# Patient Record
Sex: MALE | Race: WHITE | NOT HISPANIC OR LATINO | Employment: OTHER | URBAN - METROPOLITAN AREA
[De-identification: names, ages, dates, MRNs, and addresses within clinical notes are randomized per-mention and may not be internally consistent; named-entity substitution may affect disease eponyms.]

---

## 2017-01-05 ENCOUNTER — PATIENT MESSAGE (OUTPATIENT)
Dept: FAMILY MEDICINE | Facility: CLINIC | Age: 62
End: 2017-01-05

## 2017-01-06 ENCOUNTER — PATIENT MESSAGE (OUTPATIENT)
Dept: FAMILY MEDICINE | Facility: CLINIC | Age: 62
End: 2017-01-06

## 2017-01-06 DIAGNOSIS — E11.9 TYPE 2 DIABETES MELLITUS WITHOUT COMPLICATION: ICD-10-CM

## 2017-01-06 RX ORDER — VALSARTAN 160 MG/1
TABLET ORAL
Qty: 30 TABLET | Refills: 0 | Status: SHIPPED | OUTPATIENT
Start: 2017-01-06 | End: 2017-02-08 | Stop reason: SDUPTHER

## 2017-01-26 ENCOUNTER — LAB VISIT (OUTPATIENT)
Dept: LAB | Facility: HOSPITAL | Age: 62
End: 2017-01-26
Attending: FAMILY MEDICINE
Payer: COMMERCIAL

## 2017-01-26 ENCOUNTER — OFFICE VISIT (OUTPATIENT)
Dept: OPTOMETRY | Facility: CLINIC | Age: 62
End: 2017-01-26
Payer: COMMERCIAL

## 2017-01-26 DIAGNOSIS — H43.813 POSTERIOR VITREOUS DETACHMENT, BILATERAL: ICD-10-CM

## 2017-01-26 DIAGNOSIS — E11.9 TYPE 2 DIABETES MELLITUS WITHOUT COMPLICATION: ICD-10-CM

## 2017-01-26 DIAGNOSIS — H40.053 OCULAR HYPERTENSION, BILATERAL: Primary | ICD-10-CM

## 2017-01-26 LAB
CHOLEST/HDLC SERPL: 4.4 {RATIO}
HDL/CHOLESTEROL RATIO: 22.6 %
HDLC SERPL-MCNC: 146 MG/DL
HDLC SERPL-MCNC: 33 MG/DL
LDLC SERPL CALC-MCNC: 33.2 MG/DL
NONHDLC SERPL-MCNC: 113 MG/DL
TRIGL SERPL-MCNC: 399 MG/DL

## 2017-01-26 PROCEDURE — 99999 PR PBB SHADOW E&M-EST. PATIENT-LVL II: CPT | Mod: PBBFAC,,, | Performed by: OPTOMETRIST

## 2017-01-26 PROCEDURE — 80061 LIPID PANEL: CPT

## 2017-01-26 PROCEDURE — 92014 COMPRE OPH EXAM EST PT 1/>: CPT | Mod: S$GLB,,, | Performed by: OPTOMETRIST

## 2017-01-26 PROCEDURE — 36415 COLL VENOUS BLD VENIPUNCTURE: CPT | Mod: PO

## 2017-01-26 PROCEDURE — 92020 GONIOSCOPY: CPT | Mod: S$GLB,,, | Performed by: OPTOMETRIST

## 2017-01-26 RX ORDER — METOPROLOL SUCCINATE 25 MG/1
TABLET, EXTENDED RELEASE ORAL
Qty: 30 TABLET | Refills: 0 | Status: SHIPPED | OUTPATIENT
Start: 2017-01-26 | End: 2017-03-10 | Stop reason: SDUPTHER

## 2017-01-26 NOTE — PROGRESS NOTES
HPI     Glaucoma    Additional comments: IOP check -- good compliance w/ simbrinza OU bid            Spots and/or Floaters    Additional comments: recent increase in floaters x 3 weeks --- OD >> OS    // no light flashes           Comments   Agree above  Notes some new floaters OU, denies flash / dimness/ curtains   Compliant with meds  OU  simbrinza bid         Last edited by OCTAVIO Reyes, OD on 1/26/2017  9:10 AM. (History)            Assessment /Plan     For exam results, see Encounter Report.    Ocular hypertension, bilateral    Posterior vitreous detachment, bilateral      1. IOP controlled  Angles open on gonio  Good CCT  Fields / OCT repeat 6 months  Normal OU without progression--last testing  Continue Simbrinza OU bid    2. RD precautions given and reviewed    RTC IOP 6 months--annual exam w/ fields/ testing

## 2017-01-26 NOTE — MR AVS SNAPSHOT
"    [facility]  [address]  [facility] [address]  Phone: [phone]  Fax: [phone]                  Glenn Medel   2017 8:30 AM   Office Visit    Description:  Male : 1955   Provider:  OCTAVIO Reyes OD   Department:  [facility]           Reason for Visit     Glaucoma     Spots and/or Floaters           Diagnoses this Visit        Comments    Ocular hypertension, bilateral    -  Primary     Posterior vitreous detachment, bilateral                To Do List           Future Appointments        Provider Department Dept Phone    3/10/2017 8:20 AM LABORATORY, [place] [facility] [phone]    3/10/2017 8:25 AM SPECIMEN, [place] [facility] [phone]      Goals (5 Years of Data)     None      Follow-Up and Disposition     Return in about 5 months (around 2017) for Annual Eye Exam w/ visual fields.      [facility]     [facility] Care [facility] -  Assistance  Registered nurses in the [facility] provide clinical advisement, health education, appointment booking, and other advisory services.  Call for this free service at [phone].             Medications                Verify that the below list of medications is an accurate representation of the medications you are currently taking.  If none reported, the list may be blank. If incorrect, please contact your healthcare provider. Carry this list with you in case of emergency.           Current Medications     aspirin (ECOTRIN) 81 MG EC tablet Take 81 mg by mouth once daily.    aspirin 81 MG Chew Take 81 mg by mouth.    atorvastatin (LIPITOR) 20 MG tablet Take 1 tablet (20 mg total) by mouth once daily.    atorvastatin (LIPITOR) 20 MG tablet Take 20 mg by mouth.    BD INSULIN PEN NEEDLE UF MINI 31 gauge x 3/16" Ndle USE THREE TIMES DAILY BEFORE MEALS    bethanechol (URECHOLINE) 25 MG Tab Take 50 mg by mouth 2 (two) times daily.     " bethanechol (URECHOLINE) 25 MG Tab Take 25 mg by mouth.    bethanechol (URECHOLINE) 50 MG tablet TK 1 T PO  BID    brinzolamide (AZOPT) 1 % ophthalmic suspension 1 drop.    duloxetine (CYMBALTA) 60 MG capsule Take 60 mg by mouth 2 (two) times daily.    duloxetine (CYMBALTA) 60 MG capsule Take 60 mg by mouth.    fenofibrate (TRICOR) 145 MG tablet Take 145 mg by mouth.    finasteride (PROSCAR) 5 mg tablet Take 5 mg by mouth.    fluticasone (FLONASE) 50 mcg/actuation nasal spray 2 sprays per nostril daily    furosemide (LASIX) 20 MG tablet Take 1 tablet (20 mg total) by mouth every morning.    furosemide (LASIX) 20 MG tablet Take 20 mg by mouth.    glimepiride (AMARYL) 2 MG tablet TAKE 1 TABLET BY MOUTH DAILY BEFORE BREAKFAST    glimepiride (AMARYL) 2 MG tablet Take 2 mg by mouth.    ibuprofen-famotidine (DUEXIS) 800-26.6 mg Tab Take by mouth daily as needed.    ibuprofen-famotidine (DUEXIS) 800-26.6 mg Tab Take by mouth.    liraglutide 0.6 mg/0.1 mL, 18 mg/3 mL, subq PNIJ (VICTOZA 3-NOEL) 0.6 mg/0.1 mL (18 mg/3 mL) PnIj Inject 1.8 mg into the skin once daily.    liraglutide 0.6 mg/0.1 mL, 18 mg/3 mL, subq PNIJ 0.6 mg/0.1 mL (18 mg/3 mL) PnIj Inject 1.8 mg into the skin.    metformin (GLUCOPHAGE) 500 MG tablet Take 1,000 mg by mouth.    metformin (GLUCOPHAGE-XR) 500 MG 24 hr tablet TAKE 2 TABLETS BY MOUTH EVERY DAY WITH BREAKFAST    metoprolol succinate (TOPROL-XL) 25 MG 24 hr tablet Take 25 mg by mouth.    metoprolol succinate (TOPROL-XL) 25 MG 24 hr tablet TAKE 1 TABLET BY MOUTH DAILY    phenazopyridine (PYRIDIUM) 200 MG tablet Take 200 mg by mouth.    pramoxine 1 % Foam Place rectally 3 (three) times daily as needed.    SIMBRINZA 1-0.2 % DrpS SHAKE WELL AND INSTILL 1 DROP IN BOTH EYES TWICE DAILY    valsartan (DIOVAN) 160 MG tablet Take 160 mg by mouth.    valsartan (DIOVAN) 160 MG tablet TAKE 1 TABLET BY MOUTH EVERY DAY    vitamin D 1000 units Tab Take 1,000 Units by mouth once daily. Two tablet daily          "  Clinical Reference Information           Allergies as of 1/26/2017     Avelox [Moxifloxacin]      Immunizations Administered on Date of Encounter - 1/26/2017     None      Orders Placed During Today's Visit     Future Labs/Procedures Expected by Expires    Maier Visual Field - OU - Extended - Both Eyes  As directed 1/26/2018    OCT - Optic Nerve  As directed 1/26/2018      Instructions    FLASHES / FLOATERS / POSTERIOR VITREOUS DETACHMENT    Call the clinic if you have any further changes in symptoms.  Including:  Increased numbers of floaters or flashing lights, dimness or darkness that moves through or stays constant in your vision, or any pain in the eye (s).            DRY EYES:  Use Over The Counter artificial tears as needed for dry eye symptoms.  Some common brands include:  Systane, Optive, and Refresh.  These drops can be used as frequently as desired, but may be most helpful use during long periods of concentrated work.  For example, reading / working at the computer.  Avoid drops that "get redness out", as these contain medication that may further irritate the eyes.    ALLERGY EYES / SYMPTOMS:    Over the counter medications include--Zaditor and Alaway  Use as directed 1-2 drops daily for symptoms of itching / watering eyes.  These drops will not help for dry eye or exposure symptoms.           "

## 2017-01-27 ENCOUNTER — TELEPHONE (OUTPATIENT)
Dept: FAMILY MEDICINE | Facility: CLINIC | Age: 62
End: 2017-01-27

## 2017-01-27 DIAGNOSIS — E78.2 ELEVATED TRIGLYCERIDES WITH HIGH CHOLESTEROL: Primary | ICD-10-CM

## 2017-01-27 NOTE — TELEPHONE ENCOUNTER
Pt had some confusion and miscommunication with his lab. He came in and had a lipid done and he was not fasting. He wanted to make note. Please advise.

## 2017-01-30 NOTE — TELEPHONE ENCOUNTER
Notified pt wife of message below. Pt wife verbalized understanding and would like pt to repeat lab work. Order pended. Please advise.

## 2017-02-01 RX ORDER — BRINZOLAMIDE/BRIMONIDINE TARTRATE 10; 2 MG/ML; MG/ML
SUSPENSION/ DROPS OPHTHALMIC
Qty: 8 ML | Refills: 2 | Status: ON HOLD | OUTPATIENT
Start: 2017-02-01 | End: 2017-12-06 | Stop reason: HOSPADM

## 2017-02-02 NOTE — TELEPHONE ENCOUNTER
----- Message from Collin Brown sent at 2/1/2017  1:48 PM CST -----  Contact: Patient  Placed call to pod. Patient was returning a missed call from your office made on 01/31/2017. Please call him back at [phone]. Thanks.

## 2017-02-02 NOTE — TELEPHONE ENCOUNTER
Called pt, notified of fasting labs and he verbally understood. attached lab to nephrologist lab appt per pt request.

## 2017-02-09 RX ORDER — VALSARTAN 160 MG/1
TABLET ORAL
Qty: 30 TABLET | Refills: 0 | Status: SHIPPED | OUTPATIENT
Start: 2017-02-09 | End: 2017-03-08 | Stop reason: SDUPTHER

## 2017-02-22 RX ORDER — PEN NEEDLE, DIABETIC 31 GX5/16"
NEEDLE, DISPOSABLE MISCELLANEOUS
Qty: 100 EACH | Refills: 0 | Status: SHIPPED | OUTPATIENT
Start: 2017-02-22 | End: 2017-06-18 | Stop reason: SDUPTHER

## 2017-03-08 RX ORDER — VALSARTAN 160 MG/1
TABLET ORAL
Qty: 30 TABLET | Refills: 0 | Status: SHIPPED | OUTPATIENT
Start: 2017-03-08 | End: 2017-04-05 | Stop reason: SDUPTHER

## 2017-03-10 ENCOUNTER — LAB VISIT (OUTPATIENT)
Dept: LAB | Facility: HOSPITAL | Age: 62
End: 2017-03-10
Attending: FAMILY MEDICINE
Payer: COMMERCIAL

## 2017-03-10 DIAGNOSIS — E78.2 ELEVATED TRIGLYCERIDES WITH HIGH CHOLESTEROL: ICD-10-CM

## 2017-03-10 DIAGNOSIS — N18.30 CHRONIC KIDNEY DISEASE, STAGE III (MODERATE): ICD-10-CM

## 2017-03-10 LAB
ALBUMIN SERPL BCP-MCNC: 3.9 G/DL
ANION GAP SERPL CALC-SCNC: 10 MMOL/L
BASOPHILS # BLD AUTO: 0.03 K/UL
BASOPHILS NFR BLD: 0.4 %
BUN SERPL-MCNC: 15 MG/DL
CALCIUM SERPL-MCNC: 9.7 MG/DL
CHLORIDE SERPL-SCNC: 102 MMOL/L
CHOLEST/HDLC SERPL: 5 {RATIO}
CO2 SERPL-SCNC: 29 MMOL/L
CREAT SERPL-MCNC: 1.1 MG/DL
DIFFERENTIAL METHOD: NORMAL
EOSINOPHIL # BLD AUTO: 0.2 K/UL
EOSINOPHIL NFR BLD: 1.9 %
ERYTHROCYTE [DISTWIDTH] IN BLOOD BY AUTOMATED COUNT: 14.3 %
EST. GFR  (AFRICAN AMERICAN): >60 ML/MIN/1.73 M^2
EST. GFR  (NON AFRICAN AMERICAN): >60 ML/MIN/1.73 M^2
GLUCOSE SERPL-MCNC: 168 MG/DL
HCT VFR BLD AUTO: 49 %
HDL/CHOLESTEROL RATIO: 20 %
HDLC SERPL-MCNC: 150 MG/DL
HDLC SERPL-MCNC: 30 MG/DL
HGB BLD-MCNC: 16.2 G/DL
LDLC SERPL CALC-MCNC: 52.8 MG/DL
LYMPHOCYTES # BLD AUTO: 2.5 K/UL
LYMPHOCYTES NFR BLD: 30 %
MCH RBC QN AUTO: 28.6 PG
MCHC RBC AUTO-ENTMCNC: 33.1 %
MCV RBC AUTO: 87 FL
MONOCYTES # BLD AUTO: 0.6 K/UL
MONOCYTES NFR BLD: 7.2 %
NEUTROPHILS # BLD AUTO: 5 K/UL
NEUTROPHILS NFR BLD: 60.1 %
NONHDLC SERPL-MCNC: 120 MG/DL
PHOSPHATE SERPL-MCNC: 3.3 MG/DL
PLATELET # BLD AUTO: 239 K/UL
PMV BLD AUTO: 9.7 FL
POTASSIUM SERPL-SCNC: 4.5 MMOL/L
PTH-INTACT SERPL-MCNC: 56 PG/ML
RBC # BLD AUTO: 5.66 M/UL
SODIUM SERPL-SCNC: 141 MMOL/L
TRIGL SERPL-MCNC: 336 MG/DL
WBC # BLD AUTO: 8.36 K/UL

## 2017-03-10 PROCEDURE — 36415 COLL VENOUS BLD VENIPUNCTURE: CPT | Mod: PO

## 2017-03-10 PROCEDURE — 83970 ASSAY OF PARATHORMONE: CPT

## 2017-03-10 PROCEDURE — 83036 HEMOGLOBIN GLYCOSYLATED A1C: CPT

## 2017-03-10 PROCEDURE — 85025 COMPLETE CBC W/AUTO DIFF WBC: CPT

## 2017-03-10 PROCEDURE — 80069 RENAL FUNCTION PANEL: CPT

## 2017-03-10 PROCEDURE — 80061 LIPID PANEL: CPT

## 2017-03-11 LAB
ESTIMATED AVG GLUCOSE: 169 MG/DL
HBA1C MFR BLD HPLC: 7.5 %

## 2017-03-13 ENCOUNTER — TELEPHONE (OUTPATIENT)
Dept: NEPHROLOGY | Facility: CLINIC | Age: 62
End: 2017-03-13

## 2017-03-13 ENCOUNTER — PATIENT MESSAGE (OUTPATIENT)
Dept: NEPHROLOGY | Facility: CLINIC | Age: 62
End: 2017-03-13

## 2017-03-13 RX ORDER — METOPROLOL SUCCINATE 25 MG/1
25 TABLET, EXTENDED RELEASE ORAL DAILY
Qty: 30 TABLET | Refills: 5 | Status: SHIPPED | OUTPATIENT
Start: 2017-03-13 | End: 2017-09-10 | Stop reason: SDUPTHER

## 2017-03-20 ENCOUNTER — PATIENT MESSAGE (OUTPATIENT)
Dept: NEPHROLOGY | Facility: CLINIC | Age: 62
End: 2017-03-20

## 2017-03-20 ENCOUNTER — HOSPITAL ENCOUNTER (OUTPATIENT)
Dept: RADIOLOGY | Facility: HOSPITAL | Age: 62
Discharge: HOME | End: 2017-03-20
Attending: INTERNAL MEDICINE
Payer: COMMERCIAL

## 2017-03-20 ENCOUNTER — OFFICE VISIT (OUTPATIENT)
Dept: NEPHROLOGY | Facility: CLINIC | Age: 62
End: 2017-03-20
Payer: COMMERCIAL

## 2017-03-20 VITALS
HEIGHT: 70 IN | DIASTOLIC BLOOD PRESSURE: 84 MMHG | OXYGEN SATURATION: 97 % | TEMPERATURE: 98 F | BODY MASS INDEX: 32.19 KG/M2 | HEART RATE: 89 BPM | SYSTOLIC BLOOD PRESSURE: 124 MMHG | WEIGHT: 224.88 LBS

## 2017-03-20 DIAGNOSIS — E11.21 TYPE 2 DIABETES MELLITUS WITH DIABETIC NEPHROPATHY, WITH LONG-TERM CURRENT USE OF INSULIN: ICD-10-CM

## 2017-03-20 DIAGNOSIS — G47.33 OSA ON CPAP: ICD-10-CM

## 2017-03-20 DIAGNOSIS — I10 ESSENTIAL HYPERTENSION: ICD-10-CM

## 2017-03-20 DIAGNOSIS — N18.30 CHRONIC KIDNEY DISEASE, STAGE III (MODERATE): Primary | ICD-10-CM

## 2017-03-20 DIAGNOSIS — J98.11 ATELECTASIS: ICD-10-CM

## 2017-03-20 DIAGNOSIS — R93.89 ABNORMAL CHEST X-RAY: Primary | ICD-10-CM

## 2017-03-20 DIAGNOSIS — E66.9 OBESITY (BMI 30.0-34.9): ICD-10-CM

## 2017-03-20 DIAGNOSIS — Z79.4 TYPE 2 DIABETES MELLITUS WITH DIABETIC NEPHROPATHY, WITH LONG-TERM CURRENT USE OF INSULIN: ICD-10-CM

## 2017-03-20 PROCEDURE — 3066F NEPHROPATHY DOC TX: CPT | Mod: S$GLB,,, | Performed by: INTERNAL MEDICINE

## 2017-03-20 PROCEDURE — 99214 OFFICE O/P EST MOD 30 MIN: CPT | Mod: S$GLB,,, | Performed by: INTERNAL MEDICINE

## 2017-03-20 PROCEDURE — 3045F PR MOST RECENT HEMOGLOBIN A1C LEVEL 7.0-9.0%: CPT | Mod: S$GLB,,, | Performed by: INTERNAL MEDICINE

## 2017-03-20 PROCEDURE — 3079F DIAST BP 80-89 MM HG: CPT | Mod: S$GLB,,, | Performed by: INTERNAL MEDICINE

## 2017-03-20 PROCEDURE — 71020 XR CHEST PA AND LATERAL: CPT | Mod: 26,,, | Performed by: RADIOLOGY

## 2017-03-20 PROCEDURE — 99999 PR PBB SHADOW E&M-EST. PATIENT-LVL IV: CPT | Mod: PBBFAC,,, | Performed by: INTERNAL MEDICINE

## 2017-03-20 PROCEDURE — 71020 XR CHEST PA AND LATERAL: CPT | Mod: TC,PO

## 2017-03-20 PROCEDURE — 3074F SYST BP LT 130 MM HG: CPT | Mod: S$GLB,,, | Performed by: INTERNAL MEDICINE

## 2017-03-20 PROCEDURE — 1160F RVW MEDS BY RX/DR IN RCRD: CPT | Mod: S$GLB,,, | Performed by: INTERNAL MEDICINE

## 2017-03-20 RX ORDER — MODAFINIL 200 MG/1
200 TABLET ORAL 2 TIMES DAILY
Refills: 3 | COMMUNITY
Start: 2017-01-30 | End: 2017-06-05 | Stop reason: SDUPTHER

## 2017-03-20 NOTE — PROGRESS NOTES
Subjective:       Patient ID: Glenn Medel is a 61 y.o. White male who presents for follow-up evaluation of Chronic Kidney Disease    HPI     He reports he is feeling well. He continues to commute to [facility] for work. No edema nor SOB> He's not very adherent with CPAP. Last Hba1c was 7.5. No new medications since last visit. He is concerned about his mom as she is having frequent falls    Review of Systems   Constitutional: Negative for activity change, appetite change, fatigue and unexpected weight change.   HENT: Negative for facial swelling.    Respiratory: Negative for shortness of breath.    Cardiovascular: Negative for chest pain and leg swelling.   Genitourinary: Negative for difficulty urinating, frequency and hematuria.   Musculoskeletal: Negative for arthralgias.   Neurological: Negative for weakness and headaches.       Objective:      Physical Exam   Constitutional: He is oriented to person, place, and time. He appears well-developed and well-nourished. No distress.   Neck: No JVD present.   Cardiovascular: S1 normal and S2 normal.  Exam reveals no friction rub.    Pulmonary/Chest: Breath sounds normal. He has no wheezes. He has no rales.   Abdominal: Soft.   Musculoskeletal: He exhibits no edema.   Neurological: He is alert and oriented to person, place, and time.   Skin: Skin is warm and dry.   Psychiatric: He has a normal mood and affect.   Vitals reviewed.      Assessment:       1. Chronic kidney disease, stage 3    2. Atelectasis    3. Essential hypertension    4. Type 2 diabetes mellitus with diabetic nephropathy, with long-term current use of insulin    5. Obesity (BMI 30.0-34.9)    6. LOVE on CPAP        Plan:             CKD stage 3 with stable kidney function and proteinuria. Continue RAAS blockade for renal preservation    HTN is controlled    MBD--no change in therapy    DM--continue Endocrine follow up    Abnormal CXR--repeat today        RTC 5 months in [place] with labs prior

## 2017-03-23 ENCOUNTER — HOSPITAL ENCOUNTER (OUTPATIENT)
Dept: RADIOLOGY | Facility: HOSPITAL | Age: 62
Discharge: HOME | End: 2017-03-23
Attending: INTERNAL MEDICINE
Payer: COMMERCIAL

## 2017-03-23 DIAGNOSIS — R93.89 ABNORMAL CHEST X-RAY: ICD-10-CM

## 2017-03-23 PROCEDURE — 71250 CT THORAX DX C-: CPT | Mod: TC,PO

## 2017-03-23 PROCEDURE — 71250 CT THORAX DX C-: CPT | Mod: 26,,, | Performed by: RADIOLOGY

## 2017-03-24 ENCOUNTER — TELEPHONE (OUTPATIENT)
Dept: NEPHROLOGY | Facility: CLINIC | Age: 62
End: 2017-03-24

## 2017-03-24 ENCOUNTER — PATIENT MESSAGE (OUTPATIENT)
Dept: NEPHROLOGY | Facility: CLINIC | Age: 62
End: 2017-03-24

## 2017-03-24 DIAGNOSIS — R93.89 ABNORMAL CHEST CT: Primary | ICD-10-CM

## 2017-03-24 NOTE — TELEPHONE ENCOUNTER
Please refer pt to pulmonary at [facility] for abnormal chest CT scan. Order in, thank you

## 2017-03-27 ENCOUNTER — PATIENT MESSAGE (OUTPATIENT)
Dept: ADMINISTRATIVE | Facility: HOSPITAL | Age: 62
End: 2017-03-27

## 2017-03-27 NOTE — TELEPHONE ENCOUNTER
Unable to schedule.  Called pulmonary lab who advised to call [phone].  Didn't offer to transfer.  This information was given to patient.

## 2017-04-04 ENCOUNTER — OFFICE VISIT (OUTPATIENT)
Dept: SLEEP MEDICINE | Facility: CLINIC | Age: 62
End: 2017-04-04
Payer: COMMERCIAL

## 2017-04-04 VITALS
BODY MASS INDEX: 32.14 KG/M2 | DIASTOLIC BLOOD PRESSURE: 68 MMHG | HEART RATE: 83 BPM | OXYGEN SATURATION: 97 % | WEIGHT: 224 LBS | SYSTOLIC BLOOD PRESSURE: 104 MMHG

## 2017-04-04 DIAGNOSIS — J84.9 ILD (INTERSTITIAL LUNG DISEASE): ICD-10-CM

## 2017-04-04 DIAGNOSIS — G47.10 HYPERSOMNIA: ICD-10-CM

## 2017-04-04 DIAGNOSIS — G47.33 OSA (OBSTRUCTIVE SLEEP APNEA): Primary | ICD-10-CM

## 2017-04-04 DIAGNOSIS — R09.81 NASAL CONGESTION: ICD-10-CM

## 2017-04-04 PROCEDURE — 99214 OFFICE O/P EST MOD 30 MIN: CPT | Mod: S$GLB,,, | Performed by: INTERNAL MEDICINE

## 2017-04-04 PROCEDURE — 1160F RVW MEDS BY RX/DR IN RCRD: CPT | Mod: S$GLB,,, | Performed by: INTERNAL MEDICINE

## 2017-04-04 PROCEDURE — 3074F SYST BP LT 130 MM HG: CPT | Mod: S$GLB,,, | Performed by: INTERNAL MEDICINE

## 2017-04-04 PROCEDURE — 3078F DIAST BP <80 MM HG: CPT | Mod: S$GLB,,, | Performed by: INTERNAL MEDICINE

## 2017-04-04 PROCEDURE — 99999 PR PBB SHADOW E&M-EST. PATIENT-LVL III: CPT | Mod: PBBFAC,,, | Performed by: INTERNAL MEDICINE

## 2017-04-04 NOTE — PROGRESS NOTES
Glenn Medel  was seen as a follow up.    CHIEF COMPLAINT:    Chief Complaint   Patient presents with    Sleep Apnea       HISTORY OF PRESENT ILLNESS: Glenn Medel is a 61 y.o. male is here for sleep evaluation.   Patient was diagnosed with LOVE in 1997.  Currently, patient is on Auto-BiPA 11/8.  Patient has been using bipap daily since 2013.  Still with excessive daytime somnolence.  Patient has seen Dr. Mackenzie Hinton at [facility] and underwent mslt in 2012.  Patient was started on nuvigil without much improvement.  Patient with intermittent snoring while on BIPAP.  +drymouth each morning upon awake.  No witnessed sleep apnea.  +fatigue upon awake daily.  No parasomnia.  No cataplexy.  No RLS symptoms.  +nasal congestion.    Muddy Sleepiness Scale score during initial sleep evaluation was 17.    Bipap was changed from 11/8 to 16/10 without much improvement in 2/15.  Patient underwent titration 3/26/15.  With optimal cpap settings of 16.  In 4/15 , patient bipap was switch to cpap 16.  Currently on cpap 16 cm h20.  Patient stopped cpap for several weeks without much difference in sleep quality.  Patient resumed cpap 2 days ago without much changes.  Currently on provigil bid.  Patient was only requiring provigil once daily when off cpap.  Per patient, his wife does not complain of snoring when he was sleeping without cpap.  +weight loss 14 lbs over past 3 months.      S/p cxr 3/20/17 by pcp with basilar infiltrate.  S/p ct of chest.  No fever/chills.  No coughing.  Have not exercise due to time constraint.  +weight loss.      SLEEP ROUTINE:  Activity the hour prior to sleep: watch tv    Bed partner:  Wife   Time to bed:  8:30 to 9 pm   Lights off:  Yes   Sleep onset latency:  5 minutes        Disruptions or awakenings:    6-8 times per night (5-10 minutes to go back to sleep)  Wakeup time:      5 am  Perceived sleep quality:  tire       Daytime naps:      Frequent unintentional nap  Weekend sleep routine:       9 pm till 7 am (still tire)  Caffeine use: none  exercise habit:   none    PAST MEDICAL HISTORY:    Active Ambulatory Problems     Diagnosis Date Noted    BPH (benign prostatic hyperplasia)     LOVE on CPAP     HTN (hypertension)     DDD (degenerative disc disease), lumbar     Palpitations 10/22/2014    Chronic kidney disease, stage 3 02/25/2015    Type II or unspecified type diabetes mellitus without mention of complication, uncontrolled 05/19/2015    Type II or unspecified type diabetes mellitus with neurological manifestations, uncontrolled 05/19/2015    Peripheral neuropathy 05/19/2015    Hyperlipidemia 05/19/2015    DM type 2 (diabetes mellitus, type 2) 07/29/2015    Obesity (BMI 30.0-34.9) 07/29/2015    Type 2 diabetes mellitus with neurologic complication 01/12/2016    Itch 01/12/2016    Hypersomnia with sleep apnea, unspecified      Resolved Ambulatory Problems     Diagnosis Date Noted    Insulin long-term use 07/29/2015    Encounter for medication monitoring 07/29/2015     Past Medical History:   Diagnosis Date    BPH (benign prostatic hyperplasia)     Cataract     DDD (degenerative disc disease), lumbar     Diabetes mellitus, stable     Glaucoma     HTN (hypertension)     Obesity     LOVE on CPAP                 PAST SURGICAL HISTORY:    Past Surgical History:   Procedure Laterality Date    HAND SURGERY      TONSILLECTOMY           FAMILY HISTORY:                Family History   Problem Relation Age of Onset    Heart disease Brother 62    Heart disease Father     Diabetes Father     Colon cancer Father     Cancer Father     Cataracts Father     Hypertension Mother     Diabetes Mother     Cancer Mother     Stroke Mother     Glaucoma Maternal Grandmother     Amblyopia Neg Hx     Blindness Neg Hx     Macular degeneration Neg Hx     Retinal detachment Neg Hx     Strabismus Neg Hx     Thyroid disease Neg Hx        SOCIAL HISTORY:          Tobacco:   History   Smoking  "Status    Never Smoker   Smokeless Tobacco    Never Used       alcohol use:    History   Alcohol Use    Yes     Comment: occasional                 Occupation:   for [employer]    ALLERGIES:    Review of patient's allergies indicates:   Allergen Reactions    Avelox [moxifloxacin] Hives and Rash       CURRENT MEDICATIONS:    Current Outpatient Prescriptions   Medication Sig Dispense Refill    aspirin (ECOTRIN) 81 MG EC tablet Take 81 mg by mouth once daily.      atorvastatin (LIPITOR) 20 MG tablet Take 1 tablet (20 mg total) by mouth once daily. 90 tablet 3    BD INSULIN PEN NEEDLE UF MINI 31 gauge x 3/16" Ndle USE THREE TIMES DAILY BEFORE MEALS 100 each 0    brinzolamide (AZOPT) 1 % ophthalmic suspension Place 1 drop into both eyes 2 (two) times daily.       duloxetine (CYMBALTA) 60 MG capsule Take 60 mg by mouth 2 (two) times daily.      furosemide (LASIX) 20 MG tablet Take 1 tablet (20 mg total) by mouth every morning. 30 tablet 9    glimepiride (AMARYL) 2 MG tablet TAKE 1 TABLET BY MOUTH DAILY BEFORE BREAKFAST 30 tablet 9    ibuprofen-famotidine (DUEXIS) 800-26.6 mg Tab Take by mouth daily as needed.      liraglutide 0.6 mg/0.1 mL, 18 mg/3 mL, subq PNIJ (VICTOZA 3-NOEL) 0.6 mg/0.1 mL (18 mg/3 mL) PnIj Inject 1.8 mg into the skin once daily. 3 mL 11    metformin (GLUCOPHAGE-XR) 500 MG 24 hr tablet TAKE 2 TABLETS BY MOUTH EVERY DAY WITH BREAKFAST 180 tablet 3    metoprolol succinate (TOPROL-XL) 25 MG 24 hr tablet Take 1 tablet (25 mg total) by mouth once daily. 30 tablet 5    modafinil (PROVIGIL) 200 MG Tab 200 mg 2 (two) times daily.   3    SIMBRINZA 1-0.2 % DrpS SHAKE LIQUID AND INSTILL 1 DROP IN BOTH EYES TWICE DAILY 8 mL 2    valsartan (DIOVAN) 160 MG tablet TAKE 1 TABLET BY MOUTH EVERY DAY 30 tablet 0    vitamin D 1000 units Tab Take 1,000 Units by mouth. Two tablet daily        No current facility-administered medications for this visit.                   REVIEW OF " SYSTEMS:     Sleep related symptoms as per HPI.  CONST:Denies weight gain    HEENT: + sinus congestion  PULM: Denies dyspnea  CARD:  Denies palpitations   GI:  Denies acid reflux  : Denies polyuria  NEURO: Denies headaches  PSYCH: anxiety and depression  HEME: Denies anemia   Otherwise, a balance of systems reviewed is negative.          PHYSICAL EXAM:  Vitals:    04/04/17 1417   BP: 104/68   Pulse: 83   SpO2: 97%   Weight: 101.6 kg (224 lb)   PainSc: 0-No pain     Body mass index is 32.14 kg/(m^2).     GENERAL: Normal development, well groomed  HEENT:  Conjunctivae are non-erythematous; Pupils equal, round, and reactive to light; Nose is symmetrical; Nasal mucosa is pink and moist; Septum is midline; Inferior turbinates are normal; Nasal airflow is congested; Posterior pharynx is pink; Modified Mallampati: 3; Posterior palate is normal; Tonsils +1; Uvula is normal and pink;Tongue is normal; Dentition is fair; No TMJ tenderness; Jaw opening and protrusion without click and without discomfort.  NECK: Supple. Neck circumference is 17.25 inches. No thyromegaly. No palpable nodes.     SKIN: On face and neck: No abrasions, no rashes, no lesions.  No subcutaneous nodules are palpable.  RESPIRATORY: Chest is clear to auscultation.  Normal chest expansion and non-labored breathing at rest.  CARDIOVASCULAR: Normal S1, S2.  No murmurs, gallops or rubs. No carotid bruits bilaterally.  EXTREMITIES: No edema. No clubbing. No cyanosis. Station normal. Gait normal.        NEURO/PSYCH: Oriented to time, place and person. Normal attention span and concentration. Affect is full. Mood is normal.                                              DATA split study at [facility] 8/16/11 ahi of 83 with optimal cpap of 11 cm H20  mslt 2/13/12  Sleep latency of 3:51; no sorem  Titration 3/28/15 optimal cpap of 16 cm H20  mslt - sleep latency of 3 minutes 40 second.    Ct of chest 3/21/17 scattered area of hyperattenuation.  No consolidation or  effusion.  +pleural thickening.  No lad.  Lab Results   Component Value Date    TSH 0.683 09/22/2015     ASSESSMENT    ICD-10-CM ICD-9-CM    1. LOVE (obstructive sleep apnea) G47.33 327.23    2. Hypersomnia G47.10 780.54    3. Nasal congestion R09.81 478.19    4. ILD (interstitial lung disease) J84.9 515 Stress test, pulmonary      DLCO-Carbon Monoxide Diffusing Capacity      LUNG VOLUMES      Spirometry without Bronchodilator       PLAN:    Sleep Apnea - per patient, sleep is more sound without cpap.  Will switch to auto bipap 4-25 with ps of 5-8.  In light of weight loss (~10%), will bring back for requalification study.        Hypersomnia - will resume provigil twice daily since nuvigil is being denied.  mslt did not meet criteria for narcolepsy.  Patient is hesitant about adderal due to cardiac history.  encourage schedule nap.    Nasal congestion -  Resolved.      ild - non-specific findings on ct.  Clinically asymptomatic.  Will send for baseline pft and 6 min walk.      Education: During our discussion today, we talked about the etiology of obstructive sleep apnea as well as the potential ramifications of untreated sleep apnea, which could include daytime sleepiness, hypertension, heart disease and/or stroke.      Precautions: The patient was advised to abstain from driving should they feel sleepy or drowsy.     Patient will No Follow-up on file.      This is 25 minutes visit, over 50% of time spent in direct consultation with patient.

## 2017-04-05 RX ORDER — VALSARTAN 160 MG/1
TABLET ORAL
Qty: 30 TABLET | Refills: 0 | Status: SHIPPED | OUTPATIENT
Start: 2017-04-05 | End: 2017-05-04 | Stop reason: SDUPTHER

## 2017-04-06 ENCOUNTER — HOSPITAL ENCOUNTER (OUTPATIENT)
Dept: PULMONOLOGY | Facility: CLINIC | Age: 62
Discharge: HOME | End: 2017-04-06
Payer: COMMERCIAL

## 2017-04-06 VITALS — HEIGHT: 71 IN | BODY MASS INDEX: 31.6 KG/M2 | WEIGHT: 225.75 LBS

## 2017-04-06 DIAGNOSIS — J84.9 ILD (INTERSTITIAL LUNG DISEASE): ICD-10-CM

## 2017-04-06 LAB
PRE FEV1 FVC: 81
PRE FEV1: 3.37
PRE FVC: 4.14
PREDICTED FEV1 FVC: 79
PREDICTED FEV1: 3.68
PREDICTED FVC: 4.58

## 2017-04-06 PROCEDURE — 94620 PR PULMONARY STRESS TESTING,SIMPLE: CPT | Mod: S$GLB,,, | Performed by: INTERNAL MEDICINE

## 2017-04-06 PROCEDURE — 94010 BREATHING CAPACITY TEST: CPT | Mod: 59,S$GLB,, | Performed by: INTERNAL MEDICINE

## 2017-04-06 PROCEDURE — 94729 DIFFUSING CAPACITY: CPT | Mod: S$GLB,,, | Performed by: INTERNAL MEDICINE

## 2017-04-06 PROCEDURE — 94727 GAS DIL/WSHOT DETER LNG VOL: CPT | Mod: S$GLB,,, | Performed by: INTERNAL MEDICINE

## 2017-04-07 NOTE — PROCEDURES
Glenn Medel is a 61 y.o.  male patient, who presents for a 6 minute walk test ordered by Brian Hampton MD.  The diagnosis is Shortness of Breath; Interstitial Lung Disease.  The patient's BMI is 31.6 kg/m2.  Predicted distance (lower limit of normal) is 379.44 meters.      Test Results:    The test was completed without stopping.  The total time walked was 360 seconds.  During walking, the patient reported:  Dyspnea, Leg pain, Lightheadedness. The patient used no assistive devices  during testing.     04/06/2017---------Distance: 518.16 meters (1700 feet)     O2 Sat % Supplemental Oxygen Heart Rate Blood Pressure Yahir Scale   Pre-exercise  (Resting) 98 % Room Air 75 bpm 121/76 mmHg 3   During Exercise 95 % Room Air 100 bpm 135/76 mmHg 9   Post-exercise  (Recovery) 98 % Room Air  85 bpm       Recovery Time: 79 seconds    Performing nurse/tech: Lucas ABBASI      PREVIOUS STUDY:   The patient has not had a previous study.      CLINICAL INTERPRETATION:  Six minute walk distance is 518.16 meters (1700 feet) with very, very heavy dyspnea.  During exercise, there was desaturation while breathing room air.  Blood pressure remained stable and Heart rate increased significantly with walking.  The patient reported non-pulmonary symptoms during exercise.  No previous study performed.  Based upon age and body mass index, exercise capacity is normal.

## 2017-04-10 ENCOUNTER — TELEPHONE (OUTPATIENT)
Dept: SLEEP MEDICINE | Facility: OTHER | Age: 62
End: 2017-04-10

## 2017-04-10 ENCOUNTER — PATIENT MESSAGE (OUTPATIENT)
Dept: NEPHROLOGY | Facility: CLINIC | Age: 62
End: 2017-04-10

## 2017-04-11 ENCOUNTER — TELEPHONE (OUTPATIENT)
Dept: SLEEP MEDICINE | Facility: OTHER | Age: 62
End: 2017-04-11

## 2017-04-11 ENCOUNTER — PATIENT MESSAGE (OUTPATIENT)
Dept: SLEEP MEDICINE | Facility: CLINIC | Age: 62
End: 2017-04-11

## 2017-04-17 ENCOUNTER — HOSPITAL ENCOUNTER (OUTPATIENT)
Dept: SLEEP MEDICINE | Facility: OTHER | Age: 62
Discharge: HOME | End: 2017-04-17
Attending: INTERNAL MEDICINE
Payer: COMMERCIAL

## 2017-04-17 DIAGNOSIS — G47.33 OSA (OBSTRUCTIVE SLEEP APNEA): ICD-10-CM

## 2017-04-17 PROCEDURE — 95811 POLYSOM 6/>YRS CPAP 4/> PARM: CPT

## 2017-04-17 PROCEDURE — 95811 POLYSOM 6/>YRS CPAP 4/> PARM: CPT | Mod: 26,,, | Performed by: INTERNAL MEDICINE

## 2017-04-17 NOTE — Clinical Note
Please schedule sleep clinic appointmetnt with md in 1-2 weeks to discuss sleep study result.  Please advice patient to bring cpap to clinic.

## 2017-04-18 NOTE — PROGRESS NOTES
A split night study was preformed on [facility]. The procedure was explained to the patient and questions were answered prior to the start of the study. The EKG appeared to have shown NSR. The mask used for the titration was a ResMed Mirage FX nasal standard size with a chin strap. Cflex ranged from 0-2 and humidity was at 3.  Cpap ranged from [id number]. All sleep stages were reached. Supine REM noted. Optimal pressure of [id number] reached in the side lying position. The patients response to cpap at the end of the night was that it was exactly how he remembered it to be at home and he didn't like lying on his back.

## 2017-04-24 ENCOUNTER — HOSPITAL ENCOUNTER (OUTPATIENT)
Dept: RADIOLOGY | Facility: HOSPITAL | Age: 62
Discharge: HOME | End: 2017-04-24
Attending: FAMILY MEDICINE
Payer: COMMERCIAL

## 2017-04-24 ENCOUNTER — OFFICE VISIT (OUTPATIENT)
Dept: FAMILY MEDICINE | Facility: CLINIC | Age: 62
End: 2017-04-24
Payer: COMMERCIAL

## 2017-04-24 VITALS
BODY MASS INDEX: 31.3 KG/M2 | WEIGHT: 223.56 LBS | DIASTOLIC BLOOD PRESSURE: 72 MMHG | HEIGHT: 71 IN | HEART RATE: 72 BPM | RESPIRATION RATE: 18 BRPM | SYSTOLIC BLOOD PRESSURE: 118 MMHG

## 2017-04-24 DIAGNOSIS — I10 ESSENTIAL HYPERTENSION: ICD-10-CM

## 2017-04-24 DIAGNOSIS — M79.672 LEFT FOOT PAIN: ICD-10-CM

## 2017-04-24 PROCEDURE — 73630 X-RAY EXAM OF FOOT: CPT | Mod: 26,LT,, | Performed by: RADIOLOGY

## 2017-04-24 PROCEDURE — 99214 OFFICE O/P EST MOD 30 MIN: CPT | Mod: S$GLB,,, | Performed by: FAMILY MEDICINE

## 2017-04-24 PROCEDURE — 73630 X-RAY EXAM OF FOOT: CPT | Mod: TC,PO,LT

## 2017-04-24 PROCEDURE — 99999 PR PBB SHADOW E&M-EST. PATIENT-LVL III: CPT | Mod: PBBFAC,,, | Performed by: FAMILY MEDICINE

## 2017-04-24 PROCEDURE — 3045F PR MOST RECENT HEMOGLOBIN A1C LEVEL 7.0-9.0%: CPT | Mod: S$GLB,,, | Performed by: FAMILY MEDICINE

## 2017-04-24 PROCEDURE — 1160F RVW MEDS BY RX/DR IN RCRD: CPT | Mod: S$GLB,,, | Performed by: FAMILY MEDICINE

## 2017-04-24 PROCEDURE — 3066F NEPHROPATHY DOC TX: CPT | Mod: S$GLB,,, | Performed by: FAMILY MEDICINE

## 2017-04-24 PROCEDURE — 3078F DIAST BP <80 MM HG: CPT | Mod: S$GLB,,, | Performed by: FAMILY MEDICINE

## 2017-04-24 PROCEDURE — 3074F SYST BP LT 130 MM HG: CPT | Mod: S$GLB,,, | Performed by: FAMILY MEDICINE

## 2017-04-24 RX ORDER — MUPIROCIN 20 MG/G
OINTMENT TOPICAL
Refills: 0 | Status: ON HOLD | COMMUNITY
Start: 2017-04-01 | End: 2017-12-06 | Stop reason: HOSPADM

## 2017-04-24 NOTE — PROGRESS NOTES
Subjective:       Patient ID: Glenn Medel is a 61 y.o. male    Chief Complaint: Hypertension and Follow-up (foot pain, tingling behind left ear)    Hypertension   This is a chronic problem. The problem is controlled. Pertinent negatives include no chest pain, headaches, orthopnea, palpitations or shortness of breath. Past treatments include angiotensin blockers and calcium channel blockers. The current treatment provides significant improvement. There are no compliance problems.    Diabetes   He presents for his follow-up diabetic visit. He has type 2 diabetes mellitus. His disease course has been stable. Pertinent negatives for hypoglycemia include no confusion or headaches. Pertinent negatives for diabetes include no chest pain, no polydipsia, no polyuria and no weakness. There are no hypoglycemic complications. Symptoms are stable. Current diabetic treatment includes oral agent (triple therapy). He is compliant with treatment all of the time. There is no change in his home blood glucose trend. An ACE inhibitor/angiotensin II receptor blocker is being taken. Eye exam is current.       Review of Systems   Constitutional: Negative for activity change.   HENT: Negative for hearing loss, rhinorrhea and trouble swallowing.    Eyes: Positive for visual disturbance. Negative for discharge.   Respiratory: Negative for chest tightness, shortness of breath and wheezing.    Cardiovascular: Negative for chest pain, palpitations and orthopnea.   Gastrointestinal: Negative for blood in stool, constipation, diarrhea and vomiting.   Endocrine: Negative for polydipsia and polyuria.   Genitourinary: Negative for difficulty urinating, hematuria and urgency.   Musculoskeletal: Positive for arthralgias (pain over the top of the left foot). Negative for joint swelling.   Neurological: Negative for weakness and headaches.   Psychiatric/Behavioral: Negative for confusion and dysphoric mood.         Objective:   Physical Exam    Constitutional: He is oriented to person, place, and time. He appears well-developed and well-nourished. No distress.   Cardiovascular:   Pulses:       Dorsalis pedis pulses are 1+ on the right side, and 1+ on the left side.   Musculoskeletal:        Right foot: There is no deformity.        Left foot: There is no deformity.   Feet:   Right Foot:   Protective Sensation: 4 sites tested. 4 sites sensed.   Skin Integrity: Negative for skin breakdown.   Left Foot:   Protective Sensation: 4 sites tested. 4 sites sensed.   Skin Integrity: Negative for skin breakdown.   Neurological: He is alert and oriented to person, place, and time.   Vitals reviewed.        Results for orders placed or performed in visit on 03/10/17   Protein / creatinine ratio, urine   Result Value Ref Range    Protein, Urine Random 12 0 - 15 mg/dL    Creatinine, Random Ur 196.0 23.0 - 375.0 mg/dL    Prot/Creat Ratio, Ur 0.06 0.00 - 0.20   Urinalysis   Result Value Ref Range    Specimen UA Urine, Clean Catch     Color, UA Yellow Yellow, Straw, Francisca    Appearance, UA Clear Clear    pH, UA 6.0 5.0 - 8.0    Specific Gravity, UA 1.025 1.005 - 1.030    Protein, UA Negative Negative    Glucose, UA Trace (A) Negative    Ketones, UA Negative Negative    Bilirubin (UA) Negative Negative    Occult Blood UA Negative Negative    Nitrite, UA Negative Negative    Leukocytes, UA Negative Negative     Lab Results   Component Value Date    HGBA1C 7.5 (H) 03/10/2017       Assessment:       1. Uncontrolled type 2 diabetes mellitus with diabetic polyneuropathy, with long-term current use of insulin     2. Essential hypertension     3. Left foot pain  Uric acid    X-Ray Foot Complete Left         Plan:       Uncontrolled type 2 diabetes mellitus with diabetic polyneuropathy, with long-term current use of insulin  - Diet and exercise education.  - Serial glucose monitoring  - Continue current therapy    Essential hypertension  - Continue current therapy  - Serial blood  pressure monitoring  - Diet and exercise education.    Left foot pain  -     Uric acid; Future; Expected date: 4/24/17  -     X-Ray Foot Complete Left; Future; Expected date: 4/24/17

## 2017-04-24 NOTE — MR AVS SNAPSHOT
[facility]  [address] [facility] [address]  [place] [address]  Phone: [phone]  Fax: [phone]                  Glenn Medel   2017 2:00 PM   Office Visit    Description:  Male : 1955   Provider:  Pratik Hernandez MD   Department:  [facility]           Reason for Visit     Hypertension     Follow-up           Diagnoses this Visit        Comments    Uncontrolled type 2 diabetes mellitus with diabetic polyneuropathy, with long-term current use of insulin    -  Primary     Essential hypertension         Left foot pain                To Do List           Future Appointments        Provider Department Dept Phone    2017 2:00 PM Pratik Hernandez MD [facility] [phone]    2017 8:20 AM LABORATORY, [place] [facility] [phone]    2017 8:25 AM SPECIMEN, [place] [facility] [phone]      Goals (5 Years of Data)     None      Follow-Up and Disposition     Return in about 4 months (around 2017).    Follow-up and Disposition History      [facility]     [facility] Nurse Care Line -  Assistance  Unless otherwise directed by your provider, please contact [facility], our nurse care line that is available for  assistance.     Registered nurses in the [facility] provide: appointment scheduling, clinical advisement, health education, and other advisory services.  Call: [phone] (toll free)               Medications                Verify that the below list of medications is an accurate representation of the medications you are currently taking.  If none reported, the list may be blank. If incorrect, please contact your healthcare provider. Carry this list with you in case of emergency.           Current Medications     aspirin (ECOTRIN) 81 MG EC tablet Take 81 mg by mouth once daily.    atorvastatin (LIPITOR) 20 MG tablet Take 1 tablet (20 mg  "total) by mouth once daily.    BD INSULIN PEN NEEDLE UF MINI 31 gauge x 3/16" Ndle USE THREE TIMES DAILY BEFORE MEALS    brinzolamide (AZOPT) 1 % ophthalmic suspension Place 1 drop into both eyes 2 (two) times daily.     duloxetine (CYMBALTA) 60 MG capsule Take 60 mg by mouth 2 (two) times daily.    furosemide (LASIX) 20 MG tablet Take 1 tablet (20 mg total) by mouth every morning.    glimepiride (AMARYL) 2 MG tablet TAKE 1 TABLET BY MOUTH DAILY BEFORE BREAKFAST    ibuprofen-famotidine (DUEXIS) 800-26.6 mg Tab Take by mouth daily as needed.    liraglutide 0.6 mg/0.1 mL, 18 mg/3 mL, subq PNIJ (VICTOZA 3-NOEL) 0.6 mg/0.1 mL (18 mg/3 mL) PnIj Inject 1.8 mg into the skin once daily.    metformin (GLUCOPHAGE-XR) 500 MG 24 hr tablet TAKE 2 TABLETS BY MOUTH EVERY DAY WITH BREAKFAST    metoprolol succinate (TOPROL-XL) 25 MG 24 hr tablet Take 1 tablet (25 mg total) by mouth once daily.    modafinil (PROVIGIL) 200 MG Tab 200 mg 2 (two) times daily.     mupirocin (BACTROBAN) 2 % ointment GILMAR AA OF SKIN BID    SIMBRINZA 1-0.2 % DrpS SHAKE LIQUID AND INSTILL 1 DROP IN BOTH EYES TWICE DAILY    valsartan (DIOVAN) 160 MG tablet TAKE 1 TABLET BY MOUTH EVERY DAY    vitamin D 1000 units Tab Take 1,000 Units by mouth. Two tablet daily            Clinical Reference Information           Your Vitals Were     BP Pulse Resp Height Weight BMI    118/72 (BP Location: Right arm) 72 18 5' 11" (1.803 m) 101.4 kg (223 lb 8.7 oz) 31.18 kg/m2      Blood Pressure          Most Recent Value    BP  118/72      Allergies as of 4/24/2017     Avelox [Moxifloxacin]      Immunizations Administered on Date of Encounter - 4/24/2017     None      Orders Placed During Today's Visit     Future Labs/Procedures Expected by Expires    Uric acid  4/24/2017 7/23/2017    X-Ray Foot Complete Left  4/24/2017 4/25/2018      Language Assistance Services     ATTENTION: Language assistance services are available, free of charge. Please call [phone].      ATENCIÓN: " Si habla español, tiene a bustos disposición servicios gratuitos de asistencia lingüística. Llame al [phone].     CHÚ Ý: N?u b?n nói Ti?ng Vi?t, có các d?ch v? h? tr? ngôn ng? mi?n phí dành cho b?n. G?i s? [phone].         [facility] complies with applicable Federal civil rights laws and does not discriminate on the basis of race, color, national origin, age, disability, or sex.

## 2017-04-26 ENCOUNTER — PATIENT MESSAGE (OUTPATIENT)
Dept: SLEEP MEDICINE | Facility: CLINIC | Age: 62
End: 2017-04-26

## 2017-05-01 ENCOUNTER — TELEPHONE (OUTPATIENT)
Dept: PULMONOLOGY | Facility: CLINIC | Age: 62
End: 2017-05-01

## 2017-05-01 NOTE — TELEPHONE ENCOUNTER
LVM for pt to call the clinic back to schedule a appointment with Berta Boss to discuss sleep study results

## 2017-05-01 NOTE — TELEPHONE ENCOUNTER
----- Message from Brian Hampton MD sent at 4/28/2017  9:27 AM CDT -----  Please schedule sleep clinic appointmetnt with md in 1-2 weeks to discuss sleep study result.  Please advice patient to bring cpap to clinic.

## 2017-05-04 RX ORDER — VALSARTAN 160 MG/1
TABLET ORAL
Qty: 30 TABLET | Refills: 0 | Status: SHIPPED | OUTPATIENT
Start: 2017-05-04 | End: 2017-06-01 | Stop reason: SDUPTHER

## 2017-05-08 RX ORDER — METFORMIN HYDROCHLORIDE 500 MG/1
TABLET, EXTENDED RELEASE ORAL
Qty: 180 TABLET | Refills: 3 | Status: ON HOLD | OUTPATIENT
Start: 2017-05-08 | End: 2017-12-06 | Stop reason: HOSPADM

## 2017-05-09 ENCOUNTER — OFFICE VISIT (OUTPATIENT)
Dept: SLEEP MEDICINE | Facility: CLINIC | Age: 62
End: 2017-05-09
Payer: COMMERCIAL

## 2017-05-09 VITALS
WEIGHT: 223 LBS | DIASTOLIC BLOOD PRESSURE: 76 MMHG | BODY MASS INDEX: 31.22 KG/M2 | HEART RATE: 81 BPM | SYSTOLIC BLOOD PRESSURE: 118 MMHG | OXYGEN SATURATION: 95 % | HEIGHT: 71 IN

## 2017-05-09 DIAGNOSIS — I10 ESSENTIAL HYPERTENSION: Primary | ICD-10-CM

## 2017-05-09 DIAGNOSIS — G47.33 SEVERE OBSTRUCTIVE SLEEP APNEA: ICD-10-CM

## 2017-05-09 PROCEDURE — 3078F DIAST BP <80 MM HG: CPT | Mod: S$GLB,,, | Performed by: INTERNAL MEDICINE

## 2017-05-09 PROCEDURE — 99213 OFFICE O/P EST LOW 20 MIN: CPT | Mod: S$GLB,,, | Performed by: INTERNAL MEDICINE

## 2017-05-09 PROCEDURE — 1160F RVW MEDS BY RX/DR IN RCRD: CPT | Mod: S$GLB,,, | Performed by: INTERNAL MEDICINE

## 2017-05-09 PROCEDURE — 3074F SYST BP LT 130 MM HG: CPT | Mod: S$GLB,,, | Performed by: INTERNAL MEDICINE

## 2017-05-09 PROCEDURE — 99999 PR PBB SHADOW E&M-EST. PATIENT-LVL III: CPT | Mod: PBBFAC,,, | Performed by: INTERNAL MEDICINE

## 2017-05-09 NOTE — PATIENT INSTRUCTIONS
· Reviewed sleep study results with patient. Provided reeducation on the cardiovascular risks involved with untreated sleep apnea and emphasized the importance of CPAP therapy.     · Changed CPAP machine to recommended setting of 13cm of water. Advised patient to not operated heavy equipment/motor vehicle if experiencing excessive daytime somnolence.   · Continue with provigil as directed.  · Continue to take BP medications and follow up with PCP.  Follow up in 3 months or contact prior to if any issues or concerns should arise.   · Patient verbalized understanding of all information, instruction, education, recommendations provided and agrees with plan of care.

## 2017-05-09 NOTE — PROGRESS NOTES
Subjective:       Patient ID: Glenn Medel is a 61 y.o. male.    Chief Complaint: Sleep Apnea    HPI  Glenn Medel is a 61 y.o. male who presents today for follow up sleep study. He has HTN, obesity, and obstructive sleep apnea. He is here for his results. His recent study was obtained on 4/17/17. Due to the severity of his sleep apnea he qualified for a split night sleep study. The diagnostic portion of test revealed an apnea hypopnea index (AHI) of 104.3 per hour and a low oxygen of 83%. He was placed on CPAP therapy and successfully titrated to a pressure of 13 cm of water. He is currently on Auto BiPAP which he states he has not been able to tolerate since prior to sleep study. Today his ESS total score is 20.  He brings in his CPAP machine to having adjustments made. He is still taking the provigil daily. He has no further questions or concerns at this time.    Review of patient's allergies indicates:   Allergen Reactions    Avelox [moxifloxacin] Hives and Rash     Past Medical History:   Diagnosis Date    BPH (benign prostatic hyperplasia)     Cataract     DDD (degenerative disc disease), lumbar     s/p epidural steroids     Diabetes mellitus, stable     Glaucoma     OU    HTN (hypertension)     Obesity     LOVE on CPAP      Past Surgical History:   Procedure Laterality Date    HAND SURGERY      TONSILLECTOMY       Family History     Problem Relation (Age of Onset)    Cancer Father, Mother    Cataracts Father    Colon cancer Father    Diabetes Father, Mother    Glaucoma Maternal Grandmother    Heart disease Brother (62), Father    Hypertension Mother    Stroke Mother        Social History Main Topics    Smoking status: Never Smoker    Smokeless tobacco: Never Used    Alcohol use Yes      Comment: occasional    Drug use: No    Sexual activity: Yes     Partners: Female       Review of Systems   Constitutional: Positive for fatigue.   HENT: Negative for congestion.    Eyes: Negative for  "redness.   Respiratory: Positive for snoring and somnolence (has not been able to tolerate Auto Bipap).    Cardiovascular: Positive for leg swelling.   Gastrointestinal: Negative for abdominal distention.   Neurological: Negative for headaches.   Psychiatric/Behavioral: Positive for sleep disturbance.       Objective:       Vitals:    05/09/17 0844   BP: 118/76   Pulse: 81   SpO2: 95%   Weight: 101.2 kg (223 lb)   Height: 5' 11" (1.803 m)     Physical Exam   Constitutional: He is oriented to person, place, and time. He appears well-developed and well-nourished.   HENT:   Head: Normocephalic.   Mouth/Throat: Oropharynx is clear and moist. No oropharyngeal exudate.   Neck: Normal range of motion. Neck supple.   Cardiovascular: Normal rate, regular rhythm and normal heart sounds.    No murmur heard.  Pulmonary/Chest: Normal expansion, symmetric chest wall expansion, effort normal and breath sounds normal.   Abdominal: There is no guarding.   Musculoskeletal: Normal range of motion. He exhibits no edema.   Lymphadenopathy: No supraclavicular adenopathy is present.     He has no cervical adenopathy.   Neurological: He is alert and oriented to person, place, and time. Gait normal.   Psychiatric: He has a normal mood and affect.   Vitals reviewed.    Personal Diagnostic Review      Reviewed split night sleep study results with patient: AHI of 104.3 per hour with low oxygen of 83%. Very Severe LOVE    Assessment:     Problem List Items Addressed This Visit        Neuro    Severe obstructive sleep apnea       Cardiac    HTN (hypertension) - Primary          Outpatient Encounter Prescriptions as of 5/9/2017   Medication Sig Dispense Refill    aspirin (ECOTRIN) 81 MG EC tablet Take 81 mg by mouth once daily.      atorvastatin (LIPITOR) 20 MG tablet Take 1 tablet (20 mg total) by mouth once daily. 90 tablet 3    BD INSULIN PEN NEEDLE UF MINI 31 gauge x 3/16" Ndle USE THREE TIMES DAILY BEFORE MEALS 100 each 0    brinzolamide " (AZOPT) 1 % ophthalmic suspension Place 1 drop into both eyes 2 (two) times daily.       duloxetine (CYMBALTA) 60 MG capsule Take 60 mg by mouth 2 (two) times daily.      furosemide (LASIX) 20 MG tablet Take 1 tablet (20 mg total) by mouth every morning. 30 tablet 9    glimepiride (AMARYL) 2 MG tablet TAKE 1 TABLET BY MOUTH DAILY BEFORE BREAKFAST 30 tablet 9    ibuprofen-famotidine (DUEXIS) 800-26.6 mg Tab Take by mouth daily as needed.      liraglutide 0.6 mg/0.1 mL, 18 mg/3 mL, subq PNIJ (VICTOZA 3-NOEL) 0.6 mg/0.1 mL (18 mg/3 mL) PnIj Inject 1.8 mg into the skin once daily. 3 mL 11    metformin (GLUCOPHAGE-XR) 500 MG 24 hr tablet TAKE 2 TABLETS BY MOUTH EVERY DAY WITH BREAKFAST 180 tablet 3    metoprolol succinate (TOPROL-XL) 25 MG 24 hr tablet Take 1 tablet (25 mg total) by mouth once daily. 30 tablet 5    modafinil (PROVIGIL) 200 MG Tab 200 mg 2 (two) times daily.   3    mupirocin (BACTROBAN) 2 % ointment GILMAR AA OF SKIN BID  0    SIMBRINZA 1-0.2 % DrpS SHAKE LIQUID AND INSTILL 1 DROP IN BOTH EYES TWICE DAILY 8 mL 2    valsartan (DIOVAN) 160 MG tablet TAKE 1 TABLET BY MOUTH EVERY DAY 30 tablet 0    vitamin D 1000 units Tab Take 1,000 Units by mouth. Two tablet daily        No facility-administered encounter medications on file as of 5/9/2017.      No orders of the defined types were placed in this encounter.    Plan:       · Reviewed sleep study results with patient. Provided reeducation on the cardiovascular risks involved with untreated sleep apnea and emphasized the importance of CPAP therapy.     · Changed CPAP machine to recommended setting of 13cm of water. Advised patient to not operated heavy equipment/motor vehicle if experiencing excessive daytime somnolence.   · Continue with provigil as directed.  · Continue to take BP medications and follow up with PCP.  Follow up in 3 months or contact prior to if any issues or concerns should arise.   · Patient verbalized understanding of all  information, instruction, education, recommendations provided and agrees with plan of care.

## 2017-05-24 RX ORDER — GLIMEPIRIDE 2 MG/1
2 TABLET ORAL DAILY
Qty: 30 TABLET | Refills: 11 | Status: ON HOLD | OUTPATIENT
Start: 2017-05-24 | End: 2017-12-06 | Stop reason: HOSPADM

## 2017-06-01 RX ORDER — VALSARTAN 160 MG/1
TABLET ORAL
Qty: 30 TABLET | Refills: 0 | Status: SHIPPED | OUTPATIENT
Start: 2017-06-01 | End: 2017-07-01 | Stop reason: SDUPTHER

## 2017-06-05 DIAGNOSIS — G47.10 HYPERSOMNIA: ICD-10-CM

## 2017-06-05 DIAGNOSIS — G47.33 OSA (OBSTRUCTIVE SLEEP APNEA): Primary | ICD-10-CM

## 2017-06-05 RX ORDER — MODAFINIL 200 MG/1
200 TABLET ORAL 2 TIMES DAILY
Qty: 60 TABLET | Refills: 3 | Status: SHIPPED | OUTPATIENT
Start: 2017-06-05 | End: 2017-06-06 | Stop reason: SDUPTHER

## 2017-06-06 ENCOUNTER — PATIENT MESSAGE (OUTPATIENT)
Dept: FAMILY MEDICINE | Facility: CLINIC | Age: 62
End: 2017-06-06

## 2017-06-06 RX ORDER — MODAFINIL 200 MG/1
200 TABLET ORAL 2 TIMES DAILY
Qty: 60 TABLET | Refills: 3 | Status: ON HOLD | OUTPATIENT
Start: 2017-06-06 | End: 2017-12-06 | Stop reason: HOSPADM

## 2017-06-07 NOTE — TELEPHONE ENCOUNTER
Need to recheck blood glucose.  Also, ok to take blood pressure medication the morning of surgery.

## 2017-06-09 ENCOUNTER — PATIENT MESSAGE (OUTPATIENT)
Dept: FAMILY MEDICINE | Facility: CLINIC | Age: 62
End: 2017-06-09

## 2017-06-13 NOTE — TELEPHONE ENCOUNTER
See AfterStepst message.Pt needing authorization documentation for surgery. Spoke to Dr. Carlin's office and states there is no specific form, just something stating that patient is cleared for surgery. Please advise. Thanks.

## 2017-06-20 RX ORDER — PEN NEEDLE, DIABETIC 31 GX5/16"
NEEDLE, DISPOSABLE MISCELLANEOUS
Qty: 100 EACH | Refills: 0 | Status: SHIPPED | OUTPATIENT
Start: 2017-06-20 | End: 2017-10-01 | Stop reason: SDUPTHER

## 2017-06-27 RX ORDER — FUROSEMIDE 20 MG/1
20 TABLET ORAL EVERY MORNING
Qty: 90 TABLET | Refills: 3 | Status: ON HOLD | OUTPATIENT
Start: 2017-06-27 | End: 2017-12-06 | Stop reason: HOSPADM

## 2017-06-27 NOTE — TELEPHONE ENCOUNTER
Received fax from pharmacy, requesting refill on med. Please advise LOV:  4/24/17        NOV:   8/2/17

## 2017-07-02 RX ORDER — VALSARTAN 160 MG/1
TABLET ORAL
Qty: 30 TABLET | Refills: 0 | Status: SHIPPED | OUTPATIENT
Start: 2017-07-02 | End: 2017-08-01 | Stop reason: SDUPTHER

## 2017-07-11 ENCOUNTER — OFFICE VISIT (OUTPATIENT)
Dept: OPTOMETRY | Facility: CLINIC | Age: 62
End: 2017-07-11
Payer: COMMERCIAL

## 2017-07-11 DIAGNOSIS — H40.053 OCULAR HYPERTENSION, BILATERAL: Primary | ICD-10-CM

## 2017-07-11 PROCEDURE — 99999 PR PBB SHADOW E&M-EST. PATIENT-LVL II: CPT | Mod: PBBFAC,,, | Performed by: OPTOMETRIST

## 2017-07-11 PROCEDURE — 92012 INTRM OPH EXAM EST PATIENT: CPT | Mod: S$GLB,,, | Performed by: OPTOMETRIST

## 2017-07-11 PROCEDURE — 92133 CPTRZD OPH DX IMG PST SGM ON: CPT | Mod: S$GLB,,, | Performed by: OPTOMETRIST

## 2017-07-11 NOTE — PROGRESS NOTES
HPI     Annual Exam    Additional comments: DLE 1-17 (alf)   ocular health exam            Diabetic Eye Exam    Additional comments: BSL running high           Glaucoma    Additional comments: +Simbrinza OU bid           Spots and/or Floaters    Additional comments: OU -- no light flashes           Comments   Hemoglobin A1C       Date                     Value               Ref Range             Status                03/10/2017               7.5 (H)             4.5 - 6.2 %           Final              Comment:    According to ADA guidelines, hemoglobin A1C <7.0% represents  optimal   control in non-pregnant diabetic patients.  Different  metrics may apply   to specific populations.   Standards of Medical Care in Diabetes -   2016.  For the purpose of screening for the presence of diabetes:  <5.7%       Consistent with the absence of diabetes  5.7-6.4%  Consistent with   increasing risk for diabetes   (prediabetes)  >or=6.5%  Consistent with   diabetes  Currently no consensus exists for use of hemoglobin A1C  for   diagnosis of diabetes for children.         10/24/2016               7.4 (H)             4.5 - 6.2 %           Final              Comment:    According to ADA guidelines, hemoglobin A1C <7.0% represents  optimal   control in non-pregnant diabetic patients.  Different  metrics may apply   to specific populations.   Standards of Medical Care in Diabetes -   2016.  For the purpose of screening for the presence of diabetes:  <5.7%       Consistent with the absence of diabetes  5.7-6.4%  Consistent with   increasing risk for diabetes   (prediabetes)  >or=6.5%  Consistent with   diabetes  Currently no consensus exists for use of hemoglobin A1C  for   diagnosis of diabetes for children.         05/25/2016               6.7 (H)             4.5 - 6.2 %           Final            ----------      Agree above  IOP check today---had full exam 1/2017  No new issues  Takes specs off to work on [employer]         Last  edited by OCTAVIO Reyes, OD on 7/11/2017  8:33 AM. (History)        ROS     Positive for: Eyes    Negative for: Constitutional, Gastrointestinal, Neurological, Skin,   Genitourinary, Musculoskeletal, HENT, Endocrine, Cardiovascular,   Respiratory, Psychiatric, Allergic/Imm, Heme/Lymph    Last edited by OCTAVIO Reyes, OD on 7/11/2017  8:32 AM. (History)        Assessment /Plan     For exam results, see Encounter Report.    Ocular hypertension, bilateral  -     Posterior Segment OCT Optic Nerve- Both eyes      1. IOP controlled  Angles open   Good CCT  Fields / OCT   Schedule 24-2 STD    OCT--today  G 104 OU wnl, no progression--good RNFL      Continue OU  Simbrinza bid    RTC fields, then plan for annual w/ DFE in January 2018

## 2017-07-17 ENCOUNTER — PATIENT MESSAGE (OUTPATIENT)
Dept: NEPHROLOGY | Facility: CLINIC | Age: 62
End: 2017-07-17

## 2017-07-28 ENCOUNTER — TELEPHONE (OUTPATIENT)
Dept: OPHTHALMOLOGY | Facility: CLINIC | Age: 62
End: 2017-07-28

## 2017-08-01 RX ORDER — VALSARTAN 160 MG/1
TABLET ORAL
Qty: 30 TABLET | Refills: 0 | Status: SHIPPED | OUTPATIENT
Start: 2017-08-01 | End: 2017-09-11 | Stop reason: SDUPTHER

## 2017-08-02 ENCOUNTER — LAB VISIT (OUTPATIENT)
Dept: LAB | Facility: HOSPITAL | Age: 62
End: 2017-08-02
Payer: COMMERCIAL

## 2017-08-02 ENCOUNTER — OFFICE VISIT (OUTPATIENT)
Dept: FAMILY MEDICINE | Facility: CLINIC | Age: 62
End: 2017-08-02
Payer: COMMERCIAL

## 2017-08-02 VITALS
RESPIRATION RATE: 16 BRPM | HEART RATE: 76 BPM | SYSTOLIC BLOOD PRESSURE: 132 MMHG | HEIGHT: 71 IN | BODY MASS INDEX: 31.6 KG/M2 | DIASTOLIC BLOOD PRESSURE: 82 MMHG | WEIGHT: 225.75 LBS

## 2017-08-02 DIAGNOSIS — E11.9 DIABETES MELLITUS WITHOUT COMPLICATION: Primary | ICD-10-CM

## 2017-08-02 DIAGNOSIS — E11.9 DIABETES MELLITUS WITHOUT COMPLICATION: ICD-10-CM

## 2017-08-02 DIAGNOSIS — N18.30 CHRONIC KIDNEY DISEASE, STAGE III (MODERATE): ICD-10-CM

## 2017-08-02 DIAGNOSIS — M10.9 GOUT, UNSPECIFIED CAUSE, UNSPECIFIED CHRONICITY, UNSPECIFIED SITE: ICD-10-CM

## 2017-08-02 LAB
ALBUMIN SERPL BCP-MCNC: 4 G/DL
ANION GAP SERPL CALC-SCNC: 8 MMOL/L
BUN SERPL-MCNC: 14 MG/DL
CALCIUM SERPL-MCNC: 10.2 MG/DL
CHLORIDE SERPL-SCNC: 102 MMOL/L
CO2 SERPL-SCNC: 33 MMOL/L
CREAT SERPL-MCNC: 1 MG/DL
EST. GFR  (AFRICAN AMERICAN): >60 ML/MIN/1.73 M^2
EST. GFR  (NON AFRICAN AMERICAN): >60 ML/MIN/1.73 M^2
GLUCOSE SERPL-MCNC: 120 MG/DL
PHOSPHATE SERPL-MCNC: 3.7 MG/DL
POTASSIUM SERPL-SCNC: 4.6 MMOL/L
PTH-INTACT SERPL-MCNC: 33 PG/ML
SODIUM SERPL-SCNC: 143 MMOL/L

## 2017-08-02 PROCEDURE — 83970 ASSAY OF PARATHORMONE: CPT

## 2017-08-02 PROCEDURE — 3008F BODY MASS INDEX DOCD: CPT | Mod: S$GLB,,, | Performed by: FAMILY MEDICINE

## 2017-08-02 PROCEDURE — 80069 RENAL FUNCTION PANEL: CPT

## 2017-08-02 PROCEDURE — 36415 COLL VENOUS BLD VENIPUNCTURE: CPT | Mod: PO

## 2017-08-02 PROCEDURE — 3044F HG A1C LEVEL LT 7.0%: CPT | Mod: S$GLB,,, | Performed by: FAMILY MEDICINE

## 2017-08-02 PROCEDURE — 99213 OFFICE O/P EST LOW 20 MIN: CPT | Mod: S$GLB,,, | Performed by: FAMILY MEDICINE

## 2017-08-02 PROCEDURE — 99999 PR PBB SHADOW E&M-EST. PATIENT-LVL III: CPT | Mod: PBBFAC,,, | Performed by: FAMILY MEDICINE

## 2017-08-02 PROCEDURE — 83036 HEMOGLOBIN GLYCOSYLATED A1C: CPT

## 2017-08-02 PROCEDURE — 3066F NEPHROPATHY DOC TX: CPT | Mod: S$GLB,,, | Performed by: FAMILY MEDICINE

## 2017-08-02 RX ORDER — COLCHICINE 0.6 MG/1
0.6 TABLET ORAL 2 TIMES DAILY PRN
Qty: 60 TABLET | Refills: 0 | Status: ON HOLD | OUTPATIENT
Start: 2017-08-02 | End: 2017-12-06 | Stop reason: HOSPADM

## 2017-08-03 LAB
ESTIMATED AVG GLUCOSE: 148 MG/DL
HBA1C MFR BLD HPLC: 6.8 %

## 2017-08-04 ENCOUNTER — OFFICE VISIT (OUTPATIENT)
Dept: NEPHROLOGY | Facility: CLINIC | Age: 62
End: 2017-08-04
Payer: COMMERCIAL

## 2017-08-04 VITALS
WEIGHT: 227.63 LBS | DIASTOLIC BLOOD PRESSURE: 68 MMHG | HEIGHT: 71 IN | OXYGEN SATURATION: 98 % | HEART RATE: 84 BPM | SYSTOLIC BLOOD PRESSURE: 124 MMHG | BODY MASS INDEX: 31.87 KG/M2

## 2017-08-04 DIAGNOSIS — N18.30 CHRONIC KIDNEY DISEASE, STAGE III (MODERATE): ICD-10-CM

## 2017-08-04 DIAGNOSIS — N18.9 CHRONIC KIDNEY DISEASE, UNSPECIFIED CKD STAGE: Primary | ICD-10-CM

## 2017-08-04 PROCEDURE — 99213 OFFICE O/P EST LOW 20 MIN: CPT | Mod: S$GLB,,, | Performed by: INTERNAL MEDICINE

## 2017-08-04 PROCEDURE — 3078F DIAST BP <80 MM HG: CPT | Mod: S$GLB,,, | Performed by: INTERNAL MEDICINE

## 2017-08-04 PROCEDURE — 3008F BODY MASS INDEX DOCD: CPT | Mod: S$GLB,,, | Performed by: INTERNAL MEDICINE

## 2017-08-04 PROCEDURE — 99999 PR PBB SHADOW E&M-EST. PATIENT-LVL III: CPT | Mod: PBBFAC,,, | Performed by: INTERNAL MEDICINE

## 2017-08-04 PROCEDURE — 3074F SYST BP LT 130 MM HG: CPT | Mod: S$GLB,,, | Performed by: INTERNAL MEDICINE

## 2017-08-06 NOTE — PROGRESS NOTES
Subjective:       Patient ID: Glenn Medel is a 62 y.o. male    Chief Complaint: Diabetes (hm due: tetanus, flu, pneumovax)    Diabetes   He presents for his follow-up diabetic visit. He has type 2 diabetes mellitus. His disease course has been stable. There are no hypoglycemic associated symptoms. Pertinent negatives for hypoglycemia include no confusion or headaches. There are no diabetic associated symptoms. Pertinent negatives for diabetes include no chest pain, no polydipsia, no polyuria and no weakness. There are no hypoglycemic complications. Symptoms are stable. There are no diabetic complications. Current diabetic treatment includes oral agent (triple therapy). He is compliant with treatment all of the time. An ACE inhibitor/angiotensin II receptor blocker is being taken. Eye exam is current.       Review of Systems   Constitutional: Negative for activity change and unexpected weight change.   HENT: Negative for hearing loss, rhinorrhea and trouble swallowing.    Eyes: Negative for discharge and visual disturbance.   Respiratory: Negative for chest tightness and wheezing.    Cardiovascular: Negative for chest pain and palpitations.   Gastrointestinal: Negative for blood in stool, constipation, diarrhea and vomiting.   Endocrine: Negative for polydipsia and polyuria.   Genitourinary: Negative for difficulty urinating, hematuria and urgency.   Musculoskeletal: Positive for arthralgias (recent episodes of gout, improved with colchicine, clearly associated with shellfish in diet, has modified). Negative for joint swelling and neck pain.   Neurological: Negative for weakness and headaches.   Psychiatric/Behavioral: Negative for confusion and dysphoric mood.         Objective:   Physical Exam   Constitutional: He is oriented to person, place, and time. He appears well-developed and well-nourished. No distress.   Neurological: He is alert and oriented to person, place, and time.   Vitals reviewed.         Assessment:       1. Diabetes mellitus without complication  Hemoglobin A1c   2. Gout, unspecified cause, unspecified chronicity, unspecified site           Plan:       Diabetes mellitus without complication  -     Hemoglobin A1c; Future; Expected date: 08/02/2017  - Diet and exercise education.  - Serial glucose monitoring  - Continue current therapy  - Return in about 6 months (around 2/2/2018).     Gout, unspecified cause, unspecified chronicity, unspecified site  - Continue current therapy    Other orders  -     colchicine 0.6 mg tablet; Take 1 tablet (0.6 mg total) by mouth 2 (two) times daily as needed (Gout).  Dispense: 60 tablet; Refill: 0

## 2017-08-16 ENCOUNTER — LAB VISIT (OUTPATIENT)
Dept: LAB | Facility: HOSPITAL | Age: 62
End: 2017-08-16
Attending: UROLOGY
Payer: COMMERCIAL

## 2017-08-16 DIAGNOSIS — N40.0 HYPERTROPHY OF PROSTATE WITHOUT URINARY OBSTRUCTION AND OTHER LOWER URINARY TRACT SYMPTOMS (LUTS): Primary | ICD-10-CM

## 2017-08-16 PROCEDURE — 36415 COLL VENOUS BLD VENIPUNCTURE: CPT | Mod: PO

## 2017-08-16 PROCEDURE — 84153 ASSAY OF PSA TOTAL: CPT

## 2017-08-17 LAB — COMPLEXED PSA SERPL-MCNC: 1.9 NG/ML

## 2017-08-21 ENCOUNTER — PATIENT MESSAGE (OUTPATIENT)
Dept: OPTOMETRY | Facility: CLINIC | Age: 62
End: 2017-08-21

## 2017-08-22 ENCOUNTER — TELEPHONE (OUTPATIENT)
Dept: OPTOMETRY | Facility: CLINIC | Age: 62
End: 2017-08-22

## 2017-08-23 ENCOUNTER — PATIENT MESSAGE (OUTPATIENT)
Dept: OPTOMETRY | Facility: CLINIC | Age: 62
End: 2017-08-23

## 2017-08-24 ENCOUNTER — OFFICE VISIT (OUTPATIENT)
Dept: OPTOMETRY | Facility: CLINIC | Age: 62
End: 2017-08-24
Payer: COMMERCIAL

## 2017-08-24 DIAGNOSIS — H40.053 OCULAR HYPERTENSION, BILATERAL: Primary | ICD-10-CM

## 2017-08-24 DIAGNOSIS — H52.203 MYOPIA WITH ASTIGMATISM AND PRESBYOPIA, BILATERAL: ICD-10-CM

## 2017-08-24 DIAGNOSIS — H52.13 MYOPIA WITH ASTIGMATISM AND PRESBYOPIA, BILATERAL: ICD-10-CM

## 2017-08-24 DIAGNOSIS — H52.4 MYOPIA WITH ASTIGMATISM AND PRESBYOPIA, BILATERAL: ICD-10-CM

## 2017-08-24 PROCEDURE — 99999 PR PBB SHADOW E&M-EST. PATIENT-LVL III: CPT | Mod: PBBFAC,,, | Performed by: OPTOMETRIST

## 2017-08-24 PROCEDURE — 92012 INTRM OPH EXAM EST PATIENT: CPT | Mod: S$GLB,,, | Performed by: OPTOMETRIST

## 2017-08-24 PROCEDURE — 92015 DETERMINE REFRACTIVE STATE: CPT | Mod: S$GLB,,, | Performed by: OPTOMETRIST

## 2017-08-24 NOTE — PROGRESS NOTES
HPI     Blurred Vision    Additional comments: blurred va at d and near //  dle 7/11/2017//  no new   rx given last visit//           Comments   blurred va at d and near //  dle 7/11/2017//  No new rx given last visit//    Notes blur w/ OS>OD  Over last 5 days  Last refraction 6/16--did not get specs changed at that time         Last edited by OCTAVIO Reyes, OD on 8/24/2017  8:33 AM. (History)        ROS     Positive for: Eyes    Negative for: Constitutional, Gastrointestinal, Neurological, Skin,   Genitourinary, Musculoskeletal, HENT, Endocrine, Cardiovascular,   Respiratory, Psychiatric, Allergic/Imm, Heme/Lymph    Last edited by OCTAVIO Reyes, OD on 8/24/2017  8:33 AM. (History)        Assessment /Plan     For exam results, see Encounter Report.    Ocular hypertension, bilateral    Myopia with astigmatism and presbyopia, bilateral      IOP slightly higher from previous OU  Stressed good compliance w/ meds  Has updated fields scheudled later this year and full exam early 2018---if progression will have to add meds / lower target      Updated specs rx, prefers changes in trial frame vs old Rx--fill prn    RTC full exam early 2018    [facility] reviewed from 9/22/17  PSD  1.29 wnl  1.78 <10% wnl  No progression, repeat 1 year

## 2017-09-04 ENCOUNTER — PATIENT MESSAGE (OUTPATIENT)
Dept: FAMILY MEDICINE | Facility: CLINIC | Age: 62
End: 2017-09-04

## 2017-09-05 ENCOUNTER — PATIENT MESSAGE (OUTPATIENT)
Dept: OPTOMETRY | Facility: CLINIC | Age: 62
End: 2017-09-05

## 2017-09-08 ENCOUNTER — OFFICE VISIT (OUTPATIENT)
Dept: OPTOMETRY | Facility: CLINIC | Age: 62
End: 2017-09-08
Payer: COMMERCIAL

## 2017-09-08 DIAGNOSIS — H43.813 POSTERIOR VITREOUS DETACHMENT, BILATERAL: Primary | ICD-10-CM

## 2017-09-08 PROCEDURE — 99999 PR PBB SHADOW E&M-EST. PATIENT-LVL IV: CPT | Mod: PBBFAC,,, | Performed by: OPTOMETRIST

## 2017-09-08 PROCEDURE — 92014 COMPRE OPH EXAM EST PT 1/>: CPT | Mod: S$GLB,,, | Performed by: OPTOMETRIST

## 2017-09-08 NOTE — PROGRESS NOTES
"HPI     Spots and/or Floaters    Additional comments: pt seeing floater OS x 3 weeks --- no light flashes           Eye Pain    Additional comments: floater causing shooting eye pain when working on   computer -- has to stop to rest eyes           Blurred Vision    Additional comments: OS vision blurred due to floater           Comments   Complaint of blur/ haze floating in vision OS  Bothersome at work  Complaint of "pain " working on computer for a while         Last edited by OCTAVIO Reyes, OD on 9/8/2017  9:31 AM. (History)        ROS     Positive for: Eyes    Negative for: Constitutional, Gastrointestinal, Neurological, Skin,   Genitourinary, Musculoskeletal, HENT, Endocrine, Cardiovascular,   Respiratory, Psychiatric, Allergic/Imm, Heme/Lymph    Last edited by OCTAVIO Reyes, OD on 9/8/2017  9:31 AM. (History)        Assessment /Plan     For exam results, see Encounter Report.    Posterior vitreous detachment, bilateral      Dense PVD / vit changes OS>>OD  No RD or tear noted  Reviewed RD precautions  Pt desires retina consult to discuss possible options, laser, PPV, etc                     "

## 2017-09-10 ENCOUNTER — PATIENT MESSAGE (OUTPATIENT)
Dept: FAMILY MEDICINE | Facility: CLINIC | Age: 62
End: 2017-09-10

## 2017-09-10 DIAGNOSIS — K92.1 MELENA: Primary | ICD-10-CM

## 2017-09-11 RX ORDER — METOPROLOL SUCCINATE 25 MG/1
TABLET, EXTENDED RELEASE ORAL
Qty: 30 TABLET | Refills: 0 | Status: SHIPPED | OUTPATIENT
Start: 2017-09-11 | End: 2017-10-10 | Stop reason: SDUPTHER

## 2017-09-11 RX ORDER — VALSARTAN 160 MG/1
160 TABLET ORAL DAILY
Qty: 30 TABLET | Refills: 11 | Status: ON HOLD | OUTPATIENT
Start: 2017-09-11 | End: 2017-12-06 | Stop reason: HOSPADM

## 2017-09-11 NOTE — TELEPHONE ENCOUNTER
Needs colonoscopy, Orders entered   Please contact.  If bleeding continues, will need to go to ER.

## 2017-09-18 ENCOUNTER — ANESTHESIA (OUTPATIENT)
Dept: ENDOSCOPY | Facility: HOSPITAL | Age: 62
End: 2017-09-18
Payer: COMMERCIAL

## 2017-09-18 ENCOUNTER — SURGERY (OUTPATIENT)
Age: 62
End: 2017-09-18

## 2017-09-18 ENCOUNTER — ANESTHESIA EVENT (OUTPATIENT)
Dept: ENDOSCOPY | Facility: HOSPITAL | Age: 62
End: 2017-09-18
Payer: COMMERCIAL

## 2017-09-18 ENCOUNTER — HOSPITAL ENCOUNTER (OUTPATIENT)
Facility: HOSPITAL | Age: 62
Discharge: HOME | End: 2017-09-18
Attending: INTERNAL MEDICINE | Admitting: INTERNAL MEDICINE
Payer: COMMERCIAL

## 2017-09-18 VITALS — RESPIRATION RATE: 33 BRPM

## 2017-09-18 DIAGNOSIS — K62.5 RECTAL BLEEDING: ICD-10-CM

## 2017-09-18 LAB — GLUCOSE SERPL-MCNC: 119 MG/DL (ref 70–110)

## 2017-09-18 PROCEDURE — D9220A PRA ANESTHESIA: Mod: ANES,,, | Performed by: ANESTHESIOLOGY

## 2017-09-18 PROCEDURE — 27201028 HC NEEDLE, SCLERO: Mod: PO | Performed by: INTERNAL MEDICINE

## 2017-09-18 PROCEDURE — 27201089 HC SNARE, DISP (ANY): Mod: PO | Performed by: INTERNAL MEDICINE

## 2017-09-18 PROCEDURE — 45385 COLONOSCOPY W/LESION REMOVAL: CPT | Mod: ,,, | Performed by: INTERNAL MEDICINE

## 2017-09-18 PROCEDURE — 45381 COLONOSCOPY SUBMUCOUS NJX: CPT | Mod: PO | Performed by: INTERNAL MEDICINE

## 2017-09-18 PROCEDURE — 37000008 HC ANESTHESIA 1ST 15 MINUTES: Mod: PO | Performed by: INTERNAL MEDICINE

## 2017-09-18 PROCEDURE — 63600175 PHARM REV CODE 636 W HCPCS: Mod: PO | Performed by: NURSE ANESTHETIST, CERTIFIED REGISTERED

## 2017-09-18 PROCEDURE — 25000003 PHARM REV CODE 250: Mod: PO | Performed by: INTERNAL MEDICINE

## 2017-09-18 PROCEDURE — 82962 GLUCOSE BLOOD TEST: CPT | Mod: PO | Performed by: INTERNAL MEDICINE

## 2017-09-18 PROCEDURE — 46500 INJECTION INTO HEMORRHOID(S): CPT | Mod: 51,,, | Performed by: INTERNAL MEDICINE

## 2017-09-18 PROCEDURE — 37000009 HC ANESTHESIA EA ADD 15 MINS: Mod: PO | Performed by: INTERNAL MEDICINE

## 2017-09-18 PROCEDURE — 88305 TISSUE EXAM BY PATHOLOGIST: CPT | Mod: 26,,,

## 2017-09-18 PROCEDURE — 88305 TISSUE EXAM BY PATHOLOGIST: CPT

## 2017-09-18 PROCEDURE — D9220A PRA ANESTHESIA: Mod: CRNA,,, | Performed by: NURSE ANESTHETIST, CERTIFIED REGISTERED

## 2017-09-18 PROCEDURE — 45385 COLONOSCOPY W/LESION REMOVAL: CPT | Mod: PO | Performed by: INTERNAL MEDICINE

## 2017-09-18 RX ORDER — HYDROCORTISONE ACETATE 25 MG/1
25 SUPPOSITORY RECTAL NIGHTLY PRN
Qty: 12 SUPPOSITORY | Refills: 11 | Status: SHIPPED | OUTPATIENT
Start: 2017-09-18 | End: 2017-09-28

## 2017-09-18 RX ORDER — SODIUM CHLORIDE, SODIUM LACTATE, POTASSIUM CHLORIDE, CALCIUM CHLORIDE 600; 310; 30; 20 MG/100ML; MG/100ML; MG/100ML; MG/100ML
INJECTION, SOLUTION INTRAVENOUS CONTINUOUS
Status: DISCONTINUED | OUTPATIENT
Start: 2017-09-18 | End: 2017-09-18 | Stop reason: HOSPADM

## 2017-09-18 RX ORDER — PROPOFOL 10 MG/ML
VIAL (ML) INTRAVENOUS
Status: DISCONTINUED | OUTPATIENT
Start: 2017-09-18 | End: 2017-09-18

## 2017-09-18 RX ORDER — LIDOCAINE HCL/PF 100 MG/5ML
SYRINGE (ML) INTRAVENOUS
Status: DISCONTINUED | OUTPATIENT
Start: 2017-09-18 | End: 2017-09-18

## 2017-09-18 RX ADMIN — PROPOFOL 30 MG: 10 INJECTION, EMULSION INTRAVENOUS at 02:09

## 2017-09-18 RX ADMIN — LIDOCAINE HYDROCHLORIDE 100 MG: 20 INJECTION, SOLUTION INTRAVENOUS at 02:09

## 2017-09-18 RX ADMIN — SODIUM CHLORIDE, SODIUM LACTATE, POTASSIUM CHLORIDE, AND CALCIUM CHLORIDE: 600; 310; 30; 20 INJECTION, SOLUTION INTRAVENOUS at 02:09

## 2017-09-18 NOTE — ANESTHESIA POSTPROCEDURE EVALUATION
"Anesthesia Post Evaluation    Patient: Glenn Medel    Procedure(s) Performed: Procedure(s) (LRB):  COLONOSCOPY (N/A)    Final Anesthesia Type: general  Patient location during evaluation: PACU  Patient participation: Yes- Able to Participate  Level of consciousness: awake and alert and oriented  Post-procedure vital signs: reviewed and stable  Pain management: adequate  Airway patency: patent  PONV status at discharge: No PONV  Anesthetic complications: no      Cardiovascular status: hemodynamically stable and blood pressure returned to baseline  Respiratory status: unassisted, spontaneous ventilation and room air  Hydration status: euvolemic  Follow-up not needed.        Visit Vitals  /62 (BP Location: Left arm, Patient Position: Lying)   Pulse 64   Temp 36.1 °C (97 °F) (Skin)   Resp (!) 63   Ht 5' 11" (1.803 m)   Wt 99.3 kg (219 lb)   SpO2 98%   BMI 30.54 kg/m²       Pain/Ezequiel Score: Pain Assessment Performed: Yes (9/18/2017  3:03 PM)  Presence of Pain: non-verbal indicators absent (9/18/2017  3:03 PM)  Ezequiel Score: 9 (9/18/2017  3:03 PM)      "

## 2017-09-18 NOTE — TRANSFER OF CARE
"Anesthesia Transfer of Care Note    Patient: Glenn Medel    Procedure(s) Performed: Procedure(s) (LRB):  COLONOSCOPY (N/A)    Patient location: PACU    Anesthesia Type: general    Transport from OR: Transported from OR on room air with adequate spontaneous ventilation    Post pain: adequate analgesia    Post assessment: no apparent anesthetic complications and tolerated procedure well    Post vital signs: stable    Level of consciousness: awake and alert    Nausea/Vomiting: no nausea/vomiting    Complications: none    Transfer of care protocol was followed      Last vitals:   Visit Vitals  /62 (BP Location: Left arm, Patient Position: Lying)   Pulse 70   Temp 36.1 °C (97 °F) (Skin)   Resp 20   Ht 5' 11" (1.803 m)   Wt 99.3 kg (219 lb)   SpO2 95%   BMI 30.54 kg/m²     "

## 2017-09-18 NOTE — DISCHARGE INSTRUCTIONS
Recovery After Procedural Sedation (Adult)  You have been given medicine by vein to make you sleep during your surgery. This may have included both a pain medicine and sleeping medicine. Most of the effects have worn off. But you may still have some drowsiness for the next 6 to 8 hours.  Home care  Follow these guidelines when you get home:  · For the next 8 hours, you should be watched by a responsible adult. This person should make sure your condition is not getting worse.  · Don't drink any alcohol for the next 24 hours.  · Don't drive, operate dangerous machinery, or make important business or personal decisions during the next 24 hours.  Note: Your healthcare provider may tell you not to take any medicine by mouth for pain or sleep in the next 4 hours. These medicines may react with the medicines you were given in the hospital. This could cause a much stronger response than usual.  Follow-up care  Follow up with your healthcare provider if you are not alert and back to your usual level of activity within 12 hours.  When to seek medical advice  Call your healthcare provider right away if any of these occur:  · Drowsiness gets worse  · Weakness or dizziness gets worse  · Repeated vomiting  · You can't be awakened   Date Last Reviewed: 10/18/2016  © 4516-3459 [employer]. [address] [place] [address], [place] [address]. All rights reserved. This information is not intended as a substitute for professional medical care. Always follow your healthcare professional's instructions.      PROBIOTICS:  Now that your colon is so cleaned out, now is a good time for a round of PROBIOTICS.  Eat a container of Greek Yogurt, such as OIKOS or CHOBANI,  Or Activia or Dannon    Greek Yogurt.    Or Take a similar Probiotic product such as Align or Culturelle or Mary Ellen-Q, every day for a month.                  (The products listed are non-prescription, but you may need to ask the pharmacist for their location.)  Repeat  this 3-4 times a year.        High-Fiber Diet  Fiber is in fruits, vegetables, cereals, and grains. Fiber passes through your body undigested. A high-fiber diet helps food move through your intestinal tract. The added bulk is helpful in preventing constipation. In people with diverticulosis, fiber helps clean out the pouches along the colon wall. It also prevents new pouches from forming. A high-fiber diet reduces the risk of colon cancer. It also lowers blood cholesterol and prevents high blood sugar in people with diabetes.    The fiber-rich foods listed below should be part of your diet. If you are not used to high-fiber foods, start with 1 or 2 foods from this list. Every 3 to 4 days add a new one to your diet. Do this until you are eating 4 high-fiber foods per day. This should give you 20 to 35 grams of fiber a day. It is also important to drink a lot of water when you are on this diet. You should have 6 to 8 glasses of water a day. Water makes the fiber swell and increases the benefit.  Foods high in dietary fiber  The following foods are high in dietary fiber:  · Breads. Breads made with 100% whole-wheat flour; mabel, wheat, or rye crackers; whole-grain tortillas, bran muffins.  · Cereals. Whole-grain and bran cereals with bran (shredded wheat, wheat flakes, raisin bran, corn bran); oatmeal, rolled oats, granola, and brown rice.  · Fruits. Fresh fruits and their edible skins (pears, prunes, raisins, berries, apples, and apricots); bananas, citrus fruit, mangoes, pineapple; and prune juice.  · Nuts. Any nuts and seeds.  · Vegetables. Best served raw or lightly cooked. All types, especially: green peas, celery, eggplant, potatoes, spinach, broccoli, Raymond sprouts, winter squash, carrots, cauliflower, soybeans, lentils, and fresh and dried beans of all kinds.  · Other. Popcorn, any spices.  Date Last Reviewed: 8/1/2016  © 2068-7782 [employer]. [address] [place] [address], [place] [address]. All  rights reserved. This information is not intended as a substitute for professional medical care. Always follow your healthcare professional's instructions.

## 2017-09-18 NOTE — BRIEF OP NOTE
Discharge Note  Short Stay      SUMMARY     Admit Date: 9/18/2017    Attending Physician: Paul Feliz Jr., MD     Discharge Physician: Paul Feliz Jr., MD    Discharge Date: 9/18/2017 3:31 PM    Final Diagnosis: Melena [K92.1]    Bleeding hemorrhoid, injected.  Benign polyps, rectum and sigmoid.    Disposition: HOME OR SELF CARE    Patient Instructions:   Current Discharge Medication List      START taking these medications    Details   hydrocortisone (ANUSOL-HC) 25 mg suppository Place 1 suppository (25 mg total) rectally nightly as needed for Hemorrhoids.  Qty: 12 suppository, Refills: 11      hydrocortisone-pramoxine (PROCTOFOAM-HS) rectal foam Place 1 applicator rectally 2 (two) times daily.  Qty: 10 g, Refills: 5         CONTINUE these medications which have NOT CHANGED    Details   aspirin (ECOTRIN) 81 MG EC tablet Take 81 mg by mouth once daily.      atorvastatin (LIPITOR) 20 MG tablet Take 1 tablet (20 mg total) by mouth once daily.  Qty: 90 tablet, Refills: 3      colchicine 0.6 mg tablet Take 1 tablet (0.6 mg total) by mouth 2 (two) times daily as needed (Gout).  Qty: 60 tablet, Refills: 0      duloxetine (CYMBALTA) 60 MG capsule Take 60 mg by mouth 2 (two) times daily.      furosemide (LASIX) 20 MG tablet Take 1 tablet (20 mg total) by mouth every morning.  Qty: 90 tablet, Refills: 3      glimepiride (AMARYL) 2 MG tablet Take 1 tablet (2 mg total) by mouth once daily.  Qty: 30 tablet, Refills: 11      ibuprofen-famotidine (DUEXIS) 800-26.6 mg Tab Take by mouth daily as needed.      liraglutide 0.6 mg/0.1 mL, 18 mg/3 mL, subq PNIJ (VICTOZA 3-NOEL) 0.6 mg/0.1 mL (18 mg/3 mL) PnIj Inject 1.8 mg into the skin once daily.  Qty: 3 mL, Refills: 11      metformin (GLUCOPHAGE-XR) 500 MG 24 hr tablet TAKE 2 TABLETS BY MOUTH EVERY DAY WITH BREAKFAST  Qty: 180 tablet, Refills: 3      metoprolol succinate (TOPROL-XL) 25 MG 24 hr tablet TAKE 1 TABLET(25 MG) BY MOUTH EVERY DAY  Qty: 30 tablet, Refills: 0     "  modafinil (PROVIGIL) 200 MG Tab Take 1 tablet (200 mg total) by mouth 2 (two) times daily.  Qty: 60 tablet, Refills: 3    Associated Diagnoses: LOVE (obstructive sleep apnea); Hypersomnia      SIMBRINZA 1-0.2 % DrpS SHAKE LIQUID AND INSTILL 1 DROP IN BOTH EYES TWICE DAILY  Qty: 8 mL, Refills: 2      valsartan (DIOVAN) 160 MG tablet Take 1 tablet (160 mg total) by mouth once daily.  Qty: 30 tablet, Refills: 11      vitamin D 1000 units Tab Take 1,000 Units by mouth. Two tablet daily       BD INSULIN PEN NEEDLE UF MINI 31 gauge x 3/16" Ndle USE THREE TIMES DAILY BEFORE MEALS  Qty: 100 each, Refills: 0      mupirocin (BACTROBAN) 2 % ointment GILMAR AA OF SKIN BID  Refills: 0             Discharge Procedure Orders (must include Diet, Follow-up, Activity)    Follow Up:  Follow up with PCP as per your routine.  Please follow a high fiber diet.  Activity as tolerated.    No driving day of procedure.    PROBIOTICS:  Now that your colon is so cleaned out, now is a good time for a round of PROBIOTICS.  Eat a container of Greek Yogurt, such as OIKOS or CHOBANI,  Or Activia or Dannon    Greek Yogurt.    Or Take a similar Probiotic product such as Align or Culturelle or Mary Ellen-Q, every day for a month.                  (The products listed are non-prescription, but you may need to ask the pharmacist for their location.)  Repeat this 3-4 times a year.  "

## 2017-09-18 NOTE — ANESTHESIA PREPROCEDURE EVALUATION
09/18/2017  Glenn Medel is a 62 y.o., male.    Anesthesia Evaluation      I have reviewed the Medications.     Review of Systems  Anesthesia Hx:  No problems with previous Anesthesia   Social:  Non-Smoker, No Alcohol Use    Cardiovascular:   Hypertension hyperlipidemia    Pulmonary:   Sleep Apnea, CPAP Interstitial lung disease   Renal/:   Chronic Renal Disease, CRI    Hepatic/GI:  Hepatic/GI Normal    Musculoskeletal:   Arthritis (gout)     Neurological:   Peripheral Neuropathy    Endocrine:   Diabetes        Physical Exam  General:  Obesity    Airway/Jaw/Neck:  Airway Findings: Mouth Opening: Normal General Airway Assessment: Adult  Mallampati: III  Jaw/Neck Findings:  Neck ROM: Extension Decreased, Mild       Chest/Lungs:  Chest/Lungs Findings: Clear to auscultation, Normal Respiratory Rate     Heart/Vascular:  Heart Findings: Rate: Normal  Rhythm: Regular Rhythm  Sounds: Normal  Heart murmur: negative Vascular Findings: Normal (No carotid bruits.)       Mental Status:  Mental Status Findings:  Cooperative, Alert and Oriented         Anesthesia Plan  Type of Anesthesia, risks & benefits discussed:  Anesthesia Type:  general  Patient's Preference:   Intra-op Monitoring Plan:   Intra-op Monitoring Plan Comments:   Post Op Pain Control Plan:   Post Op Pain Control Plan Comments:   Induction:   IV  Beta Blocker:  Patient is not currently on a Beta-Blocker (No further documentation required).       Informed Consent: Patient understands risks and agrees with Anesthesia plan.  Questions answered. Anesthesia consent signed with patient.  ASA Score: 3     Day of Surgery Review of History & Physical:        Anesthesia Plan Notes: Propofol general.        Ready For Surgery From Anesthesia Perspective.

## 2017-09-18 NOTE — H&P
"History & Physical - Short Stay  Gastroenterology      SUBJECTIVE:     Procedure: Colonoscopy    Chief Complaint/Indication for Procedure: Rectal bleeding.  Hx of colon polyps.  FHx of colon cancer (mother and father).    History of Present Illness:  Pratik Hernandez MD      1:36 PM   Note      Needs colonoscopy, Orders entered   Please contact.  If bleeding continues, will need to go to ER.            September 10, 2017   Glenn Medel   to Pratik Hernandez MD     8:15 PM   Dr Hernandez; Sunday evening when i thought i was gonna have a bowel movement, it was gas, i wiped myself and there was blood on the tissue, along with a long piece of dark blood, about 1/16" in diameter x 2-3" long.      Gianfranco   [birth year]   [phone]       [facility] Medications   Medication Sig    aspirin (ECOTRIN) 81 MG EC tablet Take 81 mg by mouth once daily.    atorvastatin (LIPITOR) 20 MG tablet Take 1 tablet (20 mg total) by mouth once daily.    colchicine 0.6 mg tablet Take 1 tablet (0.6 mg total) by mouth 2 (two) times daily as needed (Gout).    duloxetine (CYMBALTA) 60 MG capsule Take 60 mg by mouth 2 (two) times daily.    furosemide (LASIX) 20 MG tablet Take 1 tablet (20 mg total) by mouth every morning.    glimepiride (AMARYL) 2 MG tablet Take 1 tablet (2 mg total) by mouth once daily.    ibuprofen-famotidine (DUEXIS) 800-26.6 mg Tab Take by mouth daily as needed.    liraglutide 0.6 mg/0.1 mL, 18 mg/3 mL, subq PNIJ (VICTOZA 3-NOEL) 0.6 mg/0.1 mL (18 mg/3 mL) PnIj Inject 1.8 mg into the skin once daily.    metformin (GLUCOPHAGE-XR) 500 MG 24 hr tablet TAKE 2 TABLETS BY MOUTH EVERY DAY WITH BREAKFAST    metoprolol succinate (TOPROL-XL) 25 MG 24 hr tablet TAKE 1 TABLET(25 MG) BY MOUTH EVERY DAY    modafinil (PROVIGIL) 200 MG Tab Take 1 tablet (200 mg total) by mouth 2 (two) times daily.    SIMBRINZA 1-0.2 % DrpS SHAKE LIQUID AND INSTILL 1 DROP IN BOTH EYES TWICE DAILY    valsartan (DIOVAN) 160 MG tablet Take 1 tablet " "(160 mg total) by mouth once daily.    vitamin D 1000 units Tab Take 1,000 Units by mouth. Two tablet daily     BD INSULIN PEN NEEDLE UF MINI 31 gauge x 3/16" Ndle USE THREE TIMES DAILY BEFORE MEALS    mupirocin (BACTROBAN) 2 % ointment GILMAR AA OF SKIN BID       Review of patient's allergies indicates:   Allergen Reactions    Avelox [moxifloxacin] Hives and Rash        Past Medical History:   Diagnosis Date    BPH (benign prostatic hyperplasia)     Cataract     DDD (degenerative disc disease), lumbar     s/p epidural steroids     Diabetes mellitus, stable     Glaucoma     OU    HTN (hypertension)     Obesity     LOVE on CPAP      Past Surgical History:   Procedure Laterality Date    COLONOSCOPY W/ POLYPECTOMY  04/13/2010    YARELI.   One 1 cm polyp in the sigmoid colon.  Internal hemorrhoids.    HAND SURGERY      TONSILLECTOMY      uro lift      for enlarged prostate     Family History   Problem Relation Age of Onset    Heart disease Brother 62    Heart disease Father     Diabetes Father     Colon cancer Father     Cancer Father      colon    Cataracts Father     Hypertension Mother     Diabetes Mother     Stroke Mother     Cancer Mother      colon    Glaucoma Maternal Grandmother     Amblyopia Neg Hx     Blindness Neg Hx     Macular degeneration Neg Hx     Retinal detachment Neg Hx     Strabismus Neg Hx     Thyroid disease Neg Hx      Social History   Substance Use Topics    Smoking status: Never Smoker    Smokeless tobacco: Never Used    Alcohol use Yes      Comment: occasional         OBJECTIVE:     Vital Signs (Most Recent)  Temp: 97.7 °F (36.5 °C) (09/18/17 1356)  Pulse: (!) 57 (09/18/17 1356)  Resp: 16 (09/18/17 1356)  BP: 136/67 (09/18/17 1356)  SpO2: 96 % (09/18/17 1356)    Physical Exam:   : Ht 5' 11" (1.803 m)    Wt 103.2 kg (227 lb 9.6 oz)    BMI 31.74 kg/m²                       GENERAL:  Comfortable, in no acute distress.                                 HEENT EXAM:  " Nonicteric.  No adenopathy.  Oropharynx is clear.               NECK:  Supple.                                                               LUNGS:  Clear.                                                               CARDIAC:  Regular rate and rhythm.  S1, S2.  No murmur.                      ABDOMEN:  Soft, positive bowel sounds, nontender.  No hepatosplenomegaly or masses.  No rebound or guarding.                                             EXTREMITIES:  No edema.     MENTAL STATUS:  Alert and oriented.    ASSESSMENT/PLAN:     Assessment: Rectal bleeding.  Hx of colon polyps.  FHx of colon cancer (mother and father).    Plan: Colonoscopy    Anesthesia Plan:   MAC / General Anaesthesia    ASA Grade: ASA 2 - Patient with mild systemic disease with no functional limitations    MALLAMPATI SCORE: II (hard and soft palate, upper portion of tonsils anduvula visible)

## 2017-09-19 VITALS
HEIGHT: 71 IN | BODY MASS INDEX: 30.66 KG/M2 | DIASTOLIC BLOOD PRESSURE: 72 MMHG | RESPIRATION RATE: 18 BRPM | OXYGEN SATURATION: 100 % | TEMPERATURE: 97 F | HEART RATE: 66 BPM | SYSTOLIC BLOOD PRESSURE: 120 MMHG | WEIGHT: 219 LBS

## 2017-09-22 ENCOUNTER — CLINICAL SUPPORT (OUTPATIENT)
Dept: OPHTHALMOLOGY | Facility: CLINIC | Age: 62
End: 2017-09-22
Payer: COMMERCIAL

## 2017-09-22 DIAGNOSIS — H40.053 OCULAR HYPERTENSION, BILATERAL: ICD-10-CM

## 2017-09-22 PROCEDURE — 92083 EXTENDED VISUAL FIELD XM: CPT | Mod: S$GLB,,, | Performed by: OPTOMETRIST

## 2017-09-28 RX ORDER — ATORVASTATIN CALCIUM 20 MG/1
20 TABLET, FILM COATED ORAL DAILY
Qty: 90 TABLET | Refills: 3 | OUTPATIENT
Start: 2017-09-28 | End: 2018-09-28

## 2017-10-02 RX ORDER — PEN NEEDLE, DIABETIC 31 GX5/16"
NEEDLE, DISPOSABLE MISCELLANEOUS
Qty: 100 EACH | Refills: 0 | Status: ON HOLD | OUTPATIENT
Start: 2017-10-02 | End: 2017-12-06 | Stop reason: HOSPADM

## 2017-10-03 ENCOUNTER — PATIENT MESSAGE (OUTPATIENT)
Dept: FAMILY MEDICINE | Facility: CLINIC | Age: 62
End: 2017-10-03

## 2017-10-03 RX ORDER — ATORVASTATIN CALCIUM 20 MG/1
20 TABLET, FILM COATED ORAL DAILY
Qty: 90 TABLET | Refills: 3 | Status: ON HOLD | OUTPATIENT
Start: 2017-10-03 | End: 2017-12-06 | Stop reason: HOSPADM

## 2017-10-11 RX ORDER — METOPROLOL SUCCINATE 25 MG/1
TABLET, EXTENDED RELEASE ORAL
Qty: 30 TABLET | Refills: 0 | Status: SHIPPED | OUTPATIENT
Start: 2017-10-11 | End: 2017-11-03 | Stop reason: SDUPTHER

## 2017-10-19 ENCOUNTER — INITIAL CONSULT (OUTPATIENT)
Dept: OPHTHALMOLOGY | Facility: CLINIC | Age: 62
End: 2017-10-19
Payer: COMMERCIAL

## 2017-10-19 ENCOUNTER — TELEPHONE (OUTPATIENT)
Dept: OPHTHALMOLOGY | Facility: CLINIC | Age: 62
End: 2017-10-19

## 2017-10-19 DIAGNOSIS — H43.13 VITREOUS HEMORRHAGE OF BOTH EYES: Primary | ICD-10-CM

## 2017-10-19 DIAGNOSIS — H43.393 VITREOUS FLOATER, BILATERAL: ICD-10-CM

## 2017-10-19 DIAGNOSIS — H25.13 NUCLEAR SCLEROSIS OF BOTH EYES: ICD-10-CM

## 2017-10-19 DIAGNOSIS — H43.393 OTHER VITREOUS OPACITIES, BILATERAL: ICD-10-CM

## 2017-10-19 DIAGNOSIS — H43.13 VITREOUS HEMORRHAGE, BILATERAL: Primary | ICD-10-CM

## 2017-10-19 PROBLEM — H25.10 NS (NUCLEAR SCLEROSIS): Status: ACTIVE | Noted: 2017-10-19

## 2017-10-19 PROCEDURE — 92225 PR SPECIAL EYE EXAM, INITIAL: CPT | Mod: RT,S$GLB,, | Performed by: OPHTHALMOLOGY

## 2017-10-19 PROCEDURE — 99999 PR PBB SHADOW E&M-EST. PATIENT-LVL II: CPT | Mod: PBBFAC,,, | Performed by: OPHTHALMOLOGY

## 2017-10-19 PROCEDURE — 92014 COMPRE OPH EXAM EST PT 1/>: CPT | Mod: S$GLB,,, | Performed by: OPHTHALMOLOGY

## 2017-10-19 RX ORDER — POLYETHYLENE GLYCOL-3350 AND ELECTROLYTES 236; 6.74; 5.86; 2.97; 22.74 G/274.31G; G/274.31G; G/274.31G; G/274.31G; G/274.31G
POWDER, FOR SOLUTION ORAL
Refills: 0 | COMMUNITY
Start: 2017-09-14 | End: 2017-12-05 | Stop reason: ALTCHOICE

## 2017-10-19 NOTE — LETTER
October 19, 2017      OCTAVIO Reyes, OD  [address] [facility] [address] [place] [address]           [facility] - Ophthalmology  [address] [facility] [address] [place] [address]  Phone: [phone]  Fax: [phone]          Patient: Glenn Medel   MR Number: [id number]   YOB: 1955   Date of Visit: 10/19/2017       Dear Dr. OCTAVIO Reyes:    Thank you for referring Glenn Medel to me for evaluation. Attached you will find relevant portions of my assessment and plan of care.    If you have questions, please do not hesitate to call me. I look forward to following Glenn Medel along with you.    Sincerely,    SAJAN Pulido MD    Enclosure  CC:  No Recipients    If you would like to receive this communication electronically, please contact [email or web address] or [phone] to request more information on [employer] access.    For providers and/or their staff who would like to refer a patient to [facility], please contact us through our one-stop-shop provider referral line, [facility], at [phone].    If you feel you have received this communication in error or would no longer like to receive these types of communications, please e-mail [email or web address]

## 2017-10-19 NOTE — PROGRESS NOTES
HPI     Concerns About Ocular Health    Additional comments: PVD Eval- Dr. Reyes           Comments   Patient states he has been having floaters in OU(os>od) for a little over a month now. No flashes. His va seems to be stable in OU.    H/o DM  HTN  Glaucoma Suspect    Simbrinza BID OU       Last edited by Ron Yusuf on 10/19/2017  8:26 AM. (History)      A/P    1. Dense vitreous opacities OU with [facility]  No Improvement over last several months  ?prior VH with PVD OU - no breaks or tears    PLan 25g PPV/partial AFx OS for vitreous opacities/[facility] OS    Local MAC  LOC 25 min    Risks, benefits, and alternatives to treatment discussed in detail with the patient.  The patient voiced understanding and wished to proceed with the procedure      2. NS OU    3. DM  Controlled No DR  BS/BP/chol control    4. HTN Ret OU      To OR

## 2017-11-05 RX ORDER — METOPROLOL SUCCINATE 25 MG/1
TABLET, EXTENDED RELEASE ORAL
Qty: 30 TABLET | Refills: 0 | Status: SHIPPED | OUTPATIENT
Start: 2017-11-05 | End: 2017-12-03 | Stop reason: SDUPTHER

## 2017-11-05 RX ORDER — LIRAGLUTIDE 6 MG/ML
INJECTION SUBCUTANEOUS
Qty: 18 ML | Refills: 2 | Status: ON HOLD | OUTPATIENT
Start: 2017-11-05 | End: 2017-12-06 | Stop reason: HOSPADM

## 2017-11-14 ENCOUNTER — TELEPHONE (OUTPATIENT)
Dept: OPHTHALMOLOGY | Facility: CLINIC | Age: 62
End: 2017-11-14

## 2017-11-14 ENCOUNTER — ANESTHESIA EVENT (OUTPATIENT)
Dept: SURGERY | Facility: HOSPITAL | Age: 62
End: 2017-11-14
Payer: COMMERCIAL

## 2017-11-14 NOTE — H&P
Pre-Operative History & Physical  Ophthalmology      SUBJECTIVE:     History of Present Illness:  Patient is a 62 y.o. male presents with Vitreous hemorrhage of both eyes [H43.13]  Vitreous floater, bilateral [H43.393].    MEDICATIONS:   No prescriptions prior to admission.       ALLERGIES:   Review of patient's allergies indicates:   Allergen Reactions    Avelox [moxifloxacin] Hives and Rash       PAST MEDICAL HISTORY:   Past Medical History:   Diagnosis Date    BPH (benign prostatic hyperplasia)     Cataract     DDD (degenerative disc disease), lumbar     s/p epidural steroids     Diabetes mellitus, stable     Glaucoma     OU    HTN (hypertension)     Obesity     LOVE on CPAP      PAST SURGICAL HISTORY:   Past Surgical History:   Procedure Laterality Date    COLONOSCOPY N/A 9/18/2017    Procedure: COLONOSCOPY;  Surgeon: Paul Feliz Jr., MD;  Location: [facility];  Service: Endoscopy;  Laterality: N/A;    COLONOSCOPY W/ POLYPECTOMY  04/13/2010    YARELI.   One 1 cm polyp in the sigmoid colon.  Internal hemorrhoids.    HAND SURGERY      TONSILLECTOMY      uro lift      for enlarged prostate     PAST FAMILY HISTORY:   Family History   Problem Relation Age of Onset    Heart disease Brother 62    Heart disease Father     Diabetes Father     Colon cancer Father     Cancer Father      colon    Cataracts Father     Hypertension Mother     Diabetes Mother     Stroke Mother     Cancer Mother      colon    Glaucoma Maternal Grandmother     Amblyopia Neg Hx     Blindness Neg Hx     Macular degeneration Neg Hx     Retinal detachment Neg Hx     Strabismus Neg Hx     Thyroid disease Neg Hx      SOCIAL HISTORY:   Social History   Substance Use Topics    Smoking status: Never Smoker    Smokeless tobacco: Never Used    Alcohol use Yes      Comment: occasional        MENTAL STATUS: Alert    REVIEW OF SYSTEMS: Negative    OBJECTIVE:     Vital Signs (Most Recent)       Physical Exam:  General:  NAD  HEENT: Atraumatic  Lungs: Adequate respirations, LCTAB  Heart: RRR, No murmur  Abdomen: Soft NT    ASSESSMENT/PLAN:     Patient is a 62 y.o. male with Vitreous hemorrhage of both eyes [H43.13]  Vitreous floater, bilateral [H43.393].     - Plan for surgical correction 25 g PPV, partial AFX Left eye  Local MAC  25 minutes     - Risks/benefits/alternatives of the procedure including, but not limited to scarring, bleeding, infection, loss or decreased vision, and/or need for possible repeat surgery discussed with the patient and family.   - Informed consent obtained prior to surgery and the patient/family voiced good understanding.    Omar Rosales  11/14/2017  11:44 AM

## 2017-11-14 NOTE — PRE-PROCEDURE INSTRUCTIONS
PreOp Instructions given:    - Verbal medication information (what to hold and what to take)   - NPO guidelines   - Arrival place directions given; time to be given the day before procedure by the   Surgeon's Office   - Bathing with antibacterial soap   - Don't wear any jewelry or bring any valuables AM of surgery   - No makeup or moisturizer to face   - No perfume/cologne, powder, lotions or aftershave     Denies any family history of side effects or issues with anesthesia or sedation.    Pt. verbalized understanding.

## 2017-11-15 ENCOUNTER — ANESTHESIA (OUTPATIENT)
Dept: SURGERY | Facility: HOSPITAL | Age: 62
End: 2017-11-15
Payer: COMMERCIAL

## 2017-11-15 ENCOUNTER — HOSPITAL ENCOUNTER (OUTPATIENT)
Facility: HOSPITAL | Age: 62
Discharge: HOME | End: 2017-11-15
Attending: OPHTHALMOLOGY | Admitting: OPHTHALMOLOGY
Payer: COMMERCIAL

## 2017-11-15 VITALS
WEIGHT: 223 LBS | BODY MASS INDEX: 31.22 KG/M2 | HEIGHT: 71 IN | HEART RATE: 70 BPM | DIASTOLIC BLOOD PRESSURE: 77 MMHG | RESPIRATION RATE: 16 BRPM | OXYGEN SATURATION: 97 % | TEMPERATURE: 98 F | SYSTOLIC BLOOD PRESSURE: 145 MMHG

## 2017-11-15 DIAGNOSIS — H43.12 VITREOUS HEMORRHAGE, LEFT: Primary | ICD-10-CM

## 2017-11-15 DIAGNOSIS — H43.393 OTHER VITREOUS OPACITIES, BILATERAL: ICD-10-CM

## 2017-11-15 DIAGNOSIS — H43.13 VITREOUS HEMORRHAGE, BILATERAL: ICD-10-CM

## 2017-11-15 LAB — POCT GLUCOSE: 176 MG/DL (ref 70–110)

## 2017-11-15 PROCEDURE — 27600004 OPTIME MED/SURG SUP & DEVICES INTRAOCULAR LENS: Performed by: OPHTHALMOLOGY

## 2017-11-15 PROCEDURE — 63600175 PHARM REV CODE 636 W HCPCS: Performed by: NURSE ANESTHETIST, CERTIFIED REGISTERED

## 2017-11-15 PROCEDURE — 37000009 HC ANESTHESIA EA ADD 15 MINS: Performed by: OPHTHALMOLOGY

## 2017-11-15 PROCEDURE — 82962 GLUCOSE BLOOD TEST: CPT | Performed by: OPHTHALMOLOGY

## 2017-11-15 PROCEDURE — 36000707: Performed by: OPHTHALMOLOGY

## 2017-11-15 PROCEDURE — 63600175 PHARM REV CODE 636 W HCPCS: Performed by: OPHTHALMOLOGY

## 2017-11-15 PROCEDURE — S0020 INJECTION, BUPIVICAINE HYDRO: HCPCS | Performed by: OPHTHALMOLOGY

## 2017-11-15 PROCEDURE — 71000015 HC POSTOP RECOV 1ST HR: Performed by: OPHTHALMOLOGY

## 2017-11-15 PROCEDURE — 27201423 OPTIME MED/SURG SUP & DEVICES STERILE SUPPLY: Performed by: OPHTHALMOLOGY

## 2017-11-15 PROCEDURE — 25000003 PHARM REV CODE 250: Performed by: OPHTHALMOLOGY

## 2017-11-15 PROCEDURE — 99499 UNLISTED E&M SERVICE: CPT | Mod: ,,, | Performed by: OPHTHALMOLOGY

## 2017-11-15 PROCEDURE — 37000008 HC ANESTHESIA 1ST 15 MINUTES: Performed by: OPHTHALMOLOGY

## 2017-11-15 PROCEDURE — 67036 REMOVAL OF INNER EYE FLUID: CPT | Mod: LT,,, | Performed by: OPHTHALMOLOGY

## 2017-11-15 PROCEDURE — 36000706: Performed by: OPHTHALMOLOGY

## 2017-11-15 PROCEDURE — C1784 OCULAR DEV, INTRAOP, DET RET: HCPCS | Performed by: OPHTHALMOLOGY

## 2017-11-15 PROCEDURE — D9220A PRA ANESTHESIA: Mod: ANES,,, | Performed by: ANESTHESIOLOGY

## 2017-11-15 PROCEDURE — D9220A PRA ANESTHESIA: Mod: CRNA,,, | Performed by: NURSE ANESTHETIST, CERTIFIED REGISTERED

## 2017-11-15 RX ORDER — NEOMYCIN SULFATE, POLYMYXIN B SULFATE, AND DEXAMETHASONE 3.5; 10000; 1 MG/G; [USP'U]/G; MG/G
OINTMENT OPHTHALMIC
Status: DISCONTINUED | OUTPATIENT
Start: 2017-11-15 | End: 2017-11-15 | Stop reason: HOSPADM

## 2017-11-15 RX ORDER — PROPOFOL 10 MG/ML
VIAL (ML) INTRAVENOUS
Status: DISCONTINUED | OUTPATIENT
Start: 2017-11-15 | End: 2017-11-15

## 2017-11-15 RX ORDER — EPINEPHRINE 1 MG/ML
INJECTION, SOLUTION INTRACARDIAC; INTRAMUSCULAR; INTRAVENOUS; SUBCUTANEOUS
Status: DISCONTINUED
Start: 2017-11-15 | End: 2017-11-15 | Stop reason: HOSPADM

## 2017-11-15 RX ORDER — SODIUM CHLORIDE 0.9 % (FLUSH) 0.9 %
3 SYRINGE (ML) INJECTION
Status: DISCONTINUED | OUTPATIENT
Start: 2017-11-15 | End: 2017-11-15 | Stop reason: HOSPADM

## 2017-11-15 RX ORDER — ACETAMINOPHEN 325 MG/1
650 TABLET ORAL EVERY 4 HOURS PRN
Status: DISCONTINUED | OUTPATIENT
Start: 2017-11-15 | End: 2017-11-15 | Stop reason: HOSPADM

## 2017-11-15 RX ORDER — BUPIVACAINE HYDROCHLORIDE 7.5 MG/ML
INJECTION, SOLUTION EPIDURAL; RETROBULBAR
Status: DISCONTINUED
Start: 2017-11-15 | End: 2017-11-15 | Stop reason: HOSPADM

## 2017-11-15 RX ORDER — OXYCODONE AND ACETAMINOPHEN 5; 325 MG/1; MG/1
1 TABLET ORAL EVERY 6 HOURS PRN
Qty: 12 TABLET | Refills: 0 | Status: ON HOLD | OUTPATIENT
Start: 2017-11-15 | End: 2017-12-06 | Stop reason: HOSPADM

## 2017-11-15 RX ORDER — LIDOCAINE HYDROCHLORIDE 10 MG/ML
1 INJECTION, SOLUTION EPIDURAL; INFILTRATION; INTRACAUDAL; PERINEURAL ONCE
Status: COMPLETED | OUTPATIENT
Start: 2017-11-15 | End: 2017-11-15

## 2017-11-15 RX ORDER — LIDOCAINE HCL/PF 100 MG/5ML
SYRINGE (ML) INTRAVENOUS
Status: DISCONTINUED | OUTPATIENT
Start: 2017-11-15 | End: 2017-11-15

## 2017-11-15 RX ORDER — TROPICAMIDE 10 MG/ML
1 SOLUTION/ DROPS OPHTHALMIC
Status: DISCONTINUED | OUTPATIENT
Start: 2017-11-15 | End: 2017-11-15 | Stop reason: HOSPADM

## 2017-11-15 RX ORDER — LIDOCAINE HYDROCHLORIDE 20 MG/ML
INJECTION, SOLUTION EPIDURAL; INFILTRATION; INTRACAUDAL; PERINEURAL
Status: DISCONTINUED | OUTPATIENT
Start: 2017-11-15 | End: 2017-11-15 | Stop reason: HOSPADM

## 2017-11-15 RX ORDER — MOXIFLOXACIN 5 MG/ML
1 SOLUTION/ DROPS OPHTHALMIC
Status: DISCONTINUED | OUTPATIENT
Start: 2017-11-15 | End: 2017-11-15 | Stop reason: HOSPADM

## 2017-11-15 RX ORDER — DEXAMETHASONE SODIUM PHOSPHATE 4 MG/ML
INJECTION, SOLUTION INTRA-ARTICULAR; INTRALESIONAL; INTRAMUSCULAR; INTRAVENOUS; SOFT TISSUE
Status: DISCONTINUED | OUTPATIENT
Start: 2017-11-15 | End: 2017-11-15 | Stop reason: HOSPADM

## 2017-11-15 RX ORDER — ONDANSETRON 2 MG/ML
INJECTION INTRAMUSCULAR; INTRAVENOUS
Status: DISCONTINUED | OUTPATIENT
Start: 2017-11-15 | End: 2017-11-15

## 2017-11-15 RX ORDER — ONDANSETRON 8 MG/1
8 TABLET, ORALLY DISINTEGRATING ORAL EVERY 8 HOURS PRN
Status: DISCONTINUED | OUTPATIENT
Start: 2017-11-15 | End: 2017-11-15 | Stop reason: HOSPADM

## 2017-11-15 RX ORDER — TETRACAINE HYDROCHLORIDE 5 MG/ML
1 SOLUTION OPHTHALMIC
Status: DISCONTINUED | OUTPATIENT
Start: 2017-11-15 | End: 2017-11-15 | Stop reason: HOSPADM

## 2017-11-15 RX ORDER — SODIUM CHLORIDE 9 MG/ML
INJECTION, SOLUTION INTRAVENOUS CONTINUOUS
Status: DISCONTINUED | OUTPATIENT
Start: 2017-11-15 | End: 2017-11-15 | Stop reason: HOSPADM

## 2017-11-15 RX ORDER — VANCOMYCIN HYDROCHLORIDE 500 MG/10ML
INJECTION, POWDER, LYOPHILIZED, FOR SOLUTION INTRAVENOUS
Status: DISCONTINUED | OUTPATIENT
Start: 2017-11-15 | End: 2017-11-15 | Stop reason: HOSPADM

## 2017-11-15 RX ORDER — ONDANSETRON 4 MG/1
4 TABLET, FILM COATED ORAL EVERY 8 HOURS PRN
Qty: 12 TABLET | Refills: 0 | Status: SHIPPED | OUTPATIENT
Start: 2017-11-15 | End: 2017-12-05

## 2017-11-15 RX ORDER — PREDNISOLONE ACETATE 10 MG/ML
1 SUSPENSION/ DROPS OPHTHALMIC
Status: DISCONTINUED | OUTPATIENT
Start: 2017-11-15 | End: 2017-11-15 | Stop reason: HOSPADM

## 2017-11-15 RX ORDER — LIDOCAINE HYDROCHLORIDE 20 MG/ML
INJECTION, SOLUTION EPIDURAL; INFILTRATION; INTRACAUDAL; PERINEURAL
Status: DISCONTINUED
Start: 2017-11-15 | End: 2017-11-15 | Stop reason: HOSPADM

## 2017-11-15 RX ORDER — CYCLOPENTOLATE HYDROCHLORIDE 10 MG/ML
1 SOLUTION/ DROPS OPHTHALMIC
Status: DISCONTINUED | OUTPATIENT
Start: 2017-11-15 | End: 2017-11-15 | Stop reason: HOSPADM

## 2017-11-15 RX ORDER — PHENYLEPHRINE HYDROCHLORIDE 25 MG/ML
1 SOLUTION/ DROPS OPHTHALMIC
Status: DISCONTINUED | OUTPATIENT
Start: 2017-11-15 | End: 2017-11-15 | Stop reason: HOSPADM

## 2017-11-15 RX ORDER — MIDAZOLAM HYDROCHLORIDE 1 MG/ML
INJECTION, SOLUTION INTRAMUSCULAR; INTRAVENOUS
Status: DISCONTINUED | OUTPATIENT
Start: 2017-11-15 | End: 2017-11-15

## 2017-11-15 RX ORDER — FENTANYL CITRATE 50 UG/ML
INJECTION, SOLUTION INTRAMUSCULAR; INTRAVENOUS
Status: DISCONTINUED | OUTPATIENT
Start: 2017-11-15 | End: 2017-11-15

## 2017-11-15 RX ORDER — VANCOMYCIN HYDROCHLORIDE 500 MG/10ML
INJECTION, POWDER, LYOPHILIZED, FOR SOLUTION INTRAVENOUS
Status: DISCONTINUED
Start: 2017-11-15 | End: 2017-11-15 | Stop reason: HOSPADM

## 2017-11-15 RX ORDER — BUPIVACAINE HYDROCHLORIDE 7.5 MG/ML
INJECTION, SOLUTION EPIDURAL; RETROBULBAR
Status: DISCONTINUED | OUTPATIENT
Start: 2017-11-15 | End: 2017-11-15 | Stop reason: HOSPADM

## 2017-11-15 RX ORDER — HYDROCODONE BITARTRATE AND ACETAMINOPHEN 5; 325 MG/1; MG/1
1 TABLET ORAL EVERY 4 HOURS PRN
Status: DISCONTINUED | OUTPATIENT
Start: 2017-11-15 | End: 2017-11-15 | Stop reason: HOSPADM

## 2017-11-15 RX ORDER — DEXAMETHASONE SODIUM PHOSPHATE 4 MG/ML
INJECTION, SOLUTION INTRA-ARTICULAR; INTRALESIONAL; INTRAMUSCULAR; INTRAVENOUS; SOFT TISSUE
Status: DISCONTINUED
Start: 2017-11-15 | End: 2017-11-15 | Stop reason: HOSPADM

## 2017-11-15 RX ORDER — NEOMYCIN SULFATE, POLYMYXIN B SULFATE, AND DEXAMETHASONE 3.5; 10000; 1 MG/G; [USP'U]/G; MG/G
OINTMENT OPHTHALMIC
Status: DISCONTINUED
Start: 2017-11-15 | End: 2017-11-15 | Stop reason: HOSPADM

## 2017-11-15 RX ORDER — EPINEPHRINE 1 MG/ML
INJECTION, SOLUTION INTRACARDIAC; INTRAMUSCULAR; INTRAVENOUS; SUBCUTANEOUS
Status: DISCONTINUED | OUTPATIENT
Start: 2017-11-15 | End: 2017-11-15 | Stop reason: HOSPADM

## 2017-11-15 RX ADMIN — FENTANYL CITRATE 25 MCG: 50 INJECTION, SOLUTION INTRAMUSCULAR; INTRAVENOUS at 11:11

## 2017-11-15 RX ADMIN — TETRACAINE HYDROCHLORIDE 1 DROP: 5 SOLUTION OPHTHALMIC at 09:11

## 2017-11-15 RX ADMIN — MIDAZOLAM HYDROCHLORIDE 1 MG: 1 INJECTION, SOLUTION INTRAMUSCULAR; INTRAVENOUS at 10:11

## 2017-11-15 RX ADMIN — MOXIFLOXACIN HYDROCHLORIDE 1 DROP: 5 SOLUTION/ DROPS OPHTHALMIC at 10:11

## 2017-11-15 RX ADMIN — TROPICAMIDE 1 DROP: 10 SOLUTION/ DROPS OPHTHALMIC at 10:11

## 2017-11-15 RX ADMIN — HOMATROPINE HYDROBROMIDE 1 DROP: 50 SOLUTION OPHTHALMIC at 10:11

## 2017-11-15 RX ADMIN — PROPOFOL 100 MG: 10 INJECTION, EMULSION INTRAVENOUS at 10:11

## 2017-11-15 RX ADMIN — PREDNISOLONE ACETATE 1 DROP: 10 SUSPENSION/ DROPS OPHTHALMIC at 09:11

## 2017-11-15 RX ADMIN — PREDNISOLONE ACETATE 1 DROP: 10 SUSPENSION/ DROPS OPHTHALMIC at 10:11

## 2017-11-15 RX ADMIN — PHENYLEPHRINE HYDROCHLORIDE 1 DROP: 25 SOLUTION/ DROPS OPHTHALMIC at 10:11

## 2017-11-15 RX ADMIN — CYCLOPENTOLATE HYDROCHLORIDE 1 DROP: 10 SOLUTION/ DROPS OPHTHALMIC at 10:11

## 2017-11-15 RX ADMIN — ONDANSETRON 4 MG: 2 INJECTION INTRAMUSCULAR; INTRAVENOUS at 10:11

## 2017-11-15 RX ADMIN — LIDOCAINE HYDROCHLORIDE 100 MG: 20 INJECTION, SOLUTION INTRAVENOUS at 10:11

## 2017-11-15 RX ADMIN — SODIUM CHLORIDE: 0.9 INJECTION, SOLUTION INTRAVENOUS at 09:11

## 2017-11-15 RX ADMIN — PHENYLEPHRINE HYDROCHLORIDE 1 DROP: 25 SOLUTION/ DROPS OPHTHALMIC at 09:11

## 2017-11-15 RX ADMIN — CYCLOPENTOLATE HYDROCHLORIDE 1 DROP: 10 SOLUTION/ DROPS OPHTHALMIC at 09:11

## 2017-11-15 RX ADMIN — MOXIFLOXACIN HYDROCHLORIDE 1 DROP: 5 SOLUTION/ DROPS OPHTHALMIC at 09:11

## 2017-11-15 RX ADMIN — HOMATROPINE HYDROBROMIDE 1 DROP: 50 SOLUTION OPHTHALMIC at 09:11

## 2017-11-15 RX ADMIN — LIDOCAINE HYDROCHLORIDE 0.2 MG: 10 INJECTION, SOLUTION EPIDURAL; INFILTRATION; INTRACAUDAL; PERINEURAL at 09:11

## 2017-11-15 RX ADMIN — FENTANYL CITRATE 50 MCG: 50 INJECTION, SOLUTION INTRAMUSCULAR; INTRAVENOUS at 11:11

## 2017-11-15 RX ADMIN — TROPICAMIDE 1 DROP: 10 SOLUTION/ DROPS OPHTHALMIC at 09:11

## 2017-11-15 NOTE — INTERVAL H&P NOTE
The patient has been examined and the H&P has been reviewed:    I concur with the findings and no changes have occurred since H&P was written.    Anesthesia/Surgery risks, benefits and alternative options discussed and understood by patient/family.          Active Hospital Problems    Diagnosis  POA    Vitreous hemorrhage, left [H43.12]  Yes      Resolved Hospital Problems    Diagnosis Date Resolved POA   No resolved problems to display.     Patient seen and examined today, H&P reviewed.  There are no changes to the patient's H&P.  There is still an ongoing indication for the procedure.  Will proceed with 25 g PPV, partial AFX Left eye. All questions answered.    Citlali Lion MD  Ophthalmology PGY-2  11/15/2017  10:22 AM

## 2017-11-15 NOTE — ANESTHESIA PREPROCEDURE EVALUATION
11/15/2017  Glenn Medel is a 62 y.o., male.    Anesthesia Evaluation    I have reviewed the Patient Summary Reports.     I have reviewed the Medications.     Review of Systems  Anesthesia Hx:  No problems with previous Anesthesia  History of prior surgery of interest to airway management or planning: Previous anesthesia: General   Social:  Non-Smoker, Social Alcohol Use    Hematology/Oncology:  Hematology Normal   Oncology Normal     EENT/Dental:EENT/Dental Normal   Cardiovascular:   Exercise tolerance: poor Hypertension, well controlled    Pulmonary:   Sleep Apnea, CPAP Uses CPAP but does not feel that it helps. Obstructive Sleep Apnea (LOVE), sleep study confirmed - Severe (Adult AHI > 40), CPAP prescribed.   Renal/:   Chronic Renal Disease, CRI    Hepatic/GI:  Hepatic/GI Normal    Musculoskeletal:   Arthritis     Neurological:   Neuromuscular Disease,    Endocrine:   Diabetes, well controlled, type 2    Dermatological:  Skin Normal    Psych:  Psychiatric Normal           Physical Exam  General:  Well nourished    Airway/Jaw/Neck:  Airway Findings: Mouth Opening: Normal Tongue: Normal  General Airway Assessment: Adult  Mallampati: II  TM Distance: Normal, at least 6 cm  Jaw/Neck Findings:  Neck ROM: Normal ROM      Dental:  Dental Findings: In tact        Mental Status:  Mental Status Findings:  Cooperative, Alert and Oriented         Anesthesia Plan  Type of Anesthesia, risks & benefits discussed:  Anesthesia Type:  MAC  Patient's Preference: MAC  Intra-op Monitoring Plan: standard ASA monitors  Intra-op Monitoring Plan Comments:   Post Op Pain Control Plan: multimodal analgesia  Post Op Pain Control Plan Comments:   Induction:   IV  Beta Blocker:  Patient is on a Beta-Blocker and has received one dose within the past 24 hours (No further documentation required).       Informed Consent: Patient  understands risks and agrees with Anesthesia plan.  Questions answered. Anesthesia consent signed with patient.  ASA Score: 3     Day of Surgery Review of History & Physical:  There are no significant changes.  H&P update referred to the surgeon.         Ready For Surgery From Anesthesia Perspective.

## 2017-11-15 NOTE — ANESTHESIA POSTPROCEDURE EVALUATION
"Anesthesia Post Evaluation    Patient: Glenn Medel    Procedure(s) Performed: Procedure(s) (LRB):  REPAIR-RETINA (VITRECTOMY) (Left)    Final Anesthesia Type: MAC  Patient location during evaluation: PACU  Patient participation: Yes- Able to Participate  Level of consciousness: awake and alert  Post-procedure vital signs: reviewed and stable  Pain management: adequate  Airway patency: patent  PONV status at discharge: No PONV  Anesthetic complications: no      Cardiovascular status: blood pressure returned to baseline  Respiratory status: unassisted  Hydration status: euvolemic  Follow-up not needed.        Visit Vitals  BP (!) 145/77 (BP Location: Right arm, Patient Position: Lying)   Pulse 70   Temp 36.7 °C (98 °F) (Oral)   Resp 16   Ht 5' 11" (1.803 m)   Wt 101.2 kg (223 lb)   SpO2 97%   BMI 31.10 kg/m²       Pain/Ezequiel Score: Pain Assessment Performed: Yes (11/15/2017 12:00 PM)  Presence of Pain: denies (11/15/2017 12:00 PM)  Ezequiel Score: 10 (11/15/2017 12:00 PM)      "

## 2017-11-15 NOTE — PLAN OF CARE
Report received from Elva SANCHEZ. Discharge instructions reviewed with the patient and his wife. Both verbalize understanding. IV removed with tip intact. VSS stable patient verbalized that he is ready to go home.

## 2017-11-15 NOTE — TRANSFER OF CARE
"Anesthesia Transfer of Care Note    Patient: Glenn Medel    Procedure(s) Performed: Procedure(s) (LRB):  REPAIR-RETINA (VITRECTOMY) (Left)    Patient location: PACU    Anesthesia Type: MAC    Transport from OR: Transported from OR on 6-10 L/min O2 by face mask with adequate spontaneous ventilation    Post pain: adequate analgesia    Post assessment: no apparent anesthetic complications and tolerated procedure well    Post vital signs: stable    Level of consciousness: awake, alert and oriented    Nausea/Vomiting: no nausea/vomiting    Complications: none    Transfer of care protocol was followed      Last vitals:   Visit Vitals  BP (!) 150/83 (BP Location: Right arm)   Pulse 76   Temp 36.8 °C (98.3 °F) (Oral)   Resp (!) 9   Ht 5' 11" (1.803 m)   Wt 101.2 kg (223 lb)   SpO2 96%   BMI 31.10 kg/m²     "

## 2017-11-15 NOTE — OP NOTE
DATE OF PROCEDURE:  11/15/2017    PREOPERATIVE DIAGNOSIS:  Visually significant vitreous opacities from prior   nonclearing vitreous hemorrhage to the left eye.    POSTOPERATIVE DIAGNOSIS:  Visually significant vitreous opacities from prior   nonclearing vitreous hemorrhage to the left eye.    PROCEDURE PERFORMED:  A 25-gauge pars plana vitrectomy with partial air-fluid   exchange to the left eye.    ATTENDING SURGEON:  SAJAN Pulido M.D.    ASSISTANT SURGEON:  Fellow, Cade Rosales.    ANESTHESIA:  Local monitored anesthesia care with a retrobulbar injection of 4.0   mL mixture of 0.75% Marcaine and 2% Xylocaine.    ESTIMATED BLOOD LOSS:  Minimal.    COMPLICATIONS:  Posterior capsule defect.    SPECIMENS:  None.    FINDINGS:  Significant central vitreus opacities in the central posterior   capsular violation noted during anterior vitreous removal to the left eye.    DISPOSITION:  Stable to Recovery, then home.    INDICATIONS FOR SURGERY:  This is a 62-year-old male who was referred for   central vitreous opacities that did not improve with observation.  The patient   had had a small prior hemorrhage in the past, but the opacities remained   central, providing difficulty with reading and driving.  The patient did note he   could see around the floaters from time to time, and when they came into view,   they were significantly disruptive.  Risks, benefits, and alternatives to   surgery were discussed in detail with risks including loss of vision, loss of   eye, retinal detachment, infection, hemorrhage, cataract formation, lens   dislocation, glaucoma, hypotony, ptosis, and diplopia.  The patient voiced   understanding and wished to proceed with the procedure.    DESCRIPTION OF PROCEDURE:  After proper informed consent was obtained, the   patient was brought back to the Operating Suite at [facility] where   MAC anesthesia was induced.  Retrobulbar injection was provided as above without    complication.  The patient was prepped and draped in normal sterile fashion for   ophthalmic surgery and lid speculum was placed in the left eye.  A standard   three-port 25-gauge pars plana vitrectomy set up with the infusion cannula was   inserted 4 mm posterior to the limbus.  The infusion cannula was turned on only   after observed to be free and clear of all underlying retinal tissue.    Supranasal and supratemporal trocars were also placed 4 mm posterior to the   limbus.  The vitrector and light pipe were introduced in the vitreous cavity and   a core vitrectomy was performed.  The posterior hyaloid was , but it   was propagated out to the level of the vitreous base.  A thorough peripheral   cortical vitrectomy was performed with the assistance of scleral depression.  No   identifiable retinal breaks were found.  An anterior vitrectomy was performed   to get rid of the vitreous behind the lens capsule.  During aspiration, the   vitreous pulled on the posterior capsule, creating a small, but central defect.    Partial air-fluid exchange was performed.  The trocars were removed from the   eye, not leaking after gentle massage, and the eye was normal pressure via   palpation.  Subconjunctival injections of vancomycin and Decadron were given to   the patient.  The drapes were removed from the patient.  He was washed free of   Betadine prep solution.  Maxitrol ointment was placed in the left eye.  The eye   was patch shielded.  MAC anesthesia was reversed.  He was brought to Recovery   Room in stable condition, tolerating the procedure well.  Dr. Pulido was   present for the entire case.      SYL  dd: 11/15/2017 11:26:50 (CST)  td: 11/15/2017 12:32:54 (CST)  Doc ID   #[id number]  Job ID #[id number]    CC:

## 2017-11-15 NOTE — BRIEF OP NOTE
Pre-Op Dx: Visually significant vitreous opacities from Prior [facility] OS    Post Op Dx: same    Procedure Performed: 25g PPV/partial AFx OS    Attending Surgeon: Ashok    Assistant Surgeon: Bobby    Anesthesia: Local/Mac, retrobulbar injection of 4.0cc mixture 0.75%Marcaine, 2% Xylocaine    Estimated blood loss: Minimal    Complication: posterior capsule defect    Specimen: None    Disposition: Stable to recovery    Findings/Outcome: significant central opacities, central posterior capsular violation noted during anterior vitreous removal OS    Date of Discharge: 11/15/17    Discharge Disposition: stable to recovery then home    F/U: tomorrow

## 2017-11-16 ENCOUNTER — OFFICE VISIT (OUTPATIENT)
Dept: OPHTHALMOLOGY | Facility: CLINIC | Age: 62
End: 2017-11-16
Payer: COMMERCIAL

## 2017-11-16 VITALS — HEART RATE: 81 BPM | DIASTOLIC BLOOD PRESSURE: 80 MMHG | SYSTOLIC BLOOD PRESSURE: 136 MMHG

## 2017-11-16 DIAGNOSIS — H43.393 OTHER VITREOUS OPACITIES, BILATERAL: Primary | ICD-10-CM

## 2017-11-16 DIAGNOSIS — H43.13 VITREOUS HEMORRHAGE, BILATERAL: ICD-10-CM

## 2017-11-16 PROCEDURE — 99024 POSTOP FOLLOW-UP VISIT: CPT | Mod: S$GLB,,, | Performed by: OPHTHALMOLOGY

## 2017-11-16 PROCEDURE — 99999 PR PBB SHADOW E&M-EST. PATIENT-LVL IV: CPT | Mod: PBBFAC,,, | Performed by: OPHTHALMOLOGY

## 2017-11-16 NOTE — PROGRESS NOTES
HPI     Post-op Evaluation    Additional comments: 1 day po            Comments   S/p 25g PPV/partial AFx OS for vitreous opacities/[facility] OS      HPI     Concerns About Ocular Health    Additional comments: PVD Eval- Dr. Reyes           Comments   Patient states he has been having floaters in OU(os>od) for a little over a month now. No flashes. His va seems to be stable in OU.    H/o DM  HTN  Glaucoma Suspect    Simbrinza BID OU       Last edited by Ron Yusuf on 10/19/2017  8:26 AM. (History)      A/P    1. Dense vitreous opacities OU with [facility]  No Improvement over last several months  ?prior VH with PVD OU - no breaks or tears    s/p 25g PPV/partial AFx OS for vitreous opacities/[facility] OS 11/15/17    Doing well, good IOP  HAD PC violation during anterior vitreous removal - will need CE soon  Sleep on either side until bubble gone    F/U monday      2. NS OU    3. DM  Controlled No DR  BS/BP/chol control    4. HTN Ret OU

## 2017-11-20 ENCOUNTER — OFFICE VISIT (OUTPATIENT)
Dept: OPHTHALMOLOGY | Facility: CLINIC | Age: 62
End: 2017-11-20
Payer: COMMERCIAL

## 2017-11-20 ENCOUNTER — TELEPHONE (OUTPATIENT)
Dept: OPHTHALMOLOGY | Facility: CLINIC | Age: 62
End: 2017-11-20

## 2017-11-20 DIAGNOSIS — H43.13 VITREOUS HEMORRHAGE, BILATERAL: Primary | ICD-10-CM

## 2017-11-20 DIAGNOSIS — H25.12 NUCLEAR SCLEROSIS OF LEFT EYE: ICD-10-CM

## 2017-11-20 PROCEDURE — 99999 PR PBB SHADOW E&M-EST. PATIENT-LVL III: CPT | Mod: PBBFAC,,, | Performed by: OPHTHALMOLOGY

## 2017-11-20 PROCEDURE — 99024 POSTOP FOLLOW-UP VISIT: CPT | Mod: S$GLB,,, | Performed by: OPHTHALMOLOGY

## 2017-11-20 NOTE — Clinical Note
Charles,  This gentleman has a PC tear from removal of anterior vit that was stuck on to capsule.  He has good vision potential and would benefit from CE OS ASAP.  He is having difficulty working and was not planning on having an extended absence from work.  He should be ok for CE within a week or so. Thanks!

## 2017-11-20 NOTE — TELEPHONE ENCOUNTER
----- Message from Damaris Garcia MD sent at 11/20/2017 10:42 AM CST -----  Can you add this pt to our schedule for this wed? Urgent cat eval from [email or web address].    ----- Message -----  From: SAJAN Pulido MD  Sent: 11/20/2017  10:05 AM  To: Damaris Garcia MD    Howdy,    This gentleman has a PC tear from removal of anterior vit that was stuck on to capsule.  He has good vision potential and would benefit from CE OS ASAP.  He is having difficulty working and was not planning on having an extended absence from work.  He should be ok for CE within a week or so.  Thanks!

## 2017-11-20 NOTE — PROGRESS NOTES
HPI     Post-op Evaluation    Additional comments: 4 day check           Comments   DLS 11/16/17- No changes x last visit    PF x 4  HA x 4  vigamox x 4  Johanna qhs    HPI     Post-op Evaluation    Additional comments: 1 day po            Comments   S/p 25g PPV/partial AFx OS for vitreous opacities/[facility] OS      HPI     Concerns About Ocular Health    Additional comments: PVD Eval- Dr. Reyes           Comments   Patient states he has been having floaters in OU(os>od) for a little over a month now. No flashes. His va seems to be stable in OU.    H/o DM  HTN  Glaucoma Suspect    Simbrinza BID OU       Last edited by Ron Yusuf on 10/19/2017  8:26 AM. (History)      A/P    1. Dense vitreous opacities OU with [facility]  No Improvement over last several months  ?prior VH with PVD OU - no breaks or tears    s/p 25g PPV/partial AFx OS for vitreous opacities/[facility] OS 11/15/17    Doing well, good IOP  HAD PC violation during anterior vitreous removal - will need CE soon  Sleep on either side until bubble gone    Doing well, good IOP  Continue gtts as is until Thursday, then Taper PF 3/2/1/0    Will need Prompt CE    2. NS OU  PC tear - needs CE    3. DM  Controlled No DR  BS/BP/chol control    4. HTN Ret OU    Me in 6 weeks

## 2017-11-22 ENCOUNTER — INITIAL CONSULT (OUTPATIENT)
Dept: OPHTHALMOLOGY | Facility: CLINIC | Age: 62
End: 2017-11-22
Payer: COMMERCIAL

## 2017-11-22 DIAGNOSIS — H40.52X0 SECONDARY GLAUCOMA, LEFT, STAGE UNSPECIFIED: ICD-10-CM

## 2017-11-22 DIAGNOSIS — H40.053 OCULAR HYPERTENSION, BILATERAL: ICD-10-CM

## 2017-11-22 DIAGNOSIS — H43.13 VITREOUS HEMORRHAGE OF BOTH EYES: ICD-10-CM

## 2017-11-22 DIAGNOSIS — H26.132 TOTAL TRAUMATIC CATARACT, LEFT EYE: Primary | ICD-10-CM

## 2017-11-22 PROCEDURE — 92136 OPHTHALMIC BIOMETRY: CPT | Mod: LT,S$GLB,, | Performed by: OPHTHALMOLOGY

## 2017-11-22 PROCEDURE — 92014 COMPRE OPH EXAM EST PT 1/>: CPT | Mod: S$GLB,,, | Performed by: OPHTHALMOLOGY

## 2017-11-22 PROCEDURE — 99999 PR PBB SHADOW E&M-EST. PATIENT-LVL IV: CPT | Mod: PBBFAC,,, | Performed by: OPHTHALMOLOGY

## 2017-11-22 RX ORDER — ACETAZOLAMIDE 500 MG/1
500 CAPSULE, EXTENDED RELEASE ORAL 2 TIMES DAILY
Qty: 14 CAPSULE | Refills: 11 | Status: ON HOLD | OUTPATIENT
Start: 2017-11-22 | End: 2017-12-06 | Stop reason: HOSPADM

## 2017-11-22 RX ORDER — BRIMONIDINE TARTRATE AND TIMOLOL MALEATE 2; 5 MG/ML; MG/ML
1 SOLUTION OPHTHALMIC 3 TIMES DAILY
Qty: 10 ML | Refills: 12 | Status: SHIPPED | OUTPATIENT
Start: 2017-11-22 | End: 2017-11-22 | Stop reason: SDUPTHER

## 2017-11-22 RX ORDER — BRIMONIDINE TARTRATE AND TIMOLOL MALEATE 2; 5 MG/ML; MG/ML
1 SOLUTION OPHTHALMIC 3 TIMES DAILY
Qty: 10 ML | Refills: 12 | Status: SHIPPED | OUTPATIENT
Start: 2017-11-22 | End: 2017-11-24 | Stop reason: ALTCHOICE

## 2017-11-22 RX ORDER — LATANOPROST 50 UG/ML
1 SOLUTION/ DROPS OPHTHALMIC NIGHTLY
Qty: 2.5 ML | Refills: 12 | Status: SHIPPED | OUTPATIENT
Start: 2017-11-22 | End: 2017-11-22 | Stop reason: SDUPTHER

## 2017-11-22 RX ORDER — LATANOPROST 50 UG/ML
1 SOLUTION/ DROPS OPHTHALMIC NIGHTLY
Qty: 2.5 ML | Refills: 12 | Status: SHIPPED | OUTPATIENT
Start: 2017-11-22 | End: 2017-11-24 | Stop reason: ALTCHOICE

## 2017-11-22 NOTE — LETTER
November 24, 2017      SAJAN Pulido MD  [address]  [facility] [address]           [facility]  [address] [facility] [address] [place] [address]  Phone: [phone]  Fax: [phone]          Patient: Glenn Medel   MR Number: [id number]   YOB: 1955   Date of Visit: 11/22/2017       Dear Dr. SAJAN Pulido:    Thank you for referring Glenn Medel to me for evaluation. Attached you will find relevant portions of my assessment and plan of care.    If you have questions, please do not hesitate to call me. I look forward to following Glenn Medel along with you.    Sincerely,    Damaris Garcia MD    Enclosure  CC:  No Recipients    If you would like to receive this communication electronically, please contact [email or web address] or [phone] to request more information on [employer] access.    For providers and/or their staff who would like to refer a patient to [facility], please contact us through our one-stop-shop provider referral line, [facility], at [phone].    If you feel you have received this communication in error or would no longer like to receive these types of communications, please e-mail [email or web address]

## 2017-11-22 NOTE — PATIENT INSTRUCTIONS
SIMBRINZA - CONTINUE 2 TIMES A DAY RIGHT EYE, START 3 TIMES A DAY LEFT EYE    DIAMOX PILLS - 1 PILL TWICE A DAY    PRED FORTE - SHAKE WELL - 3 TIMES A DAY LEFT EYE    VIGAMOX - 3 TIMES A DAY LEFT EYE    HOLD: OINTMENT AND RED TOP

## 2017-11-22 NOTE — PROGRESS NOTES
HPI     Cataract    Additional comments: Cataract eval per Dr Pulido           Comments   Ref by Dr Pulido    S/p 25g PPV/partial AFx OS for vitreous opacities/[facility] OS (11/15/17)   1. Dense vitreous opacities OU with [facility]   2. NS OU   3. DM   4. HTN Ret OU     Pt states sent by Dr Pulido for CE OS soon.     Gtts: Pred Forte, Homatropaire, Vigamox QID, Neomycin Johanna QHS OS       Last edited by Damaris Garcia MD on 11/22/2017  3:20 PM. (History)            Assessment /Plan     For exam results, see Encounter Report.    Total traumatic cataract, left eye  -     IOL Master - OU - Both Eyes    Ocular hypertension, bilateral    Secondary glaucoma, left, stage unspecified    Vitreous hemorrhage of both eyes    Other orders  -     Discontinue: brimonidine-timolol (COMBIGAN) 0.2-0.5 % Drop; Place 1 drop into the left eye 3 (three) times daily.  Dispense: 10 mL; Refill: 12  -     Discontinue: latanoprost (XALATAN) 0.005 % ophthalmic solution; Place 1 drop into the left eye every evening.  Dispense: 2.5 mL; Refill: 12  -     acetaZOLAMIDE (DIAMOX) 500 mg CpSR; Take 1 capsule (500 mg total) by mouth 2 (two) times daily.  Dispense: 14 capsule; Refill: 11  -     latanoprost (XALATAN) 0.005 % ophthalmic solution; Place 1 drop into the left eye every evening.  Dispense: 2.5 mL; Refill: 12  -     brimonidine-timolol (COMBIGAN) 0.2-0.5 % Drop; Place 1 drop into the left eye 3 (three) times daily.  Dispense: 10 mL; Refill: 12    traumatic cataract with violated posterior capsule OS  - Interfering with activities of daily living.  Pt desires cataract surgery for Va rehabilitation.   - R/B/A discussed and pt agrees to proceed with surgery.   - IOL options discussed according to patient's goals and concomitant ocular pathology; and pt content with monofocal lens.    - Target: plano.    MA60AC OS    PCBOO OS  Discussed possibility of sulcus IOL, pt understands.    F/up Friday - IOP check    Va/IOP OS    Will plan for cataract  extraction next wk.    SIMBRINZA - CONTINUE 2 TIMES A DAY RIGHT EYE, START 3 TIMES A DAY LEFT EYE    DIAMOX PILLS - 1 PILL TWICE A DAY    PRED FORTE - SHAKE WELL - 3 TIMES A DAY LEFT EYE    VIGAMOX - 3 TIMES A DAY LEFT EYE    HOLD: OINTMENT AND RED TOP       S/p 25g PPV/partial AFx OS for vitreous opacities/[facility] OS (11/15/17)

## 2017-11-24 ENCOUNTER — OFFICE VISIT (OUTPATIENT)
Dept: OPHTHALMOLOGY | Facility: CLINIC | Age: 62
End: 2017-11-24
Payer: COMMERCIAL

## 2017-11-24 DIAGNOSIS — H40.053 OCULAR HYPERTENSION, BILATERAL: ICD-10-CM

## 2017-11-24 DIAGNOSIS — H43.13 VITREOUS HEMORRHAGE, BILATERAL: ICD-10-CM

## 2017-11-24 DIAGNOSIS — H40.52X0 SECONDARY GLAUCOMA, LEFT, STAGE UNSPECIFIED: ICD-10-CM

## 2017-11-24 DIAGNOSIS — H26.132 TOTAL TRAUMATIC CATARACT, LEFT EYE: Primary | ICD-10-CM

## 2017-11-24 PROCEDURE — 99999 PR PBB SHADOW E&M-EST. PATIENT-LVL III: CPT | Mod: PBBFAC,,, | Performed by: OPHTHALMOLOGY

## 2017-11-24 PROCEDURE — 92014 COMPRE OPH EXAM EST PT 1/>: CPT | Mod: S$GLB,,, | Performed by: OPHTHALMOLOGY

## 2017-11-24 NOTE — PROGRESS NOTES
HPI     Ref by Dr Pulido     S/p 25g PPV/partial AFx OS for vitreous opacities/[facility] OS (11/15/17)   1. Dense vitreous opacities OU with [facility]   2. NS OU   3. DM   4. HTN Ret OU     Gtts: Diamox pills twice a day           PF TID OS           Vigamox TID OS           Simbrinza BID OD/TID OS             Last edited by Damaris Garcia MD on 11/24/2017  1:56 PM. (History)            Assessment /Plan     For exam results, see Encounter Report.    Total traumatic cataract, left eye    Ocular hypertension, bilateral    Secondary glaucoma, left, stage unspecified    Vitreous hemorrhage, bilateral        traumatic cataract with violated posterior capsule OS  - Interfering with activities of daily living.  Pt desires cataract surgery for Va rehabilitation.   - R/B/A discussed and pt agrees to proceed with surgery.   - IOL options discussed according to patient's goals and concomitant ocular pathology; and pt content with monofocal lens.    - Target: plano.    CE + IOL, +/- ant vitrectomy    MA60AC 15.5 OS and PCBOO 16.0 OS      Discussed possibility of sulcus IOL, pt understands.      Will plan for cataract extraction next wk.    SIMBRINZA - CONTINUE 2 TIMES A DAY RIGHT EYE, START 3 TIMES A DAY LEFT EYE    DIAMOX PILLS - 1 PILL TWICE A DAY    PRED FORTE - SHAKE WELL - 3 TIMES A DAY LEFT EYE    VIGAMOX - 3 TIMES A DAY LEFT EYE    HOLD: OINTMENT AND RED TOP       S/p 25g PPV/partial AFx OS for vitreous opacities/[facility] OS (11/15/17)

## 2017-11-27 ENCOUNTER — TELEPHONE (OUTPATIENT)
Dept: OPHTHALMOLOGY | Facility: CLINIC | Age: 62
End: 2017-11-27

## 2017-11-27 DIAGNOSIS — H26.132 TOTAL TRAUMATIC CATARACT OF LEFT EYE: Primary | ICD-10-CM

## 2017-11-27 DIAGNOSIS — H43.13 VITREOUS HEMORRHAGE OF BOTH EYES: ICD-10-CM

## 2017-11-28 NOTE — H&P
"History    Chief complaint:  Painless progressive vision loss    Present Ilness/Diagnosis: Nuclear sclerotic Cataract    Past Medical History:  has a past medical history of BPH (benign prostatic hyperplasia); Cataract; DDD (degenerative disc disease), lumbar; Diabetes mellitus, stable; Glaucoma; HTN (hypertension); Obesity; and LOVE on CPAP.    Family History/Social History: refer to chart    Allergies:   Review of patient's allergies indicates:   Allergen Reactions    Avelox [moxifloxacin] Hives and Rash       Current Medications: No current facility-administered medications for this encounter.     Current Outpatient Prescriptions:     acetaZOLAMIDE (DIAMOX) 500 mg CpSR, Take 1 capsule (500 mg total) by mouth 2 (two) times daily., Disp: 14 capsule, Rfl: 11    aspirin (ECOTRIN) 81 MG EC tablet, Take 81 mg by mouth once daily., Disp: , Rfl:     atorvastatin (LIPITOR) 20 MG tablet, Take 1 tablet (20 mg total) by mouth once daily., Disp: 90 tablet, Rfl: 3    BD INSULIN PEN NEEDLE UF MINI 31 gauge x 3/16" Ndle, USE THREE TIMES DAILY BEFORE MEALS, Disp: 100 each, Rfl: 0    colchicine 0.6 mg tablet, Take 1 tablet (0.6 mg total) by mouth 2 (two) times daily as needed (Gout)., Disp: 60 tablet, Rfl: 0    duloxetine (CYMBALTA) 60 MG capsule, Take 60 mg by mouth 2 (two) times daily., Disp: , Rfl:     FLUVIRIN 5735-0505 45 mcg (15 mcg x 3)/0.5 mL Susp, ADM 0.5ML IM UTD, Disp: , Rfl: 0    furosemide (LASIX) 20 MG tablet, Take 1 tablet (20 mg total) by mouth every morning., Disp: 90 tablet, Rfl: 3    GAVILYTE-G 236-22.74-6.74 -5.86 gram suspension, MIX AND DRINK UTD, Disp: , Rfl: 0    glimepiride (AMARYL) 2 MG tablet, Take 1 tablet (2 mg total) by mouth once daily., Disp: 30 tablet, Rfl: 11    hydrocortisone-pramoxine (PROCTOFOAM-HS) rectal foam, Place 1 applicator rectally 2 (two) times daily., Disp: 10 g, Rfl: 5    ibuprofen-famotidine (DUEXIS) 800-26.6 mg Tab, Take by mouth daily as needed., Disp: , Rfl:     " metformin (GLUCOPHAGE-XR) 500 MG 24 hr tablet, TAKE 2 TABLETS BY MOUTH EVERY DAY WITH BREAKFAST (Patient taking differently: Take 1,000 mg by mouth every morning. TAKE 2 TABLETS BY MOUTH EVERY DAY WITH BREAKFAST), Disp: 180 tablet, Rfl: 3    metoprolol succinate (TOPROL-XL) 25 MG 24 hr tablet, TAKE 1 TABLET(25 MG) BY MOUTH EVERY DAY, Disp: 30 tablet, Rfl: 0    modafinil (PROVIGIL) 200 MG Tab, Take 1 tablet (200 mg total) by mouth 2 (two) times daily., Disp: 60 tablet, Rfl: 3    mupirocin (BACTROBAN) 2 % ointment, GILMAR AA OF SKIN BID, Disp: , Rfl: 0    ondansetron (ZOFRAN) 4 MG tablet, Take 1 tablet (4 mg total) by mouth every 8 (eight) hours as needed for Nausea., Disp: 12 tablet, Rfl: 0    oxyCODONE-acetaminophen (PERCOCET) 5-325 mg per tablet, Take 1 tablet by mouth every 6 (six) hours as needed for Pain., Disp: 12 tablet, Rfl: 0    SIMBRINZA 1-0.2 % DrpS, SHAKE LIQUID AND INSTILL 1 DROP IN BOTH EYES TWICE DAILY, Disp: 8 mL, Rfl: 2    valsartan (DIOVAN) 160 MG tablet, Take 1 tablet (160 mg total) by mouth once daily., Disp: 30 tablet, Rfl: 11    VICTOZA 3-NOEL 0.6 mg/0.1 mL (18 mg/3 mL) PnIj, ADMINISTER 1.8 MG UNDER THE SKIN EVERY DAY, Disp: 18 mL, Rfl: 2    vitamin D 1000 units Tab, Take 1,000 Units by mouth. Two tablet daily , Disp: , Rfl:     Physical Exam    BP: Vital signs stable  General: No apparent distress  HEENT: nuclear sclerotic cataract  Lungs: adequate respirations  Heart: + pulses  Abdomen: soft  Rectal/pelvic: deferred    Impression: Visually significant Cataract    Plan: Phacoemulsification with implantation of Intraocular lens

## 2017-11-29 ENCOUNTER — TELEPHONE (OUTPATIENT)
Dept: OPHTHALMOLOGY | Facility: CLINIC | Age: 62
End: 2017-11-29

## 2017-11-30 ENCOUNTER — ANESTHESIA (OUTPATIENT)
Dept: SURGERY | Facility: HOSPITAL | Age: 62
End: 2017-11-30
Payer: COMMERCIAL

## 2017-11-30 ENCOUNTER — SURGERY (OUTPATIENT)
Age: 62
End: 2017-11-30

## 2017-11-30 ENCOUNTER — ANESTHESIA EVENT (OUTPATIENT)
Dept: SURGERY | Facility: HOSPITAL | Age: 62
End: 2017-11-30
Payer: COMMERCIAL

## 2017-11-30 ENCOUNTER — HOSPITAL ENCOUNTER (OUTPATIENT)
Facility: HOSPITAL | Age: 62
Discharge: HOME | End: 2017-11-30
Attending: OPHTHALMOLOGY | Admitting: OPHTHALMOLOGY
Payer: COMMERCIAL

## 2017-11-30 VITALS
TEMPERATURE: 99 F | HEART RATE: 67 BPM | OXYGEN SATURATION: 100 % | BODY MASS INDEX: 30.94 KG/M2 | WEIGHT: 221 LBS | RESPIRATION RATE: 18 BRPM | DIASTOLIC BLOOD PRESSURE: 67 MMHG | HEIGHT: 71 IN | SYSTOLIC BLOOD PRESSURE: 112 MMHG

## 2017-11-30 DIAGNOSIS — H25.10 SENILE NUCLEAR SCLEROSIS: ICD-10-CM

## 2017-11-30 DIAGNOSIS — H25.12 NUCLEAR SCLEROSIS OF LEFT EYE: Primary | ICD-10-CM

## 2017-11-30 LAB
POCT GLUCOSE: 159 MG/DL (ref 70–110)
POCT GLUCOSE: 167 MG/DL (ref 70–110)

## 2017-11-30 PROCEDURE — 63600175 PHARM REV CODE 636 W HCPCS: Performed by: NURSE ANESTHETIST, CERTIFIED REGISTERED

## 2017-11-30 PROCEDURE — D9220A PRA ANESTHESIA: Mod: ANES,,, | Performed by: ANESTHESIOLOGY

## 2017-11-30 PROCEDURE — 36000706: Performed by: OPHTHALMOLOGY

## 2017-11-30 PROCEDURE — 82962 GLUCOSE BLOOD TEST: CPT | Performed by: OPHTHALMOLOGY

## 2017-11-30 PROCEDURE — 37000008 HC ANESTHESIA 1ST 15 MINUTES: Performed by: OPHTHALMOLOGY

## 2017-11-30 PROCEDURE — C9447 INJ, PHENYLEPHRINE KETOROLAC: HCPCS | Performed by: OPHTHALMOLOGY

## 2017-11-30 PROCEDURE — V2632 POST CHMBR INTRAOCULAR LENS: HCPCS | Performed by: OPHTHALMOLOGY

## 2017-11-30 PROCEDURE — 25000003 PHARM REV CODE 250

## 2017-11-30 PROCEDURE — 66982 XCAPSL CTRC RMVL CPLX WO ECP: CPT | Mod: 79,LT,, | Performed by: OPHTHALMOLOGY

## 2017-11-30 PROCEDURE — 37000009 HC ANESTHESIA EA ADD 15 MINS: Performed by: OPHTHALMOLOGY

## 2017-11-30 PROCEDURE — 25000003 PHARM REV CODE 250: Performed by: OPHTHALMOLOGY

## 2017-11-30 PROCEDURE — D9220A PRA ANESTHESIA: Mod: CRNA,,, | Performed by: NURSE ANESTHETIST, CERTIFIED REGISTERED

## 2017-11-30 PROCEDURE — 63600175 PHARM REV CODE 636 W HCPCS: Performed by: OPHTHALMOLOGY

## 2017-11-30 PROCEDURE — 71000016 HC POSTOP RECOV ADDL HR: Performed by: OPHTHALMOLOGY

## 2017-11-30 PROCEDURE — 71000015 HC POSTOP RECOV 1ST HR: Performed by: OPHTHALMOLOGY

## 2017-11-30 PROCEDURE — 36000707: Performed by: OPHTHALMOLOGY

## 2017-11-30 DEVICE — IMPLANTABLE DEVICE: Type: IMPLANTABLE DEVICE | Site: EYE | Status: FUNCTIONAL

## 2017-11-30 RX ORDER — MOXIFLOXACIN 5 MG/ML
1 SOLUTION/ DROPS OPHTHALMIC
Status: ACTIVE | OUTPATIENT
Start: 2017-11-30 | End: 2017-11-30

## 2017-11-30 RX ORDER — TROPICAMIDE 10 MG/ML
1 SOLUTION/ DROPS OPHTHALMIC
Status: COMPLETED | OUTPATIENT
Start: 2017-11-30 | End: 2017-11-30

## 2017-11-30 RX ORDER — PREDNISOLONE ACETATE 10 MG/ML
SUSPENSION/ DROPS OPHTHALMIC
Status: DISCONTINUED
Start: 2017-11-30 | End: 2017-11-30 | Stop reason: HOSPADM

## 2017-11-30 RX ORDER — PHENYLEPHRINE HYDROCHLORIDE 25 MG/ML
SOLUTION/ DROPS OPHTHALMIC
Status: COMPLETED
Start: 2017-11-30 | End: 2017-11-30

## 2017-11-30 RX ORDER — KETOROLAC TROMETHAMINE 5 MG/ML
SOLUTION OPHTHALMIC
Status: COMPLETED
Start: 2017-11-30 | End: 2017-11-30

## 2017-11-30 RX ORDER — PROPARACAINE HYDROCHLORIDE 5 MG/ML
1 SOLUTION/ DROPS OPHTHALMIC ONCE
Status: COMPLETED | OUTPATIENT
Start: 2017-11-30 | End: 2017-11-30

## 2017-11-30 RX ORDER — MOXIFLOXACIN 5 MG/ML
SOLUTION/ DROPS OPHTHALMIC
Status: DISCONTINUED | OUTPATIENT
Start: 2017-11-30 | End: 2017-11-30 | Stop reason: HOSPADM

## 2017-11-30 RX ORDER — LIDOCAINE HYDROCHLORIDE 10 MG/ML
1 INJECTION, SOLUTION EPIDURAL; INFILTRATION; INTRACAUDAL; PERINEURAL ONCE
Status: DISCONTINUED | OUTPATIENT
Start: 2017-11-30 | End: 2017-11-30 | Stop reason: HOSPADM

## 2017-11-30 RX ORDER — MIDAZOLAM HYDROCHLORIDE 1 MG/ML
INJECTION, SOLUTION INTRAMUSCULAR; INTRAVENOUS
Status: DISCONTINUED | OUTPATIENT
Start: 2017-11-30 | End: 2017-11-30

## 2017-11-30 RX ORDER — KETOROLAC TROMETHAMINE 5 MG/ML
1 SOLUTION OPHTHALMIC ONCE
Status: COMPLETED | OUTPATIENT
Start: 2017-11-30 | End: 2017-11-30

## 2017-11-30 RX ORDER — PREDNISOLONE ACETATE 10 MG/ML
SUSPENSION/ DROPS OPHTHALMIC
Status: DISCONTINUED | OUTPATIENT
Start: 2017-11-30 | End: 2017-11-30 | Stop reason: HOSPADM

## 2017-11-30 RX ORDER — TROPICAMIDE 10 MG/ML
SOLUTION/ DROPS OPHTHALMIC
Status: COMPLETED
Start: 2017-11-30 | End: 2017-11-30

## 2017-11-30 RX ORDER — SODIUM CHLORIDE 9 MG/ML
INJECTION, SOLUTION INTRAVENOUS CONTINUOUS
Status: DISCONTINUED | OUTPATIENT
Start: 2017-11-30 | End: 2017-11-30 | Stop reason: HOSPADM

## 2017-11-30 RX ORDER — LIDOCAINE HYDROCHLORIDE 40 MG/ML
INJECTION, SOLUTION RETROBULBAR
Status: DISCONTINUED
Start: 2017-11-30 | End: 2017-11-30 | Stop reason: HOSPADM

## 2017-11-30 RX ORDER — PHENYLEPHRINE HYDROCHLORIDE 25 MG/ML
1 SOLUTION/ DROPS OPHTHALMIC
Status: COMPLETED | OUTPATIENT
Start: 2017-11-30 | End: 2017-11-30

## 2017-11-30 RX ORDER — ACETAMINOPHEN 325 MG/1
650 TABLET ORAL EVERY 4 HOURS PRN
Status: DISCONTINUED | OUTPATIENT
Start: 2017-11-30 | End: 2017-11-30 | Stop reason: HOSPADM

## 2017-11-30 RX ORDER — PROPARACAINE HYDROCHLORIDE 5 MG/ML
SOLUTION/ DROPS OPHTHALMIC
Status: COMPLETED
Start: 2017-11-30 | End: 2017-11-30

## 2017-11-30 RX ORDER — LIDOCAINE HYDROCHLORIDE 10 MG/ML
INJECTION, SOLUTION EPIDURAL; INFILTRATION; INTRACAUDAL; PERINEURAL
Status: DISCONTINUED | OUTPATIENT
Start: 2017-11-30 | End: 2017-11-30 | Stop reason: HOSPADM

## 2017-11-30 RX ORDER — MOXIFLOXACIN 5 MG/ML
SOLUTION/ DROPS OPHTHALMIC
Status: DISCONTINUED
Start: 2017-11-30 | End: 2017-11-30 | Stop reason: HOSPADM

## 2017-11-30 RX ORDER — LIDOCAINE HYDROCHLORIDE 10 MG/ML
INJECTION INFILTRATION; PERINEURAL
Status: COMPLETED
Start: 2017-11-30 | End: 2017-11-30

## 2017-11-30 RX ORDER — LIDOCAINE HYDROCHLORIDE 40 MG/ML
INJECTION, SOLUTION RETROBULBAR
Status: DISCONTINUED | OUTPATIENT
Start: 2017-11-30 | End: 2017-11-30 | Stop reason: HOSPADM

## 2017-11-30 RX ADMIN — TROPICAMIDE 1 DROP: 10 SOLUTION/ DROPS OPHTHALMIC at 10:11

## 2017-11-30 RX ADMIN — PHENYLEPHRINE HYDROCHLORIDE 1 DROP: 25 SOLUTION/ DROPS OPHTHALMIC at 10:11

## 2017-11-30 RX ADMIN — KETOROLAC TROMETHAMINE 1 DROP: 5 SOLUTION OPHTHALMIC at 10:11

## 2017-11-30 RX ADMIN — SODIUM CHLORIDE: 0.9 INJECTION, SOLUTION INTRAVENOUS at 10:11

## 2017-11-30 RX ADMIN — KETOROLAC TROMETHAMINE 1 DROP: 5 SOLUTION/ DROPS OPHTHALMIC at 10:11

## 2017-11-30 RX ADMIN — LIDOCAINE HYDROCHLORIDE 5 ML: 40 INJECTION, SOLUTION RETROBULBAR; TOPICAL at 11:11

## 2017-11-30 RX ADMIN — MOXIFLOXACIN HYDROCHLORIDE 1 DROP: 5 SOLUTION/ DROPS OPHTHALMIC at 10:11

## 2017-11-30 RX ADMIN — PROPARACAINE HYDROCHLORIDE 1 DROP: 5 SOLUTION/ DROPS OPHTHALMIC at 10:11

## 2017-11-30 RX ADMIN — MOXIFLOXACIN HYDROCHLORIDE 1 DROP: 5 SOLUTION/ DROPS OPHTHALMIC at 11:11

## 2017-11-30 RX ADMIN — MIDAZOLAM HYDROCHLORIDE 1 MG: 1 INJECTION, SOLUTION INTRAMUSCULAR; INTRAVENOUS at 10:11

## 2017-11-30 RX ADMIN — PREDNISOLONE ACETATE 1 DROP: 10 SUSPENSION/ DROPS OPHTHALMIC at 11:11

## 2017-11-30 RX ADMIN — SODIUM CHONDROITIN SULFATE / SODIUM HYALURONATE 1.05 ML: 0.55-0.5 INJECTION INTRAOCULAR at 11:11

## 2017-11-30 RX ADMIN — Medication 0.5 ML: at 11:11

## 2017-11-30 RX ADMIN — LIDOCAINE HYDROCHLORIDE 1 ML: 10 INJECTION, SOLUTION EPIDURAL; INFILTRATION; INTRACAUDAL; PERINEURAL at 11:11

## 2017-11-30 RX ADMIN — PHENYLEPHRINE AND KETOROLAC 4 ML: 10.16; 2.88 INJECTION, SOLUTION, CONCENTRATE INTRAOCULAR at 11:11

## 2017-11-30 RX ADMIN — MIDAZOLAM HYDROCHLORIDE 1 MG: 1 INJECTION, SOLUTION INTRAMUSCULAR; INTRAVENOUS at 11:11

## 2017-11-30 RX ADMIN — LIDOCAINE HYDROCHLORIDE 0.5 MG: 10 INJECTION, SOLUTION EPIDURAL; INFILTRATION; INTRACAUDAL; PERINEURAL at 10:11

## 2017-11-30 NOTE — OP NOTE
DATE OF PROCEDURE: 11/30/2017    SURGEON: SORAYA HERMAN MD    PREOPERATIVE DIAGNOSIS:  1. Traumatic cataract left eye. 2.  History of ruptured posterior capsule left eye 3. Floppy iris syndrome    POSTOPERATIVE DIAGNOSIS: 1. Traumatic cataract left eye. 2.  History of ruptured posterior capsule left eye 3. Floppy iris syndrome    PROCEDURE PERFORMED:  Complex phacoemulsification with placement of sulcus intraocular lens, left eye, with trypan blue and malyugin ring.    IMPLANT:  MA60AC 15.5    ANESTHESIA:  Topical and MAC    COMPLICATIONS: none    ESTIMATED BLOOD LOSS: <1cc    SPECIMENS: none    INDICATIONS FOR PROCEDURE:  This patient presented to the clinic with decreased vision in the left eye and was found to have a cataract.  The risks, benefits, and alternatives were discussed and the patient agreed to proceed with phacoemulsification and implantation of a lens in the left eye.     PROCEDURE IN DETAIL:  The patient was met in the preop holding area.  Consent was confirmed to be signed.  The operative site was marked.  The patient was brought into the operating room by the anesthesia team and placed under monitored anesthesia care.  The left eye was prepped and draped in a sterile ophthalmic fashion.  A Katherin speculum was placed into the left eye.   A paracentesis site was made and 1% preservative-free lidocaine was injected into the anterior chamber.  Trypan blue was then injected and allowed to sit for 1 minute.  Then BSS was used to wash out the trypan blue. Viscoelastic material was injected into the anterior chamber.  A keratome blade was used to make a clear corneal incision. A malyugin ring was placed to help with pupillary dilation. A cystotome was used to initiate the continuous curvilinear capsulorrhexis which was completed with Utrata forceps.  Hydrodissection was not performed in light of the ruptured posterior capsule. The cataract was removed using irrigation-aspiration.   During this time,  viscoat was injected posteriorly to help tamponade and block any hydrated lens from falling posteriorly.  After all of the cataract was removed, the posterior capsule was inspected and noted to have a complete rent.  There was no vitreous presentation.  The capsular bag and sulcus was filled with viscoelastic material and the intraocular lens was injected and positioned into place into the sulcus with optic capture. Remaining viscoelastic material was removed from the eye using irrigation and aspiration.  The corneal wounds were hydrated and a 10.0 nylon was placed.  The eye was filled to physiologic pressure. The wounds were found to be watertight. Drops of Vigamox and prednisilone were placed into the eye.  The eye was washed, dried, and shielded.  The patient tolerated the procedure well and knows to follow up with me tomorrow morning, sooner if needed.

## 2017-11-30 NOTE — PROGRESS NOTES
Spoke with Dr. Garcia about pt's allergy to moxifloxacin.  She said to go ahead and give the pt the moxifloxacin eyedrops because he has been on them for the last two weeks.  Only gave 1 set of moxifloxacin before being taken to OR.  Told OR nurse only gave 1 set of moxifloxacin.

## 2017-11-30 NOTE — ANESTHESIA RELEASE NOTE
"Anesthesia Release from PACU Note    Patient: Glenn Medel    Procedure(s) Performed: Procedure(s) (LRB):  PHACOEMULSIFICATION-ASPIRATION-CATARACT (Left)  INSERTION-INTRAOCULAR LENS (IOL) (Left)  ANTERIOR VITRECTOMY (Left)    Anesthesia type: MAC    Post pain: Adequate analgesia    Post assessment: no apparent anesthetic complications and tolerated procedure well    Last Vitals:   Visit Vitals  /67   Pulse 67   Temp 37.1 °C (98.8 °F) (Temporal)   Resp 18   Ht 5' 11" (1.803 m)   Wt 100.2 kg (221 lb)   SpO2 100%   BMI 30.82 kg/m²       Post vital signs: stable    Level of consciousness: awake and alert     Nausea/Vomiting: no nausea/no vomiting    Complications: none    Airway Patency: patent    Respiratory: unassisted, spontaneous ventilation, room air    Cardiovascular: stable and blood pressure at baseline    Hydration: euvolemic  "

## 2017-11-30 NOTE — DISCHARGE SUMMARY
BRIEF DISCHARGE NOTE:    Reason for hospitalization -  Cataract surgery     Final Diagnosis - Visually significant Cataract    Procedures and treatment provided - Status post phacoemulsification with placement of intraocular lens     Diet - Advance to regular as tolerated    Activity - as tolerated    Disposition at the end of the case - Good.    Discharge: to home    The patient tolerated the procedure well and knows to follow up with me tomorrow morning in the eye clinic, sooner if needed.    Patient and family instructions (as appropriate) - Given to patient on discharge    Damaris Garcia MD

## 2017-11-30 NOTE — TRANSFER OF CARE
"Anesthesia Transfer of Care Note    Patient: Glenn Medel    Procedure(s) Performed: Procedure(s) (LRB):  PHACOEMULSIFICATION-ASPIRATION-CATARACT (Left)  INSERTION-INTRAOCULAR LENS (IOL) (Left)  ANTERIOR VITRECTOMY (Left)    Patient location: PACU    Anesthesia Type: MAC    Transport from OR: Transported from OR on room air with adequate spontaneous ventilation    Post pain: adequate analgesia    Post assessment: no apparent anesthetic complications    Post vital signs: stable    Level of consciousness: awake    Nausea/Vomiting: no nausea/vomiting    Complications: none    Transfer of care protocol was followed      Last vitals:   Visit Vitals  /70 (BP Location: Left arm, Patient Position: Lying)   Pulse 67   Temp 36.6 °C (97.9 °F) (Core Esophageal)   Resp 18   Ht 5' 11" (1.803 m)   Wt 100.2 kg (221 lb)   SpO2 97%   BMI 30.82 kg/m²     "

## 2017-11-30 NOTE — ANESTHESIA PREPROCEDURE EVALUATION
11/30/2017  Glenn Medel is a 62 y.o., male.  Past Medical History:   Diagnosis Date    BPH (benign prostatic hyperplasia)     Cataract     OU    DDD (degenerative disc disease), lumbar     s/p epidural steroids     Diabetes mellitus, stable     Glaucoma     OU    HTN (hypertension)     Obesity     LOVE on CPAP        Pre-op Assessment    I have reviewed the Patient Summary Reports.      I have reviewed the Medications.     Review of Systems  Anesthesia Hx:  No problems with previous Anesthesia  History of prior surgery of interest to airway management or planning: Previous anesthesia: General Denies Family Hx of Anesthesia complications.   Denies Personal Hx of Anesthesia complications.   Social:  Non-Smoker, Social Alcohol Use    Hematology/Oncology:  Hematology Normal   Oncology Normal     EENT/Dental:EENT/Dental Normal   Cardiovascular:   Exercise tolerance: poor Hypertension, well controlled    Pulmonary:   Sleep Apnea, CPAP  Obstructive Sleep Apnea (LOVE), sleep study confirmed - Severe (Adult AHI > 40), CPAP prescribed.   Renal/:   Chronic Renal Disease, CRI    Hepatic/GI:  Hepatic/GI Normal    Musculoskeletal:   Arthritis     Neurological:   Neuromuscular Disease,    Endocrine:   Diabetes, well controlled, type 2    Dermatological:  Skin Normal    Psych:  Psychiatric Normal           Physical Exam  General:  Well nourished    Airway/Jaw/Neck:  Airway Findings: Mouth Opening: Normal Tongue: Normal  General Airway Assessment: Adult  Mallampati: II  Improves to I with phonation.  TM Distance: Normal, at least 6 cm  Jaw/Neck Findings:  Neck ROM: Normal ROM      Dental:  Dental Findings: In tact   Chest/Lungs:  Chest/Lungs Findings: Normal Respiratory Rate     Heart/Vascular:  Heart Findings: Rate: Normal  Rhythm: Regular Rhythm        Mental Status:  Mental Status Findings:  Cooperative, Alert  and Oriented         Anesthesia Plan  Type of Anesthesia, risks & benefits discussed:  Anesthesia Type:  MAC  Patient's Preference: MAC  Intra-op Monitoring Plan: standard ASA monitors  Intra-op Monitoring Plan Comments:   Post Op Pain Control Plan:   Post Op Pain Control Plan Comments:   Induction:   IV  Beta Blocker:  Patient is on a Beta-Blocker and has received one dose within the past 24 hours (No further documentation required).       Informed Consent: Patient understands risks and agrees with Anesthesia plan.  Questions answered. Anesthesia consent signed with patient.  ASA Score: 3     Day of Surgery Review of History & Physical:  There are no significant changes.  H&P update referred to the surgeon.         Ready For Surgery From Anesthesia Perspective.

## 2017-11-30 NOTE — ANESTHESIA POSTPROCEDURE EVALUATION
"Anesthesia Post Evaluation    Patient: Glenn Medel    Procedure(s) Performed: Procedure(s) (LRB):  PHACOEMULSIFICATION-ASPIRATION-CATARACT (Left)  INSERTION-INTRAOCULAR LENS (IOL) (Left)  ANTERIOR VITRECTOMY (Left)    Final Anesthesia Type: MAC  Patient location during evaluation: PACU  Patient participation: Yes- Able to Participate  Level of consciousness: awake and alert  Post-procedure vital signs: reviewed and stable  Pain management: adequate  Airway patency: patent  PONV status at discharge: No PONV  Anesthetic complications: no      Cardiovascular status: hemodynamically stable  Respiratory status: unassisted, spontaneous ventilation and room air  Hydration status: euvolemic  Follow-up not needed.        Visit Vitals  /67   Pulse 67   Temp 37.1 °C (98.8 °F) (Temporal)   Resp 18   Ht 5' 11" (1.803 m)   Wt 100.2 kg (221 lb)   SpO2 100%   BMI 30.82 kg/m²       Pain/Ezequiel Score: Pain Assessment Performed: Yes (11/30/2017 12:45 PM)  Presence of Pain: denies (11/30/2017 12:45 PM)  Ezequiel Score: 10 (11/30/2017 12:45 PM)      "

## 2017-11-30 NOTE — DISCHARGE INSTRUCTIONS
Discharge Instructions for Cataract Surgery  A surgeon removed the cloudy lens in your eye and replaced it with a clear man-made lens. Be sure to have an adult family member or friend drive you home after surgery. Heres what you can expect following surgery and tips for a healthy recovery.  It is normal to have the following:  · Bruised or bloodshot eye for 7 days  · Itching and mild discomfort for several days  · Some fluid discharge  · Sensitivity to light  · Scratchy, sandlike feeling in the eye for 2 weeks  · Feeling tired, especially during the first 24 hours  Activity level  · Do not drive for 2 days or as instructed by your doctor.  · Do not drink alcohol for at least 24 hours.  · Avoid bending at the waist to  objects or lifting anything heavy for 2 days.  · Relax for the first 24 hours after surgery. Watching TV and reading are OK and wont harm your eye.  Eye protection  · Do not rub or press on your eye.  · Sleep on your back or on your unoperated side for 2 nights.  · If instructed, wear a bandage over your eye for 2 days and 2 nights.  · If instructed, wear a shield to protect your eye for 2 days and 2 nights.  Using eyedrops  You may be given special eyedrops or ointment. Here is one way to use eyedrops:  · Tilt your head back.  · Pull your bottom eyelid down.  · Squeeze one drop into your eye. Do not touch your eye with the bottle tip.  · Close your eyes for a few seconds.  · If you need more than one drop, wait a few minutes before adding the next one.   Call your doctor right away if you have any of the following:  · Bleeding or discharge from the eye  · Your vision suddenly becomes worse  · Pain medicine you are told to take does not ease your pain  · Nausea or vomiting  · Chills or fever over 100.4°F (39.1°C)   Date Last Reviewed: 5/30/2015  © 0565-0935 [employer]. [address] [place] [address], [place] [address]. All rights reserved. This information is not intended as a  substitute for professional medical care. Always follow your healthcare professional's instructions.

## 2017-12-01 ENCOUNTER — OFFICE VISIT (OUTPATIENT)
Dept: OPHTHALMOLOGY | Facility: CLINIC | Age: 62
End: 2017-12-01
Payer: COMMERCIAL

## 2017-12-01 DIAGNOSIS — Z96.1 STATUS POST CATARACT EXTRACTION AND INSERTION OF INTRAOCULAR LENS, LEFT: Primary | ICD-10-CM

## 2017-12-01 DIAGNOSIS — Z98.42 STATUS POST CATARACT EXTRACTION AND INSERTION OF INTRAOCULAR LENS, LEFT: Primary | ICD-10-CM

## 2017-12-01 DIAGNOSIS — H40.053 OCULAR HYPERTENSION, BILATERAL: ICD-10-CM

## 2017-12-01 PROCEDURE — 99999 PR PBB SHADOW E&M-EST. PATIENT-LVL IV: CPT | Mod: PBBFAC,,, | Performed by: OPHTHALMOLOGY

## 2017-12-01 PROCEDURE — 99024 POSTOP FOLLOW-UP VISIT: CPT | Mod: S$GLB,,, | Performed by: OPHTHALMOLOGY

## 2017-12-01 NOTE — PATIENT INSTRUCTIONS
RIGHT EYE:    SIMBRINA - 2 TIMES A DAY     LEFT EYE:    PRED FORTE (PINK TOP) - 4 TIMES A DAY SHAKE WELL    VIGAMOX - 3 TIMES A DAY    KETOROLAC - 3 TIMES A DAY    COMBIGAN (BLUE TOP) - 3 TIMES A DAY    AZOPT (ORANGE TOP) - 2 TIMES A DAY    DIAMOX - 1 PILL TWICE A DAY

## 2017-12-01 NOTE — PROGRESS NOTES
HPI     Post-op Evaluation    Additional comments: 1 day post-op OS           Comments   Ref by Dr Pulido     S/P Phaco w/ sulcus IOL OS 11/30/2017  S/p 25g PPV/partial AFx OS for vitreous opacities/NCVH OS (11/15/17)   1. Dense vitreous opacities OU with NCVH   2. NS OU   3. DM   4. HTN Ret OU      Gtts: Diamox pills twice a day           Vigamox TID OS              PF TID OS            Ketorolac TID OS        Patient is here for a 1 day post-op, he states that his vision started to   become blurred last night and now he notices he does not see very well. He   does have eye pain OS (scale 10). He is taking percocet for pain that he   received from a previous surgery.        Last edited by Damaris Garcia MD on 12/1/2017  4:01 PM. (History)            Assessment /Plan     For exam results, see Encounter Report.    Status post cataract extraction and insertion of intraocular lens, left    Ocular hypertension, bilateral        POD #1 s/p phaco/IOL  -IOL good position, IOP elevated, improved from 42 --> 32 after burp wound, combigan and azopt.  - continue the following drops:    RIGHT EYE:    SIMBRINA - 2 TIMES A DAY     LEFT EYE:    PRED FORTE (PINK TOP) - 4 TIMES A DAY SHAKE WELL    VIGAMOX - 3 TIMES A DAY    KETOROLAC - 3 TIMES A DAY    COMBIGAN (BLUE TOP) - 3 TIMES A DAY    AZOPT (ORANGE TOP) - 2 TIMES A DAY    DIAMOX - 1 PILL TWICE A DAY    Dr. Elmore to check IOP on Sunday.    F/up with me next week.    Pt knows to call eye dr on call if experiences pain.

## 2017-12-03 ENCOUNTER — THERAPY VISIT (OUTPATIENT)
Dept: OPHTHALMOLOGY | Facility: HOSPITAL | Age: 62
End: 2017-12-03

## 2017-12-03 DIAGNOSIS — H40.052 ELEVATED IOP, LEFT: Primary | ICD-10-CM

## 2017-12-03 RX ORDER — LATANOPROST 50 UG/ML
1 SOLUTION/ DROPS OPHTHALMIC NIGHTLY
Qty: 2.5 ML | Refills: 0 | OUTPATIENT
Start: 2017-12-03 | End: 2017-12-03 | Stop reason: SDUPTHER

## 2017-12-03 RX ORDER — LATANOPROST 50 UG/ML
1 SOLUTION/ DROPS OPHTHALMIC DAILY
Qty: 2.5 ML | Refills: 1 | OUTPATIENT
Start: 2017-12-03 | End: 2017-12-03 | Stop reason: SDUPTHER

## 2017-12-03 RX ORDER — LATANOPROST 50 UG/ML
1 SOLUTION/ DROPS OPHTHALMIC NIGHTLY
Qty: 2.5 ML | Refills: 1 | Status: ON HOLD | OUTPATIENT
Start: 2017-12-03 | End: 2017-12-06 | Stop reason: HOSPADM

## 2017-12-03 NOTE — PROGRESS NOTES
HPI     Post-op Evaluation: post-op day 3 OS    S/P Phaco w/ sulcus IOL OS 11/30/2017   S/p 25g PPV/partial AFx OS for vitreous opacities/NCVH OS (11/15/17)      Exam:   VA OS sc: CF at 1 foot  TA: 38    SLE - OS only   LL: wnl   CS: mild injection, ISAK nasally   K: suture  AC: 1 cell/hpf  I: flat  L: IOL       Assessment /Plan      #POD 3 s/p CE with sulcus IOL  #Elevated IOP  - VA slightly improved  - IOP today 38 from 32 last visit.   - Pt denies pain.   - Pt using gtts and diamox as rx'd.   - Increase azopt to TID, start xalatan qhs   - Continue other gtts and diamox, as below.   - Answered all questions. Reviewed return precautions.   - Dr. Garcia will call patient tomorrow to schedule next follow up for next week.     New regimen:   PRED FORTE (PINK TOP) - 4 TIMES A DAY SHAKE WELL  VIGAMOX - 3 TIMES A DAY  KETOROLAC - 3 TIMES A DAY  COMBIGAN (BLUE TOP) - 3 TIMES A DAY  AZOPT (ORANGE TOP) - 3 TIMES A DAY  XALATAN (TEAL TOP)  - EVERY NIGHT  DIAMOX - 1 PILL TWICE A DAY      Pt knows to call eye dr on call if experiences pain or has any questions.   Follow up with Dr. Garcia next week.       Discussed with Dr. Garcia.   OCTAVIO Elmore MD MPH PGY-2  Ophthalmology

## 2017-12-04 ENCOUNTER — OFFICE VISIT (OUTPATIENT)
Dept: OPHTHALMOLOGY | Facility: CLINIC | Age: 62
End: 2017-12-04
Payer: COMMERCIAL

## 2017-12-04 ENCOUNTER — TELEPHONE (OUTPATIENT)
Dept: OPHTHALMOLOGY | Facility: CLINIC | Age: 62
End: 2017-12-04

## 2017-12-04 DIAGNOSIS — Z18.12: Primary | ICD-10-CM

## 2017-12-04 DIAGNOSIS — H59.022 RETAINED LENS MATERIAL, LEFT: Primary | ICD-10-CM

## 2017-12-04 DIAGNOSIS — H44.732: Primary | ICD-10-CM

## 2017-12-04 PROCEDURE — 99024 POSTOP FOLLOW-UP VISIT: CPT | Mod: S$GLB,,, | Performed by: OPHTHALMOLOGY

## 2017-12-04 PROCEDURE — 99999 PR PBB SHADOW E&M-EST. PATIENT-LVL III: CPT | Mod: PBBFAC,,, | Performed by: OPHTHALMOLOGY

## 2017-12-04 RX ORDER — METOPROLOL SUCCINATE 25 MG/1
TABLET, EXTENDED RELEASE ORAL
Qty: 30 TABLET | Refills: 0 | Status: ON HOLD | OUTPATIENT
Start: 2017-12-04 | End: 2017-12-06 | Stop reason: HOSPADM

## 2017-12-04 NOTE — PROGRESS NOTES
HPI     Post-op Evaluation    Additional comments: IOP check per Jose           Comments   DLS 12/1/17 Jose- Patient had a lot of pain last night which he rates as 10 on pain scale. He hasn't had any pain since around 4 this am.    Gtts:  OD  Simbrina BID    OS   PF QID  vigamox TID  Ketorolac TID  combigan TID  Azopt TID  xalatan QHS  Diamox BID    A/P    1. Dense vitreous opacities OU with NCVH  No Improvement over last several months  ?prior VH with PVD OU - no breaks or tears    s/p 25g PPV/partial AFx OS for vitreous opacities/NCVH OS 11/15/17    Doing well, good IOP  HAD PC violation during anterior vitreous removal -     Had CE with dropped lens material give PC violation 11/30/17    Increased IOP - though improved with K edema    Will need PPV to remove lens material.  Pt would prefer more prompt resolution    Plan 25g PPV OS for retained lens material OS  Local MAC  LOC 25    Risks, benefits, and alternatives to treatment discussed in detail with the patient.  The patient voiced understanding and wished to proceed with the procedure      2. NS OD  Sulcus IOL OS    3. DM  Controlled No DR  BS/BP/chol control    4. HTN Ret OU

## 2017-12-05 ENCOUNTER — PATIENT MESSAGE (OUTPATIENT)
Dept: SURGERY | Facility: HOSPITAL | Age: 62
End: 2017-12-05

## 2017-12-05 ENCOUNTER — TELEPHONE (OUTPATIENT)
Dept: OPHTHALMOLOGY | Facility: CLINIC | Age: 62
End: 2017-12-05

## 2017-12-05 NOTE — H&P
Pre-Operative History & Physical  Ophthalmology      SUBJECTIVE:     History of Present Illness:  Patient is a 62 y.o. male presents with Retained nonmagnetic foreign body of left lens [H44.732, Z18.12]  Retained lens material, left [H44.732].    MEDICATIONS:   No prescriptions prior to admission.       ALLERGIES:   Review of patient's allergies indicates:   Allergen Reactions    Avelox [moxifloxacin] Hives and Rash       PAST MEDICAL HISTORY:   Past Medical History:   Diagnosis Date    BPH (benign prostatic hyperplasia)     Cataract     OU    DDD (degenerative disc disease), lumbar     s/p epidural steroids     Diabetes mellitus, stable     Glaucoma     OU    HTN (hypertension)     Obesity     LOVE on CPAP      PAST SURGICAL HISTORY:   Past Surgical History:   Procedure Laterality Date    CATARACT EXTRACTION      COLONOSCOPY N/A 9/18/2017    Procedure: COLONOSCOPY;  Surgeon: Paul Feliz Jr., MD;  Location[facility];  Service: Endoscopy;  Laterality: N/A;    COLONOSCOPY W/ POLYPECTOMY  04/13/2010    YARELI.   One 1 cm polyp in the sigmoid colon.  Internal hemorrhoids.    HAND SURGERY      TONSILLECTOMY      uro lift      for enlarged prostate     PAST FAMILY HISTORY:   Family History   Problem Relation Age of Onset    Heart disease Brother 62    Heart disease Father     Diabetes Father     Colon cancer Father     Cancer Father      colon    Cataracts Father     Hypertension Mother     Diabetes Mother     Stroke Mother     Cancer Mother      colon    Glaucoma Maternal Grandmother     No Known Problems Sister     No Known Problems Maternal Aunt     No Known Problems Maternal Uncle     No Known Problems Paternal Aunt     No Known Problems Paternal Uncle     No Known Problems Maternal Grandfather     No Known Problems Paternal Grandmother     No Known Problems Paternal Grandfather     Amblyopia Neg Hx     Blindness Neg Hx     Macular degeneration Neg Hx     Retinal detachment Neg  Hx     Strabismus Neg Hx     Thyroid disease Neg Hx      SOCIAL HISTORY:   Social History   Substance Use Topics    Smoking status: Never Smoker    Smokeless tobacco: Never Used    Alcohol use Yes      Comment: occasional        MENTAL STATUS: Alert    REVIEW OF SYSTEMS: Negative    OBJECTIVE:     Vital Signs (Most Recent)       Physical Exam:  General: NAD  HEENT: Atraumatic  Lungs: Adequate respirations, LCTAB  Heart: RRR, No murmur  Abdomen: Soft NT    ASSESSMENT/PLAN:     Patient is a 62 y.o. male with Retained nonmagnetic foreign body of left lens [H44.732, Z18.12]  Retained lens material, left [H44.732].     - Plan for surgical correction Plan 25g PPV OS for retained lens material OS  Local MAC  LOC 25     - Risks/benefits/alternatives of the procedure including, but not limited to scarring, bleeding, infection, loss or decreased vision, and/or need for possible repeat surgery discussed with the patient and family.   - Informed consent obtained prior to surgery and the patient/family voiced good understanding.    Omar Rosales  12/5/2017  4:17 PM

## 2017-12-05 NOTE — PRE-PROCEDURE INSTRUCTIONS
Preop instructions: NPO after midnight or 8 hours prior to procedure time, shower instructions, directions, leave all valuables at home, medication instructions for PM prior & am of procedure explained. Patient stated an understanding.  Patient denies any side effects or issues with anesthesia or sedation.

## 2017-12-06 ENCOUNTER — SURGERY (OUTPATIENT)
Age: 62
End: 2017-12-06

## 2017-12-06 ENCOUNTER — HOSPITAL ENCOUNTER (OUTPATIENT)
Facility: HOSPITAL | Age: 62
Discharge: HOME | End: 2017-12-06
Attending: OPHTHALMOLOGY | Admitting: OPHTHALMOLOGY
Payer: COMMERCIAL

## 2017-12-06 ENCOUNTER — ANESTHESIA (OUTPATIENT)
Dept: SURGERY | Facility: HOSPITAL | Age: 62
End: 2017-12-06
Payer: COMMERCIAL

## 2017-12-06 ENCOUNTER — ANESTHESIA EVENT (OUTPATIENT)
Dept: SURGERY | Facility: HOSPITAL | Age: 62
End: 2017-12-06
Payer: COMMERCIAL

## 2017-12-06 VITALS
BODY MASS INDEX: 30.94 KG/M2 | WEIGHT: 221 LBS | DIASTOLIC BLOOD PRESSURE: 93 MMHG | HEART RATE: 69 BPM | OXYGEN SATURATION: 99 % | RESPIRATION RATE: 18 BRPM | HEIGHT: 71 IN | SYSTOLIC BLOOD PRESSURE: 152 MMHG | TEMPERATURE: 98 F

## 2017-12-06 DIAGNOSIS — H59.022 RETAINED LENS MATERIAL, LEFT: Primary | ICD-10-CM

## 2017-12-06 LAB
POCT GLUCOSE: 121 MG/DL (ref 70–110)
POCT GLUCOSE: 200 MG/DL (ref 70–110)

## 2017-12-06 PROCEDURE — 71000015 HC POSTOP RECOV 1ST HR: Performed by: OPHTHALMOLOGY

## 2017-12-06 PROCEDURE — 37000009 HC ANESTHESIA EA ADD 15 MINS: Performed by: OPHTHALMOLOGY

## 2017-12-06 PROCEDURE — 71000033 HC RECOVERY, INTIAL HOUR: Performed by: OPHTHALMOLOGY

## 2017-12-06 PROCEDURE — 36000707: Performed by: OPHTHALMOLOGY

## 2017-12-06 PROCEDURE — 27201423 OPTIME MED/SURG SUP & DEVICES STERILE SUPPLY: Performed by: OPHTHALMOLOGY

## 2017-12-06 PROCEDURE — D9220A PRA ANESTHESIA: Mod: CRNA,,, | Performed by: NURSE ANESTHETIST, CERTIFIED REGISTERED

## 2017-12-06 PROCEDURE — 67036 REMOVAL OF INNER EYE FLUID: CPT | Mod: 79,LT,, | Performed by: OPHTHALMOLOGY

## 2017-12-06 PROCEDURE — S0020 INJECTION, BUPIVICAINE HYDRO: HCPCS | Performed by: OPHTHALMOLOGY

## 2017-12-06 PROCEDURE — 82962 GLUCOSE BLOOD TEST: CPT | Performed by: OPHTHALMOLOGY

## 2017-12-06 PROCEDURE — 37000008 HC ANESTHESIA 1ST 15 MINUTES: Performed by: OPHTHALMOLOGY

## 2017-12-06 PROCEDURE — 63600175 PHARM REV CODE 636 W HCPCS: Performed by: OPHTHALMOLOGY

## 2017-12-06 PROCEDURE — 36000706: Performed by: OPHTHALMOLOGY

## 2017-12-06 PROCEDURE — 63600175 PHARM REV CODE 636 W HCPCS: Performed by: NURSE ANESTHETIST, CERTIFIED REGISTERED

## 2017-12-06 PROCEDURE — 25000003 PHARM REV CODE 250

## 2017-12-06 PROCEDURE — 27600004 OPTIME MED/SURG SUP & DEVICES INTRAOCULAR LENS: Performed by: OPHTHALMOLOGY

## 2017-12-06 PROCEDURE — D9220A PRA ANESTHESIA: Mod: ANES,,, | Performed by: ANESTHESIOLOGY

## 2017-12-06 PROCEDURE — 99499 UNLISTED E&M SERVICE: CPT | Mod: ,,, | Performed by: OPHTHALMOLOGY

## 2017-12-06 PROCEDURE — 25000003 PHARM REV CODE 250: Performed by: OPHTHALMOLOGY

## 2017-12-06 PROCEDURE — C1784 OCULAR DEV, INTRAOP, DET RET: HCPCS | Performed by: OPHTHALMOLOGY

## 2017-12-06 RX ORDER — LIDOCAINE HYDROCHLORIDE 20 MG/ML
INJECTION, SOLUTION EPIDURAL; INFILTRATION; INTRACAUDAL; PERINEURAL
Status: DISCONTINUED
Start: 2017-12-06 | End: 2017-12-06 | Stop reason: HOSPADM

## 2017-12-06 RX ORDER — PREDNISOLONE ACETATE 10 MG/ML
1 SUSPENSION/ DROPS OPHTHALMIC
Status: DISCONTINUED | OUTPATIENT
Start: 2017-12-06 | End: 2017-12-06 | Stop reason: HOSPADM

## 2017-12-06 RX ORDER — TETRACAINE HYDROCHLORIDE 5 MG/ML
1 SOLUTION OPHTHALMIC
Status: DISCONTINUED | OUTPATIENT
Start: 2017-12-06 | End: 2017-12-06 | Stop reason: HOSPADM

## 2017-12-06 RX ORDER — TROPICAMIDE 10 MG/ML
1 SOLUTION/ DROPS OPHTHALMIC
Status: DISCONTINUED | OUTPATIENT
Start: 2017-12-06 | End: 2017-12-06 | Stop reason: HOSPADM

## 2017-12-06 RX ORDER — CYCLOPENTOLATE HYDROCHLORIDE 10 MG/ML
1 SOLUTION/ DROPS OPHTHALMIC
Status: DISCONTINUED | OUTPATIENT
Start: 2017-12-06 | End: 2017-12-06 | Stop reason: HOSPADM

## 2017-12-06 RX ORDER — VANCOMYCIN HYDROCHLORIDE 500 MG/10ML
INJECTION, POWDER, LYOPHILIZED, FOR SOLUTION INTRAVENOUS
Status: DISCONTINUED | OUTPATIENT
Start: 2017-12-06 | End: 2017-12-06 | Stop reason: HOSPADM

## 2017-12-06 RX ORDER — HYDROCODONE BITARTRATE AND ACETAMINOPHEN 5; 325 MG/1; MG/1
1 TABLET ORAL EVERY 4 HOURS PRN
Status: DISCONTINUED | OUTPATIENT
Start: 2017-12-06 | End: 2017-12-06 | Stop reason: HOSPADM

## 2017-12-06 RX ORDER — TETRACAINE HYDROCHLORIDE 5 MG/ML
SOLUTION OPHTHALMIC
Status: DISCONTINUED | OUTPATIENT
Start: 2017-12-06 | End: 2017-12-06 | Stop reason: HOSPADM

## 2017-12-06 RX ORDER — ACETAMINOPHEN 325 MG/1
650 TABLET ORAL EVERY 4 HOURS PRN
Status: DISCONTINUED | OUTPATIENT
Start: 2017-12-06 | End: 2017-12-06 | Stop reason: HOSPADM

## 2017-12-06 RX ORDER — OXYCODONE AND ACETAMINOPHEN 5; 325 MG/1; MG/1
1 TABLET ORAL EVERY 6 HOURS PRN
Qty: 12 TABLET | Refills: 0 | Status: SHIPPED | OUTPATIENT
Start: 2017-12-06 | End: 2018-03-09

## 2017-12-06 RX ORDER — ONDANSETRON 4 MG/1
4 TABLET, FILM COATED ORAL EVERY 8 HOURS PRN
Qty: 12 TABLET | Refills: 0 | Status: SHIPPED | OUTPATIENT
Start: 2017-12-06 | End: 2018-03-09

## 2017-12-06 RX ORDER — SODIUM CHLORIDE 9 MG/ML
INJECTION, SOLUTION INTRAVENOUS CONTINUOUS
Status: DISCONTINUED | OUTPATIENT
Start: 2017-12-06 | End: 2017-12-06 | Stop reason: HOSPADM

## 2017-12-06 RX ORDER — NEOMYCIN SULFATE, POLYMYXIN B SULFATE, AND DEXAMETHASONE 3.5; 10000; 1 MG/G; [USP'U]/G; MG/G
OINTMENT OPHTHALMIC
Status: DISCONTINUED
Start: 2017-12-06 | End: 2017-12-06 | Stop reason: HOSPADM

## 2017-12-06 RX ORDER — PROPOFOL 10 MG/ML
VIAL (ML) INTRAVENOUS
Status: DISCONTINUED | OUTPATIENT
Start: 2017-12-06 | End: 2017-12-06

## 2017-12-06 RX ORDER — HYDROMORPHONE HYDROCHLORIDE 2 MG/ML
0.2 INJECTION, SOLUTION INTRAMUSCULAR; INTRAVENOUS; SUBCUTANEOUS EVERY 5 MIN PRN
Status: DISCONTINUED | OUTPATIENT
Start: 2017-12-06 | End: 2017-12-06 | Stop reason: HOSPADM

## 2017-12-06 RX ORDER — EPINEPHRINE 1 MG/ML
INJECTION, SOLUTION INTRACARDIAC; INTRAMUSCULAR; INTRAVENOUS; SUBCUTANEOUS
Status: DISCONTINUED | OUTPATIENT
Start: 2017-12-06 | End: 2017-12-06 | Stop reason: HOSPADM

## 2017-12-06 RX ORDER — LIDOCAINE HCL/PF 100 MG/5ML
SYRINGE (ML) INTRAVENOUS
Status: DISCONTINUED | OUTPATIENT
Start: 2017-12-06 | End: 2017-12-06

## 2017-12-06 RX ORDER — LIDOCAINE HYDROCHLORIDE 20 MG/ML
INJECTION, SOLUTION EPIDURAL; INFILTRATION; INTRACAUDAL; PERINEURAL
Status: DISCONTINUED | OUTPATIENT
Start: 2017-12-06 | End: 2017-12-06 | Stop reason: HOSPADM

## 2017-12-06 RX ORDER — LIDOCAINE HYDROCHLORIDE 10 MG/ML
1 INJECTION, SOLUTION EPIDURAL; INFILTRATION; INTRACAUDAL; PERINEURAL ONCE
Status: COMPLETED | OUTPATIENT
Start: 2017-12-06 | End: 2017-12-06

## 2017-12-06 RX ORDER — DEXAMETHASONE SODIUM PHOSPHATE 4 MG/ML
INJECTION, SOLUTION INTRA-ARTICULAR; INTRALESIONAL; INTRAMUSCULAR; INTRAVENOUS; SOFT TISSUE
Status: DISCONTINUED
Start: 2017-12-06 | End: 2017-12-06 | Stop reason: HOSPADM

## 2017-12-06 RX ORDER — HYDROCODONE BITARTRATE AND ACETAMINOPHEN 5; 325 MG/1; MG/1
TABLET ORAL
Status: DISCONTINUED
Start: 2017-12-06 | End: 2017-12-06 | Stop reason: HOSPADM

## 2017-12-06 RX ORDER — BUPIVACAINE HYDROCHLORIDE 7.5 MG/ML
INJECTION, SOLUTION EPIDURAL; RETROBULBAR
Status: DISCONTINUED | OUTPATIENT
Start: 2017-12-06 | End: 2017-12-06 | Stop reason: HOSPADM

## 2017-12-06 RX ORDER — EPINEPHRINE 1 MG/ML
INJECTION, SOLUTION INTRACARDIAC; INTRAMUSCULAR; INTRAVENOUS; SUBCUTANEOUS
Status: DISCONTINUED
Start: 2017-12-06 | End: 2017-12-06 | Stop reason: HOSPADM

## 2017-12-06 RX ORDER — LIDOCAINE HYDROCHLORIDE 10 MG/ML
1 INJECTION, SOLUTION EPIDURAL; INFILTRATION; INTRACAUDAL; PERINEURAL ONCE
Status: DISCONTINUED | OUTPATIENT
Start: 2017-12-06 | End: 2017-12-06 | Stop reason: HOSPADM

## 2017-12-06 RX ORDER — PHENYLEPHRINE HYDROCHLORIDE 25 MG/ML
1 SOLUTION/ DROPS OPHTHALMIC
Status: DISCONTINUED | OUTPATIENT
Start: 2017-12-06 | End: 2017-12-06 | Stop reason: HOSPADM

## 2017-12-06 RX ORDER — DEXAMETHASONE SODIUM PHOSPHATE 4 MG/ML
INJECTION, SOLUTION INTRA-ARTICULAR; INTRALESIONAL; INTRAMUSCULAR; INTRAVENOUS; SOFT TISSUE
Status: DISCONTINUED | OUTPATIENT
Start: 2017-12-06 | End: 2017-12-06 | Stop reason: HOSPADM

## 2017-12-06 RX ORDER — MIDAZOLAM HYDROCHLORIDE 1 MG/ML
INJECTION, SOLUTION INTRAMUSCULAR; INTRAVENOUS
Status: DISCONTINUED | OUTPATIENT
Start: 2017-12-06 | End: 2017-12-06

## 2017-12-06 RX ORDER — ONDANSETRON 8 MG/1
8 TABLET, ORALLY DISINTEGRATING ORAL EVERY 8 HOURS PRN
Status: DISCONTINUED | OUTPATIENT
Start: 2017-12-06 | End: 2017-12-06 | Stop reason: HOSPADM

## 2017-12-06 RX ORDER — VANCOMYCIN HYDROCHLORIDE 500 MG/10ML
INJECTION, POWDER, LYOPHILIZED, FOR SOLUTION INTRAVENOUS
Status: DISCONTINUED
Start: 2017-12-06 | End: 2017-12-06 | Stop reason: HOSPADM

## 2017-12-06 RX ORDER — LIDOCAINE HYDROCHLORIDE 10 MG/ML
INJECTION, SOLUTION EPIDURAL; INFILTRATION; INTRACAUDAL; PERINEURAL
Status: COMPLETED
Start: 2017-12-06 | End: 2017-12-06

## 2017-12-06 RX ORDER — SODIUM CHLORIDE 0.9 % (FLUSH) 0.9 %
3 SYRINGE (ML) INJECTION
Status: DISCONTINUED | OUTPATIENT
Start: 2017-12-06 | End: 2017-12-06 | Stop reason: HOSPADM

## 2017-12-06 RX ORDER — NEOMYCIN SULFATE, POLYMYXIN B SULFATE, AND DEXAMETHASONE 3.5; 10000; 1 MG/G; [USP'U]/G; MG/G
OINTMENT OPHTHALMIC
Status: DISCONTINUED | OUTPATIENT
Start: 2017-12-06 | End: 2017-12-06 | Stop reason: HOSPADM

## 2017-12-06 RX ORDER — BUPIVACAINE HYDROCHLORIDE 7.5 MG/ML
INJECTION, SOLUTION EPIDURAL; RETROBULBAR
Status: DISCONTINUED
Start: 2017-12-06 | End: 2017-12-06 | Stop reason: HOSPADM

## 2017-12-06 RX ORDER — DEXTROSE MONOHYDRATE 25 G/50ML
INJECTION, SOLUTION INTRAVENOUS
Status: DISCONTINUED
Start: 2017-12-06 | End: 2017-12-06 | Stop reason: HOSPADM

## 2017-12-06 RX ORDER — MOXIFLOXACIN 5 MG/ML
1 SOLUTION/ DROPS OPHTHALMIC
Status: DISCONTINUED | OUTPATIENT
Start: 2017-12-06 | End: 2017-12-06 | Stop reason: HOSPADM

## 2017-12-06 RX ADMIN — VANCOMYCIN HYDROCHLORIDE 25 MG: 500 INJECTION, POWDER, LYOPHILIZED, FOR SOLUTION INTRAVENOUS at 08:12

## 2017-12-06 RX ADMIN — HYDROCODONE BITARTRATE AND ACETAMINOPHEN 1 TABLET: 5; 325 TABLET ORAL at 08:12

## 2017-12-06 RX ADMIN — TROPICAMIDE 1 DROP: 10 SOLUTION/ DROPS OPHTHALMIC at 06:12

## 2017-12-06 RX ADMIN — CYCLOPENTOLATE HYDROCHLORIDE 1 DROP: 10 SOLUTION/ DROPS OPHTHALMIC at 06:12

## 2017-12-06 RX ADMIN — TETRACAINE HYDROCHLORIDE 2 DROP: 5 SOLUTION OPHTHALMIC at 08:12

## 2017-12-06 RX ADMIN — TETRACAINE HYDROCHLORIDE 1 DROP: 5 SOLUTION OPHTHALMIC at 06:12

## 2017-12-06 RX ADMIN — PHENYLEPHRINE HYDROCHLORIDE 1 DROP: 25 SOLUTION/ DROPS OPHTHALMIC at 06:12

## 2017-12-06 RX ADMIN — LIDOCAINE HYDROCHLORIDE 5 ML: 20 INJECTION, SOLUTION EPIDURAL; INFILTRATION; INTRACAUDAL; PERINEURAL at 08:12

## 2017-12-06 RX ADMIN — LIDOCAINE HYDROCHLORIDE 60 MG: 20 INJECTION, SOLUTION INTRAVENOUS at 08:12

## 2017-12-06 RX ADMIN — PREDNISOLONE ACETATE 1 DROP: 10 SUSPENSION/ DROPS OPHTHALMIC at 06:12

## 2017-12-06 RX ADMIN — HOMATROPINE HYDROBROMIDE 1 DROP: 50 SOLUTION OPHTHALMIC at 06:12

## 2017-12-06 RX ADMIN — PROPOFOL 60 MG: 10 INJECTION, EMULSION INTRAVENOUS at 08:12

## 2017-12-06 RX ADMIN — LIDOCAINE HYDROCHLORIDE 2 MG: 10 INJECTION, SOLUTION EPIDURAL; INFILTRATION; INTRACAUDAL; PERINEURAL at 06:12

## 2017-12-06 RX ADMIN — EPINEPHRINE 0.3 MG: 1 INJECTION, SOLUTION INTRAMUSCULAR; SUBCUTANEOUS at 08:12

## 2017-12-06 RX ADMIN — MOXIFLOXACIN HYDROCHLORIDE 1 DROP: 5 SOLUTION/ DROPS OPHTHALMIC at 06:12

## 2017-12-06 RX ADMIN — DEXAMETHASONE SODIUM PHOSPHATE 2 MG: 4 INJECTION, SOLUTION INTRAMUSCULAR; INTRAVENOUS at 08:12

## 2017-12-06 RX ADMIN — NEOMYCIN SULFATE, POLYMYXIN B SULFATE, AND DEXAMETHASONE 1 APPLICATION: 3.5; 10000; 1 OINTMENT OPHTHALMIC at 08:12

## 2017-12-06 RX ADMIN — MIDAZOLAM HYDROCHLORIDE 2 MG: 1 INJECTION, SOLUTION INTRAMUSCULAR; INTRAVENOUS at 07:12

## 2017-12-06 RX ADMIN — SODIUM CHLORIDE: 0.9 INJECTION, SOLUTION INTRAVENOUS at 06:12

## 2017-12-06 RX ADMIN — BUPIVACAINE HYDROCHLORIDE 5 ML: 7.5 INJECTION, SOLUTION EPIDURAL; RETROBULBAR at 08:12

## 2017-12-06 NOTE — TRANSFER OF CARE
"Anesthesia Transfer of Care Note    Patient: Glenn Mdeel    Procedure(s) Performed: Procedure(s) (LRB):  REPAIR-RETINA (VITRECTOMY) (Left)    Patient location: PACU    Anesthesia Type: MAC    Transport from OR: Transported from OR on room air with adequate spontaneous ventilation    Post pain: adequate analgesia    Post assessment: no apparent anesthetic complications and tolerated procedure well    Post vital signs: stable    Level of consciousness: awake, alert and oriented    Nausea/Vomiting: no nausea/vomiting    Complications: none    Transfer of care protocol was followed      Last vitals:   Visit Vitals  BP (!) 145/92 (BP Location: Right arm, Patient Position: Lying)   Pulse 71   Temp 36.8 °C (98.2 °F) (Temporal)   Resp 18   Ht 5' 11" (1.803 m)   Wt 100.2 kg (221 lb)   SpO2 100%   BMI 30.82 kg/m²     "

## 2017-12-06 NOTE — ANESTHESIA POSTPROCEDURE EVALUATION
"Anesthesia Post Evaluation    Patient: Glenn Medel    Procedure(s) Performed: Procedure(s) (LRB):  REPAIR-RETINA (VITRECTOMY) (Left)    Final Anesthesia Type: general  Patient location during evaluation: PACU  Patient participation: Yes- Able to Participate  Level of consciousness: awake and alert  Post-procedure vital signs: reviewed and stable  Pain management: adequate  Airway patency: patent  PONV status at discharge: No PONV  Anesthetic complications: no      Cardiovascular status: stable  Respiratory status: unassisted, spontaneous ventilation and room air  Hydration status: euvolemic  Follow-up not needed.        Visit Vitals  BP (P) 138/85   Pulse (P) 67   Temp 36.8 °C (98.2 °F) (Temporal)   Resp (P) 18   Ht 5' 11" (1.803 m)   Wt 100.2 kg (221 lb)   SpO2 (P) 98%   BMI 30.82 kg/m²       Pain/Ezequiel Score: Pain Assessment Performed: Yes (12/6/2017  9:21 AM)  Presence of Pain: complains of pain/discomfort (12/6/2017  9:21 AM)  Pain Rating Prior to Med Admin: 7 (12/6/2017  8:42 AM)  Ezequiel Score: 10 (12/6/2017  8:39 AM)      "

## 2017-12-06 NOTE — BRIEF OP NOTE
Pre-Op Dx: Retained lens material OS    Post Op Dx: same    Procedure Performed: 25g PPV OS    Attending Surgeon: Ashok    Assistant Surgeon: Bobby    Anesthesia: Local/Mac, retrobulbar injection of 4.0cc mixture 0.75%Marcaine, 2% Xylocaine    Estimated blood loss: Minimal    Complication: None    Specimen: None    Disposition: Stable to recovery    Findings/Outcome: retained nuclear and cortical material. No tears or breaks    Date of Discharge: 12/6/17    Discharge Disposition: stable to recovery then home    F/U: tomorrow

## 2017-12-06 NOTE — ANESTHESIA PREPROCEDURE EVALUATION
12/06/2017  Glenn Medel is a 62 y.o., male.  Pre-operative evaluation for Procedure(s) (LRB):  REPAIR-RETINA (VITRECTOMY) (Left)    Glenn Medel is a 62 y.o. male     Patient Active Problem List   Diagnosis    BPH (benign prostatic hyperplasia)    LOVE (obstructive sleep apnea)    HTN (hypertension)    DDD (degenerative disc disease), lumbar    Palpitations    Chronic kidney disease, stage 3    Type II or unspecified type diabetes mellitus without mention of complication, uncontrolled    Type II or unspecified type diabetes mellitus with neurological manifestations, uncontrolled(250.62)    Peripheral neuropathy    Hyperlipidemia    DM type 2 (diabetes mellitus, type 2)    Obesity (BMI 30.0-34.9)    Type 2 diabetes mellitus with neurologic complication    Itch    Hypersomnia with sleep apnea, unspecified    ILD (interstitial lung disease)    Severe obstructive sleep apnea    Rectal bleeding    Other vitreous opacities, bilateral    Vitreous hemorrhage, bilateral    NS (nuclear sclerosis)    Vitreous hemorrhage, left    Senile nuclear sclerosis    Retained lens material, left       Review of patient's allergies indicates:   Allergen Reactions    Avelox [moxifloxacin] Hives and Rash       No current facility-administered medications on file prior to encounter.      Current Outpatient Prescriptions on File Prior to Encounter   Medication Sig Dispense Refill    acetaZOLAMIDE (DIAMOX) 500 mg CpSR Take 1 capsule (500 mg total) by mouth 2 (two) times daily. 14 capsule 11    aspirin (ECOTRIN) 81 MG EC tablet Take 81 mg by mouth once daily.      atorvastatin (LIPITOR) 20 MG tablet Take 1 tablet (20 mg total) by mouth once daily. 90 tablet 3    duloxetine (CYMBALTA) 60 MG capsule Take 60 mg by mouth 2 (two) times daily.      furosemide (LASIX) 20 MG tablet Take 1 tablet (20 mg total)  "by mouth every morning. 90 tablet 3    glimepiride (AMARYL) 2 MG tablet Take 1 tablet (2 mg total) by mouth once daily. 30 tablet 11    metformin (GLUCOPHAGE-XR) 500 MG 24 hr tablet TAKE 2 TABLETS BY MOUTH EVERY DAY WITH BREAKFAST (Patient taking differently: Take 1,000 mg by mouth every morning. TAKE 2 TABLETS BY MOUTH EVERY DAY WITH BREAKFAST) 180 tablet 3    metoprolol succinate (TOPROL-XL) 25 MG 24 hr tablet TAKE 1 TABLET(25 MG) BY MOUTH EVERY DAY 30 tablet 0    modafinil (PROVIGIL) 200 MG Tab Take 1 tablet (200 mg total) by mouth 2 (two) times daily. 60 tablet 3    oxyCODONE-acetaminophen (PERCOCET) 5-325 mg per tablet Take 1 tablet by mouth every 6 (six) hours as needed for Pain. 12 tablet 0    SIMBRINZA 1-0.2 % DrpS SHAKE LIQUID AND INSTILL 1 DROP IN BOTH EYES TWICE DAILY 8 mL 2    valsartan (DIOVAN) 160 MG tablet Take 1 tablet (160 mg total) by mouth once daily. 30 tablet 11    VICTOZA 3-NOEL 0.6 mg/0.1 mL (18 mg/3 mL) PnIj ADMINISTER 1.8 MG UNDER THE SKIN EVERY DAY 18 mL 2    vitamin D 1000 units Tab Take 1,000 Units by mouth. Two tablet daily       BD INSULIN PEN NEEDLE UF MINI 31 gauge x 3/16" Ndle USE THREE TIMES DAILY BEFORE MEALS 100 each 0    colchicine 0.6 mg tablet Take 1 tablet (0.6 mg total) by mouth 2 (two) times daily as needed (Gout). 60 tablet 0    hydrocortisone-pramoxine (PROCTOFOAM-HS) rectal foam Place 1 applicator rectally 2 (two) times daily. 10 g 5    ibuprofen-famotidine (DUEXIS) 800-26.6 mg Tab Take by mouth daily as needed.      latanoprost (XALATAN) 0.005 % ophthalmic solution Place 1 drop into the left eye every evening. 2.5 mL 1    mupirocin (BACTROBAN) 2 % ointment GILMAR AA OF SKIN BID  0       Past Surgical History:   Procedure Laterality Date    CATARACT EXTRACTION      COLONOSCOPY N/A 9/18/2017    Procedure: COLONOSCOPY;  Surgeon: Paul Feliz Jr., MD;  Location: [facility];  Service: Endoscopy;  Laterality: N/A;    COLONOSCOPY W/ POLYPECTOMY  04/13/2010    " YARELI.   One 1 cm polyp in the sigmoid colon.  Internal hemorrhoids.    HAND SURGERY      TONSILLECTOMY      uro lift      for enlarged prostate       Social History     Social History    Marital status:      Spouse name: N/A    Number of children: N/A    Years of education: N/A     Occupational History    Not on file.     Social History Main Topics    Smoking status: Never Smoker    Smokeless tobacco: Never Used    Alcohol use Yes      Comment: occasional    Drug use: No    Sexual activity: Yes     Partners: Female     Other Topics Concern    Not on file     Social History Narrative            2 grown kids.         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          Vital Signs Range (Last 24H):  Temp:  [36.5 °C (97.7 °F)]   Pulse:  [60]   Resp:  [18]   BP: (119)/(78)   SpO2:  [99 %]       CBC: No results for input(s): WBC, RBC, HGB, HCT, PLT, MCV, MCH, MCHC in the last 72 hours.    CMP: No results for input(s): NA, K, CL, CO2, BUN, CREATININE, GLU, MG, PHOS, CALCIUM, ALBUMIN, PROT, ALKPHOS, ALT, AST, BILITOT in the last 72 hours.    INR  No results for input(s): INR, PROTIME, APTT in the last 72 hours.    Invalid input(s): PT        Diagnostic Studies:      EKD Echo:      Anesthesia Evaluation    I have reviewed the Patient Summary Reports.    I have reviewed the Nursing Notes.   I have reviewed the Medications.     Review of Systems  Anesthesia Hx:  No problems with previous Anesthesia  History of prior surgery of interest to airway management or planning: Denies Family Hx of Anesthesia complications.   Denies Personal Hx of Anesthesia complications.   Cardiovascular:   Hypertension    Pulmonary:   Sleep Apnea, CPAP    Renal/:   Chronic Renal Disease    Hepatic/GI:   Denies GERD.    Musculoskeletal:   Arthritis     Neurological:   Neuromuscular Disease,    Endocrine:   Diabetes, type 2        Physical Exam  General:  Obesity    Airway/Jaw/Neck:  Airway  Findings: Mouth Opening: Normal Tongue: Large  Mallampati: III  TM Distance: < 4 cm      Dental:  Dental Findings: In tact             Anesthesia Plan  Type of Anesthesia, risks & benefits discussed:  Anesthesia Type:  MAC  Patient's Preference:   Intra-op Monitoring Plan: standard ASA monitors  Intra-op Monitoring Plan Comments:   Post Op Pain Control Plan:   Post Op Pain Control Plan Comments:   Induction:   IV  Beta Blocker:  Patient is on a Beta-Blocker and has received one dose within the past 24 hours (No further documentation required).       Informed Consent: Patient understands risks and agrees with Anesthesia plan.  Questions answered.   ASA Score: 3     Day of Surgery Review of History & Physical:    H&P update referred to the surgeon.         Ready For Surgery From Anesthesia Perspective.

## 2017-12-06 NOTE — INTERVAL H&P NOTE
H&P completed on pt has been reviewed, the patient has been examined and:  I concur with the findings and no changes have occurred since H&P was written.       Active Hospital Problems    Diagnosis  POA    Retained lens material, left [H41.254]  Yes      Resolved Hospital Problems    Diagnosis Date Resolved POA   No resolved problems to display.

## 2017-12-07 ENCOUNTER — OFFICE VISIT (OUTPATIENT)
Dept: OPHTHALMOLOGY | Facility: CLINIC | Age: 62
End: 2017-12-07
Payer: COMMERCIAL

## 2017-12-07 VITALS — DIASTOLIC BLOOD PRESSURE: 68 MMHG | HEART RATE: 71 BPM | SYSTOLIC BLOOD PRESSURE: 116 MMHG

## 2017-12-07 DIAGNOSIS — H59.022 RETAINED LENS MATERIAL, LEFT: Primary | ICD-10-CM

## 2017-12-07 PROCEDURE — 99999 PR PBB SHADOW E&M-EST. PATIENT-LVL III: CPT | Mod: PBBFAC,,, | Performed by: OPHTHALMOLOGY

## 2017-12-07 PROCEDURE — 99024 POSTOP FOLLOW-UP VISIT: CPT | Mod: S$GLB,,, | Performed by: OPHTHALMOLOGY

## 2017-12-07 NOTE — OP NOTE
DATE OF PROCEDURE:  12/06/2017    PREOPERATIVE DIAGNOSIS:  Retained lens material to the left eye.    POSTOPERATIVE DIAGNOSIS:  Retained lens material to the left eye.    PROCEDURE PERFORMED:  A 25-gauge pars plana vitrectomy to the left eye.    ATTENDING SURGEON:  SAJAN Pulido M.D.    ASSISTANT SURGEON:  Fellow, Mickey Rosales.    ANESTHESIA:  Local monitored anesthesia care with a retrobulbar injection of 4.0   mL mixture of 0.75% Marcaine, 2% Xylocaine.    ESTIMATED BLOOD LOSS:  Minimal.    COMPLICATIONS:  None.    DISPOSITION:  Stable to recovery.    INDICATIONS FOR SURGERY:  This is a 62-year-old male who is status post pars   plana vitrectomy in his left eye for vitreous opacities.  There is posterior   lens capsule violation during anterior hyaloid removal.  The patient was sent   for cataract extraction with Dr. Garcia.  Given the posterior capsular   violation, lens fragments fell back of the eye.  He presented to me with some   elevated pressure and corneal edema and retained lens fragments.  Decision was   made to take the patient to surgery to remove the lens fragments to stabilize   the eye, improve vision and improve quality of life.  Risks, benefits and   alternatives to surgery were discussed in detail.  Risks including loss of   vision, loss of eye, retinal detachment, infection, hemorrhage, lens   dislocation, glaucoma, hypotony, ptosis and diplopia.  The patient voiced   understanding and wished to proceed with the procedure.    DESCRIPTION OF PROCEDURE:  After proper informed consent was obtained, the   patient was brought back to the Operating Suite at [facility] where   MAC anesthesia was induced.  Retrobulbar injection was provided as above without   complication.  The patient was prepped and draped in normal sterile fashion for   ophthalmic surgery and lid speculum was placed in the left eye.  A standard   3-port 25-gauge pars plana vitrectomy was set up with the infusion  cannula   inserted 3.5 mm posterior to the limbus.  The infusion cannula was turned on   only after observed to be free and clear of all underlying retinal tissue.    Supranasal and supratemporal trocars were also placed 3.5 mm posterior to the   limbus.  The vitrector and light pipe were introduced in the vitreous cavity and   the lens fragments were removed with the vitrector.  There was some capture at   the vitreous base inferiorly, but these were easily able to be removed with the   vitrector.  No identifiable retinal breaks were found.  The trocars were removed   from the eye and not leaking after gentle massage and the eye was normal   pressure via palpation.  Subconjunctival injections of vancomycin and Decadron   were given to the patient.  The drapes were removed from the patient.  He was   washed free of Betadine prep solution.  Maxitrol ointment was placed in the left   eye.  The eye was patched, shielded.  MAC anesthesia was reversed.  He was   brought to Recovery Room in stable condition, tolerating the procedure well.    Dr. Pulido was present for the entire case.      SYL  dd: 12/06/2017 08:37:10 (CST)  td: 12/06/2017 19:07:27 (CST)  Doc ID   #[id number]  Job ID #[id number]    CC:

## 2017-12-07 NOTE — PROGRESS NOTES
HPI     Post-op Evaluation    Additional comments: 1 day post op           Comments   1 day post op- Doing well post sx. No pain    HPI     Post-op Evaluation    Additional comments: IOP check per Jose           Comments   DLS 12/1/17 Jose- Patient had a lot of pain last night which he rates as 10 on pain scale. He hasn't had any pain since around 4 this am.    Gtts:  OD  Simbrina BID    OS   PF QID  vigamox TID  Ketorolac TID  combigan TID  Azopt TID  xalatan QHS  Diamox BID    A/P    1. Dense vitreous opacities OU with NCVH  No Improvement over last several months  ?prior VH with PVD OU - no breaks or tears    s/p 25g PPV/partial AFx OS for vitreous opacities/NCVH OS 11/15/17    Doing well, good IOP  HAD PC violation during anterior vitreous removal -     Had CE with dropped lens material give PC violation 11/30/17    Increased IOP - though improved with K edema    Will need PPV to remove lens material.  Pt would prefer more prompt resolution    s/p 25g PPV OS for retained lens material OS 12/6/17    Doing well, good IOP  Small cortical fluff at pupil, should dissolve over next few weeks    Start gtts QID, ointment/QHS    1 week    Will need MRx update at 2-3 months    2. NS OD  Sulcus IOL OS    3. DM  Controlled No DR  BS/BP/chol control    4. HTN Ret OU

## 2017-12-14 ENCOUNTER — OFFICE VISIT (OUTPATIENT)
Dept: OPHTHALMOLOGY | Facility: CLINIC | Age: 62
End: 2017-12-14
Payer: COMMERCIAL

## 2017-12-14 ENCOUNTER — PATIENT MESSAGE (OUTPATIENT)
Dept: OPHTHALMOLOGY | Facility: CLINIC | Age: 62
End: 2017-12-14

## 2017-12-14 VITALS — DIASTOLIC BLOOD PRESSURE: 86 MMHG | SYSTOLIC BLOOD PRESSURE: 142 MMHG | HEART RATE: 69 BPM

## 2017-12-14 DIAGNOSIS — H43.12 VITREOUS HEMORRHAGE, LEFT: ICD-10-CM

## 2017-12-14 DIAGNOSIS — H59.022 RETAINED LENS MATERIAL, LEFT: Primary | ICD-10-CM

## 2017-12-14 PROCEDURE — 99024 POSTOP FOLLOW-UP VISIT: CPT | Mod: S$GLB,,, | Performed by: OPHTHALMOLOGY

## 2017-12-14 PROCEDURE — 99999 PR PBB SHADOW E&M-EST. PATIENT-LVL III: CPT | Mod: PBBFAC,,, | Performed by: OPHTHALMOLOGY

## 2017-12-14 RX ORDER — VALSARTAN 160 MG/1
TABLET ORAL
Refills: 11 | COMMUNITY
Start: 2017-12-09 | End: 2018-02-20 | Stop reason: DRUGHIGH

## 2017-12-14 NOTE — PROGRESS NOTES
HPI     1 week post op  DLS- 12/07/2017 Dr. Pulido    Pt sts vision the same very blurry as before. Slight Pain OS  (-)Flashes (+)Floaters same as usual   (-)Photophobia  (-)Glare    PF, VIG, & HA QID   Oint QPM     HPI     Post-op Evaluation    Additional comments: 1 day post op           Comments   1 day post op- Doing well post sx. No pain    HPI     Post-op Evaluation    Additional comments: IOP check per Jose           Comments   DLS 12/1/17 Jose- Patient had a lot of pain last night which he rates as 10 on pain scale. He hasn't had any pain since around 4 this am.    Gtts:  OD  Simbrina BID    OS   PF QID  vigamox TID  Ketorolac TID  combigan TID  Azopt TID  xalatan QHS  Diamox BID    A/P    1. Dense vitreous opacities OU with NCVH  No Improvement over last several months  ?prior VH with PVD OU - no breaks or tears    s/p 25g PPV/partial AFx OS for vitreous opacities/NCVH OS 11/15/17    Doing well, good IOP  HAD PC violation during anterior vitreous removal -     Had CE with dropped lens material give PC violation 11/30/17    Increased IOP - though improved with K edema    Will need PPV to remove lens material.  Pt would prefer more prompt resolution    s/p 25g PPV OS for retained lens material OS 12/6/17    Doing well, but IOP elevated - ? Steroid response, inflammation and cortical material improving, AC deep    Combigan/Azopt in clinic  Start both  BID    DC ointment/  PF 2/1/0    5 days IOP check    Will need MRx update at 2-3 months    2. NS OD  Sulcus IOL OS    3. DM  Controlled No DR  BS/BP/chol control    4. HTN Ret OU

## 2017-12-15 ENCOUNTER — OFFICE VISIT (OUTPATIENT)
Dept: OPHTHALMOLOGY | Facility: CLINIC | Age: 62
End: 2017-12-15
Payer: COMMERCIAL

## 2017-12-15 DIAGNOSIS — H40.42X4 GLAUCOMA ASSOCIATED WITH OCULAR INFLAMMATION, LEFT, INDETERMINATE STAGE: ICD-10-CM

## 2017-12-15 DIAGNOSIS — H59.022 RETAINED LENS MATERIAL, LEFT: Primary | ICD-10-CM

## 2017-12-15 PROCEDURE — 99024 POSTOP FOLLOW-UP VISIT: CPT | Mod: S$GLB,,, | Performed by: OPHTHALMOLOGY

## 2017-12-15 PROCEDURE — 99999 PR PBB SHADOW E&M-EST. PATIENT-LVL III: CPT | Mod: PBBFAC,,, | Performed by: OPHTHALMOLOGY

## 2017-12-15 NOTE — PROGRESS NOTES
HPI     1 day IOP chk   DLS- 12/14/2017 Dr. Pulido    Pt sts no pain and did see better this am due to not using oint last night     (-)Flashes (+)Floaters same as usual   (-)Photophobia  (-)Glare    DC oint   PF 2/1/0  Combigan / Azopt BID    HPI     1 week post op  DLS- 12/07/2017 Dr. Pulido    Pt sts vision the same very blurry as before. Slight Pain OS  (-)Flashes (+)Floaters same as usual   (-)Photophobia  (-)Glare    PF, VIG, & HA QID   Oint QPM     HPI     Post-op Evaluation    Additional comments: 1 day post op           Comments   1 day post op- Doing well post sx. No pain    HPI     Post-op Evaluation    Additional comments: IOP check per Jose           Comments   DLS 12/1/17 Jose- Patient had a lot of pain last night which he rates as 10 on pain scale. He hasn't had any pain since around 4 this am.    Gtts:  OD  Simbrina BID    OS   PF QID  vigamox TID  Ketorolac TID  combigan TID  Azopt TID  xalatan QHS  Diamox BID    A/P    1. Dense vitreous opacities OU with NCVH  No Improvement over last several months  ?prior VH with PVD OU - no breaks or tears    s/p 25g PPV/partial AFx OS for vitreous opacities/NCVH OS 11/15/17    Doing well, good IOP  HAD PC violation during anterior vitreous removal -     Had CE with dropped lens material give PC violation 11/30/17    Increased IOP - though improved with K edema    Will need PPV to remove lens material.  Pt would prefer more prompt resolution    s/p 25g PPV OS for retained lens material OS 12/6/17    Doing well, but IOP improved to 18 (was 45-50 yesterday) - ? Steroid response, inflammation and cortical material improving, AC deep    Combigan/Azopt/PO diamox in clinic  contiue    DC ointment/  PF 2/1/0    4 days IOP check    Will need MRx update at 2-3 months    2. NS OD  Sulcus IOL OS    3. DM  Controlled No DR  BS/BP/chol control    4. HTN Ret OU

## 2017-12-18 ENCOUNTER — TELEPHONE (OUTPATIENT)
Dept: OPTOMETRY | Facility: CLINIC | Age: 62
End: 2017-12-18

## 2017-12-18 ENCOUNTER — OFFICE VISIT (OUTPATIENT)
Dept: OPHTHALMOLOGY | Facility: CLINIC | Age: 62
End: 2017-12-18
Payer: COMMERCIAL

## 2017-12-18 ENCOUNTER — PATIENT MESSAGE (OUTPATIENT)
Dept: OPTOMETRY | Facility: CLINIC | Age: 62
End: 2017-12-18

## 2017-12-18 ENCOUNTER — PATIENT MESSAGE (OUTPATIENT)
Dept: ADMINISTRATIVE | Facility: OTHER | Age: 62
End: 2017-12-18

## 2017-12-18 DIAGNOSIS — H43.12 VITREOUS HEMORRHAGE, LEFT: ICD-10-CM

## 2017-12-18 DIAGNOSIS — H40.42X4 GLAUCOMA ASSOCIATED WITH OCULAR INFLAMMATION, LEFT, INDETERMINATE STAGE: Primary | ICD-10-CM

## 2017-12-18 PROCEDURE — 99999 PR PBB SHADOW E&M-EST. PATIENT-LVL II: CPT | Mod: PBBFAC,,, | Performed by: OPHTHALMOLOGY

## 2017-12-18 PROCEDURE — 99024 POSTOP FOLLOW-UP VISIT: CPT | Mod: S$GLB,,, | Performed by: OPHTHALMOLOGY

## 2017-12-18 RX ORDER — BRIMONIDINE TARTRATE AND TIMOLOL MALEATE 2; 5 MG/ML; MG/ML
1 SOLUTION OPHTHALMIC 2 TIMES DAILY
COMMUNITY
End: 2018-04-09 | Stop reason: ALTCHOICE

## 2017-12-18 RX ORDER — BRINZOLAMIDE 10 MG/ML
1 SUSPENSION/ DROPS OPHTHALMIC 2 TIMES DAILY
COMMUNITY
End: 2018-04-09 | Stop reason: ALTCHOICE

## 2017-12-18 RX ORDER — ACETAZOLAMIDE 500 MG/1
500 CAPSULE, EXTENDED RELEASE ORAL 2 TIMES DAILY
COMMUNITY
End: 2018-01-03

## 2017-12-18 NOTE — PROGRESS NOTES
HPI     DLS- 12/15/2017 Dr. Pulido    Pt states [place] is so much better he is driving without any glasses. (Wife expresses some concerns).      PF BID   Combigan BID   Azopt BID  Acetazoamide 500 mg BID      HPI     1 day IOP chk   DLS- 12/14/2017 Dr. Pulido    Pt sts no pain and did see better this am due to not using oint last night     (-)Flashes (+)Floaters same as usual   (-)Photophobia  (-)Glare    DC oint   PF 2/1/0  Combigan / Azopt BID    HPI     1 week post op  DLS- 12/07/2017 Dr. Pulido    Pt sts vision the same very blurry as before. Slight Pain OS  (-)Flashes (+)Floaters same as usual   (-)Photophobia  (-)Glare    PF, VIG, & HA QID   Oint QPM     HPI     Post-op Evaluation    Additional comments: 1 day post op           Comments   1 day post op- Doing well post sx. No pain    HPI     Post-op Evaluation    Additional comments: IOP check per Jose           Comments   DLS 12/1/17 Jose- Patient had a lot of pain last night which he rates as 10 on pain scale. He hasn't had any pain since around 4 this am.    Gtts:  OD  Simbrina BID    OS   PF QID  vigamox TID  Ketorolac TID  combigan TID  Azopt TID  xalatan QHS  Diamox BID    A/P    1. Dense vitreous opacities OU with NCVH  No Improvement over last several months  ?prior VH with PVD OU - no breaks or tears    s/p 25g PPV/partial AFx OS for vitreous opacities/NCVH OS 11/15/17    Doing well, good IOP  HAD PC violation during anterior vitreous removal -     Had CE with dropped lens material give PC violation 11/30/17    Increased IOP - though improved with K edema    Will need PPV to remove lens material.  Pt would prefer more prompt resolution    s/p 25g PPV OS for retained lens material OS 12/6/17    Doing well, but IOP improved to 18 (was 45-50 yesterday) - ? Steroid response, inflammation and cortical material improving, AC deep    Combigan/Azopt/PO diamox in clinic  contiue    DC ointment/  PF 2/1/0  Continue Diamox until PF stopped    Continue  Combigan/Azopt    2. NS OD  Sulcus IOL OS    3. DM  Controlled No DR  BS/BP/chol control    4. HTN Ret OU    2-3 weeks

## 2017-12-19 ENCOUNTER — PATIENT MESSAGE (OUTPATIENT)
Dept: ADMINISTRATIVE | Facility: OTHER | Age: 62
End: 2017-12-19

## 2017-12-20 RX ORDER — MODAFINIL 200 MG/1
TABLET ORAL
Qty: 60 TABLET | Refills: 0 | Status: SHIPPED | OUTPATIENT
Start: 2017-12-20 | End: 2018-02-08 | Stop reason: SDUPTHER

## 2017-12-20 NOTE — ANESTHESIA POSTPROCEDURE EVALUATION
"Anesthesia Post Evaluation    Patient: Glenn Medel    Procedure(s) Performed: Procedure(s) (LRB):  REPAIR-RETINA (VITRECTOMY) (Left)    Final Anesthesia Type: MAC  Patient location during evaluation: PACU  Patient participation: Yes- Able to Participate  Level of consciousness: awake and alert  Post-procedure vital signs: reviewed and stable  Pain management: adequate  Airway patency: patent  PONV status at discharge: No PONV  Anesthetic complications: no      Cardiovascular status: stable  Respiratory status: unassisted, spontaneous ventilation and room air  Hydration status: euvolemic  Follow-up not needed.        Visit Vitals  BP (P) 138/85   Pulse (P) 67   Temp 36.8 °C (98.2 °F) (Temporal)   Resp (P) 18   Ht 5' 11" (1.803 m)   Wt 100.2 kg (221 lb)   SpO2 (P) 98%   BMI 30.82 kg/m²       Pain/Ezequiel Score: No Data Recorded      "

## 2017-12-21 ENCOUNTER — PATIENT MESSAGE (OUTPATIENT)
Dept: ADMINISTRATIVE | Facility: OTHER | Age: 62
End: 2017-12-21

## 2017-12-27 ENCOUNTER — PATIENT OUTREACH (OUTPATIENT)
Dept: OTHER | Facility: OTHER | Age: 62
End: 2017-12-27

## 2017-12-27 NOTE — LETTER
Chayo Boone, PharmD  [address] [facility] [facility] [place] [address]     Dear Glenn Medel,    Welcome to the [facility]!         My name is Chayo Boone PharmD and I am your dedicated Digital Medicine clinician.  As an expert in medication management, I will help ensure that the medications you are taking continue to provide you with the intended benefits.      I am Chanel Clark and I will be your health  for the duration of the program.  My  job is to help you identify lifestyle changes to improve your blood pressure control.  We will talk about nutrition, exercise, and other ways that you may be able to adjust your current habits to better your health. Together, we will work to improve your overall health and encourage you to meet your goals for a healthier lifestyle.    What we expect from YOU:    You will need to take blood pressure readings multiple times a week and no less than one reading per week.   It is important that you take your measurements at different times during the day, when possible.     What you should expect from your Digital Medicine Care Team:   We will provide you with education about high blood pressure, including lifestyle changes that could help you to control your blood pressure.   We will review your weekly readings and provide you with monthly blood pressure progress reports after you have been in the program for more than 30 days.   We will send monthly progress reports on your blood pressure control to your physician so they can follow along with your progress as well.    You will be able to reach me by phone at [phone] or through your [facility] account by clicking my name under Care Team on the right side of the home screen.    I look forward to working with you to achieve your blood pressure goals!    Sincerely,    Chayo Boone PharmD  Your personal clinician    Please visit  [email or web address] to learn more about high blood pressure and what you can do lower your blood pressure.                                                                                           Glenn Medel  [address] [address] [place] [address]

## 2017-12-29 RX ORDER — ATORVASTATIN CALCIUM 20 MG/1
20 TABLET, FILM COATED ORAL DAILY
COMMUNITY
End: 2018-09-19 | Stop reason: SDUPTHER

## 2017-12-29 RX ORDER — DULOXETIN HYDROCHLORIDE 60 MG/1
60 CAPSULE, DELAYED RELEASE ORAL 2 TIMES DAILY
COMMUNITY
End: 2019-06-12 | Stop reason: ALTCHOICE

## 2017-12-29 RX ORDER — GLIMEPIRIDE 2 MG/1
2 TABLET ORAL
COMMUNITY
End: 2018-03-09 | Stop reason: SDUPTHER

## 2017-12-29 RX ORDER — NAPROXEN SODIUM 220 MG/1
81 TABLET, FILM COATED ORAL DAILY
COMMUNITY
End: 2024-02-05

## 2017-12-29 RX ORDER — VIT C/E/ZN/COPPR/LUTEIN/ZEAXAN 250MG-90MG
2000 CAPSULE ORAL DAILY
COMMUNITY
End: 2020-02-27

## 2017-12-29 RX ORDER — METFORMIN HYDROCHLORIDE 500 MG/1
1000 TABLET, EXTENDED RELEASE ORAL
COMMUNITY
End: 2018-05-13 | Stop reason: SDUPTHER

## 2017-12-29 RX ORDER — FUROSEMIDE 20 MG/1
20 TABLET ORAL DAILY
COMMUNITY
End: 2018-06-25 | Stop reason: SDUPTHER

## 2017-12-29 NOTE — TELEPHONE ENCOUNTER
"HTN Digital Medicine Program Medication Reconciliation Outreach    Called patient to introduce him into the HDMP. Reviewed program details including blood pressure goals, technique for taking readings (timing, cuff placement, body positioning, iHealth neelima), and what to do in case of emergency. Introduced patient to members of care team (health , clinician, and responsible provider). Verified patient's understanding of [facility] neelima use for contacting clinical team and to ensure that iHealth cuff readings continue to transmit by logging in once every 2 weeks.  Pt was unaware of this prior to our conversation and has not logged in recently. Will do so today to ensure that all readings transmit to chart. Confirmation text sent and patient received.  Pt has been trying to take daily BP readings and will continue to do so.  Reviewed medication list at length as all prescribed meds were not previously listed.  Reports compliance with all medications with no side effects noted.  Current regimen includes Lasix 20 mg, Metoprolol XR 25 mg + Valsartan 160 mg in AM.  Also reports compliance with all DM medications.    Has not experienced any s/s associated with elevated BP "in a long time." Knows to reach out to us if any questions or concerns arise.    Screening Questionnaire Review:  1. Depression - yes  2. Sleep apnea - yes     Depression screening results suggest patient is having depressive symptoms. Patient is being followed and is not interested in referral. Feels he is being managed adequately with Cymbalta and will continue to follow as he has been.    LOVE screening results for this patient suggest a high likelihood of sleep apnea, which can contribute to hypertension. Patient has been previously diagnosed with sleep apnea and is not interested in referral at this time.    Patient reports the following symptoms of sleep apnea: snoring.    Patient reports inconsistent CPAP use at least 8 hours per night. LOVE " is managed effectively with CPAP use.  Not able to use regularly d/t headpiece irritating his nose. This is the second headpiece he has received without being able to get comfortable using it.      Verified the following information with the patient:  1. Medication list  Current Outpatient Prescriptions on File Prior to Visit   Medication Sig Dispense Refill    acetaZOLAMIDE (DIAMOX) 500 mg CpSR Take 500 mg by mouth 2 (two) times daily.      brimonidine-timolol (COMBIGAN) 0.2-0.5 % Drop Place 1 drop into the left eye 2 (two) times daily.      brinzolamide (AZOPT) 1 % ophthalmic suspension Place 1 drop into the left eye 2 (two) times daily.      modafinil (PROVIGIL) 200 MG Tab TAKE 1 TABLET(200 MG) BY MOUTH TWICE DAILY 60 tablet 0    ondansetron (ZOFRAN) 4 MG tablet Take 1 tablet (4 mg total) by mouth every 8 (eight) hours as needed for Nausea. 12 tablet 0    oxyCODONE-acetaminophen (PERCOCET) 5-325 mg per tablet Take 1 tablet by mouth every 6 (six) hours as needed for Pain. 12 tablet 0    valsartan (DIOVAN) 160 MG tablet   11     No current facility-administered medications on file prior to visit.        Previous ADRs      2. Medication compliance: has been compliant with the medicaiton regimen    3. Medication Allergies:   Review of patient's allergies indicates:   Allergen Reactions    Avelox [moxifloxacin] Hives and Rash       Explained that our goal is to get his BP consistently below 140/90mmHg.  Patient denies having further questions, concerns. BP is at goal.       Last 5 Patient Entered Readings Current 30 Day Average: 120/78     Recent Readings 12/26/2017 12/23/2017 12/22/2017 12/21/2017    SBP (mmHg) 114 127 120 118    DBP (mmHg) 75 82 77 77    Pulse 62 74 69 77

## 2018-01-01 ENCOUNTER — PATIENT MESSAGE (OUTPATIENT)
Dept: OPHTHALMOLOGY | Facility: CLINIC | Age: 63
End: 2018-01-01

## 2018-01-01 RX ORDER — METOPROLOL SUCCINATE 25 MG/1
TABLET, EXTENDED RELEASE ORAL
Qty: 30 TABLET | Refills: 0 | Status: SHIPPED | OUTPATIENT
Start: 2018-01-01 | End: 2018-01-30 | Stop reason: SDUPTHER

## 2018-01-02 ENCOUNTER — PATIENT MESSAGE (OUTPATIENT)
Dept: FAMILY MEDICINE | Facility: CLINIC | Age: 63
End: 2018-01-02

## 2018-01-02 RX ORDER — PEN NEEDLE, DIABETIC 31 GX5/16"
NEEDLE, DISPOSABLE MISCELLANEOUS
Qty: 100 EACH | Refills: 3 | Status: SHIPPED | OUTPATIENT
Start: 2018-01-02 | End: 2018-09-19 | Stop reason: SDUPTHER

## 2018-01-03 ENCOUNTER — OFFICE VISIT (OUTPATIENT)
Dept: FAMILY MEDICINE | Facility: CLINIC | Age: 63
End: 2018-01-03
Payer: COMMERCIAL

## 2018-01-03 ENCOUNTER — PATIENT OUTREACH (OUTPATIENT)
Dept: OTHER | Facility: OTHER | Age: 63
End: 2018-01-03

## 2018-01-03 VITALS
SYSTOLIC BLOOD PRESSURE: 130 MMHG | DIASTOLIC BLOOD PRESSURE: 88 MMHG | OXYGEN SATURATION: 97 % | HEART RATE: 80 BPM | HEIGHT: 71 IN | BODY MASS INDEX: 31.67 KG/M2 | WEIGHT: 226.19 LBS

## 2018-01-03 DIAGNOSIS — J04.0 LARYNGITIS: Primary | ICD-10-CM

## 2018-01-03 DIAGNOSIS — J06.9 UPPER RESPIRATORY TRACT INFECTION, UNSPECIFIED TYPE: ICD-10-CM

## 2018-01-03 PROCEDURE — 99999 PR PBB SHADOW E&M-EST. PATIENT-LVL IV: CPT | Mod: PBBFAC,,, | Performed by: PHYSICIAN ASSISTANT

## 2018-01-03 PROCEDURE — 99214 OFFICE O/P EST MOD 30 MIN: CPT | Mod: S$GLB,,, | Performed by: PHYSICIAN ASSISTANT

## 2018-01-03 RX ORDER — PROMETHAZINE HYDROCHLORIDE AND DEXTROMETHORPHAN HYDROBROMIDE 6.25; 15 MG/5ML; MG/5ML
5 SYRUP ORAL NIGHTLY
Qty: 118 ML | Refills: 0 | Status: SHIPPED | OUTPATIENT
Start: 2018-01-03 | End: 2018-10-05

## 2018-01-03 RX ORDER — FLUTICASONE PROPIONATE 50 MCG
2 SPRAY, SUSPENSION (ML) NASAL DAILY
Qty: 16 G | Refills: 11 | Status: SHIPPED | OUTPATIENT
Start: 2018-01-03 | End: 2018-10-05

## 2018-01-03 RX ORDER — LATANOPROST 50 UG/ML
SOLUTION/ DROPS OPHTHALMIC
Refills: 0 | COMMUNITY
Start: 2017-12-30 | End: 2018-01-03

## 2018-01-03 NOTE — PROGRESS NOTES
Subjective:       Patient ID: Glenn Medel is a 62 y.o. male    Chief Complaint: Cough (cough,itchy eyes,scratchy throat. Symptoms since Friday)    HPI  Mr Medel presents today CO hoarse voice, cough and congestion for the past 5 days.  He denies any fever or chills.  No sick contacts.     Review of Systems   Constitutional: Negative for chills and fever.   HENT: Positive for postnasal drip, rhinorrhea and sore throat. Negative for ear pain.    Respiratory: Positive for cough and wheezing. Negative for shortness of breath.    Cardiovascular: Negative for chest pain.   Musculoskeletal: Negative for myalgias.   Skin: Negative for rash.   Allergic/Immunologic: Negative for environmental allergies.   Neurological: Negative for headaches.        Objective:   Physical Exam   Constitutional: He is oriented to person, place, and time. He appears well-developed and well-nourished. No distress.   HENT:   Head: Normocephalic and atraumatic.   Right Ear: External ear normal.   Left Ear: External ear normal.   Nose: Nose normal.   Mouth/Throat: Oropharynx is clear and moist.   Erythema and clear post nasal drainage present in the posterior pharynx, Hoarse voice   Eyes: Conjunctivae and EOM are normal. Pupils are equal, round, and reactive to light.   Neck: Normal range of motion. Neck supple. No JVD present.   Cardiovascular: Normal rate, regular rhythm and normal heart sounds.  Exam reveals no gallop and no friction rub.    No murmur heard.  Pulmonary/Chest: Effort normal and breath sounds normal. No respiratory distress. He has no wheezes. He has no rales.   Musculoskeletal: Normal range of motion. He exhibits no edema.   Neurological: He is alert and oriented to person, place, and time.   Skin: Skin is warm and dry.   Psychiatric: He has a normal mood and affect. His behavior is normal. Judgment and thought content normal.        Assessment:       1. Laryngitis     2. Upper respiratory tract infection, unspecified type           Plan:       Laryngitis  Most UPPER RESPIRATORY INFECTIONS are caused by viruses.    Antibiotics will not make the infection clear more quickly.  Using antibiotics when they are not needed can cause germs that cause infections to become resistant, making them more difficult to cure.  Therefore, no antibiotics are prescribed today.      Increase your water intake to 64-80 oz daily, unless otherwise restricted.    Nasal Saline spray (Over the counter [place] spray or Ayr)  2 sprays each nostril 2-3 times a day for nasal congestion    Tylenol 500 mg 2 tablets or Ibuprofen 200 mg 2 tablets every 4-6 hours as needed (unless a doctor has told you not to take Tyenol or ibuprofen) for fever, headaches, sore throat, ear pain, bodyaches, and/or nasal/sinus inflammation    Warm salt water gargles with 1/2 cup water and 1 tablespoon salt every 4 hours    Call here or follow up with your primary care provider in 1 week if symptoms do not resolve       Upper respiratory tract infection, unspecified type  -     promethazine-dextromethorphan (PROMETHAZINE-DM) 6.25-15 mg/5 mL Syrp; Take 5 mLs by mouth every evening.  Dispense: 118 mL; Refill: 0  -     fluticasone (FLONASE) 50 mcg/actuation nasal spray; 2 sprays by Each Nare route once daily.  Dispense: 16 g; Refill: 11  - Mucinex DM over the counter

## 2018-01-03 NOTE — PATIENT INSTRUCTIONS
MUCINEX DM    Most UPPER RESPIRATORY INFECTIONS are caused by viruses.    Antibiotics will not make the infection clear more quickly.  Using antibiotics when they are not needed can cause germs that cause infections to become resistant, making them more difficult to cure.  Therefore, no antibiotics are prescribed today.      Increase your water intake to 64-80 oz daily, unless otherwise restricted.    Nasal Saline spray (Over the counter [place] spray or Ayr)  2 sprays each nostril 2-3 times a day for nasal congestion    Tylenol 500 mg 2 tablets or Ibuprofen 200 mg 2 tablets every 4-6 hours as needed (unless a doctor has told you not to take Tyenol or ibuprofen) for fever, headaches, sore throat, ear pain, bodyaches, and/or nasal/sinus inflammation    Warm salt water gargles with 1/2 cup water and 1 tablespoon salt every 4 hours    Call here or follow up with your primary care provider in 1 week if symptoms do not resolve

## 2018-01-03 NOTE — PROGRESS NOTES
Last 5 Patient Entered Readings Current 30 Day Average: 123/80     Recent Readings 1/2/2018 1/1/2018 12/30/2017 12/29/2017 12/27/2017    SBP (mmHg) 133 124 122 127 125    DBP (mmHg) 87 83 79 79 81    Pulse 83 67 72 72 69          Hypertension Digital Medicine Program (HDMP): Health  Introduction    Introduced Mr. Glenn Medel to HDM. Discussed health  role and recommended lifestyle modifications.    Diet (i.e. Low sodium, food labels): Pt reports that he doesn't cook with salt and read labels to watch his sodium intake.Reports when he goes out to eat he prefers grilled food because it has less sodium in it. Reports he doesn't eat fried food or drink soda. Discussed with pt that he should try not to consume no more than 2000 mg of sodium per day to lower his BP.  Exercise:Pt reports his commute from [facility] causes him not to be able to workout in the evening. Reports he plans to try to wake up in the morning and work out soon.  Alcohol/Tobacco:Pt reports not smoking but occassionally drink.  Medication Adherence: has been compliant with the medicaiton regimen.  Other goals: Pt reports one of his goals would be to start walking in his neighborhood again for exercise..    Reviewed AHA recommendations:  Limit sodium intake to <2000mg/day  Perform 150 minutes of physical activity per week    Patient is aware of the importance of taking daily BP readings at various times of the day  Patient aware that the health  can be used as a resource for lifestyle modifications to help reduce or maintain a healthy BP  Patient is aware of the importance of medication adherence.  Patient is aware that HDMP team is not available for emergencies. Patient should call 911 or [facility] on Call if one arises.

## 2018-01-04 ENCOUNTER — PATIENT MESSAGE (OUTPATIENT)
Dept: ADMINISTRATIVE | Facility: HOSPITAL | Age: 63
End: 2018-01-04

## 2018-01-08 ENCOUNTER — OFFICE VISIT (OUTPATIENT)
Dept: OPHTHALMOLOGY | Facility: CLINIC | Age: 63
End: 2018-01-08
Payer: COMMERCIAL

## 2018-01-08 DIAGNOSIS — H20.22: ICD-10-CM

## 2018-01-08 DIAGNOSIS — H43.12 VITREOUS HEMORRHAGE OF LEFT EYE: Primary | ICD-10-CM

## 2018-01-08 PROBLEM — H20.20: Status: ACTIVE | Noted: 2018-01-08

## 2018-01-08 PROCEDURE — 99024 POSTOP FOLLOW-UP VISIT: CPT | Mod: S$GLB,,, | Performed by: OPHTHALMOLOGY

## 2018-01-08 PROCEDURE — 99999 PR PBB SHADOW E&M-EST. PATIENT-LVL III: CPT | Mod: PBBFAC,,, | Performed by: OPHTHALMOLOGY

## 2018-01-08 PROCEDURE — 92134 CPTRZ OPH DX IMG PST SGM RTA: CPT | Mod: S$GLB,,, | Performed by: OPHTHALMOLOGY

## 2018-01-08 NOTE — PROGRESS NOTES
HPI     Post-op Evaluation    Additional comments: 6 week check           Comments   DLS 12/18/17- OS vision decreased from last visit.     combigan  azopt    OCT - mild VMT OD  OS - No CME    A/P    1. Dense vitreous opacities OU with [facility]  No Improvement over last several months  ?prior VH with PVD OU - no breaks or tears    s/p 25g PPV/partial AFx OS for vitreous opacities/[facility] OS 11/15/17    Doing well, good IOP  HAD PC violation during anterior vitreous removal -     Had CE with dropped lens material give PC violation 11/30/17    Increased IOP - though improved with K edema    Will need PPV to remove lens material.  Pt would prefer more prompt resolution    s/p 25g PPV OS for retained lens material OS 12/6/17    Doing well, but IOP improved to 18 - ? Steroid response, inflammation and cortical material improving, AC deep    1/8/18 - some increased inflammation after steroid taper, no CME    Add PF BID    Continue Combigan/Azopt    F/U 10 days - IOP and AC Rxn check    2. NS OD  Sulcus IOL OS    3. DM  Controlled No DR  BS/BP/chol control    4. HTN Ret OU    10 days no Dilate

## 2018-01-09 ENCOUNTER — PATIENT OUTREACH (OUTPATIENT)
Dept: OTHER | Facility: OTHER | Age: 63
End: 2018-01-09

## 2018-01-09 NOTE — PROGRESS NOTES
Last 5 Patient Entered Readings Current 30 Day Average: 131/83     Recent Readings 1/8/2018 1/7/2018 1/7/2018 1/6/2018 1/6/2018    SBP (mmHg) 144 155 154 144 142    DBP (mmHg) 90 90 92 87 90    Pulse 66 70 64 69 65          Patient's BP average is slightly above goal of <130/80.     Patient denies s/s of hypotension (lightheadedness, dizziness, nausea, fatigue) associated with low readings. Instructed patient to inform me if this occurs, patient confirms understanding.      Patient denies s/s of hypertension (SOB, CP, severe headaches, changes in vision) associated with high readings. Instructed patient to go to the ED if BP > 180/110 and accompanied by hypertensive s/s, patient confirms understanding.    Will continue to monitor regularly. Will follow up in ~ 2 weeks, sooner if BP begins to trend upward or downward.    Patient has my contact information and knows to call with any concerns or clinical changes.     Current HTN regimen:  Hypertension Medications             furosemide (LASIX) 20 MG tablet Take 20 mg by mouth once daily.    metoprolol succinate (TOPROL-XL) 25 MG 24 hr tablet TAKE 1 TABLET(25 MG) BY MOUTH EVERY DAY    valsartan (DIOVAN) 160 MG tablet         BP typically at goal prior to 1/5. Patient was seen for URI 1/3 and also reports increased stress regarding eyes (3 eye surgeries since November and normal vision not yet restored). Patient reports use of mucinex DM and denies other decongestants. Consider BP medication adjustment if BP has not returned to goal on follow up with resolution of URI.

## 2018-01-18 ENCOUNTER — OFFICE VISIT (OUTPATIENT)
Dept: OPHTHALMOLOGY | Facility: CLINIC | Age: 63
End: 2018-01-18
Payer: COMMERCIAL

## 2018-01-18 DIAGNOSIS — H59.022 RETAINED LENS MATERIAL, LEFT: Primary | ICD-10-CM

## 2018-01-18 PROCEDURE — 99999 PR PBB SHADOW E&M-EST. PATIENT-LVL III: CPT | Mod: PBBFAC,,, | Performed by: OPHTHALMOLOGY

## 2018-01-18 PROCEDURE — 92012 INTRM OPH EXAM EST PATIENT: CPT | Mod: S$GLB,,, | Performed by: OPHTHALMOLOGY

## 2018-01-18 NOTE — PROGRESS NOTES
HPI     DLS 1/8/18- OS vision is about the same    Combigan BID   Azopt BID  PF BID        OCT - mild VMT OD  OS - No CME    A/P    1. Dense vitreous opacities OU with [facility]  No Improvement over last several months  ?prior VH with PVD OU - no breaks or tears    s/p 25g PPV/partial AFx OS for vitreous opacities/[facility] OS 11/15/17    Doing well, good IOP  HAD PC violation during anterior vitreous removal -     Had CE with dropped lens material give PC violation 11/30/17    Increased IOP - though improved with K edema    Will need PPV to remove lens material.  Pt would prefer more prompt resolution    s/p 25g PPV OS for retained lens material OS 12/6/17    Doing well, but IOP improved to 18 - ? Steroid response, inflammation and cortical material improving, AC deep    1/8/18 - some increased inflammation after steroid taper, no CME  1/18 - still low grade inflammation -     change PF BID to FML BID    Continue Combigan/Azopt    F/U 10 days - IOP and AC Rxn check    2. NS OD  Sulcus IOL OS    3. DM  Controlled No DR  BS/BP/chol control    4. HTN Ret OU    10 days no Dilate

## 2018-01-23 ENCOUNTER — PATIENT OUTREACH (OUTPATIENT)
Dept: OTHER | Facility: OTHER | Age: 63
End: 2018-01-23

## 2018-01-23 NOTE — PROGRESS NOTES
Last 5 Patient Entered Readings                                      Current 30 Day Average: 136/86     Recent Readings 1/22/2018 1/20/2018 1/19/2018 1/16/2018 1/15/2018    SBP (mmHg) 128 149 138 143 141    DBP (mmHg) 86 87 93 94 89    Pulse 70 76 61 66 73          Patient's BP average is above goal of <130/80.     Patient denies s/s of hypotension (lightheadedness, dizziness, nausea, fatigue) associated with low readings. Instructed patient to inform me if this occurs, patient confirms understanding.      Patient denies s/s of hypertension (SOB, CP, severe headaches, changes in vision) associated with high readings. Instructed patient to go to the ED if BP > 180/110 and accompanied by hypertensive s/s, patient confirms understanding.    Will continue to monitor regularly. Will follow up in 2-3 weeks, sooner if BP begins to trend upward or downward.    Patient has my contact information and knows to call with any concerns or clinical changes.     Current HTN regimen:  Hypertension Medications             furosemide (LASIX) 20 MG tablet Take 20 mg by mouth once daily.    metoprolol succinate (TOPROL-XL) 25 MG 24 hr tablet TAKE 1 TABLET(25 MG) BY MOUTH EVERY DAY    valsartan (DIOVAN) 160 MG tablet           Reviewed BP measurement technique as patient reports lower readings at doctor's appointments. Patient appears to be using proper technique except using right arm. As he is right handed, I encouraged him to use left arm. Patient is charging his BP cuff once/week. He plans to stop by the [facility] 1/29 to ensure he is using the cuff correctly. Discussed increasing valsartan if BP not improved on follow up.

## 2018-01-26 ENCOUNTER — PATIENT MESSAGE (OUTPATIENT)
Dept: FAMILY MEDICINE | Facility: CLINIC | Age: 63
End: 2018-01-26

## 2018-01-26 DIAGNOSIS — E11.9 DIABETES MELLITUS WITHOUT COMPLICATION: Primary | ICD-10-CM

## 2018-01-29 ENCOUNTER — OFFICE VISIT (OUTPATIENT)
Dept: OPHTHALMOLOGY | Facility: CLINIC | Age: 63
End: 2018-01-29
Payer: COMMERCIAL

## 2018-01-29 DIAGNOSIS — H43.821 VITREOMACULAR ADHESION, RIGHT: ICD-10-CM

## 2018-01-29 DIAGNOSIS — H59.022 RETAINED LENS MATERIAL, LEFT: Primary | ICD-10-CM

## 2018-01-29 DIAGNOSIS — H40.42X4 GLAUCOMA ASSOCIATED WITH OCULAR INFLAMMATION, LEFT, INDETERMINATE STAGE: ICD-10-CM

## 2018-01-29 PROCEDURE — 99999 PR PBB SHADOW E&M-EST. PATIENT-LVL III: CPT | Mod: PBBFAC,,, | Performed by: OPHTHALMOLOGY

## 2018-01-29 PROCEDURE — 99024 POSTOP FOLLOW-UP VISIT: CPT | Mod: S$GLB,,, | Performed by: OPHTHALMOLOGY

## 2018-01-29 RX ORDER — FLUOROMETHOLONE 1 MG/ML
SUSPENSION/ DROPS OPHTHALMIC
Refills: 4 | COMMUNITY
Start: 2018-01-18 | End: 2018-03-09

## 2018-01-29 NOTE — PROGRESS NOTES
HPI     Post-op Evaluation    Additional comments: iop chk           Comments   DLS 1/18/2017    Left Eye  Combigan BID   Azopt BID  PF BID    Right Eye  Simbrinza BID    HPI     DLS 1/8/18- OS vision is about the same    Combigan BID   Azopt BID  PF BID        OCT - mild VMT OD  OS - No CME    A/P    1. Dense vitreous opacities OU with [facility]  No Improvement over last several months  ?prior VH with PVD OU - no breaks or tears    s/p 25g PPV/partial AFx OS for vitreous opacities/[facility] OS 11/15/17    Doing well, good IOP  HAD PC violation during anterior vitreous removal -     Had CE with dropped lens material give PC violation 11/30/17    Increased IOP - though improved with K edema    Will need PPV to remove lens material.  Pt would prefer more prompt resolution    s/p 25g PPV OS for retained lens material OS 12/6/17    Doing well, but IOP improved to 18 - ? Steroid response, inflammation and cortical material improving, AC deep    1/8/18 - some increased inflammation after steroid taper, no CME  1/18 - still low grade inflammation -     change PF BID to FML BID    Continue Combigan/Azopt    If quiet next visit, will taper FML and switch to simbinza BID OU      2. NS OD  Sulcus IOL OS    3. DM  Controlled No DR  BS/BP/chol control    4. HTN Ret OU    5. POAG  On simbrinza BID  May continue OU once inflammation down        1 month OCT

## 2018-01-30 ENCOUNTER — LAB VISIT (OUTPATIENT)
Dept: LAB | Facility: HOSPITAL | Age: 63
End: 2018-01-30
Attending: FAMILY MEDICINE
Payer: COMMERCIAL

## 2018-01-30 DIAGNOSIS — E11.9 DIABETES MELLITUS WITHOUT COMPLICATION: ICD-10-CM

## 2018-01-30 LAB
ALBUMIN SERPL BCP-MCNC: 3.8 G/DL
ALP SERPL-CCNC: 156 U/L
ALT SERPL W/O P-5'-P-CCNC: 38 U/L
ANION GAP SERPL CALC-SCNC: 8 MMOL/L
AST SERPL-CCNC: 39 U/L
BILIRUB SERPL-MCNC: 0.7 MG/DL
BUN SERPL-MCNC: 15 MG/DL
CALCIUM SERPL-MCNC: 9.7 MG/DL
CHLORIDE SERPL-SCNC: 100 MMOL/L
CHOLEST SERPL-MCNC: 154 MG/DL
CHOLEST/HDLC SERPL: 4.8 {RATIO}
CO2 SERPL-SCNC: 29 MMOL/L
CREAT SERPL-MCNC: 1.1 MG/DL
EST. GFR  (AFRICAN AMERICAN): >60 ML/MIN/1.73 M^2
EST. GFR  (NON AFRICAN AMERICAN): >60 ML/MIN/1.73 M^2
ESTIMATED AVG GLUCOSE: 169 MG/DL
GLUCOSE SERPL-MCNC: 208 MG/DL
HBA1C MFR BLD HPLC: 7.5 %
HDLC SERPL-MCNC: 32 MG/DL
HDLC SERPL: 20.8 %
LDLC SERPL CALC-MCNC: ABNORMAL MG/DL
NONHDLC SERPL-MCNC: 122 MG/DL
POTASSIUM SERPL-SCNC: 5 MMOL/L
PROT SERPL-MCNC: 7.5 G/DL
SODIUM SERPL-SCNC: 137 MMOL/L
TRIGL SERPL-MCNC: 426 MG/DL

## 2018-01-30 PROCEDURE — 83036 HEMOGLOBIN GLYCOSYLATED A1C: CPT

## 2018-01-30 PROCEDURE — 80053 COMPREHEN METABOLIC PANEL: CPT

## 2018-01-30 PROCEDURE — 80061 LIPID PANEL: CPT

## 2018-01-30 PROCEDURE — 36415 COLL VENOUS BLD VENIPUNCTURE: CPT | Mod: PO

## 2018-01-30 RX ORDER — METOPROLOL SUCCINATE 25 MG/1
TABLET, EXTENDED RELEASE ORAL
Qty: 30 TABLET | Refills: 0 | Status: SHIPPED | OUTPATIENT
Start: 2018-01-30 | End: 2018-02-28 | Stop reason: SDUPTHER

## 2018-01-31 ENCOUNTER — PATIENT OUTREACH (OUTPATIENT)
Dept: OTHER | Facility: OTHER | Age: 63
End: 2018-01-31

## 2018-01-31 NOTE — PROGRESS NOTES
Last 5 Patient Entered Readings                                      Current 30 Day Average: 137/85     Recent Readings 2/25/2018 2/23/2018 2/22/2018 2/21/2018 2/19/2018    SBP (mmHg) 131 135 123 142 147    DBP (mmHg) 81 85 82 92 84    Pulse 67 76 73 70 75        Hypertension Digital Medicine Program (HDMP): Health  Follow Up    Lifestyle Modifications:    1.Low sodium diet: yesPt reports him and his wife are trying to eating better and cut back to help with their diabetes and hypertension. Reports the problem is when he works in [place] and have to eat out to for lunch and he isn't sure what he can eat. Discussed [employer] to help with knowing what would be his best options for healthier foods when he has to eat out. Send link and web site information about [employer] mar[employer].       2.Physical activity: yes Pt reports he has been walking more during the week. Reports as of last week he is walking 2-3 times a week and at least 2 miles each time.     3.Hypotension/Hypertension symptoms: no Pt reports feeling fine with the increase in blood pressure medicine.  Frequency/Alleviating factors/Precipitating factors, etc.     4.Patient has been compliant with the medication regimen.     Follow up with Mr. Glenn Medel completed. No further questions or concerns. I will follow up in a few weeks to assess progress.

## 2018-02-01 ENCOUNTER — TELEPHONE (OUTPATIENT)
Dept: FAMILY MEDICINE | Facility: CLINIC | Age: 63
End: 2018-02-01

## 2018-02-01 NOTE — TELEPHONE ENCOUNTER
----- Message from Socorro Wolf sent at 2/1/2018 10:35 AM CST -----  Contact: self  Patient missed a call from office   Please call back [phone]

## 2018-02-01 NOTE — TELEPHONE ENCOUNTER
Attempted to contact pt to reschedule appt. Left message on voicemail for pt to return call to clinic.

## 2018-02-06 ENCOUNTER — PATIENT OUTREACH (OUTPATIENT)
Dept: OTHER | Facility: OTHER | Age: 63
End: 2018-02-06

## 2018-02-06 NOTE — PROGRESS NOTES
Last 5 Patient Entered Readings                                      Current 30 Day Average: 140/87     Recent Readings 2/5/2018 2/4/2018 2/2/2018 2/1/2018 1/30/2018    SBP (mmHg) 144 150 121 130 139    DBP (mmHg) 86 91 79 82 81    Pulse 66 62 70 75 78          Patient's BP average is above goal of <130/80.     Patient denies s/s of hypotension (lightheadedness, dizziness, nausea, fatigue) associated with low readings. Instructed patient to inform me if this occurs, patient confirms understanding.      Patient denies s/s of hypertension (SOB, CP, severe headaches, changes in vision) associated with high readings. Instructed patient to go to the ED if BP > 180/110 and accompanied by hypertensive s/s, patient confirms understanding.    Will continue to monitor regularly. Will follow up in 2-3 weeks, sooner if BP begins to trend upward or downward.    Patient has my contact information and knows to call with any concerns or clinical changes.     Current HTN regimen:  Hypertension Medications             furosemide (LASIX) 20 MG tablet Take 20 mg by mouth once daily.    metoprolol succinate (TOPROL-XL) 25 MG 24 hr tablet TAKE 1 TABLET(25 MG) BY MOUTH EVERY DAY    valsartan (DIOVAN) 160 MG tablet         BP was beginning to trend down until 2/4. Patient reports increased sodium intake 2/4 due to Superbowl. He also thinks he may be experiencing a gout flare. Patient recently had labwork for PCP and states his BG is up. He appears motivated to make lifestyle changes to improve BP and BG. Discussed increasing valsartan dose and he requests to wait until follow up so he can focus on lifestyle modifications.

## 2018-02-08 RX ORDER — MODAFINIL 200 MG/1
TABLET ORAL
Qty: 60 TABLET | Refills: 3 | Status: SHIPPED | OUTPATIENT
Start: 2018-02-08 | End: 2018-09-04 | Stop reason: SDUPTHER

## 2018-02-20 ENCOUNTER — PATIENT OUTREACH (OUTPATIENT)
Dept: OTHER | Facility: OTHER | Age: 63
End: 2018-02-20

## 2018-02-20 DIAGNOSIS — I10 ESSENTIAL HYPERTENSION: Primary | ICD-10-CM

## 2018-02-20 RX ORDER — VALSARTAN 320 MG/1
320 TABLET ORAL DAILY
Qty: 30 TABLET | Refills: 2 | Status: SHIPPED | OUTPATIENT
Start: 2018-02-20 | End: 2018-05-21 | Stop reason: SDUPTHER

## 2018-02-20 NOTE — PROGRESS NOTES
Last 5 Patient Entered Readings                                      Current 30 Day Average: 138/85     Recent Readings 2/19/2018 2/17/2018 2/16/2018 2/13/2018 2/5/2018    SBP (mmHg) 147 141 135 126 144    DBP (mmHg) 84 84 88 79 86    Pulse 75 79 74 78 66          Patient's BP average is above goal of <130/80.     Patient denies s/s of hypotension (lightheadedness, dizziness, nausea, fatigue) associated with low readings. Instructed patient to inform me if this occurs, patient confirms understanding.      Patient denies s/s of hypertension (SOB, CP, severe headaches, changes in vision) associated with high readings. Instructed patient to go to the ED if BP > 180/110 and accompanied by hypertensive s/s, patient confirms understanding.    Will continue to monitor regularly. Will follow up in 2-3 weeks, sooner if BP begins to trend upward or downward.    Patient has my contact information and knows to call with any concerns or clinical changes.     Current HTN regimen:  Hypertension Medications             furosemide (LASIX) 20 MG tablet Take 20 mg by mouth once daily.    metoprolol succinate (TOPROL-XL) 25 MG 24 hr tablet TAKE 1 TABLET(25 MG) BY MOUTH EVERY DAY    valsartan (DIOVAN) 160 MG tablet         Increased stress over past week (mother fell). Started walking this morning and intends to walk each day that the weather allows. BP remains above goal. Increase valsartan to 320 mg once daily.

## 2018-03-04 RX ORDER — METOPROLOL SUCCINATE 25 MG/1
TABLET, EXTENDED RELEASE ORAL
Qty: 30 TABLET | Refills: 0 | Status: SHIPPED | OUTPATIENT
Start: 2018-03-04 | End: 2018-04-03 | Stop reason: SDUPTHER

## 2018-03-05 ENCOUNTER — OFFICE VISIT (OUTPATIENT)
Dept: OPHTHALMOLOGY | Facility: CLINIC | Age: 63
End: 2018-03-05
Payer: COMMERCIAL

## 2018-03-05 DIAGNOSIS — H43.821 VITREOMACULAR ADHESION, RIGHT: Primary | ICD-10-CM

## 2018-03-05 DIAGNOSIS — H43.393 OTHER VITREOUS OPACITIES, BILATERAL: ICD-10-CM

## 2018-03-05 PROCEDURE — 99024 POSTOP FOLLOW-UP VISIT: CPT | Mod: S$GLB,,, | Performed by: OPHTHALMOLOGY

## 2018-03-05 PROCEDURE — 92134 CPTRZ OPH DX IMG PST SGM RTA: CPT | Mod: S$GLB,,, | Performed by: OPHTHALMOLOGY

## 2018-03-05 PROCEDURE — 99999 PR PBB SHADOW E&M-EST. PATIENT-LVL II: CPT | Mod: PBBFAC,,, | Performed by: OPHTHALMOLOGY

## 2018-03-05 NOTE — PROGRESS NOTES
HPI     Eye Problem    Additional comments: 1 mos check           Comments   DLS 1/29/18    Left Eye   Combigan BID   Azopt BID   FML BID           OCT - mild VMT OD  OS - No CME    A/P    1. Dense vitreous opacities OU with [facility]  No Improvement over last several months  ?prior VH with PVD OU - no breaks or tears    s/p 25g PPV/partial AFx OS for vitreous opacities/[facility] OS 11/15/17    Doing well, good IOP  HAD PC violation during anterior vitreous removal -     Had CE with dropped lens material give PC violation 11/30/17    Increased IOP - though improved with K edema    Will need PPV to remove lens material.  Pt would prefer more prompt resolution    s/p 25g PPV OS for retained lens material OS 12/6/17    Doing well, but IOP improved to 18 - ? Steroid response, inflammation and cortical material improving, AC deep    1/8/18 - some increased inflammation after steroid taper, no CME  1/18 - still low grade inflammation -     FML 1/0    D/C Combigan/Azopt    Switch to simbrinza BID OU      2. NS OD  Sulcus IOL OS    3. DM  Controlled No DR  BS/BP/chol control    4. HTN Ret OU    5. POAG  On simbrinza BID  May continue OU once inflammation down        3 months Grayson Reyes for MRx update 1 month

## 2018-03-06 ENCOUNTER — PATIENT OUTREACH (OUTPATIENT)
Dept: OTHER | Facility: OTHER | Age: 63
End: 2018-03-06

## 2018-03-06 NOTE — PROGRESS NOTES
Last 5 Patient Entered Readings                                      Current 30 Day Average: 138/86     Recent Readings 3/5/2018 3/2/2018 2/25/2018 2/23/2018 2/22/2018    SBP (mmHg) 146 134 131 135 123    DBP (mmHg) 91 86 81 85 82    Pulse 66 76 67 76 73          Patient's BP average is above goal of <130/80.     Patient denies s/s of hypotension (lightheadedness, dizziness, nausea, fatigue) associated with low readings. Instructed patient to inform me if this occurs, patient confirms understanding.      Patient denies s/s of hypertension (SOB, CP, severe headaches, changes in vision) associated with high readings. Instructed patient to go to the ED if BP > 180/110 and accompanied by hypertensive s/s, patient confirms understanding.    Will continue to monitor regularly. Will follow up in 1-2 weeks, sooner if BP begins to trend upward or downward.    Patient has my contact information and knows to call with any concerns or clinical changes.     Current HTN regimen:  Hypertension Medications             furosemide (LASIX) 20 MG tablet Take 20 mg by mouth once daily.    metoprolol succinate (TOPROL-XL) 25 MG 24 hr tablet TAKE 1 TABLET(25 MG) BY MOUTH EVERY DAY    valsartan (DIOVAN) 320 MG tablet Take 1 tablet (320 mg total) by mouth once daily.        Reports using CPAP at least 8 hours nightly consistently but does not feel like fatigue is improving. Reports occasional palpitations that he relates to stress. He continues with lifestyle changes. Discussed initiating another antihypertensive, however, he would like to see his PCP as scheduled 3/9 and talk with me next week. Considered thiazide diuretic, however, patient reports frequent gout flares (ie. 1-2 per month); encouraged him to discuss preventative treatment with PCP. Given concerns for gout, would likely initiate amlodipine 5 mg once daily.

## 2018-03-09 ENCOUNTER — OFFICE VISIT (OUTPATIENT)
Dept: FAMILY MEDICINE | Facility: CLINIC | Age: 63
End: 2018-03-09
Payer: COMMERCIAL

## 2018-03-09 VITALS
SYSTOLIC BLOOD PRESSURE: 122 MMHG | HEIGHT: 71 IN | DIASTOLIC BLOOD PRESSURE: 84 MMHG | HEART RATE: 68 BPM | WEIGHT: 225.5 LBS | RESPIRATION RATE: 18 BRPM | BODY MASS INDEX: 31.57 KG/M2

## 2018-03-09 DIAGNOSIS — Z91.89 AT RISK FOR CORONARY ARTERY DISEASE: ICD-10-CM

## 2018-03-09 DIAGNOSIS — R07.9 ACUTE CHEST PAIN: ICD-10-CM

## 2018-03-09 DIAGNOSIS — G47.33 OSA (OBSTRUCTIVE SLEEP APNEA): ICD-10-CM

## 2018-03-09 DIAGNOSIS — Z00.00 ROUTINE HEALTH MAINTENANCE: Primary | ICD-10-CM

## 2018-03-09 PROCEDURE — 99396 PREV VISIT EST AGE 40-64: CPT | Mod: S$GLB,,, | Performed by: FAMILY MEDICINE

## 2018-03-09 PROCEDURE — 99999 PR PBB SHADOW E&M-EST. PATIENT-LVL III: CPT | Mod: PBBFAC,,, | Performed by: FAMILY MEDICINE

## 2018-03-09 RX ORDER — GLIMEPIRIDE 4 MG/1
4 TABLET ORAL
Qty: 90 TABLET | Refills: 3 | Status: SHIPPED | OUTPATIENT
Start: 2018-03-09 | End: 2018-06-13 | Stop reason: ALTCHOICE

## 2018-03-09 NOTE — PROGRESS NOTES
HPI  Glenn Medel is a 62 y.o. male with multiple medical diagnoses as listed in the medical history and problem list that presents for Diabetes (follow up; hm due: tetanus, pneumovax, foot exam) and Jaw Pain (x2 weeks)  .      Here today for routine health maintenance.        Diabetes   He presents for his follow-up diabetic visit. He has type 2 diabetes mellitus. His disease course has been worsening. Pertinent negatives for hypoglycemia include no confusion or headaches. Associated symptoms include chest pain. Pertinent negatives for diabetes include no polydipsia, no polyuria and no weakness. There are no hypoglycemic complications. Current diabetic treatment includes oral agent (dual therapy). He is compliant with treatment all of the time. His weight is increasing steadily. His breakfast blood glucose range is generally 180-200 mg/dl. An ACE inhibitor/angiotensin II receptor blocker is being taken. Eye exam is current.       PAST MEDICAL HISTORY:  Past Medical History:   Diagnosis Date    BPH (benign prostatic hyperplasia)     Cataract     OU    DDD (degenerative disc disease), lumbar     s/p epidural steroids     Diabetes mellitus, stable     Glaucoma     OU    HTN (hypertension)     Iritis, lens-induced 1/8/2018    Obesity     LOVE on CPAP        PAST SURGICAL HISTORY:  Past Surgical History:   Procedure Laterality Date    CATARACT EXTRACTION      COLONOSCOPY N/A 9/18/2017    Procedure: COLONOSCOPY;  Surgeon: Paul Feliz Jr., MD;  Location: [facility];  Service: Endoscopy;  Laterality: N/A;    COLONOSCOPY W/ POLYPECTOMY  04/13/2010    YARELI.   One 1 cm polyp in the sigmoid colon.  Internal hemorrhoids.    HAND SURGERY      TONSILLECTOMY      uro lift      for enlarged prostate       SOCIAL HISTORY:  Social History     Social History    Marital status:      Spouse name: N/A    Number of children: N/A    Years of education: N/A     Occupational History    Not on file.     Social  "History Main Topics    Smoking status: Never Smoker    Smokeless tobacco: Never Used    Alcohol use Yes      Comment: occasional    Drug use: No    Sexual activity: Yes     Partners: Female     Other Topics Concern    Not on file     Social History Narrative            2 grown kids.         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        FAMILY HISTORY:  Family History   Problem Relation Age of Onset    Heart disease Brother 62    Heart disease Father     Diabetes Father     Colon cancer Father     Cancer Father      colon    Cataracts Father     Hypertension Mother     Diabetes Mother     Stroke Mother     Cancer Mother      colon    Glaucoma Maternal Grandmother     No Known Problems Sister     No Known Problems Maternal Aunt     No Known Problems Maternal Uncle     No Known Problems Paternal Aunt     No Known Problems Paternal Uncle     No Known Problems Maternal Grandfather     No Known Problems Paternal Grandmother     No Known Problems Paternal Grandfather     Amblyopia Neg Hx     Blindness Neg Hx     Macular degeneration Neg Hx     Retinal detachment Neg Hx     Strabismus Neg Hx     Thyroid disease Neg Hx        ALLERGIES AND MEDICATIONS: updated and reviewed.  Review of patient's allergies indicates:   Allergen Reactions    Avelox [moxifloxacin] Hives and Rash     Current Outpatient Prescriptions   Medication Sig Dispense Refill    aspirin 81 MG Chew Take 81 mg by mouth once daily.      atorvastatin (LIPITOR) 20 MG tablet Take 20 mg by mouth once daily.      BD INSULIN PEN NEEDLE UF MINI 31 gauge x 3/16" Ndle USE THREE TIMES DAILY BEFORE MEALS 100 each 3    brimonidine-timolol (COMBIGAN) 0.2-0.5 % Drop Place 1 drop into the left eye 2 (two) times daily.      brinzolamide (AZOPT) 1 % ophthalmic suspension Place 1 drop into the left eye 2 (two) times daily.      cholecalciferol, vitamin D3, 1,000 unit capsule Take 2,000 Units by mouth once " daily.      DULoxetine (CYMBALTA) 60 MG capsule Take 60 mg by mouth 2 (two) times daily.      fluticasone (FLONASE) 50 mcg/actuation nasal spray 2 sprays by Each Nare route once daily. 16 g 11    furosemide (LASIX) 20 MG tablet Take 20 mg by mouth once daily.      glimepiride (AMARYL) 4 MG tablet Take 1 tablet (4 mg total) by mouth before breakfast. 90 tablet 3    liraglutide 0.6 mg/0.1 mL, 18 mg/3 mL, subq PNIJ 0.6 mg/0.1 mL (18 mg/3 mL) PnIj Inject 0.6 mg into the skin once daily. Before breakfast      metFORMIN (GLUCOPHAGE-XR) 500 MG 24 hr tablet Take 1,000 mg by mouth daily with breakfast.      metoprolol succinate (TOPROL-XL) 25 MG 24 hr tablet TAKE 1 TABLET(25 MG) BY MOUTH EVERY DAY 30 tablet 0    modafinil (PROVIGIL) 200 MG Tab TAKE 1 TABLET(200 MG) BY MOUTH TWICE DAILY 60 tablet 3    promethazine-dextromethorphan (PROMETHAZINE-DM) 6.25-15 mg/5 mL Syrp Take 5 mLs by mouth every evening. 118 mL 0    valsartan (DIOVAN) 320 MG tablet Take 1 tablet (320 mg total) by mouth once daily. 30 tablet 2     No current facility-administered medications for this visit.        ROS  Review of Systems   Constitutional: Negative for activity change and unexpected weight change.        Difficulty with wearing cpap mask   HENT: Negative for hearing loss, rhinorrhea and trouble swallowing.    Eyes: Positive for visual disturbance (multiple surgeries with Ophthalmology and addition of steroid drops for several months). Negative for discharge.   Respiratory: Negative for chest tightness and wheezing.    Cardiovascular: Positive for chest pain and palpitations.   Gastrointestinal: Negative for blood in stool, constipation, diarrhea and vomiting.   Endocrine: Negative for polydipsia and polyuria.   Genitourinary: Positive for difficulty urinating. Negative for hematuria and urgency.   Musculoskeletal: Positive for arthralgias (stable, chronic). Negative for joint swelling and neck pain.   Neurological: Negative for weakness  "and headaches.   Psychiatric/Behavioral: Positive for dysphoric mood. Negative for confusion.       Physical Exam  Vitals:    03/09/18 0748   BP: 122/84   Pulse: 68   Resp: 18    Body mass index is 31.46 kg/m².  Weight: 102.3 kg (225 lb 8.5 oz)   Height: 5' 11" (180.3 cm)     Physical Exam   Constitutional: He is oriented to person, place, and time. He appears well-developed and well-nourished. No distress.   HENT:   Head: Normocephalic and atraumatic.   Right Ear: External ear normal.   Left Ear: External ear normal.   Eyes: Conjunctivae and EOM are normal. Pupils are equal, round, and reactive to light. No scleral icterus.   Neck: Normal range of motion. Neck supple. No JVD present. No thyromegaly present.   Cardiovascular: Normal rate, regular rhythm and intact distal pulses.  Exam reveals no gallop and no friction rub.    No murmur heard.  Pulses:       Dorsalis pedis pulses are 1+ on the right side, and 1+ on the left side.   Pulmonary/Chest: Effort normal and breath sounds normal. He has no wheezes. He has no rales.   Abdominal: Soft. Bowel sounds are normal. He exhibits no distension and no mass. There is no tenderness.   Musculoskeletal: Normal range of motion. He exhibits no edema or tenderness.        Right foot: There is no deformity.        Left foot: There is no deformity.   Feet:   Right Foot:   Protective Sensation: 4 sites tested. 4 sites sensed.   Skin Integrity: Negative for skin breakdown.   Left Foot:   Protective Sensation: 4 sites tested. 4 sites sensed.   Skin Integrity: Negative for skin breakdown.   Lymphadenopathy:     He has no cervical adenopathy.   Neurological: He is alert and oriented to person, place, and time. No cranial nerve deficit. Coordination normal.   Skin: Skin is warm and dry. No rash noted.   Psychiatric: He has a normal mood and affect.   Vitals reviewed.    Results for orders placed or performed in visit on 01/30/18   Lipid panel   Result Value Ref Range    Cholesterol " 154 120 - 199 mg/dL    Triglycerides 426 (H) 30 - 150 mg/dL    HDL 32 (L) 40 - 75 mg/dL    LDL Cholesterol Invalid, Trig>400.0 63.0 - 159.0 mg/dL    HDL/Chol Ratio 20.8 20.0 - 50.0 %    Total Cholesterol/HDL Ratio 4.8 2.0 - 5.0    Non-HDL Cholesterol 122 mg/dL   Comprehensive metabolic panel   Result Value Ref Range    Sodium 137 136 - 145 mmol/L    Potassium 5.0 3.5 - 5.1 mmol/L    Chloride 100 95 - 110 mmol/L    CO2 29 23 - 29 mmol/L    Glucose 208 (H) 70 - 110 mg/dL    BUN, Bld 15 8 - 23 mg/dL    Creatinine 1.1 0.5 - 1.4 mg/dL    Calcium 9.7 8.7 - 10.5 mg/dL    Total Protein 7.5 6.0 - 8.4 g/dL    Albumin 3.8 3.5 - 5.2 g/dL    Total Bilirubin 0.7 0.1 - 1.0 mg/dL    Alkaline Phosphatase 156 (H) 55 - 135 U/L    AST 39 10 - 40 U/L    ALT 38 10 - 44 U/L    Anion Gap 8 8 - 16 mmol/L    eGFR if African American >60.0 >60 mL/min/1.73 m^2    eGFR if non African American >60.0 >60 mL/min/1.73 m^2   Hemoglobin A1c   Result Value Ref Range    Hemoglobin A1C 7.5 (H) 4.0 - 5.6 %    Estimated Avg Glucose 169 (H) 68 - 131 mg/dL        Health Maintenance       Date Due Completion Date    TETANUS VACCINE 06/30/1972 ---    Pneumococcal PPSV23 (Medium Risk) (1) 06/30/1972 ---    Foot Exam 04/24/2018 4/24/2017    Override on 4/20/2016: Done    Hemoglobin A1c 07/30/2018 1/30/2018    Eye Exam 01/18/2019 1/18/2018    Lipid Panel 01/30/2019 1/30/2018    Low Dose Statin 03/09/2019 3/9/2018    Colonoscopy 09/18/2027 9/18/2017    Override on 4/30/2010: Done          Assessment & Plan    Routine health maintenance  - Health maintenance reviewed  - Diet and exercise education.    Uncontrolled type 2 diabetes mellitus with diabetic polyneuropathy, without long-term current use of insulin  -     Hemoglobin A1c; Future; Expected date: 06/07/2018  -     INCREASE glimepiride (AMARYL) 4 MG tablet; Take 1 tablet (4 mg total) by mouth before breakfast.  Dispense: 90 tablet; Refill: 3  - Diet and exercise education.  - Serial glucose  monitoring  - Continue current therapy    LOVE (obstructive sleep apnea)  - Obtain data therapy summary    Acute chest pain, At risk for coronary artery disease  -     NM Myocardial Perfusion Spect Multi Pharmacologic; Future; Expected date: 03/09/2018  -     NM Multi Pharm Stress Cardiac Component; Future    Follow-up in about 3 months (around 6/9/2018).

## 2018-03-12 ENCOUNTER — PATIENT MESSAGE (OUTPATIENT)
Dept: FAMILY MEDICINE | Facility: CLINIC | Age: 63
End: 2018-03-12

## 2018-03-13 ENCOUNTER — PATIENT MESSAGE (OUTPATIENT)
Dept: FAMILY MEDICINE | Facility: CLINIC | Age: 63
End: 2018-03-13

## 2018-03-13 ENCOUNTER — TELEPHONE (OUTPATIENT)
Dept: FAMILY MEDICINE | Facility: CLINIC | Age: 63
End: 2018-03-13

## 2018-03-13 NOTE — TELEPHONE ENCOUNTER
Patient notified of Dr. Hernandez's notes and notified of drive ready to .Patient verbalized underastanding

## 2018-03-14 ENCOUNTER — PATIENT OUTREACH (OUTPATIENT)
Dept: OTHER | Facility: OTHER | Age: 63
End: 2018-03-14

## 2018-03-14 ENCOUNTER — PATIENT MESSAGE (OUTPATIENT)
Dept: FAMILY MEDICINE | Facility: CLINIC | Age: 63
End: 2018-03-14

## 2018-03-14 NOTE — PROGRESS NOTES
Last 5 Patient Entered Readings                                      Current 30 Day Average: 137/86     Recent Readings 3/20/2018 3/18/2018 3/16/2018 3/13/2018 3/10/2018    SBP (mmHg) 133 122 148 141 147    DBP (mmHg) 79 85 86 89 88    Pulse 71 69 69 62 92          Patient's BP average is above goal of <130/80.     Patient denies s/s of hypotension (lightheadedness, dizziness, nausea, fatigue) associated with low readings. Instructed patient to inform me if this occurs, patient confirms understanding.      Patient denies s/s of hypertension (SOB, CP, severe headaches, changes in vision) associated with high readings. Instructed patient to go to the ED if BP > 180/110 and accompanied by hypertensive s/s, patient confirms understanding.    Will continue to monitor regularly. Will follow up in 2-3 weeks, sooner if BP begins to trend upward or downward.    Patient has my contact information and knows to call with any concerns or clinical changes.     Current HTN regimen:  Hypertension Medications             furosemide (LASIX) 20 MG tablet Take 20 mg by mouth once daily.    metoprolol succinate (TOPROL-XL) 25 MG 24 hr tablet TAKE 1 TABLET(25 MG) BY MOUTH EVERY DAY    valsartan (DIOVAN) 320 MG tablet Take 1 tablet (320 mg total) by mouth once daily.        Most recent BP readings improved. Stress level has not improved but patient has started watching diet more (decreasing fried foods) in attempt to improve diabetes. He has not been able to walk as much recently due to schedule. He intends to resume walking at least 3 days/week, starting tomorrow. Discussed walking for shorter times, ie. 10 minutes, during the day to fit in exercise (for example, when he feels tired at 8:30 am). Uses CPAP daily despite irritating nose. Stress test scheduled 4/3 for chest discomfort. Patient wishes to focus on lifestyle modifications at this time. Consider changing metoprolol to carvedilol if BP not improved on follow up.

## 2018-03-20 ENCOUNTER — PATIENT MESSAGE (OUTPATIENT)
Dept: FAMILY MEDICINE | Facility: CLINIC | Age: 63
End: 2018-03-20

## 2018-03-27 ENCOUNTER — LAB VISIT (OUTPATIENT)
Dept: LAB | Facility: HOSPITAL | Age: 63
End: 2018-03-27
Attending: FAMILY MEDICINE
Payer: COMMERCIAL

## 2018-03-27 DIAGNOSIS — N18.9 CHRONIC KIDNEY DISEASE, UNSPECIFIED CKD STAGE: ICD-10-CM

## 2018-03-27 LAB
ALBUMIN SERPL BCP-MCNC: 3.8 G/DL
ANION GAP SERPL CALC-SCNC: 10 MMOL/L
BUN SERPL-MCNC: 15 MG/DL
CALCIUM SERPL-MCNC: 9.5 MG/DL
CHLORIDE SERPL-SCNC: 98 MMOL/L
CO2 SERPL-SCNC: 27 MMOL/L
CREAT SERPL-MCNC: 1.1 MG/DL
EST. GFR  (AFRICAN AMERICAN): >60 ML/MIN/1.73 M^2
EST. GFR  (NON AFRICAN AMERICAN): >60 ML/MIN/1.73 M^2
GLUCOSE SERPL-MCNC: 353 MG/DL
PHOSPHATE SERPL-MCNC: 3.6 MG/DL
POTASSIUM SERPL-SCNC: 4.7 MMOL/L
PTH-INTACT SERPL-MCNC: 58 PG/ML
SODIUM SERPL-SCNC: 135 MMOL/L

## 2018-03-27 PROCEDURE — 80069 RENAL FUNCTION PANEL: CPT

## 2018-03-27 PROCEDURE — 36415 COLL VENOUS BLD VENIPUNCTURE: CPT | Mod: PO

## 2018-03-27 PROCEDURE — 83970 ASSAY OF PARATHORMONE: CPT

## 2018-03-28 ENCOUNTER — PATIENT OUTREACH (OUTPATIENT)
Dept: OTHER | Facility: OTHER | Age: 63
End: 2018-03-28

## 2018-03-28 NOTE — PROGRESS NOTES
Last 5 Patient Entered Readings                                      Current 30 Day Average: 139/86     Recent Readings 3/27/2018 3/24/2018 3/20/2018 3/18/2018 3/16/2018    SBP (mmHg) 142 143 133 122 148    DBP (mmHg) 81 86 79 85 86    Pulse 75 74 71 69 69          Hypertension Digital Medicine Program (HDMP): Health  Follow Up    Lifestyle Modifications:    1.Low sodium diet: no Pt reports him and his wife haven't been watching the sodium because of being busy.     2.Physical activity: yes , Pt reports he did slack off walking but started again walking a few times a week.     3.Hypotension/Hypertension symptoms: no   Frequency/Alleviating factors/Precipitating factors, etc.     4.Patient has been compliant with the medication regimen.     Follow up with Mr. Glenn Medel completed. No further questions or concerns. I will follow up in a few weeks to assess progress.

## 2018-03-29 ENCOUNTER — OFFICE VISIT (OUTPATIENT)
Dept: NEPHROLOGY | Facility: CLINIC | Age: 63
End: 2018-03-29
Payer: COMMERCIAL

## 2018-03-29 ENCOUNTER — PATIENT MESSAGE (OUTPATIENT)
Dept: FAMILY MEDICINE | Facility: CLINIC | Age: 63
End: 2018-03-29

## 2018-03-29 VITALS
HEIGHT: 71 IN | OXYGEN SATURATION: 98 % | WEIGHT: 219.56 LBS | DIASTOLIC BLOOD PRESSURE: 82 MMHG | BODY MASS INDEX: 30.74 KG/M2 | SYSTOLIC BLOOD PRESSURE: 138 MMHG | HEART RATE: 79 BPM

## 2018-03-29 DIAGNOSIS — N18.30 CHRONIC KIDNEY DISEASE, STAGE III (MODERATE): Primary | ICD-10-CM

## 2018-03-29 PROCEDURE — 3079F DIAST BP 80-89 MM HG: CPT | Mod: CPTII,S$GLB,, | Performed by: INTERNAL MEDICINE

## 2018-03-29 PROCEDURE — 99999 PR PBB SHADOW E&M-EST. PATIENT-LVL III: CPT | Mod: PBBFAC,,, | Performed by: INTERNAL MEDICINE

## 2018-03-29 PROCEDURE — 3075F SYST BP GE 130 - 139MM HG: CPT | Mod: CPTII,S$GLB,, | Performed by: INTERNAL MEDICINE

## 2018-03-29 PROCEDURE — 99213 OFFICE O/P EST LOW 20 MIN: CPT | Mod: S$GLB,,, | Performed by: INTERNAL MEDICINE

## 2018-03-29 RX ORDER — BRINZOLAMIDE/BRIMONIDINE TARTRATE 10; 2 MG/ML; MG/ML
SUSPENSION/ DROPS OPHTHALMIC
Refills: 3 | COMMUNITY
Start: 2018-03-07 | End: 2018-12-22 | Stop reason: SDUPTHER

## 2018-04-03 ENCOUNTER — CLINICAL SUPPORT (OUTPATIENT)
Dept: CARDIOLOGY | Facility: CLINIC | Age: 63
End: 2018-04-03
Attending: FAMILY MEDICINE
Payer: COMMERCIAL

## 2018-04-03 ENCOUNTER — HOSPITAL ENCOUNTER (OUTPATIENT)
Dept: RADIOLOGY | Facility: HOSPITAL | Age: 63
Discharge: HOME | End: 2018-04-03
Attending: FAMILY MEDICINE
Payer: COMMERCIAL

## 2018-04-03 DIAGNOSIS — Z91.89 AT RISK FOR CORONARY ARTERY DISEASE: ICD-10-CM

## 2018-04-03 DIAGNOSIS — R07.9 ACUTE CHEST PAIN: ICD-10-CM

## 2018-04-03 PROCEDURE — 78452 HT MUSCLE IMAGE SPECT MULT: CPT | Mod: 26,,, | Performed by: INTERNAL MEDICINE

## 2018-04-03 PROCEDURE — 93015 CV STRESS TEST SUPVJ I&R: CPT | Mod: S$GLB,,, | Performed by: INTERNAL MEDICINE

## 2018-04-03 RX ORDER — METOPROLOL SUCCINATE 25 MG/1
TABLET, EXTENDED RELEASE ORAL
Qty: 30 TABLET | Refills: 0 | Status: SHIPPED | OUTPATIENT
Start: 2018-04-03 | End: 2018-05-03 | Stop reason: SDUPTHER

## 2018-04-04 ENCOUNTER — PATIENT OUTREACH (OUTPATIENT)
Dept: OTHER | Facility: OTHER | Age: 63
End: 2018-04-04

## 2018-04-04 LAB — DIASTOLIC DYSFUNCTION: NO

## 2018-04-04 NOTE — PROGRESS NOTES
Last 5 Patient Entered Readings                                      Current 30 Day Average: 138/85     Recent Readings 4/3/2018 4/1/2018 3/30/2018 3/29/2018 3/27/2018    SBP (mmHg) 125 143 136 130 142    DBP (mmHg) 83 78 81 82 81    Pulse 64 78 86 71 75          Patient's BP average is above goal of <130/80.     Patient denies s/s of hypotension (lightheadedness, dizziness, nausea, fatigue) associated with low readings. Instructed patient to inform me if this occurs, patient confirms understanding.      Patient denies s/s of hypertension (SOB, CP, severe headaches, changes in vision) associated with high readings. Instructed patient to go to the ED if BP > 180/110 and accompanied by hypertensive s/s, patient confirms understanding.    Will continue to monitor regularly. Will follow up in 2-3 weeks, sooner if BP begins to trend upward or downward.    Patient has my contact information and knows to call with any concerns or clinical changes.     Current HTN regimen:  Hypertension Medications             furosemide (LASIX) 20 MG tablet Take 20 mg by mouth once daily.    metoprolol succinate (TOPROL-XL) 25 MG 24 hr tablet TAKE 1 TABLET(25 MG) BY MOUTH EVERY DAY    valsartan (DIOVAN) 320 MG tablet Take 1 tablet (320 mg total) by mouth once daily.        Stress test 4/3 for minor chest pain, results pending. Patient states he is still walking. He is trying to watch salt intake with exception of crawfish this past weekend. Continue with lifestyle changes.

## 2018-04-06 ENCOUNTER — PATIENT MESSAGE (OUTPATIENT)
Dept: FAMILY MEDICINE | Facility: CLINIC | Age: 63
End: 2018-04-06

## 2018-04-09 ENCOUNTER — OFFICE VISIT (OUTPATIENT)
Dept: OPTOMETRY | Facility: CLINIC | Age: 63
End: 2018-04-09
Payer: COMMERCIAL

## 2018-04-09 DIAGNOSIS — H52.4 MYOPIA WITH ASTIGMATISM AND PRESBYOPIA, BILATERAL: Primary | ICD-10-CM

## 2018-04-09 DIAGNOSIS — H52.13 MYOPIA WITH ASTIGMATISM AND PRESBYOPIA, BILATERAL: Primary | ICD-10-CM

## 2018-04-09 DIAGNOSIS — H52.203 MYOPIA WITH ASTIGMATISM AND PRESBYOPIA, BILATERAL: Primary | ICD-10-CM

## 2018-04-09 DIAGNOSIS — H40.053 OCULAR HYPERTENSION, BILATERAL: ICD-10-CM

## 2018-04-09 PROCEDURE — 99999 PR PBB SHADOW E&M-EST. PATIENT-LVL II: CPT | Mod: PBBFAC,,, | Performed by: OPTOMETRIST

## 2018-04-09 PROCEDURE — 92015 DETERMINE REFRACTIVE STATE: CPT | Mod: S$GLB,,, | Performed by: OPTOMETRIST

## 2018-04-09 NOTE — PROGRESS NOTES
HPI     MRX/ iop per [facility]    Pt complains of blurred vision OD. No eye pain. Floaters and flashes OD.   Using Simbrinza BID.    Hemoglobin A1C       Date                     Value               Ref Range             Status                01/30/2018               7.5 (H)             4.0 - 5.6 %           Final              Comment:    According to ADA guidelines, hemoglobin A1c <7.0% represents  optimal   control in non-pregnant diabetic patients. Different  metrics may apply to   specific patient populations.   Standards of Medical Care in   Diabetes-2016.  For the purpose of screening for the presence of   diabetes:  <5.7%     Consistent with the absence of diabetes  5.7-6.4%    Consistent with increasing risk for diabetes   (prediabetes)  >or=6.5%    Consistent with diabetes  Currently, no consensus exists for use of   hemoglobin A1c  for diagnosis of diabetes for children.  This Hemoglobin   A1c assay has significant interference with fetal   hemoglobin   (HbF).   The results are invalid for patients with abnormal amounts of   HbF,     including those with known Hereditary Persistence   of Fetal Hemoglobin.   Heterozygous hemoglobin variants (HbAS, HbAC,   HbAD, HbAE, HbA2) do not   significantly interfere with this assay;   however, presence of multiple   variants in a sample may impact the %   interference.         08/02/2017               6.8 (H)             4.0 - 5.6 %           Final              Comment:    According to ADA guidelines, hemoglobin A1c <7.0% represents  optimal   control in non-pregnant diabetic patients. Different  metrics may apply to   specific patient populations.   Standards of Medical Care in   Diabetes-2016.  For the purpose of screening for the presence of   diabetes:  <5.7%     Consistent with the absence of diabetes  5.7-6.4%    Consistent with increasing risk for diabetes   (prediabetes)  >or=6.5%    Consistent with diabetes  Currently, no consensus exists for use of    hemoglobin A1c  for diagnosis of diabetes for children.  This Hemoglobin   A1c assay has significant interference with fetal   hemoglobin   (HbF).   The results are invalid for patients with abnormal amounts of   HbF,     including those with known Hereditary Persistence   of Fetal Hemoglobin.   Heterozygous hemoglobin variants (HbAS, HbAC,   HbAD, HbAE, HbA2) do not   significantly interfere with this assay;   however, presence of multiple   variants in a sample may impact the %   interference.         03/10/2017               7.5 (H)             4.5 - 6.2 %           Final              Comment:    According to ADA guidelines, hemoglobin A1C <7.0% represents  optimal   control in non-pregnant diabetic patients.  Different  metrics may apply   to specific populations.   Standards of Medical Care in Diabetes -   2016.  For the purpose of screening for the presence of diabetes:  <5.7%       Consistent with the absence of diabetes  5.7-6.4%  Consistent with   increasing risk for diabetes   (prediabetes)  >or=6.5%  Consistent with   diabetes  Currently no consensus exists for use of hemoglobin A1C  for   diagnosis of diabetes for children.    ----------      Last edited by Margaret Rodas on 4/9/2018  8:23 AM. (History)        ROS     Positive for: Eyes    Negative for: Constitutional, Gastrointestinal, Neurological, Skin,   Genitourinary, Musculoskeletal, HENT, Endocrine, Cardiovascular,   Respiratory, Psychiatric, Allergic/Imm, Heme/Lymph    Last edited by OCTAVIO Reyes, OD on 4/9/2018  8:32 AM. (History)        Assessment /Plan     For exam results, see Encounter Report.    Myopia with astigmatism and presbyopia, bilateral    Ocular hypertension, bilateral      1. Updated specs Rx gave copy  Improved [facility] OD from last refraction early 2018    2. IOP rechecked today, mid teens OU  Updated med card --d/c combigan/ azopt per last visit early March 2018    Continue OU   simbrinza bid    RTC Dr Pulido 6/2018, prn if  issues

## 2018-04-09 NOTE — PROGRESS NOTES
2Subjective:       Patient ID: Glenn Medel is a 62 y.o. White male who presents for follow-up evaluation of Follow-up (follow up) and Chronic Kidney Disease    HPI  Review of Systems    Objective:      Physical Exam    Assessment:       1. Chronic kidney disease, stage 3        Plan:

## 2018-04-18 ENCOUNTER — PATIENT OUTREACH (OUTPATIENT)
Dept: OTHER | Facility: OTHER | Age: 63
End: 2018-04-18

## 2018-04-18 ENCOUNTER — PATIENT MESSAGE (OUTPATIENT)
Dept: FAMILY MEDICINE | Facility: CLINIC | Age: 63
End: 2018-04-18

## 2018-04-18 NOTE — PROGRESS NOTES
Last 5 Patient Entered Readings                                      Current 30 Day Average: 135/81     Recent Readings 4/16/2018 4/11/2018 4/8/2018 4/5/2018 4/3/2018    SBP (mmHg) 139 135 137 124 125    DBP (mmHg) 83 79 82 79 83    Pulse 70 68 70 66 64          Patient's BP average is above goal of <130/80.     Patient denies s/s of hypotension (lightheadedness, dizziness, nausea, fatigue) associated with low readings. Instructed patient to inform me if this occurs, patient confirms understanding.      Patient denies s/s of hypertension (SOB, CP, severe headaches, changes in vision) associated with high readings. Instructed patient to go to the ED if BP > 180/110 and accompanied by hypertensive s/s, patient confirms understanding.    Will continue to monitor regularly. Will follow up in 2-3 weeks, sooner if BP begins to trend upward or downward.    Patient has my contact information and knows to call with any concerns or clinical changes.     Current HTN regimen:  Hypertension Medications             furosemide (LASIX) 20 MG tablet Take 20 mg by mouth once daily.    metoprolol succinate (TOPROL-XL) 25 MG 24 hr tablet TAKE 1 TABLET(25 MG) BY MOUTH EVERY DAY    valsartan (DIOVAN) 320 MG tablet Take 1 tablet (320 mg total) by mouth once daily.        Patient reports lower BP readings to increased walking and working from home more (vs. Commuting to [place]). He plans to limit his days in [place] to 2 days/week and work from home 3 days/week. If BP not improved on follow up, consider changing metoprolol to carvedilol. He is going to discuss Diabetes Digital Medicine Program with PCP.

## 2018-04-25 ENCOUNTER — PATIENT OUTREACH (OUTPATIENT)
Dept: OTHER | Facility: OTHER | Age: 63
End: 2018-04-25

## 2018-04-25 NOTE — PROGRESS NOTES
"Last 5 Patient Entered Readings                                      Current 30 Day Average: 134/81     Recent Readings 4/23/2018 4/18/2018 4/16/2018 4/11/2018 4/8/2018    SBP (mmHg) 136 124 139 135 137    DBP (mmHg) 86 77 83 79 82    Pulse 60 73 70 68 70        Encounter also consisted of patient discussing his need of having his BG meter not working.     Hypertension Digital Medicine Program (HDMP): Health  Follow Up    Lifestyle Modifications:    1.Low sodium diet: no Patient reports trying to maintain a low sodium diet. Patient states its difficult to maintain a healthy diet if the only thing he can eat is lettuce. He states he will make a more conscious effort in 2 months after his birthday. He expresses giving him an incentive as he will be "hanging it up." Will further discuss making dietary changes upon next encounter.      2.Physical activity: yes Patient states he has been trying to walk 3x/week for 45 minutes in the morning time. He states his focus is increasing his activity level in the morning. Encouraged patient to continue his daily exercise regimen.      3.Hypotension/Hypertension symptoms: no   Frequency/Alleviating factors/Precipitating factors, etc.     4.Patient has been compliant with the medication regimen.     Follow up with Mr. Glenn Medel completed. No further questions or concerns. I will follow up in a few weeks to assess progress.       "

## 2018-04-27 ENCOUNTER — PATIENT MESSAGE (OUTPATIENT)
Dept: FAMILY MEDICINE | Facility: CLINIC | Age: 63
End: 2018-04-27

## 2018-04-30 ENCOUNTER — PATIENT MESSAGE (OUTPATIENT)
Dept: FAMILY MEDICINE | Facility: CLINIC | Age: 63
End: 2018-04-30

## 2018-05-01 ENCOUNTER — PATIENT MESSAGE (OUTPATIENT)
Dept: FAMILY MEDICINE | Facility: CLINIC | Age: 63
End: 2018-05-01

## 2018-05-01 DIAGNOSIS — N18.9 CHRONIC KIDNEY DISEASE, UNSPECIFIED CKD STAGE: Primary | ICD-10-CM

## 2018-05-02 ENCOUNTER — PATIENT MESSAGE (OUTPATIENT)
Dept: FAMILY MEDICINE | Facility: CLINIC | Age: 63
End: 2018-05-02

## 2018-05-02 ENCOUNTER — PATIENT MESSAGE (OUTPATIENT)
Dept: ADMINISTRATIVE | Facility: OTHER | Age: 63
End: 2018-05-02

## 2018-05-02 ENCOUNTER — PATIENT OUTREACH (OUTPATIENT)
Dept: OTHER | Facility: OTHER | Age: 63
End: 2018-05-02

## 2018-05-02 ENCOUNTER — TELEPHONE (OUTPATIENT)
Dept: FAMILY MEDICINE | Facility: CLINIC | Age: 63
End: 2018-05-02

## 2018-05-02 RX ORDER — INSULIN PUMP SYRINGE, 3 ML
EACH MISCELLANEOUS
Qty: 1 EACH | Refills: 0 | Status: SHIPPED | OUTPATIENT
Start: 2018-05-02 | End: 2019-10-07

## 2018-05-02 NOTE — PROGRESS NOTES
Last 5 Patient Entered Readings                                      Current 30 Day Average: 134/83     Recent Readings 4/30/2018 4/25/2018 4/23/2018 4/18/2018 4/16/2018    SBP (mmHg) 159 155 136 124 139    DBP (mmHg) 92 97 86 77 83    Pulse 82 84 60 73 70          Patient's BP average is above goal of <130/80.     Patient denies s/s of hypotension (lightheadedness, dizziness, nausea, fatigue) associated with low readings. Instructed patient to inform me if this occurs, patient confirms understanding.      Patient denies s/s of hypertension (SOB, CP, severe headaches, changes in vision) associated with high readings. Instructed patient to go to the ED if BP > 180/110 and accompanied by hypertensive s/s, patient confirms understanding.    Will continue to monitor regularly. Will follow up in 2-3 weeks, sooner if BP begins to trend upward or downward.    Patient has my contact information and knows to call with any concerns or clinical changes.     Current HTN regimen:  Hypertension Medications             furosemide (LASIX) 20 MG tablet Take 20 mg by mouth once daily.    metoprolol succinate (TOPROL-XL) 25 MG 24 hr tablet TAKE 1 TABLET(25 MG) BY MOUTH EVERY DAY    valsartan (DIOVAN) 320 MG tablet Take 1 tablet (320 mg total) by mouth once daily.        Patient unsure of cause of last 2 BP readings being elevated. He reports continued walking at least 2 days per week. He is retiring in 2 months and expects stress level to significantly improve. He is also caring for his mother. BP today much improved. Discussed again possible medication change if BP does not continue to improve on follow up.

## 2018-05-02 NOTE — TELEPHONE ENCOUNTER
Spoke to pt. Advised pt that I sent a message to dr. Hernandez asking him to send the rx through the computer. Pt verbalized understanding.

## 2018-05-02 NOTE — TELEPHONE ENCOUNTER
----- Message from Aristides Fonseca sent at 5/1/2018  3:50 PM CDT -----  Contact: [facility] with [facility]   Type:  Pharmacy Calling to Clarify an RX    Name of Caller: Nidia   Pharmacy Name: [facility]   What do they need to clarify?: diabetic testing supply   Best Call Back Number:  [phone]  Additional Information:  Need clarification on a fax that was sent over for diabetic testing supply, please call back at [phone]

## 2018-05-04 ENCOUNTER — PATIENT MESSAGE (OUTPATIENT)
Dept: FAMILY MEDICINE | Facility: CLINIC | Age: 63
End: 2018-05-04

## 2018-05-04 DIAGNOSIS — G47.33 OSA (OBSTRUCTIVE SLEEP APNEA): Primary | ICD-10-CM

## 2018-05-04 RX ORDER — METOPROLOL SUCCINATE 25 MG/1
TABLET, EXTENDED RELEASE ORAL
Qty: 30 TABLET | Refills: 0 | Status: SHIPPED | OUTPATIENT
Start: 2018-05-04 | End: 2018-06-05 | Stop reason: SDUPTHER

## 2018-05-08 ENCOUNTER — PATIENT MESSAGE (OUTPATIENT)
Dept: FAMILY MEDICINE | Facility: CLINIC | Age: 63
End: 2018-05-08

## 2018-05-13 RX ORDER — METFORMIN HYDROCHLORIDE 500 MG/1
TABLET, EXTENDED RELEASE ORAL
Qty: 180 TABLET | Refills: 3 | Status: SHIPPED | OUTPATIENT
Start: 2018-05-13 | End: 2018-05-16 | Stop reason: DRUGHIGH

## 2018-05-16 ENCOUNTER — PATIENT OUTREACH (OUTPATIENT)
Dept: OTHER | Facility: OTHER | Age: 63
End: 2018-05-16

## 2018-05-16 RX ORDER — METFORMIN HYDROCHLORIDE 500 MG/1
1500 TABLET, EXTENDED RELEASE ORAL DAILY
Qty: 180 TABLET | Refills: 3
Start: 2018-05-16 | End: 2018-05-30 | Stop reason: SDUPTHER

## 2018-05-16 RX ORDER — LIRAGLUTIDE 6 MG/ML
INJECTION SUBCUTANEOUS
Qty: 18 ML | Refills: 3 | Status: SHIPPED | OUTPATIENT
Start: 2018-05-16 | End: 2018-08-16 | Stop reason: ALTCHOICE

## 2018-05-16 NOTE — PROGRESS NOTES
HYPERTENSION  Last 5 Patient Entered Readings                                      Current 30 Day Average: 137/84     Recent Readings 5/16/2018 5/16/2018 5/15/2018 5/15/2018 5/13/2018    SBP (mmHg) 127 127 133 133 146    DBP (mmHg) 81 81 82 82 85    Pulse 78 78 64 64 63        Patient's BP average is above goal of <130/80.     Patient denies s/s of hypotension (lightheadedness, dizziness, nausea, fatigue) associated with low readings. Instructed patient to inform me if this occurs, patient confirms understanding.      Patient denies s/s of hypertension (SOB, CP, severe headaches, changes in vision) associated with high readings. Instructed patient to go to the ED if BP > 180/110 and accompanied by hypertensive s/s, patient confirms understanding.    Will continue to monitor regularly. Will follow up in ~ 4 weeks, sooner if BP begins to trend upward or downward.    Patient has my contact information and knows to call with any concerns or clinical changes.     BP improving which patient contributes to decreased stress & increased exercise. He is retiring end of June.    Current HTN regimen:  Hypertension Medications             furosemide (LASIX) 20 MG tablet Take 20 mg by mouth once daily.    metoprolol succinate (TOPROL-XL) 25 MG 24 hr tablet TAKE 1 TABLET(25 MG) BY MOUTH EVERY DAY    valsartan (DIOVAN) 320 MG tablet Take 1 tablet (320 mg total) by mouth once daily.          DIABETES  Last 6 Patient Entered Readings                                          Most Recent A1c: 7.5% (Goal: 7%)     Recent Readings 5/16/2018 5/15/2018 5/14/2018 5/13/2018 5/12/2018    Blood Glucose (mg/dL) 181 191 178 189 211        Explained the patients A1C goal is set based on 2018 AACE/ACE guidelines of a goal A1C less than or equal to 7%.    Patient is not meeting the A1C goal already.   When asked what the patient thinks is causing BG to be elevated, he states: A1c increased from 8/2017 to 1/2018. He contributes increase partially  related to eye surgery in November and steroids use.    Reviewed AACE/ACE SMBG goals (FPG <  mg/dL, 2-h ppg < 160 mg/dL).   Expected SMBG monitoring schedule: once daily FBG    Health maintenance is up to date.    Is the patient on ACE inhibitor or angiotensin II receptor blocker? Yes    valsartan (Diovan)  Is the patient on a STATIN? Yes    Current diabetic medications include liraglutide, metformin, and glimepiride    Patient denies recents s/s or episodes of hypoglycemia (weakness, dizziness, hunger, shakiness, nausea, headache, heart palpitations, sweating, fatigue, anxiety, etc.). Reports experiencing anxiety and shakiness in past with readings ~ 110 mg/dL.     Patient reports increased thirst in afternoon and denies other s/s of hyperglycemia (headache, increased urination, fatigue, blurred vision)     Diet notes: Patient reports snacks typically include plain yogurt with frozen blueberries or grapes (10-15 grapes) and salt free mixed nuts. He reports eating avocado and sweet potato (with butter and cinnamon) for supper last night.    FBG remain elevated. Increase metformin-XR to 1500 mg daily. Continue SMBG testing once daily (FBG). Follow in 2 weeks, sooner if BG begins to trend upward or downward. Reviewed s/s and treatment of hypoglycemia.    Patient has my contact information and knows to call with any concerns or clinical changes.

## 2018-05-17 ENCOUNTER — PATIENT OUTREACH (OUTPATIENT)
Dept: OTHER | Facility: OTHER | Age: 63
End: 2018-05-17

## 2018-05-17 NOTE — PROGRESS NOTES
"Last 5 Patient Entered Readings                                      Current 30 Day Average: 137/84     Recent Readings 5/16/2018 5/16/2018 5/15/2018 5/15/2018 5/13/2018    SBP (mmHg) 127 127 133 133 146    DBP (mmHg) 81 81 82 82 85    Pulse 78 78 64 64 63          Last 6 Patient Entered Readings                                          Most Recent A1c: 7.5% (Goal: 7%)     Recent Readings 5/16/2018 5/15/2018 5/14/2018 5/13/2018 5/12/2018    Blood Glucose (mg/dL) 181 191 178 189 211        Encounter also consisted of patient expressing his success with BP coming down and recent diabetes med changes.     Digital Medicine: Health  Follow Up    Lifestyle Modifications:    1.Dietary Modifications (Sodium intake <2,000mg/day, food labels, dining out): Patient reports consuming a bowl of cheerios and special K berries with 2% milk. He states consuming grits with a piece of toast 1x week. He states lunch will vary. He states consuming [employer] frozen dinners. He states the dinners are 400 mg/sodium. Patient reports consuming Glucerna between breakfast and lunch to help cut back on snacks. Patient reports trying to cut back on food after dinner time. He states enjoying avocado and sweet potato (in moderation). Patient reports enjoying tuna and fish. Patient states he has been reading food labels often. Patient reports not consuming "too much bread." Patient reports rolling up deli meat and cheese 1x every 3days. Encouraged patient to keep deli meat and cheese in moderation. Encouraged patient to continue reading food labels and switch out his frozen dinner for low-sodium option-low-sodium tuna.       2.Physical Activity: Patient reports walking 2 miles, 3x week normally but has not been exercising too much recently. He states knowing he should return to his walking regimen. Encouraged patient to return to walking regimen.      3.Medication Therapy: Patient has been compliant with the medication " regimen.    4.Patient has the following medication side effects/concerns:   (Frequency/Alleviating factors/Precipitating factors, etc.)     Follow up with Mr. Glenn Medel completed. No further questions or concerns. Will continue follow up to achieve health goals.

## 2018-05-21 DIAGNOSIS — I10 ESSENTIAL HYPERTENSION: ICD-10-CM

## 2018-05-21 RX ORDER — VALSARTAN 320 MG/1
320 TABLET ORAL DAILY
Qty: 30 TABLET | Refills: 11 | Status: SHIPPED | OUTPATIENT
Start: 2018-05-21 | End: 2018-07-18 | Stop reason: ALTCHOICE

## 2018-05-30 ENCOUNTER — PATIENT OUTREACH (OUTPATIENT)
Dept: OTHER | Facility: OTHER | Age: 63
End: 2018-05-30

## 2018-05-30 DIAGNOSIS — Z79.4 TYPE 2 DIABETES MELLITUS WITH DIABETIC NEPHROPATHY, WITH LONG-TERM CURRENT USE OF INSULIN: Primary | ICD-10-CM

## 2018-05-30 DIAGNOSIS — E11.21 TYPE 2 DIABETES MELLITUS WITH DIABETIC NEPHROPATHY, WITH LONG-TERM CURRENT USE OF INSULIN: Primary | ICD-10-CM

## 2018-05-30 RX ORDER — METFORMIN HYDROCHLORIDE 500 MG/1
2000 TABLET, EXTENDED RELEASE ORAL DAILY
Qty: 180 TABLET | Refills: 3
Start: 2018-05-30 | End: 2018-07-03 | Stop reason: SDUPTHER

## 2018-05-30 NOTE — PROGRESS NOTES
Last 6 Patient Entered Readings                                          Most Recent A1c: 7.5% (Goal: 7%)     Recent Readings 5/26/2018 5/25/2018 5/24/2018 5/23/2018 5/23/2018    Blood Glucose (mg/dL) 161 165 148 174 187        Current A1c level is not at goal of less than or equal to 7%     Patient denies s/s or episodes of hypoglycemia (weakness, dizziness, hunger, shakiness, nausea, headache, heart palpitations, sweating, fatigue, anxiety, etc.)     Patient denies s/s of hyperglycemia (headache, increased thirst, increased urination, fatigue, blurred vision)      Current monitoring regimen: once daily (FBG)     Health maintenance: up to date. Patient is on ACE-I and statin      Medication adherence: reports adherence     Patient relates lower BG readings to: relates lower BG 5/28 to a longer fasting period than usual    Patient relates higher BG readings to: reports dietary excursion 5/27 while at [employer], included roast beef, 3 beers, waffles, and bread     A/P: Patient is tolerating metformin dose increase. FBGs remain elevated. Increase metformin-XR to 2000 mg once daily. Continue SMBG once daily but alternate FBG and 2hpp. Patient with history of peripheral neuropathy (symptoms typically in left foot but occasionally right foot gives out). Repeat vit B12 with upcoming PCP appointment. Hypertension follow up due ~ 6/13.     I will continue to monitor regularly and will follow up in 2-3 weeks, sooner if BG begins to trend upward or downward.     Patient has my contact information and knows to call with any concerns or clinical changes    Diabetes Medications             glimepiride (AMARYL) 4 MG tablet Take 1 tablet (4 mg total) by mouth before breakfast.    metFORMIN (GLUCOPHAGE-XR) 500 MG 24 hr tablet Take 3 tablets (1,500 mg total) by mouth once daily.    VICTOZA 3-NOEL 0.6 mg/0.1 mL (18 mg/3 mL) PnIj ADMINISTER 1.8 MG UNDER THE SKIN EVERY DAY

## 2018-06-07 ENCOUNTER — PATIENT OUTREACH (OUTPATIENT)
Dept: OTHER | Facility: OTHER | Age: 63
End: 2018-06-07

## 2018-06-07 RX ORDER — METOPROLOL SUCCINATE 25 MG/1
TABLET, EXTENDED RELEASE ORAL
Qty: 30 TABLET | Refills: 0 | Status: SHIPPED | OUTPATIENT
Start: 2018-06-07 | End: 2018-06-11 | Stop reason: SDUPTHER

## 2018-06-07 NOTE — PROGRESS NOTES
Last 5 Patient Entered Readings                                      Current 30 Day Average: 131/83     Recent Readings 6/6/2018 6/6/2018 6/5/2018 6/5/2018 6/3/2018    SBP (mmHg) 123 123 136 136 133    DBP (mmHg) 82 82 86 86 81    Pulse 72 72 68 68 73          Last 6 Patient Entered Readings                                          Most Recent A1c: 7.5% (Goal: 7%)     Recent Readings 6/6/2018 6/5/2018 6/4/2018 6/3/2018 6/2/2018    Blood Glucose (mg/dL) 144 136 157 117 171        Encounter also consisted of patient discussing his recent diabetes medication increase. He expresses his decrease in BG are attributed to increase in Metformin.     Encounter also consisted of patient discussing his upcoming senior care day at the end of the month.     Digital Medicine: Health  Follow Up    Lifestyle Modifications:    1.Dietary Modifications (Sodium intake <2,000mg/day, food labels, dining out): He states he is not as a big eater as he used to be. Patient reports having cold cereal with a piece of toast in the morning. He states consuming plain yogurt with blueberries. He states having a piece of toast with peanut with strawberry jelly and 1 glass of low-fat milk. He states trying to eat as little as possible for supper time. He states eating low-sodium provologne cheese with a piece of sliced turkey and cheese. He states consuming oatmeal with bananas for dinner. He states consuming a bottle of glucerna in mid-morning. Patient reports he had been eating mix-nuts but has ceased. He states consuming grilled catfish with a sweet potato at times. He states trying to stay from fried foods and fast food as much as possible.He states consuming low-sodium tuna out of the can. He states consuming salads often. He states consuming a whole avocado for supper at times. Informed patient of possible high sodium content in turkey and cheese. Reminded patient to be mindful of amount of carbs in bread he is consuming. Will further  assess changes upon next encounter.      2.Physical Activity: Patient reports walking in the mornings, for 45 minutes 3/xweek. He expresses having success with this but plans on increasing to walking 5x/week after he retires at the end of this month.     3.Medication Therapy: Patient has been compliant with the medication regimen.    4.Patient has the following medication side effects/concerns:   (Frequency/Alleviating factors/Precipitating factors, etc.)     Follow up with Mr. Glenn Medel completed. No further questions or concerns. Will continue follow up to achieve health goals.

## 2018-06-11 ENCOUNTER — LAB VISIT (OUTPATIENT)
Dept: LAB | Facility: HOSPITAL | Age: 63
End: 2018-06-11
Attending: FAMILY MEDICINE
Payer: COMMERCIAL

## 2018-06-11 ENCOUNTER — OFFICE VISIT (OUTPATIENT)
Dept: FAMILY MEDICINE | Facility: CLINIC | Age: 63
End: 2018-06-11
Payer: COMMERCIAL

## 2018-06-11 ENCOUNTER — TELEPHONE (OUTPATIENT)
Dept: OPHTHALMOLOGY | Facility: CLINIC | Age: 63
End: 2018-06-11

## 2018-06-11 ENCOUNTER — OFFICE VISIT (OUTPATIENT)
Dept: OPHTHALMOLOGY | Facility: CLINIC | Age: 63
End: 2018-06-11
Payer: COMMERCIAL

## 2018-06-11 ENCOUNTER — PATIENT MESSAGE (OUTPATIENT)
Dept: FAMILY MEDICINE | Facility: CLINIC | Age: 63
End: 2018-06-11

## 2018-06-11 VITALS
OXYGEN SATURATION: 98 % | HEIGHT: 71 IN | BODY MASS INDEX: 31.54 KG/M2 | HEART RATE: 73 BPM | SYSTOLIC BLOOD PRESSURE: 128 MMHG | DIASTOLIC BLOOD PRESSURE: 88 MMHG | WEIGHT: 225.31 LBS | RESPIRATION RATE: 16 BRPM

## 2018-06-11 DIAGNOSIS — H43.393 OTHER VITREOUS OPACITIES, BILATERAL: ICD-10-CM

## 2018-06-11 DIAGNOSIS — E11.21 TYPE 2 DIABETES MELLITUS WITH DIABETIC NEPHROPATHY, WITH LONG-TERM CURRENT USE OF INSULIN: ICD-10-CM

## 2018-06-11 DIAGNOSIS — H43.13 VITREOUS HEMORRHAGE OF BOTH EYES: Primary | ICD-10-CM

## 2018-06-11 DIAGNOSIS — Z79.4 TYPE 2 DIABETES MELLITUS WITH DIABETIC NEPHROPATHY, WITH LONG-TERM CURRENT USE OF INSULIN: ICD-10-CM

## 2018-06-11 DIAGNOSIS — H43.821 VITREOMACULAR ADHESION, RIGHT: ICD-10-CM

## 2018-06-11 DIAGNOSIS — E11.9 DIABETES MELLITUS WITHOUT COMPLICATION: Primary | ICD-10-CM

## 2018-06-11 DIAGNOSIS — H43.13 VITREOUS HEMORRHAGE, BILATERAL: Primary | ICD-10-CM

## 2018-06-11 DIAGNOSIS — H43.393 VITREOUS FLOATER, BILATERAL: ICD-10-CM

## 2018-06-11 LAB — VIT B12 SERPL-MCNC: 489 PG/ML

## 2018-06-11 PROCEDURE — 99999 PR PBB SHADOW E&M-EST. PATIENT-LVL III: CPT | Mod: PBBFAC,,, | Performed by: FAMILY MEDICINE

## 2018-06-11 PROCEDURE — 3079F DIAST BP 80-89 MM HG: CPT | Mod: CPTII,S$GLB,, | Performed by: FAMILY MEDICINE

## 2018-06-11 PROCEDURE — 92226 PR SPECIAL EYE EXAM, SUBSEQUENT: CPT | Mod: RT,S$GLB,, | Performed by: OPHTHALMOLOGY

## 2018-06-11 PROCEDURE — 36415 COLL VENOUS BLD VENIPUNCTURE: CPT | Mod: PO

## 2018-06-11 PROCEDURE — 3008F BODY MASS INDEX DOCD: CPT | Mod: CPTII,S$GLB,, | Performed by: FAMILY MEDICINE

## 2018-06-11 PROCEDURE — 92014 COMPRE OPH EXAM EST PT 1/>: CPT | Mod: S$GLB,,, | Performed by: OPHTHALMOLOGY

## 2018-06-11 PROCEDURE — 82607 VITAMIN B-12: CPT

## 2018-06-11 PROCEDURE — 99999 PR PBB SHADOW E&M-EST. PATIENT-LVL II: CPT | Mod: PBBFAC,,, | Performed by: OPHTHALMOLOGY

## 2018-06-11 PROCEDURE — 3074F SYST BP LT 130 MM HG: CPT | Mod: CPTII,S$GLB,, | Performed by: FAMILY MEDICINE

## 2018-06-11 PROCEDURE — 3045F PR MOST RECENT HEMOGLOBIN A1C LEVEL 7.0-9.0%: CPT | Mod: CPTII,S$GLB,, | Performed by: FAMILY MEDICINE

## 2018-06-11 PROCEDURE — 99213 OFFICE O/P EST LOW 20 MIN: CPT | Mod: S$GLB,,, | Performed by: FAMILY MEDICINE

## 2018-06-11 RX ORDER — METOPROLOL SUCCINATE 25 MG/1
TABLET, EXTENDED RELEASE ORAL
Qty: 90 TABLET | Refills: 3 | Status: SHIPPED | OUTPATIENT
Start: 2018-06-11 | End: 2019-04-04 | Stop reason: SDUPTHER

## 2018-06-11 NOTE — PROGRESS NOTES
Subjective:       Patient ID: Glenn Medel is a 62 y.o. male    Chief Complaint: Follow-up (3 month folow up )    Diabetes   He presents for his follow-up diabetic visit. He has type 2 diabetes mellitus. His disease course has been stable. There are no hypoglycemic associated symptoms. Pertinent negatives for hypoglycemia include no confusion or headaches. There are no diabetic associated symptoms. Pertinent negatives for diabetes include no chest pain, no polydipsia, no polyuria and no weakness. There are no hypoglycemic complications. Symptoms are stable. Diabetic complications include peripheral neuropathy. Current diabetic treatment includes oral agent (dual therapy) (increased glimeperide and metformin.  recently started on digital diabetes program). He is compliant with treatment all of the time. His home blood glucose trend is decreasing steadily. An ACE inhibitor/angiotensin II receptor blocker is being taken. Eye exam is current.       Review of Systems   Constitutional: Negative for activity change and unexpected weight change.   HENT: Negative for hearing loss, rhinorrhea and trouble swallowing.    Eyes: Negative for discharge and visual disturbance.   Respiratory: Negative for chest tightness and wheezing.    Cardiovascular: Negative for chest pain and palpitations.   Gastrointestinal: Negative for blood in stool, constipation, diarrhea and vomiting.   Endocrine: Negative for polydipsia and polyuria.   Genitourinary: Negative for difficulty urinating, hematuria and urgency.   Musculoskeletal: Positive for arthralgias. Negative for joint swelling and neck pain.   Neurological: Negative for weakness and headaches.   Psychiatric/Behavioral: Negative for confusion and dysphoric mood.        Objective:   Physical Exam   Constitutional: He is oriented to person, place, and time. He appears well-developed and well-nourished. No distress.   Neurological: He is alert and oriented to person, place, and time.    Vitals reviewed.       Assessment:       1. Diabetes mellitus without complication          Plan:       Diabetes mellitus without complication  - Diet and exercise education.  - Serial glucose monitoring  - Continue current therapy  - Check hemoglobin A1c  - Continue digital program  - Follow-up in about 6 months (around 12/11/2018).

## 2018-06-11 NOTE — PROGRESS NOTES
HPI     3 mo f/u   DLS- 03/05/218 Dr. Pulido       Pt sts vision has been about the same OD not as good as OS.   Denies pain.   (-)Flashes (+)Floaters OD  (-)Photophobia  (-)Glare  Gtts:  Simbrinza BID OU        OCT - mild VMT OD  OS - No CME    A/P    1. Dense vitreous opacities OU with [facility]  No Improvement over last several months  ?prior VH with PVD OU - no breaks or tears    s/p 25g PPV/partial AFx OS for vitreous opacities/[facility] OS 11/15/17    Doing well, good IOP  HAD PC violation during anterior vitreous removal -     Had CE with dropped lens material give PC violation 11/30/17    Increased IOP - though improved with K edema    Will need PPV to remove lens material.  Pt would prefer more prompt resolution    s/p 25g PPV OS for retained lens material OS 12/6/17    Doing well, but IOP improved to 18 - ? Steroid response, inflammation and cortical material improving, AC deep    1/8/18 - some increased inflammation after steroid taper, no CME  1/18 - still low grade inflammation -   6/18 - stable    PLan 25g PPV/partial AFx OD for vitreous opacities and [facility] OD    Local MAC  25 min  Audie case    Risks, benefits, and alternatives to treatment discussed in detail with the patient.  The patient voiced understanding and wished to proceed with the procedure        2. NS OD  Sulcus IOL OS    3. DM  Controlled No DR  BS/BP/chol control    4. HTN Ret OU    5. POAG  On simbrinza BID  May continue OU once inflammation down

## 2018-06-13 ENCOUNTER — PATIENT OUTREACH (OUTPATIENT)
Dept: OTHER | Facility: OTHER | Age: 63
End: 2018-06-13

## 2018-06-13 DIAGNOSIS — Z79.4 TYPE 2 DIABETES MELLITUS WITH DIABETIC NEPHROPATHY, WITH LONG-TERM CURRENT USE OF INSULIN: Primary | ICD-10-CM

## 2018-06-13 DIAGNOSIS — E11.21 TYPE 2 DIABETES MELLITUS WITH DIABETIC NEPHROPATHY, WITH LONG-TERM CURRENT USE OF INSULIN: Primary | ICD-10-CM

## 2018-06-13 RX ORDER — INSULIN GLARGINE 100 [IU]/ML
10 INJECTION, SOLUTION SUBCUTANEOUS NIGHTLY
Qty: 15 ML | Refills: 3 | Status: SHIPPED | OUTPATIENT
Start: 2018-06-13 | End: 2018-07-30 | Stop reason: SDUPTHER

## 2018-06-13 NOTE — PROGRESS NOTES
HYPERTENSION:   Last 5 Patient Entered Readings                                      Current 30 Day Average: 131/82     Recent Readings 6/13/2018 6/13/2018 6/12/2018 6/12/2018 6/11/2018    SBP (mmHg) 127 127 143 143 134    DBP (mmHg) 83 83 86 86 81    Pulse 77 77 76 76 68      Patient's BP average is above goal of <130/80.     Patient denies s/s of hypotension (lightheadedness, dizziness, nausea, fatigue) associated with low readings. Instructed patient to inform me if this occurs, patient confirms understanding.      Patient denies s/s of hypertension (SOB, CP, severe headaches, changes in vision) associated with high readings. Instructed patient to go to the ED if BP > 180/110 and accompanied by hypertensive s/s, patient confirms understanding.    Will continue to monitor regularly. Will follow up in 2-3 weeks, sooner if BP begins to trend upward or downward.    Patient has my contact information and knows to call with any concerns or clinical changes.     Current HTN regimen:  Hypertension Medications             furosemide (LASIX) 20 MG tablet Take 20 mg by mouth once daily.    metoprolol succinate (TOPROL-XL) 25 MG 24 hr tablet TAKE 1 TABLET(25 MG) BY MOUTH EVERY DAY    valsartan (DIOVAN) 320 MG tablet Take 1 tablet (320 mg total) by mouth once daily.        Stress from work and taking care his mom who is in a nursing home is what he things is causing his BP to be elevated. Today is possibly his last day of work and he will continue to work on exercise and dietary changes. No changes to medications at this time.     DIABETES:    Current A1c level is not at goal of less than or equal to 7%     Patient denies s/s or episodes of hypoglycemia (weakness, dizziness, hunger, shakiness, nausea, headache, heart palpitations, sweating, fatigue, anxiety, etc.)     Patient denies s/s of hyperglycemia (headache, increased thirst, increased urination, fatigue, blurred vision)      Current monitoring regimen:      Health  maintenance: is up to date. Patient is on ARB and statin       Medication adherence: reports good adherence     Patient relates lower BG readings to: N/A    Patient relates higher BG readings to: eating a large breakfast on 6/10     Plan:  Spoke to Mr. Medel today as follow up for his diabetes and HTN. His most recent A1C is 8%, however due to recent metformin titrations we are likely not yet seeing the full effect of this medication. However, due to elevated fasting BG will begin insulin glargine 10 units nightly (0.1 units/kg) with the goal of improving fasting glucose values and stop taking glimepiride. Pt will continue with liraglutide and metformin at this time. Educated patient on how to treat hypoglycemia episodes.       Patient has my contact information and knows to call with any concerns or clinical changes    Diabetes Medications             glimepiride (AMARYL) 4 MG tablet Take 1 tablet (4 mg total) by mouth before breakfast.    metFORMIN (GLUCOPHAGE-XR) 500 MG 24 hr tablet Take 4 tablets (2,000 mg total) by mouth once daily.    VICTOZA 3-NOEL 0.6 mg/0.1 mL (18 mg/3 mL) PnIj ADMINISTER 1.8 MG UNDER THE SKIN EVERY DAY            Last 6 Patient Entered Readings                                          Most Recent A1c: 8% (Goal: 7%)     Recent Readings 6/13/2018 6/12/2018 6/11/2018 6/10/2018 6/9/2018    Blood Glucose (mg/dL) 146 173 153 223 153

## 2018-06-18 ENCOUNTER — PATIENT MESSAGE (OUTPATIENT)
Dept: ADMINISTRATIVE | Facility: OTHER | Age: 63
End: 2018-06-18

## 2018-06-20 ENCOUNTER — PATIENT OUTREACH (OUTPATIENT)
Dept: OTHER | Facility: OTHER | Age: 63
End: 2018-06-20

## 2018-06-20 NOTE — PROGRESS NOTES
Last 5 Patient Entered Readings                                      Current 30 Day Average: 131/82     Recent Readings 6/19/2018 6/19/2018 6/18/2018 6/18/2018 6/15/2018    SBP (mmHg) 126 126 129 129 139    DBP (mmHg) 79 79 75 75 80    Pulse 70 70 64 64 68      Patient's BP average is controlled based on 2017 ACC/AHA HTN guidelines of goal BP <130/80.      Patient denies s/s of hypotension (lightheadedness, dizziness, nausea, fatigue) associated with low readings. Instructed patient to inform me if this occurs, patient confirms understanding.      Patient denies s/s of hypertension (SOB, CP, severe headaches, changes in vision) associated with high readings. Instructed patient to go to the ED if BP > 180/110 and accompanied by hypertensive s/s, patient confirms understanding.     Patient's health , Olivier Gaines, will be following up every 3-4 weeks. I will continue to monitor regularly and will follow up in 3-4 months, sooner if BP begins to trend upward or downward.    Patient has my contact information and knows to call with any concerns or clinical changes.     Current HTN regimen:  Hypertension Medications             furosemide (LASIX) 20 MG tablet Take 20 mg by mouth once daily.    metoprolol succinate (TOPROL-XL) 25 MG 24 hr tablet TAKE 1 TABLET(25 MG) BY MOUTH EVERY DAY    valsartan (DIOVAN) 320 MG tablet Take 1 tablet (320 mg total) by mouth once daily.        DIABETES:     Current A1c level is not at goal of less than or equal to 7%     Patient denies s/s or episodes of hypoglycemia (weakness, dizziness, hunger, shakiness, nausea, headache, heart palpitations, sweating, fatigue, anxiety, etc.)     Patient denies s/s of hyperglycemia (headache, increased thirst, increased urination, fatigue, blurred vision)      Current monitoring regimen:      Health maintenance: is up to date. Patient is on ARB and statin      Medication adherence: patient is adherent      Plan:     I will continue to monitor  regularly and will follow up in 1-2 weeks, sooner if BG begins to trend upward or downward.     Patient has my contact information and knows to call with any concerns or clinical changes    Diabetes Medications             insulin glargine (LANTUS SOLOSTAR U-100 INSULIN) 100 unit/mL (3 mL) InPn pen Inject 10 Units into the skin every evening.    metFORMIN (GLUCOPHAGE-XR) 500 MG 24 hr tablet Take 4 tablets (2,000 mg total) by mouth once daily.    VICTOZA 3-NOEL 0.6 mg/0.1 mL (18 mg/3 mL) PnIj ADMINISTER 1.8 MG UNDER THE SKIN EVERY DAY            Last 6 Patient Entered Readings                                          Most Recent A1c: 8% (Goal: 7%)     Recent Readings 6/19/2018 6/19/2018 6/18/2018 6/18/2018 6/17/2018    Blood Glucose (mg/dL) 117 129 134 183 152        Spoke with patient today for follow up after starting insulin last week. Pt states that he is now retired and is enjoying it.  Pt states that he has had 1-2 episodes of feeling dizzy / jittery but has checked his BG and it has been ~130 with no hypoglycemia events. Will leave current insulin dose at this point. Encouraged patient to continue to keep up the good work with positive lifestyle changes. Also encouraged patient to continue to check his BG if he has any more episodes of feeling jittery. We also reviewed proper injection technique and avoiding injecting into any hard spots in the abdomen. Will plan to follow up in the next 1-2 weeks.

## 2018-06-21 ENCOUNTER — PATIENT MESSAGE (OUTPATIENT)
Dept: NEPHROLOGY | Facility: CLINIC | Age: 63
End: 2018-06-21

## 2018-06-21 NOTE — PROGRESS NOTES
I agree with the resident's assessment and plan for this patient.  BP average slightly above goal, but recent readings below goal. Monitor patient closely as he adjusts to insulin and consider titrating on follow up.    6/28 - Health  left voicemail for patient today. FBG readings trending high.

## 2018-06-25 RX ORDER — FUROSEMIDE 20 MG/1
TABLET ORAL
Qty: 90 TABLET | Refills: 3 | Status: SHIPPED | OUTPATIENT
Start: 2018-06-25 | End: 2019-03-12 | Stop reason: SDUPTHER

## 2018-06-28 ENCOUNTER — PATIENT OUTREACH (OUTPATIENT)
Dept: OTHER | Facility: OTHER | Age: 63
End: 2018-06-28

## 2018-06-28 NOTE — PROGRESS NOTES
Last 5 Patient Entered Readings                                      Current 30 Day Average: 129/80     Recent Readings 6/27/2018 6/27/2018 6/26/2018 6/26/2018 6/26/2018    SBP (mmHg) 121 121 128 128 133    DBP (mmHg) 78 78 86 86 81    Pulse 67 67 71 71 63          Last 6 Patient Entered Readings                                          Most Recent A1c: 8% (Goal: 7%)     Recent Readings 6/27/2018 6/26/2018 6/26/2018 6/25/2018 6/24/2018    Blood Glucose (mg/dL) 151 133 183 155 163            Digital Medicine: Health  Follow Up    Left voicemail to follow up with Mr. Glenn Medel.  Current A1C average 8% is not at goal, [7%],  BP average 129/80 mmHg is at goal,  [130/80].

## 2018-06-29 ENCOUNTER — PATIENT MESSAGE (OUTPATIENT)
Dept: NEPHROLOGY | Facility: CLINIC | Age: 63
End: 2018-06-29

## 2018-07-02 ENCOUNTER — PATIENT MESSAGE (OUTPATIENT)
Dept: NEPHROLOGY | Facility: CLINIC | Age: 63
End: 2018-07-02

## 2018-07-03 ENCOUNTER — PATIENT OUTREACH (OUTPATIENT)
Dept: OTHER | Facility: OTHER | Age: 63
End: 2018-07-03

## 2018-07-03 RX ORDER — METFORMIN HYDROCHLORIDE 500 MG/1
2000 TABLET, EXTENDED RELEASE ORAL DAILY
Qty: 360 TABLET | Refills: 3 | Status: SHIPPED | OUTPATIENT
Start: 2018-07-03 | End: 2019-04-12 | Stop reason: SDUPTHER

## 2018-07-03 NOTE — PROGRESS NOTES
Last 6 Patient Entered Readings                                          Most Recent A1c: 8% (Goal: 7%)     Recent Readings 7/3/2018 7/2/2018 7/2/2018 7/1/2018 6/30/2018    Blood Glucose (mg/dL) 173 178 180 189 174          Current A1c level is at goal of less than or equal to 7%     Patient denies s/s or episodes of hypoglycemia (weakness, dizziness, hunger, shakiness, nausea, headache, heart palpitations, sweating, fatigue, anxiety, etc.)     Patient reports nausea/hunger symptoms with elevated FBG     Current monitoring regimen: 1-2 times daily, varying time     Health maintenance: is up to date. Patient is on ACE-I and statin      Medication adherence: reports adherence     Patient relates lower BG readings to: patient reports lower pre-supper BG 6/29 after eating 1/2 sausage poboy for lunch    Patient relates higher BG readings to: no changes     Plan: Patient denies hypoglycemic episodes. BG elevated when patient experiences nausea/hunger feeling. Discussed occasional lower pre-supper BG but unable to identify consistent pattern. Patient denies s/s of nocturnal hypoglycemia. Increase lantus to 14 units once daily and send [facility] message with self-titration instructions (increase by 2 units if FBG > 130 mg/dL for 3 days in a row, up to 20 units). Continue to monitor for hypoglycemia. Consider changing victoza to once weekly glp-1 and/or initiating SGLT-2.     I will continue to monitor regularly and will follow up in 2-3 weeks, sooner if BG begins to trend upward or downward.     Patient has my contact information and knows to call with any concerns or clinical changes    Diabetes Medications             insulin glargine (LANTUS SOLOSTAR U-100 INSULIN) 100 unit/mL (3 mL) InPn pen Inject 10 Units into the skin every evening.    metFORMIN (GLUCOPHAGE-XR) 500 MG 24 hr tablet Take 4 tablets (2,000 mg total) by mouth once daily.    VICTOZA 3-NOEL 0.6 mg/0.1 mL (18 mg/3 mL) PnIj ADMINISTER 1.8 MG UNDER THE SKIN  EVERY DAY

## 2018-07-03 NOTE — PROGRESS NOTES
Last 5 Patient Entered Readings                                      Current 30 Day Average: 129/80     Recent Readings 7/3/2018 7/3/2018 7/2/2018 7/2/2018 6/29/2018    SBP (mmHg) 138 138 132 132 121    DBP (mmHg) 79 79 78 78 87    Pulse 74 74 77 77 64          Last 6 Patient Entered Readings                                          Most Recent A1c: 8% (Goal: 7%)     Recent Readings 7/3/2018 7/2/2018 7/2/2018 7/1/2018 6/30/2018    Blood Glucose (mg/dL) 173 178 180 189 174        Encounter also consisted of patient discussing his recent weekend long term celebration. He states treating himself and eating too much.    Encounter also consisted of patient discussing next week urologist appointment.      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     Patient states he will be in [place] 7/5 thru 7/9. Placed on hiatus.     Digital Medicine: Health  Follow Up    Lifestyle Modifications:    1.Dietary Modifications (Sodium intake <2,000mg/day, food labels, dining out): Patient reports maintaining the same diet except over the recent weekend for his birthday and long term party. Patient reports wanting to decrease the amount of food prior to bed. Patient reports he is about to eat low-sodium tuna and wheat thins. Patient expressed his desire to focus on discussing his night time meals.       2.Physical Activity: Patient reports planning on changing his walking regimen to the afternoon now that he is retired. Encouraged patient to continue walking regimen and will further assess afternoon walking regimen/progress.     3.Medication Therapy: Patient has been compliant with the medication regimen.    4.Patient has the following medication side effects/concerns:   (Frequency/Alleviating factors/Precipitating factors, etc.)     Follow up with Mr. Glenn Medel completed. No further questions or concerns. Will continue follow up to achieve health goals.

## 2018-07-08 RX ORDER — METOPROLOL SUCCINATE 25 MG/1
TABLET, EXTENDED RELEASE ORAL
Qty: 30 TABLET | Refills: 0 | Status: SHIPPED | OUTPATIENT
Start: 2018-07-08 | End: 2018-07-30 | Stop reason: ALTCHOICE

## 2018-07-18 ENCOUNTER — PATIENT OUTREACH (OUTPATIENT)
Dept: OTHER | Facility: OTHER | Age: 63
End: 2018-07-18

## 2018-07-18 DIAGNOSIS — I10 ESSENTIAL HYPERTENSION: Primary | ICD-10-CM

## 2018-07-18 DIAGNOSIS — E11.21 TYPE 2 DIABETES MELLITUS WITH DIABETIC NEPHROPATHY, WITH LONG-TERM CURRENT USE OF INSULIN: ICD-10-CM

## 2018-07-18 DIAGNOSIS — Z79.4 TYPE 2 DIABETES MELLITUS WITH DIABETIC NEPHROPATHY, WITH LONG-TERM CURRENT USE OF INSULIN: ICD-10-CM

## 2018-07-18 RX ORDER — IRBESARTAN 300 MG/1
300 TABLET ORAL DAILY
Qty: 30 TABLET | Refills: 2 | Status: SHIPPED | OUTPATIENT
Start: 2018-07-18 | End: 2018-10-05 | Stop reason: SDUPTHER

## 2018-07-24 ENCOUNTER — PATIENT MESSAGE (OUTPATIENT)
Dept: ADMINISTRATIVE | Facility: OTHER | Age: 63
End: 2018-07-24

## 2018-07-26 ENCOUNTER — PATIENT OUTREACH (OUTPATIENT)
Dept: OTHER | Facility: OTHER | Age: 63
End: 2018-07-26

## 2018-07-26 ENCOUNTER — PATIENT MESSAGE (OUTPATIENT)
Dept: NEPHROLOGY | Facility: CLINIC | Age: 63
End: 2018-07-26

## 2018-07-26 DIAGNOSIS — N20.0 KIDNEY STONES: Primary | ICD-10-CM

## 2018-07-26 NOTE — PROGRESS NOTES
"Last 5 Patient Entered Readings                                      Current 30 Day Average: 126/78     Recent Readings 7/25/2018 7/25/2018 7/24/2018 7/24/2018 7/23/2018    SBP (mmHg) 116 116 109 109 126    DBP (mmHg) 70 70 68 68 79    Pulse 73 73 75 75 62          Last 6 Patient Entered Readings                                          Most Recent A1c: 8% (Goal: 7%)     Recent Readings 7/26/2018 7/25/2018 7/24/2018 7/24/2018 7/23/2018    Blood Glucose (mg/dL) 125 137 139 131 126        Patient attributes lower readings to change in BP medication.     Digital Medicine: Health  Follow Up    Lifestyle Modifications:    1.Dietary Modifications (Sodium intake <2,000mg/day, food labels, dining out): Patient reports trying to make healthier food choices when eating out (i.e. Eating grilled fish in lieu of red meat). He states the diet may be "hit and miss." Patient reports consuming tuna fish. He states limiting the processed meats. Patient reports consuming grilled or boiled seafood. He states limiting carbohydrates. Patient reports consuming tomatoes. He reports feeling good with his current approach. Encouraged patient to continue his great work.      2.Physical Activity: Patient reports accomplishing a great amount of yard work at his daughters. Patient reports decreasing weight by 4lbs. Encouraged patient to increase water consumption and be mindful of elevated humidity.      3.Medication Therapy: Patient has been compliant with the medication regimen.    4.Patient has the following medication side effects/concerns:   (Frequency/Alleviating factors/Precipitating factors, etc.)     Follow up with Mr. Lewisnn MARY Medel completed. No further questions or concerns. Will continue follow up to achieve health goals.    "

## 2018-07-28 ENCOUNTER — PATIENT MESSAGE (OUTPATIENT)
Dept: CARDIOLOGY | Facility: CLINIC | Age: 63
End: 2018-07-28

## 2018-07-30 RX ORDER — INSULIN GLARGINE 100 [IU]/ML
18 INJECTION, SOLUTION SUBCUTANEOUS NIGHTLY
Qty: 2 BOX | Refills: 3 | Status: SHIPPED | OUTPATIENT
Start: 2018-07-30 | End: 2018-09-06 | Stop reason: DRUGHIGH

## 2018-07-31 ENCOUNTER — TELEPHONE (OUTPATIENT)
Dept: OPHTHALMOLOGY | Facility: CLINIC | Age: 63
End: 2018-07-31

## 2018-08-01 ENCOUNTER — HOSPITAL ENCOUNTER (OUTPATIENT)
Facility: HOSPITAL | Age: 63
Discharge: HOME | End: 2018-08-01
Attending: OPHTHALMOLOGY | Admitting: OPHTHALMOLOGY
Payer: COMMERCIAL

## 2018-08-01 ENCOUNTER — ANESTHESIA EVENT (OUTPATIENT)
Dept: SURGERY | Facility: HOSPITAL | Age: 63
End: 2018-08-01
Payer: COMMERCIAL

## 2018-08-01 ENCOUNTER — ANESTHESIA (OUTPATIENT)
Dept: SURGERY | Facility: HOSPITAL | Age: 63
End: 2018-08-01
Payer: COMMERCIAL

## 2018-08-01 VITALS
OXYGEN SATURATION: 98 % | RESPIRATION RATE: 16 BRPM | TEMPERATURE: 99 F | SYSTOLIC BLOOD PRESSURE: 132 MMHG | DIASTOLIC BLOOD PRESSURE: 82 MMHG | BODY MASS INDEX: 29.68 KG/M2 | HEART RATE: 61 BPM | HEIGHT: 71 IN | WEIGHT: 212 LBS

## 2018-08-01 DIAGNOSIS — H43.393 OTHER VITREOUS OPACITIES, BILATERAL: ICD-10-CM

## 2018-08-01 DIAGNOSIS — H43.13 VITREOUS HEMORRHAGE, BILATERAL: Primary | ICD-10-CM

## 2018-08-01 DIAGNOSIS — H35.371 EPIRETINAL MEMBRANE, RIGHT: ICD-10-CM

## 2018-08-01 LAB
POCT GLUCOSE: 107 MG/DL (ref 70–110)
POCT GLUCOSE: 155 MG/DL (ref 70–110)

## 2018-08-01 PROCEDURE — 63600175 PHARM REV CODE 636 W HCPCS: Performed by: OPHTHALMOLOGY

## 2018-08-01 PROCEDURE — 36000706: Performed by: OPHTHALMOLOGY

## 2018-08-01 PROCEDURE — 71000015 HC POSTOP RECOV 1ST HR: Performed by: OPHTHALMOLOGY

## 2018-08-01 PROCEDURE — D9220A PRA ANESTHESIA: Mod: CRNA,,, | Performed by: NURSE ANESTHETIST, CERTIFIED REGISTERED

## 2018-08-01 PROCEDURE — 67036 REMOVAL OF INNER EYE FLUID: CPT | Mod: RT,,, | Performed by: OPHTHALMOLOGY

## 2018-08-01 PROCEDURE — 37000009 HC ANESTHESIA EA ADD 15 MINS: Performed by: OPHTHALMOLOGY

## 2018-08-01 PROCEDURE — 63600175 PHARM REV CODE 636 W HCPCS: Performed by: NURSE ANESTHETIST, CERTIFIED REGISTERED

## 2018-08-01 PROCEDURE — 37000008 HC ANESTHESIA 1ST 15 MINUTES: Performed by: OPHTHALMOLOGY

## 2018-08-01 PROCEDURE — 82962 GLUCOSE BLOOD TEST: CPT | Performed by: OPHTHALMOLOGY

## 2018-08-01 PROCEDURE — 27600004 OPTIME MED/SURG SUP & DEVICES INTRAOCULAR LENS: Performed by: OPHTHALMOLOGY

## 2018-08-01 PROCEDURE — 27201423 OPTIME MED/SURG SUP & DEVICES STERILE SUPPLY: Performed by: OPHTHALMOLOGY

## 2018-08-01 PROCEDURE — C1784 OCULAR DEV, INTRAOP, DET RET: HCPCS | Performed by: OPHTHALMOLOGY

## 2018-08-01 PROCEDURE — 25000003 PHARM REV CODE 250: Performed by: OPHTHALMOLOGY

## 2018-08-01 PROCEDURE — S0020 INJECTION, BUPIVICAINE HYDRO: HCPCS | Performed by: OPHTHALMOLOGY

## 2018-08-01 PROCEDURE — D9220A PRA ANESTHESIA: Mod: ANES,,, | Performed by: ANESTHESIOLOGY

## 2018-08-01 PROCEDURE — 36000707: Performed by: OPHTHALMOLOGY

## 2018-08-01 PROCEDURE — 25000003 PHARM REV CODE 250: Performed by: NURSE ANESTHETIST, CERTIFIED REGISTERED

## 2018-08-01 RX ORDER — PHENYLEPHRINE HYDROCHLORIDE 25 MG/ML
1 SOLUTION/ DROPS OPHTHALMIC
Status: DISCONTINUED | OUTPATIENT
Start: 2018-08-01 | End: 2018-08-01 | Stop reason: HOSPADM

## 2018-08-01 RX ORDER — BUPIVACAINE HYDROCHLORIDE 7.5 MG/ML
INJECTION, SOLUTION EPIDURAL; RETROBULBAR
Status: DISCONTINUED
Start: 2018-08-01 | End: 2018-08-01 | Stop reason: HOSPADM

## 2018-08-01 RX ORDER — TROPICAMIDE 10 MG/ML
1 SOLUTION/ DROPS OPHTHALMIC
Status: DISCONTINUED | OUTPATIENT
Start: 2018-08-01 | End: 2018-08-01 | Stop reason: HOSPADM

## 2018-08-01 RX ORDER — ACETAMINOPHEN 325 MG/1
650 TABLET ORAL EVERY 4 HOURS PRN
Status: DISCONTINUED | OUTPATIENT
Start: 2018-08-01 | End: 2018-08-01 | Stop reason: HOSPADM

## 2018-08-01 RX ORDER — LIDOCAINE HYDROCHLORIDE 10 MG/ML
1 INJECTION, SOLUTION EPIDURAL; INFILTRATION; INTRACAUDAL; PERINEURAL ONCE
Status: DISCONTINUED | OUTPATIENT
Start: 2018-08-01 | End: 2018-08-01 | Stop reason: HOSPADM

## 2018-08-01 RX ORDER — LIDOCAINE HYDROCHLORIDE 10 MG/ML
INJECTION, SOLUTION INTRAVENOUS
Status: DISCONTINUED | OUTPATIENT
Start: 2018-08-01 | End: 2018-08-01

## 2018-08-01 RX ORDER — LIDOCAINE HYDROCHLORIDE 20 MG/ML
INJECTION, SOLUTION EPIDURAL; INFILTRATION; INTRACAUDAL; PERINEURAL
Status: DISCONTINUED
Start: 2018-08-01 | End: 2018-08-01 | Stop reason: HOSPADM

## 2018-08-01 RX ORDER — DEXAMETHASONE SODIUM PHOSPHATE 4 MG/ML
INJECTION, SOLUTION INTRA-ARTICULAR; INTRALESIONAL; INTRAMUSCULAR; INTRAVENOUS; SOFT TISSUE
Status: DISCONTINUED
Start: 2018-08-01 | End: 2018-08-01 | Stop reason: HOSPADM

## 2018-08-01 RX ORDER — PREDNISOLONE ACETATE 10 MG/ML
1 SUSPENSION/ DROPS OPHTHALMIC
Status: DISCONTINUED | OUTPATIENT
Start: 2018-08-01 | End: 2018-08-01 | Stop reason: HOSPADM

## 2018-08-01 RX ORDER — SODIUM CHLORIDE 9 MG/ML
INJECTION, SOLUTION INTRAVENOUS CONTINUOUS
Status: DISCONTINUED | OUTPATIENT
Start: 2018-08-01 | End: 2018-08-01 | Stop reason: HOSPADM

## 2018-08-01 RX ORDER — NEOMYCIN SULFATE, POLYMYXIN B SULFATE, AND DEXAMETHASONE 3.5; 10000; 1 MG/G; [USP'U]/G; MG/G
OINTMENT OPHTHALMIC
Status: DISCONTINUED | OUTPATIENT
Start: 2018-08-01 | End: 2018-08-01 | Stop reason: HOSPADM

## 2018-08-01 RX ORDER — SODIUM CHLORIDE 9 MG/ML
INJECTION, SOLUTION INTRAVENOUS CONTINUOUS PRN
Status: DISCONTINUED | OUTPATIENT
Start: 2018-08-01 | End: 2018-08-01

## 2018-08-01 RX ORDER — ONDANSETRON 2 MG/ML
4 INJECTION INTRAMUSCULAR; INTRAVENOUS DAILY PRN
Status: DISCONTINUED | OUTPATIENT
Start: 2018-08-01 | End: 2018-08-01 | Stop reason: HOSPADM

## 2018-08-01 RX ORDER — OXYCODONE AND ACETAMINOPHEN 5; 325 MG/1; MG/1
1 TABLET ORAL EVERY 6 HOURS PRN
Qty: 12 TABLET | Refills: 0 | Status: SHIPPED | OUTPATIENT
Start: 2018-08-01 | End: 2018-10-05

## 2018-08-01 RX ORDER — ONDANSETRON 8 MG/1
8 TABLET, ORALLY DISINTEGRATING ORAL EVERY 8 HOURS PRN
Status: DISCONTINUED | OUTPATIENT
Start: 2018-08-01 | End: 2018-08-01 | Stop reason: HOSPADM

## 2018-08-01 RX ORDER — TETRACAINE HYDROCHLORIDE 5 MG/ML
1 SOLUTION OPHTHALMIC
Status: DISCONTINUED | OUTPATIENT
Start: 2018-08-01 | End: 2018-08-01 | Stop reason: HOSPADM

## 2018-08-01 RX ORDER — VANCOMYCIN HYDROCHLORIDE 500 MG/10ML
INJECTION, POWDER, LYOPHILIZED, FOR SOLUTION INTRAVENOUS
Status: DISCONTINUED | OUTPATIENT
Start: 2018-08-01 | End: 2018-08-01 | Stop reason: HOSPADM

## 2018-08-01 RX ORDER — ONDANSETRON 4 MG/1
4 TABLET, FILM COATED ORAL EVERY 8 HOURS PRN
Qty: 12 TABLET | Refills: 0 | Status: SHIPPED | OUTPATIENT
Start: 2018-08-01 | End: 2018-10-05

## 2018-08-01 RX ORDER — NEOMYCIN SULFATE, POLYMYXIN B SULFATE, AND DEXAMETHASONE 3.5; 10000; 1 MG/G; [USP'U]/G; MG/G
OINTMENT OPHTHALMIC
Status: DISCONTINUED
Start: 2018-08-01 | End: 2018-08-01 | Stop reason: HOSPADM

## 2018-08-01 RX ORDER — EPINEPHRINE 1 MG/ML
INJECTION, SOLUTION INTRACARDIAC; INTRAMUSCULAR; INTRAVENOUS; SUBCUTANEOUS
Status: DISCONTINUED | OUTPATIENT
Start: 2018-08-01 | End: 2018-08-01 | Stop reason: HOSPADM

## 2018-08-01 RX ORDER — LIDOCAINE HYDROCHLORIDE 20 MG/ML
INJECTION, SOLUTION EPIDURAL; INFILTRATION; INTRACAUDAL; PERINEURAL
Status: DISCONTINUED | OUTPATIENT
Start: 2018-08-01 | End: 2018-08-01 | Stop reason: HOSPADM

## 2018-08-01 RX ORDER — FENTANYL CITRATE 50 UG/ML
25 INJECTION, SOLUTION INTRAMUSCULAR; INTRAVENOUS EVERY 5 MIN PRN
Status: DISCONTINUED | OUTPATIENT
Start: 2018-08-01 | End: 2018-08-01 | Stop reason: HOSPADM

## 2018-08-01 RX ORDER — CYCLOPENTOLATE HYDROCHLORIDE 10 MG/ML
1 SOLUTION/ DROPS OPHTHALMIC
Status: DISCONTINUED | OUTPATIENT
Start: 2018-08-01 | End: 2018-08-01 | Stop reason: HOSPADM

## 2018-08-01 RX ORDER — VANCOMYCIN HYDROCHLORIDE 500 MG/10ML
INJECTION, POWDER, LYOPHILIZED, FOR SOLUTION INTRAVENOUS
Status: DISCONTINUED
Start: 2018-08-01 | End: 2018-08-01 | Stop reason: HOSPADM

## 2018-08-01 RX ORDER — BUPIVACAINE HYDROCHLORIDE 7.5 MG/ML
INJECTION, SOLUTION EPIDURAL; RETROBULBAR
Status: DISCONTINUED | OUTPATIENT
Start: 2018-08-01 | End: 2018-08-01 | Stop reason: HOSPADM

## 2018-08-01 RX ORDER — SODIUM CHLORIDE 0.9 % (FLUSH) 0.9 %
3 SYRINGE (ML) INJECTION
Status: DISCONTINUED | OUTPATIENT
Start: 2018-08-01 | End: 2018-08-01 | Stop reason: HOSPADM

## 2018-08-01 RX ORDER — DEXAMETHASONE SODIUM PHOSPHATE 4 MG/ML
INJECTION, SOLUTION INTRA-ARTICULAR; INTRALESIONAL; INTRAMUSCULAR; INTRAVENOUS; SOFT TISSUE
Status: DISCONTINUED | OUTPATIENT
Start: 2018-08-01 | End: 2018-08-01 | Stop reason: HOSPADM

## 2018-08-01 RX ORDER — PROPOFOL 10 MG/ML
VIAL (ML) INTRAVENOUS
Status: DISCONTINUED | OUTPATIENT
Start: 2018-08-01 | End: 2018-08-01

## 2018-08-01 RX ORDER — MIDAZOLAM HYDROCHLORIDE 1 MG/ML
INJECTION, SOLUTION INTRAMUSCULAR; INTRAVENOUS
Status: DISCONTINUED | OUTPATIENT
Start: 2018-08-01 | End: 2018-08-01

## 2018-08-01 RX ORDER — HYDROCODONE BITARTRATE AND ACETAMINOPHEN 5; 325 MG/1; MG/1
1 TABLET ORAL EVERY 4 HOURS PRN
Status: DISCONTINUED | OUTPATIENT
Start: 2018-08-01 | End: 2018-08-01 | Stop reason: HOSPADM

## 2018-08-01 RX ORDER — MOXIFLOXACIN 5 MG/ML
1 SOLUTION/ DROPS OPHTHALMIC
Status: DISCONTINUED | OUTPATIENT
Start: 2018-08-01 | End: 2018-08-01 | Stop reason: HOSPADM

## 2018-08-01 RX ORDER — EPINEPHRINE 1 MG/ML
INJECTION, SOLUTION INTRACARDIAC; INTRAMUSCULAR; INTRAVENOUS; SUBCUTANEOUS
Status: DISCONTINUED
Start: 2018-08-01 | End: 2018-08-01 | Stop reason: HOSPADM

## 2018-08-01 RX ADMIN — HOMATROPINE HYDROBROMIDE 1 DROP: 50 SOLUTION OPHTHALMIC at 08:08

## 2018-08-01 RX ADMIN — TROPICAMIDE 1 DROP: 10 SOLUTION/ DROPS OPHTHALMIC at 08:08

## 2018-08-01 RX ADMIN — SODIUM CHLORIDE: 0.9 INJECTION, SOLUTION INTRAVENOUS at 09:08

## 2018-08-01 RX ADMIN — PREDNISOLONE ACETATE 1 DROP: 10 SUSPENSION/ DROPS OPHTHALMIC at 08:08

## 2018-08-01 RX ADMIN — PHENYLEPHRINE HYDROCHLORIDE 1 DROP: 25 SOLUTION/ DROPS OPHTHALMIC at 08:08

## 2018-08-01 RX ADMIN — CYCLOPENTOLATE HYDROCHLORIDE 1 DROP: 10 SOLUTION/ DROPS OPHTHALMIC at 08:08

## 2018-08-01 RX ADMIN — TETRACAINE HYDROCHLORIDE 1 DROP: 5 SOLUTION OPHTHALMIC at 08:08

## 2018-08-01 RX ADMIN — MOXIFLOXACIN HYDROCHLORIDE 1 DROP: 5 SOLUTION/ DROPS OPHTHALMIC at 08:08

## 2018-08-01 RX ADMIN — PROPOFOL 60 MG: 10 INJECTION, EMULSION INTRAVENOUS at 09:08

## 2018-08-01 RX ADMIN — LIDOCAINE HYDROCHLORIDE 100 MG: 10 INJECTION, SOLUTION INTRAVENOUS at 09:08

## 2018-08-01 RX ADMIN — MIDAZOLAM HYDROCHLORIDE 2 MG: 1 INJECTION, SOLUTION INTRAMUSCULAR; INTRAVENOUS at 09:08

## 2018-08-01 NOTE — DISCHARGE INSTRUCTIONS
Post Op Instructions:  Patient should Maintain Eye shield & Dressing until seen tomorrow in eye clinic  Tylenol as needed for general discomfort  Use Prescription for pain medication if pain is severe  Use Prescription for Nausea (Zofran) if nausea or vomiting  No excessive exercise   No Bending, Lifting or Straining  Call MD if significant pain or nausea / vomiting uncontrolled by medications  Call MD if temperature in excess of 101' F  Sleep on either side  Return to eye clinic for Post Op Examination tomorrow Morning.  Bring Medicine bag to tomorrow's appointment.      Anesthesia: General Anesthesia     You are watched continuously during your procedure by your anesthesia provider.     Youre due to have surgery. During surgery, youll be given medicine called anesthesia or anesthetic. This will keep you comfortable and pain-free. Your anesthesia provider will use general anesthesia.  What is general anesthesia?  General anesthesia puts you into a state like deep sleep. It goes into the bloodstream (IV anesthetics), into the lungs (gas anesthetics), or both. You feel nothing during the procedure. You will not remember it. During the procedure, the anesthesia provider monitors you continuously. He or she checks your heart rate and rhythm, blood pressure, breathing, and blood oxygen.  · IV anesthetics. IV anesthetics are given through an IV line in your arm. Theyre often given first. This is so you are asleep before a gas anesthetic is started. Some kinds of IV anesthetics relieve pain. Others relax you. Your doctor will decide which kind is best in your case.  · Gas anesthetics. Gas anesthetics are breathed into the lungs. They are often used to keep you asleep. They can be given through a facemask or a tube placed in your larynx or trachea (breathing tube).  ¨ If you have a facemask, your anesthesia provider will most likely place it over your nose and mouth while youre still awake. Youll breathe oxygen  through the mask as your IV anesthetic is started. Gas anesthetic may be added through the mask.  ¨ If you have a tube in the larynx or trachea, it will be inserted into your throat after youre asleep.  Anesthesia tools and medicines  You will likely have:  · IV anesthetics. These are put into an IV line into your bloodstream.  · Gas anesthetics. You breathe these anesthetics into your lungs, where they pass into your bloodstream.  · Pulse oximeter. This is a small clip that is attached to the end of your finger. This measures your blood oxygen level.  · Electrocardiography leads (electrodes). These are small sticky pads that are placed on your chest. They record your heart rate and rhythm.  · Blood pressure cuff. This reads your blood pressure.  Risks and possible complications  General anesthesia has some risks. These include:  · Breathing problems  · Nausea and vomiting  · Sore throat or hoarseness (usually temporary)  · Allergic reaction to the anesthetic  · Irregular heartbeat (rare)  · Cardiac arrest (rare)   Anesthesia safety  · Follow all instructions you are given for how long not to eat or drink before your procedure.  · Be sure your doctor knows what medicines and drugs you take. This includes over-the-counter medicines, herbs, supplements, alcohol or other drugs. You will be asked when those were last taken.  · Have an adult family member or friend drive you home after the procedure.  · For the first 24 hours after your surgery:  ¨ Do not drive or use heavy equipment.  ¨ Do not make important decisions or sign legal documents. If important decisions or signing legal documents is necessary during the first 24 hours after surgery, have a trusted family member or spouse act on your behalf.  ¨ Avoid alcohol.  ¨ Have a responsible adult stay with you. He or she can watch for problems and help keep you safe.  Date Last Reviewed: 12/1/2016  © 4884-5937 [employer]. [address] [facility],  [place] [address]. All rights reserved. This information is not intended as a substitute for professional medical care. Always follow your healthcare professional's instructions.

## 2018-08-01 NOTE — TRANSFER OF CARE
"Anesthesia Transfer of Care Note    Patient: Glenn Medel    Procedure(s) Performed: Procedure(s) (LRB):  REPAIR, RETINAL DETACHMENT, WITH VITRECTOMY (Right)    Patient location: PACU    Anesthesia Type: MAC    Transport from OR: Transported from OR on room air with adequate spontaneous ventilation. Transported from OR on 6-10 L/min O2 by face mask with adequate spontaneous ventilation    Post pain: adequate analgesia    Post assessment: no apparent anesthetic complications and tolerated procedure well    Post vital signs: stable    Level of consciousness: awake and alert    Nausea/Vomiting: no nausea/vomiting    Complications: none    Transfer of care protocol was followed      Last vitals:   Visit Vitals  /70 (BP Location: Left arm, Patient Position: Lying)   Pulse (!) 58   Temp 36.2 °C (97.2 °F) (Temporal)   Resp 18   Ht 5' 11" (1.803 m)   Wt 96.2 kg (212 lb)   SpO2 99%   BMI 29.57 kg/m²     "

## 2018-08-01 NOTE — ANESTHESIA PREPROCEDURE EVALUATION
08/01/2018  Glenn Medel is a 63 y.o., male.    Anesthesia Evaluation         Review of Systems  Anesthesia Hx:  No problems with previous Anesthesia   Social:  Social Alcohol Use    Cardiovascular:   Hypertension  Denies Angina.    Pulmonary:   Denies Shortness of breath. Sleep Apnea, CPAP    Renal/:   Chronic Renal Disease, CRI BPH    Endocrine:   Diabetes, using insulin        Physical Exam  General:  Well nourished    Airway/Jaw/Neck:  Airway Findings: Mouth Opening: Normal Tongue: Normal  General Airway Assessment: Adult  Mallampati: II  TM Distance: Normal, at least 6 cm  Jaw/Neck Findings:     Neck ROM: Normal ROM       Chest/Lungs:  Chest/Lungs Findings: Normal Respiratory Rate, Clear to auscultation     Heart/Vascular:  Heart Findings: Rate: Normal  Rhythm: Regular Rhythm  Sounds: Normal        Mental Status:  Mental Status Findings:  Cooperative, Alert and Oriented         Anesthesia Plan  Type of Anesthesia, risks & benefits discussed:  Anesthesia Type:  general, MAC  Patient's Preference:   Intra-op Monitoring Plan: standard ASA monitors  Intra-op Monitoring Plan Comments:   Post Op Pain Control Plan: multimodal analgesia, IV/PO Opioids PRN and per primary service following discharge from PACU  Post Op Pain Control Plan Comments:   Induction:   IV  Beta Blocker:  Patient is not currently on a Beta-Blocker (No further documentation required).       Informed Consent: Patient understands risks and agrees with Anesthesia plan.  Questions answered. Anesthesia consent signed with patient.  ASA Score: 2     Day of Surgery Review of History & Physical:            Ready For Surgery From Anesthesia Perspective.

## 2018-08-01 NOTE — H&P
Pre-Operative History & Physical  Ophthalmology      SUBJECTIVE:     History of Present Illness:  Patient is a 63 y.o. male presents with Vitreous hemorrhage of both eyes [H43.13]  Vitreous floater, bilateral [H43.393].    MEDICATIONS:   No prescriptions prior to admission.       ALLERGIES:   Review of patient's allergies indicates:   Allergen Reactions    Avelox [moxifloxacin] Hives and Rash       PAST MEDICAL HISTORY:   Past Medical History:   Diagnosis Date    BPH (benign prostatic hyperplasia)     Cataract     OU    DDD (degenerative disc disease), lumbar     s/p epidural steroids     Diabetes mellitus, stable     Glaucoma     OU    HTN (hypertension)     Iritis, lens-induced 1/8/2018    Obesity     LOVE on CPAP      PAST SURGICAL HISTORY:   Past Surgical History:   Procedure Laterality Date    CATARACT EXTRACTION      COLONOSCOPY N/A 9/18/2017    Procedure: COLONOSCOPY;  Surgeon: Paul Feliz Jr., MD;  Location: [facility];  Service: Endoscopy;  Laterality: N/A;    COLONOSCOPY W/ POLYPECTOMY  04/13/2010    YARELI.   One 1 cm polyp in the sigmoid colon.  Internal hemorrhoids.    HAND SURGERY      TONSILLECTOMY      uro lift      for enlarged prostate     PAST FAMILY HISTORY:   Family History   Problem Relation Age of Onset    Heart disease Brother 62    Heart disease Father     Diabetes Father     Colon cancer Father     Cancer Father         colon    Cataracts Father     Hypertension Mother     Diabetes Mother     Stroke Mother     Cancer Mother         colon    Glaucoma Maternal Grandmother     No Known Problems Sister     No Known Problems Maternal Aunt     No Known Problems Maternal Uncle     No Known Problems Paternal Aunt     No Known Problems Paternal Uncle     No Known Problems Maternal Grandfather     No Known Problems Paternal Grandmother     No Known Problems Paternal Grandfather     Amblyopia Neg Hx     Blindness Neg Hx     Macular degeneration Neg Hx      Retinal detachment Neg Hx     Strabismus Neg Hx     Thyroid disease Neg Hx      SOCIAL HISTORY:   Social History   Substance Use Topics    Smoking status: Never Smoker    Smokeless tobacco: Never Used    Alcohol use Yes      Comment: occasional        MENTAL STATUS: Alert    REVIEW OF SYSTEMS: Negative    OBJECTIVE:     Vital Signs (Most Recent)       Physical Exam:  General: NAD  HEENT: Atraumatic  Lungs: Adequate respirations, LCTAB  Heart: RRR, No murmur  Abdomen: Soft NT    ASSESSMENT/PLAN:     Patient is a 63 y.o. male with Vitreous hemorrhage of both eyes [H43.13]  Vitreous floater, bilateral [H43.393].     - Plan for surgical correction PLan 25g PPV/partial AFx OD for vitreous opacities and NCVH OD     Local MAC  25 min  Audie case   - Risks/benefits/alternatives of the procedure including, but not limited to scarring, bleeding, infection, loss or decreased vision, and/or need for possible repeat surgery discussed with the patient and family.   - Informed consent obtained prior to surgery and the patient/family voiced good understanding.    Omar Rosales  8/1/2018  6:05 AM

## 2018-08-01 NOTE — PLAN OF CARE
Discharge instructions reviewed with pt, verbalized understanding, questions answered.  VSS, no c/o pain or nausea, consents in chart.  IV removed, pt has all belongings, ready for discharge.

## 2018-08-01 NOTE — OP NOTE
DATE OF PROCEDURE:  08/01/2018.    PREOPERATIVE DIAGNOSES:  Nonclearing vitreous hemorrhage with vitreous opacities   in the right eye.    POSTOPERATIVE DIAGNOSES:  Nonclearing vitreous hemorrhage with vitreous   opacities in the right eye.    PROCEDURE PERFORMED:  A 25-gauge pars plana vitrectomy with partial air-fluid   exchange to the right eye.    ATTENDING SURGEON:  SAJAN Pulido M.D.    ASSISTANT SURGEON:  Yair Rosales.    ANESTHESIA:  Local monitored anesthesia care with a retrobulbar injection of 4.0   mL mixture of 0.75% Marcaine and 2% Xylocaine.    ESTIMATED BLOOD LOSS:  Minimal.    COMPLICATIONS:  None.    DISPOSITION:  Stable to Recovery.    INDICATIONS FOR SURGERY:  This is a 63-year-old male who had preexisting   hemorrhage in both eyes that had resolved, but left central vitreous opacities.    He underwent a pars plana vitrectomy in the left eye to remove them and   reported back to have them removed from the right eye as well.  Risks, benefits,   and alternatives of surgery were discussed in details with risks including loss   of vision, loss of eye, retinal detachment, recurrent hemorrhage, infection,   cataract formation, lens dislocation, glaucoma, hypotony, ptosis and diplopia.    The patient voiced understanding and wished to proceed with the procedure.    DESCRIPTION OF PROCEDURE:  After proper informed consent was obtained, the   patient was brought back to the Operative Suite at [facility] where   MAC anesthesia was induced.  The patient was prepped and draped in normal   sterile fashion for ophthalmic surgery and lid speculum was placed in the right   eye.  A standard 3-port 25-gauge pars plana vitrectomy was set up with the   infusion cannula inserted 4 mm posterior to the limbus.  The infusion cannula   was turned on only after it was observed to be free and clear of all underlying   retinal tissue.  Supranasal and supratemporal trocars were also placed 4 mm    posterior to the limbus.  The vitrector and light pipe were introduced in the   vitreous cavity.  A core vitrectomy was performed.  Posterior hyaloid was   elevated and propagated out to the level of the vitreous base.  Scleral   depression was used 360 degrees to help with removal of the cortical vitreous.    No identifiable retinal breaks were found.  A partial air-fluid exchange   performed.  The trocars were removed from the eye and not leaking after gentle   massage.  The eye was normal pressure via palpation.  Subconjunctival injections   of vancomycin and Decadron were given to the patient.  The drapes were removed   from the patient.  He was washed free of Betadine prep solution.  Maxitrol   ointment was placed in the right eye.  The eye was patch shielded.  MAC   anesthesia was reversed.  He was brought to Recovery Room in stable condition,   tolerating the procedure well.  Dr. Pulido was present for the entire case.      SYL  dd: 08/01/2018 10:04:16 (CDT)  td: 08/01/2018 10:25:56 (CDT)  Doc ID   #[id number]  Job ID #[id number]    CC:

## 2018-08-01 NOTE — ANESTHESIA POSTPROCEDURE EVALUATION
"Anesthesia Post Evaluation    Patient: Glenn Medel    Procedure(s) Performed: Procedure(s) (LRB):  REPAIR, RETINAL DETACHMENT, WITH VITRECTOMY (Right)    Final Anesthesia Type: MAC                Visit Vitals  /82 (BP Location: Left arm, Patient Position: Sitting)   Pulse 61   Temp 37.1 °C (98.7 °F) (Temporal)   Resp 16   Ht 5' 11" (1.803 m)   Wt 96.2 kg (212 lb)   SpO2 98%   BMI 29.57 kg/m²       Pain/Ezequiel Score: Pain Assessment Performed: Yes (8/1/2018 10:30 AM)  Presence of Pain: denies (8/1/2018 10:30 AM)  Ezequiel Score: 10 (8/1/2018 10:30 AM)      "

## 2018-08-01 NOTE — INTERVAL H&P NOTE
Patient seen and examined today, H&P reviewed.  There are no changes to the patient's H&P.  There is still an ongoing indication for the procedure.  Will proceed with 25g PPV/partial AFx OD for vitreous opacities and NCVH OD. All questions answered.    JUANITO Hardy MD  PGY2, Ophthalmology Resident  08/01/2018  8:53 AM

## 2018-08-01 NOTE — ANESTHESIA POSTPROCEDURE EVALUATION
"Anesthesia Post Evaluation    Patient: Glenn Medel    Procedure(s) Performed: Procedure(s) (LRB):  REPAIR, RETINAL DETACHMENT, WITH VITRECTOMY (Right)    Final Anesthesia Type: MAC  Patient location during evaluation: PACU  Patient participation: Yes- Able to Participate  Level of consciousness: awake and alert  Post-procedure vital signs: reviewed and stable  Pain management: adequate  Airway patency: patent  PONV status at discharge: No PONV  Anesthetic complications: no      Cardiovascular status: hemodynamically stable and blood pressure returned to baseline  Respiratory status: unassisted and spontaneous ventilation  Hydration status: euvolemic  Follow-up not needed.        Visit Vitals  /82 (BP Location: Left arm, Patient Position: Sitting)   Pulse 61   Temp 37.1 °C (98.7 °F) (Temporal)   Resp 16   Ht 5' 11" (1.803 m)   Wt 96.2 kg (212 lb)   SpO2 98%   BMI 29.57 kg/m²       Pain/Ezequiel Score: Pain Assessment Performed: Yes (8/1/2018 10:30 AM)  Presence of Pain: denies (8/1/2018 10:30 AM)  Ezequiel Score: 10 (8/1/2018 10:30 AM)      "

## 2018-08-02 ENCOUNTER — PATIENT MESSAGE (OUTPATIENT)
Dept: NEPHROLOGY | Facility: CLINIC | Age: 63
End: 2018-08-02

## 2018-08-02 ENCOUNTER — PATIENT OUTREACH (OUTPATIENT)
Dept: OTHER | Facility: OTHER | Age: 63
End: 2018-08-02

## 2018-08-02 ENCOUNTER — OFFICE VISIT (OUTPATIENT)
Dept: OPHTHALMOLOGY | Facility: CLINIC | Age: 63
End: 2018-08-02
Payer: COMMERCIAL

## 2018-08-02 VITALS — SYSTOLIC BLOOD PRESSURE: 111 MMHG | HEART RATE: 70 BPM | DIASTOLIC BLOOD PRESSURE: 69 MMHG

## 2018-08-02 DIAGNOSIS — H43.393 OTHER VITREOUS OPACITIES, BILATERAL: Primary | ICD-10-CM

## 2018-08-02 PROCEDURE — 99024 POSTOP FOLLOW-UP VISIT: CPT | Mod: S$GLB,,, | Performed by: OPHTHALMOLOGY

## 2018-08-02 PROCEDURE — 99999 PR PBB SHADOW E&M-EST. PATIENT-LVL II: CPT | Mod: PBBFAC,,, | Performed by: OPHTHALMOLOGY

## 2018-08-02 NOTE — PROGRESS NOTES
HPI     Post-op Evaluation    Additional comments: 1 day check           Comments   1 day post op- Did well post sx. No pain last night or this morning    1) Dense vitreious opacities OU w/NCVH OS   --s/p 25g PPV/partial AFx OS (11/15/17)     NCVH OD  --s/p 25g PPV/partial AFx OD (6/11/18)    2) NS OD,   phaco w/sulcus IOL OS (11/30/17)   --s/p 25g PPV  OS (12/6/17)     3) DM     4) HTN Ret OU       Last edited by Miryam Killian on 8/2/2018 12:43 PM. (History)          A/P    1. Dense vitreous opacities OU with NCVH  No Improvement over last several months  ?prior VH with PVD OU - no breaks or tears    s/p 25g PPV/partial AFx OS for vitreous opacities/NCVH OS 11/15/17    Doing well, good IOP  HAD PC violation during anterior vitreous removal -     Had CE with dropped lens material give PC violation 11/30/17    Increased IOP - though improved with K edema    Will need PPV to remove lens material.  Pt would prefer more prompt resolution    s/p 25g PPV OS for retained lens material OS 12/6/17    Doing well, but IOP improved to 18 - ? Steroid response, inflammation and cortical material improving, AC deep    1/8/18 - some increased inflammation after steroid taper, no CME  1/18 - still low grade inflammation -   6/18 - stable    8/1/18 - 25G PPV/FAX   - Doing well POD1  PF QID oD  Vig QID OD  HA QID OD  No vigorous activity, no lifting, bending, etc.  Sawant shield sleeping  Sawant shield, glasses, or sunglasses all times for protection   No shower   Face down,either side down positioning  RD/Endophthalmitis precautions   RTC 1 wk sooner PRN if any problems (daksha pain, redness, dimming or loss of vision)        2. NS OD  Sulcus IOL OS    3. DM  Controlled No DR  BS/BP/chol control    4. HTN Ret OU    5. POAG  On simbrinza BID  May continue OU once inflammation down

## 2018-08-02 NOTE — PROGRESS NOTES
Last 5 Patient Entered Readings                                      Current 30 Day Average: 125/77     Recent Readings 7/31/2018 7/31/2018 7/30/2018 7/30/2018 7/29/2018    SBP (mmHg) 118 118 121 121 117    DBP (mmHg) 78 78 70 70 74    Pulse 62 62 74 74 69        Patient's BP average is controlled based on 2017 ACC/AHA HTN guidelines of goal BP <130/80.      Patient denies s/s of hypotension (lightheadedness, dizziness, nausea, fatigue) associated with low readings. Instructed patient to inform me if this occurs, patient confirms understanding.      Patient denies s/s of hypertension (SOB, CP, severe headaches, changes in vision) associated with high readings. Instructed patient to go to the ED if BP > 180/110 and accompanied by hypertensive s/s, patient confirms understanding.     Patient's health , Olivier Gaines, will be following up every 3-4 weeks. I will continue to monitor regularly and will follow up in 2-3 months, sooner if BP begins to trend upward or downward.    Patient has my contact information and knows to call with any concerns or clinical changes.     Current HTN regimen:  Hypertension Medications             furosemide (LASIX) 20 MG tablet TAKE 1 TABLET(20 MG) BY MOUTH EVERY MORNING    irbesartan (AVAPRO) 300 MG tablet Take 1 tablet (300 mg total) by mouth once daily. (replaces valsartan)    metoprolol succinate (TOPROL-XL) 25 MG 24 hr tablet TAKE 1 TABLET(25 MG) BY MOUTH EVERY DAY        BP on downward trend, may be due in part to initiation of empgaflozin.        Last 6 Patient Entered Readings                                          Most Recent A1c: 8% (Goal: 7%)     Recent Readings 8/2/2018 8/1/2018 7/31/2018 7/31/2018 7/30/2018    Blood Glucose (mg/dL) 131 136 218 130 125        Current A1c level is not at goal of less than or equal to 7%     Patient denies s/s or episodes of hypoglycemia (weakness, dizziness, hunger, shakiness, nausea, headache, heart palpitations, sweating,  fatigue, anxiety, etc.)     Patient denies s/s of hyperglycemia (headache, increased thirst, increased urination, fatigue, blurred vision)      Current monitoring regimen: 1-2 times daily     Plan: FBG improving with initiation of empagaflozin. Patient agreed to take more post-prandial readings. He has ~ 20 doses remaining of Victoza. He will check with insurance about pricing of Trulicity and Ozempic.     I will continue to monitor regularly and will follow up in 3-4 weeks, sooner if BG begins to trend upward or downward.     Patient has my contact information and knows to call with any concerns or clinical changes    Diabetes Medications             empagliflozin (JARDIANCE) 10 mg Tab Take 10 mg by mouth once daily. (for blood sugar)    insulin glargine (LANTUS SOLOSTAR U-100 INSULIN) 100 unit/mL (3 mL) InPn pen Inject 18 Units into the skin every evening.    metFORMIN (GLUCOPHAGE-XR) 500 MG 24 hr tablet Take 4 tablets (2,000 mg total) by mouth once daily.    VICTOZA 3-NOEL 0.6 mg/0.1 mL (18 mg/3 mL) PnIj ADMINISTER 1.8 MG UNDER THE SKIN EVERY DAY

## 2018-08-05 NOTE — PROGRESS NOTES
HPI     Post-op Evaluation    Additional comments: 1 day check           Comments   1 day post op- Did well post sx. No pain last night or this morning    1) Dense vitreious opacities OU w/[facility] OS   --s/p 25g PPV/partial AFx OS (11/15/17)     [facility] OD  --s/p 25g PPV/partial AFx OD (6/11/18)    2) NS OD,   phaco w/sulcus IOL OS (11/30/17)   --s/p 25g PPV  OS (12/6/17)     3) DM     4) HTN Ret OU       Last edited by Miryam Killian on 8/2/2018 12:43 PM. (History)            Assessment /Plan     For exam results, see Encounter Report.    Other vitreous opacities, bilateral      Case d/w fellow.  Agree with assessment and plan as documented in Dr Rosales's note

## 2018-08-06 ENCOUNTER — LAB VISIT (OUTPATIENT)
Dept: LAB | Facility: HOSPITAL | Age: 63
End: 2018-08-06
Attending: FAMILY MEDICINE
Payer: COMMERCIAL

## 2018-08-06 DIAGNOSIS — N20.0 KIDNEY STONES: ICD-10-CM

## 2018-08-06 PROCEDURE — 82507 ASSAY OF CITRATE: CPT

## 2018-08-07 ENCOUNTER — OFFICE VISIT (OUTPATIENT)
Dept: OPHTHALMOLOGY | Facility: CLINIC | Age: 63
End: 2018-08-07
Payer: COMMERCIAL

## 2018-08-07 DIAGNOSIS — H43.393 OTHER VITREOUS OPACITIES, BILATERAL: ICD-10-CM

## 2018-08-07 DIAGNOSIS — H43.13 VITREOUS HEMORRHAGE, BILATERAL: Primary | ICD-10-CM

## 2018-08-07 PROCEDURE — 99999 PR PBB SHADOW E&M-EST. PATIENT-LVL II: CPT | Mod: PBBFAC,,, | Performed by: OPHTHALMOLOGY

## 2018-08-07 PROCEDURE — 99024 POSTOP FOLLOW-UP VISIT: CPT | Mod: S$GLB,,, | Performed by: OPHTHALMOLOGY

## 2018-08-07 NOTE — PROGRESS NOTES
HPI     Post-op Evaluation    Additional comments: 5 day check           Comments   DLS 8/2/18- Vision the same. Still seeing bubble    PF x 4  HA x 4  vigamox x 4  Johanna qhs    HPI     3 mo f/u   DLS- 03/05/218 Dr. Pulido       Pt sts vision has been about the same OD not as good as OS.   Denies pain.   (-)Flashes (+)Floaters OD  (-)Photophobia  (-)Glare  Gtts:  Simbrinza BID OU        OCT - mild VMT OD  OS - No CME    A/P    1. Dense vitreous opacities OU with [facility]  No Improvement over last several months  ?prior VH with PVD OU - no breaks or tears    s/p 25g PPV/partial AFx OS for vitreous opacities/[facility] OS 11/15/17    Doing well, good IOP  HAD PC violation during anterior vitreous removal -     Had CE with dropped lens material give PC violation 11/30/17    Increased IOP - though improved with K edema    Will need PPV to remove lens material.  Pt would prefer more prompt resolution    s/p 25g PPV OS for retained lens material OS 12/6/17    Doing well, but IOP improved to 18 - ? Steroid response, inflammation and cortical material improving, AC deep    1/8/18 - some increased inflammation after steroid taper, no CME  1/18 - still low grade inflammation -   6/18 - stable    s/p 25g PPV/partial AFx OD for vitreous opacities and [facility] OD 7/1/18    Doing well, good IOP  Continue gtts QID, ointment/shield until Thursday  Then taper PF 3/2/1/0    1 month NS          2. NS OD  Sulcus IOL OS    3. DM  Controlled No DR  BS/BP/chol control    4. HTN Ret OU    5. POAG  On simbrinza BID  May continue OU once inflammation down

## 2018-08-09 ENCOUNTER — PATIENT MESSAGE (OUTPATIENT)
Dept: NEPHROLOGY | Facility: CLINIC | Age: 63
End: 2018-08-09

## 2018-08-13 ENCOUNTER — PATIENT MESSAGE (OUTPATIENT)
Dept: NEPHROLOGY | Facility: CLINIC | Age: 63
End: 2018-08-13

## 2018-08-13 ENCOUNTER — PATIENT OUTREACH (OUTPATIENT)
Dept: OTHER | Facility: OTHER | Age: 63
End: 2018-08-13

## 2018-08-13 NOTE — PROGRESS NOTES
Last 5 Patient Entered Readings                                      Current 30 Day Average: 122/75     Recent Readings 8/10/2018 8/10/2018 8/9/2018 8/9/2018 8/8/2018    SBP (mmHg) 116 116 121 121 137    DBP (mmHg) 76 76 72 72 81    Pulse 71 71 73 73 70          Last 6 Patient Entered Readings                                          Most Recent A1c: 8% (Goal: 7%)     Recent Readings 8/10/2018 8/10/2018 8/9/2018 8/9/2018 8/8/2018    Blood Glucose (mg/dL) 158 156 146 153 138          8/13-[facility] requesting [facility] patient testimonies participation.

## 2018-08-15 LAB — STONE ANALYSIS-IMP: NORMAL

## 2018-08-16 ENCOUNTER — PATIENT OUTREACH (OUTPATIENT)
Dept: OTHER | Facility: OTHER | Age: 63
End: 2018-08-16

## 2018-08-16 NOTE — PROGRESS NOTES
Last 5 Patient Entered Readings                                      Current 30 Day Average: 120/74     Recent Readings 8/16/2018 8/16/2018 8/15/2018 8/15/2018 8/14/2018    SBP (mmHg) 115 115 108 108 117    DBP (mmHg) 70 70 69 69 70    Pulse 81 81 75 75 88        Patient's BP average is controlled based on 2017 ACC/AHA HTN guidelines of goal BP <130/80.      Patient denies s/s of hypotension (lightheadedness, dizziness, nausea, fatigue) associated with low readings. Instructed patient to inform me if this occurs, patient confirms understanding.      Patient denies s/s of hypertension (SOB, CP, severe headaches, changes in vision) associated with high readings. Instructed patient to go to the ED if BP > 180/110 and accompanied by hypertensive s/s, patient confirms understanding.     Patient's health , Olivier Gaines, will be following up every 3-4 weeks. I will continue to monitor regularly and will follow up in 3-4 months, sooner if BP begins to trend upward or downward.    Patient has my contact information and knows to call with any concerns or clinical changes.     Current HTN regimen:  Hypertension Medications             furosemide (LASIX) 20 MG tablet TAKE 1 TABLET(20 MG) BY MOUTH EVERY MORNING    irbesartan (AVAPRO) 300 MG tablet Take 1 tablet (300 mg total) by mouth once daily. (replaces valsartan)    metoprolol succinate (TOPROL-XL) 25 MG 24 hr tablet TAKE 1 TABLET(25 MG) BY MOUTH EVERY DAY        Patient reports occasional dizziness when changing positions. He will continue to monitor and inform me if symptoms worsen.      Last 6 Patient Entered Readings                                          Most Recent A1c: 8% (Goal: 7%)     Recent Readings 8/16/2018 8/15/2018 8/15/2018 8/14/2018 8/14/2018    Blood Glucose (mg/dL) 131 169 122 154 159        Current A1c level is not at goal of less than or equal to 7%     Patient denies s/s or episodes of hypoglycemia (weakness, dizziness, hunger, shakiness,  nausea, headache, heart palpitations, sweating, fatigue, anxiety, etc.)     Patient denies s/s of hyperglycemia (headache, increased thirst, increased urination, fatigue, blurred vision)      Plan: BG readings improved since initiation of empagaflozin. Patient tolerating regimen without hypoglycemia. He is interested in changing victoza to once weekly glp-1. He will complete supply of victoza (about 10 days remaining) and begin Ozempic once weekly on day after last dose of victoza. Begin Ozempic at 0.5 mg once weekly dose since he is tolerating victoza and so not to lose glycemic control. He will inform me if he has any insurance coverage issues. Will not decrease Lantus with initiation of Ozempic since going from one glp-1 agent to another and BGs remain slightly elevated.     I will continue to monitor regularly and will follow up in 3-4 weeks, sooner if BG begins to trend upward or downward.     Patient has my contact information and knows to call with any concerns or clinical changes    Diabetes Medications             empagliflozin (JARDIANCE) 10 mg Tab Take 10 mg by mouth once daily. (for blood sugar)    insulin glargine (LANTUS SOLOSTAR U-100 INSULIN) 100 unit/mL (3 mL) InPn pen Inject 18 Units into the skin every evening.    metFORMIN (GLUCOPHAGE-XR) 500 MG 24 hr tablet Take 4 tablets (2,000 mg total) by mouth once daily.    semaglutide (OZEMPIC) 0.25 mg or 0.5 mg(2 mg/1.5 mL) PnIj Inject 0.5 mg into the skin every 7 days. (replaces Victoza)

## 2018-08-17 ENCOUNTER — PATIENT MESSAGE (OUTPATIENT)
Dept: NEPHROLOGY | Facility: CLINIC | Age: 63
End: 2018-08-17

## 2018-08-22 NOTE — PROGRESS NOTES
Last 5 Patient Entered Readings                                      Current 30 Day Average: 121/74     Recent Readings 8/19/2018 8/19/2018 8/18/2018 8/18/2018 8/17/2018    SBP (mmHg) 113 113 126 126 128    DBP (mmHg) 72 72 78 78 75    Pulse 71 71 80 80 82          Last 6 Patient Entered Readings                                          Most Recent A1c: 8% (Goal: 7%)     Recent Readings 8/19/2018 8/16/2018 8/15/2018 8/15/2018 8/14/2018    Blood Glucose (mg/dL) 130 131 169 122 154          Spoke with pharmacist at [facility], Ozempic not covered by insurance. Trulicity 0.75 mg once weekly processed at no cost to patient. Will begin with 0.75 mg dose since patient has made lifestyle changes and started Jardiance. Can increase Trulicity to 1.5 mg once weekly dose if needed based on SMBGs. Advised patient of above.

## 2018-08-27 NOTE — PROGRESS NOTES
Last 5 Patient Entered Readings                                      Current 30 Day Average: 121/74     Recent Readings 8/27/2018 8/27/2018 8/26/2018 8/26/2018 8/25/2018    SBP (mmHg) 118 118 109 109 129    DBP (mmHg) 70 70 67 67 74    Pulse 75 75 70 70 67          Last 6 Patient Entered Readings                                          Most Recent A1c: 8% (Goal: 7%)     Recent Readings 8/27/2018 8/23/2018 8/22/2018 8/21/2018 8/21/2018    Blood Glucose (mg/dL) 113 125 128 137 162        Patient mostly consisted of his thoughts on his sleep apnea and taking Trulicity.     Encouraged and congratulated patient on maintaining controlled BP and BG readings. He reports appreciating [facility] care team for everything we have done for him.     Digital Medicine: Health  Follow Up    Lifestyle Modifications:    1.Dietary Modifications (Sodium intake <2,000mg/day, food labels, dining out): Patient states his diet is the same.     2.Physical Activity: Patient reports staying active by accomplishing a great deal of yardwork . He states he is not overworking himself and is drinking plenty of water. Encouraged patient to continue his high activity level and being mindful of amount of water consumed.     3.Medication Therapy: Patient has been compliant with the medication regimen.    4.Patient has the following medication side effects/concerns:   (Frequency/Alleviating factors/Precipitating factors, etc.)     Follow up with Mr. Glenn Medel completed. No further questions or concerns. Will continue follow up to achieve health goals.

## 2018-09-04 RX ORDER — MODAFINIL 200 MG/1
TABLET ORAL
Qty: 60 TABLET | Refills: 0 | Status: SHIPPED | OUTPATIENT
Start: 2018-09-04 | End: 2018-10-23 | Stop reason: SDUPTHER

## 2018-09-04 NOTE — TELEPHONE ENCOUNTER
Please inform patient that a prescription for modafinil was refilled for patient.  Last sleep clinic appointment was 5/9/17.  Please advice patient to make clinic appointment with md/np for additional refill.

## 2018-09-06 ENCOUNTER — PATIENT OUTREACH (OUTPATIENT)
Dept: OTHER | Facility: OTHER | Age: 63
End: 2018-09-06

## 2018-09-06 DIAGNOSIS — Z79.4 TYPE 2 DIABETES MELLITUS WITH DIABETIC NEPHROPATHY, WITH LONG-TERM CURRENT USE OF INSULIN: ICD-10-CM

## 2018-09-06 DIAGNOSIS — E11.21 TYPE 2 DIABETES MELLITUS WITH DIABETIC NEPHROPATHY, WITH LONG-TERM CURRENT USE OF INSULIN: ICD-10-CM

## 2018-09-06 RX ORDER — INSULIN GLARGINE 100 [IU]/ML
20 INJECTION, SOLUTION SUBCUTANEOUS NIGHTLY
Qty: 2 BOX | Refills: 3
Start: 2018-09-06 | End: 2019-08-26 | Stop reason: SDUPTHER

## 2018-09-06 NOTE — PROGRESS NOTES
Last 5 Patient Entered Readings                                      Current 30 Day Average: 121/74     Recent Readings 9/6/2018 9/6/2018 9/5/2018 9/5/2018 9/4/2018    SBP (mmHg) 131 131 117 117 134    DBP (mmHg) 80 80 81 81 75    Pulse 78 78 73 73 89        Patient's BP average is controlled based on 2017 ACC/AHA HTN guidelines of goal BP <130/80.      Patient denies s/s of hypotension (lightheadedness, dizziness, nausea, fatigue) associated with low readings. Instructed patient to inform me if this occurs, patient confirms understanding.      Patient denies s/s of hypertension (SOB, CP, severe headaches, changes in vision) associated with high readings. Instructed patient to go to the ED if BP > 180/110 and accompanied by hypertensive s/s, patient confirms understanding.     Patient's health , Olivier Gaines, will be following up every 3-4 weeks. I will continue to monitor regularly and will follow up in 3-4 months, sooner if BP begins to trend upward or downward.    Patient has my contact information and knows to call with any concerns or clinical changes.     Current HTN regimen:  Hypertension Medications             furosemide (LASIX) 20 MG tablet TAKE 1 TABLET(20 MG) BY MOUTH EVERY MORNING    irbesartan (AVAPRO) 300 MG tablet Take 1 tablet (300 mg total) by mouth once daily. (replaces valsartan)    metoprolol succinate (TOPROL-XL) 25 MG 24 hr tablet TAKE 1 TABLET(25 MG) BY MOUTH EVERY DAY            Last 6 Patient Entered Readings                                          Most Recent A1c: 8% on 6/11/2018  (Goal: 7%)     Recent Readings 9/4/2018 9/4/2018 9/3/2018 9/3/2018 9/2/2018    Blood Glucose (mg/dL) 159 153 231 159 154      9/5 -  mg/dL, 2hpp-S 262 mg/dL  9/6 -  mg/dL, 2hpp-L 213 mg/dL      Current A1c level is not at goal of less than or equal to 7%     Patient denies s/s or episodes of hypoglycemia (weakness, dizziness, hunger, shakiness, nausea, headache, heart palpitations,  sweating, fatigue, anxiety, etc.)     Current monitoring regimen: BID (FBG and 2hpp-vary meal)     Medication adherence: reports adherence     Started Trulicity 8/26. SMBGs trending up. Patient admits diet has not been as healthy and exercising less recently. Reports less energy over past week. Discussed that hyperglycemia can contribute to fatigue. Discussed increasing Trulicity dose, however, he would like to refocus on lifestyle until follow up in 2 weeks. Increase Lantus to 20 units daily. Schedule repeat A1c on follow up.     Patient has my contact information and knows to call with any concerns or clinical changes    Diabetes Medications             dulaglutide (TRULICITY) 0.75 mg/0.5 mL PnIj Inject 0.5 mLs (0.75 mg total) into the skin every 7 days.    empagliflozin (JARDIANCE) 10 mg Tab Take 10 mg by mouth once daily. (for blood sugar)    insulin glargine (LANTUS SOLOSTAR U-100 INSULIN) 100 unit/mL (3 mL) InPn pen Inject 18 Units into the skin every evening.    metFORMIN (GLUCOPHAGE-XR) 500 MG 24 hr tablet Take 4 tablets (2,000 mg total) by mouth once daily.

## 2018-09-07 ENCOUNTER — TELEPHONE (OUTPATIENT)
Dept: NEPHROLOGY | Facility: CLINIC | Age: 63
End: 2018-09-07

## 2018-09-07 NOTE — TELEPHONE ENCOUNTER
----- Message from Chayo Boone PharmD sent at 9/6/2018  4:58 PM CDT -----  Patient states he has not received the results in the mail for his stone risk profile. Please send, or let him know if you already have.    thanks

## 2018-09-10 ENCOUNTER — OFFICE VISIT (OUTPATIENT)
Dept: OPHTHALMOLOGY | Facility: CLINIC | Age: 63
End: 2018-09-10
Payer: COMMERCIAL

## 2018-09-10 DIAGNOSIS — H43.821 VITREOMACULAR ADHESION, RIGHT: ICD-10-CM

## 2018-09-10 PROCEDURE — 99024 POSTOP FOLLOW-UP VISIT: CPT | Mod: S$GLB,,, | Performed by: OPHTHALMOLOGY

## 2018-09-10 PROCEDURE — 99999 PR PBB SHADOW E&M-EST. PATIENT-LVL II: CPT | Mod: PBBFAC,,, | Performed by: OPHTHALMOLOGY

## 2018-09-10 NOTE — PROGRESS NOTES
HPI     DLS 8/07/18- Dr. Pulido    Pt states that vision ain't no better than it was thw last visit. No changes.  No pain, flashes, admits anjali floaters havent seen in a while, (+) double vision - OD. No longer seeing bubble.    Eye Med(s) - Simbrinza BID - OU    HPI     Post-op Evaluation    Additional comments: 5 day check           Comments   DLS 8/2/18- Vision the same. Still seeing bubble    PF x 4  HA x 4  vigamox x 4  Johanna qhs    HPI     3 mo f/u   DLS- 03/05/218 Dr. Pulido       Pt sts vision has been about the same OD not as good as OS.   Denies pain.   (-)Flashes (+)Floaters OD  (-)Photophobia  (-)Glare  Gtts:  Simbrinza BID OU        Prior OCT - mild VMT OD  OS - No CME    A/P    1. Dense vitreous opacities OU with [facility]  No Improvement over last several months  ?prior VH with PVD OU - no breaks or tears    s/p 25g PPV/partial AFx OS for vitreous opacities/[facility] OS 11/15/17    Doing well, good IOP  HAD PC violation during anterior vitreous removal -     Had CE with dropped lens material give PC violation 11/30/17    Increased IOP - though improved with K edema    Will need PPV to remove lens material.  Pt would prefer more prompt resolution    s/p 25g PPV OS for retained lens material OS 12/6/17    Doing well, but IOP improved to 18 - ? Steroid response, inflammation and cortical material improving, AC deep    1/8/18 - some increased inflammation after steroid taper, no CME  1/18 - still low grade inflammation -   6/18 - stable    s/p 25g PPV/partial AFx OD for vitreous opacities and [facility] OD 7/1/18    Doing well, good IOP    3 months OCT      2. NS OD  Sulcus IOL OS    3. DM  Controlled No DR  BS/BP/chol control    4. HTN Ret OU    5. POAG  On simbrinza BID  May continue OU once inflammation down

## 2018-09-12 ENCOUNTER — PATIENT MESSAGE (OUTPATIENT)
Dept: NEPHROLOGY | Facility: CLINIC | Age: 63
End: 2018-09-12

## 2018-09-12 ENCOUNTER — PATIENT MESSAGE (OUTPATIENT)
Dept: FAMILY MEDICINE | Facility: CLINIC | Age: 63
End: 2018-09-12

## 2018-09-13 ENCOUNTER — PATIENT MESSAGE (OUTPATIENT)
Dept: OPHTHALMOLOGY | Facility: CLINIC | Age: 63
End: 2018-09-13

## 2018-09-13 ENCOUNTER — TELEPHONE (OUTPATIENT)
Dept: OPHTHALMOLOGY | Facility: CLINIC | Age: 63
End: 2018-09-13

## 2018-09-19 RX ORDER — ATORVASTATIN CALCIUM 20 MG/1
TABLET, FILM COATED ORAL
Qty: 90 TABLET | Refills: 2 | Status: SHIPPED | OUTPATIENT
Start: 2018-09-19 | End: 2019-03-12 | Stop reason: SDUPTHER

## 2018-09-19 RX ORDER — PEN NEEDLE, DIABETIC 31 GX5/16"
NEEDLE, DISPOSABLE MISCELLANEOUS
Qty: 100 EACH | Refills: 0 | Status: SHIPPED | OUTPATIENT
Start: 2018-09-19 | End: 2019-04-12 | Stop reason: SDUPTHER

## 2018-09-24 ENCOUNTER — PATIENT OUTREACH (OUTPATIENT)
Dept: OTHER | Facility: OTHER | Age: 63
End: 2018-09-24

## 2018-09-24 NOTE — PROGRESS NOTES
Last 5 Patient Entered Readings                                      Current 30 Day Average: 121/74     Recent Readings 9/19/2018 9/19/2018 9/18/2018 9/18/2018 9/17/2018    SBP (mmHg) 121 121 134 134 118    DBP (mmHg) 80 80 79 79 69    Pulse 77 77 69 69 77          Last 6 Patient Entered Readings                                          Most Recent A1c: 8% on 6/11/2018  (Goal: 7%)     Recent Readings 9/20/2018 9/19/2018 9/18/2018 9/18/2018 9/17/2018    Blood Glucose (mg/dL) 126 118 167 142 169            Digital Medicine: Health  Follow Up    Left voicemail to follow up with Mr. Glenn Medel.  Current BP average 121/74 mmHg is at goal, [130/80]. Current A1C 8% is not at goal. [7%]

## 2018-09-27 ENCOUNTER — PATIENT OUTREACH (OUTPATIENT)
Dept: OTHER | Facility: OTHER | Age: 63
End: 2018-09-27

## 2018-09-27 DIAGNOSIS — I10 ESSENTIAL HYPERTENSION: ICD-10-CM

## 2018-09-27 DIAGNOSIS — E11.21 TYPE 2 DIABETES MELLITUS WITH DIABETIC NEPHROPATHY, WITH LONG-TERM CURRENT USE OF INSULIN: ICD-10-CM

## 2018-09-27 DIAGNOSIS — Z79.4 TYPE 2 DIABETES MELLITUS WITH DIABETIC NEPHROPATHY, WITH LONG-TERM CURRENT USE OF INSULIN: ICD-10-CM

## 2018-09-27 NOTE — PROGRESS NOTES
Last 5 Patient Entered Readings                                      Current 30 Day Average: 122/75     Recent Readings 9/26/2018 9/26/2018 9/25/2018 9/25/2018 9/24/2018    SBP (mmHg) 116 116 121 121 135    DBP (mmHg) 75 75 74 74 83    Pulse 74 74 70 70 68          Last 6 Patient Entered Readings                                          Most Recent A1c: 8% on 6/11/2018  (Goal: 7%)     Recent Readings 9/27/2018 9/26/2018 9/25/2018 9/25/2018 9/20/2018    Blood Glucose (mg/dL) 117 146 156 129 126        Left voicemail. Confirm if pre-meal BG readings labeled correctly; these are the only readings typically elevated. Otherwise, BP and BG well controlled. Health  also attempting contact.

## 2018-10-01 ENCOUNTER — PATIENT MESSAGE (OUTPATIENT)
Dept: CARDIOLOGY | Facility: CLINIC | Age: 63
End: 2018-10-01

## 2018-10-01 ENCOUNTER — PATIENT MESSAGE (OUTPATIENT)
Dept: NEPHROLOGY | Facility: CLINIC | Age: 63
End: 2018-10-01

## 2018-10-01 DIAGNOSIS — D50.8 OTHER IRON DEFICIENCY ANEMIA: Primary | ICD-10-CM

## 2018-10-01 DIAGNOSIS — N18.30 CKD (CHRONIC KIDNEY DISEASE) STAGE 3, GFR 30-59 ML/MIN: ICD-10-CM

## 2018-10-01 NOTE — PROGRESS NOTES
HYPERTENSION  Last 5 Patient Entered Readings                                      Current 30 Day Average: 122/76     Recent Readings 9/28/2018 9/28/2018 9/27/2018 9/27/2018 9/26/2018    SBP (mmHg) 112 112 131 131 116    DBP (mmHg) 78 78 78 78 75    Pulse 75 75 68 68 74        Patient's BP average is controlled based on 2017 ACC/AHA HTN guidelines of goal BP <130/80.      Patient denies s/s of hypotension (lightheadedness, dizziness, nausea, fatigue) associated with low readings. Instructed patient to inform me if this occurs, patient confirms understanding.      Patient denies s/s of hypertension (SOB, CP, severe headaches, changes in vision) associated with high readings. Instructed patient to go to the ED if BP > 180/110 and accompanied by hypertensive s/s, patient confirms understanding.     Current HTN regimen:  Hypertension Medications             furosemide (LASIX) 20 MG tablet TAKE 1 TABLET(20 MG) BY MOUTH EVERY MORNING    irbesartan (AVAPRO) 300 MG tablet Take 1 tablet (300 mg total) by mouth once daily. (replaces valsartan)    metoprolol succinate (TOPROL-XL) 25 MG 24 hr tablet TAKE 1 TABLET(25 MG) BY MOUTH EVERY DAY          DIABETES  Last 6 Patient Entered Readings                                          Most Recent A1c: 8% on 6/11/2018  (Goal: 7%)     Recent Readings 10/1/2018 9/29/2018 9/28/2018 9/28/2018 9/27/2018    Blood Glucose (mg/dL) 128 132 119 125 121        Current A1c level is not at goal of less than or equal to 7%     Patient denies s/s or episodes of hypoglycemia (weakness, dizziness, hunger, shakiness, nausea, headache, heart palpitations, sweating, fatigue, anxiety, etc.)     Patient denies s/s of hyperglycemia (headache, increased thirst, increased urination, fatigue, blurred vision)      Current monitoring regimen: BID (FBG and vary)     Medication adherence: reports adherence     Discussed pre-meal readings 9/18 and 9/25, these were taken about 2 hours after a snack. He typically  eats an afternoon snack ~ 3-3:30 pm. SMBGs typically at goal. Repeat A1C 10/3 with other labs. Patient requests 90 day supply of medications if doses remaining stable after A1C.    Diabetes Medications             dulaglutide (TRULICITY) 0.75 mg/0.5 mL PnIj Inject 0.5 mLs (0.75 mg total) into the skin every 7 days.    empagliflozin (JARDIANCE) 10 mg Tab Take 10 mg by mouth once daily. (for blood sugar)    insulin glargine (LANTUS SOLOSTAR U-100 INSULIN) 100 unit/mL (3 mL) InPn pen Inject 20 Units into the skin every evening.    metFORMIN (GLUCOPHAGE-XR) 500 MG 24 hr tablet Take 4 tablets (2,000 mg total) by mouth once daily.        I will continue to monitor regularly and will follow up in 4-6 weeks, sooner if BP or BG begins to trend upward or downward.     Patient has my contact information and knows to call with any concerns or clinical changes

## 2018-10-01 NOTE — PROGRESS NOTES
Last 5 Patient Entered Readings                                      Current 30 Day Average: 122/76     Recent Readings 9/28/2018 9/28/2018 9/27/2018 9/27/2018 9/26/2018    SBP (mmHg) 112 112 131 131 116    DBP (mmHg) 78 78 78 78 75    Pulse 75 75 68 68 74          Last 6 Patient Entered Readings                                          Most Recent A1c: 8% on 6/11/2018  (Goal: 7%)     Recent Readings 10/1/2018 9/29/2018 9/28/2018 9/28/2018 9/27/2018    Blood Glucose (mg/dL) 128 132 119 125 121          Returned patient's call; left voicemail. Confirm if pre-meal BG readings 9/18, 9/25 labeled correctly; these are the only readings elevated. Otherwise, BP and BG well controlled. Health  also attempting contact.

## 2018-10-03 ENCOUNTER — LAB VISIT (OUTPATIENT)
Dept: LAB | Facility: HOSPITAL | Age: 63
End: 2018-10-03
Attending: INTERNAL MEDICINE
Payer: COMMERCIAL

## 2018-10-03 DIAGNOSIS — D50.8 OTHER IRON DEFICIENCY ANEMIA: ICD-10-CM

## 2018-10-03 DIAGNOSIS — E11.21 TYPE 2 DIABETES MELLITUS WITH DIABETIC NEPHROPATHY, WITH LONG-TERM CURRENT USE OF INSULIN: ICD-10-CM

## 2018-10-03 DIAGNOSIS — N18.30 CKD (CHRONIC KIDNEY DISEASE) STAGE 3, GFR 30-59 ML/MIN: ICD-10-CM

## 2018-10-03 DIAGNOSIS — Z79.4 TYPE 2 DIABETES MELLITUS WITH DIABETIC NEPHROPATHY, WITH LONG-TERM CURRENT USE OF INSULIN: ICD-10-CM

## 2018-10-03 LAB
ALBUMIN SERPL BCP-MCNC: 3.9 G/DL
ANION GAP SERPL CALC-SCNC: 9 MMOL/L
BASOPHILS # BLD AUTO: 0.08 K/UL
BASOPHILS NFR BLD: 0.9 %
BUN SERPL-MCNC: 19 MG/DL
CALCIUM SERPL-MCNC: 9.9 MG/DL
CHLORIDE SERPL-SCNC: 101 MMOL/L
CO2 SERPL-SCNC: 29 MMOL/L
CREAT SERPL-MCNC: 1.1 MG/DL
DIFFERENTIAL METHOD: ABNORMAL
EOSINOPHIL # BLD AUTO: 0.2 K/UL
EOSINOPHIL NFR BLD: 2.7 %
ERYTHROCYTE [DISTWIDTH] IN BLOOD BY AUTOMATED COUNT: 14 %
EST. GFR  (AFRICAN AMERICAN): >60 ML/MIN/1.73 M^2
EST. GFR  (NON AFRICAN AMERICAN): >60 ML/MIN/1.73 M^2
ESTIMATED AVG GLUCOSE: 163 MG/DL
FERRITIN SERPL-MCNC: 58 NG/ML
GLUCOSE SERPL-MCNC: 172 MG/DL
HBA1C MFR BLD HPLC: 7.3 %
HCT VFR BLD AUTO: 52.8 %
HGB BLD-MCNC: 16.5 G/DL
IMM GRANULOCYTES # BLD AUTO: 0.04 K/UL
IMM GRANULOCYTES NFR BLD AUTO: 0.4 %
IRON SERPL-MCNC: 88 UG/DL
LYMPHOCYTES # BLD AUTO: 2.6 K/UL
LYMPHOCYTES NFR BLD: 28.9 %
MCH RBC QN AUTO: 27.6 PG
MCHC RBC AUTO-ENTMCNC: 31.3 G/DL
MCV RBC AUTO: 88 FL
MONOCYTES # BLD AUTO: 0.7 K/UL
MONOCYTES NFR BLD: 7.3 %
NEUTROPHILS # BLD AUTO: 5.4 K/UL
NEUTROPHILS NFR BLD: 59.8 %
NRBC BLD-RTO: 0 /100 WBC
PHOSPHATE SERPL-MCNC: 3.7 MG/DL
PLATELET # BLD AUTO: 278 K/UL
PMV BLD AUTO: 9.2 FL
POTASSIUM SERPL-SCNC: 5.1 MMOL/L
PTH-INTACT SERPL-MCNC: 57 PG/ML
RBC # BLD AUTO: 5.97 M/UL
SATURATED IRON: 22 %
SODIUM SERPL-SCNC: 139 MMOL/L
TOTAL IRON BINDING CAPACITY: 398 UG/DL
TRANSFERRIN SERPL-MCNC: 269 MG/DL
WBC # BLD AUTO: 8.94 K/UL

## 2018-10-03 PROCEDURE — 80069 RENAL FUNCTION PANEL: CPT

## 2018-10-03 PROCEDURE — 83540 ASSAY OF IRON: CPT

## 2018-10-03 PROCEDURE — 36415 COLL VENOUS BLD VENIPUNCTURE: CPT | Mod: PO

## 2018-10-03 PROCEDURE — 82728 ASSAY OF FERRITIN: CPT

## 2018-10-03 PROCEDURE — 85025 COMPLETE CBC W/AUTO DIFF WBC: CPT

## 2018-10-03 PROCEDURE — 83036 HEMOGLOBIN GLYCOSYLATED A1C: CPT

## 2018-10-03 PROCEDURE — 83970 ASSAY OF PARATHORMONE: CPT

## 2018-10-05 ENCOUNTER — OFFICE VISIT (OUTPATIENT)
Dept: NEPHROLOGY | Facility: CLINIC | Age: 63
End: 2018-10-05
Payer: COMMERCIAL

## 2018-10-05 VITALS
SYSTOLIC BLOOD PRESSURE: 113 MMHG | BODY MASS INDEX: 30.01 KG/M2 | WEIGHT: 215.19 LBS | HEART RATE: 70 BPM | DIASTOLIC BLOOD PRESSURE: 71 MMHG

## 2018-10-05 DIAGNOSIS — Z79.4 TYPE 2 DIABETES MELLITUS WITH DIABETIC NEPHROPATHY, WITH LONG-TERM CURRENT USE OF INSULIN: ICD-10-CM

## 2018-10-05 DIAGNOSIS — J84.9 ILD (INTERSTITIAL LUNG DISEASE): ICD-10-CM

## 2018-10-05 DIAGNOSIS — E66.9 OBESITY (BMI 30.0-34.9): ICD-10-CM

## 2018-10-05 DIAGNOSIS — N18.30 CKD (CHRONIC KIDNEY DISEASE) STAGE 3, GFR 30-59 ML/MIN: Primary | ICD-10-CM

## 2018-10-05 DIAGNOSIS — I10 ESSENTIAL HYPERTENSION: ICD-10-CM

## 2018-10-05 DIAGNOSIS — E11.21 TYPE 2 DIABETES MELLITUS WITH DIABETIC NEPHROPATHY, WITH LONG-TERM CURRENT USE OF INSULIN: ICD-10-CM

## 2018-10-05 DIAGNOSIS — E78.5 HYPERLIPIDEMIA, UNSPECIFIED HYPERLIPIDEMIA TYPE: ICD-10-CM

## 2018-10-05 DIAGNOSIS — G47.33 OSA (OBSTRUCTIVE SLEEP APNEA): ICD-10-CM

## 2018-10-05 DIAGNOSIS — N18.30 CHRONIC KIDNEY DISEASE, STAGE III (MODERATE): ICD-10-CM

## 2018-10-05 PROCEDURE — 3045F PR MOST RECENT HEMOGLOBIN A1C LEVEL 7.0-9.0%: CPT | Mod: CPTII,S$GLB,, | Performed by: INTERNAL MEDICINE

## 2018-10-05 PROCEDURE — 3008F BODY MASS INDEX DOCD: CPT | Mod: CPTII,S$GLB,, | Performed by: INTERNAL MEDICINE

## 2018-10-05 PROCEDURE — 3078F DIAST BP <80 MM HG: CPT | Mod: CPTII,S$GLB,, | Performed by: INTERNAL MEDICINE

## 2018-10-05 PROCEDURE — 99999 PR PBB SHADOW E&M-EST. PATIENT-LVL III: CPT | Mod: PBBFAC,,, | Performed by: INTERNAL MEDICINE

## 2018-10-05 PROCEDURE — 3074F SYST BP LT 130 MM HG: CPT | Mod: CPTII,S$GLB,, | Performed by: INTERNAL MEDICINE

## 2018-10-05 PROCEDURE — 99214 OFFICE O/P EST MOD 30 MIN: CPT | Mod: S$GLB,,, | Performed by: INTERNAL MEDICINE

## 2018-10-05 RX ORDER — IRBESARTAN 300 MG/1
300 TABLET ORAL DAILY
Qty: 90 TABLET | Refills: 1 | Status: SHIPPED | OUTPATIENT
Start: 2018-10-05 | End: 2019-02-04 | Stop reason: SDUPTHER

## 2018-10-05 NOTE — PROGRESS NOTES
Last 5 Patient Entered Readings                                      Current 30 Day Average: 121/76     Recent Readings 10/5/2018 10/5/2018 10/4/2018 10/4/2018 10/2/2018    SBP (mmHg) 116 116 125 125 110    DBP (mmHg) 70 70 72 72 76    Pulse 72 72 77 77 74          Last 6 Patient Entered Readings                                          Most Recent A1c: 7.3% on 10/3/2018  (Goal: 7%)     Recent Readings 10/5/2018 10/4/2018 10/4/2018 10/3/2018 10/3/2018    Blood Glucose (mg/dL) 122 209 108 158 128         Patient also discussed this mornings MD visit.     Digital Medicine: Health  Follow Up    Lifestyle Modifications:    1.Dietary Modifications (Sodium intake <2,000mg/day, food labels, dining out): Patient states he struggles with breakfast food being elevated in carbs or sugar as he eats a bowl of cereal. Patient states he will make breakfast changes per MD request. Will assess changes upon next encounter.     2.Physical Activity: Patient reports he has not been exercising as much as he should. Patient reports he aims to exercise 3x/week, walking in the afternoon. Encouraged patient to walk 15-30 minutes , 3x/week. Encouraged patient to increase physical activity.     3.Medication Therapy: Patient has been compliant with the medication regimen.    4.Patient has the following medication side effects/concerns:   (Frequency/Alleviating factors/Precipitating factors, etc.)     Follow up with Mr. Glenn Medel completed. No further questions or concerns. Will continue to follow up to achieve health goals.

## 2018-10-12 ENCOUNTER — PATIENT MESSAGE (OUTPATIENT)
Dept: OPHTHALMOLOGY | Facility: CLINIC | Age: 63
End: 2018-10-12

## 2018-10-16 RX ORDER — DULAGLUTIDE 0.75 MG/.5ML
INJECTION, SOLUTION SUBCUTANEOUS
Qty: 2 ML | Refills: 9 | Status: SHIPPED | OUTPATIENT
Start: 2018-10-16 | End: 2019-06-28 | Stop reason: SDUPTHER

## 2018-10-17 ENCOUNTER — PATIENT MESSAGE (OUTPATIENT)
Dept: OPHTHALMOLOGY | Facility: CLINIC | Age: 63
End: 2018-10-17

## 2018-10-18 ENCOUNTER — TELEPHONE (OUTPATIENT)
Dept: OPHTHALMOLOGY | Facility: CLINIC | Age: 63
End: 2018-10-18

## 2018-10-18 NOTE — PROGRESS NOTES
Subjective:       Patient ID: Glenn Medel is a 63 y.o. White male who presents for follow-up evaluation of Follow-up; Chronic Kidney Disease; and Proteinuria    HPI    He reports he is doing well. He has intentionally lost weight. His Hba1c improved to 7.4 from >8. No LE and no SOB. No LUTS.   Review of Systems   Constitutional: Negative for activity change, appetite change, fatigue and unexpected weight change.   HENT: Negative for facial swelling.    Eyes: Negative for visual disturbance.   Respiratory: Negative for cough and shortness of breath.    Cardiovascular: Negative for chest pain and leg swelling.   Gastrointestinal: Negative for constipation and diarrhea.   Endocrine: Negative for polyuria.   Genitourinary: Positive for frequency. Negative for difficulty urinating, dysuria, hematuria and urgency.   Musculoskeletal: Negative for arthralgias.   Skin: Negative for rash.   Neurological: Negative for weakness and headaches.   Hematological: Does not bruise/bleed easily.   Psychiatric/Behavioral: Negative for decreased concentration.       Objective:      Physical Exam   Constitutional: He is oriented to person, place, and time. He appears well-developed and well-nourished. No distress.   Eyes: No scleral icterus.   Neck: No JVD present.   Cardiovascular: S1 normal and S2 normal. Exam reveals no friction rub.   Pulmonary/Chest: Breath sounds normal.   Abdominal: Soft.   Musculoskeletal: He exhibits no edema.   Neurological: He is alert and oriented to person, place, and time.   Skin: Skin is warm and dry. No erythema.   Psychiatric: He has a normal mood and affect.   Nursing note and vitals reviewed.      Assessment:       1. CKD (chronic kidney disease) stage 3, GFR 30-59 ml/min    2. Chronic kidney disease, stage 3    3. Essential hypertension    4. Hyperlipidemia, unspecified hyperlipidemia type    5. ILD (interstitial lung disease)    6. Type 2 diabetes mellitus with diabetic nephropathy, with  long-term current use of insulin    7. Obesity (BMI 30.0-34.9)    8. Uncontrolled type 2 diabetes mellitus with diabetic polyneuropathy, without long-term current use of insulin    9. LOVE (obstructive sleep apnea)        Plan:             CKD 2/3 is stable with only 80mg of proteinuria. Continue RAAS blockade (irbesartan) for renal preservation    HTN is controlled    Mineral and Bone Disease--continue D3    DM--improving. Continue Endocrine follow up      RTC 6 mo

## 2018-10-18 NOTE — TELEPHONE ENCOUNTER
Left message for pt to return call to scheduled him an appt with Dr Garcia around 11/14 for mrx OD and cat katie.

## 2018-10-18 NOTE — TELEPHONE ENCOUNTER
----- Message from Cheryle Quintana sent at 10/18/2018  9:52 AM CDT -----  LM for pt to return call to schedule with Dr SLATER for cat katie and mrx. Schedule for 11/14.

## 2018-10-18 NOTE — TELEPHONE ENCOUNTER
----- Message from Damaris Garcia MD sent at 10/18/2018  9:27 AM CDT -----  Yes happy to do that as well but maybe 2nd week of nov is next avail??

## 2018-10-22 ENCOUNTER — PATIENT MESSAGE (OUTPATIENT)
Dept: CARDIOLOGY | Facility: CLINIC | Age: 63
End: 2018-10-22

## 2018-10-23 RX ORDER — MODAFINIL 200 MG/1
TABLET ORAL
Qty: 60 TABLET | Refills: 0 | Status: SHIPPED | OUTPATIENT
Start: 2018-10-23 | End: 2018-12-17 | Stop reason: SDUPTHER

## 2018-10-23 NOTE — TELEPHONE ENCOUNTER
[facility] is requesting a new script  For modafinil (PROVIGIL) 200 MG Tab. Please advise.

## 2018-10-23 NOTE — TELEPHONE ENCOUNTER
----- Message from Ana Rosa Janice sent at 10/23/2018  2:57 PM CDT -----  Contact: Rosalina boland/ [facility]  tel: [phone]   Pharmacy Calling    Reason for call:  Pt. Needs a new script of this.  He is using a new pharmacy.   Pharmacy Name:   [facility]   Prescription Name:   MODAFINIL  200mgs. / takes 1 twice daily/ 30 day supply   Phone Number:             [phone]   Additional Information:      Caller says needs you to send this today.

## 2018-11-06 ENCOUNTER — PATIENT OUTREACH (OUTPATIENT)
Dept: OTHER | Facility: OTHER | Age: 63
End: 2018-11-06

## 2018-11-06 NOTE — PROGRESS NOTES
Last 5 Patient Entered Readings                                      Current 30 Day Average: 125/75     Recent Readings 11/5/2018 11/5/2018 11/4/2018 11/4/2018 11/3/2018    SBP (mmHg) 126 126 116 116 128    DBP (mmHg) 72 72 72 72 82    Pulse 66 66 67 67 65          Last 6 Patient Entered Readings                                          Most Recent A1c: 7.3% on 10/3/2018  (Goal: 7%)     Recent Readings 11/5/2018 11/5/2018 11/4/2018 11/3/2018 11/2/2018    Blood Glucose (mg/dL) 146 130 118 133 183          Digital Medicine: Health  Follow Up    Lifestyle Modifications:    1.Dietary Modifications (Sodium intake <2,000mg/day, food labels, dining out): States he has been trying to watch what he eats.  Does not usually eat big meals for supper, usually salads with tomato and avocado.  States he tries to have a bigger lunch to keep himself full.  Per previous health 's note, patient has trying to work on breakfast food options.  Has switched to almond milk with 0g sugar.  For dessert, usually has fresh tangerines from [employer].     2.Physical Activity: States he will try to start walking with cooler weather.  Will try to aim to walk Tues Thurs Sat about 45min in the morning.  Patient has [employer] and [employer] tickets so does a lot of walking at the [facility].    3.Medication Therapy: Patient has been compliant with the medication regimen.    4.Patient has the following medication side effects/concerns: none  (Frequency/Alleviating factors/Precipitating factors, etc.)     Follow up with Mr. Glenn Medel completed. No further questions or concerns. Will continue to follow up to achieve health goals.

## 2018-11-07 ENCOUNTER — TELEPHONE (OUTPATIENT)
Dept: FAMILY MEDICINE | Facility: CLINIC | Age: 63
End: 2018-11-07

## 2018-11-07 NOTE — TELEPHONE ENCOUNTER
----- Message from Kelsey Taylor sent at 11/6/2018  4:45 PM CST -----  Contact: [phone]  Patient is returning nurse's phone call, requesting a sooner appt than the date offered.  Please call patient back at [phone].

## 2018-11-14 ENCOUNTER — OFFICE VISIT (OUTPATIENT)
Dept: OPHTHALMOLOGY | Facility: CLINIC | Age: 63
End: 2018-11-14
Payer: COMMERCIAL

## 2018-11-14 DIAGNOSIS — H43.821 VITREOMACULAR ADHESION, RIGHT: ICD-10-CM

## 2018-11-14 DIAGNOSIS — H25.11 NUCLEAR SCLEROTIC CATARACT OF RIGHT EYE: Primary | ICD-10-CM

## 2018-11-14 PROCEDURE — 92136 OPHTHALMIC BIOMETRY: CPT | Mod: 26,RT,S$GLB, | Performed by: OPHTHALMOLOGY

## 2018-11-14 PROCEDURE — 99999 PR PBB SHADOW E&M-EST. PATIENT-LVL III: CPT | Mod: PBBFAC,,, | Performed by: OPHTHALMOLOGY

## 2018-11-14 PROCEDURE — 92025 CPTRIZED CORNEAL TOPOGRAPHY: CPT | Mod: S$GLB,,, | Performed by: OPHTHALMOLOGY

## 2018-11-14 PROCEDURE — 92014 COMPRE OPH EXAM EST PT 1/>: CPT | Mod: S$GLB,,, | Performed by: OPHTHALMOLOGY

## 2018-11-14 NOTE — PROGRESS NOTES
HPI     Dr Pulido    1) vitreous opacities OU w/[facility] OS --s/p 25g PPV/partial AFx OS (11/15/17)     [facility] OD --s/p 25g PPV/partial AFx OD (6/11/18)   2) NS OD, -phaco w/sulcus IOL OS (11/30/17) --s/p 25g PPV  OS (12/6/17)   3) DM   4) HTN Ret OU     Eye Med(s) - Simbrinza BID - OU     Pt here for blurred VA OD and difficulty with glare OD. Pt denies flashes,   headaches or eye pain.     Last edited by Damaris Garcia MD on 11/14/2018  9:13 AM. (History)            Assessment /Plan     For exam results, see Encounter Report.    Nuclear sclerotic cataract of right eye  -     IOL Master - OD - Right Eye  -     Computerized corneal topography    Uncontrolled type 2 diabetes mellitus with diabetic polyneuropathy, without long-term current use of insulin    Vitreomacular adhesion, right      Visually significant nuclear sclerotic cataract   - Interfering with activities of daily living.  Pt desires cataract surgery for Va rehabilitation.   - R/B/A discussed and pt agrees to proceed with surgery.   - IOL options discussed according to patient's goals and concomitant ocular pathology; and pt content with monofocal lens.    - Target: plano.    pcboo 16.5 OD      1) vitreous opacities OU w/[facility] OS --s/p 25g PPV/partial AFx OS (11/15/17)     [facility] OD --s/p 25g PPV/partial AFx OD (6/11/18)   2) NS OD, -phaco w/sulcus IOL OS (11/30/17) --s/p 25g PPV  OS (12/6/17)   3) DM   4) HTN Ret OU

## 2018-11-15 ENCOUNTER — PATIENT MESSAGE (OUTPATIENT)
Dept: FAMILY MEDICINE | Facility: CLINIC | Age: 63
End: 2018-11-15

## 2018-11-15 ENCOUNTER — PATIENT MESSAGE (OUTPATIENT)
Dept: OPHTHALMOLOGY | Facility: CLINIC | Age: 63
End: 2018-11-15

## 2018-11-19 ENCOUNTER — PATIENT OUTREACH (OUTPATIENT)
Dept: OTHER | Facility: OTHER | Age: 63
End: 2018-11-19

## 2018-11-19 NOTE — PROGRESS NOTES
Last 5 Patient Entered Readings                                      Current 30 Day Average: 124/74     Recent Readings 11/15/2018 11/15/2018 11/13/2018 11/13/2018 11/12/2018    SBP (mmHg) 121 121 118 118 124    DBP (mmHg) 76 76 70 70 76    Pulse 65 65 70 70 65        Patient's BP average is at goal of <130/80.     Patient denies s/s of hypotension (lightheadedness, dizziness, nausea, fatigue) associated with low readings. Instructed patient to inform me if this occurs, patient confirms understanding.      Patient denies s/s of hypertension (SOB, CP, severe headaches, changes in vision) associated with high readings. Instructed patient to go to the ED if BP > 180/110 and accompanied by hypertensive s/s, patient confirms understanding.      Current HTN regimen:  Hypertension Medications             furosemide (LASIX) 20 MG tablet TAKE 1 TABLET(20 MG) BY MOUTH EVERY MORNING    irbesartan (AVAPRO) 300 MG tablet Take 1 tablet (300 mg total) by mouth once daily. (replaces valsartan)    metoprolol succinate (TOPROL-XL) 25 MG 24 hr tablet TAKE 1 TABLET(25 MG) BY MOUTH EVERY DAY            Last 6 Patient Entered Readings                                      Most Recent A1c: 7.3% on 10/3/2018  (Goal: 7%)     Recent Readings 11/15/2018 11/14/2018 11/14/2018 11/13/2018 11/13/2018    Blood Glucose (mg/dL) 120 105 128 83 90        Current A1c level is not at goal of less than or equal to 7%     Patient denies s/s or episodes of hypoglycemia (weakness, dizziness, hunger, shakiness, nausea, headache, heart palpitations, sweating, fatigue, anxiety, etc.)     Patient denies s/s of hyperglycemia (headache, increased thirst, increased urination, fatigue, blurred vision)      Patient is on ARB and statin      Plan: A1C approaching goal. Patient tolerating regimen without hypoglycemia. Plan to schedule A1C on follow up.       Diabetes Medications             empagliflozin (JARDIANCE) 10 mg Tab Take 10 mg by mouth once daily. (for blood  sugar)    insulin glargine (LANTUS SOLOSTAR U-100 INSULIN) 100 unit/mL (3 mL) InPn pen Inject 20 Units into the skin every evening.    metFORMIN (GLUCOPHAGE-XR) 500 MG 24 hr tablet Take 4 tablets (2,000 mg total) by mouth once daily.    TRULICITY 0.75 mg/0.5 mL PnIj INJECT 0.5 MLS( 0.75 MG) INTO THE SKIN EVERY 7 DAYS        I will continue to monitor regularly and will follow up in 6-8 weeks, sooner if BP or BG begins to trend upward or downward.     Patient has my contact information and knows to call with any concerns or clinical changes

## 2018-11-24 ENCOUNTER — PATIENT MESSAGE (OUTPATIENT)
Dept: NEPHROLOGY | Facility: CLINIC | Age: 63
End: 2018-11-24

## 2018-11-24 DIAGNOSIS — Z87.442 HISTORY OF RENAL STONE: ICD-10-CM

## 2018-11-24 DIAGNOSIS — R82.90 ABNORMAL URINE FINDING: Primary | ICD-10-CM

## 2018-11-26 ENCOUNTER — PATIENT MESSAGE (OUTPATIENT)
Dept: NEPHROLOGY | Facility: CLINIC | Age: 63
End: 2018-11-26

## 2018-11-26 ENCOUNTER — TELEPHONE (OUTPATIENT)
Dept: OPHTHALMOLOGY | Facility: CLINIC | Age: 63
End: 2018-11-26

## 2018-11-26 NOTE — TELEPHONE ENCOUNTER
We can get a UA and a urine culture and he has not had a renal US since 2015.  Thus we can get this as well to see if he has a greater stone burden.    Alternatively, he may go to Urgent Care or see his PCP.

## 2018-11-27 ENCOUNTER — TELEPHONE (OUTPATIENT)
Dept: OPHTHALMOLOGY | Facility: CLINIC | Age: 63
End: 2018-11-27

## 2018-11-27 DIAGNOSIS — H25.11 NUCLEAR SCLEROTIC CATARACT OF RIGHT EYE: Primary | ICD-10-CM

## 2018-11-28 ENCOUNTER — PATIENT MESSAGE (OUTPATIENT)
Dept: NEPHROLOGY | Facility: CLINIC | Age: 63
End: 2018-11-28

## 2018-11-28 ENCOUNTER — PATIENT MESSAGE (OUTPATIENT)
Dept: OPHTHALMOLOGY | Facility: CLINIC | Age: 63
End: 2018-11-28

## 2018-11-29 ENCOUNTER — HOSPITAL ENCOUNTER (OUTPATIENT)
Dept: RADIOLOGY | Facility: HOSPITAL | Age: 63
Discharge: HOME | End: 2018-11-29
Attending: INTERNAL MEDICINE
Payer: COMMERCIAL

## 2018-11-29 DIAGNOSIS — R82.90 ABNORMAL URINE FINDING: ICD-10-CM

## 2018-11-29 DIAGNOSIS — Z87.442 HISTORY OF RENAL STONE: ICD-10-CM

## 2018-11-29 PROCEDURE — 76770 US EXAM ABDO BACK WALL COMP: CPT | Mod: 26,,, | Performed by: RADIOLOGY

## 2018-11-29 PROCEDURE — 76770 US EXAM ABDO BACK WALL COMP: CPT | Mod: TC,PO

## 2018-12-05 ENCOUNTER — PATIENT OUTREACH (OUTPATIENT)
Dept: OTHER | Facility: OTHER | Age: 63
End: 2018-12-05

## 2018-12-05 NOTE — PROGRESS NOTES
"Last 5 Patient Entered Readings                                      Current 30 Day Average: 125/74     Recent Readings 12/4/2018 12/4/2018 12/3/2018 12/3/2018 12/2/2018    SBP (mmHg) 119 119 133 133 124    DBP (mmHg) 71 71 75 75 71    Pulse 78 78 70 70 70          Last 6 Patient Entered Readings                                          Most Recent A1c: 7.3% on 10/3/2018  (Goal: 7%)     Recent Readings 12/4/2018 12/4/2018 12/3/2018 12/3/2018 12/2/2018    Blood Glucose (mg/dL) 101 91 113 124 114          Digital Medicine: Health  Follow Up    Lifestyle Modifications:    1.Dietary Modifications (Sodium intake <2,000mg/day, food labels, dining out): States he is being more mindful of what he is eating.  States he is eating more fruits for snacks.  Having tomato and avocado salads for dinner and is enjoying that.    2.Physical Activity: States he is going to try to start walking more.  Has not really gotten into a routine.  Still aiming for Tues, Thurs, Sat walking.    3.Medication Therapy: Patient has been compliant with the medication regimen.    4.Patient has the following medication side effects/concerns: none  (Frequency/Alleviating factors/Precipitating factors, etc.)     Follow up with Tanya Glenn Medel completed. No further questions or concerns. Will continue to follow up to achieve health goals.    States he has been having "violent dreams and ends up on the floor."  States he will ask PCP about this.  Encouraged patient to start walking and it may help with sleep.  "

## 2018-12-06 RX ORDER — LIDOCAINE HYDROCHLORIDE 40 MG/ML
1 INJECTION, SOLUTION RETROBULBAR
Status: CANCELLED | OUTPATIENT
Start: 2018-12-06

## 2018-12-06 NOTE — H&P
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    Family History/Social History: refer to chart    Allergies:   Review of patient's allergies indicates:   Allergen Reactions    Avelox [moxifloxacin] Hives and Rash       Current Medications: No current facility-administered medications for this encounter.     Current Outpatient Medications:     aspirin 81 MG Chew, Take 81 mg by mouth once daily., Disp: , Rfl:     atorvastatin (LIPITOR) 20 MG tablet, TAKE 1 TABLET(20 MG) BY MOUTH EVERY DAY, Disp: 90 tablet, Rfl: 2    BD ULTRA-FINE MINI PEN NEEDLE 31 gauge x 3/16" Ndle, USE THREE TIMES DAILY BEFORE MEALS, Disp: 100 each, Rfl: 0    blood-glucose meter kit, Use as instructed, Disp: 1 each, Rfl: 0    cholecalciferol, vitamin D3, 1,000 unit capsule, Take 2,000 Units by mouth once daily., Disp: , Rfl:     DULoxetine (CYMBALTA) 60 MG capsule, Take 60 mg by mouth 2 (two) times daily., Disp: , Rfl:     empagliflozin (JARDIANCE) 10 mg Tab, Take 10 mg by mouth once daily. (for blood sugar), Disp: 90 tablet, Rfl: 1    furosemide (LASIX) 20 MG tablet, TAKE 1 TABLET(20 MG) BY MOUTH EVERY MORNING, Disp: 90 tablet, Rfl: 3    insulin glargine (LANTUS SOLOSTAR U-100 INSULIN) 100 unit/mL (3 mL) InPn pen, Inject 20 Units into the skin every evening., Disp: 2 Box, Rfl: 3    irbesartan (AVAPRO) 300 MG tablet, Take 1 tablet (300 mg total) by mouth once daily. (replaces valsartan), Disp: 90 tablet, Rfl: 1    metFORMIN (GLUCOPHAGE-XR) 500 MG 24 hr tablet, Take 4 tablets (2,000 mg total) by mouth once daily., Disp: 360 tablet, Rfl: 3    metoprolol succinate (TOPROL-XL) 25 MG 24 hr tablet, TAKE 1 TABLET(25 " MG) BY MOUTH EVERY DAY, Disp: 90 tablet, Rfl: 3    modafinil (PROVIGIL) 200 MG Tab, TAKE 1 TABLET(200 MG) BY MOUTH TWICE DAILY, Disp: 60 tablet, Rfl: 0    SIMBRINZA 1-0.2 % DrpS, SHAKE LQ AND INT 1 GTT IN OU BID, Disp: , Rfl: 3    TRULICITY 0.75 mg/0.5 mL PnIj, INJECT 0.5 MLS( 0.75 MG) INTO THE SKIN EVERY 7 DAYS, Disp: 2 mL, Rfl: 9    Physical Exam    BP: Vital signs stable  General: No apparent distress  HEENT: nuclear sclerotic cataract  Lungs: adequate respirations  Heart: + pulses  Abdomen: soft  Rectal/pelvic: deferred    Impression: Visually significant Cataract    Plan: Phacoemulsification with implantation of Intraocular lens

## 2018-12-10 ENCOUNTER — ANESTHESIA EVENT (OUTPATIENT)
Dept: SURGERY | Facility: HOSPITAL | Age: 63
End: 2018-12-10
Payer: COMMERCIAL

## 2018-12-11 ENCOUNTER — OFFICE VISIT (OUTPATIENT)
Dept: FAMILY MEDICINE | Facility: CLINIC | Age: 63
End: 2018-12-11
Payer: COMMERCIAL

## 2018-12-11 ENCOUNTER — HOSPITAL ENCOUNTER (OUTPATIENT)
Facility: HOSPITAL | Age: 63
Discharge: HOME | End: 2018-12-11
Attending: OPHTHALMOLOGY | Admitting: OPHTHALMOLOGY
Payer: COMMERCIAL

## 2018-12-11 ENCOUNTER — ANESTHESIA (OUTPATIENT)
Dept: SURGERY | Facility: HOSPITAL | Age: 63
End: 2018-12-11
Payer: COMMERCIAL

## 2018-12-11 VITALS
BODY MASS INDEX: 29.97 KG/M2 | DIASTOLIC BLOOD PRESSURE: 82 MMHG | HEART RATE: 64 BPM | WEIGHT: 214.06 LBS | SYSTOLIC BLOOD PRESSURE: 116 MMHG | HEIGHT: 71 IN

## 2018-12-11 VITALS
HEART RATE: 70 BPM | TEMPERATURE: 97 F | SYSTOLIC BLOOD PRESSURE: 112 MMHG | HEIGHT: 71 IN | BODY MASS INDEX: 29.4 KG/M2 | RESPIRATION RATE: 18 BRPM | OXYGEN SATURATION: 96 % | WEIGHT: 210 LBS | DIASTOLIC BLOOD PRESSURE: 76 MMHG

## 2018-12-11 DIAGNOSIS — H25.10 SENILE NUCLEAR SCLEROSIS: ICD-10-CM

## 2018-12-11 DIAGNOSIS — H25.11 NUCLEAR SENILE CATARACT OF RIGHT EYE: Primary | ICD-10-CM

## 2018-12-11 DIAGNOSIS — G47.33 OSA (OBSTRUCTIVE SLEEP APNEA): ICD-10-CM

## 2018-12-11 DIAGNOSIS — E11.9 DIABETES MELLITUS WITHOUT COMPLICATION: Primary | ICD-10-CM

## 2018-12-11 DIAGNOSIS — I10 ESSENTIAL HYPERTENSION: ICD-10-CM

## 2018-12-11 DIAGNOSIS — G47.52 REM BEHAVIORAL DISORDER: ICD-10-CM

## 2018-12-11 LAB — GLUCOSE SERPL-MCNC: 105 MG/DL (ref 70–110)

## 2018-12-11 PROCEDURE — 3079F DIAST BP 80-89 MM HG: CPT | Mod: CPTII,S$GLB,, | Performed by: FAMILY MEDICINE

## 2018-12-11 PROCEDURE — 37000009 HC ANESTHESIA EA ADD 15 MINS: Mod: PO | Performed by: OPHTHALMOLOGY

## 2018-12-11 PROCEDURE — 25000003 PHARM REV CODE 250: Mod: PO | Performed by: ANESTHESIOLOGY

## 2018-12-11 PROCEDURE — 82962 GLUCOSE BLOOD TEST: CPT | Mod: PO | Performed by: OPHTHALMOLOGY

## 2018-12-11 PROCEDURE — 25000003 PHARM REV CODE 250: Mod: PO | Performed by: NURSE ANESTHETIST, CERTIFIED REGISTERED

## 2018-12-11 PROCEDURE — 71000033 HC RECOVERY, INTIAL HOUR: Mod: PO | Performed by: OPHTHALMOLOGY

## 2018-12-11 PROCEDURE — 63600175 PHARM REV CODE 636 W HCPCS: Mod: PO | Performed by: NURSE ANESTHETIST, CERTIFIED REGISTERED

## 2018-12-11 PROCEDURE — 99999 PR PBB SHADOW E&M-EST. PATIENT-LVL III: CPT | Mod: PBBFAC,,, | Performed by: FAMILY MEDICINE

## 2018-12-11 PROCEDURE — 3074F SYST BP LT 130 MM HG: CPT | Mod: CPTII,S$GLB,, | Performed by: FAMILY MEDICINE

## 2018-12-11 PROCEDURE — 3008F BODY MASS INDEX DOCD: CPT | Mod: CPTII,S$GLB,, | Performed by: FAMILY MEDICINE

## 2018-12-11 PROCEDURE — 36000707: Mod: PO | Performed by: OPHTHALMOLOGY

## 2018-12-11 PROCEDURE — 27201423 OPTIME MED/SURG SUP & DEVICES STERILE SUPPLY: Mod: PO | Performed by: OPHTHALMOLOGY

## 2018-12-11 PROCEDURE — 66982 XCAPSL CTRC RMVL CPLX WO ECP: CPT | Mod: RT,,, | Performed by: OPHTHALMOLOGY

## 2018-12-11 PROCEDURE — D9220A PRA ANESTHESIA: Mod: CRNA,,, | Performed by: NURSE ANESTHETIST, CERTIFIED REGISTERED

## 2018-12-11 PROCEDURE — 3045F PR MOST RECENT HEMOGLOBIN A1C LEVEL 7.0-9.0%: CPT | Mod: CPTII,S$GLB,, | Performed by: FAMILY MEDICINE

## 2018-12-11 PROCEDURE — 99214 OFFICE O/P EST MOD 30 MIN: CPT | Mod: S$GLB,,, | Performed by: FAMILY MEDICINE

## 2018-12-11 PROCEDURE — 36000706: Mod: PO | Performed by: OPHTHALMOLOGY

## 2018-12-11 PROCEDURE — V2632 POST CHMBR INTRAOCULAR LENS: HCPCS | Mod: PO | Performed by: OPHTHALMOLOGY

## 2018-12-11 PROCEDURE — 37000008 HC ANESTHESIA 1ST 15 MINUTES: Mod: PO | Performed by: OPHTHALMOLOGY

## 2018-12-11 PROCEDURE — 25000003 PHARM REV CODE 250: Mod: PO | Performed by: OPHTHALMOLOGY

## 2018-12-11 PROCEDURE — D9220A PRA ANESTHESIA: Mod: ANES,,, | Performed by: ANESTHESIOLOGY

## 2018-12-11 PROCEDURE — 63600175 PHARM REV CODE 636 W HCPCS: Mod: PO | Performed by: OPHTHALMOLOGY

## 2018-12-11 DEVICE — LENS IOL ITEC PRELOAD 16.5D: Type: IMPLANTABLE DEVICE | Site: LENS | Status: FUNCTIONAL

## 2018-12-11 RX ORDER — EPINEPHRINE 1 MG/ML
INJECTION INTRAMUSCULAR; INTRAVENOUS; SUBCUTANEOUS
Status: DISCONTINUED | OUTPATIENT
Start: 2018-12-11 | End: 2018-12-11 | Stop reason: HOSPADM

## 2018-12-11 RX ORDER — PROPARACAINE HYDROCHLORIDE 5 MG/ML
1 SOLUTION/ DROPS OPHTHALMIC
Status: DISCONTINUED | OUTPATIENT
Start: 2018-12-11 | End: 2018-12-11 | Stop reason: HOSPADM

## 2018-12-11 RX ORDER — LIDOCAINE HYDROCHLORIDE 10 MG/ML
5 INJECTION, SOLUTION EPIDURAL; INFILTRATION; INTRACAUDAL; PERINEURAL ONCE
Status: DISCONTINUED | OUTPATIENT
Start: 2018-12-11 | End: 2018-12-11 | Stop reason: HOSPADM

## 2018-12-11 RX ORDER — PHENYLEPHRINE HYDROCHLORIDE 25 MG/ML
1 SOLUTION/ DROPS OPHTHALMIC
Status: DISPENSED | OUTPATIENT
Start: 2018-12-11 | End: 2018-12-11

## 2018-12-11 RX ORDER — LIDOCAINE HYDROCHLORIDE 40 MG/ML
INJECTION, SOLUTION RETROBULBAR
Status: DISCONTINUED | OUTPATIENT
Start: 2018-12-11 | End: 2018-12-11

## 2018-12-11 RX ORDER — OFLOXACIN 3 MG/ML
1 SOLUTION/ DROPS OPHTHALMIC
Status: ACTIVE | OUTPATIENT
Start: 2018-12-11 | End: 2018-12-11

## 2018-12-11 RX ORDER — TROPICAMIDE 10 MG/ML
1 SOLUTION/ DROPS OPHTHALMIC
Status: ACTIVE | OUTPATIENT
Start: 2018-12-11 | End: 2018-12-11

## 2018-12-11 RX ORDER — LIDOCAINE HYDROCHLORIDE 10 MG/ML
INJECTION, SOLUTION EPIDURAL; INFILTRATION; INTRACAUDAL; PERINEURAL
Status: DISCONTINUED | OUTPATIENT
Start: 2018-12-11 | End: 2018-12-11 | Stop reason: HOSPADM

## 2018-12-11 RX ORDER — MIDAZOLAM HYDROCHLORIDE 1 MG/ML
INJECTION, SOLUTION INTRAMUSCULAR; INTRAVENOUS
Status: DISCONTINUED | OUTPATIENT
Start: 2018-12-11 | End: 2018-12-11

## 2018-12-11 RX ORDER — ACETAMINOPHEN 325 MG/1
650 TABLET ORAL EVERY 4 HOURS PRN
Status: CANCELLED | OUTPATIENT
Start: 2018-12-11

## 2018-12-11 RX ORDER — SODIUM CHLORIDE, SODIUM LACTATE, POTASSIUM CHLORIDE, CALCIUM CHLORIDE 600; 310; 30; 20 MG/100ML; MG/100ML; MG/100ML; MG/100ML
INJECTION, SOLUTION INTRAVENOUS CONTINUOUS
Status: DISCONTINUED | OUTPATIENT
Start: 2018-12-11 | End: 2018-12-11 | Stop reason: HOSPADM

## 2018-12-11 RX ORDER — KETOROLAC TROMETHAMINE 5 MG/ML
1 SOLUTION OPHTHALMIC ONCE
Status: COMPLETED | OUTPATIENT
Start: 2018-12-11 | End: 2018-12-11

## 2018-12-11 RX ORDER — PREDNISOLONE ACETATE 10 MG/ML
SUSPENSION/ DROPS OPHTHALMIC
Status: DISCONTINUED | OUTPATIENT
Start: 2018-12-11 | End: 2018-12-11 | Stop reason: HOSPADM

## 2018-12-11 RX ORDER — ONDANSETRON 2 MG/ML
INJECTION INTRAMUSCULAR; INTRAVENOUS
Status: DISCONTINUED | OUTPATIENT
Start: 2018-12-11 | End: 2018-12-11

## 2018-12-11 RX ORDER — MOXIFLOXACIN 5 MG/ML
SOLUTION/ DROPS OPHTHALMIC
Status: DISCONTINUED | OUTPATIENT
Start: 2018-12-11 | End: 2018-12-11 | Stop reason: HOSPADM

## 2018-12-11 RX ADMIN — TROPICAMIDE 1 DROP: 10 SOLUTION/ DROPS OPHTHALMIC at 10:12

## 2018-12-11 RX ADMIN — ONDANSETRON 4 MG: 2 INJECTION, SOLUTION INTRAMUSCULAR; INTRAVENOUS at 11:12

## 2018-12-11 RX ADMIN — KETOROLAC TROMETHAMINE 1 DROP: 5 SOLUTION/ DROPS OPHTHALMIC at 10:12

## 2018-12-11 RX ADMIN — OFLOXACIN 1 DROP: 3 SOLUTION OPHTHALMIC at 10:12

## 2018-12-11 RX ADMIN — MIDAZOLAM HYDROCHLORIDE 1 MG: 1 INJECTION, SOLUTION INTRAMUSCULAR; INTRAVENOUS at 10:12

## 2018-12-11 RX ADMIN — LIDOCAINE HYDROCHLORIDE 5 DROP: 40 SOLUTION RETROBULBAR; TOPICAL at 11:12

## 2018-12-11 RX ADMIN — MIDAZOLAM HYDROCHLORIDE 1 MG: 1 INJECTION, SOLUTION INTRAMUSCULAR; INTRAVENOUS at 11:12

## 2018-12-11 RX ADMIN — PHENYLEPHRINE HYDROCHLORIDE 1 DROP: 25 SOLUTION/ DROPS OPHTHALMIC at 10:12

## 2018-12-11 RX ADMIN — PROPARACAINE HYDROCHLORIDE 1 DROP: 5 SOLUTION/ DROPS OPHTHALMIC at 10:12

## 2018-12-11 RX ADMIN — SODIUM CHLORIDE, SODIUM LACTATE, POTASSIUM CHLORIDE, AND CALCIUM CHLORIDE: .6; .31; .03; .02 INJECTION, SOLUTION INTRAVENOUS at 10:12

## 2018-12-11 NOTE — ANESTHESIA POSTPROCEDURE EVALUATION
"Anesthesia Post Evaluation    Patient: Glenn Medel    Procedure(s) Performed: Procedure(s) (LRB):  EXTRACTION, CATARACT, WITH IOL INSERTION (Right)    Final Anesthesia Type: MAC  Patient location during evaluation: PACU  Patient participation: Yes- Able to Participate  Level of consciousness: awake and alert  Post-procedure vital signs: reviewed and stable  Pain management: adequate  Airway patency: patent  PONV status at discharge: No PONV  Anesthetic complications: no      Cardiovascular status: blood pressure returned to baseline and hemodynamically stable  Respiratory status: unassisted, spontaneous ventilation and room air  Hydration status: euvolemic  Follow-up not needed.        Visit Vitals  /76 (BP Location: Left arm, Patient Position: Sitting)   Pulse 70   Temp 36.3 °C (97.3 °F) (Skin)   Resp 18   Ht 5' 11" (1.803 m)   Wt 95.3 kg (210 lb)   SpO2 96%   BMI 29.29 kg/m²       Pain/Ezequiel Score: No Data Recorded      "

## 2018-12-11 NOTE — TRANSFER OF CARE
"Anesthesia Transfer of Care Note    Patient: Glenn Medel    Procedure(s) Performed: Procedure(s) (LRB):  EXTRACTION, CATARACT, WITH IOL INSERTION (Right)    Patient location: PACU    Anesthesia Type: MAC    Transport from OR: Transported from OR on room air with adequate spontaneous ventilation    Post pain: adequate analgesia    Post assessment: no apparent anesthetic complications    Post vital signs: stable    Level of consciousness: awake, alert and oriented    Nausea/Vomiting: no nausea/vomiting    Complications: none    Transfer of care protocol was followed      Last vitals:   Visit Vitals  /76 (BP Location: Left arm, Patient Position: Sitting)   Pulse 70   Temp 36.3 °C (97.3 °F) (Skin)   Resp 18   Ht 5' 11" (1.803 m)   Wt 95.3 kg (210 lb)   SpO2 96%   BMI 29.29 kg/m²     "

## 2018-12-11 NOTE — OR NURSING
"I observed the scrub tech tighten the cannula on the syringe and perform the initial "squirt" to assure the cannula is safely on the syringe.   "

## 2018-12-11 NOTE — ANESTHESIA PREPROCEDURE EVALUATION
12/11/2018  Glenn Medel is a 63 y.o., male.    Anesthesia Evaluation      I have reviewed the Medications.     Review of Systems  Anesthesia Hx:  No problems with previous Anesthesia   Social:  Non-Smoker, No Alcohol Use    Cardiovascular:   Hypertension    Pulmonary:   Sleep Apnea Interstitial lung disease   Renal/:   Chronic Renal Disease, CRI    Hepatic/GI:  Hepatic/GI Normal    Neurological:   Peripheral Neuropathy    Endocrine:   Diabetes        Physical Exam  General:  Well nourished    Airway/Jaw/Neck:  Airway Findings: Mouth Opening: Normal General Airway Assessment: Adult  Mallampati: III  Jaw/Neck Findings:  Neck ROM: Extension Decreased, Mild       Chest/Lungs:  Chest/Lungs Findings: Clear to auscultation, Normal Respiratory Rate     Heart/Vascular:  Heart Findings: Rate: Normal  Rhythm: Regular Rhythm  Sounds: Normal  Heart murmur: negative Vascular Findings: Normal (No carotid bruits.)       Mental Status:  Mental Status Findings:  Cooperative, Alert and Oriented         Anesthesia Plan  Type of Anesthesia, risks & benefits discussed:  Anesthesia Type:  MAC  Patient's Preference:   Intra-op Monitoring Plan:   Intra-op Monitoring Plan Comments:   Post Op Pain Control Plan:   Post Op Pain Control Plan Comments:   Induction:    Beta Blocker:  Patient is not currently on a Beta-Blocker (No further documentation required).       Informed Consent: Patient understands risks and agrees with Anesthesia plan.  Questions answered. Anesthesia consent signed with patient.  ASA Score: 3     Day of Surgery Review of History & Physical:            Ready For Surgery From Anesthesia Perspective.

## 2018-12-11 NOTE — PROGRESS NOTES
HPI  Glenn Medel is a 63 y.o. male with multiple medical diagnoses as listed in the medical history and problem list that presents for Diabetes (follow up)  .      Diabetes   He presents for his follow-up diabetic visit. He has type 2 diabetes mellitus. His disease course has been improving. There are no hypoglycemic associated symptoms. Pertinent negatives for hypoglycemia include no confusion or headaches. There are no diabetic associated symptoms. Pertinent negatives for diabetes include no chest pain, no polydipsia, no polyuria and no weakness. There are no hypoglycemic complications. Symptoms are stable. Diabetic complications include nephropathy and peripheral neuropathy. Current diabetic treatment includes oral agent (triple therapy) and insulin injections. He is compliant with treatment all of the time. His home blood glucose trend is decreasing steadily. An ACE inhibitor/angiotensin II receptor blocker is being taken. Eye exam is current.       PAST MEDICAL HISTORY:  Past Medical History:   Diagnosis Date    BPH (benign prostatic hyperplasia)     Cataract     OU    DDD (degenerative disc disease), lumbar     s/p epidural steroids     Diabetes mellitus, stable     Glaucoma     OU    HTN (hypertension)     Iritis, lens-induced 1/8/2018    Obesity     LOVE on CPAP        PAST SURGICAL HISTORY:  Past Surgical History:   Procedure Laterality Date    ANTERIOR VITRECTOMY Left 11/30/2017    Performed by Damaris Garcia MD at [facility] OR 1ST FLR    CATARACT EXTRACTION      COLONOSCOPY N/A 9/18/2017    Procedure: COLONOSCOPY;  Surgeon: Paul Feliz Jr., MD;  Location: [facility];  Service: Endoscopy;  Laterality: N/A;    COLONOSCOPY N/A 9/18/2017    Performed by Paul Feliz Jr., MD at [facility] ENDO    COLONOSCOPY W/ POLYPECTOMY  04/13/2010    YARELI.   One 1 cm polyp in the sigmoid colon.  Internal hemorrhoids.    HAND SURGERY      INSERTION-INTRAOCULAR LENS (IOL) Left 11/30/2017    Performed by  Damaris Garcia MD at [facility] OR [address] [place] [address]    PHACOEMULSIFICATION-ASPIRATION-CATARACT Left 11/30/2017    Performed by Damaris Garcia MD at [facility] OR [address] [place] [address]    REPAIR OF RETINAL DETACHMENT WITH VITRECTOMY Right 8/1/2018    Procedure: REPAIR, RETINAL DETACHMENT, WITH VITRECTOMY;  Surgeon: SAJAN Pulido MD;  Location: [facility] OR [address] [place] [address];  Service: Ophthalmology;  Laterality: Right;  35 min    REPAIR, RETINAL DETACHMENT, WITH VITRECTOMY Right 8/1/2018    Performed by SAJAN Pulido MD at [facility] OR [address] [place] [address]    REPAIR-RETINA (VITRECTOMY) Left 12/6/2017    Performed by SAJAN Pulido MD at [facility] OR [address] [place] [address]    REPAIR-RETINA (VITRECTOMY) Left 11/15/2017    Performed by SAJAN Pulido MD at [facility] OR [address] [place] [address]    TONSILLECTOMY      uro lift      for enlarged prostate       SOCIAL HISTORY:  Social History     Socioeconomic History    Marital status:      Spouse name: Not on file    Number of children: Not on file    Years of education: Not on file    Highest education level: Not on file   Social Needs    Financial resource strain: Not on file    Food insecurity - worry: Not on file    Food insecurity - inability: Not on file    Transportation needs - medical: Not on file    Transportation needs - non-medical: Not on file   Occupational History    Not on file   Tobacco Use    Smoking status: Never Smoker    Smokeless tobacco: Never Used   Substance and Sexual Activity    Alcohol use: Yes     Comment: occasional    Drug use: No    Sexual activity: Yes     Partners: Female   Other Topics Concern    Not on file   Social History Narrative            2 grown kids.         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        FAMILY HISTORY:  Family History   Problem Relation Age of Onset    Heart disease Brother 62    Heart disease Father     Diabetes Father     Colon cancer Father     Cancer Father         colon    Cataracts Father     Hypertension Mother   "   Diabetes Mother     Stroke Mother     Cancer Mother         colon    Kidney disease Mother     Glaucoma Maternal Grandmother     No Known Problems Sister     No Known Problems Maternal Aunt     No Known Problems Maternal Uncle     No Known Problems Paternal Aunt     No Known Problems Paternal Uncle     No Known Problems Maternal Grandfather     No Known Problems Paternal Grandmother     No Known Problems Paternal Grandfather     Amblyopia Neg Hx     Blindness Neg Hx     Macular degeneration Neg Hx     Retinal detachment Neg Hx     Strabismus Neg Hx     Thyroid disease Neg Hx        ALLERGIES AND MEDICATIONS: updated and reviewed.  Review of patient's allergies indicates:   Allergen Reactions    Avelox [moxifloxacin] Hives and Rash     Current Outpatient Medications   Medication Sig Dispense Refill    aspirin 81 MG Chew Take 81 mg by mouth once daily.      atorvastatin (LIPITOR) 20 MG tablet TAKE 1 TABLET(20 MG) BY MOUTH EVERY DAY 90 tablet 2    BD ULTRA-FINE MINI PEN NEEDLE 31 gauge x 3/16" Ndle USE THREE TIMES DAILY BEFORE MEALS 100 each 0    blood-glucose meter kit Use as instructed 1 each 0    cholecalciferol, vitamin D3, 1,000 unit capsule Take 2,000 Units by mouth once daily.      DULoxetine (CYMBALTA) 60 MG capsule Take 60 mg by mouth 2 (two) times daily.      empagliflozin (JARDIANCE) 10 mg Tab Take 10 mg by mouth once daily. (for blood sugar) 90 tablet 1    furosemide (LASIX) 20 MG tablet TAKE 1 TABLET(20 MG) BY MOUTH EVERY MORNING 90 tablet 3    insulin glargine (LANTUS SOLOSTAR U-100 INSULIN) 100 unit/mL (3 mL) InPn pen Inject 20 Units into the skin every evening. 2 Box 3    irbesartan (AVAPRO) 300 MG tablet Take 1 tablet (300 mg total) by mouth once daily. (replaces valsartan) 90 tablet 1    metFORMIN (GLUCOPHAGE-XR) 500 MG 24 hr tablet Take 4 tablets (2,000 mg total) by mouth once daily. 360 tablet 3    metoprolol succinate (TOPROL-XL) 25 MG 24 hr tablet TAKE 1 " "TABLET(25 MG) BY MOUTH EVERY DAY 90 tablet 3    modafinil (PROVIGIL) 200 MG Tab TAKE 1 TABLET(200 MG) BY MOUTH TWICE DAILY 60 tablet 0    SIMBRINZA 1-0.2 % DrpS SHAKE LQ AND INT 1 GTT IN OU BID  3    TRULICITY 0.75 mg/0.5 mL PnIj INJECT 0.5 MLS( 0.75 MG) INTO THE SKIN EVERY 7 DAYS 2 mL 9     No current facility-administered medications for this visit.        ROS  Review of Systems   Constitutional: Negative for activity change and unexpected weight change.   HENT: Negative for hearing loss, rhinorrhea and trouble swallowing.    Eyes: Positive for visual disturbance. Negative for discharge.   Respiratory: Negative for chest tightness and wheezing.    Cardiovascular: Negative for chest pain and palpitations.   Gastrointestinal: Negative for blood in stool, constipation, diarrhea and vomiting.   Endocrine: Negative for polydipsia and polyuria.   Genitourinary: Positive for difficulty urinating, hematuria (has appointment pending with Urology) and urgency.   Musculoskeletal: Positive for arthralgias. Negative for joint swelling and neck pain.   Neurological: Negative for weakness and headaches.   Psychiatric/Behavioral: Negative for confusion and dysphoric mood.       Physical Exam  Vitals:    12/11/18 0813   BP: 116/82   Pulse: 64    Body mass index is 29.86 kg/m².  Weight: 97.1 kg (214 lb 1.1 oz)   Height: 5' 11" (180.3 cm)     Physical Exam   Constitutional: He appears well-developed and well-nourished.   HENT:   Head: Normocephalic and atraumatic.   Eyes: EOM are normal. Pupils are equal, round, and reactive to light.   Neck: Neck supple.   Cardiovascular: Normal rate, regular rhythm and normal heart sounds. Exam reveals no gallop and no friction rub.   No murmur heard.  Pulmonary/Chest: Effort normal and breath sounds normal. He has no wheezes. He has no rales.   Vitals reviewed.    Health Maintenance       Date Due Completion Date    TETANUS VACCINE 06/30/1972 ---    Pneumococcal Vaccine (Medium Risk) (1 of 1 - " PPSV23) 06/30/1973 ---    Lipid Panel 01/30/2019 1/30/2018    Foot Exam 03/09/2019 3/9/2018    Override on 4/20/2016: Done    Hemoglobin A1c 04/03/2019 10/3/2018    Eye Exam 11/14/2019 11/14/2018    Low Dose Statin 12/07/2019 12/7/2018    Colonoscopy 09/18/2027 9/18/2017    Override on 4/30/2010: Done          Assessment & Plan    Diabetes mellitus without complication  -     Hemoglobin A1c; Future; Expected date: 12/11/2018  - Diet and exercise education.  - Serial glucose monitoring  - Continue current therapy  - Continue Digital Diabetes    Essential hypertension  - Continue current therapy  - Serial blood pressure monitoring  - Diet and exercise education.  - Continue Digital HTN    LOVE (obstructive sleep apnea)  -     Ambulatory consult to Sleep Disorders    REM behavioral disorder  -     Ambulatory consult to Sleep Disorders        Follow-up in about 6 months (around 6/11/2019).

## 2018-12-11 NOTE — TRANSFER OF CARE
"Anesthesia Transfer of Care Note    Patient: Glenn Medel    Procedure(s) Performed: Procedure(s) (LRB):  EXTRACTION, CATARACT, WITH IOL INSERTION (Right)    Patient location: PACU    Anesthesia Type: MAC    Transport from OR: Transported from OR on room air with adequate spontaneous ventilation    Post pain: adequate analgesia    Post assessment: no apparent anesthetic complications    Post vital signs: stable    Level of consciousness: awake and alert    Nausea/Vomiting: no nausea/vomiting    Complications: none          Last vitals:   Visit Vitals  /79 (BP Location: Right arm, Patient Position: Sitting)   Pulse 63   Temp 36.4 °C (97.5 °F) (Skin)   Resp 17   Ht 5' 11" (1.803 m)   Wt 95.3 kg (210 lb)   SpO2 99%   BMI 29.29 kg/m²     "

## 2018-12-11 NOTE — ADDENDUM NOTE
Addendum  created 12/11/18 1149 by Jeny Maynard, KATHY    Intraprocedure Flowsheets edited, Intraprocedure Meds edited, Sign clinical note

## 2018-12-11 NOTE — OP NOTE
DATE OF PROCEDURE: 12/11/2018    SURGEON: SORAYA HERMAN MD    PREOPERATIVE DIAGNOSIS:  1. Senile nuclear sclerotic cataract right eye. 2. 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 2. 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    IMPLANT:  PCBOO 16.5    ANESTHESIA:  Topical and MAC    COMPLICATIONS: none    ESTIMATED BLOOD LOSS: <1cc    SPECIMENS: none    INDICATIONS FOR PROCEDURE:  This patient presented to the clinic with decreased vision in the right eye and was found to have a cataract.  The risks, benefits, and alternatives were discussed and the patient agreed to proceed with phacoemulsification and implantation of a lens in the right eye.     PROCEDURE IN DETAIL:  The patient was met in the preop holding area.  Consent was confirmed to be signed.  The operative site was marked.  The patient was brought into the operating room by the anesthesia team and placed under monitored anesthesia care.  The right eye was prepped and draped in a sterile ophthalmic fashion.  A Katherin speculum was placed into the right eye.   A paracentesis site was made and 1% preservative-free lidocaine was injected into the anterior chamber.  Viscoelastic  material was injected into the anterior chamber.  A keratome blade was used to make a clear corneal incision. The malyugin ring was inserted and positioned into place, assisting with pupillary dilation.  A cystotome was used to initiate the continuous curvilinear capsulorrhexis which was completed with Utrata forceps.  BSS on a gaytan cannula was used to perform hydrodissection.  The phacoemulsification tip was introduced into the eye and the nucleus was removed in a standard divide-and-conquer fashion.  Remaining cortical material was removed  from the eye using irrigation-aspiration.  The capsular bag was filled with viscoelastic material and the intraocular lens was injected and positioned into place. The Malyugin ring was removed from the eye. Remaining viscoelastic material was removed from the eye using irrigation and aspiration.  The corneal wounds were hydrated.  The eye was filled to physiologic pressure. The wounds were found to be watertight. Drops of Vigamox and prednisilone were placed into the eye.  The eye was washed, dried, and shielded.  The patient tolerated the procedure well and knows to follow up with me tomorrow morning, sooner if needed.

## 2018-12-12 ENCOUNTER — OFFICE VISIT (OUTPATIENT)
Dept: OPHTHALMOLOGY | Facility: CLINIC | Age: 63
End: 2018-12-12
Payer: COMMERCIAL

## 2018-12-12 DIAGNOSIS — Z98.41 STATUS POST CATARACT EXTRACTION AND INSERTION OF INTRAOCULAR LENS, RIGHT: Primary | ICD-10-CM

## 2018-12-12 DIAGNOSIS — Z96.1 STATUS POST CATARACT EXTRACTION AND INSERTION OF INTRAOCULAR LENS, RIGHT: Primary | ICD-10-CM

## 2018-12-12 PROCEDURE — 99999 PR PBB SHADOW E&M-EST. PATIENT-LVL III: CPT | Mod: PBBFAC,,, | Performed by: OPHTHALMOLOGY

## 2018-12-12 PROCEDURE — 99024 POSTOP FOLLOW-UP VISIT: CPT | Mod: S$GLB,,, | Performed by: OPHTHALMOLOGY

## 2018-12-12 RX ORDER — FENOFIBRATE 145 MG/1
145 TABLET, FILM COATED ORAL
COMMUNITY
End: 2018-12-20

## 2018-12-12 RX ORDER — IBUPROFEN AND FAMOTIDINE 26.6; 8 MG/1; MG/1
TABLET ORAL
COMMUNITY
End: 2019-06-10

## 2018-12-12 RX ORDER — ARMODAFINIL 250 MG/1
250 TABLET ORAL 2 TIMES DAILY
COMMUNITY
End: 2018-12-20

## 2018-12-12 RX ORDER — FINASTERIDE 5 MG/1
5 TABLET, FILM COATED ORAL DAILY
COMMUNITY
End: 2018-12-20

## 2018-12-12 NOTE — PROGRESS NOTES
Last 5 Patient Entered Readings                                      Current 30 Day Average: 124/74     Recent Readings 12/11/2018 12/11/2018 12/8/2018 12/8/2018 12/6/2018    SBP (mmHg) 125 125 126 126 124    DBP (mmHg) 76 76 73 73 73    Pulse 63 63 76 76 68          Last 6 Patient Entered Readings                                     Most Recent A1c: 6.6% on 12/11/2018  (Goal: 7%)     Recent Readings 12/12/2018 12/11/2018 12/10/2018 12/9/2018 12/8/2018    Blood Glucose (mg/dL) 139 145 111 131 217        BP remains at goal. Repeat A1C at goal.

## 2018-12-12 NOTE — PROGRESS NOTES
HPI     Post-op Evaluation      Additional comments: 1 day s/p phaco iol OD 12/11/18              Comments     Dr Pulido     s/p phaco iol OD 12/11/18  vitreous opacities OU w/NCVH OS --s/p 25g PPV/partial AFx OS (11/15/17)   NCVH OD --s/p 25g PPV/partial AFx OD (6/11/18)   phaco w/sulcus IOL OS (11/30/17) --s/p 25g PPV  OS (12/6/17)   DM   HTN Ret OU     Eye Med(s) - Simbrinza BID - OU, Combo drop TID OD    Pt states no pain or irritation. Seeing clearer today OD.          Last edited by Damaris Garcia MD on 12/12/2018  1:37 PM. (History)            Assessment /Plan     For exam results, see Encounter Report.    Status post cataract extraction and insertion of intraocular lens, right      Slit Lamp Exam  L/L - normal  C/s - quiet  Cornea - clear  A/C - 1+ cell  Lens - PCIOL    POD #1 s/p phaco/IOL  - doing well, iop better after burping wound with a/p abx.  - continue the following drops:    vigamox or ocuflox TID x 1 wk then stop  Pred forte or durezol or dexamethasone TID x  4 wks  Ketorolac TID until runs out    Versus:    Combination drop - 1 drop TID x total of 1 month    Appropriate precautions and post op medications reviewed.  Patient instructed to call or come in if symptoms of redness, decreased vision, or pain are experienced.    -f/up 1-2wks-- already has appt with jackelyn. Then 1 mo with me - va/iop / Mrx OU only

## 2018-12-17 ENCOUNTER — PATIENT MESSAGE (OUTPATIENT)
Dept: FAMILY MEDICINE | Facility: CLINIC | Age: 63
End: 2018-12-17

## 2018-12-17 RX ORDER — MODAFINIL 200 MG/1
TABLET ORAL
Qty: 60 TABLET | Refills: 3 | Status: SHIPPED | OUTPATIENT
Start: 2018-12-17 | End: 2019-03-12 | Stop reason: SDUPTHER

## 2018-12-20 ENCOUNTER — OFFICE VISIT (OUTPATIENT)
Dept: OPHTHALMOLOGY | Facility: CLINIC | Age: 63
End: 2018-12-20
Payer: COMMERCIAL

## 2018-12-20 DIAGNOSIS — Z79.4 CONTROLLED TYPE 2 DIABETES MELLITUS WITH BOTH EYES AFFECTED BY MILD NONPROLIFERATIVE RETINOPATHY WITHOUT MACULAR EDEMA, WITH LONG-TERM CURRENT USE OF INSULIN: Primary | ICD-10-CM

## 2018-12-20 DIAGNOSIS — E11.3293 CONTROLLED TYPE 2 DIABETES MELLITUS WITH BOTH EYES AFFECTED BY MILD NONPROLIFERATIVE RETINOPATHY WITHOUT MACULAR EDEMA, WITH LONG-TERM CURRENT USE OF INSULIN: Primary | ICD-10-CM

## 2018-12-20 PROCEDURE — 92134 CPTRZ OPH DX IMG PST SGM RTA: CPT | Mod: S$GLB,,, | Performed by: OPHTHALMOLOGY

## 2018-12-20 PROCEDURE — 99999 PR PBB SHADOW E&M-EST. PATIENT-LVL III: CPT | Mod: PBBFAC,,, | Performed by: OPHTHALMOLOGY

## 2018-12-20 PROCEDURE — 99024 POSTOP FOLLOW-UP VISIT: CPT | Mod: S$GLB,,, | Performed by: OPHTHALMOLOGY

## 2018-12-20 NOTE — PROGRESS NOTES
HPI     Diabetic Eye Exam      Additional comments: 3 mon chk              Comments         s/p phaco iol OD 12/11/18  vitreous opacities OU w/NCVH OS --s/p 25g PPV/partial AFx OS (11/15/17)   NCVH OD --s/p 25g PPV/partial AFx OD (6/11/18)   phaco w/sulcus IOL OS (11/30/17) --s/p 25g PPV  OS (12/6/17)   DM   HTN Ret OU     Eye Med(s) - Simbrinza BID - OU, Combo drop TID OD        OCT - No ME OU        A/P    1. Dense vitreous opacities OU with NCVH  No Improvement over last several months  ?prior VH with PVD OU - no breaks or tears    s/p 25g PPV/partial AFx OS for vitreous opacities/NCVH OS 11/15/17    Doing well, good IOP  HAD PC violation during anterior vitreous removal -     Had CE with dropped lens material give PC violation 11/30/17    Increased IOP - though improved with K edema    Will need PPV to remove lens material.  Pt would prefer more prompt resolution    s/p 25g PPV OS for retained lens material OS 12/6/17    Doing well, but IOP improved to 18 - ? Steroid response, inflammation and cortical material improving, AC deep    1/8/18 - some increased inflammation after steroid taper, no CME  1/18 - still low grade inflammation -   6/18 - stable    s/p 25g PPV/partial AFx OD for vitreous opacities and NCVH OD 7/1/18      2. PCIOL OD  Sulcus IOL OS    Great result    3. DM  Controlled No DR  BS/BP/chol control    4. HTN Ret OU    5. POAG  On simbrinza BID  May continue OU once inflammation down        1 year

## 2018-12-25 RX ORDER — BRINZOLAMIDE/BRIMONIDINE TARTRATE 10; 2 MG/ML; MG/ML
SUSPENSION/ DROPS OPHTHALMIC
Qty: 8 ML | Refills: 3 | Status: SHIPPED | OUTPATIENT
Start: 2018-12-25 | End: 2019-10-09 | Stop reason: SDUPTHER

## 2018-12-30 ENCOUNTER — PATIENT MESSAGE (OUTPATIENT)
Dept: OPHTHALMOLOGY | Facility: CLINIC | Age: 63
End: 2018-12-30

## 2019-01-02 ENCOUNTER — PATIENT MESSAGE (OUTPATIENT)
Dept: FAMILY MEDICINE | Facility: CLINIC | Age: 64
End: 2019-01-02

## 2019-01-02 ENCOUNTER — TELEPHONE (OUTPATIENT)
Dept: OPHTHALMOLOGY | Facility: CLINIC | Age: 64
End: 2019-01-02

## 2019-01-02 RX ORDER — PREDNISOLONE ACETATE 10 MG/ML
1 SUSPENSION/ DROPS OPHTHALMIC 3 TIMES DAILY
Qty: 5 ML | Refills: 1 | Status: SHIPPED | OUTPATIENT
Start: 2019-01-02 | End: 2019-02-01

## 2019-01-02 NOTE — TELEPHONE ENCOUNTER
----- Message from Damaris Garcia MD sent at 1/2/2019  3:49 PM CST -----  Oh interesting.  So all of our pts are going to get this letter!?    This pts sx was on 12/11, so he can def send the drug back for a refund and I can call him in just pf to use TID till 1/11.      ----- Message -----  From: Robles Santos  Sent: 12/31/2018  11:39 AM  To: Damaris Garcia MD    Please read message from pt.  I spoke to FDA and Pharmacy.  This was a voluntary recall on [facility] part.  FDA says drug is fine.  The are recommending patients discontinue using and send drop back to pharmacy.  [facility]

## 2019-01-02 NOTE — TELEPHONE ENCOUNTER
Spoke to pt and advised, per Dr Garcia,  to send eyedrops that were recalled back to [facility] and that Dr Garcia called in PF to be used three times a day until 1/11/19.  Pt understood.

## 2019-01-03 ENCOUNTER — OFFICE VISIT (OUTPATIENT)
Dept: FAMILY MEDICINE | Facility: CLINIC | Age: 64
End: 2019-01-03
Payer: COMMERCIAL

## 2019-01-03 VITALS
BODY MASS INDEX: 29.75 KG/M2 | HEART RATE: 68 BPM | HEIGHT: 71 IN | DIASTOLIC BLOOD PRESSURE: 74 MMHG | WEIGHT: 212.5 LBS | SYSTOLIC BLOOD PRESSURE: 116 MMHG | OXYGEN SATURATION: 98 % | TEMPERATURE: 98 F | RESPIRATION RATE: 16 BRPM

## 2019-01-03 DIAGNOSIS — J40 BRONCHITIS: Primary | ICD-10-CM

## 2019-01-03 PROCEDURE — 3078F PR MOST RECENT DIASTOLIC BLOOD PRESSURE < 80 MM HG: ICD-10-PCS | Mod: CPTII,S$GLB,, | Performed by: PHYSICIAN ASSISTANT

## 2019-01-03 PROCEDURE — 99999 PR PBB SHADOW E&M-EST. PATIENT-LVL III: ICD-10-PCS | Mod: PBBFAC,,, | Performed by: PHYSICIAN ASSISTANT

## 2019-01-03 PROCEDURE — 99214 PR OFFICE/OUTPT VISIT, EST, LEVL IV, 30-39 MIN: ICD-10-PCS | Mod: S$GLB,,, | Performed by: PHYSICIAN ASSISTANT

## 2019-01-03 PROCEDURE — 3074F PR MOST RECENT SYSTOLIC BLOOD PRESSURE < 130 MM HG: ICD-10-PCS | Mod: CPTII,S$GLB,, | Performed by: PHYSICIAN ASSISTANT

## 2019-01-03 PROCEDURE — 3078F DIAST BP <80 MM HG: CPT | Mod: CPTII,S$GLB,, | Performed by: PHYSICIAN ASSISTANT

## 2019-01-03 PROCEDURE — 3074F SYST BP LT 130 MM HG: CPT | Mod: CPTII,S$GLB,, | Performed by: PHYSICIAN ASSISTANT

## 2019-01-03 PROCEDURE — 3008F PR BODY MASS INDEX (BMI) DOCUMENTED: ICD-10-PCS | Mod: CPTII,S$GLB,, | Performed by: PHYSICIAN ASSISTANT

## 2019-01-03 PROCEDURE — 99999 PR PBB SHADOW E&M-EST. PATIENT-LVL III: CPT | Mod: PBBFAC,,, | Performed by: PHYSICIAN ASSISTANT

## 2019-01-03 PROCEDURE — 99214 OFFICE O/P EST MOD 30 MIN: CPT | Mod: S$GLB,,, | Performed by: PHYSICIAN ASSISTANT

## 2019-01-03 PROCEDURE — 3008F BODY MASS INDEX DOCD: CPT | Mod: CPTII,S$GLB,, | Performed by: PHYSICIAN ASSISTANT

## 2019-01-03 RX ORDER — PROMETHAZINE HYDROCHLORIDE AND DEXTROMETHORPHAN HYDROBROMIDE 6.25; 15 MG/5ML; MG/5ML
5 SYRUP ORAL NIGHTLY PRN
Qty: 120 ML | Refills: 0 | Status: SHIPPED | OUTPATIENT
Start: 2019-01-03 | End: 2019-04-05

## 2019-01-03 NOTE — PROGRESS NOTES
"Subjective:      Patient ID: Glenn Medel is a 63 y.o. male.    Chief Complaint: Cough (since friday ) and Chills    Patient is new to me, here today for cough      Cough   This is a recurrent problem. The current episode started in the past 7 days (7days). The problem has been gradually improving. The problem occurs every few hours. The cough is productive of brown sputum. Associated symptoms include chills, headaches, a sore throat (mild, scratchy) and sweats. Pertinent negatives include no ear congestion, ear pain, fever, heartburn, hemoptysis, myalgias, nasal congestion, postnasal drip, rash, rhinorrhea, shortness of breath, weight loss or wheezing. Nothing aggravates the symptoms. He has tried cool air, prescription cough suppressant and rest (Mucinex DM, promethazine DM, flonase) for the symptoms. The treatment provided moderate relief. His past medical history is significant for bronchitis and pneumonia (2008). There is no history of asthma, bronchiectasis, COPD, emphysema or environmental allergies.     Review of Systems   Constitutional: Positive for chills and diaphoresis. Negative for fever and weight loss.   HENT: Positive for sore throat (mild, scratchy). Negative for congestion, ear pain, postnasal drip, rhinorrhea, sinus pressure and sinus pain.    Eyes: Positive for pain (resolved, 2 days, bilateral). Negative for discharge, itching and visual disturbance.   Respiratory: Positive for cough (productive). Negative for hemoptysis, shortness of breath and wheezing.    Gastrointestinal: Negative for abdominal pain, constipation, diarrhea, heartburn, nausea and vomiting.   Musculoskeletal: Negative for myalgias.   Skin: Negative for rash.   Allergic/Immunologic: Negative for environmental allergies.   Neurological: Positive for headaches. Negative for dizziness and light-headedness.       Objective:   /74   Pulse 68   Temp 97.8 °F (36.6 °C)   Resp 16   Ht 5' 11" (1.803 m)   Wt 96.4 kg (212 lb " 8.4 oz)   SpO2 98%   BMI 29.64 kg/m²   Physical Exam   Constitutional: He appears well-developed and well-nourished. He does not appear ill. No distress.   HENT:   Head: Normocephalic and atraumatic.   Right Ear: Tympanic membrane and ear canal normal. Tympanic membrane is not erythematous. No middle ear effusion.   Left Ear: Tympanic membrane and ear canal normal. Tympanic membrane is not erythematous.  No middle ear effusion.   Nose: Mucosal edema present. Right sinus exhibits no maxillary sinus tenderness and no frontal sinus tenderness. Left sinus exhibits no maxillary sinus tenderness and no frontal sinus tenderness.   Mouth/Throat: Uvula is midline and oropharynx is clear and moist. No posterior oropharyngeal erythema.   Cardiovascular: Normal rate, regular rhythm and normal heart sounds.   No murmur heard.  Pulmonary/Chest: Effort normal and breath sounds normal. No respiratory distress. He has no decreased breath sounds. He has no wheezes. He has no rhonchi. He has no rales.   Occasional cough on clinical exam   Lymphadenopathy:     He has no cervical adenopathy.   Skin: Skin is warm and dry. No rash noted. He is not diaphoretic.   Psychiatric: He has a normal mood and affect. His speech is normal and behavior is normal. Thought content normal. Cognition and memory are normal.     Assessment:      1. Bronchitis       Plan:   Bronchitis  -     promethazine-dextromethorphan (PROMETHAZINE-DM) 6.25-15 mg/5 mL Syrp; Take 5 mLs by mouth nightly as needed (Cough).  Dispense: 120 mL; Refill: 0    Patient concerned about color of sputum, discussed that sputum color does not indicate viral vs bacterial only that infection is present  Breath sounds clear, O2 sats good, no concern for PNA at this time, offer CXR for reassurance, patient declines    Advised patient based on time frame and symptomology this is likely a viral upper respiratory infection.  It does not require antibiotics at this time.    Will treat  symptoms with OTC meds.  If no improvement, or if condition worsens, follow up with PCP.     Discussed worsening signs/symptoms and when to return to clinic or go to ED.   Patient expresses understanding and agrees with treatment plan.

## 2019-01-17 ENCOUNTER — PATIENT OUTREACH (OUTPATIENT)
Dept: OTHER | Facility: OTHER | Age: 64
End: 2019-01-17

## 2019-01-17 NOTE — PROGRESS NOTES
Last 5 Patient Entered Readings                                      Current 30 Day Average: 118/71     Recent Readings 1/16/2019 1/16/2019 1/14/2019 1/14/2019 1/12/2019    SBP (mmHg) 122 122 124 124 116    DBP (mmHg) 74 74 69 69 72    Pulse 70 70 68 68 64          Last 6 Patient Entered Readings                                    Most Recent A1c: 6.6% on 12/11/2018  (Goal: 7%)     Recent Readings 1/17/2019 1/16/2019 1/15/2019 1/15/2019 1/14/2019    Blood Glucose (mg/dL) 96 106 131 88 108          Health  spoke with patient today. BG and BG at goal. No medication recommendations at this time.

## 2019-01-17 NOTE — PROGRESS NOTES
Last 5 Patient Entered Readings                                      Current 30 Day Average: 118/71     Recent Readings 1/16/2019 1/16/2019 1/14/2019 1/14/2019 1/12/2019    SBP (mmHg) 122 122 124 124 116    DBP (mmHg) 74 74 69 69 72    Pulse 70 70 68 68 64          Last 6 Patient Entered Readings                                          Most Recent A1c: 6.6% on 12/11/2018  (Goal: 7%)     Recent Readings 1/17/2019 1/16/2019 1/15/2019 1/15/2019 1/14/2019    Blood Glucose (mg/dL) 96 106 131 88 108          1/17: LVM.  Will call in 4 weeks.  Patient at goal.

## 2019-01-17 NOTE — PROGRESS NOTES
"Last 5 Patient Entered Readings                                      Current 30 Day Average: 118/71     Recent Readings 1/16/2019 1/16/2019 1/14/2019 1/14/2019 1/12/2019    SBP (mmHg) 122 122 124 124 116    DBP (mmHg) 74 74 69 69 72    Pulse 70 70 68 68 64          Last 6 Patient Entered Readings                                          Most Recent A1c: 6.6% on 12/11/2018  (Goal: 7%)     Recent Readings 1/17/2019 1/16/2019 1/15/2019 1/15/2019 1/14/2019    Blood Glucose (mg/dL) 96 106 131 88 108          Digital Medicine: Health  Follow Up    Lifestyle Modifications:    1.Dietary Modifications (Sodium intake <2,000mg/day, food labels, dining out): Patient reports he is not eating 3 big meals a day anymore.    2.Physical Activity: States he has not started back with exercise yet due to the cold weather.    3.Medication Therapy: Patient has been compliant with the medication regimen.    4.Patient has the following medication side effects/concerns: none  (Frequency/Alleviating factors/Precipitating factors, etc.)     Follow up with Mr. Glenn Medel completed. No further questions or concerns. Will continue to follow up to achieve health goals.    Patient states he is enjoying [facility] and "taking it easy."  "

## 2019-01-22 ENCOUNTER — PATIENT MESSAGE (OUTPATIENT)
Dept: ADMINISTRATIVE | Facility: OTHER | Age: 64
End: 2019-01-22

## 2019-01-23 ENCOUNTER — OFFICE VISIT (OUTPATIENT)
Dept: OPHTHALMOLOGY | Facility: CLINIC | Age: 64
End: 2019-01-23
Payer: COMMERCIAL

## 2019-01-23 DIAGNOSIS — Z96.1 STATUS POST CATARACT EXTRACTION AND INSERTION OF INTRAOCULAR LENS, RIGHT: Primary | ICD-10-CM

## 2019-01-23 DIAGNOSIS — Z98.41 STATUS POST CATARACT EXTRACTION AND INSERTION OF INTRAOCULAR LENS, RIGHT: Primary | ICD-10-CM

## 2019-01-23 PROCEDURE — 99024 PR POST-OP FOLLOW-UP VISIT: ICD-10-PCS | Mod: S$GLB,,, | Performed by: OPHTHALMOLOGY

## 2019-01-23 PROCEDURE — 99999 PR PBB SHADOW E&M-EST. PATIENT-LVL III: CPT | Mod: PBBFAC,,, | Performed by: OPHTHALMOLOGY

## 2019-01-23 PROCEDURE — 99024 POSTOP FOLLOW-UP VISIT: CPT | Mod: S$GLB,,, | Performed by: OPHTHALMOLOGY

## 2019-01-23 PROCEDURE — 99999 PR PBB SHADOW E&M-EST. PATIENT-LVL III: ICD-10-PCS | Mod: PBBFAC,,, | Performed by: OPHTHALMOLOGY

## 2019-01-23 NOTE — PROGRESS NOTES
HPI     Dr Pulido     s/p phaco iol OD 12/11/18   vitreous opacities OU w/[facility] OS --s/p 25g PPV/partial AFx OS (11/15/17)   [facility] OD --s/p 25g PPV/partial AFx OD (6/11/18)   phaco w/sulcus IOL OS (11/30/17) --s/p 25g PPV  OS (12/6/17)   DM   HTN Ret OU     Pt denies any eye pain. Using gtts as directed.     gtts- PF TID OD          Simbrinza BID OU    Last edited by Margaret Rojas on 1/23/2019 10:29 AM. (History)            Assessment /Plan     For exam results, see Encounter Report.    Status post cataract extraction and insertion of intraocular lens, right    s/p phaco iol OD 12/11/18     Doing well, okay to stop all gtts    Cont simbrinza BID OU      vitreous opacities OU w/[facility] OS --s/p 25g PPV/partial AFx OS (11/15/17)   [facility] OD --s/p 25g PPV/partial AFx OD (6/11/18)   phaco w/sulcus IOL OS (11/30/17) --s/p 25g PPV  OS (12/6/17)   DM   HTN Ret OU     F/up dr. pulido as scheduled, me prn

## 2019-02-04 DIAGNOSIS — I10 ESSENTIAL HYPERTENSION: ICD-10-CM

## 2019-02-04 RX ORDER — PEN NEEDLE, DIABETIC 31 GX5/16"
NEEDLE, DISPOSABLE MISCELLANEOUS
OUTPATIENT
Start: 2019-02-04

## 2019-02-04 RX ORDER — IRBESARTAN 300 MG/1
TABLET ORAL
Qty: 90 TABLET | Refills: 3 | Status: SHIPPED | OUTPATIENT
Start: 2019-02-04 | End: 2019-11-11

## 2019-03-01 ENCOUNTER — PATIENT OUTREACH (OUTPATIENT)
Dept: OTHER | Facility: OTHER | Age: 64
End: 2019-03-01

## 2019-03-01 NOTE — PROGRESS NOTES
"Last 5 Patient Entered Readings                                      Current 30 Day Average: 120/74     Recent Readings 3/1/2019 3/1/2019 2/25/2019 2/25/2019 2/21/2019    SBP (mmHg) 113 113 128 128 121    DBP (mmHg) 70 70 79 79 75    Pulse 63 63 56 56 68          Last 6 Patient Entered Readings                                          Most Recent A1c: 6.6% on 12/11/2018  (Goal: 7%)     Recent Readings 3/1/2019 2/28/2019 2/27/2019 2/26/2019 2/25/2019    Blood Glucose (mg/dL) 104 108 125 89 101          Digital Medicine: Health  Follow Up    Lifestyle Modifications:    1.Dietary Modifications (Sodium intake <2,000mg/day, food labels, dining out): Reports he is eating more fruits.  States he eats his tomato cucumber, avocado salad for dinner.    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    4.Patient has the following medication side effects/concerns: none  (Frequency/Alleviating factors/Precipitating factors, etc.)     Follow up with   MARY Medel completed. No further questions or concerns. Will continue to follow up to achieve health goals.      "

## 2019-03-01 NOTE — PROGRESS NOTES
Last 5 Patient Entered Readings                                      Current 30 Day Average: 120/74     Recent Readings 2/25/2019 2/25/2019 2/21/2019 2/21/2019 2/19/2019    SBP (mmHg) 128 128 121 121 116    DBP (mmHg) 79 79 75 75 70    Pulse 56 56 68 68 70          Last 6 Patient Entered Readings                                          Most Recent A1c: 6.6% on 12/11/2018  (Goal: 7%)     Recent Readings 3/1/2019 2/28/2019 2/27/2019 2/26/2019 2/25/2019    Blood Glucose (mg/dL) 104 108 125 89 101        3/1: LVM.  Will call in 4 weeks.  Patient at goal.

## 2019-03-06 DIAGNOSIS — Z79.4 TYPE 2 DIABETES MELLITUS WITH DIABETIC NEPHROPATHY, WITH LONG-TERM CURRENT USE OF INSULIN: ICD-10-CM

## 2019-03-06 DIAGNOSIS — E11.21 TYPE 2 DIABETES MELLITUS WITH DIABETIC NEPHROPATHY, WITH LONG-TERM CURRENT USE OF INSULIN: ICD-10-CM

## 2019-03-08 RX ORDER — EMPAGLIFLOZIN 10 MG/1
TABLET, FILM COATED ORAL
Qty: 90 TABLET | Refills: 3 | Status: SHIPPED | OUTPATIENT
Start: 2019-03-08 | End: 2020-03-09 | Stop reason: SDUPTHER

## 2019-03-13 RX ORDER — ATORVASTATIN CALCIUM 20 MG/1
TABLET, FILM COATED ORAL
Qty: 90 TABLET | Refills: 2 | Status: SHIPPED | OUTPATIENT
Start: 2019-03-13 | End: 2019-12-17 | Stop reason: SDUPTHER

## 2019-03-13 RX ORDER — MODAFINIL 200 MG/1
TABLET ORAL
Qty: 60 TABLET | Refills: 0 | Status: SHIPPED | OUTPATIENT
Start: 2019-03-13 | End: 2019-06-10

## 2019-03-13 RX ORDER — FUROSEMIDE 20 MG/1
TABLET ORAL
Qty: 90 TABLET | Refills: 2 | Status: SHIPPED | OUTPATIENT
Start: 2019-03-13 | End: 2019-12-17 | Stop reason: SDUPTHER

## 2019-03-13 NOTE — TELEPHONE ENCOUNTER
Please advise patient that last sleep clinic appointment was 2017.  A 1 month supply was approved.  Sleep clinic with md/np appointment is needed for additional refill.

## 2019-03-22 ENCOUNTER — TELEPHONE (OUTPATIENT)
Dept: NEPHROLOGY | Facility: CLINIC | Age: 64
End: 2019-03-22

## 2019-03-26 ENCOUNTER — PATIENT MESSAGE (OUTPATIENT)
Dept: NEPHROLOGY | Facility: CLINIC | Age: 64
End: 2019-03-26

## 2019-03-29 ENCOUNTER — LAB VISIT (OUTPATIENT)
Dept: LAB | Facility: HOSPITAL | Age: 64
End: 2019-03-29
Attending: FAMILY MEDICINE
Payer: COMMERCIAL

## 2019-03-29 DIAGNOSIS — N18.30 CKD (CHRONIC KIDNEY DISEASE) STAGE 3, GFR 30-59 ML/MIN: ICD-10-CM

## 2019-03-29 LAB
ALBUMIN SERPL BCP-MCNC: 3.7 G/DL (ref 3.5–5.2)
ANION GAP SERPL CALC-SCNC: 8 MMOL/L (ref 8–16)
BASOPHILS # BLD AUTO: 0.05 K/UL (ref 0–0.2)
BASOPHILS NFR BLD: 0.8 % (ref 0–1.9)
BUN SERPL-MCNC: 16 MG/DL (ref 8–23)
CALCIUM SERPL-MCNC: 9.5 MG/DL (ref 8.7–10.5)
CHLORIDE SERPL-SCNC: 103 MMOL/L (ref 95–110)
CO2 SERPL-SCNC: 30 MMOL/L (ref 23–29)
CREAT SERPL-MCNC: 1 MG/DL (ref 0.5–1.4)
DIFFERENTIAL METHOD: ABNORMAL
EOSINOPHIL # BLD AUTO: 0.2 K/UL (ref 0–0.5)
EOSINOPHIL NFR BLD: 3.2 % (ref 0–8)
ERYTHROCYTE [DISTWIDTH] IN BLOOD BY AUTOMATED COUNT: 14.1 % (ref 11.5–14.5)
EST. GFR  (AFRICAN AMERICAN): >60 ML/MIN/1.73 M^2
EST. GFR  (NON AFRICAN AMERICAN): >60 ML/MIN/1.73 M^2
GLUCOSE SERPL-MCNC: 193 MG/DL (ref 70–110)
HCT VFR BLD AUTO: 50.6 % (ref 40–54)
HGB BLD-MCNC: 15.9 G/DL (ref 14–18)
IMM GRANULOCYTES # BLD AUTO: 0.02 K/UL (ref 0–0.04)
IMM GRANULOCYTES NFR BLD AUTO: 0.3 % (ref 0–0.5)
LYMPHOCYTES # BLD AUTO: 1.8 K/UL (ref 1–4.8)
LYMPHOCYTES NFR BLD: 29.9 % (ref 18–48)
MCH RBC QN AUTO: 28.2 PG (ref 27–31)
MCHC RBC AUTO-ENTMCNC: 31.4 G/DL (ref 32–36)
MCV RBC AUTO: 90 FL (ref 82–98)
MONOCYTES # BLD AUTO: 0.4 K/UL (ref 0.3–1)
MONOCYTES NFR BLD: 6.3 % (ref 4–15)
NEUTROPHILS # BLD AUTO: 3.5 K/UL (ref 1.8–7.7)
NEUTROPHILS NFR BLD: 59.5 % (ref 38–73)
NRBC BLD-RTO: 0 /100 WBC
PHOSPHATE SERPL-MCNC: 3.6 MG/DL (ref 2.7–4.5)
PLATELET # BLD AUTO: 235 K/UL (ref 150–350)
PMV BLD AUTO: 9.4 FL (ref 9.2–12.9)
POTASSIUM SERPL-SCNC: 4.4 MMOL/L (ref 3.5–5.1)
PTH-INTACT SERPL-MCNC: 101 PG/ML (ref 9–77)
RBC # BLD AUTO: 5.63 M/UL (ref 4.6–6.2)
SODIUM SERPL-SCNC: 141 MMOL/L (ref 136–145)
WBC # BLD AUTO: 5.91 K/UL (ref 3.9–12.7)

## 2019-03-29 PROCEDURE — 83970 ASSAY OF PARATHORMONE: CPT

## 2019-03-29 PROCEDURE — 80069 RENAL FUNCTION PANEL: CPT

## 2019-03-29 PROCEDURE — 85025 COMPLETE CBC W/AUTO DIFF WBC: CPT

## 2019-03-29 PROCEDURE — 36415 COLL VENOUS BLD VENIPUNCTURE: CPT | Mod: PO

## 2019-04-05 ENCOUNTER — OFFICE VISIT (OUTPATIENT)
Dept: NEPHROLOGY | Facility: CLINIC | Age: 64
End: 2019-04-05
Payer: COMMERCIAL

## 2019-04-05 VITALS
HEART RATE: 70 BPM | HEIGHT: 71 IN | WEIGHT: 210.56 LBS | DIASTOLIC BLOOD PRESSURE: 68 MMHG | SYSTOLIC BLOOD PRESSURE: 115 MMHG | BODY MASS INDEX: 29.48 KG/M2

## 2019-04-05 DIAGNOSIS — N18.30 CHRONIC KIDNEY DISEASE, STAGE III (MODERATE): ICD-10-CM

## 2019-04-05 DIAGNOSIS — Z79.4 TYPE 2 DIABETES MELLITUS WITH DIABETIC NEPHROPATHY, WITH LONG-TERM CURRENT USE OF INSULIN: ICD-10-CM

## 2019-04-05 DIAGNOSIS — J84.9 ILD (INTERSTITIAL LUNG DISEASE): ICD-10-CM

## 2019-04-05 DIAGNOSIS — E78.2 MIXED HYPERLIPIDEMIA: ICD-10-CM

## 2019-04-05 DIAGNOSIS — E11.3293 CONTROLLED TYPE 2 DIABETES MELLITUS WITH BOTH EYES AFFECTED BY MILD NONPROLIFERATIVE RETINOPATHY WITHOUT MACULAR EDEMA, WITH LONG-TERM CURRENT USE OF INSULIN: ICD-10-CM

## 2019-04-05 DIAGNOSIS — G47.33 SEVERE OBSTRUCTIVE SLEEP APNEA: ICD-10-CM

## 2019-04-05 DIAGNOSIS — I10 ESSENTIAL HYPERTENSION: ICD-10-CM

## 2019-04-05 DIAGNOSIS — Z79.4 CONTROLLED TYPE 2 DIABETES MELLITUS WITH BOTH EYES AFFECTED BY MILD NONPROLIFERATIVE RETINOPATHY WITHOUT MACULAR EDEMA, WITH LONG-TERM CURRENT USE OF INSULIN: ICD-10-CM

## 2019-04-05 DIAGNOSIS — G63 POLYNEUROPATHY ASSOCIATED WITH UNDERLYING DISEASE: ICD-10-CM

## 2019-04-05 DIAGNOSIS — E66.9 OBESITY (BMI 30.0-34.9): ICD-10-CM

## 2019-04-05 DIAGNOSIS — G47.33 OSA (OBSTRUCTIVE SLEEP APNEA): ICD-10-CM

## 2019-04-05 DIAGNOSIS — E11.21 TYPE 2 DIABETES MELLITUS WITH DIABETIC NEPHROPATHY, WITH LONG-TERM CURRENT USE OF INSULIN: ICD-10-CM

## 2019-04-05 DIAGNOSIS — N18.30 CKD (CHRONIC KIDNEY DISEASE) STAGE 3, GFR 30-59 ML/MIN: Primary | ICD-10-CM

## 2019-04-05 PROCEDURE — 3044F HG A1C LEVEL LT 7.0%: CPT | Mod: CPTII,S$GLB,, | Performed by: INTERNAL MEDICINE

## 2019-04-05 PROCEDURE — 99214 OFFICE O/P EST MOD 30 MIN: CPT | Mod: S$GLB,,, | Performed by: INTERNAL MEDICINE

## 2019-04-05 PROCEDURE — 99999 PR PBB SHADOW E&M-EST. PATIENT-LVL IV: CPT | Mod: PBBFAC,,, | Performed by: INTERNAL MEDICINE

## 2019-04-05 PROCEDURE — 3008F BODY MASS INDEX DOCD: CPT | Mod: CPTII,S$GLB,, | Performed by: INTERNAL MEDICINE

## 2019-04-05 PROCEDURE — 3078F DIAST BP <80 MM HG: CPT | Mod: CPTII,S$GLB,, | Performed by: INTERNAL MEDICINE

## 2019-04-05 PROCEDURE — 3074F SYST BP LT 130 MM HG: CPT | Mod: CPTII,S$GLB,, | Performed by: INTERNAL MEDICINE

## 2019-04-05 PROCEDURE — 3008F PR BODY MASS INDEX (BMI) DOCUMENTED: ICD-10-PCS | Mod: CPTII,S$GLB,, | Performed by: INTERNAL MEDICINE

## 2019-04-05 PROCEDURE — 3078F PR MOST RECENT DIASTOLIC BLOOD PRESSURE < 80 MM HG: ICD-10-PCS | Mod: CPTII,S$GLB,, | Performed by: INTERNAL MEDICINE

## 2019-04-05 PROCEDURE — 99214 PR OFFICE/OUTPT VISIT, EST, LEVL IV, 30-39 MIN: ICD-10-PCS | Mod: S$GLB,,, | Performed by: INTERNAL MEDICINE

## 2019-04-05 PROCEDURE — 99999 PR PBB SHADOW E&M-EST. PATIENT-LVL IV: ICD-10-PCS | Mod: PBBFAC,,, | Performed by: INTERNAL MEDICINE

## 2019-04-05 PROCEDURE — 3074F PR MOST RECENT SYSTOLIC BLOOD PRESSURE < 130 MM HG: ICD-10-PCS | Mod: CPTII,S$GLB,, | Performed by: INTERNAL MEDICINE

## 2019-04-05 PROCEDURE — 3044F PR MOST RECENT HEMOGLOBIN A1C LEVEL <7.0%: ICD-10-PCS | Mod: CPTII,S$GLB,, | Performed by: INTERNAL MEDICINE

## 2019-04-05 RX ORDER — DOCUSATE SODIUM 100 MG/1
100 CAPSULE, LIQUID FILLED ORAL 3 TIMES DAILY
COMMUNITY
Start: 2019-01-25 | End: 2019-10-07

## 2019-04-05 RX ORDER — MELATONIN 5 MG
10 TABLET, SUBLINGUAL SUBLINGUAL NIGHTLY
Refills: 11 | COMMUNITY
Start: 2019-01-24 | End: 2022-12-12

## 2019-04-05 RX ORDER — CLONAZEPAM 0.5 MG/1
TABLET ORAL
COMMUNITY
End: 2020-04-20 | Stop reason: SDUPTHER

## 2019-04-05 RX ORDER — ALFUZOSIN HYDROCHLORIDE 10 MG/1
TABLET, EXTENDED RELEASE ORAL
COMMUNITY
End: 2019-04-23

## 2019-04-06 NOTE — PROGRESS NOTES
Subjective:       Patient ID: Glenn Medel is a 63 y.o. White male who presents for follow-up evaluation of Follow-up (6 month follow up with labs prior); Chronic Kidney Disease; and Proteinuria    HPI    He reports he is doing well. He has intentionally lost weight. His Hba1c improved to 7.4 from >8. No LE and no SOB. No LUTS.     Interval history April 2019: he reports he is feeling well. No LE edema and no SOB. Weight is down a little. Last Hba1c was 6.6 He is asking about a podiatrist.     Review of Systems   Constitutional: Negative for activity change, appetite change, fatigue and unexpected weight change.   HENT: Negative for facial swelling.    Eyes: Negative for visual disturbance.   Respiratory: Negative for cough and shortness of breath.    Cardiovascular: Negative for chest pain and leg swelling.   Gastrointestinal: Negative for constipation and diarrhea.   Endocrine: Negative for polyuria.   Genitourinary: Positive for frequency. Negative for difficulty urinating, dysuria, hematuria and urgency.   Musculoskeletal: Negative for arthralgias.   Skin: Negative for rash.   Neurological: Negative for weakness and headaches.   Hematological: Does not bruise/bleed easily.   Psychiatric/Behavioral: Negative for decreased concentration.       Objective:      Physical Exam   Constitutional: He is oriented to person, place, and time. He appears well-developed and well-nourished. No distress.   Eyes: No scleral icterus.   Neck: No JVD present.   Cardiovascular: S1 normal and S2 normal. Exam reveals no friction rub.   Pulmonary/Chest: Breath sounds normal.   Abdominal: Soft.   Musculoskeletal: He exhibits no edema.   Neurological: He is alert and oriented to person, place, and time.   Skin: Skin is warm and dry. No erythema.   Psychiatric: He has a normal mood and affect.   Nursing note and vitals reviewed.      Assessment:       1. CKD (chronic kidney disease) stage 3, GFR 30-59 ml/min    2. Uncontrolled type 2  diabetes mellitus with diabetic polyneuropathy, without long-term current use of insulin    3. Chronic kidney disease, stage 3    4. Essential hypertension    5. Mixed hyperlipidemia    6. 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, with long-term current use of insulin    7. Type 2 diabetes mellitus with diabetic nephropathy, with long-term current use of insulin    8. LOVE (obstructive sleep apnea)    9. Severe obstructive sleep apnea    10. Obesity (BMI 30.0-34.9)    11. ILD (interstitial lung disease)    12. Polyneuropathy associated with underlying disease        Plan:             CKD 2/3 is stable with only 80mg of proteinuria. Continue RAAS blockade (irbesartan) for renal preservation    HTN is controlled    Mineral and Bone Disease--continue D3    DM--improved and controlled. Continue Endocrine follow up    Dm feet--refer to podiatry      RTC 6 mo

## 2019-04-08 ENCOUNTER — PATIENT OUTREACH (OUTPATIENT)
Dept: OTHER | Facility: OTHER | Age: 64
End: 2019-04-08

## 2019-04-08 RX ORDER — METOPROLOL SUCCINATE 25 MG/1
TABLET, EXTENDED RELEASE ORAL
Qty: 90 TABLET | Refills: 2 | Status: SHIPPED | OUTPATIENT
Start: 2019-04-08 | End: 2019-12-23

## 2019-04-08 NOTE — PROGRESS NOTES
Last 5 Patient Entered Readings                                      Current 30 Day Average: 122/74     Recent Readings 4/3/2019 4/3/2019 4/2/2019 4/2/2019 3/25/2019    SBP (mmHg) 119 119 125 125 115    DBP (mmHg) 75 75 71 71 72    Pulse 68 68 62 62 69          Last 6 Patient Entered Readings                                          Most Recent A1c: 6.6% on 12/11/2018  (Goal: 7%)     Recent Readings 4/7/2019 4/6/2019 4/6/2019 4/5/2019 4/4/2019    Blood Glucose (mg/dL) 118 126 124 101 118        Digital Medicine: Health  Follow Up    Lifestyle Modifications:    1.Dietary Modifications (Sodium intake <2,000mg/day, food labels, dining out): Reports he continues to monitor his diet.    2.Physical Activity: States he is trying to get some walking in.  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  Encouraged patient to continue to stay active.    3.Medication Therapy: Patient has been compliant with the medication regimen.    4.Patient has the following medication side effects/concerns: none  (Frequency/Alleviating factors/Precipitating factors, etc.)     Follow up with Mr. Glenn Medel completed. No further questions or concerns. Will continue to follow up to achieve health goals.

## 2019-04-12 RX ORDER — PEN NEEDLE, DIABETIC 31 GX5/16"
NEEDLE, DISPOSABLE MISCELLANEOUS
Qty: 100 EACH | Refills: 6 | Status: SHIPPED | OUTPATIENT
Start: 2019-04-12 | End: 2020-02-27

## 2019-04-14 RX ORDER — METFORMIN HYDROCHLORIDE 500 MG/1
TABLET, EXTENDED RELEASE ORAL
Qty: 360 TABLET | Refills: 2 | Status: SHIPPED | OUTPATIENT
Start: 2019-04-14 | End: 2020-01-06

## 2019-04-16 ENCOUNTER — PATIENT OUTREACH (OUTPATIENT)
Dept: OTHER | Facility: OTHER | Age: 64
End: 2019-04-16

## 2019-04-16 NOTE — PROGRESS NOTES
Last 5 Patient Entered Readings                                      Current 30 Day Average: 119/73     Recent Readings 4/11/2019 4/11/2019 4/3/2019 4/3/2019 4/2/2019    SBP (mmHg) 108 108 119 119 125    DBP (mmHg) 66 66 75 75 71    Pulse 67 67 68 68 62          Last 6 Patient Entered Readings                                   Most Recent A1c: 6.6% on 12/11/2018  (Goal: 7%)     Recent Readings 4/16/2019 4/15/2019 4/14/2019 4/13/2019 4/12/2019    Blood Glucose (mg/dL) 92 90 96 109 74          Left voicemail.  Assess for s/s of hypotension and hypoglycemia.

## 2019-04-17 NOTE — PROGRESS NOTES
HPI:  Patient reports medication adherence. Reports he is doing well without complaints.    Last 5 Patient Entered Readings                                      Current 30 Day Average: 119/73     Recent Readings 4/17/2019 4/11/2019 4/11/2019 4/3/2019 4/3/2019    SBP (mmHg) 121 108 108 119 119    DBP (mmHg) 72 66 66 75 75    Pulse 67 67 67 68 68        Patient denies s/s of hypotension (lightheadedness, dizziness, nausea, fatigue) associated with low readings. Instructed patient to inform me if this occurs, patient confirms understanding.    Patient denies s/s of hypertension (SOB, CP, severe headaches, changes in vision) associated with high readings.     Last 6 Patient Entered Readings                                    Most Recent A1c: 6.6% on 12/11/2018  (Goal: 7%)     Recent Readings 4/17/2019 4/16/2019 4/15/2019 4/14/2019 4/13/2019    Blood Glucose (mg/dL) 108 92 90 96 109        Patient denies s/s or episodes of hypoglycemia (weakness, dizziness, hunger, shakiness, nausea, headache, heart palpitations, sweating, fatigue, anxiety, etc.).    Patient denies s/s of hyperglycemia (headache, increased thirst, increased urination, fatigue, blurred vision).    Assessment:  Patient's current 30-day average is at goal of <130/80 mmHg based on ACC/AHA HTN guidelines.     Diabetes is controlled based on patient's last A1C. SMBG readings reviewed and are at goal. eGFR > 60.    Plan:  1. Htn: Continue current regimen.    2. DM: continue current regimen. Requested patient continue once daily SMBGs but incorporate different times of the day. Advised patient to decrease lantus by 2 units if FBG consistently < 80 mg/dL. Reminded him A1C is due in June.    I will continue to monitor regularly and will follow-up in 2-3 months, sooner if blood pressure or blood glucose begins to trend upward or downward.     Current medication regimen:  Hypertension Medications             furosemide (LASIX) 20 MG tablet TAKE ONE TABLET BY MOUTH  EVERY MORNING    irbesartan (AVAPRO) 300 MG tablet TAKE ONE TABLET BY MOUTH EVERY DAY (Replaces Valsartan)    metoprolol succinate (TOPROL-XL) 25 MG 24 hr tablet TAKE ONE TABLET BY MOUTH EVERY DAY        Diabetes Medications             insulin glargine (LANTUS SOLOSTAR U-100 INSULIN) 100 unit/mL (3 mL) InPn pen Inject 20 Units into the skin every evening.    JARDIANCE 10 mg Tab TAKE ONE TABLET BY MOUTH EVERY DAY for blood sugar    metFORMIN (GLUCOPHAGE-XR) 500 MG 24 hr tablet TAKE 4 TABLETS BY MOUTH EVERY DAY    TRULICITY 0.75 mg/0.5 mL PnIj INJECT 0.5 MLS( 0.75 MG) INTO THE SKIN EVERY 7 DAYS        Patient has my contact information and knows to call with any concerns or clinical changes.

## 2019-04-22 ENCOUNTER — PATIENT MESSAGE (OUTPATIENT)
Dept: FAMILY MEDICINE | Facility: CLINIC | Age: 64
End: 2019-04-22

## 2019-04-22 DIAGNOSIS — K64.4 EXTERNAL HEMORRHOID: Primary | ICD-10-CM

## 2019-04-23 ENCOUNTER — OFFICE VISIT (OUTPATIENT)
Dept: SURGERY | Facility: CLINIC | Age: 64
End: 2019-04-23
Payer: COMMERCIAL

## 2019-04-23 ENCOUNTER — HOSPITAL ENCOUNTER (OUTPATIENT)
Dept: RADIOLOGY | Facility: HOSPITAL | Age: 64
Discharge: HOME | End: 2019-04-23
Attending: COLON & RECTAL SURGERY
Payer: COMMERCIAL

## 2019-04-23 ENCOUNTER — TELEPHONE (OUTPATIENT)
Dept: FAMILY MEDICINE | Facility: CLINIC | Age: 64
End: 2019-04-23

## 2019-04-23 ENCOUNTER — PATIENT MESSAGE (OUTPATIENT)
Dept: FAMILY MEDICINE | Facility: CLINIC | Age: 64
End: 2019-04-23

## 2019-04-23 ENCOUNTER — CLINICAL SUPPORT (OUTPATIENT)
Dept: CARDIOLOGY | Facility: CLINIC | Age: 64
End: 2019-04-23
Payer: COMMERCIAL

## 2019-04-23 ENCOUNTER — ANESTHESIA EVENT (OUTPATIENT)
Dept: SURGERY | Facility: HOSPITAL | Age: 64
End: 2019-04-23
Payer: COMMERCIAL

## 2019-04-23 VITALS
TEMPERATURE: 98 F | SYSTOLIC BLOOD PRESSURE: 99 MMHG | WEIGHT: 206.13 LBS | DIASTOLIC BLOOD PRESSURE: 64 MMHG | BODY MASS INDEX: 28.75 KG/M2 | HEART RATE: 74 BPM

## 2019-04-23 DIAGNOSIS — Z01.818 PRE-OP TESTING: ICD-10-CM

## 2019-04-23 DIAGNOSIS — K64.8: Primary | ICD-10-CM

## 2019-04-23 DIAGNOSIS — Z01.818 PRE-OP TESTING: Primary | ICD-10-CM

## 2019-04-23 PROCEDURE — 99254 IP/OBS CNSLTJ NEW/EST MOD 60: CPT | Mod: 25,,, | Performed by: COLON & RECTAL SURGERY

## 2019-04-23 PROCEDURE — 93000 ELECTROCARDIOGRAM COMPLETE: CPT | Mod: S$GLB,,, | Performed by: INTERNAL MEDICINE

## 2019-04-23 PROCEDURE — 71046 XR CHEST PA AND LATERAL: ICD-10-PCS | Mod: 26,,, | Performed by: RADIOLOGY

## 2019-04-23 PROCEDURE — 71046 X-RAY EXAM CHEST 2 VIEWS: CPT | Mod: TC

## 2019-04-23 PROCEDURE — 46600 PR DIAG2STIC A2SCOPY: ICD-10-PCS | Mod: S$GLB,,, | Performed by: COLON & RECTAL SURGERY

## 2019-04-23 PROCEDURE — 99254 PR INITIAL INPATIENT CONSULT,LEVL IV: ICD-10-PCS | Mod: 25,,, | Performed by: COLON & RECTAL SURGERY

## 2019-04-23 PROCEDURE — 99999 PR PBB SHADOW E&M-EST. PATIENT-LVL III: ICD-10-PCS | Mod: PBBFAC,,, | Performed by: COLON & RECTAL SURGERY

## 2019-04-23 PROCEDURE — 46600 DIAGNOSTIC ANOSCOPY SPX: CPT | Mod: S$GLB,,, | Performed by: COLON & RECTAL SURGERY

## 2019-04-23 PROCEDURE — 93000 EKG 12-LEAD: ICD-10-PCS | Mod: S$GLB,,, | Performed by: INTERNAL MEDICINE

## 2019-04-23 PROCEDURE — 71046 X-RAY EXAM CHEST 2 VIEWS: CPT | Mod: 26,,, | Performed by: RADIOLOGY

## 2019-04-23 PROCEDURE — 99999 PR PBB SHADOW E&M-EST. PATIENT-LVL III: CPT | Mod: PBBFAC,,, | Performed by: COLON & RECTAL SURGERY

## 2019-04-23 RX ORDER — LIDOCAINE HYDROCHLORIDE 10 MG/ML
1 INJECTION, SOLUTION EPIDURAL; INFILTRATION; INTRACAUDAL; PERINEURAL ONCE
Status: DISCONTINUED | OUTPATIENT
Start: 2019-04-23 | End: 2019-04-24 | Stop reason: HOSPADM

## 2019-04-23 RX ORDER — METRONIDAZOLE 500 MG/100ML
500 INJECTION, SOLUTION INTRAVENOUS ONCE
Status: ACTIVE | OUTPATIENT
Start: 2019-04-23

## 2019-04-23 RX ORDER — DOCUSATE SODIUM 100 MG/1
100 CAPSULE, LIQUID FILLED ORAL 2 TIMES DAILY
Qty: 60 CAPSULE | Refills: 11 | Status: SHIPPED | OUTPATIENT
Start: 2019-04-23 | End: 2019-07-15

## 2019-04-23 RX ORDER — SODIUM CHLORIDE, SODIUM LACTATE, POTASSIUM CHLORIDE, CALCIUM CHLORIDE 600; 310; 30; 20 MG/100ML; MG/100ML; MG/100ML; MG/100ML
INJECTION, SOLUTION INTRAVENOUS CONTINUOUS
Status: CANCELLED | OUTPATIENT
Start: 2019-04-23

## 2019-04-23 RX ORDER — ONDANSETRON 2 MG/ML
4 INJECTION INTRAMUSCULAR; INTRAVENOUS EVERY 12 HOURS PRN
Status: CANCELLED | OUTPATIENT
Start: 2019-04-23

## 2019-04-23 NOTE — TELEPHONE ENCOUNTER
----- Message from Naheed Pittman sent at 4/22/2019  4:49 PM CDT -----  Contact: self  Pt is calling in regards to an e-mail he sent earlier today. Per pt, he has an external hemorrhoid. The next available appt is 07/22. Pt would like a call back to schedule a sooner appt. Pt did not want to see someone else on the team.    He can be reached at [phone].    Thank you

## 2019-04-23 NOTE — PRE ADMISSION SCREENING
Pre op instructions reviewed with patient per phone:    To confirm, Your surgeon has instructed you:  Surgery is scheduled 04/24/2019 at 1330.  Pre admit office to call afternoon prior to surgery with final arrival time.  If surgery is on Monday, Pre admit office to call Friday afternoon with with final arrival time      Please report to [facility] 1st floor main lobby by 12:00 pm.      INSTRUCTIONS IMPORTANT!!!  ¨ No smoking after 12 midnight, the night before surgery.  ¨ No solid food after 12 midnight, but you may have clear liquids up until 3 hours prior to surgery.  This includes: grape, cranberry, and apple juice (not orange, and no coffee.)   ¨ OK to brush teeth, but no gum, candy or mints!    ¨ Take only these medicines with a small swallow of water-morning of surgery.  Atorvastatin, cymbalta, metoprolol, simbrinza eye drops    ____  Do not wear makeup, including mascara.  ____  No powder, lotions or creams to surgical area.  ____  Please remove all jewelry, including piercings and leave at home.  ____  No money or valuables needed. Please leave at home.  ____  Please bring identification and insurance information to hospital.  ____  If going home the same day, arrange for a ride home. You will not be able to   drive if Anesthesia was used.  ____  Children, under 12 years old, must remain in the waiting room with an adult.  They are not allowed in patient areas.  ____  Wear loose fitting clothing. Allow for dressings, bandages.  ____  Stop Aspirin, Ibuprofen, Motrin and Aleve at least 5-7 days before surgery, unless otherwise instructed by your doctor, or the nurse.   You MAY use Tylenol/acetaminophen until day of surgery.  ____  If you take diabetic medication, do not take am of surgery unless instructed by   Doctor.  ____ Stop taking any Fish Oil supplement or any Vitamins that contain Vitamin E at least 5 days prior to surgery.          Bathing Instructions-- The night before  surgery and the morning prior to coming to the hospital:   -Do not shave the surgical area.   -Shower and wash your hair and body as usual with your regular soap and shampoo.   -Rinse your hair and body completely.   -Use one packet of hibiclens to wash the surgical site (using your hand) gently for 5 minutes.  Do not scrub you skin too hard.   -Do not use hibiclens on your head, face, or genitals.   -Do not wash with regular soap after you use the hibiclens.   -Rinse your body thoroughly.   -Dry with clean, soft towel.  Do not use lotion, cream, deodorant, or powders on   the surgical site.    Use antibacterial soap in place of hibiclens if your surgery is on the head, face or genitals.         Surgical Site Infection    Prevention of surgical site infections:     -Keep incisions clean and dry.   -Do not soak/submerge incisions in water until completely healed.   -Do not apply lotions, powders, creams, or deodorants to site.   -Always make sure hands are cleaned with antibacterial soap/ alcohol-based   prior to touching the surgical site.  (This includes doctors, nurses, staff, and yourself.)    Signs and symptoms:   -Redness and pain around the area where you had surgery   -Drainage of cloudy fluid from your surgical wound   -Fever over 100.4  I have read or had read and explained to me, and understand the above information.

## 2019-04-23 NOTE — LETTER
April 23, 2019      Pratik Hernandez MD  [address] [facility] [address] [place] [address]           [address] [address]  Phone: [phone]  Fax: [phone]          Patient: Glenn Medel   MR Number: [id number]   YOB: 1955   Date of Visit: 4/23/2019       Dear Dr. Pratik Hernandez:    Thank you for referring Glenn Medel to me for evaluation. Attached you will find relevant portions of my assessment and plan of care.    If you have questions, please do not hesitate to call me. I look forward to following Glenn Medel along with you.    Sincerely,    Kaz Keating MD    Enclosure  CC:  No Recipients    If you would like to receive this communication electronically, please contact [email or web address] or [phone] to request more information on [employer] access.    For providers and/or their staff who would like to refer a patient to [facility], please contact us through our one-stop-shop provider referral line, [facility] , at [phone].    If you feel you have received this communication in error or would no longer like to receive these types of communications, please e-mail [email or web address]

## 2019-04-23 NOTE — PROGRESS NOTES
"History & Physical    SUBJECTIVE:     Chief Complaint   Patient presents with    Consult     External Hemorrhoid       History of Present Illness:  Patient is a 63 y.o. male presents for evaluation of a possible hemorrhoid.  Patient reports that about 1 week ago he noticed a pressure and pain near his anus and once he took a shower that evening he noticed a protrusion of tissue from his anus that was abnormal and that he reports his multiple cm.  He states that he does have a sharp pain like sensation when does have a bowel movement and when he is standing or walks too long.  He did go to the emergency room at another hospital where they did not perform any interventions but did prescribe 10 hydrocortisone cream that has not alleviated his symptoms and has failed to improve the symptoms.  He contacted his primary care doctor then referred him here for evaluation.  He states that he is not seeing any bright red blood per rectum, hematochezia, melena, or change in bowel habits.  He states that he has a bowel movement once every 2 days, it is intermittently hard and large and sometimes require straining to pass.  He states that he drinks lots of water per day and does take a stool softener in the morning.  He also eats fiber supplementations with gummies.  His last colonoscopy was reportedly 1 year ago and without polyps or masses, although he has previously had known internal hemorrhoids as well as polyps removed.  He has a positive family history of colorectal cancer in his father who  in his 70s from colon cancer.        Review of patient's allergies indicates:   Allergen Reactions    Avelox [moxifloxacin] Hives and Rash       Current Outpatient Medications   Medication Sig Dispense Refill    aspirin 81 MG Chew Take 81 mg by mouth once daily.      atorvastatin (LIPITOR) 20 MG tablet TAKE ONE TABLET BY MOUTH EVERY DAY 90 tablet 2    BD ULTRA-FINE MINI PEN NEEDLE 31 gauge x 3/16" Ndle USE THREE TIMES DAILY " BEFORE MEALS 100 each 6    blood-glucose meter kit Use as instructed 1 each 0    cholecalciferol, vitamin D3, 1,000 unit capsule Take 2,000 Units by mouth once daily.      clonazePAM (KLONOPIN) 0.5 MG tablet Klonopin 0.5 mg tablet   Take 1 tablet every day by oral route at bedtime.      docusate sodium (STOOL SOFTENER) 100 MG capsule       DULoxetine (CYMBALTA) 60 MG capsule Take 60 mg by mouth 2 (two) times daily.      furosemide (LASIX) 20 MG tablet TAKE ONE TABLET BY MOUTH EVERY MORNING 90 tablet 2    ibuprofen-famotidine (DUEXIS) 800-26.6 mg Tab Take by mouth as needed.      insulin glargine (LANTUS SOLOSTAR U-100 INSULIN) 100 unit/mL (3 mL) InPn pen Inject 20 Units into the skin every evening. 2 Box 3    irbesartan (AVAPRO) 300 MG tablet TAKE ONE TABLET BY MOUTH EVERY DAY (Replaces Valsartan) 90 tablet 3    JARDIANCE 10 mg Tab TAKE ONE TABLET BY MOUTH EVERY DAY for blood sugar 90 tablet 3    melatonin 5 mg Subl Take 3 tablets by mouth once daily.  11    metFORMIN (GLUCOPHAGE-XR) 500 MG 24 hr tablet TAKE 4 TABLETS BY MOUTH EVERY  tablet 2    metoprolol succinate (TOPROL-XL) 25 MG 24 hr tablet TAKE ONE TABLET BY MOUTH EVERY DAY 90 tablet 2    modafinil (PROVIGIL) 200 MG Tab TAKE ONE TABLET BY MOUTH TWICE DAILY 60 tablet 0    SIMBRINZA 1-0.2 % DrpS Shake Liquid AND Instill one drop into BOTH eyes twice daily 8 mL 3    TRULICITY 0.75 mg/0.5 mL PnIj INJECT 0.5 MLS( 0.75 MG) INTO THE SKIN EVERY 7 DAYS 2 mL 9     No current facility-administered medications for this visit.        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, with long-term current use of insulin 12/20/2018    DDD (degenerative disc disease), lumbar     s/p epidural steroids     Diabetes mellitus, stable     Glaucoma     OU    HTN (hypertension)      Iritis, lens-induced 1/8/2018    Obesity     LOVE on CPAP      Past Surgical History:   Procedure Laterality Date    ANTERIOR VITRECTOMY Left 11/30/2017    Performed [facility] at [facility]    CATARACT EXTRACTION W/  INTRAOCULAR LENS IMPLANT Left 11/30/2017    CATARACT EXTRACTION W/  INTRAOCULAR LENS IMPLANT Right 12/11/2018    Dr Garcia    COLONOSCOPY N/A 9/18/2017    Performed by Paul Feliz Jr[facility] at [facility]    COLONOSCOPY W/ POLYPECTOMY  04/13/2010    YARELI.   One 1 cm polyp in the sigmoid colon.  Internal hemorrhoids.    EXTRACTION, CATARACT, WITH IOL INSERTION Right 12/11/2018    [facility] OR    HAND SURGERY      INSERTION-INTRAOCULAR LENS (IOL) Left 11/30/2017    Performed by [facility] at [facility]    PHACOEMULSIFICATION-ASPIRATION-CATARACT Left 11/30/2017    Performed by [facility] at [facility] OR [facility]    REPAIR, RETINAL DETACHMENT, WITH VITRECTOMY Right 8/1/2018    Performed by [facility]AN Pulido [facility] at [facility]    REPAIR-RETINA (VITRECTOMY) Left 12/6/2017    [facility] at [facility]    REPAIR-RETINA (VITRECTOMY) Left 11/15/2017    Performed [facility] at [facility]    TONSILLECTOMY      uro lift      for enlarged prostate     Family History   Problem Relation Age of Onset    Heart disease Brother 62    Heart disease Father     Diabetes Father     Colon cancer Father     Cancer Father         colon    Cataracts Father     Hypertension Mother     Diabetes Mother     Stroke Mother     Cancer Mother         colon    Kidney disease Mother     Glaucoma Maternal Grandmother     No Known Problems Sister     No Known Problems Maternal Aunt     No Known Problems Maternal Uncle     No Known Problems Paternal Aunt     No Known Problems Paternal Uncle     No Known Problems Maternal Grandfather     No Known Problems Paternal Grandmother      No Known Problems Paternal Grandfather     Amblyopia Neg Hx     Blindness Neg Hx     Macular degeneration Neg Hx     Retinal detachment Neg Hx     Strabismus Neg Hx     Thyroid disease Neg Hx      Social History     Tobacco Use    Smoking status: Never Smoker    Smokeless tobacco: Never Used   Substance Use Topics    Alcohol use: Yes     Comment: occasional    Drug use: No        Review of Systems:  Review of Systems   Constitutional: Negative for activity change, appetite change, chills, fatigue, fever and unexpected weight change.   HENT: Negative for congestion, ear pain, sore throat and trouble swallowing.    Eyes: Negative for pain, redness and itching.   Respiratory: Negative for cough, shortness of breath and wheezing.    Cardiovascular: Negative for chest pain, palpitations and leg swelling.   Gastrointestinal: Positive for constipation and rectal pain. Negative for abdominal distention, abdominal pain, anal bleeding, blood in stool, diarrhea, nausea and vomiting.   Endocrine: Negative for cold intolerance, heat intolerance and polyuria.   Genitourinary: Negative for dysuria, flank pain, frequency and hematuria.   Musculoskeletal: Negative for gait problem, joint swelling and neck pain.   Skin: Negative for color change, rash and wound.   Allergic/Immunologic: Negative for environmental allergies and immunocompromised state.   Neurological: Negative for dizziness, speech difficulty, weakness and numbness.   Psychiatric/Behavioral: Negative for agitation, confusion and hallucinations.       OBJECTIVE:     Vital Signs (Most Recent)  Temp: 97.9 °F (36.6 °C) (04/23/19 1318)  Pulse: 74 (04/23/19 1318)  BP: 99/64 (04/23/19 1318)     93.5 kg (206 lb 2.1 oz)     Physical Exam:  Physical Exam   Constitutional: He is oriented to person, place, and time. He appears well-developed.   HENT:   Head: Normocephalic and atraumatic.   Eyes: Conjunctivae and EOM are normal.   Neck: Normal range of motion. No  thyromegaly present.   Cardiovascular: Normal rate and regular rhythm.   Pulmonary/Chest: Effort normal. No respiratory distress.   Abdominal: Soft. He exhibits no distension and no mass. There is no tenderness.   Genitourinary:   Genitourinary Comments: Anorectal: prolapsed, strangulated left lateral internal hemorrhoid with some evidence of mucosal breakdown which is easily reducible and firm; no external hemorrhoids or lesions or fissures; ESTHER with palpable left lateral internal hemorrhoid which is tender; no other palpable masses or lesions; good tone, no blood, good push/squeeze   Musculoskeletal: Normal range of motion. He exhibits no edema or tenderness.   Neurological: He is alert and oriented to person, place, and time.   Skin: Skin is warm and dry. Capillary refill takes less than 2 seconds. No rash noted.   Psychiatric: He has a normal mood and affect.     Anoscopy Procedure Note    Pre-procedure diagnosis: Rectal pain    Post-procedure diagnosis: Internal hemorrhoids    Procedure: Anoscopy    Surgeon: Kaz Keating MD    Assistant: Charo Pringle LPN    Specimen: none    Findings:  Anoscope inserted all 4 quadrants examined. Incarcerated, strangulated and slight mucosal breakdown over left lateral internal hemorrhoidal column.  This was easily reducible.  There were no enlargement of the right posterior or right anterior hemorrhoidal columns.  There were no other mucosal abnormalities, defects or masses noted.    Patient tolerated procedure well.      Laboratory  Lab Results   Component Value Date    WBC 5.91 03/29/2019    HGB 15.9 03/29/2019    HCT 50.6 03/29/2019     03/29/2019    CHOL 154 01/30/2018    TRIG 426 (H) 01/30/2018    HDL 32 (L) 01/30/2018    ALT 38 01/30/2018    AST 39 01/30/2018     03/29/2019    K 4.4 03/29/2019     03/29/2019    CREATININE 1.0 03/29/2019    BUN 16 03/29/2019    CO2 30 (H) 03/29/2019    TSH 0.683 09/22/2015    PSA 1.5 08/08/2014    HGBA1C 6.6 (H)  12/11/2018       Diagnostic review:  Colonoscopy from September 2017 reviewed which revealed internal hemorrhoids as well as 2 polyps removed that were hyperplastic in nature.    ASSESSMENT/PLAN:     63-year-old male who presents with a prolapse, incarcerated and strangulated left lateral internal hemorrhoid which is easily reducible but there was some evidence of mucosal breakdown overlying the hemorrhoidal tissue    - he will require excisional hemorrhoidectomy of this given the mucosal breakdown overlying the hemorrhoidal tissue with the prolapsed, incarcerated strangulated nature of the hemorrhoid.  Discussed this with the patient he voiced understanding as well as his wife.  - All risks, benefits and alternatives fully explained to patient.  Risks include, but are not limited to, bleeding, infection, fecal incontinence, damage to the sphincter muscles, postoperative abscess, postoperative pain, urinary incontinence, urinary retention, perioperative MI, CVA and death.  All questions appropriately answered to patient's satisfaction.  Consent signed and placed on chart.    - recommended lifestyle and dietary modifications to include taking a stool softener twice per day instead of once per day, changing fiber supplementatio to the powder form either Metamucil or Benefiber daily, continue to drink at least 64 oz of water per day, taking MiraLax as needed to have a bowel movement daily, avoid straining on the toilet when having a bowel movement, and spending less than 5 min on the toilet per bowel movement.  - he will perform a enema the morning of surgery prior to coming to the hospital    Kaz Keating MD  Colon and Rectal Surgery  [facility]

## 2019-04-23 NOTE — PRE ADMISSION SCREENING
Breanne with Dr. Keating office informed me that pt. Has been scheduled for sx on 04/24/20419 and requested arrival time. I told Breanne have the pt. Arrive at [facility] at 12:00 pm on 04/24/2019. Breanne gave pt. Arrival time for surgery tomorrow.

## 2019-04-23 NOTE — H&P (VIEW-ONLY)
"History & Physical    SUBJECTIVE:     Chief Complaint   Patient presents with    Consult     External Hemorrhoid       History of Present Illness:  Patient is a 63 y.o. male presents for evaluation of a possible hemorrhoid.  Patient reports that about 1 week ago he noticed a pressure and pain near his anus and once he took a shower that evening he noticed a protrusion of tissue from his anus that was abnormal and that he reports his multiple cm.  He states that he does have a sharp pain like sensation when does have a bowel movement and when he is standing or walks too long.  He did go to the emergency room at another hospital where they did not perform any interventions but did prescribe 10 hydrocortisone cream that has not alleviated his symptoms and has failed to improve the symptoms.  He contacted his primary care doctor then referred him here for evaluation.  He states that he is not seeing any bright red blood per rectum, hematochezia, melena, or change in bowel habits.  He states that he has a bowel movement once every 2 days, it is intermittently hard and large and sometimes require straining to pass.  He states that he drinks lots of water per day and does take a stool softener in the morning.  He also eats fiber supplementations with gummies.  His last colonoscopy was reportedly 1 year ago and without polyps or masses, although he has previously had known internal hemorrhoids as well as polyps removed.  He has a positive family history of colorectal cancer in his father who  in his 70s from colon cancer.        Review of patient's allergies indicates:   Allergen Reactions    Avelox [moxifloxacin] Hives and Rash       Current Outpatient Medications   Medication Sig Dispense Refill    aspirin 81 MG Chew Take 81 mg by mouth once daily.      atorvastatin (LIPITOR) 20 MG tablet TAKE ONE TABLET BY MOUTH EVERY DAY 90 tablet 2    BD ULTRA-FINE MINI PEN NEEDLE 31 gauge x 3/16" Ndle USE THREE TIMES DAILY " BEFORE MEALS 100 each 6    blood-glucose meter kit Use as instructed 1 each 0    cholecalciferol, vitamin D3, 1,000 unit capsule Take 2,000 Units by mouth once daily.      clonazePAM (KLONOPIN) 0.5 MG tablet Klonopin 0.5 mg tablet   Take 1 tablet every day by oral route at bedtime.      docusate sodium (STOOL SOFTENER) 100 MG capsule       DULoxetine (CYMBALTA) 60 MG capsule Take 60 mg by mouth 2 (two) times daily.      furosemide (LASIX) 20 MG tablet TAKE ONE TABLET BY MOUTH EVERY MORNING 90 tablet 2    ibuprofen-famotidine (DUEXIS) 800-26.6 mg Tab Take by mouth as needed.      insulin glargine (LANTUS SOLOSTAR U-100 INSULIN) 100 unit/mL (3 mL) InPn pen Inject 20 Units into the skin every evening. 2 Box 3    irbesartan (AVAPRO) 300 MG tablet TAKE ONE TABLET BY MOUTH EVERY DAY (Replaces Valsartan) 90 tablet 3    JARDIANCE 10 mg Tab TAKE ONE TABLET BY MOUTH EVERY DAY for blood sugar 90 tablet 3    melatonin 5 mg Subl Take 3 tablets by mouth once daily.  11    metFORMIN (GLUCOPHAGE-XR) 500 MG 24 hr tablet TAKE 4 TABLETS BY MOUTH EVERY  tablet 2    metoprolol succinate (TOPROL-XL) 25 MG 24 hr tablet TAKE ONE TABLET BY MOUTH EVERY DAY 90 tablet 2    modafinil (PROVIGIL) 200 MG Tab TAKE ONE TABLET BY MOUTH TWICE DAILY 60 tablet 0    SIMBRINZA 1-0.2 % DrpS Shake Liquid AND Instill one drop into BOTH eyes twice daily 8 mL 3    TRULICITY 0.75 mg/0.5 mL PnIj INJECT 0.5 MLS( 0.75 MG) INTO THE SKIN EVERY 7 DAYS 2 mL 9     No current facility-administered medications for this visit.        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, with long-term current use of insulin 12/20/2018    DDD (degenerative disc disease), lumbar     s/p epidural steroids     Diabetes mellitus, stable     Glaucoma     OU    HTN (hypertension)      Iritis, lens-induced 1/8/2018    Obesity     LOVE on CPAP      Past Surgical History:   Procedure Laterality Date    ANTERIOR VITRECTOMY Left 11/30/2017    Performed by [facility] at [facility] OR 1ST [facility]    CATARACT EXTRACTION W/  INTRAOCULAR LENS IMPLANT Left 11/30/2017    CATARACT EXTRACTION W/  INTRAOCULAR LENS IMPLANT Right 12/11/2018    Dr Garcia    COLONOSCOPY N/A 9/18/2017    Performed by Paul Feliz Jr., MD at [facility] END[facility]    COLONOSCOPY W/ POLYPECTOMY  04/13/2010    YARELI.   One 1 cm polyp in the sigmoid colon.  Internal hemorrhoids.    EXTRACTION, CATARACT, WITH IOL INSERTION Right 12/11/2018    Performed by [facility] at [facility] OR    HAND SURGERY      INSERTION-INTRAOCULAR LENS (IOL) Left 11/30/2017    Performed by [facility] at [facility] OR [facility]    PHACOEMULSIFICATION-ASPIRATION-CATARACT Left 11/30/2017    Performed by [facility] at [facility] OR 1ST [facility]LR    REPAIR, RETINAL DETACHMENT, WITH VITRECTOMY Right 8/1/2018    Performed by SA[facility]AN Pulido [facility] at [facility] OR [facility]LR    REPAIR-RETINA (VITRECTOMY) Left 12/6/2017    Performed by [facility] at [facility] OR 1[facility]LR    REPAIR-RETINA (VITRECTOMY) Left 11/15/2017    Performed by [facility] at [facility] OR [facility]    TONSILLECTOMY      uro lift      for enlarged prostate     Family History   Problem Relation Age of Onset    Heart disease Brother 62    Heart disease Father     Diabetes Father     Colon cancer Father     Cancer Father         colon    Cataracts Father     Hypertension Mother     Diabetes Mother     Stroke Mother     Cancer Mother         colon    Kidney disease Mother     Glaucoma Maternal Grandmother     No Known Problems Sister     No Known Problems Maternal Aunt     No Known Problems Maternal Uncle     No Known Problems Paternal Aunt     No Known Problems Paternal Uncle     No Known Problems Maternal Grandfather     No Known Problems Paternal Grandmother      No Known Problems Paternal Grandfather     Amblyopia Neg Hx     Blindness Neg Hx     Macular degeneration Neg Hx     Retinal detachment Neg Hx     Strabismus Neg Hx     Thyroid disease Neg Hx      Social History     Tobacco Use    Smoking status: Never Smoker    Smokeless tobacco: Never Used   Substance Use Topics    Alcohol use: Yes     Comment: occasional    Drug use: No        Review of Systems:  Review of Systems   Constitutional: Negative for activity change, appetite change, chills, fatigue, fever and unexpected weight change.   HENT: Negative for congestion, ear pain, sore throat and trouble swallowing.    Eyes: Negative for pain, redness and itching.   Respiratory: Negative for cough, shortness of breath and wheezing.    Cardiovascular: Negative for chest pain, palpitations and leg swelling.   Gastrointestinal: Positive for constipation and rectal pain. Negative for abdominal distention, abdominal pain, anal bleeding, blood in stool, diarrhea, nausea and vomiting.   Endocrine: Negative for cold intolerance, heat intolerance and polyuria.   Genitourinary: Negative for dysuria, flank pain, frequency and hematuria.   Musculoskeletal: Negative for gait problem, joint swelling and neck pain.   Skin: Negative for color change, rash and wound.   Allergic/Immunologic: Negative for environmental allergies and immunocompromised state.   Neurological: Negative for dizziness, speech difficulty, weakness and numbness.   Psychiatric/Behavioral: Negative for agitation, confusion and hallucinations.       OBJECTIVE:     Vital Signs (Most Recent)  Temp: 97.9 °F (36.6 °C) (04/23/19 1318)  Pulse: 74 (04/23/19 1318)  BP: 99/64 (04/23/19 1318)     93.5 kg (206 lb 2.1 oz)     Physical Exam:  Physical Exam   Constitutional: He is oriented to person, place, and time. He appears well-developed.   HENT:   Head: Normocephalic and atraumatic.   Eyes: Conjunctivae and EOM are normal.   Neck: Normal range of motion. No  thyromegaly present.   Cardiovascular: Normal rate and regular rhythm.   Pulmonary/Chest: Effort normal. No respiratory distress.   Abdominal: Soft. He exhibits no distension and no mass. There is no tenderness.   Genitourinary:   Genitourinary Comments: Anorectal: prolapsed, strangulated left lateral internal hemorrhoid with some evidence of mucosal breakdown which is easily reducible and firm; no external hemorrhoids or lesions or fissures; ESTHER with palpable left lateral internal hemorrhoid which is tender; no other palpable masses or lesions; good tone, no blood, good push/squeeze   Musculoskeletal: Normal range of motion. He exhibits no edema or tenderness.   Neurological: He is alert and oriented to person, place, and time.   Skin: Skin is warm and dry. Capillary refill takes less than 2 seconds. No rash noted.   Psychiatric: He has a normal mood and affect.     Anoscopy Procedure Note    Pre-procedure diagnosis: Rectal pain    Post-procedure diagnosis: Internal hemorrhoids    Procedure: Anoscopy    Surgeon: Kaz Keating MD    Assistant: Charo Pringle LPN    Specimen: none    Findings:  Anoscope inserted all 4 quadrants examined. Incarcerated, strangulated and slight mucosal breakdown over left lateral internal hemorrhoidal column.  This was easily reducible.  There were no enlargement of the right posterior or right anterior hemorrhoidal columns.  There were no other mucosal abnormalities, defects or masses noted.    Patient tolerated procedure well.      Laboratory  Lab Results   Component Value Date    WBC 5.91 03/29/2019    HGB 15.9 03/29/2019    HCT 50.6 03/29/2019     03/29/2019    CHOL 154 01/30/2018    TRIG 426 (H) 01/30/2018    HDL 32 (L) 01/30/2018    ALT 38 01/30/2018    AST 39 01/30/2018     03/29/2019    K 4.4 03/29/2019     03/29/2019    CREATININE 1.0 03/29/2019    BUN 16 03/29/2019    CO2 30 (H) 03/29/2019    TSH 0.683 09/22/2015    PSA 1.5 08/08/2014    HGBA1C 6.6 (H)  12/11/2018       Diagnostic review:  Colonoscopy from September 2017 reviewed which revealed internal hemorrhoids as well as 2 polyps removed that were hyperplastic in nature.    ASSESSMENT/PLAN:     63-year-old male who presents with a prolapse, incarcerated and strangulated left lateral internal hemorrhoid which is easily reducible but there was some evidence of mucosal breakdown overlying the hemorrhoidal tissue    - he will require excisional hemorrhoidectomy of this given the mucosal breakdown overlying the hemorrhoidal tissue with the prolapsed, incarcerated strangulated nature of the hemorrhoid.  Discussed this with the patient he voiced understanding as well as his wife.  - All risks, benefits and alternatives fully explained to patient.  Risks include, but are not limited to, bleeding, infection, fecal incontinence, damage to the sphincter muscles, postoperative abscess, postoperative pain, urinary incontinence, urinary retention, perioperative MI, CVA and death.  All questions appropriately answered to patient's satisfaction.  Consent signed and placed on chart.    - recommended lifestyle and dietary modifications to include taking a stool softener twice per day instead of once per day, changing fiber supplementatio to the powder form either Metamucil or Benefiber daily, continue to drink at least 64 oz of water per day, taking MiraLax as needed to have a bowel movement daily, avoid straining on the toilet when having a bowel movement, and spending less than 5 min on the toilet per bowel movement.  - he will perform a enema the morning of surgery prior to coming to the hospital    Kaz Keating MD  Colon and Rectal Surgery  [facility]

## 2019-04-24 ENCOUNTER — HOSPITAL ENCOUNTER (OUTPATIENT)
Facility: HOSPITAL | Age: 64
Discharge: HOME | End: 2019-04-24
Attending: COLON & RECTAL SURGERY | Admitting: COLON & RECTAL SURGERY
Payer: COMMERCIAL

## 2019-04-24 ENCOUNTER — ANESTHESIA (OUTPATIENT)
Dept: SURGERY | Facility: HOSPITAL | Age: 64
End: 2019-04-24
Payer: COMMERCIAL

## 2019-04-24 DIAGNOSIS — K64.8: Primary | ICD-10-CM

## 2019-04-24 LAB
POCT GLUCOSE: 112 MG/DL (ref 70–110)
POCT GLUCOSE: 75 MG/DL (ref 70–110)

## 2019-04-24 PROCEDURE — 36000706: Performed by: COLON & RECTAL SURGERY

## 2019-04-24 PROCEDURE — 71000033 HC RECOVERY, INTIAL HOUR: Performed by: COLON & RECTAL SURGERY

## 2019-04-24 PROCEDURE — C9290 INJ, BUPIVACAINE LIPOSOME: HCPCS | Performed by: COLON & RECTAL SURGERY

## 2019-04-24 PROCEDURE — 25000003 PHARM REV CODE 250: Performed by: ANESTHESIOLOGY

## 2019-04-24 PROCEDURE — 88304 TISSUE EXAM BY PATHOLOGIST: CPT | Mod: 26,,, | Performed by: PATHOLOGY

## 2019-04-24 PROCEDURE — 27201423 OPTIME MED/SURG SUP & DEVICES STERILE SUPPLY: Performed by: COLON & RECTAL SURGERY

## 2019-04-24 PROCEDURE — 25000003 PHARM REV CODE 250: Performed by: COLON & RECTAL SURGERY

## 2019-04-24 PROCEDURE — 71000015 HC POSTOP RECOV 1ST HR: Performed by: COLON & RECTAL SURGERY

## 2019-04-24 PROCEDURE — 37000009 HC ANESTHESIA EA ADD 15 MINS: Performed by: COLON & RECTAL SURGERY

## 2019-04-24 PROCEDURE — 46255 PR HEMORRHOIDECTOMY,INT/EXT,1 COLUMN/GROUP: ICD-10-PCS | Mod: 52,,, | Performed by: COLON & RECTAL SURGERY

## 2019-04-24 PROCEDURE — 46255 REMOVE INT/EXT HEM 1 GROUP: CPT | Mod: 52,,, | Performed by: COLON & RECTAL SURGERY

## 2019-04-24 PROCEDURE — 63600175 PHARM REV CODE 636 W HCPCS: Performed by: NURSE ANESTHETIST, CERTIFIED REGISTERED

## 2019-04-24 PROCEDURE — 88304 TISSUE SPECIMEN TO PATHOLOGY - SURGERY: ICD-10-PCS | Mod: 26,,, | Performed by: PATHOLOGY

## 2019-04-24 PROCEDURE — 63600175 PHARM REV CODE 636 W HCPCS: Performed by: COLON & RECTAL SURGERY

## 2019-04-24 PROCEDURE — 88304 TISSUE EXAM BY PATHOLOGIST: CPT | Performed by: PATHOLOGY

## 2019-04-24 PROCEDURE — 37000008 HC ANESTHESIA 1ST 15 MINUTES: Performed by: COLON & RECTAL SURGERY

## 2019-04-24 PROCEDURE — 36000707: Performed by: COLON & RECTAL SURGERY

## 2019-04-24 RX ORDER — METRONIDAZOLE 500 MG/1
500 TABLET ORAL 3 TIMES DAILY
Qty: 21 TABLET | Refills: 0 | Status: SHIPPED | OUTPATIENT
Start: 2019-04-24 | End: 2019-05-01

## 2019-04-24 RX ORDER — METOCLOPRAMIDE HYDROCHLORIDE 5 MG/ML
10 INJECTION INTRAMUSCULAR; INTRAVENOUS EVERY 10 MIN PRN
Status: DISCONTINUED | OUTPATIENT
Start: 2019-04-24 | End: 2019-04-24 | Stop reason: HOSPADM

## 2019-04-24 RX ORDER — PROPOFOL 10 MG/ML
VIAL (ML) INTRAVENOUS
Status: DISCONTINUED | OUTPATIENT
Start: 2019-04-24 | End: 2019-04-24

## 2019-04-24 RX ORDER — LIDOCAINE HCL/PF 100 MG/5ML
SYRINGE (ML) INTRAVENOUS
Status: DISCONTINUED | OUTPATIENT
Start: 2019-04-24 | End: 2019-04-24

## 2019-04-24 RX ORDER — SODIUM CHLORIDE, SODIUM LACTATE, POTASSIUM CHLORIDE, CALCIUM CHLORIDE 600; 310; 30; 20 MG/100ML; MG/100ML; MG/100ML; MG/100ML
INJECTION, SOLUTION INTRAVENOUS CONTINUOUS
Status: DISCONTINUED | OUTPATIENT
Start: 2019-04-24 | End: 2019-04-24 | Stop reason: HOSPADM

## 2019-04-24 RX ORDER — SODIUM CHLORIDE 9 MG/ML
INJECTION, SOLUTION INTRAVENOUS CONTINUOUS
Status: DISCONTINUED | OUTPATIENT
Start: 2019-04-24 | End: 2019-04-24 | Stop reason: HOSPADM

## 2019-04-24 RX ORDER — SODIUM CHLORIDE 0.9 % (FLUSH) 0.9 %
3 SYRINGE (ML) INJECTION
Status: DISCONTINUED | OUTPATIENT
Start: 2019-04-24 | End: 2019-04-24 | Stop reason: HOSPADM

## 2019-04-24 RX ORDER — ONDANSETRON 2 MG/ML
4 INJECTION INTRAMUSCULAR; INTRAVENOUS EVERY 12 HOURS PRN
Status: DISCONTINUED | OUTPATIENT
Start: 2019-04-24 | End: 2019-04-24 | Stop reason: HOSPADM

## 2019-04-24 RX ORDER — DIPHENHYDRAMINE HCL 25 MG
25 CAPSULE ORAL EVERY 6 HOURS PRN
Status: ACTIVE | OUTPATIENT
Start: 2019-04-24

## 2019-04-24 RX ORDER — MEPERIDINE HYDROCHLORIDE 50 MG/ML
12.5 INJECTION INTRAMUSCULAR; INTRAVENOUS; SUBCUTANEOUS ONCE AS NEEDED
Status: DISCONTINUED | OUTPATIENT
Start: 2019-04-24 | End: 2019-04-24 | Stop reason: SDUPTHER

## 2019-04-24 RX ORDER — OXYCODONE HYDROCHLORIDE 5 MG/1
5 TABLET ORAL
Status: DISCONTINUED | OUTPATIENT
Start: 2019-04-24 | End: 2019-04-24 | Stop reason: HOSPADM

## 2019-04-24 RX ORDER — HYDROCODONE BITARTRATE AND ACETAMINOPHEN 5; 325 MG/1; MG/1
1 TABLET ORAL
Status: DISCONTINUED | OUTPATIENT
Start: 2019-04-24 | End: 2019-04-24 | Stop reason: SDUPTHER

## 2019-04-24 RX ORDER — SODIUM CHLORIDE 0.9 % (FLUSH) 0.9 %
3 SYRINGE (ML) INJECTION EVERY 8 HOURS
Status: DISCONTINUED | OUTPATIENT
Start: 2019-04-24 | End: 2019-04-24 | Stop reason: HOSPADM

## 2019-04-24 RX ORDER — BUPIVACAINE HYDROCHLORIDE 2.5 MG/ML
INJECTION, SOLUTION EPIDURAL; INFILTRATION; INTRACAUDAL
Status: DISCONTINUED | OUTPATIENT
Start: 2019-04-24 | End: 2019-04-24 | Stop reason: HOSPADM

## 2019-04-24 RX ORDER — MORPHINE SULFATE 4 MG/ML
2 INJECTION, SOLUTION INTRAMUSCULAR; INTRAVENOUS EVERY 5 MIN PRN
Status: DISCONTINUED | OUTPATIENT
Start: 2019-04-24 | End: 2019-04-24 | Stop reason: HOSPADM

## 2019-04-24 RX ORDER — OXYCODONE HYDROCHLORIDE 5 MG/1
10 TABLET ORAL EVERY 4 HOURS PRN
Status: DISCONTINUED | OUTPATIENT
Start: 2019-04-24 | End: 2019-04-24 | Stop reason: HOSPADM

## 2019-04-24 RX ORDER — ONDANSETRON 2 MG/ML
INJECTION INTRAMUSCULAR; INTRAVENOUS
Status: DISCONTINUED | OUTPATIENT
Start: 2019-04-24 | End: 2019-04-24

## 2019-04-24 RX ORDER — FENTANYL CITRATE 50 UG/ML
25 INJECTION, SOLUTION INTRAMUSCULAR; INTRAVENOUS EVERY 5 MIN PRN
Status: DISCONTINUED | OUTPATIENT
Start: 2019-04-24 | End: 2019-04-24 | Stop reason: SDUPTHER

## 2019-04-24 RX ORDER — MIDAZOLAM HYDROCHLORIDE 1 MG/ML
INJECTION, SOLUTION INTRAMUSCULAR; INTRAVENOUS
Status: DISCONTINUED | OUTPATIENT
Start: 2019-04-24 | End: 2019-04-24

## 2019-04-24 RX ORDER — MEPERIDINE HYDROCHLORIDE 50 MG/ML
12.5 INJECTION INTRAMUSCULAR; INTRAVENOUS; SUBCUTANEOUS ONCE AS NEEDED
Status: DISCONTINUED | OUTPATIENT
Start: 2019-04-24 | End: 2019-04-24 | Stop reason: HOSPADM

## 2019-04-24 RX ORDER — OXYCODONE AND ACETAMINOPHEN 5; 325 MG/1; MG/1
1 TABLET ORAL
Qty: 30 TABLET | Refills: 0 | Status: SHIPPED | OUTPATIENT
Start: 2019-04-24 | End: 2019-06-10

## 2019-04-24 RX ORDER — PROPOFOL 10 MG/ML
VIAL (ML) INTRAVENOUS CONTINUOUS PRN
Status: DISCONTINUED | OUTPATIENT
Start: 2019-04-24 | End: 2019-04-24

## 2019-04-24 RX ORDER — HYDROMORPHONE HYDROCHLORIDE 2 MG/ML
0.2 INJECTION, SOLUTION INTRAMUSCULAR; INTRAVENOUS; SUBCUTANEOUS EVERY 5 MIN PRN
Status: DISCONTINUED | OUTPATIENT
Start: 2019-04-24 | End: 2019-04-24 | Stop reason: SDUPTHER

## 2019-04-24 RX ORDER — METRONIDAZOLE 500 MG/100ML
500 INJECTION, SOLUTION INTRAVENOUS
Status: DISCONTINUED | OUTPATIENT
Start: 2019-04-24 | End: 2019-04-24 | Stop reason: HOSPADM

## 2019-04-24 RX ORDER — ONDANSETRON 2 MG/ML
4 INJECTION INTRAMUSCULAR; INTRAVENOUS DAILY PRN
Status: DISCONTINUED | OUTPATIENT
Start: 2019-04-24 | End: 2019-04-24 | Stop reason: SDUPTHER

## 2019-04-24 RX ORDER — OXYCODONE HYDROCHLORIDE 5 MG/1
5 TABLET ORAL EVERY 4 HOURS PRN
Status: DISCONTINUED | OUTPATIENT
Start: 2019-04-24 | End: 2019-04-24 | Stop reason: HOSPADM

## 2019-04-24 RX ORDER — FENTANYL CITRATE 50 UG/ML
INJECTION, SOLUTION INTRAMUSCULAR; INTRAVENOUS
Status: DISCONTINUED | OUTPATIENT
Start: 2019-04-24 | End: 2019-04-24

## 2019-04-24 RX ADMIN — BUPIVACAINE 266 MG: 13.3 INJECTION, SUSPENSION, LIPOSOMAL INFILTRATION at 03:04

## 2019-04-24 RX ADMIN — PROPOFOL 50 MG: 10 INJECTION, EMULSION INTRAVENOUS at 02:04

## 2019-04-24 RX ADMIN — OXYCODONE HYDROCHLORIDE 5 MG: 5 TABLET ORAL at 03:04

## 2019-04-24 RX ADMIN — MIDAZOLAM 2 MG: 1 INJECTION INTRAMUSCULAR; INTRAVENOUS at 02:04

## 2019-04-24 RX ADMIN — ONDANSETRON 4 MG: 2 INJECTION, SOLUTION INTRAMUSCULAR; INTRAVENOUS at 02:04

## 2019-04-24 RX ADMIN — LIDOCAINE HYDROCHLORIDE 100 MG: 20 INJECTION, SOLUTION INTRAVENOUS at 02:04

## 2019-04-24 RX ADMIN — FENTANYL CITRATE 25 MCG: 50 INJECTION, SOLUTION INTRAMUSCULAR; INTRAVENOUS at 02:04

## 2019-04-24 RX ADMIN — SODIUM CHLORIDE, SODIUM LACTATE, POTASSIUM CHLORIDE, AND CALCIUM CHLORIDE: 600; 310; 30; 20 INJECTION, SOLUTION INTRAVENOUS at 02:04

## 2019-04-24 RX ADMIN — PROPOFOL 50 MCG/KG/MIN: 10 INJECTION, EMULSION INTRAVENOUS at 02:04

## 2019-04-24 RX ADMIN — PROPOFOL 30 MG: 10 INJECTION, EMULSION INTRAVENOUS at 02:04

## 2019-04-24 NOTE — INTERVAL H&P NOTE
The patient has been examined and the H&P has been reviewed:    I concur with the findings and no changes have occurred since H&P was written.    Anesthesia/Surgery risks, benefits and alternative options discussed and understood by patient/family.          Active Hospital Problems    Diagnosis  POA    Internal strangulated hemorrhoids [K64.8]  Yes      Resolved Hospital Problems   No resolved problems to display.

## 2019-04-24 NOTE — ANESTHESIA PREPROCEDURE EVALUATION
04/24/2019  Glenn Medel is a 63 y.o., male.    Pre-op Assessment    I have reviewed the Patient Summary Reports.     I have reviewed the Nursing Notes.   I have reviewed the Medications.     Review of Systems  Cardiovascular:   Hypertension   Impression: NORMAL MYOCARDIAL PERFUSION  1. The perfusion scan is free of evidence for myocardial ischemia or injury.   2. Resting wall motion is physiologic.   3. Resting LV function is normal.   4. The ventricular volumes are normal at rest and stress.   5. The extracardiac distribution of radioactivity is normal.           This document has been electronically    SIGNED BY: Floyd Valencia MD On: 04/04/2018 16:03   Pulmonary:   Sleep Apnea    Renal/:   Chronic Renal Disease    Musculoskeletal:   Arthritis     Neurological:   Neuromuscular Disease,    Endocrine:   Diabetes, type 2           Anesthesia Plan  Type of Anesthesia, risks & benefits discussed:  Anesthesia Type:  general, MAC  Patient's Preference:   Intra-op Monitoring Plan: standard ASA monitors  Intra-op Monitoring Plan Comments:   Post Op Pain Control Plan: multimodal analgesia  Post Op Pain Control Plan Comments:   Induction:   IV  Beta Blocker:         Informed Consent: Patient understands risks and agrees with Anesthesia plan.  Questions answered.   ASA Score: 3     Day of Surgery Review of History & Physical: I have interviewed and examined the patient. I have reviewed the patient's H&P dated:  There are no significant changes.

## 2019-04-25 VITALS
WEIGHT: 204.06 LBS | HEIGHT: 71 IN | TEMPERATURE: 98 F | DIASTOLIC BLOOD PRESSURE: 87 MMHG | BODY MASS INDEX: 28.57 KG/M2 | OXYGEN SATURATION: 98 % | SYSTOLIC BLOOD PRESSURE: 141 MMHG | HEART RATE: 66 BPM | RESPIRATION RATE: 17 BRPM

## 2019-04-25 NOTE — DISCHARGE SUMMARY
"[facility]  Discharge Note  Short Stay    Admit Date: 4/24/2019    Discharge Date and Time: 4/24/2019  4:35 PM     Attending Physician: Kaz Keating     Discharge Provider: Kaz Keating    Diagnoses:  Active Hospital Problems    Diagnosis  POA    Internal strangulated hemorrhoids [K64.8]  Yes      Resolved Hospital Problems   No resolved problems to display.       Discharged Condition: good    Hospital Course: Patient was admitted for an outpatient procedure and tolerated the procedure well with no complications.    Final Diagnoses: Same as principal problem.    Disposition: Home or Self Care    Follow up/Patient Instructions:    Medications:  Reconciled Home Medications:      Medication List      START taking these medications    metroNIDAZOLE 500 MG tablet  Commonly known as:  FLAGYL  Take 1 tablet (500 mg total) by mouth 3 (three) times daily. Take initial dose at 2pm, second dose at 3pm and final dose at 10pm the day before surgery for 7 days     oxyCODONE-acetaminophen 5-325 mg per tablet  Commonly known as:  PERCOCET  Take 1 tablet by mouth every 4 to 6 hours as needed for Pain.        CONTINUE taking these medications    aspirin 81 MG Chew  Take 81 mg by mouth once daily.     atorvastatin 20 MG tablet  Commonly known as:  LIPITOR  TAKE ONE TABLET BY MOUTH EVERY DAY     BD ULTRA-FINE MINI PEN NEEDLE 31 gauge x 3/16" Ndle  Generic drug:  pen needle, diabetic  USE THREE TIMES DAILY BEFORE MEALS     blood-glucose meter kit  Use as instructed     cholecalciferol (vitamin D3) 1,000 unit capsule  Commonly known as:  VITAMIN D3  Take 2,000 Units by mouth once daily.     DUEXIS 800-26.6 mg Tab  Generic drug:  ibuprofen-famotidine  Take by mouth as needed.     DULoxetine 60 MG capsule  Commonly known as:  CYMBALTA  Take 60 mg by mouth 2 (two) times daily.     furosemide 20 MG tablet  Commonly known as:  LASIX  TAKE ONE TABLET BY MOUTH EVERY MORNING     insulin glargine 100 units/mL (3mL) SubQ " pen  Commonly known as:  LANTUS SOLOSTAR U-100 INSULIN  Inject 20 Units into the skin every evening.     irbesartan 300 MG tablet  Commonly known as:  AVAPRO  TAKE ONE TABLET BY MOUTH EVERY DAY (Replaces Valsartan)     JARDIANCE 10 mg Tab  Generic drug:  empagliflozin  TAKE ONE TABLET BY MOUTH EVERY DAY for blood sugar     KlonoPIN 0.5 MG tablet  Generic drug:  clonazePAM  Klonopin 0.5 mg tablet   Take 1 tablet every day by oral route at bedtime.     melatonin 5 mg Subl  Take 3 tablets by mouth once daily.     metFORMIN 500 MG 24 hr tablet  Commonly known as:  GLUCOPHAGE-XR  TAKE 4 TABLETS BY MOUTH EVERY DAY     metoprolol succinate 25 MG 24 hr tablet  Commonly known as:  TOPROL-XL  TAKE ONE TABLET BY MOUTH EVERY DAY     modafinil 200 MG Tab  Commonly known as:  PROVIGIL  TAKE ONE TABLET BY MOUTH TWICE DAILY     SIMBRINZA 1-0.2 % Drps  Generic drug:  brinzolamide-brimonidine  Shake Liquid AND Instill one drop into BOTH eyes twice daily     * STOOL SOFTENER 100 MG capsule  Generic drug:  docusate sodium     * docusate sodium 100 MG capsule  Commonly known as:  COLACE  Take 1 capsule (100 mg total) by mouth 2 (two) times daily.     TRULICITY 0.75 mg/0.5 mL Pnij  Generic drug:  dulaglutide  INJECT 0.5 MLS( 0.75 MG) INTO THE SKIN EVERY 7 DAYS         * This list has 2 medication(s) that are the same as other medications prescribed for you. Read the directions carefully, and ask your doctor or other care provider to review them with you.            ASK your doctor about these medications    sodium phosphates 19-7 gram/118 mL Enem  Commonly known as:  FLEET ENEMA  Place 1 enema rectally once. Use on the morning of surgery prior to coming to hospital for 1 dose  Ask about: Should I take this medication?          Discharge Procedure Orders   Diet general     Call MD for:  extreme fatigue     Call MD for:  persistent dizziness or light-headedness     Call MD for:  hives     Call MD for:  redness, tenderness, or signs of  infection (pain, swelling, redness, odor or green/yellow discharge around incision site)     Call MD for:  difficulty breathing, headache or visual disturbances     Call MD for:  severe uncontrolled pain     Call MD for:  persistent nausea and vomiting     Call MD for:  temperature >100.4     Remove dressing in 24 hours   Order Comments: Remove anal packing in a warm bath if it does not spontaneously, within the next 1-2 days     Activity as tolerated     Follow-up Information     Kaz Keating MD In 6 weeks.    Specialty:  Colon and Rectal Surgery  Why:  postop check  Contact information:  [address] [place] [address] [address] [place] [address]  [phone]                   Discharge Procedure Orders (must include Diet, Follow-up, Activity):   Discharge Procedure Orders (must include Diet, Follow-up, Activity)   Diet general     Call MD for:  extreme fatigue     Call MD for:  persistent dizziness or light-headedness     Call MD for:  hives     Call MD for:  redness, tenderness, or signs of infection (pain, swelling, redness, odor or green/yellow discharge around incision site)     Call MD for:  difficulty breathing, headache or visual disturbances     Call MD for:  severe uncontrolled pain     Call MD for:  persistent nausea and vomiting     Call MD for:  temperature >100.4     Remove dressing in 24 hours   Order Comments: Remove anal packing in a warm bath if it does not spontaneously, within the next 1-2 days     Activity as tolerated

## 2019-04-26 NOTE — ANESTHESIA POSTPROCEDURE EVALUATION
Anesthesia Post Evaluation    Patient: Glenn Medel    Procedure(s) Performed: Procedure(s) (LRB):  HEMORRHOIDECTOMY (lithotomy) (N/A)  EXAM UNDER ANESTHESIA (N/A)  BLOCK, NERVE, PUDENDAL (N/A)    Final Anesthesia Type: general  Patient location during evaluation: PACU  Patient participation: Yes- Able to Participate  Level of consciousness: awake and alert  Post-procedure vital signs: reviewed and stable  Pain management: adequate  Airway patency: patent  PONV status at discharge: No PONV  Anesthetic complications: no      Cardiovascular status: blood pressure returned to baseline  Respiratory status: unassisted  Hydration status: euvolemic  Follow-up not needed.          Vitals Value Taken Time   /87 4/24/2019  4:00 PM   Temp 36.9 °C (98.4 °F) 4/24/2019  4:00 PM   Pulse 66 4/24/2019  4:00 PM   Resp 17 4/24/2019  4:00 PM   SpO2 98 % 4/24/2019  4:00 PM         Event Time     Out of Recovery 16:07:00          Pain/Ezequiel Score: No data recorded

## 2019-04-28 ENCOUNTER — PATIENT MESSAGE (OUTPATIENT)
Dept: SURGERY | Facility: CLINIC | Age: 64
End: 2019-04-28

## 2019-05-03 ENCOUNTER — PATIENT MESSAGE (OUTPATIENT)
Dept: SURGERY | Facility: CLINIC | Age: 64
End: 2019-05-03

## 2019-05-03 ENCOUNTER — PATIENT MESSAGE (OUTPATIENT)
Dept: FAMILY MEDICINE | Facility: CLINIC | Age: 64
End: 2019-05-03

## 2019-05-13 NOTE — TELEPHONE ENCOUNTER
Please advise   RNCC called to [facility] -   Recent trip to ED d/t constipation causing back and abdominal pain.    BP today = 102 / 79. HR 78, per DONALD Brown.    RNCC was able to talk with Hector himself:  Hector does report having lightheadedness upon standing.  Abdominal pain: Hector denies over his kidney site.  Bloody urine: denies.  Change in urine output: denies.  Hector started taking Miralax for the constipation, soft BMs now.    Hector does report that he has an IV infusion scheduled today at [facility]. RNCC updated orders for 1L NS.  Will repeat labs tomorrow AM.      Discharge from recovery tomorrow.

## 2019-05-20 ENCOUNTER — PATIENT MESSAGE (OUTPATIENT)
Dept: SURGERY | Facility: CLINIC | Age: 64
End: 2019-05-20

## 2019-05-21 ENCOUNTER — TELEPHONE (OUTPATIENT)
Dept: SURGERY | Facility: CLINIC | Age: 64
End: 2019-05-21

## 2019-05-21 ENCOUNTER — CLINICAL SUPPORT (OUTPATIENT)
Dept: FAMILY MEDICINE | Facility: CLINIC | Age: 64
End: 2019-05-21
Attending: FAMILY MEDICINE
Payer: COMMERCIAL

## 2019-05-21 PROCEDURE — 99999 PR PBB SHADOW E&M-EST. PATIENT-LVL I: CPT | Mod: PBBFAC,,,

## 2019-05-21 PROCEDURE — 99999 PR PBB SHADOW E&M-EST. PATIENT-LVL I: ICD-10-PCS | Mod: PBBFAC,,,

## 2019-05-21 NOTE — TELEPHONE ENCOUNTER
"Spoke to pt advised that Dr Keating said, "The Sutures should dissolve on their own, however if they haven't by the time he comes in for his follow up appt Dr Keating will remove them. This is all normal and there is nothing to be concerned about." Pt stated understanding   "

## 2019-05-29 ENCOUNTER — PATIENT OUTREACH (OUTPATIENT)
Dept: ADMINISTRATIVE | Facility: HOSPITAL | Age: 64
End: 2019-05-29

## 2019-05-31 ENCOUNTER — PATIENT OUTREACH (OUTPATIENT)
Dept: OTHER | Facility: OTHER | Age: 64
End: 2019-05-31

## 2019-05-31 NOTE — PROGRESS NOTES
Last 5 Patient Entered Readings                                      Current 30 Day Average: 113/69     Recent Readings 5/31/2019 5/29/2019 5/23/2019 5/22/2019 5/14/2019    SBP (mmHg) 104 120 113 109 105    DBP (mmHg) 65 81 69 64 59    Pulse 64 61 72 70 74          Last 6 Patient Entered Readings                                          Most Recent A1c: 6.6% on 12/11/2018  (Goal: 7%)     Recent Readings 5/31/2019 5/30/2019 5/29/2019 5/29/2019 5/28/2019    Blood Glucose (mg/dL) 117 119 158 127 119        Reports he had surgery 4 weeks ago, but is feeling better now.    Digital Medicine: Health  Follow Up    Lifestyle Modifications:    1.Dietary Modifications (Sodium intake <2,000mg/day, food labels, dining out): deferred    2.Physical Activity: deferred    3.Medication Therapy: Patient has been compliant with the medication regimen.    4.Patient has the following medication side effects/concerns: Reports he needs to have cholesterol and A1c checked next month.  Will route note to pharmacist.  (Frequency/Alleviating factors/Precipitating factors, etc.)     Follow up with Mr. Glenn Medel completed. No further questions or concerns. Will continue to follow up to achieve health goals.

## 2019-06-04 ENCOUNTER — OFFICE VISIT (OUTPATIENT)
Dept: PODIATRY | Facility: CLINIC | Age: 64
End: 2019-06-04
Payer: MEDICARE

## 2019-06-04 VITALS
WEIGHT: 214 LBS | DIASTOLIC BLOOD PRESSURE: 71 MMHG | HEART RATE: 69 BPM | BODY MASS INDEX: 29.96 KG/M2 | SYSTOLIC BLOOD PRESSURE: 110 MMHG | HEIGHT: 71 IN

## 2019-06-04 DIAGNOSIS — M20.5X2 ACQUIRED ADDUCTOVARUS ROTATION OF TOE OF LEFT FOOT: ICD-10-CM

## 2019-06-04 DIAGNOSIS — E11.40 TYPE 2 DIABETES MELLITUS WITH DIABETIC NEUROPATHY, WITHOUT LONG-TERM CURRENT USE OF INSULIN: Primary | ICD-10-CM

## 2019-06-04 PROCEDURE — 99999 PR PBB SHADOW E&M-EST. PATIENT-LVL III: ICD-10-PCS | Mod: PBBFAC,,, | Performed by: PODIATRIST

## 2019-06-04 PROCEDURE — 99203 OFFICE O/P NEW LOW 30 MIN: CPT | Mod: S$PBB,,, | Performed by: PODIATRIST

## 2019-06-04 PROCEDURE — 99203 PR OFFICE/OUTPT VISIT, NEW, LEVL III, 30-44 MIN: ICD-10-PCS | Mod: S$PBB,,, | Performed by: PODIATRIST

## 2019-06-04 PROCEDURE — 99999 PR PBB SHADOW E&M-EST. PATIENT-LVL III: CPT | Mod: PBBFAC,,, | Performed by: PODIATRIST

## 2019-06-04 PROCEDURE — 99213 OFFICE O/P EST LOW 20 MIN: CPT | Mod: PBBFAC,PO | Performed by: PODIATRIST

## 2019-06-04 NOTE — PROGRESS NOTES
Subjective:     Patient ID: Glenn Medel is a 63 y.o. male.    Chief Complaint: Diabetic Foot Exam (no c/o pain. wears casual shoes with socks. diabetic Pt. PCP Dr. Hernandez, last seen 6 months ago per patient.)     is a 63 y.o. male who presents to the clinic upon referral from Dr. Velazquez  for evaluation and treatment of diabetic feet.  has a past medical history of BPH (benign prostatic hyperplasia), Cataract, 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, with long-term current use of insulin (12/20/2018), DDD (degenerative disc disease), lumbar, Diabetes mellitus, stable, Glaucoma, HTN (hypertension), Iritis, lens-induced (1/8/2018), Obesity, and LOVE on CPAP. Patient relates no major problem with feet. Only complaints today consist of left 5th toe was broken some years ago. Patient denies pain.     PCP: Pratik Hernandez MD    Date Last Seen by PCP: 12/11/18    Current shoe gear: Casual shoes    Hemoglobin A1C   Date Value Ref Range Status   12/11/2018 6.6 (H) 4.0 - 5.6 % Final     Comment:     ADA Screening Guidelines:  5.7-6.4%  Consistent with prediabetes  >or=6.5%  Consistent with diabetes  High levels of fetal hemoglobin interfere with the HbA1C  assay. Heterozygous hemoglobin variants (HbS, HgC, etc)do  not significantly interfere with this assay.   However, presence of multiple variants may affect accuracy.     10/03/2018 7.3 (H) 4.0 - 5.6 % Final     Comment:     ADA Screening Guidelines:  5.7-6.4%  Consistent with prediabetes  >or=6.5%  Consistent with diabetes  High levels of fetal hemoglobin interfere with the HbA1C  assay. Heterozygous hemoglobin variants (HbS, HgC, etc)do  not significantly interfere with this assay.   However, presence of multiple variants may affect accuracy.     06/11/2018 8.0 (H) 4.0 - 5.6 % Final     Comment:     ADA Screening Guidelines:  5.7-6.4%  Consistent with prediabetes  >or=6.5%   "Consistent with diabetes  High levels of fetal hemoglobin interfere with the HbA1C  assay. Heterozygous hemoglobin variants (HbS, HgC, etc)do  not significantly interfere with this assay.   However, presence of multiple variants may affect accuracy.                  Patient Active Problem List   Diagnosis    BPH (benign prostatic hyperplasia)    LOVE (obstructive sleep apnea)    HTN (hypertension)    DDD (degenerative disc disease), lumbar    Palpitations    Chronic kidney disease, stage 3    Uncontrolled type 2 diabetes mellitus with diabetic polyneuropathy, without long-term current use of insulin    Type II or unspecified type diabetes mellitus with neurological manifestations, uncontrolled(250.62)    Peripheral neuropathy    Hyperlipidemia    DM type 2 (diabetes mellitus, type 2)    Obesity (BMI 30.0-34.9)    Type 2 diabetes mellitus with neurologic complication    Itch    Hypersomnia with sleep apnea, unspecified    ILD (interstitial lung disease)    Severe obstructive sleep apnea    Rectal bleeding    Other vitreous opacities, bilateral    Vitreous hemorrhage, bilateral    NS (nuclear sclerosis)    Vitreous hemorrhage, left    Senile nuclear sclerosis    Retained lens material, left    Glaucoma associated with ocular inflammation, left, indeterminate stage    Iritis, lens-induced    Vitreomacular adhesion, right    Epiretinal membrane, right    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, with long-term current use of insulin    Internal strangulated hemorrhoids       Medication List with Changes/Refills   Current Medications    ASPIRIN 81 MG CHEW    Take 81 mg by mouth once daily.    ATORVASTATIN (LIPITOR) 20 MG TABLET    TAKE ONE TABLET BY MOUTH EVERY DAY    BD ULTRA-FINE MINI PEN NEEDLE 31 GAUGE X 3/16" NDLE    USE THREE TIMES DAILY BEFORE MEALS    BLOOD-GLUCOSE METER KIT    Use as instructed    " CHOLECALCIFEROL, VITAMIN D3, 1,000 UNIT CAPSULE    Take 2,000 Units by mouth once daily.    CLONAZEPAM (KLONOPIN) 0.5 MG TABLET    Klonopin 0.5 mg tablet   Take 1 tablet every day by oral route at bedtime.    DOCUSATE SODIUM (COLACE) 100 MG CAPSULE    Take 1 capsule (100 mg total) by mouth 2 (two) times daily.    DOCUSATE SODIUM (STOOL SOFTENER) 100 MG CAPSULE        DULOXETINE (CYMBALTA) 60 MG CAPSULE    Take 60 mg by mouth 2 (two) times daily.    FUROSEMIDE (LASIX) 20 MG TABLET    TAKE ONE TABLET BY MOUTH EVERY MORNING    IBUPROFEN-FAMOTIDINE (DUEXIS) 800-26.6 MG TAB    Take by mouth as needed.    INSULIN GLARGINE (LANTUS SOLOSTAR U-100 INSULIN) 100 UNIT/ML (3 ML) INPN PEN    Inject 20 Units into the skin every evening.    IRBESARTAN (AVAPRO) 300 MG TABLET    TAKE ONE TABLET BY MOUTH EVERY DAY (Replaces Valsartan)    JARDIANCE 10 MG TAB    TAKE ONE TABLET BY MOUTH EVERY DAY for blood sugar    MELATONIN 5 MG SUBL    Take 3 tablets by mouth once daily.    METFORMIN (GLUCOPHAGE-XR) 500 MG 24 HR TABLET    TAKE 4 TABLETS BY MOUTH EVERY DAY    METOPROLOL SUCCINATE (TOPROL-XL) 25 MG 24 HR TABLET    TAKE ONE TABLET BY MOUTH EVERY DAY    MODAFINIL (PROVIGIL) 200 MG TAB    TAKE ONE TABLET BY MOUTH TWICE DAILY    OXYCODONE-ACETAMINOPHEN (PERCOCET) 5-325 MG PER TABLET    Take 1 tablet by mouth every 4 to 6 hours as needed for Pain.    SIMBRINZA 1-0.2 % DRPS    Shake Liquid AND Instill one drop into BOTH eyes twice daily    TRULICITY 0.75 MG/0.5 ML PNIJ    INJECT 0.5 MLS( 0.75 MG) INTO THE SKIN EVERY 7 DAYS       Review of patient's allergies indicates:   Allergen Reactions    Avelox [moxifloxacin] Hives and Rash       Past Surgical History:   Procedure Laterality Date    ANTERIOR VITRECTOMY Left 11/30/2017    [facility]ST F[facility]    BLOCK, NERVE, PUDENDAL N/A 4/24/2019    [facility]    CATARACT EXTRACTION W/  INTRAOCULAR LENS IMPLANT Left 11/30/2017     CATARACT EXTRACTION W/  INTRAOCULAR LENS IMPLANT Right 12/11/2018    Dr Garcia    COLONOSCOPY N/A 9/18/2017    [facility] ENDO    COLONOSCOPY W/ POLYPECTOMY  04/13/2010    YARELI.   One 1 cm polyp in the sigmoid colon.  Internal hemorrhoids.    EXAM UNDER ANESTHESIA N/A 4/24/2019    [facility]    EXTRACTION, CATARACT, WITH IOL INSERTION Right 12/11/2018    [facility] OR    HAND SURGERY      HEMORRHOIDECTOMY (lithotomy) N/A 4/24/2019    [facility] OR    INSERTION-INTRAOCULAR LENS (IOL) Left 11/30/2017    [facility] 1ST FLR    PHACOEMULSIFICATION-ASPIRATION-CATARACT Left 11/30/2017    Performed by Damaris [facility] at [facility] OR 1ST FLR    REPAIR, RETINAL DETACHMENT, WITH VITRECTOMY Right 8/1/2018    Performed by SA[facility]AN Pulido [facility] at [facility] OR 1ST FLR    REPAIR-RETINA (VITRECTOMY) Left 12/6/2017    Per[facility] by [facility] at [facility] OR 1ST FLR    REPAIR-RETINA (VITRECTOMY) Left 11/15/2017    Per[facility] by [facility] at [facility] OR 1ST FLR    TONSILLECTOMY      uro lift      for enlarged prostate       Family History   Problem Relation Age of Onset    Heart disease Brother 62    Heart disease Father     Diabetes Father     Colon cancer Father     Cancer Father         colon    Cataracts Father     Hypertension Mother     Diabetes Mother     Stroke Mother     Cancer Mother         colon    Kidney disease Mother     Glaucoma Maternal Grandmother     No Known Problems Sister     No Known Problems Maternal Aunt     No Known Problems Maternal Uncle     No Known Problems Paternal Aunt     No Known Problems Paternal Uncle     No Known Problems Maternal Grandfather     No Known Problems Paternal Grandmother     No Known Problems Paternal Grandfather     Amblyopia Neg Hx     Blindness Neg Hx     Macular  "degeneration Neg Hx     Retinal detachment Neg Hx     Strabismus Neg Hx     Thyroid disease Neg Hx        Social History     Socioeconomic History    Marital status:      Spouse name: Not on file    Number of children: Not on file    Years of education: Not on file    Highest education level: Not on file   Occupational History    Not on file   Social Needs    Financial resource strain: Not on file    Food insecurity:     Worry: Not on file     Inability: Not on file    Transportation needs:     Medical: Not on file     Non-medical: Not on file   Tobacco Use    Smoking status: Never Smoker    Smokeless tobacco: Never Used   Substance and Sexual Activity    Alcohol use: Yes     Comment: occasional, no alcohol 72 hours prior to sx    Drug use: No    Sexual activity: Yes     Partners: Female   Lifestyle    Physical activity:     Days per week: Not on file     Minutes per session: Not on file    Stress: Not on file   Relationships    Social connections:     Talks on phone: Not on file     Gets together: Not on file     Attends Anabaptist service: Not on file     Active member of club or organization: Not on file     Attends meetings of clubs or organizations: Not on file     Relationship status: Not on file   Other Topics Concern    Not on file   Social History Narrative            2 grown kids.         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        Vitals:    06/04/19 1439   BP: 110/71   Pulse: 69   Weight: 97.1 kg (214 lb)   Height: 5' 11" (1.803 m)   PainSc: 0-No pain   PainLoc: Foot       Hemoglobin A1C   Date Value Ref Range Status   12/11/2018 6.6 (H) 4.0 - 5.6 % Final     Comment:     ADA Screening Guidelines:  5.7-6.4%  Consistent with prediabetes  >or=6.5%  Consistent with diabetes  High levels of fetal hemoglobin interfere with the HbA1C  assay. Heterozygous hemoglobin variants (HbS, HgC, etc)do  not significantly interfere with this assay.   However, " presence of multiple variants may affect accuracy.     10/03/2018 7.3 (H) 4.0 - 5.6 % Final     Comment:     ADA Screening Guidelines:  5.7-6.4%  Consistent with prediabetes  >or=6.5%  Consistent with diabetes  High levels of fetal hemoglobin interfere with the HbA1C  assay. Heterozygous hemoglobin variants (HbS, HgC, etc)do  not significantly interfere with this assay.   However, presence of multiple variants may affect accuracy.     06/11/2018 8.0 (H) 4.0 - 5.6 % Final     Comment:     ADA Screening Guidelines:  5.7-6.4%  Consistent with prediabetes  >or=6.5%  Consistent with diabetes  High levels of fetal hemoglobin interfere with the HbA1C  assay. Heterozygous hemoglobin variants (HbS, HgC, etc)do  not significantly interfere with this assay.   However, presence of multiple variants may affect accuracy.         Review of Systems   Constitutional: Negative for chills and fever.   Respiratory: Negative for shortness of breath.    Cardiovascular: Negative for chest pain, palpitations, orthopnea, claudication and leg swelling.   Gastrointestinal: Negative for diarrhea, nausea and vomiting.   Musculoskeletal: Negative for joint pain.   Skin: Negative for rash.   Neurological: Negative for dizziness, tingling, sensory change, focal weakness and weakness.   Psychiatric/Behavioral: Negative.              Objective:   PHYSICAL EXAM: Apperance: Alert and orient in no distress,well developed, and with good attention to grooming and body habits  Patient presents ambulating in casual shoes.   LOWER EXTREMITY EXAM:  VASCULAR: Dorsalis pedis pulses 2/4 bilateral and Posterior Tibial pulses 2/4 bilateral. Capillary fill time <3 seconds bilateral. No edema observed bilateral. Varicosities absent bilateral. Skin temperature of the lower extremities is warm to warm, proximal to distal. Hair growth WNL bilateral.  DERMATOLOGICAL: No skin rashes, subcutaneous nodules, lesions, or ulcers observed bilateral. Nails 1,2,3,4,5 bilateral  normal length and thickness. Webspaces 1,2,3,4 bilateral clean, dry and without evidence of break in skin integrity.   NEUROLOGICAL: Light touch, sharp-dull, proprioception all present and equal bilaterally. Vibratory sensation diminished at bilateral hallux. Protective sensation intact at all 10 sites as tested with a [place] 5.07 monofilament.   MUSCULOSKELETAL: Muscle strength is 5/5 for foot inverters, everters, plantarflexors, and dorsiflexors. Muscle tone is normal. Adductovarus left 4th and 5th toes noted. No pain on palpation of toes.     Assessment:   Type 2 diabetes mellitus with diabetic neuropathy, without long-term current use of insulin    Acquired adductovarus rotation of toe of left foot          Plan:   Type 2 diabetes mellitus with diabetic neuropathy, without long-term current use of insulin    Acquired adductovarus rotation of toe of left foot      I counseled the patient on his conditions, regarding findings of my examination, my impressions, and usual treatment plan.   Greater than 50% of this visit spent on counseling and coordination of care.  Greater than 15 minutes of a 20 minute appointment spent on education about the diabetic foot, neuropathy, and prevention of limb loss.  Shoe inspection. Diabetic Foot Education. Patient reminded of the importance of good nutrition and blood sugar control to help prevent podiatric complications of diabetes. Patient instructed on proper foot hygeine. We discussed wearing proper shoe gear, daily foot inspections, never walking without protective shoe gear, never putting sharp instruments to feet.    Patient was fitted with surgical shoe and instructed on proper usage. This should be worn daily for a minimum of 2 weeks at all times when ambulating. Patient instructed not to drive with walking boot if on right foot.   Patient  will continue to monitor the areas daily, inspect feet, wear protective shoe gear when ambulatory, moisturizer to maintain  skin integrity. Patient reminded of the importance of good nutrition and blood sugar control to help prevent podiatric complications of diabetes.  Patient to return 12 months or sooner if needed.                 Everett Duarte DPM  [facility]

## 2019-06-06 ENCOUNTER — PATIENT MESSAGE (OUTPATIENT)
Dept: FAMILY MEDICINE | Facility: CLINIC | Age: 64
End: 2019-06-06

## 2019-06-06 DIAGNOSIS — E78.5 HYPERLIPIDEMIA, UNSPECIFIED HYPERLIPIDEMIA TYPE: Primary | ICD-10-CM

## 2019-06-10 ENCOUNTER — PATIENT MESSAGE (OUTPATIENT)
Dept: SURGERY | Facility: CLINIC | Age: 64
End: 2019-06-10

## 2019-06-10 ENCOUNTER — OFFICE VISIT (OUTPATIENT)
Dept: SURGERY | Facility: CLINIC | Age: 64
End: 2019-06-10
Payer: MEDICARE

## 2019-06-10 VITALS
SYSTOLIC BLOOD PRESSURE: 113 MMHG | WEIGHT: 214.5 LBS | DIASTOLIC BLOOD PRESSURE: 71 MMHG | HEART RATE: 66 BPM | BODY MASS INDEX: 29.92 KG/M2 | TEMPERATURE: 97 F

## 2019-06-10 DIAGNOSIS — K64.8: Primary | ICD-10-CM

## 2019-06-10 PROCEDURE — 99024 POSTOP FOLLOW-UP VISIT: CPT | Mod: POP,,, | Performed by: COLON & RECTAL SURGERY

## 2019-06-10 PROCEDURE — 99213 OFFICE O/P EST LOW 20 MIN: CPT | Mod: PBBFAC | Performed by: COLON & RECTAL SURGERY

## 2019-06-10 PROCEDURE — 99024 PR POST-OP FOLLOW-UP VISIT: ICD-10-PCS | Mod: POP,,, | Performed by: COLON & RECTAL SURGERY

## 2019-06-10 PROCEDURE — 99999 PR PBB SHADOW E&M-EST. PATIENT-LVL III: ICD-10-PCS | Mod: PBBFAC,,, | Performed by: COLON & RECTAL SURGERY

## 2019-06-10 PROCEDURE — 99999 PR PBB SHADOW E&M-EST. PATIENT-LVL III: CPT | Mod: PBBFAC,,, | Performed by: COLON & RECTAL SURGERY

## 2019-06-10 NOTE — PROGRESS NOTES
History & Physical    SUBJECTIVE:     Chief Complaint   Patient presents with    [facility]     Hospital Follow Up        History of Present Illness:  Patient is a 63 y.o. male presents for evaluation of a possible hemorrhoid.  Patient reports that about 1 week ago he noticed a pressure and pain near his anus and once he took a shower that evening he noticed a protrusion of tissue from his anus that was abnormal and that he reports his multiple cm.  He states that he does have a sharp pain like sensation when does have a bowel movement and when he is standing or walks too long.  He did go to the emergency room at another hospital where they did not perform any interventions but did prescribe 10 hydrocortisone cream that has not alleviated his symptoms and has failed to improve the symptoms.  He contacted his primary care doctor then referred him here for evaluation.  He states that he is not seeing any bright red blood per rectum, hematochezia, melena, or change in bowel habits.  He states that he has a bowel movement once every 2 days, it is intermittently hard and large and sometimes require straining to pass.  He states that he drinks lots of water per day and does take a stool softener in the morning.  He also eats fiber supplementations with gummies.  His last colonoscopy was reportedly 1 year ago and without polyps or masses, although he has previously had known internal hemorrhoids as well as polyps removed.  He has a positive family history of colorectal cancer in his father who  in his 70s from colon cancer, as well as his mother who is a survivor.    19: Underwent left lateral excisional hemorrhoidectomy for incarcerated, strangulated and prolapsed left lateral internal hemorrhoid    Interval history:  Patient underwent an excisional left lateral hemorrhoidectomy.  Initially postoperatively did have significant pain, but this has completely stopped.  He does continue to have some blood  "when wiping and had 1 episode of blood in the toilet.  He states his bowel movements still or intermittently hard.  He denies any fever, chills, nausea, vomiting.  No anal or perirectal pain.    Review of patient's allergies indicates:   Allergen Reactions    Avelox [moxifloxacin] Hives and Rash       Current Outpatient Medications   Medication Sig Dispense Refill    aspirin 81 MG Chew Take 81 mg by mouth once daily.      atorvastatin (LIPITOR) 20 MG tablet TAKE ONE TABLET BY MOUTH EVERY DAY 90 tablet 2    BD ULTRA-FINE MINI PEN NEEDLE 31 gauge x 3/16" Ndle USE THREE TIMES DAILY BEFORE MEALS 100 each 6    cholecalciferol, vitamin D3, 1,000 unit capsule Take 2,000 Units by mouth once daily.      clonazePAM (KLONOPIN) 0.5 MG tablet Klonopin 0.5 mg tablet   Take 1 tablet every day by oral route at bedtime.      docusate sodium (COLACE) 100 MG capsule Take 1 capsule (100 mg total) by mouth 2 (two) times daily. 60 capsule 11    docusate sodium (STOOL SOFTENER) 100 MG capsule       DULoxetine (CYMBALTA) 60 MG capsule Take 60 mg by mouth 2 (two) times daily.      furosemide (LASIX) 20 MG tablet TAKE ONE TABLET BY MOUTH EVERY MORNING 90 tablet 2    insulin glargine (LANTUS SOLOSTAR U-100 INSULIN) 100 unit/mL (3 mL) InPn pen Inject 20 Units into the skin every evening. 2 Box 3    irbesartan (AVAPRO) 300 MG tablet TAKE ONE TABLET BY MOUTH EVERY DAY (Replaces Valsartan) 90 tablet 3    JARDIANCE 10 mg Tab TAKE ONE TABLET BY MOUTH EVERY DAY for blood sugar 90 tablet 3    melatonin 5 mg Subl Take 3 tablets by mouth once daily.  11    metFORMIN (GLUCOPHAGE-XR) 500 MG 24 hr tablet TAKE 4 TABLETS BY MOUTH EVERY  tablet 2    metoprolol succinate (TOPROL-XL) 25 MG 24 hr tablet TAKE ONE TABLET BY MOUTH EVERY DAY 90 tablet 2    SIMBRINZA 1-0.2 % DrpS Shake Liquid AND Instill one drop into BOTH eyes twice daily 8 mL 3    TRULICITY 0.75 mg/0.5 mL PnIj INJECT 0.5 MLS( 0.75 MG) INTO THE SKIN EVERY 7 DAYS 2 mL 9    " blood-glucose meter kit Use as instructed 1 each 0     Current Facility-Administered Medications   Medication Dose Route Frequency Provider Last Rate Last Dose    metronidazole IVPB 500 mg  500 mg Intravenous Once Kaz Keating MD         Facility-Administered Medications Ordered in Other Visits   Medication Dose Route Frequency Provider Last Rate Last Dose    diphenhydrAMINE capsule 25 mg  25 mg Oral Q6H PRN Leonila Richardson MD           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, with long-term current use of insulin 12/20/2018    DDD (degenerative disc disease), lumbar     s/p epidural steroids     Diabetes mellitus, stable     Glaucoma     OU    HTN (hypertension)     Iritis, lens-induced 1/8/2018    Obesity     LOVE on CPAP      Past Surgical History:   Procedure Laterality Date    ANTERIOR VITRECTOMY Left 11/30/2017    Performed by Damaris Garcia [facility] at [facility] OR 1ST FLR    BLOCK, NERVE, PUDENDAL N/A 4/24/2019    Performed by Kaz [facility]ating [facility] at [facility] OR    CATARACT EXTRACTION W/  INTRAOCULAR LENS IMPLANT Left 11/30/2017    CATARACT EXTRACTION W/  INTRAOCULAR LENS IMPLANT Right 12/11/2018    Dr Garcia    COLONOSCOPY N/A 9/18/2017    Performed by Paul Feliz Jr., [facility] at [facility] ENDO    COLONOSCOPY W/ POLYPECTOMY  04/13/2010    YARELI.   One 1 cm polyp in the sigmoid colon.  Internal hemorrhoids.    EXAM UNDER ANESTHESIA N/A 4/24/2019    Performed by Kaz Keating [facility] at [facility] OR    EXTRACTION, CATARACT, WITH IOL INSERTION Right 12/11/2018    Performed by [facility] at [facility] OR    HAND SURGERY      HEMORRHOIDECTOMY (lithotomy) N/A 4/24/2019    Performed by Kaz Keating [facility] at [facility] OR    INSERTION-INTRAOCULAR LENS (IOL) Left 11/30/2017    Performed by Damaris [facility] MD at [facility] OR 1ST  FLR    PHACOEMULSIFICATION-ASPIRATION-CATARACT Left 11/30/2017    Performed by Damaris [facility] MD at [facility] OR 1ST FLR    REPAIR, RETINAL DETACHMENT, WITH VITRECTOMY Right 8/1/2018    Performed by SAJAN Pulido MD at [facility] OR 1ST FLR    REPAIR-RETINA (VITRECTOMY) Left 12/6/2017    Performed by [facility]AN Pulido [facility] at [facility] OR 1ST FLR    REPAIR-RETINA (VITRECTOMY) Left 11/15/2017    Performed by [facility] Pulido [facility] at [facility] OR 1ST FLR    TONSILLECTOMY      uro lift      for enlarged prostate     Family History   Problem Relation Age of Onset    Heart disease Brother 62    Heart disease Father     Diabetes Father     Colon cancer Father     Cancer Father         colon    Cataracts Father     Hypertension Mother     Diabetes Mother     Stroke Mother     Cancer Mother         colon    Kidney disease Mother     Glaucoma Maternal Grandmother     No Known Problems Sister     No Known Problems Maternal Aunt     No Known Problems Maternal Uncle     No Known Problems Paternal Aunt     No Known Problems Paternal Uncle     No Known Problems Maternal Grandfather     No Known Problems Paternal Grandmother     No Known Problems Paternal Grandfather     Amblyopia Neg Hx     Blindness Neg Hx     Macular degeneration Neg Hx     Retinal detachment Neg Hx     Strabismus Neg Hx     Thyroid disease Neg Hx      Social History     Tobacco Use    Smoking status: Never Smoker    Smokeless tobacco: Never Used   Substance Use Topics    Alcohol use: Yes     Comment: occasional, no alcohol 72 hours prior to sx    Drug use: No        Review of Systems:  Review of Systems   Constitutional: Negative for activity change, appetite change, chills, fatigue, fever and unexpected weight change.   HENT: Negative for congestion, ear pain, sore throat and trouble swallowing.    Eyes: Negative for pain, redness and itching.   Respiratory: Negative for cough, shortness of breath and wheezing.    Cardiovascular:  Negative for chest pain, palpitations and leg swelling.   Gastrointestinal: Positive for anal bleeding. Negative for abdominal distention, abdominal pain, blood in stool, constipation, diarrhea, nausea, rectal pain and vomiting.   Endocrine: Negative for cold intolerance, heat intolerance and polyuria.   Genitourinary: Negative for dysuria, flank pain, frequency and hematuria.   Musculoskeletal: Negative for gait problem, joint swelling and neck pain.   Skin: Negative for color change, rash and wound.   Allergic/Immunologic: Negative for environmental allergies and immunocompromised state.   Neurological: Negative for dizziness, speech difficulty, weakness and numbness.   Psychiatric/Behavioral: Negative for agitation, confusion and hallucinations.       OBJECTIVE:     Vital Signs (Most Recent)  Temp: 97.1 °F (36.2 °C) (06/10/19 0841)  Pulse: 66 (06/10/19 0841)  BP: 113/71 (06/10/19 0841)     97.3 kg (214 lb 8.1 oz)     Physical Exam:  Physical Exam   Constitutional: He is oriented to person, place, and time. He appears well-developed.   HENT:   Head: Normocephalic and atraumatic.   Eyes: Conjunctivae and EOM are normal.   Neck: Normal range of motion. No thyromegaly present.   Cardiovascular: Normal rate and regular rhythm.   Pulmonary/Chest: Effort normal. No respiratory distress.   Abdominal: Soft. He exhibits no distension and no mass. There is no tenderness.   Genitourinary:   Genitourinary Comments: Anorectal:  No external lesions; ESTHER with good tone, no blood; no palpable lesions or defects, good push/squeeze   Musculoskeletal: Normal range of motion. He exhibits no edema or tenderness.   Neurological: He is alert and oriented to person, place, and time.   Skin: Skin is warm and dry. Capillary refill takes less than 2 seconds. No rash noted.   Psychiatric: He has a normal mood and affect.     Anoscopy Procedure Note    Pre-procedure diagnosis: s/p left lateral excisional hemorrhoidectomy    Post-procedure  diagnosis: s/p left lateral excisional hemorrhoidectomy    Procedure: Anoscopy    Surgeon: Kaz Keating MD    Assistant: Charo Pringle LPN    Specimen: none    Findings:  Anoscope inserted all 4 quadrants examined. Left lateral internal hemorrhoidectomy site with some small mucosal separation with evidence of recent bleeding without purulence or drainage. There was no enlargement of the right posterior or right anterior hemorrhoidal columns.  There were no other mucosal abnormalities, defects or masses noted.    Patient tolerated procedure well.      Laboratory  Lab Results   Component Value Date    WBC 8.68 04/23/2019    HGB 16.4 04/23/2019    HCT 52.0 04/23/2019     04/23/2019    CHOL 154 01/30/2018    TRIG 426 (H) 01/30/2018    HDL 32 (L) 01/30/2018    ALT 23 04/23/2019    AST 34 04/23/2019     04/23/2019    K 4.5 04/23/2019     04/23/2019    CREATININE 1.0 04/23/2019    BUN 16 04/23/2019    CO2 31 (H) 04/23/2019    TSH 0.683 09/22/2015    PSA 1.5 08/08/2014    HGBA1C 6.6 (H) 12/11/2018       Diagnostic review:  Colonoscopy from September 2017 reviewed which revealed internal hemorrhoids as well as 2 polyps removed that were hyperplastic in nature.    ASSESSMENT/PLAN:     63-year-old male who presents who is s/p excisional hemorrhoidectomy of the left lateral hemorrhoidal column which had evidence of strangulation, incarceration and prolapse who continues to have some small amount of rectal bleeding postoperatively with some small mucosal separation and nonhealing on exam today    - given improvement in symptoms over the past couple weeks, will allow the patient to continue to heal and see if this mucosa completely heals and stops bleeding over the next few weeks  - if continues to have blood when wiping and possibly in the stool over the next few weeks, may need intervention to repair this defect in the anoderm.  Would also consider repeat colonoscopy at this time given his strong family  history in both parents of colorectal cancer if he has ongoing bleeding  - recommended lifestyle and dietary modifications to include taking a stool softener twice per day instead of once per day, changing fiber supplementatio to the powder form either Metamucil or Benefiber daily, continue to drink at least 64 oz of water per day, taking MiraLax as needed to have a bowel movement daily, avoid straining on the toilet when having a bowel movement, and spending less than 5 min on the toilet per bowel movement.  - RTC 4 weeks for re-evaluation    Kaz Keating MD  Colon and Rectal Surgery  [facility]

## 2019-06-11 ENCOUNTER — LAB VISIT (OUTPATIENT)
Dept: LAB | Facility: HOSPITAL | Age: 64
End: 2019-06-11
Attending: FAMILY MEDICINE
Payer: MEDICARE

## 2019-06-11 DIAGNOSIS — Z79.4 TYPE 2 DIABETES MELLITUS WITH DIABETIC NEPHROPATHY, WITH LONG-TERM CURRENT USE OF INSULIN: ICD-10-CM

## 2019-06-11 DIAGNOSIS — E78.5 HYPERLIPIDEMIA, UNSPECIFIED HYPERLIPIDEMIA TYPE: ICD-10-CM

## 2019-06-11 DIAGNOSIS — E11.21 TYPE 2 DIABETES MELLITUS WITH DIABETIC NEPHROPATHY, WITH LONG-TERM CURRENT USE OF INSULIN: ICD-10-CM

## 2019-06-11 LAB
CHOLEST SERPL-MCNC: 125 MG/DL (ref 120–199)
CHOLEST/HDLC SERPL: 4 {RATIO} (ref 2–5)
HDLC SERPL-MCNC: 31 MG/DL (ref 40–75)
HDLC SERPL: 24.8 % (ref 20–50)
LDLC SERPL CALC-MCNC: 63.6 MG/DL (ref 63–159)
NONHDLC SERPL-MCNC: 94 MG/DL
TRIGL SERPL-MCNC: 152 MG/DL (ref 30–150)

## 2019-06-11 PROCEDURE — 36415 COLL VENOUS BLD VENIPUNCTURE: CPT | Mod: PO

## 2019-06-11 PROCEDURE — 83036 HEMOGLOBIN GLYCOSYLATED A1C: CPT

## 2019-06-11 PROCEDURE — 80061 LIPID PANEL: CPT

## 2019-06-12 ENCOUNTER — OFFICE VISIT (OUTPATIENT)
Dept: FAMILY MEDICINE | Facility: CLINIC | Age: 64
End: 2019-06-12
Payer: MEDICARE

## 2019-06-12 VITALS
OXYGEN SATURATION: 98 % | SYSTOLIC BLOOD PRESSURE: 130 MMHG | BODY MASS INDEX: 29.92 KG/M2 | DIASTOLIC BLOOD PRESSURE: 62 MMHG | HEART RATE: 58 BPM | WEIGHT: 214.5 LBS

## 2019-06-12 DIAGNOSIS — F41.9 ANXIETY: ICD-10-CM

## 2019-06-12 DIAGNOSIS — E11.9 DIABETES MELLITUS WITHOUT COMPLICATION: Primary | ICD-10-CM

## 2019-06-12 DIAGNOSIS — K59.00 CONSTIPATION, UNSPECIFIED CONSTIPATION TYPE: ICD-10-CM

## 2019-06-12 LAB
ESTIMATED AVG GLUCOSE: 137 MG/DL (ref 68–131)
HBA1C MFR BLD HPLC: 6.4 % (ref 4–5.6)

## 2019-06-12 PROCEDURE — 99999 PR PBB SHADOW E&M-EST. PATIENT-LVL III: CPT | Mod: PBBFAC,,, | Performed by: FAMILY MEDICINE

## 2019-06-12 PROCEDURE — 99214 OFFICE O/P EST MOD 30 MIN: CPT | Mod: S$PBB,,, | Performed by: FAMILY MEDICINE

## 2019-06-12 PROCEDURE — 99213 OFFICE O/P EST LOW 20 MIN: CPT | Mod: PBBFAC,PO | Performed by: FAMILY MEDICINE

## 2019-06-12 PROCEDURE — 99214 PR OFFICE/OUTPT VISIT, EST, LEVL IV, 30-39 MIN: ICD-10-PCS | Mod: S$PBB,,, | Performed by: FAMILY MEDICINE

## 2019-06-12 PROCEDURE — 99999 PR PBB SHADOW E&M-EST. PATIENT-LVL III: ICD-10-PCS | Mod: PBBFAC,,, | Performed by: FAMILY MEDICINE

## 2019-06-12 RX ORDER — GABAPENTIN 300 MG/1
CAPSULE ORAL
COMMUNITY
Start: 2019-05-14 | End: 2020-01-06

## 2019-06-12 RX ORDER — FLUOXETINE HYDROCHLORIDE 40 MG/1
40 CAPSULE ORAL DAILY
Qty: 90 CAPSULE | Refills: 3 | Status: SHIPPED | OUTPATIENT
Start: 2019-06-12 | End: 2019-10-17

## 2019-06-12 NOTE — PROGRESS NOTES
Subjective:       Patient ID: Glenn Medel is a 63 y.o. male    Chief Complaint: Follow-up    Diabetes   He presents for his follow-up diabetic visit. He has type 2 diabetes mellitus. His disease course has been stable. There are no hypoglycemic associated symptoms. Pertinent negatives for hypoglycemia include no confusion or headaches. There are no diabetic associated symptoms. Pertinent negatives for diabetes include no chest pain and no weakness. There are no hypoglycemic complications. Symptoms are stable. Diabetic complications include nephropathy. Current diabetic treatment includes oral agent (triple therapy) and insulin injections. He is compliant with treatment all of the time. His home blood glucose trend is decreasing steadily. An ACE inhibitor/angiotensin II receptor blocker is being taken. He sees a podiatrist.Eye exam is current.       Review of Systems   Constitutional: Negative for activity change and unexpected weight change.   HENT: Negative for hearing loss, rhinorrhea and trouble swallowing.    Eyes: Negative for discharge and visual disturbance.   Respiratory: Negative for chest tightness and wheezing.    Cardiovascular: Negative for chest pain and palpitations.   Gastrointestinal: Positive for blood in stool (recently seen by Surgery with incarcerated internal hemorrhoid) and constipation. Negative for diarrhea and vomiting.   Genitourinary: Positive for urgency. Negative for difficulty urinating and hematuria.   Musculoskeletal: Negative for arthralgias, joint swelling and neck pain.   Neurological: Negative for weakness and headaches.   Psychiatric/Behavioral: Positive for sleep disturbance (still with nightmares, slight improvement with Klonopin, but continues). Negative for confusion and dysphoric mood.        Objective:   Physical Exam   Constitutional: He is oriented to person, place, and time. He appears well-developed and well-nourished. No distress.   Neurological: He is alert and  oriented to person, place, and time.   Vitals reviewed.    Results for orders placed or performed in visit on 06/11/19   Hemoglobin A1c   Result Value Ref Range    Hemoglobin A1C 6.4 (H) 4.0 - 5.6 %    Estimated Avg Glucose 137 (H) 68 - 131 mg/dL   Lipid panel   Result Value Ref Range    Cholesterol 125 120 - 199 mg/dL    Triglycerides 152 (H) 30 - 150 mg/dL    HDL 31 (L) 40 - 75 mg/dL    LDL Cholesterol 63.6 63.0 - 159.0 mg/dL    Hdl/Cholesterol Ratio 24.8 20.0 - 50.0 %    Total Cholesterol/HDL Ratio 4.0 2.0 - 5.0    Non-HDL Cholesterol 94 mg/dL         Assessment:       1. Diabetes mellitus without complication     2. Constipation, unspecified constipation type          Plan:       Diabetes mellitus without complication  - Diet and exercise education.  - Serial glucose monitoring  - Continue current therapy    Constipation, unspecified constipation type  - Continue bowel regimen  - ADD Miralax as needed    Anxiety  -     ADD FLUoxetine 40 MG capsule; Take 1 capsule (40 mg total) by mouth once daily.  Dispense: 90 capsule; Refill: 3  - STOP Cymbalta  - Recheck by video visit 4 weeks  - Consider return to Psychiatry

## 2019-06-13 ENCOUNTER — PATIENT OUTREACH (OUTPATIENT)
Dept: OTHER | Facility: OTHER | Age: 64
End: 2019-06-13

## 2019-06-13 NOTE — PROGRESS NOTES
"HPI:  Called Mr. Glenn Medel for hypertension and diabetes digital medicine follow-up. Patient reports adherence  to medication regimen with no complaints. He thinks "before dinner" BG readings were actually within 2 hours of eating snacks.    Last 5 Patient Entered Readings                                      Current 30 Day Average: 113/70     Recent Readings 6/12/2019 6/11/2019 6/7/2019 6/4/2019 5/31/2019    SBP (mmHg) 121 115 119 111 104    DBP (mmHg) 72 68 78 71 65    Pulse 63 60 66 62 64        Patient denies s/s of hypotension (lightheadedness, dizziness, nausea, fatigue) associated with low readings. Instructed patient to inform me if this occurs, patient confirms understanding.    Patient denies s/s of hypertension (SOB, CP, severe headaches, changes in vision) associated with high readings.    Last 6 Patient Entered Readings                                     Most Recent A1c: 6.4% on 6/11/2019  (Goal: 7%)     Recent Readings 6/13/2019 6/12/2019 6/11/2019 6/10/2019 6/9/2019    Blood Glucose (mg/dL) 108 117 111 119 164        Patient denies s/s or episodes of hypoglycemia (weakness, dizziness, hunger, shakiness, nausea, headache, heart palpitations, sweating, fatigue, anxiety, etc.).    Patient denies s/s of hyperglycemia (headache, increased thirst, increased urination, fatigue, blurred vision).    Assessment:  Patient's current 30-day average is at goal of <130/80 mmHg based on ACC/AHA HTN guidelines.     Diabetes is controlled based on patient's last A1C. SMBG readings reviewed and are at goal without hypoglycemia.    Plan:  Continue current regimen.    Reminded patient to label BG readings as after-snack or before-meal appropriately.      I will continue to monitor regularly and will follow-up in 4-6 weeks, sooner if blood pressure or blood glucose begins to trend upward or downward.     Current medication regimen:  Hypertension Medications             furosemide (LASIX) 20 MG tablet TAKE ONE TABLET " BY MOUTH EVERY MORNING    irbesartan (AVAPRO) 300 MG tablet TAKE ONE TABLET BY MOUTH EVERY DAY (Replaces Valsartan)    metoprolol succinate (TOPROL-XL) 25 MG 24 hr tablet TAKE ONE TABLET BY MOUTH EVERY DAY        Diabetes Medications             insulin glargine (LANTUS SOLOSTAR U-100 INSULIN) 100 unit/mL (3 mL) InPn pen Inject 20 Units into the skin every evening.    JARDIANCE 10 mg Tab TAKE ONE TABLET BY MOUTH EVERY DAY for blood sugar        Ml Arvizu RN 4/23/2019  2:47 PM  HOLD MORNING OF SURGERY              metFORMIN (GLUCOPHAGE-XR) 500 MG 24 hr tablet TAKE 4 TABLETS BY MOUTH EVERY DAY        Ml Arvizu RN 4/23/2019  2:47 PM  HOLD 24 HOURS PRIOR TO SURGERY              TRULICITY 0.75 mg/0.5 mL PnIj INJECT 0.5 MLS( 0.75 MG) INTO THE SKIN EVERY 7 DAYS        Patient has my contact information and knows to call with any concerns or clinical changes.

## 2019-06-14 ENCOUNTER — PATIENT MESSAGE (OUTPATIENT)
Dept: NEPHROLOGY | Facility: CLINIC | Age: 64
End: 2019-06-14

## 2019-06-14 ENCOUNTER — PATIENT MESSAGE (OUTPATIENT)
Dept: OTHER | Facility: OTHER | Age: 64
End: 2019-06-14

## 2019-06-14 ENCOUNTER — PATIENT MESSAGE (OUTPATIENT)
Dept: CARDIOLOGY | Facility: CLINIC | Age: 64
End: 2019-06-14

## 2019-06-17 ENCOUNTER — PATIENT MESSAGE (OUTPATIENT)
Dept: CARDIOLOGY | Facility: CLINIC | Age: 64
End: 2019-06-17

## 2019-06-17 ENCOUNTER — PATIENT MESSAGE (OUTPATIENT)
Dept: FAMILY MEDICINE | Facility: CLINIC | Age: 64
End: 2019-06-17

## 2019-06-28 RX ORDER — DULAGLUTIDE 0.75 MG/.5ML
INJECTION, SOLUTION SUBCUTANEOUS
Qty: 2 ML | Refills: 11 | Status: SHIPPED | OUTPATIENT
Start: 2019-06-28 | End: 2020-01-15 | Stop reason: DRUGHIGH

## 2019-07-03 ENCOUNTER — PATIENT MESSAGE (OUTPATIENT)
Dept: FAMILY MEDICINE | Facility: CLINIC | Age: 64
End: 2019-07-03

## 2019-07-03 NOTE — TELEPHONE ENCOUNTER
- recommend OTC simethicone as directed, such as Phazyme or Gas-x  -Reduce or eliminate these foods from your diet: Broccoli, Cauliflower, [place] sprouts, Cabbage, Cooked dried beans, Carbonated beverages (sparkling water, soda, beer, champagne)    Other Causes Of Excess Gas Include:   1) EATING TOO FAST or TALKING WHILE YOU CHEW may cause you to swallow air. This increases the amount of gas in the stomach and may worsen your symptoms.  --> Chew each mouthful completely before swallowing. Take your time.  2) OVEREATING may increase the feeling of being bloated and cause more gas.  --> When you are full, stop eating.  3) CONSTIPATION can increase the amount of normal intestinal gas.  --> Avoid constipation by increasing the amount of fiber in your diet by including whole cereal grains, fresh vegetables (except those in the above list) and fresh fruits. High-fiber foods absorb water and carry it out of the body. When increasing the amount of fiber in your diet, you also need to increase the amount of water that you drink. You should drink at least eight 8-ounce glasses of water (two quarts) per day.

## 2019-07-10 ENCOUNTER — PATIENT MESSAGE (OUTPATIENT)
Dept: OTHER | Facility: OTHER | Age: 64
End: 2019-07-10

## 2019-07-10 ENCOUNTER — PATIENT MESSAGE (OUTPATIENT)
Dept: SURGERY | Facility: CLINIC | Age: 64
End: 2019-07-10

## 2019-07-12 NOTE — PROGRESS NOTES
Last 5 Patient Entered Readings                                      Current 30 Day Average: 115/70     Recent Readings 7/10/2019 7/3/2019 7/1/2019 6/26/2019 6/25/2019    SBP (mmHg) 123 111 106 107 127    DBP (mmHg) 71 64 64 64 77    Pulse 65 67 59 59 58          Last 6 Patient Entered Readings                                          Most Recent A1c: 6.4% on 6/11/2019  (Goal: 7%)     Recent Readings 7/12/2019 7/11/2019 7/10/2019 7/10/2019 7/9/2019    Blood Glucose (mg/dL) 129 130 146 117 129          7/12: Patient spoke with pharmacist via [email or web address] this week.  Will follow up next week

## 2019-07-15 ENCOUNTER — LAB VISIT (OUTPATIENT)
Dept: LAB | Facility: HOSPITAL | Age: 64
End: 2019-07-15
Attending: COLON & RECTAL SURGERY
Payer: MEDICARE

## 2019-07-15 ENCOUNTER — OFFICE VISIT (OUTPATIENT)
Dept: SURGERY | Facility: CLINIC | Age: 64
End: 2019-07-15
Payer: MEDICARE

## 2019-07-15 VITALS
DIASTOLIC BLOOD PRESSURE: 64 MMHG | WEIGHT: 213.38 LBS | SYSTOLIC BLOOD PRESSURE: 96 MMHG | BODY MASS INDEX: 29.76 KG/M2 | HEART RATE: 64 BPM | TEMPERATURE: 97 F

## 2019-07-15 DIAGNOSIS — K60.2 ANAL FISSURE: Primary | ICD-10-CM

## 2019-07-15 DIAGNOSIS — Z01.818 PRE-OP TESTING: ICD-10-CM

## 2019-07-15 DIAGNOSIS — Z01.818 PRE-OP TESTING: Primary | ICD-10-CM

## 2019-07-15 DIAGNOSIS — K64.8: ICD-10-CM

## 2019-07-15 LAB
ALBUMIN SERPL BCP-MCNC: 3.9 G/DL (ref 3.5–5.2)
ALP SERPL-CCNC: 121 U/L (ref 55–135)
ALT SERPL W/O P-5'-P-CCNC: 20 U/L (ref 10–44)
ANION GAP SERPL CALC-SCNC: 12 MMOL/L (ref 8–16)
AST SERPL-CCNC: 35 U/L (ref 10–40)
BASOPHILS # BLD AUTO: 0.04 K/UL (ref 0–0.2)
BASOPHILS NFR BLD: 0.6 % (ref 0–1.9)
BILIRUB SERPL-MCNC: 0.7 MG/DL (ref 0.1–1)
BUN SERPL-MCNC: 19 MG/DL (ref 8–23)
CALCIUM SERPL-MCNC: 9.9 MG/DL (ref 8.7–10.5)
CHLORIDE SERPL-SCNC: 104 MMOL/L (ref 95–110)
CO2 SERPL-SCNC: 28 MMOL/L (ref 23–29)
CREAT SERPL-MCNC: 1.2 MG/DL (ref 0.5–1.4)
DIFFERENTIAL METHOD: ABNORMAL
EOSINOPHIL # BLD AUTO: 0.2 K/UL (ref 0–0.5)
EOSINOPHIL NFR BLD: 3.3 % (ref 0–8)
ERYTHROCYTE [DISTWIDTH] IN BLOOD BY AUTOMATED COUNT: 14.6 % (ref 11.5–14.5)
EST. GFR  (AFRICAN AMERICAN): >60 ML/MIN/1.73 M^2
EST. GFR  (NON AFRICAN AMERICAN): >60 ML/MIN/1.73 M^2
GLUCOSE SERPL-MCNC: 120 MG/DL (ref 70–110)
HCT VFR BLD AUTO: 49.4 % (ref 40–54)
HGB BLD-MCNC: 16.7 G/DL (ref 14–18)
LYMPHOCYTES # BLD AUTO: 2.1 K/UL (ref 1–4.8)
LYMPHOCYTES NFR BLD: 29 % (ref 18–48)
MCH RBC QN AUTO: 29.1 PG (ref 27–31)
MCHC RBC AUTO-ENTMCNC: 33.8 G/DL (ref 32–36)
MCV RBC AUTO: 86 FL (ref 82–98)
MONOCYTES # BLD AUTO: 0.6 K/UL (ref 0.3–1)
MONOCYTES NFR BLD: 8.6 % (ref 4–15)
NEUTROPHILS # BLD AUTO: 4.2 K/UL (ref 1.8–7.7)
NEUTROPHILS NFR BLD: 58.8 % (ref 38–73)
PLATELET # BLD AUTO: 248 K/UL (ref 150–350)
PMV BLD AUTO: 9.2 FL (ref 9.2–12.9)
POTASSIUM SERPL-SCNC: 4.8 MMOL/L (ref 3.5–5.1)
PROT SERPL-MCNC: 7.7 G/DL (ref 6–8.4)
RBC # BLD AUTO: 5.74 M/UL (ref 4.6–6.2)
SODIUM SERPL-SCNC: 144 MMOL/L (ref 136–145)
WBC # BLD AUTO: 7.18 K/UL (ref 3.9–12.7)

## 2019-07-15 PROCEDURE — 85025 COMPLETE CBC W/AUTO DIFF WBC: CPT

## 2019-07-15 PROCEDURE — 99999 PR PBB SHADOW E&M-EST. PATIENT-LVL III: ICD-10-PCS | Mod: PBBFAC,,, | Performed by: COLON & RECTAL SURGERY

## 2019-07-15 PROCEDURE — 99213 OFFICE O/P EST LOW 20 MIN: CPT | Mod: PBBFAC,25 | Performed by: COLON & RECTAL SURGERY

## 2019-07-15 PROCEDURE — 99024 PR POST-OP FOLLOW-UP VISIT: ICD-10-PCS | Mod: POP,,, | Performed by: COLON & RECTAL SURGERY

## 2019-07-15 PROCEDURE — 46600 DIAGNOSTIC ANOSCOPY SPX: CPT | Mod: PBBFAC | Performed by: COLON & RECTAL SURGERY

## 2019-07-15 PROCEDURE — 99999 PR PBB SHADOW E&M-EST. PATIENT-LVL III: CPT | Mod: PBBFAC,,, | Performed by: COLON & RECTAL SURGERY

## 2019-07-15 PROCEDURE — 99024 POSTOP FOLLOW-UP VISIT: CPT | Mod: POP,,, | Performed by: COLON & RECTAL SURGERY

## 2019-07-15 PROCEDURE — 80053 COMPREHEN METABOLIC PANEL: CPT

## 2019-07-15 PROCEDURE — 36415 COLL VENOUS BLD VENIPUNCTURE: CPT

## 2019-07-15 RX ORDER — SODIUM CHLORIDE 9 MG/ML
INJECTION, SOLUTION INTRAVENOUS CONTINUOUS
Status: CANCELLED | OUTPATIENT
Start: 2019-07-15

## 2019-07-15 RX ORDER — LIDOCAINE HYDROCHLORIDE 10 MG/ML
1 INJECTION, SOLUTION EPIDURAL; INFILTRATION; INTRACAUDAL; PERINEURAL ONCE
Status: DISCONTINUED | OUTPATIENT
Start: 2019-07-15 | End: 2019-07-23 | Stop reason: HOSPADM

## 2019-07-15 RX ORDER — ONDANSETRON 2 MG/ML
4 INJECTION INTRAMUSCULAR; INTRAVENOUS EVERY 12 HOURS PRN
Status: CANCELLED | OUTPATIENT
Start: 2019-07-15

## 2019-07-15 RX ORDER — AMOXICILLIN 500 MG/1
500 CAPSULE ORAL 3 TIMES DAILY
COMMUNITY
End: 2019-10-07

## 2019-07-15 NOTE — H&P (VIEW-ONLY)
History & Physical    SUBJECTIVE:     Chief Complaint   Patient presents with    Post-op Evaluation     Post Op        History of Present Illness:  Patient is a 64 y.o. male presents for evaluation of a possible hemorrhoid.  Patient reports that about 1 week ago he noticed a pressure and pain near his anus and once he took a shower that evening he noticed a protrusion of tissue from his anus that was abnormal and that he reports his multiple cm.  He states that he does have a sharp pain like sensation when does have a bowel movement and when he is standing or walks too long.  He did go to the emergency room at another hospital where they did not perform any interventions but did prescribe 10 hydrocortisone cream that has not alleviated his symptoms and has failed to improve the symptoms.  He contacted his primary care doctor then referred him here for evaluation.  He states that he is not seeing any bright red blood per rectum, hematochezia, melena, or change in bowel habits.  He states that he has a bowel movement once every 2 days, it is intermittently hard and large and sometimes require straining to pass.  He states that he drinks lots of water per day and does take a stool softener in the morning.  He also eats fiber supplementations with gummies.  His last colonoscopy was reportedly 1 year ago and without polyps or masses, although he has previously had known internal hemorrhoids as well as polyps removed.  He has a positive family history of colorectal cancer in his father who  in his 70s from colon cancer, as well as his mother who is a survivor.    19: Underwent left lateral excisional hemorrhoidectomy for incarcerated, strangulated and prolapsed left lateral internal hemorrhoid    Interval history:  Since last clinic visit, patient continues to have anal bleeding after bowel movements.  He states that his stool is now somewhat softer but can be intermittently hard.  He denies any pain with this  "bleeding. He does not have any spontaneous bleeding in his underwear without bowel movements.  He denies any fever, chills, nausea, vomiting.      Review of patient's allergies indicates:   Allergen Reactions    Avelox [moxifloxacin] Hives and Rash       Current Outpatient Medications   Medication Sig Dispense Refill    amoxicillin (AMOXIL) 500 MG capsule Take 500 mg by mouth 3 (three) times daily.      aspirin 81 MG Chew Take 81 mg by mouth once daily.      atorvastatin (LIPITOR) 20 MG tablet TAKE ONE TABLET BY MOUTH EVERY DAY 90 tablet 2    BD ULTRA-FINE MINI PEN NEEDLE 31 gauge x 3/16" Ndle USE THREE TIMES DAILY BEFORE MEALS 100 each 6    cholecalciferol, vitamin D3, 1,000 unit capsule Take 2,000 Units by mouth once daily.      clonazePAM (KLONOPIN) 0.5 MG tablet Klonopin 0.5 mg tablet   Take 1 tablet every day by oral route at bedtime.      docusate sodium (STOOL SOFTENER) 100 MG capsule       furosemide (LASIX) 20 MG tablet TAKE ONE TABLET BY MOUTH EVERY MORNING 90 tablet 2    gabapentin (NEURONTIN) 300 MG capsule gabapentin 300 mg capsule   TAKE ONE CAPSULE BY MOUTH TWICE DAILY FOR FOUR DAYS THEN ONE CAPSULE THREE TIMES DAILY thereafter      insulin glargine (LANTUS SOLOSTAR U-100 INSULIN) 100 unit/mL (3 mL) InPn pen Inject 20 Units into the skin every evening. 2 Box 3    irbesartan (AVAPRO) 300 MG tablet TAKE ONE TABLET BY MOUTH EVERY DAY (Replaces Valsartan) 90 tablet 3    JARDIANCE 10 mg Tab TAKE ONE TABLET BY MOUTH EVERY DAY for blood sugar 90 tablet 3    melatonin 5 mg Subl Take 3 tablets by mouth once daily.  11    metFORMIN (GLUCOPHAGE-XR) 500 MG 24 hr tablet TAKE 4 TABLETS BY MOUTH EVERY  tablet 2    metoprolol succinate (TOPROL-XL) 25 MG 24 hr tablet TAKE ONE TABLET BY MOUTH EVERY DAY 90 tablet 2    SIMBRINZA 1-0.2 % DrpS Shake Liquid AND Instill one drop into BOTH eyes twice daily 8 mL 3    TRULICITY 0.75 mg/0.5 mL PnIj Inject 0.5mls (0.75mg) into the skin every 7 days 2 mL " 11    blood-glucose meter kit Use as instructed 1 each 0    FLUoxetine 40 MG capsule Take 1 capsule (40 mg total) by mouth once daily. 90 capsule 3     Current Facility-Administered Medications   Medication Dose Route Frequency Provider Last Rate Last Dose    lidocaine (PF) 10 mg/ml (1%) injection 10 mg  1 mL Intradermal Once Kaz Keating MD        metronidazole IVPB 500 mg  500 mg Intravenous Once Kaz Keating MD         Facility-Administered Medications Ordered in Other Visits   Medication Dose Route Frequency Provider Last Rate Last Dose    diphenhydrAMINE capsule 25 mg  25 mg Oral Q6H PRN Leonila Richardson MD           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, with long-term current use of insulin 12/20/2018    DDD (degenerative disc disease), lumbar     s/p epidural steroids     Diabetes mellitus, stable     Glaucoma     OU    HTN (hypertension)     Iritis, lens-induced 1/8/2018    Obesity     LOVE on CPAP      Past Surgical History:   Procedure Laterality Date    ANTERIOR VITRECTOMY Left 11/30/2017    [facility]    BLOCK, NERVE, PUDENDAL N/A 4/24/2019    [facility]    CATARACT EXTRACTION W/  INTRAOCULAR LENS IMPLANT Left 11/30/2017    CATARACT EXTRACTION W/  INTRAOCULAR LENS IMPLANT Right 12/11/2018    Dr Garcia    COLONOSCOPY N/A 9/18/2017    Performed by Paul Feliz Jr., MD at [facility]    COLONOSCOPY W/ POLYPECTOMY  04/13/2010    YARELI.   One 1 cm polyp in the sigmoid colon.  Internal hemorrhoids.    EXAM UNDER ANESTHESIA N/A 4/24/2019    [facility] at [facility] OR    EXTRACTION, CATARACT, WITH IOL INSERTION Right 12/11/2018    Per[facility] by [facility] at [facility] OR    HAND SURGERY       HEMORRHOIDECTOMY (lithotomy) N/A 4/24/2019    [facility]    INSERTION-INTRAOCULAR LENS (IOL) Left 11/30/2017    Performed by Dam[facility]    PHACOEMULSIFICATION-ASPIRATION-CATARACT Left 11/30/2017    Performed by Dam[facility] OR [facility]    REPAIR, RETINAL DETACHMENT, WITH VITRECTOMY Right 8/1/2018    Performed by SAJAN Pulido [facility] at [facility] OR [facility]ST [facility]    REPAIR-RETINA (VITRECTOMY) Left 12/6/2017    Per[facility] at [facility]    REPAIR-RETINA (VITRECTOMY) Left 11/15/2017    Per[facility] at [facility]    TONSILLECTOMY      uro lift      for enlarged prostate     Family History   Problem Relation Age of Onset    Heart disease Brother 62    Heart disease Father     Diabetes Father     Colon cancer Father     Cancer Father         colon    Cataracts Father     Hypertension Mother     Diabetes Mother     Stroke Mother     Cancer Mother         colon    Kidney disease Mother     Glaucoma Maternal Grandmother     No Known Problems Sister     No Known Problems Maternal Aunt     No Known Problems Maternal Uncle     No Known Problems Paternal Aunt     No Known Problems Paternal Uncle     No Known Problems Maternal Grandfather     No Known Problems Paternal Grandmother     No Known Problems Paternal Grandfather     Amblyopia Neg Hx     Blindness Neg Hx     Macular degeneration Neg Hx     Retinal detachment Neg Hx     Strabismus Neg Hx     Thyroid disease Neg Hx      Social History     Tobacco Use    Smoking status: Never Smoker    Smokeless tobacco: Never Used   Substance Use Topics    Alcohol use: Yes     Comment: occasional, no alcohol 72 hours prior to sx    Drug use: No        Review of Systems:  Review of Systems   Constitutional: Negative for activity change, appetite change, chills, fatigue, fever and unexpected weight change.   HENT: Negative for  congestion, ear pain, sore throat and trouble swallowing.    Eyes: Negative for pain, redness and itching.   Respiratory: Negative for cough, shortness of breath and wheezing.    Cardiovascular: Negative for chest pain, palpitations and leg swelling.   Gastrointestinal: Positive for anal bleeding. Negative for abdominal distention, abdominal pain, blood in stool, constipation, diarrhea, nausea, rectal pain and vomiting.   Endocrine: Negative for cold intolerance, heat intolerance and polyuria.   Genitourinary: Negative for dysuria, flank pain, frequency and hematuria.   Musculoskeletal: Negative for gait problem, joint swelling and neck pain.   Skin: Negative for color change, rash and wound.   Allergic/Immunologic: Negative for environmental allergies and immunocompromised state.   Neurological: Negative for dizziness, speech difficulty, weakness and numbness.   Psychiatric/Behavioral: Negative for agitation, confusion and hallucinations.       OBJECTIVE:     Vital Signs (Most Recent)  Temp: 97.4 °F (36.3 °C) (07/15/19 1307)  Pulse: 64 (07/15/19 1307)  BP: 96/64 (07/15/19 1307)     96.8 kg (213 lb 6.5 oz)     Physical Exam:  Physical Exam   Constitutional: He is oriented to person, place, and time. He appears well-developed.   HENT:   Head: Normocephalic and atraumatic.   Eyes: Conjunctivae and EOM are normal.   Neck: Normal range of motion. No thyromegaly present.   Cardiovascular: Normal rate and regular rhythm.   Pulmonary/Chest: Effort normal. No respiratory distress.   Abdominal: Soft. He exhibits no distension and no mass. There is no tenderness.   Genitourinary:   Genitourinary Comments: Anorectal:  No external lesions; +small left lateral anal fissure/non-healed hemorrhoidectomy site; ESTHER with good tone, no blood; no palpable lesions or defects, good push/squeeze;    Musculoskeletal: Normal range of motion. He exhibits no edema or tenderness.   Neurological: He is alert and oriented to person, place, and  time.   Skin: Skin is warm and dry. Capillary refill takes less than 2 seconds. No rash noted.   Psychiatric: He has a normal mood and affect.     Anoscopy Procedure Note    Pre-procedure diagnosis: s/p left lateral excisional hemorrhoidectomy    Post-procedure diagnosis: s/p left lateral excisional hemorrhoidectomy    Procedure: Anoscopy    Surgeon: Kaz Keating MD    Assistant: Charo Pringle LPN    Specimen: none    Findings:  Anoscope inserted all 4 quadrants examined. +left lateral anal fissure/previous hemorrhoidectomy site poor mucosal healing and evidence of recent bleeding without purulence. No enlargement of the right posterior or right anterior hemorrhoidal columns.  There were no other mucosal abnormalities, defects or masses noted.    Patient tolerated procedure well.      Laboratory  Lab Results   Component Value Date    WBC 8.68 04/23/2019    HGB 16.4 04/23/2019    HCT 52.0 04/23/2019     04/23/2019    CHOL 125 06/11/2019    TRIG 152 (H) 06/11/2019    HDL 31 (L) 06/11/2019    ALT 23 04/23/2019    AST 34 04/23/2019     04/23/2019    K 4.5 04/23/2019     04/23/2019    CREATININE 1.0 04/23/2019    BUN 16 04/23/2019    CO2 31 (H) 04/23/2019    TSH 0.683 09/22/2015    PSA 1.5 08/08/2014    HGBA1C 6.4 (H) 06/11/2019       Diagnostic review:  Colonoscopy from September 2017 reviewed which revealed internal hemorrhoids as well as 2 polyps removed that were hyperplastic in nature.    ASSESSMENT/PLAN:     64-year-old male who presents who is s/p excisional hemorrhoidectomy of the left lateral hemorrhoidal column which had evidence of strangulation, incarceration and prolapse who continues to have some rectal bleeding postoperatively with some small mucosal separation and nonhealing on exam today concerning for nonhealing of the site versus new anal fissure    - will take to the operating room in the near future for examination anesthesia, possible fissurectomy, possible primary closure of  nonhealing site  - All risks, benefits and alternatives fully explained to patient.  Risks include, but are not limited to, bleeding, infection, fecal incontinence, damage to the sphincter muscles, postoperative abscess, postoperative pain, urinary incontinence, urinary retention, perioperative MI, CVA and death.  All questions appropriately answered to patient's satisfaction.  Consent signed and placed on chart.  - recommended lifestyle and dietary modifications to include taking a stool softener twice per day instead of once per day, changing fiber supplementatio to the powder form either Metamucil or Benefiber daily, continue to drink at least 64 oz of water per day, taking MiraLax as needed to have a bowel movement daily, avoid straining on the toilet when having a bowel movement, and spending less than 5 min on the toilet per bowel movement.  - 2 enemas the morning of surgery    Kaz Keating MD  Colon and Rectal Surgery  [facility]

## 2019-07-15 NOTE — PROGRESS NOTES
History & Physical    SUBJECTIVE:     Chief Complaint   Patient presents with    Post-op Evaluation     Post Op        History of Present Illness:  Patient is a 64 y.o. male presents for evaluation of a possible hemorrhoid.  Patient reports that about 1 week ago he noticed a pressure and pain near his anus and once he took a shower that evening he noticed a protrusion of tissue from his anus that was abnormal and that he reports his multiple cm.  He states that he does have a sharp pain like sensation when does have a bowel movement and when he is standing or walks too long.  He did go to the emergency room at another hospital where they did not perform any interventions but did prescribe 10 hydrocortisone cream that has not alleviated his symptoms and has failed to improve the symptoms.  He contacted his primary care doctor then referred him here for evaluation.  He states that he is not seeing any bright red blood per rectum, hematochezia, melena, or change in bowel habits.  He states that he has a bowel movement once every 2 days, it is intermittently hard and large and sometimes require straining to pass.  He states that he drinks lots of water per day and does take a stool softener in the morning.  He also eats fiber supplementations with gummies.  His last colonoscopy was reportedly 1 year ago and without polyps or masses, although he has previously had known internal hemorrhoids as well as polyps removed.  He has a positive family history of colorectal cancer in his father who  in his 70s from colon cancer, as well as his mother who is a survivor.    19: Underwent left lateral excisional hemorrhoidectomy for incarcerated, strangulated and prolapsed left lateral internal hemorrhoid    Interval history:  Since last clinic visit, patient continues to have anal bleeding after bowel movements.  He states that his stool is now somewhat softer but can be intermittently hard.  He denies any pain with this  "bleeding. He does not have any spontaneous bleeding in his underwear without bowel movements.  He denies any fever, chills, nausea, vomiting.      Review of patient's allergies indicates:   Allergen Reactions    Avelox [moxifloxacin] Hives and Rash       Current Outpatient Medications   Medication Sig Dispense Refill    amoxicillin (AMOXIL) 500 MG capsule Take 500 mg by mouth 3 (three) times daily.      aspirin 81 MG Chew Take 81 mg by mouth once daily.      atorvastatin (LIPITOR) 20 MG tablet TAKE ONE TABLET BY MOUTH EVERY DAY 90 tablet 2    BD ULTRA-FINE MINI PEN NEEDLE 31 gauge x 3/16" Ndle USE THREE TIMES DAILY BEFORE MEALS 100 each 6    cholecalciferol, vitamin D3, 1,000 unit capsule Take 2,000 Units by mouth once daily.      clonazePAM (KLONOPIN) 0.5 MG tablet Klonopin 0.5 mg tablet   Take 1 tablet every day by oral route at bedtime.      docusate sodium (STOOL SOFTENER) 100 MG capsule       furosemide (LASIX) 20 MG tablet TAKE ONE TABLET BY MOUTH EVERY MORNING 90 tablet 2    gabapentin (NEURONTIN) 300 MG capsule gabapentin 300 mg capsule   TAKE ONE CAPSULE BY MOUTH TWICE DAILY FOR FOUR DAYS THEN ONE CAPSULE THREE TIMES DAILY thereafter      insulin glargine (LANTUS SOLOSTAR U-100 INSULIN) 100 unit/mL (3 mL) InPn pen Inject 20 Units into the skin every evening. 2 Box 3    irbesartan (AVAPRO) 300 MG tablet TAKE ONE TABLET BY MOUTH EVERY DAY (Replaces Valsartan) 90 tablet 3    JARDIANCE 10 mg Tab TAKE ONE TABLET BY MOUTH EVERY DAY for blood sugar 90 tablet 3    melatonin 5 mg Subl Take 3 tablets by mouth once daily.  11    metFORMIN (GLUCOPHAGE-XR) 500 MG 24 hr tablet TAKE 4 TABLETS BY MOUTH EVERY  tablet 2    metoprolol succinate (TOPROL-XL) 25 MG 24 hr tablet TAKE ONE TABLET BY MOUTH EVERY DAY 90 tablet 2    SIMBRINZA 1-0.2 % DrpS Shake Liquid AND Instill one drop into BOTH eyes twice daily 8 mL 3    TRULICITY 0.75 mg/0.5 mL PnIj Inject 0.5mls (0.75mg) into the skin every 7 days 2 mL " 11    blood-glucose meter kit Use as instructed 1 each 0    FLUoxetine 40 MG capsule Take 1 capsule (40 mg total) by mouth once daily. 90 capsule 3     Current Facility-Administered Medications   Medication Dose Route Frequency Provider Last Rate Last Dose    lidocaine (PF) 10 mg/ml (1%) injection 10 mg  1 mL Intradermal Once Kaz Keating MD        metronidazole IVPB 500 mg  500 mg Intravenous Once Kaz Keating MD         Facility-Administered Medications Ordered in Other Visits   Medication Dose Route Frequency Provider Last Rate Last Dose    diphenhydrAMINE capsule 25 mg  25 mg Oral Q6H PRN Leonila Richardson MD           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, with long-term current use of insulin 12/20/2018    DDD (degenerative disc disease), lumbar     s/p epidural steroids     Diabetes mellitus, stable     Glaucoma     OU    HTN (hypertension)     Iritis, lens-induced 1/8/2018    Obesity     LOVE on CPAP      Past Surgical History:   Procedure Laterality Date    ANTERIOR VITRECTOMY Left 11/30/2017    [facility]ST FLR    BLOCK, NERVE, PUDENDAL N/A 4/24/2019    [facility]    CATARACT EXTRACTION W/  INTRAOCULAR LENS IMPLANT Left 11/30/2017    CATARACT EXTRACTION W/  INTRAOCULAR LENS IMPLANT Right 12/11/2018    Dr Garcia    COLONOSCOPY N/A 9/18/2017    [facility] Paul Fel[facility] ENDO    COLONOSCOPY W/ POLYPECTOMY  04/13/2010    YARELI.   One 1 cm polyp in the sigmoid colon.  Internal hemorrhoids.    EXAM UNDER ANESTHESIA N/A 4/24/2019    Per[facility] at [facility] OR    EXTRACTION, CATARACT, WITH IOL INSERTION Right 12/11/2018    [facility] OR    HAND SURGERY       HEMORRHOIDECTOMY (lithotomy) N/A 4/24/2019    [facility]    INSERTION-INTRAOCULAR LENS (IOL) Left 11/30/2017    Performed by Damaris Garcia MD at [facility] OR 1ST FLR    PHACOEMULSIFICATION-ASPIRATION-CATARACT Left 11/30/2017    Performed by Dam[facility] at [facility] OR 1ST FLR    REPAIR, RETINAL DETACHMENT, WITH VITRECTOMY Right 8/1/2018    Performed by SAJAN Pulido MD at [facility] OR 1ST FLR    REPAIR-RETINA (VITRECTOMY) Left 12/6/2017    Performed by [facility]AN Pulido MD at [facility] OR 1ST FLR    REPAIR-RETINA (VITRECTOMY) Left 11/15/2017    Performed by [facility]JAN Pulido MD at [facility] OR 1ST FLR    TONSILLECTOMY      uro lift      for enlarged prostate     Family History   Problem Relation Age of Onset    Heart disease Brother 62    Heart disease Father     Diabetes Father     Colon cancer Father     Cancer Father         colon    Cataracts Father     Hypertension Mother     Diabetes Mother     Stroke Mother     Cancer Mother         colon    Kidney disease Mother     Glaucoma Maternal Grandmother     No Known Problems Sister     No Known Problems Maternal Aunt     No Known Problems Maternal Uncle     No Known Problems Paternal Aunt     No Known Problems Paternal Uncle     No Known Problems Maternal Grandfather     No Known Problems Paternal Grandmother     No Known Problems Paternal Grandfather     Amblyopia Neg Hx     Blindness Neg Hx     Macular degeneration Neg Hx     Retinal detachment Neg Hx     Strabismus Neg Hx     Thyroid disease Neg Hx      Social History     Tobacco Use    Smoking status: Never Smoker    Smokeless tobacco: Never Used   Substance Use Topics    Alcohol use: Yes     Comment: occasional, no alcohol 72 hours prior to sx    Drug use: No        Review of Systems:  Review of Systems   Constitutional: Negative for activity change, appetite change, chills, fatigue, fever and unexpected weight change.   HENT: Negative for  congestion, ear pain, sore throat and trouble swallowing.    Eyes: Negative for pain, redness and itching.   Respiratory: Negative for cough, shortness of breath and wheezing.    Cardiovascular: Negative for chest pain, palpitations and leg swelling.   Gastrointestinal: Positive for anal bleeding. Negative for abdominal distention, abdominal pain, blood in stool, constipation, diarrhea, nausea, rectal pain and vomiting.   Endocrine: Negative for cold intolerance, heat intolerance and polyuria.   Genitourinary: Negative for dysuria, flank pain, frequency and hematuria.   Musculoskeletal: Negative for gait problem, joint swelling and neck pain.   Skin: Negative for color change, rash and wound.   Allergic/Immunologic: Negative for environmental allergies and immunocompromised state.   Neurological: Negative for dizziness, speech difficulty, weakness and numbness.   Psychiatric/Behavioral: Negative for agitation, confusion and hallucinations.       OBJECTIVE:     Vital Signs (Most Recent)  Temp: 97.4 °F (36.3 °C) (07/15/19 1307)  Pulse: 64 (07/15/19 1307)  BP: 96/64 (07/15/19 1307)     96.8 kg (213 lb 6.5 oz)     Physical Exam:  Physical Exam   Constitutional: He is oriented to person, place, and time. He appears well-developed.   HENT:   Head: Normocephalic and atraumatic.   Eyes: Conjunctivae and EOM are normal.   Neck: Normal range of motion. No thyromegaly present.   Cardiovascular: Normal rate and regular rhythm.   Pulmonary/Chest: Effort normal. No respiratory distress.   Abdominal: Soft. He exhibits no distension and no mass. There is no tenderness.   Genitourinary:   Genitourinary Comments: Anorectal:  No external lesions; +small left lateral anal fissure/non-healed hemorrhoidectomy site; ESTHER with good tone, no blood; no palpable lesions or defects, good push/squeeze;    Musculoskeletal: Normal range of motion. He exhibits no edema or tenderness.   Neurological: He is alert and oriented to person, place, and  time.   Skin: Skin is warm and dry. Capillary refill takes less than 2 seconds. No rash noted.   Psychiatric: He has a normal mood and affect.     Anoscopy Procedure Note    Pre-procedure diagnosis: s/p left lateral excisional hemorrhoidectomy    Post-procedure diagnosis: s/p left lateral excisional hemorrhoidectomy    Procedure: Anoscopy    Surgeon: Kaz Keating MD    Assistant: Charo Pringle LPN    Specimen: none    Findings:  Anoscope inserted all 4 quadrants examined. +left lateral anal fissure/previous hemorrhoidectomy site poor mucosal healing and evidence of recent bleeding without purulence. No enlargement of the right posterior or right anterior hemorrhoidal columns.  There were no other mucosal abnormalities, defects or masses noted.    Patient tolerated procedure well.      Laboratory  Lab Results   Component Value Date    WBC 8.68 04/23/2019    HGB 16.4 04/23/2019    HCT 52.0 04/23/2019     04/23/2019    CHOL 125 06/11/2019    TRIG 152 (H) 06/11/2019    HDL 31 (L) 06/11/2019    ALT 23 04/23/2019    AST 34 04/23/2019     04/23/2019    K 4.5 04/23/2019     04/23/2019    CREATININE 1.0 04/23/2019    BUN 16 04/23/2019    CO2 31 (H) 04/23/2019    TSH 0.683 09/22/2015    PSA 1.5 08/08/2014    HGBA1C 6.4 (H) 06/11/2019       Diagnostic review:  Colonoscopy from September 2017 reviewed which revealed internal hemorrhoids as well as 2 polyps removed that were hyperplastic in nature.    ASSESSMENT/PLAN:     64-year-old male who presents who is s/p excisional hemorrhoidectomy of the left lateral hemorrhoidal column which had evidence of strangulation, incarceration and prolapse who continues to have some rectal bleeding postoperatively with some small mucosal separation and nonhealing on exam today concerning for nonhealing of the site versus new anal fissure    - will take to the operating room in the near future for examination anesthesia, possible fissurectomy, possible primary closure of  nonhealing site  - All risks, benefits and alternatives fully explained to patient.  Risks include, but are not limited to, bleeding, infection, fecal incontinence, damage to the sphincter muscles, postoperative abscess, postoperative pain, urinary incontinence, urinary retention, perioperative MI, CVA and death.  All questions appropriately answered to patient's satisfaction.  Consent signed and placed on chart.  - recommended lifestyle and dietary modifications to include taking a stool softener twice per day instead of once per day, changing fiber supplementatio to the powder form either Metamucil or Benefiber daily, continue to drink at least 64 oz of water per day, taking MiraLax as needed to have a bowel movement daily, avoid straining on the toilet when having a bowel movement, and spending less than 5 min on the toilet per bowel movement.  - 2 enemas the morning of surgery    Kaz Keating MD  Colon and Rectal Surgery  [facility]

## 2019-07-16 ENCOUNTER — PATIENT MESSAGE (OUTPATIENT)
Dept: ADMINISTRATIVE | Facility: OTHER | Age: 64
End: 2019-07-16

## 2019-07-18 NOTE — PRE ADMISSION SCREENING
Pre op instructions reviewed with patient per phone:    To confirm, Your surgeon has instructed you:  Surgery is scheduled 07/23/19at 070.      Please report to [facility] 1st floor main lobby by 0530.   Pre admit office to call afternoon prior to surgery with final arrival time      INSTRUCTIONS IMPORTANT!!!  ¨ Do not eat, drink, or smoke after 12 midnight-including water. OK to brush teeth, no gum, candy or mints!    ¨ Take only these medicines with a small swallow of water-morning of surgery.  Prozac, Atorvastatin, Gabapentin, Metoprolol, use Simbrinza opht gtts      Morning of surgery prior to coming to hospital, use Fleet enema, repeat after 30 min's           ____  Do not wear makeup, including mascara.  ___    No powder, lotions or creams to surgical area.  ____  Please remove all jewelry, including piercings and leave at home.  ____  No money or valuables needed. Please leave at home.  ____  Please bring identification and insurance information to hospital.  ____  If going home the same day, arrange for a ride home. You will not be able to   drive if Anesthesia was used.  ____  Children, under 12 years old, must remain in the waiting room with an adult.  They are not allowed in patient areas.  ____  Wear loose fitting clothing. Allow for dressings, bandages.  ____  Stop Aspirin, Ibuprofen, Motrin and Aleve at least 5-7 days before surgery, unless otherwise instructed by your doctor, or the nurse.   You MAY use Tylenol/acetaminophen until day of surgery.  ____  If you take diabetic medication, do not take am of surgery unless instructed by   Doctor.  ____ Stop taking any Fish Oil supplement or any Vitamins that contain Vitamin E at least 5 days prior to surgery.          Bathing Instructions-- The night before surgery and the morning prior to coming to the hospital:   -Do not shave the surgical area.   -Shower and wash your hair and body as usual with anti-bacterial  soap and  shampoo.   -Rinse your hair and body completely.   -Use one packet of hibiclens to wash the surgical site (using your hand) gently for 5 minutes.  Do not scrub you skin too hard.   -Do not use hibiclens on your head, face, or genitals.   -Do not wash with anti-bacterial soap after you use the hibiclens.   -Rinse your body thoroughly.   -Dry with clean, soft towel.  Do not use lotion, cream, deodorant, or powders on   the surgical site.    Use antibacterial soap in place of hibiclens if your surgery is on the head, face or genitals.         Surgical Site Infection    Prevention of surgical site infections:     -Keep incisions clean and dry.   -Do not soak/submerge incisions in water until completely healed.   -Do not apply lotions, powders, creams, or deodorants to site.   -Always make sure hands are cleaned with antibacterial soap/ alcohol-based   prior to touching the surgical site.  (This includes doctors, nurses, staff, and yourself.)    Signs and symptoms:   -Redness and pain around the area where you had surgery   -Drainage of cloudy fluid from your surgical wound   -Fever over 100.4  I have read or had read and explained to me, and understand the above information.

## 2019-07-19 ENCOUNTER — PATIENT OUTREACH (OUTPATIENT)
Dept: OTHER | Facility: OTHER | Age: 64
End: 2019-07-19

## 2019-07-19 NOTE — PROGRESS NOTES
Last 5 Patient Entered Readings                                      Current 30 Day Average: 114/69     Recent Readings 7/18/2019 7/16/2019 7/12/2019 7/10/2019 7/3/2019    SBP (mmHg) 106 109 121 123 111    DBP (mmHg) 66 60 77 71 64    Pulse 57 63 57 65 67          Last 6 Patient Entered Readings                                          Most Recent A1c: 6.4% on 6/11/2019  (Goal: 7%)     Recent Readings 7/19/2019 7/18/2019 7/17/2019 7/16/2019 7/15/2019    Blood Glucose (mg/dL) 129 114 119 97 106        Reports he is having follow up surgery for his hemmroidectomy.  States he is also suffering with a UTI.  BP and BG readings at goal.    .Digital Medicine: Health  Follow Up    Lifestyle Modifications:    1.Dietary Modifications (Sodium intake <2,000mg/day, food labels, dining out): deferred    2.Physical Activity: deferred    3.Medication Therapy: Patient has been compliant with the medication regimen.    4.Patient has the following medication side effects/concerns:   (Frequency/Alleviating factors/Precipitating factors, etc.)     Follow up with Mr. Glenn Medel completed. No further questions or concerns. Will continue to follow up to achieve health goals.

## 2019-07-23 ENCOUNTER — HOSPITAL ENCOUNTER (OUTPATIENT)
Facility: HOSPITAL | Age: 64
Discharge: HOME | End: 2019-07-23
Attending: COLON & RECTAL SURGERY | Admitting: COLON & RECTAL SURGERY
Payer: MEDICARE

## 2019-07-23 ENCOUNTER — TELEPHONE (OUTPATIENT)
Dept: SURGERY | Facility: CLINIC | Age: 64
End: 2019-07-23

## 2019-07-23 ENCOUNTER — ANESTHESIA EVENT (OUTPATIENT)
Dept: SURGERY | Facility: HOSPITAL | Age: 64
End: 2019-07-23
Payer: MEDICARE

## 2019-07-23 ENCOUNTER — ANESTHESIA (OUTPATIENT)
Dept: SURGERY | Facility: HOSPITAL | Age: 64
End: 2019-07-23
Payer: MEDICARE

## 2019-07-23 ENCOUNTER — PATIENT MESSAGE (OUTPATIENT)
Dept: FAMILY MEDICINE | Facility: CLINIC | Age: 64
End: 2019-07-23

## 2019-07-23 DIAGNOSIS — K60.2 ANAL FISSURE: Primary | ICD-10-CM

## 2019-07-23 DIAGNOSIS — K64.8: ICD-10-CM

## 2019-07-23 LAB
POCT GLUCOSE: 101 MG/DL (ref 70–110)
POCT GLUCOSE: 118 MG/DL (ref 70–110)

## 2019-07-23 PROCEDURE — 63600175 PHARM REV CODE 636 W HCPCS: Performed by: COLON & RECTAL SURGERY

## 2019-07-23 PROCEDURE — 36000707: Performed by: COLON & RECTAL SURGERY

## 2019-07-23 PROCEDURE — 25000003 PHARM REV CODE 250: Performed by: NURSE ANESTHETIST, CERTIFIED REGISTERED

## 2019-07-23 PROCEDURE — 36000706: Performed by: COLON & RECTAL SURGERY

## 2019-07-23 PROCEDURE — C9290 INJ, BUPIVACAINE LIPOSOME: HCPCS | Performed by: COLON & RECTAL SURGERY

## 2019-07-23 PROCEDURE — 88305 TISSUE EXAM BY PATHOLOGIST: CPT | Performed by: PATHOLOGY

## 2019-07-23 PROCEDURE — 71000033 HC RECOVERY, INTIAL HOUR: Performed by: COLON & RECTAL SURGERY

## 2019-07-23 PROCEDURE — 37000009 HC ANESTHESIA EA ADD 15 MINS: Performed by: COLON & RECTAL SURGERY

## 2019-07-23 PROCEDURE — 71000015 HC POSTOP RECOV 1ST HR: Performed by: COLON & RECTAL SURGERY

## 2019-07-23 PROCEDURE — 25000003 PHARM REV CODE 250: Performed by: COLON & RECTAL SURGERY

## 2019-07-23 PROCEDURE — 88305 TISSUE EXAM BY PATHOLOGIST: CPT | Mod: 26,,, | Performed by: PATHOLOGY

## 2019-07-23 PROCEDURE — 88305 TISSUE SPECIMEN TO PATHOLOGY - SURGERY: ICD-10-PCS | Mod: 26,,, | Performed by: PATHOLOGY

## 2019-07-23 PROCEDURE — 37000008 HC ANESTHESIA 1ST 15 MINUTES: Performed by: COLON & RECTAL SURGERY

## 2019-07-23 PROCEDURE — 46200 PR REMOVAL OF ANAL FISSURE: ICD-10-PCS | Mod: 78,,, | Performed by: COLON & RECTAL SURGERY

## 2019-07-23 PROCEDURE — 27201423 OPTIME MED/SURG SUP & DEVICES STERILE SUPPLY: Performed by: COLON & RECTAL SURGERY

## 2019-07-23 PROCEDURE — 46200 REMOVAL OF ANAL FISSURE: CPT | Mod: 78,,, | Performed by: COLON & RECTAL SURGERY

## 2019-07-23 PROCEDURE — 63600175 PHARM REV CODE 636 W HCPCS: Performed by: NURSE ANESTHETIST, CERTIFIED REGISTERED

## 2019-07-23 RX ORDER — PROPOFOL 10 MG/ML
VIAL (ML) INTRAVENOUS CONTINUOUS PRN
Status: DISCONTINUED | OUTPATIENT
Start: 2019-07-23 | End: 2019-07-23

## 2019-07-23 RX ORDER — MIDAZOLAM HYDROCHLORIDE 1 MG/ML
INJECTION, SOLUTION INTRAMUSCULAR; INTRAVENOUS
Status: DISCONTINUED | OUTPATIENT
Start: 2019-07-23 | End: 2019-07-23

## 2019-07-23 RX ORDER — SODIUM CHLORIDE, SODIUM LACTATE, POTASSIUM CHLORIDE, CALCIUM CHLORIDE 600; 310; 30; 20 MG/100ML; MG/100ML; MG/100ML; MG/100ML
INJECTION, SOLUTION INTRAVENOUS CONTINUOUS PRN
Status: DISCONTINUED | OUTPATIENT
Start: 2019-07-23 | End: 2019-07-23

## 2019-07-23 RX ORDER — OXYCODONE HYDROCHLORIDE 5 MG/1
10 TABLET ORAL EVERY 4 HOURS PRN
Status: DISCONTINUED | OUTPATIENT
Start: 2019-07-23 | End: 2019-07-23 | Stop reason: HOSPADM

## 2019-07-23 RX ORDER — ONDANSETRON 2 MG/ML
4 INJECTION INTRAMUSCULAR; INTRAVENOUS DAILY PRN
Status: DISCONTINUED | OUTPATIENT
Start: 2019-07-23 | End: 2019-07-23 | Stop reason: HOSPADM

## 2019-07-23 RX ORDER — ONDANSETRON 2 MG/ML
4 INJECTION INTRAMUSCULAR; INTRAVENOUS EVERY 12 HOURS PRN
Status: DISCONTINUED | OUTPATIENT
Start: 2019-07-23 | End: 2019-07-23 | Stop reason: HOSPADM

## 2019-07-23 RX ORDER — FENTANYL CITRATE 50 UG/ML
INJECTION, SOLUTION INTRAMUSCULAR; INTRAVENOUS
Status: DISCONTINUED | OUTPATIENT
Start: 2019-07-23 | End: 2019-07-23

## 2019-07-23 RX ORDER — SODIUM CHLORIDE 9 MG/ML
INJECTION, SOLUTION INTRAVENOUS CONTINUOUS
Status: DISCONTINUED | OUTPATIENT
Start: 2019-07-23 | End: 2019-07-23 | Stop reason: HOSPADM

## 2019-07-23 RX ORDER — OXYCODONE HYDROCHLORIDE 5 MG/1
5 TABLET ORAL EVERY 4 HOURS PRN
Status: DISCONTINUED | OUTPATIENT
Start: 2019-07-23 | End: 2019-07-23 | Stop reason: HOSPADM

## 2019-07-23 RX ORDER — OXYCODONE AND ACETAMINOPHEN 5; 325 MG/1; MG/1
1 TABLET ORAL EVERY 4 HOURS PRN
Qty: 20 TABLET | Refills: 0 | Status: SHIPPED | OUTPATIENT
Start: 2019-07-23 | End: 2020-01-06

## 2019-07-23 RX ORDER — BUPIVACAINE HYDROCHLORIDE 2.5 MG/ML
INJECTION, SOLUTION EPIDURAL; INFILTRATION; INTRACAUDAL
Status: DISCONTINUED | OUTPATIENT
Start: 2019-07-23 | End: 2019-07-23 | Stop reason: HOSPADM

## 2019-07-23 RX ORDER — FENTANYL CITRATE 50 UG/ML
25 INJECTION, SOLUTION INTRAMUSCULAR; INTRAVENOUS EVERY 5 MIN PRN
Status: DISCONTINUED | OUTPATIENT
Start: 2019-07-23 | End: 2019-07-23 | Stop reason: HOSPADM

## 2019-07-23 RX ADMIN — SODIUM CHLORIDE, SODIUM LACTATE, POTASSIUM CHLORIDE, AND CALCIUM CHLORIDE: .6; .31; .03; .02 INJECTION, SOLUTION INTRAVENOUS at 06:07

## 2019-07-23 RX ADMIN — BUPIVACAINE 266 MG: 13.3 INJECTION, SUSPENSION, LIPOSOMAL INFILTRATION at 07:07

## 2019-07-23 RX ADMIN — FENTANYL CITRATE 50 MCG: 50 INJECTION, SOLUTION INTRAMUSCULAR; INTRAVENOUS at 07:07

## 2019-07-23 RX ADMIN — PROPOFOL 550 MG/HR: 10 INJECTION, EMULSION INTRAVENOUS at 07:07

## 2019-07-23 RX ADMIN — MIDAZOLAM 2 MG: 1 INJECTION INTRAMUSCULAR; INTRAVENOUS at 06:07

## 2019-07-23 NOTE — DISCHARGE SUMMARY
"[facility]  Discharge Note  Short Stay    Admit Date: 7/23/2019    Discharge Date and Time: 7/23/2019  9:15 AM     Attending Physician: Kaz Keating    Discharge Provider: Kaz Keating    Diagnoses:  Active Hospital Problems    Diagnosis  POA    Anal fissure [K60.2]  Yes      Resolved Hospital Problems   No resolved problems to display.       Discharged Condition: good    Hospital Course: Patient was admitted for an outpatient procedure and tolerated the procedure well with no complications.    Final Diagnoses: Same as principal problem.    Disposition: Home or Self Care    Follow up/Patient Instructions:    Medications:  Reconciled Home Medications:      Medication List      START taking these medications    oxyCODONE-acetaminophen 5-325 mg per tablet  Commonly known as:  PERCOCET  Take 1 tablet by mouth every 4 (four) hours as needed for Pain.        CHANGE how you take these medications    metoprolol succinate 25 MG 24 hr tablet  Commonly known as:  TOPROL-XL  TAKE ONE TABLET BY MOUTH EVERY DAY  What changed:    · how much to take  · how to take this  · when to take this  · additional instructions     TRULICITY 0.75 mg/0.5 mL Pnij  Generic drug:  dulaglutide  Inject 0.5mls (0.75mg) into the skin every 7 days  What changed:  See the new instructions.        CONTINUE taking these medications    amoxicillin 500 MG capsule  Commonly known as:  AMOXIL  Take 500 mg by mouth 3 (three) times daily.     aspirin 81 MG Chew  Take 81 mg by mouth once daily.     atorvastatin 20 MG tablet  Commonly known as:  LIPITOR  TAKE ONE TABLET BY MOUTH EVERY DAY     BD ULTRA-FINE MINI PEN NEEDLE 31 gauge x 3/16" Ndle  Generic drug:  pen needle, diabetic  USE THREE TIMES DAILY BEFORE MEALS     blood-glucose meter kit  Use as instructed     cholecalciferol (vitamin D3) 1,000 unit capsule  Commonly known as:  VITAMIN D3  Take 2,000 Units by mouth once daily.     FLUoxetine 40 MG capsule  Take 1 capsule (40 mg total) by " mouth once daily.     furosemide 20 MG tablet  Commonly known as:  LASIX  TAKE ONE TABLET BY MOUTH EVERY MORNING     gabapentin 300 MG capsule  Commonly known as:  NEURONTIN  gabapentin 300 mg capsule   TAKE ONE CAPSULE BY MOUTH TWICE DAILY FOR FOUR DAYS THEN ONE CAPSULE THREE TIMES DAILY thereafter     insulin glargine 100 units/mL (3mL) SubQ pen  Commonly known as:  LANTUS SOLOSTAR U-100 INSULIN  Inject 20 Units into the skin every evening.     irbesartan 300 MG tablet  Commonly known as:  AVAPRO  TAKE ONE TABLET BY MOUTH EVERY DAY (Replaces Valsartan)     JARDIANCE 10 mg Tab  Generic drug:  empagliflozin  TAKE ONE TABLET BY MOUTH EVERY DAY for blood sugar     KlonoPIN 0.5 MG tablet  Generic drug:  clonazePAM  Klonopin 0.5 mg tablet   Take 1 tablet every night  oral route at bedtime.     melatonin 5 mg Subl  Take 5 tablets by mouth nightly.     metFORMIN 500 MG 24 hr tablet  Commonly known as:  GLUCOPHAGE-XR  TAKE 4 TABLETS BY MOUTH EVERY DAY     SIMBRINZA 1-0.2 % Drps  Generic drug:  brinzolamide-brimonidine  Shake Liquid AND Instill one drop into BOTH eyes twice daily     STOOL SOFTENER 100 MG capsule  Generic drug:  docusate sodium  Take 100 mg by mouth 3 (three) times daily.          Discharge Procedure Orders   Diet general     Call MD for:  extreme fatigue     Call MD for:  persistent dizziness or light-headedness     Call MD for:  hives     Call MD for:  redness, tenderness, or signs of infection (pain, swelling, redness, odor or green/yellow discharge around incision site)     Call MD for:  difficulty breathing, headache or visual disturbances     Call MD for:  severe uncontrolled pain     Call MD for:  persistent nausea and vomiting     Call MD for:  temperature >100.4     No dressing needed     Activity as tolerated     Follow-up Information     Kaz Keating MD In 6 weeks.    Specialty:  Colon and Rectal Surgery  Contact information:  [address] [place] [address] [address] [place] [address] [phone]                    Discharge Procedure Orders (must include Diet, Follow-up, Activity):   Discharge Procedure Orders (must include Diet, Follow-up, Activity)   Diet general     Call MD for:  extreme fatigue     Call MD for:  persistent dizziness or light-headedness     Call MD for:  hives     Call MD for:  redness, tenderness, or signs of infection (pain, swelling, redness, odor or green/yellow discharge around incision site)     Call MD for:  difficulty breathing, headache or visual disturbances     Call MD for:  severe uncontrolled pain     Call MD for:  persistent nausea and vomiting     Call MD for:  temperature >100.4     No dressing needed     Activity as tolerated

## 2019-07-23 NOTE — ANESTHESIA PREPROCEDURE EVALUATION
07/23/2019  Glenn Medel is a 64 y.o., male.    Anesthesia Evaluation    I have reviewed the Patient Summary Reports.    I have reviewed the Nursing Notes.   I have reviewed the Medications.     Review of Systems  Anesthesia Hx:  No problems with previous Anesthesia    Social:  Non-Smoker    Cardiovascular:   Hypertension ECG has been reviewed. CONCLUSIONS     1 - Normal left ventricular systolic function (EF 55-60%).     2 - Normal left ventricular diastolic function.  Hypertension, Essential Hypertension    Pulmonary:   Sleep Apnea, CPAP  Obstructive Sleep Apnea (LOVE), CPAP used.   Renal/:   Chronic Renal Disease  Kidney Function/Disease, Chronic Kidney Disease (CKD) , CKD Stage III (GFR 30-59)    Musculoskeletal:   Arthritis   Joint Disease:  Arthritis    Neurological:   Denies Neuromuscular Disease.   Peripheral Neuropathy    Endocrine:   Diabetes, type 2  Diabetes, Type 2 Diabetes , Complications include Diabetic Neuropathy , controlled by insulin.  Metabolic Disorders, Obesity / BMI > 30      Physical Exam  General:  Obesity    Airway/Jaw/Neck:  Airway Findings: Mouth Opening: Normal Tongue: Normal  General Airway Assessment: Adult  Mallampati: III  Improves to II with phonation.  TM Distance: Normal, at least 6 cm      Dental:  Dental Findings: In tact   Chest/Lungs:  Chest/Lungs Findings: Normal Respiratory Rate     Heart/Vascular:  Heart Findings: Rate: Normal             Anesthesia Plan  Type of Anesthesia, risks & benefits discussed:  Anesthesia Type:  MAC  Patient's Preference:   Intra-op Monitoring Plan: standard ASA monitors  Intra-op Monitoring Plan Comments:   Post Op Pain Control Plan:   Post Op Pain Control Plan Comments:   Induction:   IV  Beta Blocker:  Patient is on a Beta-Blocker and has received one dose within the past 24 hours (No further documentation required).       Informed  Consent: Patient understands risks and agrees with Anesthesia plan.  Questions answered. Anesthesia consent signed with patient.  ASA Score: 3     Day of Surgery Review of History & Physical: I have interviewed and examined the patient. I have reviewed the patient's H&P dated:  There are no significant changes.          Ready For Surgery From Anesthesia Perspective.

## 2019-07-23 NOTE — TELEPHONE ENCOUNTER
----- Message from Pily Lange sent at 7/23/2019  8:09 AM CDT -----  Contact: [phone]  Would like to consult with the nurse,  Patient needs an Appt for a post op, patient had surgery this morning please call back at [phone], thanks sj

## 2019-07-23 NOTE — OP NOTE
[facility]  Surgery Department  Operative Note    SUMMARY     Date of Procedure: 7/23/2019     Procedure: Procedure(s) (LRB):  Exam under anesthesia (N/A)  FISSURECTOMY (N/A)     Surgeon(s) and Role:     * Kaz Keating MD - Primary    Assisting Surgeon: None    Pre-Operative Diagnosis: Internal strangulated hemorrhoids [K64.8]  Anal fissure [K60.2]    Post-Operative Diagnosis: Post-Op Diagnosis Codes:     * Internal strangulated hemorrhoids [K64.8]     * Anal fissure [K60.2]    Anesthesia: Local MAC    Technical Procedures Used:   1.  Rectal examination under anesthesia  2.  Fissurectomy  3.  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 Mac anesthesia was then induced.  The patient was admitted lithotomy position.  The anus and perineum were then prepped and draped in usual sterile fashion.  A preoperative surgical time-out was then performed confirming the correct patient, procedure and preoperative medications given.  A digital rectal exam was then performed confirming the defect in the left lateral position but no other abnormalities.  A pudendal nerve block was then performed using a mixture 30 cc of 0.25% bupivacaine plain and 20 cc of Exparel.  10 cc was injected in the subdermal tissues around the anal verge, 20 cc injected in the intersphincteric space bilaterally and 20 cc injected into the ischial fossa bilateral for a total 50 cc given for  the block. After this, Hill-Shepherd retractor was inserted into the anus in all 4 quadrants were examined.  The only abnormality was the nonhealing left lateral hemorrhoidectomy site and anal fissure.  This had chronic heaped up tissue at the edges and granulation tissue at the base.  A 15 blade was used to excise this tissue and sent for final pathology to perform the fissurectomy.  AE electrocautery was used to achieve hemostasis from the excision site. A running 3-0 Vicryl suture was then used to close the defect with healthy mucosa overlying the fissure site. Once this was accomplished the anus and rectum were then checked for hemostasis was ensured.  A Gel-Foam was fashioned to a tampon wrapped in Surgicel and inserted into the anal canal for additional hemostasis at the site. Surgical dressings were then applied. The patient was then moved back in the supine position. He was woken from mac anesthesia and transferred to the postanesthesia care in stable condition. He tolerated procedure well.  All sponge, needle and instrument counts were correct at the end of the case.    Significant Surgical Tasks Conducted by the Assistant(s), if Applicable: N/A    Complications: No    Estimated Blood Loss (EBL): 5 mL           Implants: * No implants in log *    Specimens:   Specimen (12h ago, onward)    Start     Ordered    07/23/19 0741  Specimen to Pathology - Surgery  Once     Comments:  Pre-op Diagnosis: Internal strangulated hemorrhoids [K64.8]Anal fissure [K60.2]Procedure(s):Exam under anesthesiaFISSURECTOMY Number of specimens: 1Name of specimens: 1. Fissure     Start Status     07/23/19 0741 Collected (07/23/19 0747) Order ID: [id number]       07/23/19 0740                  Condition: Good    Disposition: PACU - hemodynamically stable.    Attestation: I performed the procedure.

## 2019-07-23 NOTE — TRANSFER OF CARE
"Anesthesia Transfer of Care Note    Patient: Glenn Medel    Procedure(s) Performed: Procedure(s) (LRB):  Exam under anesthesia (N/A)  FISSURECTOMY (N/A)    Patient location: PACU    Anesthesia Type: MAC    Transport from OR: Transported from OR on room air with adequate spontaneous ventilation    Post pain: adequate analgesia    Post assessment: no apparent anesthetic complications    Post vital signs: stable    Level of consciousness: awake, alert and oriented    Nausea/Vomiting: no nausea/vomiting    Complications: none    Transfer of care protocol was followed      Last vitals:   Visit Vitals  /70 (BP Location: Right arm, Patient Position: Sitting)   Pulse (!) 56   Temp 36.4 °C (97.5 °F) (Temporal)   Resp 18   Ht 5' 11" (1.803 m)   Wt 97.2 kg (214 lb 4.6 oz)   SpO2 97%   BMI 29.89 kg/m²     "

## 2019-07-24 VITALS
DIASTOLIC BLOOD PRESSURE: 68 MMHG | WEIGHT: 214.31 LBS | RESPIRATION RATE: 15 BRPM | BODY MASS INDEX: 30 KG/M2 | SYSTOLIC BLOOD PRESSURE: 119 MMHG | HEART RATE: 62 BPM | OXYGEN SATURATION: 98 % | TEMPERATURE: 98 F | HEIGHT: 71 IN

## 2019-07-24 NOTE — ANESTHESIA POSTPROCEDURE EVALUATION
Anesthesia Post Evaluation    Patient: Glenn Medel    Procedure(s) Performed: Procedure(s) (LRB):  EXAM UNDER ANESTHESIA, DIGITAL, RECTUM (N/A)  FISSURECTOMY (N/A)  BLOCK, NERVE, PUDENDAL (N/A)    Final Anesthesia Type: general  Patient location during evaluation: PACU  Patient participation: Yes- Able to Participate  Level of consciousness: awake and alert  Post-procedure vital signs: reviewed and stable  Pain management: adequate  Airway patency: patent  PONV status at discharge: No PONV  Anesthetic complications: no      Cardiovascular status: hemodynamically stable  Respiratory status: spontaneous ventilation  Hydration status: euvolemic  Follow-up not needed.          Vitals Value Taken Time   /68 7/23/2019  9:00 AM   Temp 36.6 °C (97.9 °F) 7/23/2019  9:00 AM   Pulse 62 7/23/2019  9:00 AM   Resp 15 7/23/2019  9:00 AM   SpO2 98 % 7/23/2019  9:00 AM         Event Time     Out of Recovery 08:13:00          Pain/Ezequiel Score: Ezequiel Score: 10 (7/23/2019  9:00 AM)

## 2019-08-07 ENCOUNTER — PATIENT OUTREACH (OUTPATIENT)
Dept: OTHER | Facility: OTHER | Age: 64
End: 2019-08-07

## 2019-08-07 DIAGNOSIS — G60.9 PERIPHERAL NEUROPATHY, IDIOPATHIC: ICD-10-CM

## 2019-08-07 NOTE — PROGRESS NOTES
HPI:  Called Mr. Glenn Medel for hypertension and diabetes digital medicine follow-up. Patient reports adherence  to medication regimen with no complaints.  Has had 2 recent hemorrhoid surgeries. Had a circumcision today. He plans to schedule surgery for trigger finger once recovered from today's procedure.  He reports exercise has been decreased with surgeries.    Last 5 Patient Entered Readings                                      Current 30 Day Average: 114/69     Recent Readings 8/7/2019 8/6/2019 8/2/2019 7/29/2019 7/22/2019    SBP (mmHg) 113 108 114 120 116    DBP (mmHg) 73 62 66 77 68    Pulse 61 65 60 52 66        Patient denies s/s of hypotension (lightheadedness, dizziness, nausea, fatigue) associated with low readings. Instructed patient to inform me if this occurs, patient confirms understanding.    Patient denies s/s of hypertension (SOB, CP, severe headaches, changes in vision) associated with high readings.     Last 6 Patient Entered Readings                                       Most Recent A1c: 6.4% on 6/11/2019  (Goal: 7%)     Recent Readings 8/7/2019 8/6/2019 8/5/2019 8/4/2019 8/3/2019    Blood Glucose (mg/dL) 91 94 103 97 108        Patient denies s/s or episodes of hypoglycemia (weakness, dizziness, hunger, shakiness, nausea, headache, heart palpitations, sweating, fatigue, anxiety, etc.).    Patient denies s/s of hyperglycemia (headache, increased thirst, increased urination, fatigue, blurred vision).    Assessment:  Patient's current 30-day average is at goal of <130/80 mmHg based on ACC/AHA HTN guidelines.     Diabetes is controlled based on patient's last A1C. SMBG readings reviewed and are trending down. No hypoglycemia.    Plan:  Continue current regimen  Advised patient to decrease Lantus to 18 units daily if 8/8 FBG < 95 mg/dL due to downward trend and to decrease risk of hypoglycemia  Patient amenable to repeat vit b12 level with 10/16 labs due to longterm metformin use and  peripheral neuropathy  Patients health , Sean Aguirre, will follow-up as scheduled.    I will continue to monitor regularly and will follow-up in 4-6 weeks, sooner if blood pressure or blood glucose begins to trend upward or downward.     Current medication regimen:  Hypertension Medications             furosemide (LASIX) 20 MG tablet TAKE ONE TABLET BY MOUTH EVERY MORNING    irbesartan (AVAPRO) 300 MG tablet TAKE ONE TABLET BY MOUTH EVERY DAY (Replaces Valsartan)    metoprolol succinate (TOPROL-XL) 25 MG 24 hr tablet TAKE ONE TABLET BY MOUTH EVERY DAY        Diabetes Medications             insulin glargine (LANTUS SOLOSTAR U-100 INSULIN) 100 unit/mL (3 mL) InPn pen Inject 20 Units into the skin every evening.    JARDIANCE 10 mg Tab TAKE ONE TABLET BY MOUTH EVERY DAY for blood sugar        Ml Arvizu RN 4/23/2019  2:47 PM  HOLD MORNING OF SURGERY              metFORMIN (GLUCOPHAGE-XR) 500 MG 24 hr tablet TAKE 4 TABLETS BY MOUTH EVERY DAY        Ml Arvizu RN 4/23/2019  2:47 PM  HOLD 24 HOURS PRIOR TO SURGERY              TRULICITY 0.75 mg/0.5 mL PnIj Inject 0.5mls (0.75mg) into the skin every 7 days        Patient has my contact information and knows to call with any concerns or clinical changes.

## 2019-08-26 DIAGNOSIS — E11.21 TYPE 2 DIABETES MELLITUS WITH DIABETIC NEPHROPATHY, WITH LONG-TERM CURRENT USE OF INSULIN: ICD-10-CM

## 2019-08-26 DIAGNOSIS — Z79.4 TYPE 2 DIABETES MELLITUS WITH DIABETIC NEPHROPATHY, WITH LONG-TERM CURRENT USE OF INSULIN: ICD-10-CM

## 2019-08-26 RX ORDER — INSULIN GLARGINE 100 [IU]/ML
INJECTION, SOLUTION SUBCUTANEOUS
Qty: 30 ML | Refills: 3 | Status: SHIPPED | OUTPATIENT
Start: 2019-08-26 | End: 2020-02-06

## 2019-09-05 ENCOUNTER — PATIENT OUTREACH (OUTPATIENT)
Dept: OTHER | Facility: OTHER | Age: 64
End: 2019-09-05

## 2019-09-05 NOTE — PROGRESS NOTES
Last 5 Patient Entered Readings                                      Current 30 Day Average: 107/66     Recent Readings 9/4/2019 9/3/2019 8/30/2019 8/29/2019 8/23/2019    SBP (mmHg) 96 99 100 111 108    DBP (mmHg) 59 61 66 67 67    Pulse 63 66 60 62 66          Last 6 Patient Entered Readings                                          Most Recent A1c: 6.4% on 6/11/2019  (Goal: 7%)     Recent Readings 9/5/2019 9/4/2019 9/3/2019 9/2/2019 9/1/2019    Blood Glucose (mg/dL) 113 110 95 107 107          Patient reports he is unsure why BP is trending down.  Denies hypotensive symptoms (lightheadedness, dizziness, nausea, fatigue).      Digital Medicine: Health  Follow Up    Lifestyle Modifications:    1.Dietary Modifications (Sodium intake <2,000mg/day, food labels, dining out): Reports he is having issues with constipation, so he has been trying to drink a lot of water and eat more fiber. Encouraged patient to reach out to PCP to see if they have any suggestions.    2.Physical Activity: Patient reports he had surgery 3 weeks ago, so he is still recovering.    3.Medication Therapy: Patient has been compliant with the medication regimen.    4.Patient has the following medication side effects/concerns: none  (Frequency/Alleviating factors/Precipitating factors, etc.)     Follow up with Mr. Glenn Medel completed. No further questions or concerns. Will continue to follow up to achieve health goals.

## 2019-09-30 ENCOUNTER — PATIENT MESSAGE (OUTPATIENT)
Dept: FAMILY MEDICINE | Facility: CLINIC | Age: 64
End: 2019-09-30

## 2019-09-30 ENCOUNTER — TELEPHONE (OUTPATIENT)
Dept: FAMILY MEDICINE | Facility: CLINIC | Age: 64
End: 2019-09-30

## 2019-10-07 ENCOUNTER — PATIENT MESSAGE (OUTPATIENT)
Dept: FAMILY MEDICINE | Facility: CLINIC | Age: 64
End: 2019-10-07

## 2019-10-07 ENCOUNTER — TELEPHONE (OUTPATIENT)
Dept: FAMILY MEDICINE | Facility: CLINIC | Age: 64
End: 2019-10-07

## 2019-10-07 DIAGNOSIS — K64.4 EXTERNAL HEMORRHOID: Primary | ICD-10-CM

## 2019-10-07 NOTE — TELEPHONE ENCOUNTER
----- Message from Marylou Castro sent at 10/7/2019 12:54 PM CDT -----  Type: Needs Medical Advice    Who Called:  patient  Symptoms (please be specific):  na  How long has patient had these symptoms:  danya  Pharmacy name and phone #:  danya  Best Call Back Number: [phone]  Additional Information: patient has a video appt now with Dr Hernandez but he states there is no place for him to go in the computer for this. A nurse told him to get on the computer now but when I tried to reach a nurse (were on green) - I sent the message to the nurses and they went to yellow/please call patient

## 2019-10-08 NOTE — TELEPHONE ENCOUNTER
I spoke with patient.  Please schedule 3 month follow up appointment in person.    Also, he needs a follow up appointment with Dr. Frye, Orders entered

## 2019-10-09 RX ORDER — BRINZOLAMIDE/BRIMONIDINE TARTRATE 10; 2 MG/ML; MG/ML
SUSPENSION/ DROPS OPHTHALMIC
Qty: 8 ML | Refills: 3 | Status: SHIPPED | OUTPATIENT
Start: 2019-10-09 | End: 2020-10-22

## 2019-10-16 ENCOUNTER — LAB VISIT (OUTPATIENT)
Dept: LAB | Facility: HOSPITAL | Age: 64
End: 2019-10-16
Attending: INTERNAL MEDICINE
Payer: MEDICARE

## 2019-10-16 DIAGNOSIS — N18.30 CKD (CHRONIC KIDNEY DISEASE) STAGE 3, GFR 30-59 ML/MIN: ICD-10-CM

## 2019-10-16 DIAGNOSIS — G60.9 PERIPHERAL NEUROPATHY, IDIOPATHIC: ICD-10-CM

## 2019-10-16 LAB
ALBUMIN SERPL BCP-MCNC: 3.7 G/DL (ref 3.5–5.2)
ANION GAP SERPL CALC-SCNC: 9 MMOL/L (ref 8–16)
BASOPHILS # BLD AUTO: 0.06 K/UL (ref 0–0.2)
BASOPHILS NFR BLD: 0.8 % (ref 0–1.9)
BUN SERPL-MCNC: 18 MG/DL (ref 8–23)
CALCIUM SERPL-MCNC: 9.4 MG/DL (ref 8.7–10.5)
CHLORIDE SERPL-SCNC: 102 MMOL/L (ref 95–110)
CO2 SERPL-SCNC: 29 MMOL/L (ref 23–29)
CREAT SERPL-MCNC: 1.1 MG/DL (ref 0.5–1.4)
DIFFERENTIAL METHOD: ABNORMAL
EOSINOPHIL # BLD AUTO: 0.2 K/UL (ref 0–0.5)
EOSINOPHIL NFR BLD: 2.6 % (ref 0–8)
ERYTHROCYTE [DISTWIDTH] IN BLOOD BY AUTOMATED COUNT: 14.1 % (ref 11.5–14.5)
EST. GFR  (AFRICAN AMERICAN): >60 ML/MIN/1.73 M^2
EST. GFR  (NON AFRICAN AMERICAN): >60 ML/MIN/1.73 M^2
GLUCOSE SERPL-MCNC: 99 MG/DL (ref 70–110)
HCT VFR BLD AUTO: 54.1 % (ref 40–54)
HGB BLD-MCNC: 17 G/DL (ref 14–18)
IMM GRANULOCYTES # BLD AUTO: 0.01 K/UL (ref 0–0.04)
IMM GRANULOCYTES NFR BLD AUTO: 0.1 % (ref 0–0.5)
LYMPHOCYTES # BLD AUTO: 2.4 K/UL (ref 1–4.8)
LYMPHOCYTES NFR BLD: 31.1 % (ref 18–48)
MCH RBC QN AUTO: 27.9 PG (ref 27–31)
MCHC RBC AUTO-ENTMCNC: 31.4 G/DL (ref 32–36)
MCV RBC AUTO: 89 FL (ref 82–98)
MONOCYTES # BLD AUTO: 0.6 K/UL (ref 0.3–1)
MONOCYTES NFR BLD: 7.7 % (ref 4–15)
NEUTROPHILS # BLD AUTO: 4.5 K/UL (ref 1.8–7.7)
NEUTROPHILS NFR BLD: 57.7 % (ref 38–73)
NRBC BLD-RTO: 0 /100 WBC
PHOSPHATE SERPL-MCNC: 3.7 MG/DL (ref 2.7–4.5)
PLATELET # BLD AUTO: 225 K/UL (ref 150–350)
PMV BLD AUTO: 9.5 FL (ref 9.2–12.9)
POTASSIUM SERPL-SCNC: 4.4 MMOL/L (ref 3.5–5.1)
PTH-INTACT SERPL-MCNC: 61 PG/ML (ref 9–77)
RBC # BLD AUTO: 6.09 M/UL (ref 4.6–6.2)
SODIUM SERPL-SCNC: 140 MMOL/L (ref 136–145)
VIT B12 SERPL-MCNC: 400 PG/ML (ref 210–950)
WBC # BLD AUTO: 7.77 K/UL (ref 3.9–12.7)

## 2019-10-16 PROCEDURE — 36415 COLL VENOUS BLD VENIPUNCTURE: CPT | Mod: PO

## 2019-10-16 PROCEDURE — 82607 VITAMIN B-12: CPT

## 2019-10-16 PROCEDURE — 83970 ASSAY OF PARATHORMONE: CPT

## 2019-10-16 PROCEDURE — 85025 COMPLETE CBC W/AUTO DIFF WBC: CPT

## 2019-10-16 PROCEDURE — 80069 RENAL FUNCTION PANEL: CPT

## 2019-10-17 ENCOUNTER — OFFICE VISIT (OUTPATIENT)
Dept: SURGERY | Facility: CLINIC | Age: 64
End: 2019-10-17
Payer: MEDICARE

## 2019-10-17 VITALS
WEIGHT: 214.94 LBS | SYSTOLIC BLOOD PRESSURE: 110 MMHG | DIASTOLIC BLOOD PRESSURE: 70 MMHG | BODY MASS INDEX: 30.09 KG/M2 | TEMPERATURE: 97 F | HEART RATE: 66 BPM | HEIGHT: 71 IN

## 2019-10-17 DIAGNOSIS — K62.5 RECTAL BLEED: Primary | ICD-10-CM

## 2019-10-17 PROBLEM — K60.2 ANAL FISSURE: Status: RESOLVED | Noted: 2019-07-23 | Resolved: 2019-10-17

## 2019-10-17 PROCEDURE — 46600 DIAGNOSTIC ANOSCOPY SPX: CPT | Mod: S$PBB,,, | Performed by: SURGERY

## 2019-10-17 PROCEDURE — 46600 PR DIAG2STIC A2SCOPY: ICD-10-PCS | Mod: S$PBB,,, | Performed by: SURGERY

## 2019-10-17 PROCEDURE — 99213 OFFICE O/P EST LOW 20 MIN: CPT | Mod: PBBFAC,PO | Performed by: SURGERY

## 2019-10-17 PROCEDURE — 99999 PR PBB SHADOW E&M-EST. PATIENT-LVL III: ICD-10-PCS | Mod: PBBFAC,,, | Performed by: SURGERY

## 2019-10-17 PROCEDURE — 99999 PR PBB SHADOW E&M-EST. PATIENT-LVL III: CPT | Mod: PBBFAC,,, | Performed by: SURGERY

## 2019-10-17 PROCEDURE — 99203 PR OFFICE/OUTPT VISIT, NEW, LEVL III, 30-44 MIN: ICD-10-PCS | Mod: S$PBB,25,, | Performed by: SURGERY

## 2019-10-17 PROCEDURE — 99203 OFFICE O/P NEW LOW 30 MIN: CPT | Mod: S$PBB,25,, | Performed by: SURGERY

## 2019-10-17 PROCEDURE — 46600 DIAGNOSTIC ANOSCOPY SPX: CPT | Mod: PBBFAC,PO | Performed by: SURGERY

## 2019-10-17 RX ORDER — HYDROCORTISONE 25 MG/G
CREAM TOPICAL
COMMUNITY
Start: 2019-04-18 | End: 2020-01-06

## 2019-10-17 RX ORDER — KETOCONAZOLE 20 MG/G
CREAM TOPICAL
Refills: 0 | COMMUNITY
Start: 2019-08-17 | End: 2020-01-06

## 2019-10-17 RX ORDER — HYDROCODONE BITARTRATE AND ACETAMINOPHEN 7.5; 325 MG/1; MG/1
1 TABLET ORAL
COMMUNITY
Start: 2019-08-07 | End: 2020-01-06

## 2019-10-17 RX ORDER — DULOXETIN HYDROCHLORIDE 60 MG/1
1 CAPSULE, DELAYED RELEASE ORAL DAILY
COMMUNITY
End: 2019-11-11

## 2019-10-17 NOTE — PROGRESS NOTES
Subjective:       Patient ID: Glenn Medel is a 64 y.o. male.    Chief Complaint: Consult (External hemorrhoid)    HPI  65 yo M referred to me in consultation from Dr Hernandez for evaluation of perianal wound. Pt notes that he had excision of swollen thrombosed L lateral hemorrhoid in May 2019.  He had to have a second surgery to remove a fissure in scar site.  These were both done by Dr Keating.  Pt notes that he is feeling much better now. He is no longer having pain or discomofrt. He does note that he continues to have bleeding with BM.  Bleeding only with BM. Pt otherwise without complaint.  Pt suffers with HTN, sleep apnea, DM and CKD III.  Last colonoscopy in 2017    Review of Systems   Constitutional: Negative for activity change, appetite change, diaphoresis, fever and unexpected weight change.   HENT: Negative for congestion and facial swelling.    Respiratory: Negative for cough, chest tightness and wheezing.    Cardiovascular: Negative for chest pain and palpitations.   Gastrointestinal: Positive for anal bleeding and blood in stool. Negative for abdominal distention, abdominal pain, constipation, diarrhea, nausea, rectal pain and vomiting.   Genitourinary: Negative for difficulty urinating, dysuria and hematuria.   Musculoskeletal: Negative for back pain and gait problem.   Skin: Negative for color change and wound.   Neurological: Negative for tremors and seizures.   Hematological: Negative for adenopathy. Does not bruise/bleed easily.   Psychiatric/Behavioral: Negative for agitation and decreased concentration.       Objective:      Physical Exam   Constitutional: He is oriented to person, place, and time. He appears well-developed and well-nourished.   HENT:   Head: Normocephalic and atraumatic.   Eyes: Pupils are equal, round, and reactive to light.   Neck: Normal range of motion. No tracheal deviation present. No thyromegaly present.   Cardiovascular: Normal rate, regular rhythm and normal heart  sounds. Exam reveals no gallop and no friction rub.   No murmur heard.  Pulmonary/Chest: Effort normal and breath sounds normal. He has no wheezes. He exhibits no tenderness.   Abdominal: He exhibits no distension and no mass. There is no tenderness. There is no rebound and no guarding. No hernia.   Genitourinary:   Genitourinary Comments: Ext exam demonstrates slight ext hemorrhoids in the R ant position. ESTHER with scarring noted in the L lateral position    ANoscopy performed demonstrating open granulating wound in the L lateral position.  Int hemorrhoid notes in the R ant position    Silver nitrate applied to the L lateral position wound   Musculoskeletal: Normal range of motion.   Lymphadenopathy:     He has no cervical adenopathy.   Neurological: He is alert and oriented to person, place, and time. Coordination normal.   Skin: Skin is warm. No rash noted. No erythema. No pallor.   Psychiatric: He has a normal mood and affect. His behavior is normal.   Vitals reviewed.      Assessment:     Bleeding wound of rectum.   No diagnosis found.    Plan:       Silver nitrate applied to open wound in L lateral wound.     Pt will f/u with me in 6 weeks

## 2019-10-17 NOTE — LETTER
October 17, 2019      Pratik Hernandez MD  [address] [facility] [address]  [facility] [address]           [facility]  [address] [place] [address]  Phone: [phone]          Patient: Glenn Medel   MR Number: [id number]   YOB: 1955   Date of Visit: 10/17/2019       Dear Dr. Pratik Hernandez:    Thank you for referring Glenn Medel to me for evaluation. Attached you will find relevant portions of my assessment and plan of care.    If you have questions, please do not hesitate to call me. I look forward to following Glenn Medel along with you.    Sincerely,    Lenny Frye MD    Enclosure  CC:  No Recipients    If you would like to receive this communication electronically, please contact [email or web address] or [phone] to request more information on [employer] access.    For providers and/or their staff who would like to refer a patient to [facility], please contact us through our one-stop-shop provider referral line, [facility] , at [phone].    If you feel you have received this communication in error or would no longer like to receive these types of communications, please e-mail [email or web address]

## 2019-11-07 ENCOUNTER — TELEPHONE (OUTPATIENT)
Dept: OPTOMETRY | Facility: CLINIC | Age: 64
End: 2019-11-07

## 2019-11-07 ENCOUNTER — PATIENT MESSAGE (OUTPATIENT)
Dept: OPTOMETRY | Facility: CLINIC | Age: 64
End: 2019-11-07

## 2019-11-07 ENCOUNTER — PATIENT OUTREACH (OUTPATIENT)
Dept: OTHER | Facility: OTHER | Age: 64
End: 2019-11-07

## 2019-11-07 NOTE — PROGRESS NOTES
"Digital Medicine: Health  Follow-Up    Patient reports he has been feeling very tired for the last few weeks.  Reports he will take a nap after he eats breakfast.  States he does not sleep well at night.    The history is provided by the patient.     Follow Up  Follow-up reason(s): reading review      Readings are trending down Reports occasional dizziness upon standing.      INTERVENTION(S)  encouragement/support    PLAN  patient verbalizes understanding and Clinician follow-up    Will route note to clinician and place task for follow up.          Topic    Eye Exam        Last 5 Patient Entered Readings                                      Current 30 Day Average: 108/64     Recent Readings 11/7/2019 11/4/2019 10/30/2019 10/24/2019 10/22/2019    SBP (mmHg) 111 101 98 108 108    DBP (mmHg) 64 58 61 65 67    Pulse 62 65 62 63 63        Last 6 Patient Entered Readings                                          Most Recent A1c: 6.4% on 6/11/2019  (Goal: 7%)     Recent Readings 11/7/2019 11/6/2019 11/5/2019 11/4/2019 11/3/2019    Blood Glucose (mg/dL) 105 104 105 85 98                    Diet Screening   No change to diet.      Physical Activity Screening   When asked if exercising, patient responded: no    Reports he does not have the energy and states he is not "staying on my feet."  Reports dizziness.      SDOH  "

## 2019-11-08 ENCOUNTER — PATIENT OUTREACH (OUTPATIENT)
Dept: OTHER | Facility: OTHER | Age: 64
End: 2019-11-08

## 2019-11-08 DIAGNOSIS — I10 ESSENTIAL HYPERTENSION: ICD-10-CM

## 2019-11-08 NOTE — PROGRESS NOTES
Called to address task from health  - occasional dizziness  Left voicemail requesting call back  Possibly decrease irbesartan to 150 mg daily  Request patient to vary BG checks before meals

## 2019-11-11 RX ORDER — IRBESARTAN 300 MG/1
150 TABLET ORAL DAILY
Qty: 90 TABLET | Refills: 3
Start: 2019-11-11 | End: 2020-01-06

## 2019-11-11 RX ORDER — FLUOXETINE HYDROCHLORIDE 40 MG/1
40 CAPSULE ORAL DAILY
COMMUNITY
End: 2019-11-11 | Stop reason: SDUPTHER

## 2019-11-11 NOTE — PROGRESS NOTES
Digital Medicine: Clinician Follow-Up    Patient reports episodes of when he is working outside that if he changes positions too quickly, he may lose his balance. Reports this occurs about once a week. Denies serious injuries from falls/stumbles.    States he is staying well hydrated, due to constipation and on Jardiance.    The history is provided by the patient.     Follow Up  Follow-up reason(s): reading review, medication change and responding to task          INTERVENTION(S)  reviewed appropriate dose schedule and recommended med change    PLAN  patient verbalizes understanding, patient amenable to changes and continue monitoring    Due to possible hypotensive symptoms, decrease irbesartan to 150 mg daily.    A1c and SMBGs at goal. Requested patient incorporate AC-L and AC-S readings into monitoring schedule. Continue regimen.    Placed on hiatus 12/1-12/6 while he is traveling.          Topic    Eye Exam        Last 5 Patient Entered Readings                                      Current 30 Day Average: 108/65     Recent Readings 11/11/2019 11/7/2019 11/4/2019 10/30/2019 10/24/2019    SBP (mmHg) 110 111 101 98 108    DBP (mmHg) 70 64 58 61 65    Pulse 59 62 65 62 63        Last 6 Patient Entered Readings                                       Most Recent A1c: 6.4% on 6/11/2019  (Goal: 7%)     Recent Readings 11/11/2019 11/10/2019 11/9/2019 11/8/2019 11/7/2019    Blood Glucose (mg/dL) 99 115 118 109 105           Hypertension Medications             furosemide (LASIX) 20 MG tablet TAKE ONE TABLET BY MOUTH EVERY MORNING    irbesartan (AVAPRO) 300 MG tablet TAKE ONE TABLET BY MOUTH EVERY DAY (Replaces Valsartan)    metoprolol succinate (TOPROL-XL) 25 MG 24 hr tablet TAKE ONE TABLET BY MOUTH EVERY DAY        Diabetes Medications             JARDIANCE 10 mg Tab TAKE ONE TABLET BY MOUTH EVERY DAY for blood sugar        Ml Arvizu, RN 4/23/2019  2:47 PM  HOLD MORNING OF SURGERY              JARVIS VINCENT U-100  INSULIN glargine 100 units/mL (3mL) SubQ pen Inject 18 units into the skin every evening    metFORMIN (GLUCOPHAGE-XR) 500 MG 24 hr tablet TAKE 4 TABLETS BY MOUTH EVERY DAY        Ml Arvizu RN 4/23/2019  2:47 PM  HOLD 24 HOURS PRIOR TO SURGERY              TRULICITY 0.75 mg/0.5 mL PnIj Inject 0.5mls (0.75mg) into the skin every 7 days          Patient asked: Patient reports possible symptoms (hand twitching) related to change from cymbalta to fluoxetine and discontinuation of gabapentin. Referred to physician for clarification.    Patient asked:     Patient asked:                   Medication Adherence Screening     He does not wonder if medications are working.  He knows purpose of medications.

## 2019-11-13 ENCOUNTER — OFFICE VISIT (OUTPATIENT)
Dept: OPTOMETRY | Facility: CLINIC | Age: 64
End: 2019-11-13
Payer: MEDICARE

## 2019-11-13 DIAGNOSIS — E11.3293 CONTROLLED TYPE 2 DIABETES MELLITUS WITH BOTH EYES AFFECTED BY MILD NONPROLIFERATIVE RETINOPATHY WITHOUT MACULAR EDEMA, WITH LONG-TERM CURRENT USE OF INSULIN: ICD-10-CM

## 2019-11-13 DIAGNOSIS — H40.053 OCULAR HYPERTENSION, BILATERAL: Primary | ICD-10-CM

## 2019-11-13 DIAGNOSIS — Z96.1 BILATERAL PSEUDOPHAKIA: ICD-10-CM

## 2019-11-13 DIAGNOSIS — Z79.4 CONTROLLED TYPE 2 DIABETES MELLITUS WITH BOTH EYES AFFECTED BY MILD NONPROLIFERATIVE RETINOPATHY WITHOUT MACULAR EDEMA, WITH LONG-TERM CURRENT USE OF INSULIN: ICD-10-CM

## 2019-11-13 PROCEDURE — 99213 OFFICE O/P EST LOW 20 MIN: CPT | Mod: PBBFAC,PO | Performed by: OPTOMETRIST

## 2019-11-13 PROCEDURE — 92014 COMPRE OPH EXAM EST PT 1/>: CPT | Mod: S$PBB,,, | Performed by: OPTOMETRIST

## 2019-11-13 PROCEDURE — 99999 PR PBB SHADOW E&M-EST. PATIENT-LVL III: CPT | Mod: PBBFAC,,, | Performed by: OPTOMETRIST

## 2019-11-13 PROCEDURE — 99999 PR PBB SHADOW E&M-EST. PATIENT-LVL III: ICD-10-PCS | Mod: PBBFAC,,, | Performed by: OPTOMETRIST

## 2019-11-13 PROCEDURE — 92014 PR EYE EXAM, EST PATIENT,COMPREHESV: ICD-10-PCS | Mod: S$PBB,,, | Performed by: OPTOMETRIST

## 2019-11-13 NOTE — PROGRESS NOTES
HPI     mrx-dle-1/23/19 Jose    Pt complains of blurred vision at near. Wanting rx for reading glasses.   Denies any eye pain.     Last edited by Margaret Rojas on 11/13/2019  8:59 AM. (History)        ROS     Positive for: Eyes    Negative for: Constitutional, Gastrointestinal, Neurological, Skin,   Genitourinary, Musculoskeletal, HENT, Endocrine, Cardiovascular,   Respiratory, Psychiatric, Allergic/Imm, Heme/Lymph    Last edited by OCTAVIO Reyes, OD on 11/13/2019  9:05 AM. (History)        Assessment /Plan     For exam results, see Encounter Report.    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 IOP well controlled   /657  Angles open, need updated gonio  + fhx glaucoma m gmother  Last fields and oct in 2017 were normal without progression  Will schedule updated baseline     Continue OU  Simbrinza bid    RTC for updated bauer then f/u w/ Dr Pulido, if done with retina care, RTC me in 6 months for IOP recheck      Bauer and OCT updated 12/13/19  G 105 wnl  G 104 wnl    PSD  2.02 <5% wnl  2.58 <2% wnl  Stable OU    No progression  IOP in 6 months

## 2019-12-09 ENCOUNTER — OFFICE VISIT (OUTPATIENT)
Dept: OPHTHALMOLOGY | Facility: CLINIC | Age: 64
End: 2019-12-09
Payer: MEDICARE

## 2019-12-09 ENCOUNTER — PATIENT MESSAGE (OUTPATIENT)
Dept: OTHER | Facility: OTHER | Age: 64
End: 2019-12-09

## 2019-12-09 ENCOUNTER — LAB VISIT (OUTPATIENT)
Dept: LAB | Facility: HOSPITAL | Age: 64
End: 2019-12-09
Attending: FAMILY MEDICINE
Payer: MEDICARE

## 2019-12-09 VITALS — SYSTOLIC BLOOD PRESSURE: 114 MMHG | HEART RATE: 56 BPM | DIASTOLIC BLOOD PRESSURE: 74 MMHG

## 2019-12-09 DIAGNOSIS — Z79.4 TYPE 2 DIABETES MELLITUS WITH DIABETIC NEPHROPATHY, WITH LONG-TERM CURRENT USE OF INSULIN: ICD-10-CM

## 2019-12-09 DIAGNOSIS — E11.21 TYPE 2 DIABETES MELLITUS WITH DIABETIC NEPHROPATHY, WITH LONG-TERM CURRENT USE OF INSULIN: ICD-10-CM

## 2019-12-09 DIAGNOSIS — E11.3293 CONTROLLED TYPE 2 DIABETES MELLITUS WITH BOTH EYES AFFECTED BY MILD NONPROLIFERATIVE RETINOPATHY WITHOUT MACULAR EDEMA, WITH LONG-TERM CURRENT USE OF INSULIN: ICD-10-CM

## 2019-12-09 DIAGNOSIS — H35.371 EPIRETINAL MEMBRANE, RIGHT: Primary | ICD-10-CM

## 2019-12-09 DIAGNOSIS — Z79.4 CONTROLLED TYPE 2 DIABETES MELLITUS WITH BOTH EYES AFFECTED BY MILD NONPROLIFERATIVE RETINOPATHY WITHOUT MACULAR EDEMA, WITH LONG-TERM CURRENT USE OF INSULIN: ICD-10-CM

## 2019-12-09 LAB
ESTIMATED AVG GLUCOSE: 131 MG/DL (ref 68–131)
HBA1C MFR BLD HPLC: 6.2 % (ref 4–5.6)

## 2019-12-09 PROCEDURE — 92134 CPTRZ OPH DX IMG PST SGM RTA: CPT | Mod: PBBFAC,PO | Performed by: OPHTHALMOLOGY

## 2019-12-09 PROCEDURE — 99999 PR PBB SHADOW E&M-EST. PATIENT-LVL III: CPT | Mod: PBBFAC,,, | Performed by: OPHTHALMOLOGY

## 2019-12-09 PROCEDURE — 92014 PR EYE EXAM, EST PATIENT,COMPREHESV: ICD-10-PCS | Mod: S$PBB,,, | Performed by: OPHTHALMOLOGY

## 2019-12-09 PROCEDURE — 92226 PR SPECIAL EYE EXAM, SUBSEQUENT: ICD-10-PCS | Mod: S$PBB,RT,, | Performed by: OPHTHALMOLOGY

## 2019-12-09 PROCEDURE — 36415 COLL VENOUS BLD VENIPUNCTURE: CPT | Mod: PO

## 2019-12-09 PROCEDURE — 92134 POSTERIOR SEGMENT OCT RETINA (OCULAR COHERENCE TOMOGRAPHY)-BOTH EYES: ICD-10-PCS | Mod: 26,S$PBB,, | Performed by: OPHTHALMOLOGY

## 2019-12-09 PROCEDURE — 99999 PR PBB SHADOW E&M-EST. PATIENT-LVL III: ICD-10-PCS | Mod: PBBFAC,,, | Performed by: OPHTHALMOLOGY

## 2019-12-09 PROCEDURE — 92226 PR SPECIAL EYE EXAM, SUBSEQUENT: CPT | Mod: S$PBB,RT,, | Performed by: OPHTHALMOLOGY

## 2019-12-09 PROCEDURE — 92226 PR SPECIAL EYE EXAM, SUBSEQUENT: CPT | Mod: 50,PBBFAC,PO | Performed by: OPHTHALMOLOGY

## 2019-12-09 PROCEDURE — 83036 HEMOGLOBIN GLYCOSYLATED A1C: CPT

## 2019-12-09 PROCEDURE — 92014 COMPRE OPH EXAM EST PT 1/>: CPT | Mod: S$PBB,,, | Performed by: OPHTHALMOLOGY

## 2019-12-09 PROCEDURE — 99213 OFFICE O/P EST LOW 20 MIN: CPT | Mod: PBBFAC,PO | Performed by: OPHTHALMOLOGY

## 2019-12-09 NOTE — PROGRESS NOTES
HPI     12 mo f/u   DLS- 09/10/2018 Dr. Pulido       Pt sts glasses work okay but sometimes struggles with computer vision overall vision stable   Denies pain     (-)Flashes (-)Floaters  (-)Photophobia  (-)Glare    Simbrinza BID       Last edited by Xiomara Heaton on 12/9/2019  8:01 AM. (History)          OCT - No ME OU        A/P    1. Dense vitreous opacities OU with [facility]  No Improvement over last several months  ?prior VH with PVD OU - no breaks or tears    s/p 25g PPV/partial AFx OS for vitreous opacities/[facility] OS 11/15/17    Doing well, good IOP  HAD PC violation during anterior vitreous removal -     Had CE with dropped lens material give PC violation 11/30/17    Increased IOP - though improved with K edema    Will need PPV to remove lens material.  Pt would prefer more prompt resolution    s/p 25g PPV OS for retained lens material OS 12/6/17    Doing well, but IOP improved to 18 - ? Steroid response, inflammation and cortical material improving, AC deep    1/8/18 - some increased inflammation after steroid taper, no CME  1/18 - still low grade inflammation -   6/18 - stable    s/p 25g PPV/partial AFx OD for vitreous opacities and [facility] OD 7/1/18      2. PCIOL OD  Sulcus IOL OS    Great result    3. DM  Controlled No DR  BS/BP/chol control  Ok for yearly DFE during optometry visit    4. HTN Ret OU    5. POAG  On simbrinza BID  May continue OU once inflammation down        Doing well, retina PRN

## 2019-12-12 ENCOUNTER — PATIENT OUTREACH (OUTPATIENT)
Dept: OTHER | Facility: OTHER | Age: 64
End: 2019-12-12

## 2019-12-12 NOTE — PROGRESS NOTES
Digital Medicine: Clinician Follow-Up    Patient reports loss of balance improved with irbesartan dose reduction  Still napping, which he contributes to using CPAP less (got out of the habit) and schedule change since retiring  Denies s/s of hypoglycemia    The history is provided by the patient.     Follow Up  Follow-up reason(s): reading review and medication change follow-up      Patient started new medication.    Is patient tolerating med change?:  Yes      INTERVENTION(S)  encouragement/support and denied questions    PLAN  patient verbalizes understanding and continue monitoring    1. Htn: BP remains at goal with irbesartan dose decrease and loss of balance improved. Continue current regimen.    2. DM: A1C and SMBGs remain at goal without hypoglycemia. Continue current regimen.    Renal function 10/2019 WNL. Encouraged him to be consistent with CPAP.    Advised patient to contact me if begins to experience s/s of hypoglycemia or hypotension.      There are no preventive care reminders to display for this patient.    Last 5 Patient Entered Readings                                      Current 30 Day Average: 111/68     Recent Readings 11/30/2019 11/26/2019 11/22/2019 11/18/2019 11/14/2019    SBP (mmHg) 109 114 114 113 104    DBP (mmHg) 65 71 72 70 65    Pulse 63 62 59 66 71        Last 6 Patient Entered Readings                                      Most Recent A1c: 6.2% on 12/9/2019  (Goal: 7%)     Recent Readings 12/12/2019 12/11/2019 12/10/2019 12/9/2019 12/9/2019    Blood Glucose (mg/dL) 97 112 109 122 107           Hypertension Medications             furosemide (LASIX) 20 MG tablet TAKE ONE TABLET BY MOUTH EVERY MORNING    irbesartan (AVAPRO) 300 MG tablet Take 0.5 tablets (150 mg total) by mouth once daily.    metoprolol succinate (TOPROL-XL) 25 MG 24 hr tablet TAKE ONE TABLET BY MOUTH EVERY DAY        Diabetes Medications             JARDIANCE 10 mg Tab TAKE ONE TABLET BY MOUTH EVERY DAY for blood sugar         Ml Arvizu RN 4/23/2019  2:47 PM  HOLD MORNING OF SURGERY              LANTUS SOLOSTAR U-100 INSULIN glargine 100 units/mL (3mL) SubQ pen Inject 18 units into the skin every evening    metFORMIN (GLUCOPHAGE-XR) 500 MG 24 hr tablet TAKE 4 TABLETS BY MOUTH EVERY DAY        Ml Arvizu RN 4/23/2019  2:47 PM  HOLD 24 HOURS PRIOR TO SURGERY              TRULICITY 0.75 mg/0.5 mL PnIj Inject 0.5mls (0.75mg) into the skin every 7 days                           Medication Adherence Screening     He does not wonder if medications are working.  He knows purpose of medications.

## 2019-12-13 ENCOUNTER — CLINICAL SUPPORT (OUTPATIENT)
Dept: OPHTHALMOLOGY | Facility: CLINIC | Age: 64
End: 2019-12-13
Payer: MEDICARE

## 2019-12-13 DIAGNOSIS — H40.053 OCULAR HYPERTENSION, BILATERAL: ICD-10-CM

## 2019-12-13 PROCEDURE — 92133 OCT, OPTIC NERVE - OU - BOTH EYES: ICD-10-PCS | Mod: 26,S$PBB,, | Performed by: OPTOMETRIST

## 2019-12-13 PROCEDURE — 92083 EXTENDED VISUAL FIELD XM: CPT | Mod: PBBFAC,PO

## 2019-12-13 PROCEDURE — 92133 CPTRZD OPH DX IMG PST SGM ON: CPT | Mod: 26,S$PBB,, | Performed by: OPTOMETRIST

## 2019-12-13 PROCEDURE — 92133 CPTRZD OPH DX IMG PST SGM ON: CPT | Mod: PBBFAC,PO

## 2019-12-13 PROCEDURE — 92083 HUMPHREY VISUAL FIELD - OU - BOTH EYES: ICD-10-PCS | Mod: 26,S$PBB,, | Performed by: OPTOMETRIST

## 2019-12-13 PROCEDURE — 92083 EXTENDED VISUAL FIELD XM: CPT | Mod: 26,S$PBB,, | Performed by: OPTOMETRIST

## 2019-12-18 RX ORDER — FUROSEMIDE 20 MG/1
TABLET ORAL
Qty: 90 TABLET | Refills: 1 | Status: SHIPPED | OUTPATIENT
Start: 2019-12-18 | End: 2020-06-18 | Stop reason: SDUPTHER

## 2019-12-18 RX ORDER — ATORVASTATIN CALCIUM 20 MG/1
TABLET, FILM COATED ORAL
Qty: 90 TABLET | Refills: 1 | Status: SHIPPED | OUTPATIENT
Start: 2019-12-18 | End: 2020-09-14 | Stop reason: SDUPTHER

## 2019-12-19 ENCOUNTER — OFFICE VISIT (OUTPATIENT)
Dept: SURGERY | Facility: CLINIC | Age: 64
End: 2019-12-19
Payer: MEDICARE

## 2019-12-19 VITALS
BODY MASS INDEX: 29.72 KG/M2 | HEART RATE: 59 BPM | HEIGHT: 71 IN | TEMPERATURE: 97 F | DIASTOLIC BLOOD PRESSURE: 52 MMHG | SYSTOLIC BLOOD PRESSURE: 97 MMHG | WEIGHT: 212.31 LBS

## 2019-12-19 DIAGNOSIS — S36.69XS: Primary | ICD-10-CM

## 2019-12-19 PROCEDURE — 46600 DIAGNOSTIC ANOSCOPY SPX: CPT | Mod: S$PBB,,, | Performed by: SURGERY

## 2019-12-19 PROCEDURE — 99999 PR PBB SHADOW E&M-EST. PATIENT-LVL III: CPT | Mod: PBBFAC,,, | Performed by: SURGERY

## 2019-12-19 PROCEDURE — 99213 OFFICE O/P EST LOW 20 MIN: CPT | Mod: PBBFAC,PO | Performed by: SURGERY

## 2019-12-19 PROCEDURE — 99999 PR PBB SHADOW E&M-EST. PATIENT-LVL III: ICD-10-PCS | Mod: PBBFAC,,, | Performed by: SURGERY

## 2019-12-19 PROCEDURE — 46600 PR DIAG2STIC A2SCOPY: ICD-10-PCS | Mod: S$PBB,,, | Performed by: SURGERY

## 2019-12-19 PROCEDURE — 46600 DIAGNOSTIC ANOSCOPY SPX: CPT | Mod: PBBFAC,PO | Performed by: SURGERY

## 2019-12-19 PROCEDURE — 99211 PR OFFICE/OUTPT VISIT, EST, LEVL I: ICD-10-PCS | Mod: 25,S$PBB,, | Performed by: SURGERY

## 2019-12-19 PROCEDURE — 99211 OFF/OP EST MAY X REQ PHY/QHP: CPT | Mod: 25,S$PBB,, | Performed by: SURGERY

## 2019-12-19 NOTE — PROGRESS NOTES
Pt returns for f/u.  He notes that he is feeling much better. He has no pain or discomfort.  He notes that his bleeding has improved significantly.  Notes some light bleeding 1/week.  NO other complaints    AFVSS  AAOx3  Soft/nt/nd  Rectal: ext exam demonstrates slight ext hemorrhoids in R ant position.  ESTHER with normal sphincter tone.  Anoscopy demonstrates nearly fully healed scar in the L lateral position   Silver nitrate applied to this area      A/P: wound of rectum    Silver nitrated applied  F/u with me in 8 weeks

## 2019-12-23 RX ORDER — METOPROLOL SUCCINATE 25 MG/1
TABLET, EXTENDED RELEASE ORAL
Qty: 90 TABLET | Refills: 1 | Status: SHIPPED | OUTPATIENT
Start: 2019-12-23 | End: 2020-09-24

## 2020-01-03 DIAGNOSIS — I10 ESSENTIAL HYPERTENSION: ICD-10-CM

## 2020-01-06 ENCOUNTER — OFFICE VISIT (OUTPATIENT)
Dept: OPTOMETRY | Facility: CLINIC | Age: 65
End: 2020-01-06
Payer: MEDICARE

## 2020-01-06 ENCOUNTER — OFFICE VISIT (OUTPATIENT)
Dept: FAMILY MEDICINE | Facility: CLINIC | Age: 65
End: 2020-01-06
Payer: MEDICARE

## 2020-01-06 ENCOUNTER — PATIENT MESSAGE (OUTPATIENT)
Dept: OPHTHALMOLOGY | Facility: CLINIC | Age: 65
End: 2020-01-06

## 2020-01-06 ENCOUNTER — LAB VISIT (OUTPATIENT)
Dept: LAB | Facility: HOSPITAL | Age: 65
End: 2020-01-06
Attending: INTERNAL MEDICINE
Payer: MEDICARE

## 2020-01-06 VITALS
SYSTOLIC BLOOD PRESSURE: 122 MMHG | HEART RATE: 60 BPM | BODY MASS INDEX: 29.72 KG/M2 | HEIGHT: 71 IN | DIASTOLIC BLOOD PRESSURE: 80 MMHG | WEIGHT: 212.31 LBS

## 2020-01-06 DIAGNOSIS — S05.02XA CORNEAL ABRASION, LEFT, INITIAL ENCOUNTER: Primary | ICD-10-CM

## 2020-01-06 DIAGNOSIS — I10 ESSENTIAL HYPERTENSION: ICD-10-CM

## 2020-01-06 DIAGNOSIS — R29.818 PARKINSONIAN FEATURES: ICD-10-CM

## 2020-01-06 DIAGNOSIS — M54.50 CHRONIC LOW BACK PAIN, UNSPECIFIED BACK PAIN LATERALITY, UNSPECIFIED WHETHER SCIATICA PRESENT: ICD-10-CM

## 2020-01-06 DIAGNOSIS — E11.39 CONTROLLED TYPE 2 DIABETES MELLITUS WITH OTHER OPHTHALMIC COMPLICATION, WITH LONG-TERM CURRENT USE OF INSULIN: Primary | ICD-10-CM

## 2020-01-06 DIAGNOSIS — Z79.4 CONTROLLED TYPE 2 DIABETES MELLITUS WITH OTHER OPHTHALMIC COMPLICATION, WITH LONG-TERM CURRENT USE OF INSULIN: ICD-10-CM

## 2020-01-06 DIAGNOSIS — G89.29 CHRONIC LOW BACK PAIN, UNSPECIFIED BACK PAIN LATERALITY, UNSPECIFIED WHETHER SCIATICA PRESENT: ICD-10-CM

## 2020-01-06 DIAGNOSIS — H11.422 CHEMOSIS OF CONJUNCTIVA, LEFT: ICD-10-CM

## 2020-01-06 DIAGNOSIS — Z79.4 CONTROLLED TYPE 2 DIABETES MELLITUS WITH OTHER OPHTHALMIC COMPLICATION, WITH LONG-TERM CURRENT USE OF INSULIN: Primary | ICD-10-CM

## 2020-01-06 DIAGNOSIS — E11.39 CONTROLLED TYPE 2 DIABETES MELLITUS WITH OTHER OPHTHALMIC COMPLICATION, WITH LONG-TERM CURRENT USE OF INSULIN: ICD-10-CM

## 2020-01-06 DIAGNOSIS — T81.30XA EXTRUDING SUTURE, INITIAL ENCOUNTER: ICD-10-CM

## 2020-01-06 LAB
ALBUMIN SERPL BCP-MCNC: 4.1 G/DL (ref 3.5–5.2)
ALP SERPL-CCNC: 120 U/L (ref 55–135)
ALT SERPL W/O P-5'-P-CCNC: 15 U/L (ref 10–44)
ANION GAP SERPL CALC-SCNC: 9 MMOL/L (ref 8–16)
AST SERPL-CCNC: 29 U/L (ref 10–40)
BASOPHILS # BLD AUTO: 0.09 K/UL (ref 0–0.2)
BASOPHILS NFR BLD: 1.1 % (ref 0–1.9)
BILIRUB SERPL-MCNC: 1 MG/DL (ref 0.1–1)
BUN SERPL-MCNC: 14 MG/DL (ref 8–23)
CALCIUM SERPL-MCNC: 9.7 MG/DL (ref 8.7–10.5)
CHLORIDE SERPL-SCNC: 103 MMOL/L (ref 95–110)
CO2 SERPL-SCNC: 29 MMOL/L (ref 23–29)
CREAT SERPL-MCNC: 1.1 MG/DL (ref 0.5–1.4)
DIFFERENTIAL METHOD: ABNORMAL
EOSINOPHIL # BLD AUTO: 0.2 K/UL (ref 0–0.5)
EOSINOPHIL NFR BLD: 2.7 % (ref 0–8)
ERYTHROCYTE [DISTWIDTH] IN BLOOD BY AUTOMATED COUNT: 13.9 % (ref 11.5–14.5)
EST. GFR  (AFRICAN AMERICAN): >60 ML/MIN/1.73 M^2
EST. GFR  (NON AFRICAN AMERICAN): >60 ML/MIN/1.73 M^2
GLUCOSE SERPL-MCNC: 86 MG/DL (ref 70–110)
HCT VFR BLD AUTO: 53.1 % (ref 40–54)
HGB BLD-MCNC: 16.6 G/DL (ref 14–18)
IMM GRANULOCYTES # BLD AUTO: 0.02 K/UL (ref 0–0.04)
IMM GRANULOCYTES NFR BLD AUTO: 0.2 % (ref 0–0.5)
LYMPHOCYTES # BLD AUTO: 2.6 K/UL (ref 1–4.8)
LYMPHOCYTES NFR BLD: 31 % (ref 18–48)
MCH RBC QN AUTO: 27.7 PG (ref 27–31)
MCHC RBC AUTO-ENTMCNC: 31.3 G/DL (ref 32–36)
MCV RBC AUTO: 89 FL (ref 82–98)
MONOCYTES # BLD AUTO: 0.8 K/UL (ref 0.3–1)
MONOCYTES NFR BLD: 9.7 % (ref 4–15)
NEUTROPHILS # BLD AUTO: 4.6 K/UL (ref 1.8–7.7)
NEUTROPHILS NFR BLD: 55.3 % (ref 38–73)
NRBC BLD-RTO: 0 /100 WBC
PLATELET # BLD AUTO: 277 K/UL (ref 150–350)
PMV BLD AUTO: 9.8 FL (ref 9.2–12.9)
POTASSIUM SERPL-SCNC: 4.3 MMOL/L (ref 3.5–5.1)
PROT SERPL-MCNC: 7.8 G/DL (ref 6–8.4)
RBC # BLD AUTO: 5.99 M/UL (ref 4.6–6.2)
SODIUM SERPL-SCNC: 141 MMOL/L (ref 136–145)
TSH SERPL DL<=0.005 MIU/L-ACNC: 0.98 UIU/ML (ref 0.4–4)
WBC # BLD AUTO: 8.27 K/UL (ref 3.9–12.7)

## 2020-01-06 PROCEDURE — G0009 ADMIN PNEUMOCOCCAL VACCINE: HCPCS | Mod: PBBFAC,PO

## 2020-01-06 PROCEDURE — 99214 OFFICE O/P EST MOD 30 MIN: CPT | Mod: S$PBB,,, | Performed by: INTERNAL MEDICINE

## 2020-01-06 PROCEDURE — 99999 PR PBB SHADOW E&M-EST. PATIENT-LVL IV: CPT | Mod: PBBFAC,,, | Performed by: INTERNAL MEDICINE

## 2020-01-06 PROCEDURE — 80053 COMPREHEN METABOLIC PANEL: CPT

## 2020-01-06 PROCEDURE — 85025 COMPLETE CBC W/AUTO DIFF WBC: CPT

## 2020-01-06 PROCEDURE — 99999 PR PBB SHADOW E&M-EST. PATIENT-LVL IV: ICD-10-PCS | Mod: PBBFAC,,, | Performed by: OPTOMETRIST

## 2020-01-06 PROCEDURE — 36415 COLL VENOUS BLD VENIPUNCTURE: CPT | Mod: PO

## 2020-01-06 PROCEDURE — 99999 PR PBB SHADOW E&M-EST. PATIENT-LVL IV: CPT | Mod: PBBFAC,,, | Performed by: OPTOMETRIST

## 2020-01-06 PROCEDURE — 84443 ASSAY THYROID STIM HORMONE: CPT

## 2020-01-06 PROCEDURE — 92012 PR EYE EXAM, EST PATIENT,INTERMED: ICD-10-PCS | Mod: S$PBB,,, | Performed by: OPTOMETRIST

## 2020-01-06 PROCEDURE — 92012 INTRM OPH EXAM EST PATIENT: CPT | Mod: S$PBB,,, | Performed by: OPTOMETRIST

## 2020-01-06 PROCEDURE — 99214 OFFICE O/P EST MOD 30 MIN: CPT | Mod: PBBFAC,PO,25 | Performed by: INTERNAL MEDICINE

## 2020-01-06 PROCEDURE — 99214 PR OFFICE/OUTPT VISIT, EST, LEVL IV, 30-39 MIN: ICD-10-PCS | Mod: S$PBB,,, | Performed by: INTERNAL MEDICINE

## 2020-01-06 PROCEDURE — 99999 PR PBB SHADOW E&M-EST. PATIENT-LVL IV: ICD-10-PCS | Mod: PBBFAC,,, | Performed by: INTERNAL MEDICINE

## 2020-01-06 PROCEDURE — 99214 OFFICE O/P EST MOD 30 MIN: CPT | Mod: PBBFAC,27,PO,25 | Performed by: OPTOMETRIST

## 2020-01-06 RX ORDER — POLYMYXIN B SULFATE AND TRIMETHOPRIM 1; 10000 MG/ML; [USP'U]/ML
1 SOLUTION OPHTHALMIC 4 TIMES DAILY
Qty: 10 ML | Refills: 0 | Status: SHIPPED | OUTPATIENT
Start: 2020-01-06 | End: 2020-01-11

## 2020-01-06 RX ORDER — IBUPROFEN AND FAMOTIDINE 26.6; 8 MG/1; MG/1
1 TABLET ORAL DAILY PRN
Qty: 30 TABLET | Refills: 0 | Status: SHIPPED | OUTPATIENT
Start: 2020-01-06 | End: 2020-02-27

## 2020-01-06 RX ORDER — IRBESARTAN 300 MG/1
TABLET ORAL
Qty: 90 TABLET | Refills: 1 | Status: SHIPPED | OUTPATIENT
Start: 2020-01-06 | End: 2020-01-15

## 2020-01-06 RX ORDER — METFORMIN HYDROCHLORIDE 500 MG/1
TABLET, EXTENDED RELEASE ORAL
Qty: 360 TABLET | Refills: 1 | Status: SHIPPED | OUTPATIENT
Start: 2020-01-06 | End: 2020-07-09 | Stop reason: SDUPTHER

## 2020-01-06 RX ORDER — FLUOXETINE HYDROCHLORIDE 40 MG/1
40 CAPSULE ORAL DAILY
Refills: 3 | COMMUNITY
Start: 2019-12-02 | End: 2020-03-06

## 2020-01-06 RX ORDER — TOBRAMYCIN 3 MG/ML
1 SOLUTION/ DROPS OPHTHALMIC 4 TIMES DAILY
Qty: 5 ML | Refills: 0 | Status: SHIPPED | OUTPATIENT
Start: 2020-01-06 | End: 2020-01-11

## 2020-01-06 RX ORDER — SODIUM FLUORIDE/POTASSIUM NIT 1.1 %-5 %
PASTE (ML) DENTAL
Refills: 0 | COMMUNITY
Start: 2019-11-27 | End: 2020-02-27

## 2020-01-06 NOTE — PROGRESS NOTES
HPI     Urgent     Pt is here for possible k abrasion per pcp. Pt states, while he was   getting decorations for the house out of the attic on Wednesday something   his head. Since then he has been having a progressively scratchy,   irritated, and red left eye. Pain is about a 5. Pt has tried flushing his   OS out with visine and currently uses Simbrinza    Agree   5 day h/o worsening fbs OS      Last edited by OCTAVIO Reyes, OD on 1/6/2020 11:08 AM. (History)        ROS     Positive for: Eyes    Negative for: Constitutional, Gastrointestinal, Neurological, Skin,   Genitourinary, Musculoskeletal, HENT, Endocrine, Cardiovascular,   Respiratory, Psychiatric, Allergic/Imm, Heme/Lymph    Last edited by OCTAVIO Reyes, OD on 1/6/2020 11:08 AM. (History)        Assessment /Plan     For exam results, see Encounter Report.    Corneal abrasion, left, initial encounter  -     tobramycin sulfate 0.3% (TOBREX) 0.3 % ophthalmic solution; Place 1 drop into the left eye 4 (four) times daily. for 5 days  Dispense: 5 mL; Refill: 0  -     polymyxin B sulf-trimethoprim (POLYTRIM) 10,000 unit- 1 mg/mL Drop; Place 1 drop into the left eye 4 (four) times daily. for 5 days  Dispense: 10 mL; Refill: 0    Extruding suture, initial encounter      1,2. Longstanding suture now w/ acute extrusion OS  Firmly in deep in K stroma and was not removed  End of suture snipped w/ sterile scissor  In office tobrex nayely into OS  Will check for home abx gtts from CE  Send tobrex to pharmacy to use qid, if not available abx at home  (polytrim written if tobrex too expensive)    Call/ message with current home abx  Call/ message if worsening pain / redness/ vision in next 48 hours    RTC as previous notes

## 2020-01-06 NOTE — PROGRESS NOTES
Patient ID: Glenn Medel is a 64 y.o. male.    Chief Complaint: Follow-up and Eye Problem (left eye )    HPI  From [place]. . Has 1 son and 1 daughter. Retired construction management.    Here today to [facility]. Has been acting out dreams, having nightmares, and tremors (1 year) L>R.  . Has had some issues balance and initiating walking (1 year) . Denies ETOH use. States memory has not been as sharp. On physical exam. Rapid hand slowed and abn on L >R. Finger to nose ok. Gait ok. CN III-XII intact. Slight resting tremor on left > right.   Has left eye chemosis.    Past medical history includes:  1.  Controlled type 2 diabetes mellitus: dulaglutide, empagliflozin, metformin max dose, insulin glargine 16 units, atorvastatin. Is on digital diabetes.   --> Well controlled. Taking meds. FBG avg 110s. Following ADA diet. Discussed titrating down on glargine and potentially increasing trulicity and stopping glargine in the future. Goal BG .   2.  Hypertension:  Irbesartan, metoprolol succinate, Lasix.  --> Well controlled. Continue currrent meds.   3.  Obstructive sleep apnea: occasional CPAP  --> needs f/u with Dr. Hinton.  4.  Glaucoma and nuclear sclerosis: simbrinza.   --> stable  5.  Anxiety and depression: fluoxetine and clonazepam. Has not seen psychiatrist in some time.   --> stable except for nightmares and acting out dreams.   6.  Chronic insomnia:  Clonazepam  --> stable   7.  Hemorrhoids:  --> still problematic.    -needs pneumococcal vaccination  -CBC, CMP, TSH today.   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/2018    Obesity     LOVE on CPAP      Surg Hx:   Past Surgical History:   Procedure Laterality Date    CATARACT EXTRACTION W/  INTRAOCULAR LENS IMPLANT Left 11/30/2017    CATARACT EXTRACTION W/  INTRAOCULAR LENS IMPLANT Right 12/11/2018    Dr Garcia    CATARACT EXTRACTION W/  INTRAOCULAR LENS IMPLANT Right 12/11/2018    Procedure: EXTRACTION, CATARACT, WITH IOL INSERTION;  Surgeon: Damaris Garcia MD;  Location: [facility] OR;  Service: Ophthalmology;  Laterality: Right;    COLONOSCOPY N/A 9/18/2017    Procedure: COLONOSCOPY;  Surgeon: Paul Feliz Jr., MD;  Location: [facility] ENDO;  Service: Endoscopy;  Laterality: N/A;    COLONOSCOPY W/ POLYPECTOMY  04/13/2010    YARELI.   One 1 cm polyp in the sigmoid colon.  Internal hemorrhoids.    COSMETIC SURGERY      DIGITAL RECTAL EXAMINATION UNDER ANESTHESIA N/A 7/23/2019    Procedure: EXAM UNDER ANESTHESIA, DIGITAL, RECTUM;  Surgeon: Kaz Keating MD;  Location: [facility] OR;  Service: General;  Laterality: N/A;    EXAMINATION UNDER ANESTHESIA N/A 4/24/2019    Procedure: EXAM UNDER ANESTHESIA;  Surgeon: Kaz Keating MD;  Location: [facility] OR;  Service: General;  Laterality: N/A;    EXCISIONAL HEMORRHOIDECTOMY N/A 4/24/2019    Procedure: HEMORRHOIDECTOMY (lithotomy);  Surgeon: Kaz Keating MD;  Location: [facility] OR;  Service: General;  Laterality: N/A;    GANGLION CYST EXCISION Left     INJECTION OF ANESTHETIC AGENT AROUND PUDENDAL NERVE N/A 4/24/2019    Procedure: BLOCK, NERVE, PUDENDAL;  Surgeon: Kaz Keating MD;  Location: [facility] OR;  Service: General;  Laterality: N/A;    INJECTION OF ANESTHETIC AGENT AROUND PUDENDAL NERVE N/A 7/23/2019    Procedure: BLOCK, NERVE, PUDENDAL;  Surgeon: Kaz Keating MD;  Location: [facility] OR;  Service: General;  Laterality: N/A;    REPAIR OF RETINAL DETACHMENT WITH VITRECTOMY Right 8/1/2018    Procedure: REPAIR, RETINAL DETACHMENT, WITH VITRECTOMY;  Surgeon: SAJAN  Lee Pulido MD;  Location: [facility] OR [address] [place] [address];  Service: Ophthalmology;  Laterality: Right;  35 min    TONSILLECTOMY      TRIGGER FINGER RELEASE Right     X 2    uro lift      for enlarged prostate     Fhx:   Family History   Problem Relation Age of Onset    Heart disease Brother 62    Heart disease Father     Diabetes Father     Colon cancer Father     Cancer Father         colon    Cataracts Father     Hypertension Mother     Diabetes Mother     Stroke Mother     Cancer Mother         colon    Kidney disease Mother     Glaucoma Maternal Grandmother     No Known Problems Sister     No Known Problems Maternal Aunt     No Known Problems Maternal Uncle     No Known Problems Paternal Aunt     No Known Problems Paternal Uncle     No Known Problems Maternal Grandfather     No Known Problems Paternal Grandmother     No Known Problems Paternal Grandfather     Amblyopia Neg Hx     Blindness Neg Hx     Macular degeneration Neg Hx     Retinal detachment Neg Hx     Strabismus Neg Hx     Thyroid disease Neg Hx      Social Hx:   Social History     Socioeconomic History    Marital status:      Spouse name: Not on file    Number of children: Not on file    Years of education: Not on file    Highest education level: Not on file   Occupational History    Not on file   Social Needs    Financial resource strain: Not hard at all    Food insecurity:     Worry: Never true     Inability: Never true    Transportation needs:     Medical: No     Non-medical: No   Tobacco Use    Smoking status: Never Smoker    Smokeless tobacco: Never Used   Substance and Sexual Activity    Alcohol use: Yes     Frequency: Monthly or less     Drinks per session: 1 or 2     Binge frequency: Never     Comment: occasional, no alcohol 72 hours prior to sx    Drug use: No    Sexual activity: Yes     Partners: Female   Lifestyle    Physical activity:     Days per week: 0 days     Minutes per session: 0 min     "Stress: Very much   Relationships    Social connections:     Talks on phone: Patient refused     Gets together: Patient refused     Attends Tenriism service: Not on file     Active member of club or organization: No     Attends meetings of clubs or organizations: Never     Relationship status:    Other Topics Concern    Not on file   Social History Narrative            2 grown kids.         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        Here today for        Current Outpatient Medications on File Prior to Visit   Medication Sig Dispense Refill    aspirin 81 MG Chew Take 81 mg by mouth once daily.      atorvastatin (LIPITOR) 20 MG tablet TAKE ONE TABLET BY MOUTH EVERY DAY 90 tablet 1    BD ULTRA-FINE MINI PEN NEEDLE 31 gauge x 3/16" Ndle USE THREE TIMES DAILY BEFORE MEALS 100 each 6    cholecalciferol, vitamin D3, 1,000 unit capsule Take 2,000 Units by mouth once daily.      clonazePAM (KLONOPIN) 0.5 MG tablet Klonopin 0.5 mg tablet   Take 1 tablet every night  oral route at bedtime.      furosemide (LASIX) 20 MG tablet TAKE ONE TABLET BY MOUTH EVERY MORNING 90 tablet 1    irbesartan (AVAPRO) 300 MG tablet Take 0.5 tablets (150 mg total) by mouth once daily. 90 tablet 3    JARDIANCE 10 mg Tab TAKE ONE TABLET BY MOUTH EVERY DAY for blood sugar 90 tablet 3    LANTUS SOLOSTAR U-100 INSULIN glargine 100 units/mL (3mL) SubQ pen Inject 18 units into the skin every evening (Patient taking differently: 16 Units every evening. ) 30 mL 3    melatonin 5 mg Subl Take 5 tablets by mouth nightly.   11    metFORMIN (GLUCOPHAGE-XR) 500 MG 24 hr tablet TAKE 4 TABLETS BY MOUTH EVERY  tablet 2    metoprolol succinate (TOPROL-XL) 25 MG 24 hr tablet TAKE ONE TABLET BY MOUTH EVERY DAY 90 tablet 1    PREVIDENT 5000 SENSITIVE 1.1-5 % Pste BRUSH AS USUAL AND SPIT DO NOT SWALLOW DAILY  UTD  0    SIMBRINZA 1-0.2 % DrpS Shake Liquid AND Instill one drop into BOTH eyes twice daily 8 " mL 3    TRULICITY 0.75 mg/0.5 mL PnIj Inject 0.5mls (0.75mg) into the skin every 7 days (Patient taking differently: Inject 0.5mls (0.75mg) into the skin every 7 days (Sunday)) 2 mL 11    FLUoxetine 40 MG capsule Take 40 mg by mouth once daily.  3    [DISCONTINUED] gabapentin (NEURONTIN) 300 MG capsule gabapentin 300 mg capsule   TAKE ONE CAPSULE BY MOUTH TWICE DAILY FOR FOUR DAYS THEN ONE CAPSULE THREE TIMES DAILY thereafter      [DISCONTINUED] HYDROcodone-acetaminophen (NORCO) 7.5-325 mg per tablet Take 1 tablet by mouth as needed.      [DISCONTINUED] hydrocortisone 2.5 % cream Place rectally as needed.      [DISCONTINUED] ketoconazole (NIZORAL) 2 % cream GILMAR EXT AA QD FOR 2 WKS  0    [DISCONTINUED] oxyCODONE-acetaminophen (PERCOCET) 5-325 mg per tablet Take 1 tablet by mouth every 4 (four) hours as needed for Pain. (Patient not taking: Reported on 12/19/2019) 20 tablet 0     Current Facility-Administered Medications on File Prior to Visit   Medication Dose Route Frequency Provider Last Rate Last Dose    diphenhydrAMINE capsule 25 mg  25 mg Oral Q6H PRN Leonila Richardson MD        metronidazole IVPB 500 mg  500 mg Intravenous Once Kaz Keating MD         I personally reviewed past medical, family and social history.  Review of Systems   Constitutional: Negative for activity change and unexpected weight change.   HENT: Negative for hearing loss, rhinorrhea and trouble swallowing.    Eyes: Positive for discharge. Negative for visual disturbance.   Respiratory: Negative for chest tightness and wheezing.    Cardiovascular: Negative for chest pain and palpitations.   Gastrointestinal: Positive for constipation. Negative for blood in stool, diarrhea and vomiting.   Endocrine: Negative for polydipsia and polyuria.   Genitourinary: Positive for urgency. Negative for difficulty urinating and hematuria.   Musculoskeletal: Positive for arthralgias. Negative for joint swelling and neck pain.   Neurological:  Negative for weakness and headaches.   Psychiatric/Behavioral: Negative for confusion and dysphoric mood.       Objective:     Vitals:    01/06/20 0850   BP: 122/80   Pulse: 60        Physical Exam   Constitutional: He is oriented to person, place, and time. He appears well-developed and well-nourished. No distress.   HENT:   Head: Normocephalic and atraumatic.   Eyes: Pupils are equal, round, and reactive to light. EOM are normal. Right eye exhibits no discharge. Left eye exhibits no discharge. No scleral icterus.   Neck: Normal range of motion. Neck supple. No JVD present. No thyromegaly present.   Cardiovascular: Normal rate, regular rhythm and normal heart sounds. Exam reveals no gallop and no friction rub.   No murmur heard.  Pulmonary/Chest: Effort normal and breath sounds normal. No respiratory distress. He has no wheezes.   Abdominal: Soft. Bowel sounds are normal. He exhibits no distension and no mass. There is no tenderness.   Musculoskeletal: Normal range of motion. He exhibits no edema.   Lymphadenopathy:     He has no cervical adenopathy.   Neurological: He is alert and oriented to person, place, and time. No cranial nerve deficit. Coordination normal.   Rapid hand abn on left    Skin: Skin is warm and dry. Capillary refill takes less than 2 seconds. No rash noted. He is not diaphoretic.   Psychiatric: He has a normal mood and affect. His behavior is normal.         Assessment/Plan    was seen today for follow-up and eye problem.    Diagnoses and all orders for this visit:    Controlled type 2 diabetes mellitus with other ophthalmic complication, with long-term current use of insulin  -     CBC auto differential; Future  -     Comprehensive metabolic panel; Future  -     Lipid panel; Future  -     Hemoglobin A1c; Future  -     Pneumococcal Conjugate Vaccine (13 Valent) (IM)    Essential hypertension  -     CBC auto differential; Future  -     Comprehensive metabolic panel; Future    Parkinsonian  features  -     CBC auto differential; Future  -     Comprehensive metabolic panel; Future  -     TSH; Future  -     Ambulatory referral to Neurology    Chronic low back pain, unspecified back pain laterality, unspecified whether sciatica present        Follow up in about 3 months (around 4/6/2020) for check up .

## 2020-01-07 ENCOUNTER — PATIENT MESSAGE (OUTPATIENT)
Dept: OPHTHALMOLOGY | Facility: CLINIC | Age: 65
End: 2020-01-07

## 2020-01-07 ENCOUNTER — PATIENT MESSAGE (OUTPATIENT)
Dept: OPTOMETRY | Facility: CLINIC | Age: 65
End: 2020-01-07

## 2020-01-07 ENCOUNTER — PATIENT MESSAGE (OUTPATIENT)
Dept: FAMILY MEDICINE | Facility: CLINIC | Age: 65
End: 2020-01-07

## 2020-01-08 ENCOUNTER — DOCUMENTATION ONLY (OUTPATIENT)
Dept: FAMILY MEDICINE | Facility: CLINIC | Age: 65
End: 2020-01-08

## 2020-01-08 NOTE — PROGRESS NOTES
PA:  Duexis 60 mg tablets    [facility] key:   [id number]  Case ID:    [id number]  [employer]  ----------------------------    Determination:   DENIED  Valid:   Non-formulary... Must try and fail at least 2 formulary alternatives. (Meloxicam, Naproxen, Diclofenac tablet, Ibuprofen).  Patient has tried and failed ibuprofen and Meloxicam but only noted in PA the ibuprofen. There was no where on the form to note the Meloxicam. Will appeal and try to get approved.

## 2020-01-10 ENCOUNTER — PATIENT OUTREACH (OUTPATIENT)
Dept: OTHER | Facility: OTHER | Age: 65
End: 2020-01-10

## 2020-01-10 NOTE — PROGRESS NOTES
Digital Medicine: Health  Follow-Up    Patient reports he saw Dr. Benton.  States Dr. Benton suggested he reduce losartan and increasing trulicity.  Informed patient he can discuss this with his clinical pharmacist, Chayo.  Reports the only thing he adjusted with insulin to 14 units.  Patient's BP and A1c and SMBG are at goal.    The history is provided by the patient.       INTERVENTION(S)  encouragement/support    PLAN  patient verbalizes understanding and Clinician follow-up    Will route note to clinician and place routine task.      There are no preventive care reminders to display for this patient.    Last 5 Patient Entered Readings                                      Current 30 Day Average: 114/74     Recent Readings 1/6/2020 1/3/2020 1/3/2020 12/28/2019 12/23/2019    SBP (mmHg) 124 114 124 106 109    DBP (mmHg) 80 68 75 69 73    Pulse 63 56 56 65 58        Last 6 Patient Entered Readings                                          Most Recent A1c: 6.2% on 12/9/2019  (Goal: 7%)     Recent Readings 1/10/2020 1/9/2020 1/8/2020 1/7/2020 1/6/2020    Blood Glucose (mg/dL) 99 102 92 102 101                Screenings    SDOH

## 2020-01-12 ENCOUNTER — PATIENT MESSAGE (OUTPATIENT)
Dept: ADMINISTRATIVE | Facility: OTHER | Age: 65
End: 2020-01-12

## 2020-01-13 ENCOUNTER — PATIENT OUTREACH (OUTPATIENT)
Dept: OTHER | Facility: OTHER | Age: 65
End: 2020-01-13

## 2020-01-13 DIAGNOSIS — I10 ESSENTIAL HYPERTENSION: ICD-10-CM

## 2020-01-13 NOTE — PROGRESS NOTES
Called to address task from health , possible medication changes (decrease Glargine and increase Trulicity, decrease irbesartan - clarify dose as previously advised to decrease to 150 mg daily)  Confirm PCP and update digital medicine order and care team if needed  Left voicemail requesting call back  BP average 114/73 mmHG  A1c 6.2%

## 2020-01-15 RX ORDER — IRBESARTAN 300 MG/1
150 TABLET ORAL DAILY
Qty: 90 TABLET | Refills: 1
Start: 2020-01-15 | End: 2020-03-11 | Stop reason: SDUPTHER

## 2020-01-15 NOTE — PROGRESS NOTES
Digital Medicine: Clinician Follow-Up    Patient reports he reduced Lantus 14 units to 12 units once a day starting 1/12  Will use last Trulicity 0.75 mg dose 1/19/20  BG testing schedule BID (FBG and 2hpp-l or ac-s)  Denies s/s of hypoglycemia  Confirms taking previously reduced dose of irbesartan 150 mg daily  New PCP is Dr. Benton    The history is provided by the patient.     Follow Up  Follow-up reason(s): reading review and medication change      Medication Change: dose increase and dose decrease        INTERVENTION(S)  reviewed appropriate dose schedule, recommended med change, encouragement/support and denied questions    PLAN  patient verbalizes understanding, patient amenable to changes and continue monitoring    1. Htn: BP at goal. Continue current regimen. Patient will advise me when he would like irbesartan 150 mg prescription; currently cutting 300 mg tablets in half.    2. DM: As recommended by Dr. Benton, proceed with Lantus decrease and Trulicity increase. Patient will complete supply of Trulicity 0.75 mg 1/19. Beginning 1/26, increase Trulicity to 1.5 mg weekly and decrease Lantus to 10 units daily. Patient advised to decrease Lantus by 2 units increments if FBG < 100 mg/dL x 3 consecutive days. Continue metformin and jardiance.    CMP 1/6/20 WNL.  Updated digital medicine responsible provider to Dr. Benton.      There are no preventive care reminders to display for this patient.    Last 5 Patient Entered Readings                                    Current 30 Day Average: 115/74     Recent Readings 1/15/2020 1/11/2020 1/6/2020 1/3/2020 1/3/2020    SBP (mmHg) 122 118 124 114 124    DBP (mmHg) 75 70 80 68 75    Pulse 61 59 63 56 56        Last 6 Patient Entered Readings                                      Most Recent A1c: 6.2% on 12/9/2019  (Goal: 7%)     Recent Readings 1/15/2020 1/13/2020 1/12/2020 1/11/2020 1/10/2020    Blood Glucose (mg/dL) 102 95 91 106 99           Hypertension Medications              furosemide (LASIX) 20 MG tablet TAKE ONE TABLET BY MOUTH EVERY MORNING    irbesartan (AVAPRO) 300 MG tablet TAKE ONE-half TABLET BY MOUTH EVERY DAY (Replaces Valsartan)    metoprolol succinate (TOPROL-XL) 25 MG 24 hr tablet TAKE ONE TABLET BY MOUTH EVERY DAY        Diabetes Medications             JARDIANCE 10 mg Tab TAKE ONE TABLET BY MOUTH EVERY DAY for blood sugar        Ml Arvizu RN 4/23/2019  2:47 PM  HOLD MORNING OF SURGERY              LANTUS SOLOSTAR U-100 INSULIN glargine 100 units/mL (3mL) SubQ pen Inject 18 units into the skin every evening    metFORMIN (GLUCOPHAGE-XR) 500 MG 24 hr tablet TAKE 4 TABLETS BY MOUTH EVERY DAY    TRULICITY 0.75 mg/0.5 mL PnIj Inject 0.5mls (0.75mg) into the skin every 7 days                           Medication Adherence Screening     He does not wonder if medications are working.  He knows purpose of medications.

## 2020-01-20 ENCOUNTER — OFFICE VISIT (OUTPATIENT)
Dept: NEUROLOGY | Facility: CLINIC | Age: 65
End: 2020-01-20
Payer: MEDICARE

## 2020-01-20 VITALS
DIASTOLIC BLOOD PRESSURE: 68 MMHG | SYSTOLIC BLOOD PRESSURE: 108 MMHG | HEART RATE: 61 BPM | BODY MASS INDEX: 29.3 KG/M2 | RESPIRATION RATE: 18 BRPM | WEIGHT: 209.31 LBS | HEIGHT: 71 IN

## 2020-01-20 DIAGNOSIS — G47.33 OSA (OBSTRUCTIVE SLEEP APNEA): ICD-10-CM

## 2020-01-20 DIAGNOSIS — I10 ESSENTIAL HYPERTENSION: ICD-10-CM

## 2020-01-20 DIAGNOSIS — F32.A DEPRESSION, UNSPECIFIED DEPRESSION TYPE: ICD-10-CM

## 2020-01-20 DIAGNOSIS — F41.9 ANXIETY: ICD-10-CM

## 2020-01-20 DIAGNOSIS — E78.2 MIXED HYPERLIPIDEMIA: ICD-10-CM

## 2020-01-20 DIAGNOSIS — R29.818 PARKINSONIAN FEATURES: Primary | ICD-10-CM

## 2020-01-20 PROCEDURE — 99999 PR PBB SHADOW E&M-EST. PATIENT-LVL IV: ICD-10-PCS | Mod: PBBFAC,,, | Performed by: NURSE PRACTITIONER

## 2020-01-20 PROCEDURE — 99205 PR OFFICE/OUTPT VISIT, NEW, LEVL V, 60-74 MIN: ICD-10-PCS | Mod: S$PBB,,, | Performed by: NURSE PRACTITIONER

## 2020-01-20 PROCEDURE — 99205 OFFICE O/P NEW HI 60 MIN: CPT | Mod: S$PBB,,, | Performed by: NURSE PRACTITIONER

## 2020-01-20 PROCEDURE — 99999 PR PBB SHADOW E&M-EST. PATIENT-LVL IV: CPT | Mod: PBBFAC,,, | Performed by: NURSE PRACTITIONER

## 2020-01-20 PROCEDURE — 99214 OFFICE O/P EST MOD 30 MIN: CPT | Mod: PBBFAC,PN | Performed by: NURSE PRACTITIONER

## 2020-01-20 NOTE — PROGRESS NOTES
Name: Glenn Medel  MRN: [id number]   CSN: [id number]      Date: 1-      Referring physician:  Power Benton, DO  [address] [place] [address]    Subjective:      Chief Complaint: tremors and balance    History of Present Illness (HPI):    Glenn Medel is a 64 y.o. right-handed male with DM2, HTN, LOVE, anxiety and depression who presents today for an initial evaluation of tremos and balance and is accompanied by wife.     Tremor- started in RH a couple of years ago but seemed to have gotten worse. Will watch TV and ev hands shake. Started noting in LH in the last year. Also notes shaking in legs for several years. He says the legs in fact started before the hands. Nothing makes it better. Not noted any aggravating factors. Drinks very little alcohol. Has not noted benefit with alcohol. Handwriting is pretty good. No changes with this.     Balance- sometimes will be working on knees and then fall back when he goes to get up. Sometimes with walking, he will wobble. No falls while walking. Has fallen backwards when he is trying to get up from being on knees. Has noted balance issues the past couple of months. Has not noted progression.      Acting out dreams. Not nightly but is significant when does happen. He actually ended up in hospital in the last year because of fighting in sleep. Has LOVE, used to wear CPAP and wife wonders if this helped when he was wearing this. He says he will wear it a couple of days, aggravates him and then he will not use for month or longer. He had sleep doctor, Dr. Hinton, but dismissed him as she did everything she could do for him.     Denies stiffness in muscles.   Sometimes feels slow with walking. Wife says he is slower to answer questions.   Memory is getting worse. Been bad couple of years but worse the past year. Wife agrees but attributes some of this to senior living- lazier life.     Retired 2 years ago. 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   He left on his terms. Was not asked to leave.     Had B cataract surgery.     Review of Systems   Constitutional: Positive for appetite change (don't eat as much) and unexpected weight change (14 lb weight loss over 2 years w/o trying).   HENT: Negative for drooling, trouble swallowing and voice change.    Eyes: Negative for visual disturbance.   Respiratory: Negative for cough, choking and shortness of breath.    Cardiovascular: Negative for chest pain and leg swelling.   Gastrointestinal: Positive for constipation (ongoing for awhile). Negative for diarrhea.   Genitourinary: Positive for frequency (since [place] 4 years ago ).   Musculoskeletal: Positive for gait problem.   Neurological: Positive for tremors. Negative for dizziness, syncope and light-headedness.   Psychiatric/Behavioral: Positive for dysphoric mood (longstanding) and sleep disturbance (active dreams). The patient is nervous/anxious (longstanding).        Past Medical History: The patient  has a past medical history of BPH (benign prostatic hyperplasia), Cataract, 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, with long-term current use of insulin (12/20/2018), DDD (degenerative disc disease), lumbar, Diabetes mellitus, stable, Glaucoma, HTN (hypertension), Iritis, lens-induced (1/8/2018), Obesity, and LOVE on CPAP.    Social History: The patient  reports that he has never smoked. He has never used smokeless tobacco. He reports that he drinks alcohol. He reports that he does not use drugs.    Family History: Their family history includes Abnormal EKG in his brother; Cancer in his father and mother; Cataracts in his father; Colon cancer in his father; Dementia in his brother; Diabetes in his father and mother; Glaucoma in his maternal grandmother; Heart disease in his father; Heart disease (age of onset: 62) in his brother; Hypertension in his  "mother; Kidney disease in his brother and mother; No Known Problems in his maternal aunt, maternal grandfather, maternal uncle, paternal aunt, paternal grandfather, paternal grandmother, paternal uncle, and sister; Stroke in his mother.    Allergies: Avelox [moxifloxacin]     Meds:   Current Outpatient Medications on File Prior to Visit   Medication Sig Dispense Refill    aspirin 81 MG Chew Take 81 mg by mouth once daily.      atorvastatin (LIPITOR) 20 MG tablet TAKE ONE TABLET BY MOUTH EVERY DAY 90 tablet 1    BD ULTRA-FINE MINI PEN NEEDLE 31 gauge x 3/16" Ndle USE THREE TIMES DAILY BEFORE MEALS 100 each 6    cholecalciferol, vitamin D3, 1,000 unit capsule Take 2,000 Units by mouth once daily.      clonazePAM (KLONOPIN) 0.5 MG tablet Klonopin 0.5 mg tablet   Take 1 tablet every night  oral route at bedtime.      dulaglutide (TRULICITY) 1.5 mg/0.5 mL PnIj Inject 1.5 mg into the skin once a week. (Increased dose beginning 1/26/20) 2 mL 5    FLUoxetine 40 MG capsule Take 40 mg by mouth once daily.  3    furosemide (LASIX) 20 MG tablet TAKE ONE TABLET BY MOUTH EVERY MORNING 90 tablet 1    ibuprofen-famotidine (DUEXIS) 800-26.6 mg Tab Take 1 tablet by mouth daily as needed. 30 tablet 0    irbesartan (AVAPRO) 300 MG tablet Take 0.5 tablets (150 mg total) by mouth once daily. 90 tablet 1    JARDIANCE 10 mg Tab TAKE ONE TABLET BY MOUTH EVERY DAY for blood sugar 90 tablet 3    LANTUS SOLOSTAR U-100 INSULIN glargine 100 units/mL (3mL) SubQ pen Inject 18 units into the skin every evening (Patient taking differently: 16 Units every evening. ) 30 mL 3    melatonin 5 mg Subl Take 5 tablets by mouth nightly.   11    metFORMIN (GLUCOPHAGE-XR) 500 MG 24 hr tablet TAKE 4 TABLETS BY MOUTH EVERY  tablet 1    metoprolol succinate (TOPROL-XL) 25 MG 24 hr tablet TAKE ONE TABLET BY MOUTH EVERY DAY 90 tablet 1    PREVIDENT 5000 SENSITIVE 1.1-5 % Pste BRUSH AS USUAL AND SPIT DO NOT SWALLOW DAILY  UTD  0    SIMBRINZA " "1-0.2 % DrpS Shake Liquid AND Instill one drop into BOTH eyes twice daily 8 mL 3     Current Facility-Administered Medications on File Prior to Visit   Medication Dose Route Frequency Provider Last Rate Last Dose    diphenhydrAMINE capsule 25 mg  25 mg Oral Q6H PRN Leonila Richardson MD        metronidazole IVPB 500 mg  500 mg Intravenous Once Kaz Keating MD           Objective:     Physical Exam:    Vitals:    01/20/20 0957   BP: 108/68   Pulse: 61   Resp: 18   Weight: 95 kg (209 lb 5.2 oz)   Height: 5' 11" (1.803 m)     Body mass index is 29.2 kg/m².    Constitutional  Well-developed, well-nourished, appears stated age   Cardiovascular  Radial pulses 2+ and symmetric, no LE edema bilaterally     ..  Neurological    * Mental status  MOCA = deferred   - Orientation Oriented to: person, place, day of week, month of year, year and date     - Memory     provides good history     - Attention/concentration  Normal     - Language  spontaneous, cluent     - Fund of knowledge  Aware of current events     - Executive  Well-organized thoughts     ..  * Cranial nerves       - CN II  PERRL, visual fields full to confrontation     - CN III, IV, VI  Extraocular movements full, normal pursuits and saccades     - CN V  Sensation V1 - V3 intact     - CN VII  Face strong and symmetric bilaterally     - CN VIII  Hearing intact bilaterally     - CN IX, X  Palate raises midline and symmetric     - CN XI  SCM and trapezius 5/5 bilaterally     - CN XII  Tongue midline   * Motor  Muscle bulk normal, strength 5/5 throughout, except 4/5 L tricep   * Sensory   Intact to temperature and vibration throughout   * Coordination  No dysmetria with finger-to-nose    * Gait  See below.   '  ..III.  MOTOR EXAMINATION        Speech  1 - Slight loss of expression, dictation, and/or volumn.   Facial Expression  0 - Normal.   Tremor at Rest:      Face, lips, chin 0 - Absent.    Hands:      right 1 - Slight and infrequently present.    left 2 - Mild " in amplitude, intermittent    Feet:      right 0 - Absent.    left 0 - Absent.    Action or Postural Tremor of Hands      right 1 - Slight; present with action.   left 1 - Slight; present with action.   Rigidity      Neck 0 - Absent.   Upper Extremity: Right 1 - Slight but only detectable on diversion.   Upper Extremity: Left 1 - Slight but only detectable on diversion.   Lower Extremity: Right 0 - Absent.   Lower Extremity: Left 0 - Absent.   Finger Taps      right 1 - Mild slowing and/or reduction in amplitude.   left 0 - Normal.   Hand Movements      right 1 - Mild slowing and/or reduction in amplitude.   left 0 - Normal.   Rapid Alternating Movements of Hands      right 1 - Mild slowing and/or reduction in amplitude.   left 0 - Normal.   Leg Agility      right 0 - Normal.   left 1 - Mild slowing and/or reduction in amplitude.   Arising from Chair  0 - Normal.   Posture  0 - Normal erect.   Gait  0 - Normal Except very slt reduced L arm swing.   Postural Stability (Response to sudden, strong posterior displacement produced by pull on shoulders while patient erect with eyes open and feet slightly apart. Patient is prepared, and can have had some practice runs.)  1 - Retropulsion, but recovers unaided. Interestingly has a very hyper response to this   Body Bradykinesia and Hypokinesia (Combining slowness, hesitancy, decreased armswing, small amplitude, and poverty of movement in general)  1 - Minimal slowness, giving movement a deliberate character; could be normal for some persons. Possibly reduced amplitude.             Laboratory Results:  Lab Visit on 01/06/2020   Component Date Value Ref Range Status    WBC 01/06/2020 8.27  3.90 - 12.70 K/uL Final    RBC 01/06/2020 5.99  4.60 - 6.20 M/uL Final    Hemoglobin 01/06/2020 16.6  14.0 - 18.0 g/dL Final    Hematocrit 01/06/2020 53.1  40.0 - 54.0 % Final    Mean Corpuscular Volume 01/06/2020 89  82 - 98 fL Final    Mean Corpuscular Hemoglobin 01/06/2020 27.7   27.0 - 31.0 pg Final    Mean Corpuscular Hemoglobin Conc 01/06/2020 31.3* 32.0 - 36.0 g/dL Final    RDW 01/06/2020 13.9  11.5 - 14.5 % Final    Platelets 01/06/2020 277  150 - 350 K/uL Final    MPV 01/06/2020 9.8  9.2 - 12.9 fL Final    Immature Granulocytes 01/06/2020 0.2  0.0 - 0.5 % Final    Gran # (ANC) 01/06/2020 4.6  1.8 - 7.7 K/uL Final    Immature Grans (Abs) 01/06/2020 0.02  0.00 - 0.04 K/uL Final    Lymph # 01/06/2020 2.6  1.0 - 4.8 K/uL Final    Mono # 01/06/2020 0.8  0.3 - 1.0 K/uL Final    Eos # 01/06/2020 0.2  0.0 - 0.5 K/uL Final    Baso # 01/06/2020 0.09  0.00 - 0.20 K/uL Final    nRBC 01/06/2020 0  0 /100 WBC Final    Gran% 01/06/2020 55.3  38.0 - 73.0 % Final    Lymph% 01/06/2020 31.0  18.0 - 48.0 % Final    Mono% 01/06/2020 9.7  4.0 - 15.0 % Final    Eosinophil% 01/06/2020 2.7  0.0 - 8.0 % Final    Basophil% 01/06/2020 1.1  0.0 - 1.9 % Final    Differential Method 01/06/2020 Automated   Final    Sodium 01/06/2020 141  136 - 145 mmol/L Final    Potassium 01/06/2020 4.3  3.5 - 5.1 mmol/L Final    Chloride 01/06/2020 103  95 - 110 mmol/L Final    CO2 01/06/2020 29  23 - 29 mmol/L Final    Glucose 01/06/2020 86  70 - 110 mg/dL Final    BUN, Bld 01/06/2020 14  8 - 23 mg/dL Final    Creatinine 01/06/2020 1.1  0.5 - 1.4 mg/dL Final    Calcium 01/06/2020 9.7  8.7 - 10.5 mg/dL Final    Total Protein 01/06/2020 7.8  6.0 - 8.4 g/dL Final    Albumin 01/06/2020 4.1  3.5 - 5.2 g/dL Final    Total Bilirubin 01/06/2020 1.0  0.1 - 1.0 mg/dL Final    Alkaline Phosphatase 01/06/2020 120  55 - 135 U/L Final    AST 01/06/2020 29  10 - 40 U/L Final    ALT 01/06/2020 15  10 - 44 U/L Final    Anion Gap 01/06/2020 9  8 - 16 mmol/L Final    eGFR if African American 01/06/2020 >60.0  >60 mL/min/1.73 m^2 Final    eGFR if non African American 01/06/2020 >60.0  >60 mL/min/1.73 m^2 Final    TSH 01/06/2020 0.976  0.400 - 4.000 uIU/mL Final   Lab Visit on 12/09/2019   Component Date Value Ref  Range Status    Microalbum.,U,Random 12/09/2019 <2.5  ug/mL Final    Creatinine, Random Ur 12/09/2019 23.0  23.0 - 375.0 mg/dL Final    Microalb Creat Ratio 12/09/2019 Unable to calculate  0.0 - 30.0 ug/mg Final   Lab Visit on 12/09/2019   Component Date Value Ref Range Status    Hemoglobin A1C 12/09/2019 6.2* 4.0 - 5.6 % Final    Estimated Avg Glucose 12/09/2019 131  68 - 131 mg/dL Final       Imaging:   Independent review of images: NA    Assessment and Plan     Parkinsonian features  -     MRI Brain Without Contrast; Future; Expected date: 01/20/2020    LOVE (obstructive sleep apnea)  Comments:  does not consistently use CPAP    Anxiety    Depression, unspecified depression type    Essential hypertension    Mixed hyperlipidemia        Medical Decision Making:    Mr. Medel has parkinsonian features, which are generally subtle. He does have rest tremor BH (L>R) as well as action tremor. He has slight bradykinesia with rapid alternating movements in right hand.He has subtle rigidity on diversion only and only in the upper extremities. He has subtle reduction of left arm swing. Seems to have an almost hyper response to postural pull test. While he does recover unaided, his response to the pull is robust.    I have discussed cardinal motor features of pdism as well as his specific exam findings.   Questions answered.  Recommend MRI brain. Agreeable and will proceed.     Discussed option of l-dopa trial. He will consider.      To consider Neuropsychological evaluation.     Does have active dreams and is non-compliant with CPAP.     Follow up with Dr. Mcgraw in [place] in 3-4 months, new to her.     I spent 65 minutes face-to-face with the patient with >50% of the time spent with counseling and education regarding:  - results of data, diagnosis, and recommendations stated above  - the cardinal motor features of pdism   - discussed Parkinson's disease, diagnosis, treatment and prognosis   -discussed various tremors,  diagnosis and treatment   - risks and benefits of carbidopa/levodopa   - importance of diet and exercise    Krupa Martinez DNP, NP-C  Division of Movement and Memory Disorders  [facility]  [phone]

## 2020-01-20 NOTE — LETTER
January 20, 2020      Power Benton, DO  [address] [facility] [address] [place] [address]           [facility]  [address] [place] [address]  Phone: [phone]  Fax: [phone]          Patient: Glenn Medel   MR Number: [id number]   YOB: 1955   Date of Visit: 1/20/2020       Dear Dr. Power Benton:    Thank you for referring Glenn Medel to me for evaluation. Attached you will find relevant portions of my assessment and plan of care.    If you have questions, please do not hesitate to call me. I look forward to following Glenn Medel along with you.    Sincerely,    Krupa Martinez NP    Enclosure  CC:  No Recipients    If you would like to receive this communication electronically, please contact [email or web address] or [phone] to request more information on [employer] access.    For providers and/or their staff who would like to refer a patient to [facility], please contact us through our one-stop-shop provider referral line, [facility], at [phone].    If you feel you have received this communication in error or would no longer like to receive these types of communications, please e-mail [email or web address]

## 2020-01-20 NOTE — PATIENT INSTRUCTIONS
"I have ordered MRI brain.     If you would like, we could consider a trial of carbidopa/levodopa to see if this helps tremor. This would be a "test" to see how you respond.     I will have you return to see Dr. Mcgraw in 3-4 months.   "

## 2020-01-29 ENCOUNTER — HOSPITAL ENCOUNTER (OUTPATIENT)
Dept: RADIOLOGY | Facility: HOSPITAL | Age: 65
Discharge: HOME | End: 2020-01-29
Attending: NURSE PRACTITIONER
Payer: MEDICARE

## 2020-01-29 DIAGNOSIS — R29.818 PARKINSONIAN FEATURES: ICD-10-CM

## 2020-01-29 PROCEDURE — 70551 MRI BRAIN WITHOUT CONTRAST: ICD-10-PCS | Mod: 26,,, | Performed by: RADIOLOGY

## 2020-01-29 PROCEDURE — 70551 MRI BRAIN STEM W/O DYE: CPT | Mod: 26,,, | Performed by: RADIOLOGY

## 2020-01-29 PROCEDURE — 70551 MRI BRAIN STEM W/O DYE: CPT | Mod: TC,PO

## 2020-02-06 ENCOUNTER — PATIENT OUTREACH (OUTPATIENT)
Dept: OTHER | Facility: OTHER | Age: 65
End: 2020-02-06

## 2020-02-06 DIAGNOSIS — Z79.4 TYPE 2 DIABETES MELLITUS WITH DIABETIC NEPHROPATHY, WITH LONG-TERM CURRENT USE OF INSULIN: ICD-10-CM

## 2020-02-06 DIAGNOSIS — E11.21 TYPE 2 DIABETES MELLITUS WITH DIABETIC NEPHROPATHY, WITH LONG-TERM CURRENT USE OF INSULIN: ICD-10-CM

## 2020-02-06 RX ORDER — INSULIN GLARGINE 100 [IU]/ML
8 INJECTION, SOLUTION SUBCUTANEOUS DAILY
Qty: 30 ML | Refills: 3
Start: 2020-02-06 | End: 2020-02-11

## 2020-02-06 NOTE — PROGRESS NOTES
Digital Medicine: Clinician Follow-Up    Reports making advised medication changes 1/26/20. Further decreased lantus to 8 units daily 2/1/20  Denies GI or other side effects with increased Trulicity dose  Denies s/s of hypoglycemia    The history is provided by the patient.     Follow Up  Follow-up reason(s): reading review and medication change follow-up      Patient started new medication.    Date:  1/26/2020  Is patient tolerating med change?:  Yes      INTERVENTION(S)  reviewed appropriate dose schedule, encouragement/support and denied questions    PLAN  patient verbalizes understanding and continue monitoring    1. Htn: BP remains at goal. Continue current regimen.    2. DM: SMBGs remain at goal and patient tolerating medication changes. Reviewed to further decrease lantus by 2 unit increments if FBG < 100 mg/dL x 3 consecutive days. Discussed possible trial of stopping Lantus completely in future depending on SMBGs. BG testing schedule BID (FBG and 2hpp-l or ac-s). Reviewed s/s of hypoglycemia.      There are no preventive care reminders to display for this patient.    Last 5 Patient Entered Readings                                      Current 30 Day Average: 121/75     Recent Readings 2/5/2020 2/3/2020 1/28/2020 1/22/2020 1/15/2020    SBP (mmHg) 122 122 121 123 122    DBP (mmHg) 80 73 77 76 75    Pulse 62 60 61 62 61        Last 6 Patient Entered Readings                                     Most Recent A1c: 6.2% on 12/9/2019  (Goal: 7%)     Recent Readings 2/6/2020 2/5/2020 2/5/2020 2/4/2020 2/4/2020    Blood Glucose (mg/dL) 102 135 101 98 105           Hypertension Medications             furosemide (LASIX) 20 MG tablet TAKE ONE TABLET BY MOUTH EVERY MORNING    irbesartan (AVAPRO) 300 MG tablet Take 0.5 tablets (150 mg total) by mouth once daily.    metoprolol succinate (TOPROL-XL) 25 MG 24 hr tablet TAKE ONE TABLET BY MOUTH EVERY DAY        Diabetes Medications             dulaglutide (TRULICITY) 1.5  mg/0.5 mL PnIj Inject 1.5 mg into the skin once a week. (Increased dose beginning 1/26/20)    JARDIANCE 10 mg Tab TAKE ONE TABLET BY MOUTH EVERY DAY for blood sugar        Ml Arvizu RN 4/23/2019  2:47 PM  HOLD MORNING OF SURGERY              LANTUS SOLOSTAR U-100 INSULIN glargine 100 units/mL (3mL) SubQ pen Inject 18 units into the skin every evening    metFORMIN (GLUCOPHAGE-XR) 500 MG 24 hr tablet TAKE 4 TABLETS BY MOUTH EVERY DAY                           Medication Adherence Screening     He does not wonder if medications are working.  He knows purpose of medications.

## 2020-02-10 ENCOUNTER — PATIENT MESSAGE (OUTPATIENT)
Dept: OTHER | Facility: OTHER | Age: 65
End: 2020-02-10

## 2020-02-10 ENCOUNTER — LAB VISIT (OUTPATIENT)
Dept: LAB | Facility: HOSPITAL | Age: 65
End: 2020-02-10
Attending: UROLOGY
Payer: MEDICARE

## 2020-02-10 ENCOUNTER — PATIENT MESSAGE (OUTPATIENT)
Dept: NEPHROLOGY | Facility: CLINIC | Age: 65
End: 2020-02-10

## 2020-02-10 DIAGNOSIS — N18.30 CKD (CHRONIC KIDNEY DISEASE) STAGE 3, GFR 30-59 ML/MIN: Primary | ICD-10-CM

## 2020-02-10 DIAGNOSIS — N40.0 BENIGN ENLARGEMENT OF PROSTATE: Primary | ICD-10-CM

## 2020-02-10 PROCEDURE — 84153 ASSAY OF PSA TOTAL: CPT

## 2020-02-10 PROCEDURE — 36415 COLL VENOUS BLD VENIPUNCTURE: CPT | Mod: PO

## 2020-02-11 ENCOUNTER — PATIENT MESSAGE (OUTPATIENT)
Dept: NEPHROLOGY | Facility: CLINIC | Age: 65
End: 2020-02-11

## 2020-02-11 LAB — COMPLEXED PSA SERPL-MCNC: 2.4 NG/ML (ref 0–4)

## 2020-02-18 ENCOUNTER — TELEPHONE (OUTPATIENT)
Dept: NEUROLOGY | Facility: CLINIC | Age: 65
End: 2020-02-18

## 2020-02-18 DIAGNOSIS — G93.0 ARACHNOID CYST: Primary | ICD-10-CM

## 2020-02-18 DIAGNOSIS — R26.89 BALANCE DISORDER: ICD-10-CM

## 2020-02-18 DIAGNOSIS — R25.1 TREMOR: ICD-10-CM

## 2020-02-18 NOTE — TELEPHONE ENCOUNTER
Spoke to Mr. Singhace about MRI brain results. Not sure if this is causing tremor and balance issues but would like to have ZACHERY consult. He is agreeable. Will refer.

## 2020-02-26 ENCOUNTER — TELEPHONE (OUTPATIENT)
Dept: NEPHROLOGY | Facility: CLINIC | Age: 65
End: 2020-02-26

## 2020-02-26 ENCOUNTER — LAB VISIT (OUTPATIENT)
Dept: LAB | Facility: HOSPITAL | Age: 65
End: 2020-02-26
Attending: INTERNAL MEDICINE
Payer: MEDICARE

## 2020-02-26 DIAGNOSIS — N18.30 CKD (CHRONIC KIDNEY DISEASE) STAGE 3, GFR 30-59 ML/MIN: ICD-10-CM

## 2020-02-26 LAB
BACTERIA #/AREA URNS HPF: NORMAL /HPF
BILIRUB UR QL STRIP: NEGATIVE
CLARITY UR: CLEAR
COLOR UR: YELLOW
CREAT UR-MCNC: 99 MG/DL (ref 23–375)
GLUCOSE UR QL STRIP: ABNORMAL
HGB UR QL STRIP: NEGATIVE
KETONES UR QL STRIP: NEGATIVE
LEUKOCYTE ESTERASE UR QL STRIP: NEGATIVE
MICROSCOPIC COMMENT: NORMAL
NITRITE UR QL STRIP: NEGATIVE
PH UR STRIP: 6 [PH] (ref 5–8)
PROT UR QL STRIP: NEGATIVE
PROT UR-MCNC: 7 MG/DL (ref 0–15)
PROT/CREAT UR: 0.07 MG/G{CREAT} (ref 0–0.2)
RBC #/AREA URNS HPF: 1 /HPF (ref 0–4)
SP GR UR STRIP: 1.02 (ref 1–1.03)
SQUAMOUS #/AREA URNS HPF: NORMAL /HPF
URN SPEC COLLECT METH UR: ABNORMAL
WBC #/AREA URNS HPF: 0 /HPF (ref 0–5)
YEAST URNS QL MICRO: NORMAL

## 2020-02-26 PROCEDURE — 84156 ASSAY OF PROTEIN URINE: CPT

## 2020-02-26 PROCEDURE — 81000 URINALYSIS NONAUTO W/SCOPE: CPT | Mod: PO

## 2020-02-27 ENCOUNTER — OFFICE VISIT (OUTPATIENT)
Dept: NEPHROLOGY | Facility: CLINIC | Age: 65
End: 2020-02-27
Payer: MEDICARE

## 2020-02-27 VITALS
DIASTOLIC BLOOD PRESSURE: 76 MMHG | WEIGHT: 202.81 LBS | HEIGHT: 71 IN | OXYGEN SATURATION: 97 % | SYSTOLIC BLOOD PRESSURE: 116 MMHG | HEART RATE: 68 BPM | BODY MASS INDEX: 28.39 KG/M2

## 2020-02-27 DIAGNOSIS — G47.33 OSA (OBSTRUCTIVE SLEEP APNEA): ICD-10-CM

## 2020-02-27 DIAGNOSIS — N20.0 KIDNEY STONES: ICD-10-CM

## 2020-02-27 DIAGNOSIS — D50.8 OTHER IRON DEFICIENCY ANEMIA: ICD-10-CM

## 2020-02-27 DIAGNOSIS — Z87.442 HISTORY OF RENAL STONE: ICD-10-CM

## 2020-02-27 DIAGNOSIS — N18.30 CHRONIC KIDNEY DISEASE, STAGE III (MODERATE): Primary | ICD-10-CM

## 2020-02-27 DIAGNOSIS — Z79.4 CONTROLLED TYPE 2 DIABETES MELLITUS WITH BOTH EYES AFFECTED BY MILD NONPROLIFERATIVE RETINOPATHY WITHOUT MACULAR EDEMA, WITH LONG-TERM CURRENT USE OF INSULIN: ICD-10-CM

## 2020-02-27 DIAGNOSIS — E11.21 TYPE 2 DIABETES MELLITUS WITH DIABETIC NEPHROPATHY, WITH LONG-TERM CURRENT USE OF INSULIN: ICD-10-CM

## 2020-02-27 DIAGNOSIS — E78.5 HYPERLIPIDEMIA, UNSPECIFIED HYPERLIPIDEMIA TYPE: ICD-10-CM

## 2020-02-27 DIAGNOSIS — G47.33 SEVERE OBSTRUCTIVE SLEEP APNEA: ICD-10-CM

## 2020-02-27 DIAGNOSIS — E11.3293 CONTROLLED TYPE 2 DIABETES MELLITUS WITH BOTH EYES AFFECTED BY MILD NONPROLIFERATIVE RETINOPATHY WITHOUT MACULAR EDEMA, WITH LONG-TERM CURRENT USE OF INSULIN: ICD-10-CM

## 2020-02-27 DIAGNOSIS — I10 ESSENTIAL HYPERTENSION: ICD-10-CM

## 2020-02-27 DIAGNOSIS — Z79.4 TYPE 2 DIABETES MELLITUS WITH DIABETIC NEPHROPATHY, WITH LONG-TERM CURRENT USE OF INSULIN: ICD-10-CM

## 2020-02-27 DIAGNOSIS — G63 POLYNEUROPATHY ASSOCIATED WITH UNDERLYING DISEASE: ICD-10-CM

## 2020-02-27 DIAGNOSIS — E66.9 OBESITY (BMI 30.0-34.9): ICD-10-CM

## 2020-02-27 DIAGNOSIS — E78.2 MIXED HYPERLIPIDEMIA: ICD-10-CM

## 2020-02-27 DIAGNOSIS — J84.9 ILD (INTERSTITIAL LUNG DISEASE): ICD-10-CM

## 2020-02-27 PROCEDURE — 99214 PR OFFICE/OUTPT VISIT, EST, LEVL IV, 30-39 MIN: ICD-10-PCS | Mod: S$PBB,,, | Performed by: INTERNAL MEDICINE

## 2020-02-27 PROCEDURE — 99213 OFFICE O/P EST LOW 20 MIN: CPT | Mod: PBBFAC,PO | Performed by: INTERNAL MEDICINE

## 2020-02-27 PROCEDURE — 99999 PR PBB SHADOW E&M-EST. PATIENT-LVL III: CPT | Mod: PBBFAC,,, | Performed by: INTERNAL MEDICINE

## 2020-02-27 PROCEDURE — 99214 OFFICE O/P EST MOD 30 MIN: CPT | Mod: S$PBB,,, | Performed by: INTERNAL MEDICINE

## 2020-02-27 PROCEDURE — 99999 PR PBB SHADOW E&M-EST. PATIENT-LVL III: ICD-10-PCS | Mod: PBBFAC,,, | Performed by: INTERNAL MEDICINE

## 2020-02-27 NOTE — PROGRESS NOTES
Subjective:       Patient ID: Glenn Medel is a 64 y.o. White male who presents for follow-up evaluation of Chronic Kidney Disease    HPI    He reports he is doing well. He has intentionally lost weight. His Hba1c improved to 7.4 from >8. No LE and no SOB. No LUTS.     Interval history April 2019: he reports he is feeling well. No LE edema and no SOB. Weight is down a little. Last Hba1c was 6.6 He is asking about a podiatrist.     Interval history Feb 2020: Doing well. Last Hba1c was 6.2. Seeing Neurology for tremors. Had episode of hemorrhoids and also required circumcision. Needs new sleep doctor. He retired     Review of Systems   Constitutional: Negative for activity change, appetite change, fatigue and unexpected weight change.   HENT: Negative for facial swelling.    Eyes: Negative for visual disturbance.   Respiratory: Negative for cough and shortness of breath.    Cardiovascular: Negative for chest pain and leg swelling.   Gastrointestinal: Negative for constipation and diarrhea.   Endocrine: Negative for polyuria.   Genitourinary: Positive for frequency. Negative for difficulty urinating, dysuria, hematuria and urgency.   Musculoskeletal: Negative for arthralgias.   Skin: Negative for rash.   Neurological: Negative for weakness and headaches.   Hematological: Does not bruise/bleed easily.   Psychiatric/Behavioral: Negative for decreased concentration.       Objective:      Physical Exam   Constitutional: He is oriented to person, place, and time. He appears well-developed and well-nourished. No distress.   Eyes: No scleral icterus.   Neck: No JVD present.   Cardiovascular: S1 normal and S2 normal. Exam reveals no friction rub.   Pulmonary/Chest: Breath sounds normal.   Abdominal: Soft.   Musculoskeletal: He exhibits no edema.   Neurological: He is alert and oriented to person, place, and time.   Skin: Skin is warm and dry. No erythema.   Psychiatric: He has a normal mood and affect.   Nursing note and  vitals reviewed.      Assessment:       1. Chronic kidney disease, stage 3    2. Essential hypertension    3. ILD (interstitial lung disease)    4. Uncontrolled type 2 diabetes mellitus with diabetic polyneuropathy, without long-term current use of insulin    5. 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, with long-term current use of insulin    6. Mixed hyperlipidemia    7. Type 2 diabetes mellitus with diabetic nephropathy, with long-term current use of insulin    8. LOVE (obstructive sleep apnea)    9. Severe obstructive sleep apnea    10. Polyneuropathy associated with underlying disease    11. Obesity (BMI 30.0-34.9)    12. History of renal stone    13. Hyperlipidemia, unspecified hyperlipidemia type    14. Kidney stones    15. Other iron deficiency anemia        Plan:             CKD 2/3 is stable with only 70mg of proteinuria. Continue RAAS blockade (irbesartan) for renal preservation    HTN is controlled    Mineral and Bone Disease--continue D3    DM--improved and controlled. Continue Endocrine follow up          RTC 6 mo

## 2020-02-28 ENCOUNTER — PATIENT MESSAGE (OUTPATIENT)
Dept: FAMILY MEDICINE | Facility: CLINIC | Age: 65
End: 2020-02-28

## 2020-02-28 ENCOUNTER — PATIENT MESSAGE (OUTPATIENT)
Dept: NEUROLOGY | Facility: CLINIC | Age: 65
End: 2020-02-28

## 2020-03-05 NOTE — PROGRESS NOTES
Blood pressure remains at goal, average 116/76 mmHg  Blood glucose remains at goal without lantus. Advised patient he can decrease BG monitoring to once daily (vary time, use meal-relationship labels)    Hypertension Medications             furosemide (LASIX) 20 MG tablet TAKE ONE TABLET BY MOUTH EVERY MORNING    irbesartan (AVAPRO) 300 MG tablet Take 0.5 tablets (150 mg total) by mouth once daily.    metoprolol succinate (TOPROL-XL) 25 MG 24 hr tablet TAKE ONE TABLET BY MOUTH EVERY DAY        Diabetes Medications             dulaglutide (TRULICITY) 1.5 mg/0.5 mL PnIj Inject 1.5 mg into the skin once a week. (Increased dose beginning 1/26/20)    JARDIANCE 10 mg Tab TAKE ONE TABLET BY MOUTH EVERY DAY for blood sugar        Carolyne Hwang LPN 2/27/2020  2:47 PM                metFORMIN (GLUCOPHAGE-XR) 500 MG 24 hr tablet TAKE 4 TABLETS BY MOUTH EVERY DAY

## 2020-03-06 RX ORDER — FLUOXETINE HYDROCHLORIDE 40 MG/1
CAPSULE ORAL
Qty: 90 CAPSULE | Refills: 1 | Status: SHIPPED | OUTPATIENT
Start: 2020-03-06 | End: 2020-06-01 | Stop reason: SDUPTHER

## 2020-03-06 NOTE — PROGRESS NOTES
Refill Authorization Note     is requesting a refill authorization.    Brief assessment and rationale for refill: ROUTE: npp          Medication Therapy Plan: Non-participating provider, route to you                              Comments:                       Refill Routing Note     Medication(s) are not appropriate for processing by [facility]:    Non Participating Provider in Refill Center     Appointments  past 12m or future 3m with PCP    Date Provider   Last Visit   1/6/2020 Power Benton DO   Next Visit   7/6/2020 Power Benton DO           Automatic Epic Protocol Generated Data:    Requested Prescriptions   Pending Prescriptions Disp Refills    FLUoxetine 40 MG capsule [Pharmacy Med Name: fluoxetine 40 mg capsule] 90 capsule 3     Sig: Take 1 capsule (40 mg total) by mouth once daily.       Psychiatry:  Antidepressants - SSRI Passed - 3/6/2020  4:02 PM        Passed - Patient is at least 18 years old        Passed - Office visit in past 6 months or future 90 days.     Recent Outpatient Visits            1 week ago Chronic kidney disease, stage 3    [place] - Nephrology Juan Mcdermott MD    1 month ago Parkinsonian features    [place] - Neurology Krupa Martinez NP    2 months ago Corneal abrasion, left, initial encounter    [place] - Optometry OCTAVIO Reyes, OD    2 months ago Controlled type 2 diabetes mellitus with other ophthalmic complication, with long-term current use of insulin    [facility] Power Benton DO    2 months ago Open puncture wound of rectum, sequela    [place] - General Surgery Lenny Frye MD          Future Appointments              In 1 week MD [facility] Mendez - Neurosurgery 7th Fl, [facility]    In 3 weeks LAB, [place] [facility]    In 2 months [facility]    In 4 months Power Benton DO [facility]                    Note composed:4:03 PM 03/06/2020

## 2020-03-09 DIAGNOSIS — E11.21 TYPE 2 DIABETES MELLITUS WITH DIABETIC NEPHROPATHY, WITH LONG-TERM CURRENT USE OF INSULIN: ICD-10-CM

## 2020-03-09 DIAGNOSIS — Z79.4 TYPE 2 DIABETES MELLITUS WITH DIABETIC NEPHROPATHY, WITH LONG-TERM CURRENT USE OF INSULIN: ICD-10-CM

## 2020-03-11 ENCOUNTER — TELEPHONE (OUTPATIENT)
Dept: FAMILY MEDICINE | Facility: CLINIC | Age: 65
End: 2020-03-11

## 2020-03-11 DIAGNOSIS — I10 ESSENTIAL HYPERTENSION: ICD-10-CM

## 2020-03-11 RX ORDER — IRBESARTAN 150 MG/1
150 TABLET ORAL DAILY
Qty: 90 TABLET | Refills: 1
Start: 2020-03-11 | End: 2020-08-31

## 2020-03-11 NOTE — TELEPHONE ENCOUNTER
Spoke with pharmacy. They stated they have not received the prescription . Verbal order for script given given to ele.    Patient notified that rx has been sent to pharmacy

## 2020-03-11 NOTE — TELEPHONE ENCOUNTER
Will send in prescription.  I called in the 150 mg daily tablets so he does not have to cut the tablet in half.

## 2020-03-11 NOTE — TELEPHONE ENCOUNTER
----- Message from Alejandra Leung sent at 3/11/2020 12:37 PM CDT -----  Contact: Rosalina from [facility]  Type:  Pharmacy Calling to Clarify an RX    Name of Caller:  rosalina  Pharmacy Name:  [facility]  Prescription Name:   irbesartan (AVAPRO) 300 MG tablet   What do they need to clarify?:  RX was being changed to 150 MG but this has not been called in to the pharmacy yet.  Patient is ready to pick this up.  He thought it would be called in after he was last seen  Best Call Back Number:  [phone]  Additional Information:

## 2020-03-12 ENCOUNTER — PATIENT OUTREACH (OUTPATIENT)
Dept: OTHER | Facility: OTHER | Age: 65
End: 2020-03-12

## 2020-03-12 RX ORDER — EMPAGLIFLOZIN 10 MG/1
TABLET, FILM COATED ORAL
Qty: 90 TABLET | Refills: 3 | OUTPATIENT
Start: 2020-03-12

## 2020-03-12 NOTE — PROGRESS NOTES
Refill Authorization Note     is requesting a refill authorization.    Brief assessment and rationale for refill: QUICK DC: duplicate request                                         Comments:     Requested Prescriptions     Refused Prescriptions Disp Refills    JARDIANCE 10 mg tablet [Pharmacy Med Name: Jardiance 10 mg tablet] 90 tablet 3     Sig: TAKE ONE TABLET BY MOUTH EVERY DAY for blood sugar     Refused By: LAN TALBERT     Reason for Refusal: Request already responded to by other means (e.g. phone or fax)       Last Prescribed Info:   empagliflozin (JARDIANCE) 10 mg tablet 90 tablet 1 3/9/2020     Sig - Route: Take 1 tablet (10 mg total) by mouth once daily. - Oral    Sent to pharmacy as: empagliflozin (JARDIANCE) 10 mg tablet    Notes to Pharmacy: This prescription was filled on 3/6/2019. Any refills authorized will be placed on file.    E-Prescribing Status: Receipt confirmed by pharmacy (3/9/2020  5:49 PM CDT)    Ordering Encounter Report     Associated Reports   View Encounter

## 2020-03-16 ENCOUNTER — PATIENT OUTREACH (OUTPATIENT)
Dept: ADMINISTRATIVE | Facility: OTHER | Age: 65
End: 2020-03-16

## 2020-03-25 NOTE — TELEPHONE ENCOUNTER
----- Message from Leonila Calloway sent at 3/25/2020 10:07 AM CDT -----  Due to concerns associated with Covid19 the Laboratory is seeking to reschedule outpatient labs between 3/21 - 4/21 that have been ordered prior to 3/19. Glenn Medel  is scheduled for labs on 3/31 at the [place] lab. Please reschedule this patients labs if you believe it is safe to postpone.    Please do not respond to this message.

## 2020-04-13 NOTE — PROGRESS NOTES
Digital Medicine: Health  Follow-Up    Patient reports his mother was recently admitted and diagnosed with COVID 19.  Reports she is about to be moved to the [facility].    The history is provided by the patient.       INTERVENTION(S)  encouragement/support and denied questions    PLAN  patient verbalizes understanding and continue monitoring      There are no preventive care reminders to display for this patient.    Last 5 Patient Entered Readings                                      Current 30 Day Average: 108/69     Recent Readings 4/10/2020 4/2/2020 3/25/2020 3/19/2020 3/11/2020    SBP (mmHg) 115 104 115 98 122    DBP (mmHg) 71 71 70 65 79    Pulse 62 63 60 63 58        Last 6 Patient Entered Readings                                          Most Recent A1c: 6.2% on 12/9/2019  (Goal: 7%)     Recent Readings 4/13/2020 4/12/2020 4/11/2020 4/10/2020 4/9/2020    Blood Glucose (mg/dL) 114 110 89 97 106                Screenings    SDOH

## 2020-04-18 ENCOUNTER — PATIENT MESSAGE (OUTPATIENT)
Dept: OTHER | Facility: OTHER | Age: 65
End: 2020-04-18

## 2020-04-20 ENCOUNTER — PATIENT MESSAGE (OUTPATIENT)
Dept: OTHER | Facility: OTHER | Age: 65
End: 2020-04-20

## 2020-04-20 RX ORDER — CLONAZEPAM 0.5 MG/1
TABLET ORAL
Qty: 30 TABLET | Refills: 0 | Status: SHIPPED | OUTPATIENT
Start: 2020-04-20 | End: 2020-05-19 | Stop reason: SDUPTHER

## 2020-04-27 ENCOUNTER — PATIENT MESSAGE (OUTPATIENT)
Dept: FAMILY MEDICINE | Facility: CLINIC | Age: 65
End: 2020-04-27

## 2020-04-27 ENCOUNTER — PATIENT MESSAGE (OUTPATIENT)
Dept: NEUROSURGERY | Facility: CLINIC | Age: 65
End: 2020-04-27

## 2020-04-28 ENCOUNTER — PATIENT OUTREACH (OUTPATIENT)
Dept: ADMINISTRATIVE | Facility: OTHER | Age: 65
End: 2020-04-28

## 2020-04-28 ENCOUNTER — OFFICE VISIT (OUTPATIENT)
Dept: NEUROSURGERY | Facility: CLINIC | Age: 65
End: 2020-04-28
Payer: MEDICARE

## 2020-04-28 DIAGNOSIS — G47.52 RBD (REM BEHAVIORAL DISORDER): ICD-10-CM

## 2020-04-28 DIAGNOSIS — R25.1 TREMOR: ICD-10-CM

## 2020-04-28 DIAGNOSIS — G93.0 ARACHNOID CYST: ICD-10-CM

## 2020-04-28 DIAGNOSIS — R26.89 BALANCE DISORDER: ICD-10-CM

## 2020-04-28 PROCEDURE — 99211 OFF/OP EST MAY X REQ PHY/QHP: CPT

## 2020-04-28 PROCEDURE — 99203 OFFICE O/P NEW LOW 30 MIN: CPT | Mod: 95,,, | Performed by: NEUROLOGICAL SURGERY

## 2020-04-28 PROCEDURE — 99203 PR OFFICE/OUTPT VISIT, NEW, LEVL III, 30-44 MIN: ICD-10-PCS | Mod: 95,,, | Performed by: NEUROLOGICAL SURGERY

## 2020-04-28 PROCEDURE — G0463 HOSPITAL OUTPT CLINIC VISIT: HCPCS

## 2020-04-28 NOTE — LETTER
April 28, 2020      Krupa Martinez, LUKAS  [address]  [facility] [address]           [facility] - Neurosurgery 7th Fl  [address]  [facility] [address]  Phone: [phone]  Fax: [phone]          Patient: Glenn Medel   MR Number: [id number]   YOB: 1955   Date of Visit: 4/28/2020       Dear Krupa Martinez:    Thank you for referring Glenn Medel to me for evaluation. Attached you will find relevant portions of my assessment and plan of care.    If you have questions, please do not hesitate to call me. I look forward to following Glenn Medel along with you.    Sincerely,    Sandra Campbell MD    Enclosure  CC:  No Recipients    If you would like to receive this communication electronically, please contact [email or web address] or [phone] to request more information on [facility] [employer] access.    For providers and/or their staff who would like to refer a patient to [facility], please contact us through our one-stop-shop provider referral line, [facility], at [phone].    If you feel you have received this communication in error or would no longer like to receive these types of communications, please e-mail [email or web address]
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
## 2020-04-30 ENCOUNTER — PATIENT OUTREACH (OUTPATIENT)
Dept: OTHER | Facility: OTHER | Age: 65
End: 2020-04-30

## 2020-04-30 NOTE — PROGRESS NOTES
Sent [facility] message to follow up with  Glenn HERNANDEZ Medel.    Per 30 day average, blood pressure is well controlled 109/69 mmHg.     A1C 6.2%. BG readings at goal. Encouraged patient to vary timing of once daily BG checks and use meal-relationship labels.    Hypertension Medications             furosemide (LASIX) 20 MG tablet TAKE ONE TABLET BY MOUTH EVERY MORNING    irbesartan (AVAPRO) 150 MG tablet Take 1 tablet (150 mg total) by mouth once daily.    metoprolol succinate (TOPROL-XL) 25 MG 24 hr tablet TAKE ONE TABLET BY MOUTH EVERY DAY        Diabetes Medications             dulaglutide (TRULICITY) 1.5 mg/0.5 mL PnIj Inject 1.5 mg into the skin once a week. (Increased dose beginning 1/26/20)    empagliflozin (JARDIANCE) 10 mg tablet Take 1 tablet (10 mg total) by mouth once daily.    metFORMIN (GLUCOPHAGE-XR) 500 MG 24 hr tablet TAKE 4 TABLETS BY MOUTH EVERY DAY

## 2020-05-04 ENCOUNTER — TELEPHONE (OUTPATIENT)
Dept: NEUROSURGERY | Facility: CLINIC | Age: 65
End: 2020-05-04

## 2020-05-04 DIAGNOSIS — G93.0 ARACHNOID CYST: Primary | ICD-10-CM

## 2020-05-05 ENCOUNTER — PATIENT MESSAGE (OUTPATIENT)
Dept: ADMINISTRATIVE | Facility: HOSPITAL | Age: 65
End: 2020-05-05

## 2020-05-13 ENCOUNTER — PATIENT OUTREACH (OUTPATIENT)
Dept: ADMINISTRATIVE | Facility: OTHER | Age: 65
End: 2020-05-13

## 2020-05-14 ENCOUNTER — LAB VISIT (OUTPATIENT)
Dept: LAB | Facility: HOSPITAL | Age: 65
End: 2020-05-14
Attending: PSYCHIATRY & NEUROLOGY
Payer: MEDICARE

## 2020-05-14 ENCOUNTER — OFFICE VISIT (OUTPATIENT)
Dept: NEUROLOGY | Facility: CLINIC | Age: 65
End: 2020-05-14
Payer: MEDICARE

## 2020-05-14 VITALS
HEART RATE: 69 BPM | DIASTOLIC BLOOD PRESSURE: 67 MMHG | WEIGHT: 194.31 LBS | BODY MASS INDEX: 27.2 KG/M2 | SYSTOLIC BLOOD PRESSURE: 93 MMHG | HEIGHT: 71 IN

## 2020-05-14 DIAGNOSIS — R41.9 UNSPECIFIED SYMPTOMS AND SIGNS INVOLVING COGNITIVE FUNCTIONS AND AWARENESS: ICD-10-CM

## 2020-05-14 DIAGNOSIS — R26.9 ABNORMAL GAIT: Primary | ICD-10-CM

## 2020-05-14 DIAGNOSIS — G47.33 SEVERE OBSTRUCTIVE SLEEP APNEA: ICD-10-CM

## 2020-05-14 DIAGNOSIS — R25.9 ABNORMAL INVOLUNTARY MOVEMENTS: ICD-10-CM

## 2020-05-14 DIAGNOSIS — G93.0 ARACHNOID CYST: ICD-10-CM

## 2020-05-14 DIAGNOSIS — R26.9 ABNORMAL GAIT: ICD-10-CM

## 2020-05-14 PROBLEM — H43.13 VITREOUS HEMORRHAGE, BILATERAL: Status: RESOLVED | Noted: 2017-10-19 | Resolved: 2020-05-14

## 2020-05-14 PROBLEM — K62.5 RECTAL BLEEDING: Status: RESOLVED | Noted: 2017-09-18 | Resolved: 2020-05-14

## 2020-05-14 PROBLEM — H43.12 VITREOUS HEMORRHAGE, LEFT: Status: RESOLVED | Noted: 2017-11-15 | Resolved: 2020-05-14

## 2020-05-14 PROBLEM — R29.818 PARKINSONIAN FEATURES: Status: ACTIVE | Noted: 2020-05-14

## 2020-05-14 LAB — VIT B12 SERPL-MCNC: 391 PG/ML (ref 210–950)

## 2020-05-14 PROCEDURE — 84425 ASSAY OF VITAMIN B-1: CPT

## 2020-05-14 PROCEDURE — 84446 ASSAY OF VITAMIN E: CPT

## 2020-05-14 PROCEDURE — 99215 OFFICE O/P EST HI 40 MIN: CPT | Mod: S$PBB,,, | Performed by: PSYCHIATRY & NEUROLOGY

## 2020-05-14 PROCEDURE — 99215 PR OFFICE/OUTPT VISIT, EST, LEVL V, 40-54 MIN: ICD-10-PCS | Mod: S$PBB,,, | Performed by: PSYCHIATRY & NEUROLOGY

## 2020-05-14 PROCEDURE — 82607 VITAMIN B-12: CPT

## 2020-05-14 PROCEDURE — 36415 COLL VENOUS BLD VENIPUNCTURE: CPT | Mod: PO

## 2020-05-14 PROCEDURE — 99999 PR PBB SHADOW E&M-EST. PATIENT-LVL IV: CPT | Mod: PBBFAC,,, | Performed by: PSYCHIATRY & NEUROLOGY

## 2020-05-14 PROCEDURE — 99214 OFFICE O/P EST MOD 30 MIN: CPT | Mod: PBBFAC,PO | Performed by: PSYCHIATRY & NEUROLOGY

## 2020-05-14 PROCEDURE — 99999 PR PBB SHADOW E&M-EST. PATIENT-LVL IV: ICD-10-PCS | Mod: PBBFAC,,, | Performed by: PSYCHIATRY & NEUROLOGY

## 2020-05-14 NOTE — PROGRESS NOTES
Glenn Medel  I. Chief Complaints during this visit:  New Patient visit for  Parkinsonism.   Krupa Martinez, NP  [address] [place] [address]    Primary Care Physician  [facility],   [address] [place] [address]      History of present illness:   64 y.o.  male seen in consultation at the request of  Dr. Martinez for evaluation of parkinsonism.      Falls often, onto knee.  Right hand will suddenly start shaking.  Memory a little worse.  Needs new mask for his LOVE, hasn't used in a while.  Not sure who to see.    Has DM, had an emg about 2 years ago.      1/20/20 Juan:  Glenn Medel is a 64 y.o. right-handed male with DM2, HTN, LOVE, anxiety and depression who presents today for an initial evaluation of tremos and balance and is accompanied by wife.    Tremor- started in RH a couple of years ago but seemed to have gotten worse. Will watch TV and ev hands shake. Started noting in LH in the last year. Also notes shaking in legs for several years. He says the legs in fact started before the hands. Nothing makes it better. Not noted any aggravating factors. Drinks very little alcohol. Has not noted benefit with alcohol. Handwriting is pretty good. No changes with this.    Balance- sometimes will be working on knees and then fall back when he goes to get up. Sometimes with walking, he will wobble. No falls while walking. Has fallen backwards when he is trying to get up from being on knees. Has noted balance issues the past couple of months. Has not noted progression.     Acting out dreams. Not nightly but is significant when does happen. He actually ended up in hospital in the last year because of fighting in sleep. Has LOVE, used to wear CPAP and wife wonders if this helped when he was wearing this. He says he will wear it a couple of days, aggravates him and then he will not use for month or longer. He had sleep doctor, Dr. Hinton, but dismissed him as she did everything she could do for  him.    Denies stiffness in muscles.   Sometimes feels slow with walking. Wife says he is slower to answer questions.   Memory is getting worse. Been bad couple of years but worse the past year. Wife agrees but attributes some of this to penitentiary- lazier life.    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   He left on his terms. Was not asked to leave.       II.  Review of systems:  As in HPI,  otherwise, balance 10 systems reviewed and are negative.    III.  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, with long-term current use of insulin 12/20/2018    DDD (degenerative disc disease), lumbar     s/p epidural steroids     Diabetes mellitus, stable     Glaucoma     OU    HTN (hypertension)     Iritis, lens-induced 1/8/2018    Obesity     LOVE on CPAP      Family History   Problem Relation Age of Onset    Heart disease Brother 62    Kidney disease Brother     Heart disease Father     Diabetes Father     Colon cancer Father     Cancer Father         colon    Cataracts Father     Hypertension Mother     Diabetes Mother     Stroke Mother     Cancer Mother         colon    Kidney disease Mother     Glaucoma Maternal Grandmother     No Known Problems Sister     No Known Problems Maternal Aunt     No Known Problems Maternal Uncle     No Known Problems Paternal Aunt     No Known Problems Paternal Uncle     No Known Problems Maternal Grandfather     No Known Problems Paternal Grandmother     No Known Problems Paternal Grandfather     Dementia Brother     Abnormal EKG Brother     Amblyopia Neg Hx     Blindness Neg Hx     Macular degeneration Neg Hx     Retinal detachment Neg Hx     Strabismus Neg Hx     Thyroid disease Neg Hx     Parkinsonism Neg  Hx      Social history:  , retired.      Current Outpatient Medications on File Prior to Visit   Medication Sig Dispense Refill    aspirin 81 MG Chew Take 81 mg by mouth once daily.      atorvastatin (LIPITOR) 20 MG tablet TAKE ONE TABLET BY MOUTH EVERY DAY 90 tablet 1    cholecalciferol, vitamin D3, (D3-2000 ORAL) Take by mouth.      clonazePAM (KLONOPIN) 0.5 MG tablet Klonopin 0.5 mg tablet  Take 1 tablet every night  oral route at bedtime. 30 tablet 0    dulaglutide (TRULICITY) 1.5 mg/0.5 mL PnIj Inject 1.5 mg into the skin once a week. (Increased dose beginning 1/26/20) 2 mL 5    empagliflozin (JARDIANCE) 10 mg tablet Take 1 tablet (10 mg total) by mouth once daily. 90 tablet 1    FLUoxetine 40 MG capsule Take 1 capsule (40 mg total) by mouth once daily. 90 capsule 1    furosemide (LASIX) 20 MG tablet TAKE ONE TABLET BY MOUTH EVERY MORNING 90 tablet 1    irbesartan (AVAPRO) 150 MG tablet Take 1 tablet (150 mg total) by mouth once daily. 90 tablet 1    melatonin 5 mg Subl Take 5 mg by mouth nightly.   11    metFORMIN (GLUCOPHAGE-XR) 500 MG 24 hr tablet TAKE 4 TABLETS BY MOUTH EVERY  tablet 1    metoprolol succinate (TOPROL-XL) 25 MG 24 hr tablet TAKE ONE TABLET BY MOUTH EVERY DAY 90 tablet 1    SIMBRINZA 1-0.2 % DrpS Shake Liquid AND Instill one drop into BOTH eyes twice daily 8 mL 3     Current Facility-Administered Medications on File Prior to Visit   Medication Dose Route Frequency Provider Last Rate Last Dose    diphenhydrAMINE capsule 25 mg  25 mg Oral Q6H PRN Leonila Richardson MD        metronidazole IVPB 500 mg  500 mg Intravenous Once Kaz Keating MD           PRIOR problem-specific medications tried:  none    Review of patient's allergies indicates:   Allergen Reactions    Avelox [moxifloxacin] Hives and Rash       IV. Physical Exam    Vitals:    05/14/20 1026   BP: 93/67   BP Location: Right arm   Patient Position: Sitting   BP Method: Large (Automatic)   Pulse: 69  "  Weight: 88.2 kg (194 lb 5.4 oz)   Height: 5' 11" (1.803 m)     General appearance: Well nourished, well developed, no acute distress.         Cardiovascular:  pedal pulses 2, no edema or cyanosis, heart regular rate and rhythym, no carotid bruits.         -------------------------------------------------------------  Facial Expression: normal       Affect: full       Orientation to time & place:  Oriented to time, place, person and situation       Attention & concentration:  Normal attention span and concentration       Memory:  Recent and remote memory intact  Language: Spontaneous, fluent; able to repeat and name objects        Fund of knowledge:  Aware of current events        Speech:  normal (not dysarthric)  -------------------------------------------------------  Cranial nerves: normal visual acuity, visual fields full, optic discs not visualized, pupils equal round and reactive, extraocular movements intact,       facial sensation intact, face symmetrical, hearing intact to whisper, palate raises midline, shoulder shrug strength normal, tongue protrudes midline.        -------------------------------------------------------  Musculoskeletal  Muscle tone: all 4 extremities normal        Muscle Bulk: all 4 extremities normal        Muscle strength:  5/5 in all 4 extremities        No pronator drift  Sensation: reduced touch limbs  Deep tendon Reflexes: 1 bilateral biceps, triceps, patella and ankles        --------------------------------------------------------------  Cerebellar and Coordination  Gait:  Mild ataxia, unable to tandem       Finger-nose: no dysmetria       Rapid Alternating Movements (pronation/supination):  R normal; L normal  --------------------------------------------------------------  Abnormality of movement (bradykinesia, hyperkinesia) present? Yes, restless, fidgety, could be normal, but definitely no bradykinesia  Tremor present?  Not appreciated today   Posture:  normal  Postural " stability:  no Rhomberg    V.  Laboratory/ Radiological Data: (Personally reviewed images)         MRI 1/29/20  Impression       1. There is no acute abnormality.  There is mild volume loss without significant white matter disease.  There is no hemorrhage, parenchymal mass or acute infarction.  There is a probable arachnoid cyst in the right posterior fossa.  Please see above discussion.     Memory/Encephalopathy Labs:  Lab Results   Component Value Date    TSH 0.976 01/06/2020    TRRBOJZZ46 400 10/16/2019     LGi and CASPR  No results found for: ENCESLGI1, ENCESCASPR2    Movement Labs:  Lab Results   Component Value Date    CALCIUM 9.7 02/26/2020    PTH 78.0 (H) 02/26/2020    ALBUMIN 3.9 02/26/2020    BILITOT 1.0 01/06/2020    ALT 15 01/06/2020    AST 29 01/06/2020    ALKPHOS 120 01/06/2020    FERRITIN 58 10/03/2018       IRON STUDIES:  Lab Results   Component Value Date    IRON 88 10/03/2018    TRANSFERRIN 269 10/03/2018    TIBC 398 10/03/2018    FESATURATED 22 10/03/2018       Malignancy Labs:  Lab Results   Component Value Date    PSADIAG 2.4 02/10/2020     PARANEOPLASTIC PANEL:  No results found for: NMOINTERPRET, REFLEXTEST, CRMP5, STMAB, LABACHR, ACHRGANGLION, NEUROVG    CSF:  No results found for: CSFWBC, CSFRBC, LYMPHS, MONOMACCSF, PROTEINCSF, GLUCCSF, GADCSF, LUME, MYELINPROT  Flow cytometry: No results found for: CSFC  [place]: No results found for: WESTNILEIGGA    CNS Demyelinating Labs:  Nmo serum, csf igg:  No results found for: NMOFSFACSS, NMOFCFACSC  No results found for: JCVIRUS    CSF oligoclonal bands:  No results found for: CSFB, CSFOLI, SERB, IGGINDEXCSF, IGGCSF, ALBQ, GALB, IGGSYNRATE, IGGS, ALBSER, IGGALBS    Myopathy labs:  No results found for: CPK, ALDOLASE, AMMONIA, ACFCRATIO, ACETYLCARNIT, CARNITINEPLA, LACTATE    Myasthenia panel:  No results found for: LABACHR  No results found for: ACETMOD  No results found for: STMAB   No results found for: ACHRGANGLION    Neuropathy Labs:  Lab  Results   Component Value Date    DJMFVZVM78 400 10/16/2019    HGBA1C 6.2 (H) 12/09/2019     SPEP:  Lab Results   Component Value Date    PROTEINSERUM 6.7 07/27/2015    PATHINTPSPE REVIEWED 07/27/2015       Rheum labs:  Lab Results   Component Value Date    URICACID 7.2 (H) 04/24/2017           VI. Assessment and Plan            Problem List Items Addressed This Visit        1 - High    Abnormal involuntary movements    Overview     Right hand shaking         Current Assessment & Plan     He did not strike me as parkinsonian on exam today (hyperkinetic, if anything), so I'm not yet sure what may be going on.  He is also slightly ataxic in gait (again, not parkinsonian today).   -> labs   -> pt   -> neuropsych         Relevant Orders    Ceruloplasmin    Vitamin E    Abnormal gait - Primary    Relevant Orders    Vitamin B1    Vitamin B12    Ceruloplasmin    Vitamin E    Ambulatory referral/consult to Neuropsychology    Ambulatory referral/consult to Physical/Occupational Therapy    Unspecified symptoms and signs involving cognitive functions and awareness    Relevant Orders    Vitamin B1    Vitamin B12    Ceruloplasmin    Vitamin E    Ambulatory referral/consult to Neuropsychology       2     Severe obstructive sleep apnea       3     Arachnoid cyst    Overview     Followed in [facility]                     Follow up in about 3 months (around 8/14/2020).             ___________________________________________________________    I appreciate the opportunity to participate in the care of this patient and will communicate my assessment and plan back to the referring physician via copy of this note.

## 2020-05-14 NOTE — LETTER
May 14, 2020      Krupa Martinez, LUKAS  [address]  [facility] [address]           [facility] [address] [place] [address]  Phone: [phone]  Fax: [phone]          Patient: Glenn Medel   MR Number: [id number]   YOB: 1955   Date of Visit: 5/14/2020       Dear Krupa Martinez:    Thank you for referring Glenn Medel to me for evaluation. Attached you will find relevant portions of my assessment and plan of care.    If you have questions, please do not hesitate to call me. I look forward to following Glenn Medel along with you.    Sincerely,    Cherelle Mcgraw MD    Enclosure  CC:  No Recipients    If you would like to receive this communication electronically, please contact [email or web address] or [phone] to request more information on [employer] access.    For providers and/or their staff who would like to refer a patient to [facility], please contact us through our one-stop-shop provider referral line, [facility], at [phone].    If you feel you have received this communication in error or would no longer like to receive these types of communications, please e-mail [email or web address]

## 2020-05-14 NOTE — ASSESSMENT & PLAN NOTE
He did not strike me as parkinsonian on exam today (hyperkinetic, if anything), so I'm not yet sure what may be going on.  He is also slightly ataxic in gait (again, not parkinsonian today).   -> labs   -> pt   -> neuropsych

## 2020-05-19 LAB
A-TOCOPHEROL VIT E SERPL-MCNC: 1275 UG/DL (ref 500–1800)
VIT B1 BLD-MCNC: 59 UG/L (ref 38–122)

## 2020-05-20 RX ORDER — CLONAZEPAM 0.5 MG/1
TABLET ORAL
Qty: 30 TABLET | Refills: 0 | Status: SHIPPED | OUTPATIENT
Start: 2020-05-20 | End: 2020-07-15

## 2020-05-21 RX ORDER — CLONAZEPAM 0.5 MG/1
TABLET ORAL
Qty: 30 TABLET | Refills: 0 | Status: SHIPPED | OUTPATIENT
Start: 2020-05-21 | End: 2020-07-15

## 2020-05-25 ENCOUNTER — TELEPHONE (OUTPATIENT)
Dept: NEUROLOGY | Facility: CLINIC | Age: 65
End: 2020-05-25

## 2020-05-25 NOTE — TELEPHONE ENCOUNTER
Made contact to complete previsit questionnaires. Patient asked to called back in the next 5-10 mins. Will make contact.

## 2020-05-28 ENCOUNTER — PATIENT MESSAGE (OUTPATIENT)
Dept: FAMILY MEDICINE | Facility: CLINIC | Age: 65
End: 2020-05-28

## 2020-05-28 ENCOUNTER — OFFICE VISIT (OUTPATIENT)
Dept: NEUROLOGY | Facility: CLINIC | Age: 65
End: 2020-05-28
Payer: MEDICARE

## 2020-05-28 DIAGNOSIS — G47.33 OSA (OBSTRUCTIVE SLEEP APNEA): Primary | ICD-10-CM

## 2020-05-28 DIAGNOSIS — R41.9 UNSPECIFIED SYMPTOMS AND SIGNS INVOLVING COGNITIVE FUNCTIONS AND AWARENESS: ICD-10-CM

## 2020-05-28 DIAGNOSIS — F43.21 ADJUSTMENT DISORDER WITH DEPRESSED MOOD: ICD-10-CM

## 2020-05-28 DIAGNOSIS — R26.9 ABNORMAL GAIT: ICD-10-CM

## 2020-05-28 DIAGNOSIS — G47.33 OSA (OBSTRUCTIVE SLEEP APNEA): ICD-10-CM

## 2020-05-28 DIAGNOSIS — G31.84 MILD COGNITIVE IMPAIRMENT: ICD-10-CM

## 2020-05-28 DIAGNOSIS — G47.52 RBD (REM BEHAVIORAL DISORDER): ICD-10-CM

## 2020-05-28 PROCEDURE — 96116 NUBHVL XM PHYS/QHP 1ST HR: CPT | Mod: 95,,, | Performed by: PSYCHIATRY & NEUROLOGY

## 2020-05-28 PROCEDURE — 99499 NO LOS: ICD-10-PCS | Mod: 95,,, | Performed by: PSYCHIATRY & NEUROLOGY

## 2020-05-28 PROCEDURE — 99499 UNLISTED E&M SERVICE: CPT | Mod: 95,,, | Performed by: PSYCHIATRY & NEUROLOGY

## 2020-05-28 PROCEDURE — 96116 PR NEUROBEHAVIORAL STATUS EXAM BY PSYCH/PHYS: ICD-10-PCS | Mod: 95,,, | Performed by: PSYCHIATRY & NEUROLOGY
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
## 2020-06-01 ENCOUNTER — PATIENT OUTREACH (OUTPATIENT)
Dept: OTHER | Facility: OTHER | Age: 65
End: 2020-06-01

## 2020-06-01 RX ORDER — FLUOXETINE HYDROCHLORIDE 40 MG/1
40 CAPSULE ORAL DAILY
Qty: 90 CAPSULE | Refills: 1 | Status: SHIPPED | OUTPATIENT
Start: 2020-06-01 | End: 2020-12-01 | Stop reason: SDUPTHER

## 2020-06-03 NOTE — TELEPHONE ENCOUNTER
That is the only sleep medicine specialist that I refer to.  Please see if there is another 1 that he would like to see in the area.  I do not know of any off the top of my head.

## 2020-06-07 PROBLEM — G31.84 MILD COGNITIVE IMPAIRMENT: Status: ACTIVE | Noted: 2020-05-14

## 2020-06-07 PROBLEM — F43.21 ADJUSTMENT DISORDER WITH DEPRESSED MOOD: Status: ACTIVE | Noted: 2020-06-07

## 2020-06-07 NOTE — ASSESSMENT & PLAN NOTE
Compensatory Mechanisms: Mr. Medel is encouraged to set a recurring alarm timer on his phone, especially for his once per week medication. He also may consider using a calendar and writing his medication time on a specific day for each week.     Optimize Brain Health: Prioritize physical and social activity. Maintain a heart healthy diet and 100% treatment compliance.     Neuropsychology Follow-up: Will return to clinic for comprehensive testing.

## 2020-06-07 NOTE — ASSESSMENT & PLAN NOTE
>>ASSESSMENT AND PLAN FOR LOVE (OBSTRUCTIVE SLEEP APNEA) WRITTEN ON 6/7/2020  8:01 AM BY SHAI QUICK PSYD    Follow up with sleep medicine regarding untreated LOVE

## 2020-06-07 NOTE — ASSESSMENT & PLAN NOTE
Mental Health Treatment: Recommended that he establish care with psychiatry for medication management as well as engage in consistent therapy/counseling.

## 2020-06-10 ENCOUNTER — LAB VISIT (OUTPATIENT)
Dept: LAB | Facility: HOSPITAL | Age: 65
End: 2020-06-10
Attending: FAMILY MEDICINE
Payer: MEDICARE

## 2020-06-10 DIAGNOSIS — Z79.4 TYPE 2 DIABETES MELLITUS WITH DIABETIC NEPHROPATHY, WITH LONG-TERM CURRENT USE OF INSULIN: ICD-10-CM

## 2020-06-10 DIAGNOSIS — E11.21 TYPE 2 DIABETES MELLITUS WITH DIABETIC NEPHROPATHY, WITH LONG-TERM CURRENT USE OF INSULIN: ICD-10-CM

## 2020-06-10 PROCEDURE — 36415 COLL VENOUS BLD VENIPUNCTURE: CPT | Mod: PO

## 2020-06-10 PROCEDURE — 83036 HEMOGLOBIN GLYCOSYLATED A1C: CPT

## 2020-06-11 ENCOUNTER — PATIENT OUTREACH (OUTPATIENT)
Dept: OTHER | Facility: OTHER | Age: 65
End: 2020-06-11

## 2020-06-11 DIAGNOSIS — E11.39 CONTROLLED TYPE 2 DIABETES MELLITUS WITH OTHER OPHTHALMIC COMPLICATION, WITH LONG-TERM CURRENT USE OF INSULIN: Primary | ICD-10-CM

## 2020-06-11 DIAGNOSIS — Z79.4 CONTROLLED TYPE 2 DIABETES MELLITUS WITH OTHER OPHTHALMIC COMPLICATION, WITH LONG-TERM CURRENT USE OF INSULIN: Primary | ICD-10-CM

## 2020-06-11 LAB
ESTIMATED AVG GLUCOSE: 114 MG/DL (ref 68–131)
HBA1C MFR BLD HPLC: 5.6 % (ref 4–5.6)

## 2020-06-11 NOTE — PROGRESS NOTES
Digital Medicine: Clinician Follow-Up    Patient denies s/s of hypoglycemia or hypotension    The history is provided by the patient.   Follow Up  Follow-up reason(s): reading review and medication change      Medication Change: stop therapy        INTERVENTION(S)  recommended med change, encouragement/support and denied questions    PLAN  patient verbalizes understanding and continue monitoring    BP and BG well below goal  Stop Jardiance, which may impact both BP and BG  Continue other medications in regimen  Vary timing of once daily BG checks and use meal-relationship labels. Will likely decrease frequency of BG monitoring to 2-3 times per week on follow up.          Topic    Lipid (Cholesterol) Test        Last 5 Patient Entered Readings                                      Current 30 Day Average: 102/63     Recent Readings 6/8/2020 6/5/2020 6/3/2020 6/2/2020 5/31/2020    SBP (mmHg) 111 98 102 105 99    DBP (mmHg) 69 59 62 60 61    Pulse 63 68 60 62 73        Last 6 Patient Entered Readings                                      Most Recent A1c: 5.6% on 6/10/2020  (Goal: 7%)     Recent Readings 6/11/2020 6/9/2020 6/8/2020 6/7/2020 6/6/2020    Blood Glucose (mg/dL) 96 118 99 133 113           Hypertension Medications             furosemide (LASIX) 20 MG tablet TAKE ONE TABLET BY MOUTH EVERY MORNING    irbesartan (AVAPRO) 150 MG tablet Take 1 tablet (150 mg total) by mouth once daily.    metoprolol succinate (TOPROL-XL) 25 MG 24 hr tablet TAKE ONE TABLET BY MOUTH EVERY DAY        Diabetes Medications             dulaglutide (TRULICITY) 1.5 mg/0.5 mL PnIj Inject 1.5 mg into the skin once a week. (Increased dose beginning 1/26/20)    empagliflozin (JARDIANCE) 10 mg tablet Take 1 tablet (10 mg total) by mouth once daily.    metFORMIN (GLUCOPHAGE-XR) 500 MG 24 hr tablet TAKE 4 TABLETS BY MOUTH EVERY DAY                           Medication Adherence Screening     Patient knows purpose of medications.

## 2020-06-13 ENCOUNTER — TELEPHONE (OUTPATIENT)
Dept: FAMILY MEDICINE | Facility: CLINIC | Age: 65
End: 2020-06-13

## 2020-06-13 NOTE — TELEPHONE ENCOUNTER
----- Message from Maria Luisa Dhillon sent at 6/12/2020  2:08 PM CDT -----  Contact: [phone]/self  Type:  Discuss Test Results    Who Called:  Self   Name of Test (Lab/Mammo/Etc):  Labs   Date of Test:  06/10  Ordering Provider:    Where the test was performed:  [facility]  Would the patient rather a call back or a response via [facility]?  call  Best Call Back Number:  [phone]/self  Additional Information:

## 2020-06-13 NOTE — TELEPHONE ENCOUNTER
Spoke with patient, states the digital medicine PharmD d/c lantus approx 2 months ago. Currently he is taking metformin 100 once per day and trulicity once a week. Please advise if meds need to be adjusted. He states that since he retired he doesn't eat as much as he use to.

## 2020-06-15 NOTE — TELEPHONE ENCOUNTER
Can continue those 2 for now. May reduce dose of trulicity to 0.75 mg weekly depending on next a1c.

## 2020-06-17 ENCOUNTER — INITIAL CONSULT (OUTPATIENT)
Dept: NEUROLOGY | Facility: CLINIC | Age: 65
End: 2020-06-17
Payer: MEDICARE

## 2020-06-17 DIAGNOSIS — R41.89 COGNITIVE CHANGE: Primary | ICD-10-CM

## 2020-06-17 DIAGNOSIS — G47.33 SEVERE OBSTRUCTIVE SLEEP APNEA: ICD-10-CM

## 2020-06-17 DIAGNOSIS — F43.21 ADJUSTMENT DISORDER WITH DEPRESSED MOOD: ICD-10-CM

## 2020-06-17 DIAGNOSIS — R26.9 ABNORMAL GAIT: ICD-10-CM

## 2020-06-17 DIAGNOSIS — G47.52 RBD (REM BEHAVIORAL DISORDER): ICD-10-CM

## 2020-06-17 PROCEDURE — 96133 PR NEUROPSYCHOLOGIC TEST EVAL SVCS, EA ADDTL HR: ICD-10-PCS | Mod: ,,, | Performed by: PSYCHIATRY & NEUROLOGY

## 2020-06-17 PROCEDURE — 96138 PSYCL/NRPSYC TECH 1ST: CPT | Mod: ,,, | Performed by: PSYCHIATRY & NEUROLOGY

## 2020-06-17 PROCEDURE — 96132 PR NEUROPSYCHOLOGIC TEST EVAL SVCS, 1ST HR: ICD-10-PCS | Mod: ,,, | Performed by: PSYCHIATRY & NEUROLOGY

## 2020-06-17 PROCEDURE — 96132 NRPSYC TST EVAL PHYS/QHP 1ST: CPT | Mod: ,,, | Performed by: PSYCHIATRY & NEUROLOGY

## 2020-06-17 PROCEDURE — 96138 PR PSYCH/NEUROPSYCH TEST ADMIN/SCORING, BY TECH, 2+ TESTS, 1ST 30 MIN: ICD-10-PCS | Mod: ,,, | Performed by: PSYCHIATRY & NEUROLOGY

## 2020-06-17 PROCEDURE — 99499 NO LOS: ICD-10-PCS | Mod: S$PBB,,, | Performed by: PSYCHIATRY & NEUROLOGY

## 2020-06-17 PROCEDURE — 96133 NRPSYC TST EVAL PHYS/QHP EA: CPT | Mod: ,,, | Performed by: PSYCHIATRY & NEUROLOGY

## 2020-06-17 PROCEDURE — 99499 UNLISTED E&M SERVICE: CPT | Mod: S$PBB,,, | Performed by: PSYCHIATRY & NEUROLOGY

## 2020-06-17 PROCEDURE — 96139 PR PSYCH/NEUROPSYCH TEST ADMIN/SCORING, BY TECH, 2+ TESTS, EA ADDTL 30 MIN: ICD-10-PCS | Mod: ,,, | Performed by: PSYCHIATRY & NEUROLOGY

## 2020-06-17 PROCEDURE — 96139 PSYCL/NRPSYC TST TECH EA: CPT | Mod: ,,, | Performed by: PSYCHIATRY & NEUROLOGY

## 2020-06-18 RX ORDER — FUROSEMIDE 20 MG/1
20 TABLET ORAL EVERY MORNING
Qty: 90 TABLET | Refills: 3 | Status: SHIPPED | OUTPATIENT
Start: 2020-06-18 | End: 2021-06-14

## 2020-06-27 PROBLEM — R41.89 COGNITIVE CHANGE: Status: ACTIVE | Noted: 2020-05-14
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
## 2020-06-27 NOTE — ASSESSMENT & PLAN NOTE
Compensatory Mechanisms: Set recurring alarm/timer on phone for medication times, especially for his shot once per week. If setting the alarm doesn't work, he can write a note on a calendar to act as a weekly reminder. He can also check off the note after he has taken the shot for tracking purposes.     Optimize Brain Health: Prioritize physical and social activity. Maintain a heart healthy diet. Follow-up with sleep medicine for treatment of LOVE.     Neurology Follow-up: With Dr. Mcgraw.     Neuropsychology Follow-up: Feedback scheduled for 6/30. Recommended that he undergo testing every 1-2 years.

## 2020-06-29 ENCOUNTER — PATIENT OUTREACH (OUTPATIENT)
Dept: OTHER | Facility: OTHER | Age: 65
End: 2020-06-29

## 2020-06-29 NOTE — PROGRESS NOTES
Digital Medicine: Clinician Follow-Up    Patient reports he stopped Jardiance as advised  Discussed elevated BG reading 6/20/20, he relates to Father's Day weekend and forgot his Trulicity dose. He has set an alarm on his phone to assist with medication adherence.    The history is provided by the patient.   Follow Up  Follow-up reason(s): reading review and medication change follow-up      Patient started new medication.    Adherence Barriers: patient forgets    Is patient tolerating med change?:  Yes      INTERVENTION(S)  encouragement/support and denied questions    PLAN  patient verbalizes understanding and continue monitoring    1. Htn: BP remains at goal. Continue current regimen.    2. DM: Majority of BG readings remain at goal since stopping Jardiance. Continue current regimen. Advised patient he can decrease BG monitoring to 2-3 times per week if desired (vary timing and use meal-relationship label). Consider repeat A1C 9/2020 due to therapy change.          Last 5 Patient Entered Readings                                      Current 30 Day Average: 105/65     Recent Readings 6/28/2020 6/20/2020 6/16/2020 6/11/2020 6/8/2020    SBP (mmHg) 110 110 101 110 111    DBP (mmHg) 72 75 60 71 69    Pulse 64 60 66 62 63        Last 6 Patient Entered Readings                                      Most Recent A1c: 5.6% on 6/10/2020  (Goal: 7%)     Recent Readings 6/28/2020 6/27/2020 6/26/2020 6/25/2020 6/24/2020    Blood Glucose (mg/dL) 106 114 120 104 128           Hypertension Medications             furosemide (LASIX) 20 MG tablet Take 1 tablet (20 mg total) by mouth every morning.    irbesartan (AVAPRO) 150 MG tablet Take 1 tablet (150 mg total) by mouth once daily.    metoprolol succinate (TOPROL-XL) 25 MG 24 hr tablet TAKE ONE TABLET BY MOUTH EVERY DAY        Diabetes Medications             dulaglutide (TRULICITY) 1.5 mg/0.5 mL PnIj Inject 1.5 mg into the skin once a week. (Increased dose beginning 1/26/20)     metFORMIN (GLUCOPHAGE-XR) 500 MG 24 hr tablet TAKE 4 TABLETS BY MOUTH EVERY DAY                           Medication Adherence Screening   He missed a dose once this month.  Patient knows purpose of medications.      Patient identified the following reasons for non-compliance: Patient forgets    Adherence tools used: Cell phone

## 2020-06-30 ENCOUNTER — OFFICE VISIT (OUTPATIENT)
Dept: NEUROLOGY | Facility: CLINIC | Age: 65
End: 2020-06-30
Payer: MEDICARE

## 2020-06-30 DIAGNOSIS — R41.89 COGNITIVE CHANGE: Primary | ICD-10-CM

## 2020-06-30 DIAGNOSIS — G47.33 SEVERE OBSTRUCTIVE SLEEP APNEA: ICD-10-CM

## 2020-06-30 DIAGNOSIS — F43.21 ADJUSTMENT DISORDER WITH DEPRESSED MOOD: ICD-10-CM

## 2020-06-30 DIAGNOSIS — G47.52 RBD (REM BEHAVIORAL DISORDER): ICD-10-CM

## 2020-06-30 PROCEDURE — 99499 UNLISTED E&M SERVICE: CPT | Mod: 95,,, | Performed by: PSYCHIATRY & NEUROLOGY

## 2020-06-30 PROCEDURE — 99499 NO LOS: ICD-10-PCS | Mod: 95,,, | Performed by: PSYCHIATRY & NEUROLOGY

## 2020-06-30 NOTE — PROGRESS NOTES
NEUROPSYCHOLOGICAL EVALUATION - CONFIDENTIAL  FEEDBACK NOTE    On 6/30/2020, I provided Mr. Glenn Medel neuropsychological evaluation results. Please see the full report for a comprehensive overview of the findings.     Lukas Clark Psy.D., ABPP  Board Certified in Clinical Neuropsychology  [facility] - Department of Neurology

## 2020-07-09 ENCOUNTER — OFFICE VISIT (OUTPATIENT)
Dept: FAMILY MEDICINE | Facility: CLINIC | Age: 65
End: 2020-07-09
Payer: MEDICARE

## 2020-07-09 ENCOUNTER — LAB VISIT (OUTPATIENT)
Dept: LAB | Facility: HOSPITAL | Age: 65
End: 2020-07-09
Attending: INTERNAL MEDICINE
Payer: MEDICARE

## 2020-07-09 VITALS
HEART RATE: 73 BPM | SYSTOLIC BLOOD PRESSURE: 116 MMHG | DIASTOLIC BLOOD PRESSURE: 86 MMHG | WEIGHT: 192.69 LBS | HEIGHT: 71 IN | BODY MASS INDEX: 26.98 KG/M2 | TEMPERATURE: 99 F | OXYGEN SATURATION: 97 %

## 2020-07-09 DIAGNOSIS — I10 ESSENTIAL HYPERTENSION: ICD-10-CM

## 2020-07-09 DIAGNOSIS — F51.5 NIGHTMARES: ICD-10-CM

## 2020-07-09 DIAGNOSIS — E11.39 CONTROLLED TYPE 2 DIABETES MELLITUS WITH OTHER OPHTHALMIC COMPLICATION, WITH LONG-TERM CURRENT USE OF INSULIN: Primary | ICD-10-CM

## 2020-07-09 DIAGNOSIS — E11.39 CONTROLLED TYPE 2 DIABETES MELLITUS WITH OTHER OPHTHALMIC COMPLICATION, WITH LONG-TERM CURRENT USE OF INSULIN: ICD-10-CM

## 2020-07-09 DIAGNOSIS — Z79.4 CONTROLLED TYPE 2 DIABETES MELLITUS WITH OTHER OPHTHALMIC COMPLICATION, WITH LONG-TERM CURRENT USE OF INSULIN: Primary | ICD-10-CM

## 2020-07-09 DIAGNOSIS — Z79.4 CONTROLLED TYPE 2 DIABETES MELLITUS WITH OTHER OPHTHALMIC COMPLICATION, WITH LONG-TERM CURRENT USE OF INSULIN: ICD-10-CM

## 2020-07-09 LAB
CHOLEST SERPL-MCNC: 118 MG/DL (ref 120–199)
CHOLEST/HDLC SERPL: 3.7 {RATIO} (ref 2–5)
HDLC SERPL-MCNC: 32 MG/DL (ref 40–75)
HDLC SERPL: 27.1 % (ref 20–50)
LDLC SERPL CALC-MCNC: 54.8 MG/DL (ref 63–159)
NONHDLC SERPL-MCNC: 86 MG/DL
TRIGL SERPL-MCNC: 156 MG/DL (ref 30–150)

## 2020-07-09 PROCEDURE — 99214 OFFICE O/P EST MOD 30 MIN: CPT | Mod: PBBFAC,PO | Performed by: INTERNAL MEDICINE

## 2020-07-09 PROCEDURE — 99214 OFFICE O/P EST MOD 30 MIN: CPT | Mod: S$PBB,,, | Performed by: INTERNAL MEDICINE

## 2020-07-09 PROCEDURE — 99214 PR OFFICE/OUTPT VISIT, EST, LEVL IV, 30-39 MIN: ICD-10-PCS | Mod: S$PBB,,, | Performed by: INTERNAL MEDICINE

## 2020-07-09 PROCEDURE — 99999 PR PBB SHADOW E&M-EST. PATIENT-LVL IV: CPT | Mod: PBBFAC,,, | Performed by: INTERNAL MEDICINE

## 2020-07-09 PROCEDURE — 99999 PR PBB SHADOW E&M-EST. PATIENT-LVL IV: ICD-10-PCS | Mod: PBBFAC,,, | Performed by: INTERNAL MEDICINE

## 2020-07-09 PROCEDURE — 80061 LIPID PANEL: CPT

## 2020-07-09 PROCEDURE — 36415 COLL VENOUS BLD VENIPUNCTURE: CPT | Mod: PO

## 2020-07-09 RX ORDER — METFORMIN HYDROCHLORIDE 500 MG/1
1000 TABLET, EXTENDED RELEASE ORAL DAILY
Qty: 180 TABLET | Refills: 1 | Status: SHIPPED | OUTPATIENT
Start: 2020-07-09 | End: 2020-09-21

## 2020-07-09 RX ORDER — PRAZOSIN HYDROCHLORIDE 1 MG/1
1 CAPSULE ORAL NIGHTLY
Qty: 30 CAPSULE | Refills: 2 | Status: SHIPPED | OUTPATIENT
Start: 2020-07-09 | End: 2020-10-02

## 2020-07-09 NOTE — PROGRESS NOTES
Subjective:       Patient ID: Glenn Medel is a 65 y.o. male.    Chief Complaint: check stitches in head      Social Hx:  From [place]. . Has 1 son and 1 daughter. Retired construction management.    HPI:  Recently had a fall on 07/06/2020 after having a nightmare and waking up.  Hit his head on a nightstand. Neuro does not think he has parkinsons. Still having nightmares. Has not tried prazosin. He is followed by Dr. Hinton (sleep med). Has pain at left hip. Sutures look ok to remove today.     PMH and A/P:   1.  Type 2 diabetes mellitus:  -controlled.  Last A1c 5.6.  -will decrease metformin to 1000 mg daily.   -repeat lipids today  2.  Hypertension:  -controlled.   3.  Anxiety and depression:  - controlled except for nightmares  - will trial prazosin 1 mg qhs.   4.  Chronic insomnia:  - stable on meds.   5.  LOVE:   - not currently using CPAP  Needs foot exam  Current Outpatient Medications on File Prior to Visit   Medication Sig Dispense Refill    aspirin 81 MG Chew Take 81 mg by mouth once daily.      atorvastatin (LIPITOR) 20 MG tablet TAKE ONE TABLET BY MOUTH EVERY DAY 90 tablet 1    cholecalciferol, vitamin D3, (D3-2000 ORAL) Take by mouth.      clonazePAM (KLONOPIN) 0.5 MG tablet TAKE 1 TABLET BY MOUTH EVERY NIGHT AT BEDTIME 30 tablet 0    FLUoxetine 40 MG capsule Take 1 capsule (40 mg total) by mouth once daily. 90 capsule 1    furosemide (LASIX) 20 MG tablet Take 1 tablet (20 mg total) by mouth every morning. 90 tablet 3    irbesartan (AVAPRO) 150 MG tablet Take 1 tablet (150 mg total) by mouth once daily. 90 tablet 1    melatonin 5 mg Subl Take 5 mg by mouth nightly.   11    metoprolol succinate (TOPROL-XL) 25 MG 24 hr tablet TAKE ONE TABLET BY MOUTH EVERY DAY 90 tablet 1    SIMBRINZA 1-0.2 % DrpS Shake Liquid AND Instill one drop into BOTH eyes twice daily 8 mL 3    TRULICITY 1.5 mg/0.5 mL pen injector INJECT 1.5 MG INTO THE SKIN ONCE A WEEK 2 mL 5    [DISCONTINUED] metFORMIN  (GLUCOPHAGE-XR) 500 MG 24 hr tablet TAKE 4 TABLETS BY MOUTH EVERY  tablet 1    clonazePAM (KLONOPIN) 0.5 MG tablet Klonopin 0.5 mg tablet  Take 1 tablet every night  oral route at bedtime. 30 tablet 0     Current Facility-Administered Medications on File Prior to Visit   Medication Dose Route Frequency Provider Last Rate Last Dose    diphenhydrAMINE capsule 25 mg  25 mg Oral Q6H PRN Leonila Richardson MD        metronidazole IVPB 500 mg  500 mg Intravenous Once Kaz eKating MD         I personally reviewed past medical, family and social history.  Review of Systems   Constitutional: Negative for activity change and fever.   HENT: Negative for sore throat and trouble swallowing.    Eyes: Negative for pain and visual disturbance.   Respiratory: Negative for cough, shortness of breath and wheezing.    Cardiovascular: Negative for chest pain, palpitations and leg swelling.   Gastrointestinal: Negative for abdominal pain, blood in stool, diarrhea, nausea and vomiting.   Endocrine: Negative for cold intolerance and polyuria.   Genitourinary: Negative for decreased urine volume and dysuria.   Musculoskeletal: Negative for gait problem and neck pain.   Skin: Negative for rash.   Neurological: Negative for dizziness, syncope and light-headedness.   Psychiatric/Behavioral: Negative for dysphoric mood. The patient is not nervous/anxious.          Objective:     Vitals:    07/09/20 1032   BP: 116/86   Pulse: 73   Temp: 98.8 °F (37.1 °C)        Physical Exam  Constitutional:       General: He is not in acute distress.     Appearance: He is well-developed.   HENT:      Head: Normocephalic and atraumatic.   Eyes:      Pupils: Pupils are equal, round, and reactive to light.   Neck:      Musculoskeletal: Neck supple.      Thyroid: No thyromegaly.   Cardiovascular:      Rate and Rhythm: Normal rate and regular rhythm.      Heart sounds: Normal heart sounds. No murmur. No friction rub. No gallop.    Pulmonary:       Effort: Pulmonary effort is normal. No respiratory distress.      Breath sounds: Normal breath sounds. No wheezing.   Abdominal:      General: Bowel sounds are normal.      Palpations: Abdomen is soft.      Tenderness: There is no abdominal tenderness.   Skin:     General: Skin is warm.      Findings: No rash.      Comments: Laceration between eyes. 5 stitches. Laceration healed. Stitches removed.    Neurological:      Mental Status: He is alert and oriented to person, place, and time.      Cranial Nerves: No cranial nerve deficit.   Psychiatric:         Behavior: Behavior normal.         Protective Sensation (w/ 10 gram monofilament):  Right: Intact  Left: Intact    Visual Inspection:  Normal -  Bilateral    Pedal Pulses:   Right: Present  Left: Present    Posterior tibialis:   Right:Present  Left: Present      Assessment/Plan    was seen today for check stitches in head.    Diagnoses and all orders for this visit:    Controlled type 2 diabetes mellitus with other ophthalmic complication, with long-term current use of insulin  -     Lipid Panel; Future  -     Hemoglobin A1C; Future  -     metFORMIN (GLUCOPHAGE-XR) 500 MG XR 24hr tablet; Take 2 tablets (1,000 mg total) by mouth once daily.    Essential hypertension  -     Basic metabolic panel; Future    Uncontrolled type 2 diabetes mellitus with diabetic polyneuropathy, without long-term current use of insulin    Nightmares  -     prazosin (MINIPRESS) 1 MG Cap; Take 1 capsule (1 mg total) by mouth every evening.

## 2020-07-28 ENCOUNTER — PATIENT OUTREACH (OUTPATIENT)
Dept: OTHER | Facility: OTHER | Age: 65
End: 2020-07-28

## 2020-07-28 NOTE — PROGRESS NOTES
Digital Medicine: Health  Follow-Up    The history is provided by the patient.             Reason for review: Blood glucose at goal and Blood pressure at goal        Topics Covered on Call: Recent ED visit and sleep    Additional Follow-up details: Patient reports he fell off his bed after a bad dream and hit his nightstand on the bridge of his nose.  States he received 5 stitches but were removed after 3 days due to quick healing.  States he is on new sleep medication which is working well.        Diet-Not assessed      Additional diet details:    Physical Activity-Not assessed    Medication Adherence-Medication Adherence not addressed.        Substance, Sleep, Stress-change  stress-  Details:  Intervention(s):    Sleep-assessed  Details:Reports new medication is helping with bad dreams  Intervention(s): sleep hygiene tips    Alcohol -  Details:  Intervention(s):    Tobacco-  Details:  Intervention(s):        PLAN  Continue current diet/physical activity routine:    Patient verbalizes understanding. Patient did not express questions or concerns and patient has contact information if needed.    Explained the importance of self-monitoring and medication adherence. Encouraged the patient to communicate with their health  for lifestyle modifications to help improve or maintain a healthy lifestyle.        There are no preventive care reminders to display for this patient.    Last 5 Patient Entered Readings                                      Current 30 Day Average: 110/73     Recent Readings 7/27/2020 7/17/2020 7/13/2020 7/9/2020 7/8/2020    SBP (mmHg) 113 115 109 116 109    DBP (mmHg) 74 73 72 82 71    Pulse 64 62 64 65 63        Last 6 Patient Entered Readings                                          Most Recent A1c: 5.6% on 6/10/2020  (Goal: 7%)     Recent Readings 7/28/2020 7/26/2020 7/24/2020 7/22/2020 7/21/2020    Blood Glucose (mg/dL) 118 122 114 127 114

## 2020-08-10 ENCOUNTER — PATIENT MESSAGE (OUTPATIENT)
Dept: FAMILY MEDICINE | Facility: CLINIC | Age: 65
End: 2020-08-10

## 2020-08-10 RX ORDER — CLONAZEPAM 1 MG/1
1 TABLET ORAL NIGHTLY
Qty: 30 TABLET | Refills: 0 | Status: SHIPPED | OUTPATIENT
Start: 2020-08-10 | End: 2020-12-14 | Stop reason: SDUPTHER

## 2020-08-11 ENCOUNTER — TELEPHONE (OUTPATIENT)
Dept: NEUROLOGY | Facility: CLINIC | Age: 65
End: 2020-08-11

## 2020-08-17 ENCOUNTER — OFFICE VISIT (OUTPATIENT)
Dept: FAMILY MEDICINE | Facility: CLINIC | Age: 65
End: 2020-08-17
Payer: MEDICARE

## 2020-08-17 VITALS
SYSTOLIC BLOOD PRESSURE: 108 MMHG | HEIGHT: 71 IN | WEIGHT: 199.88 LBS | DIASTOLIC BLOOD PRESSURE: 70 MMHG | BODY MASS INDEX: 27.98 KG/M2 | HEART RATE: 62 BPM | TEMPERATURE: 98 F | OXYGEN SATURATION: 96 %

## 2020-08-17 DIAGNOSIS — R29.818 PARKINSONIAN FEATURES: Primary | ICD-10-CM

## 2020-08-17 DIAGNOSIS — E78.2 MIXED HYPERLIPIDEMIA: ICD-10-CM

## 2020-08-17 DIAGNOSIS — I10 ESSENTIAL HYPERTENSION: ICD-10-CM

## 2020-08-17 DIAGNOSIS — M25.511 CHRONIC RIGHT SHOULDER PAIN: ICD-10-CM

## 2020-08-17 DIAGNOSIS — M54.50 CHRONIC BILATERAL LOW BACK PAIN WITHOUT SCIATICA: ICD-10-CM

## 2020-08-17 DIAGNOSIS — G89.29 CHRONIC RIGHT SHOULDER PAIN: ICD-10-CM

## 2020-08-17 DIAGNOSIS — G89.29 CHRONIC BILATERAL LOW BACK PAIN WITHOUT SCIATICA: ICD-10-CM

## 2020-08-17 DIAGNOSIS — E11.40 TYPE 2 DIABETES MELLITUS WITH DIABETIC NEUROPATHY, WITHOUT LONG-TERM CURRENT USE OF INSULIN: ICD-10-CM

## 2020-08-17 PROCEDURE — 99999 PR PBB SHADOW E&M-EST. PATIENT-LVL IV: ICD-10-PCS | Mod: PBBFAC,,, | Performed by: INTERNAL MEDICINE

## 2020-08-17 PROCEDURE — 99214 OFFICE O/P EST MOD 30 MIN: CPT | Mod: PBBFAC,PO | Performed by: INTERNAL MEDICINE

## 2020-08-17 PROCEDURE — 99214 PR OFFICE/OUTPT VISIT, EST, LEVL IV, 30-39 MIN: ICD-10-PCS | Mod: S$PBB,,, | Performed by: INTERNAL MEDICINE

## 2020-08-17 PROCEDURE — 99999 PR PBB SHADOW E&M-EST. PATIENT-LVL IV: CPT | Mod: PBBFAC,,, | Performed by: INTERNAL MEDICINE

## 2020-08-17 PROCEDURE — 99214 OFFICE O/P EST MOD 30 MIN: CPT | Mod: S$PBB,,, | Performed by: INTERNAL MEDICINE

## 2020-08-17 RX ORDER — ONDANSETRON 4 MG/1
TABLET, ORALLY DISINTEGRATING ORAL
COMMUNITY
Start: 2020-08-08 | End: 2021-03-15 | Stop reason: ALTCHOICE

## 2020-08-17 RX ORDER — TRAMADOL HYDROCHLORIDE 50 MG/1
50 TABLET ORAL EVERY 12 HOURS PRN
Qty: 30 TABLET | Refills: 0 | Status: SHIPPED | OUTPATIENT
Start: 2020-08-17 | End: 2020-10-29

## 2020-08-17 RX ORDER — MUPIROCIN 20 MG/G
OINTMENT TOPICAL
COMMUNITY
Start: 2020-08-09 | End: 2021-03-15

## 2020-08-17 NOTE — PROGRESS NOTES
Subjective:       Patient ID: Glenn Medel is a 65 y.o. male.    Chief Complaint: Hospital Follow Up (ER)      Social Hx:  From [place]. . Has 1 son and 1 daughter. Retired construction management.    HPI:  He has been issues with walking. Was walking with daughter 1 week ago and had stooped over leaning forward gait. It was difficult to stop and fell forward. He has had issues with acting out dreams. Has fallen out of beg at least 3x in past year. He has fine tremor L > R of outstretched hands. He saw Neuro (Dr. Mcgraw) in May. He had PT after seeing Dr. Clark. Both did not think he had parkinsons. Left hand pat abn. MRI jan 2020.  No dizziness upon standing.   1. There is no acute abnormality.  There is mild volume loss without significant white matter disease.  There is no hemorrhage, parenchymal mass or acute infarction.  There is a probable arachnoid cyst in the right posterior fossa.  Please see above discussion.   -to me it seems like Parkinson like features.   -will message neuro.     Also having low back and right shoulder pain. This has been going on for years. Has seen ortho and had injections in shoulder in back. No durable pain relief. Pain does keep him up at night.   -Will trial low dose tramadol.       PMH and A/P:   Type 2 diabetes mellitus:  Metformin and Trulicity  -controlled, last A1c 5.6 (June 2020)  -metformin decreased on last visit.  -A1c in September  Hypertension:  Irbesartan and metoprolol   -controlled.   Hyperlipidemia:  Atorvastatin  -controlled August 2020  Anxiety depression:  Prozac, p.r.n. clonazepam, prazosin  LOVE:  -not using CPAP  Insomnia:  Melatonin and clonazepam  -stable  Health maintenance:  -shingles vaccine  Current Outpatient Medications on File Prior to Visit   Medication Sig Dispense Refill    aspirin 81 MG Chew Take 81 mg by mouth once daily.      atorvastatin (LIPITOR) 20 MG tablet TAKE ONE TABLET BY MOUTH EVERY DAY 90 tablet 1    cholecalciferol, vitamin  D3, (D3-2000 ORAL) Take by mouth.      clonazePAM (KLONOPIN) 1 MG tablet Take 1 tablet (1 mg total) by mouth nightly. 30 tablet 0    FLUoxetine 40 MG capsule Take 1 capsule (40 mg total) by mouth once daily. 90 capsule 1    furosemide (LASIX) 20 MG tablet Take 1 tablet (20 mg total) by mouth every morning. 90 tablet 3    irbesartan (AVAPRO) 150 MG tablet Take 1 tablet (150 mg total) by mouth once daily. 90 tablet 1    melatonin 5 mg Subl Take 10 mg by mouth nightly.   11    metFORMIN (GLUCOPHAGE-XR) 500 MG XR 24hr tablet Take 2 tablets (1,000 mg total) by mouth once daily. 180 tablet 1    metoprolol succinate (TOPROL-XL) 25 MG 24 hr tablet TAKE ONE TABLET BY MOUTH EVERY DAY 90 tablet 1    mupirocin (BACTROBAN) 2 % ointment APPLY TOPICALLY BID FOR 10 DAYS      ondansetron (ZOFRAN-ODT) 4 MG TbDL ondansetron 4 mg disintegrating tablet   DISSOLVE 1 T ON THE TONGUE Q 8 H PRN NAUSEA      prazosin (MINIPRESS) 1 MG Cap Take 1 capsule (1 mg total) by mouth every evening. 30 capsule 2    SIMBRINZA 1-0.2 % DrpS Shake Liquid AND Instill one drop into BOTH eyes twice daily 8 mL 3    TRULICITY 1.5 mg/0.5 mL pen injector INJECT 1.5 MG INTO THE SKIN ONCE A WEEK 2 mL 5     Current Facility-Administered Medications on File Prior to Visit   Medication Dose Route Frequency Provider Last Rate Last Dose    diphenhydrAMINE capsule 25 mg  25 mg Oral Q6H PRN Leonila Richardson MD        metronidazole IVPB 500 mg  500 mg Intravenous Once Kaz Keating MD         I personally reviewed past medical, family and social history.  Review of Systems      Objective:     Vitals:    08/17/20 1328   BP: 108/70   Pulse: 62   Temp: 97.9 °F (36.6 °C)        Physical Exam  Constitutional:       General: He is not in acute distress.     Appearance: He is well-developed.   HENT:      Head: Normocephalic and atraumatic.   Eyes:      Pupils: Pupils are equal, round, and reactive to light.   Neck:      Musculoskeletal: Neck supple.      Thyroid:  No thyromegaly.   Cardiovascular:      Rate and Rhythm: Normal rate and regular rhythm.      Heart sounds: Normal heart sounds. No murmur. No friction rub. No gallop.    Pulmonary:      Effort: Pulmonary effort is normal. No respiratory distress.      Breath sounds: Normal breath sounds. No wheezing.   Abdominal:      General: Bowel sounds are normal.      Palpations: Abdomen is soft.      Tenderness: There is no abdominal tenderness.   Skin:     General: Skin is warm.      Findings: No rash.   Neurological:      Mental Status: He is alert and oriented to person, place, and time.      Cranial Nerves: No cranial nerve deficit.      Comments: Left abn hand pat. Left hand resting tremor.    Psychiatric:         Behavior: Behavior normal.           Assessment/Plan    was seen today for hospital follow up.    Diagnoses and all orders for this visit:    Parkinsonian features    Type 2 diabetes mellitus with diabetic neuropathy, without long-term current use of insulin    Essential hypertension    Mixed hyperlipidemia    Chronic right shoulder pain  -     traMADoL (ULTRAM) 50 mg tablet; Take 1 tablet (50 mg total) by mouth every 12 (twelve) hours as needed for Pain.    Chronic bilateral low back pain without sciatica  -     traMADoL (ULTRAM) 50 mg tablet; Take 1 tablet (50 mg total) by mouth every 12 (twelve) hours as needed for Pain.

## 2020-08-18 ENCOUNTER — PATIENT OUTREACH (OUTPATIENT)
Dept: OTHER | Facility: OTHER | Age: 65
End: 2020-08-18

## 2020-08-18 ENCOUNTER — TELEPHONE (OUTPATIENT)
Dept: NEUROLOGY | Facility: CLINIC | Age: 65
End: 2020-08-18

## 2020-08-18 DIAGNOSIS — Z79.4 TYPE 2 DIABETES MELLITUS WITH DIABETIC NEPHROPATHY, WITH LONG-TERM CURRENT USE OF INSULIN: Primary | ICD-10-CM

## 2020-08-18 DIAGNOSIS — E11.21 TYPE 2 DIABETES MELLITUS WITH DIABETIC NEPHROPATHY, WITH LONG-TERM CURRENT USE OF INSULIN: Primary | ICD-10-CM

## 2020-08-18 NOTE — TELEPHONE ENCOUNTER
----- Message from Coni Cee MD sent at 8/18/2020  1:25 PM CDT -----  Regarding: PD  Dr. Cee covering for Dr. Mcgraw  I'd be happy to see him for a re-evaluation over a video visit  Jeanine can you help schedule him for my next free video visit please?    ----- Message -----  From: Power Benton DO  Sent: 8/17/2020   2:08 PM CDT  To: Cherelle Mcgraw MD    I'm not convinced he does not have some degree of Parkinson. He has acted out dreams and fallen out of bed x 3 in past year. He was waking with daughter the other day and she noted stooped over leaning forward gait. When he tried to stop walking he fell forward. He does have resting left upper extremity tremor. Abn left hand pat. He finished PT without any improvement in symptoms and he does admit to difficulty initiating walking. Let me know your thoughts

## 2020-08-18 NOTE — PROGRESS NOTES
Digital Medicine: Clinician Follow-Up    Patient recovering from recent fall. He is following with neurology and Dr. Benton.  He denies eating snacks between breakfast and lunch.  He thinks SMBGs have increased since increasing clonazepam about 2 weeks ago    The history is provided by the patient.   Follow-up reason(s): routine follow up.         Last 5 Patient Entered Readings                                      Current 30 Day Average: 108/68     Recent Readings 8/17/2020 8/11/2020 8/4/2020 7/30/2020 7/27/2020    SBP (mmHg) 117 104 109 95 113    DBP (mmHg) 74 61 71 58 74    Pulse 57 68 62 66 64        Last 6 Patient Entered Readings                                   Most Recent A1c: 5.6% on 6/10/2020  (Goal: 7%)     Recent Readings 8/17/2020 8/16/2020 8/15/2020 8/12/2020 8/9/2020    Blood Glucose (mg/dL) 122 163 132 129 122             Depression Screening  Did not address depression screening.    Sleep Apnea Screening    Did not address sleep apnea screening.     Medication Affordability Screening  Did not address medication affordability screening.     Medication Adherence-Medication adherence was assessed.          ASSESSMENT(S)  Patients BP average is 108/68 mmHg, which is at goal. Patient's BP goal is less than or equal to 130/80 per 2017 ACC/AHA Hypertension Guidelines.  Patient's A1C goal is less than or equal to 7 per 2020 ADA guidelines. Patient's most recent A1C result is at goal. Lab Results    Component                Value               Date                     HGBA1C                   5.6                 06/10/2020          .       AC-L BG readings elevated, other SMBGs at goal      Hypertension Plan  Continue current therapy.    Diabetes Plan  Labs ordered. Added A1C to upcoming appointment in September Expected Lab Date:  Prescribed SMBG testing frequency. 3 times weekly up to once daily, vary timing before meals  Continue current therapy. Unlikely to make medication change prior to upcoming  A1C.  Provided patient education. Discussed that would not expect clonazepam to affect BG. Do not have AC-L readings before clonazepam increase to compare. FBGs have remained stable.       Addressed any questions or concerns and patient has my contact information if needed prior to next outreach. Patient verbalizes understanding.            There are no preventive care reminders to display for this patient.      Hypertension Medications             furosemide (LASIX) 20 MG tablet Take 1 tablet (20 mg total) by mouth every morning.    irbesartan (AVAPRO) 150 MG tablet Take 1 tablet (150 mg total) by mouth once daily.    metoprolol succinate (TOPROL-XL) 25 MG 24 hr tablet TAKE ONE TABLET BY MOUTH EVERY DAY    prazosin (MINIPRESS) 1 MG Cap Take 1 capsule (1 mg total) by mouth every evening.        Diabetes Medications             metFORMIN (GLUCOPHAGE-XR) 500 MG XR 24hr tablet Take 2 tablets (1,000 mg total) by mouth once daily.    TRULICITY 1.5 mg/0.5 mL pen injector INJECT 1.5 MG INTO THE SKIN ONCE A WEEK

## 2020-08-25 ENCOUNTER — PATIENT OUTREACH (OUTPATIENT)
Dept: ADMINISTRATIVE | Facility: OTHER | Age: 65
End: 2020-08-25

## 2020-08-26 ENCOUNTER — OFFICE VISIT (OUTPATIENT)
Dept: NEUROLOGY | Facility: CLINIC | Age: 65
End: 2020-08-26
Payer: MEDICARE

## 2020-08-26 ENCOUNTER — LAB VISIT (OUTPATIENT)
Dept: LAB | Facility: HOSPITAL | Age: 65
End: 2020-08-26
Attending: PSYCHIATRY & NEUROLOGY
Payer: MEDICARE

## 2020-08-26 DIAGNOSIS — R27.0 ATAXIA: Primary | ICD-10-CM

## 2020-08-26 DIAGNOSIS — R25.9 ABNORMAL INVOLUNTARY MOVEMENTS: ICD-10-CM

## 2020-08-26 DIAGNOSIS — G93.0 ARACHNOID CYST: ICD-10-CM

## 2020-08-26 DIAGNOSIS — R27.0 ATAXIA: ICD-10-CM

## 2020-08-26 LAB — CERULOPLASMIN SERPL-MCNC: 29 MG/DL (ref 15–45)

## 2020-08-26 PROCEDURE — 36415 COLL VENOUS BLD VENIPUNCTURE: CPT | Mod: PO

## 2020-08-26 PROCEDURE — 99215 OFFICE O/P EST HI 40 MIN: CPT | Mod: 95,,, | Performed by: PSYCHIATRY & NEUROLOGY

## 2020-08-26 PROCEDURE — 82390 ASSAY OF CERULOPLASMIN: CPT

## 2020-08-26 PROCEDURE — 99215 PR OFFICE/OUTPT VISIT, EST, LEVL V, 40-54 MIN: ICD-10-PCS | Mod: 95,,, | Performed by: PSYCHIATRY & NEUROLOGY

## 2020-08-26 RX ORDER — CARBIDOPA AND LEVODOPA 25; 100 MG/1; MG/1
1 TABLET ORAL 3 TIMES DAILY
Qty: 90 TABLET | Refills: 11 | Status: SHIPPED | OUTPATIENT
Start: 2020-08-26 | End: 2021-04-26

## 2020-08-26 NOTE — PROGRESS NOTES
"The patient location is: home  The chief complaint leading to consultation is: tremor, ataxia    Visit type: audiovisual    Face to Face time with patient: 40mins  40 minutes of total time spent on the encounter, which includes face to face time and non-face to face time preparing to see the patient (eg, review of tests), Obtaining and/or reviewing separately obtained history, Documenting clinical information in the electronic or other health record, Independently interpreting results (not separately reported) and communicating results to the patient/family/caregiver, or Care coordination (not separately reported).         Each patient to whom he or she provides medical services by telemedicine is:  (1) informed of the relationship between the physician and patient and the respective role of any other health care provider with respect to management of the patient; and (2) notified that he or she may decline to receive medical services by telemedicine and may withdraw from such care at any time.    Notes:   Glenn STOCKTON Chief Complaints during this visit:  New Patient visit for  Parkinsonism.   Coni Cee MD  [address] [place] [address]    Primary Care Physician  Power Benton, DO  [address] [place] [address]      Interval Hx    Since last visit,   Continues to have tremor and ataxia    Tremor of both hands x 4 years, resting, and then balance decline since 2 years    Ataxia getting slightly worse since last visit    Reports a festinating gait at times  Also reports "walking on tip toe" at times    No toe cramping.  R hand fingers cramp- he notes fingers stay closed at times  Several surgeries due to this have not resolved this issue    Falls: once a month  Assist Device: none  Walks 2 miles without stopping  With stairs he must hand onto railing    MRI brain revealed a possible posterior fossa arachnoid cyst - followed by Dr. Saldivar - has yet to schedule interval " MRI    Brother with dementia since age 60 - mild hand tremors and some issues walking  2 sisters/2 brothers healthy  Mother with dementia at age 80 - also hand hand dystonia  Father also reported fingers getting stuck    Ceruloplasmin not yet done    Neuropsych revealed mild cognitive impairments but no diagnosis    PD Review of Symptoms:  Anosmia: None  Dysarthria/Hypophonia: none  Dysphagia/Sialorrhea: none  Depression: yes  Cognitive slowing: as above  Hallucinations: none  Urinary changes: none  Constipation: yes  Orthostasis: at times  Falls: occasional  Freezing: none  Micrographia: none  Sleep issues:  -RBD: 1 year of REM sleep dz      History of present illness:   65 y.o.  male seen in consultation at the request of  Dr. Martinez for evaluation of parkinsonism.      Falls often, onto knee.  Right hand will suddenly start shaking.  Memory a little worse.  Needs new mask for his LOVE, hasn't used in a while.  Not sure who to see.    Has DM, had an emg about 2 years ago.      1/20/20 Juan:  Glenn Medel is a 64 y.o. right-handed male with DM2, HTN, LOVE, anxiety and depression who presents today for an initial evaluation of tremos and balance and is accompanied by wife.    Tremor- started in RH a couple of years ago but seemed to have gotten worse. Will watch TV and ev hands shake. Started noting in LH in the last year. Also notes shaking in legs for several years. He says the legs in fact started before the hands. Nothing makes it better. Not noted any aggravating factors. Drinks very little alcohol. Has not noted benefit with alcohol. Handwriting is pretty good. No changes with this.    Balance- sometimes will be working on knees and then fall back when he goes to get up. Sometimes with walking, he will wobble. No falls while walking. Has fallen backwards when he is trying to get up from being on knees. Has noted balance issues the past couple of months. Has not noted progression.     Acting out dreams. Not  nightly but is significant when does happen. He actually ended up in hospital in the last year because of fighting in sleep. Has LOVE, used to wear CPAP and wife wonders if this helped when he was wearing this. He says he will wear it a couple of days, aggravates him and then he will not use for month or longer. He had sleep doctor, Dr. Hinton, but dismissed him as she did everything she could do for him.    Denies stiffness in muscles.   Sometimes feels slow with walking. Wife says he is slower to answer questions.   Memory is getting worse. Been bad couple of years but worse the past year. Wife agrees but attributes some of this to half-way- lazier life.    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   He left on his terms. Was not asked to leave.       II.  Review of systems:  As in HPI,  otherwise, balance 10 systems reviewed and are negative.    III.  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, with long-term current use of insulin 12/20/2018    DDD (degenerative disc disease), lumbar     s/p epidural steroids     Diabetes mellitus, stable     Glaucoma     OU    HTN (hypertension)     Iritis, lens-induced 1/8/2018    Obesity     LOVE on CPAP      Family History   Problem Relation Age of Onset    Heart disease Brother 62    Kidney disease Brother     Heart disease Father     Diabetes Father     Colon cancer Father     Cancer Father         colon    Cataracts Father     Hypertension Mother     Diabetes Mother     Stroke Mother     Cancer Mother         colon    Kidney disease Mother     Glaucoma Maternal Grandmother     No Known Problems Sister     No Known Problems Maternal Aunt     No Known Problems Maternal Uncle     No Known Problems Paternal  Aunt     No Known Problems Paternal Uncle     No Known Problems Maternal Grandfather     No Known Problems Paternal Grandmother     No Known Problems Paternal Grandfather     Dementia Brother     Abnormal EKG Brother     Amblyopia Neg Hx     Blindness Neg Hx     Macular degeneration Neg Hx     Retinal detachment Neg Hx     Strabismus Neg Hx     Thyroid disease Neg Hx     Parkinsonism Neg Hx      Social history:  , retired.      Current Outpatient Medications on File Prior to Visit   Medication Sig Dispense Refill    aspirin 81 MG Chew Take 81 mg by mouth once daily.      atorvastatin (LIPITOR) 20 MG tablet TAKE ONE TABLET BY MOUTH EVERY DAY 90 tablet 1    cholecalciferol, vitamin D3, (D3-2000 ORAL) Take by mouth.      clonazePAM (KLONOPIN) 1 MG tablet Take 1 tablet (1 mg total) by mouth nightly. 30 tablet 0    FLUoxetine 40 MG capsule Take 1 capsule (40 mg total) by mouth once daily. 90 capsule 1    furosemide (LASIX) 20 MG tablet Take 1 tablet (20 mg total) by mouth every morning. 90 tablet 3    irbesartan (AVAPRO) 150 MG tablet Take 1 tablet (150 mg total) by mouth once daily. 90 tablet 1    melatonin 5 mg Subl Take 10 mg by mouth nightly.   11    metFORMIN (GLUCOPHAGE-XR) 500 MG XR 24hr tablet Take 2 tablets (1,000 mg total) by mouth once daily. 180 tablet 1    metoprolol succinate (TOPROL-XL) 25 MG 24 hr tablet TAKE ONE TABLET BY MOUTH EVERY DAY 90 tablet 1    mupirocin (BACTROBAN) 2 % ointment APPLY TOPICALLY BID FOR 10 DAYS      ondansetron (ZOFRAN-ODT) 4 MG TbDL ondansetron 4 mg disintegrating tablet   DISSOLVE 1 T ON THE TONGUE Q 8 H PRN NAUSEA      prazosin (MINIPRESS) 1 MG Cap Take 1 capsule (1 mg total) by mouth every evening. 30 capsule 2    SIMBRINZA 1-0.2 % DrpS Shake Liquid AND Instill one drop into BOTH eyes twice daily 8 mL 3    traMADoL (ULTRAM) 50 mg tablet Take 1 tablet (50 mg total) by mouth every 12 (twelve) hours as needed for Pain. 30 tablet 0    TRULICITY  1.5 mg/0.5 mL pen injector INJECT 1.5 MG INTO THE SKIN ONCE A WEEK 2 mL 5     Current Facility-Administered Medications on File Prior to Visit   Medication Dose Route Frequency Provider Last Rate Last Dose    diphenhydrAMINE capsule 25 mg  25 mg Oral Q6H PRN Leonila Richardson MD        metronidazole IVPB 500 mg  500 mg Intravenous Once Kaz Keating MD           PRIOR problem-specific medications tried:  none    Review of patient's allergies indicates:   Allergen Reactions    Avelox [moxifloxacin] Hives and Rash       IV. Physical Exam    There were no vitals filed for this visit.  General appearance: Well nourished, well developed, no acute distress.         Cardiovascular:  pedal pulses 2, no edema or cyanosis, heart regular rate and rhythym, no carotid bruits.         -------------------------------------------------------------  Facial Expression: normal       Affect: full       Orientation to time & place:  Oriented to time, place, person and situation       Attention & concentration:  Normal attention span and concentration       Memory:  Recent and remote memory intact  Language: Spontaneous, fluent; able to repeat and name objects        Fund of knowledge:  Aware of current events        Speech:  normal (not dysarthric)  -------------------------------------------------------  Cranial nerves: normal visual acuity, visual fields full, optic discs not visualized, pupils equal round and reactive, extraocular movements intact,       facial sensation intact, face symmetrical, hearing intact to whisper, palate raises midline, shoulder shrug strength normal, tongue protrudes midline.        -------------------------------------------------------  Musculoskeletal  Muscle tone: all 4 extremities normal        Muscle Bulk: all 4 extremities normal        Muscle strength:  5/5 in all 4 extremities        No pronator drift  Sensation: reduced touch limbs  Deep tendon Reflexes: 1 bilateral biceps, triceps, patella  and ankles        --------------------------------------------------------------  Cerebellar and Coordination  Gait:  Mild ataxia, unable to tandem       Finger-nose: no dysmetria       Rapid Alternating Movements (pronation/supination):  R normal; L normal  --------------------------------------------------------------  Abnormality of movement (bradykinesia, hyperkinesia) present? R finger taps 1+  Tremor present?  Occasional R hand dystonic posturing - bilat UE irregular postural tremor  Posture:  normal  Postural stability:  no Rhomberg    V.  Laboratory/ Radiological Data: (Personally reviewed images)         MRI 1/29/20  Impression       1. There is no acute abnormality.  There is mild volume loss without significant white matter disease.  There is no hemorrhage, parenchymal mass or acute infarction.  There is a probable arachnoid cyst in the right posterior fossa.  Please see above discussion.     Memory/Encephalopathy Labs:  Lab Results   Component Value Date    TSH 0.976 01/06/2020    HOFSYHOX67 391 05/14/2020    THIAMINEBLOO 59 05/14/2020     LGi and CASPR  No results found for: ENCESLGI1, ENCESCASPR2    Movement Labs:  Lab Results   Component Value Date    CALCIUM 9.7 02/26/2020    PTH 78.0 (H) 02/26/2020    ALBUMIN 3.9 02/26/2020    BILITOT 1.0 01/06/2020    ALT 15 01/06/2020    AST 29 01/06/2020    ALKPHOS 120 01/06/2020    FERRITIN 58 10/03/2018    ALPHATOCOPHE 1275 05/14/2020       IRON STUDIES:  Lab Results   Component Value Date    IRON 88 10/03/2018    TRANSFERRIN 269 10/03/2018    TIBC 398 10/03/2018    FESATURATED 22 10/03/2018       Malignancy Labs:  Lab Results   Component Value Date    PSADIAG 2.4 02/10/2020     PARANEOPLASTIC PANEL:  No results found for: NMOINTERPRET, REFLEXTEST, CRMP5, STMAB, LABACHR, ACHRGANGLION, NEUROVG    CSF:  No results found for: CSFWBC, CSFRBC, LYMPHS, MONOMACCSF, PROTEINCSF, GLUCCSF, GADCSF, LUME, MYELINPROT  Flow cytometry: No results found for: CSFC  [place]: No  "results found for: WESTNILEIGGA    CNS Demyelinating Labs:  Nmo serum, csf igg:  No results found for: NMOFSFACSS, NMOFCFACSC  No results found for: JCVIRUS    CSF oligoclonal bands:  No results found for: CSFB, CSFOLI, SERB, IGGINDEXCSF, IGGCSF, ALBQ, GALB, IGGSYNRATE, IGGS, ALBSER, IGGALBS    Myopathy labs:  No results found for: CPK, ALDOLASE, AMMONIA, ACFCRATIO, ACETYLCARNIT, CARNITINEPLA, LACTATE    Myasthenia panel:  No results found for: LABACHR  No results found for: ACETMOD  No results found for: STMAB   No results found for: ACHRGANGLION    Neuropathy Labs:  Lab Results   Component Value Date    KPFNIKIH94 391 05/14/2020    HGBA1C 5.6 06/10/2020     SPEP:  Lab Results   Component Value Date    PROTEINSERUM 6.7 07/27/2015    PATHINTPSPE REVIEWED 07/27/2015       Rheum labs:  Lab Results   Component Value Date    URICACID 7.2 (H) 04/24/2017       Neuropsych"  Mr. Medel is a 64 year old male with cognitive complaints over the past several years. He has a slightly ataxic gait of unclear etiology, but no clear parkinsonism. He has symptoms suggestive of REM sleep behavior disorder over the past year. He denied presence, passage, or recurrent visual hallucinations. He remains functionally independent, albeit more error prone than in the past. His history is remarkable for multiple vascular risk factors, including currently untreated sleep apnea.      His neuropsychological profile was largely within normal limits with the exception of mild memory inefficiencies, primarily related to encoding and cognitive organization/retrieval. His test scores and functioning are not at the level to warrant a cognitive diagnosis at this time. He did not present with the type of oliver forgetting seen in Alzheimer's disease. He also did not demonstrate the type of visuospatial dysfunction often seen early in Lewy Body Dementia, which is also in keeping with the fact that he does not have visual hallucinations. It will be " "important to track his cognition over time as mild cognitive changes in the setting of ataxia of unclear etiology and possible REM sleep behavior disorder could be indicative of a neurodegenerative condition. Otherwise, his mild cognitive weaknesses could simply be attributable to mild cerebrovascular disease.      Mr. Medel also reported clinically significant anxiety and depression. The transition to long term has been admittedly more challenging than expected. His wife has also noticed reduced frustration tolerance with an increased tendency to perseverate on his frustrations, which has led to multiple confrontations. Treatment is indicated. "    MRI brain 2020      VI. Assessment and Plan            Problem List Items Addressed This Visit        Neuro    Arachnoid cyst    Overview     Followed in [facility]         Current Assessment & Plan     Ataxia and dystonia, possibly related to cyst. F/y interval scan.         Abnormal involuntary movements    Overview     Right hand shaking         Current Assessment & Plan     B/L hand tremor, postural on exam with some R hand dystonic features. Notable has a large arachnoid cysts which could cause both dystonia and ataxia. Family histroy of "fingers sticking" suspeicios for dystonia.    Some hypomimia and R hand decrement. Suggested trial of carbidopa/levodopa 25/100mg 1 tab PO TID. While he does not ha sa classic parkinsonian syndrome, some midbrain vascular lesions may explain this.           Other Visit Diagnoses     Ataxia    -  Primary    Relevant Orders    Ceruloplasmin                No follow-ups on file.             ___________________________________________________________    I appreciate the opportunity to participate in the care of this patient and will communicate my assessment and plan back to the referring physician via copy of this note.                        Coni Cee MD, MS  [facility]  Department of Neurology  Movement Disorders    "

## 2020-09-08 ENCOUNTER — PATIENT MESSAGE (OUTPATIENT)
Dept: NEUROLOGY | Facility: CLINIC | Age: 65
End: 2020-09-08

## 2020-09-09 ENCOUNTER — TELEPHONE (OUTPATIENT)
Dept: FAMILY MEDICINE | Facility: CLINIC | Age: 65
End: 2020-09-09

## 2020-09-09 NOTE — TELEPHONE ENCOUNTER
----- Message from Marvin Edward sent at 9/9/2020 11:21 AM CDT -----  Regarding: Sooner appmnt for hospital F/U  Type:  Sooner Apoointment Request    Caller is requesting a sooner appointment.  Caller declined first available appointment listed below.  Caller will not accept being placed on the waitlist and is requesting a message be sent to doctor.    Name of Caller:  Ptnt  [phone]    When is the first available appointment?  09-22-20    Symptoms:  Hospital F/U discharge 09-09-20    Additional Information:      Please call to schedule a F/U appmnt with in one week discharging today from [facility].  Please call.

## 2020-09-14 ENCOUNTER — OFFICE VISIT (OUTPATIENT)
Dept: FAMILY MEDICINE | Facility: CLINIC | Age: 65
End: 2020-09-14
Payer: MEDICARE

## 2020-09-14 ENCOUNTER — IMMUNIZATION (OUTPATIENT)
Dept: PHARMACY | Facility: CLINIC | Age: 65
End: 2020-09-14
Payer: MEDICARE

## 2020-09-14 VITALS
SYSTOLIC BLOOD PRESSURE: 106 MMHG | HEIGHT: 71 IN | WEIGHT: 200.63 LBS | DIASTOLIC BLOOD PRESSURE: 70 MMHG | HEART RATE: 54 BPM | OXYGEN SATURATION: 96 % | BODY MASS INDEX: 28.09 KG/M2

## 2020-09-14 DIAGNOSIS — R25.9 ABNORMAL INVOLUNTARY MOVEMENTS: ICD-10-CM

## 2020-09-14 DIAGNOSIS — I10 ESSENTIAL HYPERTENSION: Primary | ICD-10-CM

## 2020-09-14 DIAGNOSIS — E78.2 MIXED HYPERLIPIDEMIA: ICD-10-CM

## 2020-09-14 PROCEDURE — 99213 OFFICE O/P EST LOW 20 MIN: CPT | Mod: S$PBB,,, | Performed by: INTERNAL MEDICINE

## 2020-09-14 PROCEDURE — 99213 PR OFFICE/OUTPT VISIT, EST, LEVL III, 20-29 MIN: ICD-10-PCS | Mod: S$PBB,,, | Performed by: INTERNAL MEDICINE

## 2020-09-14 PROCEDURE — 99999 PR PBB SHADOW E&M-EST. PATIENT-LVL III: ICD-10-PCS | Mod: PBBFAC,,, | Performed by: INTERNAL MEDICINE

## 2020-09-14 PROCEDURE — 99999 PR PBB SHADOW E&M-EST. PATIENT-LVL III: CPT | Mod: PBBFAC,,, | Performed by: INTERNAL MEDICINE

## 2020-09-14 PROCEDURE — 99213 OFFICE O/P EST LOW 20 MIN: CPT | Mod: PBBFAC,PO | Performed by: INTERNAL MEDICINE

## 2020-09-14 RX ORDER — ATORVASTATIN CALCIUM 20 MG/1
20 TABLET, FILM COATED ORAL DAILY
Qty: 90 TABLET | Refills: 3 | Status: SHIPPED | OUTPATIENT
Start: 2020-09-14 | End: 2021-08-25

## 2020-09-14 NOTE — PROGRESS NOTES
"Subjective:       Patient ID: Glenn Medel is a 65 y.o. male.    Chief Complaint: ER Follow up      Social Hx:  From [place]. . Has 1 son and 1 daughter. Retired construction management.    PMH:   1.  Type 2 diabetes mellitus:  Metformin and Trulicity  2.  Hypertension:  irbesartan and metoprolol  3.  Hyperlipidemia:  Atorvastatin  4.  Anxiety and depression:  Prozac, p.r.n. clonazepam, prazosin  5.  Insomnia:  Melatonin and clonazepam  6.  LOVE  7.  Abnormal involuntary movements:  Trial of carbidopa-levodopa. Has seen neurology.  Considered to not be classic parkinsonian syndrome.    HPI:   Since last visit he was seen by neurology on 08/26/2020.  Put on trial of Sinemet.    On 09/08/2020 he went to [facility] with complaints of left arm numbness and blurry vision.  MRI brain unremarkable.  Neurology their stated unlikely to be CVA.    CTA head:   The ACAs, MCAs, and PCAs are unremarkable.    The vertebrobasilar system is unremarkable    CTA neck:   There is a 3 great vessel arch. The internal, external, and common carotid arteries are patent, with mild atherosclerotic disease of the proximal left ICA.    There is mild stenosis of right vertebral artery origin. Left vertebral artery is patent.    Lung apices are clear.    Evaluation of the internal carotid arteries for determining clinically significant stenosis was performed by comparing the diameters of the proximal and distal internal carotid arteries.    Impression:     1. No flow-limiting stenosis in the intracranial vasculature.    2. Mild atherosclerotic disease of the proximal left ICA and right vertebral artery origin.    Left hand weakness is resolved. Has been sinemet for 3 weeks. No change in balance. Still has awakenings "fighting in sleep".     A/P:   Abnormal movement, balance problems: etiology is still unk. Sent message to Dr. Cee   Diabetes controlled  Hypertension controlled  Hyperlipidemia controlled August " 2020      Health Maintenance:         Current Outpatient Medications on File Prior to Visit   Medication Sig Dispense Refill    aspirin 81 MG Chew Take 81 mg by mouth once daily.      carbidopa-levodopa  mg (SINEMET)  mg per tablet Take 1 tablet by mouth 3 (three) times daily. 90 tablet 11    cholecalciferol, vitamin D3, (D3-2000 ORAL) Take by mouth.      clonazePAM (KLONOPIN) 1 MG tablet Take 1 tablet (1 mg total) by mouth nightly. 30 tablet 0    FLUoxetine 40 MG capsule Take 1 capsule (40 mg total) by mouth once daily. 90 capsule 1    furosemide (LASIX) 20 MG tablet Take 1 tablet (20 mg total) by mouth every morning. 90 tablet 3    irbesartan (AVAPRO) 150 MG tablet TAKE 1 TABLET BY MOUTH EVERY DAY 90 tablet 3    melatonin 5 mg Subl Take 10 mg by mouth nightly.   11    metFORMIN (GLUCOPHAGE-XR) 500 MG XR 24hr tablet Take 2 tablets (1,000 mg total) by mouth once daily. 180 tablet 1    metoprolol succinate (TOPROL-XL) 25 MG 24 hr tablet TAKE ONE TABLET BY MOUTH EVERY DAY 90 tablet 1    prazosin (MINIPRESS) 1 MG Cap Take 1 capsule (1 mg total) by mouth every evening. 30 capsule 2    SIMBRINZA 1-0.2 % DrpS Shake Liquid AND Instill one drop into BOTH eyes twice daily 8 mL 3    TRULICITY 1.5 mg/0.5 mL pen injector INJECT 1.5 MG INTO THE SKIN ONCE A WEEK 2 mL 5    [DISCONTINUED] atorvastatin (LIPITOR) 20 MG tablet TAKE ONE TABLET BY MOUTH EVERY DAY 90 tablet 1    mupirocin (BACTROBAN) 2 % ointment APPLY TOPICALLY BID FOR 10 DAYS      ondansetron (ZOFRAN-ODT) 4 MG TbDL ondansetron 4 mg disintegrating tablet   DISSOLVE 1 T ON THE TONGUE Q 8 H PRN NAUSEA      traMADoL (ULTRAM) 50 mg tablet Take 1 tablet (50 mg total) by mouth every 12 (twelve) hours as needed for Pain. (Patient not taking: Reported on 9/14/2020) 30 tablet 0     Current Facility-Administered Medications on File Prior to Visit   Medication Dose Route Frequency Provider Last Rate Last Dose    diphenhydrAMINE capsule 25 mg  25 mg  Oral Q6H PRN Leonila Richardson MD        metronidazole IVPB 500 mg  500 mg Intravenous Once Kaz Keating MD        [DISCONTINUED] GENERIC EXTERNAL MEDICATION     Generic External Data Provider         I personally reviewed past medical, family and social history.  Review of Systems   Constitutional: Negative for activity change and fever.   HENT: Negative for sore throat and trouble swallowing.    Eyes: Negative for pain and visual disturbance.   Respiratory: Negative for cough, shortness of breath and wheezing.    Cardiovascular: Negative for chest pain, palpitations and leg swelling.   Gastrointestinal: Negative for abdominal pain, blood in stool, diarrhea, nausea and vomiting.   Endocrine: Negative for cold intolerance and polyuria.   Genitourinary: Negative for decreased urine volume and dysuria.   Musculoskeletal: Negative for gait problem and neck pain.   Skin: Negative for rash.   Neurological: Negative for dizziness, syncope and light-headedness.        Balance issues, problems with ambulation   Psychiatric/Behavioral: Negative for dysphoric mood. The patient is not nervous/anxious.          Objective:     Vitals:    09/14/20 1338   BP: 106/70   Pulse: (!) 54        Physical Exam  Constitutional:       General: He is not in acute distress.     Appearance: He is well-developed.   HENT:      Head: Normocephalic and atraumatic.   Eyes:      Pupils: Pupils are equal, round, and reactive to light.   Neck:      Musculoskeletal: Neck supple.      Thyroid: No thyromegaly.   Cardiovascular:      Rate and Rhythm: Normal rate and regular rhythm.      Heart sounds: Normal heart sounds. No murmur. No friction rub. No gallop.    Pulmonary:      Effort: Pulmonary effort is normal. No respiratory distress.      Breath sounds: Normal breath sounds. No wheezing.   Abdominal:      General: Bowel sounds are normal.      Palpations: Abdomen is soft.      Tenderness: There is no abdominal tenderness.   Skin:     General:  Skin is warm.      Findings: No rash.   Neurological:      Mental Status: He is alert and oriented to person, place, and time.      Cranial Nerves: No cranial nerve deficit.   Psychiatric:         Behavior: Behavior normal.           Assessment/Plan    was seen today for er follow up.    Diagnoses and all orders for this visit:    Essential hypertension    Abnormal involuntary movements    Mixed hyperlipidemia  -     atorvastatin (LIPITOR) 20 MG tablet; Take 1 tablet (20 mg total) by mouth once daily.

## 2020-09-15 ENCOUNTER — TELEPHONE (OUTPATIENT)
Dept: FAMILY MEDICINE | Facility: CLINIC | Age: 65
End: 2020-09-15

## 2020-09-15 NOTE — PROGRESS NOTES
Patient went to ED on 9/8 for LUE numbness.  Patient saw PCP yesterday.  Will follow up in 2 weeks.  BP avg at goal 115/73 mmHg and SMBGs at goal.

## 2020-09-21 ENCOUNTER — LAB VISIT (OUTPATIENT)
Dept: LAB | Facility: HOSPITAL | Age: 65
End: 2020-09-21
Attending: INTERNAL MEDICINE
Payer: MEDICARE

## 2020-09-21 DIAGNOSIS — N18.30 CHRONIC KIDNEY DISEASE, STAGE III (MODERATE): ICD-10-CM

## 2020-09-21 DIAGNOSIS — Z79.4 TYPE 2 DIABETES MELLITUS WITH DIABETIC NEPHROPATHY, WITH LONG-TERM CURRENT USE OF INSULIN: ICD-10-CM

## 2020-09-21 DIAGNOSIS — E11.21 TYPE 2 DIABETES MELLITUS WITH DIABETIC NEPHROPATHY, WITH LONG-TERM CURRENT USE OF INSULIN: ICD-10-CM

## 2020-09-21 LAB
ALBUMIN SERPL BCP-MCNC: 3.5 G/DL (ref 3.5–5.2)
ANION GAP SERPL CALC-SCNC: 9 MMOL/L (ref 8–16)
BUN SERPL-MCNC: 18 MG/DL (ref 8–23)
CALCIUM SERPL-MCNC: 9 MG/DL (ref 8.7–10.5)
CHLORIDE SERPL-SCNC: 101 MMOL/L (ref 95–110)
CO2 SERPL-SCNC: 28 MMOL/L (ref 23–29)
CREAT SERPL-MCNC: 1.1 MG/DL (ref 0.5–1.4)
EST. GFR  (AFRICAN AMERICAN): >60 ML/MIN/1.73 M^2
EST. GFR  (NON AFRICAN AMERICAN): >60 ML/MIN/1.73 M^2
ESTIMATED AVG GLUCOSE: 120 MG/DL (ref 68–131)
GLUCOSE SERPL-MCNC: 265 MG/DL (ref 70–110)
HBA1C MFR BLD HPLC: 5.8 % (ref 4–5.6)
PHOSPHATE SERPL-MCNC: 3.2 MG/DL (ref 2.7–4.5)
POTASSIUM SERPL-SCNC: 4.4 MMOL/L (ref 3.5–5.1)
PTH-INTACT SERPL-MCNC: 65 PG/ML (ref 9–77)
SODIUM SERPL-SCNC: 138 MMOL/L (ref 136–145)

## 2020-09-21 PROCEDURE — 80069 RENAL FUNCTION PANEL: CPT

## 2020-09-21 PROCEDURE — 83970 ASSAY OF PARATHORMONE: CPT

## 2020-09-21 PROCEDURE — 83036 HEMOGLOBIN GLYCOSYLATED A1C: CPT

## 2020-09-21 PROCEDURE — 36415 COLL VENOUS BLD VENIPUNCTURE: CPT | Mod: PO

## 2020-09-21 RX ORDER — METFORMIN HYDROCHLORIDE 500 MG/1
500 TABLET, FILM COATED, EXTENDED RELEASE ORAL
Qty: 90 TABLET | Refills: 3 | Status: SHIPPED | OUTPATIENT
Start: 2020-09-21 | End: 2021-03-28 | Stop reason: SDUPTHER

## 2020-09-22 ENCOUNTER — PATIENT OUTREACH (OUTPATIENT)
Dept: OTHER | Facility: OTHER | Age: 65
End: 2020-09-22

## 2020-09-22 NOTE — PROGRESS NOTES
Digital Medicine: Clinician Follow-Up    Patient notes he has been getting up later, typically eating breakfast 8-8:30 am. He may snack on plain nuts ~ 10-10:30 am. He has decreased snacking compared to when he used to get up earlier for work. Denies drinking caffeine.  He continues to follow with neurology for balance.  He received Dr. Benton's message to decrease metformin to 500 mg daily.      The history is provided by the patient.   Follow-up reason(s): routine follow up.   Patient is not experiencing signs/symptoms of hypoglycemia.          Last 5 Patient Entered Readings                                      Current 30 Day Average: 115/74     Recent Readings 9/21/2020 9/14/2020 9/8/2020 9/6/2020 8/29/2020    SBP (mmHg) 118 120 128 112 112    DBP (mmHg) 78 77 78 69 72    Pulse 56 55 61 58 59        Last 6 Patient Entered Readings                                      Most Recent A1c: 5.8% on 9/21/2020  (Goal: 7%)     Recent Readings 9/22/2020 9/20/2020 9/19/2020 9/16/2020 9/15/2020    Blood Glucose (mg/dL) 140 160 141 131 164               Depression Screening  Did not address depression screening.    Sleep Apnea Screening    Did not address sleep apnea screening.     Medication Affordability Screening  Did not address medication affordability screening.     Medication Adherence-Medication adherence was assessed.          ASSESSMENT(S)  Patients BP average is 115/74 mmHg, which is at goal. Patient's BP goal is less than or equal to 130/80 per 2017 ACC/AHA Hypertension Guidelines.  Patient's A1C goal is less than or equal to 7 per 2020 ADA guidelines. Patient's most recent A1C result is at goal. Lab Results    Component                Value               Date                     HGBA1C                   5.8 (H)             09/21/2020          .       A1C remains well at goal (hgb 14.8). Occasional AC elevations per SMBGs.      Hypertension Plan  Continue current therapy.    Diabetes Plan  Medication change.  Proceed with metformin dose decrease as advised by Dr. Benton  Prescribed SMBG testing frequency. 3 times weekly up to once a day, vary timing and use meal-relationship labels  Provided patient education.  Discussed using SMBGs and A1C to assess glycemic control. Will tolerate occasional elevations given A1C well below goal. Discussed that may not receive significant glycemic lowering with metformin dose < 1000 mg per day. Discussed resuming current dose if SMBGs trend up with dose decrease.     Addressed patient questions and patient has my contact information if needed prior to next outreach. Patient verbalizes understanding.            There are no preventive care reminders to display for this patient.  There are no preventive care reminders to display for this patient.    Hypertension Medications             furosemide (LASIX) 20 MG tablet Take 1 tablet (20 mg total) by mouth every morning.    irbesartan (AVAPRO) 150 MG tablet TAKE 1 TABLET BY MOUTH EVERY DAY    metoprolol succinate (TOPROL-XL) 25 MG 24 hr tablet TAKE ONE TABLET BY MOUTH EVERY DAY    prazosin (MINIPRESS) 1 MG Cap Take 1 capsule (1 mg total) by mouth every evening.        Diabetes Medications             metFORMIN (GLUMETZA) 500 MG (MOD) 24hr tablet Take 1 tablet (500 mg total) by mouth daily with breakfast.    TRULICITY 1.5 mg/0.5 mL pen injector INJECT 1.5 MG INTO THE SKIN ONCE A WEEK

## 2020-09-23 ENCOUNTER — PATIENT MESSAGE (OUTPATIENT)
Dept: NEUROLOGY | Facility: CLINIC | Age: 65
End: 2020-09-23

## 2020-09-24 RX ORDER — METOPROLOL SUCCINATE 25 MG/1
TABLET, EXTENDED RELEASE ORAL
Qty: 90 TABLET | Refills: 3 | Status: SHIPPED | OUTPATIENT
Start: 2020-09-24 | End: 2021-09-14

## 2020-09-24 NOTE — PROGRESS NOTES
Refill Routing Note   Medication(s) are not appropriate for processing by [facility]:       - Non-participating provider           Medication reconciliation completed: No      Automatic Epic Generated Protocol Data:        Requested Prescriptions   Pending Prescriptions Disp Refills    metoprolol succinate (TOPROL-XL) 25 MG 24 hr tablet [Pharmacy Med Name: METOPROLOL ER SUCCINATE 25MG TABS] 90 tablet 1     Sig: TAKE 1 TABLET BY MOUTH EVERY DAY       Beta-Blockers Protocol Passed - 9/24/2020  3:38 AM        Passed - Visit with authorizing provider in past 12 months or upcoming 90 days         Passed - Blood Pressure below 139/89 on file in past 12 months      BP Readings from Last 3 Encounters:   09/14/20 106/70   08/17/20 108/70   07/09/20 116/86             Passed - Pulse rate greater than 50 on file in past 12 months      Last 3 pulse readings   09/14/20 (!) 54   08/17/20 62   07/09/20 73                   Appointments  past 12m or future 3m with PCP    Date Provider   Last Visit   9/14/2020 Power Benton, DO   Next Visit   10/9/2020 Power Benton, DO   ED visits in past 90 days: 0     Note composed:4:57 AM 09/24/2020

## 2020-09-28 ENCOUNTER — OFFICE VISIT (OUTPATIENT)
Dept: NEPHROLOGY | Facility: CLINIC | Age: 65
End: 2020-09-28
Payer: MEDICARE

## 2020-09-28 VITALS
DIASTOLIC BLOOD PRESSURE: 75 MMHG | HEIGHT: 71 IN | OXYGEN SATURATION: 99 % | WEIGHT: 201 LBS | BODY MASS INDEX: 28.14 KG/M2 | HEART RATE: 61 BPM | SYSTOLIC BLOOD PRESSURE: 130 MMHG

## 2020-09-28 DIAGNOSIS — G47.33 OSA (OBSTRUCTIVE SLEEP APNEA): ICD-10-CM

## 2020-09-28 DIAGNOSIS — Z79.4 TYPE 2 DIABETES MELLITUS WITH DIABETIC NEPHROPATHY, WITH LONG-TERM CURRENT USE OF INSULIN: ICD-10-CM

## 2020-09-28 DIAGNOSIS — N20.0 KIDNEY STONES: ICD-10-CM

## 2020-09-28 DIAGNOSIS — E78.2 MIXED HYPERLIPIDEMIA: ICD-10-CM

## 2020-09-28 DIAGNOSIS — J84.9 ILD (INTERSTITIAL LUNG DISEASE): ICD-10-CM

## 2020-09-28 DIAGNOSIS — E78.5 HYPERLIPIDEMIA, UNSPECIFIED HYPERLIPIDEMIA TYPE: ICD-10-CM

## 2020-09-28 DIAGNOSIS — I10 ESSENTIAL HYPERTENSION: ICD-10-CM

## 2020-09-28 DIAGNOSIS — Z87.442 HISTORY OF RENAL STONE: ICD-10-CM

## 2020-09-28 DIAGNOSIS — G47.30 HYPERSOMNIA WITH SLEEP APNEA: ICD-10-CM

## 2020-09-28 DIAGNOSIS — E11.3293 CONTROLLED TYPE 2 DIABETES MELLITUS WITH BOTH EYES AFFECTED BY MILD NONPROLIFERATIVE RETINOPATHY WITHOUT MACULAR EDEMA, WITH LONG-TERM CURRENT USE OF INSULIN: ICD-10-CM

## 2020-09-28 DIAGNOSIS — Z79.4 CONTROLLED TYPE 2 DIABETES MELLITUS WITH BOTH EYES AFFECTED BY MILD NONPROLIFERATIVE RETINOPATHY WITHOUT MACULAR EDEMA, WITH LONG-TERM CURRENT USE OF INSULIN: ICD-10-CM

## 2020-09-28 DIAGNOSIS — G63 POLYNEUROPATHY ASSOCIATED WITH UNDERLYING DISEASE: ICD-10-CM

## 2020-09-28 DIAGNOSIS — G47.10 HYPERSOMNIA WITH SLEEP APNEA: ICD-10-CM

## 2020-09-28 DIAGNOSIS — E66.9 OBESITY (BMI 30.0-34.9): ICD-10-CM

## 2020-09-28 DIAGNOSIS — R00.2 PALPITATIONS: ICD-10-CM

## 2020-09-28 DIAGNOSIS — N18.30 STAGE 3 CHRONIC KIDNEY DISEASE, UNSPECIFIED WHETHER STAGE 3A OR 3B CKD: Primary | ICD-10-CM

## 2020-09-28 DIAGNOSIS — D50.8 OTHER IRON DEFICIENCY ANEMIA: ICD-10-CM

## 2020-09-28 DIAGNOSIS — G47.33 SEVERE OBSTRUCTIVE SLEEP APNEA: ICD-10-CM

## 2020-09-28 DIAGNOSIS — E11.21 TYPE 2 DIABETES MELLITUS WITH DIABETIC NEPHROPATHY, WITH LONG-TERM CURRENT USE OF INSULIN: ICD-10-CM

## 2020-09-28 DIAGNOSIS — E11.40 TYPE 2 DIABETES MELLITUS WITH DIABETIC NEUROPATHY, WITHOUT LONG-TERM CURRENT USE OF INSULIN: ICD-10-CM

## 2020-09-28 PROCEDURE — 99999 PR PBB SHADOW E&M-EST. PATIENT-LVL III: CPT | Mod: PBBFAC,,, | Performed by: INTERNAL MEDICINE

## 2020-09-28 PROCEDURE — 99999 PR PBB SHADOW E&M-EST. PATIENT-LVL III: ICD-10-PCS | Mod: PBBFAC,,, | Performed by: INTERNAL MEDICINE

## 2020-09-28 PROCEDURE — 99214 PR OFFICE/OUTPT VISIT, EST, LEVL IV, 30-39 MIN: ICD-10-PCS | Mod: S$PBB,,, | Performed by: INTERNAL MEDICINE

## 2020-09-28 PROCEDURE — 99214 OFFICE O/P EST MOD 30 MIN: CPT | Mod: S$PBB,,, | Performed by: INTERNAL MEDICINE

## 2020-09-28 PROCEDURE — 99213 OFFICE O/P EST LOW 20 MIN: CPT | Mod: PBBFAC,PO | Performed by: INTERNAL MEDICINE

## 2020-09-28 NOTE — PROGRESS NOTES
Subjective:       Patient ID: Glenn Medel is a 65 y.o. White male who presents for follow-up evaluation of Chronic Kidney Disease    HPI    He reports he is doing well. He has intentionally lost weight. His Hba1c improved to 7.4 from >8. No LE and no SOB. No LUTS.     Interval history April 2019: he reports he is feeling well. No LE edema and no SOB. Weight is down a little. Last Hba1c was 6.6 He is asking about a podiatrist.     Interval history Feb 2020: Doing well. Last Hba1c was 6.2. Seeing Neurology for tremors. Had episode of hemorrhoids and also required circumcision. Needs new sleep doctor. He retired     Interval history Sep 2020: He has lost weight, Hba1c is down and he is on less insulin. No LE edema. He feels great    Review of Systems   Constitutional: Negative for activity change, appetite change, fatigue and unexpected weight change.   HENT: Negative for facial swelling.    Eyes: Negative for visual disturbance.   Respiratory: Negative for cough and shortness of breath.    Cardiovascular: Negative for chest pain and leg swelling.   Gastrointestinal: Negative for constipation and diarrhea.   Endocrine: Negative for polyuria.   Genitourinary: Positive for frequency. Negative for difficulty urinating, dysuria, hematuria and urgency.   Musculoskeletal: Negative for arthralgias.   Skin: Negative for rash.   Neurological: Negative for weakness and headaches.   Hematological: Does not bruise/bleed easily.   Psychiatric/Behavioral: Negative for decreased concentration.       Objective:      Physical Exam  Vitals signs and nursing note reviewed.   Constitutional:       General: He is not in acute distress.     Appearance: He is well-developed.   Eyes:      General: No scleral icterus.  Neck:      Vascular: No JVD.   Cardiovascular:      Heart sounds: S1 normal and S2 normal. No friction rub.   Pulmonary:      Breath sounds: Normal breath sounds.   Abdominal:      Palpations: Abdomen is soft.   Skin:      General: Skin is warm and dry.      Findings: No erythema.   Neurological:      Mental Status: He is alert and oriented to person, place, and time.         Assessment:       1. Stage 3 chronic kidney disease, unspecified whether stage 3a or 3b CKD    2. Type 2 diabetes mellitus with diabetic nephropathy, with long-term current use of insulin    3. Essential hypertension    4. ILD (interstitial lung disease)    5. 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, with long-term current use of insulin    6. Mixed hyperlipidemia    7. LOVE (obstructive sleep apnea)    8. Severe obstructive sleep apnea    9. Polyneuropathy associated with underlying disease    10. History of renal stone    11. Hyperlipidemia, unspecified hyperlipidemia type    12. Kidney stones    13. Other iron deficiency anemia    14. Obesity (BMI 30.0-34.9)    15. Hypersomnia with sleep apnea, unspecified    16. Type 2 diabetes mellitus with diabetic neuropathy, without long-term current use of insulin    17. Palpitations        Plan:             CKD 2/3 is stable with only 90mg of proteinuria. Continue RAAS blockade (irbesartan) for renal preservation    HTN is controlled    Mineral and Bone Disease--continue D3    DM--much improved and controlled. Continue Endocrine follow up        RTC 6 mo

## 2020-10-06 ENCOUNTER — LAB VISIT (OUTPATIENT)
Dept: LAB | Facility: HOSPITAL | Age: 65
End: 2020-10-06
Attending: INTERNAL MEDICINE
Payer: MEDICARE

## 2020-10-06 DIAGNOSIS — E11.39 CONTROLLED TYPE 2 DIABETES MELLITUS WITH OTHER OPHTHALMIC COMPLICATION, WITH LONG-TERM CURRENT USE OF INSULIN: ICD-10-CM

## 2020-10-06 DIAGNOSIS — I10 ESSENTIAL HYPERTENSION: ICD-10-CM

## 2020-10-06 DIAGNOSIS — Z79.4 CONTROLLED TYPE 2 DIABETES MELLITUS WITH OTHER OPHTHALMIC COMPLICATION, WITH LONG-TERM CURRENT USE OF INSULIN: ICD-10-CM

## 2020-10-06 LAB
ANION GAP SERPL CALC-SCNC: 9 MMOL/L (ref 8–16)
BUN SERPL-MCNC: 14 MG/DL (ref 8–23)
CALCIUM SERPL-MCNC: 9 MG/DL (ref 8.7–10.5)
CHLORIDE SERPL-SCNC: 104 MMOL/L (ref 95–110)
CO2 SERPL-SCNC: 27 MMOL/L (ref 23–29)
CREAT SERPL-MCNC: 1.1 MG/DL (ref 0.5–1.4)
EST. GFR  (AFRICAN AMERICAN): >60 ML/MIN/1.73 M^2
EST. GFR  (NON AFRICAN AMERICAN): >60 ML/MIN/1.73 M^2
ESTIMATED AVG GLUCOSE: 123 MG/DL (ref 68–131)
GLUCOSE SERPL-MCNC: 124 MG/DL (ref 70–110)
HBA1C MFR BLD HPLC: 5.9 % (ref 4–5.6)
POTASSIUM SERPL-SCNC: 4.8 MMOL/L (ref 3.5–5.1)
SODIUM SERPL-SCNC: 140 MMOL/L (ref 136–145)

## 2020-10-06 PROCEDURE — 80048 BASIC METABOLIC PNL TOTAL CA: CPT

## 2020-10-06 PROCEDURE — 36415 COLL VENOUS BLD VENIPUNCTURE: CPT | Mod: PO

## 2020-10-06 PROCEDURE — 83036 HEMOGLOBIN GLYCOSYLATED A1C: CPT

## 2020-10-07 ENCOUNTER — DOCUMENTATION ONLY (OUTPATIENT)
Dept: FAMILY MEDICINE | Facility: CLINIC | Age: 65
End: 2020-10-07

## 2020-10-07 ENCOUNTER — PATIENT MESSAGE (OUTPATIENT)
Dept: NEPHROLOGY | Facility: CLINIC | Age: 65
End: 2020-10-07

## 2020-10-09 ENCOUNTER — OFFICE VISIT (OUTPATIENT)
Dept: FAMILY MEDICINE | Facility: CLINIC | Age: 65
End: 2020-10-09
Payer: MEDICARE

## 2020-10-09 VITALS
HEIGHT: 71 IN | DIASTOLIC BLOOD PRESSURE: 70 MMHG | SYSTOLIC BLOOD PRESSURE: 102 MMHG | WEIGHT: 201.5 LBS | OXYGEN SATURATION: 95 % | BODY MASS INDEX: 28.21 KG/M2 | HEART RATE: 60 BPM

## 2020-10-09 DIAGNOSIS — M25.511 RIGHT SHOULDER PAIN, UNSPECIFIED CHRONICITY: Primary | ICD-10-CM

## 2020-10-09 DIAGNOSIS — E11.40 TYPE 2 DIABETES MELLITUS WITH DIABETIC NEUROPATHY, WITHOUT LONG-TERM CURRENT USE OF INSULIN: ICD-10-CM

## 2020-10-09 DIAGNOSIS — E78.2 MIXED HYPERLIPIDEMIA: ICD-10-CM

## 2020-10-09 DIAGNOSIS — M25.512 ACUTE PAIN OF BOTH SHOULDERS: ICD-10-CM

## 2020-10-09 DIAGNOSIS — I10 ESSENTIAL HYPERTENSION: Primary | ICD-10-CM

## 2020-10-09 DIAGNOSIS — M25.511 ACUTE PAIN OF BOTH SHOULDERS: ICD-10-CM

## 2020-10-09 PROCEDURE — 99999 PR PBB SHADOW E&M-EST. PATIENT-LVL V: ICD-10-PCS | Mod: PBBFAC,,, | Performed by: INTERNAL MEDICINE

## 2020-10-09 PROCEDURE — 99213 PR OFFICE/OUTPT VISIT, EST, LEVL III, 20-29 MIN: ICD-10-PCS | Mod: S$PBB,,, | Performed by: INTERNAL MEDICINE

## 2020-10-09 PROCEDURE — 99999 PR PBB SHADOW E&M-EST. PATIENT-LVL V: CPT | Mod: PBBFAC,,, | Performed by: INTERNAL MEDICINE

## 2020-10-09 PROCEDURE — 99213 OFFICE O/P EST LOW 20 MIN: CPT | Mod: S$PBB,,, | Performed by: INTERNAL MEDICINE

## 2020-10-09 PROCEDURE — 99215 OFFICE O/P EST HI 40 MIN: CPT | Mod: PBBFAC,PO | Performed by: INTERNAL MEDICINE

## 2020-10-09 NOTE — PROGRESS NOTES
Subjective:       Patient ID: Glenn Medel is a 65 y.o. male.    Chief Complaint: Hypertension      Social Hx:  From [place]. . Has 1 son and 1 daughter. Retired construction management.    PMH:  1.  Type 2 diabetes mellitus:  Metformin and Trulicity  2.  Hypertension:  irbesartan and metoprolol  3.  Hyperlipidemia:  Atorvastatin  4.  Anxiety and depression:  Prozac, p.r.n. clonazepam, prazosin  5.  Insomnia:  Melatonin and clonazepam  6.  LOVE  7.  Abnormal involuntary movements:   carbidopa-levodopa. Has seen neurology.  Considered to not be classic parkinsonian syndrome.    HPI:   Still having same issues of difficulty stopping when walking, falling backwards is a constant problem. Still having active dreaming. Has not fallen out of bed x 3 wks. Sinemet has not made much difference.     Has BL shoulder pain for about 1.5 months. Could have been 2/2 falls. Worse with shoulder abduction. Ibuprofen helps. Hurts to sleep on them. At worst is 8/10. Baseline 4/10.     A/P:  Shoulder pain likely msk. Oral NSAIDs not best option with hx of renal dysfunction. Will trial topical diclofenac and ref ortho.    Movement disorder still baffling, apt with neuro on 29th  HTN controlled.   DM controlled controlled oct 2020  HLD controlled sept 2020    Health Maintenance:         Current Outpatient Medications on File Prior to Visit   Medication Sig Dispense Refill    aspirin 81 MG Chew Take 81 mg by mouth once daily.      atorvastatin (LIPITOR) 20 MG tablet Take 1 tablet (20 mg total) by mouth once daily. 90 tablet 3    carbidopa-levodopa  mg (SINEMET)  mg per tablet Take 1 tablet by mouth 3 (three) times daily. 90 tablet 11    cholecalciferol, vitamin D3, (D3-2000 ORAL) Take by mouth.      clonazePAM (KLONOPIN) 1 MG tablet Take 1 tablet (1 mg total) by mouth nightly. 30 tablet 0    FLUoxetine 40 MG capsule Take 1 capsule (40 mg total) by mouth once daily. 90 capsule 1    furosemide (LASIX) 20 MG tablet  Take 1 tablet (20 mg total) by mouth every morning. 90 tablet 3    irbesartan (AVAPRO) 150 MG tablet TAKE 1 TABLET BY MOUTH EVERY DAY 90 tablet 3    melatonin 5 mg Subl Take 10 mg by mouth nightly.   11    metFORMIN (GLUMETZA) 500 MG (MOD) 24hr tablet Take 1 tablet (500 mg total) by mouth daily with breakfast. 90 tablet 3    metoprolol succinate (TOPROL-XL) 25 MG 24 hr tablet TAKE 1 TABLET BY MOUTH EVERY DAY 90 tablet 3    mupirocin (BACTROBAN) 2 % ointment APPLY TOPICALLY BID FOR 10 DAYS      ondansetron (ZOFRAN-ODT) 4 MG TbDL ondansetron 4 mg disintegrating tablet   DISSOLVE 1 T ON THE TONGUE Q 8 H PRN NAUSEA      prazosin (MINIPRESS) 1 MG Cap TAKE 1 CAPSULE(1 MG) BY MOUTH EVERY EVENING 90 capsule 3    SIMBRINZA 1-0.2 % DrpS Shake Liquid AND Instill one drop into BOTH eyes twice daily 8 mL 3    traMADoL (ULTRAM) 50 mg tablet Take 1 tablet (50 mg total) by mouth every 12 (twelve) hours as needed for Pain. 30 tablet 0    TRULICITY 1.5 mg/0.5 mL pen injector INJECT 1.5 MG INTO THE SKIN ONCE A WEEK 2 mL 5     Current Facility-Administered Medications on File Prior to Visit   Medication Dose Route Frequency Provider Last Rate Last Dose    diphenhydrAMINE capsule 25 mg  25 mg Oral Q6H PRN Leonila Richardson MD        metronidazole IVPB 500 mg  500 mg Intravenous Once Kaz Keating MD         I personally reviewed past medical, family and social history.  Review of Systems   Constitutional: Negative for activity change and fever.   HENT: Negative for sore throat and trouble swallowing.    Eyes: Negative for pain and visual disturbance.   Respiratory: Negative for cough, shortness of breath and wheezing.    Cardiovascular: Negative for chest pain, palpitations and leg swelling.   Gastrointestinal: Negative for abdominal pain, blood in stool, diarrhea, nausea and vomiting.   Endocrine: Negative for cold intolerance and polyuria.   Genitourinary: Negative for decreased urine volume and dysuria.    Musculoskeletal: Negative for gait problem and neck pain.        Shoulder pain    Skin: Negative for rash.   Neurological: Negative for dizziness, syncope and light-headedness.        Balance issues   Psychiatric/Behavioral: Negative for dysphoric mood. The patient is not nervous/anxious.          Objective:     Vitals:    10/09/20 0827   BP: 102/70   Pulse: 60        Physical Exam  Constitutional:       General: He is not in acute distress.     Appearance: He is well-developed.   HENT:      Head: Normocephalic and atraumatic.   Eyes:      Pupils: Pupils are equal, round, and reactive to light.   Neck:      Musculoskeletal: Neck supple.      Thyroid: No thyromegaly.   Cardiovascular:      Rate and Rhythm: Normal rate and regular rhythm.      Heart sounds: Normal heart sounds. No murmur. No friction rub. No gallop.    Pulmonary:      Effort: Pulmonary effort is normal. No respiratory distress.      Breath sounds: Normal breath sounds. No wheezing.   Abdominal:      General: Bowel sounds are normal.      Palpations: Abdomen is soft.      Tenderness: There is no abdominal tenderness.   Musculoskeletal:      Comments: Decreased ROM in shoulders BL. Painful in active abduction and passive internal rotation and extension. Neg pain with resisted bicep flexion    Skin:     General: Skin is warm.      Findings: No rash.   Neurological:      Mental Status: He is alert and oriented to person, place, and time.      Cranial Nerves: No cranial nerve deficit.   Psychiatric:         Behavior: Behavior normal.           Assessment/Plan    was seen today for hypertension.    Diagnoses and all orders for this visit:    Essential hypertension    Mixed hyperlipidemia    Acute pain of both shoulders  -     Ambulatory referral/consult to Orthopedics; Future    Type 2 diabetes mellitus with diabetic neuropathy, without long-term current use of insulin

## 2020-10-12 ENCOUNTER — HOSPITAL ENCOUNTER (OUTPATIENT)
Dept: RADIOLOGY | Facility: HOSPITAL | Age: 65
Discharge: HOME | End: 2020-10-12
Attending: ORTHOPAEDIC SURGERY
Payer: MEDICARE

## 2020-10-12 ENCOUNTER — OFFICE VISIT (OUTPATIENT)
Dept: ORTHOPEDICS | Facility: CLINIC | Age: 65
End: 2020-10-12
Payer: MEDICARE

## 2020-10-12 VITALS — BODY MASS INDEX: 28.14 KG/M2 | WEIGHT: 201 LBS | HEIGHT: 71 IN

## 2020-10-12 DIAGNOSIS — M25.511 ACUTE PAIN OF BOTH SHOULDERS: ICD-10-CM

## 2020-10-12 DIAGNOSIS — M25.511 RIGHT SHOULDER PAIN, UNSPECIFIED CHRONICITY: ICD-10-CM

## 2020-10-12 DIAGNOSIS — M25.512 ACUTE PAIN OF BOTH SHOULDERS: ICD-10-CM

## 2020-10-12 DIAGNOSIS — N18.30 STAGE 3 CHRONIC KIDNEY DISEASE, UNSPECIFIED WHETHER STAGE 3A OR 3B CKD: Primary | ICD-10-CM

## 2020-10-12 DIAGNOSIS — M25.512 LEFT SHOULDER PAIN, UNSPECIFIED CHRONICITY: ICD-10-CM

## 2020-10-12 DIAGNOSIS — E11.21 TYPE 2 DIABETES MELLITUS WITH DIABETIC NEPHROPATHY, WITH LONG-TERM CURRENT USE OF INSULIN: ICD-10-CM

## 2020-10-12 DIAGNOSIS — Z79.4 TYPE 2 DIABETES MELLITUS WITH DIABETIC NEPHROPATHY, WITH LONG-TERM CURRENT USE OF INSULIN: ICD-10-CM

## 2020-10-12 PROCEDURE — 99204 PR OFFICE/OUTPT VISIT, NEW, LEVL IV, 45-59 MIN: ICD-10-PCS | Mod: 25,S$PBB,, | Performed by: ORTHOPAEDIC SURGERY

## 2020-10-12 PROCEDURE — 99999 PR PBB SHADOW E&M-EST. PATIENT-LVL IV: ICD-10-PCS | Mod: PBBFAC,,, | Performed by: ORTHOPAEDIC SURGERY

## 2020-10-12 PROCEDURE — 99214 OFFICE O/P EST MOD 30 MIN: CPT | Mod: PBBFAC,25,PN | Performed by: ORTHOPAEDIC SURGERY

## 2020-10-12 PROCEDURE — 20610 LARGE JOINT ASPIRATION/INJECTION: R SUBACROMIAL BURSA: ICD-10-PCS | Mod: S$PBB,RT,, | Performed by: ORTHOPAEDIC SURGERY

## 2020-10-12 PROCEDURE — 73030 X-RAY EXAM OF SHOULDER: CPT | Mod: 26,50,, | Performed by: RADIOLOGY

## 2020-10-12 PROCEDURE — 73030 XR SHOULDER TRAUMA 3 VIEW BILATERAL: ICD-10-PCS | Mod: 26,50,, | Performed by: RADIOLOGY

## 2020-10-12 PROCEDURE — 73030 X-RAY EXAM OF SHOULDER: CPT | Mod: TC,50,PO

## 2020-10-12 PROCEDURE — 20610 DRAIN/INJ JOINT/BURSA W/O US: CPT | Mod: PBBFAC,PN | Performed by: ORTHOPAEDIC SURGERY

## 2020-10-12 PROCEDURE — 99204 OFFICE O/P NEW MOD 45 MIN: CPT | Mod: 25,S$PBB,, | Performed by: ORTHOPAEDIC SURGERY

## 2020-10-12 PROCEDURE — 99999 PR PBB SHADOW E&M-EST. PATIENT-LVL IV: CPT | Mod: PBBFAC,,, | Performed by: ORTHOPAEDIC SURGERY

## 2020-10-12 RX ORDER — TRIAMCINOLONE ACETONIDE 40 MG/ML
80 INJECTION, SUSPENSION INTRA-ARTICULAR; INTRAMUSCULAR
Status: DISCONTINUED | OUTPATIENT
Start: 2020-10-12 | End: 2020-10-12 | Stop reason: HOSPADM

## 2020-10-12 RX ADMIN — TRIAMCINOLONE ACETONIDE 80 MG: 40 INJECTION, SUSPENSION INTRA-ARTICULAR; INTRAMUSCULAR at 09:10

## 2020-10-12 NOTE — PROGRESS NOTES
65 years old complaining of right greater than left shoulder pain which has had now for a few months time on the right in about a month's time left.  Feels that it started after he had a fall.  Describes the pain as sharp and 4 on good days and 10 on bad days.  Advil seems to help.  Patient reports to have had a shot several months ago which did provide relief as well.    Exam shows positive Neer Harp impingement sign with weak and painful cuff strength, no signs infection, cervical motion does not seem to reproduce his symptoms today    X-rays show calcific tendinitis    Assessment:  Bilateral shoulder calcific tendinitis    Plan:  Kenalog injection into the right shoulder, physical therapy, follow up as needed    Further History  Aching pain  Worse with activity  Relieved with rest  No other associated symptoms  No other radiation    Further Exam  Alert and oriented  Pleasant  Contralateral limb has appropriate range of motion for age and condition  Contralateral limb has appropriate strength for age and condition  Contralateral limb has appropriate stability  for age and condition  No adenopathy  Pulses are appropriate for current condition  Skin is intact        Chief Complaint    Chief Complaint   Patient presents with    Right Shoulder - Pain    Left Shoulder - Pain       HPI  Glenn Medel is a 65 y.o.  male who presents with       Past Medical History  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, with long-term current use of insulin 12/20/2018    DDD (degenerative disc disease), lumbar     s/p epidural steroids     Diabetes mellitus, stable     Glaucoma     OU    HTN (hypertension)     Iritis, lens-induced 1/8/2018    Obesity     LOVE on CPAP        Past Surgical History  Past Surgical History:   Procedure  Laterality Date    CATARACT EXTRACTION W/  INTRAOCULAR LENS IMPLANT Left 11/30/2017    CATARACT EXTRACTION W/  INTRAOCULAR LENS IMPLANT Right 12/11/2018    Dr Garcia    CATARACT EXTRACTION W/  INTRAOCULAR LENS IMPLANT Right 12/11/2018    Procedure: EXTRACTION, CATARACT, WITH IOL INSERTION;  Surgeon: Damaris Garcia MD;  Location: [facility] OR;  Service: Ophthalmology;  Laterality: Right;    COLONOSCOPY N/A 9/18/2017    Procedure: COLONOSCOPY;  Surgeon: Paul Feliz Jr., MD;  Location: [facility] ENDO;  Service: Endoscopy;  Laterality: N/A;    COLONOSCOPY W/ POLYPECTOMY  04/13/2010    YARELI.   One 1 cm polyp in the sigmoid colon.  Internal hemorrhoids.    COSMETIC SURGERY      DIGITAL RECTAL EXAMINATION UNDER ANESTHESIA N/A 7/23/2019    Procedure: EXAM UNDER ANESTHESIA, DIGITAL, RECTUM;  Surgeon: Kaz Keating MD;  Location: [facility] OR;  Service: General;  Laterality: N/A;    EXAMINATION UNDER ANESTHESIA N/A 4/24/2019    Procedure: EXAM UNDER ANESTHESIA;  Surgeon: Kaz Keating MD;  Location: [facility] OR;  Service: General;  Laterality: N/A;    EXCISIONAL HEMORRHOIDECTOMY N/A 4/24/2019    Procedure: HEMORRHOIDECTOMY (lithotomy);  Surgeon: Kaz Keating MD;  Location: [facility] OR;  Service: General;  Laterality: N/A;    GANGLION CYST EXCISION Left     INJECTION OF ANESTHETIC AGENT AROUND PUDENDAL NERVE N/A 4/24/2019    Procedure: BLOCK, NERVE, PUDENDAL;  Surgeon: Kaz Keating MD;  Location: [facility] OR;  Service: General;  Laterality: N/A;    INJECTION OF ANESTHETIC AGENT AROUND PUDENDAL NERVE N/A 7/23/2019    Procedure: BLOCK, NERVE, PUDENDAL;  Surgeon: Kaz Keating MD;  Location: [facility] OR;  Service: General;  Laterality: N/A;    REPAIR OF RETINAL DETACHMENT WITH VITRECTOMY Right 8/1/2018    Procedure: REPAIR, RETINAL DETACHMENT, WITH VITRECTOMY;  Surgeon: SAJAN Pulido MD;  Location: [address];  Service: Ophthalmology;  Laterality: Right;  35 min    TONSILLECTOMY      TRIGGER FINGER  RELEASE Right     X 2    uro lift      for enlarged prostate       Medications  Current Outpatient Medications   Medication Sig    aspirin 81 MG Chew Take 81 mg by mouth once daily.    atorvastatin (LIPITOR) 20 MG tablet Take 1 tablet (20 mg total) by mouth once daily.    carbidopa-levodopa  mg (SINEMET)  mg per tablet Take 1 tablet by mouth 3 (three) times daily.    cholecalciferol, vitamin D3, (D3-2000 ORAL) Take by mouth.    clonazePAM (KLONOPIN) 1 MG tablet Take 1 tablet (1 mg total) by mouth nightly.    FLUoxetine 40 MG capsule Take 1 capsule (40 mg total) by mouth once daily.    furosemide (LASIX) 20 MG tablet Take 1 tablet (20 mg total) by mouth every morning.    irbesartan (AVAPRO) 150 MG tablet TAKE 1 TABLET BY MOUTH EVERY DAY    melatonin 5 mg Subl Take 10 mg by mouth nightly.     metFORMIN (GLUMETZA) 500 MG (MOD) 24hr tablet Take 1 tablet (500 mg total) by mouth daily with breakfast.    metoprolol succinate (TOPROL-XL) 25 MG 24 hr tablet TAKE 1 TABLET BY MOUTH EVERY DAY    mupirocin (BACTROBAN) 2 % ointment APPLY TOPICALLY BID FOR 10 DAYS    ondansetron (ZOFRAN-ODT) 4 MG TbDL ondansetron 4 mg disintegrating tablet   DISSOLVE 1 T ON THE TONGUE Q 8 H PRN NAUSEA    prazosin (MINIPRESS) 1 MG Cap TAKE 1 CAPSULE(1 MG) BY MOUTH EVERY EVENING    SIMBRINZA 1-0.2 % DrpS Shake Liquid AND Instill one drop into BOTH eyes twice daily    traMADoL (ULTRAM) 50 mg tablet Take 1 tablet (50 mg total) by mouth every 12 (twelve) hours as needed for Pain.    TRULICITY 1.5 mg/0.5 mL pen injector INJECT 1.5 MG INTO THE SKIN ONCE A WEEK     Current Facility-Administered Medications   Medication    metronidazole IVPB 500 mg     Facility-Administered Medications Ordered in Other Visits   Medication    diphenhydrAMINE capsule 25 mg       Allergies  Review of patient's allergies indicates:   Allergen Reactions    Avelox [moxifloxacin] Hives and Rash       Family History  Family History   Problem  Relation Age of Onset    Heart disease Brother 62    Kidney disease Brother     Heart disease Father     Diabetes Father     Colon cancer Father     Cancer Father         colon    Cataracts Father     Hypertension Mother     Diabetes Mother     Stroke Mother     Cancer Mother         colon    Kidney disease Mother     Glaucoma Maternal Grandmother     No Known Problems Sister     No Known Problems Maternal Aunt     No Known Problems Maternal Uncle     No Known Problems Paternal Aunt     No Known Problems Paternal Uncle     No Known Problems Maternal Grandfather     No Known Problems Paternal Grandmother     No Known Problems Paternal Grandfather     Dementia Brother     Abnormal EKG Brother     Amblyopia Neg Hx     Blindness Neg Hx     Macular degeneration Neg Hx     Retinal detachment Neg Hx     Strabismus Neg Hx     Thyroid disease Neg Hx     Parkinsonism Neg Hx        Social History  Social History     Socioeconomic History    Marital status:      Spouse name: Not on file    Number of children: Not on file    Years of education: Not on file    Highest education level: Not on file   Occupational History    Not on file   Social Needs    Financial resource strain: Not hard at all    Food insecurity     Worry: Never true     Inability: Never true    Transportation needs     Medical: No     Non-medical: No   Tobacco Use    Smoking status: Never Smoker    Smokeless tobacco: Never Used   Substance and Sexual Activity    Alcohol use: Yes     Frequency: Monthly or less     Drinks per session: 1 or 2     Binge frequency: Never     Comment: occasional, no alcohol 72 hours prior to sx    Drug use: No    Sexual activity: Yes     Partners: Female   Lifestyle    Physical activity     Days per week: 0 days     Minutes per session: 0 min    Stress: Very much   Relationships    Social connections     Talks on phone: Patient refused     Gets together: Patient refused     Attends  Christian service: Not on file     Active member of club or organization: No     Attends meetings of clubs or organizations: Never     Relationship status:    Other Topics Concern    Not on file   Social History Narrative            2 grown kids.         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                Review of Systems     Constitutional: Negative    HENT: Negative  Eyes: Negative  Respiratory: Negative  Cardiovascular: Negative  Musculoskeletal: HPI  Skin: Negative  Neurological: Negative  Hematological: Negative  Endocrine: Negative                 Physical Exam    There were no vitals filed for this visit.  Body mass index is 28.03 kg/m².  Physical Examination:     General appearance -  well appearing, and in no distress  Mental status - awake  Neck - supple  Chest -  symmetric air entry  Heart - normal rate   Abdomen - soft      Assessment     1. Stage 3 chronic kidney disease, unspecified whether stage 3a or 3b CKD    2. Acute pain of both shoulders    3. Type 2 diabetes mellitus with diabetic nephropathy, with long-term current use of insulin          Plan

## 2020-10-12 NOTE — PROCEDURES
Large Joint Aspiration/Injection: R subacromial bursa    Date/Time: 10/12/2020 9:30 AM  Performed by: Saturnino Hodges MD  Authorized by: Saturnino Hodges MD     Consent Done?:  Yes (Verbal)  Site marked: the procedure site was marked    Prep: patient was prepped and draped in usual sterile fashion      Details:  Needle Size:  21 G  Approach:  Posterior  Location:  Shoulder  Site:  R subacromial bursa  Medications:  80 mg triamcinolone acetonide 40 mg/mL  Patient tolerance:  Patient tolerated the procedure well with no immediate complications

## 2020-10-12 NOTE — LETTER
October 12, 2020      Power Benton,   [address] [facility] [address] [place] [address]           [facility]  [address] [place] [address]  Phone: [phone]          Patient: Glenn Medel   MR Number: [id number]   YOB: 1955   Date of Visit: 10/12/2020       Dear Dr. Power Benton:    Thank you for referring Glenn Medel to me for evaluation. Attached you will find relevant portions of my assessment and plan of care.    If you have questions, please do not hesitate to call me. I look forward to following Glenn Medel along with you.    Sincerely,    Saturnino Hodges MD    Enclosure  CC:  No Recipients    If you would like to receive this communication electronically, please contact [email or web address] or [phone] to request more information on [facility] Link access.    For providers and/or their staff who would like to refer a patient to [facility], please contact us through our one-stop-shop provider referral line, [facility] , at [phone].    If you feel you have received this communication in error or would no longer like to receive these types of communications, please e-mail [email or web address]

## 2020-10-15 ENCOUNTER — PATIENT OUTREACH (OUTPATIENT)
Dept: OTHER | Facility: OTHER | Age: 65
End: 2020-10-15

## 2020-10-15 NOTE — PROGRESS NOTES
Digital Medicine: Clinician Follow-Up    Following up metformin dose decrease advised by Dr. Benton.  Discussed elevated BG reading of 234 mg/dL 10/7. States he ate lunch out for his anniversary (Grilled shrimp platter (gumbo with rice, crab meat au gratin, grilled shrimp, stuffed baked potato, shrimp stew, hush puppies), chose not to eat dessert).  He reports cortisone injection for shoulder earlier this week and believes it is affecting his BG.    The history is provided by the patient.   Follow-up reason(s): routine follow up.     Hypertension    Patient's blood pressure is stable.   Diabetes    Patient's blood glucose is stable.   Patient is not experiencing signs/symptoms of hypotension.  Patient is not experiencing signs/symptoms of hypertension.  Patient is not experiencing signs/symptoms of hypoglycemia.  Patient is not experiencing signs/symptoms of hyperglycemia.    Patient did make medication change.    Is patient tolerating med change? yes            Last 5 Patient Entered Readings                                      Current 30 Day Average: 121/78     Recent Readings 10/14/2020 10/14/2020 10/9/2020 10/6/2020 9/28/2020    SBP (mmHg) 112 143 125 125 123    DBP (mmHg) 66 87 76 82 77    Pulse 67 54 64 58 61        Last 6 Patient Entered Readings                                     Most Recent A1c: 5.9% on 10/6/2020  (Goal: 7%)     Recent Readings 10/15/2020 10/14/2020 10/11/2020 10/9/2020 10/8/2020    Blood Glucose (mg/dL) 140 150 133 128 115               Depression Screening  Did not address depression screening.    Sleep Apnea Screening    Did not address sleep apnea screening.     Medication Affordability Screening  Did not address medication affordability screening.     Medication Adherence-Medication adherence was assessed.          ASSESSMENT(S)  Patients BP average is 121/78 mmHg, which is at goal. Patient's BP goal is less than or equal to 130/80.  Patient's A1C goal is less than or equal to 7.  Patient's most recent A1C result is at goal. Lab Results    Component                Value               Date                     HGBA1C                   5.9 (H)             10/06/2020          .       SMBGs overall stable since metformin dose decrease      Hypertension Plan  Continue current therapy.    Diabetes Plan  Prescribed SMBG testing frequency. 3 times weekly up to once a day, vary timing and use meal-relationship labels  Additional monitoring needed.  Continue current therapy.  Discussed would expect less effect of cortisone injection on BG than oral steroids but to be mindful of CHO intake for this week and next. As previously noted, given A1C well below goal, will tolerate occasional BG spikes. Discussed resuming metformin 1000 mg daily if BG consistently elevated.     Addressed patient questions and patient has my contact information if needed prior to next outreach. Patient verbalizes understanding.             There are no preventive care reminders to display for this patient.  There are no preventive care reminders to display for this patient.      Hypertension Medications             furosemide (LASIX) 20 MG tablet Take 1 tablet (20 mg total) by mouth every morning.    irbesartan (AVAPRO) 150 MG tablet TAKE 1 TABLET BY MOUTH EVERY DAY    metoprolol succinate (TOPROL-XL) 25 MG 24 hr tablet TAKE 1 TABLET BY MOUTH EVERY DAY    prazosin (MINIPRESS) 1 MG Cap TAKE 1 CAPSULE(1 MG) BY MOUTH EVERY EVENING        Diabetes Medications             metFORMIN (GLUMETZA) 500 MG (MOD) 24hr tablet Take 1 tablet (500 mg total) by mouth daily with breakfast.    TRULICITY 1.5 mg/0.5 mL pen injector INJECT 1.5 MG INTO THE SKIN ONCE A WEEK

## 2020-10-29 ENCOUNTER — OFFICE VISIT (OUTPATIENT)
Dept: NEUROLOGY | Facility: CLINIC | Age: 65
End: 2020-10-29
Payer: MEDICARE

## 2020-10-29 VITALS
DIASTOLIC BLOOD PRESSURE: 66 MMHG | BODY MASS INDEX: 28.03 KG/M2 | SYSTOLIC BLOOD PRESSURE: 105 MMHG | HEIGHT: 71 IN | HEART RATE: 66 BPM

## 2020-10-29 DIAGNOSIS — R26.9 ABNORMAL GAIT: ICD-10-CM

## 2020-10-29 DIAGNOSIS — G20.C PARKINSONISM, UNSPECIFIED PARKINSONISM TYPE: ICD-10-CM

## 2020-10-29 PROCEDURE — 99214 OFFICE O/P EST MOD 30 MIN: CPT | Mod: S$PBB,,, | Performed by: PSYCHIATRY & NEUROLOGY

## 2020-10-29 PROCEDURE — 99213 OFFICE O/P EST LOW 20 MIN: CPT | Mod: PBBFAC | Performed by: PSYCHIATRY & NEUROLOGY

## 2020-10-29 PROCEDURE — 99999 PR PBB SHADOW E&M-EST. PATIENT-LVL III: ICD-10-PCS | Mod: PBBFAC,,, | Performed by: PSYCHIATRY & NEUROLOGY

## 2020-10-29 PROCEDURE — 99999 PR PBB SHADOW E&M-EST. PATIENT-LVL III: CPT | Mod: PBBFAC,,, | Performed by: PSYCHIATRY & NEUROLOGY

## 2020-10-29 PROCEDURE — 99214 PR OFFICE/OUTPT VISIT, EST, LEVL IV, 30-39 MIN: ICD-10-PCS | Mod: S$PBB,,, | Performed by: PSYCHIATRY & NEUROLOGY

## 2020-10-29 RX ORDER — RASAGILINE 1 MG/1
1 TABLET ORAL NIGHTLY
Qty: 30 TABLET | Refills: 12 | Status: SHIPPED | OUTPATIENT
Start: 2020-10-29 | End: 2021-11-22

## 2020-10-29 NOTE — PROGRESS NOTES
"[facility]    Notes:   Glenn STOCKTON Chief Complaints during this visit:  New Patient visit for  Parkinsonism.   Coni Cee MD  [address] [facility] [place] [address]    Primary Care Physician  Power Benton, DO  [address] [place] [address]      Interval Hx    Since last visit,   Continues to have tremor but significantly helped by carbidopa/levodopa 25/100mg 1 tab PO TID   No side effects noted    Gait continues to deteriorate    Falls: once a month  Assist Device: none  Walks 2 miles without stopping  With stairs he must hand onto railing    Reports a festinating gait at times  Also reports "walking on tip toe" at times    No toe cramping.  R hand fingers cramp- he notes fingers stay closed at times  Several surgeries due to this have not resolved this issue    MRI brain revealed a possible posterior fossa arachnoid cyst - followed by Dr. Saldivar - has yet to schedule interval MRI    Brother with dementia since age 60 - mild hand tremors and some issues walking  2 sisters/2 brothers healthy  Mother with dementia at age 80 - also hand hand dystonia  Father also reported fingers getting stuck    Ceruloplasmin NL    Neuropsych revealed mild cognitive impairments but no diagnosis    PD Review of Symptoms:  Anosmia: some stable  Dysarthria/Hypophonia: none  Dysphagia/Sialorrhea: none  Depression: yes  Cognitive slowing: as above  Hallucinations: none  Urinary changes: none  Constipation: yes  Orthostasis: at times  Falls: occasional  Freezing: none  Micrographia: none  Sleep issues:  -RBD: 1 year of REM sleep dz      History of present illness:   65 y.o.  male seen in consultation at the request of  Dr. Martinez for evaluation of parkinsonism.      Falls often, onto knee.  Right hand will suddenly start shaking.  Memory a little worse.  Needs new mask for his LOVE, hasn't used in a while.  Not sure who to see.    Has DM, had an emg about 2 years ago.      1/20/20 Juan:  Glenn Medel is a " 64 y.o. right-handed male with DM2, HTN, LOVE, anxiety and depression who presents today for an initial evaluation of tremos and balance and is accompanied by wife.    Tremor- started in RH a couple of years ago but seemed to have gotten worse. Will watch TV and ev hands shake. Started noting in LH in the last year. Also notes shaking in legs for several years. He says the legs in fact started before the hands. Nothing makes it better. Not noted any aggravating factors. Drinks very little alcohol. Has not noted benefit with alcohol. Handwriting is pretty good. No changes with this.    Balance- sometimes will be working on knees and then fall back when he goes to get up. Sometimes with walking, he will wobble. No falls while walking. Has fallen backwards when he is trying to get up from being on knees. Has noted balance issues the past couple of months. Has not noted progression.     Acting out dreams. Not nightly but is significant when does happen. He actually ended up in hospital in the last year because of fighting in sleep. Has LOVE, used to wear CPAP and wife wonders if this helped when he was wearing this. He says he will wear it a couple of days, aggravates him and then he will not use for month or longer. He had sleep doctor, Dr. Hinton, but dismissed him as she did everything she could do for him.    Denies stiffness in muscles.   Sometimes feels slow with walking. Wife says he is slower to answer questions.   Memory is getting worse. Been bad couple of years but worse the past year. Wife agrees but attributes some of this to [facility]- lazier life.    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   He left on his terms. Was not asked to leave.       II.  Review of systems:  As in HPI,  otherwise, balance 10 systems reviewed and are negative.    III.  Past Medical History:   Diagnosis Date    BPH (benign prostatic hyperplasia)     Cataract     done  ou    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, with long-term current use of insulin 12/20/2018    DDD (degenerative disc disease), lumbar     s/p epidural steroids     Diabetes mellitus, stable     Glaucoma     OU    HTN (hypertension)     Iritis, lens-induced 1/8/2018    Obesity     LOVE on CPAP      Family History   Problem Relation Age of Onset    Heart disease Brother 62    Kidney disease Brother     Heart disease Father     Diabetes Father     Colon cancer Father     Cancer Father         colon    Cataracts Father     Hypertension Mother     Diabetes Mother     Stroke Mother     Cancer Mother         colon    Kidney disease Mother     Glaucoma Maternal Grandmother     No Known Problems Sister     No Known Problems Maternal Aunt     No Known Problems Maternal Uncle     No Known Problems Paternal Aunt     No Known Problems Paternal Uncle     No Known Problems Maternal Grandfather     No Known Problems Paternal Grandmother     No Known Problems Paternal Grandfather     Dementia Brother     Abnormal EKG Brother     Amblyopia Neg Hx     Blindness Neg Hx     Macular degeneration Neg Hx     Retinal detachment Neg Hx     Strabismus Neg Hx     Thyroid disease Neg Hx     Parkinsonism Neg Hx      Social history:  , retired.      Current Outpatient Medications on File Prior to Visit   Medication Sig Dispense Refill    aspirin 81 MG Chew Take 81 mg by mouth once daily.      atorvastatin (LIPITOR) 20 MG tablet Take 1 tablet (20 mg total) by mouth once daily. 90 tablet 3    carbidopa-levodopa  mg (SINEMET)  mg per tablet Take 1 tablet by mouth 3 (three) times daily. 90 tablet 11    cholecalciferol, vitamin D3, (D3-2000 ORAL) Take by mouth.      clonazePAM (KLONOPIN) 1 MG tablet Take 1 tablet (1 mg total) by mouth nightly. 30 tablet 0    FLUoxetine 40 MG capsule Take 1 capsule (40 mg  "total) by mouth once daily. 90 capsule 1    furosemide (LASIX) 20 MG tablet Take 1 tablet (20 mg total) by mouth every morning. 90 tablet 3    irbesartan (AVAPRO) 150 MG tablet TAKE 1 TABLET BY MOUTH EVERY DAY 90 tablet 3    melatonin 5 mg Subl Take 10 mg by mouth nightly.   11    metFORMIN (GLUMETZA) 500 MG (MOD) 24hr tablet Take 1 tablet (500 mg total) by mouth daily with breakfast. 90 tablet 3    metoprolol succinate (TOPROL-XL) 25 MG 24 hr tablet TAKE 1 TABLET BY MOUTH EVERY DAY 90 tablet 3    prazosin (MINIPRESS) 1 MG Cap TAKE 1 CAPSULE(1 MG) BY MOUTH EVERY EVENING 90 capsule 3    SIMBRINZA 1-0.2 % DrpS SHAKE LIQUID AND PLACE 1 DROP INTO BOTH EYES TWICE DAILY 8 mL 3    TRULICITY 1.5 mg/0.5 mL pen injector INJECT 1.5 MG INTO THE SKIN ONCE A WEEK 2 mL 5    mupirocin (BACTROBAN) 2 % ointment APPLY TOPICALLY BID FOR 10 DAYS      ondansetron (ZOFRAN-ODT) 4 MG TbDL ondansetron 4 mg disintegrating tablet   DISSOLVE 1 T ON THE TONGUE Q 8 H PRN NAUSEA      [DISCONTINUED] traMADoL (ULTRAM) 50 mg tablet Take 1 tablet (50 mg total) by mouth every 12 (twelve) hours as needed for Pain. 30 tablet 0     Current Facility-Administered Medications on File Prior to Visit   Medication Dose Route Frequency Provider Last Rate Last Dose    diphenhydrAMINE capsule 25 mg  25 mg Oral Q6H PRN Leonila Richardson MD        metronidazole IVPB 500 mg  500 mg Intravenous Once Kaz Keating MD           PRIOR problem-specific medications tried:  none    Review of patient's allergies indicates:   Allergen Reactions    Avelox [moxifloxacin] Hives and Rash       IV. Physical Exam    Vitals:    10/29/20 1333   BP: 105/66   BP Location: Left arm   Patient Position: Sitting   Pulse: 66   Height: 5' 11" (1.803 m)     PHYSICAL:  /66 (BP Location: Left arm, Patient Position: Sitting)   Pulse 66   Ht 5' 11" (1.803 m)   BMI 28.03 kg/m²     General Medical Examination:  General: The patient is alert and cooperative with the exam. "   HEENT: Normocephalic, atraumatic.   Neck: Supple.   Chest: Unlabored breathing.   CV: Symmetric pulses.   Ext: No clubbing, cyanosis, or edema.     Mental Status:  General: Good hygiene, appropriate appearance.  Mood/Affect: Appropriate/congruent.  Level of consciousness: Awake, alert.  Orientation: Oriented to person, place, time and situation.  Language: No Dysarthria    Cranial nerves:  I: Not tested  II: PERRL, VFF to counting  III, IV, VI: EOMI with conjugate gaze and no nystagmus on end gaze  V: Facial sensation intact and symmetric over the bilateral V1-V3  VII: Facial muscle activation intact and symmetric over the bilateral upper and lower face  VIII: Hearing intact in the b/l ears and symmetrical to finger rub  IX, X, XII: TUP midline  X: SCMs and shoulder shrug full strength b/l and symmetric    Motor:   Strength Intact throughout upper and lower extremities, 5/5.  Fasciculations/Atrophy:none    Mod hypomimia  Mild hypophonia    DTRs:  ? Biceps Triceps Brachioradialis Knee Ankle   Left 2+ 2+ 2+ 3+ 2+   Right 2+ 2+ 2+ 3+ 2+       ? Finger taps Finger flicks DAVE Heel taps   Left 0 0 0 0   Right 1+ 1+ 0 0   Neck tone: nl  ? Arm Leg   Left 0 0   Right ?1+ 0     Sensation:   -Light touch: Intact and symmetric in the bilateral upper and lower extremities.    Coordination:   -Finger to nose: nl    Gait:  -Arises from chair without use of hands.  -Casual gait is: mildly narrow based  -Arm Swing: decreased L  -Turning: nl    Pull Test: 2 step        V.  Laboratory/ Radiological Data: (Personally reviewed images)         MRI 1/29/20  Impression       1. There is no acute abnormality.  There is mild volume loss without significant white matter disease.  There is no hemorrhage, parenchymal mass or acute infarction.  There is a probable arachnoid cyst in the right posterior fossa.  Please see above discussion.     Memory/Encephalopathy Labs:  Lab Results   Component Value Date    TSH 0.976 01/06/2020    ZZVCFLSS09  "391 05/14/2020    THIAMINEBLOO 59 05/14/2020     LGi and CASPR  No results found for: ENCESLGI1, ENCESCASPR2    Movement Labs:  Lab Results   Component Value Date    CALCIUM 9.0 10/06/2020    PTH 65.0 09/21/2020    ALBUMIN 3.5 09/21/2020    BILITOT 1.0 01/06/2020    ALT 15 01/06/2020    AST 29 01/06/2020    ALKPHOS 120 01/06/2020    CERULOPLSM 29.0 08/26/2020    FERRITIN 58 10/03/2018    ALPHATOCOPHE 1275 05/14/2020       IRON STUDIES:  Lab Results   Component Value Date    IRON 88 10/03/2018    TRANSFERRIN 269 10/03/2018    TIBC 398 10/03/2018    FESATURATED 22 10/03/2018       Malignancy Labs:  Lab Results   Component Value Date    PSADIAG 2.4 02/10/2020     PARANEOPLASTIC PANEL:  No results found for: NMOINTERPRET, REFLEXTEST, CRMP5, STMAB, LABACHR, ACHRGANGLION, NEUROVG    CSF:  No results found for: CSFWBC, CSFRBC, LYMPHS, MONOMACCSF, PROTEINCSF, GLUCCSF, GADCSF, LUME, MYELINPROT  Flow cytometry: No results found for: CSFC  [place]: No results found for: WESTNILEIGGA    CNS Demyelinating Labs:  Nmo serum, csf igg:  No results found for: NMOFSFACSS, NMOFCFACSC  No results found for: JCVIRUS    CSF oligoclonal bands:  No results found for: CSFB, CSFOLI, SERB, IGGINDEXCSF, IGGCSF, ALBQ, GALB, IGGSYNRATE, IGGS, ALBSER, IGGALBS    Myopathy labs:  No results found for: CPK, ALDOLASE, AMMONIA, ACFCRATIO, ACETYLCARNIT, CARNITINEPLA, LACTATE    Myasthenia panel:  No results found for: LABACHR  No results found for: ACETMOD  No results found for: STMAB   No results found for: ACHRGANGLION    Neuropathy Labs:  Lab Results   Component Value Date    HNVVSMCN79 391 05/14/2020    HGBA1C 5.9 (H) 10/06/2020     SPEP:  Lab Results   Component Value Date    PROTEINSERUM 6.7 07/27/2015    PATHINTPSPE REVIEWED 07/27/2015       Rheum labs:  Lab Results   Component Value Date    URICACID 7.2 (H) 04/24/2017       Neuropsych"  Mr. Medel is a 64 year old male with cognitive complaints over the past several years. He has a slightly " "ataxic gait of unclear etiology, but no clear parkinsonism. He has symptoms suggestive of REM sleep behavior disorder over the past year. He denied presence, passage, or recurrent visual hallucinations. He remains functionally independent, albeit more error prone than in the past. His history is remarkable for multiple vascular risk factors, including currently untreated sleep apnea.      His neuropsychological profile was largely within normal limits with the exception of mild memory inefficiencies, primarily related to encoding and cognitive organization/retrieval. His test scores and functioning are not at the level to warrant a cognitive diagnosis at this time. He did not present with the type of oliver forgetting seen in Alzheimer's disease. He also did not demonstrate the type of visuospatial dysfunction often seen early in Lewy Body Dementia, which is also in keeping with the fact that he does not have visual hallucinations. It will be important to track his cognition over time as mild cognitive changes in the setting of ataxia of unclear etiology and possible REM sleep behavior disorder could be indicative of a neurodegenerative condition. Otherwise, his mild cognitive weaknesses could simply be attributable to mild cerebrovascular disease.      Mr. Medel also reported clinically significant anxiety and depression. The transition to [facility] has been admittedly more challenging than expected. His wife has also noticed reduced frustration tolerance with an increased tendency to perseverate on his frustrations, which has led to multiple confrontations. Treatment is indicated. "    MRI brain 2020      VI. Assessment and Plan            Problem List Items Addressed This Visit        Neuro    Parkinsonism    Current Assessment & Plan     Very subtle PDism with some nonmotor features. Mildly suggestive of PDism. Tremor responded very well to carbidopa/levodopa 25/100mg 1 tab PO TID which is most suggestive of " this disease.  Suggested starting rasagiline 1mg QHS for neuroprotection  F/u longterm to screen for further evidence of PDism            Other    Abnormal gait    Current Assessment & Plan     No ataxia, Only PDism  Arachnoid cyst likely not affecting gait               Coni Cee MD, MS  [facility]  Department of Neurology  Movement Disorders

## 2020-10-29 NOTE — ASSESSMENT & PLAN NOTE
Very subtle PDism with some nonmotor features. Mildly suggestive of PDism. Tremor responded very well to carbidopa/levodopa 25/100mg 1 tab PO TID which is most suggestive of this disease.  Suggested starting rasagiline 1mg QHS for neuroprotection  F/u longterm to screen for further evidence of PDism

## 2020-11-03 ENCOUNTER — PATIENT MESSAGE (OUTPATIENT)
Dept: NEUROLOGY | Facility: CLINIC | Age: 65
End: 2020-11-03

## 2020-11-17 ENCOUNTER — PATIENT OUTREACH (OUTPATIENT)
Dept: OTHER | Facility: OTHER | Age: 65
End: 2020-11-17

## 2020-11-17 NOTE — PROGRESS NOTES
Digital Medicine: Health  Follow-Up    The history is provided by the patient.             Reason for review: Blood glucose at goal and Blood pressure at goal        Topics Covered on Call: Diet and stress            Diet-Change      Dietary Indiscretions:Patient reports he recently sold his house, so he has not been sticking with his diet lately. Reports he has been suffering with bouts of gout for the last couple days.    Intervention(s): carb reduction and regularly scheduling meals      Physical Activity-no change to routine  No change to exercise routine.     Medication Adherence-Medication adherence was assessed.        Substance, Sleep, Stress-change  stress-assessed  Details:Patient reports his house sold faster than expected, so he is feeling some stress packing up.  Intervention(s):    Sleep-  Details:  Intervention(s):    Alcohol -  Details:  Intervention(s):    Tobacco-  Details:  Intervention(s):          Continue current diet/physical activity routine.  Provided patient education.       Addressed patient questions and patient has my contact information if needed prior to next outreach. Patient verbalizes understanding.      Explained the importance of self-monitoring and medication adherence. Encouraged the patient to communicate with their health  for lifestyle modifications to help improve or maintain a healthy lifestyle.               There are no preventive care reminders to display for this patient.      Last 5 Patient Entered Readings                                      Current 30 Day Average: 122/77     Recent Readings 11/10/2020 11/3/2020 10/27/2020 10/23/2020 10/14/2020    SBP (mmHg) 130 117 122 120 112    DBP (mmHg) 80 77 77 75 66    Pulse 63 72 59 62 67        Last 6 Patient Entered Readings                                          Most Recent A1c: 5.9% on 10/6/2020  (Goal: 7%)     Recent Readings 11/17/2020 11/14/2020 11/11/2020 11/9/2020 11/6/2020    Blood Glucose (mg/dL) 135  137 118 142 130

## 2020-11-20 ENCOUNTER — IMMUNIZATION (OUTPATIENT)
Dept: PHARMACY | Facility: CLINIC | Age: 65
End: 2020-11-20
Payer: MEDICARE

## 2020-11-30 ENCOUNTER — TELEPHONE (OUTPATIENT)
Dept: OPTOMETRY | Facility: CLINIC | Age: 65
End: 2020-11-30

## 2020-11-30 ENCOUNTER — PATIENT MESSAGE (OUTPATIENT)
Dept: OPHTHALMOLOGY | Facility: CLINIC | Age: 65
End: 2020-11-30

## 2020-11-30 NOTE — TELEPHONE ENCOUNTER
----- Message from Margaret Rojas sent at 11/30/2020 11:26 AM CST -----  Jace Martinez, OD  Margaret Rojas  Caller: Unspecified (Today, 10:02 AM)         Simbrinza is a combo of Brimonidine and Trusopt. It may be cheaper to do the 2 separate generic drops. Ask him if he is Ok with that.

## 2020-12-01 RX ORDER — DORZOLAMIDE HCL 20 MG/ML
1 SOLUTION/ DROPS OPHTHALMIC 2 TIMES DAILY
Qty: 10 ML | Refills: 6 | Status: SHIPPED | OUTPATIENT
Start: 2020-12-01 | End: 2021-03-15

## 2020-12-01 RX ORDER — FLUOXETINE HYDROCHLORIDE 40 MG/1
40 CAPSULE ORAL DAILY
Qty: 90 CAPSULE | Refills: 1 | Status: SHIPPED | OUTPATIENT
Start: 2020-12-01 | End: 2021-05-31

## 2020-12-01 RX ORDER — BRIMONIDINE TARTRATE 2 MG/ML
1 SOLUTION/ DROPS OPHTHALMIC 2 TIMES DAILY
Qty: 5 ML | Refills: 11 | Status: SHIPPED | OUTPATIENT
Start: 2020-12-01 | End: 2021-10-13

## 2020-12-01 NOTE — PROGRESS NOTES
Assessment /Plan     For exam results, see Encounter Report.    There are no diagnoses linked to this encounter.  1. Called in rx for brimonidine and trusopt to replace simbrinza which was too expensive.

## 2020-12-10 ENCOUNTER — PATIENT OUTREACH (OUTPATIENT)
Dept: OTHER | Facility: OTHER | Age: 65
End: 2020-12-10

## 2020-12-10 NOTE — PROGRESS NOTES
Digital Medicine: Clinician Follow-Up    Patient reports he moved into his new house this past Friday and trying to unpack still.   He asks if Parkinsons medications could be affecting his BP.  Reports falling while working in [employer] yesterday. Denies injuries.  He reports occasional LH when he stands or walks for extended time.  Reports past 2 weeks he inadvertently took metformin 1000 mg daily    The history is provided by the patient.   Follow-up reason(s): routine follow up.     Hypertension    Readings are trending up due to lifestyle change.    Diabetes    Patient's blood glucose is stable.   Patient is not experiencing signs/symptoms of hypotension.  Patient is not experiencing signs/symptoms of hypertension.            Last 5 Patient Entered Readings                                      Current 30 Day Average: 132/80     Recent Readings 12/10/2020 12/1/2020 11/26/2020 11/20/2020 11/10/2020    SBP (mmHg) 138 133 124 135 130    DBP (mmHg) 82 84 77 77 80    Pulse 63 64 67 61 63        Last 6 Patient Entered Readings                                      Most Recent A1c: 5.9% on 10/6/2020  (Goal: 7%)     Recent Readings 12/8/2020 12/7/2020 12/4/2020 11/30/2020 11/27/2020    Blood Glucose (mg/dL) 143 127 137 123 116               Depression Screening  Did not address depression screening.    Sleep Apnea Screening    Did not address sleep apnea screening.     Medication Affordability Screening  Did not address medication affordability screening.     Medication Adherence-Medication adherence was asssessed.        Took higher than advised dose of metformin x past 2 weeks      ASSESSMENT(S)  Patients BP average is 132/80 mmHg, which is at goal. Patient's BP goal is less than or equal to 130/80.  Patient's A1C goal is less than or equal to 7. Patient's most recent A1C result is at goal. Lab Results    Component                Value               Date                     HGBA1C                   5.9 (H)              10/06/2020          .       Hypertension Plan  Continue current therapy.  Provided patient education.  Discuss need to decrease dizziness/LH and risk of falling. Discussed LH and orthostatic hypotension as related to Parkinson's. Discussed possibly adjusting to higher BP goal dependent on Parkinson's symptoms. Reinforced taking precautions to decrease risk of falling. He states he will not continue working in [employer]. States he carries his phone with him in case falls and needs assistance.  Discussed low incidence (4%) of Azilect causing htn but need to balance BP and Parkinson's control.  Diabetes Plan  Prescribed SMBG testing frequency. 3 times weekly up to once a day, vary timing and use meal-relationship labels  Continue current therapy. Decrease metformin back to advised dose  Provided patient education.  Discussed desire to avoid hypoglycemia, especially with potential fall risk of Parkinson's  Repeat A1C pending 12/23/20     Addressed patient questions and patient has my contact information if needed prior to next outreach. Patient verbalizes understanding.                 Topic    Eye Exam      There are no preventive care reminders to display for this patient.      Hypertension Medications             furosemide (LASIX) 20 MG tablet Take 1 tablet (20 mg total) by mouth every morning.    irbesartan (AVAPRO) 150 MG tablet TAKE 1 TABLET BY MOUTH EVERY DAY    metoprolol succinate (TOPROL-XL) 25 MG 24 hr tablet TAKE 1 TABLET BY MOUTH EVERY DAY    prazosin (MINIPRESS) 1 MG Cap TAKE 1 CAPSULE(1 MG) BY MOUTH EVERY EVENING        Diabetes Medications             metFORMIN (GLUMETZA) 500 MG (MOD) 24hr tablet Take 1 tablet (500 mg total) by mouth daily with breakfast.    TRULICITY 1.5 mg/0.5 mL pen injector INJECT 1.5 MG INTO THE SKIN ONCE A WEEK

## 2020-12-14 ENCOUNTER — OFFICE VISIT (OUTPATIENT)
Dept: FAMILY MEDICINE | Facility: CLINIC | Age: 65
End: 2020-12-14
Payer: MEDICARE

## 2020-12-14 ENCOUNTER — PATIENT MESSAGE (OUTPATIENT)
Dept: FAMILY MEDICINE | Facility: CLINIC | Age: 65
End: 2020-12-14

## 2020-12-14 VITALS
DIASTOLIC BLOOD PRESSURE: 68 MMHG | OXYGEN SATURATION: 98 % | SYSTOLIC BLOOD PRESSURE: 120 MMHG | BODY MASS INDEX: 27.9 KG/M2 | HEART RATE: 65 BPM | WEIGHT: 199.25 LBS | TEMPERATURE: 98 F | HEIGHT: 71 IN

## 2020-12-14 DIAGNOSIS — Z87.39 HX OF ACUTE GOUTY ARTHRITIS: Primary | ICD-10-CM

## 2020-12-14 DIAGNOSIS — I10 ESSENTIAL HYPERTENSION: Primary | ICD-10-CM

## 2020-12-14 DIAGNOSIS — Z87.39 HX OF ACUTE GOUTY ARTHRITIS: ICD-10-CM

## 2020-12-14 DIAGNOSIS — E78.2 MIXED HYPERLIPIDEMIA: ICD-10-CM

## 2020-12-14 DIAGNOSIS — F51.04 CHRONIC INSOMNIA: ICD-10-CM

## 2020-12-14 PROCEDURE — 99214 OFFICE O/P EST MOD 30 MIN: CPT | Mod: PBBFAC,PO | Performed by: INTERNAL MEDICINE

## 2020-12-14 PROCEDURE — 99999 PR PBB SHADOW E&M-EST. PATIENT-LVL IV: ICD-10-PCS | Mod: PBBFAC,,, | Performed by: INTERNAL MEDICINE

## 2020-12-14 PROCEDURE — 99214 PR OFFICE/OUTPT VISIT, EST, LEVL IV, 30-39 MIN: ICD-10-PCS | Mod: S$PBB,,, | Performed by: INTERNAL MEDICINE

## 2020-12-14 PROCEDURE — 99214 OFFICE O/P EST MOD 30 MIN: CPT | Mod: S$PBB,,, | Performed by: INTERNAL MEDICINE

## 2020-12-14 PROCEDURE — 99999 PR PBB SHADOW E&M-EST. PATIENT-LVL IV: CPT | Mod: PBBFAC,,, | Performed by: INTERNAL MEDICINE

## 2020-12-14 RX ORDER — CLONAZEPAM 1 MG/1
1 TABLET ORAL 3 TIMES DAILY PRN
COMMUNITY
End: 2020-12-14 | Stop reason: SDUPTHER

## 2020-12-14 RX ORDER — CLONAZEPAM 1 MG/1
1 TABLET ORAL 3 TIMES DAILY PRN
Qty: 90 TABLET | Refills: 0 | Status: SHIPPED | OUTPATIENT
Start: 2020-12-14 | End: 2021-03-15

## 2020-12-14 RX ORDER — COLCHICINE 0.6 MG/1
0.6 TABLET ORAL DAILY
Qty: 30 TABLET | Refills: 5 | Status: SHIPPED | OUTPATIENT
Start: 2020-12-14 | End: 2021-10-13 | Stop reason: SDUPTHER

## 2020-12-14 RX ORDER — CLONAZEPAM 2 MG/1
2 TABLET ORAL 3 TIMES DAILY
Qty: 90 TABLET | Refills: 0 | Status: SHIPPED | OUTPATIENT
Start: 2020-12-14 | End: 2020-12-14

## 2020-12-14 NOTE — TELEPHONE ENCOUNTER
Colchicine prescription sent.  Remind me and we will change her prescription to 1 mg 3 times daily to cover 3 months.

## 2020-12-14 NOTE — PROGRESS NOTES
Subjective:       Patient ID: Glenn Medel is a 65 y.o. male.    Chief Complaint: Hypertension and Gout      Social Hx:  From [place]. . Has 1 son and 1 daughter. Retired construction management.    Active Problem List with Overview Notes    Diagnosis Date Noted    Hx of acute gouty arthritis 12/14/2020    Parkinsonism 10/29/2020    Adjustment disorder with depressed mood 06/07/2020    Abnormal gait 05/14/2020    Cognitive change 05/14/2020    Abnormal involuntary movements 05/14/2020     Right hand shaking      Arachnoid cyst 04/28/2020     Followed in [facility]      RBD (REM behavioral disorder) 04/28/2020    Internal strangulated hemorrhoids 04/24/2019    Epiretinal membrane, right 08/01/2018    Vitreomacular adhesion, right 01/29/2018    Iritis, lens-induced 01/08/2018    Glaucoma associated with ocular inflammation, left, indeterminate stage 12/15/2017    Retained lens material, left 12/04/2017    Senile nuclear sclerosis 11/30/2017    Other vitreous opacities, bilateral 10/19/2017    NS (nuclear sclerosis) 10/19/2017    Severe obstructive sleep apnea 05/09/2017    ILD (interstitial lung disease)     Hypersomnia with sleep apnea, unspecified      Dx updated per 2019 IMO Load      Type 2 diabetes mellitus with neurologic complication 01/12/2016    Itch 01/12/2016    DM type 2 (diabetes mellitus, type 2) 07/29/2015    Peripheral neuropathy 05/19/2015    Hyperlipidemia 05/19/2015    Chronic kidney disease, stage 3 02/25/2015    Palpitations 10/22/2014    BPH (benign prostatic hyperplasia)      Sees Dr. Ewing      LOVE (obstructive sleep apnea)     HTN (hypertension)     DDD (degenerative disc disease), lumbar      s/p epidural steroids           HPI:   Had 2 falls in attic. Came down steps wrong. rasagiline started in October for neuro protection. He has been very active with moving to a new house. Still having anger issues pertaining to staff at mother's nursing home. Has had  arguments with staff there. Has pain in right ankle. Has gout and usually has pain in left big toe. Was taking colchicine prn with good results in the past.  Has had 6 gout flares over the course of 1 yr.     Assessment/Plan    was seen today for hypertension and gout.    Diagnoses and all orders for this visit:    Essential hypertension  Comments:  controlled    Mixed hyperlipidemia  Comments:  controlled july 2020    Hx of acute gouty arthritis  Comments:  Refill colchicine, check uric acid in 2 weeks. not wanting to start allopurinol.   Orders:  -     Uric Acid; Future    Chronic insomnia  -     clonazePAM (KLONOPIN) 2 MG Tab; Take 1 tablet (2 mg total) by mouth 3 (three) times daily.          Current Outpatient Medications on File Prior to Visit   Medication Sig Dispense Refill    aspirin 81 MG Chew Take 81 mg by mouth once daily.      atorvastatin (LIPITOR) 20 MG tablet Take 1 tablet (20 mg total) by mouth once daily. 90 tablet 3    brimonidine 0.2% (ALPHAGAN) 0.2 % Drop Place 1 drop into both eyes 2 (two) times daily. 5 mL 11    carbidopa-levodopa  mg (SINEMET)  mg per tablet Take 1 tablet by mouth 3 (three) times daily. 90 tablet 11    cholecalciferol, vitamin D3, (D3-2000 ORAL) Take by mouth.      dorzolamide (TRUSOPT) 2 % ophthalmic solution Place 1 drop into both eyes 2 (two) times daily. 10 mL 6    FLUoxetine 40 MG capsule Take 1 capsule (40 mg total) by mouth once daily. 90 capsule 1    furosemide (LASIX) 20 MG tablet Take 1 tablet (20 mg total) by mouth every morning. 90 tablet 3    irbesartan (AVAPRO) 150 MG tablet TAKE 1 TABLET BY MOUTH EVERY DAY 90 tablet 3    melatonin 5 mg Subl Take 10 mg by mouth nightly.   11    metFORMIN (GLUMETZA) 500 MG (MOD) 24hr tablet Take 1 tablet (500 mg total) by mouth daily with breakfast. 90 tablet 3    metoprolol succinate (TOPROL-XL) 25 MG 24 hr tablet TAKE 1 TABLET BY MOUTH EVERY DAY 90 tablet 3    prazosin (MINIPRESS) 1 MG Cap TAKE 1  CAPSULE(1 MG) BY MOUTH EVERY EVENING 90 capsule 3    rasagiline (AZILECT) 1 mg Tab Take 1 tablet (1 mg total) by mouth every evening. 30 tablet 12    TRULICITY 1.5 mg/0.5 mL pen injector INJECT 1.5 MG INTO THE SKIN ONCE A WEEK 2 mL 5    [DISCONTINUED] clonazePAM (KLONOPIN) 1 MG tablet Take 1 tablet (1 mg total) by mouth nightly. 30 tablet 0    mupirocin (BACTROBAN) 2 % ointment APPLY TOPICALLY BID FOR 10 DAYS      ondansetron (ZOFRAN-ODT) 4 MG TbDL ondansetron 4 mg disintegrating tablet   DISSOLVE 1 T ON THE TONGUE Q 8 H PRN NAUSEA       Current Facility-Administered Medications on File Prior to Visit   Medication Dose Route Frequency Provider Last Rate Last Dose    diphenhydrAMINE capsule 25 mg  25 mg Oral Q6H PRN Leonila Richardson MD        metronidazole IVPB 500 mg  500 mg Intravenous Once Kaz Keating MD         I personally reviewed past medical, family and social history.  Review of Systems   Constitutional: Negative for activity change and fever.   HENT: Negative for sore throat and trouble swallowing.    Eyes: Negative for pain and visual disturbance.   Respiratory: Negative for cough, shortness of breath and wheezing.    Cardiovascular: Negative for chest pain, palpitations and leg swelling.   Gastrointestinal: Negative for abdominal pain, blood in stool, diarrhea, nausea and vomiting.   Endocrine: Negative for cold intolerance and polyuria.   Genitourinary: Negative for decreased urine volume and dysuria.   Musculoskeletal: Negative for gait problem and neck pain.        Right ankle pain.    Skin: Negative for rash.   Neurological: Negative for dizziness, syncope and light-headedness.   Psychiatric/Behavioral: Negative for dysphoric mood. The patient is not nervous/anxious.          Objective:     Vitals:    12/14/20 1325   BP: 120/68   Pulse: 65   Temp: 97.5 °F (36.4 °C)        Physical Exam  Constitutional:       General: He is not in acute distress.     Appearance: He is well-developed.    HENT:      Head: Normocephalic and atraumatic.   Eyes:      Pupils: Pupils are equal, round, and reactive to light.   Neck:      Musculoskeletal: Neck supple.      Thyroid: No thyromegaly.   Cardiovascular:      Rate and Rhythm: Normal rate and regular rhythm.      Heart sounds: Normal heart sounds. No murmur. No friction rub. No gallop.    Pulmonary:      Effort: Pulmonary effort is normal. No respiratory distress.      Breath sounds: Normal breath sounds. No wheezing.   Abdominal:      General: Bowel sounds are normal.      Palpations: Abdomen is soft.      Tenderness: There is no abdominal tenderness.   Skin:     General: Skin is warm.      Findings: No rash.   Neurological:      Mental Status: He is alert and oriented to person, place, and time.      Cranial Nerves: No cranial nerve deficit.   Psychiatric:         Behavior: Behavior normal.

## 2020-12-16 ENCOUNTER — TELEPHONE (OUTPATIENT)
Dept: FAMILY MEDICINE | Facility: CLINIC | Age: 65
End: 2020-12-16

## 2020-12-16 ENCOUNTER — PATIENT MESSAGE (OUTPATIENT)
Dept: FAMILY MEDICINE | Facility: CLINIC | Age: 65
End: 2020-12-16

## 2020-12-16 DIAGNOSIS — M10.9 ACUTE GOUT OF ANKLE, UNSPECIFIED CAUSE, UNSPECIFIED LATERALITY: Primary | ICD-10-CM

## 2020-12-16 RX ORDER — IBUPROFEN 400 MG/1
400 TABLET ORAL 3 TIMES DAILY
Qty: 21 TABLET | Refills: 0 | Status: SHIPPED | OUTPATIENT
Start: 2020-12-16 | End: 2021-03-15

## 2020-12-16 RX ORDER — HYDROCODONE BITARTRATE AND ACETAMINOPHEN 5; 325 MG/1; MG/1
1 TABLET ORAL EVERY 12 HOURS PRN
Qty: 10 TABLET | Refills: 0 | Status: SHIPPED | OUTPATIENT
Start: 2020-12-16 | End: 2021-03-15

## 2020-12-16 NOTE — TELEPHONE ENCOUNTER
Spoke with pharmacy and told them Dr Benton confirmed it was ok to change Colchicene tablets to capsules. They also need to know if pt is taking 1mgs or 2mgs of klonopin?    Please advise

## 2020-12-16 NOTE — TELEPHONE ENCOUNTER
----- Message from Mickie Alanis sent at 12/16/2020  9:40 AM CST -----  Contact: RX  Type:  Pharmacy Calling to Clarify an RX    Name of Caller:  Qi  Pharmacy Name:    [id number] - [place] - [id number] [facility]  [address]  [place] [address]  Phone: [phone] Fax: [phone]  Prescription Name:  clonazePAM (KLONOPIN) 1 MG tablet  What do they need to clarify?:  dosage 2 or 1 MG  Best Call Back Number:  [phone]  Additional Information:

## 2020-12-16 NOTE — TELEPHONE ENCOUNTER
Spoke with pharmacy, they stated that a prescription for 2mg clonazepam was sent to the pharmacy yesterday evening, the pt has already picked up the 2mg tabs, please advise how pt should take this so I can reach out to pt, thank you

## 2020-12-21 ENCOUNTER — PATIENT MESSAGE (OUTPATIENT)
Dept: FAMILY MEDICINE | Facility: CLINIC | Age: 65
End: 2020-12-21

## 2020-12-21 RX ORDER — DULAGLUTIDE 1.5 MG/.5ML
1.5 INJECTION, SOLUTION SUBCUTANEOUS WEEKLY
Qty: 6 ML | Refills: 1 | Status: SHIPPED | OUTPATIENT
Start: 2020-12-21 | End: 2021-06-07

## 2020-12-21 NOTE — ADDENDUM NOTE
Addended by: REBECCA HAMM on: 12/21/2020 09:28 AM     Modules accepted: Orders     Thank you for referring your patient Cipriano Thompson to the electrophysiology clinic for consultation. The patient is accompanied by his mother. Please review my findings below.    CHIEF COMPLAINT: Outpatient EP follow up after PM generator change.    HISTORY OF PRESENT ILLNESS:   Cipriano is a 26 y.o. Male with Down's syndrome, TOF s/p complete repair as an infant with subsequent PVR in 1999 for right ventricular dilation and pacemaker placement in 2008 for Sinus node dysfunction. He presented with exercise intolerance and moderate PS/PI so he was referred for repeat PVR. Also with history of hypertension and ventricular ectopy with frequent PVC's. His home medication regimen includes Carvedilol 25 mg daily, Digoxin 250 mcg qd, Lisinopril 10 mg qd and Synthroid. He had a cardiac catheterization for possible Jeanna valve implantation on 4/4/17 and were unable to secondary to coronary artery anatomy. He underwent surgical pulmonary valve placement on 5/2/17.  He had frequent PVC's in the post op period.  He had his pacemaker interrogated post op but pacemaker was working appropriately although showed ~9months to RINA.  Cipriano underwent atrial flutter ablation in November 2017.    Cipriano underwent pacemaker generator change on 3/23/18.  He returns today for wound check.  He has no interval fever or pain or swelling.  He has no redness.  He has no drainage.  He has no muscle twitch around site.  He has no syncope or near syncope.  He has no chest pain or palpitations.  He has no fatigue or change in activity tolerance.    REVIEW OF SYSTEMS:     GENERAL: No fever, chills, fatigability or weight loss.  SKIN: No rashes, itching or changes in color or texture of skin.  CHEST: Denies ECHEVERRIA, cyanosis, wheezing, cough and sputum production.  CARDIOVASCULAR: Denies chest pain or reduced exercise tolerance.  ABDOMEN: Appetite fine. No weight loss. Denies diarrhea, abdominal pain, or vomiting.  PERIPHERAL VASCULAR: No claudication or  cyanosis.  MUSCULOSKELETAL: No joint stiffness or swelling.   NEUROLOGIC: No history of seizures,  alteration of gait or coordination.    PAST MEDICAL HISTORY:   Past Medical History:   Diagnosis Date    CHF (congestive heart failure)     Down syndrome     Encounter for blood transfusion     S/P surgery for complex congenital heart disease 1/30/2017    Tetralogy of Fallot 05/1990           FAMILY HISTORY:   Family History   Problem Relation Age of Onset    No Known Problems Mother     No Known Problems Sister          SOCIAL HISTORY:   Social History     Social History    Marital status: Single     Spouse name: N/A    Number of children: N/A    Years of education: N/A     Occupational History    Not on file.     Social History Main Topics    Smoking status: Never Smoker    Smokeless tobacco: Never Used    Alcohol use No    Drug use: No    Sexual activity: Not Currently     Other Topics Concern    Not on file     Social History Narrative    Lives with mother, 1 dog (inside)       ALLERGIES:  Review of patient's allergies indicates:   Allergen Reactions    Latex, natural rubber     Sulfa (sulfonamide antibiotics)        MEDICATIONS:    Current Outpatient Prescriptions:     carvedilol (COREG) 25 MG tablet, Take 1 tablet (25 mg total) by mouth 2 (two) times daily with meals., Disp: 60 tablet, Rfl: 11    levothyroxine (SYNTHROID) 88 MCG tablet, Take 88 mcg by mouth before breakfast. , Disp: , Rfl:     warfarin (COUMADIN) 5 MG tablet, Take 1 tablet (5 mg total) by mouth once daily. Or as instructed by coumadin clinic, Disp: 45 tablet, Rfl: 3    hydrocodone-acetaminophen 5-325mg (NORCO) 5-325 mg per tablet, Take 1 tablet by mouth every 6 (six) hours as needed for Pain., Disp: 12 tablet, Rfl: 0      PHYSICAL EXAM:   Vitals:    04/05/18 1338 04/05/18 1341   BP: 115/61 (!) 109/57   BP Location: Right arm Left arm   Patient Position: Sitting Sitting   BP Method: Large (Automatic) Large (Automatic)  "  Pulse: 75 75   SpO2: (!) 94%    Weight: 106.4 kg (234 lb 9.1 oz)    Height: 5' 5" (1.651 m)          GENERAL: Awake, well-developed well-nourished, no apparent distress. +Down's facies.  HEENT: mucous membranes moist and pink, normocephalic atraumatic, no cranial or carotid bruits, sclera anicteric  NECK: no jugular venous distention, no lymphadenopathy  CHEST: Good air movement, clear to auscultation bilaterally, pacemaker incision healing well.  CARDIOVASCULAR: Quiet precordium, regular rate and rhythm, S1S2, no rubs or gallops. 1/6 MARIELA at LSB.  ABDOMEN: Soft, nontender nondistended, no hepatomegaly, no aortic bruits  EXTREMITIES: Warm well perfused, 2+ radial/pedal pulses, capillary refill 2 seconds, no clubbing, cyanosis, or edema  NEURO: Alert and oriented, cooperative with exam, face symmetric, moves all extremities well    STUDIES:  ECG: ventricular pacing with 100% capture at rate of 65 bpm.    ASSESSMENT:  Encounter Diagnoses   Name Primary?    TOF (tetralogy of Fallot)      25 y/o male with TOF s/p repair and s/p recent pulmonary valve replacement.  He has pacemaker for sinus node dysfunction.  He is s/p flutter ablation without signs of recurrent flutter.  He is s/p pacemaker generator change and clinically doing well.    PLAN:   1. Continue coumadin and carvedilol  2. Keep f/u with Dr. Dean as planned.  3. F/u in EP clinic in 6 months with ECG, pacemaker check, and Holter.  4. Call for syncope, palpitations, chest pain, difficulty breathing, fatigue, or any other questions or concerns in the interim.      Time Spent: 20 (min) with over 50% in direct patient and family consultation regarding the management of pacemaker and history of atrial flutter s/p ablation now s/p pacemaker generator change.      The patient's doctor will be notified via EPIC/FAX    I hope this brings you up-to-date on Cipriano Fobbs  Please contact me with any questions or concerns.    Jose Ortiz MD  Pediatric and Adult " Congenital Electrophysiologist  Pediatric Cardiologist

## 2020-12-23 ENCOUNTER — LAB VISIT (OUTPATIENT)
Dept: LAB | Facility: HOSPITAL | Age: 65
End: 2020-12-23
Attending: INTERNAL MEDICINE
Payer: MEDICARE

## 2020-12-23 DIAGNOSIS — E11.21 TYPE 2 DIABETES MELLITUS WITH DIABETIC NEPHROPATHY, WITH LONG-TERM CURRENT USE OF INSULIN: ICD-10-CM

## 2020-12-23 DIAGNOSIS — Z79.4 TYPE 2 DIABETES MELLITUS WITH DIABETIC NEPHROPATHY, WITH LONG-TERM CURRENT USE OF INSULIN: ICD-10-CM

## 2020-12-23 DIAGNOSIS — Z87.39 HX OF ACUTE GOUTY ARTHRITIS: ICD-10-CM

## 2020-12-23 LAB
ESTIMATED AVG GLUCOSE: 131 MG/DL (ref 68–131)
ESTIMATED AVG GLUCOSE: 131 MG/DL (ref 68–131)
HBA1C MFR BLD HPLC: 6.2 % (ref 4–5.6)
HBA1C MFR BLD HPLC: 6.2 % (ref 4–5.6)
URATE SERPL-MCNC: 7.2 MG/DL (ref 3.4–7)

## 2020-12-23 PROCEDURE — 36415 COLL VENOUS BLD VENIPUNCTURE: CPT | Mod: PO

## 2020-12-23 PROCEDURE — 83036 HEMOGLOBIN GLYCOSYLATED A1C: CPT

## 2020-12-23 PROCEDURE — 84550 ASSAY OF BLOOD/URIC ACID: CPT

## 2021-01-08 ENCOUNTER — PATIENT MESSAGE (OUTPATIENT)
Dept: ADMINISTRATIVE | Facility: OTHER | Age: 66
End: 2021-01-08

## 2021-01-08 ENCOUNTER — PATIENT MESSAGE (OUTPATIENT)
Dept: SPINE | Facility: CLINIC | Age: 66
End: 2021-01-08

## 2021-01-15 ENCOUNTER — PATIENT MESSAGE (OUTPATIENT)
Dept: SPINE | Facility: CLINIC | Age: 66
End: 2021-01-15

## 2021-01-15 ENCOUNTER — PATIENT MESSAGE (OUTPATIENT)
Dept: FAMILY MEDICINE | Facility: CLINIC | Age: 66
End: 2021-01-15

## 2021-01-29 PROCEDURE — 99454 REM MNTR PHYSIOL PARAM 16-30: CPT | Mod: PBBFAC,PO | Performed by: INTERNAL MEDICINE

## 2021-02-04 ENCOUNTER — PATIENT MESSAGE (OUTPATIENT)
Dept: FAMILY MEDICINE | Facility: CLINIC | Age: 66
End: 2021-02-04

## 2021-02-04 ENCOUNTER — OFFICE VISIT (OUTPATIENT)
Dept: NEUROSURGERY | Facility: CLINIC | Age: 66
End: 2021-02-04
Payer: MEDICARE

## 2021-02-04 ENCOUNTER — HOSPITAL ENCOUNTER (OUTPATIENT)
Dept: RADIOLOGY | Facility: HOSPITAL | Age: 66
Discharge: HOME | End: 2021-02-04
Attending: NEUROLOGICAL SURGERY
Payer: MEDICARE

## 2021-02-04 VITALS
TEMPERATURE: 97 F | DIASTOLIC BLOOD PRESSURE: 84 MMHG | HEART RATE: 61 BPM | SYSTOLIC BLOOD PRESSURE: 129 MMHG | BODY MASS INDEX: 27.3 KG/M2 | WEIGHT: 195 LBS | HEIGHT: 71 IN

## 2021-02-04 DIAGNOSIS — G47.52 RBD (REM BEHAVIORAL DISORDER): ICD-10-CM

## 2021-02-04 DIAGNOSIS — G20.C PARKINSONISM, UNSPECIFIED PARKINSONISM TYPE: ICD-10-CM

## 2021-02-04 DIAGNOSIS — G93.0 ARACHNOID CYST: ICD-10-CM

## 2021-02-04 DIAGNOSIS — R26.9 ABNORMAL GAIT: ICD-10-CM

## 2021-02-04 DIAGNOSIS — G93.0 ARACHNOID CYST: Primary | ICD-10-CM

## 2021-02-04 PROCEDURE — 99214 OFFICE O/P EST MOD 30 MIN: CPT | Mod: PBBFAC,25 | Performed by: NEUROLOGICAL SURGERY

## 2021-02-04 PROCEDURE — 99999 PR PBB SHADOW E&M-EST. PATIENT-LVL IV: CPT | Mod: PBBFAC,,, | Performed by: NEUROLOGICAL SURGERY

## 2021-02-04 PROCEDURE — 99999 PR PBB SHADOW E&M-EST. PATIENT-LVL IV: ICD-10-PCS | Mod: PBBFAC,,, | Performed by: NEUROLOGICAL SURGERY

## 2021-02-04 PROCEDURE — 70551 MRI BRAIN WITHOUT CONTRAST: ICD-10-PCS | Mod: 26,,, | Performed by: RADIOLOGY

## 2021-02-04 PROCEDURE — 99214 OFFICE O/P EST MOD 30 MIN: CPT | Mod: S$PBB,,, | Performed by: NEUROLOGICAL SURGERY

## 2021-02-04 PROCEDURE — 70551 MRI BRAIN STEM W/O DYE: CPT | Mod: TC

## 2021-02-04 PROCEDURE — 99214 PR OFFICE/OUTPT VISIT, EST, LEVL IV, 30-39 MIN: ICD-10-PCS | Mod: S$PBB,,, | Performed by: NEUROLOGICAL SURGERY

## 2021-02-04 PROCEDURE — 70551 MRI BRAIN STEM W/O DYE: CPT | Mod: 26,,, | Performed by: RADIOLOGY

## 2021-02-11 ENCOUNTER — PATIENT OUTREACH (OUTPATIENT)
Dept: ADMINISTRATIVE | Facility: OTHER | Age: 66
End: 2021-02-11

## 2021-02-17 ENCOUNTER — OFFICE VISIT (OUTPATIENT)
Dept: OPTOMETRY | Facility: CLINIC | Age: 66
End: 2021-02-17
Payer: MEDICARE

## 2021-02-17 DIAGNOSIS — E11.9 DIABETES MELLITUS TYPE 2 WITHOUT RETINOPATHY: Primary | ICD-10-CM

## 2021-02-17 DIAGNOSIS — Z96.1 BILATERAL PSEUDOPHAKIA: ICD-10-CM

## 2021-02-17 DIAGNOSIS — Z98.890 H/O VITRECTOMY: ICD-10-CM

## 2021-02-17 DIAGNOSIS — H40.053 OCULAR HYPERTENSION, BILATERAL: ICD-10-CM

## 2021-02-17 PROCEDURE — 99999 PR PBB SHADOW E&M-EST. PATIENT-LVL IV: CPT | Mod: PBBFAC,,, | Performed by: OPTOMETRIST

## 2021-02-17 PROCEDURE — 99999 PR PBB SHADOW E&M-EST. PATIENT-LVL IV: ICD-10-PCS | Mod: PBBFAC,,, | Performed by: OPTOMETRIST

## 2021-02-17 PROCEDURE — 92014 PR EYE EXAM, EST PATIENT,COMPREHESV: ICD-10-PCS | Mod: S$PBB,,, | Performed by: OPTOMETRIST

## 2021-02-17 PROCEDURE — 99214 OFFICE O/P EST MOD 30 MIN: CPT | Mod: PBBFAC,PO | Performed by: OPTOMETRIST

## 2021-02-17 PROCEDURE — 92014 COMPRE OPH EXAM EST PT 1/>: CPT | Mod: S$PBB,,, | Performed by: OPTOMETRIST

## 2021-03-15 ENCOUNTER — OFFICE VISIT (OUTPATIENT)
Dept: FAMILY MEDICINE | Facility: CLINIC | Age: 66
End: 2021-03-15
Payer: MEDICARE

## 2021-03-15 VITALS
BODY MASS INDEX: 27.75 KG/M2 | OXYGEN SATURATION: 97 % | HEART RATE: 60 BPM | WEIGHT: 198.19 LBS | TEMPERATURE: 98 F | DIASTOLIC BLOOD PRESSURE: 62 MMHG | SYSTOLIC BLOOD PRESSURE: 124 MMHG | HEIGHT: 71 IN

## 2021-03-15 DIAGNOSIS — G63 POLYNEUROPATHY ASSOCIATED WITH UNDERLYING DISEASE: ICD-10-CM

## 2021-03-15 DIAGNOSIS — N18.30 STAGE 3 CHRONIC KIDNEY DISEASE, UNSPECIFIED WHETHER STAGE 3A OR 3B CKD: ICD-10-CM

## 2021-03-15 DIAGNOSIS — J84.9 ILD (INTERSTITIAL LUNG DISEASE): ICD-10-CM

## 2021-03-15 PROCEDURE — 99215 OFFICE O/P EST HI 40 MIN: CPT | Mod: PBBFAC,PO | Performed by: INTERNAL MEDICINE

## 2021-03-15 PROCEDURE — 99213 PR OFFICE/OUTPT VISIT, EST, LEVL III, 20-29 MIN: ICD-10-PCS | Mod: S$PBB,,, | Performed by: INTERNAL MEDICINE

## 2021-03-15 PROCEDURE — 99999 PR PBB SHADOW E&M-EST. PATIENT-LVL V: CPT | Mod: PBBFAC,,, | Performed by: INTERNAL MEDICINE

## 2021-03-15 PROCEDURE — 99213 OFFICE O/P EST LOW 20 MIN: CPT | Mod: S$PBB,,, | Performed by: INTERNAL MEDICINE

## 2021-03-15 PROCEDURE — 99999 PR PBB SHADOW E&M-EST. PATIENT-LVL V: ICD-10-PCS | Mod: PBBFAC,,, | Performed by: INTERNAL MEDICINE

## 2021-03-15 RX ORDER — CLONAZEPAM 1 MG/1
1 TABLET ORAL DAILY PRN
COMMUNITY
End: 2021-04-26

## 2021-03-28 DIAGNOSIS — E11.21 TYPE 2 DIABETES MELLITUS WITH DIABETIC NEPHROPATHY, WITH LONG-TERM CURRENT USE OF INSULIN: ICD-10-CM

## 2021-03-28 DIAGNOSIS — Z79.4 TYPE 2 DIABETES MELLITUS WITH DIABETIC NEPHROPATHY, WITH LONG-TERM CURRENT USE OF INSULIN: ICD-10-CM

## 2021-03-29 ENCOUNTER — LAB VISIT (OUTPATIENT)
Dept: LAB | Facility: HOSPITAL | Age: 66
End: 2021-03-29
Attending: INTERNAL MEDICINE
Payer: MEDICARE

## 2021-03-29 DIAGNOSIS — N18.30 STAGE 3 CHRONIC KIDNEY DISEASE, UNSPECIFIED WHETHER STAGE 3A OR 3B CKD: ICD-10-CM

## 2021-03-29 DIAGNOSIS — D50.8 OTHER IRON DEFICIENCY ANEMIA: ICD-10-CM

## 2021-03-29 LAB
ALBUMIN SERPL BCP-MCNC: 3.6 G/DL (ref 3.5–5.2)
ANION GAP SERPL CALC-SCNC: 8 MMOL/L (ref 8–16)
BUN SERPL-MCNC: 16 MG/DL (ref 8–23)
CALCIUM SERPL-MCNC: 9 MG/DL (ref 8.7–10.5)
CHLORIDE SERPL-SCNC: 102 MMOL/L (ref 95–110)
CO2 SERPL-SCNC: 29 MMOL/L (ref 23–29)
CREAT SERPL-MCNC: 1.1 MG/DL (ref 0.5–1.4)
EST. GFR  (AFRICAN AMERICAN): >60 ML/MIN/1.73 M^2
EST. GFR  (NON AFRICAN AMERICAN): >60 ML/MIN/1.73 M^2
FERRITIN SERPL-MCNC: 88 NG/ML (ref 20–300)
GLUCOSE SERPL-MCNC: 197 MG/DL (ref 70–110)
IRON SERPL-MCNC: 60 UG/DL (ref 45–160)
PHOSPHATE SERPL-MCNC: 3.4 MG/DL (ref 2.7–4.5)
POTASSIUM SERPL-SCNC: 4 MMOL/L (ref 3.5–5.1)
SATURATED IRON: 17 % (ref 20–50)
SODIUM SERPL-SCNC: 139 MMOL/L (ref 136–145)
TOTAL IRON BINDING CAPACITY: 360 UG/DL (ref 250–450)
TRANSFERRIN SERPL-MCNC: 243 MG/DL (ref 200–375)

## 2021-03-29 PROCEDURE — 80069 RENAL FUNCTION PANEL: CPT | Performed by: INTERNAL MEDICINE

## 2021-03-29 PROCEDURE — 83970 ASSAY OF PARATHORMONE: CPT | Performed by: INTERNAL MEDICINE

## 2021-03-29 PROCEDURE — 83540 ASSAY OF IRON: CPT | Performed by: INTERNAL MEDICINE

## 2021-03-29 PROCEDURE — 36415 COLL VENOUS BLD VENIPUNCTURE: CPT | Mod: PO | Performed by: INTERNAL MEDICINE

## 2021-03-29 PROCEDURE — 82728 ASSAY OF FERRITIN: CPT | Performed by: INTERNAL MEDICINE

## 2021-03-29 PROCEDURE — 85025 COMPLETE CBC W/AUTO DIFF WBC: CPT | Performed by: INTERNAL MEDICINE

## 2021-03-29 RX ORDER — METFORMIN HYDROCHLORIDE 500 MG/1
500 TABLET, FILM COATED, EXTENDED RELEASE ORAL
Qty: 90 TABLET | Refills: 1 | Status: SHIPPED | OUTPATIENT
Start: 2021-03-29 | End: 2021-05-18

## 2021-03-30 LAB
BASOPHILS # BLD AUTO: 0.06 K/UL (ref 0–0.2)
BASOPHILS NFR BLD: 0.8 % (ref 0–1.9)
DIFFERENTIAL METHOD: ABNORMAL
EOSINOPHIL # BLD AUTO: 0.1 K/UL (ref 0–0.5)
EOSINOPHIL NFR BLD: 1.8 % (ref 0–8)
ERYTHROCYTE [DISTWIDTH] IN BLOOD BY AUTOMATED COUNT: 13.7 % (ref 11.5–14.5)
HCT VFR BLD AUTO: 49.1 % (ref 40–54)
HGB BLD-MCNC: 15 G/DL (ref 14–18)
IMM GRANULOCYTES # BLD AUTO: 0.02 K/UL (ref 0–0.04)
IMM GRANULOCYTES NFR BLD AUTO: 0.3 % (ref 0–0.5)
LYMPHOCYTES # BLD AUTO: 1.8 K/UL (ref 1–4.8)
LYMPHOCYTES NFR BLD: 24.7 % (ref 18–48)
MCH RBC QN AUTO: 28 PG (ref 27–31)
MCHC RBC AUTO-ENTMCNC: 30.5 G/DL (ref 32–36)
MCV RBC AUTO: 92 FL (ref 82–98)
MONOCYTES # BLD AUTO: 0.5 K/UL (ref 0.3–1)
MONOCYTES NFR BLD: 7.4 % (ref 4–15)
NEUTROPHILS # BLD AUTO: 4.7 K/UL (ref 1.8–7.7)
NEUTROPHILS NFR BLD: 65 % (ref 38–73)
NRBC BLD-RTO: 0 /100 WBC
PLATELET # BLD AUTO: 234 K/UL (ref 150–450)
PMV BLD AUTO: 9.6 FL (ref 9.2–12.9)
PTH-INTACT SERPL-MCNC: 55 PG/ML (ref 9–77)
RBC # BLD AUTO: 5.36 M/UL (ref 4.6–6.2)
WBC # BLD AUTO: 7.25 K/UL (ref 3.9–12.7)

## 2021-03-31 PROCEDURE — 99454 REM MNTR PHYSIOL PARAM 16-30: CPT | Mod: PBBFAC,PO | Performed by: INTERNAL MEDICINE

## 2021-03-31 RX ORDER — METFORMIN HYDROCHLORIDE 500 MG/1
500 TABLET, EXTENDED RELEASE ORAL DAILY
Qty: 90 TABLET | Refills: 1 | Status: SHIPPED | OUTPATIENT
Start: 2021-03-31 | End: 2021-05-18 | Stop reason: SDUPTHER

## 2021-04-09 ENCOUNTER — OFFICE VISIT (OUTPATIENT)
Dept: FAMILY MEDICINE | Facility: CLINIC | Age: 66
End: 2021-04-09
Payer: MEDICARE

## 2021-04-09 ENCOUNTER — PATIENT MESSAGE (OUTPATIENT)
Dept: NEUROLOGY | Facility: CLINIC | Age: 66
End: 2021-04-09

## 2021-04-09 VITALS
BODY MASS INDEX: 27.72 KG/M2 | OXYGEN SATURATION: 97 % | SYSTOLIC BLOOD PRESSURE: 126 MMHG | HEIGHT: 71 IN | WEIGHT: 198 LBS | HEART RATE: 70 BPM | DIASTOLIC BLOOD PRESSURE: 64 MMHG

## 2021-04-09 DIAGNOSIS — G47.52 RBD (REM BEHAVIORAL DISORDER): Primary | ICD-10-CM
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
## 2021-04-09 PROCEDURE — 99214 PR OFFICE/OUTPT VISIT, EST, LEVL IV, 30-39 MIN: ICD-10-PCS | Mod: S$PBB,,, | Performed by: INTERNAL MEDICINE

## 2021-04-09 PROCEDURE — 99215 OFFICE O/P EST HI 40 MIN: CPT | Mod: PBBFAC,PO | Performed by: INTERNAL MEDICINE

## 2021-04-09 PROCEDURE — 99999 PR PBB SHADOW E&M-EST. PATIENT-LVL V: CPT | Mod: PBBFAC,,, | Performed by: INTERNAL MEDICINE

## 2021-04-09 PROCEDURE — 99999 PR PBB SHADOW E&M-EST. PATIENT-LVL V: ICD-10-PCS | Mod: PBBFAC,,, | Performed by: INTERNAL MEDICINE

## 2021-04-09 PROCEDURE — 99214 OFFICE O/P EST MOD 30 MIN: CPT | Mod: S$PBB,,, | Performed by: INTERNAL MEDICINE

## 2021-04-20 ENCOUNTER — CLINICAL SUPPORT (OUTPATIENT)
Dept: OPHTHALMOLOGY | Facility: CLINIC | Age: 66
End: 2021-04-20
Payer: MEDICARE

## 2021-04-20 DIAGNOSIS — H40.053 OCULAR HYPERTENSION, BILATERAL: ICD-10-CM

## 2021-04-26 ENCOUNTER — OFFICE VISIT (OUTPATIENT)
Dept: NEUROLOGY | Facility: CLINIC | Age: 66
End: 2021-04-26
Payer: MEDICARE

## 2021-04-26 DIAGNOSIS — G20.C PARKINSONISM, UNSPECIFIED PARKINSONISM TYPE: ICD-10-CM

## 2021-04-26 DIAGNOSIS — G47.52 REM SLEEP BEHAVIOR DISORDER: Chronic | ICD-10-CM

## 2021-04-26 PROCEDURE — 99215 OFFICE O/P EST HI 40 MIN: CPT | Mod: 95,,, | Performed by: PSYCHIATRY & NEUROLOGY

## 2021-04-26 PROCEDURE — 99215 PR OFFICE/OUTPT VISIT, EST, LEVL V, 40-54 MIN: ICD-10-PCS | Mod: 95,,, | Performed by: PSYCHIATRY & NEUROLOGY

## 2021-04-26 RX ORDER — CLONAZEPAM 1 MG/1
1.5 TABLET ORAL NIGHTLY
Qty: 45 TABLET | Refills: 5 | Status: SHIPPED | OUTPATIENT
Start: 2021-04-26 | End: 2021-10-18 | Stop reason: SDUPTHER

## 2021-04-26 RX ORDER — CARBIDOPA AND LEVODOPA 25; 100 MG/1; MG/1
1 TABLET ORAL 4 TIMES DAILY
Qty: 120 TABLET | Refills: 11 | Status: SHIPPED | OUTPATIENT
Start: 2021-04-26 | End: 2021-10-13

## 2021-04-30 PROCEDURE — 99454 REM MNTR PHYSIOL PARAM 16-30: CPT | Mod: PBBFAC,PO | Performed by: INTERNAL MEDICINE

## 2021-05-06 NOTE — OP NOTE
Problem: Mobility  Goal: STG-Within one week, patient will ascend and descend four to six stairs  Description: 1) Individualized goal: Up/down 4-6 stairs, handrails, SBA 1 week  2) Interventions:  PT Gait Training, PT Therapeutic Exercises, and PT Therapeutic Activity    Outcome: MET  Goal: STG-Within one week, patient will  Description: 1) Individualized goal: Gait fww/no  FT SBA, 1 week  2) Interventions:  PT Gait Training, PT Therapeutic Exercises, PT Neuro Re-Ed/Balance, and PT Therapeutic Activity    Outcome: MET     Problem: Mobility Transfers  Goal: STG-Within one week, patient will transfer bed to chair  Description: 1) Individualized goal: Transfer bed to/from chair fww/no AD supervision, 1 week  2) Interventions:  PT Gait Training, PT Therapeutic Exercises, and PT Therapeutic Activity    Outcome: MET      [facility]  Surgery Department  Operative Note    SUMMARY     Date of Procedure: 4/24/2019     Procedure: Procedure(s) (LRB):  HEMORRHOIDECTOMY (lithotomy) (N/A)     Surgeon(s) and Role:     * Kaz Keating MD - Primary    Assisting Surgeon: None    Pre-Operative Diagnosis: Internal strangulated hemorrhoids [K64.8]    Post-Operative Diagnosis: Post-Op Diagnosis Codes:     * Internal strangulated hemorrhoids [K64.8]    Anesthesia: Choice    Technical Procedures Used:   1.  Examination under anesthesia  2.  Excisional hemorrhoidectomy  3.  Pudendal nerve block    Indications for Procedure:  63-year-old male who was seen in clinic 1 day ago with findings of a prolapse, strangulated incarcerated internal left lateral hemorrhoid who presents for definitive repair and resection    Findings of the Procedure:  Incarcerated, prolapsed and strangulated left lateral internal hemorrhoid    Description of the Procedure:  Patient was brought to the operating placed supine on the table. Mac anesthesia was then induced.  Patient was then moved to lithotomy position.  The perineum and anus were then prepped and draped in usual sterile fashion. A preoperative surgical time-out was then performed for the correct patient, procedure and preop medications given.  A digital rectal exam was then performed with confirmation of a prolapse, strangulated incarcerated left lateral internal hemorrhoid without other definitive masses or abnormalities found within the rectum or anal canal.  A pudendal nerve block was then performed using a mixture of 30 cc of 0.25% bupivacaine without epinephrine and 20 cc of Exparel.  10 cc was injected into the subdermal space around the anal verge, 20 cc was injected into the intersphincteric space bilaterally and 20 cc injected into the ischial anal fossa where the pudendal nerve enters bilaterally for a total of 50 cc given for the block. A lighted Hill-Shepherd retractor was then  inserted into the anal canal in all 4 quadrants were examined. There were no enlargements of any of the other hemorrhoidal columns except for the left lateral as previously described above. Attention was then turned to the left lateral hemorrhoidal column for excisional hemorrhoidectomy.  The skin overlying the left lateral hemorrhoid column any anal verge was then incised with the electrocautery and dissected down to where the prolapsed internal hemorrhoid was encountered.  Care was taken not to injure any muscle and to take his minimal anoderm as possible.  An elliptical incision around the hemorrhoid was then scored with electrocautery to a point inside the rectum.  The handheld Ligasure was then used to complete the resection and transect the hemorrhoid with good hemostasis. The hemorrhoid was then passed off the field for final pathology. The transection site was then found to be hemostatic. A 2-0 Vicryl suture was then used to over sew the internal portion of the transection site and reapproximate all tissues.  The small amount of external skin that was taken was then reapproximated with a 3-0 chromic suture with a small pinpoint opening at the end to allow for any more drainage. The inside of the anus as well as externally was then evaluated and found to be hemostatic again.  A Gel-Foam was then fashioned into a tampon and wrapped in Surgicel then inserted into the anal canal for additional hemostasis. All sponge, needle and instrument counts were correct at the end the case. Surgical dressings were then applied and the patient back in the supine position. He was woken from mac anesthesia and then transferred to the postanesthesia care in stable condition. He tolerated procedure well.    Significant Surgical Tasks Conducted by the Assistant(s), if Applicable: N/A    Complications: No    Estimated Blood Loss (EBL):  10mL           Implants: * No implants in log *    Specimens:   Specimen (12h ago, onward)     Start     Ordered    04/24/19 1516  Specimen to Pathology - Surgery  Once     Comments:  1) Left Lateral hemorrhoid  (perm)Dx: Hemorrhoids     Start Status     04/24/19 1516 Collected (04/24/19 1516) Order ID: [id number]       04/24/19 1516                  Condition: Good    Disposition: PACU - hemodynamically stable.    Attestation: I performed the procedure.

## 2021-05-11 ENCOUNTER — TELEPHONE (OUTPATIENT)
Dept: NEPHROLOGY | Facility: CLINIC | Age: 66
End: 2021-05-11

## 2021-05-17 ENCOUNTER — TELEPHONE (OUTPATIENT)
Dept: FAMILY MEDICINE | Facility: CLINIC | Age: 66
End: 2021-05-17

## 2021-05-18 ENCOUNTER — PATIENT MESSAGE (OUTPATIENT)
Dept: FAMILY MEDICINE | Facility: CLINIC | Age: 66
End: 2021-05-18

## 2021-05-18 RX ORDER — METFORMIN HYDROCHLORIDE 500 MG/1
500 TABLET, EXTENDED RELEASE ORAL DAILY
Qty: 90 TABLET | Refills: 1 | Status: SHIPPED | OUTPATIENT
Start: 2021-05-18 | End: 2021-12-30 | Stop reason: SDUPTHER

## 2021-05-26 PROCEDURE — 99454 REM MNTR PHYSIOL PARAM 16-30: CPT | Mod: PBBFAC,PO | Performed by: INTERNAL MEDICINE

## 2021-06-14 RX ORDER — FUROSEMIDE 20 MG/1
20 TABLET ORAL EVERY MORNING
Qty: 90 TABLET | Refills: 3 | OUTPATIENT
Start: 2021-06-14

## 2021-06-25 PROCEDURE — 99454 REM MNTR PHYSIOL PARAM 16-30: CPT | Mod: PBBFAC,PO | Performed by: INTERNAL MEDICINE

## 2021-06-28 ENCOUNTER — OFFICE VISIT (OUTPATIENT)
Dept: NEUROLOGY | Facility: CLINIC | Age: 66
End: 2021-06-28
Payer: MEDICARE

## 2021-06-28 DIAGNOSIS — R41.3 MEMORY CHANGE: ICD-10-CM

## 2021-06-28 DIAGNOSIS — G20.C PARKINSONISM, UNSPECIFIED PARKINSONISM TYPE: Chronic | ICD-10-CM

## 2021-06-28 DIAGNOSIS — G47.52 REM SLEEP BEHAVIOR DISORDER: Chronic | ICD-10-CM

## 2021-06-28 PROCEDURE — 99215 OFFICE O/P EST HI 40 MIN: CPT | Mod: 95,,, | Performed by: PSYCHIATRY & NEUROLOGY

## 2021-06-28 PROCEDURE — 99215 PR OFFICE/OUTPT VISIT, EST, LEVL V, 40-54 MIN: ICD-10-PCS | Mod: 95,,, | Performed by: PSYCHIATRY & NEUROLOGY

## 2021-06-28 RX ORDER — BRIMONIDINE TARTRATE 2 MG/ML
1 SOLUTION/ DROPS OPHTHALMIC 2 TIMES DAILY
Qty: 5 ML | Refills: 11 | OUTPATIENT
Start: 2021-06-28 | End: 2022-06-28

## 2021-06-29 ENCOUNTER — LAB VISIT (OUTPATIENT)
Dept: LAB | Facility: HOSPITAL | Age: 66
End: 2021-06-29
Attending: INTERNAL MEDICINE
Payer: MEDICARE

## 2021-06-29 DIAGNOSIS — E11.21 TYPE 2 DIABETES MELLITUS WITH DIABETIC NEPHROPATHY, WITH LONG-TERM CURRENT USE OF INSULIN: ICD-10-CM

## 2021-06-29 DIAGNOSIS — Z79.4 TYPE 2 DIABETES MELLITUS WITH DIABETIC NEPHROPATHY, WITH LONG-TERM CURRENT USE OF INSULIN: ICD-10-CM

## 2021-06-29 LAB
ESTIMATED AVG GLUCOSE: 131 MG/DL (ref 68–131)
HBA1C MFR BLD: 6.2 % (ref 4–5.6)

## 2021-06-29 PROCEDURE — 36415 COLL VENOUS BLD VENIPUNCTURE: CPT | Mod: PO | Performed by: INTERNAL MEDICINE

## 2021-06-29 PROCEDURE — 83036 HEMOGLOBIN GLYCOSYLATED A1C: CPT | Performed by: INTERNAL MEDICINE

## 2021-07-06 ENCOUNTER — OFFICE VISIT (OUTPATIENT)
Dept: NEPHROLOGY | Facility: CLINIC | Age: 66
End: 2021-07-06
Payer: MEDICARE

## 2021-07-06 VITALS
BODY MASS INDEX: 27.67 KG/M2 | HEART RATE: 82 BPM | HEIGHT: 71 IN | WEIGHT: 197.63 LBS | DIASTOLIC BLOOD PRESSURE: 70 MMHG | OXYGEN SATURATION: 93 % | SYSTOLIC BLOOD PRESSURE: 118 MMHG

## 2021-07-06 DIAGNOSIS — D50.8 OTHER IRON DEFICIENCY ANEMIA: ICD-10-CM

## 2021-07-06 DIAGNOSIS — G20.C PARKINSONISM, UNSPECIFIED PARKINSONISM TYPE: Chronic | ICD-10-CM

## 2021-07-06 DIAGNOSIS — R41.3 MEMORY CHANGE: ICD-10-CM

## 2021-07-06 DIAGNOSIS — Z87.39 HX OF ACUTE GOUTY ARTHRITIS: ICD-10-CM

## 2021-07-06 DIAGNOSIS — I10 ESSENTIAL HYPERTENSION: ICD-10-CM

## 2021-07-06 DIAGNOSIS — E11.40 TYPE 2 DIABETES MELLITUS WITH DIABETIC NEUROPATHY, WITHOUT LONG-TERM CURRENT USE OF INSULIN: ICD-10-CM

## 2021-07-06 DIAGNOSIS — J84.9 ILD (INTERSTITIAL LUNG DISEASE): ICD-10-CM

## 2021-07-06 DIAGNOSIS — E11.3293 CONTROLLED TYPE 2 DIABETES MELLITUS WITH BOTH EYES AFFECTED BY MILD NONPROLIFERATIVE RETINOPATHY WITHOUT MACULAR EDEMA, WITH LONG-TERM CURRENT USE OF INSULIN: ICD-10-CM

## 2021-07-06 DIAGNOSIS — G47.33 OSA (OBSTRUCTIVE SLEEP APNEA): ICD-10-CM

## 2021-07-06 DIAGNOSIS — E55.9 VITAMIN D DEFICIENCY: ICD-10-CM

## 2021-07-06 DIAGNOSIS — E78.5 HYPERLIPIDEMIA, UNSPECIFIED HYPERLIPIDEMIA TYPE: ICD-10-CM

## 2021-07-06 DIAGNOSIS — N18.2 CKD (CHRONIC KIDNEY DISEASE) STAGE 2, GFR 60-89 ML/MIN: Primary | ICD-10-CM

## 2021-07-06 DIAGNOSIS — Z79.4 TYPE 2 DIABETES MELLITUS WITH DIABETIC NEPHROPATHY, WITH LONG-TERM CURRENT USE OF INSULIN: ICD-10-CM

## 2021-07-06 DIAGNOSIS — R41.89 COGNITIVE CHANGE: ICD-10-CM

## 2021-07-06 DIAGNOSIS — Z79.4 CONTROLLED TYPE 2 DIABETES MELLITUS WITH BOTH EYES AFFECTED BY MILD NONPROLIFERATIVE RETINOPATHY WITHOUT MACULAR EDEMA, WITH LONG-TERM CURRENT USE OF INSULIN: ICD-10-CM

## 2021-07-06 DIAGNOSIS — E78.2 MIXED HYPERLIPIDEMIA: ICD-10-CM

## 2021-07-06 DIAGNOSIS — E66.9 OBESITY (BMI 30.0-34.9): ICD-10-CM

## 2021-07-06 DIAGNOSIS — G63 POLYNEUROPATHY ASSOCIATED WITH UNDERLYING DISEASE: ICD-10-CM

## 2021-07-06 DIAGNOSIS — N20.0 KIDNEY STONES: ICD-10-CM

## 2021-07-06 DIAGNOSIS — E11.21 TYPE 2 DIABETES MELLITUS WITH DIABETIC NEPHROPATHY, WITH LONG-TERM CURRENT USE OF INSULIN: ICD-10-CM

## 2021-07-06 PROCEDURE — 99999 PR PBB SHADOW E&M-EST. PATIENT-LVL IV: ICD-10-PCS | Mod: PBBFAC,,, | Performed by: INTERNAL MEDICINE

## 2021-07-06 PROCEDURE — 99214 OFFICE O/P EST MOD 30 MIN: CPT | Mod: PBBFAC,PO | Performed by: INTERNAL MEDICINE

## 2021-07-06 PROCEDURE — 99215 PR OFFICE/OUTPT VISIT, EST, LEVL V, 40-54 MIN: ICD-10-PCS | Mod: S$PBB,,, | Performed by: INTERNAL MEDICINE

## 2021-07-06 PROCEDURE — 99999 PR PBB SHADOW E&M-EST. PATIENT-LVL IV: CPT | Mod: PBBFAC,,, | Performed by: INTERNAL MEDICINE

## 2021-07-06 PROCEDURE — 99215 OFFICE O/P EST HI 40 MIN: CPT | Mod: S$PBB,,, | Performed by: INTERNAL MEDICINE

## 2021-07-06 RX ORDER — BRINZOLAMIDE/BRIMONIDINE TARTRATE 10; 2 MG/ML; MG/ML
SUSPENSION/ DROPS OPHTHALMIC
COMMUNITY
Start: 2021-07-01 | End: 2021-08-05

## 2021-07-17 PROBLEM — N18.2 CKD (CHRONIC KIDNEY DISEASE) STAGE 2, GFR 60-89 ML/MIN: Status: ACTIVE | Noted: 2021-07-17

## 2021-07-21 ENCOUNTER — PATIENT MESSAGE (OUTPATIENT)
Dept: NEPHROLOGY | Facility: CLINIC | Age: 66
End: 2021-07-21

## 2021-07-22 ENCOUNTER — PATIENT MESSAGE (OUTPATIENT)
Dept: NEPHROLOGY | Facility: CLINIC | Age: 66
End: 2021-07-22

## 2021-07-22 ENCOUNTER — TELEPHONE (OUTPATIENT)
Dept: NEPHROLOGY | Facility: CLINIC | Age: 66
End: 2021-07-22

## 2021-08-20 ENCOUNTER — PATIENT MESSAGE (OUTPATIENT)
Dept: NEPHROLOGY | Facility: CLINIC | Age: 66
End: 2021-08-20

## 2021-08-20 ENCOUNTER — PATIENT MESSAGE (OUTPATIENT)
Dept: FAMILY MEDICINE | Facility: CLINIC | Age: 66
End: 2021-08-20

## 2021-08-20 DIAGNOSIS — N40.0 BENIGN PROSTATIC HYPERPLASIA, UNSPECIFIED WHETHER LOWER URINARY TRACT SYMPTOMS PRESENT: Primary | ICD-10-CM

## 2021-08-25 DIAGNOSIS — E78.2 MIXED HYPERLIPIDEMIA: ICD-10-CM

## 2021-08-25 RX ORDER — ATORVASTATIN CALCIUM 20 MG/1
TABLET, FILM COATED ORAL
Qty: 90 TABLET | Refills: 0 | Status: SHIPPED | OUTPATIENT
Start: 2021-08-25 | End: 2021-11-23

## 2021-08-26 PROCEDURE — 99454 REM MNTR PHYSIOL PARAM 16-30: CPT | Mod: PBBFAC,PO | Performed by: INTERNAL MEDICINE

## 2021-09-03 ENCOUNTER — PATIENT MESSAGE (OUTPATIENT)
Dept: NEUROSURGERY | Facility: CLINIC | Age: 66
End: 2021-09-03

## 2021-09-03 ENCOUNTER — PATIENT MESSAGE (OUTPATIENT)
Dept: NEUROLOGY | Facility: CLINIC | Age: 66
End: 2021-09-03

## 2021-09-14 ENCOUNTER — OFFICE VISIT (OUTPATIENT)
Dept: NEUROLOGY | Facility: CLINIC | Age: 66
End: 2021-09-14
Payer: MEDICARE

## 2021-09-14 VITALS — BODY MASS INDEX: 27.58 KG/M2 | WEIGHT: 197.75 LBS

## 2021-09-14 DIAGNOSIS — G20.C PARKINSONISM, UNSPECIFIED PARKINSONISM TYPE: Chronic | ICD-10-CM

## 2021-09-14 PROCEDURE — 99999 PR PBB SHADOW E&M-EST. PATIENT-LVL III: CPT | Mod: PBBFAC,,, | Performed by: PSYCHIATRY & NEUROLOGY

## 2021-09-14 PROCEDURE — 99214 OFFICE O/P EST MOD 30 MIN: CPT | Mod: S$PBB,,, | Performed by: PSYCHIATRY & NEUROLOGY

## 2021-09-14 PROCEDURE — 99214 PR OFFICE/OUTPT VISIT, EST, LEVL IV, 30-39 MIN: ICD-10-PCS | Mod: S$PBB,,, | Performed by: PSYCHIATRY & NEUROLOGY

## 2021-09-14 PROCEDURE — 99999 PR PBB SHADOW E&M-EST. PATIENT-LVL III: ICD-10-PCS | Mod: PBBFAC,,, | Performed by: PSYCHIATRY & NEUROLOGY

## 2021-09-14 PROCEDURE — 99213 OFFICE O/P EST LOW 20 MIN: CPT | Mod: PBBFAC | Performed by: PSYCHIATRY & NEUROLOGY

## 2021-09-14 RX ORDER — CARBIDOPA AND LEVODOPA 25; 100 MG/1; MG/1
1 TABLET ORAL
Qty: 150 TABLET | Refills: 11 | Status: SHIPPED | OUTPATIENT
Start: 2021-09-14 | End: 2021-12-06

## 2021-09-30 ENCOUNTER — PATIENT MESSAGE (OUTPATIENT)
Dept: FAMILY MEDICINE | Facility: CLINIC | Age: 66
End: 2021-09-30

## 2021-09-30 ENCOUNTER — PATIENT MESSAGE (OUTPATIENT)
Dept: NEPHROLOGY | Facility: CLINIC | Age: 66
End: 2021-09-30

## 2021-10-13 ENCOUNTER — OFFICE VISIT (OUTPATIENT)
Dept: FAMILY MEDICINE | Facility: CLINIC | Age: 66
End: 2021-10-13
Payer: MEDICARE

## 2021-10-13 VITALS — SYSTOLIC BLOOD PRESSURE: 112 MMHG | DIASTOLIC BLOOD PRESSURE: 73 MMHG

## 2021-10-13 DIAGNOSIS — Z87.39 HISTORY OF GOUT: Primary | ICD-10-CM

## 2021-10-13 DIAGNOSIS — N40.0 BENIGN PROSTATIC HYPERPLASIA, UNSPECIFIED WHETHER LOWER URINARY TRACT SYMPTOMS PRESENT: ICD-10-CM

## 2021-10-13 DIAGNOSIS — Z87.39 HX OF ACUTE GOUTY ARTHRITIS: ICD-10-CM

## 2021-10-13 DIAGNOSIS — E78.2 MIXED HYPERLIPIDEMIA: ICD-10-CM

## 2021-10-13 DIAGNOSIS — E11.21 TYPE 2 DIABETES MELLITUS WITH DIABETIC NEPHROPATHY, WITH LONG-TERM CURRENT USE OF INSULIN: ICD-10-CM

## 2021-10-13 DIAGNOSIS — Z79.4 TYPE 2 DIABETES MELLITUS WITH DIABETIC NEPHROPATHY, WITH LONG-TERM CURRENT USE OF INSULIN: ICD-10-CM

## 2021-10-13 PROCEDURE — 99214 PR OFFICE/OUTPT VISIT, EST, LEVL IV, 30-39 MIN: ICD-10-PCS | Mod: 95,,, | Performed by: INTERNAL MEDICINE

## 2021-10-13 PROCEDURE — 99214 OFFICE O/P EST MOD 30 MIN: CPT | Mod: 95,,, | Performed by: INTERNAL MEDICINE

## 2021-10-13 RX ORDER — ALLOPURINOL 100 MG/1
100 TABLET ORAL DAILY
Qty: 30 TABLET | Refills: 1 | Status: SHIPPED | OUTPATIENT
Start: 2021-10-13 | End: 2021-11-04

## 2021-10-13 RX ORDER — COLCHICINE 0.6 MG/1
1 CAPSULE ORAL DAILY
Qty: 90 CAPSULE | Refills: 1 | Status: SHIPPED | OUTPATIENT
Start: 2021-10-13 | End: 2023-01-10

## 2021-11-02 ENCOUNTER — LAB VISIT (OUTPATIENT)
Dept: LAB | Facility: HOSPITAL | Age: 66
End: 2021-11-02
Attending: INTERNAL MEDICINE
Payer: MEDICARE

## 2021-11-02 DIAGNOSIS — E11.21 TYPE 2 DIABETES MELLITUS WITH DIABETIC NEPHROPATHY, WITH LONG-TERM CURRENT USE OF INSULIN: ICD-10-CM

## 2021-11-02 DIAGNOSIS — Z79.4 TYPE 2 DIABETES MELLITUS WITH DIABETIC NEPHROPATHY, WITH LONG-TERM CURRENT USE OF INSULIN: ICD-10-CM

## 2021-11-02 DIAGNOSIS — Z87.39 HISTORY OF GOUT: ICD-10-CM

## 2021-11-02 DIAGNOSIS — E78.2 MIXED HYPERLIPIDEMIA: ICD-10-CM

## 2021-11-02 LAB
ALBUMIN SERPL BCP-MCNC: 3.9 G/DL (ref 3.5–5.2)
ALP SERPL-CCNC: 128 U/L (ref 55–135)
ALT SERPL W/O P-5'-P-CCNC: 10 U/L (ref 10–44)
ANION GAP SERPL CALC-SCNC: 10 MMOL/L (ref 8–16)
AST SERPL-CCNC: 30 U/L (ref 10–40)
BILIRUB SERPL-MCNC: 0.9 MG/DL (ref 0.1–1)
BUN SERPL-MCNC: 12 MG/DL (ref 8–23)
CALCIUM SERPL-MCNC: 9.8 MG/DL (ref 8.7–10.5)
CHLORIDE SERPL-SCNC: 99 MMOL/L (ref 95–110)
CHOLEST SERPL-MCNC: 125 MG/DL (ref 120–199)
CHOLEST/HDLC SERPL: 3.7 {RATIO} (ref 2–5)
CO2 SERPL-SCNC: 30 MMOL/L (ref 23–29)
CREAT SERPL-MCNC: 1.1 MG/DL (ref 0.5–1.4)
EST. GFR  (AFRICAN AMERICAN): >60 ML/MIN/1.73 M^2
EST. GFR  (NON AFRICAN AMERICAN): >60 ML/MIN/1.73 M^2
ESTIMATED AVG GLUCOSE: 128 MG/DL (ref 68–131)
GLUCOSE SERPL-MCNC: 140 MG/DL (ref 70–110)
HBA1C MFR BLD: 6.1 % (ref 4–5.6)
HDLC SERPL-MCNC: 34 MG/DL (ref 40–75)
HDLC SERPL: 27.2 % (ref 20–50)
LDLC SERPL CALC-MCNC: 60 MG/DL (ref 63–159)
NONHDLC SERPL-MCNC: 91 MG/DL
POTASSIUM SERPL-SCNC: 4 MMOL/L (ref 3.5–5.1)
PROT SERPL-MCNC: 7.8 G/DL (ref 6–8.4)
SODIUM SERPL-SCNC: 139 MMOL/L (ref 136–145)
TRIGL SERPL-MCNC: 155 MG/DL (ref 30–150)
URATE SERPL-MCNC: 7.4 MG/DL (ref 3.4–7)
URATE SERPL-MCNC: 7.4 MG/DL (ref 3.4–7)

## 2021-11-02 PROCEDURE — 80061 LIPID PANEL: CPT | Performed by: INTERNAL MEDICINE

## 2021-11-02 PROCEDURE — 85025 COMPLETE CBC W/AUTO DIFF WBC: CPT | Performed by: INTERNAL MEDICINE

## 2021-11-02 PROCEDURE — 36415 COLL VENOUS BLD VENIPUNCTURE: CPT | Mod: PO | Performed by: INTERNAL MEDICINE

## 2021-11-02 PROCEDURE — 83036 HEMOGLOBIN GLYCOSYLATED A1C: CPT | Performed by: INTERNAL MEDICINE

## 2021-11-02 PROCEDURE — 80053 COMPREHEN METABOLIC PANEL: CPT | Performed by: INTERNAL MEDICINE

## 2021-11-02 PROCEDURE — 84550 ASSAY OF BLOOD/URIC ACID: CPT | Performed by: INTERNAL MEDICINE

## 2021-11-03 LAB
BASOPHILS # BLD AUTO: 0.06 K/UL (ref 0–0.2)
BASOPHILS NFR BLD: 0.8 % (ref 0–1.9)
DIFFERENTIAL METHOD: ABNORMAL
EOSINOPHIL # BLD AUTO: 0.2 K/UL (ref 0–0.5)
EOSINOPHIL NFR BLD: 2.1 % (ref 0–8)
ERYTHROCYTE [DISTWIDTH] IN BLOOD BY AUTOMATED COUNT: 13.7 % (ref 11.5–14.5)
HCT VFR BLD AUTO: 48.2 % (ref 40–54)
HGB BLD-MCNC: 15 G/DL (ref 14–18)
IMM GRANULOCYTES # BLD AUTO: 0.01 K/UL (ref 0–0.04)
IMM GRANULOCYTES NFR BLD AUTO: 0.1 % (ref 0–0.5)
LYMPHOCYTES # BLD AUTO: 1.6 K/UL (ref 1–4.8)
LYMPHOCYTES NFR BLD: 22.4 % (ref 18–48)
MCH RBC QN AUTO: 27.8 PG (ref 27–31)
MCHC RBC AUTO-ENTMCNC: 31.1 G/DL (ref 32–36)
MCV RBC AUTO: 89 FL (ref 82–98)
MONOCYTES # BLD AUTO: 0.5 K/UL (ref 0.3–1)
MONOCYTES NFR BLD: 6.4 % (ref 4–15)
NEUTROPHILS # BLD AUTO: 4.8 K/UL (ref 1.8–7.7)
NEUTROPHILS NFR BLD: 68.2 % (ref 38–73)
NRBC BLD-RTO: 0 /100 WBC
PLATELET # BLD AUTO: 261 K/UL (ref 150–450)
PMV BLD AUTO: 9.6 FL (ref 9.2–12.9)
RBC # BLD AUTO: 5.39 M/UL (ref 4.6–6.2)
WBC # BLD AUTO: 7.06 K/UL (ref 3.9–12.7)

## 2021-11-04 ENCOUNTER — TELEPHONE (OUTPATIENT)
Dept: FAMILY MEDICINE | Facility: CLINIC | Age: 66
End: 2021-11-04
Payer: MEDICARE

## 2021-11-04 ENCOUNTER — PATIENT MESSAGE (OUTPATIENT)
Dept: FAMILY MEDICINE | Facility: CLINIC | Age: 66
End: 2021-11-04
Payer: MEDICARE

## 2021-11-04 DIAGNOSIS — Z87.39 HISTORY OF GOUT: Primary | ICD-10-CM

## 2021-11-04 DIAGNOSIS — E79.0 HYPERURICEMIA: ICD-10-CM

## 2021-11-04 RX ORDER — ALLOPURINOL 300 MG/1
300 TABLET ORAL DAILY
Qty: 90 TABLET | Refills: 0 | Status: SHIPPED | OUTPATIENT
Start: 2021-11-04 | End: 2022-02-10

## 2021-11-05 ENCOUNTER — PATIENT MESSAGE (OUTPATIENT)
Dept: FAMILY MEDICINE | Facility: CLINIC | Age: 66
End: 2021-11-05
Payer: MEDICARE

## 2021-11-18 ENCOUNTER — LAB VISIT (OUTPATIENT)
Dept: LAB | Facility: HOSPITAL | Age: 66
End: 2021-11-18
Attending: INTERNAL MEDICINE
Payer: MEDICARE

## 2021-11-18 DIAGNOSIS — E79.0 HYPERURICEMIA: ICD-10-CM

## 2021-11-18 DIAGNOSIS — Z87.39 HISTORY OF GOUT: ICD-10-CM

## 2021-11-18 LAB
BASOPHILS # BLD AUTO: 0.06 K/UL (ref 0–0.2)
BASOPHILS NFR BLD: 0.8 % (ref 0–1.9)
DIFFERENTIAL METHOD: ABNORMAL
EOSINOPHIL # BLD AUTO: 0.2 K/UL (ref 0–0.5)
EOSINOPHIL NFR BLD: 2.6 % (ref 0–8)
ERYTHROCYTE [DISTWIDTH] IN BLOOD BY AUTOMATED COUNT: 13.6 % (ref 11.5–14.5)
HCT VFR BLD AUTO: 47 % (ref 40–54)
HGB BLD-MCNC: 14.8 G/DL (ref 14–18)
IMM GRANULOCYTES # BLD AUTO: 0.01 K/UL (ref 0–0.04)
IMM GRANULOCYTES NFR BLD AUTO: 0.1 % (ref 0–0.5)
LYMPHOCYTES # BLD AUTO: 2.1 K/UL (ref 1–4.8)
LYMPHOCYTES NFR BLD: 27.4 % (ref 18–48)
MCH RBC QN AUTO: 27.6 PG (ref 27–31)
MCHC RBC AUTO-ENTMCNC: 31.5 G/DL (ref 32–36)
MCV RBC AUTO: 88 FL (ref 82–98)
MONOCYTES # BLD AUTO: 0.6 K/UL (ref 0.3–1)
MONOCYTES NFR BLD: 8 % (ref 4–15)
NEUTROPHILS # BLD AUTO: 4.7 K/UL (ref 1.8–7.7)
NEUTROPHILS NFR BLD: 61.1 % (ref 38–73)
NRBC BLD-RTO: 0 /100 WBC
PLATELET # BLD AUTO: 232 K/UL (ref 150–450)
PMV BLD AUTO: 9.8 FL (ref 9.2–12.9)
RBC # BLD AUTO: 5.37 M/UL (ref 4.6–6.2)
WBC # BLD AUTO: 7.66 K/UL (ref 3.9–12.7)

## 2021-11-18 PROCEDURE — 85025 COMPLETE CBC W/AUTO DIFF WBC: CPT | Performed by: INTERNAL MEDICINE

## 2021-11-18 PROCEDURE — 36415 COLL VENOUS BLD VENIPUNCTURE: CPT | Mod: PO | Performed by: INTERNAL MEDICINE

## 2021-11-18 PROCEDURE — 84550 ASSAY OF BLOOD/URIC ACID: CPT | Performed by: INTERNAL MEDICINE

## 2021-11-19 ENCOUNTER — PATIENT MESSAGE (OUTPATIENT)
Dept: FAMILY MEDICINE | Facility: CLINIC | Age: 66
End: 2021-11-19
Payer: MEDICARE

## 2021-11-19 LAB — URATE SERPL-MCNC: 6.8 MG/DL (ref 3.4–7)

## 2021-11-28 PROCEDURE — 99454 REM MNTR PHYSIOL PARAM 16-30: CPT | Mod: PBBFAC,PO | Performed by: INTERNAL MEDICINE

## 2021-12-06 ENCOUNTER — OFFICE VISIT (OUTPATIENT)
Dept: NEUROLOGY | Facility: CLINIC | Age: 66
End: 2021-12-06
Payer: MEDICARE

## 2021-12-06 DIAGNOSIS — G47.52 REM SLEEP BEHAVIOR DISORDER: Chronic | ICD-10-CM

## 2021-12-06 DIAGNOSIS — G20.C PARKINSONISM, UNSPECIFIED PARKINSONISM TYPE: Chronic | ICD-10-CM

## 2021-12-06 PROCEDURE — 99214 OFFICE O/P EST MOD 30 MIN: CPT | Mod: 95,,, | Performed by: PSYCHIATRY & NEUROLOGY

## 2021-12-06 PROCEDURE — 99214 PR OFFICE/OUTPT VISIT, EST, LEVL IV, 30-39 MIN: ICD-10-PCS | Mod: 95,,, | Performed by: PSYCHIATRY & NEUROLOGY

## 2021-12-06 RX ORDER — CARBIDOPA AND LEVODOPA 25; 100 MG/1; MG/1
1.5 TABLET ORAL 4 TIMES DAILY
Qty: 180 TABLET | Refills: 11 | Status: SHIPPED | OUTPATIENT
Start: 2021-12-06 | End: 2022-12-16

## 2021-12-14 ENCOUNTER — PATIENT MESSAGE (OUTPATIENT)
Dept: OTHER | Facility: OTHER | Age: 66
End: 2021-12-14
Payer: MEDICARE

## 2021-12-15 DIAGNOSIS — E11.21 TYPE 2 DIABETES MELLITUS WITH DIABETIC NEPHROPATHY, WITH LONG-TERM CURRENT USE OF INSULIN: ICD-10-CM

## 2021-12-15 DIAGNOSIS — Z79.4 TYPE 2 DIABETES MELLITUS WITH DIABETIC NEPHROPATHY, WITH LONG-TERM CURRENT USE OF INSULIN: ICD-10-CM

## 2021-12-17 ENCOUNTER — PATIENT MESSAGE (OUTPATIENT)
Dept: FAMILY MEDICINE | Facility: CLINIC | Age: 66
End: 2021-12-17
Payer: MEDICARE

## 2021-12-20 ENCOUNTER — OFFICE VISIT (OUTPATIENT)
Dept: FAMILY MEDICINE | Facility: CLINIC | Age: 66
End: 2021-12-20
Payer: MEDICARE

## 2021-12-20 ENCOUNTER — HOSPITAL ENCOUNTER (OUTPATIENT)
Dept: RADIOLOGY | Facility: HOSPITAL | Age: 66
Discharge: HOME | End: 2021-12-20
Attending: PHYSICIAN ASSISTANT
Payer: MEDICARE

## 2021-12-20 VITALS
OXYGEN SATURATION: 98 % | SYSTOLIC BLOOD PRESSURE: 128 MMHG | DIASTOLIC BLOOD PRESSURE: 74 MMHG | HEART RATE: 61 BPM | WEIGHT: 198.19 LBS | BODY MASS INDEX: 27.75 KG/M2 | HEIGHT: 71 IN

## 2021-12-20 DIAGNOSIS — R10.30 LOWER ABDOMINAL PAIN: ICD-10-CM

## 2021-12-20 DIAGNOSIS — R30.0 DYSURIA: Primary | ICD-10-CM

## 2021-12-20 LAB
BILIRUB UR QL STRIP: NEGATIVE
CLARITY UR: CLEAR
COLOR UR: YELLOW
GLUCOSE UR QL STRIP: NEGATIVE
HGB UR QL STRIP: NEGATIVE
KETONES UR QL STRIP: NEGATIVE
LEUKOCYTE ESTERASE UR QL STRIP: NEGATIVE
NITRITE UR QL STRIP: NEGATIVE
PH UR STRIP: 6 [PH] (ref 5–8)
PROT UR QL STRIP: NEGATIVE
SP GR UR STRIP: 1.01 (ref 1–1.03)
URN SPEC COLLECT METH UR: NORMAL

## 2021-12-20 PROCEDURE — 81003 URINALYSIS AUTO W/O SCOPE: CPT | Mod: PO | Performed by: PHYSICIAN ASSISTANT

## 2021-12-20 PROCEDURE — 99214 PR OFFICE/OUTPT VISIT, EST, LEVL IV, 30-39 MIN: ICD-10-PCS | Mod: S$PBB,,, | Performed by: PHYSICIAN ASSISTANT

## 2021-12-20 PROCEDURE — 74019 RADEX ABDOMEN 2 VIEWS: CPT | Mod: 26,,, | Performed by: RADIOLOGY

## 2021-12-20 PROCEDURE — 74019 XR ABDOMEN FLAT AND ERECT: ICD-10-PCS | Mod: 26,,, | Performed by: RADIOLOGY

## 2021-12-20 PROCEDURE — 99214 OFFICE O/P EST MOD 30 MIN: CPT | Mod: S$PBB,,, | Performed by: PHYSICIAN ASSISTANT

## 2021-12-20 PROCEDURE — 99999 PR PBB SHADOW E&M-EST. PATIENT-LVL V: CPT | Mod: PBBFAC,,, | Performed by: PHYSICIAN ASSISTANT

## 2021-12-20 PROCEDURE — 99215 OFFICE O/P EST HI 40 MIN: CPT | Mod: PBBFAC,PO | Performed by: PHYSICIAN ASSISTANT

## 2021-12-20 PROCEDURE — 99999 PR PBB SHADOW E&M-EST. PATIENT-LVL V: ICD-10-PCS | Mod: PBBFAC,,, | Performed by: PHYSICIAN ASSISTANT

## 2021-12-20 PROCEDURE — 74019 RADEX ABDOMEN 2 VIEWS: CPT | Mod: TC,FY,PO

## 2021-12-20 RX ORDER — DULAGLUTIDE 1.5 MG/.5ML
1.5 INJECTION, SOLUTION SUBCUTANEOUS
Qty: 12 PEN | Refills: 1 | OUTPATIENT
Start: 2021-12-20 | End: 2022-12-20

## 2021-12-21 NOTE — TELEPHONE ENCOUNTER
No new care gaps identified.  Powered by [employer]. Reference number: [id number].   12/21/2021 1:13:13 PM CST

## 2021-12-21 NOTE — TELEPHONE ENCOUNTER
Encounter details require adjustment(s)/ updating by ORC Staff  As of this time Protocols and CDM: did not populate or display   Adjustment(s) made: Department  CDM should display. Medication(s) delegated by the OR.  Will resend refill request encounter to P Centralized Refill Staff Pool.   [facility]   Note composed:1:12 PM 12/21/2021

## 2021-12-26 ENCOUNTER — PATIENT OUTREACH (OUTPATIENT)
Dept: ADMINISTRATIVE | Facility: OTHER | Age: 66
End: 2021-12-26
Payer: MEDICARE

## 2021-12-28 RX ORDER — CYCLOBENZAPRINE HCL 10 MG
TABLET ORAL
COMMUNITY
Start: 2021-12-17 | End: 2022-12-12

## 2021-12-28 RX ORDER — HYDROCODONE BITARTRATE AND ACETAMINOPHEN 5; 325 MG/1; MG/1
TABLET ORAL
COMMUNITY
End: 2022-12-12

## 2021-12-28 RX ORDER — IBUPROFEN 400 MG/1
TABLET ORAL
COMMUNITY
End: 2022-10-05

## 2021-12-28 RX ORDER — DICLOFENAC SODIUM 50 MG/1
50 TABLET, DELAYED RELEASE ORAL EVERY 8 HOURS
COMMUNITY
Start: 2021-12-17 | End: 2022-12-12

## 2021-12-28 RX ORDER — CLOBETASOL PROPIONATE 0.5 MG/G
CREAM TOPICAL 2 TIMES DAILY
COMMUNITY
Start: 2021-08-16 | End: 2022-12-12

## 2021-12-28 RX ORDER — DORZOLAMIDE HCL 20 MG/ML
SOLUTION/ DROPS OPHTHALMIC
COMMUNITY
End: 2022-02-16 | Stop reason: SDUPTHER

## 2021-12-28 RX ORDER — PREDNISONE 20 MG/1
20 TABLET ORAL DAILY
COMMUNITY
Start: 2021-08-16 | End: 2022-12-12

## 2021-12-29 ENCOUNTER — LAB VISIT (OUTPATIENT)
Dept: LAB | Facility: HOSPITAL | Age: 66
End: 2021-12-29
Attending: UROLOGY
Payer: MEDICARE

## 2021-12-29 ENCOUNTER — OFFICE VISIT (OUTPATIENT)
Dept: UROLOGY | Facility: CLINIC | Age: 66
End: 2021-12-29
Payer: MEDICARE

## 2021-12-29 VITALS — BODY MASS INDEX: 27.75 KG/M2 | HEIGHT: 71 IN | WEIGHT: 198.19 LBS

## 2021-12-29 DIAGNOSIS — Z12.5 SCREENING FOR PROSTATE CANCER: Primary | ICD-10-CM

## 2021-12-29 DIAGNOSIS — Z12.5 SCREENING FOR PROSTATE CANCER: ICD-10-CM

## 2021-12-29 DIAGNOSIS — N40.0 BENIGN PROSTATIC HYPERPLASIA, UNSPECIFIED WHETHER LOWER URINARY TRACT SYMPTOMS PRESENT: ICD-10-CM

## 2021-12-29 LAB
BILIRUB SERPL-MCNC: NORMAL MG/DL
BLOOD URINE, POC: NORMAL
CLARITY, POC UA: CLEAR
COLOR, POC UA: YELLOW
COMPLEXED PSA SERPL-MCNC: 4.4 NG/ML (ref 0–4)
GLUCOSE UR QL STRIP: NORMAL
KETONES UR QL STRIP: NORMAL
LEUKOCYTE ESTERASE URINE, POC: NORMAL
NITRITE, POC UA: NORMAL
PH, POC UA: 6.5
POC RESIDUAL URINE VOLUME: 0 ML (ref 0–100)
PROTEIN, POC: NORMAL
SPECIFIC GRAVITY, POC UA: 1.02
UROBILINOGEN, POC UA: 0.2

## 2021-12-29 PROCEDURE — 99203 OFFICE O/P NEW LOW 30 MIN: CPT | Mod: S$PBB,,, | Performed by: UROLOGY

## 2021-12-29 PROCEDURE — 51798 US URINE CAPACITY MEASURE: CPT | Mod: PBBFAC,PO | Performed by: UROLOGY

## 2021-12-29 PROCEDURE — 99203 PR OFFICE/OUTPT VISIT, NEW, LEVL III, 30-44 MIN: ICD-10-PCS | Mod: S$PBB,,, | Performed by: UROLOGY

## 2021-12-29 PROCEDURE — 99999 PR PBB SHADOW E&M-EST. PATIENT-LVL V: CPT | Mod: PBBFAC,,, | Performed by: UROLOGY

## 2021-12-29 PROCEDURE — 99999 PR PBB SHADOW E&M-EST. PATIENT-LVL V: ICD-10-PCS | Mod: PBBFAC,,, | Performed by: UROLOGY

## 2021-12-29 PROCEDURE — 81002 URINALYSIS NONAUTO W/O SCOPE: CPT | Mod: PBBFAC,PO | Performed by: UROLOGY

## 2021-12-29 PROCEDURE — 36415 COLL VENOUS BLD VENIPUNCTURE: CPT | Mod: PO | Performed by: UROLOGY

## 2021-12-29 PROCEDURE — 99215 OFFICE O/P EST HI 40 MIN: CPT | Mod: PBBFAC,PO | Performed by: UROLOGY

## 2021-12-29 PROCEDURE — 84153 ASSAY OF PSA TOTAL: CPT | Performed by: UROLOGY

## 2021-12-29 NOTE — TELEPHONE ENCOUNTER
Refill Routing Note   Medication(s) are not appropriate for processing by [facility] for the following reason(s):      - Drug-Disease Interaction (metFORMIN and Type 2 diabetes mellitus with diabetic nephropathy, with long-term current use of insulin)    [facility] action(s):  Defer Medication-related problems identified: Drug-disease interaction        --->Care Gap information included in message below if applicable.   Medication reconciliation completed: No   Automatic Epic Generated Protocol Data:        Requested Prescriptions   Pending Prescriptions Disp Refills    metFORMIN (GLUCOPHAGE-XR) 500 MG ER 24hr tablet [Pharmacy Med Name: METFORMIN ER 500MG 24HR TABS] 90 tablet 1     Sig: Take 1 tablet (500 mg total) by mouth once daily.       Antihyperglycemics Protocol Passed - 12/29/2021  3:10 PM        Passed - Recent visit in the past year        Passed - Normal creatinine in past 6 months     Lab Results   Component Value Date    CREATININE 1.1 11/02/2021              Passed - Had HBA1C in past 6 months     Hemoglobin A1C   Date Value Ref Range Status   11/02/2021 6.1 (H) 4.0 - 5.6 % Final     Comment:     ADA Screening Guidelines:  5.7-6.4%  Consistent with prediabetes  >or=6.5%  Consistent with diabetes    High levels of fetal hemoglobin interfere with the HbA1C  assay. Heterozygous hemoglobin variants (HbS, HgC, etc)do  not significantly interfere with this assay.   However, presence of multiple variants may affect accuracy.     06/29/2021 6.2 (H) 4.0 - 5.6 % Final     Comment:     ADA Screening Guidelines:  5.7-6.4%  Consistent with prediabetes  >or=6.5%  Consistent with diabetes    High levels of fetal hemoglobin interfere with the HbA1C  assay. Heterozygous hemoglobin variants (HbS, HgC, etc)do  not significantly interfere with this assay.   However, presence of multiple variants may affect accuracy.     12/23/2020 6.2 (H) 4.0 - 5.6 % Final     Comment:     ADA Screening Guidelines:  5.7-6.4%   Consistent with prediabetes  >or=6.5%  Consistent with diabetes  High levels of fetal hemoglobin interfere with the HbA1C  assay. Heterozygous hemoglobin variants (HbS, HgC, etc)do  not significantly interfere with this assay.   However, presence of multiple variants may affect accuracy.     12/23/2020 6.2 (H) 4.0 - 5.6 % Final     Comment:     ADA Screening Guidelines:  5.7-6.4%  Consistent with prediabetes  >or=6.5%  Consistent with diabetes  High levels of fetal hemoglobin interfere with the HbA1C  assay. Heterozygous hemoglobin variants (HbS, HgC, etc)do  not significantly interfere with this assay.   However, presence of multiple variants may affect accuracy.              Endocrinology:  Diabetes - Biguanides Passed - 12/29/2021  3:10 PM        Passed - Patient is at least 18 years old        Passed - Office visit in past 12 months.     Recent Visits  Date Type Provider Dept   10/13/21 Office Visit Power Benton, DO [facility]   04/09/21 Office Visit Power Benton, DO [facility]   03/15/21 Office Visit Power Benton, DO [facility]   12/14/20 Office Visit Power Benton, DO [facility]   10/09/20 Office Visit Power Benton, DO [facility]   09/14/20 Office Visit Power Benton, DO [facility]   08/17/20 Office Visit Power Benton, DO [facility]   07/09/20 Office Visit Power Benton, DO [facility]   Showing recent visits within past 720 days and meeting all other requirements  Future Appointments  No visits were found meeting these conditions.  Showing future appointments within next 150 days and meeting all other requirements      Future Appointments              Tomorrow JUAN, PAULINA Avery - [facility]    In 1 week MD Gena Bradley - Nephrology, [place]                Passed - Cr is 1.4 or below and within 360 days     Lab Results   Component Value Date    CREATININE 1.1  11/02/2021    CREATININE 1.1 03/29/2021    CREATININE 1.1 10/06/2020              Passed - HBA1C is 8 or below and within 180 days     Lab Results   Component Value Date    HGBA1C 6.1 (H) 11/02/2021    HGBA1C 6.2 (H) 06/29/2021    HGBA1C 6.2 (H) 12/23/2020    HGBA1C 6.2 (H) 12/23/2020              Passed - eGFR is 30 or above and within 360 days     Lab Results   Component Value Date    EGFRNONAA >60.0 11/02/2021    EGFRNONAA >60.0 03/29/2021    EGFRNONAA >60.0 10/06/2020                      Appointments  past 12m or future 3m with PCP    Date Provider   Last Visit   10/13/2021 Power Benton, DO   Next Visit   Visit date not found Power Benton,    ED visits in past 90 days: 0        Note composed:3:13 PM 12/29/2021

## 2021-12-30 ENCOUNTER — LAB VISIT (OUTPATIENT)
Dept: LAB | Facility: HOSPITAL | Age: 66
End: 2021-12-30
Attending: INTERNAL MEDICINE
Payer: MEDICARE

## 2021-12-30 DIAGNOSIS — N18.2 CKD (CHRONIC KIDNEY DISEASE) STAGE 2, GFR 60-89 ML/MIN: ICD-10-CM

## 2021-12-30 LAB
CREAT UR-MCNC: 160 MG/DL (ref 23–375)
PROT UR-MCNC: 16 MG/DL (ref 0–15)
PROT/CREAT UR: 0.1 MG/G{CREAT} (ref 0–0.2)

## 2021-12-30 PROCEDURE — 84156 ASSAY OF PROTEIN URINE: CPT | Performed by: INTERNAL MEDICINE

## 2022-01-01 RX ORDER — METFORMIN HYDROCHLORIDE 500 MG/1
500 TABLET, EXTENDED RELEASE ORAL DAILY
Qty: 90 TABLET | Refills: 1 | OUTPATIENT
Start: 2022-01-01

## 2022-01-03 NOTE — TELEPHONE ENCOUNTER
Provider Staff:     Action required for this patient.    Please note Refusal of medication.            Requested Prescriptions     Refused Prescriptions Disp Refills    metFORMIN (GLUCOPHAGE-XR) 500 MG ER 24hr tablet [Pharmacy Med Name: METFORMIN ER 500MG 24HR TABS] 90 tablet 1     Sig: Take 1 tablet (500 mg total) by mouth once daily.     Refused By: BS BRITT     Reason for Refusal: Duplicate      Thanks!  [facility]   Note composed: 01/03/2022 7:52 AM

## 2022-01-04 ENCOUNTER — TELEPHONE (OUTPATIENT)
Dept: NEPHROLOGY | Facility: CLINIC | Age: 67
End: 2022-01-04
Payer: MEDICARE

## 2022-01-04 ENCOUNTER — PATIENT MESSAGE (OUTPATIENT)
Dept: UROLOGY | Facility: CLINIC | Age: 67
End: 2022-01-04
Payer: MEDICARE

## 2022-01-04 NOTE — TELEPHONE ENCOUNTER
Patient was rescheduled due to the providers schedule changing. Labs were reviewed and understood.   He has recently been prescribed Allopurinol for gout. Please respond if it's not safe for his kidneys.

## 2022-02-02 ENCOUNTER — PATIENT MESSAGE (OUTPATIENT)
Dept: ADMINISTRATIVE | Facility: OTHER | Age: 67
End: 2022-02-02
Payer: MEDICARE

## 2022-02-03 ENCOUNTER — PATIENT MESSAGE (OUTPATIENT)
Dept: OTHER | Facility: OTHER | Age: 67
End: 2022-02-03
Payer: MEDICARE

## 2022-02-16 ENCOUNTER — PATIENT MESSAGE (OUTPATIENT)
Dept: OPTOMETRY | Facility: CLINIC | Age: 67
End: 2022-02-16
Payer: MEDICARE

## 2022-02-16 DIAGNOSIS — H40.053 OCULAR HYPERTENSION, BILATERAL: Primary | ICD-10-CM

## 2022-02-16 RX ORDER — DORZOLAMIDE HCL 20 MG/ML
1 SOLUTION/ DROPS OPHTHALMIC 2 TIMES DAILY
Qty: 10 ML | Refills: 3 | Status: SHIPPED | OUTPATIENT
Start: 2022-02-16 | End: 2023-02-22

## 2022-02-16 RX ORDER — BRIMONIDINE TARTRATE 2 MG/ML
1 SOLUTION/ DROPS OPHTHALMIC 2 TIMES DAILY
Qty: 10 ML | Refills: 3 | Status: SHIPPED | OUTPATIENT
Start: 2022-02-16 | End: 2023-02-22

## 2022-02-21 ENCOUNTER — PATIENT MESSAGE (OUTPATIENT)
Dept: NEUROLOGY | Facility: CLINIC | Age: 67
End: 2022-02-21
Payer: MEDICARE

## 2022-03-14 NOTE — TELEPHONE ENCOUNTER
No new care gaps identified.  Powered by [employer]. Reference number: [id number].   3/14/2022 5:55:51 AM CDT

## 2022-03-18 ENCOUNTER — PATIENT MESSAGE (OUTPATIENT)
Dept: FAMILY MEDICINE | Facility: CLINIC | Age: 67
End: 2022-03-18
Payer: MEDICARE

## 2022-03-18 DIAGNOSIS — E11.21 TYPE 2 DIABETES MELLITUS WITH DIABETIC NEPHROPATHY, WITH LONG-TERM CURRENT USE OF INSULIN: ICD-10-CM

## 2022-03-18 DIAGNOSIS — Z79.4 TYPE 2 DIABETES MELLITUS WITH DIABETIC NEPHROPATHY, WITH LONG-TERM CURRENT USE OF INSULIN: ICD-10-CM

## 2022-03-21 RX ORDER — METFORMIN HYDROCHLORIDE 500 MG/1
TABLET, EXTENDED RELEASE ORAL
Qty: 90 TABLET | Refills: 0 | Status: SHIPPED | OUTPATIENT
Start: 2022-03-21 | End: 2022-06-01

## 2022-03-21 NOTE — TELEPHONE ENCOUNTER
Please talk with our pharmacy to see what GLP 1 agonist will be covered with his insurance or if he just needs a prior authorization for Trulicity.  Thank you

## 2022-03-21 NOTE — TELEPHONE ENCOUNTER
Refill Authorization Note   Glenn Medel  is requesting a refill authorization.  Brief Assessment and Rationale for Refill:  Approve     Medication Therapy Plan:   Last refill was by Dr. Fry while Dr. Benton was out.  Okay to refill.    Medication Reconciliation Completed: No   Comments:   --->Care Gap information included below if applicable.       Requested Prescriptions   Pending Prescriptions Disp Refills    metFORMIN (GLUCOPHAGE-XR) 500 MG ER 24hr tablet [Pharmacy Med Name: METFORMIN ER 500MG 24HR TABS] 90 tablet 0     Sig: TAKE 1 TABLET(500 MG) BY MOUTH EVERY DAY       Endocrinology:  Diabetes - Biguanides Passed - 3/21/2022  2:08 PM        Passed - Patient is at least 18 years old        Passed - Valid encounter within last 15 months     Recent Visits  Date Type Provider Dept   10/13/21 Office Visit Power Benton, DO [facility]   04/09/21 Office Visit Power Benton, DO [facility]   03/15/21 Office Visit Power Benton, DO [facility]   12/14/20 Office Visit Power Benton, DO [facility]   10/09/20 Office Visit Power Benton, DO [facility]   09/14/20 Office Visit Power Benton, DO [facility]   08/17/20 Office Visit Power Benton, DO [facility]   07/09/20 Office Visit Power Benton, DO [facility]   Showing recent visits within past 720 days and meeting all other requirements  Future Appointments  No visits were found meeting these conditions.  Showing future appointments within next 150 days and meeting all other requirements      Future Appointments              In 1 month MD Francesco Odom - Neurology 8th Fl, [facility] [place]    In 3 months MD Gena Bradley - Nephrology, [place]                Passed - Cr is 1.39 or below and within 360 days     Lab Results   Component Value Date    CREATININE 1.2 12/29/2021    CREATININE 1.1 11/02/2021    CREATININE 1.1 03/29/2021               Passed - HBA1C within 180 days     Lab Results   Component Value Date    HGBA1C 6.1 (H) 11/02/2021    HGBA1C 6.2 (H) 06/29/2021    HGBA1C 6.2 (H) 12/23/2020    HGBA1C 6.2 (H) 12/23/2020              Passed - eGFR is 45 or above and within 360 days     Lab Results   Component Value Date    EGFRNONAA >60.0 12/29/2021    EGFRNONAA >60.0 11/02/2021    EGFRNONAA >60.0 03/29/2021                    Appointments  past 12m or future 3m with PCP    Date Provider   Last Visit   Visit date not found Clay Fry MD   Next Visit   Visit date not found Clay Fry MD   ED visits in past 90 days: 0     Note composed:2:12 PM 03/21/2022

## 2022-03-23 NOTE — TELEPHONE ENCOUNTER
I spoke to [facility] pharmacist and the Trulicity is the only GLP 1 agonist not covered by the pt's plan.    The following GLP 1 agonist are covered:  Jeffry Felix    Pt is aware medication might be changed to something that is covered and he is ok with it. Copay is $55 for the aboved covered pens and pt is ok with the copay.    Patient would like communication of their results via:      Zulma

## 2022-03-30 ENCOUNTER — PATIENT MESSAGE (OUTPATIENT)
Dept: OTHER | Facility: OTHER | Age: 67
End: 2022-03-30
Payer: MEDICARE

## 2022-04-13 ENCOUNTER — PATIENT MESSAGE (OUTPATIENT)
Dept: OTHER | Facility: OTHER | Age: 67
End: 2022-04-13
Payer: MEDICARE

## 2022-05-02 ENCOUNTER — PATIENT MESSAGE (OUTPATIENT)
Dept: NEUROLOGY | Facility: CLINIC | Age: 67
End: 2022-05-02
Payer: MEDICARE

## 2022-05-05 ENCOUNTER — PATIENT MESSAGE (OUTPATIENT)
Dept: RESEARCH | Facility: HOSPITAL | Age: 67
End: 2022-05-05
Payer: MEDICARE

## 2022-05-09 ENCOUNTER — PATIENT MESSAGE (OUTPATIENT)
Dept: SMOKING CESSATION | Facility: CLINIC | Age: 67
End: 2022-05-09
Payer: MEDICARE

## 2022-05-13 ENCOUNTER — LAB VISIT (OUTPATIENT)
Dept: LAB | Facility: HOSPITAL | Age: 67
End: 2022-05-13
Payer: MEDICARE

## 2022-05-13 ENCOUNTER — OFFICE VISIT (OUTPATIENT)
Dept: NEUROLOGY | Facility: CLINIC | Age: 67
End: 2022-05-13
Payer: MEDICARE

## 2022-05-13 VITALS
HEART RATE: 52 BPM | SYSTOLIC BLOOD PRESSURE: 111 MMHG | WEIGHT: 192.81 LBS | DIASTOLIC BLOOD PRESSURE: 69 MMHG | BODY MASS INDEX: 26.89 KG/M2

## 2022-05-13 DIAGNOSIS — G20.C PARKINSONISM, UNSPECIFIED PARKINSONISM TYPE: Chronic | ICD-10-CM

## 2022-05-13 DIAGNOSIS — G47.52 REM SLEEP BEHAVIOR DISORDER: Chronic | ICD-10-CM

## 2022-05-13 DIAGNOSIS — E11.21 TYPE 2 DIABETES MELLITUS WITH DIABETIC NEPHROPATHY, WITH LONG-TERM CURRENT USE OF INSULIN: ICD-10-CM

## 2022-05-13 DIAGNOSIS — Z79.4 TYPE 2 DIABETES MELLITUS WITH DIABETIC NEPHROPATHY, WITH LONG-TERM CURRENT USE OF INSULIN: ICD-10-CM

## 2022-05-13 DIAGNOSIS — E78.2 MIXED HYPERLIPIDEMIA: ICD-10-CM

## 2022-05-13 LAB
ESTIMATED AVG GLUCOSE: 131 MG/DL (ref 68–131)
HBA1C MFR BLD: 6.2 % (ref 4–5.6)

## 2022-05-13 PROCEDURE — 99215 PR OFFICE/OUTPT VISIT, EST, LEVL V, 40-54 MIN: ICD-10-PCS | Mod: S$PBB,,, | Performed by: PSYCHIATRY & NEUROLOGY

## 2022-05-13 PROCEDURE — 99999 PR PBB SHADOW E&M-EST. PATIENT-LVL III: ICD-10-PCS | Mod: PBBFAC,,, | Performed by: PSYCHIATRY & NEUROLOGY

## 2022-05-13 PROCEDURE — 99999 PR PBB SHADOW E&M-EST. PATIENT-LVL III: CPT | Mod: PBBFAC,,, | Performed by: PSYCHIATRY & NEUROLOGY

## 2022-05-13 PROCEDURE — 99213 OFFICE O/P EST LOW 20 MIN: CPT | Mod: PBBFAC | Performed by: PSYCHIATRY & NEUROLOGY

## 2022-05-13 PROCEDURE — 83036 HEMOGLOBIN GLYCOSYLATED A1C: CPT | Performed by: INTERNAL MEDICINE

## 2022-05-13 PROCEDURE — 36415 COLL VENOUS BLD VENIPUNCTURE: CPT | Performed by: INTERNAL MEDICINE

## 2022-05-13 PROCEDURE — 99215 OFFICE O/P EST HI 40 MIN: CPT | Mod: S$PBB,,, | Performed by: PSYCHIATRY & NEUROLOGY

## 2022-05-13 RX ORDER — ATORVASTATIN CALCIUM 20 MG/1
TABLET, FILM COATED ORAL
Qty: 90 TABLET | Refills: 1 | Status: SHIPPED | OUTPATIENT
Start: 2022-05-13 | End: 2022-08-25

## 2022-05-13 RX ORDER — FLUOXETINE HYDROCHLORIDE 40 MG/1
CAPSULE ORAL
Qty: 90 CAPSULE | Refills: 1 | Status: SHIPPED | OUTPATIENT
Start: 2022-05-13 | End: 2022-08-25

## 2022-05-13 RX ORDER — FUROSEMIDE 20 MG/1
TABLET ORAL
Qty: 90 TABLET | Refills: 1 | Status: SHIPPED | OUTPATIENT
Start: 2022-05-13 | End: 2022-08-25

## 2022-05-13 NOTE — PROGRESS NOTES
"  Notes:   Glenn STOCKTON Chief Complaints during this visit:  New Patient visit for  Parkinsonism.   No referring provider defined for this encounter.    Primary Care Physician  Power Benton, DO  [address] [place] [address]      Interval Hx    Since last visit,   Increased  Carbidopa/levodopa 25/100mg 1.5tas QID - walking more confidently  No offtime    No falls  Tremor never well controlled  Trouble typing  Dizziness when he works outside for hours    Started to have REM sleep dz -worsening- taking clonazepam 1.5mg QHS but drowsy in AM  Also takes melatonin  No side effects noted    Takes azilect 1mg QHS    Falls: once  Assist Device: none  Walks 2 miles without stopping  With stairs he must hand onto railing    MRI brain revealed a possible posterior fossa arachnoid cyst - followed by Dr. Saldivar     Brother with dementia since age 60 - mild hand tremors and some issues walking  2 sisters/2 brothers healthy  Mother with dementia at age 80 - also hand hand dystonia  Father also reported fingers getting stuck    Ceruloplasmin NL    Neuropsych revealed mild cognitive impairments but no diagnosis    PD Review of Symptoms:  Anosmia: some stable  Dysarthria/Hypophonia: none  Dysphagia/Sialorrhea: none  Depression: yes  Cognitive slowing: mild attention issues - forgets tasks between rooms - recently stopped CPAP as it was recalled and he did not receive another  Hallucinations: none  Urinary changes: none  Constipation: yes  Orthostasis: at times  Falls: occasional  Freezing: none  Micrographia: none  Sleep issues:  -RBD: 1 year of REM sleep dz      "History of present illness:   66 y.o.  male seen in consultation at the request of  Dr. Martinez for evaluation of parkinsonism.      Falls often, onto knee.  Right hand will suddenly start shaking.  Memory a little worse.  Needs new mask for his LOVE, hasn't used in a while.  Not sure who to see.    Has DM, had an emg about 2 years ago.      1/20/20 " "Knoop:  Glenn Medel is a 64 y.o. right-handed male with DM2, HTN, LOVE, anxiety and depression who presents today for an initial evaluation of tremos and balance and is accompanied by wife.    Tremor- started in RH a couple of years ago but seemed to have gotten worse. Will watch TV and ev hands shake. Started noting in LH in the last year. Also notes shaking in legs for several years. He says the legs in fact started before the hands. Nothing makes it better. Not noted any aggravating factors. Drinks very little alcohol. Has not noted benefit with alcohol. Handwriting is pretty good. No changes with this.    Balance- sometimes will be working on knees and then fall back when he goes to get up. Sometimes with walking, he will wobble. No falls while walking. Has fallen backwards when he is trying to get up from being on knees. Has noted balance issues the past couple of months. Has not noted progression.     Acting out dreams. Not nightly but is significant when does happen. He actually ended up in hospital in the last year because of fighting in sleep. Has LOVE, used to wear CPAP and wife wonders if this helped when he was wearing this. He says he will wear it a couple of days, aggravates him and then he will not use for month or longer. He had sleep doctor, Dr. Hinton, but dismissed him as she did everything she could do for him.    Denies stiffness in muscles.   Sometimes feels slow with walking. Wife says he is slower to answer questions.   Memory is getting worse. Been bad couple of years but worse the past year. Wife agrees but attributes some of this to intermediate- lazier life.    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   He left on his terms. Was not asked to leave. "      II.  Review of systems:  As in HPI,  otherwise, balance 10 systems reviewed and are negative.    III.  Past Medical History:   Diagnosis Date    BPH (benign prostatic " hyperplasia)     Cataract     done ou    Chronic kidney disease, stage 3 2/25/2015    DDD (degenerative disc disease), lumbar     s/p epidural steroids     Glaucoma     OU    HTN (hypertension)     Iritis, lens-induced 1/8/2018    Obesity      Family History   Problem Relation Age of Onset    Heart disease Brother 62    Kidney disease Brother     Heart disease Father     Diabetes Father     Colon cancer Father     Cancer Father         colon    Cataracts Father     Hypertension Mother     Diabetes Mother     Stroke Mother     Cancer Mother         colon    Kidney disease Mother     Glaucoma Maternal Grandmother     No Known Problems Sister     No Known Problems Maternal Aunt     No Known Problems Maternal Uncle     No Known Problems Paternal Aunt     No Known Problems Paternal Uncle     No Known Problems Maternal Grandfather     No Known Problems Paternal Grandmother     No Known Problems Paternal Grandfather     Dementia Brother     Abnormal EKG Brother     Amblyopia Neg Hx     Blindness Neg Hx     Macular degeneration Neg Hx     Retinal detachment Neg Hx     Strabismus Neg Hx     Thyroid disease Neg Hx     Parkinsonism Neg Hx      Social history:  , retired.      Current Outpatient Medications on File Prior to Visit   Medication Sig Dispense Refill    allopurinoL (ZYLOPRIM) 300 MG tablet TAKE 1 TABLET(300 MG) BY MOUTH EVERY DAY 90 tablet 3    aspirin 81 MG Chew Take 81 mg by mouth once daily.      atorvastatin (LIPITOR) 20 MG tablet TAKE 1 TABLET(20 MG) BY MOUTH EVERY DAY 90 tablet 1    brimonidine 0.2% (ALPHAGAN) 0.2 % Drop Place 1 drop into both eyes 2 (two) times daily. 10 mL 3    carbidopa-levodopa  mg (SINEMET)  mg per tablet Take 1.5 tablets by mouth 4 (four) times daily. 180 tablet 11    cholecalciferol, vitamin D3, (D3-2000 ORAL) Take by mouth.      clobetasoL (TEMOVATE) 0.05 % cream Apply topically 2 (two) times daily.      clonazePAM  (KLONOPIN) 1 MG tablet Take 1 tablet (1 mg total) by mouth every evening. 30 tablet 5    colchicine (MITIGARE) 0.6 mg Cap Take 1 capsule (0.6 mg total) by mouth once daily. 90 capsule 1    cyclobenzaprine (FLEXERIL) 10 MG tablet SMARTSI Tablet(s) By Mouth Every 12 Hours PRN      diclofenac (VOLTAREN) 50 MG EC tablet Take 50 mg by mouth every 8 (eight) hours.      dorzolamide (TRUSOPT) 2 % ophthalmic solution Place 1 drop into both eyes 2 (two) times a day. 10 mL 3    FLUoxetine 40 MG capsule TAKE 1 CAPSULE(40 MG) BY MOUTH EVERY DAY 90 capsule 1    furosemide (LASIX) 20 MG tablet TAKE 1 TABLET(20 MG) BY MOUTH EVERY MORNING 90 tablet 1    HYDROcodone-acetaminophen (NORCO) 5-325 mg per tablet hydrocodone 5 mg-acetaminophen 325 mg tablet   TAKE 1 TABLET BY MOUTH EVERY 12 HOURS AS NEEDED FOR PAIN      ibuprofen (ADVIL,MOTRIN) 400 MG tablet ibuprofen 400 mg tablet      irbesartan (AVAPRO) 75 MG tablet Take 1 tablet (75 mg total) by mouth once daily. 90 tablet 1    melatonin 5 mg Subl Take 10 mg by mouth nightly.   11    metFORMIN (GLUCOPHAGE-XR) 500 MG ER 24hr tablet TAKE 1 TABLET(500 MG) BY MOUTH EVERY DAY 90 tablet 0    metoprolol succinate (TOPROL-XL) 25 MG 24 hr tablet TAKE 1 TABLET BY MOUTH EVERY DAY 90 tablet 3    prazosin (MINIPRESS) 1 MG Cap TAKE 1 CAPSULE(1 MG) BY MOUTH EVERY EVENING 90 capsule 3    predniSONE (DELTASONE) 20 MG tablet Take 20 mg by mouth once daily.      rasagiline (AZILECT) 1 mg Tab TAKE 1 TABLET(1 MG) BY MOUTH EVERY EVENING 30 tablet 12    semaglutide (OZEMPIC) 0.25 mg or 0.5 mg(2 mg/1.5 mL) pen injector Inject 0.5 mg into the skin every 7 days. 3 pen 1     Current Facility-Administered Medications on File Prior to Visit   Medication Dose Route Frequency Provider Last Rate Last Admin    diphenhydrAMINE capsule 25 mg  25 mg Oral Q6H PRN Leonila Richardson MD        metronidazole IVPB 500 mg  500 mg Intravenous Once Kaz Keating MD           PRIOR problem-specific  medications tried:  none    Review of patient's allergies indicates:   Allergen Reactions    Avelox [moxifloxacin] Hives and Rash       IV. Physical Exam    Physical Exam  Constitutional: Well-developed, well-nourished, appears stated age  Eyes: No scleral icterus  ENT: Moist oral mucosa  Cardiovascular: No lower extremity edema   Respiratory: No labored breathing   Skin: No rash   Hematologic: No bruising    Other: GI/ deferred   · Mental status: Alert and oriented to person, place, time, and situation;   · follows commands  · Speech: normal (not dysarthric), no aphasia  · Cranial nerves:            · CN II: Pupils mid-position and equal, not tested light or accommodation  · CN III, IV, VI: Extraocular movements full, no nystagmus visualized  · CN V: Not tested   · CN VII: Face strong and symmetric bilaterally   · CN VIII: Hearing intact to voice and conversation   · CN IX, X: Palate raises midline and symmetric   · CN XI: Strong shoulder shrug B/L  · CN XII: Tongue appears midline   · Motor: Normal bulk by appearance, no drift   · Sensory: Not tested    · Gait: Can stand on either leg- barely a shuffle  · Deep tendon reflexes: Not tested  · Movement/Coordination                    Mod hypophonic speech.                     Mod facial masking.    Minor resting tremor R hand                      No other dystonia, chorea, athetosis, myoclonus, or tics visualized.    Bradykinesia      ? Finger taps Finger flicks DAVE Heel taps   Left 0 0 0 1+   Right 1+ 1+ 0 0         V.  Laboratory/ Radiological Data: (Personally reviewed images)         MRI 1/29/20  Impression       1. There is no acute abnormality.  There is mild volume loss without significant white matter disease.  There is no hemorrhage, parenchymal mass or acute infarction.  There is a probable arachnoid cyst in the right posterior fossa.  Please see above discussion.     Memory/Encephalopathy Labs:  Lab Results   Component Value Date    TSH 0.976  "01/06/2020    NRVAHGYB23 391 05/14/2020    THIAMINEBLOO 59 05/14/2020     LGi and CASPR  No results found for: ENCESLGI1, ENCESCASPR2    Movement Labs:  Lab Results   Component Value Date    MUTTXJRE84FG 52 12/29/2021    CALCIUM 9.2 12/29/2021    PTH 90.9 (H) 12/29/2021    ALBUMIN 3.5 12/29/2021    BILITOT 0.9 11/02/2021    ALT 10 11/02/2021    AST 30 11/02/2021    ALKPHOS 128 11/02/2021    CERULOPLSM 29.0 08/26/2020    FERRITIN 88 03/29/2021    ALPHATOCOPHE 1275 05/14/2020       IRON STUDIES:  Lab Results   Component Value Date    IRON 60 03/29/2021    TRANSFERRIN 243 03/29/2021    TIBC 360 03/29/2021    FESATURATED 17 (L) 03/29/2021       Malignancy Labs:  Lab Results   Component Value Date    PSADIAG 2.4 02/10/2020     PARANEOPLASTIC PANEL:  No results found for: NMOINTERPRET, REFLEXTEST, CRMP5, STMAB, LABACHR, ACHRGANGLION, NEUROVG    CSF:  No results found for: CSFWBC, CSFRBC, LYMPHS, MONOMACCSF, PROTEINCSF, GLUCCSF, GADCSF, LUME, MYELINPROT  Flow cytometry: No results found for: CSFC  [place]: No results found for: WESTNILEIGGA    CNS Demyelinating Labs:  Nmo serum, csf igg:  No results found for: NMOFSFACSS, NMOFCFACSC  No results found for: JCVIRUS    CSF oligoclonal bands:  No results found for: CSFB, CSFOLI, SERB, IGGINDEXCSF, IGGCSF, ALBQ, GALB, IGGSYNRATE, IGGS, ALBSER, IGGALBS    Myopathy labs:  No results found for: CPK, ALDOLASE, AMMONIA, ACFCRATIO, ACETYLCARNIT, CARNITINEPLA, LACTATE    Myasthenia panel:  No results found for: LABACHR  No results found for: ACETMOD  No results found for: STMAB   No results found for: ACHRGANGLION    Neuropathy Labs:  Lab Results   Component Value Date    UFPBJNYD06 391 05/14/2020    HGBA1C 6.1 (H) 11/02/2021     SPEP:  Lab Results   Component Value Date    PROTEINSERUM 6.7 07/27/2015    PATHINTPSPE REVIEWED 07/27/2015       Rheum labs:  Lab Results   Component Value Date    URICACID 4.7 12/29/2021       Neuropsych"  Mr. Medel is a 64 year old male with cognitive " "complaints over the past several years. He has a slightly ataxic gait of unclear etiology, but no clear parkinsonism. He has symptoms suggestive of REM sleep behavior disorder over the past year. He denied presence, passage, or recurrent visual hallucinations. He remains functionally independent, albeit more error prone than in the past. His history is remarkable for multiple vascular risk factors, including currently untreated sleep apnea.      His neuropsychological profile was largely within normal limits with the exception of mild memory inefficiencies, primarily related to encoding and cognitive organization/retrieval. His test scores and functioning are not at the level to warrant a cognitive diagnosis at this time. He did not present with the type of oliver forgetting seen in Alzheimer's disease. He also did not demonstrate the type of visuospatial dysfunction often seen early in Lewy Body Dementia, which is also in keeping with the fact that he does not have visual hallucinations. It will be important to track his cognition over time as mild cognitive changes in the setting of ataxia of unclear etiology and possible REM sleep behavior disorder could be indicative of a neurodegenerative condition. Otherwise, his mild cognitive weaknesses could simply be attributable to mild cerebrovascular disease.      Mr. Medel also reported clinically significant anxiety and depression. The transition to halfway has been admittedly more challenging than expected. His wife has also noticed reduced frustration tolerance with an increased tendency to perseverate on his frustrations, which has led to multiple confrontations. Treatment is indicated. "    MRI brain 2020      VI. Assessment and Plan            Problem List Items Addressed This Visit        Neuro    Parkinsonism (Chronic)    Current Assessment & Plan     Typical  with some nonmotor features.  Tremor responded very well to carbidopa/levodopa. "   Stable    Suggested continue - carbidopa/levodopa 25/100mg 1.5 tab PO four times daily for tremor control    Continue azilect 1mg QHS                Other    REM sleep behavior disorder (Chronic)    Current Assessment & Plan     Decrease clonazepam to 1mg QHS due to oversedation  Suggested melatonin at dinner                 Coni Cee MD, MS  [facility]  Department of Neurology  Movement Disorders

## 2022-05-13 NOTE — ASSESSMENT & PLAN NOTE
Typical  with some nonmotor features.  Tremor responded very well to carbidopa/levodopa.   Stable    Suggested continue - carbidopa/levodopa 25/100mg 1.5 tab PO four times daily for tremor control    Continue azilect 1mg QHS

## 2022-05-13 NOTE — TELEPHONE ENCOUNTER
Care Due:                  Date            Visit Type   Department     Provider  --------------------------------------------------------------------------------                                ESTABLISHED                              PATIENT -    [facility]  Last Visit: 10-      [employer]      MEDICINE       Power Benton  Next Visit: None Scheduled  None         None Found                                                            Last  Test          Frequency    Reason                     Performed    Due Date  --------------------------------------------------------------------------------    HBA1C.......  6 months...  metFORMIN, semaglutide...  11- 05-    [facility] Embedded Care Gaps. Reference number: [id number]. 5/13/2022   7:09:58 AM CDT

## 2022-05-13 NOTE — TELEPHONE ENCOUNTER
Refill Authorization Note   Glenn Medel  is requesting a refill authorization.  Brief Assessment and Rationale for Refill:  Approve     Medication Therapy Plan:       Medication Reconciliation Completed: No   Comments:     Provider Staff:      Action is required for this patient.   Please see care gap opportunities below in Care Due Message.      Thanks!  [facility]     Note composed:11:34 AM 05/13/2022

## 2022-05-17 DIAGNOSIS — E55.9 VITAMIN D DEFICIENCY: ICD-10-CM

## 2022-05-17 DIAGNOSIS — N18.2 CKD (CHRONIC KIDNEY DISEASE) STAGE 2, GFR 60-89 ML/MIN: Primary | ICD-10-CM

## 2022-06-01 RX ORDER — METFORMIN HYDROCHLORIDE 500 MG/1
TABLET, EXTENDED RELEASE ORAL
Qty: 90 TABLET | Refills: 1 | Status: SHIPPED | OUTPATIENT
Start: 2022-06-01 | End: 2022-11-23

## 2022-06-01 RX ORDER — METOPROLOL SUCCINATE 25 MG/1
TABLET, EXTENDED RELEASE ORAL
Qty: 90 TABLET | Refills: 1 | Status: SHIPPED | OUTPATIENT
Start: 2022-06-01 | End: 2022-11-23

## 2022-06-01 NOTE — TELEPHONE ENCOUNTER
Refill Routing Note   Medication(s) are not appropriate for processing by [facility] for the following reason(s):      - Drug-Disease Interaction ( metFORMIN and Type 2 diabetes mellitus with diabetic nephropathy, with long-term current use of insulin)    [facility] action(s):  Defer  Approve Medication-related problems identified: Drug-disease interaction        Medication reconciliation completed: No     Appointments  past 12m or future 3m with PCP    Date Provider   Last Visit   10/13/2021 Power Benton DO   Next Visit   Visit date not found Power Benton DO   ED visits in past 90 days: 0        Note composed:11:52 AM 06/01/2022

## 2022-06-01 NOTE — TELEPHONE ENCOUNTER
No new care gaps identified.  [facility] Embedded Care Gaps. Reference number: [id number]. 6/01/2022   6:01:30 AM CDT

## 2022-06-14 ENCOUNTER — PATIENT MESSAGE (OUTPATIENT)
Dept: OTHER | Facility: OTHER | Age: 67
End: 2022-06-14
Payer: MEDICARE

## 2022-07-11 ENCOUNTER — OFFICE VISIT (OUTPATIENT)
Dept: OPTOMETRY | Facility: CLINIC | Age: 67
End: 2022-07-11
Payer: MEDICARE

## 2022-07-11 DIAGNOSIS — H40.053 OCULAR HYPERTENSION, BILATERAL: ICD-10-CM

## 2022-07-11 DIAGNOSIS — E11.9 DIABETES MELLITUS TYPE 2 WITHOUT RETINOPATHY: Primary | ICD-10-CM

## 2022-07-11 DIAGNOSIS — H35.052 NEOVASCULARIZATION OF OPTIC DISC OF LEFT EYE: ICD-10-CM

## 2022-07-11 DIAGNOSIS — Z98.890 H/O VITRECTOMY: ICD-10-CM

## 2022-07-11 DIAGNOSIS — H26.491 POSTERIOR CAPSULAR OPACIFICATION VISUALLY SIGNIFICANT OF RIGHT EYE: ICD-10-CM

## 2022-07-11 DIAGNOSIS — Z96.1 BILATERAL PSEUDOPHAKIA: ICD-10-CM

## 2022-07-11 PROCEDURE — 92014 COMPRE OPH EXAM EST PT 1/>: CPT | Mod: S$PBB,,, | Performed by: OPTOMETRIST

## 2022-07-11 PROCEDURE — 92014 PR EYE EXAM, EST PATIENT,COMPREHESV: ICD-10-PCS | Mod: S$PBB,,, | Performed by: OPTOMETRIST

## 2022-07-11 PROCEDURE — 99999 PR PBB SHADOW E&M-EST. PATIENT-LVL V: ICD-10-PCS | Mod: PBBFAC,,, | Performed by: OPTOMETRIST

## 2022-07-11 PROCEDURE — 99999 PR PBB SHADOW E&M-EST. PATIENT-LVL V: CPT | Mod: PBBFAC,,, | Performed by: OPTOMETRIST

## 2022-07-11 PROCEDURE — 99215 OFFICE O/P EST HI 40 MIN: CPT | Mod: PBBFAC,PO | Performed by: OPTOMETRIST

## 2022-07-11 NOTE — PATIENT INSTRUCTIONS
"DRY EYES -- BURNING OR INEZ SYMPTOMS:  Use Over The Counter artificial tears as needed for dry eye symptoms.   Some common brands include:  Systane, Optive, Refresh, and Thera-Tears.  These drops can be used as frequently as desired, but may be most helpful use during long periods of concentrated work.  For example, reading / working at the computer. Start with 3-4x per day.     Nighttime Ophthalmic gel or ointments are available: Refresh PM, Genteal, and Lacrilube.    Avoid drops that "get redness out" (Visine, Murine, Clear Eyes), as these may contain medication that could further irritate the eyes, especially with chronic use.    ALLERGY EYES -- ITCHING SYMPTOMS:  Over the counter medications include--Pataday, Zaditor, and Alaway.  Use as directed 1-2 drops daily for symptoms of itching / watering eyes.  These drops will not help for dry eye or exposure symptoms.    REDNESS RELIEF:  Lumify---is a good redness reliever that will not cause irritation if used chronically.        Using the Amsler Grid  If you are at risk for vision loss, you may be told to check your eyesight regularly using the Amsler grid. Below is the grid and instructions for using it.         The Amsler grid helps you track changes in your vision.    How to Use the Amsler Grid  Use the grid in a well-lighted area.  Wear glasses or contact lenses if you usually wear them.  Hold the grid at your normal reading distance (about 16 inches).  Cover your left eye.  With your right eye, look at the dot in the center of the grid.  While looking at the dot, notice if any of the lines look wavy, if any lines disappear, or if the boxes change shape.  Write down on a piece of paper any vision changes from the last time you used the grid.  Now repeat the exercise, this time covering your right eye.  Call your doctor right away if you notice any vision changes.  How Often Should I Check My Vision?  Use the Amsler grid as often as your eye doctor suggests. " Keep the grid where youll remember to use it. Call your eye doctor right away if you notice any changes with your eyesight. This includes if your vision improves.  © 8415-4925 Richie [employer], [address] [place] [address], [place] [address]. All rights reserved. This information is not intended as a substitute for professional medical care. Always follow your healthcare professional's instructions.

## 2022-07-11 NOTE — PROGRESS NOTES
HPI     Routine eye exam-dle-2/17/21    Pt complains of eye pressure pain OU every afternoon x 1 month. Has   noticed some change of va OD while driving since last visit. Using gtts as   directed-Alphagan BID OU and Trusopt BID OU. Pt complains of new floaters   and some flashes OS x 2 months. BSL controlled.    Hemoglobin A1C       Date                     Value               Ref Range             Status                05/13/2022               6.2 (H)             4.0 - 5.6 %           Final              Comment:    ADA Screening Guidelines:  5.7-6.4%  Consistent with   prediabetes  >or=6.5%  Consistent with diabetes    High levels of fetal   hemoglobin interfere with the HbA1C  assay. Heterozygous hemoglobin   variants (HbS, HgC, etc)do  not significantly interfere with this assay.     However, presence of multiple variants may affect accuracy.         11/02/2021               6.1 (H)             4.0 - 5.6 %           Final              Comment:    ADA Screening Guidelines:  5.7-6.4%  Consistent with   prediabetes  >or=6.5%  Consistent with diabetes    High levels of fetal   hemoglobin interfere with the HbA1C  assay. Heterozygous hemoglobin   variants (HbS, HgC, etc)do  not significantly interfere with this assay.     However, presence of multiple variants may affect accuracy.         06/29/2021               6.2 (H)             4.0 - 5.6 %           Final              Comment:    ADA Screening Guidelines:  5.7-6.4%  Consistent with   prediabetes  >or=6.5%  Consistent with diabetes    High levels of fetal   hemoglobin interfere with the HbA1C  assay. Heterozygous hemoglobin   variants (HbS, HgC, etc)do  not significantly interfere with this assay.     However, presence of multiple variants may affect accuracy.    ----------      Last edited by Margaret Rojas on 7/11/2022 10:11 AM. (History)        ROS     Positive for: Eyes    Negative for: Constitutional, Gastrointestinal, Neurological, Skin,    Genitourinary, Musculoskeletal, HENT, Endocrine, Cardiovascular,   Respiratory, Psychiatric, Allergic/Imm, Heme/Lymph    Last edited by OCTAVIO Reyes, OD on 7/11/2022 10:30 AM. (History)        Assessment /Plan     For exam results, see Encounter Report.    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 No sylvia/ retinopathy, no csme, gave Diabetic Retinopathy info, control glucose and BP  Advise annual DFE  Unsure if nerve vascular changes are related to DM2    2. s/p 25g PPV OS for retained lens material OS 12/6/17 1/8/18 - some increased inflammation after steroid taper, no CME  1/18 - still low grade inflammation -   6/18 - stable     s/p 25g PPV/partial AFx OD for vitreous opacities and NCVH OD 7/1/18    3. Vis sig PCO, gradually worsening from 2021  To Dr Garcia for Yag consult    4. Longstanding w/ last fields 4/2021  PSD   1.76 wnl   1.57 wnl   No progression    Reschedule for update 24-2 and OCT   Continue OU alpahgan / trusopt bid     5. New/ shunt vessles / congestion at disc OS  To Dr Pulido to eval     6. Stable OU    No glasses changes at this time  Can recheck after f/u visits noted above    RTC prn

## 2022-08-08 ENCOUNTER — OFFICE VISIT (OUTPATIENT)
Dept: OPHTHALMOLOGY | Facility: CLINIC | Age: 67
End: 2022-08-08
Payer: MEDICARE

## 2022-08-08 DIAGNOSIS — H35.052 NEOVASCULARIZATION OF OPTIC DISC OF LEFT EYE: ICD-10-CM

## 2022-08-08 DIAGNOSIS — E11.3293 CONTROLLED TYPE 2 DIABETES MELLITUS WITH BOTH EYES AFFECTED BY MILD NONPROLIFERATIVE RETINOPATHY WITHOUT MACULAR EDEMA, WITH LONG-TERM CURRENT USE OF INSULIN: ICD-10-CM

## 2022-08-08 DIAGNOSIS — H43.821 VITREOMACULAR ADHESION, RIGHT: ICD-10-CM

## 2022-08-08 DIAGNOSIS — H35.371 EPIRETINAL MEMBRANE, RIGHT: Primary | ICD-10-CM

## 2022-08-08 DIAGNOSIS — Z79.4 CONTROLLED TYPE 2 DIABETES MELLITUS WITH BOTH EYES AFFECTED BY MILD NONPROLIFERATIVE RETINOPATHY WITHOUT MACULAR EDEMA, WITH LONG-TERM CURRENT USE OF INSULIN: ICD-10-CM

## 2022-08-08 PROCEDURE — 92014 COMPRE OPH EXAM EST PT 1/>: CPT | Mod: S$PBB,,, | Performed by: OPHTHALMOLOGY

## 2022-08-08 PROCEDURE — 92201 OPSCPY EXTND RTA DRAW UNI/BI: CPT | Mod: S$PBB,,, | Performed by: OPHTHALMOLOGY

## 2022-08-08 PROCEDURE — 92134 CPTRZ OPH DX IMG PST SGM RTA: CPT | Mod: PBBFAC,PO | Performed by: OPHTHALMOLOGY

## 2022-08-08 PROCEDURE — 99215 OFFICE O/P EST HI 40 MIN: CPT | Mod: PBBFAC,PO | Performed by: OPHTHALMOLOGY

## 2022-08-08 PROCEDURE — 99999 PR PBB SHADOW E&M-EST. PATIENT-LVL V: CPT | Mod: PBBFAC,,, | Performed by: OPHTHALMOLOGY

## 2022-08-08 PROCEDURE — 92201 PR OPHTHALMOSCOPY, EXT, W/RET DRAW/SCLERAL DEPR, I&R, UNI/BI: ICD-10-PCS | Mod: S$PBB,,, | Performed by: OPHTHALMOLOGY

## 2022-08-08 PROCEDURE — 99999 PR PBB SHADOW E&M-EST. PATIENT-LVL V: ICD-10-PCS | Mod: PBBFAC,,, | Performed by: OPHTHALMOLOGY

## 2022-08-08 PROCEDURE — 92134 POSTERIOR SEGMENT OCT RETINA (OCULAR COHERENCE TOMOGRAPHY)-BOTH EYES: ICD-10-PCS | Mod: 26,S$PBB,, | Performed by: OPHTHALMOLOGY

## 2022-08-08 PROCEDURE — 92201 OPSCPY EXTND RTA DRAW UNI/BI: CPT | Mod: PBBFAC,PO | Performed by: OPHTHALMOLOGY

## 2022-08-08 PROCEDURE — 92014 PR EYE EXAM, EST PATIENT,COMPREHESV: ICD-10-PCS | Mod: S$PBB,,, | Performed by: OPHTHALMOLOGY

## 2022-08-08 NOTE — PROGRESS NOTES
HPI     Referral from Dr. Reyes for Neovascularization of Disc OS  DLS: 07/11/2022 Dr. Reyes  DLS: 12/09/2019 Dr. Pulido    Patient states he has been having blurry vision in his right eye. In the   left eye he has a black [place] with a light inside of it. It will fade and   eventually disappear daily.    (-)Photophobia  (-)Glare    Gtts:   Brimonidine BID OU   Dorzolamide BID OU      Last edited by Brooke Mendoza on 8/8/2022 10:54 AM. (History)            OCT - OD ERM, no sig traction  No ME OS        A/P    1. Dense vitreous opacities OU with NCVH  No Improvement over last several months  ?prior VH with PVD OU - no breaks or tears    s/p 25g PPV/partial AFx OS for vitreous opacities/NCVH OS 11/15/17    Doing well, good IOP  HAD PC violation during anterior vitreous removal -     Had CE with dropped lens material give PC violation 11/30/17    Increased IOP - though improved with K edema    Will need PPV to remove lens material.  Pt would prefer more prompt resolution    s/p 25g PPV OS for retained lens material OS 12/6/17    Doing well, but IOP improved to 18 - ? Steroid response, inflammation and cortical material improving, AC deep    1/8/18 - some increased inflammation after steroid taper, no CME  1/18 - still low grade inflammation -   6/18 - stable    s/p 25g PPV/partial AFx OD for vitreous opacities and NCVH OD 7/1/18      2. PCIOL OD  Ok for YAG  Sulcus IOL OS    Great result    3. DM  Controlled No DR  BS/BP/chol control  Ok for yearly DFE during optometry visit    4. HTN Ret OU    5. POAG  On simbrinza BID  May continue OU once inflammation down  8/22 - some nasalization of vessels OS, but no overt vascular occlusion    6. ERM OD  Small - not sig

## 2022-08-09 ENCOUNTER — OFFICE VISIT (OUTPATIENT)
Dept: OPHTHALMOLOGY | Facility: CLINIC | Age: 67
End: 2022-08-09
Payer: MEDICARE

## 2022-08-09 DIAGNOSIS — H35.052 NEOVASCULARIZATION OF OPTIC DISC OF LEFT EYE: ICD-10-CM

## 2022-08-09 DIAGNOSIS — H26.491 POSTERIOR CAPSULAR OPACIFICATION VISUALLY SIGNIFICANT, RIGHT EYE: Primary | ICD-10-CM

## 2022-08-09 DIAGNOSIS — H43.821 VITREOMACULAR ADHESION, RIGHT: ICD-10-CM

## 2022-08-09 PROCEDURE — 99214 OFFICE O/P EST MOD 30 MIN: CPT | Mod: 57,S$PBB,, | Performed by: OPHTHALMOLOGY

## 2022-08-09 PROCEDURE — 99214 PR OFFICE/OUTPT VISIT, EST, LEVL IV, 30-39 MIN: ICD-10-PCS | Mod: 57,S$PBB,, | Performed by: OPHTHALMOLOGY

## 2022-08-09 PROCEDURE — 99999 PR PBB SHADOW E&M-EST. PATIENT-LVL IV: ICD-10-PCS | Mod: PBBFAC,,, | Performed by: OPHTHALMOLOGY

## 2022-08-09 PROCEDURE — 66821 AFTER CATARACT LASER SURGERY: CPT | Mod: PBBFAC,PO,RT | Performed by: OPHTHALMOLOGY

## 2022-08-09 PROCEDURE — 99214 OFFICE O/P EST MOD 30 MIN: CPT | Mod: PBBFAC,PO,25 | Performed by: OPHTHALMOLOGY

## 2022-08-09 PROCEDURE — 99999 PR PBB SHADOW E&M-EST. PATIENT-LVL IV: CPT | Mod: PBBFAC,,, | Performed by: OPHTHALMOLOGY

## 2022-08-09 PROCEDURE — 66821 YAG CAPSULOTOMY - OD - RIGHT EYE: ICD-10-PCS | Mod: S$PBB,RT,, | Performed by: OPHTHALMOLOGY

## 2022-08-09 NOTE — PROGRESS NOTES
HPI     Laser Treatment      Additional comments: Yag Cap OD               Comments     Ref by Dr Pulido    Dense vitreous opacities OU with NCVH  s/p 25g PPV/partial AFx OS for vitreous opacities/NCVH OS 11/15/17   s/p 25g PPV OS for retained lens material OS 12/6/17  s/p 25g PPV/partial AFx OD for vitreous opacities and NCVH OD 7/1/18  PCIOL OD  DM  Controlled No DR  BS/BP/chol control  Ok for yearly DFE during optometry visit  HTN Ret OU  POAG    Gtts: Brimonidine, Dorzolamide BID OU    Pt states blurred vision OD.           Last edited by Damaris Garcia MD on 8/9/2022 10:40 AM. (History)            Assessment /Plan     For exam results, see Encounter Report.    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 OD   - discussed risks, benefits, and alternatives to laser surgery - pt wishes to proceed with yag laser  - Informed consent obtained and correct eye(s) verified with patient.  - Intraocular Pressure to be taken 10- 30 minutes post procedure.   - PF QID x 4 days then d/c  - f/up as scheduled    DIAGNOSIS: Visually significant posterior capsular opacity    PROCEDURE: YAG Laser Capsulotomy OD    COMPLICATIONS: none     DESCRIPTION OF PROCEDURE IN DETAIL:  1 drop of topical Proparacaine and Iopidine instilled, and eye(s) dilated with 1% Tropicamide 2.5% Phenylephrine. YAG laser applied to posterior capsule in cruciate pattern.       DISPOSITION:  Patient tolerated procedure well.        Will call pt next wk to ensure doing well s/p yag cap

## 2022-08-29 ENCOUNTER — TELEPHONE (OUTPATIENT)
Dept: GASTROENTEROLOGY | Facility: CLINIC | Age: 67
End: 2022-08-29
Payer: MEDICARE

## 2022-08-30 ENCOUNTER — PATIENT MESSAGE (OUTPATIENT)
Dept: OTHER | Facility: OTHER | Age: 67
End: 2022-08-30
Payer: MEDICARE

## 2022-08-31 ENCOUNTER — PATIENT MESSAGE (OUTPATIENT)
Dept: ADMINISTRATIVE | Facility: OTHER | Age: 67
End: 2022-08-31
Payer: MEDICARE

## 2022-08-31 ENCOUNTER — PATIENT MESSAGE (OUTPATIENT)
Dept: OTHER | Facility: OTHER | Age: 67
End: 2022-08-31
Payer: MEDICARE

## 2022-09-02 ENCOUNTER — PATIENT MESSAGE (OUTPATIENT)
Dept: FAMILY MEDICINE | Facility: CLINIC | Age: 67
End: 2022-09-02
Payer: MEDICARE

## 2022-09-06 ENCOUNTER — TELEPHONE (OUTPATIENT)
Dept: GASTROENTEROLOGY | Facility: CLINIC | Age: 67
End: 2022-09-06
Payer: MEDICARE

## 2022-09-06 NOTE — TELEPHONE ENCOUNTER
Returned call to the patient, patient was scheduled for colonoscopy, patient verbalized understanding of this, patient was sent prep instructions via my chart, patient verbalized understanding of this.

## 2022-09-06 NOTE — TELEPHONE ENCOUNTER
----- Message from Tatianna Orozco sent at 9/6/2022 11:21 AM CDT -----  Contact: Pt  Type:  Colonoscopy Appointment Request    Name of Caller:  Pt    Best Call Back Number:  [phone]    Additional Information:  Please call back to schedule.  Thanks.

## 2022-09-19 DIAGNOSIS — E11.21 TYPE 2 DIABETES MELLITUS WITH DIABETIC NEPHROPATHY, WITH LONG-TERM CURRENT USE OF INSULIN: ICD-10-CM

## 2022-09-19 DIAGNOSIS — Z79.4 TYPE 2 DIABETES MELLITUS WITH DIABETIC NEPHROPATHY, WITH LONG-TERM CURRENT USE OF INSULIN: ICD-10-CM

## 2022-09-19 RX ORDER — SEMAGLUTIDE 1.34 MG/ML
INJECTION, SOLUTION SUBCUTANEOUS
Qty: 3 PEN | Refills: 0 | Status: SHIPPED | OUTPATIENT
Start: 2022-09-19 | End: 2022-12-12

## 2022-09-19 NOTE — TELEPHONE ENCOUNTER
No new care gaps identified.  [facility] Embedded Care Gaps. Reference number: [id number]. 9/19/2022   11:47:22 AM RANT

## 2022-09-19 NOTE — TELEPHONE ENCOUNTER
Refill Decision Note   Glenn Medel  is requesting a refill authorization.  Brief Assessment and Rationale for Refill:  Approve     Medication Therapy Plan:       Medication Reconciliation Completed: No   Comments:     No Care Gaps recommended.     Note composed:3:42 PM 09/19/2022

## 2022-09-28 ENCOUNTER — LAB VISIT (OUTPATIENT)
Dept: LAB | Facility: HOSPITAL | Age: 67
End: 2022-09-28
Attending: INTERNAL MEDICINE
Payer: MEDICARE

## 2022-09-28 DIAGNOSIS — N18.2 CKD (CHRONIC KIDNEY DISEASE) STAGE 2, GFR 60-89 ML/MIN: ICD-10-CM

## 2022-09-28 DIAGNOSIS — E55.9 VITAMIN D DEFICIENCY: ICD-10-CM

## 2022-09-28 LAB
25(OH)D3+25(OH)D2 SERPL-MCNC: 51 NG/ML (ref 30–96)
ALBUMIN SERPL BCP-MCNC: 3.7 G/DL (ref 3.5–5.2)
ANION GAP SERPL CALC-SCNC: 10 MMOL/L (ref 8–16)
BASOPHILS # BLD AUTO: 0.06 K/UL (ref 0–0.2)
BASOPHILS NFR BLD: 0.7 % (ref 0–1.9)
BUN SERPL-MCNC: 16 MG/DL (ref 8–23)
CALCIUM SERPL-MCNC: 9.5 MG/DL (ref 8.7–10.5)
CHLORIDE SERPL-SCNC: 103 MMOL/L (ref 95–110)
CO2 SERPL-SCNC: 27 MMOL/L (ref 23–29)
CREAT SERPL-MCNC: 1 MG/DL (ref 0.5–1.4)
DIFFERENTIAL METHOD: ABNORMAL
EOSINOPHIL # BLD AUTO: 0.2 K/UL (ref 0–0.5)
EOSINOPHIL NFR BLD: 2.5 % (ref 0–8)
ERYTHROCYTE [DISTWIDTH] IN BLOOD BY AUTOMATED COUNT: 13.9 % (ref 11.5–14.5)
EST. GFR  (NO RACE VARIABLE): >60 ML/MIN/1.73 M^2
GLUCOSE SERPL-MCNC: 114 MG/DL (ref 70–110)
HCT VFR BLD AUTO: 48.1 % (ref 40–54)
HGB BLD-MCNC: 15.1 G/DL (ref 14–18)
IMM GRANULOCYTES # BLD AUTO: 0.03 K/UL (ref 0–0.04)
IMM GRANULOCYTES NFR BLD AUTO: 0.4 % (ref 0–0.5)
LYMPHOCYTES # BLD AUTO: 2.6 K/UL (ref 1–4.8)
LYMPHOCYTES NFR BLD: 31.8 % (ref 18–48)
MCH RBC QN AUTO: 28.9 PG (ref 27–31)
MCHC RBC AUTO-ENTMCNC: 31.4 G/DL (ref 32–36)
MCV RBC AUTO: 92 FL (ref 82–98)
MONOCYTES # BLD AUTO: 0.6 K/UL (ref 0.3–1)
MONOCYTES NFR BLD: 7.7 % (ref 4–15)
NEUTROPHILS # BLD AUTO: 4.6 K/UL (ref 1.8–7.7)
NEUTROPHILS NFR BLD: 56.9 % (ref 38–73)
NRBC BLD-RTO: 0 /100 WBC
PHOSPHATE SERPL-MCNC: 3.5 MG/DL (ref 2.7–4.5)
PLATELET # BLD AUTO: 230 K/UL (ref 150–450)
PMV BLD AUTO: 9.8 FL (ref 9.2–12.9)
POTASSIUM SERPL-SCNC: 4.2 MMOL/L (ref 3.5–5.1)
PTH-INTACT SERPL-MCNC: 77.9 PG/ML (ref 9–77)
RBC # BLD AUTO: 5.22 M/UL (ref 4.6–6.2)
SODIUM SERPL-SCNC: 140 MMOL/L (ref 136–145)
URATE SERPL-MCNC: 4.5 MG/DL (ref 3.4–7)
WBC # BLD AUTO: 8.14 K/UL (ref 3.9–12.7)

## 2022-09-28 PROCEDURE — 80069 RENAL FUNCTION PANEL: CPT | Performed by: INTERNAL MEDICINE

## 2022-09-28 PROCEDURE — 85025 COMPLETE CBC W/AUTO DIFF WBC: CPT | Performed by: INTERNAL MEDICINE

## 2022-09-28 PROCEDURE — 83970 ASSAY OF PARATHORMONE: CPT | Performed by: INTERNAL MEDICINE

## 2022-09-28 PROCEDURE — 82306 VITAMIN D 25 HYDROXY: CPT | Performed by: INTERNAL MEDICINE

## 2022-09-28 PROCEDURE — 84550 ASSAY OF BLOOD/URIC ACID: CPT | Performed by: INTERNAL MEDICINE

## 2022-09-28 PROCEDURE — 36415 COLL VENOUS BLD VENIPUNCTURE: CPT | Mod: PO | Performed by: INTERNAL MEDICINE

## 2022-10-05 ENCOUNTER — OFFICE VISIT (OUTPATIENT)
Dept: NEPHROLOGY | Facility: CLINIC | Age: 67
End: 2022-10-05
Payer: MEDICARE

## 2022-10-05 VITALS
HEART RATE: 67 BPM | DIASTOLIC BLOOD PRESSURE: 80 MMHG | HEIGHT: 71 IN | BODY MASS INDEX: 26.85 KG/M2 | SYSTOLIC BLOOD PRESSURE: 132 MMHG | WEIGHT: 191.81 LBS | OXYGEN SATURATION: 99 %

## 2022-10-05 DIAGNOSIS — N18.2 TYPE 2 DIABETES MELLITUS WITH STAGE 2 CHRONIC KIDNEY DISEASE, WITHOUT LONG-TERM CURRENT USE OF INSULIN: ICD-10-CM

## 2022-10-05 DIAGNOSIS — E11.22 TYPE 2 DIABETES MELLITUS WITH STAGE 2 CHRONIC KIDNEY DISEASE, WITHOUT LONG-TERM CURRENT USE OF INSULIN: ICD-10-CM

## 2022-10-05 DIAGNOSIS — I10 PRIMARY HYPERTENSION: ICD-10-CM

## 2022-10-05 DIAGNOSIS — N18.2 CKD (CHRONIC KIDNEY DISEASE) STAGE 2, GFR 60-89 ML/MIN: Primary | ICD-10-CM

## 2022-10-05 DIAGNOSIS — G20.C PARKINSONISM, UNSPECIFIED PARKINSONISM TYPE: Chronic | ICD-10-CM

## 2022-10-05 PROCEDURE — 99215 PR OFFICE/OUTPT VISIT, EST, LEVL V, 40-54 MIN: ICD-10-PCS | Mod: S$PBB,,, | Performed by: INTERNAL MEDICINE

## 2022-10-05 PROCEDURE — 99999 PR PBB SHADOW E&M-EST. PATIENT-LVL IV: ICD-10-PCS | Mod: PBBFAC,,, | Performed by: INTERNAL MEDICINE

## 2022-10-05 PROCEDURE — 99215 OFFICE O/P EST HI 40 MIN: CPT | Mod: S$PBB,,, | Performed by: INTERNAL MEDICINE

## 2022-10-05 PROCEDURE — 99214 OFFICE O/P EST MOD 30 MIN: CPT | Mod: PBBFAC,PO | Performed by: INTERNAL MEDICINE

## 2022-10-05 PROCEDURE — 99999 PR PBB SHADOW E&M-EST. PATIENT-LVL IV: CPT | Mod: PBBFAC,,, | Performed by: INTERNAL MEDICINE

## 2022-10-06 PROBLEM — N18.2 TYPE 2 DIABETES MELLITUS WITH STAGE 2 CHRONIC KIDNEY DISEASE, WITHOUT LONG-TERM CURRENT USE OF INSULIN: Status: ACTIVE | Noted: 2022-10-06

## 2022-10-06 PROBLEM — E11.22 TYPE 2 DIABETES MELLITUS WITH STAGE 2 CHRONIC KIDNEY DISEASE, WITHOUT LONG-TERM CURRENT USE OF INSULIN: Status: ACTIVE | Noted: 2022-10-06

## 2022-10-25 ENCOUNTER — HOSPITAL ENCOUNTER (OUTPATIENT)
Dept: RADIOLOGY | Facility: HOSPITAL | Age: 67
Discharge: HOME | End: 2022-10-25
Attending: PODIATRIST
Payer: MEDICARE

## 2022-10-25 ENCOUNTER — OFFICE VISIT (OUTPATIENT)
Dept: PODIATRY | Facility: CLINIC | Age: 67
End: 2022-10-25
Payer: MEDICARE

## 2022-10-25 VITALS — BODY MASS INDEX: 26.74 KG/M2 | WEIGHT: 191 LBS | HEIGHT: 71 IN

## 2022-10-25 DIAGNOSIS — M24.572 EQUINUS CONTRACTURE OF LEFT ANKLE: ICD-10-CM

## 2022-10-25 DIAGNOSIS — M77.52 BURSITIS OF HEEL, LEFT: ICD-10-CM

## 2022-10-25 DIAGNOSIS — M76.62 ACHILLES TENDINITIS OF LEFT LOWER EXTREMITY: Primary | ICD-10-CM

## 2022-10-25 DIAGNOSIS — M76.62 ACHILLES TENDINITIS OF LEFT LOWER EXTREMITY: ICD-10-CM

## 2022-10-25 PROCEDURE — 73630 XR FOOT COMPLETE 3 VIEW LEFT: ICD-10-PCS | Mod: 26,LT,, | Performed by: RADIOLOGY

## 2022-10-25 PROCEDURE — 99999 PR PBB SHADOW E&M-EST. PATIENT-LVL IV: ICD-10-PCS | Mod: PBBFAC,,, | Performed by: PODIATRIST

## 2022-10-25 PROCEDURE — 99214 OFFICE O/P EST MOD 30 MIN: CPT | Mod: PBBFAC | Performed by: PODIATRIST

## 2022-10-25 PROCEDURE — 99204 OFFICE O/P NEW MOD 45 MIN: CPT | Mod: S$PBB,,, | Performed by: PODIATRIST

## 2022-10-25 PROCEDURE — 73630 X-RAY EXAM OF FOOT: CPT | Mod: 26,LT,, | Performed by: RADIOLOGY

## 2022-10-25 PROCEDURE — 99204 PR OFFICE/OUTPT VISIT, NEW, LEVL IV, 45-59 MIN: ICD-10-PCS | Mod: S$PBB,,, | Performed by: PODIATRIST

## 2022-10-25 PROCEDURE — 99999 PR PBB SHADOW E&M-EST. PATIENT-LVL IV: CPT | Mod: PBBFAC,,, | Performed by: PODIATRIST

## 2022-10-25 PROCEDURE — 73630 X-RAY EXAM OF FOOT: CPT | Mod: TC,LT

## 2022-10-25 RX ORDER — METHYLPREDNISOLONE 4 MG/1
4 TABLET ORAL DAILY
Qty: 1 EACH | Refills: 0 | Status: SHIPPED | OUTPATIENT
Start: 2022-10-25 | End: 2022-12-12

## 2022-11-04 ENCOUNTER — LAB VISIT (OUTPATIENT)
Dept: LAB | Facility: HOSPITAL | Age: 67
End: 2022-11-04
Payer: MEDICARE

## 2022-11-04 ENCOUNTER — OFFICE VISIT (OUTPATIENT)
Dept: NEUROLOGY | Facility: CLINIC | Age: 67
End: 2022-11-04
Payer: MEDICARE

## 2022-11-04 VITALS
SYSTOLIC BLOOD PRESSURE: 130 MMHG | WEIGHT: 189.94 LBS | HEIGHT: 71 IN | BODY MASS INDEX: 26.59 KG/M2 | DIASTOLIC BLOOD PRESSURE: 83 MMHG | HEART RATE: 59 BPM

## 2022-11-04 DIAGNOSIS — E11.21 TYPE 2 DIABETES MELLITUS WITH DIABETIC NEPHROPATHY, WITH LONG-TERM CURRENT USE OF INSULIN: ICD-10-CM

## 2022-11-04 DIAGNOSIS — R41.3 MEMORY CHANGE: ICD-10-CM

## 2022-11-04 DIAGNOSIS — Z79.4 TYPE 2 DIABETES MELLITUS WITH DIABETIC NEPHROPATHY, WITH LONG-TERM CURRENT USE OF INSULIN: ICD-10-CM

## 2022-11-04 DIAGNOSIS — G20.C PARKINSONISM, UNSPECIFIED PARKINSONISM TYPE: Chronic | ICD-10-CM

## 2022-11-04 LAB
ESTIMATED AVG GLUCOSE: 140 MG/DL (ref 68–131)
HBA1C MFR BLD: 6.5 % (ref 4–5.6)

## 2022-11-04 PROCEDURE — 36415 COLL VENOUS BLD VENIPUNCTURE: CPT | Performed by: INTERNAL MEDICINE

## 2022-11-04 PROCEDURE — 83036 HEMOGLOBIN GLYCOSYLATED A1C: CPT | Performed by: INTERNAL MEDICINE

## 2022-11-04 PROCEDURE — 99215 PR OFFICE/OUTPT VISIT, EST, LEVL V, 40-54 MIN: ICD-10-PCS | Mod: S$PBB,,, | Performed by: PSYCHIATRY & NEUROLOGY

## 2022-11-04 PROCEDURE — 99215 OFFICE O/P EST HI 40 MIN: CPT | Mod: S$PBB,,, | Performed by: PSYCHIATRY & NEUROLOGY

## 2022-11-04 PROCEDURE — 99999 PR PBB SHADOW E&M-EST. PATIENT-LVL IV: ICD-10-PCS | Mod: PBBFAC,,, | Performed by: PSYCHIATRY & NEUROLOGY

## 2022-11-04 PROCEDURE — 99999 PR PBB SHADOW E&M-EST. PATIENT-LVL IV: CPT | Mod: PBBFAC,,, | Performed by: PSYCHIATRY & NEUROLOGY

## 2022-11-04 PROCEDURE — 99214 OFFICE O/P EST MOD 30 MIN: CPT | Mod: PBBFAC | Performed by: PSYCHIATRY & NEUROLOGY

## 2022-11-04 RX ORDER — RAMELTEON 8 MG/1
8 TABLET ORAL NIGHTLY
COMMUNITY
End: 2024-01-03

## 2022-11-04 NOTE — PROGRESS NOTES
"  Notes:   Glenn Medel  I. Chief Complaints during this visit:  New Patient visit for  Parkinsonism.   No referring provider defined for this encounter.    Primary Care Physician  Power Benton, DO  [address] [place] [address]    Interval Hx    Since last visit,   REM sleep dz worse  Had prior taken 1.5mg clonazepam  Taking clonazepam 1mg at added remeron last week - stopped melatonin  Increased  Carbidopa/levodopa 25/100mg 1.5tab QID - walking more confidently  No offtime    Tremor well controlled  Trouble typing  Dizziness when he works outside for hours    Takes azilect 1mg QHS    Falls: once  Assist Device: none  Walks 2 miles without stopping  With stairs he must hand onto railing    MRI brain revealed a possible posterior fossa arachnoid cyst - followed by Dr. Saldivar     Brother with dementia since age 60 - mild hand tremors and some issues walking  2 sisters/2 brothers healthy  Mother with dementia at age 80 - also hand hand dystonia  Father also reported fingers getting stuck    Ceruloplasmin NL    Neuropsych revealed mild cognitive impairments but no diagnosis    PD Review of Symptoms:  Anosmia: some stable  Dysarthria/Hypophonia: none  Dysphagia/Sialorrhea: none  Depression: yes  Cognitive slowing: mild attention issues - replaced CPAP  Hallucinations: none  Urinary changes: none  Constipation: yes  Orthostasis: at times  Falls: occasional  Freezing: none  Micrographia: none  Sleep issues:  -RBD: 1 year of REM sleep dz      "History of present illness:   67 y.o.  male seen in consultation at the request of  Dr. Martinez for evaluation of parkinsonism.      Falls often, onto knee.  Right hand will suddenly start shaking.  Memory a little worse.  Needs new mask for his LOVE, hasn't used in a while.  Not sure who to see.    Has DM, had an emg about 2 years ago.      1/20/20 Juan:  Glenn Medel is a 64 y.o. right-handed male with DM2, HTN, LOVE, anxiety and depression who presents today for an " "initial evaluation of tremos and balance and is accompanied by wife.    Tremor- started in RH a couple of years ago but seemed to have gotten worse. Will watch TV and ev hands shake. Started noting in LH in the last year. Also notes shaking in legs for several years. He says the legs in fact started before the hands. Nothing makes it better. Not noted any aggravating factors. Drinks very little alcohol. Has not noted benefit with alcohol. Handwriting is pretty good. No changes with this.    Balance- sometimes will be working on knees and then fall back when he goes to get up. Sometimes with walking, he will wobble. No falls while walking. Has fallen backwards when he is trying to get up from being on knees. Has noted balance issues the past couple of months. Has not noted progression.     Acting out dreams. Not nightly but is significant when does happen. He actually ended up in hospital in the last year because of fighting in sleep. Has LOVE, used to wear CPAP and wife wonders if this helped when he was wearing this. He says he will wear it a couple of days, aggravates him and then he will not use for month or longer. He had sleep doctor, Dr. Hinton, but dismissed him as she did everything she could do for him.    Denies stiffness in muscles.   Sometimes feels slow with walking. Wife says he is slower to answer questions.   Memory is getting worse. Been bad couple of years but worse the past year. Wife agrees but attributes some of this to [employer]- lazier life.    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   He left on his terms. Was not asked to leave. "      II.  Review of systems:  As in HPI,  otherwise, balance 10 systems reviewed and are negative.    III.  Past Medical History:   Diagnosis Date    BPH (benign prostatic hyperplasia)     Cataract     done ou    Chronic kidney disease, stage 3 2/25/2015    DDD (degenerative disc disease), lumbar     " s/p epidural steroids     Glaucoma     OU    HTN (hypertension)     Iritis, lens-induced 1/8/2018    Obesity      Family History   Problem Relation Age of Onset    Heart disease Brother 62    Kidney disease Brother     Heart disease Father     Diabetes Father     Colon cancer Father     Cancer Father         colon    Cataracts Father     Hypertension Mother     Diabetes Mother     Stroke Mother     Cancer Mother         colon    Kidney disease Mother     Glaucoma Maternal Grandmother     No Known Problems Sister     No Known Problems Maternal Aunt     No Known Problems Maternal Uncle     No Known Problems Paternal Aunt     No Known Problems Paternal Uncle     No Known Problems Maternal Grandfather     No Known Problems Paternal Grandmother     No Known Problems Paternal Grandfather     Dementia Brother     Abnormal EKG Brother     Amblyopia Neg Hx     Blindness Neg Hx     Macular degeneration Neg Hx     Retinal detachment Neg Hx     Strabismus Neg Hx     Thyroid disease Neg Hx     Parkinsonism Neg Hx      Social history:  , retired.      Current Outpatient Medications on File Prior to Visit   Medication Sig Dispense Refill    allopurinoL (ZYLOPRIM) 300 MG tablet TAKE 1 TABLET(300 MG) BY MOUTH EVERY DAY 90 tablet 3    aspirin 81 MG Chew Take 81 mg by mouth once daily.      atorvastatin (LIPITOR) 20 MG tablet TAKE 1 TABLET(20 MG) BY MOUTH EVERY DAY 90 tablet 0    brimonidine 0.2% (ALPHAGAN) 0.2 % Drop Place 1 drop into both eyes 2 (two) times daily. 10 mL 3    carbidopa-levodopa  mg (SINEMET)  mg per tablet Take 1.5 tablets by mouth 4 (four) times daily. 180 tablet 11    cholecalciferol, vitamin D3, (D3-2000 ORAL) Take by mouth.      clonazePAM (KLONOPIN) 1 MG tablet TAKE 1 TABLET BY MOUTH EVERY NIGHT AT BEDTIME 30 tablet 5    dorzolamide (TRUSOPT) 2 % ophthalmic solution Place 1 drop into both eyes 2 (two) times a day. 10 mL 3    FLUoxetine 40 MG capsule TAKE 1 CAPSULE(40 MG) BY MOUTH EVERY DAY 90  capsule 0    furosemide (LASIX) 20 MG tablet TAKE 1 TABLET(20 MG) BY MOUTH EVERY MORNING 90 tablet 0    irbesartan (AVAPRO) 75 MG tablet TAKE 1 TABLET BY MOUTH EVERY DAY 90 tablet 1    metFORMIN (GLUCOPHAGE-XR) 500 MG ER 24hr tablet TAKE 1 TABLET(500 MG) BY MOUTH EVERY DAY 90 tablet 1    metoprolol succinate (TOPROL-XL) 25 MG 24 hr tablet TAKE 1 TABLET BY MOUTH EVERY DAY 90 tablet 1    OZEMPIC 0.25 mg or 0.5 mg(2 mg/1.5 mL) pen injector INJECT 0.5 MG INTO THE SKIN EVERY 7 DAYS 3 pen 0    prazosin (MINIPRESS) 1 MG Cap TAKE 1 CAPSULE(1 MG) BY MOUTH EVERY EVENING 90 capsule 0    ramelteon (ROZEREM) 8 mg tablet Take 8 mg by mouth every evening.      rasagiline (AZILECT) 1 mg Tab TAKE 1 TABLET(1 MG) BY MOUTH EVERY EVENING 30 tablet 12    clobetasoL (TEMOVATE) 0.05 % cream Apply topically 2 (two) times daily.      colchicine (MITIGARE) 0.6 mg Cap Take 1 capsule (0.6 mg total) by mouth once daily. 90 capsule 1    cyclobenzaprine (FLEXERIL) 10 MG tablet SMARTSI Tablet(s) By Mouth Every 12 Hours PRN      diclofenac (VOLTAREN) 50 MG EC tablet Take 50 mg by mouth every 8 (eight) hours.      HYDROcodone-acetaminophen (NORCO) 5-325 mg per tablet hydrocodone 5 mg-acetaminophen 325 mg tablet   TAKE 1 TABLET BY MOUTH EVERY 12 HOURS AS NEEDED FOR PAIN      melatonin 5 mg Subl Take 10 mg by mouth nightly.   11    methylPREDNISolone (MEDROL DOSEPACK) 4 mg tablet Take 1 tablet (4 mg total) by mouth once daily. Use as instructed on dose pack 1 each 0    predniSONE (DELTASONE) 20 MG tablet Take 20 mg by mouth once daily.       Current Facility-Administered Medications on File Prior to Visit   Medication Dose Route Frequency Provider Last Rate Last Admin    diphenhydrAMINE capsule 25 mg  25 mg Oral Q6H PRN Leonila Richardson MD        metronidazole IVPB 500 mg  500 mg Intravenous Once Kaz Keating MD           PRIOR problem-specific medications tried:  none    Review of patient's allergies indicates:   Allergen Reactions    Avelox  [moxifloxacin] Hives and Rash       IV. Physical Exam    Physical Exam  Constitutional: Well-developed, well-nourished, appears stated age  Eyes: No scleral icterus  ENT: Moist oral mucosa  Cardiovascular: No lower extremity edema   Respiratory: No labored breathing   Skin: No rash   Hematologic: No bruising    Other: GI/ deferred   Mental status: Alert and oriented to person, place, time, and situation;   follows commands  Speech: normal (not dysarthric), no aphasia  Cranial nerves:            CN II: Pupils mid-position and equal, not tested light or accommodation  CN III, IV, VI: Extraocular movements full, no nystagmus visualized  CN V: Not tested   CN VII: Face strong and symmetric bilaterally   CN VIII: Hearing intact to voice and conversation   CN IX, X: Palate raises midline and symmetric   CN XI: Strong shoulder shrug B/L  CN XII: Tongue appears midline   Motor: Normal bulk by appearance, no drift   Sensory: Not tested    Gait: Can stand on either leg- barely a shuffle  Deep tendon reflexes: Not tested  Movement/Coordination                    Mod hypophonic speech.                     Mod facial masking.    Minor resting tremor R hand                      No other dystonia, chorea, athetosis, myoclonus, or tics visualized.    Bradykinesia      ? Finger taps Finger flicks DAVE Heel taps   Left 0 0 0 1+   Right 1+ 1+ 0 0         V.  Laboratory/ Radiological Data: (Personally reviewed images)         MRI 1/29/20  Impression       1. There is no acute abnormality.  There is mild volume loss without significant white matter disease.  There is no hemorrhage, parenchymal mass or acute infarction.  There is a probable arachnoid cyst in the right posterior fossa.  Please see above discussion.     Memory/Encephalopathy Labs:  Lab Results   Component Value Date    TSH 0.976 01/06/2020    RFRXSBSV98 391 05/14/2020    THIAMINEBLOO 59 05/14/2020     LGi and CASPR  No results found for: ENCESLGI1,  "ENCESCASPR2    Movement Labs:  Lab Results   Component Value Date    SVKNXOBB00TU 51 09/28/2022    CALCIUM 9.5 09/28/2022    PTH 77.9 (H) 09/28/2022    ALBUMIN 3.7 09/28/2022    BILITOT 0.9 11/02/2021    ALT 10 11/02/2021    AST 30 11/02/2021    ALKPHOS 128 11/02/2021    CERULOPLSM 29.0 08/26/2020    FERRITIN 88 03/29/2021    ALPHATOCOPHE 1275 05/14/2020       IRON STUDIES:  Lab Results   Component Value Date    IRON 60 03/29/2021    TRANSFERRIN 243 03/29/2021    TIBC 360 03/29/2021    FESATURATED 17 (L) 03/29/2021       Malignancy Labs:  Lab Results   Component Value Date    PSADIAG 2.4 02/10/2020     PARANEOPLASTIC PANEL:  No results found for: NMOINTERPRET, REFLEXTEST, CRMP5, STMAB, LABACHR, ACHRGANGLION, NEUROVG    CSF:  No results found for: CSFWBC, CSFRBC, LYMPHS, MONOMACCSF, PROTEINCSF, GLUCCSF, GADCSF, LUME, MYELINPROT  Flow cytometry: No results found for: CSFC  [place]: No results found for: WESTNILEIGGA    CNS Demyelinating Labs:  Nmo serum, csf igg:  No results found for: NMOFSFACSS, NMOFCFACSC  No results found for: JCVIRUS    CSF oligoclonal bands:  No results found for: CSFB, CSFOLI, SERB, IGGINDEXCSF, IGGCSF, ALBQ, GALB, IGGSYNRATE, IGGS, ALBSER, IGGALBS    Myopathy labs:  No results found for: CPK, ALDOLASE, AMMONIA, ACFCRATIO, ACETYLCARNIT, CARNITINEPLA, LACTATE    Myasthenia panel:  No results found for: LABACHR  No results found for: ACETMOD  No results found for: STMAB   No results found for: ACHRGANGLION    Neuropathy Labs:  Lab Results   Component Value Date    JYEXCQOK71 391 05/14/2020    HGBA1C 6.2 (H) 05/13/2022     SPEP:  Lab Results   Component Value Date    PROTEINSERUM 6.7 07/27/2015    PATHINTPSPE REVIEWED 07/27/2015       Rheum labs:  Lab Results   Component Value Date    URICACID 4.5 09/28/2022       Neuropsych"  Mr. Medel is a 64 year old male with cognitive complaints over the past several years. He has a slightly ataxic gait of unclear etiology, but no clear parkinsonism. He " "has symptoms suggestive of REM sleep behavior disorder over the past year. He denied presence, passage, or recurrent visual hallucinations. He remains functionally independent, albeit more error prone than in the past. His history is remarkable for multiple vascular risk factors, including currently untreated sleep apnea.      His neuropsychological profile was largely within normal limits with the exception of mild memory inefficiencies, primarily related to encoding and cognitive organization/retrieval. His test scores and functioning are not at the level to warrant a cognitive diagnosis at this time. He did not present with the type of oliver forgetting seen in Alzheimer's disease. He also did not demonstrate the type of visuospatial dysfunction often seen early in Lewy Body Dementia, which is also in keeping with the fact that he does not have visual hallucinations. It will be important to track his cognition over time as mild cognitive changes in the setting of ataxia of unclear etiology and possible REM sleep behavior disorder could be indicative of a neurodegenerative condition. Otherwise, his mild cognitive weaknesses could simply be attributable to mild cerebrovascular disease.      Mr. Medel also reported clinically significant anxiety and depression. The transition to alf has been admittedly more challenging than expected. His wife has also noticed reduced frustration tolerance with an increased tendency to perseverate on his frustrations, which has led to multiple confrontations. Treatment is indicated. "    MRI brain 2020      VI. Assessment and Plan            Problem List Items Addressed This Visit          Neuro    Parkinsonism (Chronic)    Current Assessment & Plan     Typical  with some nonmotor features.  Tremor responded very well to carbidopa/levodopa.   Stable    Suggested continue - carbidopa/levodopa 25/100mg 1.5 tab PO four times daily for tremor control  No OFFtime  May consider DBS " for tremor control    Continue azilect 1mg QHS           Memory change    Current Assessment & Plan     [facility]  Suggested make changed to help sleep to help memory  Just started remeron  If this does not help sleep/REM may take clonazepam at bedtime  1mg or 1.5mg                Coni Cee MD, MS  [facility]  Department of Neurology  Movement Disorders

## 2022-11-04 NOTE — ASSESSMENT & PLAN NOTE
[facility]  Suggested make changed to help sleep to help memory  Just started remeron  If this does not help sleep/REM may take clonazepam at bedtime  1mg or 1.5mg

## 2022-11-04 NOTE — ASSESSMENT & PLAN NOTE
Typical  with some nonmotor features.  Tremor responded very well to carbidopa/levodopa.   Stable    Suggested continue - carbidopa/levodopa 25/100mg 1.5 tab PO four times daily for tremor control  No OFFtime  May consider DBS for tremor control    Continue azilect 1mg QHS

## 2022-11-12 NOTE — PROGRESS NOTES
Subjective:     Patient ID: Glenn Medel is a 67 y.o. male.    Chief Complaint: Heel Pain (C/o left heel/achilles pain, rates pain 8/10 while weightbearing, Diabetic Pt, wears tennis shoes with socks, last seen on 06/25/2022 with Ruth Ann Regalado NP)     is a 67 y.o. male who presents to the clinic complaining of heel pain in the left foot, especially with the first step in the morning. The pain is described as Aching, Burning, Throbbing, and Tight. The onset of the pain was gradual and has worsened over the past several months.  rates the pain as 8/10. Patient points to left posterior heel. Patient has no other pedal complaints at this time.     Patient Active Problem List   Diagnosis    BPH (benign prostatic hyperplasia)    LOVE (obstructive sleep apnea)    HTN (hypertension)    DDD (degenerative disc disease), lumbar    Palpitations    Peripheral neuropathy    Hyperlipidemia    DM type 2 (diabetes mellitus, type 2)    Itch    Hypersomnia with sleep apnea, unspecified    ILD (interstitial lung disease)    Severe obstructive sleep apnea    Other vitreous opacities, bilateral    NS (nuclear sclerosis)    Senile nuclear sclerosis    Retained lens material, left    Glaucoma associated with ocular inflammation, left, indeterminate stage    Iritis, lens-induced    Vitreomacular adhesion, right    Epiretinal membrane, right    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, with long-term current use of insulin    Internal strangulated hemorrhoids    Arachnoid cyst    REM sleep behavior disorder    Abnormal gait    Cognitive change    Abnormal involuntary movements    Adjustment disorder with depressed mood    Parkinsonism    Hx of acute gouty arthritis    Polyneuropathy associated with underlying disease    Fall from bed    Memory change    CKD (chronic kidney disease) stage 2, GFR 60-89 ml/min    Type 2 diabetes mellitus with stage 2  chronic kidney disease, without long-term current use of insulin       Medication List with Changes/Refills   New Medications    METHYLPREDNISOLONE (MEDROL DOSEPACK) 4 MG TABLET    Take 1 tablet (4 mg total) by mouth once daily. Use as instructed on dose pack   Current Medications    ALLOPURINOL (ZYLOPRIM) 300 MG TABLET    TAKE 1 TABLET(300 MG) BY MOUTH EVERY DAY    ASPIRIN 81 MG CHEW    Take 81 mg by mouth once daily.    ATORVASTATIN (LIPITOR) 20 MG TABLET    TAKE 1 TABLET(20 MG) BY MOUTH EVERY DAY    BRIMONIDINE 0.2% (ALPHAGAN) 0.2 % DROP    Place 1 drop into both eyes 2 (two) times daily.    CARBIDOPA-LEVODOPA  MG (SINEMET)  MG PER TABLET    Take 1.5 tablets by mouth 4 (four) times daily.    CHOLECALCIFEROL, VITAMIN D3, (D3-2000 ORAL)    Take by mouth.    CLOBETASOL (TEMOVATE) 0.05 % CREAM    Apply topically 2 (two) times daily.    CLONAZEPAM (KLONOPIN) 1 MG TABLET    TAKE 1 TABLET BY MOUTH EVERY NIGHT AT BEDTIME    COLCHICINE (MITIGARE) 0.6 MG CAP    Take 1 capsule (0.6 mg total) by mouth once daily.    CYCLOBENZAPRINE (FLEXERIL) 10 MG TABLET    SMARTSI Tablet(s) By Mouth Every 12 Hours PRN    DICLOFENAC (VOLTAREN) 50 MG EC TABLET    Take 50 mg by mouth every 8 (eight) hours.    DORZOLAMIDE (TRUSOPT) 2 % OPHTHALMIC SOLUTION    Place 1 drop into both eyes 2 (two) times a day.    FLUOXETINE 40 MG CAPSULE    TAKE 1 CAPSULE(40 MG) BY MOUTH EVERY DAY    FUROSEMIDE (LASIX) 20 MG TABLET    TAKE 1 TABLET(20 MG) BY MOUTH EVERY MORNING    HYDROCODONE-ACETAMINOPHEN (NORCO) 5-325 MG PER TABLET    hydrocodone 5 mg-acetaminophen 325 mg tablet   TAKE 1 TABLET BY MOUTH EVERY 12 HOURS AS NEEDED FOR PAIN    IRBESARTAN (AVAPRO) 75 MG TABLET    TAKE 1 TABLET BY MOUTH EVERY DAY    MELATONIN 5 MG SUBL    Take 10 mg by mouth nightly.     METFORMIN (GLUCOPHAGE-XR) 500 MG ER 24HR TABLET    TAKE 1 TABLET(500 MG) BY MOUTH EVERY DAY    METOPROLOL SUCCINATE (TOPROL-XL) 25 MG 24 HR TABLET    TAKE 1 TABLET BY MOUTH EVERY DAY     OZEMPIC 0.25 MG OR 0.5 MG(2 MG/1.5 ML) PEN INJECTOR    INJECT 0.5 MG INTO THE SKIN EVERY 7 DAYS    PRAZOSIN (MINIPRESS) 1 MG CAP    TAKE 1 CAPSULE(1 MG) BY MOUTH EVERY EVENING    PREDNISONE (DELTASONE) 20 MG TABLET    Take 20 mg by mouth once daily.    RAMELTEON (ROZEREM) 8 MG TABLET    Take 8 mg by mouth every evening.    RASAGILINE (AZILECT) 1 MG TAB    TAKE 1 TABLET(1 MG) BY MOUTH EVERY EVENING       Review of patient's allergies indicates:   Allergen Reactions    Avelox [moxifloxacin] Hives and Rash       Past Surgical History:   Procedure Laterality Date    CATARACT EXTRACTION W/  INTRAOCULAR LENS IMPLANT Left 11/30/2017    CATARACT EXTRACTION W/  INTRAOCULAR LENS IMPLANT Right 12/11/2018    Dr Garcia    CATARACT EXTRACTION W/  INTRAOCULAR LENS IMPLANT Right 12/11/2018    Procedure: EXTRACTION, CATARACT, WITH IOL INSERTION;  Surgeon: Damaris Garcia MD;  Location: [facility] OR;  Service: Ophthalmology;  Laterality: Right;    COLONOSCOPY N/A 9/18/2017    Procedure: COLONOSCOPY;  Surgeon: Paul Feliz Jr., MD;  Location: [facility] ENDO;  Service: Endoscopy;  Laterality: N/A;    COLONOSCOPY W/ POLYPECTOMY  04/13/2010    YARELI.   One 1 cm polyp in the sigmoid colon.  Internal hemorrhoids.    COSMETIC SURGERY      DIGITAL RECTAL EXAMINATION UNDER ANESTHESIA N/A 7/23/2019    Procedure: EXAM UNDER ANESTHESIA, DIGITAL, RECTUM;  Surgeon: Kaz Keating MD;  Location: [facility] OR;  Service: General;  Laterality: N/A;    EXAMINATION UNDER ANESTHESIA N/A 4/24/2019    Procedure: EXAM UNDER ANESTHESIA;  Surgeon: Kaz Keating MD;  Location: [facility] OR;  Service: General;  Laterality: N/A;    EXCISIONAL HEMORRHOIDECTOMY N/A 4/24/2019    Procedure: HEMORRHOIDECTOMY (lithotomy);  Surgeon: Kaz Keating MD;  Location: [facility] OR;  Service: General;  Laterality: N/A;    GANGLION CYST EXCISION Left     INJECTION OF ANESTHETIC AGENT AROUND PUDENDAL NERVE N/A 4/24/2019    Procedure: BLOCK, NERVE, PUDENDAL;  Surgeon: Kaz MOELLER  MD Zuri;  Location: [facility] OR;  Service: General;  Laterality: N/A;    INJECTION OF ANESTHETIC AGENT AROUND PUDENDAL NERVE N/A 7/23/2019    Procedure: BLOCK, NERVE, PUDENDAL;  Surgeon: Kaz Keating MD;  Location: [facility] OR;  Service: General;  Laterality: N/A;    REPAIR OF RETINAL DETACHMENT WITH VITRECTOMY Right 8/1/2018    Procedure: REPAIR, RETINAL DETACHMENT, WITH VITRECTOMY;  Surgeon: SAJAN Pulido MD;  Location: [address];  Service: Ophthalmology;  Laterality: Right;  35 min    TONSILLECTOMY      TRIGGER FINGER RELEASE Right     X 2    uro lift      for enlarged prostate       Family History   Problem Relation Age of Onset    Heart disease Brother 62    Kidney disease Brother     Heart disease Father     Diabetes Father     Colon cancer Father     Cancer Father         colon    Cataracts Father     Hypertension Mother     Diabetes Mother     Stroke Mother     Cancer Mother         colon    Kidney disease Mother     Glaucoma Maternal Grandmother     No Known Problems Sister     No Known Problems Maternal Aunt     No Known Problems Maternal Uncle     No Known Problems Paternal Aunt     No Known Problems Paternal Uncle     No Known Problems Maternal Grandfather     No Known Problems Paternal Grandmother     No Known Problems Paternal Grandfather     Dementia Brother     Abnormal EKG Brother     Amblyopia Neg Hx     Blindness Neg Hx     Macular degeneration Neg Hx     Retinal detachment Neg Hx     Strabismus Neg Hx     Thyroid disease Neg Hx     Parkinsonism Neg Hx        Social History     Socioeconomic History    Marital status:    Tobacco Use    Smoking status: Never    Smokeless tobacco: Never   Substance and Sexual Activity    Alcohol use: Yes     Comment: occasional, no alcohol 72 hours prior to sx    Drug use: No    Sexual activity: Yes     Partners: Female   Social History Narrative            2 grown kids.         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      Social Determinants of Health     Financial Resource Strain: Low Risk     Difficulty of Paying Living Expenses: Not hard at all   Food Insecurity: No Food Insecurity    Worried About Running Out of Food in the Last Year: Never true    Ran Out of Food in the Last Year: Never true   Transportation Needs: No Transportation Needs    Lack of Transportation (Medical): No    Lack of Transportation (Non-Medical): No   Physical Activity: Sufficiently Active    Days of Exercise per Week: 4 days    Minutes of Exercise per Session: 90 min   Stress: No Stress Concern Present    Feeling of Stress : Only a little   Social Connections: Unknown    Frequency of Communication with Friends and Family: Patient refused    Frequency of Social Gatherings with Friends and Family: Patient refused    Active Member of Clubs or Organizations: No    Attends Club or Organization Meetings: Patient refused    Marital Status:    Housing Stability: Unknown    Unable to Pay for Housing in the Last Year: Patient refused    Unstable Housing in the Last Year: Patient refused       Vitals:    10/25/22 1512   Weight: 86.6 kg (191 lb)   Height: 5' 11" (1.803 m)   PainSc:   8   PainLoc: Foot       Hemoglobin A1C   Date Value Ref Range Status   11/04/2022 6.5 (H) 4.0 - 5.6 % Final     Comment:     ADA Screening Guidelines:  5.7-6.4%  Consistent with prediabetes  >or=6.5%  Consistent with diabetes    High levels of fetal hemoglobin interfere with the HbA1C  assay. Heterozygous hemoglobin variants (HbS, HgC, etc)do  not significantly interfere with this assay.   However, presence of multiple variants may affect accuracy.     05/13/2022 6.2 (H) 4.0 - 5.6 % Final     Comment:     ADA Screening Guidelines:  5.7-6.4%  Consistent with prediabetes  >or=6.5%  Consistent with diabetes    High levels of fetal hemoglobin interfere with the HbA1C  assay. Heterozygous hemoglobin variants (HbS, HgC, etc)do  not significantly interfere with this assay. "   However, presence of multiple variants may affect accuracy.     11/02/2021 6.1 (H) 4.0 - 5.6 % Final     Comment:     ADA Screening Guidelines:  5.7-6.4%  Consistent with prediabetes  >or=6.5%  Consistent with diabetes    High levels of fetal hemoglobin interfere with the HbA1C  assay. Heterozygous hemoglobin variants (HbS, HgC, etc)do  not significantly interfere with this assay.   However, presence of multiple variants may affect accuracy.         Review of Systems   Constitutional:  Negative for chills and fever.   Respiratory:  Negative for shortness of breath.    Cardiovascular:  Negative for chest pain, palpitations, orthopnea, claudication and leg swelling.   Gastrointestinal:  Negative for diarrhea, nausea and vomiting.   Musculoskeletal:  Positive for joint pain (left heel).   Skin:  Negative for rash.   Neurological:  Negative for dizziness, tingling, sensory change, focal weakness and weakness.   Psychiatric/Behavioral: Negative.             Objective:      PHYSICAL EXAM: Apperance: Alert and orient in no distress,well developed, and with good attention to grooming and body habits  Patient presents ambulating in tennis shoes.   Lower Extremity Exam  VASCULAR: Dorsalis pedis pulses 2/4 bilateral and Posterior Tibial pulses 2/4 bilateral. Capillary fill time <blanched bilateral. No edema observed bilateral. Varicosities absent bilateral. Skin temperature of the lower extremities is warm to warm, proximal to distal. Hair growth WNL bilateral.  DERMATOLOGICAL: No skin rashes, subcutaneous nodules, lesions, or ulcers observed bilateral.   NEUROLOGICAL: Light touch, sharp-dull, proprioception all present and equal bilaterally.    MUSCULOSKELETAL: Muscle strength 5/5 for all foot inverters, everters, plantarflexors, and dorsiflexors bilateral. Ankle joints left shows decreased ROM. left ankle ROM shows greater decrease in dorsiflexion with knee extended. Ankle joint ROM is pain free and without crepitus left.  Pain with palpation of left posterior 1/3 calcaneus at region of Achilles tendon insertion. Negative pain to palpation left plantar medial tubercle. Negative pain on medial-lateral compression of the calcaneus.        Assessment:       ICD-10-CM ICD-9-CM   1. Achilles tendinitis of left lower extremity - Left Foot  M76.62 726.71   2. Equinus contracture of left ankle - Left Foot  M24.572 718.47   3. Bursitis of heel, left  M77.52 726.79       Plan:   Achilles tendinitis of left lower extremity - Left Foot  -     X-Ray Foot Complete Left; Future; Expected date: 10/25/2022  -     methylPREDNISolone (MEDROL DOSEPACK) 4 mg tablet; Take 1 tablet (4 mg total) by mouth once daily. Use as instructed on dose pack  Dispense: 1 each; Refill: 0    Equinus contracture of left ankle - Left Foot  -     X-Ray Foot Complete Left; Future; Expected date: 10/25/2022  -     methylPREDNISolone (MEDROL DOSEPACK) 4 mg tablet; Take 1 tablet (4 mg total) by mouth once daily. Use as instructed on dose pack  Dispense: 1 each; Refill: 0    Bursitis of heel, left  -     X-Ray Foot Complete Left; Future; Expected date: 10/25/2022  -     methylPREDNISolone (MEDROL DOSEPACK) 4 mg tablet; Take 1 tablet (4 mg total) by mouth once daily. Use as instructed on dose pack  Dispense: 1 each; Refill: 0      I counseled the patient on his conditions, regarding findings of my examination, my impressions, and usual treatment plan.   I explained to the patient that etiology and treatment options for heel pain including rest,  ice messages, stretching exercises, strappings/tappings, NSAID's, injections, new shoegear with orthotic inserts, and/or surgical treatment.   Patient agreed to oral and immobilization today.  Ordered left foot x-rays to be completed today.   I gave written and verbal instructions on heel cord stretching and this was demonstrated for the patient. Patient expressed understanding.  Prescribed Medrol Dosepak to be taken as directed on  package. Discussed possible increase in blood sugar with taking steroid medication. Patient advised on the possible elevation of blood pressure sugar and caution to take pills as prescribed and to discontinue use if symptoms arise, patient agreed.   Patient instructed on adequate icing techniques. Patient should ice the affected area at least once per day x 10 minutes for 10 days . I advised the  patient that extra icing would also be beneficial to ensure adequate anti inflammatory effect.   Patient was fitted with short walking boot and instructed on proper usage. This should be worn daily for a minimum of 2 weeks at all times when ambulating. Patient instructed not to drive with walking boot if on right foot.   The patient and I reviewed the types of shoes he should be wearing, my recommendation includes generally the best time of the day for a shoe fitting is the afternoon, shoes with a wide toe box, very good cushion, and tennis shoes with removable inner soles. The patient and I reviewed my recommendations for over-the-counter orthotic inserts.   Patient to return in 4-6 weeks or sooner if needed.        Everett Duarte DPM  [facility]

## 2022-11-16 ENCOUNTER — PATIENT MESSAGE (OUTPATIENT)
Dept: NEUROLOGY | Facility: CLINIC | Age: 67
End: 2022-11-16
Payer: MEDICARE

## 2022-11-16 DIAGNOSIS — G47.52 REM SLEEP BEHAVIOR DISORDER: ICD-10-CM

## 2022-11-16 RX ORDER — CLONAZEPAM 1 MG/1
1.5 TABLET ORAL NIGHTLY
Qty: 30 TABLET | Refills: 5 | Status: SHIPPED | OUTPATIENT
Start: 2022-11-16 | End: 2022-11-17 | Stop reason: SDUPTHER

## 2022-11-22 ENCOUNTER — PATIENT MESSAGE (OUTPATIENT)
Dept: OTHER | Facility: OTHER | Age: 67
End: 2022-11-22
Payer: MEDICARE

## 2022-12-11 DIAGNOSIS — Z79.4 TYPE 2 DIABETES MELLITUS WITH DIABETIC NEPHROPATHY, WITH LONG-TERM CURRENT USE OF INSULIN: ICD-10-CM

## 2022-12-11 DIAGNOSIS — E11.21 TYPE 2 DIABETES MELLITUS WITH DIABETIC NEPHROPATHY, WITH LONG-TERM CURRENT USE OF INSULIN: ICD-10-CM

## 2022-12-11 NOTE — TELEPHONE ENCOUNTER
No new care gaps identified.  [facility] Embedded Care Gaps. Reference number: [id number]. 12/11/2022   3:46:26 AM CST

## 2022-12-12 ENCOUNTER — LAB VISIT (OUTPATIENT)
Dept: LAB | Facility: HOSPITAL | Age: 67
End: 2022-12-12
Attending: INTERNAL MEDICINE
Payer: MEDICARE

## 2022-12-12 ENCOUNTER — OFFICE VISIT (OUTPATIENT)
Dept: FAMILY MEDICINE | Facility: CLINIC | Age: 67
End: 2022-12-12
Payer: MEDICARE

## 2022-12-12 ENCOUNTER — PATIENT MESSAGE (OUTPATIENT)
Dept: GASTROENTEROLOGY | Facility: CLINIC | Age: 67
End: 2022-12-12
Payer: MEDICARE

## 2022-12-12 VITALS
DIASTOLIC BLOOD PRESSURE: 70 MMHG | HEIGHT: 71 IN | BODY MASS INDEX: 26.85 KG/M2 | SYSTOLIC BLOOD PRESSURE: 110 MMHG | OXYGEN SATURATION: 95 % | HEART RATE: 63 BPM | WEIGHT: 191.81 LBS

## 2022-12-12 DIAGNOSIS — I10 PRIMARY HYPERTENSION: ICD-10-CM

## 2022-12-12 DIAGNOSIS — Z79.4 TYPE 2 DIABETES MELLITUS WITH DIABETIC NEPHROPATHY, WITH LONG-TERM CURRENT USE OF INSULIN: ICD-10-CM

## 2022-12-12 DIAGNOSIS — E78.2 MIXED HYPERLIPIDEMIA: ICD-10-CM

## 2022-12-12 DIAGNOSIS — Z23 NEED FOR PNEUMOCOCCAL VACCINATION: ICD-10-CM

## 2022-12-12 DIAGNOSIS — Z00.00 ANNUAL PHYSICAL EXAM: Primary | ICD-10-CM

## 2022-12-12 DIAGNOSIS — J84.9 ILD (INTERSTITIAL LUNG DISEASE): ICD-10-CM

## 2022-12-12 DIAGNOSIS — E11.21 TYPE 2 DIABETES MELLITUS WITH DIABETIC NEPHROPATHY, WITH LONG-TERM CURRENT USE OF INSULIN: ICD-10-CM

## 2022-12-12 DIAGNOSIS — G63 POLYNEUROPATHY ASSOCIATED WITH UNDERLYING DISEASE: ICD-10-CM

## 2022-12-12 DIAGNOSIS — R97.20 ELEVATED PSA: ICD-10-CM

## 2022-12-12 DIAGNOSIS — N18.2 CKD (CHRONIC KIDNEY DISEASE) STAGE 2, GFR 60-89 ML/MIN: ICD-10-CM

## 2022-12-12 DIAGNOSIS — G47.33 OSA (OBSTRUCTIVE SLEEP APNEA): ICD-10-CM

## 2022-12-12 LAB
ESTIMATED AVG GLUCOSE: 146 MG/DL (ref 68–131)
HBA1C MFR BLD: 6.7 % (ref 4–5.6)

## 2022-12-12 PROCEDURE — 83036 HEMOGLOBIN GLYCOSYLATED A1C: CPT | Performed by: INTERNAL MEDICINE

## 2022-12-12 PROCEDURE — 90732 PPSV23 VACC 2 YRS+ SUBQ/IM: CPT | Mod: PBBFAC,PO

## 2022-12-12 PROCEDURE — 99214 PR OFFICE/OUTPT VISIT, EST, LEVL IV, 30-39 MIN: ICD-10-PCS | Mod: S$PBB,,, | Performed by: INTERNAL MEDICINE

## 2022-12-12 PROCEDURE — 36415 COLL VENOUS BLD VENIPUNCTURE: CPT | Mod: PO | Performed by: INTERNAL MEDICINE

## 2022-12-12 PROCEDURE — 99214 OFFICE O/P EST MOD 30 MIN: CPT | Mod: PBBFAC,PO,25 | Performed by: INTERNAL MEDICINE

## 2022-12-12 PROCEDURE — 99999 PR PBB SHADOW E&M-EST. PATIENT-LVL IV: ICD-10-PCS | Mod: PBBFAC,,, | Performed by: INTERNAL MEDICINE

## 2022-12-12 PROCEDURE — 84153 ASSAY OF PSA TOTAL: CPT | Performed by: INTERNAL MEDICINE

## 2022-12-12 PROCEDURE — 80069 RENAL FUNCTION PANEL: CPT | Performed by: INTERNAL MEDICINE

## 2022-12-12 PROCEDURE — 99999 PR PBB SHADOW E&M-EST. PATIENT-LVL IV: CPT | Mod: PBBFAC,,, | Performed by: INTERNAL MEDICINE

## 2022-12-12 PROCEDURE — 99214 OFFICE O/P EST MOD 30 MIN: CPT | Mod: S$PBB,,, | Performed by: INTERNAL MEDICINE

## 2022-12-12 PROCEDURE — 80076 HEPATIC FUNCTION PANEL: CPT | Mod: 59 | Performed by: INTERNAL MEDICINE

## 2022-12-12 PROCEDURE — 83970 ASSAY OF PARATHORMONE: CPT | Performed by: INTERNAL MEDICINE

## 2022-12-12 PROCEDURE — 80061 LIPID PANEL: CPT | Performed by: INTERNAL MEDICINE

## 2022-12-12 RX ORDER — SEMAGLUTIDE 1.34 MG/ML
INJECTION, SOLUTION SUBCUTANEOUS
Qty: 3 PEN | Refills: 1 | Status: SHIPPED | OUTPATIENT
Start: 2022-12-12 | End: 2022-12-15

## 2022-12-12 NOTE — TELEPHONE ENCOUNTER
Refill Decision Note   Glenn Medel  is requesting a refill authorization.  Brief Assessment and Rationale for Refill:  Approve     Medication Therapy Plan:       Medication Reconciliation Completed: No   Comments:     No Care Gaps recommended.     Note composed:12:38 PM 12/12/2022

## 2022-12-12 NOTE — PROGRESS NOTES
Patient ID: Glenn Medel is a 67 y.o. male.    Chief Complaint: Annual Exam      HPI     Type 2 diabetes- controlled   Hypertension- blood pressure has been controlled but somewhat low.  He would like to consider decreasing or discontinuing blood pressure medicine.  Hyperlipidemia- due for lipids.   LOVE-wearing CPAP.  ILD-stable, no dyspnea or cough  Parkinsonism- Dr. Cee  Insomnia/ falling out of bed. Now back on previous dose of clonazepam and this is better. Dr. Hinton put him on ramelteon. js is helping some.   Anxiety/depression-he is taking fluoxetine 40 mg daily.  This works well most of the time.  He has ups and Downs.  History of Gout- uric acid at goal, tolerating allopurinol  Seeing podiatry for left foot pain- Achilles tendinitis and left heel bursitis  Neuropathy stable    Labs-   Diet- semi healthy  Exercise- walking 2.5-3 miles 2x week  Alcohol- none  Tobacco- none   HM- colonoscopy scheduled      Assessment and plan     Will decrease metoprolol to of 12.5 mg for week (take half a tablet of 25 mg), then discontinue.  Monitor blood pressure.  Send reminder of blood pressures.   Monitor ILD  Continue irbesartan for renal protection (diabetes)  Continue Lasix  Continue Ozempic  Continue fluoxetine  Continue allopurinol   Continue atorvastatin     Diagnosis and orders    was seen today for annual exam.    Diagnoses and all orders for this visit:    Annual physical exam    Polyneuropathy associated with underlying disease    ILD (interstitial lung disease)  -     (In Office Administered) Pneumococcal Polysaccharide Vaccine (23 Valent) (SQ/IM)    Need for pneumococcal vaccination  -     (In Office Administered) Pneumococcal Polysaccharide Vaccine (23 Valent) (SQ/IM)    Elevated PSA  -     PROSTATE SPECIFIC ANTIGEN, DIAGNOSTIC; Future    Primary hypertension    Mixed hyperlipidemia    LOVE (obstructive sleep apnea)        Review of Systems   Constitutional:  Negative for fever.   Respiratory:   Negative for shortness of breath.    Cardiovascular:  Negative for chest pain.   Gastrointestinal:  Negative for abdominal pain.     Physical Exam  Cardiovascular:      Rate and Rhythm: Normal rate and regular rhythm.      Heart sounds: No murmur heard.    No gallop.   Pulmonary:      Breath sounds: Normal breath sounds. No wheezing or rhonchi.   Abdominal:      Palpations: Abdomen is soft.      Tenderness: There is no abdominal tenderness.   Musculoskeletal:         General: Normal range of motion.      Cervical back: Neck supple.   Skin:     General: Skin is warm.      Findings: No rash.   Neurological:      Mental Status: He is alert.   Psychiatric:         Behavior: Behavior normal.       Vitals:    12/12/22 1359   BP: 110/70   Pulse: 63     Wt Readings from Last 3 Encounters:   12/12/22 1359 87 kg (191 lb 12.8 oz)   11/04/22 0928 86.1 kg (189 lb 14.8 oz)   10/25/22 1512 86.6 kg (191 lb)      Body mass index is 26.75 kg/m².   Diabetes Medications               metFORMIN (GLUCOPHAGE-XR) 500 MG ER 24hr tablet Take 1 tablet (500 mg total) by mouth once daily.    OZEMPIC 0.25 mg or 0.5 mg(2 mg/1.5 mL) pen injector INJECT 0.5 MG INTO THE SKIN EVERY 7 DAYS           Hypertension Medications               furosemide (LASIX) 20 MG tablet Take 1 tablet (20 mg total) by mouth every morning.    irbesartan (AVAPRO) 75 MG tablet Take 1 tablet (75 mg total) by mouth once daily.    metoprolol succinate (TOPROL-XL) 25 MG 24 hr tablet Take 1 tablet (25 mg total) by mouth once daily.    prazosin (MINIPRESS) 1 MG Cap Take 1 capsule (1 mg total) by mouth every evening.           Hyperlipidemia Medications               atorvastatin (LIPITOR) 20 MG tablet TAKE 1 TABLET(20 MG) BY MOUTH EVERY DAY           Medication List with Changes/Refills   Current Medications    ALLOPURINOL (ZYLOPRIM) 300 MG TABLET    TAKE 1 TABLET(300 MG) BY MOUTH EVERY DAY    ASPIRIN 81 MG CHEW    Take 81 mg by mouth once daily.    ATORVASTATIN (LIPITOR) 20 MG  TABLET    TAKE 1 TABLET(20 MG) BY MOUTH EVERY DAY    BRIMONIDINE 0.2% (ALPHAGAN) 0.2 % DROP    Place 1 drop into both eyes 2 (two) times daily.    CARBIDOPA-LEVODOPA  MG (SINEMET)  MG PER TABLET    Take 1.5 tablets by mouth 4 (four) times daily.    CHOLECALCIFEROL, VITAMIN D3, (D3-2000 ORAL)    Take by mouth.    CLONAZEPAM (KLONOPIN) 1 MG TABLET    Take 1.5 tablets (1.5 mg total) by mouth every evening.    COLCHICINE (MITIGARE) 0.6 MG CAP    Take 1 capsule (0.6 mg total) by mouth once daily.    DORZOLAMIDE (TRUSOPT) 2 % OPHTHALMIC SOLUTION    Place 1 drop into both eyes 2 (two) times a day.    FLUOXETINE 40 MG CAPSULE    Take 1 capsule (40 mg total) by mouth once daily.    FUROSEMIDE (LASIX) 20 MG TABLET    Take 1 tablet (20 mg total) by mouth every morning.    IRBESARTAN (AVAPRO) 75 MG TABLET    Take 1 tablet (75 mg total) by mouth once daily.    METFORMIN (GLUCOPHAGE-XR) 500 MG ER 24HR TABLET    Take 1 tablet (500 mg total) by mouth once daily.    METOPROLOL SUCCINATE (TOPROL-XL) 25 MG 24 HR TABLET    Take 1 tablet (25 mg total) by mouth once daily.    OZEMPIC 0.25 MG OR 0.5 MG(2 MG/1.5 ML) PEN INJECTOR    INJECT 0.5 MG INTO THE SKIN EVERY 7 DAYS    PRAZOSIN (MINIPRESS) 1 MG CAP    Take 1 capsule (1 mg total) by mouth every evening.    RAMELTEON (ROZEREM) 8 MG TABLET    Take 8 mg by mouth every evening.    RASAGILINE (AZILECT) 1 MG TAB    TAKE 1 TABLET(1 MG) BY MOUTH EVERY EVENING   Discontinued Medications    CLOBETASOL (TEMOVATE) 0.05 % CREAM    Apply topically 2 (two) times daily.    CLONAZEPAM (KLONOPIN) 1 MG TABLET    Take 1.5 tablets (1.5 mg total) by mouth every evening.    CYCLOBENZAPRINE (FLEXERIL) 10 MG TABLET    SMARTSI Tablet(s) By Mouth Every 12 Hours PRN    DICLOFENAC (VOLTAREN) 50 MG EC TABLET    Take 50 mg by mouth every 8 (eight) hours.    HYDROCODONE-ACETAMINOPHEN (NORCO) 5-325 MG PER TABLET    hydrocodone 5 mg-acetaminophen 325 mg tablet   TAKE 1 TABLET BY MOUTH EVERY 12 HOURS AS  NEEDED FOR PAIN    MELATONIN 5 MG SUBL    Take 10 mg by mouth nightly.     METHYLPREDNISOLONE (MEDROL DOSEPACK) 4 MG TABLET    Take 1 tablet (4 mg total) by mouth once daily. Use as instructed on dose pack    PREDNISONE (DELTASONE) 20 MG TABLET    Take 20 mg by mouth once daily.       I personally reviewed past medical, family and social history.

## 2022-12-13 ENCOUNTER — PATIENT MESSAGE (OUTPATIENT)
Dept: OTHER | Facility: OTHER | Age: 67
End: 2022-12-13
Payer: MEDICARE

## 2022-12-13 ENCOUNTER — OFFICE VISIT (OUTPATIENT)
Dept: PODIATRY | Facility: CLINIC | Age: 67
End: 2022-12-13
Payer: MEDICARE

## 2022-12-13 VITALS — HEIGHT: 71 IN | WEIGHT: 191.81 LBS | BODY MASS INDEX: 26.85 KG/M2

## 2022-12-13 DIAGNOSIS — M24.572 EQUINUS CONTRACTURE OF LEFT ANKLE: ICD-10-CM

## 2022-12-13 DIAGNOSIS — M76.62 ACHILLES TENDINITIS OF LEFT LOWER EXTREMITY: Primary | ICD-10-CM

## 2022-12-13 LAB
ALBUMIN SERPL BCP-MCNC: 3.8 G/DL (ref 3.5–5.2)
ALBUMIN SERPL BCP-MCNC: 3.8 G/DL (ref 3.5–5.2)
ALP SERPL-CCNC: 148 U/L (ref 55–135)
ALT SERPL W/O P-5'-P-CCNC: 14 U/L (ref 10–44)
ANION GAP SERPL CALC-SCNC: 9 MMOL/L (ref 8–16)
AST SERPL-CCNC: 33 U/L (ref 10–40)
BILIRUB DIRECT SERPL-MCNC: 0.2 MG/DL (ref 0.1–0.3)
BILIRUB SERPL-MCNC: 0.7 MG/DL (ref 0.1–1)
BUN SERPL-MCNC: 19 MG/DL (ref 8–23)
CALCIUM SERPL-MCNC: 10 MG/DL (ref 8.7–10.5)
CHLORIDE SERPL-SCNC: 102 MMOL/L (ref 95–110)
CHOLEST SERPL-MCNC: 132 MG/DL (ref 120–199)
CHOLEST/HDLC SERPL: 4 {RATIO} (ref 2–5)
CO2 SERPL-SCNC: 30 MMOL/L (ref 23–29)
COMPLEXED PSA SERPL-MCNC: 5.1 NG/ML (ref 0–4)
CREAT SERPL-MCNC: 1.1 MG/DL (ref 0.5–1.4)
EST. GFR  (NO RACE VARIABLE): >60 ML/MIN/1.73 M^2
GLUCOSE SERPL-MCNC: 111 MG/DL (ref 70–110)
HDLC SERPL-MCNC: 33 MG/DL (ref 40–75)
HDLC SERPL: 25 % (ref 20–50)
LDLC SERPL CALC-MCNC: 41.8 MG/DL (ref 63–159)
NONHDLC SERPL-MCNC: 99 MG/DL
PHOSPHATE SERPL-MCNC: 4 MG/DL (ref 2.7–4.5)
POTASSIUM SERPL-SCNC: 4.9 MMOL/L (ref 3.5–5.1)
PROT SERPL-MCNC: 7.7 G/DL (ref 6–8.4)
PTH-INTACT SERPL-MCNC: 80.9 PG/ML (ref 9–77)
SODIUM SERPL-SCNC: 141 MMOL/L (ref 136–145)
TRIGL SERPL-MCNC: 286 MG/DL (ref 30–150)

## 2022-12-13 PROCEDURE — 99212 OFFICE O/P EST SF 10 MIN: CPT | Mod: S$PBB,,, | Performed by: PODIATRIST

## 2022-12-13 PROCEDURE — 99999 PR PBB SHADOW E&M-EST. PATIENT-LVL III: CPT | Mod: PBBFAC,,, | Performed by: PODIATRIST

## 2022-12-13 PROCEDURE — 99212 PR OFFICE/OUTPT VISIT, EST, LEVL II, 10-19 MIN: ICD-10-PCS | Mod: S$PBB,,, | Performed by: PODIATRIST

## 2022-12-13 PROCEDURE — 99213 OFFICE O/P EST LOW 20 MIN: CPT | Mod: PBBFAC | Performed by: PODIATRIST

## 2022-12-13 PROCEDURE — 99999 PR PBB SHADOW E&M-EST. PATIENT-LVL III: ICD-10-PCS | Mod: PBBFAC,,, | Performed by: PODIATRIST

## 2022-12-15 ENCOUNTER — TELEPHONE (OUTPATIENT)
Dept: NEUROLOGY | Facility: CLINIC | Age: 67
End: 2022-12-15
Payer: MEDICARE

## 2022-12-16 ENCOUNTER — PATIENT MESSAGE (OUTPATIENT)
Dept: NEUROLOGY | Facility: CLINIC | Age: 67
End: 2022-12-16
Payer: MEDICARE

## 2022-12-16 RX ORDER — RASAGILINE 1 MG/1
1 TABLET ORAL
Qty: 30 TABLET | Refills: 12 | OUTPATIENT
Start: 2022-12-16

## 2022-12-16 NOTE — TELEPHONE ENCOUNTER
----- Message from Chayo Boone PharmD sent at 12/16/2022 11:32 AM CST -----  Patient requesting renewal of rasagaline to be sent to [facility]. He notes he will run out of medication tomorrow, 12/17/22.

## 2022-12-16 NOTE — TELEPHONE ENCOUNTER
----- Message from Cee Mendoza sent at 12/15/2022  2:44 PM CST -----  Regarding: Refill  Contact: [phone]  Pt is needing a refill on rasagiline (AZILECT) 1 mg Tab. He states that she has to approve the medication before it can be refill. Please call to discuss further @ [phone]    [id number] - [place] - [address]  AT [facility] [address] [[id number]]

## 2022-12-20 NOTE — H&P
History & Physical - Short Stay  Gastroenterology      SUBJECTIVE:     Procedure: Colonoscopy    Chief Complaint/Indication for Procedure: Screening.  FHx of colon cancer.    History of Present Illness:  Asymptomatic    See recent communiques:  Glenn Medel   9/2/2022  to AILEEN Steve Staff (supporting Power Benton DO)      10:55 AM  I received a letter about scheduling an appointment at [facility] in September 2022 or shortly thereafter.     I need for you to send a referral to whomever so that i can get this scheduled.     Thanks!      See recent PCP's OV note:  Office Visit   12/12/2022  [facility]  Power Benton DO  Internal Medicine Annual physical exam +7 more  Dx Annual Exam  Reason for Visit     Progress Notes    Power Benton DO at 12/12/2022  1:40 PM    Status: Signed    Patient ID: Glenn Medel is a 67 y.o. male.     Chief Complaint: Annual Exam        HPI      Type 2 diabetes- controlled   Hypertension- blood pressure has been controlled but somewhat low.  He would like to consider decreasing or discontinuing blood pressure medicine.  Hyperlipidemia- due for lipids.   LOVE-wearing CPAP.  ILD-stable, no dyspnea or cough  Parkinsonism- Dr. Cee  Insomnia/ falling out of bed. Now back on previous dose of clonazepam and this is better. Dr. Hinton put him on ramelteon. Mick is helping some.   Anxiety/depression-he is taking fluoxetine 40 mg daily.  This works well most of the time.  He has ups and Downs.  History of Gout- uric acid at goal, tolerating allopurinol  Seeing podiatry for left foot pain- Achilles tendinitis and left heel bursitis  Neuropathy stable     Labs-   Diet- semi healthy  Exercise- walking 2.5-3 miles 2x week  Alcohol- none  Tobacco- none   HM- colonoscopy scheduled       Assessment and plan      Will decrease metoprolol to of 12.5 mg for week (take half a tablet of 25 mg), then discontinue.  Monitor blood  pressure.  Send reminder of blood pressures.   Monitor ILD  Continue irbesartan for renal protection (diabetes)  Continue Lasix  Continue Ozempic  Continue fluoxetine  Continue allopurinol   Continue atorvastatin      Diagnosis and orders    was seen today for annual exam.     Diagnoses and all orders for this visit:     Annual physical exam     Polyneuropathy associated with underlying disease     ILD (interstitial lung disease)  -     (In Office Administered) Pneumococcal Polysaccharide Vaccine (23 Valent) (SQ/IM)     Need for pneumococcal vaccination  -     (In Office Administered) Pneumococcal Polysaccharide Vaccine (23 Valent) (SQ/IM)     Elevated PSA  -     PROSTATE SPECIFIC ANTIGEN, DIAGNOSTIC; Future     Primary hypertension     Mixed hyperlipidemia     LOVE (obstructive sleep apnea)                  See last Colonoscopy 9/18/2017  Indications:         Rectal bleeding, Family history of colon cancer in                        multiple first-degree relatives (both parenta).                        Personal history of colonic polyps. Last                        colonoscopy: April 2010 ([facility]).   Comorbidities        Hypertension, Obesity, Type 2 diabetes mellitus, Hyperlipidemia,        Stage 3 chronic kidney disease, Sleep apnea   Impression:          - Bleeding internal hemorrhoids. Injected.                        - One 2 mm polyp in the rectum, removed with a cold                        snare. Resected and retrieved.                        - One 2 mm polyp in the proximal sigmoid colon,                        removed with a cold snare. Resected and retrieved.                        - Redundant colon.                        - The anus is normal.                        - The examination was otherwise normal.                        - The examined portion of the ileum was normal.   Recommendation:      - Discharge patient to home.                        - Await pathology results.                        -  Repeat colonoscopy in 5 years for surveillance.                        - High fiber diet.                        - Take a PROBIOTIC, such as a carton of GREEK YOGURT                        (Chobani or Oikos, or Activia or Dannon); or tablets                        of ALIGN or CULTURELLE or BAILEE-Q (all                        non-prescription), every day for a month.                        - Continue present medications.                        - Anusol (pramoxine) HC suppository: Insert rectally                        daily for 1 week.                        - If further symptoms, refer to CRS.                        - Call the G.I. clinic in 2 weeks for reports (if                        you haven't heard from us sooner) [phone].                        - Return to normal activities tomorrow.   Paul Feliz MD   9/18/2017      SPECIMEN   1) Rectal polyp. 2) Sigmoid polyp.   FINAL PATHOLOGIC DIAGNOSIS   #1-2. POLYPOID COLONIC MUCOSA WITH HYPERPLASTIC CHANGE.          Facility-Administered Medications Prior to Admission   Medication    metronidazole IVPB 500 mg     PTA Medications   Medication Sig    allopurinoL (ZYLOPRIM) 300 MG tablet TAKE 1 TABLET(300 MG) BY MOUTH EVERY DAY    aspirin 81 MG Chew Take 81 mg by mouth once daily.    atorvastatin (LIPITOR) 20 MG tablet TAKE 1 TABLET(20 MG) BY MOUTH EVERY DAY    brimonidine 0.2% (ALPHAGAN) 0.2 % Drop Place 1 drop into both eyes 2 (two) times daily.    carbidopa-levodopa  mg (SINEMET)  mg per tablet TAKE 1 AND 1/2 TABLETS BY MOUTH FOUR TIMES DAILY    cholecalciferol, vitamin D3, (D3-2000 ORAL) Take by mouth.    clonazePAM (KLONOPIN) 1 MG tablet Take 1.5 tablets (1.5 mg total) by mouth every evening.    dorzolamide (TRUSOPT) 2 % ophthalmic solution Place 1 drop into both eyes 2 (two) times a day.    FLUoxetine 40 MG capsule Take 1 capsule (40 mg total) by mouth once daily.    furosemide (LASIX) 20 MG tablet Take 1 tablet (20 mg total) by mouth every  morning.    irbesartan (AVAPRO) 75 MG tablet Take 1 tablet (75 mg total) by mouth once daily.    metFORMIN (GLUCOPHAGE-XR) 500 MG ER 24hr tablet Take 1 tablet (500 mg total) by mouth once daily.    metoprolol succinate (TOPROL-XL) 25 MG 24 hr tablet Take 1 tablet (25 mg total) by mouth once daily.    prazosin (MINIPRESS) 1 MG Cap Take 1 capsule (1 mg total) by mouth every evening.    ramelteon (ROZEREM) 8 mg tablet Take 8 mg by mouth every evening.    rasagiline (AZILECT) 1 mg Tab TAKE 1 TABLET(1 MG) BY MOUTH EVERY EVENING    tirzepatide (MOUNJARO) 2.5 mg/0.5 mL PnIj Inject 2.5 mg into the skin every 7 days.    colchicine (MITIGARE) 0.6 mg Cap Take 1 capsule (0.6 mg total) by mouth once daily.       Review of patient's allergies indicates:   Allergen Reactions    Avelox [moxifloxacin] Hives and Rash        Past Medical History:   Diagnosis Date    BPH (benign prostatic hyperplasia)     Cataract     done ou    Chronic kidney disease, stage 3 2/25/2015    DDD (degenerative disc disease), lumbar     s/p epidural steroids     Glaucoma     OU    HTN (hypertension)     Iritis, lens-induced 1/8/2018    Obesity      Past Surgical History:   Procedure Laterality Date    CATARACT EXTRACTION W/  INTRAOCULAR LENS IMPLANT Left 11/30/2017    CATARACT EXTRACTION W/  INTRAOCULAR LENS IMPLANT Right 12/11/2018    Dr Garcia    CATARACT EXTRACTION W/  INTRAOCULAR LENS IMPLANT Right 12/11/2018    Procedure: EXTRACTION, CATARACT, WITH IOL INSERTION;  Surgeon: Damaris Garcia MD;  Location: [facility] OR;  Service: Ophthalmology;  Laterality: Right;    COLONOSCOPY N/A 9/18/2017    Procedure: COLONOSCOPY;  Surgeon: Paul Feliz Jr., MD;  Location: [facility] ENDO;  Service: Endoscopy;  Laterality: N/A;    COLONOSCOPY W/ POLYPECTOMY  04/13/2010    [facility].   One 1 cm polyp in the sigmoid colon.  Internal hemorrhoids.    COSMETIC SURGERY      DIGITAL RECTAL EXAMINATION UNDER ANESTHESIA N/A 7/23/2019    Procedure: EXAM UNDER ANESTHESIA, DIGITAL,  RECTUM;  Surgeon: Kaz Keating MD;  Location: [facility] OR;  Service: General;  Laterality: N/A;    EXAMINATION UNDER ANESTHESIA N/A 4/24/2019    Procedure: EXAM UNDER ANESTHESIA;  Surgeon: Kaz Keating MD;  Location: [facility] OR;  Service: General;  Laterality: N/A;    EXCISIONAL HEMORRHOIDECTOMY N/A 4/24/2019    Procedure: HEMORRHOIDECTOMY (lithotomy);  Surgeon: Kaz Keating MD;  Location: [facility] OR;  Service: General;  Laterality: N/A;    GANGLION CYST EXCISION Left     INJECTION OF ANESTHETIC AGENT AROUND PUDENDAL NERVE N/A 4/24/2019    Procedure: BLOCK, NERVE, PUDENDAL;  Surgeon: Kaz Keating MD;  Location: [facility] OR;  Service: General;  Laterality: N/A;    INJECTION OF ANESTHETIC AGENT AROUND PUDENDAL NERVE N/A 7/23/2019    Procedure: BLOCK, NERVE, PUDENDAL;  Surgeon: Kaz Keating MD;  Location: [facility] OR;  Service: General;  Laterality: N/A;    REPAIR OF RETINAL DETACHMENT WITH VITRECTOMY Right 8/1/2018    Procedure: REPAIR, RETINAL DETACHMENT, WITH VITRECTOMY;  Surgeon: SAJAN Pulido MD;  Location: [address];  Service: Ophthalmology;  Laterality: Right;  35 min    TONSILLECTOMY      TRIGGER FINGER RELEASE Right     X 2    uro lift      for enlarged prostate     Family History   Problem Relation Age of Onset    Heart disease Brother 62    Kidney disease Brother     Heart disease Father     Diabetes Father     Colon cancer Father     Cancer Father         colon    Cataracts Father     Hypertension Mother     Diabetes Mother     Stroke Mother     Cancer Mother         colon    Kidney disease Mother     Glaucoma Maternal Grandmother     No Known Problems Sister     No Known Problems Maternal Aunt     No Known Problems Maternal Uncle     No Known Problems Paternal Aunt     No Known Problems Paternal Uncle     No Known Problems Maternal Grandfather     No Known Problems Paternal Grandmother     No Known Problems Paternal Grandfather     Dementia Brother     Abnormal EKG Brother      "Amblyopia Neg Hx     Blindness Neg Hx     Macular degeneration Neg Hx     Retinal detachment Neg Hx     Strabismus Neg Hx     Thyroid disease Neg Hx     Parkinsonism Neg Hx      Social History     Tobacco Use    Smoking status: Never    Smokeless tobacco: Never   Substance Use Topics    Alcohol use: Yes     Comment: occasional, no alcohol 72 hours prior to sx    Drug use: No         OBJECTIVE:     Vital Signs (Most Recent)  Temp: 98.2 °F (36.8 °C) (12/21/22 0911)  Pulse: 66 (12/21/22 0911)  Resp: 20 (12/21/22 0911)  BP: 136/78 (12/21/22 0911)  SpO2: 98 % (12/21/22 0911)    Physical Exam:  : Ht: 5' 11" (180.3 cm)   Wt: 88.5 kg (195 lb)   BMI: 27.20 kg/m²  .                                                       GENERAL:  Comfortable, in no acute distress.                                 HEENT EXAM:  Nonicteric.  No adenopathy.  Oropharynx is clear.               NECK:  Supple.                                                               LUNGS:  Clear.                                                               CARDIAC:  Regular rate and rhythm.  S1, S2.  No murmur.                      ABDOMEN:  Soft, positive bowel sounds, nontender.  No hepatosplenomegaly or masses.  No rebound or guarding.                                             EXTREMITIES:  No edema.     MENTAL STATUS:  Alert and oriented.    ASSESSMENT/PLAN:     Assessment: Colorectal cancer screening.  FHx of colon cancer.    Plan: Colonoscopy    Anesthesia Plan:   MAC / General Anaesthesia    ASA Grade: ASA 2 - Patient with mild systemic disease with no functional limitations    MALLAMPATI SCORE: I (soft palate, uvula, fauces, and tonsillar pillars visible)    "

## 2022-12-21 ENCOUNTER — ANESTHESIA EVENT (OUTPATIENT)
Dept: ENDOSCOPY | Facility: HOSPITAL | Age: 67
End: 2022-12-21
Payer: MEDICARE

## 2022-12-21 ENCOUNTER — ANESTHESIA (OUTPATIENT)
Dept: ENDOSCOPY | Facility: HOSPITAL | Age: 67
End: 2022-12-21
Payer: MEDICARE

## 2022-12-21 ENCOUNTER — HOSPITAL ENCOUNTER (OUTPATIENT)
Facility: HOSPITAL | Age: 67
Discharge: HOME | End: 2022-12-21
Attending: INTERNAL MEDICINE | Admitting: INTERNAL MEDICINE
Payer: MEDICARE

## 2022-12-21 DIAGNOSIS — Z80.0 FAMILY HISTORY OF COLON CANCER: ICD-10-CM

## 2022-12-21 LAB — GLUCOSE SERPL-MCNC: 96 MG/DL (ref 70–110)

## 2022-12-21 PROCEDURE — D9220A PRA ANESTHESIA: Mod: PT,ANES,, | Performed by: ANESTHESIOLOGY

## 2022-12-21 PROCEDURE — 82962 GLUCOSE BLOOD TEST: CPT | Mod: PO | Performed by: INTERNAL MEDICINE

## 2022-12-21 PROCEDURE — 88305 TISSUE EXAM BY PATHOLOGIST: CPT | Performed by: PATHOLOGY

## 2022-12-21 PROCEDURE — 25000003 PHARM REV CODE 250: Mod: PO | Performed by: NURSE ANESTHETIST, CERTIFIED REGISTERED

## 2022-12-21 PROCEDURE — 37000009 HC ANESTHESIA EA ADD 15 MINS: Mod: PO | Performed by: INTERNAL MEDICINE

## 2022-12-21 PROCEDURE — 45385 PR COLONOSCOPY,REMV LESN,SNARE: ICD-10-PCS | Mod: PT,,, | Performed by: INTERNAL MEDICINE

## 2022-12-21 PROCEDURE — 27201089 HC SNARE, DISP (ANY): Mod: PO | Performed by: INTERNAL MEDICINE

## 2022-12-21 PROCEDURE — D9220A PRA ANESTHESIA: ICD-10-PCS | Mod: PT,ANES,, | Performed by: ANESTHESIOLOGY

## 2022-12-21 PROCEDURE — D9220A PRA ANESTHESIA: Mod: PT,CRNA,, | Performed by: NURSE ANESTHETIST, CERTIFIED REGISTERED

## 2022-12-21 PROCEDURE — 45385 COLONOSCOPY W/LESION REMOVAL: CPT | Mod: PT,PO | Performed by: INTERNAL MEDICINE

## 2022-12-21 PROCEDURE — 63600175 PHARM REV CODE 636 W HCPCS: Mod: PO | Performed by: INTERNAL MEDICINE

## 2022-12-21 PROCEDURE — D9220A PRA ANESTHESIA: ICD-10-PCS | Mod: PT,CRNA,, | Performed by: NURSE ANESTHETIST, CERTIFIED REGISTERED

## 2022-12-21 PROCEDURE — 88305 TISSUE EXAM BY PATHOLOGIST: ICD-10-PCS | Mod: 26,,, | Performed by: PATHOLOGY

## 2022-12-21 PROCEDURE — 88305 TISSUE EXAM BY PATHOLOGIST: CPT | Mod: 26,,, | Performed by: PATHOLOGY

## 2022-12-21 PROCEDURE — 37000008 HC ANESTHESIA 1ST 15 MINUTES: Mod: PO | Performed by: INTERNAL MEDICINE

## 2022-12-21 PROCEDURE — 63600175 PHARM REV CODE 636 W HCPCS: Mod: PO | Performed by: NURSE ANESTHETIST, CERTIFIED REGISTERED

## 2022-12-21 PROCEDURE — 45385 COLONOSCOPY W/LESION REMOVAL: CPT | Mod: PT,,, | Performed by: INTERNAL MEDICINE

## 2022-12-21 RX ORDER — PROPOFOL 10 MG/ML
VIAL (ML) INTRAVENOUS
Status: DISCONTINUED | OUTPATIENT
Start: 2022-12-21 | End: 2022-12-21

## 2022-12-21 RX ORDER — LIDOCAINE HCL/PF 100 MG/5ML
SYRINGE (ML) INTRAVENOUS
Status: DISCONTINUED | OUTPATIENT
Start: 2022-12-21 | End: 2022-12-21

## 2022-12-21 RX ORDER — PROPOFOL 10 MG/ML
VIAL (ML) INTRAVENOUS CONTINUOUS PRN
Status: DISCONTINUED | OUTPATIENT
Start: 2022-12-21 | End: 2022-12-21

## 2022-12-21 RX ORDER — SODIUM CHLORIDE 0.9 % (FLUSH) 0.9 %
10 SYRINGE (ML) INJECTION
Status: DISCONTINUED | OUTPATIENT
Start: 2022-12-21 | End: 2022-12-21 | Stop reason: HOSPADM

## 2022-12-21 RX ORDER — SODIUM CHLORIDE, SODIUM LACTATE, POTASSIUM CHLORIDE, CALCIUM CHLORIDE 600; 310; 30; 20 MG/100ML; MG/100ML; MG/100ML; MG/100ML
INJECTION, SOLUTION INTRAVENOUS CONTINUOUS
Status: DISCONTINUED | OUTPATIENT
Start: 2022-12-21 | End: 2022-12-21 | Stop reason: HOSPADM

## 2022-12-21 RX ADMIN — SODIUM CHLORIDE, SODIUM LACTATE, POTASSIUM CHLORIDE, AND CALCIUM CHLORIDE: .6; .31; .03; .02 INJECTION, SOLUTION INTRAVENOUS at 09:12

## 2022-12-21 RX ADMIN — PROPOFOL 100 MG: 10 INJECTION, EMULSION INTRAVENOUS at 09:12

## 2022-12-21 RX ADMIN — PROPOFOL 150 MCG/KG/MIN: 10 INJECTION, EMULSION INTRAVENOUS at 09:12

## 2022-12-21 RX ADMIN — LIDOCAINE HYDROCHLORIDE 100 MG: 20 INJECTION, SOLUTION INTRAVENOUS at 09:12

## 2022-12-21 NOTE — [employer] PATIENT INSTRUCTIONS
Discharge Summary/Instructions for after Colonoscopy with   Biopsy/Polypectomy  Glenn Medel    Wednesday, December 21, 2022  Paul Feliz MD  RESTRICTIONS ON ACTIVITY:  - Do not drive a car or operate machinery until the day after the procedure.      - The following day: return to full activity including work.  - For  3 days: No heavy lifting, straining or running.  - Diet: You can have solid foods, but no gassy foods (i.e. beans, broccoli,   cabbage, etc).  TREATMENT FOR COMMON SIDE EFFECTS:  - Mild abdominal pain and bloating or excessive gas: rest, eat lightly and   use a heating pad.  SYMPTOMS TO WATCH FOR AND REPORT TO YOUR PHYSICIAN:  1. Severe abdominal pain.  2. Fever within 24 hours after a procedure.  3. A large amount of rectal bleeding. (A small amount of blood from the   rectum is not serious, especially if hemorrhoids are present.  3.  Because air was put into your colon during the procedure, expelling   large amounts of air from your rectum is normal.  4.  You may not have a bowel movement for 1-3 days because of the   colonoscopy prep.  This is normal.  5.  Call immediately if you notice any of the following:   Chills and/or fever over 101   Persistent vomiting   Severe abdominal pain, other than gas cramps   Severe chest pain   Black, tarry stools   Any bleeding - exceeding one tablespoon  Your doctor recommends these additional instructions:  You are being discharged to home.   We are waiting for your pathology results.   Your physician has recommended a repeat colonoscopy in 5 years for   surveillance.   Eat a high fiber diet and eat a low fat diet.   Take a fiber supplement, for example Citrucel, Fibercon, Konsyl or   Metamucil.   Take Miralax 1 capful (17 grams) in 8 ounces of water by mouth once a day as   needed.   Call the G.I. clinic in 2 weeks for reports (if you haven't heard from us   sooner)  [phone].  None  If you have any questions or problems, please call your  physician.  EMERGENCY PHONE NUMBER: [phone]  LAB RESULTS: Call in two (2) weeks for lab results, [phone]  ___________________________________________  Nurse Signature  ___________________________________________  Patient/Designated Responsible Party Signature  Paul Feliz MD  12/21/2022 10:22:54 AM  This report has been verified and signed electronically.  Dear patient,  As a result of recent federal legislation (The Federal Cures Act), you may   receive lab or pathology results from your procedure in your [facility]   account before your physician is able to contact you. Your physician or   their representative will relay the results to you with their   recommendations at their soonest availability.  Thank you.  [employer]ATION

## 2022-12-21 NOTE — TRANSFER OF CARE
"Anesthesia Transfer of Care Note    Patient: Glenn Medel    Procedure(s) Performed: Procedure(s) (LRB):  COLONOSCOPY (N/A)    Patient location: PACU    Anesthesia Type: general    Transport from OR: Transported from OR on 2-3 L/min O2 by NC with adequate spontaneous ventilation    Post pain: adequate analgesia    Post assessment: no apparent anesthetic complications and tolerated procedure well    Post vital signs: stable    Level of consciousness: sedated    Nausea/Vomiting: no nausea/vomiting    Complications: none    Transfer of care protocol was followed      Last vitals:   Visit Vitals  /78 (BP Location: Right arm, Patient Position: Lying)   Pulse 66   Temp 36.8 °C (98.2 °F) (Oral)   Resp 20   Ht 5' 11" (1.803 m)   Wt 88.5 kg (195 lb)   SpO2 98%   BMI 27.20 kg/m²     "

## 2022-12-21 NOTE — ANESTHESIA PREPROCEDURE EVALUATION
12/21/2022  Glenn Medel is a 67 y.o., male.      Pre-op Assessment    I have reviewed the Patient Summary Reports.     I have reviewed the Nursing Notes. I have reviewed the NPO Status.   I have reviewed the Medications.     Review of Systems  Cardiovascular:   Hypertension    Pulmonary:   Sleep Apnea    Renal/:   Chronic Renal Disease    Musculoskeletal:   Arthritis     Neurological:   Neuromuscular Disease, Parkinsonism   Endocrine:   Diabetes, type 2    Psych:   Psychiatric History          Physical Exam  General: Well nourished, Cooperative, Alert and Oriented    Airway:  Mallampati: II / II  Mouth Opening: Normal  TM Distance: 4 - 6 cm  Tongue: Normal    Dental:  Intact    Chest/Lungs:  Clear to auscultation, Normal Respiratory Rate    Heart:  Rate: Normal  Rhythm: Regular Rhythm  Sounds: Normal        Anesthesia Plan  Type of Anesthesia, risks & benefits discussed:    Anesthesia Type: Gen Natural Airway  Intra-op Monitoring Plan: Standard ASA Monitors  Induction:  IV  Informed Consent: Informed consent signed with the Patient and all parties understand the risks and agree with anesthesia plan.  All questions answered.   ASA Score: 3    Ready For Surgery From Anesthesia Perspective.     .

## 2022-12-21 NOTE — PATIENT INSTRUCTIONS
Recovery After Procedural Sedation (Adult)   You have been given medicine by vein to make you sleep during your surgery. This may have included both a pain medicine and sleeping medicine. Most of the effects have worn off. But you may still have some drowsiness for the next 6 to 8 hours.  Home care  Follow these guidelines when you get home:  For the next 8 hours, you should be watched by a responsible adult. This person should make sure your condition is not getting worse.  Don't drink any alcohol for the next 24 hours.  Don't drive, operate dangerous machinery, or make important business or personal decisions during the next 24 hours.  To prevent injury or falls, use caution when standing and walking for at least 24 hours after your procedure.  Note: Your healthcare provider may tell you not to take any medicine by mouth for pain or sleep in the next 4 hours. These medicines may react with the medicines you were given in the hospital. This could cause a much stronger response than usual.  Follow-up care  Follow up with your healthcare provider if you are not alert and back to your usual level of activity within 12 hours.  When to seek medical advice  Call your healthcare provider right away if any of these occur:  Drowsiness gets worse  Weakness or dizziness gets worse  Repeated vomiting  You can't be awakened  Fever  New rash  [employer] last reviewed this educational content on 9/1/2019  © 8323-7302 [employer]. [address] [place] [address], [place] [id number]. All rights reserved. This information is not intended as a substitute for professional medical care. Always follow your healthcare professional's instructions       High-Fiber Diet  Fiber is in fruits, vegetables, cereals, and grains. Fiber passes through your body undigested. A high-fiber diet helps food move through your intestinal tract. The added bulk is helpful in preventing constipation. In people with diverticulosis, fiber helps clean out the  pouches along the colon wall. It also prevents new pouches from forming. A high-fiber diet reduces the risk of colon cancer. It also lowers blood cholesterol and prevents high blood sugar in people with diabetes.    The fiber-rich foods listed below should be part of your diet. If you are not used to high-fiber foods, start with 1 or 2 foods from this list. Every 3 to 4 days add a new one to your diet. Do this until you are eating 4 high-fiber foods per day. This should give you 20 to 35 grams of fiber a day. It is also important to drink a lot of water when you are on this diet. You should have 6 to 8 glasses of water a day. Water makes the fiber swell and increases the benefit.  Foods high in dietary fiber  The following foods are high in dietary fiber:  Breads. Breads made with 100% whole-wheat flour; mabel, wheat, or rye crackers; whole-grain tortillas, bran muffins.  Cereals. Whole-grain and bran cereals with bran (shredded wheat, wheat flakes, raisin bran, corn bran); oatmeal, rolled oats, granola, and brown rice.  Fruits. Fresh fruits and their edible skins (pears, prunes, raisins, berries, apples, and apricots); bananas, citrus fruit, mangoes, pineapple; and prune juice.  Nuts. Any nuts and seeds.  Vegetables. Best served raw or lightly cooked. All types, especially: green peas, celery, eggplant, potatoes, spinach, broccoli, Mckenna sprouts, winter squash, carrots, cauliflower, soybeans, lentils, and fresh and dried beans of all kinds.  Other. Popcorn, any spices.  Date Last Reviewed: 8/1/2016  © 6679-4776 [employer]. [address] [place] [address], [place] [address]. All rights reserved. This information is not intended as a substitute for professional medical care. Always follow your healthcare professional's instructions.

## 2022-12-21 NOTE — PLAN OF CARE
"Pt c/o chest pain after first BM last night.  Pt denies any arm numbness or SOB.  Pt states that his chest felt "heavy" and has persisted through this morning.      MD Erin made aware.   "

## 2022-12-22 VITALS
HEIGHT: 71 IN | RESPIRATION RATE: 13 BRPM | OXYGEN SATURATION: 98 % | SYSTOLIC BLOOD PRESSURE: 116 MMHG | HEART RATE: 66 BPM | BODY MASS INDEX: 27.3 KG/M2 | TEMPERATURE: 98 F | WEIGHT: 195 LBS | DIASTOLIC BLOOD PRESSURE: 70 MMHG

## 2023-01-02 LAB
FINAL PATHOLOGIC DIAGNOSIS: NORMAL
GROSS: NORMAL
Lab: NORMAL

## 2023-01-07 NOTE — PROGRESS NOTES
Subjective:     Patient ID: Glenn Medel is a 67 y.o. male.    Chief Complaint: Follow-up (Left foot boot f/u, pt c/o 0 /10 pain,diabetic pt wears tennis shoes,  PCP Dr. Benton last seen 12-12-22)     is a 67 y.o. male who presents to the clinic for follow up of left heel pain. Patient states the stretching exercises and boot have helped a lot. Patient denies any pain in the heel. Patient states he has no returned fully to his exercising. Patient has no other pedal complaints at this time.     Patient Active Problem List   Diagnosis    BPH (benign prostatic hyperplasia)    LOVE (obstructive sleep apnea)    HTN (hypertension)    DDD (degenerative disc disease), lumbar    Palpitations    Peripheral neuropathy    Hyperlipidemia    DM type 2 (diabetes mellitus, type 2)    Itch    Hypersomnia with sleep apnea, unspecified    ILD (interstitial lung disease)    Severe obstructive sleep apnea    Other vitreous opacities, bilateral    NS (nuclear sclerosis)    Senile nuclear sclerosis    Retained lens material, left    Glaucoma associated with ocular inflammation, left, indeterminate stage    Iritis, lens-induced    Vitreomacular adhesion, right    Epiretinal membrane, right    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, with long-term current use of insulin    Internal strangulated hemorrhoids    Arachnoid cyst    REM sleep behavior disorder    Abnormal gait    Cognitive change    Abnormal involuntary movements    Adjustment disorder with depressed mood    Parkinsonism    Hx of acute gouty arthritis    Polyneuropathy associated with underlying disease    Fall from bed    Memory change    CKD (chronic kidney disease) stage 2, GFR 60-89 ml/min    Type 2 diabetes mellitus with stage 2 chronic kidney disease, without long-term current use of insulin    Family history of colon cancer       Medication List with Changes/Refills   New Medications     TIRZEPATIDE (MOUNJARO) 2.5 MG/0.5 ML PNIJ    Inject 2.5 mg into the skin every 7 days.   Current Medications    ALLOPURINOL (ZYLOPRIM) 300 MG TABLET    TAKE 1 TABLET(300 MG) BY MOUTH EVERY DAY    ASPIRIN 81 MG CHEW    Take 81 mg by mouth once daily.    ATORVASTATIN (LIPITOR) 20 MG TABLET    TAKE 1 TABLET(20 MG) BY MOUTH EVERY DAY    BRIMONIDINE 0.2% (ALPHAGAN) 0.2 % DROP    Place 1 drop into both eyes 2 (two) times daily.    CHOLECALCIFEROL, VITAMIN D3, (D3-2000 ORAL)    Take by mouth.    CLONAZEPAM (KLONOPIN) 1 MG TABLET    Take 1.5 tablets (1.5 mg total) by mouth every evening.    COLCHICINE (MITIGARE) 0.6 MG CAP    Take 1 capsule (0.6 mg total) by mouth once daily.    DORZOLAMIDE (TRUSOPT) 2 % OPHTHALMIC SOLUTION    Place 1 drop into both eyes 2 (two) times a day.    FLUOXETINE 40 MG CAPSULE    Take 1 capsule (40 mg total) by mouth once daily.    FUROSEMIDE (LASIX) 20 MG TABLET    Take 1 tablet (20 mg total) by mouth every morning.    IRBESARTAN (AVAPRO) 75 MG TABLET    Take 1 tablet (75 mg total) by mouth once daily.    METFORMIN (GLUCOPHAGE-XR) 500 MG ER 24HR TABLET    Take 1 tablet (500 mg total) by mouth once daily.    METOPROLOL SUCCINATE (TOPROL-XL) 25 MG 24 HR TABLET    Take 1 tablet (25 mg total) by mouth once daily.    PRAZOSIN (MINIPRESS) 1 MG CAP    Take 1 capsule (1 mg total) by mouth every evening.    RAMELTEON (ROZEREM) 8 MG TABLET    Take 8 mg by mouth every evening.   Changed and/or Refilled Medications    Modified Medication Previous Medication    CARBIDOPA-LEVODOPA  MG (SINEMET)  MG PER TABLET carbidopa-levodopa  mg (SINEMET)  mg per tablet       TAKE 1 AND 1/2 TABLETS BY MOUTH FOUR TIMES DAILY    Take 1.5 tablets by mouth 4 (four) times daily.    RASAGILINE (AZILECT) 1 MG TAB rasagiline (AZILECT) 1 mg Tab       TAKE 1 TABLET(1 MG) BY MOUTH EVERY EVENING    TAKE 1 TABLET(1 MG) BY MOUTH EVERY EVENING   Discontinued Medications    OZEMPIC 0.25 MG OR 0.5 MG(2 MG/1.5 ML) PEN  INJECTOR    INJECT 0.5 MG INTO THE SKIN EVERY 7 DAYS       Review of patient's allergies indicates:   Allergen Reactions    Avelox [moxifloxacin] Hives and Rash       Past Surgical History:   Procedure Laterality Date    CATARACT EXTRACTION W/  INTRAOCULAR LENS IMPLANT Left 11/30/2017    CATARACT EXTRACTION W/  INTRAOCULAR LENS IMPLANT Right 12/11/2018    Dr Garcia    CATARACT EXTRACTION W/  INTRAOCULAR LENS IMPLANT Right 12/11/2018    Procedure: EXTRACTION, CATARACT, WITH IOL INSERTION;  Surgeon: Damaris Garcia MD;  Location: [facility] OR;  Service: Ophthalmology;  Laterality: Right;    COLONOSCOPY N/A 9/18/2017    Procedure: COLONOSCOPY;  Surgeon: Paul Feliz Jr., MD;  Location: [facility] ENDO;  Service: Endoscopy;  Laterality: N/A;    COLONOSCOPY N/A 12/21/2022    Procedure: COLONOSCOPY;  Surgeon: Paul Feliz Jr., MD;  Location: [facility] ENDO;  Service: Endoscopy;  Laterality: N/A;    COLONOSCOPY W/ POLYPECTOMY  04/13/2010    YARELI.   One 1 cm polyp in the sigmoid colon.  Internal hemorrhoids.    COSMETIC SURGERY      DIGITAL RECTAL EXAMINATION UNDER ANESTHESIA N/A 7/23/2019    Procedure: EXAM UNDER ANESTHESIA, DIGITAL, RECTUM;  Surgeon: Kaz Keating MD;  Location: [facility] OR;  Service: General;  Laterality: N/A;    EXAMINATION UNDER ANESTHESIA N/A 4/24/2019    Procedure: EXAM UNDER ANESTHESIA;  Surgeon: Kaz Keating MD;  Location: [facility] OR;  Service: General;  Laterality: N/A;    EXCISIONAL HEMORRHOIDECTOMY N/A 4/24/2019    Procedure: HEMORRHOIDECTOMY (lithotomy);  Surgeon: Kaz Keating MD;  Location: [facility] OR;  Service: General;  Laterality: N/A;    GANGLION CYST EXCISION Left     INJECTION OF ANESTHETIC AGENT AROUND PUDENDAL NERVE N/A 4/24/2019    Procedure: BLOCK, NERVE, PUDENDAL;  Surgeon: Kaz Keating MD;  Location: [facility] OR;  Service: General;  Laterality: N/A;    INJECTION OF ANESTHETIC AGENT AROUND PUDENDAL NERVE N/A 7/23/2019    Procedure: BLOCK, NERVE, PUDENDAL;  Surgeon: Kaz MOELLER  MD Zuri;  Location: [facility] OR;  Service: General;  Laterality: N/A;    REPAIR OF RETINAL DETACHMENT WITH VITRECTOMY Right 8/1/2018    Procedure: REPAIR, RETINAL DETACHMENT, WITH VITRECTOMY;  Surgeon: SAJAN Pulido MD;  Location: [facility] OR [address] [place] [address];  Service: Ophthalmology;  Laterality: Right;  35 min    TONSILLECTOMY      TRIGGER FINGER RELEASE Right     X 2    uro lift      for enlarged prostate       Family History   Problem Relation Age of Onset    Heart disease Brother 62    Kidney disease Brother     Heart disease Father     Diabetes Father     Colon cancer Father     Cancer Father         colon    Cataracts Father     Hypertension Mother     Diabetes Mother     Stroke Mother     Cancer Mother         colon    Kidney disease Mother     Glaucoma Maternal Grandmother     No Known Problems Sister     No Known Problems Maternal Aunt     No Known Problems Maternal Uncle     No Known Problems Paternal Aunt     No Known Problems Paternal Uncle     No Known Problems Maternal Grandfather     No Known Problems Paternal Grandmother     No Known Problems Paternal Grandfather     Dementia Brother     Abnormal EKG Brother     Amblyopia Neg Hx     Blindness Neg Hx     Macular degeneration Neg Hx     Retinal detachment Neg Hx     Strabismus Neg Hx     Thyroid disease Neg Hx     Parkinsonism Neg Hx        Social History     Socioeconomic History    Marital status:    Tobacco Use    Smoking status: Never    Smokeless tobacco: Never   Substance and Sexual Activity    Alcohol use: Yes     Comment: occasional, no alcohol 72 hours prior to sx    Drug use: No    Sexual activity: Yes     Partners: Female   Social History Narrative            2 grown kids.         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      Social Determinants of Health     Financial Resource Strain: Low Risk     Difficulty of Paying Living Expenses: Not hard at all   Food Insecurity: No Food Insecurity    Worried About  "Running Out of Food in the Last Year: Never true    Ran Out of Food in the Last Year: Never true   Transportation Needs: No Transportation Needs    Lack of Transportation (Medical): No    Lack of Transportation (Non-Medical): No   Physical Activity: Sufficiently Active    Days of Exercise per Week: 2 days    Minutes of Exercise per Session: 90 min   Stress: Stress Concern Present    Feeling of Stress : To some extent   Social Connections: Unknown    Frequency of Communication with Friends and Family: Three times a week    Frequency of Social Gatherings with Friends and Family: Patient refused    Active Member of Clubs or Organizations: Yes    Attends Club or Organization Meetings: 1 to 4 times per year    Marital Status:    Housing Stability: Unknown    Unable to Pay for Housing in the Last Year: Patient refused    Number of Places Lived in the Last Year: 1    Unstable Housing in the Last Year: No       Vitals:    12/13/22 1502   Weight: 87 kg (191 lb 12.8 oz)   Height: 5' 11" (1.803 m)   PainSc: 0-No pain       Hemoglobin A1C   Date Value Ref Range Status   12/12/2022 6.7 (H) 4.0 - 5.6 % Final     Comment:     ADA Screening Guidelines:  5.7-6.4%  Consistent with prediabetes  >or=6.5%  Consistent with diabetes    High levels of fetal hemoglobin interfere with the HbA1C  assay. Heterozygous hemoglobin variants (HbS, HgC, etc)do  not significantly interfere with this assay.   However, presence of multiple variants may affect accuracy.     11/04/2022 6.5 (H) 4.0 - 5.6 % Final     Comment:     ADA Screening Guidelines:  5.7-6.4%  Consistent with prediabetes  >or=6.5%  Consistent with diabetes    High levels of fetal hemoglobin interfere with the HbA1C  assay. Heterozygous hemoglobin variants (HbS, HgC, etc)do  not significantly interfere with this assay.   However, presence of multiple variants may affect accuracy.     05/13/2022 6.2 (H) 4.0 - 5.6 % Final     Comment:     ADA Screening Guidelines:  5.7-6.4%  " Consistent with prediabetes  >or=6.5%  Consistent with diabetes    High levels of fetal hemoglobin interfere with the HbA1C  assay. Heterozygous hemoglobin variants (HbS, HgC, etc)do  not significantly interfere with this assay.   However, presence of multiple variants may affect accuracy.         Review of Systems   Constitutional:  Negative for chills and fever.   Respiratory:  Negative for shortness of breath.    Cardiovascular:  Negative for chest pain, palpitations, orthopnea, claudication and leg swelling.   Gastrointestinal:  Negative for diarrhea, nausea and vomiting.   Musculoskeletal:  Positive for joint pain (left heel).   Skin:  Negative for rash.   Neurological:  Negative for dizziness, tingling, sensory change, focal weakness and weakness.   Psychiatric/Behavioral: Negative.             Objective:      PHYSICAL EXAM: Apperance: Alert and orient in no distress,well developed, and with good attention to grooming and body habits  Patient presents ambulating in tennis shoes.   Lower Extremity Exam  VASCULAR: Dorsalis pedis pulses 2/4 bilateral and Posterior Tibial pulses 2/4 bilateral. Capillary fill time <blanched bilateral. No edema observed bilateral. Varicosities absent bilateral. Skin temperature of the lower extremities is warm to warm, proximal to distal. Hair growth WNL bilateral.  DERMATOLOGICAL: No skin rashes, subcutaneous nodules, lesions, or ulcers observed bilateral.   NEUROLOGICAL: Light touch, sharp-dull, proprioception all present and equal bilaterally.    MUSCULOSKELETAL: Muscle strength 5/5 for all foot inverters, everters, plantarflexors, and dorsiflexors bilateral. Ankle joints left shows decreased ROM. left ankle ROM shows greater decrease in dorsiflexion with knee extended. Ankle joint ROM is pain free and without crepitus left. No pain with palpation of left posterior 1/3 calcaneus at region of Achilles tendon insertion. Negative pain to palpation left plantar medial tubercle.  Negative pain on medial-lateral compression of the calcaneus.    TEST RESULTS: Radiographs of left foot/ankle taken reveals mild focal soft tissue prominence is seen posterior to the calcaneus near the attachment site of the Achilles tendon with an adjacent enthesophyte noted.  Achilles tendon pathology not excluded.  Further evaluation could be performed with MRI as clinically warranted.  No acute fractures or dislocations visualized.  There are mild degenerative findings present at the 1st MTP joint.  No erosive changes demonstrated.          Assessment:       ICD-10-CM ICD-9-CM   1. Achilles tendinitis of left lower extremity - Left Foot  M76.62 726.71   2. Equinus contracture of left ankle - Left Foot  M24.572 718.47         Plan:   Achilles tendinitis of left lower extremity - Left Foot    Equinus contracture of left ankle - Left Foot        I counseled the patient on his conditions, regarding findings of my examination, my impressions, and usual treatment plan.   I explained to the patient that etiology and treatment options for heel pain including rest,  ice messages, stretching exercises, strappings/tappings, NSAID's, injections, new shoegear with orthotic inserts, and/or surgical treatment.   Reviewed left foot x-rays in exam room with patient.   Patient to continue stretching exercises.   Discussed surgery for Achilles with patient and patient will would like surgery sometime next year.   The patient and I reviewed the types of shoes he should be wearing, my recommendation includes generally the best time of the day for a shoe fitting is the afternoon, shoes with a wide toe box, very good cushion, and tennis shoes with removable inner soles. The patient and I reviewed my recommendations for over-the-counter orthotic inserts.   Patient to return as needed.        Everett Duarte DPM  [facility]

## 2023-01-10 ENCOUNTER — OFFICE VISIT (OUTPATIENT)
Dept: UROLOGY | Facility: CLINIC | Age: 68
End: 2023-01-10
Payer: MEDICARE

## 2023-01-10 VITALS — HEIGHT: 71 IN | BODY MASS INDEX: 27.16 KG/M2 | WEIGHT: 194 LBS

## 2023-01-10 DIAGNOSIS — R97.20 ELEVATED PSA: Primary | ICD-10-CM

## 2023-01-10 DIAGNOSIS — N40.0 BENIGN PROSTATIC HYPERPLASIA WITHOUT LOWER URINARY TRACT SYMPTOMS: ICD-10-CM

## 2023-01-10 DIAGNOSIS — Z98.890 HISTORY OF PROSTATE SURGERY: ICD-10-CM

## 2023-01-10 PROCEDURE — 99999 PR PBB SHADOW E&M-EST. PATIENT-LVL III: ICD-10-PCS | Mod: PBBFAC,,,

## 2023-01-10 PROCEDURE — 99213 OFFICE O/P EST LOW 20 MIN: CPT | Mod: PBBFAC,PO

## 2023-01-10 PROCEDURE — 99214 OFFICE O/P EST MOD 30 MIN: CPT | Mod: S$PBB,,,

## 2023-01-10 PROCEDURE — 99999 PR PBB SHADOW E&M-EST. PATIENT-LVL III: CPT | Mod: PBBFAC,,,

## 2023-01-10 PROCEDURE — 99214 PR OFFICE/OUTPT VISIT, EST, LEVL IV, 30-39 MIN: ICD-10-PCS | Mod: S$PBB,,,

## 2023-01-10 RX ORDER — RAMELTEON 8 MG/1
TABLET ORAL
COMMUNITY
End: 2023-02-24 | Stop reason: SDUPTHER

## 2023-01-10 RX ORDER — KETOCONAZOLE 20 MG/ML
SHAMPOO, SUSPENSION TOPICAL
COMMUNITY
Start: 2022-12-27

## 2023-01-10 NOTE — H&P (VIEW-ONLY)
[facility] Note  Staff: XENA Pacheco    PCP: DO Chetan    Chief Complaint: Elevated PSA    Subjective:        HPI: Glenn Medel is a 67 y.o. male new patient to me presents today for evaluation of an elevated PSA. He has been followed by urology over the years. His last office visit was 12/29/2021 with Dr. Donahue:  66-year-old with a history of BPH.  He had a previous Urolift about 3 years ago by Dr. Ewing.  He is overall doing well.  He has no bothersome urinary symptoms today.  He has been off all BPH medications for years.  He is here for routine follow-up and to establish care as Dr. Ewing has now retired.  He denies hematuria and dysuria.  His PSA last year was 2.4.    Urine dipstick shows negative for all components.  PVR 0 ml  update PSA.  I recommend annual follow-up with his primary care doctor.    Today:  His PSA on 12/29/2021 was 4.4. Dr. Donahue advised he follow up in 6 months with a repeated PSA and if it remained elevated, he recommended a prostate needle biopsy. He did not follow up with a repeat PSA. He recently had his PSA checked 12/12/22 which was 5.1. He denies a family history of prostate cancer. He does have a history of Urolift and circumcision with Dr. Ewing. He denies any new or worsening urinary complaints such as dysuria, hematuria, urgency, frequency, fever, flank pain, abd pain, incontinence, and difficulty urinating. He is currently not taking any bladder or prostate medications.     Questions asked the pt during ov today:  Urgency: No, urge incontinence? No  NTF: 0x night  Dysuria: No  Gross Hematuria:No  Straining:No, Hesistancy:No, Intermittency:No, Weak stream:No    Last PSA Screening:   Lab Results   Component Value Date    PSA 4.4 (H) 12/29/2021    PSA 1.5 08/08/2014    PSADIAG 5.1 (H) 12/12/2022    PSADIAG 2.4 02/10/2020    PSADIAG 1.9 08/16/2017       History of Kidney Stones?:  No    Constipation issues?:  No    REVIEW OF  SYSTEMS:  Review of Systems   Constitutional: Negative.  Negative for chills and fever.   HENT: Negative.     Eyes: Negative.    Respiratory: Negative.     Cardiovascular: Negative.    Gastrointestinal: Negative.  Negative for abdominal pain, nausea and vomiting.   Genitourinary: Negative.  Negative for dysuria, flank pain, frequency, hematuria and urgency.   Musculoskeletal: Negative.  Negative for back pain.   Skin: Negative.    Neurological: Negative.    Endo/Heme/Allergies: Negative.    Psychiatric/Behavioral: Negative.       PMHx:  Past Medical History:   Diagnosis Date    BPH (benign prostatic hyperplasia)     Cataract     done ou    Chronic kidney disease, stage 3 2/25/2015    DDD (degenerative disc disease), lumbar     s/p epidural steroids     Glaucoma     OU    HTN (hypertension)     Iritis, lens-induced 1/8/2018    Obesity        PSHx:  Past Surgical History:   Procedure Laterality Date    CATARACT EXTRACTION W/  INTRAOCULAR LENS IMPLANT Left 11/30/2017    CATARACT EXTRACTION W/  INTRAOCULAR LENS IMPLANT Right 12/11/2018    Dr Garcia    CATARACT EXTRACTION W/  INTRAOCULAR LENS IMPLANT Right 12/11/2018    Procedure: EXTRACTION, CATARACT, WITH IOL INSERTION;  Surgeon: Damaris Garcia MD;  Location: [facility] OR;  Service: Ophthalmology;  Laterality: Right;    COLONOSCOPY N/A 9/18/2017    Procedure: COLONOSCOPY;  Surgeon: Paul Feliz Jr., MD;  Location: [facility] ENDO;  Service: Endoscopy;  Laterality: N/A;    COLONOSCOPY N/A 12/21/2022    Procedure: COLONOSCOPY;  Surgeon: Paul Feliz Jr., MD;  Location: [facility] ENDO;  Service: Endoscopy;  Laterality: N/A;    COLONOSCOPY W/ POLYPECTOMY  04/13/2010    YARELI.   One 1 cm polyp in the sigmoid colon.  Internal hemorrhoids.    COSMETIC SURGERY      DIGITAL RECTAL EXAMINATION UNDER ANESTHESIA N/A 7/23/2019    Procedure: EXAM UNDER ANESTHESIA, DIGITAL, RECTUM;  Surgeon: Kaz Keating MD;  Location: [facility] OR;  Service: General;  Laterality: N/A;    EXAMINATION  UNDER ANESTHESIA N/A 4/24/2019    Procedure: EXAM UNDER ANESTHESIA;  Surgeon: Kaz Keating MD;  Location: [facility] OR;  Service: General;  Laterality: N/A;    EXCISIONAL HEMORRHOIDECTOMY N/A 4/24/2019    Procedure: HEMORRHOIDECTOMY (lithotomy);  Surgeon: Kaz Keating MD;  Location: [facility] OR;  Service: General;  Laterality: N/A;    GANGLION CYST EXCISION Left     INJECTION OF ANESTHETIC AGENT AROUND PUDENDAL NERVE N/A 4/24/2019    Procedure: BLOCK, NERVE, PUDENDAL;  Surgeon: Kaz Keating MD;  Location: [facility] OR;  Service: General;  Laterality: N/A;    INJECTION OF ANESTHETIC AGENT AROUND PUDENDAL NERVE N/A 7/23/2019    Procedure: BLOCK, NERVE, PUDENDAL;  Surgeon: Kaz Keating MD;  Location: [facility] OR;  Service: General;  Laterality: N/A;    REPAIR OF RETINAL DETACHMENT WITH VITRECTOMY Right 8/1/2018    Procedure: REPAIR, RETINAL DETACHMENT, WITH VITRECTOMY;  Surgeon: SAJAN Pulido MD;  Location: [address];  Service: Ophthalmology;  Laterality: Right;  35 min    TONSILLECTOMY      TRIGGER FINGER RELEASE Right     X 2    uro lift      for enlarged prostate       Fam Hx:   malignancies: No    kidney stones: No     Soc Hx:  , lives in [place]    Allergies:  Avelox [moxifloxacin]    Medications: reviewed     Objective:   There were no vitals filed for this visit.    Physical Exam  Constitutional:       Appearance: Normal appearance.   HENT:      Head: Normocephalic.      Mouth/Throat:      Mouth: Mucous membranes are moist.      Pharynx: Oropharynx is clear.   Eyes:      Conjunctiva/sclera: Conjunctivae normal.   Pulmonary:      Effort: Pulmonary effort is normal.   Abdominal:      General: There is no distension.      Palpations: Abdomen is soft.      Tenderness: There is no abdominal tenderness. There is no right CVA tenderness or left CVA tenderness.   Musculoskeletal:         General: Normal range of motion.      Cervical back: Normal range of motion.   Skin:     General: Skin is  warm.   Neurological:      Mental Status: He is alert and oriented to person, place, and time.   Psychiatric:         Mood and Affect: Mood normal.         Behavior: Behavior normal.        EXAM performed by me in office today: deferred by pt    LABS REVIEW:  UA today:  Color:Clear, Yellow  Spec. Grav.  1.025  PH  5.5  Negative for leukocytes, nitrates, protein, glucose, ketones, urobili, bili, and blood.    Assessment:       1. Elevated PSA    2. Benign prostatic hyperplasia without lower urinary tract symptoms    3. History of prostate surgery          Plan:     Will proceed with prostate needle biopsy with Dr. Donahue  Pt to hold Aspirin for procedure    F/u As Needed per Treatment Plan    [facility]: Active    XENA Pacheco

## 2023-01-10 NOTE — PROGRESS NOTES
[facility] Note  Staff: XENA Pacheco    PCP: DO Chetan    Chief Complaint: Elevated PSA    Subjective:        HPI: Glenn Medel is a 67 y.o. male new patient to me presents today for evaluation of an elevated PSA. He has been followed by urology over the years. His last office visit was 12/29/2021 with Dr. Donahue:  66-year-old with a history of BPH.  He had a previous Urolift about 3 years ago by Dr. Ewing.  He is overall doing well.  He has no bothersome urinary symptoms today.  He has been off all BPH medications for years.  He is here for routine follow-up and to establish care as Dr. Ewing has now retired.  He denies hematuria and dysuria.  His PSA last year was 2.4.    Urine dipstick shows negative for all components.  PVR 0 ml  update PSA.  I recommend annual follow-up with his primary care doctor.    Today:  His PSA on 12/29/2021 was 4.4. Dr. Donahue advised he follow up in 6 months with a repeated PSA and if it remained elevated, he recommended a prostate needle biopsy. He did not follow up with a repeat PSA. He recently had his PSA checked 12/12/22 which was 5.1. He denies a family history of prostate cancer. He does have a history of Urolift and circumcision with Dr. Ewing. He denies any new or worsening urinary complaints such as dysuria, hematuria, urgency, frequency, fever, flank pain, abd pain, incontinence, and difficulty urinating. He is currently not taking any bladder or prostate medications.     Questions asked the pt during ov today:  Urgency: No, urge incontinence? No  NTF: 0x night  Dysuria: No  Gross Hematuria:No  Straining:No, Hesistancy:No, Intermittency:No, Weak stream:No    Last PSA Screening:   Lab Results   Component Value Date    PSA 4.4 (H) 12/29/2021    PSA 1.5 08/08/2014    PSADIAG 5.1 (H) 12/12/2022    PSADIAG 2.4 02/10/2020    PSADIAG 1.9 08/16/2017       History of Kidney Stones?:  No    Constipation issues?:  No    REVIEW OF  SYSTEMS:  Review of Systems   Constitutional: Negative.  Negative for chills and fever.   HENT: Negative.     Eyes: Negative.    Respiratory: Negative.     Cardiovascular: Negative.    Gastrointestinal: Negative.  Negative for abdominal pain, nausea and vomiting.   Genitourinary: Negative.  Negative for dysuria, flank pain, frequency, hematuria and urgency.   Musculoskeletal: Negative.  Negative for back pain.   Skin: Negative.    Neurological: Negative.    Endo/Heme/Allergies: Negative.    Psychiatric/Behavioral: Negative.       PMHx:  Past Medical History:   Diagnosis Date    BPH (benign prostatic hyperplasia)     Cataract     done ou    Chronic kidney disease, stage 3 2/25/2015    DDD (degenerative disc disease), lumbar     s/p epidural steroids     Glaucoma     OU    HTN (hypertension)     Iritis, lens-induced 1/8/2018    Obesity        PSHx:  Past Surgical History:   Procedure Laterality Date    CATARACT EXTRACTION W/  INTRAOCULAR LENS IMPLANT Left 11/30/2017    CATARACT EXTRACTION W/  INTRAOCULAR LENS IMPLANT Right 12/11/2018    Dr Garcia    CATARACT EXTRACTION W/  INTRAOCULAR LENS IMPLANT Right 12/11/2018    Procedure: EXTRACTION, CATARACT, WITH IOL INSERTION;  Surgeon: Damaris Garcia MD;  Location: [facility] OR;  Service: Ophthalmology;  Laterality: Right;    COLONOSCOPY N/A 9/18/2017    Procedure: COLONOSCOPY;  Surgeon: Paul Feliz Jr., MD;  Location: [facility] ENDO;  Service: Endoscopy;  Laterality: N/A;    COLONOSCOPY N/A 12/21/2022    Procedure: COLONOSCOPY;  Surgeon: Paul Feliz Jr., MD;  Location: [facility] ENDO;  Service: Endoscopy;  Laterality: N/A;    COLONOSCOPY W/ POLYPECTOMY  04/13/2010    YARELI.   One 1 cm polyp in the sigmoid colon.  Internal hemorrhoids.    COSMETIC SURGERY      DIGITAL RECTAL EXAMINATION UNDER ANESTHESIA N/A 7/23/2019    Procedure: EXAM UNDER ANESTHESIA, DIGITAL, RECTUM;  Surgeon: Kaz Keating MD;  Location: [facility] OR;  Service: General;  Laterality: N/A;    EXAMINATION  UNDER ANESTHESIA N/A 4/24/2019    Procedure: EXAM UNDER ANESTHESIA;  Surgeon: Kaz Keating MD;  Location: [facility] OR;  Service: General;  Laterality: N/A;    EXCISIONAL HEMORRHOIDECTOMY N/A 4/24/2019    Procedure: HEMORRHOIDECTOMY (lithotomy);  Surgeon: Kaz Keating MD;  Location: [facility] OR;  Service: General;  Laterality: N/A;    GANGLION CYST EXCISION Left     INJECTION OF ANESTHETIC AGENT AROUND PUDENDAL NERVE N/A 4/24/2019    Procedure: BLOCK, NERVE, PUDENDAL;  Surgeon: Kaz Keating MD;  Location: [facility] OR;  Service: General;  Laterality: N/A;    INJECTION OF ANESTHETIC AGENT AROUND PUDENDAL NERVE N/A 7/23/2019    Procedure: BLOCK, NERVE, PUDENDAL;  Surgeon: Kaz Keating MD;  Location: [facility] OR;  Service: General;  Laterality: N/A;    REPAIR OF RETINAL DETACHMENT WITH VITRECTOMY Right 8/1/2018    Procedure: REPAIR, RETINAL DETACHMENT, WITH VITRECTOMY;  Surgeon: SAJAN Pulido MD;  Location: [address];  Service: Ophthalmology;  Laterality: Right;  35 min    TONSILLECTOMY      TRIGGER FINGER RELEASE Right     X 2    uro lift      for enlarged prostate       Fam Hx:   malignancies: No    kidney stones: No     Soc Hx:  , lives in [place]    Allergies:  Avelox [moxifloxacin]    Medications: reviewed     Objective:   There were no vitals filed for this visit.    Physical Exam  Constitutional:       Appearance: Normal appearance.   HENT:      Head: Normocephalic.      Mouth/Throat:      Mouth: Mucous membranes are moist.      Pharynx: Oropharynx is clear.   Eyes:      Conjunctiva/sclera: Conjunctivae normal.   Pulmonary:      Effort: Pulmonary effort is normal.   Abdominal:      General: There is no distension.      Palpations: Abdomen is soft.      Tenderness: There is no abdominal tenderness. There is no right CVA tenderness or left CVA tenderness.   Musculoskeletal:         General: Normal range of motion.      Cervical back: Normal range of motion.   Skin:     General: Skin is  warm.   Neurological:      Mental Status: He is alert and oriented to person, place, and time.   Psychiatric:         Mood and Affect: Mood normal.         Behavior: Behavior normal.        EXAM performed by me in office today: deferred by pt    LABS REVIEW:  UA today:  Color:Clear, Yellow  Spec. Grav.  1.025  PH  5.5  Negative for leukocytes, nitrates, protein, glucose, ketones, urobili, bili, and blood.    Assessment:       1. Elevated PSA    2. Benign prostatic hyperplasia without lower urinary tract symptoms    3. History of prostate surgery          Plan:     Will proceed with prostate needle biopsy with Dr. Donahue  Pt to hold Aspirin for procedure    F/u As Needed per Treatment Plan    [facility]: Active    XENA Pacheco

## 2023-01-12 ENCOUNTER — TELEPHONE (OUTPATIENT)
Dept: GASTROENTEROLOGY | Facility: CLINIC | Age: 68
End: 2023-01-12
Payer: MEDICARE

## 2023-01-17 ENCOUNTER — DOCUMENTATION ONLY (OUTPATIENT)
Dept: FAMILY MEDICINE | Facility: CLINIC | Age: 68
End: 2023-01-17
Payer: MEDICARE

## 2023-01-17 DIAGNOSIS — E11.21 TYPE 2 DIABETES MELLITUS WITH DIABETIC NEPHROPATHY, WITH LONG-TERM CURRENT USE OF INSULIN: ICD-10-CM

## 2023-01-17 DIAGNOSIS — Z79.4 TYPE 2 DIABETES MELLITUS WITH DIABETIC NEPHROPATHY, WITH LONG-TERM CURRENT USE OF INSULIN: ICD-10-CM

## 2023-01-17 RX ORDER — TIRZEPATIDE 2.5 MG/.5ML
2.5 INJECTION, SOLUTION SUBCUTANEOUS
Qty: 4 PEN | Refills: 0 | OUTPATIENT
Start: 2023-01-17

## 2023-01-17 NOTE — TELEPHONE ENCOUNTER
No new care gaps identified.  [facility] Embedded Care Gaps. Reference number: [id number]. 1/17/2023   10:19:58 AM CST

## 2023-01-18 ENCOUNTER — PATIENT MESSAGE (OUTPATIENT)
Dept: UROLOGY | Facility: CLINIC | Age: 68
End: 2023-01-18
Payer: MEDICARE

## 2023-01-19 ENCOUNTER — TELEPHONE (OUTPATIENT)
Dept: UROLOGY | Facility: CLINIC | Age: 68
End: 2023-01-19
Payer: MEDICARE

## 2023-01-19 DIAGNOSIS — R97.20 ELEVATED PSA: Primary | ICD-10-CM

## 2023-01-25 ENCOUNTER — TELEPHONE (OUTPATIENT)
Dept: UROLOGY | Facility: CLINIC | Age: 68
End: 2023-01-25
Payer: MEDICARE

## 2023-01-25 ENCOUNTER — PATIENT MESSAGE (OUTPATIENT)
Dept: UROLOGY | Facility: CLINIC | Age: 68
End: 2023-01-25
Payer: MEDICARE

## 2023-01-26 RX ORDER — SULFAMETHOXAZOLE AND TRIMETHOPRIM 400; 80 MG/1; MG/1
1 TABLET ORAL 2 TIMES DAILY
Qty: 6 TABLET | Refills: 0 | Status: SHIPPED | OUTPATIENT
Start: 2023-01-26 | End: 2023-02-24

## 2023-01-30 ENCOUNTER — ANESTHESIA EVENT (OUTPATIENT)
Dept: SURGERY | Facility: HOSPITAL | Age: 68
End: 2023-01-30
Payer: MEDICARE

## 2023-01-31 ENCOUNTER — ANESTHESIA (OUTPATIENT)
Dept: SURGERY | Facility: HOSPITAL | Age: 68
End: 2023-01-31
Payer: MEDICARE

## 2023-01-31 ENCOUNTER — HOSPITAL ENCOUNTER (OUTPATIENT)
Facility: HOSPITAL | Age: 68
Discharge: HOME | End: 2023-01-31
Attending: UROLOGY | Admitting: UROLOGY
Payer: MEDICARE

## 2023-01-31 DIAGNOSIS — R97.20 ELEVATED PSA: Primary | ICD-10-CM

## 2023-01-31 LAB — GLUCOSE SERPL-MCNC: 125 MG/DL (ref 70–110)

## 2023-01-31 PROCEDURE — 71000015 HC POSTOP RECOV 1ST HR: Mod: PO | Performed by: UROLOGY

## 2023-01-31 PROCEDURE — 25000003 PHARM REV CODE 250: Mod: PO | Performed by: UROLOGY

## 2023-01-31 PROCEDURE — 71000033 HC RECOVERY, INTIAL HOUR: Mod: PO | Performed by: UROLOGY

## 2023-01-31 PROCEDURE — 25000003 PHARM REV CODE 250: Mod: PO | Performed by: NURSE ANESTHETIST, CERTIFIED REGISTERED

## 2023-01-31 PROCEDURE — 37000008 HC ANESTHESIA 1ST 15 MINUTES: Mod: PO | Performed by: UROLOGY

## 2023-01-31 PROCEDURE — 63600175 PHARM REV CODE 636 W HCPCS: Mod: PO | Performed by: UROLOGY

## 2023-01-31 PROCEDURE — D9220A PRA ANESTHESIA: ICD-10-PCS | Mod: ANES,,, | Performed by: ANESTHESIOLOGY

## 2023-01-31 PROCEDURE — D9220A PRA ANESTHESIA: Mod: ANES,,, | Performed by: ANESTHESIOLOGY

## 2023-01-31 PROCEDURE — 76872 PR US TRANSRECTAL: ICD-10-PCS | Mod: 26,,, | Performed by: UROLOGY

## 2023-01-31 PROCEDURE — 63600175 PHARM REV CODE 636 W HCPCS: Mod: PO | Performed by: NURSE ANESTHETIST, CERTIFIED REGISTERED

## 2023-01-31 PROCEDURE — 63600175 PHARM REV CODE 636 W HCPCS: Mod: PO | Performed by: ANESTHESIOLOGY

## 2023-01-31 PROCEDURE — 55700 PR BIOPSY OF PROSTATE,NEEDLE/PUNCH: CPT | Mod: ,,, | Performed by: UROLOGY

## 2023-01-31 PROCEDURE — D9220A PRA ANESTHESIA: Mod: CRNA,,, | Performed by: NURSE ANESTHETIST, CERTIFIED REGISTERED

## 2023-01-31 PROCEDURE — 88305 TISSUE EXAM BY PATHOLOGIST: ICD-10-PCS | Mod: 26,,, | Performed by: PATHOLOGY

## 2023-01-31 PROCEDURE — D9220A PRA ANESTHESIA: ICD-10-PCS | Mod: CRNA,,, | Performed by: NURSE ANESTHETIST, CERTIFIED REGISTERED

## 2023-01-31 PROCEDURE — 88305 TISSUE EXAM BY PATHOLOGIST: CPT | Performed by: PATHOLOGY

## 2023-01-31 PROCEDURE — 76872 US TRANSRECTAL: CPT | Mod: 26,,, | Performed by: UROLOGY

## 2023-01-31 PROCEDURE — 55700 PR BIOPSY OF PROSTATE,NEEDLE/PUNCH: ICD-10-PCS | Mod: ,,, | Performed by: UROLOGY

## 2023-01-31 PROCEDURE — 36000704 HC OR TIME LEV I 1ST 15 MIN: Mod: PO | Performed by: UROLOGY

## 2023-01-31 PROCEDURE — 88305 TISSUE EXAM BY PATHOLOGIST: CPT | Mod: 26,,, | Performed by: PATHOLOGY

## 2023-01-31 RX ORDER — SODIUM CHLORIDE 0.9 % (FLUSH) 0.9 %
10 SYRINGE (ML) INJECTION ONCE
Status: DISCONTINUED | OUTPATIENT
Start: 2023-01-31 | End: 2023-01-31 | Stop reason: HOSPADM

## 2023-01-31 RX ORDER — LIDOCAINE HYDROCHLORIDE 10 MG/ML
INJECTION, SOLUTION EPIDURAL; INFILTRATION; INTRACAUDAL; PERINEURAL
Status: DISCONTINUED | OUTPATIENT
Start: 2023-01-31 | End: 2023-01-31 | Stop reason: HOSPADM

## 2023-01-31 RX ORDER — ONDANSETRON 2 MG/ML
INJECTION INTRAMUSCULAR; INTRAVENOUS
Status: DISCONTINUED | OUTPATIENT
Start: 2023-01-31 | End: 2023-01-31

## 2023-01-31 RX ORDER — MEPERIDINE HYDROCHLORIDE 50 MG/ML
12.5 INJECTION INTRAMUSCULAR; INTRAVENOUS; SUBCUTANEOUS ONCE
Status: DISCONTINUED | OUTPATIENT
Start: 2023-01-31 | End: 2023-01-31 | Stop reason: HOSPADM

## 2023-01-31 RX ORDER — SODIUM CHLORIDE, SODIUM LACTATE, POTASSIUM CHLORIDE, CALCIUM CHLORIDE 600; 310; 30; 20 MG/100ML; MG/100ML; MG/100ML; MG/100ML
INJECTION, SOLUTION INTRAVENOUS CONTINUOUS
Status: DISCONTINUED | OUTPATIENT
Start: 2023-01-31 | End: 2023-01-31 | Stop reason: HOSPADM

## 2023-01-31 RX ORDER — DIPHENHYDRAMINE HYDROCHLORIDE 50 MG/ML
25 INJECTION INTRAMUSCULAR; INTRAVENOUS EVERY 6 HOURS PRN
Status: DISCONTINUED | OUTPATIENT
Start: 2023-01-31 | End: 2023-01-31 | Stop reason: HOSPADM

## 2023-01-31 RX ORDER — OXYCODONE HYDROCHLORIDE 5 MG/1
5 TABLET ORAL
Status: DISCONTINUED | OUTPATIENT
Start: 2023-01-31 | End: 2023-01-31 | Stop reason: HOSPADM

## 2023-01-31 RX ORDER — FENTANYL CITRATE 50 UG/ML
INJECTION, SOLUTION INTRAMUSCULAR; INTRAVENOUS
Status: DISCONTINUED | OUTPATIENT
Start: 2023-01-31 | End: 2023-01-31

## 2023-01-31 RX ORDER — PROPOFOL 10 MG/ML
VIAL (ML) INTRAVENOUS
Status: DISCONTINUED | OUTPATIENT
Start: 2023-01-31 | End: 2023-01-31

## 2023-01-31 RX ORDER — LIDOCAINE HYDROCHLORIDE 10 MG/ML
1 INJECTION, SOLUTION EPIDURAL; INFILTRATION; INTRACAUDAL; PERINEURAL ONCE
Status: DISCONTINUED | OUTPATIENT
Start: 2023-01-31 | End: 2023-01-31 | Stop reason: HOSPADM

## 2023-01-31 RX ORDER — HYDROMORPHONE HYDROCHLORIDE 2 MG/ML
0.2 INJECTION, SOLUTION INTRAMUSCULAR; INTRAVENOUS; SUBCUTANEOUS EVERY 5 MIN PRN
Status: DISCONTINUED | OUTPATIENT
Start: 2023-01-31 | End: 2023-01-31 | Stop reason: HOSPADM

## 2023-01-31 RX ORDER — ACETAMINOPHEN 10 MG/ML
INJECTION, SOLUTION INTRAVENOUS
Status: DISCONTINUED | OUTPATIENT
Start: 2023-01-31 | End: 2023-01-31

## 2023-01-31 RX ORDER — MIDAZOLAM HYDROCHLORIDE 1 MG/ML
INJECTION, SOLUTION INTRAMUSCULAR; INTRAVENOUS
Status: DISCONTINUED | OUTPATIENT
Start: 2023-01-31 | End: 2023-01-31

## 2023-01-31 RX ORDER — LIDOCAINE HYDROCHLORIDE 20 MG/ML
INJECTION INTRAVENOUS
Status: DISCONTINUED | OUTPATIENT
Start: 2023-01-31 | End: 2023-01-31

## 2023-01-31 RX ORDER — SODIUM CHLORIDE 9 MG/ML
INJECTION, SOLUTION INTRAVENOUS CONTINUOUS
Status: DISCONTINUED | OUTPATIENT
Start: 2023-01-31 | End: 2023-01-31 | Stop reason: HOSPADM

## 2023-01-31 RX ORDER — PROPOFOL 10 MG/ML
VIAL (ML) INTRAVENOUS CONTINUOUS PRN
Status: DISCONTINUED | OUTPATIENT
Start: 2023-01-31 | End: 2023-01-31

## 2023-01-31 RX ADMIN — MIDAZOLAM HYDROCHLORIDE 2 MG: 1 INJECTION, SOLUTION INTRAMUSCULAR; INTRAVENOUS at 08:01

## 2023-01-31 RX ADMIN — ACETAMINOPHEN 1000 MG: 10 INJECTION, SOLUTION INTRAVENOUS at 08:01

## 2023-01-31 RX ADMIN — SODIUM CHLORIDE, POTASSIUM CHLORIDE, SODIUM LACTATE AND CALCIUM CHLORIDE: 600; 310; 30; 20 INJECTION, SOLUTION INTRAVENOUS at 07:01

## 2023-01-31 RX ADMIN — FENTANYL CITRATE 50 MCG: 50 INJECTION, SOLUTION INTRAMUSCULAR; INTRAVENOUS at 08:01

## 2023-01-31 RX ADMIN — DEXTROSE 2 G: 50 INJECTION, SOLUTION INTRAVENOUS at 08:01

## 2023-01-31 RX ADMIN — PROPOFOL 80 MG: 10 INJECTION, EMULSION INTRAVENOUS at 08:01

## 2023-01-31 RX ADMIN — LIDOCAINE HYDROCHLORIDE 100 MG: 20 INJECTION INTRAVENOUS at 08:01

## 2023-01-31 RX ADMIN — ONDANSETRON 4 MG: 2 INJECTION, SOLUTION INTRAMUSCULAR; INTRAVENOUS at 08:01

## 2023-01-31 RX ADMIN — PROPOFOL 100 MCG/KG/MIN: 10 INJECTION, EMULSION INTRAVENOUS at 08:01

## 2023-01-31 NOTE — BRIEF OP NOTE
[facility] - Surgery  Brief Operative Note    Surgery Date: 1/31/2023     Surgeon(s) and Role:     * MARIZA Donahue MD - Primary    Assisting Surgeon: None    Pre-op Diagnosis:  Elevated PSA [R97.20]    Post-op Diagnosis:  Post-Op Diagnosis Codes:     * Elevated PSA [R97.20]    Procedure(s) (LRB):  BIOPSY, PROSTATE, RECTAL APPROACH, WITH US GUIDANCE (N/A)    Anesthesia: Local MAC    Operative Findings:  86.1 mL gland    Estimated Blood Loss: * No values recorded between 1/31/2023  8:10 AM and 1/31/2023  8:20 AM *         Specimens:   Specimen (24h ago, onward)       Start     Ordered    01/31/23 0812  Specimen to Pathology, Surgery Urology  Once        Comments: Pre-op Diagnosis: Elevated PSA [R97.20]Procedure(s):BIOPSY, PROSTATE, RECTAL APPROACH, WITH US GUIDANCE Number of specimens: 6Name of specimens: 1. Left base2. Left mid3. Left apex4. Right base5. Right mid6. Right apex     References:    Click here for ordering Quick Tip   Question Answer Comment   Procedure Type: Urology    Specimen Class: Routine/Screening    Which provider would you like to cc? MARIZA DONAHUE    Release to patient Immediate        01/31/23 0814                      Discharge Note    OUTCOME: Patient tolerated treatment/procedure well without complication and is now ready for discharge.    DISPOSITION: Home or Self Care    FINAL DIAGNOSIS:  Elevated PSA    FOLLOWUP: In clinic    DISCHARGE INSTRUCTIONS:    Discharge Procedure Orders   Diet diabetic     Activity as tolerated   Scheduling Instructions: Keep hydrated  No heavy lifting or strenuous activity for 3 days  I will call for follow up

## 2023-01-31 NOTE — ANESTHESIA PREPROCEDURE EVALUATION
01/31/2023  Glenn Medel is a 67 y.o., male.      Pre-op Assessment    I have reviewed the Patient Summary Reports.     I have reviewed the Nursing Notes. I have reviewed the NPO Status.   I have reviewed the Medications.     Review of Systems  Anesthesia Hx:  No problems with previous Anesthesia    Social:  Non-Smoker    Cardiovascular:   Hypertension Denies MI.  Denies CAD.    Denies CABG/stent.   Denies Angina.    Pulmonary:   Denies COPD.  Denies Asthma.  Denies Recent URI. Sleep Apnea    Renal/:   Denies Chronic Renal Disease.     Hepatic/GI:   Denies GERD. Denies Liver Disease.    Musculoskeletal:   Arthritis     Neurological:   Denies TIA. Denies CVA. Neuromuscular Disease,  Denies Seizures. Parkinsonism   Endocrine:   Denies Diabetes. Denies Hypothyroidism.    Psych:   Denies Psychiatric History.          Physical Exam  General: Well nourished, Cooperative, Alert and Oriented    Airway:  Mallampati: II / II  Mouth Opening: Normal  TM Distance: 4 - 6 cm  Tongue: Normal    Dental:  Intact    Chest/Lungs:  Clear to auscultation, Normal Respiratory Rate    Heart:  Rate: Normal  Rhythm: Regular Rhythm  Sounds: Normal        Anesthesia Plan  Type of Anesthesia, risks & benefits discussed:    Anesthesia Type: Gen Natural Airway  Intra-op Monitoring Plan: Standard ASA Monitors  Induction:  IV  Informed Consent: Informed consent signed with the Patient and all parties understand the risks and agree with anesthesia plan.  All questions answered.   ASA Score: 3    Ready For Surgery From Anesthesia Perspective.     .

## 2023-01-31 NOTE — DISCHARGE INSTRUCTIONS
PROSTATE BIOPSY      DO:  Minimal activity for 24 hours.  May shower or bathe today.  Advance diet as tolerated.  Drink plenty of liquids until you see your doctor.  Expect blood in urine/stool for up to 3 weeks.  Expect blood in semen for up to 8 weeks.  Resume home medications as prescribed.  Biopsy results will be available in 7-14 days.      DO NOT:  Do not drive for 24 hours or while taking narcotic pain medication.  Do not take aspirin, blood thinners, or NSAIDS (such as ibuprofen or aleve) for 7 days.  Do not engage in sexual intercourse for 7 days.  Do not do any heavy lifting or strenuous activity for 7 days.  Do not take additional tylenol/acetaminophen while taking narcotic pain medication that contains tylenol/acetaminophen.     CALL PHYSICIAN FOR:  Unable to urinate within 6 hours after surgery.  Excessive bleeding.   Fever greater than 101.  Persistent pain not relieved by pain medication.    Contact your physician for emergencies at [phone].

## 2023-01-31 NOTE — TRANSFER OF CARE
"Anesthesia Transfer of Care Note    Patient: Glenn Medel    Procedure(s) Performed: Procedure(s) (LRB):  BIOPSY, PROSTATE, RECTAL APPROACH, WITH US GUIDANCE (N/A)    Patient location: PACU    Anesthesia Type: MAC    Transport from OR: Transported from OR on 2-3 L/min O2 by NC with adequate spontaneous ventilation    Post pain: adequate analgesia    Post assessment: no apparent anesthetic complications and tolerated procedure well    Post vital signs: stable    Level of consciousness: sedated    Nausea/Vomiting: no nausea/vomiting    Complications: none    Transfer of care protocol was followed      Last vitals:   Visit Vitals  /72 (BP Location: Right arm, Patient Position: Sitting)   Pulse (!) 54   Temp 36.3 °C (97.4 °F) (Skin)   Resp 16   Ht 5' 11" (1.803 m)   Wt 88 kg (194 lb)   SpO2 98%   BMI 27.06 kg/m²     "

## 2023-02-01 VITALS
BODY MASS INDEX: 27.16 KG/M2 | TEMPERATURE: 97 F | RESPIRATION RATE: 14 BRPM | SYSTOLIC BLOOD PRESSURE: 110 MMHG | HEART RATE: 66 BPM | DIASTOLIC BLOOD PRESSURE: 64 MMHG | HEIGHT: 71 IN | WEIGHT: 194 LBS | OXYGEN SATURATION: 98 %

## 2023-02-07 ENCOUNTER — OFFICE VISIT (OUTPATIENT)
Dept: PODIATRY | Facility: CLINIC | Age: 68
End: 2023-02-07
Payer: MEDICARE

## 2023-02-07 VITALS — BODY MASS INDEX: 27.16 KG/M2 | WEIGHT: 194 LBS | HEIGHT: 71 IN

## 2023-02-07 DIAGNOSIS — M24.572 EQUINUS CONTRACTURE OF LEFT ANKLE: ICD-10-CM

## 2023-02-07 DIAGNOSIS — M76.62 ACHILLES TENDINITIS OF LEFT LOWER EXTREMITY: Primary | ICD-10-CM

## 2023-02-07 PROCEDURE — 99999 PR PBB SHADOW E&M-EST. PATIENT-LVL IV: ICD-10-PCS | Mod: PBBFAC,,, | Performed by: PODIATRIST

## 2023-02-07 PROCEDURE — 99999 PR PBB SHADOW E&M-EST. PATIENT-LVL IV: CPT | Mod: PBBFAC,,, | Performed by: PODIATRIST

## 2023-02-07 PROCEDURE — 99214 OFFICE O/P EST MOD 30 MIN: CPT | Mod: PBBFAC,PO | Performed by: PODIATRIST

## 2023-02-07 PROCEDURE — 99212 OFFICE O/P EST SF 10 MIN: CPT | Mod: S$PBB,,, | Performed by: PODIATRIST

## 2023-02-07 PROCEDURE — 99212 PR OFFICE/OUTPT VISIT, EST, LEVL II, 10-19 MIN: ICD-10-PCS | Mod: S$PBB,,, | Performed by: PODIATRIST

## 2023-02-09 NOTE — PROGRESS NOTES
Subjective:     Patient ID: Glenn Medel is a 67 y.o. male.    Chief Complaint: Follow-up (C/o pain to left achilles, rates pain 1/10, wears tennis shoe with socks, dabetic Pt, PCP Dr. Benton)     is a 67 y.o. male who presents to the clinic for follow up of left heel pain. Patient states the stretching exercises have helped a lot. Patient denies any pain in the heel since last appointment but this morning was 1/10.  Patient states he has no returned fully to his exercising. Patient has no other pedal complaints at this time.     Patient Active Problem List   Diagnosis    BPH (benign prostatic hyperplasia)    LOVE (obstructive sleep apnea)    HTN (hypertension)    DDD (degenerative disc disease), lumbar    Palpitations    Peripheral neuropathy    Hyperlipidemia    DM type 2 (diabetes mellitus, type 2)    Itch    Hypersomnia with sleep apnea, unspecified    ILD (interstitial lung disease)    Severe obstructive sleep apnea    Other vitreous opacities, bilateral    NS (nuclear sclerosis)    Senile nuclear sclerosis    Retained lens material, left    Glaucoma associated with ocular inflammation, left, indeterminate stage    Iritis, lens-induced    Vitreomacular adhesion, right    Epiretinal membrane, right    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, with long-term current use of insulin    Internal strangulated hemorrhoids    Arachnoid cyst    REM sleep behavior disorder    Abnormal gait    Cognitive change    Abnormal involuntary movements    Adjustment disorder with depressed mood    Parkinsonism    Hx of acute gouty arthritis    Polyneuropathy associated with underlying disease    Fall from bed    Memory change    CKD (chronic kidney disease) stage 2, GFR 60-89 ml/min    Type 2 diabetes mellitus with stage 2 chronic kidney disease, without long-term current use of insulin    Family history of colon cancer    Elevated PSA        Medication List with Changes/Refills   Current Medications    ALLOPURINOL (ZYLOPRIM) 300 MG TABLET    TAKE 1 TABLET(300 MG) BY MOUTH EVERY DAY    ASPIRIN 81 MG CHEW    Take 81 mg by mouth once daily.    ATORVASTATIN (LIPITOR) 20 MG TABLET    TAKE 1 TABLET(20 MG) BY MOUTH EVERY DAY    BRIMONIDINE 0.2% (ALPHAGAN) 0.2 % DROP    Place 1 drop into both eyes 2 (two) times daily.    CARBIDOPA-LEVODOPA  MG (SINEMET)  MG PER TABLET    TAKE 1 AND 1/2 TABLETS BY MOUTH FOUR TIMES DAILY    CHOLECALCIFEROL, VITAMIN D3, (D3-2000 ORAL)    Take by mouth.    CLONAZEPAM (KLONOPIN) 1 MG TABLET    Take 1.5 tablets (1.5 mg total) by mouth every evening.    DORZOLAMIDE (TRUSOPT) 2 % OPHTHALMIC SOLUTION    Place 1 drop into both eyes 2 (two) times a day.    FLUOXETINE 40 MG CAPSULE    Take 1 capsule (40 mg total) by mouth once daily.    FUROSEMIDE (LASIX) 20 MG TABLET    Take 1 tablet (20 mg total) by mouth every morning.    IRBESARTAN (AVAPRO) 75 MG TABLET    Take 1 tablet (75 mg total) by mouth once daily.    KETOCONAZOLE (NIZORAL) 2 % SHAMPOO    Apply topically.    METFORMIN (GLUCOPHAGE-XR) 500 MG ER 24HR TABLET    Take 1 tablet (500 mg total) by mouth once daily.    PRAZOSIN (MINIPRESS) 1 MG CAP    Take 1 capsule (1 mg total) by mouth every evening.    RAMELTEON (ROZEREM) 8 MG TABLET    Take 8 mg by mouth every evening.    RAMELTEON (ROZEREM) 8 MG TABLET    Rozerem 8 mg tablet   Take 1 tablet every day by oral route.    RASAGILINE (AZILECT) 1 MG TAB    TAKE 1 TABLET(1 MG) BY MOUTH EVERY EVENING    SEMAGLUTIDE (RYBELSUS) 7 MG TABLET    Take 1 tablet (7 mg total) by mouth once daily. (Replaces Mounjaro. Start 2/13/23)    SULFAMETHOXAZOLE-TRIMETHOPRIM 400-80MG (BACTRIM,SEPTRA) 400-80 MG PER TABLET    Take 1 tablet by mouth 2 (two) times daily. Begin 1 day before the procedure       Review of patient's allergies indicates:   Allergen Reactions    Avelox [moxifloxacin] Hives and Rash       Past Surgical History:    Procedure Laterality Date    CATARACT EXTRACTION W/  INTRAOCULAR LENS IMPLANT Left 11/30/2017    CATARACT EXTRACTION W/  INTRAOCULAR LENS IMPLANT Right 12/11/2018    Dr Garcia    CATARACT EXTRACTION W/  INTRAOCULAR LENS IMPLANT Right 12/11/2018    Procedure: EXTRACTION, CATARACT, WITH IOL INSERTION;  Surgeon: Damaris Garcia MD;  Location: [facility] OR;  Service: Ophthalmology;  Laterality: Right;    COLONOSCOPY N/A 9/18/2017    Procedure: COLONOSCOPY;  Surgeon: Paul Feliz Jr., MD;  Location: [facility] ENDO;  Service: Endoscopy;  Laterality: N/A;    COLONOSCOPY N/A 12/21/2022    Procedure: COLONOSCOPY;  Surgeon: Paul Feliz Jr., MD;  Location: [facility] ENDO;  Service: Endoscopy;  Laterality: N/A;    COLONOSCOPY W/ POLYPECTOMY  04/13/2010    YARELI.   One 1 cm polyp in the sigmoid colon.  Internal hemorrhoids.    COSMETIC SURGERY      DIGITAL RECTAL EXAMINATION UNDER ANESTHESIA N/A 7/23/2019    Procedure: EXAM UNDER ANESTHESIA, DIGITAL, RECTUM;  Surgeon: Kaz Keating MD;  Location: [facility] OR;  Service: General;  Laterality: N/A;    EXAMINATION UNDER ANESTHESIA N/A 4/24/2019    Procedure: EXAM UNDER ANESTHESIA;  Surgeon: Kaz Keating MD;  Location: [facility] OR;  Service: General;  Laterality: N/A;    EXCISIONAL HEMORRHOIDECTOMY N/A 4/24/2019    Procedure: HEMORRHOIDECTOMY (lithotomy);  Surgeon: Kaz Keating MD;  Location: [facility] OR;  Service: General;  Laterality: N/A;    GANGLION CYST EXCISION Left     INJECTION OF ANESTHETIC AGENT AROUND PUDENDAL NERVE N/A 4/24/2019    Procedure: BLOCK, NERVE, PUDENDAL;  Surgeon: Kaz Keating MD;  Location: [facility] OR;  Service: General;  Laterality: N/A;    INJECTION OF ANESTHETIC AGENT AROUND PUDENDAL NERVE N/A 7/23/2019    Procedure: BLOCK, NERVE, PUDENDAL;  Surgeon: Kaz Keating MD;  Location: [facility] OR;  Service: General;  Laterality: N/A;    REPAIR OF RETINAL DETACHMENT WITH VITRECTOMY Right 8/1/2018    Procedure: REPAIR, RETINAL DETACHMENT, WITH VITRECTOMY;   Surgeon: SAJAN Pulido MD;  Location: [facility] OR [address] [place] [address];  Service: Ophthalmology;  Laterality: Right;  35 min    TONSILLECTOMY      TRANSRECTAL BIOPSY OF PROSTATE WITH ULTRASOUND GUIDANCE N/A 1/31/2023    Procedure: BIOPSY, PROSTATE, RECTAL APPROACH, WITH US GUIDANCE;  Surgeon: MARIZA Donahue MD;  Location: [facility] OR;  Service: Urology;  Laterality: N/A;    TRIGGER FINGER RELEASE Right     X 2    uro lift      for enlarged prostate       Family History   Problem Relation Age of Onset    Heart disease Brother 62    Kidney disease Brother     Heart disease Father     Diabetes Father     Colon cancer Father     Cancer Father         colon    Cataracts Father     Hypertension Mother     Diabetes Mother     Stroke Mother     Cancer Mother         colon    Kidney disease Mother     Glaucoma Maternal Grandmother     No Known Problems Sister     No Known Problems Maternal Aunt     No Known Problems Maternal Uncle     No Known Problems Paternal Aunt     No Known Problems Paternal Uncle     No Known Problems Maternal Grandfather     No Known Problems Paternal Grandmother     No Known Problems Paternal Grandfather     Dementia Brother     Abnormal EKG Brother     Amblyopia Neg Hx     Blindness Neg Hx     Macular degeneration Neg Hx     Retinal detachment Neg Hx     Strabismus Neg Hx     Thyroid disease Neg Hx     Parkinsonism Neg Hx        Social History     Socioeconomic History    Marital status:    Tobacco Use    Smoking status: Never    Smokeless tobacco: Never   Substance and Sexual Activity    Alcohol use: Yes     Comment: occasional, no alcohol 72 hours prior to sx    Drug use: No    Sexual activity: Yes     Partners: Female   Social History Narrative            2 grown kids.         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      Social Determinants of Health     Financial Resource Strain: Unknown    Difficulty of Paying Living Expenses: Patient refused   Food Insecurity:  "Unknown    Worried About Running Out of Food in the Last Year: Patient refused    Ran Out of Food in the Last Year: Patient refused   Transportation Needs: No Transportation Needs    Lack of Transportation (Medical): No    Lack of Transportation (Non-Medical): No   Physical Activity: Sufficiently Active    Days of Exercise per Week: 2 days    Minutes of Exercise per Session: 90 min   Stress: Stress Concern Present    Feeling of Stress : Rather much   Social Connections: Unknown    Frequency of Communication with Friends and Family: Patient refused    Frequency of Social Gatherings with Friends and Family: Patient refused    Active Member of Clubs or Organizations: No    Attends Club or Organization Meetings: Patient refused    Marital Status:    Housing Stability: Unknown    Unable to Pay for Housing in the Last Year: Patient refused    Number of Places Lived in the Last Year: 1    Unstable Housing in the Last Year: No       Vitals:    02/07/23 1153   Weight: 88 kg (194 lb)   Height: 5' 11" (1.803 m)   PainSc:   1   PainLoc: Foot       Hemoglobin A1C   Date Value Ref Range Status   12/12/2022 6.7 (H) 4.0 - 5.6 % Final     Comment:     ADA Screening Guidelines:  5.7-6.4%  Consistent with prediabetes  >or=6.5%  Consistent with diabetes    High levels of fetal hemoglobin interfere with the HbA1C  assay. Heterozygous hemoglobin variants (HbS, HgC, etc)do  not significantly interfere with this assay.   However, presence of multiple variants may affect accuracy.     11/04/2022 6.5 (H) 4.0 - 5.6 % Final     Comment:     ADA Screening Guidelines:  5.7-6.4%  Consistent with prediabetes  >or=6.5%  Consistent with diabetes    High levels of fetal hemoglobin interfere with the HbA1C  assay. Heterozygous hemoglobin variants (HbS, HgC, etc)do  not significantly interfere with this assay.   However, presence of multiple variants may affect accuracy.     05/13/2022 6.2 (H) 4.0 - 5.6 % Final     Comment:     ADA Screening " Guidelines:  5.7-6.4%  Consistent with prediabetes  >or=6.5%  Consistent with diabetes    High levels of fetal hemoglobin interfere with the HbA1C  assay. Heterozygous hemoglobin variants (HbS, HgC, etc)do  not significantly interfere with this assay.   However, presence of multiple variants may affect accuracy.         Review of Systems   Constitutional:  Negative for chills and fever.   Respiratory:  Negative for shortness of breath.    Cardiovascular:  Negative for chest pain, palpitations, orthopnea, claudication and leg swelling.   Gastrointestinal:  Negative for diarrhea, nausea and vomiting.   Musculoskeletal:  Positive for joint pain (left heel).   Skin:  Negative for rash.   Neurological:  Negative for dizziness, tingling, sensory change, focal weakness and weakness.   Psychiatric/Behavioral: Negative.             Objective:      PHYSICAL EXAM: Apperance: Alert and orient in no distress,well developed, and with good attention to grooming and body habits  Patient presents ambulating in tennis shoes.   Lower Extremity Exam  VASCULAR: Dorsalis pedis pulses 2/4 bilateral and Posterior Tibial pulses 2/4 bilateral. Capillary fill time <blanched bilateral. No edema observed bilateral. Varicosities absent bilateral. Skin temperature of the lower extremities is warm to warm, proximal to distal. Hair growth WNL bilateral.  DERMATOLOGICAL: No skin rashes, subcutaneous nodules, lesions, or ulcers observed bilateral.   NEUROLOGICAL: Light touch, sharp-dull, proprioception all present and equal bilaterally.    MUSCULOSKELETAL: Muscle strength 5/5 for all foot inverters, everters, plantarflexors, and dorsiflexors bilateral. Ankle joints left shows decreased ROM. left ankle ROM shows greater decrease in dorsiflexion with knee extended. Ankle joint ROM is pain free and without crepitus left. No pain with palpation of left posterior 1/3 calcaneus at region of Achilles tendon insertion. Negative pain to palpation left plantar  medial tubercle. Negative pain on medial-lateral compression of the calcaneus.    TEST RESULTS: Radiographs of left foot/ankle taken reveals mild focal soft tissue prominence is seen posterior to the calcaneus near the attachment site of the Achilles tendon with an adjacent enthesophyte noted.  Achilles tendon pathology not excluded.  Further evaluation could be performed with MRI as clinically warranted.  No acute fractures or dislocations visualized.  There are mild degenerative findings present at the 1st MTP joint.  No erosive changes demonstrated.        Assessment:       ICD-10-CM ICD-9-CM   1. Achilles tendinitis of left lower extremity - Left Foot  M76.62 726.71   2. Equinus contracture of left ankle - Left Foot  M24.572 718.47       Plan:   Achilles tendinitis of left lower extremity - Left Foot    Equinus contracture of left ankle - Left Foot    I counseled the patient on his conditions, regarding findings of my examination, my impressions, and usual treatment plan.   I explained to the patient that etiology and treatment options for heel pain including rest,  ice messages, stretching exercises, strappings/tappings, NSAID's, injections, new shoegear with orthotic inserts, and/or surgical treatment.   Patient to continue stretching exercises.   Discussed surgery for Achilles with patient and patient will would like surgery sometime next year.   The patient and I reviewed the types of shoes he should be wearing, my recommendation includes generally the best time of the day for a shoe fitting is the afternoon, shoes with a wide toe box, very good cushion, and tennis shoes with removable inner soles. The patient and I reviewed my recommendations for over-the-counter orthotic inserts.   Patient to return as needed.        Everett Duarte DPM  [facility]

## 2023-02-13 ENCOUNTER — OFFICE VISIT (OUTPATIENT)
Dept: NEUROLOGY | Facility: CLINIC | Age: 68
End: 2023-02-13
Payer: MEDICARE

## 2023-02-13 ENCOUNTER — TELEPHONE (OUTPATIENT)
Dept: NEUROLOGY | Facility: CLINIC | Age: 68
End: 2023-02-13
Payer: MEDICARE

## 2023-02-13 DIAGNOSIS — G20.C PARKINSONISM, UNSPECIFIED PARKINSONISM TYPE: Chronic | ICD-10-CM

## 2023-02-13 PROCEDURE — 99443 PR PHYSICIAN TELEPHONE EVALUATION 21-30 MIN: CPT | Mod: 95,,, | Performed by: PSYCHIATRY & NEUROLOGY

## 2023-02-13 PROCEDURE — 99443 PR PHYSICIAN TELEPHONE EVALUATION 21-30 MIN: ICD-10-PCS | Mod: 95,,, | Performed by: PSYCHIATRY & NEUROLOGY

## 2023-02-13 NOTE — PROGRESS NOTES
The patient location is: home  The chief complaint leading to consultation is: PD    Visit type:  audio only    Lelephone visit  20mins  20 minutes of total time spent on the encounter, which includes non-face to face time preparing to see the patient (eg, review of tests), Obtaining and/or reviewing separately obtained history, Documenting clinical information in the electronic or other health record, Independently interpreting results (not separately reported) and communicating results to the patient/family/caregiver, or Care coordination (not separately reported).         Each patient to whom he or she provides medical services by telemedicine is:  (1) informed of the relationship between the physician and patient and the respective role of any other health care provider with respect to management of the patient; and (2) notified that he or she may decline to receive medical services by telemedicine and may withdraw from such care at any time.    Notes:       Notes:   Glenn STOCKTON Chief Complaints during this visit:  New Patient visit for  Parkinsonism.   No referring provider defined for this encounter.    Primary Care Physician  Power Benton, DO  [address] [place] [address]    Interval Hx    Since last visit,   REM sleep dz worse - waking up at 4:30AM consistently    Had prior taken Taking  Carbidopa/levodopa 25/100mg 1.5tab QID - walking more confidently  No offtime    Tremor well controlled until Ldopa wears off  Trouble typing  Dizziness when he works outside for hours    Takes azilect 1mg QHS    Falls: none  Assist Device: none  Walks 2 miles without stopping  With stairs he must hand onto railing    MRI brain revealed a possible posterior fossa arachnoid cyst - followed by Dr. Saldivar     Brother with dementia since age 60 - mild hand tremors and some issues walking  2 sisters/2 brothers healthy  Mother with dementia at age 80 - also hand hand dystonia  Father also reported fingers  "getting stuck    Ceruloplasmin NL    Neuropsych revealed mild cognitive impairments but no diagnosis    PD Review of Symptoms:  Anosmia: some stable  Dysarthria/Hypophonia: none  Dysphagia/Sialorrhea: none  Depression: yes  Cognitive slowing: mild attention issues - replaced CPAP  Hallucinations: none  Urinary changes: none  Constipation: yes  Orthostasis: at times  Falls: occasional  Freezing: none  Micrographia: none  Sleep issues:  -RBD: 1 year of REM sleep dz      "History of present illness:   67 y.o.  male seen in consultation at the request of  Dr. Martinez for evaluation of parkinsonism.      Falls often, onto knee.  Right hand will suddenly start shaking.  Memory a little worse.  Needs new mask for his LOVE, hasn't used in a while.  Not sure who to see.    Has DM, had an emg about 2 years ago.      1/20/20 Juan:  Glenn Medel is a 64 y.o. right-handed male with DM2, HTN, LOVE, anxiety and depression who presents today for an initial evaluation of tremos and balance and is accompanied by wife.    Tremor- started in RH a couple of years ago but seemed to have gotten worse. Will watch TV and ev hands shake. Started noting in LH in the last year. Also notes shaking in legs for several years. He says the legs in fact started before the hands. Nothing makes it better. Not noted any aggravating factors. Drinks very little alcohol. Has not noted benefit with alcohol. Handwriting is pretty good. No changes with this.    Balance- sometimes will be working on knees and then fall back when he goes to get up. Sometimes with walking, he will wobble. No falls while walking. Has fallen backwards when he is trying to get up from being on knees. Has noted balance issues the past couple of months. Has not noted progression.     Acting out dreams. Not nightly but is significant when does happen. He actually ended up in hospital in the last year because of fighting in sleep. Has LOVE, used to wear CPAP and wife wonders if this " "helped when he was wearing this. He says he will wear it a couple of days, aggravates him and then he will not use for month or longer. He had sleep doctor, Dr. Hinton, but dismissed him as she did everything she could do for him.    Denies stiffness in muscles.   Sometimes feels slow with walking. Wife says he is slower to answer questions.   Memory is getting worse. Been bad couple of years but worse the past year. Wife agrees but attributes some of this to penitentiary- lazier life.    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   He left on his terms. Was not asked to leave. "      II.  Review of systems:  As in HPI,  otherwise, balance 10 systems reviewed and are negative.    III.  Past Medical History:   Diagnosis Date    BPH (benign prostatic hyperplasia)     Cataract     done ou    Chronic kidney disease, stage 3 02/25/2015    DDD (degenerative disc disease), lumbar     s/p epidural steroids     Diabetes mellitus     type 2    Glaucoma     OU    HTN (hypertension)     Iritis, lens-induced 01/08/2018    Obesity     Parkinson's disease     Sleep apnea     uses cpap     Family History   Problem Relation Age of Onset    Heart disease Brother 62    Kidney disease Brother     Heart disease Father     Diabetes Father     Colon cancer Father     Cancer Father         colon    Cataracts Father     Hypertension Mother     Diabetes Mother     Stroke Mother     Cancer Mother         colon    Kidney disease Mother     Glaucoma Maternal Grandmother     No Known Problems Sister     No Known Problems Maternal Aunt     No Known Problems Maternal Uncle     No Known Problems Paternal Aunt     No Known Problems Paternal Uncle     No Known Problems Maternal Grandfather     No Known Problems Paternal Grandmother     No Known Problems Paternal Grandfather     Dementia Brother     Abnormal EKG Brother     Amblyopia Neg Hx     Blindness Neg Hx     Macular degeneration Neg Hx  "    Retinal detachment Neg Hx     Strabismus Neg Hx     Thyroid disease Neg Hx     Parkinsonism Neg Hx      Social history:  , retired.      Current Outpatient Medications on File Prior to Visit   Medication Sig Dispense Refill    allopurinoL (ZYLOPRIM) 300 MG tablet TAKE 1 TABLET(300 MG) BY MOUTH EVERY DAY 90 tablet 2    aspirin 81 MG Chew Take 81 mg by mouth once daily.      atorvastatin (LIPITOR) 20 MG tablet TAKE 1 TABLET(20 MG) BY MOUTH EVERY DAY 90 tablet 0    brimonidine 0.2% (ALPHAGAN) 0.2 % Drop Place 1 drop into both eyes 2 (two) times daily. 10 mL 3    carbidopa-levodopa  mg (SINEMET)  mg per tablet TAKE 1 AND 1/2 TABLETS BY MOUTH FOUR TIMES DAILY 180 tablet 11    cholecalciferol, vitamin D3, (D3-2000 ORAL) Take by mouth.      clonazePAM (KLONOPIN) 1 MG tablet Take 1.5 tablets (1.5 mg total) by mouth every evening. 45 tablet 5    dorzolamide (TRUSOPT) 2 % ophthalmic solution Place 1 drop into both eyes 2 (two) times a day. 10 mL 3    FLUoxetine 40 MG capsule Take 1 capsule (40 mg total) by mouth once daily. 90 capsule 0    furosemide (LASIX) 20 MG tablet Take 1 tablet (20 mg total) by mouth every morning. 90 tablet 0    irbesartan (AVAPRO) 75 MG tablet Take 1 tablet (75 mg total) by mouth once daily. 90 tablet 0    ketoconazole (NIZORAL) 2 % shampoo Apply topically.      metFORMIN (GLUCOPHAGE-XR) 500 MG ER 24hr tablet Take 1 tablet (500 mg total) by mouth once daily. 90 tablet 0    prazosin (MINIPRESS) 1 MG Cap Take 1 capsule (1 mg total) by mouth every evening. 90 capsule 0    ramelteon (ROZEREM) 8 mg tablet Take 8 mg by mouth every evening.      ramelteon (ROZEREM) 8 mg tablet Rozerem 8 mg tablet   Take 1 tablet every day by oral route.      rasagiline (AZILECT) 1 mg Tab TAKE 1 TABLET(1 MG) BY MOUTH EVERY EVENING 30 tablet 12    semaglutide (RYBELSUS) 7 mg tablet Take 1 tablet (7 mg total) by mouth once daily. (Replaces Mounjaro. Start 2/13/23) 30 tablet 5     sulfamethoxazole-trimethoprim 400-80mg (BACTRIM,SEPTRA) 400-80 mg per tablet Take 1 tablet by mouth 2 (two) times daily. Begin 1 day before the procedure 6 tablet 0     Current Facility-Administered Medications on File Prior to Visit   Medication Dose Route Frequency Provider Last Rate Last Admin    diphenhydrAMINE capsule 25 mg  25 mg Oral Q6H PRN Leonila Richardson MD        metronidazole IVPB 500 mg  500 mg Intravenous Once Kaz Keating MD           PRIOR problem-specific medications tried:  none    Review of patient's allergies indicates:   Allergen Reactions    Avelox [moxifloxacin] Hives and Rash       IV. Physical Exam  None - audio only    Physical Exam  Constitutional: Well-developed, well-nourished, appears stated age  Eyes: No scleral icterus  ENT: Moist oral mucosa  Cardiovascular: No lower extremity edema   Respiratory: No labored breathing   Skin: No rash   Hematologic: No bruising    Other: GI/ deferred   Mental status: Alert and oriented to person, place, time, and situation;   follows commands  Speech: normal (not dysarthric), no aphasia  Cranial nerves:            CN II: Pupils mid-position and equal, not tested light or accommodation  CN III, IV, VI: Extraocular movements full, no nystagmus visualized  CN V: Not tested   CN VII: Face strong and symmetric bilaterally   CN VIII: Hearing intact to voice and conversation   CN IX, X: Palate raises midline and symmetric   CN XI: Strong shoulder shrug B/L  CN XII: Tongue appears midline   Motor: Normal bulk by appearance, no drift   Sensory: Not tested    Gait: Can stand on either leg- barely a shuffle  Deep tendon reflexes: Not tested  Movement/Coordination                    Mod hypophonic speech.                     Mod facial masking.    Minor resting tremor R hand                      No other dystonia, chorea, athetosis, myoclonus, or tics visualized.    Bradykinesia      ? Finger taps Finger flicks DAVE Heel taps   Left 0 0 0 1+   Right 1+ 1+  "0 0   "      V.  Laboratory/ Radiological Data: (Personally reviewed images)         MRI 1/29/20  Impression       1. There is no acute abnormality.  There is mild volume loss without significant white matter disease.  There is no hemorrhage, parenchymal mass or acute infarction.  There is a probable arachnoid cyst in the right posterior fossa.  Please see above discussion.     Memory/Encephalopathy Labs:  Lab Results   Component Value Date    TSH 0.976 01/06/2020    WMHOCUTI22 391 05/14/2020    THIAMINEBLOO 59 05/14/2020     LGi and CASPR  No results found for: ENCESLGI1, ENCESCASPR2    Movement Labs:  Lab Results   Component Value Date    FLCBKQGB39MZ 51 09/28/2022    CALCIUM 10.0 12/12/2022    PTH 80.9 (H) 12/12/2022    ALBUMIN 3.8 12/12/2022    ALBUMIN 3.8 12/12/2022    BILITOT 0.7 12/12/2022    BILIDIR 0.2 12/12/2022    ALT 14 12/12/2022    AST 33 12/12/2022    ALKPHOS 148 (H) 12/12/2022    CERULOPLSM 29.0 08/26/2020    FERRITIN 88 03/29/2021    ALPHATOCOPHE 1275 05/14/2020       IRON STUDIES:  Lab Results   Component Value Date    IRON 60 03/29/2021    TRANSFERRIN 243 03/29/2021    TIBC 360 03/29/2021    FESATURATED 17 (L) 03/29/2021       Malignancy Labs:  Lab Results   Component Value Date    PSADIAG 5.1 (H) 12/12/2022     PARANEOPLASTIC PANEL:  No results found for: NMOINTERPRET, REFLEXTEST, CRMP5, STMAB, LABACHR, ACHRGANGLION, NEUROVG    CSF:  No results found for: CSFWBC, CSFRBC, LYMPHS, MONOMACCSF, PROTEINCSF, GLUCCSF, GADCSF, LUME, MYELINPROT  Flow cytometry: No results found for: CSFC  [place]: No results found for: WESTNILEIGGA    CNS Demyelinating Labs:  Nmo serum, csf igg:  No results found for: NMOFSFACSS, NMOFCFACSC  No results found for: JCVIRUS    CSF oligoclonal bands:  No results found for: CSFB, CSFOLI, SERB, IGGINDEXCSF, IGGCSF, ALBQ, GALB, IGGSYNRATE, IGGS, ALBSER, IGGALBS    Myopathy labs:  No results found for: CPK, ALDOLASE, AMMONIA, ACFCRATIO, ACETYLCARNIT, CARNITINEPLA, " "LACTATE    Myasthenia panel:  No results found for: LABACHR  No results found for: ACETMOD  No results found for: STMAB   No results found for: ACHRGANGLION    Neuropathy Labs:  Lab Results   Component Value Date    JTRHDCOF73 391 05/14/2020    HGBA1C 6.7 (H) 12/12/2022     SPEP:  Lab Results   Component Value Date    PROTEINSERUM 6.7 07/27/2015    PATHINTPSPE REVIEWED 07/27/2015       Rheum labs:  Lab Results   Component Value Date    URICACID 4.5 09/28/2022       Neuropsych"  Mr. Medel is a 64 year old male with cognitive complaints over the past several years. He has a slightly ataxic gait of unclear etiology, but no clear parkinsonism. He has symptoms suggestive of REM sleep behavior disorder over the past year. He denied presence, passage, or recurrent visual hallucinations. He remains functionally independent, albeit more error prone than in the past. His history is remarkable for multiple vascular risk factors, including currently untreated sleep apnea.      His neuropsychological profile was largely within normal limits with the exception of mild memory inefficiencies, primarily related to encoding and cognitive organization/retrieval. His test scores and functioning are not at the level to warrant a cognitive diagnosis at this time. He did not present with the type of oliver forgetting seen in Alzheimer's disease. He also did not demonstrate the type of visuospatial dysfunction often seen early in Lewy Body Dementia, which is also in keeping with the fact that he does not have visual hallucinations. It will be important to track his cognition over time as mild cognitive changes in the setting of ataxia of unclear etiology and possible REM sleep behavior disorder could be indicative of a neurodegenerative condition. Otherwise, his mild cognitive weaknesses could simply be attributable to mild cerebrovascular disease.      Mr. Medel also reported clinically significant anxiety and depression. The " "transition to [facility] has been admittedly more challenging than expected. His wife has also noticed reduced frustration tolerance with an increased tendency to perseverate on his frustrations, which has led to multiple confrontations. Treatment is indicated. "    MRI brain 2020      VI. Assessment and Plan            Problem List Items Addressed This Visit          Neuro    Parkinsonism (Chronic)    Current Assessment & Plan     Typical  with some nonmotor features.  Tremor responded very well to carbidopa/levodopa.   Tremor not controlled daytime    Suggested increase- carbidopa/levodopa 25/100mg 1.5 tabto 2 tabs  PO four times daily for tremor control PLUS extra 5th dose at 12am to help stop REM sleep dz  May consider DBS for tremor control    Continue azilect 1mg QHS                  Coni eCe MD, MS  [facility]  Department of Neurology  Movement Disorders      "

## 2023-02-13 NOTE — ASSESSMENT & PLAN NOTE
Typical  with some nonmotor features.  Tremor responded very well to carbidopa/levodopa.   Tremor not controlled daytime    Suggested increase- carbidopa/levodopa 25/100mg 1.5 tabto 2 tabs  PO four times daily for tremor control PLUS extra 5th dose at 12am to help stop REM sleep dz  May consider DBS for tremor control    Continue azilect 1mg QHS

## 2023-02-14 NOTE — TELEPHONE ENCOUNTER
----- Message from Coni Cee MD sent at 2/13/2023 10:26 AM CST -----  Regarding: return pt  3 mos VV

## 2023-02-15 ENCOUNTER — PATIENT MESSAGE (OUTPATIENT)
Dept: HEMATOLOGY/ONCOLOGY | Facility: CLINIC | Age: 68
End: 2023-02-15
Payer: MEDICARE

## 2023-02-15 DIAGNOSIS — R97.20 ELEVATED PSA: Primary | ICD-10-CM

## 2023-02-24 ENCOUNTER — OFFICE VISIT (OUTPATIENT)
Dept: UROLOGY | Facility: CLINIC | Age: 68
End: 2023-02-24
Payer: MEDICARE

## 2023-02-24 ENCOUNTER — OFFICE VISIT (OUTPATIENT)
Dept: RADIATION ONCOLOGY | Facility: CLINIC | Age: 68
End: 2023-02-24
Payer: MEDICARE

## 2023-02-24 VITALS
HEART RATE: 70 BPM | WEIGHT: 194 LBS | DIASTOLIC BLOOD PRESSURE: 72 MMHG | BODY MASS INDEX: 27.16 KG/M2 | HEART RATE: 70 BPM | HEIGHT: 71 IN | TEMPERATURE: 98 F | SYSTOLIC BLOOD PRESSURE: 113 MMHG | BODY MASS INDEX: 27.16 KG/M2 | SYSTOLIC BLOOD PRESSURE: 113 MMHG | WEIGHT: 194 LBS | TEMPERATURE: 98 F | OXYGEN SATURATION: 97 % | HEIGHT: 71 IN | DIASTOLIC BLOOD PRESSURE: 72 MMHG

## 2023-02-24 DIAGNOSIS — R97.20 ELEVATED PSA: ICD-10-CM

## 2023-02-24 DIAGNOSIS — C61 MALIGNANT NEOPLASM OF PROSTATE: Primary | ICD-10-CM

## 2023-02-24 DIAGNOSIS — C61 PROSTATE CANCER: Primary | ICD-10-CM

## 2023-02-24 PROCEDURE — 99215 OFFICE O/P EST HI 40 MIN: CPT | Mod: S$PBB,,, | Performed by: UROLOGY

## 2023-02-24 PROCEDURE — 99999 PR PBB SHADOW E&M-EST. PATIENT-LVL III: CPT | Mod: PBBFAC,,, | Performed by: UROLOGY

## 2023-02-24 PROCEDURE — 99215 PR OFFICE/OUTPT VISIT, EST, LEVL V, 40-54 MIN: ICD-10-PCS | Mod: S$PBB,,, | Performed by: UROLOGY

## 2023-02-24 PROCEDURE — 99999 PR PBB SHADOW E&M-EST. PATIENT-LVL III: CPT | Mod: PBBFAC,,, | Performed by: RADIOLOGY

## 2023-02-24 PROCEDURE — 99999 PR PBB SHADOW E&M-EST. PATIENT-LVL III: ICD-10-PCS | Mod: PBBFAC,,, | Performed by: RADIOLOGY

## 2023-02-24 PROCEDURE — 99999 PR PBB SHADOW E&M-EST. PATIENT-LVL III: ICD-10-PCS | Mod: PBBFAC,,, | Performed by: UROLOGY

## 2023-02-24 PROCEDURE — 99204 PR OFFICE/OUTPT VISIT, NEW, LEVL IV, 45-59 MIN: ICD-10-PCS | Mod: S$PBB,,, | Performed by: RADIOLOGY

## 2023-02-24 PROCEDURE — 99213 OFFICE O/P EST LOW 20 MIN: CPT | Mod: PBBFAC,PN | Performed by: UROLOGY

## 2023-02-24 PROCEDURE — 99204 OFFICE O/P NEW MOD 45 MIN: CPT | Mod: S$PBB,,, | Performed by: RADIOLOGY

## 2023-02-24 PROCEDURE — 99213 OFFICE O/P EST LOW 20 MIN: CPT | Mod: PBBFAC,27,PN | Performed by: RADIOLOGY

## 2023-02-24 NOTE — PROGRESS NOTES
Subjective:       Patient ID: Glenn Meedl is a 67 y.o. male.    Chief Complaint: No chief complaint on file.    HPI    67-year-old with elevated PSA of 5.1.  He underwent prostate needle biopsy.  Two of 12 cores are positive for grade group 2 prostate cancer.   He is seen today with his wife.  We discussed all treatment options for prostate cancer including radical prostatectomy, radiation therapy, androgen deprivation therapy, and active surveillance.  We discussed potential complications including the risk of incontinence and erectile dysfunction.  He is not sexually active now.  I answered all questions to his satisfaction.      TRUS volume:  86 ml  IPSS:    KEY: Not sexually active    1. PROSTATE, LEFT BASE, BIOPSY:   Prostatic adenocarcinoma, Kana grade 3+4=7 (25% pattern 4), involving 5%   total of 1/2 cores.   2. PROSTATE, LEFT MID, BIOPSY:   Prostatic adenocarcinoma, Chapman grade 3+3=6, involving 20% total of 1/2   cores.   3. PROSTATE, LEFT APEX, BIOPSY:   Benign prostatic tissue with atrophy.   Negative for malignancy.   4. PROSTATE, RIGHT BASE, BIOPSY:   Benign prostatic tissue with hyperplastic stromal nodules.   Negative for malignancy.   5. PROSTATE, RIGHT MID, BIOPSY:   Benign prostatic tissue with hyperplastic stromal nodule and basal cell   hyperplasia.   Negative for malignancy.   6. PROSTATE, RIGHT APEX, BIOPSY:   Benign prostatic tissue with basal cell hyperplasia and atrophy.   Negative for malignancy.     Review of Systems   Constitutional:  Negative for fever.   Genitourinary:  Negative for dysuria and hematuria.     Objective:      Physical Exam  Vitals reviewed.   Constitutional:       Appearance: He is well-developed.   Pulmonary:      Effort: Pulmonary effort is normal.   Skin:     Findings: No rash.   Neurological:      Mental Status: He is alert and oriented to person, place, and time.       Assessment:       1. Prostate cancer        Plan:       Prostate cancer      Treatment  options.  Leaning towards active surveillance.

## 2023-02-24 NOTE — PROGRESS NOTES
02/24/2023    [facility]   Radiation Oncology Consultation    Assessment   This is a 67 y.o. 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  He presents today to discuss definitve management with radiation therapy.        Plan     Treatment options were discussed with the patient including prostatectomy, surveillance, radiation therapy with or without hormone therapy.  We discussed the goals of treatment to be curative.  The risks, benefits, scheduling, alternatives to and rationale of radiation therapy were explained in detail.    Indication, course, and potential toxicities of pelvic radiation therapy reviewed in detail, including but not limited to fatigue, increased frequency, urgency, or pain with urination, increased frequency or urgency of bowel movements, diarrhea, pelvic bone fragility, erectile dysfunction, decreased volume of ejaculate, remote risk of secondary malignancy.   After this discussion, he elected to proceed with surveillance.    He will follow up with Dr. Donahue for q6 month PSA  Decipher score for risk stratification   He was given our contact information, and he was told that he could call our clinic at any time if he has any questions or concerns.      Radiation Treatment Details:   No Rt planned at this time         Chief Complaint   Patient presents with    Prostate Cancer         Oncology History    No history exists.       HPI: The patient is a 67 year old male with a diagnosis of prostate cancer.  He was noted to have an elevated PSA of 5.1 on 12/12/22.  Previous PSA history as follows:   Latest Reference Range & Units Most Recent 08/16/17 15:12 02/10/20 15:11 12/29/21 10:40 12/12/22 15:07   PSA, Screen 0.00 - 4.00 ng/mL 4.4 (H)  12/29/21 10:40   4.4 (H)    PSA Diagnostic 0.00 - 4.00 ng/mL 5.1 (H)  12/12/22 15:07 1.9 2.4  5.1 (H)   (H): Data is abnormally high    On 1/31/23 he underwent  TRUS-guided biopsy of the prostate, with pathology as follows:    Left Base: Group 2 (25% pattern 4) in 1 of 2 cores, 5% of tissue  Left mid: Group 1 in 1 of 2 cores, 20% of tissue      He presents today to discuss the potential role of radiation therapy in his cancer care.  AUA is 7, with +++frequency, +urgency, and nocturia x 0.  He reports KEY 17, mild to moderate erectile difficulty at baseline.     History of prior irradiation: No  History of prior systemic anti-cancer therapy: No  History of collagen vascular disease: No  Implanted electronic device (pacer/defib/nerve stimulator): No    Past Medical History:   Diagnosis Date    BPH (benign prostatic hyperplasia)     Cataract     done ou    Chronic kidney disease, stage 3 02/25/2015    DDD (degenerative disc disease), lumbar     s/p epidural steroids     Diabetes mellitus     type 2    Glaucoma     OU    HTN (hypertension)     Iritis, lens-induced 01/08/2018    Obesity     Parkinson's disease     Sleep apnea     uses cpap       Past Surgical History:   Procedure Laterality Date    CATARACT EXTRACTION W/  INTRAOCULAR LENS IMPLANT Left 11/30/2017    CATARACT EXTRACTION W/  INTRAOCULAR LENS IMPLANT Right 12/11/2018    Dr Garcia    CATARACT EXTRACTION W/  INTRAOCULAR LENS IMPLANT Right 12/11/2018    Procedure: EXTRACTION, CATARACT, WITH IOL INSERTION;  Surgeon: Damaris Garcia MD;  Location: [facility] OR;  Service: Ophthalmology;  Laterality: Right;    COLONOSCOPY N/A 9/18/2017    Procedure: COLONOSCOPY;  Surgeon: Paul Feliz Jr., MD;  Location: [facility] ENDO;  Service: Endoscopy;  Laterality: N/A;    COLONOSCOPY N/A 12/21/2022    Procedure: COLONOSCOPY;  Surgeon: Paul Feliz Jr., MD;  Location: [facility] ENDO;  Service: Endoscopy;  Laterality: N/A;    COLONOSCOPY W/ POLYPECTOMY  04/13/2010    YARELI.   One 1 cm polyp in the sigmoid colon.  Internal hemorrhoids.    COSMETIC SURGERY      DIGITAL RECTAL EXAMINATION UNDER ANESTHESIA N/A 7/23/2019    Procedure: EXAM  UNDER ANESTHESIA, DIGITAL, RECTUM;  Surgeon: Kaz Keating MD;  Location: [facility] OR;  Service: General;  Laterality: N/A;    EXAMINATION UNDER ANESTHESIA N/A 4/24/2019    Procedure: EXAM UNDER ANESTHESIA;  Surgeon: Kaz Keating MD;  Location: [facility] OR;  Service: General;  Laterality: N/A;    EXCISIONAL HEMORRHOIDECTOMY N/A 4/24/2019    Procedure: HEMORRHOIDECTOMY (lithotomy);  Surgeon: Kaz Keating MD;  Location: [facility] OR;  Service: General;  Laterality: N/A;    GANGLION CYST EXCISION Left     INJECTION OF ANESTHETIC AGENT AROUND PUDENDAL NERVE N/A 4/24/2019    Procedure: BLOCK, NERVE, PUDENDAL;  Surgeon: Kaz Keating MD;  Location: [facility] OR;  Service: General;  Laterality: N/A;    INJECTION OF ANESTHETIC AGENT AROUND PUDENDAL NERVE N/A 7/23/2019    Procedure: BLOCK, NERVE, PUDENDAL;  Surgeon: Kaz Keating MD;  Location: [facility] OR;  Service: General;  Laterality: N/A;    REPAIR OF RETINAL DETACHMENT WITH VITRECTOMY Right 8/1/2018    Procedure: REPAIR, RETINAL DETACHMENT, WITH VITRECTOMY;  Surgeon: SAJAN Pulido MD;  Location: [address];  Service: Ophthalmology;  Laterality: Right;  35 min    TONSILLECTOMY      TRANSRECTAL BIOPSY OF PROSTATE WITH ULTRASOUND GUIDANCE N/A 1/31/2023    Procedure: BIOPSY, PROSTATE, RECTAL APPROACH, WITH US GUIDANCE;  Surgeon: MARIZA Donahue MD;  [facility];  Service: Urology;  Laterality: N/A;    TRIGGER FINGER RELEASE Right     X 2    uro lift      for enlarged prostate       Social History     Tobacco Use    Smoking status: Never    Smokeless tobacco: Never   Substance Use Topics    Alcohol use: Yes     Comment: occasional, no alcohol 72 hours prior to sx    Drug use: No       Cancer-related family history includes Cancer in his father and mother.    Current Outpatient Medications on File Prior to Visit   Medication Sig Dispense Refill    allopurinoL (ZYLOPRIM) 300 MG tablet TAKE 1 TABLET(300 MG) BY MOUTH EVERY DAY 90 tablet 2    aspirin 81  MG Chew Take 81 mg by mouth once daily.      atorvastatin (LIPITOR) 20 MG tablet Take 1 tablet (20 mg total) by mouth once daily. 90 tablet 3    brimonidine 0.2% (ALPHAGAN) 0.2 % Drop INSTILL 1 DROP IN BOTH EYES TWICE DAILY 10 mL 3    carbidopa-levodopa  mg (SINEMET)  mg per tablet TAKE 1 AND 1/2 TABLETS BY MOUTH FOUR TIMES DAILY 180 tablet 11    cholecalciferol, vitamin D3, (D3-2000 ORAL) Take by mouth.      clonazePAM (KLONOPIN) 1 MG tablet Take 1.5 tablets (1.5 mg total) by mouth every evening. 45 tablet 5    dorzolamide (TRUSOPT) 2 % ophthalmic solution INSTILL 1 DROP IN BOTH EYES TWICE DAILY 10 mL 3    FLUoxetine 40 MG capsule Take 1 capsule (40 mg total) by mouth once daily. 90 capsule 3    furosemide (LASIX) 20 MG tablet Take 1 tablet (20 mg total) by mouth every morning. 90 tablet 3    irbesartan (AVAPRO) 75 MG tablet Take 1 tablet (75 mg total) by mouth once daily. 90 tablet 3    ketoconazole (NIZORAL) 2 % shampoo Apply topically.      metFORMIN (GLUCOPHAGE-XR) 500 MG ER 24hr tablet Take 1 tablet (500 mg total) by mouth once daily. 90 tablet 1    prazosin (MINIPRESS) 1 MG Cap Take 1 capsule (1 mg total) by mouth every evening. 90 capsule 3    ramelteon (ROZEREM) 8 mg tablet Take 8 mg by mouth every evening.      rasagiline (AZILECT) 1 mg Tab TAKE 1 TABLET(1 MG) BY MOUTH EVERY EVENING 30 tablet 12    semaglutide (RYBELSUS) 7 mg tablet Take 1 tablet (7 mg total) by mouth once daily. (Replaces Mounjaro. Start 2/13/23) 30 tablet 5    [DISCONTINUED] ramelteon (ROZEREM) 8 mg tablet Rozerem 8 mg tablet   Take 1 tablet every day by oral route.      [DISCONTINUED] sulfamethoxazole-trimethoprim 400-80mg (BACTRIM,SEPTRA) 400-80 mg per tablet Take 1 tablet by mouth 2 (two) times daily. Begin 1 day before the procedure 6 tablet 0     Current Facility-Administered Medications on File Prior to Visit   Medication Dose Route Frequency Provider Last Rate Last Admin    diphenhydrAMINE capsule 25 mg  25 mg Oral Q6H  "KATHRINE Richardson MD        metronidazole IVPB 500 mg  500 mg Intravenous Once Kaz Keating MD           Review of patient's allergies indicates:   Allergen Reactions    Avelox [moxifloxacin] Hives and Rash       Review of Systems   Constitutional:  Negative for chills and fever.   Eyes:  Negative for double vision.   Genitourinary:  Positive for frequency. Negative for dysuria and hematuria.   Musculoskeletal:  Positive for back pain, joint pain and neck pain.   Neurological:  Positive for tremors. Negative for headaches.      Vital Signs: /72 (BP Location: Left arm, Patient Position: Sitting)   Pulse 70   Temp 97.5 °F (36.4 °C) (Temporal)   Ht 5' 11" (1.803 m)   Wt 88 kg (194 lb 0.1 oz)   SpO2 97%   BMI 27.06 kg/m²     ECOG Performance Status: 0 - Fully Active    Physical Exam  Vitals and nursing note reviewed.   Constitutional:       General: He is not in acute distress.     Appearance: Normal appearance. He is not ill-appearing or toxic-appearing.   HENT:      Head: Normocephalic and atraumatic.      Nose: Nose normal.   Eyes:      Extraocular Movements: Extraocular movements intact.      Conjunctiva/sclera: Conjunctivae normal.      Pupils: Pupils are equal, round, and reactive to light.   Pulmonary:      Effort: Pulmonary effort is normal.   Chest:      Chest wall: Tenderness: slow/halting speech.   Musculoskeletal:         General: Normal range of motion.      Cervical back: Normal range of motion and neck supple.   Skin:     General: Skin is warm.   Neurological:      General: No focal deficit present.      Mental Status: He is alert and oriented to person, place, and time.   Psychiatric:         Mood and Affect: Mood normal.         Behavior: Behavior normal.         Thought Content: Thought content normal.         Judgment: Judgment normal.          Imaging: I have personally reviewed the patient's available images and reports and summarized pertinent findings above in HPI. "     Pathology: I have personally reviewed the patient's available pathology and summarized pertinent findings above in HPI.     This case was discussed with Dr. Donahue.      - Thank you for allowing me to participate in the care of your patient.    Britney Christianson MD

## 2023-03-01 ENCOUNTER — PATIENT MESSAGE (OUTPATIENT)
Dept: OPHTHALMOLOGY | Facility: CLINIC | Age: 68
End: 2023-03-01
Payer: MEDICARE

## 2023-03-01 ENCOUNTER — TELEPHONE (OUTPATIENT)
Dept: NEUROLOGY | Facility: CLINIC | Age: 68
End: 2023-03-01
Payer: MEDICARE

## 2023-03-01 NOTE — TELEPHONE ENCOUNTER
Called pt back to schedule 3 month follow up visit with Dr. Cee. He preferred an in person visit, so we scheduled for 05/11/23 at 1:20 PM

## 2023-03-02 ENCOUNTER — TELEPHONE (OUTPATIENT)
Dept: OPHTHALMOLOGY | Facility: CLINIC | Age: 68
End: 2023-03-02
Payer: MEDICARE

## 2023-03-13 ENCOUNTER — PATIENT MESSAGE (OUTPATIENT)
Dept: ADMINISTRATIVE | Facility: OTHER | Age: 68
End: 2023-03-13
Payer: MEDICARE

## 2023-03-17 LAB
FINAL PATHOLOGIC DIAGNOSIS: NORMAL
GROSS: NORMAL
Lab: NORMAL
SUPPLEMENTAL DIAGNOSIS: NORMAL

## 2023-03-28 ENCOUNTER — TELEPHONE (OUTPATIENT)
Dept: RADIATION ONCOLOGY | Facility: CLINIC | Age: 68
End: 2023-03-28
Payer: MEDICARE

## 2023-03-28 NOTE — TELEPHONE ENCOUNTER
Called to review low Decipher score.  Wishes to continue AS with Dr. Donahue.  All questions answered.

## 2023-04-06 ENCOUNTER — TELEPHONE (OUTPATIENT)
Dept: NEPHROLOGY | Facility: CLINIC | Age: 68
End: 2023-04-06
Payer: MEDICARE

## 2023-04-06 ENCOUNTER — OFFICE VISIT (OUTPATIENT)
Dept: URGENT CARE | Facility: CLINIC | Age: 68
End: 2023-04-06
Payer: MEDICARE

## 2023-04-06 VITALS
HEART RATE: 75 BPM | BODY MASS INDEX: 27.16 KG/M2 | HEIGHT: 71 IN | TEMPERATURE: 98 F | WEIGHT: 194 LBS | OXYGEN SATURATION: 97 % | RESPIRATION RATE: 18 BRPM | SYSTOLIC BLOOD PRESSURE: 107 MMHG | DIASTOLIC BLOOD PRESSURE: 61 MMHG

## 2023-04-06 DIAGNOSIS — M54.9 BACK PAIN, UNSPECIFIED BACK LOCATION, UNSPECIFIED BACK PAIN LATERALITY, UNSPECIFIED CHRONICITY: Primary | ICD-10-CM

## 2023-04-06 LAB
BILIRUB UR QL STRIP: NEGATIVE
GLUCOSE UR QL STRIP: NEGATIVE
KETONES UR QL STRIP: NEGATIVE
LEUKOCYTE ESTERASE UR QL STRIP: NEGATIVE
PH, POC UA: 5
POC BLOOD, URINE: NEGATIVE
POC NITRATES, URINE: NEGATIVE
PROT UR QL STRIP: NEGATIVE
SP GR UR STRIP: 1.02 (ref 1–1.03)
UROBILINOGEN UR STRIP-ACNC: NORMAL (ref 0.3–2.2)

## 2023-04-06 PROCEDURE — 72100 XR LUMBAR SPINE 2 OR 3 VIEWS: ICD-10-PCS | Mod: S$GLB,,, | Performed by: RADIOLOGY

## 2023-04-06 PROCEDURE — 81003 POCT URINALYSIS, DIPSTICK, AUTOMATED, W/O SCOPE: ICD-10-PCS | Mod: QW,S$GLB,, | Performed by: FAMILY MEDICINE

## 2023-04-06 PROCEDURE — 81003 URINALYSIS AUTO W/O SCOPE: CPT | Mod: QW,S$GLB,, | Performed by: FAMILY MEDICINE

## 2023-04-06 PROCEDURE — 74018 XR KUB: ICD-10-PCS | Mod: S$GLB,,, | Performed by: RADIOLOGY

## 2023-04-06 PROCEDURE — 74018 RADEX ABDOMEN 1 VIEW: CPT | Mod: S$GLB,,, | Performed by: RADIOLOGY

## 2023-04-06 PROCEDURE — 99214 PR OFFICE/OUTPT VISIT, EST, LEVL IV, 30-39 MIN: ICD-10-PCS | Mod: S$GLB,,, | Performed by: FAMILY MEDICINE

## 2023-04-06 PROCEDURE — 99214 OFFICE O/P EST MOD 30 MIN: CPT | Mod: S$GLB,,, | Performed by: FAMILY MEDICINE

## 2023-04-06 PROCEDURE — 72100 X-RAY EXAM L-S SPINE 2/3 VWS: CPT | Mod: S$GLB,,, | Performed by: RADIOLOGY

## 2023-04-06 RX ORDER — METHOCARBAMOL 500 MG/1
500 TABLET, FILM COATED ORAL 2 TIMES DAILY PRN
Qty: 30 TABLET | Refills: 0 | Status: SHIPPED | OUTPATIENT
Start: 2023-04-06 | End: 2023-04-16

## 2023-04-06 RX ORDER — METHYLPREDNISOLONE 4 MG/1
TABLET ORAL
Qty: 1 EACH | Refills: 0 | Status: SHIPPED | OUTPATIENT
Start: 2023-04-06 | End: 2023-04-18 | Stop reason: ALTCHOICE

## 2023-04-06 NOTE — PROGRESS NOTES
"Subjective:      Patient ID: Glenn Medel is a 67 y.o. male.    Vitals:  height is 5' 11" (1.803 m) and weight is 88 kg (194 lb). His oral temperature is 97.7 °F (36.5 °C). His blood pressure is 107/61 and his pulse is 75. His respiration is 18 and oxygen saturation is 97%.     Chief Complaint: Other Misc (i have been having a sharp pain in my lower right side, i am concerned it may be kidney stones.  I normally see DR Mcdermott, but have no idea of how to contact her.    Thanks  Glenn Medel - Entered by patient) and Flank Pain    This is a 67 y.o. male who presents today with a chief complaint of  right side pain x 4 days. Pt state he noticed his urine is darker than normal.    Flank Pain  This is a recurrent problem. The current episode started in the past 7 days. The problem occurs constantly. The problem is unchanged. The pain is present in the sacro-iliac. The quality of the pain is described as shooting. The pain does not radiate. The pain is at a severity of 6/10. The pain is moderate. The pain is The same all the time. The symptoms are aggravated by bending and lying down. Stiffness is present All day. Associated symptoms include dysuria. Pertinent negatives include no abdominal pain, bladder incontinence, bowel incontinence, chest pain, fever, headaches, leg pain, numbness, paresis, paresthesias, pelvic pain, perianal numbness, tingling, weakness or weight loss. He has tried nothing for the symptoms. The treatment provided no relief.     Constitution: Negative for fever.   Cardiovascular:  Negative for chest pain.   Gastrointestinal:  Negative for abdominal pain and bowel incontinence.   Genitourinary:  Positive for dysuria and flank pain. Negative for bladder incontinence and pelvic pain.   Neurological:  Negative for headaches and numbness.    Objective:     Physical Exam   Musculoskeletal:        Back:         Arms:       Comments: No rash appreciated on exam.  Discomfort with deep palpation to the " area marked.  Pain with straight leg raise on right      Assessment:     1. Back pain, unspecified back location, unspecified back pain laterality, unspecified chronicity        Plan:   Patient has tried Salonpas without significant relief.  Has not tried any Tylenol or ibuprofen based upon previous recommendations from his physicians not to take.  However advised patient to reach out to primary care and Nephrology to see what is the maximum dose he can take per day for future issues.  Patient initially thought that this was a musculoskeletal problem but as he did not getting relief from Salonpas and time in addition to notice in his urine darker today wonder if this was a stone.  Advised there was no blood on his urinalysis however is not indicative of the absence of a stone and advised if symptoms persisted or worsened he went have to go to the ER for a further workup.  Advised can assess this as possible musculoskeletal so will try anti-inflammatories and muscle relaxers with further precautions given if symptoms change or worsen..  Patient care plan.    Back pain, unspecified back location, unspecified back pain laterality, unspecified chronicity  -     POCT Urinalysis, Dipstick, Automated, W/O Scope      Results for orders placed or performed in visit on 04/06/23   POCT Urinalysis, Dipstick, Automated, W/O Scope   Result Value Ref Range    POC Blood, Urine Negative Negative    POC Bilirubin, Urine Negative Negative    POC Urobilinogen, Urine norm 0.3 - 2.2    POC Ketones, Urine Negative Negative    POC Protein, Urine Negative Negative    POC Nitrates, Urine Negative Negative    POC Glucose, Urine Negative Negative    pH, UA 5.0     POC Specific Gravity, Urine 1.020 1.003 - 1.029    POC Leukocytes, Urine Negative Negative     *Note: Due to a large number of results and/or encounters for the requested time period, some results have not been displayed. A complete set of results can be found in Results Review.        XR LUMBAR SPINE 2 OR 3 VIEWS    Result Date: 4/6/2023  EXAMINATION: XR LUMBAR SPINE 2 OR 3 VIEWS CLINICAL HISTORY: Dorsalgia, unspecified TECHNIQUE: AP and lateral COMPARISON: None FINDINGS: There are degenerative changes of the lumbar spine.  Vertebral body height is maintained.  The adjacent soft tissues are unremarkable.     There are degenerative changes of the lumbar spine. Electronically signed by: dIa Crow MD Date:    04/06/2023 Time:    10:27    XR KUB    Result Date: 4/6/2023  EXAMINATION: XR KUB CLINICAL HISTORY: Dorsalgia, unspecified TECHNIQUE: Single AP supine view of the abdomen (KUB) was performed COMPARISON: None FINDINGS: There is prominent amount of stool noted in the colon.  Small bowel dilatation or air-fluid levels are not seen.  The psoas shadows are sharp.  No free air is seen.  No masses are identified.  Metal devices in the prostate are consistent with prior Uro lift procedure.     Constipation.  Prior Uro lift procedure otherwise negative abdomen. Electronically signed by: Alfredo Parr MD Date:    04/06/2023 Time:    10:20

## 2023-04-06 NOTE — TELEPHONE ENCOUNTER
----- Message from Arlen Woods sent at 4/6/2023  9:06 AM CDT -----  Contact:   Type:  Needs Medical Advice    Who Called:   Symptoms (please be specific): Pain, dark urine   How long has patient had these symptoms: 4 days  Pharmacy name and phone #:    [facility] #[id number] - [facility] [address] AT [facility] OF [facility] [address]  [address] [facility] [address]  Phone: [phone] Fax: [phone]  Would the patient rather a call back or a response via [facility]? Call  Best Call Back Number:  [phone]  Additional Information: Patient is currently at Urgent care in [place]

## 2023-04-06 NOTE — TELEPHONE ENCOUNTER
Patient was called back and left message to follow up with PCP from urgent care today for painful, dark urine.

## 2023-04-14 ENCOUNTER — OFFICE VISIT (OUTPATIENT)
Dept: OPHTHALMOLOGY | Facility: CLINIC | Age: 68
End: 2023-04-14
Payer: MEDICARE

## 2023-04-14 DIAGNOSIS — H43.821 VITREOMACULAR ADHESION, RIGHT: ICD-10-CM

## 2023-04-14 DIAGNOSIS — H35.371 EPIRETINAL MEMBRANE, RIGHT: ICD-10-CM

## 2023-04-14 DIAGNOSIS — H35.052 NEOVASCULARIZATION OF OPTIC DISC OF LEFT EYE: Primary | ICD-10-CM

## 2023-04-14 PROCEDURE — 99999 PR PBB SHADOW E&M-EST. PATIENT-LVL III: ICD-10-PCS | Mod: PBBFAC,,, | Performed by: OPHTHALMOLOGY

## 2023-04-14 PROCEDURE — 92134 CPTRZ OPH DX IMG PST SGM RTA: CPT | Mod: PBBFAC | Performed by: OPHTHALMOLOGY

## 2023-04-14 PROCEDURE — 99213 OFFICE O/P EST LOW 20 MIN: CPT | Mod: PBBFAC | Performed by: OPHTHALMOLOGY

## 2023-04-14 PROCEDURE — 99214 OFFICE O/P EST MOD 30 MIN: CPT | Mod: S$PBB,,, | Performed by: OPHTHALMOLOGY

## 2023-04-14 PROCEDURE — 99214 PR OFFICE/OUTPT VISIT, EST, LEVL IV, 30-39 MIN: ICD-10-PCS | Mod: S$PBB,,, | Performed by: OPHTHALMOLOGY

## 2023-04-14 PROCEDURE — 99999 PR PBB SHADOW E&M-EST. PATIENT-LVL III: CPT | Mod: PBBFAC,,, | Performed by: OPHTHALMOLOGY

## 2023-04-14 PROCEDURE — 92134 POSTERIOR SEGMENT OCT RETINA (OCULAR COHERENCE TOMOGRAPHY)-BOTH EYES: ICD-10-PCS | Mod: 26,S$PBB,, | Performed by: OPHTHALMOLOGY

## 2023-04-14 NOTE — PROGRESS NOTES
HPI    Ref by Dr Pulido    Dense vitreous opacities OU with [facility]  s/p 25g PPV/partial AFx OS for vitreous opacities/[facility] OS 11/15/17  s/p 25g PPV OS for retained lens material OS 12/6/17  s/p 25g PPV/partial AFx OD for vitreous opacities and [facility] OD 7/1/18  PCIOL OD  DM  Controlled No DR  BS/BP/chol control  Ok for yearly DFE during optometry visit  HTN Ret OU  POAG    Gtts: Brimonidine, Dorzolamide BID OU    Pt states that vision in OD has improved after the yag. Pt states that OS   vision is blurry like the OD was before the yag. Pt denies eye pain and   flashes. Pt does have floaters in OS.     Last edited by Damaris Garcia MD on 4/14/2023  9:14 AM.            Assessment /Plan     For exam results, see Encounter Report.             Dense vitreous opacities OU with [facility]  s/p 25g PPV/partial AFx OS for vitreous opacities/[facility] OS 11/15/17  s/p 25g PPV OS for retained lens material OS 12/6/17  s/p 25g PPV/partial AFx OD for vitreous opacities and [facility] OD 7/1/18  PCIOL OD  DM  Controlled No DR  BS/BP/chol control  Ok for yearly DFE during optometry visit  HTN Ret OU  POAG    Pt notes blurry VA OS x 1 mo    Open PC, no PCO.    IOL in good position    OCT mac wnl    ON still with collateral BV - old RVO? Retina for eval.

## 2023-04-17 ENCOUNTER — TELEPHONE (OUTPATIENT)
Dept: NEUROLOGY | Facility: CLINIC | Age: 68
End: 2023-04-17
Payer: MEDICARE

## 2023-04-18 ENCOUNTER — OFFICE VISIT (OUTPATIENT)
Dept: FAMILY MEDICINE | Facility: CLINIC | Age: 68
End: 2023-04-18
Payer: MEDICARE

## 2023-04-18 VITALS
SYSTOLIC BLOOD PRESSURE: 104 MMHG | BODY MASS INDEX: 27.72 KG/M2 | HEIGHT: 71 IN | HEART RATE: 72 BPM | DIASTOLIC BLOOD PRESSURE: 70 MMHG | OXYGEN SATURATION: 99 % | WEIGHT: 198 LBS

## 2023-04-18 DIAGNOSIS — N18.2 TYPE 2 DIABETES MELLITUS WITH STAGE 2 CHRONIC KIDNEY DISEASE, WITHOUT LONG-TERM CURRENT USE OF INSULIN: ICD-10-CM

## 2023-04-18 DIAGNOSIS — G63 POLYNEUROPATHY ASSOCIATED WITH UNDERLYING DISEASE: ICD-10-CM

## 2023-04-18 DIAGNOSIS — K59.00 CONSTIPATION, UNSPECIFIED CONSTIPATION TYPE: ICD-10-CM

## 2023-04-18 DIAGNOSIS — E11.22 TYPE 2 DIABETES MELLITUS WITH STAGE 2 CHRONIC KIDNEY DISEASE, WITHOUT LONG-TERM CURRENT USE OF INSULIN: ICD-10-CM

## 2023-04-18 DIAGNOSIS — M54.50 ACUTE RIGHT-SIDED LOW BACK PAIN WITHOUT SCIATICA: Primary | ICD-10-CM

## 2023-04-18 DIAGNOSIS — J84.9 ILD (INTERSTITIAL LUNG DISEASE): ICD-10-CM

## 2023-04-18 PROCEDURE — 99214 PR OFFICE/OUTPT VISIT, EST, LEVL IV, 30-39 MIN: ICD-10-PCS | Mod: S$PBB,,, | Performed by: PHYSICIAN ASSISTANT

## 2023-04-18 PROCEDURE — 99214 OFFICE O/P EST MOD 30 MIN: CPT | Mod: PBBFAC,PO | Performed by: PHYSICIAN ASSISTANT

## 2023-04-18 PROCEDURE — 99214 OFFICE O/P EST MOD 30 MIN: CPT | Mod: S$PBB,,, | Performed by: PHYSICIAN ASSISTANT

## 2023-04-18 PROCEDURE — 99999 PR PBB SHADOW E&M-EST. PATIENT-LVL IV: ICD-10-PCS | Mod: PBBFAC,,, | Performed by: PHYSICIAN ASSISTANT

## 2023-04-18 PROCEDURE — 99999 PR PBB SHADOW E&M-EST. PATIENT-LVL IV: CPT | Mod: PBBFAC,,, | Performed by: PHYSICIAN ASSISTANT

## 2023-04-18 NOTE — PATIENT INSTRUCTIONS
Take 1/2 capful of miralax in a tall glass of water daily.  Use a heating pad on back.    Thanks for seeing me,  Karena Anglin PA-C

## 2023-04-18 NOTE — PROGRESS NOTES
"Subjective:      Patient ID: Glenn Medel is a 67 y.o. male.    Chief Complaint: Pain (Right side /2 weeks )    HPI  Patient has PMH of parkinsonism, interstitial lung disease, HTN, HLD, BPH, Type 2 DM, gout, and LOVE.    Patient reports intermittent shooting right back pain to right abdomen for two weeks.  Had this in the past in the same area with bulging disc and had to burn the nerve.  No injury or fall noted.    Patient went to Urgent Care 4/6/2023 and treated with medrol dosepack and methocarbamol.  This treatment did not help.  Urine was clear.  Lumbar spine shows DDD.  Has not taken tylenol or advil.    Blood sugar was 138 this morning.  Lab Results   Component Value Date    HGBA1C 6.7 (H) 12/12/2022      Review of Systems   Constitutional:  Positive for appetite change (baseline). Negative for chills and fever.   Respiratory:  Positive for shortness of breath (when laying down).    Cardiovascular:  Negative for chest pain and leg swelling.   Gastrointestinal:  Positive for abdominal pain and constipation (last BM Sunday). Negative for diarrhea, nausea and vomiting.       Objective:   /70   Pulse 72   Ht 5' 11" (1.803 m)   Wt 89.8 kg (197 lb 15.6 oz)   SpO2 99%   BMI 27.61 kg/m²     Physical Exam  Vitals reviewed.   Constitutional:       Appearance: Normal appearance. He is well-developed.   HENT:      Head: Normocephalic and atraumatic.      Right Ear: External ear normal.      Left Ear: External ear normal.   Eyes:      Conjunctiva/sclera: Conjunctivae normal.   Cardiovascular:      Rate and Rhythm: Normal rate and regular rhythm.      Heart sounds: Normal heart sounds. No murmur heard.    No friction rub. No gallop.   Pulmonary:      Effort: Pulmonary effort is normal. No respiratory distress.      Breath sounds: Normal breath sounds. No wheezing, rhonchi or rales.   Abdominal:      Palpations: Abdomen is soft.      Tenderness: There is no abdominal tenderness. There is no right CVA " tenderness or left CVA tenderness.   Musculoskeletal:         General: Normal range of motion.      Cervical back: No bony tenderness.      Thoracic back: No bony tenderness.      Lumbar back: Tenderness (right-sided) present. No bony tenderness. Negative right straight leg raise test and negative left straight leg raise test.   Skin:     General: Skin is warm and dry.      Findings: No rash.   Neurological:      General: No focal deficit present.      Mental Status: He is alert and oriented to person, place, and time.   Psychiatric:         Mood and Affect: Mood normal.         Behavior: Behavior normal.         Judgment: Judgment normal.     Assessment:      1. Acute right-sided low back pain without sciatica    2. Constipation, unspecified constipation type    3. Type 2 diabetes mellitus with stage 2 chronic kidney disease, without long-term current use of insulin    4. ILD (interstitial lung disease)    5. Polyneuropathy associated with underlying disease       Plan:   1. Acute right-sided low back pain without sciatica  - Ambulatory referral/consult to Pain Clinic; Future    2. Constipation, unspecified constipation type  Take 1/2 capful of miralax in a tall glass of water daily.  Use a heating pad on back.    3. Type 2 diabetes mellitus with stage 2 chronic kidney disease, without long-term current use of insulin  Controlled.  Dr. Mcdermott manages this.    4. ILD (interstitial lung disease)  Stable.    5. Polyneuropathy associated with underlying disease  Stable.    Follow up in 3 months with Dr. Benton.  Patient agreed with plan and expressed understanding.  I spent 30 minutes on this encounter, time includes face-to-face, chart review, documentation, test review and orders.    Thank you for allowing me to serve you,

## 2023-04-27 ENCOUNTER — PATIENT MESSAGE (OUTPATIENT)
Dept: FAMILY MEDICINE | Facility: CLINIC | Age: 68
End: 2023-04-27
Payer: MEDICARE

## 2023-04-27 ENCOUNTER — PATIENT MESSAGE (OUTPATIENT)
Dept: NEUROSURGERY | Facility: CLINIC | Age: 68
End: 2023-04-27
Payer: MEDICARE

## 2023-05-04 ENCOUNTER — OFFICE VISIT (OUTPATIENT)
Dept: PAIN MEDICINE | Facility: CLINIC | Age: 68
End: 2023-05-04
Payer: MEDICARE

## 2023-05-04 VITALS
BODY MASS INDEX: 27.24 KG/M2 | SYSTOLIC BLOOD PRESSURE: 130 MMHG | DIASTOLIC BLOOD PRESSURE: 66 MMHG | HEART RATE: 63 BPM | WEIGHT: 194.56 LBS | HEIGHT: 71 IN

## 2023-05-04 DIAGNOSIS — M47.816 LUMBAR SPONDYLOSIS: Primary | ICD-10-CM

## 2023-05-04 DIAGNOSIS — M54.9 DORSALGIA, UNSPECIFIED: ICD-10-CM

## 2023-05-04 PROCEDURE — 99214 OFFICE O/P EST MOD 30 MIN: CPT | Mod: PBBFAC,PN | Performed by: ANESTHESIOLOGY

## 2023-05-04 PROCEDURE — 99204 OFFICE O/P NEW MOD 45 MIN: CPT | Mod: S$PBB,,, | Performed by: ANESTHESIOLOGY

## 2023-05-04 PROCEDURE — 99999 PR PBB SHADOW E&M-EST. PATIENT-LVL IV: ICD-10-PCS | Mod: PBBFAC,,, | Performed by: ANESTHESIOLOGY

## 2023-05-04 PROCEDURE — 99999 PR PBB SHADOW E&M-EST. PATIENT-LVL IV: CPT | Mod: PBBFAC,,, | Performed by: ANESTHESIOLOGY

## 2023-05-04 PROCEDURE — 99204 PR OFFICE/OUTPT VISIT, NEW, LEVL IV, 45-59 MIN: ICD-10-PCS | Mod: S$PBB,,, | Performed by: ANESTHESIOLOGY

## 2023-05-04 NOTE — PROGRESS NOTES
[facility] New Patient Evaluation      Referred by: No ref. provider found    PCP:     CC:   Chief Complaint   Patient presents with    Low-back Pain     Has been going on for 3wks now.       No flowsheet data found.      HPI:   Glenn Medel is a 67 y.o. male who presents with back pain.  He has a history of chronic lower back pain but over the past month it has been gradually worsening.  Today he reports his pain is 7/10, intermittent, aching, sharp, throbbing, shooting.  He gets some radiating pain around the sides of the lower flanks around the lumbar area.  His pain is worse with sitting, bending, lifting and relieved with rest and lying down.  He denies any numbness or weakness.  No oliver radicular symptoms.      Pain Intervention History:      Past Spine Surgical History:      Past and current medications:  Antineuropathics:  NSAIDs:  Physical therapy:  Antidepressants:  Muscle relaxers:  Opioids:  Antiplatelets/Anticoagulants:    History:    Current Outpatient Medications:     allopurinoL (ZYLOPRIM) 300 MG tablet, TAKE 1 TABLET(300 MG) BY MOUTH EVERY DAY, Disp: 90 tablet, Rfl: 2    aspirin 81 MG Chew, Take 81 mg by mouth once daily., Disp: , Rfl:     atorvastatin (LIPITOR) 20 MG tablet, Take 1 tablet (20 mg total) by mouth once daily., Disp: 90 tablet, Rfl: 3    brimonidine 0.2% (ALPHAGAN) 0.2 % Drop, INSTILL 1 DROP IN BOTH EYES TWICE DAILY, Disp: 10 mL, Rfl: 3    carbidopa-levodopa  mg (SINEMET)  mg per tablet, Take 1.5 tablets by mouth 5 (five) times daily., Disp: 225 tablet, Rfl: 11    cholecalciferol, vitamin D3, (D3-2000 ORAL), Take by mouth., Disp: , Rfl:     clonazePAM (KLONOPIN) 1 MG tablet, Take 1.5 tablets (1.5 mg total) by mouth every evening., Disp: 45 tablet, Rfl: 5    dorzolamide (TRUSOPT) 2 % ophthalmic solution, INSTILL 1 DROP IN BOTH EYES TWICE DAILY, Disp: 10 mL, Rfl: 3    FLUoxetine 40 MG capsule, Take 1 capsule (40 mg total) by mouth once daily., Disp: 90 capsule,  Rfl: 3    furosemide (LASIX) 20 MG tablet, Take 1 tablet (20 mg total) by mouth every morning., Disp: 90 tablet, Rfl: 3    irbesartan (AVAPRO) 75 MG tablet, Take 1 tablet (75 mg total) by mouth once daily., Disp: 90 tablet, Rfl: 3    ketoconazole (NIZORAL) 2 % shampoo, Apply topically., Disp: , Rfl:     metFORMIN (GLUCOPHAGE-XR) 500 MG ER 24hr tablet, Take 1 tablet (500 mg total) by mouth once daily., Disp: 90 tablet, Rfl: 1    prazosin (MINIPRESS) 1 MG Cap, Take 1 capsule (1 mg total) by mouth every evening., Disp: 90 capsule, Rfl: 3    ramelteon (ROZEREM) 8 mg tablet, Take 8 mg by mouth every evening., Disp: , Rfl:     rasagiline (AZILECT) 1 mg Tab, TAKE 1 TABLET(1 MG) BY MOUTH EVERY EVENING, Disp: 30 tablet, Rfl: 12    semaglutide (RYBELSUS) 7 mg tablet, Take 1 tablet (7 mg total) by mouth once daily. (Replaces Mounjaro. Start 2/13/23), Disp: 30 tablet, Rfl: 5    Current Facility-Administered Medications:     metronidazole IVPB 500 mg, 500 mg, Intravenous, Once, Kaz Keating MD    Facility-Administered Medications Ordered in Other Visits:     diphenhydrAMINE capsule 25 mg, 25 mg, Oral, Q6H PRN, Leonila Richardson MD    Past Medical History:   Diagnosis Date    BPH (benign prostatic hyperplasia)     Cataract     done ou    Chronic kidney disease, stage 3 02/25/2015    DDD (degenerative disc disease), lumbar     s/p epidural steroids     Diabetes mellitus     type 2    Glaucoma     OU    HTN (hypertension)     Iritis, lens-induced 01/08/2018    Obesity     Parkinson's disease     Sleep apnea     uses cpap       Past Surgical History:   Procedure Laterality Date    CATARACT EXTRACTION W/  INTRAOCULAR LENS IMPLANT Left 11/30/2017    CATARACT EXTRACTION W/  INTRAOCULAR LENS IMPLANT Right 12/11/2018    Dr Garcia    CATARACT EXTRACTION W/  INTRAOCULAR LENS IMPLANT Right 12/11/2018    Procedure: EXTRACTION, CATARACT, WITH IOL INSERTION;  Surgeon: Damaris Garcia MD;  Location: [facility] OR;  Service: Ophthalmology;   Laterality: Right;    COLONOSCOPY N/A 9/18/2017    Procedure: COLONOSCOPY;  Surgeon: Paul Feliz Jr., MD;  Location: [facility] ENDO;  Service: Endoscopy;  Laterality: N/A;    COLONOSCOPY N/A 12/21/2022    Procedure: COLONOSCOPY;  Surgeon: Paul Feliz Jr., MD;  Location: [facility] ENDO;  Service: Endoscopy;  Laterality: N/A;    COLONOSCOPY W/ POLYPECTOMY  04/13/2010    YARELI.   One 1 cm polyp in the sigmoid colon.  Internal hemorrhoids.    COSMETIC SURGERY      DIGITAL RECTAL EXAMINATION UNDER ANESTHESIA N/A 7/23/2019    Procedure: EXAM UNDER ANESTHESIA, DIGITAL, RECTUM;  Surgeon: Kaz Keating MD;  Location: [facility] OR;  Service: General;  Laterality: N/A;    EXAMINATION UNDER ANESTHESIA N/A 4/24/2019    Procedure: EXAM UNDER ANESTHESIA;  Surgeon: Kaz Keating MD;  Location: [facility] OR;  Service: General;  Laterality: N/A;    EXCISIONAL HEMORRHOIDECTOMY N/A 4/24/2019    Procedure: HEMORRHOIDECTOMY (lithotomy);  Surgeon: Kaz Keating MD;  Location: [facility] OR;  Service: General;  Laterality: N/A;    GANGLION CYST EXCISION Left     INJECTION OF ANESTHETIC AGENT AROUND PUDENDAL NERVE N/A 4/24/2019    Procedure: BLOCK, NERVE, PUDENDAL;  Surgeon: Kaz Keating MD;  Location: [facility] OR;  Service: General;  Laterality: N/A;    INJECTION OF ANESTHETIC AGENT AROUND PUDENDAL NERVE N/A 7/23/2019    Procedure: BLOCK, NERVE, PUDENDAL;  Surgeon: Kaz eKating MD;  Location: [facility] OR;  Service: General;  Laterality: N/A;    REPAIR OF RETINAL DETACHMENT WITH VITRECTOMY Right 8/1/2018    Procedure: REPAIR, RETINAL DETACHMENT, WITH VITRECTOMY;  Surgeon: SAJAN Pulido MD;  Location: [address];  Service: Ophthalmology;  Laterality: Right;  35 min    TONSILLECTOMY      TRANSRECTAL BIOPSY OF PROSTATE WITH ULTRASOUND GUIDANCE N/A 1/31/2023    Procedure: BIOPSY, PROSTATE, RECTAL APPROACH, WITH US GUIDANCE;  Surgeon: MARIZA Donahue MD;  Location: [facility] OR;  Service: Urology;  Laterality: N/A;    TRIGGER FINGER  RELEASE Right     X 2    uro lift      for enlarged prostate       Family History   Problem Relation Age of Onset    Heart disease Brother 62    Kidney disease Brother     Heart disease Father     Diabetes Father     Colon cancer Father     Cancer Father         colon    Cataracts Father     Hypertension Mother     Diabetes Mother     Stroke Mother     Cancer Mother         colon    Kidney disease Mother     Glaucoma Maternal Grandmother     No Known Problems Sister     No Known Problems Maternal Aunt     No Known Problems Maternal Uncle     No Known Problems Paternal Aunt     No Known Problems Paternal Uncle     No Known Problems Maternal Grandfather     No Known Problems Paternal Grandmother     No Known Problems Paternal Grandfather     Dementia Brother     Abnormal EKG Brother     Amblyopia Neg Hx     Blindness Neg Hx     Macular degeneration Neg Hx     Retinal detachment Neg Hx     Strabismus Neg Hx     Thyroid disease Neg Hx     Parkinsonism Neg Hx        Social History     Socioeconomic History    Marital status:    Tobacco Use    Smoking status: Never    Smokeless tobacco: Never   Substance and Sexual Activity    Alcohol use: Yes     Comment: occasional, no alcohol 72 hours prior to sx    Drug use: No    Sexual activity: Yes     Partners: Female   Social History Narrative            2 grown kids.         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      Social Determinants of Health     Financial Resource Strain: Low Risk     Difficulty of Paying Living Expenses: Not hard at all   Food Insecurity: No Food Insecurity    Worried About Running Out of Food in the Last Year: Never true    Ran Out of Food in the Last Year: Never true   Transportation Needs: No Transportation Needs    Lack of Transportation (Medical): No    Lack of Transportation (Non-Medical): No   Physical Activity: Sufficiently Active    Days of Exercise per Week: 3 days    Minutes of Exercise per Session: 70  "min   Stress: Stress Concern Present    Feeling of Stress : To some extent   Social Connections: Unknown    Frequency of Communication with Friends and Family: More than three times a week    Frequency of Social Gatherings with Friends and Family: Patient refused    Active Member of Clubs or Organizations: No    Attends Club or Organization Meetings: Never    Marital Status:    Housing Stability: Low Risk     Unable to Pay for Housing in the Last Year: No    Number of Places Lived in the Last Year: 1    Unstable Housing in the Last Year: No       Review of patient's allergies indicates:   Allergen Reactions    Avelox [moxifloxacin] Hives and Rash       Review of Systems:  12 point review of systems is negative.    Physical Exam:  Vitals:    05/04/23 0949   BP: 130/66   Pulse: 63   Weight: 88.3 kg (194 lb 8.9 oz)   Height: 5' 11" (1.803 m)   PainSc:   9     Body mass index is 27.14 kg/m².    Gen: NAD  Psych: mood appropriate for given condition  HEENT: eyes anicteric   CV: RRR  HEENT: anicteric   Respiratory: non-labored, no signs of respiratory distress  Abd: non-distended  Skin: warm, dry and intact.  Gait: No antalgic gait.     Pain with lumbar axial facet loading    Sensory:  Intact and symmetrical to light touch in L1-S1 dermatomes bilaterally.    Motor:       Right Left   L2/3 Iliacus Hip flexion  5  5   L3/4 Qudratus Femoris Knee Extension  5  5   L4/5 Tib Anterior Ankle Dorsiflexion   5  5   L5/S1 Extensor Hallicus Longus Great toe extension  5  5   S1/S2 Gastroc/Soleus Plantar Flexion  5  5      Right Left                  Patellar DTR 2+ 2+   Achilles DTR 2+ 2+                      Imaging:  Xray lumbar spine 4/6/23  FINDINGS:  There are degenerative changes of the lumbar spine.  Vertebral body height is maintained.  The adjacent soft tissues are unremarkable.    Labs:  Lab Results   Component Value Date    HGBA1C 6.7 (H) 12/12/2022       Lab Results   Component Value Date    WBC 8.14 09/28/2022    " HGB 15.1 09/28/2022    HCT 48.1 09/28/2022    MCV 92 09/28/2022     09/28/2022       Assessment:   Problem List Items Addressed This Visit    None  Visit Diagnoses       Lumbar spondylosis    -  Primary    Dorsalgia, unspecified        Relevant Orders    MRI Lumbar Spine Without Contrast              67 y.o. year old male with PMH parkinsonism, HTN, BPH, CKD who presents with back pain.  He has a history of chronic lower back pain but over the past month it has been gradually worsening.  Today he reports his pain is 7/10, intermittent, aching, sharp, throbbing, shooting.  He gets some radiating pain around the sides of the lower flanks around the lumbar area.  His pain is worse with sitting, bending, lifting and relieved with rest and lying down.  He denies any numbness or weakness.  No oliver radicular symptoms.    - on exam he is neurologically intact.  He has reproducible pain with lumbar axial facet loading  - is x-ray of lumbar spine is consistent with multilevel bilateral degenerative changes and facet arthropathy  - he reports he is had similar back pain about 5 years ago and had epidural injections without significant relief.  He then reports he had lumbar rhizotomy with near complete relief of his symptoms.  He reports his symptoms today are similar to how they previously were prior to the rhizotomy  - He reports that he has previously been maintaining home exercise program but his pain right now is too significant to currently participate.  - I would like to get an updated lumbar MRI to better review his neural anatomy for any interval changes.  Were also going to get the previous records of his prior lumbar rhizotomy.  Once I review the MRI will give him a call and discuss the most reasonable treatment option.      : Not applicable    Everett Taveras M.D.  Interventional Pain Medicine / Anesthesiology    This note was completed with dictation software and grammatical errors may exist.

## 2023-05-05 ENCOUNTER — PATIENT MESSAGE (OUTPATIENT)
Dept: NEUROLOGY | Facility: CLINIC | Age: 68
End: 2023-05-05
Payer: MEDICARE

## 2023-05-05 ENCOUNTER — PATIENT MESSAGE (OUTPATIENT)
Dept: NEUROSURGERY | Facility: CLINIC | Age: 68
End: 2023-05-05
Payer: MEDICARE

## 2023-05-05 ENCOUNTER — TELEPHONE (OUTPATIENT)
Dept: NEUROSURGERY | Facility: CLINIC | Age: 68
End: 2023-05-05
Payer: MEDICARE

## 2023-05-05 DIAGNOSIS — G20.C PARKINSONISM, UNSPECIFIED PARKINSONISM TYPE: ICD-10-CM

## 2023-05-05 DIAGNOSIS — G93.0 ARACHNOID CYST: Primary | ICD-10-CM

## 2023-05-05 RX ORDER — CARBIDOPA AND LEVODOPA 25; 100 MG/1; MG/1
2 TABLET ORAL
Qty: 300 TABLET | Refills: 11 | Status: SHIPPED | OUTPATIENT
Start: 2023-05-05 | End: 2024-01-23

## 2023-05-08 ENCOUNTER — PATIENT MESSAGE (OUTPATIENT)
Dept: PAIN MEDICINE | Facility: CLINIC | Age: 68
End: 2023-05-08
Payer: MEDICARE

## 2023-05-08 ENCOUNTER — PATIENT MESSAGE (OUTPATIENT)
Dept: NEUROLOGY | Facility: CLINIC | Age: 68
End: 2023-05-08
Payer: MEDICARE

## 2023-05-08 ENCOUNTER — TELEPHONE (OUTPATIENT)
Dept: NEUROSURGERY | Facility: CLINIC | Age: 68
End: 2023-05-08
Payer: MEDICARE

## 2023-05-08 NOTE — TELEPHONE ENCOUNTER
Returned call, spoke w pt who wants the 5/18 mri rescheduled to 5/16 when he has an appt for another another MRI to be done on the same day. There are no appts available that day. I offered to reschedule them both on the same day but pt did not want that. I explained that I only have access to the schedule provided to me. Provided pt with the number to [facility] to inquire as they may be able to.      ----- Message from Chayo Boone PharmD sent at 5/8/2023  2:30 PM CDT -----  Regarding: MRI appointments  Patient has appointments for spinal MRI 5/16 and brain MRI 5/18. He is asking if the procedures could be combined into one appointment. Please advise him. Thanks

## 2023-05-10 NOTE — TELEPHONE ENCOUNTER
Called pt to get him rescheduled from 05/11/23 since Dr. Cee would not be in clinic that afternoon. We rescheduled virtually on Monday 05/15/23 at 9:40 AM. Appt on hold, will schedule in soon.

## 2023-05-15 ENCOUNTER — PATIENT MESSAGE (OUTPATIENT)
Dept: NEUROLOGY | Facility: CLINIC | Age: 68
End: 2023-05-15

## 2023-05-15 ENCOUNTER — OFFICE VISIT (OUTPATIENT)
Dept: NEUROLOGY | Facility: CLINIC | Age: 68
End: 2023-05-15
Payer: MEDICARE

## 2023-05-15 DIAGNOSIS — G47.52 REM SLEEP BEHAVIOR DISORDER: ICD-10-CM

## 2023-05-15 DIAGNOSIS — G20.C PARKINSONISM, UNSPECIFIED PARKINSONISM TYPE: Chronic | ICD-10-CM

## 2023-05-15 PROCEDURE — 99215 OFFICE O/P EST HI 40 MIN: CPT | Mod: 95,,, | Performed by: PSYCHIATRY & NEUROLOGY

## 2023-05-15 PROCEDURE — 99215 PR OFFICE/OUTPT VISIT, EST, LEVL V, 40-54 MIN: ICD-10-PCS | Mod: 95,,, | Performed by: PSYCHIATRY & NEUROLOGY

## 2023-05-15 RX ORDER — CLONAZEPAM 1 MG/1
2 TABLET ORAL NIGHTLY
Qty: 45 TABLET | Refills: 5 | Status: SHIPPED | OUTPATIENT
Start: 2023-05-15 | End: 2023-06-02 | Stop reason: SDUPTHER

## 2023-05-15 RX ORDER — CLONAZEPAM 1 MG/1
2 TABLET ORAL NIGHTLY
Qty: 45 TABLET | Refills: 5 | OUTPATIENT
Start: 2023-05-15

## 2023-05-15 NOTE — PROGRESS NOTES
The patient location is: home  The chief complaint leading to consultation is: PD    Visit type:  audiovideo    Lelephone visit  20mins  20 minutes of total time spent on the encounter, which includes non-face to face time preparing to see the patient (eg, review of tests), Obtaining and/or reviewing separately obtained history, Documenting clinical information in the electronic or other health record, Independently interpreting results (not separately reported) and communicating results to the patient/family/caregiver, or Care coordination (not separately reported).         Each patient to whom he or she provides medical services by telemedicine is:  (1) informed of the relationship between the physician and patient and the respective role of any other health care provider with respect to management of the patient; and (2) notified that he or she may decline to receive medical services by telemedicine and may withdraw from such care at any time.    Notes:       Notes:   Glenn STOCKTON Chief Complaints during this visit:  New Patient visit for  Parkinsonism.   No referring provider defined for this encounter.    Primary Care Physician  Power Benton, DO  [address] [place] [address]    Interval Hx    Since last visit,   REM sleep dz sporadic now- once every 3 weeks  Take clonazepam 1.5mg QHS at 9PM    Carbidopa/levodopa 25/100mg 2 tabs five times daily - walking more confidently  No offtime  Tremor well controlled until Ldopa wears off  Trouble typing  Dizziness when he works outside for hours  Some facial dyskinesias    Takes azilect 1mg QHS    Falls: none  Assist Device: none  Walks 2 miles without stopping  With stairs he must hand onto railing    MRI brain revealed a possible posterior fossa arachnoid cyst - followed by Dr. Saldivar   -having repeat imaging soon  Also having Lspine MRI through pain management soon    Brother with dementia since age 60 - mild hand tremors and some issues walking  2  "sisters/2 brothers healthy  Mother with dementia at age 80 - also hand hand dystonia  Father also reported fingers getting stuck    Ceruloplasmin NL    Neuropsych revealed mild cognitive impairments but no diagnosis    PD Review of Symptoms:  Anosmia: some stable  Dysarthria/Hypophonia: none  Dysphagia/Sialorrhea: none  Depression: yes  Cognitive slowing: mild attention issues - replaced CPAP  Hallucinations: none  Urinary changes: none  Constipation: yes  Orthostasis: at times  Falls: occasional  Freezing: none  Micrographia: none  Sleep issues:  -RBD: 1 year of REM sleep dz      "History of present illness:   67 y.o.  male seen in consultation at the request of  Dr. Martinez for evaluation of parkinsonism.      Falls often, onto knee.  Right hand will suddenly start shaking.  Memory a little worse.  Needs new mask for his LOVE, hasn't used in a while.  Not sure who to see.    Has DM, had an emg about 2 years ago.      1/20/20 Juan:  Glenn Medel is a 64 y.o. right-handed male with DM2, HTN, LOVE, anxiety and depression who presents today for an initial evaluation of tremos and balance and is accompanied by wife.    Tremor- started in RH a couple of years ago but seemed to have gotten worse. Will watch TV and ev hands shake. Started noting in LH in the last year. Also notes shaking in legs for several years. He says the legs in fact started before the hands. Nothing makes it better. Not noted any aggravating factors. Drinks very little alcohol. Has not noted benefit with alcohol. Handwriting is pretty good. No changes with this.    Balance- sometimes will be working on knees and then fall back when he goes to get up. Sometimes with walking, he will wobble. No falls while walking. Has fallen backwards when he is trying to get up from being on knees. Has noted balance issues the past couple of months. Has not noted progression.     Acting out dreams. Not nightly but is significant when does happen. He actually ended " "up in hospital in the last year because of fighting in sleep. Has LOVE, used to wear CPAP and wife wonders if this helped when he was wearing this. He says he will wear it a couple of days, aggravates him and then he will not use for month or longer. He had sleep doctor, Dr. Hinton, but dismissed him as she did everything she could do for him.    Denies stiffness in muscles.   Sometimes feels slow with walking. Wife says he is slower to answer questions.   Memory is getting worse. Been bad couple of years but worse the past year. Wife agrees but attributes some of this to residential- lazier life.    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   He left on his terms. Was not asked to leave. "      II.  Review of systems:  As in HPI,  otherwise, balance 10 systems reviewed and are negative.    III.  Past Medical History:   Diagnosis Date    BPH (benign prostatic hyperplasia)     Cataract     done ou    Chronic kidney disease, stage 3 02/25/2015    DDD (degenerative disc disease), lumbar     s/p epidural steroids     Diabetes mellitus     type 2    Glaucoma     OU    HTN (hypertension)     Iritis, lens-induced 01/08/2018    Obesity     Parkinson's disease     Sleep apnea     uses cpap     Family History   Problem Relation Age of Onset    Heart disease Brother 62    Kidney disease Brother     Heart disease Father     Diabetes Father     Colon cancer Father     Cancer Father         colon    Cataracts Father     Hypertension Mother     Diabetes Mother     Stroke Mother     Cancer Mother         colon    Kidney disease Mother     Glaucoma Maternal Grandmother     No Known Problems Sister     No Known Problems Maternal Aunt     No Known Problems Maternal Uncle     No Known Problems Paternal Aunt     No Known Problems Paternal Uncle     No Known Problems Maternal Grandfather     No Known Problems Paternal Grandmother     No Known Problems Paternal Grandfather     " Dementia Brother     Abnormal EKG Brother     Amblyopia Neg Hx     Blindness Neg Hx     Macular degeneration Neg Hx     Retinal detachment Neg Hx     Strabismus Neg Hx     Thyroid disease Neg Hx     Parkinsonism Neg Hx      Social history:  , retired.      Current Outpatient Medications on File Prior to Visit   Medication Sig Dispense Refill    allopurinoL (ZYLOPRIM) 300 MG tablet TAKE 1 TABLET(300 MG) BY MOUTH EVERY DAY 90 tablet 2    aspirin 81 MG Chew Take 81 mg by mouth once daily.      atorvastatin (LIPITOR) 20 MG tablet Take 1 tablet (20 mg total) by mouth once daily. 90 tablet 3    brimonidine 0.2% (ALPHAGAN) 0.2 % Drop INSTILL 1 DROP IN BOTH EYES TWICE DAILY 10 mL 3    carbidopa-levodopa  mg (SINEMET)  mg per tablet Take 2 tablets by mouth 5 (five) times daily. 300 tablet 11    cholecalciferol, vitamin D3, (D3-2000 ORAL) Take by mouth.      dorzolamide (TRUSOPT) 2 % ophthalmic solution INSTILL 1 DROP IN BOTH EYES TWICE DAILY 10 mL 3    FLUoxetine 40 MG capsule Take 1 capsule (40 mg total) by mouth once daily. 90 capsule 3    furosemide (LASIX) 20 MG tablet Take 1 tablet (20 mg total) by mouth every morning. 90 tablet 3    irbesartan (AVAPRO) 75 MG tablet Take 1 tablet (75 mg total) by mouth once daily. 90 tablet 3    ketoconazole (NIZORAL) 2 % shampoo Apply topically.      metFORMIN (GLUCOPHAGE-XR) 500 MG ER 24hr tablet Take 1 tablet (500 mg total) by mouth once daily. 90 tablet 1    prazosin (MINIPRESS) 1 MG Cap Take 1 capsule (1 mg total) by mouth every evening. 90 capsule 3    ramelteon (ROZEREM) 8 mg tablet Take 8 mg by mouth every evening.      rasagiline (AZILECT) 1 mg Tab TAKE 1 TABLET(1 MG) BY MOUTH EVERY EVENING 30 tablet 12    semaglutide (RYBELSUS) 7 mg tablet Take 1 tablet (7 mg total) by mouth once daily. (Replaces Mounjaro. Start 2/13/23) 30 tablet 5    [DISCONTINUED] clonazePAM (KLONOPIN) 1 MG tablet Take 1.5 tablets (1.5 mg total) by mouth every evening. 45 tablet 5      Current Facility-Administered Medications on File Prior to Visit   Medication Dose Route Frequency Provider Last Rate Last Admin    diphenhydrAMINE capsule 25 mg  25 mg Oral Q6H PRN Leonila Richardson MD        metronidazole IVPB 500 mg  500 mg Intravenous Once Kaz Keating MD           PRIOR problem-specific medications tried:  none    Review of patient's allergies indicates:   Allergen Reactions    Avelox [moxifloxacin] Hives and Rash       IV. Physical Exam    Physical Exam  Constitutional: Well-developed, well-nourished, appears stated age  Eyes: No scleral icterus  ENT: Moist oral mucosa  Cardiovascular: No lower extremity edema   Respiratory: No labored breathing   Skin: No rash   Hematologic: No bruising    Other: GI/ deferred   Mental status: Alert and oriented to person, place, time, and situation;   follows commands  Speech: normal (not dysarthric), no aphasia  Cranial nerves:            CN II: Pupils mid-position and equal, not tested light or accommodation  CN III, IV, VI: Extraocular movements full, no nystagmus visualized  CN V: Not tested   CN VII: Face strong and symmetric bilaterally   CN VIII: Hearing intact to voice and conversation   CN IX, X: Palate raises midline and symmetric   CN XI: Strong shoulder shrug B/L  CN XII: Tongue appears midline   Motor: Normal bulk by appearance, no drift   Sensory: Not tested    Gait: Can stand on either leg- barely a shuffle  Deep tendon reflexes: Not tested  Movement/Coordination                    Mod hypophonic speech.                     Mod facial masking.    Mild orofacial dyskinesias                      No other dystonia, chorea, athetosis, myoclonus, or tics visualized.    Bradykinesia      ? Finger taps Finger flicks DAVE Heel taps   Left 0 0 0 0   Right 1+ 1+ 0 0         V.  Laboratory/ Radiological Data: (Personally reviewed images)         MRI 1/29/20  Impression       1. There is no acute abnormality.  There is mild volume loss without  significant white matter disease.  There is no hemorrhage, parenchymal mass or acute infarction.  There is a probable arachnoid cyst in the right posterior fossa.  Please see above discussion.     Memory/Encephalopathy Labs:  Lab Results   Component Value Date    TSH 0.976 01/06/2020    NOOKPAQD70 391 05/14/2020    THIAMINEBLOO 59 05/14/2020     LGi and CASPR  No results found for: ENCESLGI1, ENCESCASPR2    Movement Labs:  Lab Results   Component Value Date    DERWPKOY58MM 51 09/28/2022    CALCIUM 10.0 12/12/2022    PTH 80.9 (H) 12/12/2022    ALBUMIN 3.8 12/12/2022    ALBUMIN 3.8 12/12/2022    BILITOT 0.7 12/12/2022    BILIDIR 0.2 12/12/2022    ALT 14 12/12/2022    AST 33 12/12/2022    ALKPHOS 148 (H) 12/12/2022    CERULOPLSM 29.0 08/26/2020    FERRITIN 88 03/29/2021    ALPHATOCOPHE 1275 05/14/2020       IRON STUDIES:  Lab Results   Component Value Date    IRON 60 03/29/2021    TRANSFERRIN 243 03/29/2021    TIBC 360 03/29/2021    FESATURATED 17 (L) 03/29/2021       Malignancy Labs:  Lab Results   Component Value Date    PSADIAG 5.1 (H) 12/12/2022     PARANEOPLASTIC PANEL:  No results found for: NMOINTERPRET, REFLEXTEST, CRMP5, STMAB, LABACHR, ACHRGANGLION, NEUROVG    CSF:  No results found for: CSFWBC, CSFRBC, LYMPHS, MONOMACCSF, PROTEINCSF, GLUCCSF, GADCSF, LUME, MYELINPROT  Flow cytometry: No results found for: CSFC  [place]: No results found for: WESTNILEIGGA    CNS Demyelinating Labs:  Nmo serum, csf igg:  No results found for: NMOFSFACSS, NMOFCFACSC  No results found for: JCVIRUS    CSF oligoclonal bands:  No results found for: CSFB, CSFOLI, SERB, IGGINDEXCSF, IGGCSF, ALBQ, GALB, IGGSYNRATE, IGGS, ALBSER, IGGALBS    Myopathy labs:  No results found for: CPK, ALDOLASE, AMMONIA, ACFCRATIO, ACETYLCARNIT, CARNITINEPLA, LACTATE    Myasthenia panel:  No results found for: LABACHR  No results found for: ACETMOD  No results found for: STMAB   No results found for: ACHRGANGLION    Neuropathy Labs:  Lab Results  "  Component Value Date    CRDBIIOM67 391 05/14/2020    HGBA1C 6.7 (H) 12/12/2022     SPEP:  Lab Results   Component Value Date    PROTEINSERUM 6.7 07/27/2015    PATHINTPSPE REVIEWED 07/27/2015       Rheum labs:  Lab Results   Component Value Date    URICACID 4.5 09/28/2022       Neuropsych"  Mr. Medel is a 64 year old male with cognitive complaints over the past several years. He has a slightly ataxic gait of unclear etiology, but no clear parkinsonism. He has symptoms suggestive of REM sleep behavior disorder over the past year. He denied presence, passage, or recurrent visual hallucinations. He remains functionally independent, albeit more error prone than in the past. His history is remarkable for multiple vascular risk factors, including currently untreated sleep apnea.      His neuropsychological profile was largely within normal limits with the exception of mild memory inefficiencies, primarily related to encoding and cognitive organization/retrieval. His test scores and functioning are not at the level to warrant a cognitive diagnosis at this time. He did not present with the type of oliver forgetting seen in Alzheimer's disease. He also did not demonstrate the type of visuospatial dysfunction often seen early in Lewy Body Dementia, which is also in keeping with the fact that he does not have visual hallucinations. It will be important to track his cognition over time as mild cognitive changes in the setting of ataxia of unclear etiology and possible REM sleep behavior disorder could be indicative of a neurodegenerative condition. Otherwise, his mild cognitive weaknesses could simply be attributable to mild cerebrovascular disease.      Mr. Medel also reported clinically significant anxiety and depression. The transition to penitentiary has been admittedly more challenging than expected. His wife has also noticed reduced frustration tolerance with an increased tendency to perseverate on his frustrations, " "which has led to multiple confrontations. Treatment is indicated. "    MRI brain 2020      VI. Assessment and Plan            Problem List Items Addressed This Visit          Neuro    Parkinsonism (Chronic)    Current Assessment & Plan     Typical  with some nonmotor features.  Tremor responded very well to carbidopa/levodopa.   Tremor not controlled daytime    Continue- carbidopa/levodopa 25/100mg 2 tabs  PO five times a day  May consider DBS for tremor control    Continue azilect 1mg QHS                Other    REM sleep behavior disorder (Chronic)    Current Assessment & Plan     Increased clonazepam 1.5mg to 2 tabs for rem sleep dz as he is falling out of bed at times           Relevant Medications    clonazePAM (KLONOPIN) 1 MG tablet             Coni Cee MD, MS  [facility]  Department of Neurology  Movement Disorders        "

## 2023-05-15 NOTE — ASSESSMENT & PLAN NOTE
Typical  with some nonmotor features.  Tremor responded very well to carbidopa/levodopa.   Tremor not controlled daytime    Continue- carbidopa/levodopa 25/100mg 2 tabs  PO five times a day  May consider DBS for tremor control    Continue azilect 1mg QHS

## 2023-05-16 ENCOUNTER — HOSPITAL ENCOUNTER (OUTPATIENT)
Dept: RADIOLOGY | Facility: HOSPITAL | Age: 68
Discharge: HOME | End: 2023-05-16
Attending: ANESTHESIOLOGY
Payer: MEDICARE

## 2023-05-16 ENCOUNTER — TELEPHONE (OUTPATIENT)
Dept: PAIN MEDICINE | Facility: CLINIC | Age: 68
End: 2023-05-16
Payer: MEDICARE

## 2023-05-16 DIAGNOSIS — M54.9 DORSALGIA, UNSPECIFIED: ICD-10-CM

## 2023-05-16 PROCEDURE — 72148 MRI LUMBAR SPINE W/O DYE: CPT | Mod: 26,,, | Performed by: RADIOLOGY

## 2023-05-16 PROCEDURE — 72148 MRI LUMBAR SPINE WITHOUT CONTRAST: ICD-10-PCS | Mod: 26,,, | Performed by: RADIOLOGY

## 2023-05-16 PROCEDURE — 72148 MRI LUMBAR SPINE W/O DYE: CPT | Mod: TC,PO

## 2023-05-16 NOTE — TELEPHONE ENCOUNTER
Can you please let Mr. Townsend know I have reviewed his MRI.      I do not see any significant bulging discs around the nerves.      I think the most reasonable next step would be to repeat his lumbar rhizotomy.  Can we see about his prior records so we can review the levels that were done and we can repeat this for him.

## 2023-05-18 ENCOUNTER — HOSPITAL ENCOUNTER (OUTPATIENT)
Dept: RADIOLOGY | Facility: HOSPITAL | Age: 68
Discharge: HOME | End: 2023-05-18
Attending: NEUROLOGICAL SURGERY
Payer: MEDICARE

## 2023-05-18 DIAGNOSIS — G93.0 ARACHNOID CYST: ICD-10-CM

## 2023-05-18 PROCEDURE — 70551 MRI BRAIN WITHOUT CONTRAST: ICD-10-PCS | Mod: 26,,, | Performed by: RADIOLOGY

## 2023-05-18 PROCEDURE — 70551 MRI BRAIN STEM W/O DYE: CPT | Mod: 26,,, | Performed by: RADIOLOGY

## 2023-05-18 PROCEDURE — 70551 MRI BRAIN STEM W/O DYE: CPT | Mod: TC,PO

## 2023-05-22 ENCOUNTER — PATIENT MESSAGE (OUTPATIENT)
Dept: PAIN MEDICINE | Facility: CLINIC | Age: 68
End: 2023-05-22
Payer: MEDICARE

## 2023-05-23 NOTE — TELEPHONE ENCOUNTER
"THERE IS LITERALLY AN OPEN TELEPHONE ENCOUNTER ON 5/16/23 THAT SAYS    "Can you please let Mr. Townsend know I have reviewed his MRI.       I do not see any significant bulging discs around the nerves.       I think the most reasonable next step would be to repeat his lumbar rhizotomy.  Can we see about his prior records so we can review the levels that were done and we can repeat this for him."  "

## 2023-05-25 NOTE — TELEPHONE ENCOUNTER
Called patient about getting records from [facility] in [place], patient states he will be driving by that clinic today and will obtain the records and bring them to [facility] for Dr. Taveras to review. Patient will call when he has the records.

## 2023-05-29 ENCOUNTER — OFFICE VISIT (OUTPATIENT)
Dept: OPHTHALMOLOGY | Facility: CLINIC | Age: 68
End: 2023-05-29
Payer: MEDICARE

## 2023-05-29 DIAGNOSIS — Z79.4 CONTROLLED TYPE 2 DIABETES MELLITUS WITH BOTH EYES AFFECTED BY MILD NONPROLIFERATIVE RETINOPATHY WITHOUT MACULAR EDEMA, WITH LONG-TERM CURRENT USE OF INSULIN: Primary | ICD-10-CM

## 2023-05-29 DIAGNOSIS — H43.393 OTHER VITREOUS OPACITIES, BILATERAL: ICD-10-CM

## 2023-05-29 DIAGNOSIS — H43.821 VITREOMACULAR ADHESION, RIGHT: ICD-10-CM

## 2023-05-29 DIAGNOSIS — H35.371 EPIRETINAL MEMBRANE, RIGHT: ICD-10-CM

## 2023-05-29 DIAGNOSIS — E11.3293 CONTROLLED TYPE 2 DIABETES MELLITUS WITH BOTH EYES AFFECTED BY MILD NONPROLIFERATIVE RETINOPATHY WITHOUT MACULAR EDEMA, WITH LONG-TERM CURRENT USE OF INSULIN: Primary | ICD-10-CM

## 2023-05-29 PROCEDURE — 92134 POSTERIOR SEGMENT OCT RETINA (OCULAR COHERENCE TOMOGRAPHY)-BOTH EYES: ICD-10-PCS | Mod: 26,S$PBB,, | Performed by: OPHTHALMOLOGY

## 2023-05-29 PROCEDURE — 99213 OFFICE O/P EST LOW 20 MIN: CPT | Mod: PBBFAC,PO | Performed by: OPHTHALMOLOGY

## 2023-05-29 PROCEDURE — 99999 PR PBB SHADOW E&M-EST. PATIENT-LVL III: ICD-10-PCS | Mod: PBBFAC,,, | Performed by: OPHTHALMOLOGY

## 2023-05-29 PROCEDURE — 92201 PR OPHTHALMOSCOPY, EXT, W/RET DRAW/SCLERAL DEPR, I&R, UNI/BI: ICD-10-PCS | Mod: S$PBB,,, | Performed by: OPHTHALMOLOGY

## 2023-05-29 PROCEDURE — 99999 PR PBB SHADOW E&M-EST. PATIENT-LVL III: CPT | Mod: PBBFAC,,, | Performed by: OPHTHALMOLOGY

## 2023-05-29 PROCEDURE — 92201 OPSCPY EXTND RTA DRAW UNI/BI: CPT | Mod: S$PBB,,, | Performed by: OPHTHALMOLOGY

## 2023-05-29 PROCEDURE — 92201 OPSCPY EXTND RTA DRAW UNI/BI: CPT | Mod: PBBFAC,PO | Performed by: OPHTHALMOLOGY

## 2023-05-29 PROCEDURE — 92014 COMPRE OPH EXAM EST PT 1/>: CPT | Mod: S$PBB,,, | Performed by: OPHTHALMOLOGY

## 2023-05-29 PROCEDURE — 92134 CPTRZ OPH DX IMG PST SGM RTA: CPT | Mod: PBBFAC,PO | Performed by: OPHTHALMOLOGY

## 2023-05-29 PROCEDURE — 92014 PR EYE EXAM, EST PATIENT,COMPREHESV: ICD-10-PCS | Mod: S$PBB,,, | Performed by: OPHTHALMOLOGY

## 2023-05-29 RX ORDER — FLUORESCEIN 500 MG/ML
5 INJECTION INTRAVENOUS ONCE
Status: COMPLETED | OUTPATIENT
Start: 2023-05-29 | End: 2023-10-19

## 2023-05-29 NOTE — PROGRESS NOTES
HPI    DLS: 08/08/2022 Dr. Pulido    Patient states his vision is getting worse in the left eye at distance and   near. He states he saw Dr Garcia and had a YAG in the right eye but did   not need one in the left.     Brimonidine BID OU   Dorzolamide BID OU     Last edited by Brooke Mendoza on 5/29/2023  8:35 AM.            OCT - OD ERM, no sig traction  No ME OS        A/P    1. Dense vitreous opacities OU with NCVH  No Improvement over last several months  ?prior VH with PVD OU - no breaks or tears    s/p 25g PPV/partial AFx OS for vitreous opacities/NCVH OS 11/15/17    Doing well, good IOP  HAD PC violation during anterior vitreous removal -     Had CE with dropped lens material give PC violation 11/30/17    Increased IOP - though improved with K edema    Will need PPV to remove lens material.  Pt would prefer more prompt resolution    s/p 25g PPV OS for retained lens material OS 12/6/17    Doing well, but IOP improved to 18 - ? Steroid response, inflammation and cortical material improving, AC deep    1/8/18 - some increased inflammation after steroid taper, no CME  1/18 - still low grade inflammation -   6/18 - stable    s/p 25g PPV/partial AFx OD for vitreous opacities and NCVH OD 7/1/18      2. PCIOL OD  Ok for YAG  Sulcus IOL OS    Great result    3. DM  Controlled No DR  BS/BP/chol control  Ok for yearly DFE during optometry visit    4. HTN Ret OU  Collateral vessels on ONH OS - longstanding on OCT  Some peripheral microangiopathy OS - likely silent CRVO in past when BP's elevated    5. POAG  On simbrinza BID  May continue OU once inflammation down  8/22 - some nasalization of vessels OS, but no overt vascular occlusion    6. ERM OD  Small - not sig        6 months OCT/WFFA OS

## 2023-06-02 DIAGNOSIS — G47.52 REM SLEEP BEHAVIOR DISORDER: ICD-10-CM

## 2023-06-05 ENCOUNTER — TELEPHONE (OUTPATIENT)
Dept: NEUROLOGY | Facility: CLINIC | Age: 68
End: 2023-06-05
Payer: MEDICARE

## 2023-06-05 ENCOUNTER — PATIENT MESSAGE (OUTPATIENT)
Dept: NEUROLOGY | Facility: CLINIC | Age: 68
End: 2023-06-05
Payer: MEDICARE

## 2023-06-05 RX ORDER — CLONAZEPAM 1 MG/1
2 TABLET ORAL NIGHTLY
Qty: 60 TABLET | Refills: 5 | Status: SHIPPED | OUTPATIENT
Start: 2023-06-05 | End: 2023-10-26 | Stop reason: SDUPTHER

## 2023-06-05 NOTE — TELEPHONE ENCOUNTER
----- Message from Oanh Medel sent at 6/5/2023  4:01 PM CDT -----  Regarding: Rx Info  Contact: [phone]  [facility] is calling for a new rx clonazePAM (KLONOPIN) 1 MG tablet due to a dosage change. Please call.

## 2023-06-08 ENCOUNTER — PATIENT MESSAGE (OUTPATIENT)
Dept: FAMILY MEDICINE | Facility: CLINIC | Age: 68
End: 2023-06-08
Payer: MEDICARE

## 2023-06-08 DIAGNOSIS — C61 PROSTATE CANCER: ICD-10-CM

## 2023-06-08 DIAGNOSIS — E11.22 TYPE 2 DIABETES MELLITUS WITH STAGE 2 CHRONIC KIDNEY DISEASE, WITHOUT LONG-TERM CURRENT USE OF INSULIN: Primary | ICD-10-CM

## 2023-06-08 DIAGNOSIS — N18.2 TYPE 2 DIABETES MELLITUS WITH STAGE 2 CHRONIC KIDNEY DISEASE, WITHOUT LONG-TERM CURRENT USE OF INSULIN: Primary | ICD-10-CM

## 2023-06-09 ENCOUNTER — PATIENT MESSAGE (OUTPATIENT)
Dept: FAMILY MEDICINE | Facility: CLINIC | Age: 68
End: 2023-06-09
Payer: MEDICARE

## 2023-06-09 ENCOUNTER — NURSE TRIAGE (OUTPATIENT)
Dept: ADMINISTRATIVE | Facility: CLINIC | Age: 68
End: 2023-06-09
Payer: MEDICARE

## 2023-06-09 ENCOUNTER — TELEPHONE (OUTPATIENT)
Dept: FAMILY MEDICINE | Facility: CLINIC | Age: 68
End: 2023-06-09
Payer: MEDICARE

## 2023-06-09 NOTE — TELEPHONE ENCOUNTER
----- Message from Terisoraya Martinez sent at 6/9/2023 10:30 AM CDT -----  Regarding: blood pressue  Name of Who is Calling: RUBÉN SMALL [[id number]]        What is the request in detail: pt is feeling woozy and his blood pressure is abnormal, would like a call back, please advise.         Can the clinic reply by [facility]: no           What Number to Call Back if not in [facility]: [phone]

## 2023-06-09 NOTE — TELEPHONE ENCOUNTER
Patient states he was lightheaded when he woke up this morning before taking meds. patient states he was staggering.Blood pressure 91/56 HR:84.     Patient states he is currently weak and lightheaded and its when he walks around he feels worst.     Advised patient to go to ER patient states he will be seen at [facility]     Patient verbalized understanding and states he will go straight to the ER.

## 2023-06-10 NOTE — TELEPHONE ENCOUNTER
Reason for Disposition   Health Information question, no triage required and triager able to answer question    Additional Information   Negative: [1] Caller is not with the adult (patient) AND [2] reporting urgent symptoms   Negative: Lab result questions   Negative: Medication questions   Negative: Caller can't be reached by phone   Negative: Caller has already spoken to PCP or another triager   Negative: RN needs further essential information from caller in order to complete triage   Negative: Requesting regular office appointment   Negative: [1] Caller requesting NON-URGENT health information AND [2] PCP's office is the best resource    Protocols used: Information Only Call - No Triage-A-  Pt states he is being admitted to [facility]. Staff came to give night time meds but didn't give all meds. hx PD. Pt states he wasn't given his carbidopa/levodopa or his clonazepam. Spoke with Smita in ED. pt being admitted to [facility]. Nurse states she will call the nurse on the floor where pt is being admitted to see about getting those two meds ordered. Pt notified.

## 2023-06-11 ENCOUNTER — PATIENT MESSAGE (OUTPATIENT)
Dept: FAMILY MEDICINE | Facility: CLINIC | Age: 68
End: 2023-06-11
Payer: MEDICARE

## 2023-06-12 ENCOUNTER — PATIENT MESSAGE (OUTPATIENT)
Dept: RADIATION ONCOLOGY | Facility: CLINIC | Age: 68
End: 2023-06-12
Payer: MEDICARE

## 2023-06-12 DIAGNOSIS — C61 PROSTATE CANCER: Primary | ICD-10-CM

## 2023-06-15 ENCOUNTER — TELEPHONE (OUTPATIENT)
Dept: FAMILY MEDICINE | Facility: CLINIC | Age: 68
End: 2023-06-15
Payer: MEDICARE

## 2023-06-15 ENCOUNTER — PATIENT MESSAGE (OUTPATIENT)
Dept: FAMILY MEDICINE | Facility: CLINIC | Age: 68
End: 2023-06-15
Payer: MEDICARE

## 2023-06-15 ENCOUNTER — PATIENT MESSAGE (OUTPATIENT)
Dept: UROLOGY | Facility: CLINIC | Age: 68
End: 2023-06-15
Payer: MEDICARE

## 2023-06-15 NOTE — TELEPHONE ENCOUNTER
----- Message from Chayo Boone, Ramon sent at 6/15/2023  2:25 PM CDT -----  Regarding: diarrhea  As you're aware, Mr. Medel was admitted at [facility] 6/9-10. He states he was given IVF and antibiotics while admitted (nothing on discharge). He is holding his irbesartan as advised on discharged. I told him to continue holding irbesartan and monitoring his BP.    He reports having diarrhea since 6/12. He states diarrhea has distinctive/worse smell and progressively more watery. States he is trying to stay hydrated with water, however, he is experiencing diarrhea after eating or drinking anything. The earliest he was able to get an appointment with you was 7/18. Please advise him of any recommendations.

## 2023-06-15 NOTE — TELEPHONE ENCOUNTER
Keep the a1c  Reschedule the PSA for 1 month out.   Have him contact [facility] for results  Monitor blood pressure and follow up with me first available.

## 2023-06-16 ENCOUNTER — LAB VISIT (OUTPATIENT)
Dept: LAB | Facility: HOSPITAL | Age: 68
End: 2023-06-16
Attending: INTERNAL MEDICINE
Payer: MEDICARE

## 2023-06-16 ENCOUNTER — TELEPHONE (OUTPATIENT)
Dept: FAMILY MEDICINE | Facility: CLINIC | Age: 68
End: 2023-06-16
Payer: MEDICARE

## 2023-06-16 ENCOUNTER — OFFICE VISIT (OUTPATIENT)
Dept: FAMILY MEDICINE | Facility: CLINIC | Age: 68
End: 2023-06-16
Payer: MEDICARE

## 2023-06-16 VITALS
HEIGHT: 71 IN | WEIGHT: 191.69 LBS | TEMPERATURE: 98 F | DIASTOLIC BLOOD PRESSURE: 82 MMHG | HEART RATE: 92 BPM | OXYGEN SATURATION: 97 % | BODY MASS INDEX: 26.84 KG/M2 | SYSTOLIC BLOOD PRESSURE: 124 MMHG

## 2023-06-16 DIAGNOSIS — Z09 HOSPITAL DISCHARGE FOLLOW-UP: Primary | ICD-10-CM

## 2023-06-16 DIAGNOSIS — N18.2 TYPE 2 DIABETES MELLITUS WITH STAGE 2 CHRONIC KIDNEY DISEASE, WITHOUT LONG-TERM CURRENT USE OF INSULIN: ICD-10-CM

## 2023-06-16 DIAGNOSIS — K21.9 GASTROESOPHAGEAL REFLUX DISEASE, UNSPECIFIED WHETHER ESOPHAGITIS PRESENT: ICD-10-CM

## 2023-06-16 DIAGNOSIS — E11.22 TYPE 2 DIABETES MELLITUS WITH STAGE 2 CHRONIC KIDNEY DISEASE, WITHOUT LONG-TERM CURRENT USE OF INSULIN: ICD-10-CM

## 2023-06-16 DIAGNOSIS — C61 PROSTATE CANCER: ICD-10-CM

## 2023-06-16 LAB
ESTIMATED AVG GLUCOSE: 160 MG/DL (ref 68–131)
HBA1C MFR BLD: 7.2 % (ref 4–5.6)

## 2023-06-16 PROCEDURE — 84153 ASSAY OF PSA TOTAL: CPT | Performed by: INTERNAL MEDICINE

## 2023-06-16 PROCEDURE — 99999 PR PBB SHADOW E&M-EST. PATIENT-LVL III: ICD-10-PCS | Mod: PBBFAC,,, | Performed by: INTERNAL MEDICINE

## 2023-06-16 PROCEDURE — 99214 PR OFFICE/OUTPT VISIT, EST, LEVL IV, 30-39 MIN: ICD-10-PCS | Mod: S$PBB,,, | Performed by: INTERNAL MEDICINE

## 2023-06-16 PROCEDURE — 99213 OFFICE O/P EST LOW 20 MIN: CPT | Mod: PBBFAC,PO | Performed by: INTERNAL MEDICINE

## 2023-06-16 PROCEDURE — 83036 HEMOGLOBIN GLYCOSYLATED A1C: CPT | Performed by: INTERNAL MEDICINE

## 2023-06-16 PROCEDURE — 36415 COLL VENOUS BLD VENIPUNCTURE: CPT | Mod: PO | Performed by: INTERNAL MEDICINE

## 2023-06-16 PROCEDURE — 99214 OFFICE O/P EST MOD 30 MIN: CPT | Mod: S$PBB,,, | Performed by: INTERNAL MEDICINE

## 2023-06-16 PROCEDURE — 99999 PR PBB SHADOW E&M-EST. PATIENT-LVL III: CPT | Mod: PBBFAC,,, | Performed by: INTERNAL MEDICINE

## 2023-06-16 RX ORDER — PANTOPRAZOLE SODIUM 20 MG/1
20 TABLET, DELAYED RELEASE ORAL DAILY
Qty: 14 TABLET | Refills: 0 | Status: SHIPPED | OUTPATIENT
Start: 2023-06-16 | End: 2023-07-24

## 2023-06-16 NOTE — PROGRESS NOTES
Patient ID: Glenn Medel is a 67 y.o. male.    Chief Complaint: Follow-up        Assessment and Plan      1. Hospital discharge follow-up    2. Gastroesophageal reflux disease, unspecified whether esophagitis present      I think that he had an adverse reaction to switching from oral semaglutide to ozempic.I apologized that this occurred. For now should hold off on ozempic. If starting again in the future, we should start out at 0.25 mg. Will treat diarrhea with Imodium.     [facility] last Friday for dizziness and low blood pressure. 90/50s. Elevated lactate. Blood cultures, UA negative, ESR, CRP, CPK normal. Received IV abx and discharged on Sat. This Tuesday he started having diarrhea w/o fever. Whatever he ate went straight through. Yesterday he messaged and I sent him to ER. Deuce neg. Last Thursday he was restarted on ozempic at 1 mg Prior to this happening. He was on oral semaglutide prior to switching to ozempic.     Review of Systems   Constitutional:  Negative for fever.   Respiratory:  Negative for shortness of breath.    Cardiovascular:  Negative for chest pain.   Gastrointestinal:  Positive for abdominal pain and diarrhea.      Objective     Vitals:    06/16/23 1126   BP: 124/82   Pulse: 92   Temp: 97.8 °F (36.6 °C)     Wt Readings from Last 3 Encounters:   06/16/23 1126 87 kg (191 lb 11 oz)   05/04/23 0949 88.3 kg (194 lb 8.9 oz)   04/18/23 0841 89.8 kg (197 lb 15.6 oz)      Body mass index is 26.74 kg/m².   Physical Exam  Cardiovascular:      Rate and Rhythm: Normal rate and regular rhythm.      Heart sounds: No murmur heard.    No gallop.   Pulmonary:      Breath sounds: Normal breath sounds. No wheezing or rhonchi.   Abdominal:      Palpations: Abdomen is soft.      Tenderness: There is no abdominal tenderness.        Orders      was seen today for follow-up.    Diagnoses and all orders for this visit:    Hospital discharge follow-up    Gastroesophageal reflux disease, unspecified  whether esophagitis present  -     pantoprazole (PROTONIX) 20 MG tablet; Take 1 tablet (20 mg total) by mouth once daily.         Diabetes Medications               metFORMIN (GLUCOPHAGE-XR) 500 MG ER 24hr tablet Take 1 tablet (500 mg total) by mouth once daily.    semaglutide (OZEMPIC) 1 mg/dose (4 mg/3 mL) Inject 1 mg into the skin every 7 days. ([facility]          Hospital Medications            Hypertension Medications               furosemide (LASIX) 20 MG tablet Take 1 tablet (20 mg total) by mouth every morning.    irbesartan (AVAPRO) 75 MG tablet Take 1 tablet (75 mg total) by mouth once daily.    prazosin (MINIPRESS) 1 MG Cap Take 1 capsule (1 mg total) by mouth every evening.           Hyperlipidemia Medications               atorvastatin (LIPITOR) 20 MG tablet Take 1 tablet (20 mg total) by mouth once daily.           Medication List with Changes/Refills   New Medications    PANTOPRAZOLE (PROTONIX) 20 MG TABLET    Take 1 tablet (20 mg total) by mouth once daily.   Current Medications    ALLOPURINOL (ZYLOPRIM) 300 MG TABLET    TAKE 1 TABLET(300 MG) BY MOUTH EVERY DAY    ASPIRIN 81 MG CHEW    Take 81 mg by mouth once daily.    ATORVASTATIN (LIPITOR) 20 MG TABLET    Take 1 tablet (20 mg total) by mouth once daily.    BRIMONIDINE 0.2% (ALPHAGAN) 0.2 % DROP    INSTILL 1 DROP IN BOTH EYES TWICE DAILY    CARBIDOPA-LEVODOPA  MG (SINEMET)  MG PER TABLET    Take 2 tablets by mouth 5 (five) times daily.    CHOLECALCIFEROL, VITAMIN D3, (D3-2000 ORAL)    Take by mouth.    CLONAZEPAM (KLONOPIN) 1 MG TABLET    Take 2 tablets (2 mg total) by mouth every evening.    DORZOLAMIDE (TRUSOPT) 2 % OPHTHALMIC SOLUTION    INSTILL 1 DROP IN BOTH EYES TWICE DAILY    FLUOXETINE 40 MG CAPSULE    Take 1 capsule (40 mg total) by mouth once daily.    FUROSEMIDE (LASIX) 20 MG TABLET    Take 1 tablet (20 mg total) by mouth every morning.    IRBESARTAN (AVAPRO) 75 MG TABLET    Take 1 tablet (75 mg total) by mouth once  daily.    KETOCONAZOLE (NIZORAL) 2 % SHAMPOO    Apply topically.    METFORMIN (GLUCOPHAGE-XR) 500 MG ER 24HR TABLET    Take 1 tablet (500 mg total) by mouth once daily.    PRAZOSIN (MINIPRESS) 1 MG CAP    Take 1 capsule (1 mg total) by mouth every evening.    RAMELTEON (ROZEREM) 8 MG TABLET    Take 8 mg by mouth every evening.    RASAGILINE (AZILECT) 1 MG TAB    TAKE 1 TABLET(1 MG) BY MOUTH EVERY EVENING   Discontinued Medications    SEMAGLUTIDE (OZEMPIC) 1 MG/DOSE (4 MG/3 ML)    Inject 1 mg into the skin every 7 days. (Replaces rybelsus)       I personally reviewed past medical, family and social history.    Patient Active Problem List   Diagnosis    BPH (benign prostatic hyperplasia)    LOVE (obstructive sleep apnea)    HTN (hypertension)    DDD (degenerative disc disease), lumbar    Palpitations    Peripheral neuropathy    Hyperlipidemia    DM type 2 (diabetes mellitus, type 2)    Itch    Hypersomnia with sleep apnea, unspecified    ILD (interstitial lung disease)    Severe obstructive sleep apnea    Other vitreous opacities, bilateral    NS (nuclear sclerosis)    Senile nuclear sclerosis    Retained lens material, left    Glaucoma associated with ocular inflammation, left, indeterminate stage    Iritis, lens-induced    Vitreomacular adhesion, right    Epiretinal membrane, right    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, with long-term current use of insulin    Internal strangulated hemorrhoids    Arachnoid cyst    REM sleep behavior disorder    Abnormal gait    Cognitive change    Abnormal involuntary movements    Adjustment disorder with depressed mood    Parkinsonism    Hx of acute gouty arthritis    Polyneuropathy associated with underlying disease    Fall from bed    Memory change    CKD (chronic kidney disease) stage 2, GFR 60-89 ml/min    Type 2 diabetes mellitus with stage 2 chronic kidney disease, without long-term current use  of insulin    Family history of colon cancer    Elevated PSA

## 2023-06-16 NOTE — TELEPHONE ENCOUNTER
----- Message from Hiro Benjamin sent at 6/16/2023  8:17 AM CDT -----  Regarding: ed f/u  Type: Needs Medical Advice    Who Called:  lee ann    Symptoms (please be specific):  diarrhea    How long has patient had these symptoms:  Tuesday    Best Call Back Number: [phone]    Additional Information: pt went to ED yesterday as instructed. No c-diff found at ed. Symptoms worsening today. Please call to assist.

## 2023-06-16 NOTE — Clinical Note
Thank you for helping with his care. I think I made him sick with the increased dose of ozempic. Will hold for now and if starting again will start at 0.25 mg. He was appreciative of your care in general. Thank you

## 2023-06-17 LAB — COMPLEXED PSA SERPL-MCNC: 4.3 NG/ML (ref 0–4)

## 2023-06-20 ENCOUNTER — OFFICE VISIT (OUTPATIENT)
Dept: PODIATRY | Facility: CLINIC | Age: 68
End: 2023-06-20
Payer: MEDICARE

## 2023-06-20 VITALS — WEIGHT: 191 LBS | BODY MASS INDEX: 26.74 KG/M2 | HEIGHT: 71 IN

## 2023-06-20 DIAGNOSIS — M76.62 ACHILLES TENDINITIS OF LEFT LOWER EXTREMITY: ICD-10-CM

## 2023-06-20 DIAGNOSIS — E11.9 ENCOUNTER FOR COMPREHENSIVE DIABETIC FOOT EXAMINATION, TYPE 2 DIABETES MELLITUS: Primary | ICD-10-CM

## 2023-06-20 DIAGNOSIS — M24.572 EQUINUS CONTRACTURE OF LEFT ANKLE: ICD-10-CM

## 2023-06-20 PROCEDURE — 99999 PR PBB SHADOW E&M-EST. PATIENT-LVL III: CPT | Mod: PBBFAC,,, | Performed by: PODIATRIST

## 2023-06-20 PROCEDURE — 99213 PR OFFICE/OUTPT VISIT, EST, LEVL III, 20-29 MIN: ICD-10-PCS | Mod: 25,S$PBB,, | Performed by: PODIATRIST

## 2023-06-20 PROCEDURE — 99999 PR PBB SHADOW E&M-EST. PATIENT-LVL III: ICD-10-PCS | Mod: PBBFAC,,, | Performed by: PODIATRIST

## 2023-06-20 PROCEDURE — 99213 OFFICE O/P EST LOW 20 MIN: CPT | Mod: 25,S$PBB,, | Performed by: PODIATRIST

## 2023-06-20 PROCEDURE — 99213 OFFICE O/P EST LOW 20 MIN: CPT | Mod: PBBFAC,PO | Performed by: PODIATRIST

## 2023-06-20 NOTE — PROGRESS NOTES
Subjective:     Patient ID: Glenn Medel is a 67 y.o. male.    Chief Complaint: Follow-up (F/u achilles pain, no c/o pain, wears sandals, diabetic Pt, last seen on 06/16/23 with PCP Dr. Benton)     is a 67 y.o. male who presents to the clinic for evaluation and treatment of high risk feet.  has a past medical history of BPH (benign prostatic hyperplasia), Cataract, Chronic kidney disease, stage 3 (02/25/2015), DDD (degenerative disc disease), lumbar, Diabetes mellitus, Glaucoma, HTN (hypertension), Iritis, lens-induced (01/08/2018), Obesity, Parkinson's disease, and Sleep apnea. The patient's chief complaint is achilles pain that comes and goes but for most part is better overall.  This patient has documented high risk feet requiring routine maintenance secondary to diabetes mellitis and those secondary complications of diabetes, as mentioned..    PCP: Power Benton, DO    Date Last Seen by PCP: 06/16/2023    Current shoe gear:  Affected Foot: Slip-on shoes     Unaffected Foot: Slip-on shoes    Hemoglobin A1C   Date Value Ref Range Status   06/16/2023 7.2 (H) 4.0 - 5.6 % Final     Comment:     ADA Screening Guidelines:  5.7-6.4%  Consistent with prediabetes  >or=6.5%  Consistent with diabetes    High levels of fetal hemoglobin interfere with the HbA1C  assay. Heterozygous hemoglobin variants (HbS, HgC, etc)do  not significantly interfere with this assay.   However, presence of multiple variants may affect accuracy.     12/12/2022 6.7 (H) 4.0 - 5.6 % Final     Comment:     ADA Screening Guidelines:  5.7-6.4%  Consistent with prediabetes  >or=6.5%  Consistent with diabetes    High levels of fetal hemoglobin interfere with the HbA1C  assay. Heterozygous hemoglobin variants (HbS, HgC, etc)do  not significantly interfere with this assay.   However, presence of multiple variants may affect accuracy.     11/04/2022 6.5 (H) 4.0 - 5.6 % Final     Comment:     ADA Screening Guidelines:  5.7-6.4%  Consistent  with prediabetes  >or=6.5%  Consistent with diabetes    High levels of fetal hemoglobin interfere with the HbA1C  assay. Heterozygous hemoglobin variants (HbS, HgC, etc)do  not significantly interfere with this assay.   However, presence of multiple variants may affect accuracy.         Patient Active Problem List   Diagnosis    BPH (benign prostatic hyperplasia)    LOVE (obstructive sleep apnea)    HTN (hypertension)    DDD (degenerative disc disease), lumbar    Palpitations    Peripheral neuropathy    Hyperlipidemia    DM type 2 (diabetes mellitus, type 2)    Itch    Hypersomnia with sleep apnea, unspecified    ILD (interstitial lung disease)    Severe obstructive sleep apnea    Other vitreous opacities, bilateral    NS (nuclear sclerosis)    Senile nuclear sclerosis    Retained lens material, left    Glaucoma associated with ocular inflammation, left, indeterminate stage    Iritis, lens-induced    Vitreomacular adhesion, right    Epiretinal membrane, right    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, with long-term current use of insulin    Internal strangulated hemorrhoids    Arachnoid cyst    REM sleep behavior disorder    Abnormal gait    Cognitive change    Abnormal involuntary movements    Adjustment disorder with depressed mood    Parkinsonism    Hx of acute gouty arthritis    Polyneuropathy associated with underlying disease    Fall from bed    Memory change    CKD (chronic kidney disease) stage 2, GFR 60-89 ml/min    Type 2 diabetes mellitus with stage 2 chronic kidney disease, without long-term current use of insulin    Family history of colon cancer    Elevated PSA       Medication List with Changes/Refills   Current Medications    ALLOPURINOL (ZYLOPRIM) 300 MG TABLET    TAKE 1 TABLET(300 MG) BY MOUTH EVERY DAY    ASPIRIN 81 MG CHEW    Take 81 mg by mouth once daily.    ATORVASTATIN (LIPITOR) 20 MG TABLET    Take 1 tablet (20  mg total) by mouth once daily.    BRIMONIDINE 0.2% (ALPHAGAN) 0.2 % DROP    INSTILL 1 DROP IN BOTH EYES TWICE DAILY    CARBIDOPA-LEVODOPA  MG (SINEMET)  MG PER TABLET    Take 2 tablets by mouth 5 (five) times daily.    CHOLECALCIFEROL, VITAMIN D3, (D3-2000 ORAL)    Take by mouth.    CLONAZEPAM (KLONOPIN) 1 MG TABLET    Take 2 tablets (2 mg total) by mouth every evening.    DORZOLAMIDE (TRUSOPT) 2 % OPHTHALMIC SOLUTION    INSTILL 1 DROP IN BOTH EYES TWICE DAILY    FLUOXETINE 40 MG CAPSULE    Take 1 capsule (40 mg total) by mouth once daily.    FUROSEMIDE (LASIX) 20 MG TABLET    Take 1 tablet (20 mg total) by mouth every morning.    IRBESARTAN (AVAPRO) 75 MG TABLET    Take 1 tablet (75 mg total) by mouth once daily.    KETOCONAZOLE (NIZORAL) 2 % SHAMPOO    Apply topically.    METFORMIN (GLUCOPHAGE-XR) 500 MG ER 24HR TABLET    Take 1 tablet (500 mg total) by mouth once daily.    PANTOPRAZOLE (PROTONIX) 20 MG TABLET    Take 1 tablet (20 mg total) by mouth once daily.    PRAZOSIN (MINIPRESS) 1 MG CAP    Take 1 capsule (1 mg total) by mouth every evening.    RAMELTEON (ROZEREM) 8 MG TABLET    Take 8 mg by mouth every evening.    RASAGILINE (AZILECT) 1 MG TAB    TAKE 1 TABLET(1 MG) BY MOUTH EVERY EVENING       Review of patient's allergies indicates:   Allergen Reactions    Avelox [moxifloxacin] Hives and Rash       Past Surgical History:   Procedure Laterality Date    CATARACT EXTRACTION W/  INTRAOCULAR LENS IMPLANT Left 11/30/2017    CATARACT EXTRACTION W/  INTRAOCULAR LENS IMPLANT Right 12/11/2018    Dr Garcia    CATARACT EXTRACTION W/  INTRAOCULAR LENS IMPLANT Right 12/11/2018    Procedure: EXTRACTION, CATARACT, WITH IOL INSERTION;  Surgeon: Damaris Garcia MD;  Location: [facility] OR;  Service: Ophthalmology;  Laterality: Right;    COLONOSCOPY N/A 9/18/2017    Procedure: COLONOSCOPY;  Surgeon: Paul Feliz Jr., MD;  Location: [facility] ENDO;  Service: Endoscopy;  Laterality: N/A;    COLONOSCOPY N/A 12/21/2022     Procedure: COLONOSCOPY;  Surgeon: Paul Feliz Jr., MD;  Location: [facility];  Service: Endoscopy;  Laterality: N/A;    COLONOSCOPY W/ POLYPECTOMY  04/13/2010    YARELI.   One 1 cm polyp in the sigmoid colon.  Internal hemorrhoids.    COSMETIC SURGERY      DIGITAL RECTAL EXAMINATION UNDER ANESTHESIA N/A 7/23/2019    Procedure: EXAM UNDER ANESTHESIA, DIGITAL, RECTUM;  Surgeon: Kaz Keating MD;  Location: [facility] OR;  Service: General;  Laterality: N/A;    EXAMINATION UNDER ANESTHESIA N/A 4/24/2019    Procedure: EXAM UNDER ANESTHESIA;  Surgeon: Kaz Keating MD;  Location: [facility] OR;  Service: General;  Laterality: N/A;    EXCISIONAL HEMORRHOIDECTOMY N/A 4/24/2019    Procedure: HEMORRHOIDECTOMY (lithotomy);  Surgeon: Kaz Keating MD;  Location: [facility] OR;  Service: General;  Laterality: N/A;    GANGLION CYST EXCISION Left     INJECTION OF ANESTHETIC AGENT AROUND PUDENDAL NERVE N/A 4/24/2019    Procedure: BLOCK, NERVE, PUDENDAL;  Surgeon: Kaz Keating MD;  Location: [facility] OR;  Service: General;  Laterality: N/A;    INJECTION OF ANESTHETIC AGENT AROUND PUDENDAL NERVE N/A 7/23/2019    Procedure: BLOCK, NERVE, PUDENDAL;  Surgeon: Kaz Keating MD;  Location: [facility] OR;  Service: General;  Laterality: N/A;    REPAIR OF RETINAL DETACHMENT WITH VITRECTOMY Right 8/1/2018    Procedure: REPAIR, RETINAL DETACHMENT, WITH VITRECTOMY;  Surgeon: SAJAN Pulido MD;  Location: [address];  Service: Ophthalmology;  Laterality: Right;  35 min    TONSILLECTOMY      TRANSRECTAL BIOPSY OF PROSTATE WITH ULTRASOUND GUIDANCE N/A 1/31/2023    Procedure: BIOPSY, PROSTATE, RECTAL APPROACH, WITH US GUIDANCE;  Surgeon: MARIZA Donahue MD;  Location: [facility] OR;  Service: Urology;  Laterality: N/A;    TRIGGER FINGER RELEASE Right     X 2    uro lift      for enlarged prostate       Family History   Problem Relation Age of Onset    Heart disease Brother 62    Kidney disease Brother     Heart disease Father      Diabetes Father     Colon cancer Father     Cancer Father         colon    Cataracts Father     Hypertension Mother     Diabetes Mother     Stroke Mother     Cancer Mother         colon    Kidney disease Mother     Glaucoma Maternal Grandmother     No Known Problems Sister     No Known Problems Maternal Aunt     No Known Problems Maternal Uncle     No Known Problems Paternal Aunt     No Known Problems Paternal Uncle     No Known Problems Maternal Grandfather     No Known Problems Paternal Grandmother     No Known Problems Paternal Grandfather     Dementia Brother     Abnormal EKG Brother     Amblyopia Neg Hx     Blindness Neg Hx     Macular degeneration Neg Hx     Retinal detachment Neg Hx     Strabismus Neg Hx     Thyroid disease Neg Hx     Parkinsonism Neg Hx        Social History     Socioeconomic History    Marital status:    Tobacco Use    Smoking status: Never    Smokeless tobacco: Never   Substance and Sexual Activity    Alcohol use: Yes     Comment: occasional, no alcohol 72 hours prior to sx    Drug use: No    Sexual activity: Yes     Partners: Female   Social History Narrative            2 grown kids.         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      Social Determinants of Health     Financial Resource Strain: Unknown    Difficulty of Paying Living Expenses: Patient refused   Food Insecurity: Unknown    Worried About Running Out of Food in the Last Year: Patient refused    Ran Out of Food in the Last Year: Patient refused   Transportation Needs: Unknown    Lack of Transportation (Medical): Patient refused    Lack of Transportation (Non-Medical): Patient refused   Physical Activity: Sufficiently Active    Days of Exercise per Week: 3 days    Minutes of Exercise per Session: 70 min   Stress: Stress Concern Present    Feeling of Stress : To some extent   Social Connections: Unknown    Frequency of Communication with Friends and Family: Patient refused    Frequency of  "Social Gatherings with Friends and Family: Patient refused    Active Member of Clubs or Organizations: Patient refused    Attends Club or Organization Meetings: Patient refused    Marital Status:    Housing Stability: Unknown    Unable to Pay for Housing in the Last Year: Patient refused    Number of Places Lived in the Last Year: 1    Unstable Housing in the Last Year: Patient refused       Vitals:    06/20/23 1147   Weight: 86.6 kg (191 lb)   Height: 5' 11" (1.803 m)   PainSc: 0-No pain   PainLoc: Foot       Hemoglobin A1C   Date Value Ref Range Status   06/16/2023 7.2 (H) 4.0 - 5.6 % Final     Comment:     ADA Screening Guidelines:  5.7-6.4%  Consistent with prediabetes  >or=6.5%  Consistent with diabetes    High levels of fetal hemoglobin interfere with the HbA1C  assay. Heterozygous hemoglobin variants (HbS, HgC, etc)do  not significantly interfere with this assay.   However, presence of multiple variants may affect accuracy.     12/12/2022 6.7 (H) 4.0 - 5.6 % Final     Comment:     ADA Screening Guidelines:  5.7-6.4%  Consistent with prediabetes  >or=6.5%  Consistent with diabetes    High levels of fetal hemoglobin interfere with the HbA1C  assay. Heterozygous hemoglobin variants (HbS, HgC, etc)do  not significantly interfere with this assay.   However, presence of multiple variants may affect accuracy.     11/04/2022 6.5 (H) 4.0 - 5.6 % Final     Comment:     ADA Screening Guidelines:  5.7-6.4%  Consistent with prediabetes  >or=6.5%  Consistent with diabetes    High levels of fetal hemoglobin interfere with the HbA1C  assay. Heterozygous hemoglobin variants (HbS, HgC, etc)do  not significantly interfere with this assay.   However, presence of multiple variants may affect accuracy.         Review of Systems   Constitutional:  Negative for chills and fever.   Respiratory:  Negative for shortness of breath.    Cardiovascular:  Negative for chest pain, palpitations, orthopnea, claudication and leg " swelling.   Gastrointestinal:  Negative for diarrhea, nausea and vomiting.   Musculoskeletal:  Negative for joint pain.   Skin:  Negative for rash.   Neurological:  Positive for tingling and sensory change.   Psychiatric/Behavioral: Negative.             Objective:      PHYSICAL EXAM: Apperance: Alert and orient in no distress,well developed, and with good attention to grooming and body habits  Patient presents ambulating in sandals.  Lower Extremity Exam  VASCULAR: Dorsalis pedis pulses 2/4 bilateral and Posterior Tibial pulses 2/4 bilateral. Capillary fill time <blanched bilateral. No edema observed bilateral. Varicosities absent bilateral. Skin temperature of the lower extremities is warm to warm, proximal to distal. Hair growth WNL bilateral.  DERMATOLOGICAL: No skin rashes, subcutaneous nodules, lesions, or ulcers observed bilateral.   NEUROLOGICAL: Light touch, sharp-dull, proprioception all present and equal bilaterally. Vibratory sensation diminished at bilateral hallux and navicular. Protective sensation decreased at sites as tested with a [employer] 5.07 monofilament.   MUSCULOSKELETAL: Muscle strength 5/5 for all foot inverters, everters, plantarflexors, and dorsiflexors bilateral. Ankle joints left shows decreased ROM. left ankle ROM shows greater decrease in dorsiflexion with knee extended. Ankle joint ROM is pain free and without crepitus left. No pain with palpation of left posterior 1/3 calcaneus at region of Achilles tendon insertion. Negative pain to palpation left plantar medial tubercle. Negative pain on medial-lateral compression of the calcaneus.          Assessment:       ICD-10-CM ICD-9-CM   1. Encounter for comprehensive diabetic foot examination, type 2 diabetes mellitus  E11.9 250.00   2. Achilles tendinitis of left lower extremity - Left Foot  M76.62 726.71   3. Equinus contracture of left ankle - Left Foot  M24.572 718.47       Plan:   Encounter for comprehensive diabetic foot  examination, type 2 diabetes mellitus    Achilles tendinitis of left lower extremity - Left Foot    Equinus contracture of left ankle - Left Foot      I counseled the patient on his conditions, regarding findings of my examination, my impressions, and usual treatment plan.   This visit spent on counseling and coordination of care.  Appointment spent on education about the diabetic foot, neuropathy, and prevention of limb loss.  Shoe inspection. Diabetic Foot Education. Patient reminded of the importance of good nutrition and blood sugar control to help prevent podiatric complications of diabetes. Patient instructed on proper foot hygeine. We discussed wearing proper shoe gear, daily foot inspections, never walking without protective shoe gear, never putting sharp instruments to feet.    Patient instructed to continue stretching exercises and thick soled shoes.   Patient  will continue to monitor the areas daily, inspect feet, wear protective shoe gear when ambulatory, moisturizer to maintain skin integrity. Patient reminded of the importance of good nutrition and blood sugar control to help prevent podiatric complications of diabetes.  Patient to return 6 months or sooner if needed.        Everett Duarte DPM  [facility]

## 2023-06-27 ENCOUNTER — PATIENT MESSAGE (OUTPATIENT)
Dept: UROLOGY | Facility: CLINIC | Age: 68
End: 2023-06-27
Payer: MEDICARE

## 2023-07-13 ENCOUNTER — TELEPHONE (OUTPATIENT)
Dept: FAMILY MEDICINE | Facility: CLINIC | Age: 68
End: 2023-07-13
Payer: MEDICARE

## 2023-07-13 NOTE — TELEPHONE ENCOUNTER
Called patient informed him that we do not have anything sooner than the 18th for an appointment, I did recommend if the dizziness got any worse to go to the urgent care. PT verbalized understanding.

## 2023-07-13 NOTE — TELEPHONE ENCOUNTER
----- Message from Jennifer Krishna sent at 7/13/2023  1:58 PM CDT -----  Contact: self  Type: Sooner Appointment Request        Caller is requesting a sooner appointment. Caller declined first available appointment listed below. Caller will not accept being placed on the waitlist and is requesting a message be sent to doctor.        Name of Caller: Patient   Best Call Back Number: [phone]  Additional Information: Pt states he is still having dizzy spells. Pt has a hard time getting out of bed because he is so dizzy cant get anything done. Plz call pt soon really needs to be seen. Thanks

## 2023-07-18 ENCOUNTER — TELEPHONE (OUTPATIENT)
Dept: PAIN MEDICINE | Facility: CLINIC | Age: 68
End: 2023-07-18
Payer: MEDICARE

## 2023-07-18 NOTE — TELEPHONE ENCOUNTER
I spoke to Mr. Medel.  At this time he reports his back is feeling better so he is going to follow up on an as needed basis.    I reviewed his prior records.  He had a right-sided L3-4, L4-5, L5-S1 RFA on 09/29/2014 and he had a right-sided L5-S1 and S1 TFESI on 10/27/2014

## 2023-07-21 NOTE — TELEPHONE ENCOUNTER
----- Message from Mary Ellen Rodríguez sent at 5/2/2018  7:58 AM CDT -----  Type:  Pharmacy Calling to Clarify an RX    Name of Caller:pt  Pharmacy Name:  na  Prescription Name: pt not  Sure  Of  Name ,  But  stripes  For  Glucose   What do they need to clarify?:  Wants  To    Best Call Back Number:  [phone]  Additional Information:  Calling to  Pick  Up a  script   EGD    [address]    OFFICE NUMBER [phone]    FOLLOW UP APPOINTMENT  AS NEEDED. REPEAT PROCEDURE AS  NEEDED. TEST ORDERED:NONE     What to Expect at Home  Your Recovery:  The only discomfort after your EGD is generally limited to a mild soreness of the throat, which may last a day or two. Call your physician immediately if you have severe chest pain, shortness of breath or a temperature of 100 degrees or higher if taken orally. How can you care for yourself at home? Activity  Rest as much as you need to after you go home. You should be able to go back to your usual activities the day after the test.  Diet  Follow your doctor's directions for eating after the test.  Drink plenty of fluids (unless your doctor has told you not to). Medications  If you have a sore throat the day after the test, use an over-the-counter spray to numb your throat. Your doctor will tell you if and when you can restart your medicines. He or she will also give you instructions about taking any new medicines. If you take blood thinners, such as warfarin (Coumadin), clopidogrel (Plavix), or aspirin, be sure to talk to your doctor. He or she will tell you if and when to start taking those medicines again. Make sure that you understand exactly what your doctor wants you to do. If a biopsy was done during the test, your doctor may tell you not to take aspirin or other anti-inflammatory medicines for a few days. These include ibuprofen (Advil, Motrin) and naproxen (Aleve). DO NOT DRINK ANYTHING WITH ALCOHOL TODAY. Other instructions:Anesthesia  For your safety, do not drive or operate machinery for 24 hours. Do not sign legal documents or make major decisions for 24 hours. The anesthesia can make it hard for you to fully understand what you are agreeing to. Follow-up care is a key part of your treatment and safety. Be sure to make and go to all appointments, and call your doctor if you are having problems.  It's also a

## 2023-07-24 ENCOUNTER — OFFICE VISIT (OUTPATIENT)
Dept: FAMILY MEDICINE | Facility: CLINIC | Age: 68
End: 2023-07-24
Payer: MEDICARE

## 2023-07-24 VITALS
WEIGHT: 188.06 LBS | BODY MASS INDEX: 26.33 KG/M2 | HEART RATE: 74 BPM | HEIGHT: 71 IN | SYSTOLIC BLOOD PRESSURE: 122 MMHG | DIASTOLIC BLOOD PRESSURE: 74 MMHG | OXYGEN SATURATION: 97 %

## 2023-07-24 DIAGNOSIS — E78.2 MIXED HYPERLIPIDEMIA: ICD-10-CM

## 2023-07-24 DIAGNOSIS — I10 PRIMARY HYPERTENSION: ICD-10-CM

## 2023-07-24 DIAGNOSIS — E11.22 TYPE 2 DIABETES MELLITUS WITH STAGE 2 CHRONIC KIDNEY DISEASE, WITHOUT LONG-TERM CURRENT USE OF INSULIN: ICD-10-CM

## 2023-07-24 DIAGNOSIS — R42 VERTIGO: Primary | ICD-10-CM

## 2023-07-24 DIAGNOSIS — N18.2 TYPE 2 DIABETES MELLITUS WITH STAGE 2 CHRONIC KIDNEY DISEASE, WITHOUT LONG-TERM CURRENT USE OF INSULIN: ICD-10-CM

## 2023-07-24 PROCEDURE — 99215 OFFICE O/P EST HI 40 MIN: CPT | Mod: PBBFAC,PO | Performed by: INTERNAL MEDICINE

## 2023-07-24 PROCEDURE — 99213 PR OFFICE/OUTPT VISIT, EST, LEVL III, 20-29 MIN: ICD-10-PCS | Mod: S$PBB,,, | Performed by: INTERNAL MEDICINE

## 2023-07-24 PROCEDURE — 99999 PR PBB SHADOW E&M-EST. PATIENT-LVL V: ICD-10-PCS | Mod: PBBFAC,,, | Performed by: INTERNAL MEDICINE

## 2023-07-24 PROCEDURE — 99213 OFFICE O/P EST LOW 20 MIN: CPT | Mod: S$PBB,,, | Performed by: INTERNAL MEDICINE

## 2023-07-24 PROCEDURE — 99999 PR PBB SHADOW E&M-EST. PATIENT-LVL V: CPT | Mod: PBBFAC,,, | Performed by: INTERNAL MEDICINE

## 2023-07-24 NOTE — PROGRESS NOTES
Patient ID: Glenn Medel is a 68 y.o. male.    Chief Complaint: Fall (Out of bed) and Dizziness ( When patient  complaining room spinning when get up in the morning. Worse after leaning head back.)        Assessment and Plan      1. Vertigo    2. Type 2 diabetes mellitus with stage 2 chronic kidney disease, without long-term current use of insulin    3. Mixed hyperlipidemia    4. Primary hypertension       Likely BPPV.  To ENT for testing and treatment.  Labs and annual in dec     HPI     Dizziness daily. Positional. Rotational. Intermittent. 3 episodes/ day last about 5 sec. Onset 2 weeks.      Review of Systems   Constitutional:  Negative for fever.   Respiratory:  Negative for shortness of breath.    Cardiovascular:  Negative for chest pain.   Gastrointestinal:  Negative for abdominal pain.   Neurological:  Positive for dizziness.      Objective     Vitals:    07/24/23 1421   BP: 122/74   Pulse: 74     Wt Readings from Last 3 Encounters:   07/24/23 1421 85.3 kg (188 lb 0.8 oz)   06/20/23 1147 86.6 kg (191 lb)   06/16/23 1126 87 kg (191 lb 11 oz)      Body mass index is 26.23 kg/m².   Physical Exam  Cardiovascular:      Rate and Rhythm: Normal rate and regular rhythm.      Heart sounds: No murmur heard.    No gallop.   Pulmonary:      Breath sounds: Normal breath sounds. No wheezing or rhonchi.   Abdominal:      Palpations: Abdomen is soft.      Tenderness: There is no abdominal tenderness.        Orders      was seen today for fall and dizziness.    Diagnoses and all orders for this visit:    Vertigo  -     Ambulatory referral/consult to ENT; Future    Type 2 diabetes mellitus with stage 2 chronic kidney disease, without long-term current use of insulin  -     Hemoglobin A1C; Future  -     Microalbumin/Creatinine Ratio, Urine; Future    Mixed hyperlipidemia  -     Lipid Panel; Future    Primary hypertension  -     CBC Auto Differential; Future  -     Comprehensive Metabolic Panel; Future          Medication List with Changes/Refills   Current Medications    ALLOPURINOL (ZYLOPRIM) 300 MG TABLET    TAKE 1 TABLET(300 MG) BY MOUTH EVERY DAY    ASPIRIN 81 MG CHEW    Take 81 mg by mouth once daily.    ATORVASTATIN (LIPITOR) 20 MG TABLET    Take 1 tablet (20 mg total) by mouth once daily.    BRIMONIDINE 0.2% (ALPHAGAN) 0.2 % DROP    INSTILL 1 DROP IN BOTH EYES TWICE DAILY    CARBIDOPA-LEVODOPA  MG (SINEMET)  MG PER TABLET    Take 2 tablets by mouth 5 (five) times daily.    CHOLECALCIFEROL, VITAMIN D3, (D3-2000 ORAL)    Take by mouth.    CLONAZEPAM (KLONOPIN) 1 MG TABLET    Take 2 tablets (2 mg total) by mouth every evening.    DORZOLAMIDE (TRUSOPT) 2 % OPHTHALMIC SOLUTION    INSTILL 1 DROP IN BOTH EYES TWICE DAILY    FLUOXETINE 40 MG CAPSULE    Take 1 capsule (40 mg total) by mouth once daily.    FUROSEMIDE (LASIX) 20 MG TABLET    Take 1 tablet (20 mg total) by mouth every morning.    KETOCONAZOLE (NIZORAL) 2 % SHAMPOO    Apply topically.    METFORMIN (GLUCOPHAGE-XR) 500 MG ER 24HR TABLET    Take 1 tablet (500 mg total) by mouth once daily.    PANTOPRAZOLE (PROTONIX) 20 MG TABLET    Take 1 tablet (20 mg total) by mouth once daily.    PRAZOSIN (MINIPRESS) 1 MG CAP    Take 1 capsule (1 mg total) by mouth every evening.    RAMELTEON (ROZEREM) 8 MG TABLET    Take 8 mg by mouth every evening.    RASAGILINE (AZILECT) 1 MG TAB    TAKE 1 TABLET(1 MG) BY MOUTH EVERY EVENING    SEMAGLUTIDE (OZEMPIC) 0.25 MG OR 0.5 MG (2 MG/3 ML) PEN INJECTOR    Inject 0.5 mg into the skin every 7 days.       I personally reviewed past medical, family and social history.    Patient Active Problem List   Diagnosis    BPH (benign prostatic hyperplasia)    LOVE (obstructive sleep apnea)    HTN (hypertension)    DDD (degenerative disc disease), lumbar    Palpitations    Peripheral neuropathy    Hyperlipidemia    DM type 2 (diabetes mellitus, type 2)    Itch    Hypersomnia with sleep apnea, unspecified    ILD (interstitial lung  disease)    Severe obstructive sleep apnea    Other vitreous opacities, bilateral    NS (nuclear sclerosis)    Senile nuclear sclerosis    Retained lens material, left    Glaucoma associated with ocular inflammation, left, indeterminate stage    Iritis, lens-induced    Vitreomacular adhesion, right    Epiretinal membrane, right    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, with long-term current use of insulin    Internal strangulated hemorrhoids    Arachnoid cyst    REM sleep behavior disorder    Abnormal gait    Cognitive change    Abnormal involuntary movements    Adjustment disorder with depressed mood    Parkinsonism    Hx of acute gouty arthritis    Polyneuropathy associated with underlying disease    Fall from bed    Memory change    CKD (chronic kidney disease) stage 2, GFR 60-89 ml/min    Type 2 diabetes mellitus with stage 2 chronic kidney disease, without long-term current use of insulin    Family history of colon cancer    Elevated PSA

## 2023-08-02 ENCOUNTER — PATIENT MESSAGE (OUTPATIENT)
Dept: NEUROLOGY | Facility: CLINIC | Age: 68
End: 2023-08-02
Payer: MEDICARE

## 2023-08-02 ENCOUNTER — LAB VISIT (OUTPATIENT)
Dept: LAB | Facility: HOSPITAL | Age: 68
End: 2023-08-02
Attending: UROLOGY
Payer: MEDICARE

## 2023-08-02 DIAGNOSIS — C61 PROSTATE CANCER: ICD-10-CM

## 2023-08-02 LAB
BASOPHILS # BLD AUTO: 0.05 K/UL (ref 0–0.2)
BASOPHILS NFR BLD: 0.7 % (ref 0–1.9)
COMPLEXED PSA SERPL-MCNC: 4.1 NG/ML (ref 0–4)
DIFFERENTIAL METHOD: ABNORMAL
EOSINOPHIL # BLD AUTO: 0.2 K/UL (ref 0–0.5)
EOSINOPHIL NFR BLD: 2.3 % (ref 0–8)
ERYTHROCYTE [DISTWIDTH] IN BLOOD BY AUTOMATED COUNT: 13.7 % (ref 11.5–14.5)
HCT VFR BLD AUTO: 46.7 % (ref 40–54)
HGB BLD-MCNC: 14.6 G/DL (ref 14–18)
IMM GRANULOCYTES # BLD AUTO: 0.02 K/UL (ref 0–0.04)
IMM GRANULOCYTES NFR BLD AUTO: 0.3 % (ref 0–0.5)
LYMPHOCYTES # BLD AUTO: 1.8 K/UL (ref 1–4.8)
LYMPHOCYTES NFR BLD: 25.2 % (ref 18–48)
MCH RBC QN AUTO: 27.8 PG (ref 27–31)
MCHC RBC AUTO-ENTMCNC: 31.3 G/DL (ref 32–36)
MCV RBC AUTO: 89 FL (ref 82–98)
MONOCYTES # BLD AUTO: 0.5 K/UL (ref 0.3–1)
MONOCYTES NFR BLD: 6.4 % (ref 4–15)
NEUTROPHILS # BLD AUTO: 4.5 K/UL (ref 1.8–7.7)
NEUTROPHILS NFR BLD: 65.1 % (ref 38–73)
NRBC BLD-RTO: 0 /100 WBC
PLATELET # BLD AUTO: 233 K/UL (ref 150–450)
PMV BLD AUTO: 10.3 FL (ref 9.2–12.9)
RBC # BLD AUTO: 5.26 M/UL (ref 4.6–6.2)
WBC # BLD AUTO: 6.98 K/UL (ref 3.9–12.7)

## 2023-08-02 PROCEDURE — 84402 ASSAY OF FREE TESTOSTERONE: CPT | Performed by: UROLOGY

## 2023-08-02 PROCEDURE — 85025 COMPLETE CBC W/AUTO DIFF WBC: CPT | Performed by: UROLOGY

## 2023-08-02 PROCEDURE — 84153 ASSAY OF PSA TOTAL: CPT | Performed by: UROLOGY

## 2023-08-08 ENCOUNTER — OFFICE VISIT (OUTPATIENT)
Dept: UROLOGY | Facility: CLINIC | Age: 68
End: 2023-08-08
Payer: MEDICARE

## 2023-08-08 VITALS — HEIGHT: 71 IN | BODY MASS INDEX: 26.23 KG/M2

## 2023-08-08 DIAGNOSIS — C61 PROSTATE CANCER: Primary | ICD-10-CM

## 2023-08-08 PROCEDURE — 99999 PR PBB SHADOW E&M-EST. PATIENT-LVL III: CPT | Mod: PBBFAC,,, | Performed by: UROLOGY

## 2023-08-08 PROCEDURE — 99213 OFFICE O/P EST LOW 20 MIN: CPT | Mod: S$PBB,,, | Performed by: UROLOGY

## 2023-08-08 PROCEDURE — 99999 PR PBB SHADOW E&M-EST. PATIENT-LVL III: ICD-10-PCS | Mod: PBBFAC,,, | Performed by: UROLOGY

## 2023-08-08 PROCEDURE — 99213 PR OFFICE/OUTPT VISIT, EST, LEVL III, 20-29 MIN: ICD-10-PCS | Mod: S$PBB,,, | Performed by: UROLOGY

## 2023-08-08 PROCEDURE — 99213 OFFICE O/P EST LOW 20 MIN: CPT | Mod: PBBFAC,PO | Performed by: UROLOGY

## 2023-08-08 NOTE — PROGRESS NOTES
Subjective:       Patient ID: Glenn Medel is a 68 y.o. male.    Chief Complaint: Follow-up    HPI    68-year-old with elevated PSA of 5.1.  He underwent prostate needle biopsy in January 2023.  Two of 12 cores are positive for grade group 2 prostate cancer.   He elected active surveillance.  He is here for follow-up.  His most recent PSA is decreased to 4.1.    Component PSA Diagnostic   Latest Ref Rng & Units 0.00 - 4.00 ng/mL   8/2/2023 4.1 (H)   6/16/2023 4.3 (H)   12/12/2022 5.1 (H)   12/29/2021 4.4 (H)   2/10/2020 2.4   8/16/2017 1.9       Review of Systems   Constitutional:  Negative for fever.   Genitourinary:  Negative for dysuria and hematuria.       Objective:      Physical Exam  Vitals reviewed.   Constitutional:       Appearance: He is well-developed.   Pulmonary:      Effort: Pulmonary effort is normal.   Genitourinary:     Prostate: Enlarged (40g). Not tender and no nodules present.   Skin:     Findings: No rash.   Neurological:      Mental Status: He is alert and oriented to person, place, and time.         Assessment:       1. Prostate cancer        Plan:       Prostate cancer  -     Prostate Specific Antigen, Diagnostic; Future; Expected date: 02/08/2024        Follow-up 6 months with repeat PSA.  Will likely repeat biopsy at that time.

## 2023-08-10 LAB
TESTOST FREE SERPL-MCNC: 135.3 PG/ML (ref 35–155)
TESTOST SERPL-MCNC: 480 NG/DL (ref 250–1100)

## 2023-08-25 ENCOUNTER — OFFICE VISIT (OUTPATIENT)
Dept: OTOLARYNGOLOGY | Facility: CLINIC | Age: 68
End: 2023-08-25
Payer: MEDICARE

## 2023-08-25 VITALS
SYSTOLIC BLOOD PRESSURE: 118 MMHG | DIASTOLIC BLOOD PRESSURE: 58 MMHG | HEIGHT: 71 IN | WEIGHT: 187.81 LBS | BODY MASS INDEX: 26.29 KG/M2

## 2023-08-25 DIAGNOSIS — R42 VERTIGO: ICD-10-CM

## 2023-08-25 DIAGNOSIS — H81.10 BPPV (BENIGN PAROXYSMAL POSITIONAL VERTIGO), UNSPECIFIED LATERALITY: Primary | ICD-10-CM

## 2023-08-25 PROCEDURE — 99999 PR PBB SHADOW E&M-EST. PATIENT-LVL IV: CPT | Mod: PBBFAC,,, | Performed by: STUDENT IN AN ORGANIZED HEALTH CARE EDUCATION/TRAINING PROGRAM

## 2023-08-25 PROCEDURE — 99203 PR OFFICE/OUTPT VISIT, NEW, LEVL III, 30-44 MIN: ICD-10-PCS | Mod: S$PBB,,, | Performed by: STUDENT IN AN ORGANIZED HEALTH CARE EDUCATION/TRAINING PROGRAM

## 2023-08-25 PROCEDURE — 99999 PR PBB SHADOW E&M-EST. PATIENT-LVL IV: ICD-10-PCS | Mod: PBBFAC,,, | Performed by: STUDENT IN AN ORGANIZED HEALTH CARE EDUCATION/TRAINING PROGRAM

## 2023-08-25 PROCEDURE — 99214 OFFICE O/P EST MOD 30 MIN: CPT | Mod: PBBFAC,PO | Performed by: STUDENT IN AN ORGANIZED HEALTH CARE EDUCATION/TRAINING PROGRAM

## 2023-08-25 PROCEDURE — 99203 OFFICE O/P NEW LOW 30 MIN: CPT | Mod: S$PBB,,, | Performed by: STUDENT IN AN ORGANIZED HEALTH CARE EDUCATION/TRAINING PROGRAM

## 2023-08-25 NOTE — PROGRESS NOTES
Otolaryngology Clinic Note    Subjective:       Patient ID: Glenn Medel is a 68 y.o. male.    Chief Complaint: Dizziness (1mo ago was having symptoms but hasn't had any in 2wks )      History of Present Illness: Glenn Medel is a 68 y.o. male presenting with positional vertigo 1 month ago, saw PCP, no testing done at that time. Has been better past 2 weeks. Had some medication changes with some other issues. Started when getting out of bed, room spinning, then laying down at night, would trigger again. Not sure right or left worse. Was seconds. Leaning head back for eye drops would trigger. Has never happened before.   No recent URI, no head trauma. Only  new was med change. BP did drop very low at one point, stopped BP med.       Past Surgical History:   Procedure Laterality Date    CATARACT EXTRACTION W/  INTRAOCULAR LENS IMPLANT Left 11/30/2017    CATARACT EXTRACTION W/  INTRAOCULAR LENS IMPLANT Right 12/11/2018    Dr Garcia    CATARACT EXTRACTION W/  INTRAOCULAR LENS IMPLANT Right 12/11/2018    Procedure: EXTRACTION, CATARACT, WITH IOL INSERTION;  Surgeon: Damaris Garcia MD;  [place] [facility];  Service: Ophthalmology;  Laterality: Right;    COLONOSCOPY N/A 9/18/2017    Procedure: COLONOSCOPY;  Surgeon: Paul Feliz Jr., MD;  Location: [facility];  Service: Endoscopy;  Laterality: N/A;    COLONOSCOPY N/A 12/21/2022    Procedure: COLONOSCOPY;  Surgeon: Paul Feliz Jr., MD;  [place] [facility] [place] [facility];  Service: Endoscopy;  Laterality: N/A;    COLONOSCOPY W/ POLYPECTOMY  04/13/2010    YARELI.   One 1 cm polyp in the sigmoid colon.  Internal hemorrhoids.    COSMETIC SURGERY      DIGITAL RECTAL EXAMINATION UNDER ANESTHESIA N/A 7/23/2019    Procedure: EXAM UNDER ANESTHESIA, DIGITAL, RECTUM;  Surgeon: Kaz Keating MD;  Location: [facility] OR;  Service: General;  Laterality: N/A;    EXAMINATION UNDER ANESTHESIA N/A 4/24/2019    Procedure: EXAM UNDER ANESTHESIA;  Surgeon: Kaz Keating MD;  Location:  [facility] OR;  Service: General;  Laterality: N/A;    EXCISIONAL HEMORRHOIDECTOMY N/A 4/24/2019    Procedure: HEMORRHOIDECTOMY (lithotomy);  Surgeon: Kaz Keating MD;  Location: [facility] OR;  Service: General;  Laterality: N/A;    GANGLION CYST EXCISION Left     INJECTION OF ANESTHETIC AGENT AROUND PUDENDAL NERVE N/A 4/24/2019    Procedure: BLOCK, NERVE, PUDENDAL;  Surgeon: Kaz Keating MD;  Location: [facility] OR;  Service: General;  Laterality: N/A;    INJECTION OF ANESTHETIC AGENT AROUND PUDENDAL NERVE N/A 7/23/2019    Procedure: BLOCK, NERVE, PUDENDAL;  Surgeon: Kaz Keating MD;  Location: [facility] OR;  Service: General;  Laterality: N/A;    REPAIR OF RETINAL DETACHMENT WITH VITRECTOMY Right 8/1/2018    Procedure: REPAIR, RETINAL DETACHMENT, WITH VITRECTOMY;  Surgeon: SAJAN Pulido MD;  Location: [address];  Service: Ophthalmology;  Laterality: Right;  35 min    TONSILLECTOMY      TRANSRECTAL BIOPSY OF PROSTATE WITH ULTRASOUND GUIDANCE N/A 1/31/2023    Procedure: BIOPSY, PROSTATE, RECTAL APPROACH, WITH US GUIDANCE;  Surgeon: MARIZA Donahue MD;  Location: [facility];  Service: Urology;  Laterality: N/A;    TRIGGER FINGER RELEASE Right     X 2    uro lift      for enlarged prostate     Past Medical History:   Diagnosis Date    BPH (benign prostatic hyperplasia)     Cataract     done ou    Chronic kidney disease, stage 3 02/25/2015    DDD (degenerative disc disease), lumbar     s/p epidural steroids     Diabetes mellitus     type 2    Glaucoma     OU    HTN (hypertension)     Iritis, lens-induced 01/08/2018    Obesity     Parkinson's disease     Sleep apnea     uses cpap     Social Determinants of Health     Tobacco Use: Low Risk  (8/25/2023)    Patient History     Smoking Tobacco Use: Never     Smokeless Tobacco Use: Never     Passive Exposure: Not on file   Alcohol Use: Not At Risk (8/20/2023)    AUDIT-C     Frequency of Alcohol Consumption: Never     Average Number of Drinks: Patient does not drink      Frequency of Binge Drinking: Never   Financial Resource Strain: Unknown (8/20/2023)    Overall Financial Resource Strain (CARDIA)     Difficulty of Paying Living Expenses: Patient refused   Food Insecurity: Unknown (8/20/2023)    Hunger Vital Sign     Worried About Running Out of Food in the Last Year: Patient refused     Ran Out of Food in the Last Year: Patient refused   Transportation Needs: Unknown (8/20/2023)    PRAPARE - Transportation     Lack of Transportation (Medical): Patient refused     Lack of Transportation (Non-Medical): Patient refused   Physical Activity: Sufficiently Active (8/20/2023)    Exercise Vital Sign     Days of Exercise per Week: 3 days     Minutes of Exercise per Session: 90 min   Stress: Stress Concern Present (8/20/2023)    [employer] - Occupational Stress Questionnaire     Feeling of Stress : To some extent   Social Connections: Unknown (8/20/2023)    Social Connection and Isolation Panel [NHANES]     Frequency of Communication with Friends and Family: Patient refused     Frequency of Social Gatherings with Friends and Family: Patient refused     Attends Yarsani Services: Not on file     Active Member of Clubs or Organizations: No     Attends Club or Organization Meetings: Patient refused     Marital Status:    Housing Stability: Low Risk  (8/20/2023)    Housing Stability Vital Sign     Unable to Pay for Housing in the Last Year: No     Number of Places Lived in the Last Year: 1     Unstable Housing in the Last Year: No   Depression: Not on file     Review of patient's allergies indicates:   Allergen Reactions    Avelox [moxifloxacin] Hives and Rash     Current Outpatient Medications   Medication Instructions    allopurinoL (ZYLOPRIM) 300 MG tablet TAKE 1 TABLET(300 MG) BY MOUTH EVERY DAY    aspirin 81 mg, Oral, Daily    atorvastatin (LIPITOR) 20 mg, Oral, Daily    brimonidine 0.2% (ALPHAGAN) 0.2 % Drop INSTILL 1 DROP IN BOTH EYES TWICE DAILY     carbidopa-levodopa  mg (SINEMET)  mg per tablet 2 tablets, Oral, 5 times daily    cholecalciferol, vitamin D3, (D3-2000 ORAL) Oral    clonazePAM (KLONOPIN) 2 mg, Oral, Nightly    dorzolamide (TRUSOPT) 2 % ophthalmic solution INSTILL 1 DROP IN BOTH EYES TWICE DAILY    FLUoxetine 40 mg, Oral, Daily    furosemide (LASIX) 20 mg, Oral, Every morning    ketoconazole (NIZORAL) 2 % shampoo Topical (Top)    metFORMIN (GLUCOPHAGE-XR) 500 mg, Oral    OZEMPIC 0.5 mg, Subcutaneous, Every 7 days    prazosin (MINIPRESS) 1 mg, Oral, Nightly    ramelteon (ROZEREM) 8 mg, Oral, Nightly    rasagiline (AZILECT) 1 mg Tab TAKE 1 TABLET(1 MG) BY MOUTH EVERY EVENING         ENT ROS negative except as stated above.     Patient answers are not available for this visit.            Objective:      Vitals:    08/25/23 0847   BP: (!) 118/58       General: NAD, well appearing  Eyes: Normal conjunctiva and lids  Face: symmetric, nerve intact  Nose: The nose is without any evidence of any deformity. The nasal mucosa is moist. The septum is midline. There is no evidence of septal hematoma. The turbinates are without abnormality.   Ears: The ears are with normal-appearing pinna. Examination of the canals is normal appearing bilaterally. There is no drainage or erythema noted. The tympanic membranes are normal appearing with pearly color, normal-appearing landmarks and normal light reflex. Hearing is grossly intact.  Mouth: No obvious abnormalities to the lips. The teeth are unremarkable. The gingivae are without any obvious evidence of infection or lesion. The oral mucosa is moist and pink. There are no obvious masses to the hard or soft palate.   Oropharynx: The uvula is midline.  The tongue is midline. The posterior pharynx is without erythema or exudate. The tonsils are normal appearing.  Salivary glands: The salivary glands are symmetric and not enlarged, no masses  Neck: No lymphadenopathy, trachea midline, thryoid not  enlarged.  Psych: Normal mood and affect.   Neuro: Grossly intact  Speech: fluent         Assessment and Plan:       1. BPPV (benign paroxysmal positional vertigo), unspecified laterality    2. Vertigo          Hx consistent with BPPV but resolved. Do not want to trigger today. Advised him to contact if it happens again for me to test which ear is source and teach him the epley maneuver. He agrees with this plan    RTC: PRN    Plan of care was discussed in detail with the patient, who agreed with the plan as above. All questions were answered in detail.     Izzy Myers MD  Otolaryngology

## 2023-08-29 ENCOUNTER — PATIENT MESSAGE (OUTPATIENT)
Dept: FAMILY MEDICINE | Facility: CLINIC | Age: 68
End: 2023-08-29
Payer: MEDICARE

## 2023-08-29 ENCOUNTER — PATIENT MESSAGE (OUTPATIENT)
Dept: ADMINISTRATIVE | Facility: OTHER | Age: 68
End: 2023-08-29
Payer: MEDICARE

## 2023-09-22 ENCOUNTER — PATIENT MESSAGE (OUTPATIENT)
Dept: NEPHROLOGY | Facility: CLINIC | Age: 68
End: 2023-09-22
Payer: MEDICARE

## 2023-09-22 DIAGNOSIS — N18.2 CKD (CHRONIC KIDNEY DISEASE) STAGE 2, GFR 60-89 ML/MIN: Primary | ICD-10-CM

## 2023-09-28 ENCOUNTER — LAB VISIT (OUTPATIENT)
Dept: LAB | Facility: HOSPITAL | Age: 68
End: 2023-09-28
Attending: INTERNAL MEDICINE
Payer: MEDICARE

## 2023-09-28 DIAGNOSIS — N18.2 CKD (CHRONIC KIDNEY DISEASE) STAGE 2, GFR 60-89 ML/MIN: ICD-10-CM

## 2023-09-28 LAB
ALBUMIN SERPL BCP-MCNC: 3.5 G/DL (ref 3.5–5.2)
ANION GAP SERPL CALC-SCNC: 6 MMOL/L (ref 8–16)
BASOPHILS # BLD AUTO: 0.05 K/UL (ref 0–0.2)
BASOPHILS NFR BLD: 0.7 % (ref 0–1.9)
BUN SERPL-MCNC: 14 MG/DL (ref 8–23)
CALCIUM SERPL-MCNC: 9.1 MG/DL (ref 8.7–10.5)
CHLORIDE SERPL-SCNC: 102 MMOL/L (ref 95–110)
CO2 SERPL-SCNC: 28 MMOL/L (ref 23–29)
CREAT SERPL-MCNC: 1 MG/DL (ref 0.5–1.4)
DIFFERENTIAL METHOD: ABNORMAL
EOSINOPHIL # BLD AUTO: 0.1 K/UL (ref 0–0.5)
EOSINOPHIL NFR BLD: 1.5 % (ref 0–8)
ERYTHROCYTE [DISTWIDTH] IN BLOOD BY AUTOMATED COUNT: 13.6 % (ref 11.5–14.5)
EST. GFR  (NO RACE VARIABLE): >60 ML/MIN/1.73 M^2
GLUCOSE SERPL-MCNC: 191 MG/DL (ref 70–110)
HCT VFR BLD AUTO: 47.7 % (ref 40–54)
HGB BLD-MCNC: 15 G/DL (ref 14–18)
IMM GRANULOCYTES # BLD AUTO: 0.02 K/UL (ref 0–0.04)
IMM GRANULOCYTES NFR BLD AUTO: 0.3 % (ref 0–0.5)
LYMPHOCYTES # BLD AUTO: 1.9 K/UL (ref 1–4.8)
LYMPHOCYTES NFR BLD: 24.6 % (ref 18–48)
MCH RBC QN AUTO: 27.7 PG (ref 27–31)
MCHC RBC AUTO-ENTMCNC: 31.4 G/DL (ref 32–36)
MCV RBC AUTO: 88 FL (ref 82–98)
MONOCYTES # BLD AUTO: 0.5 K/UL (ref 0.3–1)
MONOCYTES NFR BLD: 6 % (ref 4–15)
NEUTROPHILS # BLD AUTO: 5.1 K/UL (ref 1.8–7.7)
NEUTROPHILS NFR BLD: 66.9 % (ref 38–73)
NRBC BLD-RTO: 0 /100 WBC
PHOSPHATE SERPL-MCNC: 3.7 MG/DL (ref 2.7–4.5)
PLATELET # BLD AUTO: 223 K/UL (ref 150–450)
PMV BLD AUTO: 9.6 FL (ref 9.2–12.9)
POTASSIUM SERPL-SCNC: 4.9 MMOL/L (ref 3.5–5.1)
PTH-INTACT SERPL-MCNC: 115.6 PG/ML (ref 9–77)
RBC # BLD AUTO: 5.42 M/UL (ref 4.6–6.2)
SODIUM SERPL-SCNC: 136 MMOL/L (ref 136–145)
WBC # BLD AUTO: 7.56 K/UL (ref 3.9–12.7)

## 2023-09-28 PROCEDURE — 36415 COLL VENOUS BLD VENIPUNCTURE: CPT | Mod: PO | Performed by: INTERNAL MEDICINE

## 2023-09-28 PROCEDURE — 85025 COMPLETE CBC W/AUTO DIFF WBC: CPT | Performed by: INTERNAL MEDICINE

## 2023-09-28 PROCEDURE — 83970 ASSAY OF PARATHORMONE: CPT | Performed by: INTERNAL MEDICINE

## 2023-09-28 PROCEDURE — 80069 RENAL FUNCTION PANEL: CPT | Performed by: INTERNAL MEDICINE

## 2023-10-02 ENCOUNTER — OFFICE VISIT (OUTPATIENT)
Dept: NEPHROLOGY | Facility: CLINIC | Age: 68
End: 2023-10-02
Payer: MEDICARE

## 2023-10-02 VITALS
DIASTOLIC BLOOD PRESSURE: 82 MMHG | HEIGHT: 71 IN | SYSTOLIC BLOOD PRESSURE: 130 MMHG | HEART RATE: 68 BPM | BODY MASS INDEX: 26.42 KG/M2 | WEIGHT: 188.69 LBS | OXYGEN SATURATION: 99 %

## 2023-10-02 DIAGNOSIS — E55.9 VITAMIN D DEFICIENCY: ICD-10-CM

## 2023-10-02 DIAGNOSIS — G20.C PARKINSONISM, UNSPECIFIED PARKINSONISM TYPE: ICD-10-CM

## 2023-10-02 DIAGNOSIS — N18.2 TYPE 2 DIABETES MELLITUS WITH STAGE 2 CHRONIC KIDNEY DISEASE, WITHOUT LONG-TERM CURRENT USE OF INSULIN: ICD-10-CM

## 2023-10-02 DIAGNOSIS — N18.2 CKD (CHRONIC KIDNEY DISEASE) STAGE 2, GFR 60-89 ML/MIN: Primary | ICD-10-CM

## 2023-10-02 DIAGNOSIS — I10 PRIMARY HYPERTENSION: ICD-10-CM

## 2023-10-02 DIAGNOSIS — E11.22 TYPE 2 DIABETES MELLITUS WITH STAGE 2 CHRONIC KIDNEY DISEASE, WITHOUT LONG-TERM CURRENT USE OF INSULIN: ICD-10-CM

## 2023-10-02 DIAGNOSIS — N25.81 SECONDARY HYPERPARATHYROIDISM OF RENAL ORIGIN: ICD-10-CM

## 2023-10-02 PROCEDURE — 99999 PR PBB SHADOW E&M-EST. PATIENT-LVL V: CPT | Mod: PBBFAC,,, | Performed by: INTERNAL MEDICINE

## 2023-10-02 PROCEDURE — 99215 PR OFFICE/OUTPT VISIT, EST, LEVL V, 40-54 MIN: ICD-10-PCS | Mod: S$PBB,,, | Performed by: INTERNAL MEDICINE

## 2023-10-02 PROCEDURE — 99215 OFFICE O/P EST HI 40 MIN: CPT | Mod: PBBFAC,PO | Performed by: INTERNAL MEDICINE

## 2023-10-02 PROCEDURE — 99999 PR PBB SHADOW E&M-EST. PATIENT-LVL V: ICD-10-PCS | Mod: PBBFAC,,, | Performed by: INTERNAL MEDICINE

## 2023-10-02 PROCEDURE — 99215 OFFICE O/P EST HI 40 MIN: CPT | Mod: S$PBB,,, | Performed by: INTERNAL MEDICINE

## 2023-10-02 NOTE — PROGRESS NOTES
Subjective:       Patient ID: Glenn Medel is a 68 y.o. White male who presents for follow-up evaluation of Chronic Kidney Disease      HPI  He reports he is doing well. He has intentionally lost weight. His Hba1c improved to 7.4 from >8. No LE and no SOB. No LUTS.     Interval history April 2019: he reports he is feeling well. No LE edema and no SOB. Weight is down a little. Last Hba1c was 6.6 He is asking about a podiatrist.     Interval history Feb 2020: Doing well. Last Hba1c was 6.2. Seeing Neurology for tremors. Had episode of hemorrhoids and also required circumcision. Needs new sleep doctor. He retired     Interval history Sep 2020: He has lost weight, Hba1c is down and he is on less insulin. No LE edema. He feels great    Interval history July 2021: Parkinson's symptoms. Overall doing well. Last Hba1c was 6.2. Going to [place]     Sep 2022: He is doing over all well. No gout on allopurinol. Lost weight on Ozempic. Poor sleep.     Oct 2023:  Hypotensive on Ozempic-- ER visit for hypotension, worked outside at same time, 'dehydration'. Resolved. Parkinson's OK while on meds. Prosate cancer. On vitamin D3. LE edema. Questions on Lasix     Review of Systems   Constitutional:  Positive for fatigue. Negative for activity change, appetite change and unexpected weight change.   HENT:  Negative for facial swelling.    Respiratory:  Negative for cough and shortness of breath.    Cardiovascular:  Negative for chest pain and leg swelling.   Gastrointestinal:  Negative for nausea and vomiting.   Genitourinary:  Positive for frequency. Negative for difficulty urinating.   Musculoskeletal:  Positive for arthralgias.   Allergic/Immunologic: Positive for immunocompromised state (due to DM).   Neurological:  Negative for weakness and headaches.   Psychiatric/Behavioral:  Positive for sleep disturbance. Negative for confusion and decreased concentration.        Objective:      Physical Exam  Vitals and nursing note  reviewed.   Constitutional:       General: He is not in acute distress.     Appearance: Normal appearance. He is well-developed. He is not ill-appearing.   HENT:      Head: Atraumatic.   Eyes:      General: No scleral icterus.  Neck:      Vascular: No JVD.   Cardiovascular:      Rate and Rhythm: Normal rate.      Heart sounds: S1 normal and S2 normal.      No friction rub.   Pulmonary:      Breath sounds: Normal breath sounds. No wheezing or rales.   Abdominal:      General: There is no distension.   Musculoskeletal:      Right lower leg: No edema.      Left lower leg: No edema.   Skin:     General: Skin is warm and dry.      Findings: No erythema.   Neurological:      Mental Status: He is alert and oriented to person, place, and time. Mental status is at baseline.   Psychiatric:         Mood and Affect: Mood normal.         Behavior: Behavior normal.         Thought Content: Thought content normal.         Judgment: Judgment normal.       Assessment:       1. CKD (chronic kidney disease) stage 2, GFR 60-89 ml/min    2. Secondary hyperparathyroidism of renal origin    3. Primary hypertension    4. Type 2 diabetes mellitus with stage 2 chronic kidney disease, without long-term current use of insulin    5. Vitamin D deficiency     6. Parkinsonism, unspecified Parkinsonism type            Plan:             CKD 2/3 is stable with only 90-100mg of proteinuria. Continue RAAS blockade (irbesartan) for renal preservation    HTN--BP improved since weight tloss, then dropped BP in ER    Mineral and Bone Disease--continue D3, PTH     DM2--much improved and controlled. Continue Endocrine follow up    Parkinson's-- appears stable with stable BPs. Continue Neuro follow up    Volume status--Lasix 2X a week       RTC 9mo    49 minutes of total time spent on the encounter, which includes face to face time and non-face to face time preparing to see the patient (eg, review of tests), Obtaining and/or reviewing separately obtained  history, Documenting clinical information in the electronic or other health record, Independently interpreting results (not separately reported) and communicating results to the patient/family/caregiver, or Care coordination (not separately reported).

## 2023-10-02 NOTE — PATIENT INSTRUCTIONS
Start taking Lasix 20mg two times a week,   Next dose Thursday and then again Monday and continue every M and Th     Yes to flu vaccine  Yes to Prevnar-20

## 2023-10-03 ENCOUNTER — OFFICE VISIT (OUTPATIENT)
Dept: NEUROLOGY | Facility: CLINIC | Age: 68
End: 2023-10-03
Payer: MEDICARE

## 2023-10-03 ENCOUNTER — TELEPHONE (OUTPATIENT)
Dept: NEUROSURGERY | Facility: CLINIC | Age: 68
End: 2023-10-03
Payer: MEDICARE

## 2023-10-03 ENCOUNTER — PATIENT MESSAGE (OUTPATIENT)
Dept: NEUROLOGY | Facility: CLINIC | Age: 68
End: 2023-10-03

## 2023-10-03 ENCOUNTER — PATIENT MESSAGE (OUTPATIENT)
Dept: NEUROSURGERY | Facility: CLINIC | Age: 68
End: 2023-10-03
Payer: MEDICARE

## 2023-10-03 VITALS — HEART RATE: 68 BPM | DIASTOLIC BLOOD PRESSURE: 76 MMHG | SYSTOLIC BLOOD PRESSURE: 110 MMHG

## 2023-10-03 DIAGNOSIS — G20.C PARKINSONISM, UNSPECIFIED PARKINSONISM TYPE: Chronic | ICD-10-CM

## 2023-10-03 DIAGNOSIS — G47.00 INSOMNIA, UNSPECIFIED TYPE: Primary | ICD-10-CM

## 2023-10-03 PROCEDURE — 99214 PR OFFICE/OUTPT VISIT, EST, LEVL IV, 30-39 MIN: ICD-10-PCS | Mod: S$PBB,,, | Performed by: PSYCHIATRY & NEUROLOGY

## 2023-10-03 PROCEDURE — 99999 PR PBB SHADOW E&M-EST. PATIENT-LVL I: ICD-10-PCS | Mod: PBBFAC,,, | Performed by: PSYCHIATRY & NEUROLOGY

## 2023-10-03 PROCEDURE — 99211 OFF/OP EST MAY X REQ PHY/QHP: CPT | Mod: PBBFAC | Performed by: PSYCHIATRY & NEUROLOGY

## 2023-10-03 PROCEDURE — 99999 PR PBB SHADOW E&M-EST. PATIENT-LVL I: CPT | Mod: PBBFAC,,, | Performed by: PSYCHIATRY & NEUROLOGY

## 2023-10-03 PROCEDURE — 99214 OFFICE O/P EST MOD 30 MIN: CPT | Mod: S$PBB,,, | Performed by: PSYCHIATRY & NEUROLOGY

## 2023-10-03 NOTE — TELEPHONE ENCOUNTER
LVM - sent mssg - appt scheduled  ----- Message from Coni Cee MD sent at 10/3/2023  1:49 PM CDT -----  Needs DBS education

## 2023-10-03 NOTE — ASSESSMENT & PLAN NOTE
Typical  with some nonmotor features.  Tremor responded very well to carbidopa/levodopa.   Tremor not controlled daytime    Continue- carbidopa/levodopa 25/100mg 2 tabs  PO five times a day  May consider DBS for tremor control - see NSGY for education    Continue azilect 1mg QHS

## 2023-10-03 NOTE — PROGRESS NOTES
"    Notes:   Glenn STOCKTON Chief Complaints during this visit:  New Patient visit for  Parkinsonism.   No referring provider defined for this encounter.    Primary Care Physician  Power Benton, DO  [address] [place] [address]    Interval Hx    Since last visit,     Doing well    Carbidopa/levodopa 25/100mg 2 tabs five times daily - walking more confidently  No offtime  Tremor well controlled until Ldopa wears off  Trouble typing  Dizziness when he works outside for hours  Some facial dyskinesias    REM sleep dz sporadic now- once every 3 weeks  Take clonazepam 1.5mg QHS at 9PM  Stopped using CPAP    Takes azilect 1mg QHS    Falls: none  Assist Device: none  Walks 2 miles without stopping  With stairs he must hand onto railing    MRI brain revealed a possible posterior fossa arachnoid cyst - followed by Dr. Saldivar   -having repeat imaging soon  Also having Lspine MRI through pain management soon    Brother with dementia since age 60 - mild hand tremors and some issues walking  2 sisters/2 brothers healthy  Mother with dementia at age 80 - also hand hand dystonia  Father also reported fingers getting stuck    Ceruloplasmin NL    Neuropsych revealed mild cognitive impairments but no diagnosis    PD Review of Symptoms:  Anosmia: some stable  Dysarthria/Hypophonia: none  Dysphagia/Sialorrhea: none  Depression: yes  Cognitive slowing: mild attention issues - replaced CPAP  Hallucinations: none  Urinary changes: none  Constipation: yes  Orthostasis: at times  Falls: occasional  Freezing: none  Micrographia: none  Sleep issues:  -RBD: 1 year of REM sleep dz      "History of present illness:   68 y.o.  male seen in consultation at the request of  Dr. Martinez for evaluation of parkinsonism.      Falls often, onto knee.  Right hand will suddenly start shaking.  Memory a little worse.  Needs new mask for his LOVE, hasn't used in a while.  Not sure who to see.    Has DM, had an emg about 2 years " "ago.      1/20/20 Knoop:  Glenn Medel is a 64 y.o. right-handed male with DM2, HTN, LOVE, anxiety and depression who presents today for an initial evaluation of tremos and balance and is accompanied by wife.    Tremor- started in RH a couple of years ago but seemed to have gotten worse. Will watch TV and ev hands shake. Started noting in LH in the last year. Also notes shaking in legs for several years. He says the legs in fact started before the hands. Nothing makes it better. Not noted any aggravating factors. Drinks very little alcohol. Has not noted benefit with alcohol. Handwriting is pretty good. No changes with this.    Balance- sometimes will be working on knees and then fall back when he goes to get up. Sometimes with walking, he will wobble. No falls while walking. Has fallen backwards when he is trying to get up from being on knees. Has noted balance issues the past couple of months. Has not noted progression.     Acting out dreams. Not nightly but is significant when does happen. He actually ended up in hospital in the last year because of fighting in sleep. Has LOVE, used to wear CPAP and wife wonders if this helped when he was wearing this. He says he will wear it a couple of days, aggravates him and then he will not use for month or longer. He had sleep doctor, Dr. Hinton, but dismissed him as she did everything she could do for him.    Denies stiffness in muscles.   Sometimes feels slow with walking. Wife says he is slower to answer questions.   Memory is getting worse. Been bad couple of years but worse the past year. Wife agrees but attributes some of this to prison- lazier life.    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   He left on his terms. Was not asked to leave. "      II.  Review of systems:  As in HPI,  otherwise, balance 10 systems reviewed and are negative.    III.  Past Medical History:   Diagnosis Date    BPH " (benign prostatic hyperplasia)     Cataract     done ou    Chronic kidney disease, stage 3 02/25/2015    DDD (degenerative disc disease), lumbar     s/p epidural steroids     Diabetes mellitus     type 2    Glaucoma     OU    HTN (hypertension)     Iritis, lens-induced 01/08/2018    Obesity     Parkinson's disease     Sleep apnea     uses cpap     Family History   Problem Relation Age of Onset    Heart disease Brother 62    Kidney disease Brother     Heart disease Father     Diabetes Father     Colon cancer Father     Cancer Father         colon    Cataracts Father     Hypertension Mother     Diabetes Mother     Stroke Mother     Cancer Mother         colon    Kidney disease Mother     Glaucoma Maternal Grandmother     No Known Problems Sister     No Known Problems Maternal Aunt     No Known Problems Maternal Uncle     No Known Problems Paternal Aunt     No Known Problems Paternal Uncle     No Known Problems Maternal Grandfather     No Known Problems Paternal Grandmother     No Known Problems Paternal Grandfather     Dementia Brother     Abnormal EKG Brother     Amblyopia Neg Hx     Blindness Neg Hx     Macular degeneration Neg Hx     Retinal detachment Neg Hx     Strabismus Neg Hx     Thyroid disease Neg Hx     Parkinsonism Neg Hx      Social history:  , retired.      Current Outpatient Medications on File Prior to Visit   Medication Sig Dispense Refill    allopurinoL (ZYLOPRIM) 300 MG tablet TAKE 1 TABLET(300 MG) BY MOUTH EVERY DAY 90 tablet 2    aspirin 81 MG Chew Take 81 mg by mouth once daily.      atorvastatin (LIPITOR) 20 MG tablet Take 1 tablet (20 mg total) by mouth once daily. 90 tablet 3    brimonidine 0.2% (ALPHAGAN) 0.2 % Drop INSTILL 1 DROP IN BOTH EYES TWICE DAILY 10 mL 3    carbidopa-levodopa  mg (SINEMET)  mg per tablet Take 2 tablets by mouth 5 (five) times daily. 300 tablet 11    cholecalciferol, vitamin D3, (D3-2000 ORAL) Take by mouth.      clonazePAM (KLONOPIN) 1 MG tablet  Take 2 tablets (2 mg total) by mouth every evening. 60 tablet 5    dorzolamide (TRUSOPT) 2 % ophthalmic solution INSTILL 1 DROP IN BOTH EYES TWICE DAILY 10 mL 3    FLUoxetine 40 MG capsule Take 1 capsule (40 mg total) by mouth once daily. 90 capsule 3    furosemide (LASIX) 20 MG tablet Take 1 tablet (20 mg total) by mouth every morning. 90 tablet 3    ketoconazole (NIZORAL) 2 % shampoo Apply topically.      metFORMIN (GLUCOPHAGE-XR) 500 MG ER 24hr tablet TAKE 1 TABLET(500 MG) BY MOUTH EVERY DAY 90 tablet 1    prazosin (MINIPRESS) 1 MG Cap Take 1 capsule (1 mg total) by mouth every evening. 90 capsule 3    ramelteon (ROZEREM) 8 mg tablet Take 8 mg by mouth every evening.      rasagiline (AZILECT) 1 mg Tab TAKE 1 TABLET(1 MG) BY MOUTH EVERY EVENING 30 tablet 12    semaglutide (OZEMPIC) 1 mg/dose (4 mg/3 mL) Inject 1 mg into the skin every 7 days. (Dose increase) 3 mL 5     Current Facility-Administered Medications on File Prior to Visit   Medication Dose Route Frequency Provider Last Rate Last Admin    diphenhydrAMINE capsule 25 mg  25 mg Oral Q6H PRN Leonila Richardson MD        fluorescein 500 mg/5 mL (10 %) injection 500 mg  5 mL Intravenous Once SAJAN Pulido MD        metronidazole IVPB 500 mg  500 mg Intravenous Once Kaz Keating MD           PRIOR problem-specific medications tried:  none    Review of patient's allergies indicates:   Allergen Reactions    Avelox [moxifloxacin] Hives and Rash       IV. Physical Exam    Physical Exam  Constitutional: Well-developed, well-nourished, appears stated age  Eyes: No scleral icterus  ENT: Moist oral mucosa  Cardiovascular: No lower extremity edema   Respiratory: No labored breathing   Skin: No rash   Hematologic: No bruising    Other: GI/ deferred   Mental status: Alert and oriented to person, place, time, and situation;   follows commands  Speech: normal (not dysarthric), no aphasia  Cranial nerves:            CN II: Pupils mid-position and equal, not  "tested light or accommodation  CN III, IV, VI: Extraocular movements full, no nystagmus visualized  CN V: Not tested   CN VII: Face strong and symmetric bilaterally   CN VIII: Hearing intact to voice and conversation   CN IX, X: Palate raises midline and symmetric   CN XI: Strong shoulder shrug B/L  CN XII: Tongue appears midline   Motor: Normal bulk by appearance, no drift   Sensory: Not tested    Gait: Can stand on either leg- barely a shuffle  Deep tendon reflexes: Not tested  Movement/Coordination                    Mod hypophonic speech.                     Mod facial masking.    Mild orofacial dyskinesias                      No other dystonia, chorea, athetosis, myoclonus, or tics visualized.    Bradykinesia      ? Finger taps Finger flicks DAVE Heel taps   Left 0 0 0 0   Right 1+ 1+ 0 0         V.  Laboratory/ Radiological Data: (Personally reviewed images)         MRI 1/29/20  Impression       1. There is no acute abnormality.  There is mild volume loss without significant white matter disease.  There is no hemorrhage, parenchymal mass or acute infarction.  There is a probable arachnoid cyst in the right posterior fossa.  Please see above discussion.     Memory/Encephalopathy Labs:  Lab Results   Component Value Date    TSH 0.976 01/06/2020    HLBBQQKX99 391 05/14/2020    THIAMINEBLOO 59 05/14/2020     LGi and CASPR  No results found for: "ENCESLGI1", "ENCESCASPR2"    Movement Labs:  Lab Results   Component Value Date    UDDGOYYG50MZ 51 09/28/2022    CALCIUM 9.1 09/28/2023    .6 (H) 09/28/2023    ALBUMIN 3.5 09/28/2023    BILITOT 1.3 06/15/2023    BILIDIR 0.2 12/12/2022    ALT 16 06/15/2023    AST 35 06/15/2023    ALKPHOS 142 06/15/2023    CERULOPLSM 29.0 08/26/2020    FERRITIN 88 03/29/2021    ALPHATOCOPHE 1275 05/14/2020       IRON STUDIES:  Lab Results   Component Value Date    IRON 60 03/29/2021    TRANSFERRIN 243 03/29/2021    TIBC 360 03/29/2021    FESATURATED 17 (L) 03/29/2021       Malignancy " "Labs:  Lab Results   Component Value Date    PSADIAG 4.1 (H) 08/02/2023     PARANEOPLASTIC PANEL:  No results found for: "NMOINTERPRET", "REFLEXTEST", "CRMP5", "STMAB", "LABACHR", "ACHRGANGLION", "NEUROVG"    CSF:  No results found for: "CSFWBC", "CSFRBC", "LYMPHS", "MONOMACCSF", "PROTEINCSF", "GLUCCSF", "GADCSF", "LUME", "MYELINPROT"  Flow cytometry: No results found for: "CSFC"  [place]: No results found for: "WESTNILEIGGA"    CNS Demyelinating Labs:  Nmo serum, csf igg:  No results found for: "NMOFSFACSS", "NMOFCFACSC"  No results found for: "JCVIRUS"    CSF oligoclonal bands:  No results found for: "CSFB", "CSFOLI", "SERB", "IGGINDEXCSF", "IGGCSF", "ALBQ", "GALB", "IGGSYNRATE", "IGGS", "ALBSER", "IGGALBS"    Myopathy labs:  No results found for: "CPK", "ALDOLASE", "AMMONIA", "ACFCRATIO", "ACETYLCARNIT", "CARNITINEPLA", "LACTATE"    Myasthenia panel:  No results found for: "LABACHR"  No results found for: "ACETMOD"  No results found for: "STMAB"   No results found for: "ACHRGANGLION"    Neuropathy Labs:  Lab Results   Component Value Date    XUSOPELG34 391 05/14/2020    HGBA1C 7.2 (H) 06/16/2023     SPEP:  Lab Results   Component Value Date    PROTEINSERUM 6.7 07/27/2015    PATHINTPSPE REVIEWED 07/27/2015       Rheum labs:  Lab Results   Component Value Date    URICACID 4.5 09/28/2022       Neuropsych"  Mr. Medel is a 64 year old male with cognitive complaints over the past several years. He has a slightly ataxic gait of unclear etiology, but no clear parkinsonism. He has symptoms suggestive of REM sleep behavior disorder over the past year. He denied presence, passage, or recurrent visual hallucinations. He remains functionally independent, albeit more error prone than in the past. His history is remarkable for multiple vascular risk factors, including currently untreated sleep apnea.      His neuropsychological profile was largely within normal limits with the exception of mild memory inefficiencies, " "primarily related to encoding and cognitive organization/retrieval. His test scores and functioning are not at the level to warrant a cognitive diagnosis at this time. He did not present with the type of oliver forgetting seen in Alzheimer's disease. He also did not demonstrate the type of visuospatial dysfunction often seen early in Lewy Body Dementia, which is also in keeping with the fact that he does not have visual hallucinations. It will be important to track his cognition over time as mild cognitive changes in the setting of ataxia of unclear etiology and possible REM sleep behavior disorder could be indicative of a neurodegenerative condition. Otherwise, his mild cognitive weaknesses could simply be attributable to mild cerebrovascular disease.      Mr. Medel also reported clinically significant anxiety and depression. The transition to longterm has been admittedly more challenging than expected. His wife has also noticed reduced frustration tolerance with an increased tendency to perseverate on his frustrations, which has led to multiple confrontations. Treatment is indicated. "    MRI brain 2020      VI. Assessment and Plan            Problem List Items Addressed This Visit          Neuro    Parkinsonism (Chronic)    Current Assessment & Plan     Typical  with some nonmotor features.  Tremor responded very well to carbidopa/levodopa.   Tremor not controlled daytime    Continue- carbidopa/levodopa 25/100mg 2 tabs  PO five times a day  May consider DBS for tremor control - see NSGY for education    Continue azilect 1mg QHS            Other Visit Diagnoses       Insomnia, unspecified type    -  Primary    Relevant Orders    Ambulatory referral/consult to Sleep Disorders                    Coni Cee MD, MS [facility]  Department of Neurology  Movement Disorders        "

## 2023-10-04 ENCOUNTER — TELEPHONE (OUTPATIENT)
Dept: OPHTHALMOLOGY | Facility: CLINIC | Age: 68
End: 2023-10-04
Payer: MEDICARE

## 2023-10-04 NOTE — TELEPHONE ENCOUNTER
----- Message from Cherie Williamson sent at 10/4/2023  4:03 PM CDT -----  Regarding: appt request  Contact:  [phone]  Type:  Sooner Appointment Request    Caller is requesting a sooner appointment.  Caller declined first available appointment listed below.  Caller will not accept being placed on the waitlist and is requesting a message be sent to doctor.    Name of Caller:    When is the first available appointment?  Dept book  Symptoms:  recall  Best Call Back Number:  [phone]    Additional Information:

## 2023-10-06 ENCOUNTER — PATIENT MESSAGE (OUTPATIENT)
Dept: FAMILY MEDICINE | Facility: CLINIC | Age: 68
End: 2023-10-06
Payer: MEDICARE

## 2023-10-13 ENCOUNTER — TELEPHONE (OUTPATIENT)
Dept: FAMILY MEDICINE | Facility: CLINIC | Age: 68
End: 2023-10-13
Payer: MEDICARE

## 2023-10-13 NOTE — TELEPHONE ENCOUNTER
----- Message from Elizabeth Jean Baptiste sent at 10/13/2023  3:10 PM CDT -----  Pharmacy Calling to Clarify an RX         Name of Caller Kaz          Pharmacy Name [facility]          Prescription Name Missing from fax          What do they need to clarify? What rx is being requested          Best Call Back Number [phone]          Additional Information: ref # [id number]

## 2023-10-16 ENCOUNTER — PATIENT MESSAGE (OUTPATIENT)
Dept: NEUROLOGY | Facility: CLINIC | Age: 68
End: 2023-10-16
Payer: MEDICARE

## 2023-10-19 ENCOUNTER — OFFICE VISIT (OUTPATIENT)
Dept: OPHTHALMOLOGY | Facility: CLINIC | Age: 68
End: 2023-10-19
Payer: MEDICARE

## 2023-10-19 DIAGNOSIS — E11.3293 CONTROLLED TYPE 2 DIABETES MELLITUS WITH BOTH EYES AFFECTED BY MILD NONPROLIFERATIVE RETINOPATHY WITHOUT MACULAR EDEMA, WITH LONG-TERM CURRENT USE OF INSULIN: Primary | ICD-10-CM

## 2023-10-19 DIAGNOSIS — H34.8122 CENTRAL RETINAL VEIN OCCLUSION, LEFT EYE, STABLE: ICD-10-CM

## 2023-10-19 DIAGNOSIS — Z79.4 CONTROLLED TYPE 2 DIABETES MELLITUS WITH BOTH EYES AFFECTED BY MILD NONPROLIFERATIVE RETINOPATHY WITHOUT MACULAR EDEMA, WITH LONG-TERM CURRENT USE OF INSULIN: Primary | ICD-10-CM

## 2023-10-19 PROCEDURE — 99999 PR PBB SHADOW E&M-EST. PATIENT-LVL III: CPT | Mod: PBBFAC,,, | Performed by: OPHTHALMOLOGY

## 2023-10-19 PROCEDURE — 92014 PR EYE EXAM, EST PATIENT,COMPREHESV: ICD-10-PCS | Mod: S$PBB,,, | Performed by: OPHTHALMOLOGY

## 2023-10-19 PROCEDURE — 92201 OPSCPY EXTND RTA DRAW UNI/BI: CPT | Mod: PBBFAC,PO | Performed by: OPHTHALMOLOGY

## 2023-10-19 PROCEDURE — 92014 COMPRE OPH EXAM EST PT 1/>: CPT | Mod: S$PBB,,, | Performed by: OPHTHALMOLOGY

## 2023-10-19 PROCEDURE — 92235 FLUORESCEIN ANGRPH MLTIFRAME: CPT | Mod: PBBFAC,PO | Performed by: OPHTHALMOLOGY

## 2023-10-19 PROCEDURE — 92201 OPSCPY EXTND RTA DRAW UNI/BI: CPT | Mod: S$PBB,,, | Performed by: OPHTHALMOLOGY

## 2023-10-19 PROCEDURE — 92134 POSTERIOR SEGMENT OCT RETINA (OCULAR COHERENCE TOMOGRAPHY)-BOTH EYES: ICD-10-PCS | Mod: 26,S$PBB,, | Performed by: OPHTHALMOLOGY

## 2023-10-19 PROCEDURE — 92201 PR OPHTHALMOSCOPY, EXT, W/RET DRAW/SCLERAL DEPR, I&R, UNI/BI: ICD-10-PCS | Mod: S$PBB,,, | Performed by: OPHTHALMOLOGY

## 2023-10-19 PROCEDURE — 92134 CPTRZ OPH DX IMG PST SGM RTA: CPT | Mod: PBBFAC,PO | Performed by: OPHTHALMOLOGY

## 2023-10-19 PROCEDURE — 92235 FLUORESCEIN ANGIOGRAPHY - OU - BOTH EYES: ICD-10-PCS | Mod: 26,S$PBB,, | Performed by: OPHTHALMOLOGY

## 2023-10-19 PROCEDURE — 99999 PR PBB SHADOW E&M-EST. PATIENT-LVL III: ICD-10-PCS | Mod: PBBFAC,,, | Performed by: OPHTHALMOLOGY

## 2023-10-19 PROCEDURE — 99213 OFFICE O/P EST LOW 20 MIN: CPT | Mod: PBBFAC,PO | Performed by: OPHTHALMOLOGY

## 2023-10-19 RX ADMIN — FLUORESCEIN 500 MG: 500 INJECTION INTRAVENOUS at 09:10

## 2023-10-19 NOTE — PROGRESS NOTES
HPI    Patient here today for 6 mo f/u. C/o blurry VA at distance especially at   night with glare, no pain or floaters/ flashes ou.    Brimonidine ou bid  Dorzolamide ou bid  Last edited by Xiomara Bashir on 10/19/2023  8:45 AM.            OCT - OD ERM, no sig traction  No ME OS    WFFA - delayed filling OS  Some microangiopathy but no sig NP or NV        A/P    1. Dense vitreous opacities OU with NCVH  No Improvement over last several months  ?prior VH with PVD OU - no breaks or tears    s/p 25g PPV/partial AFx OS for vitreous opacities/NCVH OS 11/15/17    Doing well, good IOP  HAD PC violation during anterior vitreous removal -     Had CE with dropped lens material give PC violation 11/30/17    Increased IOP - though improved with K edema    Will need PPV to remove lens material.  Pt would prefer more prompt resolution    s/p 25g PPV OS for retained lens material OS 12/6/17    Doing well, but IOP improved to 18 - ? Steroid response, inflammation and cortical material improving, AC deep    1/8/18 - some increased inflammation after steroid taper, no CME  1/18 - still low grade inflammation -   6/18 - stable    s/p 25g PPV/partial AFx OD for vitreous opacities and NCVH OD 7/1/18      2. PCIOL OD  Ok for YAG  Sulcus IOL OS    Great result    3. DM  Controlled No DR  BS/BP/chol control  Ok for yearly DFE during optometry visit    4. HTN Ret OU  Collateral vessels on ONH OS - longstanding on OCT  Some peripheral microangiopathy OS - likely silent CRVO in past when BP's elevated  10/23 - no sig NP/NV on FA    5. POAG  On simbrinza BID  May continue OU once inflammation down  8/22 - some nasalization of vessels OS, but no overt vascular occlusion    6. ERM OD  Small - not sig        12 months OCT and dilate

## 2023-10-25 DIAGNOSIS — Z87.39 HISTORY OF GOUT: ICD-10-CM

## 2023-10-25 DIAGNOSIS — E79.0 HYPERURICEMIA: ICD-10-CM

## 2023-10-25 RX ORDER — ALLOPURINOL 300 MG/1
TABLET ORAL
Qty: 90 TABLET | Refills: 3 | Status: SHIPPED | OUTPATIENT
Start: 2023-10-25

## 2023-10-25 RX ORDER — RASAGILINE 1 MG/1
TABLET ORAL
Qty: 30 TABLET | Refills: 12 | Status: SHIPPED | OUTPATIENT
Start: 2023-10-25 | End: 2024-02-10 | Stop reason: SDUPTHER

## 2023-10-25 RX ORDER — RASAGILINE 1 MG/1
TABLET ORAL
Qty: 30 TABLET | Refills: 12 | Status: SHIPPED | OUTPATIENT
Start: 2023-10-25 | End: 2024-01-05

## 2023-10-25 RX ORDER — RAMELTEON 8 MG/1
8 TABLET ORAL NIGHTLY
Qty: 90 TABLET | Refills: 3 | Status: SHIPPED | OUTPATIENT
Start: 2023-10-25

## 2023-10-25 NOTE — TELEPHONE ENCOUNTER
----- Message from Chayo Boone PharmD sent at 10/25/2023  2:23 PM CDT -----  Regarding: prescription request  Mr. Medel is requesting if one of you would provide a new prescription for ramelteon 8 mg nightly (he is out of refills on his current prescription). He is no longer seeing his prior sleep physician, Dr. Hinton. He has an appointment with Dr. Benton 12/6/23 and Dr. Lejeune 1/10/24. If so, he uses the [facility] in [place] in his chart.

## 2023-10-25 NOTE — TELEPHONE ENCOUNTER
Care Due:                  Date            Visit Type   Department     Provider  --------------------------------------------------------------------------------                                [facility] FAMILY  Last Visit: 07-      CARE ([facility])   SHIRA Benton                              EP -                              PRIMARY      [facility] FAM[facility]  Next Visit: 12-      CARE ([facility])   MEDICINE       Power Benton                                                            Last  Test          Frequency    Reason                     Performed    Due Date  --------------------------------------------------------------------------------    HBA1C.......  6 months...  metFORMIN, semaglutide...  06- 12-    Lipid Panel.  12 months..  atorvastatin.............  12- 12-    Uric Acid...  12 months..  allopurinoL..............  09- 09-    [facility] Embedded Care Due Messages. Reference number: [id number].   10/25/2023 6:08:17 AM CDT

## 2023-10-25 NOTE — TELEPHONE ENCOUNTER
Refill Routing Note   Medication(s) are not appropriate for processing by [facility] for the following reason(s):      Required labs outdated    [facility] action(s):  Defer Care Due:  Labs due     Medication Therapy Plan: Flos 11/21/23 HBA1C, LIPID; URIC ACID NOT ON ORDER      Appointments  past 12m or future 3m with PCP    Date Provider   Last Visit   7/24/2023 Power Benton, DO   Next Visit   12/6/2023 Power Benton, DO   ED visits in past 90 days: 0        Note composed:6:35 AM 10/25/2023

## 2023-10-26 DIAGNOSIS — G47.52 REM SLEEP BEHAVIOR DISORDER: ICD-10-CM

## 2023-10-27 RX ORDER — CLONAZEPAM 1 MG/1
2 TABLET ORAL NIGHTLY
Qty: 60 TABLET | Refills: 0 | Status: SHIPPED | OUTPATIENT
Start: 2023-10-27 | End: 2024-01-31

## 2023-10-31 ENCOUNTER — OFFICE VISIT (OUTPATIENT)
Dept: NEUROSURGERY | Facility: CLINIC | Age: 68
End: 2023-10-31
Payer: MEDICARE

## 2023-10-31 VITALS
WEIGHT: 185 LBS | HEART RATE: 69 BPM | DIASTOLIC BLOOD PRESSURE: 72 MMHG | SYSTOLIC BLOOD PRESSURE: 114 MMHG | HEIGHT: 71 IN | TEMPERATURE: 98 F | BODY MASS INDEX: 25.9 KG/M2

## 2023-10-31 DIAGNOSIS — G20.A2 PARKINSON'S DISEASE WITHOUT DYSKINESIA, WITH FLUCTUATING MANIFESTATIONS: Primary | Chronic | ICD-10-CM

## 2023-10-31 PROCEDURE — 99214 OFFICE O/P EST MOD 30 MIN: CPT | Mod: PBBFAC | Performed by: NEUROLOGICAL SURGERY

## 2023-10-31 PROCEDURE — 99999 PR PBB SHADOW E&M-EST. PATIENT-LVL IV: ICD-10-PCS | Mod: PBBFAC,,, | Performed by: NEUROLOGICAL SURGERY

## 2023-10-31 PROCEDURE — 99215 PR OFFICE/OUTPT VISIT, EST, LEVL V, 40-54 MIN: ICD-10-PCS | Mod: S$PBB,,, | Performed by: NEUROLOGICAL SURGERY

## 2023-10-31 PROCEDURE — 99215 OFFICE O/P EST HI 40 MIN: CPT | Mod: S$PBB,,, | Performed by: NEUROLOGICAL SURGERY

## 2023-10-31 PROCEDURE — 99999 PR PBB SHADOW E&M-EST. PATIENT-LVL IV: CPT | Mod: PBBFAC,,, | Performed by: NEUROLOGICAL SURGERY
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
## 2023-11-01 ENCOUNTER — PATIENT MESSAGE (OUTPATIENT)
Dept: FAMILY MEDICINE | Facility: CLINIC | Age: 68
End: 2023-11-01
Payer: MEDICARE

## 2023-11-02 ENCOUNTER — TELEPHONE (OUTPATIENT)
Dept: UROLOGY | Facility: CLINIC | Age: 68
End: 2023-11-02
Payer: MEDICARE

## 2023-11-02 NOTE — TELEPHONE ENCOUNTER
Spoke with patient last seen on 8/8/2023 with Dr Donahue , f/u appt made for patient  2/2024.  Patient will need psa lab prior to f/u appt 2/2024 . Patient expressed understanding. Patient expressed understanding.

## 2023-11-02 NOTE — TELEPHONE ENCOUNTER
----- Message from Niecy Chopra sent at 11/2/2023  7:42 AM CDT -----  Type: Needs Medical Advice  Who Called:  pt  Best Call Back Number: [phone]  Additional Information: pt is calling the office in regards to the f/u biopsy he is supposed to have in January. The pt is supposed to have DBS surgery around the same time but the other Dr would like the pt to be seen by Dr. Donahue first. The pt needs to nail down when his appt will be to let his other Dr. know. Please call back to advise Thanks!

## 2023-11-09 DIAGNOSIS — G20.A2 PARKINSON'S DISEASE WITHOUT DYSKINESIA, WITH FLUCTUATING MANIFESTATIONS: Primary | Chronic | ICD-10-CM

## 2023-11-10 ENCOUNTER — TELEPHONE (OUTPATIENT)
Dept: PAIN MEDICINE | Facility: CLINIC | Age: 68
End: 2023-11-10
Payer: MEDICARE

## 2023-11-20 ENCOUNTER — PATIENT MESSAGE (OUTPATIENT)
Dept: FAMILY MEDICINE | Facility: CLINIC | Age: 68
End: 2023-11-20
Payer: MEDICARE

## 2023-11-21 ENCOUNTER — OFFICE VISIT (OUTPATIENT)
Dept: URGENT CARE | Facility: CLINIC | Age: 68
End: 2023-11-21
Payer: MEDICARE

## 2023-11-21 ENCOUNTER — LAB VISIT (OUTPATIENT)
Dept: LAB | Facility: HOSPITAL | Age: 68
End: 2023-11-21
Payer: MEDICARE

## 2023-11-21 VITALS
HEART RATE: 74 BPM | RESPIRATION RATE: 20 BRPM | TEMPERATURE: 99 F | OXYGEN SATURATION: 99 % | DIASTOLIC BLOOD PRESSURE: 73 MMHG | WEIGHT: 185 LBS | HEIGHT: 71 IN | BODY MASS INDEX: 25.9 KG/M2 | SYSTOLIC BLOOD PRESSURE: 122 MMHG

## 2023-11-21 DIAGNOSIS — U07.1 COVID-19 VIRUS DETECTED: ICD-10-CM

## 2023-11-21 DIAGNOSIS — Z01.89 ENCOUNTER FOR LABORATORY TEST: ICD-10-CM

## 2023-11-21 DIAGNOSIS — R05.9 COUGH, UNSPECIFIED TYPE: ICD-10-CM

## 2023-11-21 DIAGNOSIS — E78.2 MIXED HYPERLIPIDEMIA: ICD-10-CM

## 2023-11-21 DIAGNOSIS — E11.22 TYPE 2 DIABETES MELLITUS WITH STAGE 2 CHRONIC KIDNEY DISEASE, WITHOUT LONG-TERM CURRENT USE OF INSULIN: ICD-10-CM

## 2023-11-21 DIAGNOSIS — I10 PRIMARY HYPERTENSION: ICD-10-CM

## 2023-11-21 DIAGNOSIS — U07.1 COVID-19: Primary | ICD-10-CM

## 2023-11-21 DIAGNOSIS — E11.21 TYPE 2 DIABETES MELLITUS WITH DIABETIC NEPHROPATHY, WITH LONG-TERM CURRENT USE OF INSULIN: ICD-10-CM

## 2023-11-21 DIAGNOSIS — N18.2 TYPE 2 DIABETES MELLITUS WITH STAGE 2 CHRONIC KIDNEY DISEASE, WITHOUT LONG-TERM CURRENT USE OF INSULIN: ICD-10-CM

## 2023-11-21 DIAGNOSIS — Z79.4 TYPE 2 DIABETES MELLITUS WITH DIABETIC NEPHROPATHY, WITH LONG-TERM CURRENT USE OF INSULIN: ICD-10-CM

## 2023-11-21 LAB
ALBUMIN SERPL BCP-MCNC: 3.5 G/DL (ref 3.5–5.2)
ALBUMIN/CREAT UR: 8.2 UG/MG (ref 0–30)
ALP SERPL-CCNC: 144 U/L (ref 55–135)
ALT SERPL W/O P-5'-P-CCNC: 6 U/L (ref 10–44)
ANION GAP SERPL CALC-SCNC: 7 MMOL/L (ref 8–16)
AST SERPL-CCNC: 28 U/L (ref 10–40)
BASOPHILS # BLD AUTO: 0.05 K/UL (ref 0–0.2)
BASOPHILS NFR BLD: 0.7 % (ref 0–1.9)
BILIRUB SERPL-MCNC: 0.9 MG/DL (ref 0.1–1)
BUN SERPL-MCNC: 11 MG/DL (ref 8–23)
CALCIUM SERPL-MCNC: 9.4 MG/DL (ref 8.7–10.5)
CHLORIDE SERPL-SCNC: 102 MMOL/L (ref 95–110)
CHOLEST SERPL-MCNC: 117 MG/DL (ref 120–199)
CHOLEST/HDLC SERPL: 3.5 {RATIO} (ref 2–5)
CO2 SERPL-SCNC: 31 MMOL/L (ref 23–29)
CREAT SERPL-MCNC: 0.9 MG/DL (ref 0.5–1.4)
CREAT UR-MCNC: 158 MG/DL (ref 23–375)
CTP QC/QA: YES
CTP QC/QA: YES
DIFFERENTIAL METHOD: ABNORMAL
EOSINOPHIL # BLD AUTO: 0.2 K/UL (ref 0–0.5)
EOSINOPHIL NFR BLD: 2 % (ref 0–8)
ERYTHROCYTE [DISTWIDTH] IN BLOOD BY AUTOMATED COUNT: 14.7 % (ref 11.5–14.5)
EST. GFR  (NO RACE VARIABLE): >60 ML/MIN/1.73 M^2
ESTIMATED AVG GLUCOSE: 137 MG/DL (ref 68–131)
ESTIMATED AVG GLUCOSE: 137 MG/DL (ref 68–131)
GLUCOSE SERPL-MCNC: 162 MG/DL (ref 70–110)
HBA1C MFR BLD: 6.4 % (ref 4–5.6)
HBA1C MFR BLD: 6.4 % (ref 4–5.6)
HCT VFR BLD AUTO: 47.1 % (ref 40–54)
HDLC SERPL-MCNC: 33 MG/DL (ref 40–75)
HDLC SERPL: 28.2 % (ref 20–50)
HGB BLD-MCNC: 14.6 G/DL (ref 14–18)
IMM GRANULOCYTES # BLD AUTO: 0.02 K/UL (ref 0–0.04)
IMM GRANULOCYTES NFR BLD AUTO: 0.3 % (ref 0–0.5)
LDLC SERPL CALC-MCNC: 60.2 MG/DL (ref 63–159)
LYMPHOCYTES # BLD AUTO: 1.8 K/UL (ref 1–4.8)
LYMPHOCYTES NFR BLD: 24 % (ref 18–48)
MCH RBC QN AUTO: 27.4 PG (ref 27–31)
MCHC RBC AUTO-ENTMCNC: 31 G/DL (ref 32–36)
MCV RBC AUTO: 88 FL (ref 82–98)
MICROALBUMIN UR DL<=1MG/L-MCNC: 13 UG/ML
MONOCYTES # BLD AUTO: 0.6 K/UL (ref 0.3–1)
MONOCYTES NFR BLD: 7.9 % (ref 4–15)
NEUTROPHILS # BLD AUTO: 4.9 K/UL (ref 1.8–7.7)
NEUTROPHILS NFR BLD: 65.1 % (ref 38–73)
NONHDLC SERPL-MCNC: 84 MG/DL
NRBC BLD-RTO: 0 /100 WBC
PLATELET # BLD AUTO: 217 K/UL (ref 150–450)
PMV BLD AUTO: 9.6 FL (ref 9.2–12.9)
POC MOLECULAR INFLUENZA A AGN: NEGATIVE
POC MOLECULAR INFLUENZA B AGN: NEGATIVE
POTASSIUM SERPL-SCNC: 4.2 MMOL/L (ref 3.5–5.1)
PROT SERPL-MCNC: 7 G/DL (ref 6–8.4)
RBC # BLD AUTO: 5.33 M/UL (ref 4.6–6.2)
SARS-COV-2 AG RESP QL IA.RAPID: POSITIVE
SODIUM SERPL-SCNC: 140 MMOL/L (ref 136–145)
TRIGL SERPL-MCNC: 119 MG/DL (ref 30–150)
WBC # BLD AUTO: 7.45 K/UL (ref 3.9–12.7)

## 2023-11-21 PROCEDURE — 99214 PR OFFICE/OUTPT VISIT, EST, LEVL IV, 30-39 MIN: ICD-10-PCS | Mod: S$GLB,,, | Performed by: PHYSICIAN ASSISTANT

## 2023-11-21 PROCEDURE — 71046 X-RAY EXAM CHEST 2 VIEWS: CPT | Mod: S$GLB,,, | Performed by: RADIOLOGY

## 2023-11-21 PROCEDURE — 80053 COMPREHEN METABOLIC PANEL: CPT | Performed by: INTERNAL MEDICINE

## 2023-11-21 PROCEDURE — 85025 COMPLETE CBC W/AUTO DIFF WBC: CPT | Performed by: INTERNAL MEDICINE

## 2023-11-21 PROCEDURE — 82043 UR ALBUMIN QUANTITATIVE: CPT | Performed by: INTERNAL MEDICINE

## 2023-11-21 PROCEDURE — 87811 SARS CORONAVIRUS 2 ANTIGEN POCT, MANUAL READ: ICD-10-PCS | Mod: QW,S$GLB,, | Performed by: PHYSICIAN ASSISTANT

## 2023-11-21 PROCEDURE — 87502 POCT INFLUENZA A/B MOLECULAR: ICD-10-PCS | Mod: QW,S$GLB,, | Performed by: PHYSICIAN ASSISTANT

## 2023-11-21 PROCEDURE — 87502 INFLUENZA DNA AMP PROBE: CPT | Mod: QW,S$GLB,, | Performed by: PHYSICIAN ASSISTANT

## 2023-11-21 PROCEDURE — 83036 HEMOGLOBIN GLYCOSYLATED A1C: CPT | Performed by: INTERNAL MEDICINE

## 2023-11-21 PROCEDURE — 99214 OFFICE O/P EST MOD 30 MIN: CPT | Mod: S$GLB,,, | Performed by: PHYSICIAN ASSISTANT

## 2023-11-21 PROCEDURE — 87811 SARS-COV-2 COVID19 W/OPTIC: CPT | Mod: QW,S$GLB,, | Performed by: PHYSICIAN ASSISTANT

## 2023-11-21 PROCEDURE — 80061 LIPID PANEL: CPT | Performed by: INTERNAL MEDICINE

## 2023-11-21 PROCEDURE — 36415 COLL VENOUS BLD VENIPUNCTURE: CPT | Mod: PO | Performed by: INTERNAL MEDICINE

## 2023-11-21 PROCEDURE — 71046 XR CHEST PA AND LATERAL: ICD-10-PCS | Mod: S$GLB,,, | Performed by: RADIOLOGY

## 2023-11-21 NOTE — PROGRESS NOTES
"Subjective:      Patient ID: Glenn Medel is a 68 y.o. male.    Vitals:  height is 5' 10.5" (1.791 m) and weight is 83.9 kg (185 lb). His temperature is 98.6 °F (37 °C). His blood pressure is 122/73 and his pulse is 74. His respiration is 20 and oxygen saturation is 99%.     Chief Complaint: Sinus Problem    68 year old male presents today with cough with sputum production with a grey color, chest congestion, hurts to breathe, ribs pain while coughing,  sinuses. No known exposure to anything. COVID vaccinated with all boosters. Flu vaccinated. Symptoms started 11/19/2023. Recent travel to [place].   Treatments at home includes Xolofluzea and Tessalon Pearls. States he went to an urgent care and they told him he was negative and then called him back and told him he was positive for the Flu and gave him these medications to take, this visit was on 11/19/2023. Hx of PNA, last episode was 10 years ago. Patient would like to be swabbed again for COVID and Flu to be sure, also requesting a CXR.     Sinus Problem  This is a new problem. The current episode started in the past 7 days. The problem has been gradually worsening since onset. There has been no fever. Associated symptoms include congestion and coughing. Pertinent negatives include no chills, diaphoresis, ear pain, headaches, hoarse voice, neck pain, shortness of breath, sinus pressure, sneezing, sore throat or swollen glands. Past treatments include oral decongestants.     Constitution: Negative for chills and sweating.   HENT:  Positive for congestion. Negative for ear pain, sinus pressure and sore throat.    Neck: Negative for neck pain.   Respiratory:  Positive for cough. Negative for shortness of breath.    Allergic/Immunologic: Negative for sneezing.   Neurological:  Negative for headaches.      Objective:     Physical Exam   Constitutional: He does not appear ill. No distress.   HENT:   Head: Normocephalic and atraumatic.   Ears:   Right Ear: External ear " normal.   Left Ear: External ear normal.   Eyes: Conjunctivae are normal. Right eye exhibits no discharge. Left eye exhibits no discharge. Extraocular movement intact   Cardiovascular: Normal rate, regular rhythm and normal heart sounds.   No murmur heard.  Pulmonary/Chest: Effort normal and breath sounds normal. He has no wheezes. He has no rhonchi. He has no rales.   Abdominal: Normal appearance.   Musculoskeletal: Normal range of motion.         General: Normal range of motion.   Neurological: He is alert.   Skin: Skin is warm, dry and not pale. jaundice  Psychiatric: His behavior is normal. Mood, judgment and thought content normal.   Nursing note and vitals reviewed.      Assessment:     1. COVID-19    2. Encounter for laboratory test    3. Cough, unspecified type        Plan:       COVID-19    Encounter for laboratory test  -     POCT Influenza A/B MOLECULAR  -     SARS Coronavirus 2 Antigen, POCT Manual Read    Results for orders placed or performed in visit on 11/21/23   POCT Influenza A/B MOLECULAR   Result Value Ref Range    POC Molecular Influenza A Ag Negative Negative, Not Reported    POC Molecular Influenza B Ag Negative Negative, Not Reported     Acceptable Yes    SARS Coronavirus 2 Antigen, POCT Manual Read   Result Value Ref Range    SARS Coronavirus 2 Antigen Positive (A) Negative     Acceptable Yes      *Note: Due to a large number of results and/or encounters for the requested time period, some results have not been displayed. A complete set of results can be found in Results Review.        Cough, unspecified type  -     X-Ray Chest PA And Lateral; Future; Expected date: 11/21/2023    My interpretation:  No acute process.    Other orders  -     nirmatrelvir-ritonavir 300 mg (150 mg x 2)-100 mg copackaged tablets (EUA); Take 3 tablets by mouth 2 (two) times daily for 5 days. Each dose contains 2 nirmatrelvir (pink tablets) and 1 ritonavir (white tablet). Take all 3  tablets together  Dispense: 30 tablet; Refill: 0      Discussed drug interactions with Paxlovid.  Discussed will need to come off statin.  Patient voices understanding and agrees with plan.       Patient Instructions   Your test was POSITIVE for COVID-19 (coronavirus).       Please isolate yourself at home.  You may leave home and/or return to work once the following conditions are met:    If you were not hospitalized and are not moderately to severely immunocompromised:   More than 5 days since symptoms first appeared AND  More than 24 hours fever free without medications AND  Symptoms are improving  Continue to wear a mask around others for 5 additional days.    If you were hospitalized OR are moderately to severely immunocompromised:  More than 20 days since symptoms first appeared  More than 24 hours fever free without medications  Symptoms have improved    If you had no symptoms but tested positive:  More than 5 days since the date of the first positive test (20 days if moderately to severely immunocompromised). If you develop symptoms, then use the guidelines above.  Continue to wear a mask around others for 5 additional days.      Contact Tracing    As one of the next steps, you will receive a call or text from the [place] Department of Health ([facility]) COVID-19 Tracing Team. See the contact information below so you know not to ignore the health departments call. It is important that you contact them back immediately so they can help.      Contact Tracer Number:  [phone]  Caller ID for most carriers: [facility]     What is contact tracing?  Contact tracing is a process that helps identify everyone who has been in close contact with an infected person. Contact tracers let those people know they may have been exposed and guide them on next steps. Confidentiality is important for everyone; no one will be told who may have exposed them to the virus.  Your involvement is important. The more we know about  where and how this virus is spreading, the better chance we have at stopping it from spreading further.  What does exposure mean?  Exposure means you have been within 6 feet for more than 15 minutes with a person who has or had COVID-19.  What kind of questions do the contact tracers ask?  A contact tracer will confirm your basic contact information including name, address, phone number, and next of kin, as well as asking about any symptoms you may have had. Theyll also ask you how you think you may have gotten sick, such as places where you may have been exposed to the virus, and people you were with. Those names will never be shared with anyone outside of that call, and will only be used to help trace and stop the spread of the virus.   I have privacy concerns. How will the state use my information?  Your privacy about your health is important. All calls are completed using call centers that use the appropriate health privacy protection measures (HIPAA compliance), meaning that your patient information is safe. No one will ever ask you any questions related to immigration status. Your health comes first.   Do I have to participate?  You do not have to participate, but we strongly encourage you to. Contact tracing can help us catch and control new outbreaks as theyre developing to keep your friends and family safe.   What if I dont hear from anyone?  If you dont receive a call within 24 hours, you can call the number above right away to inquire about your status. That line is open from 8:00 am - 8:00 p.m., 7 days a week.  Contact tracing saves lives! Together, we have the power to beat this virus and keep our loved ones and neighbors safe.    For more information see CDC link below.      https://www.cdc.gov/coronavirus/2019-ncov/hcp/guidance-prevent-spread.html#precautions        Sources:  [facility], [facility]           Sincerely,     RACHEL Patel

## 2023-11-21 NOTE — PATIENT INSTRUCTIONS
Your test was POSITIVE for COVID-19 (coronavirus).       Please isolate yourself at home.  You may leave home and/or return to work once the following conditions are met:    If you were not hospitalized and are not moderately to severely immunocompromised:   More than 5 days since symptoms first appeared AND  More than 24 hours fever free without medications AND  Symptoms are improving  Continue to wear a mask around others for 5 additional days.    If you were hospitalized OR are moderately to severely immunocompromised:  More than 20 days since symptoms first appeared  More than 24 hours fever free without medications  Symptoms have improved    If you had no symptoms but tested positive:  More than 5 days since the date of the first positive test (20 days if moderately to severely immunocompromised). If you develop symptoms, then use the guidelines above.  Continue to wear a mask around others for 5 additional days.      Contact Tracing    As one of the next steps, you will receive a call or text from the [place] Department of Health ([facility]) COVID-19 Tracing Team. See the contact information below so you know not to ignore the health departments call. It is important that you contact them back immediately so they can help.      Contact Tracer Number:  [phone]  Caller ID for most carriers: [facility]     What is contact tracing?  Contact tracing is a process that helps identify everyone who has been in close contact with an infected person. Contact tracers let those people know they may have been exposed and guide them on next steps. Confidentiality is important for everyone; no one will be told who may have exposed them to the virus.  Your involvement is important. The more we know about where and how this virus is spreading, the better chance we have at stopping it from spreading further.  What does exposure mean?  Exposure means you have been within 6 feet for more than 15 minutes with a person who  has or had COVID-19.  What kind of questions do the contact tracers ask?  A contact tracer will confirm your basic contact information including name, address, phone number, and next of kin, as well as asking about any symptoms you may have had. Theyll also ask you how you think you may have gotten sick, such as places where you may have been exposed to the virus, and people you were with. Those names will never be shared with anyone outside of that call, and will only be used to help trace and stop the spread of the virus.   I have privacy concerns. How will the state use my information?  Your privacy about your health is important. All calls are completed using call centers that use the appropriate health privacy protection measures (HIPAA compliance), meaning that your patient information is safe. No one will ever ask you any questions related to immigration status. Your health comes first.   Do I have to participate?  You do not have to participate, but we strongly encourage you to. Contact tracing can help us catch and control new outbreaks as theyre developing to keep your friends and family safe.   What if I dont hear from anyone?  If you dont receive a call within 24 hours, you can call the number above right away to inquire about your status. That line is open from 8:00 am - 8:00 p.m., 7 days a week.  Contact tracing saves lives! Together, we have the power to beat this virus and keep our loved ones and neighbors safe.    For more information see CDC link below.      https://www.cdc.gov/coronavirus/2019-ncov/hcp/guidance-prevent-spread.html#precautions        Sources:  [facility], [place] [facility] and [facility]           Sincerely,     RACHEL Patel

## 2023-11-24 ENCOUNTER — NURSE TRIAGE (OUTPATIENT)
Dept: ADMINISTRATIVE | Facility: CLINIC | Age: 68
End: 2023-11-24
Payer: MEDICARE

## 2023-11-24 NOTE — TELEPHONE ENCOUNTER
"[place]    PCP:  Dr. Power Benton    + for Covid on 11/21.  C/O severe HA ("like my head is going to explode and my eyes are about to pop out"), ears stopped up, stiff neck, sore throat, intermittent productive cough with "dark spot in it", and eye pain.  Denies numbness/weakness on one side, unsteady gait, fever, CP, and SOB.  Per protocol, care advised is go to the ED now.  Pt VU.  Advised to call for worsening/questions/concerns.  VU.    Reason for Disposition   Stiff neck (can't touch chin to chest)    Additional Information   Negative: Difficult to awaken or acting confused (e.g., disoriented, slurred speech)   Negative: Weakness of the face, arm or leg on one side of the body and new-onset   Negative: Numbness of the face, arm or leg on one side of the body and new-onset   Negative: Loss of speech or garbled speech and new-onset   Negative: Passed out (i.e., fainted, collapsed and was not responding)   Negative: Sounds like a life-threatening emergency to the triager   Negative: Unable to walk without falling    Protocols used: Headache-A-[place]    "

## 2023-12-05 ENCOUNTER — OFFICE VISIT (OUTPATIENT)
Dept: NEUROLOGY | Facility: CLINIC | Age: 68
End: 2023-12-05
Payer: MEDICARE

## 2023-12-05 DIAGNOSIS — G20.A2 PARKINSON'S DISEASE WITHOUT DYSKINESIA, WITH FLUCTUATING MANIFESTATIONS: Chronic | ICD-10-CM

## 2023-12-05 DIAGNOSIS — R41.89 COGNITIVE CHANGE: ICD-10-CM

## 2023-12-05 DIAGNOSIS — F41.9 ANXIETY: ICD-10-CM

## 2023-12-05 DIAGNOSIS — F33.1 MODERATE EPISODE OF RECURRENT MAJOR DEPRESSIVE DISORDER: Primary | ICD-10-CM

## 2023-12-05 PROCEDURE — 99999 PR PBB SHADOW E&M-EST. PATIENT-LVL II: ICD-10-PCS | Mod: PBBFAC,,, | Performed by: STUDENT IN AN ORGANIZED HEALTH CARE EDUCATION/TRAINING PROGRAM

## 2023-12-05 PROCEDURE — 96116 NUBHVL XM PHYS/QHP 1ST HR: CPT | Mod: S$PBB,,, | Performed by: STUDENT IN AN ORGANIZED HEALTH CARE EDUCATION/TRAINING PROGRAM

## 2023-12-05 PROCEDURE — 96133 PR NEUROPSYCHOLOGIC TEST EVAL SVCS, EA ADDTL HR: ICD-10-PCS | Mod: ,,, | Performed by: STUDENT IN AN ORGANIZED HEALTH CARE EDUCATION/TRAINING PROGRAM

## 2023-12-05 PROCEDURE — 96116 NUBHVL XM PHYS/QHP 1ST HR: CPT | Mod: PBBFAC,PO | Performed by: STUDENT IN AN ORGANIZED HEALTH CARE EDUCATION/TRAINING PROGRAM

## 2023-12-05 PROCEDURE — 96139 PR PSYCH/NEUROPSYCH TEST ADMIN/SCORING, BY TECH, 2+ TESTS, EA ADDTL 30 MIN: ICD-10-PCS | Mod: ,,, | Performed by: STUDENT IN AN ORGANIZED HEALTH CARE EDUCATION/TRAINING PROGRAM

## 2023-12-05 PROCEDURE — 96139 PSYCL/NRPSYC TST TECH EA: CPT | Mod: ,,, | Performed by: STUDENT IN AN ORGANIZED HEALTH CARE EDUCATION/TRAINING PROGRAM

## 2023-12-05 PROCEDURE — 99499 NO LOS: ICD-10-PCS | Mod: S$PBB,,, | Performed by: STUDENT IN AN ORGANIZED HEALTH CARE EDUCATION/TRAINING PROGRAM

## 2023-12-05 PROCEDURE — 99499 UNLISTED E&M SERVICE: CPT | Mod: S$PBB,,, | Performed by: STUDENT IN AN ORGANIZED HEALTH CARE EDUCATION/TRAINING PROGRAM

## 2023-12-05 PROCEDURE — 96133 NRPSYC TST EVAL PHYS/QHP EA: CPT | Mod: ,,, | Performed by: STUDENT IN AN ORGANIZED HEALTH CARE EDUCATION/TRAINING PROGRAM

## 2023-12-05 PROCEDURE — 96138 PR PSYCH/NEUROPSYCH TEST ADMIN/SCORING, BY TECH, 2+ TESTS, 1ST 30 MIN: ICD-10-PCS | Mod: ,,, | Performed by: STUDENT IN AN ORGANIZED HEALTH CARE EDUCATION/TRAINING PROGRAM

## 2023-12-05 PROCEDURE — 96132 NRPSYC TST EVAL PHYS/QHP 1ST: CPT | Mod: ,,, | Performed by: STUDENT IN AN ORGANIZED HEALTH CARE EDUCATION/TRAINING PROGRAM

## 2023-12-05 PROCEDURE — 99212 OFFICE O/P EST SF 10 MIN: CPT | Mod: PBBFAC,PO,25 | Performed by: STUDENT IN AN ORGANIZED HEALTH CARE EDUCATION/TRAINING PROGRAM

## 2023-12-05 PROCEDURE — 99999 PR PBB SHADOW E&M-EST. PATIENT-LVL II: CPT | Mod: PBBFAC,,, | Performed by: STUDENT IN AN ORGANIZED HEALTH CARE EDUCATION/TRAINING PROGRAM

## 2023-12-05 PROCEDURE — 96138 PSYCL/NRPSYC TECH 1ST: CPT | Mod: ,,, | Performed by: STUDENT IN AN ORGANIZED HEALTH CARE EDUCATION/TRAINING PROGRAM

## 2023-12-05 PROCEDURE — 96132 PR NEUROPSYCHOLOGIC TEST EVAL SVCS, 1ST HR: ICD-10-PCS | Mod: ,,, | Performed by: STUDENT IN AN ORGANIZED HEALTH CARE EDUCATION/TRAINING PROGRAM

## 2023-12-05 PROCEDURE — 96116 PR NEUROBEHAVIORAL STATUS EXAM BY PSYCH/PHYS: ICD-10-PCS | Mod: S$PBB,,, | Performed by: STUDENT IN AN ORGANIZED HEALTH CARE EDUCATION/TRAINING PROGRAM

## 2023-12-05 NOTE — PROGRESS NOTES
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 review of pertinent medical records, reviewing limits to confidentiality, conducting a clinical interview, and explaining procedures, the following measures were administered: MSVT; Isaias Cognitive Assessment (MoCA); Wechsler Adult Intelligence Scale, Fourth Edition (WAIS-IV) select subtests; Wechsler Memory Scale, Fourth Edition (WMS-IV) select subtests; [place] Verbal Learning Test, Third Edition Alternate Form (CVLT-III); Controlled Oral Word Association Test (COWAT) and Animal Naming (Tyler norms); Neuropsychological Assessment Battery (NAB) Naming; [place] Card Sorting Test-64 (WCST-64); Joe Complex Figure Test (RCFT), copy; Trail Making Test (MOANS norms); Alves Anxiety Inventory (RONALD); and Alves Depression Inventory, Second Edition (BDI-II). Manual norms were used unless otherwise indicated.      TEST RESULTS  GLOBAL COGNITIVE FUNCTION  Performance is below expectation on a cognitive screening measure (MoCA 20/30), slightly lower than previous testing (23/30 in 2020). Clock drawing to command is notable for numbers drawn outside the clock and concrete placement of clock hands (both pointing to number 11). He was unable to recall the five words spontaneously (3 with category cues and 2 with multiple choice). Also  missed points on repetition, orientation (date), and naming.     COGNITIVE BASELINE  Baseline cognitive function is estimated to be in the average range based on prior testing.     ATTENTION & PROCESSING SPEED  Immediate auditory attention remain average. Scores on auditory working memory tasks are average and stable. Mental arithmetic are high average. Speeded digit symbol coding and symbol matching remain average. Rapid number sequencing is high average and stable.     EXECUTIVE FUNCTIONS  On a mental set shifting task with numbers and letters, his performance remains average with one set loss error. Conceptual problem solving on a card sorting task is below expectation. He was unable to complete a category sort and had difficulty generating conceptual level responses. He committed three set loss errors and frequent perseverative errors. Verbal abstract reasoning is on the low end of average, slightly stronger than prior levels.     LANGUAGE  Picture naming is average (29/31) and stable. Letter fluency remains average. Animal fluency is exceptionally low and reduced.     VISUOSPATIAL/PERCEPTUAL  Copy of a complex figure is within expectation and stable. His reproduction captures the gestalt despite minor size distortions.    MEMORY  Learning efficiency is average on a 16-item word list. He produced semantically-related intrusion errors during learning trials that he repeated across trials. Free recall is average after a brief distractor task and after a longer delay. He also recalled a few intrusion words produced during learning trials. Recognition discriminability is low average (13 hits; 8 false positive errors). Performance on a story memory task is low average for both immediate and delayed recall, again notable for intrusion errors. Despite relatively weak initial recall, he was able to retain most learned information after a delay. Recognition of story elements is exceptionally low. Overall performance is  broadly consistent with prior testing.     MOOD  Responses on self-report mood inventories suggest mild anxiety and depression in the moderate range. He endorses suicidal ideation without plan or intent. See above psychiatric history for mood and risk assessment.       Raw Score Standardized Score Percentile/CP Descriptor   MSVT IR 95 - - -   MSVT DR 95 - - -   MSVT Cons 100 - - -   MSVT  - - -   MSVT FR 60 - - -   ACS Word Choice 47 - - -   ACS LM II Rec 14 - - -   ACS RDS 10 - - -   CVLT-3 AF FC 16 - - -   INTELLECTUAL FUNCTIONING Raw Score Standardized Score Percentile/CP Descriptor   WAIS-IV        WMI - 111 77 High Average   PSI - 97 42 Average   Similarities 20 8 25 Average   Digit Span 28 11 63 Average         DS Forward 11 11 63 Average         DS Backward 9 11 63 Average         DS Sequence 8 10 50 Average         Longest Digit Forward 7 - - -         Longest Digit Backward 6 - - -         Longest Digit Sequence 6 - - -   Arithmetic 17 13 84 High Average   Symbol Search 24 9 37 Average   Coding 52 10 50 Average   COGNITIVE SCREENING Raw Score Standardized Score Percentile/CP Descriptor   MoCA 20 - - Impaired   Orientation - Place 2/2 - - -   Orientation - Date 3/4 - - -   LANGUAGE FUNCTIONING Raw Score Standardized Score Percentile/CP Descriptor   WAIS-IV Similarities 20 8 25 Average   NAB Naming 29 44 27 Average   NAB Naming Percent Correct After Semantic Cuing 0 - 38 Average   NAB Naming Percent Correct After Phonemic Cuing 0 - 20 Low Average   FAS 38 51 54 Average   Animal Naming 9 28 1 Exceptionally Low    VISUOSPATIAL FUNCTIONING Raw Score Standardized Score Percentile/CP Descriptor   RCFT Copy 32 - >16 WNL   RCFT Time to Copy 265 - >16 WNL   LEARNING & MEMORY Raw Score Standardized Score Percentile/CP Descriptor   CVLT-3 AF        Immediate Recall: Trial 1 Correct 5 10 50 Average   Immediate Recall: Trial 2 Correct 9 11 63 Average   Immediate Recall: Trial 3 Correct 10 11 63 Average   Immediate  Recall: Trial 4 Correct 11 11 63 Average   Immediate Recall: Trial 5 Correct 12 11 63 Average   List B Correct 4 9 37 Average   Delayed Recall: Short Delay Free Recall Correct 7 8 25 Average   Delayed Recall: Short Delay Cued Recall Correct 11 10 50 Average   Delayed Recall: Long Delay Free Recall Correct 9 9 37 Average   Delayed Recall: Long Delay Cued Recall Correct 10 9 37 Average   Yes/No Recognition: Total Hits 13 8 25 Average   Yes/No Recognition: False Positives 8 7 16 Low Average   Yes/No Recognition: Recognition Discriminability 1.6 6 9 Low Average   Yes/No Recognition: Recognition Discriminability Nonparametric 77 7 16 Low Average   Recall Errors: Total Intrusions 33 1 0.1 Exceptionally Low    Forced Choice Recognition: Total Hits 16 100 #N/A #N/A   Standard Score Summary: Trials 1-5 Correct - 104 61 Average   Standard Score Summary: Delayed Recall Correct - 96 39 Average   Standard Score Summary: Total Recall Correct - 99 47 Average   WMS-IV Subtests        LM I 20 6 9 Low Average   LM II 10 6 9 Low Average   LM Recognition 14 - <2 Exceptionally Low   ATTENTION/WORKING MEMORY Raw Score Standardized Score Percentile/CP Descriptor   WAIS-IV WMI - 111 77 High Average   WAIS-IV Digit Span 28 11 63 Average         DS Forward 11 11 63 Average         DS Backward 9 11 63 Average         DS Sequence 8 10 50 Average         Longest Digit Forward 7 - - -         Longest Digit Backward 6 - - -         Longest Digit Sequence 6 - - -   WAIS-IV Arithmetic 17 13 84 High Average   MENTAL PROCESSING SPEED Raw Score Standardized Score Percentile/CP Descriptor   WAIS-IV Symbol Search 24 9 37 Average   WAIS-IV Coding 52 10 50 Average   TMT A  27 13 84 High Average   TMT A errors 0 - - -   EXECUTIVE FUNCTIONING Raw Score Standardized Score Percentile/CP Descriptor   TMT B 80 10 50 Average   TMT B errors 1 - - -   WCST-64        Total Correct 40 - - -   Total Errors 24 84 14 Low Average   Perseverative Resp. 8 99 47 Average    Perseverative Err. 8 98 45 Average   Nonperseverative Err. 16 75 5 Below Average   Concept. Level Response 28 80 9 Low Average   Categories Completed 0 - 2-5 Below Average   FMS 3 - N/A N/A   Learning to Learn N/A - N/A N/A   WAIS-IV Similarities 20 8 25 Average   MOOD & PERSONALITY Raw Score Standardized Score Percentile/CP Descriptor   BDI-2 20 - - Moderate   CHAPARRITA-7 8 - - Mild   ss = scaled score (mean = 10, SD = 3); SS = standard score (mean = 100, SD = 15); Tscore mean = 50, SD = 10; zscore (mean = 0.00, SD = 1)       It is important to note that scores/percentiles should only be interpreted by a neuropsychologist. It is common for healthy individuals to have 1-3 isolated low/unusual scores that are not indicative of any significant cognitive dysfunction.    BILLING     Service Description CPT Code Minutes Units   Psychiatric diagnostic evaluation by physician [id number]  0   Neurobehavioral status exam by physician [id number] 1   Each additional hour by physician [id number]  0   Test Evaluation Services --  --   Neuropsychological testing evaluation services by physician [id number] [phone] 1   Each additional hour by physician [id number] 3   Test Administration and Scoring --  --   Psychological or neuropsychological test administration and scoring by physician [id number]  0   Each additional 30 minutes by physician [id number]  0   Psychological or neuropsychological test administration and scoring by technician [phone] 1   Each additional 30 minutes by technician [id number]  5

## 2023-12-06 ENCOUNTER — OFFICE VISIT (OUTPATIENT)
Dept: NEUROLOGY | Facility: CLINIC | Age: 68
End: 2023-12-06
Payer: MEDICARE

## 2023-12-06 ENCOUNTER — OFFICE VISIT (OUTPATIENT)
Dept: FAMILY MEDICINE | Facility: CLINIC | Age: 68
End: 2023-12-06
Payer: MEDICARE

## 2023-12-06 VITALS
HEART RATE: 94 BPM | HEIGHT: 71 IN | OXYGEN SATURATION: 97 % | BODY MASS INDEX: 27.44 KG/M2 | DIASTOLIC BLOOD PRESSURE: 70 MMHG | SYSTOLIC BLOOD PRESSURE: 130 MMHG | WEIGHT: 196 LBS

## 2023-12-06 DIAGNOSIS — G47.33 SEVERE OBSTRUCTIVE SLEEP APNEA: Primary | ICD-10-CM

## 2023-12-06 DIAGNOSIS — E78.2 MIXED HYPERLIPIDEMIA: ICD-10-CM

## 2023-12-06 DIAGNOSIS — F33.1 MODERATE EPISODE OF RECURRENT MAJOR DEPRESSIVE DISORDER: ICD-10-CM

## 2023-12-06 DIAGNOSIS — E11.22 TYPE 2 DIABETES MELLITUS WITH STAGE 2 CHRONIC KIDNEY DISEASE, WITHOUT LONG-TERM CURRENT USE OF INSULIN: ICD-10-CM

## 2023-12-06 DIAGNOSIS — R41.89 COGNITIVE CHANGE: Primary | ICD-10-CM

## 2023-12-06 DIAGNOSIS — G47.33 OSA (OBSTRUCTIVE SLEEP APNEA): ICD-10-CM

## 2023-12-06 DIAGNOSIS — F43.21 ADJUSTMENT DISORDER WITH DEPRESSED MOOD: ICD-10-CM

## 2023-12-06 DIAGNOSIS — G20.A2 PARKINSON'S DISEASE WITHOUT DYSKINESIA, WITH FLUCTUATING MANIFESTATIONS: Chronic | ICD-10-CM

## 2023-12-06 DIAGNOSIS — I10 PRIMARY HYPERTENSION: ICD-10-CM

## 2023-12-06 DIAGNOSIS — C61 PROSTATE CANCER: ICD-10-CM

## 2023-12-06 DIAGNOSIS — J84.9 ILD (INTERSTITIAL LUNG DISEASE): ICD-10-CM

## 2023-12-06 DIAGNOSIS — N18.2 TYPE 2 DIABETES MELLITUS WITH STAGE 2 CHRONIC KIDNEY DISEASE, WITHOUT LONG-TERM CURRENT USE OF INSULIN: ICD-10-CM

## 2023-12-06 PROBLEM — E11.3293 CONTROLLED TYPE 2 DIABETES MELLITUS WITH BOTH EYES AFFECTED BY MILD NONPROLIFERATIVE RETINOPATHY WITHOUT MACULAR EDEMA, WITH LONG-TERM CURRENT USE OF INSULIN: Status: RESOLVED | Noted: 2018-12-20 | Resolved: 2023-12-06

## 2023-12-06 PROBLEM — Z79.4 CONTROLLED TYPE 2 DIABETES MELLITUS WITH BOTH EYES AFFECTED BY MILD NONPROLIFERATIVE RETINOPATHY WITHOUT MACULAR EDEMA, WITH LONG-TERM CURRENT USE OF INSULIN: Status: RESOLVED | Noted: 2018-12-20 | Resolved: 2023-12-06

## 2023-12-06 PROCEDURE — 99999 PR PBB SHADOW E&M-EST. PATIENT-LVL IV: ICD-10-PCS | Mod: PBBFAC,,, | Performed by: INTERNAL MEDICINE

## 2023-12-06 PROCEDURE — 99214 PR OFFICE/OUTPT VISIT, EST, LEVL IV, 30-39 MIN: ICD-10-PCS | Mod: S$PBB,,, | Performed by: INTERNAL MEDICINE

## 2023-12-06 PROCEDURE — 99214 OFFICE O/P EST MOD 30 MIN: CPT | Mod: PBBFAC,PO | Performed by: INTERNAL MEDICINE

## 2023-12-06 PROCEDURE — 99999 PR PBB SHADOW E&M-EST. PATIENT-LVL IV: CPT | Mod: PBBFAC,,, | Performed by: INTERNAL MEDICINE

## 2023-12-06 PROCEDURE — 99499 UNLISTED E&M SERVICE: CPT | Mod: S$PBB,,, | Performed by: STUDENT IN AN ORGANIZED HEALTH CARE EDUCATION/TRAINING PROGRAM

## 2023-12-06 PROCEDURE — 99214 OFFICE O/P EST MOD 30 MIN: CPT | Mod: S$PBB,,, | Performed by: INTERNAL MEDICINE

## 2023-12-06 PROCEDURE — 99499 NO LOS: ICD-10-PCS | Mod: S$PBB,,, | Performed by: STUDENT IN AN ORGANIZED HEALTH CARE EDUCATION/TRAINING PROGRAM

## 2023-12-06 NOTE — PROGRESS NOTES
Patient ID: Glenn Medel is a 68 y.o. male.    Chief Complaint: Annual Exam      Assessment and plan     1. Severe obstructive sleep apnea    2. Primary hypertension    3. Mixed hyperlipidemia  - HEPATIC FUNCTION PANEL; Future    4. ILD (interstitial lung disease)    5. LOVE (obstructive sleep apnea)    6. Parkinson's disease without dyskinesia, with fluctuating manifestations    7. Type 2 diabetes mellitus with stage 2 chronic kidney disease, without long-term current use of insulin  - Hemoglobin A1C; Future    8. Prostate cancer     Continue current medications except okay to stop aspirin and Lasix.    Follow-up with specialist as planned       HPI     Annual     Medications, recent labs, health maintenance, and diet has been reviewed.    Exercise- has not been walking as much   Alcohol- none  Tobacco- none     Had covid in November. Received Paxlovid. Recovered    1. Severe obstructive sleep apnea    2. Primary hypertension   - controlled.   - ok to stop lasix   3. Mixed hyperlipidemia   - controlled on lipitor    4. ILD (interstitial lung disease)   - stable.    5. LOVE (obstructive sleep apnea)   - was seeing Dr. Hinton, now has appointment with Dr. Lejeune in .    6. Parkinson's disease without dyskinesia, with fluctuating manifestations   - planning for DBS, taking sinemet.    7. Type 2 diabetes mellitus with stage 2 chronic kidney disease, without long-term current use of insulin   - controlled on metformin and mounjaro 2.5 mg    8. Prostate cancer   - Bx coming    9. History of gout- stable on allopurnol.   10.. taking prazosin for nightmares    Review of Systems   Constitutional:  Negative for fever.   Respiratory:  Negative for shortness of breath.    Cardiovascular:  Negative for chest pain.   Gastrointestinal:  Negative for abdominal pain.        Objective     Vitals:    12/06/23 0949   BP: 130/70   Pulse: 94     Wt Readings from Last 3 Encounters:   12/06/23 0949 88.9 kg (195 lb 15.8 oz)    11/24/23 1430 86.2 kg (190 lb)   11/21/23 1318 83.9 kg (185 lb)      Body mass index is 27.33 kg/m².     Physical Exam  Cardiovascular:      Rate and Rhythm: Normal rate and regular rhythm.      Heart sounds: No murmur heard.     No gallop.   Pulmonary:      Breath sounds: Normal breath sounds. No wheezing or rhonchi.   Abdominal:      Palpations: Abdomen is soft.      Tenderness: There is no abdominal tenderness.   Neurological:      Comments: Some facial masking, some dysarthria,         Diabetes Medications               metFORMIN (GLUCOPHAGE-XR) 500 MG ER 24hr tablet TAKE 1 TABLET(500 MG) BY MOUTH EVERY DAY    tirzepatide (MOUNJARO) 2.5 mg/0.5 mL PnIj Inject 2.5 mg into the skin every 7 days. (Replaces Ozempic)           Hypertension Medications               furosemide (LASIX) 20 MG tablet Take 1 tablet (20 mg total) by mouth every morning.    prazosin (MINIPRESS) 1 MG Cap Take 1 capsule (1 mg total) by mouth every evening.           Hyperlipidemia Medications               atorvastatin (LIPITOR) 20 MG tablet Take 1 tablet (20 mg total) by mouth once daily.           Medication List with Changes/Refills   New Medications    RSVPREF3 ANTIGEN-AS01E, PF, (AREXVY) 120 MCG/0.5 ML SUSR VACCINE    Inject 0.5 mLs into the muscle once. for 1 dose   Current Medications    ALLOPURINOL (ZYLOPRIM) 300 MG TABLET    TAKE 1 TABLET(300 MG) BY MOUTH EVERY DAY    ASPIRIN 81 MG CHEW    Take 81 mg by mouth once daily.    ATORVASTATIN (LIPITOR) 20 MG TABLET    Take 1 tablet (20 mg total) by mouth once daily.    BRIMONIDINE 0.2% (ALPHAGAN) 0.2 % DROP    INSTILL 1 DROP IN BOTH EYES TWICE DAILY    CARBIDOPA-LEVODOPA  MG (SINEMET)  MG PER TABLET    Take 2 tablets by mouth 5 (five) times daily.    CHOLECALCIFEROL, VITAMIN D3, (D3-2000 ORAL)    Take by mouth.    CLONAZEPAM (KLONOPIN) 1 MG TABLET    Take 2 tablets (2 mg total) by mouth every evening.    DORZOLAMIDE (TRUSOPT) 2 % OPHTHALMIC SOLUTION    INSTILL 1 DROP IN BOTH  EYES TWICE DAILY    FLUOXETINE 40 MG CAPSULE    Take 1 capsule (40 mg total) by mouth once daily.    FUROSEMIDE (LASIX) 20 MG TABLET    Take 1 tablet (20 mg total) by mouth every morning.    KETOCONAZOLE (NIZORAL) 2 % SHAMPOO    Apply topically.    METFORMIN (GLUCOPHAGE-XR) 500 MG ER 24HR TABLET    TAKE 1 TABLET(500 MG) BY MOUTH EVERY DAY    PRAZOSIN (MINIPRESS) 1 MG CAP    Take 1 capsule (1 mg total) by mouth every evening.    RAMELTEON (ROZEREM) 8 MG TABLET    Take 8 mg by mouth every evening.    RAMELTEON (ROZEREM) 8 MG TABLET    Take 1 tablet (8 mg total) by mouth every evening.    RASAGILINE (AZILECT) 1 MG TAB    TAKE 1 TABLET(1 MG) BY MOUTH EVERY EVENING    RASAGILINE (AZILECT) 1 MG TAB    TAKE 1 TABLET(1 MG) BY MOUTH EVERY EVENING    TIRZEPATIDE (MOUNJARO) 2.5 MG/0.5 ML PNIJ    Inject 2.5 mg into the skin every 7 days. (Replaces Ozempic)       I personally reviewed past medical, family and social history.

## 2023-12-06 NOTE — PROGRESS NOTES
NEUROPSYCHOLOGICAL EVALUATION FEEDBACK    Referral Information  Name: Glenn Medel  MRN: [id number]  Age: 68 y.o.    : 1955  Race: White    Gender: male        Chief complaint leading to consultation/medical necessity: Feedback from neuropsychological evaluation.  Visit type: In person  Total time spent with patient: 60 minutes.  Billin attached to testing visit on 23.  Consent/Emergency Plan: The patient expressed an understanding of the purpose of the evaluation and consented to all procedures. I informed the patient of limits to confidentiality and discussed an emergency plan.    FEEDBACK NOTE       Mr. Glenn Medel and his wife participated in a feedback session today.  We discussed the results of the neuropsychological evaluation. I gave time to discuss questions and concerns. A copy of a evaluation report will be provided to Mr. Medel via [employer].     Yaneth Ruiz, Ph.D.  Licensed Clinical Neuropsychologist  [facility] - Department of Neurology

## 2023-12-07 ENCOUNTER — PATIENT MESSAGE (OUTPATIENT)
Dept: PSYCHIATRY | Facility: CLINIC | Age: 68
End: 2023-12-07
Payer: MEDICARE

## 2023-12-13 ENCOUNTER — PATIENT MESSAGE (OUTPATIENT)
Dept: CARDIOLOGY | Facility: CLINIC | Age: 68
End: 2023-12-13
Payer: MEDICARE

## 2023-12-18 ENCOUNTER — TELEPHONE (OUTPATIENT)
Dept: NEUROLOGY | Facility: CLINIC | Age: 68
End: 2023-12-18
Payer: MEDICARE

## 2023-12-18 NOTE — TELEPHONE ENCOUNTER
Consulted with Dr. Cee. Called pt and instructed him to drink a large glass of water, and reiterated to eat protein. He stated he cooked a turkey breast over the weekend and would eat that.  Also informed him that his blood pressure may lower, so it's advisable to lay down for a couple of hours. He thanked me for the call and said that's what he plans to do.

## 2023-12-18 NOTE — TELEPHONE ENCOUNTER
SABRINA WARNER informed me that individual took too much of his PD medication yesterday. Attempted to return call. Left message with request for call back. Pt has on/off appt scheduled for 10:00 am tomorrow.

## 2023-12-18 NOTE — TELEPHONE ENCOUNTER
----- Message from Thi Kowalski sent at 12/18/2023 11:54 AM CST -----  Regarding: pt adivce  Contact: [phone]  Pt returning miss call from nurse Ceron. Pls call

## 2023-12-18 NOTE — TELEPHONE ENCOUNTER
Usually takes 2 tabs CD/LD at a time but got confused with med planner this morning around 8:30 am and took 6 tabs. Encouraged him to eat protein. He states he feels drowsy, but not nauseous. He stated he has a boiled egg and crackers with cheese he can eat. Instructed to take 5 pm and 9 pm dose. Will alert MD to the issue.  Has On/off appointment at 10 am tomorrow. Instructed not to take any CD/LD after 10 pm.

## 2023-12-19 ENCOUNTER — PATIENT MESSAGE (OUTPATIENT)
Dept: FAMILY MEDICINE | Facility: CLINIC | Age: 68
End: 2023-12-19
Payer: MEDICARE

## 2023-12-19 ENCOUNTER — OFFICE VISIT (OUTPATIENT)
Dept: NEUROLOGY | Facility: CLINIC | Age: 68
End: 2023-12-19
Payer: MEDICARE

## 2023-12-19 DIAGNOSIS — G20.A2 PARKINSON'S DISEASE WITHOUT DYSKINESIA, WITH FLUCTUATING MANIFESTATIONS: Primary | Chronic | ICD-10-CM

## 2023-12-19 DIAGNOSIS — F33.1 MODERATE EPISODE OF RECURRENT MAJOR DEPRESSIVE DISORDER: ICD-10-CM

## 2023-12-19 DIAGNOSIS — F43.21 ADJUSTMENT DISORDER WITH DEPRESSED MOOD: Primary | ICD-10-CM

## 2023-12-19 PROCEDURE — 99215 PR OFFICE/OUTPT VISIT, EST, LEVL V, 40-54 MIN: ICD-10-PCS | Mod: 95,,, | Performed by: PSYCHIATRY & NEUROLOGY

## 2023-12-19 PROCEDURE — 99215 OFFICE O/P EST HI 40 MIN: CPT | Mod: 95,,, | Performed by: PSYCHIATRY & NEUROLOGY

## 2023-12-19 RX ORDER — PROPRANOLOL HYDROCHLORIDE 10 MG/1
5 TABLET ORAL 3 TIMES DAILY
Qty: 45 TABLET | Refills: 11 | Status: SHIPPED | OUTPATIENT
Start: 2023-12-19 | End: 2024-02-06 | Stop reason: DRUGHIGH

## 2023-12-19 NOTE — ASSESSMENT & PLAN NOTE
Unmanaged depression found on neuropsych eval  On fluoxetine 40mg chronically  Suggested psych eval and counselor to better manage

## 2023-12-19 NOTE — ASSESSMENT & PLAN NOTE
Typical  with some nonmotor features.    Today during ON/OFF we revealed that his gait is excellent and does not respond to Ldopa.  We discussed DBS options, including a VIM for tremor control. He does not think tremor control alone is enough to justify DBS.    Continue- carbidopa/levodopa 25/100mg 2 tabs  PO five times a day  Continue azilect 1mg QHS

## 2023-12-19 NOTE — PROGRESS NOTES
The patient location is: HOME  The chief complaint leading to visit is: PD  1. Parkinson's disease without dyskinesia, with fluctuating manifestations        2. Moderate episode of recurrent major depressive disorder          Visit type: Virtual visit with synchronous audio and video  Total time spent with patient: 60  Each patient to whom he or she provides medical services by telemedicine is:  (1) informed of the relationship between the physician and patient and the respective role of any other health care provider with respect to management of the patient; and (2) notified that he or she may decline to receive medical services by telemedicine and may withdraw from such care at any time.     Notes:   Glenn STOCKTON Chief Complaints during this visit:  New Patient visit for  Parkinsonism.   No referring provider defined for this encounter.    Primary Care Physician  Power Benton, DO  [address] [place] [address]    Interval Hx    Since last visit,     Doing well    Carbidopa/levodopa 25/100mg 2 tabs five times daily - walking more confidently  No offtime  Tremor well controlled until Ldopa wears off  Trouble typing  Dizziness when he works outside for hours  Some facial dyskinesias    REM sleep dz sporadic now- once every 3 weeks  Take clonazepam 1.5mg QHS at 9PM  Stopped using CPAP    Takes azilect 1mg QHS    Falls: none  Assist Device: none  Walks 2 miles without stopping  With stairs he must hand onto railing    MRI brain revealed a possible posterior fossa arachnoid cyst - followed by Dr. Saldivar   -having repeat imaging soon  Also having Lspine MRI through pain management soon    Brother with dementia since age 60 - mild hand tremors and some issues walking  2 sisters/2 brothers healthy  Mother with dementia at age 80 - also hand hand dystonia  Father also reported fingers getting stuck    Ceruloplasmin NL    Neuropsych revealed mild cognitive impairments but no diagnosis    PD Review of  "Symptoms:  Anosmia: some stable  Dysarthria/Hypophonia: none  Dysphagia/Sialorrhea: none  Depression: yes  Cognitive slowing: mild attention issues - replaced CPAP  Hallucinations: none  Urinary changes: none  Constipation: yes  Orthostasis: at times  Falls: occasional  Freezing: none  Micrographia: none  Sleep issues:  -RBD: 1 year of REM sleep dz      "History of present illness:   68 y.o.  male seen in consultation at the request of  Dr. Martinez for evaluation of parkinsonism.      Falls often, onto knee.  Right hand will suddenly start shaking.  Memory a little worse.  Needs new mask for his LOVE, hasn't used in a while.  Not sure who to see.    Has DM, had an emg about 2 years ago.      1/20/20 Juan:  Glenn Medel is a 64 y.o. right-handed male with DM2, HTN, LOVE, anxiety and depression who presents today for an initial evaluation of tremos and balance and is accompanied by wife.    Tremor- started in RH a couple of years ago but seemed to have gotten worse. Will watch TV and ev hands shake. Started noting in LH in the last year. Also notes shaking in legs for several years. He says the legs in fact started before the hands. Nothing makes it better. Not noted any aggravating factors. Drinks very little alcohol. Has not noted benefit with alcohol. Handwriting is pretty good. No changes with this.    Balance- sometimes will be working on knees and then fall back when he goes to get up. Sometimes with walking, he will wobble. No falls while walking. Has fallen backwards when he is trying to get up from being on knees. Has noted balance issues the past couple of months. Has not noted progression.     Acting out dreams. Not nightly but is significant when does happen. He actually ended up in hospital in the last year because of fighting in sleep. Has LOVE, used to wear CPAP and wife wonders if this helped when he was wearing this. He says he will wear it a couple of days, aggravates him and then he will not use for " "month or longer. He had sleep doctor, Dr. Hinton, but dismissed him as she did everything she could do for him.    Denies stiffness in muscles.   Sometimes feels slow with walking. Wife says he is slower to answer questions.   Memory is getting worse. Been bad couple of years but worse the past year. Wife agrees but attributes some of this to [facility]- lazier life.    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   He left on his terms. Was not asked to leave. "      II.  Review of systems:  As in HPI,  otherwise, balance 10 systems reviewed and are negative.    III.  Past Medical History:   Diagnosis Date    BPH (benign prostatic hyperplasia)     Cataract     done ou    Chronic kidney disease, stage 3 02/25/2015    DDD (degenerative disc disease), lumbar     s/p epidural steroids     Diabetes mellitus     type 2    Glaucoma     OU    HTN (hypertension)     Iritis, lens-induced 01/08/2018    Obesity     Parkinson's disease     Sleep apnea     uses cpap     Family History   Problem Relation Age of Onset    Heart disease Brother 62    Kidney disease Brother     Heart disease Father     Diabetes Father     Colon cancer Father     Cancer Father         colon    Cataracts Father     Hypertension Mother     Diabetes Mother     Stroke Mother     Cancer Mother         colon    Kidney disease Mother     Glaucoma Maternal Grandmother     No Known Problems Sister     No Known Problems Maternal Aunt     No Known Problems Maternal Uncle     No Known Problems Paternal Aunt     No Known Problems Paternal Uncle     No Known Problems Maternal Grandfather     No Known Problems Paternal Grandmother     No Known Problems Paternal Grandfather     Dementia Brother     Abnormal EKG Brother     Amblyopia Neg Hx     Blindness Neg Hx     Macular degeneration Neg Hx     Retinal detachment Neg Hx     Strabismus Neg Hx     Thyroid disease Neg Hx     Parkinsonism Neg Hx      Social " history:  , retired.      Current Outpatient Medications on File Prior to Visit   Medication Sig Dispense Refill    allopurinoL (ZYLOPRIM) 300 MG tablet TAKE 1 TABLET(300 MG) BY MOUTH EVERY DAY 90 tablet 3    aspirin 81 MG Chew Take 81 mg by mouth once daily.      atorvastatin (LIPITOR) 20 MG tablet Take 1 tablet (20 mg total) by mouth once daily. 90 tablet 3    brimonidine 0.2% (ALPHAGAN) 0.2 % Drop INSTILL 1 DROP IN BOTH EYES TWICE DAILY 10 mL 3    carbidopa-levodopa  mg (SINEMET)  mg per tablet Take 2 tablets by mouth 5 (five) times daily. 300 tablet 11    cholecalciferol, vitamin D3, (D3-2000 ORAL) Take by mouth.      clonazePAM (KLONOPIN) 1 MG tablet Take 2 tablets (2 mg total) by mouth every evening. 60 tablet 0    dorzolamide (TRUSOPT) 2 % ophthalmic solution INSTILL 1 DROP IN BOTH EYES TWICE DAILY 10 mL 3    FLUoxetine 40 MG capsule Take 1 capsule (40 mg total) by mouth once daily. 90 capsule 3    furosemide (LASIX) 20 MG tablet Take 1 tablet (20 mg total) by mouth every morning. 90 tablet 3    ketoconazole (NIZORAL) 2 % shampoo Apply topically.      metFORMIN (GLUCOPHAGE-XR) 500 MG ER 24hr tablet TAKE 1 TABLET(500 MG) BY MOUTH EVERY DAY 90 tablet 1    prazosin (MINIPRESS) 1 MG Cap Take 1 capsule (1 mg total) by mouth every evening. 90 capsule 3    ramelteon (ROZEREM) 8 mg tablet Take 8 mg by mouth every evening.      ramelteon (ROZEREM) 8 mg tablet Take 1 tablet (8 mg total) by mouth every evening. 90 tablet 3    rasagiline (AZILECT) 1 mg Tab TAKE 1 TABLET(1 MG) BY MOUTH EVERY EVENING 30 tablet 12    rasagiline (AZILECT) 1 mg Tab TAKE 1 TABLET(1 MG) BY MOUTH EVERY EVENING 30 tablet 12    tirzepatide (MOUNJARO) 2.5 mg/0.5 mL PnIj Inject 2.5 mg into the skin every 7 days. (Replaces Ozempic) 4 Pen 2     Current Facility-Administered Medications on File Prior to Visit   Medication Dose Route Frequency Provider Last Rate Last Admin    diphenhydrAMINE capsule 25 mg  25 mg Oral Q6H PRN Dylan  "Leonila CAGE MD        metronidazole IVPB 500 mg  500 mg Intravenous Once Kaz Keating MD           PRIOR problem-specific medications tried:  none    Review of patient's allergies indicates:   Allergen Reactions    Avelox [moxifloxacin] Hives and Rash       IV. Physical Exam    Physical Exam  Constitutional: Well-developed, well-nourished, appears stated age  Eyes: No scleral icterus  ENT: Moist oral mucosa  Cardiovascular: No lower extremity edema   Respiratory: No labored breathing   Skin: No rash   Hematologic: No bruising    Other: GI/ deferred   Mental status: Alert and oriented to person, place, time, and situation;   follows commands  Speech: normal (not dysarthric), no aphasia  Cranial nerves:            CN II: Pupils mid-position and equal, not tested light or accommodation  CN III, IV, VI: Extraocular movements full, no nystagmus visualized  CN V: Not tested   CN VII: Face strong and symmetric bilaterally   CN VIII: Hearing intact to voice and conversation   CN IX, X: Palate raises midline and symmetric   CN XI: Strong shoulder shrug B/L  CN XII: Tongue appears midline   Motor: Normal bulk by appearance, no drift   Sensory: Not tested    Gait: Can stand on either leg- barely a shuffle  Deep tendon reflexes: Not tested  Movement/Coordination                    Mod hypophonic speech.                     Mod facial masking.    Mild orofacial dyskinesias                      No other dystonia, chorea, athetosis, myoclonus, or tics visualized.    Bradykinesia      ? Finger taps Finger flicks DAVE Heel taps   Left 0 0 0 0   Right 1+ 1+ 0 0     ON/OFF VIDEO Rating Scale    Time since levodopa: 12H    MOTOR EXAMINATION (OFF)       Speech  1 - Slight loss of expression, dictation, and/or volumn.   Facial Expression  1 - Minimal Hypomimia, could be normal "Poker Face".   Tremor at Rest:      Face, lips, chin 0 - Absent.    Hands:      right 0 - Absent.    left 0 - Absent.    Feet:      right 0 - Absent.  " "  left 0 - Absent.    Action or Postural Tremor of Hands      right 2   left 1 - Slight; present with action.                           Finger Taps      right 2 - Moderately impaired. Definite and early fatiguing. May have occasional arrests in movement.   left 1 - Mild slowing and/or reduction in amplitude.   Hand Movements      right 2   left 1 - Mild slowing and/or reduction in amplitude.   Rapid Alternating Movements of Hands      right 0 - Normal.   left 0 - Normal.   Leg Agility      right 1 - Mild slowing and/or reduction in amplitude.   left 1 - Mild slowing and/or reduction in amplitude.   Arising from Chair  0 - Normal.   Posture  0 - Normal erect.   Gait  0 - Normal.       Body Bradykinesia and Hypokinesia (Combining slowness, hesitancy, decreased armswing, small amplitude, and poverty of movement in general)  1 - Minimal slowness, giving movement a deliberate character; could be normal for some persons. Possibly reduced amplitude.     14  _______________________________________________________________      MOTOR EXAMINATION (ON)       Speech  1 - Slight loss of expression, dictation, and/or volumn.   Facial Expression  1 - Minimal Hypomimia, could be normal "Poker Face".   Tremor at Rest:      Face, lips, chin 0 - Absent.    Hands:      right 0 - Absent.    left 0 - Absent.    Feet:      right 1   left 1   Action or Postural Tremor of Hands      right 1 - Slight; present with action.   left 1 - Slight; present with action.                           Finger Taps      right 0 - Normal.   left 0 - Normal.   Hand Movements      right 0 - Normal.   left 0 - Normal.   Rapid Alternating Movements of Hands      right 0 - Normal.   left 1   Leg Agility      right 0 - Normal.   left 1 - Mild slowing and/or reduction in amplitude.   Arising from Chair  1   Posture  1 - Not quite erect, slightly stooped posture; could be normal for older person.   Gait  0 - Normal.       Body Bradykinesia and Hypokinesia (Combining " "slowness, hesitancy, decreased armswing, small amplitude, and poverty of movement in general)  1 - Minimal slowness, giving movement a deliberate character; could be normal for some persons. Possibly reduced amplitude.     11        V.  Laboratory/ Radiological Data: (Personally reviewed images)         MRI 1/29/20  Impression       1. There is no acute abnormality.  There is mild volume loss without significant white matter disease.  There is no hemorrhage, parenchymal mass or acute infarction.  There is a probable arachnoid cyst in the right posterior fossa.  Please see above discussion.     Memory/Encephalopathy Labs:  Lab Results   Component Value Date    TSH 0.976 01/06/2020    YYNTIWFL11 391 05/14/2020    THIAMINEBLOO 59 05/14/2020     LGi and CASPR  No results found for: "ENCESLGI1", "ENCESCASPR2"    Movement Labs:  Lab Results   Component Value Date    PLDWFZFF07LU 51 09/28/2022    CALCIUM 9.4 11/21/2023    .6 (H) 09/28/2023    ALBUMIN 3.5 11/21/2023    BILITOT 0.9 11/21/2023    BILIDIR 0.2 12/12/2022    ALT 6 (L) 11/21/2023    AST 28 11/21/2023    ALKPHOS 144 (H) 11/21/2023    CERULOPLSM 29.0 08/26/2020    FERRITIN 88 03/29/2021    ALPHATOCOPHE 1275 05/14/2020       IRON STUDIES:  Lab Results   Component Value Date    IRON 60 03/29/2021    TRANSFERRIN 243 03/29/2021    TIBC 360 03/29/2021    FESATURATED 17 (L) 03/29/2021       Malignancy Labs:  Lab Results   Component Value Date    PSADIAG 4.1 (H) 08/02/2023     PARANEOPLASTIC PANEL:  No results found for: "NMOINTERPRET", "REFLEXTEST", "CRMP5", "STMAB", "LABACHR", "ACHRGANGLION", "NEUROVG"    CSF:  No results found for: "CSFWBC", "CSFRBC", "LYMPHS", "MONOMACCSF", "PROTEINCSF", "GLUCCSF", "GADCSF", "LUME", "MYELINPROT"  Flow cytometry: No results found for: "CSFC"  [place]: No results found for: "WESTNILEIGGA"    CNS Demyelinating Labs:  Nmo serum, csf igg:  No results found for: "NMOFSFACSS", "NMOFCFACSC"  No results found for: "JCVIRUS"    CSF " "oligoclonal bands:  No results found for: "CSFB", "CSFOLI", "SERB", "IGGINDEXCSF", "IGGCSF", "ALBQ", "GALB", "IGGSYNRATE", "IGGS", "ALBSER", "IGGALBS"    Myopathy labs:  No results found for: "CPK", "ALDOLASE", "AMMONIA", "ACFCRATIO", "ACETYLCARNIT", "CARNITINEPLA", "LACTATE"    Myasthenia panel:  No results found for: "LABACHR"  No results found for: "ACETMOD"  No results found for: "STMAB"   No results found for: "ACHRGANGLION"    Neuropathy Labs:  Lab Results   Component Value Date    QGDSWZUG06 391 05/14/2020    HGBA1C 6.4 (H) 11/21/2023    HGBA1C 6.4 (H) 11/21/2023     SPEP:  Lab Results   Component Value Date    PROTEINSERUM 6.7 07/27/2015    PATHINTPSPE REVIEWED 07/27/2015       Rheum labs:  Lab Results   Component Value Date    URICACID 4.5 09/28/2022       Neuropsych"  Mr. Medel is a 64 year old male with cognitive complaints over the past several years. He has a slightly ataxic gait of unclear etiology, but no clear parkinsonism. He has symptoms suggestive of REM sleep behavior disorder over the past year. He denied presence, passage, or recurrent visual hallucinations. He remains functionally independent, albeit more error prone than in the past. His history is remarkable for multiple vascular risk factors, including currently untreated sleep apnea.      His neuropsychological profile was largely within normal limits with the exception of mild memory inefficiencies, primarily related to encoding and cognitive organization/retrieval. His test scores and functioning are not at the level to warrant a cognitive diagnosis at this time. He did not present with the type of oliver forgetting seen in Alzheimer's disease. He also did not demonstrate the type of visuospatial dysfunction often seen early in Lewy Body Dementia, which is also in keeping with the fact that he does not have visual hallucinations. It will be important to track his cognition over time as mild cognitive changes in the setting of ataxia of " "unclear etiology and possible REM sleep behavior disorder could be indicative of a neurodegenerative condition. Otherwise, his mild cognitive weaknesses could simply be attributable to mild cerebrovascular disease.      Mr. Medel also reported clinically significant anxiety and depression. The transition to senior living has been admittedly more challenging than expected. His wife has also noticed reduced frustration tolerance with an increased tendency to perseverate on his frustrations, which has led to multiple confrontations. Treatment is indicated. "    MRI brain 2020      VI. Assessment and Plan            Problem List Items Addressed This Visit          Neuro    Parkinsonism - Primary (Chronic)    Current Assessment & Plan     Typical  with some nonmotor features.    Today during ON/OFF we revealed that his gait is excellent and does not respond to Ldopa.  We discussed DBS options, including a VIM for tremor control. He does not think tremor control alone is enough to justify DBS.    Continue- carbidopa/levodopa 25/100mg 2 tabs  PO five times a day  Continue azilect 1mg QHS            Psychiatric    Moderate episode of recurrent major depressive disorder    Current Assessment & Plan     Unmanaged depression found on neuropsych eval  On fluoxetine 40mg chronically  Suggested psych eval and counselor to better manage              Coni Cee MD, MS [facility]  Department of Neurology  Movement Disorders      "

## 2023-12-20 RX ORDER — FLUOXETINE HYDROCHLORIDE 20 MG/1
20 CAPSULE ORAL DAILY
Qty: 90 CAPSULE | Refills: 1 | Status: SHIPPED | OUTPATIENT
Start: 2023-12-20 | End: 2024-01-05

## 2023-12-22 ENCOUNTER — PATIENT MESSAGE (OUTPATIENT)
Dept: ADMINISTRATIVE | Facility: OTHER | Age: 68
End: 2023-12-22
Payer: MEDICARE

## 2023-12-24 ENCOUNTER — PATIENT MESSAGE (OUTPATIENT)
Dept: ADMINISTRATIVE | Facility: OTHER | Age: 68
End: 2023-12-24
Payer: MEDICARE

## 2023-12-29 ENCOUNTER — TELEPHONE (OUTPATIENT)
Dept: PSYCHIATRY | Facility: CLINIC | Age: 68
End: 2023-12-29
Payer: MEDICARE

## 2023-12-29 ENCOUNTER — PATIENT MESSAGE (OUTPATIENT)
Dept: PSYCHIATRY | Facility: CLINIC | Age: 68
End: 2023-12-29
Payer: MEDICARE

## 2024-01-02 ENCOUNTER — OFFICE VISIT (OUTPATIENT)
Dept: PODIATRY | Facility: CLINIC | Age: 69
End: 2024-01-02
Payer: MEDICARE

## 2024-01-02 VITALS — WEIGHT: 196 LBS | BODY MASS INDEX: 27.44 KG/M2 | HEIGHT: 71 IN

## 2024-01-02 DIAGNOSIS — E11.9 ENCOUNTER FOR COMPREHENSIVE DIABETIC FOOT EXAMINATION, TYPE 2 DIABETES MELLITUS: Primary | ICD-10-CM

## 2024-01-02 DIAGNOSIS — M24.572 EQUINUS CONTRACTURE OF LEFT ANKLE: ICD-10-CM

## 2024-01-02 PROCEDURE — 99213 OFFICE O/P EST LOW 20 MIN: CPT | Mod: S$PBB,,, | Performed by: PODIATRIST

## 2024-01-02 PROCEDURE — 99999 PR PBB SHADOW E&M-EST. PATIENT-LVL III: CPT | Mod: PBBFAC,,, | Performed by: PODIATRIST

## 2024-01-02 PROCEDURE — 99213 OFFICE O/P EST LOW 20 MIN: CPT | Mod: PBBFAC,PO | Performed by: PODIATRIST

## 2024-01-03 ENCOUNTER — OFFICE VISIT (OUTPATIENT)
Dept: PAIN MEDICINE | Facility: CLINIC | Age: 69
End: 2024-01-03
Payer: MEDICARE

## 2024-01-03 VITALS
WEIGHT: 195.13 LBS | SYSTOLIC BLOOD PRESSURE: 123 MMHG | BODY MASS INDEX: 27.32 KG/M2 | DIASTOLIC BLOOD PRESSURE: 70 MMHG | HEART RATE: 63 BPM | HEIGHT: 71 IN

## 2024-01-03 DIAGNOSIS — M54.9 DORSALGIA, UNSPECIFIED: ICD-10-CM

## 2024-01-03 DIAGNOSIS — M47.816 LUMBAR SPONDYLOSIS: Primary | ICD-10-CM

## 2024-01-03 PROCEDURE — 99214 OFFICE O/P EST MOD 30 MIN: CPT | Mod: PBBFAC,PO | Performed by: ANESTHESIOLOGY

## 2024-01-03 PROCEDURE — 99999 PR PBB SHADOW E&M-EST. PATIENT-LVL IV: CPT | Mod: PBBFAC,,, | Performed by: ANESTHESIOLOGY

## 2024-01-03 PROCEDURE — 99214 OFFICE O/P EST MOD 30 MIN: CPT | Mod: S$PBB,,, | Performed by: ANESTHESIOLOGY

## 2024-01-03 NOTE — PROGRESS NOTES
[facility] Follow Up Evaluation      Referred by: No ref. provider found    PCP:     CC:   Chief Complaint   Patient presents with    Knee Pain    Back Pain    Low-back Pain    Shoulder Pain     Right shoulder pain           1/3/2024     8:27 AM   Last 3 PDI Scores   Pain Disability Index (PDI) 38       Interval HPI 1/3/24: Mr. Medel returns to the office for follow up.  He reports worsening axial lower back pain.  Worse when going from sitting to standing position.  Denies any radicular pain, numbness, weakness.    HPI:   Glenn Medel is a 68 y.o. male who presents with back pain.  He has a history of chronic lower back pain but over the past month it has been gradually worsening.  Today he reports his pain is 7/10, intermittent, aching, sharp, throbbing, shooting.  He gets some radiating pain around the sides of the lower flanks around the lumbar area.  His pain is worse with sitting, bending, lifting and relieved with rest and lying down.  He denies any numbness or weakness.  No oliver radicular symptoms.      Pain Intervention History:  right-sided L3-4, L4-5, L5-S1 RFA on 09/29/2014 and he had a right-sided L5-S1 and S1 TFESI on 10/27/2014 with outside physician     Past Spine Surgical History:      Past and current medications:  Antineuropathics:  NSAIDs:  Physical therapy:  Antidepressants:  Muscle relaxers:  Opioids:  Antiplatelets/Anticoagulants:    History:    Current Outpatient Medications:     allopurinoL (ZYLOPRIM) 300 MG tablet, TAKE 1 TABLET(300 MG) BY MOUTH EVERY DAY, Disp: 90 tablet, Rfl: 3    aspirin 81 MG Chew, Take 81 mg by mouth once daily., Disp: , Rfl:     atorvastatin (LIPITOR) 20 MG tablet, Take 1 tablet (20 mg total) by mouth once daily., Disp: 90 tablet, Rfl: 3    brimonidine 0.2% (ALPHAGAN) 0.2 % Drop, INSTILL 1 DROP IN BOTH EYES TWICE DAILY, Disp: 10 mL, Rfl: 3    carbidopa-levodopa  mg (SINEMET)  mg per tablet, Take 2 tablets by mouth 5 (five) times daily.,  Disp: 300 tablet, Rfl: 11    cholecalciferol, vitamin D3, (D3-2000 ORAL), Take by mouth., Disp: , Rfl:     clonazePAM (KLONOPIN) 1 MG tablet, Take 2 tablets (2 mg total) by mouth every evening., Disp: 60 tablet, Rfl: 0    dorzolamide (TRUSOPT) 2 % ophthalmic solution, INSTILL 1 DROP IN BOTH EYES TWICE DAILY, Disp: 10 mL, Rfl: 3    FLUoxetine 20 MG capsule, Take 1 capsule (20 mg total) by mouth once daily., Disp: 90 capsule, Rfl: 1    FLUoxetine 40 MG capsule, Take 1 capsule (40 mg total) by mouth once daily., Disp: 90 capsule, Rfl: 3    ketoconazole (NIZORAL) 2 % shampoo, Apply topically., Disp: , Rfl:     metFORMIN (GLUCOPHAGE-XR) 500 MG ER 24hr tablet, TAKE 1 TABLET(500 MG) BY MOUTH EVERY DAY, Disp: 90 tablet, Rfl: 1    prazosin (MINIPRESS) 1 MG Cap, Take 1 capsule (1 mg total) by mouth every evening., Disp: 90 capsule, Rfl: 3    propranoloL (INDERAL) 10 MG tablet, Take 0.5 tablets (5 mg total) by mouth 3 (three) times daily., Disp: 45 tablet, Rfl: 11    ramelteon (ROZEREM) 8 mg tablet, Take 1 tablet (8 mg total) by mouth every evening., Disp: 90 tablet, Rfl: 3    rasagiline (AZILECT) 1 mg Tab, TAKE 1 TABLET(1 MG) BY MOUTH EVERY EVENING, Disp: 30 tablet, Rfl: 12    rasagiline (AZILECT) 1 mg Tab, TAKE 1 TABLET(1 MG) BY MOUTH EVERY EVENING, Disp: 30 tablet, Rfl: 12    tirzepatide (MOUNJARO) 2.5 mg/0.5 mL PnIj, Inject 2.5 mg into the skin every 7 days. (Replaces Ozempic), Disp: 4 Pen, Rfl: 2    Current Facility-Administered Medications:     metronidazole IVPB 500 mg, 500 mg, Intravenous, Once, Kaz Keating MD    Facility-Administered Medications Ordered in Other Visits:     diphenhydrAMINE capsule 25 mg, 25 mg, Oral, Q6H PRN, Leonila Richardson MD    Past Medical History:   Diagnosis Date    BPH (benign prostatic hyperplasia)     Cataract     done ou    Chronic kidney disease, stage 3 02/25/2015    DDD (degenerative disc disease), lumbar     s/p epidural steroids     Diabetes mellitus     type 2    Glaucoma      OU    HTN (hypertension)     Iritis, lens-induced 01/08/2018    Obesity     Parkinson's disease     Sleep apnea     uses cpap       Past Surgical History:   Procedure Laterality Date    CATARACT EXTRACTION W/  INTRAOCULAR LENS IMPLANT Left 11/30/2017    CATARACT EXTRACTION W/  INTRAOCULAR LENS IMPLANT Right 12/11/2018    Dr Garcia    CATARACT EXTRACTION W/  INTRAOCULAR LENS IMPLANT Right 12/11/2018    Procedure: EXTRACTION, CATARACT, WITH IOL INSERTION;  Surgeon: Damaris Garcia MD;  Location: [facility] OR;  Service: Ophthalmology;  Laterality: Right;    COLONOSCOPY N/A 9/18/2017    Procedure: COLONOSCOPY;  Surgeon: Paul Feliz Jr., MD;  Location: [facility] ENDO;  Service: Endoscopy;  Laterality: N/A;    COLONOSCOPY N/A 12/21/2022    Procedure: COLONOSCOPY;  Surgeon: Paul Feliz Jr., MD;  Location: [facility] ENDO;  Service: Endoscopy;  Laterality: N/A;    COLONOSCOPY W/ POLYPECTOMY  04/13/2010    YARELI.   One 1 cm polyp in the sigmoid colon.  Internal hemorrhoids.    COSMETIC SURGERY      DIGITAL RECTAL EXAMINATION UNDER ANESTHESIA N/A 7/23/2019    Procedure: EXAM UNDER ANESTHESIA, DIGITAL, RECTUM;  Surgeon: Kaz Keating MD;  Location: [facility] OR;  Service: General;  Laterality: N/A;    EXAMINATION UNDER ANESTHESIA N/A 4/24/2019    Procedure: EXAM UNDER ANESTHESIA;  Surgeon: Kaz Keating MD;  Location: [facility] OR;  Service: General;  Laterality: N/A;    EXCISIONAL HEMORRHOIDECTOMY N/A 4/24/2019    Procedure: HEMORRHOIDECTOMY (lithotomy);  Surgeon: Kaz Keating MD;  Location: [facility] OR;  Service: General;  Laterality: N/A;    GANGLION CYST EXCISION Left     INJECTION OF ANESTHETIC AGENT AROUND PUDENDAL NERVE N/A 4/24/2019    Procedure: BLOCK, NERVE, PUDENDAL;  Surgeon: Kaz Keating MD;  Location: [facility] OR;  Service: General;  Laterality: N/A;    INJECTION OF ANESTHETIC AGENT AROUND PUDENDAL NERVE N/A 7/23/2019    Procedure: BLOCK, NERVE, PUDENDAL;  Surgeon: Kaz Keating MD;  Location: [facility] OR;   Service: General;  Laterality: N/A;    REPAIR OF RETINAL DETACHMENT WITH VITRECTOMY Right 8/1/2018    Procedure: REPAIR, RETINAL DETACHMENT, WITH VITRECTOMY;  Surgeon: SAJAN Pulido MD;  Location: [address];  Service: Ophthalmology;  Laterality: Right;  35 min    TONSILLECTOMY      TRANSRECTAL BIOPSY OF PROSTATE WITH ULTRASOUND GUIDANCE N/A 1/31/2023    Procedure: BIOPSY, PROSTATE, RECTAL APPROACH, WITH US GUIDANCE;  Surgeon: MARIZA Donahue MD;  Location: [facility];  Service: Urology;  Laterality: N/A;    TRIGGER FINGER RELEASE Right     X 2    uro lift      for enlarged prostate       Family History   Problem Relation Age of Onset    Heart disease Brother 62    Kidney disease Brother     Heart disease Father     Diabetes Father     Colon cancer Father     Cancer Father         colon    Cataracts Father     Hypertension Mother     Diabetes Mother     Stroke Mother     Cancer Mother         colon    Kidney disease Mother     Glaucoma Maternal Grandmother     No Known Problems Sister     No Known Problems Maternal Aunt     No Known Problems Maternal Uncle     No Known Problems Paternal Aunt     No Known Problems Paternal Uncle     No Known Problems Maternal Grandfather     No Known Problems Paternal Grandmother     No Known Problems Paternal Grandfather     Dementia Brother     Abnormal EKG Brother     Amblyopia Neg Hx     Blindness Neg Hx     Macular degeneration Neg Hx     Retinal detachment Neg Hx     Strabismus Neg Hx     Thyroid disease Neg Hx     Parkinsonism Neg Hx        Social History     Socioeconomic History    Marital status:    Tobacco Use    Smoking status: Never    Smokeless tobacco: Never   Substance and Sexual Activity    Alcohol use: Yes     Comment: occasional, no alcohol 72 hours prior to sx    Drug use: No    Sexual activity: Yes     Partners: Female   Social History Narrative            2 grown kids.         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      Social Determinants of Health     Financial Resource Strain: Low Risk  (11/29/2023)    Overall Financial Resource Strain (CARDIA)     Difficulty of Paying Living Expenses: Not hard at all   Food Insecurity: No Food Insecurity (11/29/2023)    Hunger Vital Sign     Worried About Running Out of Food in the Last Year: Never true     Ran Out of Food in the Last Year: Never true   Transportation Needs: No Transportation Needs (11/29/2023)    PRAPARE - Transportation     Lack of Transportation (Medical): No     Lack of Transportation (Non-Medical): No   Physical Activity: Sufficiently Active (11/29/2023)    Exercise Vital Sign     Days of Exercise per Week: 3 days     Minutes of Exercise per Session: 80 min   Stress: Stress Concern Present (9/28/2023)    [employer] - Occupational Stress Questionnaire     Feeling of Stress : To some extent   Social Connections: Unknown (11/29/2023)    Social Connection and Isolation Panel [NHANES]     Frequency of Communication with Friends and Family: Twice a week     Frequency of Social Gatherings with Friends and Family: Patient declined     Active Member of Clubs or Organizations: No     Attends Club or Organization Meetings: 1 to 4 times per year     Marital Status:    Housing Stability: Low Risk  (11/29/2023)    Housing Stability Vital Sign     Unable to Pay for Housing in the Last Year: No     Number of Places Lived in the Last Year: 1     Unstable Housing in the Last Year: No       Review of patient's allergies indicates:   Allergen Reactions    Avelox [moxifloxacin] Hives and Rash       Review of Systems:  12 point review of systems is negative.    Physical Exam:  Vitals:    01/03/24 0823   BP: 123/70   Pulse: 63   Weight: 88.5 kg (195 lb 1.7 oz)   Height: 5' 11" (1.803 m)   PainSc:   8   PainLoc: Back     Body mass index is 27.21 kg/m².    Gen: NAD  Psych: mood appropriate for given condition  HEENT: eyes anicteric   CV: " RRR  HEENT: anicteric   Respiratory: non-labored, no signs of respiratory distress  Abd: non-distended  Skin: warm, dry and intact.  Gait: No antalgic gait.     Reproducible pain with lumbar axial facet loading    Sensory:  Intact and symmetrical to light touch in L1-S1 dermatomes bilaterally.    Motor:       Right Left   L2/3 Iliacus Hip flexion  5  5   L3/4 Qudratus Femoris Knee Extension  5  5   L4/5 Tib Anterior Ankle Dorsiflexion   5  5   L5/S1 Extensor Hallicus Longus Great toe extension  5  5   S1/S2 Gastroc/Soleus Plantar Flexion  5  5      Right Left                  Patellar DTR 2+ 2+   Achilles DTR 2+ 2+                      Imaging:  Xray lumbar spine 4/6/23  FINDINGS:  There are degenerative changes of the lumbar spine.  Vertebral body height is maintained.  The adjacent soft tissues are unremarkable.    MRI lumbar spine 5/16/23  FINDINGS:  Alignment: Normal.     Vertebrae: No acute fracture or marrow replacement.  2 cm vertebral body hemangioma at L4.     Discs: Preserved disc heights.  Mild disc desiccation at several lower lumbar levels.     Cord: Normal.  Conus terminates at L2.     Paraspinal muscles & soft tissues: There are T2 hyperintense lesions along each renal cortex, possibly cysts but incompletely characterized.     Degenerative findings:  T12-L1:  L1-L2:  L2-L3: Minimal bilateral facet degenerative change.  Minimal posterior disc bulge.  Minimal bilateral facet degenerative change.  L3-L4: Minimal broad-based posterior disc bulge.  Bilateral facet degenerative change.  L4-L5: Minimal broad-based posterior disc bulge with left foraminal zone disc fissure.  Mild bilateral facet degenerative change.  Minimal spinal canal stenosis and right neural foraminal narrowing.  L5-S1: Mild broad-based posterior disc bulge.  Mild right facet degenerative change.  Mild right neural foraminal narrowing.    Labs:  Lab Results   Component Value Date    HGBA1C 6.4 (H) 11/21/2023    HGBA1C 6.4 (H) 11/21/2023        Lab Results   Component Value Date    WBC 7.45 11/21/2023    HGB 14.6 11/21/2023    HCT 47.1 11/21/2023    MCV 88 11/21/2023     11/21/2023       Assessment:   Problem List Items Addressed This Visit    None  Visit Diagnoses       Lumbar spondylosis    -  Primary    Relevant Orders    Ambulatory referral/consult to Physical/Occupational Therapy    Dorsalgia, unspecified                    68 y.o. year old male with PMH parkinsonism, HTN, BPH, CKD who presents with back pain.  He has a history of chronic lower back pain but over the past month it has been gradually worsening.  Today he reports his pain is 7/10, intermittent, aching, sharp, throbbing, shooting.  He gets some radiating pain around the sides of the lower flanks around the lumbar area.  His pain is worse with sitting, bending, lifting and relieved with rest and lying down.  He denies any numbness or weakness.  No oliver radicular symptoms.    1/3/24 - Mr. Medel returns to the office for follow up.  He reports worsening axial lower back pain.  Worse when going from sitting to standing position.  Denies any radicular pain, numbness, weakness.    - on exam he has reproducible pain with lumbar axial facet loading  - I independently reviewed his lumbar MRI and he has multilevel bilateral facet arthropathy  - I think his pain is secondary to lumbar spondylosis.  At this time we are going to maximize conservative treatment I have placed a referral for him to start formal physical therapy at [facility] in [place]   - he is also complaining of right knee pain and right shoulder pain.  He is seen Orthopedics in the past.  We will help him follow up with Orthopedics for an evaluation  - he will follow-up with me in 2-3 months.  If he continues to have axial lower back pain we can consider diagnostic lumbar medial branch blocks.    : Not applicable    Everett Taveras M.D.  Interventional Pain Medicine / Anesthesiology    This note was completed with  dictation software and grammatical errors may exist.

## 2024-01-03 NOTE — PROGRESS NOTES
Subjective:     Patient ID: Glenn Medel is a 68 y.o. male.    Chief Complaint: Diabetic Foot Exam (Diabetic pt wears tennis shoes, PCP Dr. Benton last seen 12-6-23)     is a 68 y.o. male who presents to the clinic for evaluation and treatment of high risk feet.  has a past medical history of BPH (benign prostatic hyperplasia), Cataract, Chronic kidney disease, stage 3 (02/25/2015), DDD (degenerative disc disease), lumbar, Diabetes mellitus, Glaucoma, HTN (hypertension), Iritis, lens-induced (01/08/2018), Obesity, Parkinson's disease, and Sleep apnea. The patient's chief complaint is achilles pain that comes and goes but for most part is better overall.  This patient has documented high risk feet requiring routine maintenance secondary to diabetes mellitis and those secondary complications of diabetes, as mentioned..    PCP: Power Benton,     Date Last Seen by PCP: 12/6/2023    Current shoe gear:  Affected Foot: Slip-on shoes     Unaffected Foot: Slip-on shoes    Hemoglobin A1C   Date Value Ref Range Status   11/21/2023 6.4 (H) 4.0 - 5.6 % Final     Comment:     ADA Screening Guidelines:  5.7-6.4%  Consistent with prediabetes  >or=6.5%  Consistent with diabetes    High levels of fetal hemoglobin interfere with the HbA1C  assay. Heterozygous hemoglobin variants (HbS, HgC, etc)do  not significantly interfere with this assay.   However, presence of multiple variants may affect accuracy.     11/21/2023 6.4 (H) 4.0 - 5.6 % Final     Comment:     ADA Screening Guidelines:  5.7-6.4%  Consistent with prediabetes  >or=6.5%  Consistent with diabetes    High levels of fetal hemoglobin interfere with the HbA1C  assay. Heterozygous hemoglobin variants (HbS, HgC, etc)do  not significantly interfere with this assay.   However, presence of multiple variants may affect accuracy.     06/16/2023 7.2 (H) 4.0 - 5.6 % Final     Comment:     ADA Screening Guidelines:  5.7-6.4%  Consistent with prediabetes  >or=6.5%   Consistent with diabetes    High levels of fetal hemoglobin interfere with the HbA1C  assay. Heterozygous hemoglobin variants (HbS, HgC, etc)do  not significantly interfere with this assay.   However, presence of multiple variants may affect accuracy.         Patient Active Problem List   Diagnosis    BPH (benign prostatic hyperplasia)    LOVE (obstructive sleep apnea)    HTN (hypertension)    DDD (degenerative disc disease), lumbar    Palpitations    Peripheral neuropathy    Hyperlipidemia    Itch    Hypersomnia with sleep apnea, unspecified    ILD (interstitial lung disease)    Severe obstructive sleep apnea    Other vitreous opacities, bilateral    NS (nuclear sclerosis)    Senile nuclear sclerosis    Retained lens material, left    Glaucoma associated with ocular inflammation, left, indeterminate stage    Iritis, lens-induced    Vitreomacular adhesion, right    Epiretinal membrane, right    Internal strangulated hemorrhoids    Arachnoid cyst    REM sleep behavior disorder    Abnormal gait    Cognitive change    Abnormal involuntary movements    Adjustment disorder with depressed mood    Parkinsonism    Hx of acute gouty arthritis    Polyneuropathy associated with underlying disease    Fall from bed    Memory change    CKD (chronic kidney disease) stage 2, GFR 60-89 ml/min    Type 2 diabetes mellitus with stage 2 chronic kidney disease, without long-term current use of insulin    Family history of colon cancer    Elevated PSA    Central retinal vein occlusion, left eye, stable    Prostate cancer    Moderate episode of recurrent major depressive disorder       Medication List with Changes/Refills   Current Medications    ALLOPURINOL (ZYLOPRIM) 300 MG TABLET    TAKE 1 TABLET(300 MG) BY MOUTH EVERY DAY    ASPIRIN 81 MG CHEW    Take 81 mg by mouth once daily.    ATORVASTATIN (LIPITOR) 20 MG TABLET    Take 1 tablet (20 mg total) by mouth once daily.    BRIMONIDINE 0.2% (ALPHAGAN) 0.2 % DROP    INSTILL 1 DROP IN BOTH  EYES TWICE DAILY    CARBIDOPA-LEVODOPA  MG (SINEMET)  MG PER TABLET    Take 2 tablets by mouth 5 (five) times daily.    CHOLECALCIFEROL, VITAMIN D3, (D3-2000 ORAL)    Take by mouth.    CLONAZEPAM (KLONOPIN) 1 MG TABLET    Take 2 tablets (2 mg total) by mouth every evening.    DORZOLAMIDE (TRUSOPT) 2 % OPHTHALMIC SOLUTION    INSTILL 1 DROP IN BOTH EYES TWICE DAILY    FLUOXETINE 20 MG CAPSULE    Take 1 capsule (20 mg total) by mouth once daily.    FLUOXETINE 40 MG CAPSULE    Take 1 capsule (40 mg total) by mouth once daily.    FUROSEMIDE (LASIX) 20 MG TABLET    Take 1 tablet (20 mg total) by mouth every morning.    KETOCONAZOLE (NIZORAL) 2 % SHAMPOO    Apply topically.    METFORMIN (GLUCOPHAGE-XR) 500 MG ER 24HR TABLET    TAKE 1 TABLET(500 MG) BY MOUTH EVERY DAY    PRAZOSIN (MINIPRESS) 1 MG CAP    Take 1 capsule (1 mg total) by mouth every evening.    PROPRANOLOL (INDERAL) 10 MG TABLET    Take 0.5 tablets (5 mg total) by mouth 3 (three) times daily.    RAMELTEON (ROZEREM) 8 MG TABLET    Take 8 mg by mouth every evening.    RAMELTEON (ROZEREM) 8 MG TABLET    Take 1 tablet (8 mg total) by mouth every evening.    RASAGILINE (AZILECT) 1 MG TAB    TAKE 1 TABLET(1 MG) BY MOUTH EVERY EVENING    RASAGILINE (AZILECT) 1 MG TAB    TAKE 1 TABLET(1 MG) BY MOUTH EVERY EVENING    TIRZEPATIDE (MOUNJARO) 2.5 MG/0.5 ML PNIJ    Inject 2.5 mg into the skin every 7 days. (Replaces Ozempic)       Review of patient's allergies indicates:   Allergen Reactions    Avelox [moxifloxacin] Hives and Rash       Past Surgical History:   Procedure Laterality Date    CATARACT EXTRACTION W/  INTRAOCULAR LENS IMPLANT Left 11/30/2017    CATARACT EXTRACTION W/  INTRAOCULAR LENS IMPLANT Right 12/11/2018    Dr Garcia    CATARACT EXTRACTION W/  INTRAOCULAR LENS IMPLANT Right 12/11/2018    Procedure: EXTRACTION, CATARACT, WITH IOL INSERTION;  Surgeon: Damaris Garcia MD;  Location: [facility];  Service: Ophthalmology;  Laterality: Right;     COLONOSCOPY N/A 9/18/2017    Procedure: COLONOSCOPY;  Surgeon: Paul Feliz Jr., MD;  Location: [facility] ENDO;  Service: Endoscopy;  Laterality: N/A;    COLONOSCOPY N/A 12/21/2022    Procedure: COLONOSCOPY;  Surgeon: Paul Feliz Jr., MD;  Location: [facility] ENDO;  Service: Endoscopy;  Laterality: N/A;    COLONOSCOPY W/ POLYPECTOMY  04/13/2010    YARELI.   One 1 cm polyp in the sigmoid colon.  Internal hemorrhoids.    COSMETIC SURGERY      DIGITAL RECTAL EXAMINATION UNDER ANESTHESIA N/A 7/23/2019    Procedure: EXAM UNDER ANESTHESIA, DIGITAL, RECTUM;  Surgeon: Kaz Keating MD;  Location: [facility] OR;  Service: General;  Laterality: N/A;    EXAMINATION UNDER ANESTHESIA N/A 4/24/2019    Procedure: EXAM UNDER ANESTHESIA;  Surgeon: Kaz Keating MD;  Location: [facility];  Service: General;  Laterality: N/A;    EXCISIONAL HEMORRHOIDECTOMY N/A 4/24/2019    Procedure: HEMORRHOIDECTOMY (lithotomy);  Surgeon: Kaz Keating MD;  Location: [facility] OR;  Service: General;  Laterality: N/A;    GANGLION CYST EXCISION Left     INJECTION OF ANESTHETIC AGENT AROUND PUDENDAL NERVE N/A 4/24/2019    Procedure: BLOCK, NERVE, PUDENDAL;  Surgeon: Kaz Keating MD;  Location: [facility] OR;  Service: General;  Laterality: N/A;    INJECTION OF ANESTHETIC AGENT AROUND PUDENDAL NERVE N/A 7/23/2019    Procedure: BLOCK, NERVE, PUDENDAL;  Surgeon: Kaz Keating MD;  Location: [facility] OR;  Service: General;  Laterality: N/A;    REPAIR OF RETINAL DETACHMENT WITH VITRECTOMY Right 8/1/2018    Procedure: REPAIR, RETINAL DETACHMENT, WITH VITRECTOMY;  Surgeon: SAJAN Pulido MD;  Location: [address];  Service: Ophthalmology;  Laterality: Right;  35 min    TONSILLECTOMY      TRANSRECTAL BIOPSY OF PROSTATE WITH ULTRASOUND GUIDANCE N/A 1/31/2023    Procedure: BIOPSY, PROSTATE, RECTAL APPROACH, WITH US GUIDANCE;  Surgeon: MARIZA Donahue MD;  Location: [facility];  Service: Urology;  Laterality: N/A;    TRIGGER FINGER RELEASE Right     X 2     uro lift      for enlarged prostate       Family History   Problem Relation Age of Onset    Heart disease Brother 62    Kidney disease Brother     Heart disease Father     Diabetes Father     Colon cancer Father     Cancer Father         colon    Cataracts Father     Hypertension Mother     Diabetes Mother     Stroke Mother     Cancer Mother         colon    Kidney disease Mother     Glaucoma Maternal Grandmother     No Known Problems Sister     No Known Problems Maternal Aunt     No Known Problems Maternal Uncle     No Known Problems Paternal Aunt     No Known Problems Paternal Uncle     No Known Problems Maternal Grandfather     No Known Problems Paternal Grandmother     No Known Problems Paternal Grandfather     Dementia Brother     Abnormal EKG Brother     Amblyopia Neg Hx     Blindness Neg Hx     Macular degeneration Neg Hx     Retinal detachment Neg Hx     Strabismus Neg Hx     Thyroid disease Neg Hx     Parkinsonism Neg Hx        Social History     Socioeconomic History    Marital status:    Tobacco Use    Smoking status: Never    Smokeless tobacco: Never   Substance and Sexual Activity    Alcohol use: Yes     Comment: occasional, no alcohol 72 hours prior to sx    Drug use: No    Sexual activity: Yes     Partners: Female   Social History Narrative            2 grown kids.         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      Social Determinants of Health     Financial Resource Strain: Low Risk  (11/29/2023)    Overall Financial Resource Strain (CARDIA)     Difficulty of Paying Living Expenses: Not hard at all   Food Insecurity: No Food Insecurity (11/29/2023)    Hunger Vital Sign     Worried About Running Out of Food in the Last Year: Never true     Ran Out of Food in the Last Year: Never true   Transportation Needs: No Transportation Needs (11/29/2023)    PRAPARE - Transportation     Lack of Transportation (Medical): No     Lack of Transportation (Non-Medical): No  "  Physical Activity: Sufficiently Active (11/29/2023)    Exercise Vital Sign     Days of Exercise per Week: 3 days     Minutes of Exercise per Session: 80 min   Stress: Stress Concern Present (9/28/2023)    [employer] [place] [employer] - Occupational Stress Questionnaire     Feeling of Stress : To some extent   Social Connections: Unknown (11/29/2023)    Social Connection and Isolation Panel [NHANES]     Frequency of Communication with Friends and Family: Twice a week     Frequency of Social Gatherings with Friends and Family: Patient declined     Active Member of Clubs or Organizations: No     Attends Club or Organization Meetings: 1 to 4 times per year     Marital Status:    Housing Stability: Low Risk  (11/29/2023)    Housing Stability Vital Sign     Unable to Pay for Housing in the Last Year: No     Number of Places Lived in the Last Year: 1     Unstable Housing in the Last Year: No       Vitals:    01/02/24 1142   Weight: 88.9 kg (195 lb 15.8 oz)   Height: 5' 11" (1.803 m)       Hemoglobin A1C   Date Value Ref Range Status   11/21/2023 6.4 (H) 4.0 - 5.6 % Final     Comment:     ADA Screening Guidelines:  5.7-6.4%  Consistent with prediabetes  >or=6.5%  Consistent with diabetes    High levels of fetal hemoglobin interfere with the HbA1C  assay. Heterozygous hemoglobin variants (HbS, HgC, etc)do  not significantly interfere with this assay.   However, presence of multiple variants may affect accuracy.     11/21/2023 6.4 (H) 4.0 - 5.6 % Final     Comment:     ADA Screening Guidelines:  5.7-6.4%  Consistent with prediabetes  >or=6.5%  Consistent with diabetes    High levels of fetal hemoglobin interfere with the HbA1C  assay. Heterozygous hemoglobin variants (HbS, HgC, etc)do  not significantly interfere with this assay.   However, presence of multiple variants may affect accuracy.     06/16/2023 7.2 (H) 4.0 - 5.6 % Final     Comment:     ADA Screening Guidelines:  5.7-6.4%  Consistent with " prediabetes  >or=6.5%  Consistent with diabetes    High levels of fetal hemoglobin interfere with the HbA1C  assay. Heterozygous hemoglobin variants (HbS, HgC, etc)do  not significantly interfere with this assay.   However, presence of multiple variants may affect accuracy.         Review of Systems   Constitutional:  Negative for chills and fever.   Respiratory:  Negative for shortness of breath.    Cardiovascular:  Negative for chest pain, palpitations, orthopnea, claudication and leg swelling.   Gastrointestinal:  Negative for diarrhea, nausea and vomiting.   Musculoskeletal:  Negative for joint pain.   Skin:  Negative for rash.   Neurological:  Positive for tingling and sensory change.   Psychiatric/Behavioral: Negative.             Objective:      PHYSICAL EXAM: Apperance: Alert and orient in no distress,well developed, and with good attention to grooming and body habits  Patient presents ambulating in tennis shoes.  Lower Extremity Exam  VASCULAR: Dorsalis pedis pulses 2/4 bilateral and Posterior Tibial pulses 2/4 bilateral. Capillary fill time <blanched bilateral. No edema observed bilateral. Varicosities absent bilateral. Skin temperature of the lower extremities is warm to warm, proximal to distal. Hair growth WNL bilateral.  DERMATOLOGICAL: No skin rashes, subcutaneous nodules, lesions, or ulcers observed bilateral.   NEUROLOGICAL: Light touch, sharp-dull, proprioception all present and equal bilaterally. Vibratory sensation diminished at bilateral hallux and navicular. Protective sensation decreased at sites as tested with a [place] 5.07 monofilament.   MUSCULOSKELETAL: Muscle strength 5/5 for all foot inverters, everters, plantarflexors, and dorsiflexors bilateral. Ankle joints left shows decreased ROM. left ankle ROM shows greater decrease in dorsiflexion with knee extended. Ankle joint ROM is pain free and without crepitus left. No pain with palpation of left posterior 1/3 calcaneus at region  of Achilles tendon insertion. Negative pain to palpation left plantar medial tubercle. Negative pain on medial-lateral compression of the calcaneus.          Assessment:       ICD-10-CM ICD-9-CM   1. Encounter for comprehensive diabetic foot examination, type 2 diabetes mellitus  E11.9 250.00   2. Equinus contracture of left ankle - Left Foot  M24.572 718.47       Plan:   Encounter for comprehensive diabetic foot examination, type 2 diabetes mellitus    Equinus contracture of left ankle - Left Foot    I counseled the patient on his conditions, regarding findings of my examination, my impressions, and usual treatment plan.   This visit spent on counseling and coordination of care.  Appointment spent on education about the diabetic foot, neuropathy, and prevention of limb loss.  Shoe inspection. Diabetic Foot Education. Patient reminded of the importance of good nutrition and blood sugar control to help prevent podiatric complications of diabetes. Patient instructed on proper foot hygeine. We discussed wearing proper shoe gear, daily foot inspections, never walking without protective shoe gear, never putting sharp instruments to feet.    Patient instructed to continue stretching exercises and thick soled shoes.   Patient  will continue to monitor the areas daily, inspect feet, wear protective shoe gear when ambulatory, moisturizer to maintain skin integrity. Patient reminded of the importance of good nutrition and blood sugar control to help prevent podiatric complications of diabetes.  Patient to return 6 months or sooner if needed.        Everett Duarte DPM  [facility]

## 2024-01-05 ENCOUNTER — PATIENT MESSAGE (OUTPATIENT)
Dept: NEUROLOGY | Facility: CLINIC | Age: 69
End: 2024-01-05
Payer: MEDICARE

## 2024-01-05 ENCOUNTER — OFFICE VISIT (OUTPATIENT)
Dept: PSYCHIATRY | Facility: CLINIC | Age: 69
End: 2024-01-05
Payer: MEDICARE

## 2024-01-05 VITALS
DIASTOLIC BLOOD PRESSURE: 81 MMHG | SYSTOLIC BLOOD PRESSURE: 146 MMHG | HEIGHT: 71 IN | BODY MASS INDEX: 27.59 KG/M2 | HEART RATE: 69 BPM | WEIGHT: 197.06 LBS

## 2024-01-05 DIAGNOSIS — F41.9 ANXIETY: ICD-10-CM

## 2024-01-05 DIAGNOSIS — F51.4 NIGHT TERRORS, ADULT: ICD-10-CM

## 2024-01-05 DIAGNOSIS — G47.52 REM SLEEP BEHAVIOR DISORDER: Chronic | ICD-10-CM

## 2024-01-05 DIAGNOSIS — F41.1 GAD (GENERALIZED ANXIETY DISORDER): Primary | ICD-10-CM

## 2024-01-05 DIAGNOSIS — F51.01 PRIMARY INSOMNIA: ICD-10-CM

## 2024-01-05 DIAGNOSIS — F33.1 MODERATE EPISODE OF RECURRENT MAJOR DEPRESSIVE DISORDER: ICD-10-CM

## 2024-01-05 PROCEDURE — 99999 PR PBB SHADOW E&M-EST. PATIENT-LVL IV: CPT | Mod: PBBFAC,,,

## 2024-01-05 PROCEDURE — 90792 PSYCH DIAG EVAL W/MED SRVCS: CPT | Mod: ,,,

## 2024-01-05 PROCEDURE — 99214 OFFICE O/P EST MOD 30 MIN: CPT | Mod: PBBFAC,PO

## 2024-01-05 RX ORDER — FLUOXETINE HYDROCHLORIDE 40 MG/1
40 CAPSULE ORAL DAILY
Qty: 90 CAPSULE | Refills: 1 | Status: SHIPPED | OUTPATIENT
Start: 2024-01-05 | End: 2024-01-05 | Stop reason: SINTOL

## 2024-01-05 RX ORDER — BUSPIRONE HYDROCHLORIDE 10 MG/1
TABLET ORAL
Qty: 60 TABLET | Refills: 0 | Status: SHIPPED | OUTPATIENT
Start: 2024-01-05 | End: 2024-02-06 | Stop reason: DRUGHIGH

## 2024-01-05 RX ORDER — BUSPIRONE HYDROCHLORIDE 10 MG/1
10 TABLET ORAL 3 TIMES DAILY
Qty: 90 TABLET | Refills: 11 | Status: SHIPPED | OUTPATIENT
Start: 2024-01-05 | End: 2024-01-05
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
## 2024-01-05 NOTE — PATIENT INSTRUCTIONS
2. Continue clonazePAM (KLONOPIN) 1 MG tablet - Take 2 tablets (2 mg total) by mouth every evening. Discussed risk of decreased RT, sedation, addictive potential, and not to mix with alcohol. Prescribed and managed by Neurology for REM sleep behavior disorder [G47.52]    3. Increase prazosin (MINIPRESS) 1 MG Cap - Take 2 capsule (2 mg total) by mouth every evening for night terrors.    4. Increase propranoloL (INDERAL) 10 MG tablet - Take 1 tablets (10 mg total) by mouth 2 (two) times daily for anxiety.    5. Start busPIRone (BUSPAR) 10 MG tablet - Take 0.5 tablets (5 mg total) by mouth 2 (two) times daily for 8 days, THEN 1 tablet (10 mg total) 2 (two) times daily for anxiety.    6. Discontinue FLUoxetine 40 MG capsule

## 2024-01-06 ENCOUNTER — PATIENT MESSAGE (OUTPATIENT)
Dept: PSYCHIATRY | Facility: CLINIC | Age: 69
End: 2024-01-06
Payer: MEDICARE

## 2024-01-10 ENCOUNTER — OFFICE VISIT (OUTPATIENT)
Dept: SLEEP MEDICINE | Facility: CLINIC | Age: 69
End: 2024-01-10
Payer: MEDICARE

## 2024-01-10 VITALS
WEIGHT: 217.63 LBS | DIASTOLIC BLOOD PRESSURE: 82 MMHG | SYSTOLIC BLOOD PRESSURE: 128 MMHG | RESPIRATION RATE: 18 BRPM | HEART RATE: 75 BPM | HEIGHT: 71 IN | OXYGEN SATURATION: 97 % | BODY MASS INDEX: 30.47 KG/M2

## 2024-01-10 DIAGNOSIS — G20.A2 PARKINSON'S DISEASE WITHOUT DYSKINESIA, WITH FLUCTUATING MANIFESTATIONS: Chronic | ICD-10-CM

## 2024-01-10 DIAGNOSIS — F43.21 ADJUSTMENT DISORDER WITH DEPRESSED MOOD: ICD-10-CM

## 2024-01-10 DIAGNOSIS — F51.01 PRIMARY INSOMNIA: ICD-10-CM

## 2024-01-10 DIAGNOSIS — G47.00 INSOMNIA, UNSPECIFIED TYPE: ICD-10-CM

## 2024-01-10 DIAGNOSIS — G47.33 SEVERE OBSTRUCTIVE SLEEP APNEA: Primary | ICD-10-CM

## 2024-01-10 DIAGNOSIS — G47.52 REM SLEEP BEHAVIOR DISORDER: Chronic | ICD-10-CM

## 2024-01-10 DIAGNOSIS — F51.4 NIGHT TERRORS, ADULT: ICD-10-CM

## 2024-01-10 PROCEDURE — 99204 OFFICE O/P NEW MOD 45 MIN: CPT | Mod: S$PBB,,, | Performed by: NURSE PRACTITIONER

## 2024-01-10 PROCEDURE — 99999 PR PBB SHADOW E&M-EST. PATIENT-LVL V: CPT | Mod: PBBFAC,,, | Performed by: NURSE PRACTITIONER

## 2024-01-10 PROCEDURE — 99215 OFFICE O/P EST HI 40 MIN: CPT | Mod: PBBFAC | Performed by: NURSE PRACTITIONER

## 2024-01-10 NOTE — PROGRESS NOTES
Subjective:      Patient ID: Glenn Medel is a 68 y.o. male.    Chief Complaint: Sleep Apnea and Insomnia    HPI  New patient.  He presents for obstructive sleep apnea, REM movement with Parkinsons, night terrors with anxiety.    He has very severe sleep apnea with /hr  He was having problems with his CPAP due to water in the tubing.  He has since stopped using the humidifier and symptoms resolved.  He is wearing his CPAP nightly and benefits from use.  He has restless sleep especially toward the later part of the night.  He has episodes of REM movement disorder approximately once every 3 weeks.  Takes Klonopin, 2mg for REM movement disorder. He is seeing Neurology for Parkinson's disease.      Prazosin for nightmares. Rozerem for insomnia  He does have continued hypersomnia.  He is not a candidate for stimulants.  Bedtime 9-10 p.m..  Falls asleep fairly quickly.  He states he does wake often during the night, but falls back to sleep quickly.  He wakes up around 8-9 a.m    Patient Active Problem List   Diagnosis    BPH (benign prostatic hyperplasia)    HTN (hypertension)    DDD (degenerative disc disease), lumbar    Palpitations    Peripheral neuropathy    Hyperlipidemia    Itch    Hypersomnia with sleep apnea, unspecified    ILD (interstitial lung disease)    Severe obstructive sleep apnea    Other vitreous opacities, bilateral    NS (nuclear sclerosis)    Senile nuclear sclerosis    Retained lens material, left    Glaucoma associated with ocular inflammation, left, indeterminate stage    Iritis, lens-induced    Vitreomacular adhesion, right    Epiretinal membrane, right    Internal strangulated hemorrhoids    Arachnoid cyst    REM sleep behavior disorder    Abnormal gait    Cognitive change    Abnormal involuntary movements    Adjustment disorder with depressed mood    Parkinsonism    Hx of acute gouty arthritis    Polyneuropathy associated with underlying disease    Fall from bed    Memory change    CKD  "(chronic kidney disease) stage 2, GFR 60-89 ml/min    Type 2 diabetes mellitus with stage 2 chronic kidney disease, without long-term current use of insulin    Family history of colon cancer    Elevated PSA    Central retinal vein occlusion, left eye, stable    Prostate cancer    Moderate episode of recurrent major depressive disorder    CHAPARRITA (generalized anxiety disorder)    Primary insomnia    Night terrors, adult     /82   Pulse 75   Resp 18   Ht 5' 11" (1.803 m)   Wt 98.7 kg (217 lb 9.5 oz)   SpO2 97%   BMI 30.35 kg/m²   Body mass index is 30.35 kg/m².    Review of Systems   Respiratory:  Positive for somnolence.    Neurological:         Tremors   Psychiatric/Behavioral:  Positive for sleep disturbance.    All other systems reviewed and are negative.    Objective:      Physical Exam  Constitutional:       Appearance: He is obese.   HENT:      Head: Normocephalic and atraumatic.      Nose: Nose normal.   Cardiovascular:      Rate and Rhythm: Normal rate and regular rhythm.   Pulmonary:      Effort: Pulmonary effort is normal.      Breath sounds: Normal breath sounds.   Abdominal:      Palpations: Abdomen is soft.      Tenderness: There is no abdominal tenderness.   Musculoskeletal:         General: Normal range of motion.      Cervical back: Normal range of motion and neck supple.   Skin:     General: Skin is warm and dry.   Neurological:      General: No focal deficit present.      Mental Status: He is alert and oriented to person, place, and time.   Psychiatric:         Mood and Affect: Mood normal.         Behavior: Behavior normal.       Personal Diagnostic Review  Compliance Summary  12/11/2023 - 1/9/2024 (30 days)  Days with Device Usage 28 days  Days without Device Usage 2 days  Percent Days with Device Usage 93.3%  Cumulative Usage 8 days 12 hrs. 11 mins. 55 secs.  Maximum Usage (1 Day) 11 hrs. 3 mins. 45 secs.  Average Usage (All Days) 6 hrs. 48 mins. 23 secs.  Average Usage (Days Used) 7 hrs. " 17 mins. 34 secs.  Minimum Usage (1 Day) 2 hrs. 22 mins. 38 secs.  Percent of Days with Usage >= 4 Hours 90.0%  Percent of Days with Usage < 4 Hours 10.0%  Date Range  Total Blower Time 8 days 12 hrs. 33 mins. 2 secs.  Average AHI 3.9  Auto-CPAP Summary  Auto-CPAP Mean Pressure 6.6 cmH2O  Auto-CPAP Peak Average Pressure 11.7 cmH2O  Device Pressure <= 90% of Time 9.3 cmH2O  Average Time in Large Leak Per Day 15 mins. 32 secs.      Results for orders placed in visit on 11/21/23    X-Ray Chest PA And Lateral    Narrative  EXAMINATION:  XR CHEST PA AND LATERAL    CLINICAL HISTORY:  Cough, unspecified    TECHNIQUE:  PA and lateral views of the chest were performed.    COMPARISON:  04/23/2019    FINDINGS:  The lungs are clear, with normal appearance of pulmonary vasculature and no pleural effusion or pneumothorax.    The cardiac silhouette is normal in size. The hilar and mediastinal contours are unremarkable.    Bones are intact.    Impression  No acute abnormality.      Electronically signed by: Ida Crow MD  Date:    11/21/2023  Time:    13:46        Assessment:       1. Severe obstructive sleep apnea    2. Insomnia, unspecified type    3. REM sleep behavior disorder    4. Night terrors, adult    5. Adjustment disorder with depressed mood    6. Parkinson's disease without dyskinesia, with fluctuating manifestations    7. Primary insomnia        Outpatient Encounter Medications as of 1/10/2024   Medication Sig Dispense Refill    allopurinoL (ZYLOPRIM) 300 MG tablet TAKE 1 TABLET(300 MG) BY MOUTH EVERY DAY 90 tablet 3    aspirin 81 MG Chew Take 81 mg by mouth once daily.      atorvastatin (LIPITOR) 20 MG tablet Take 1 tablet (20 mg total) by mouth once daily. 90 tablet 3    brimonidine 0.2% (ALPHAGAN) 0.2 % Drop INSTILL 1 DROP IN BOTH EYES TWICE DAILY 10 mL 3    busPIRone (BUSPAR) 10 MG tablet Take 0.5 tablets (5 mg total) by mouth 2 (two) times daily for 8 days, THEN 1 tablet (10 mg total) 2 (two) times daily for  26 days. 60 tablet 0    carbidopa-levodopa  mg (SINEMET)  mg per tablet Take 2 tablets by mouth 5 (five) times daily. 300 tablet 11    cholecalciferol, vitamin D3, (D3-2000 ORAL) Take by mouth.      clonazePAM (KLONOPIN) 1 MG tablet Take 2 tablets (2 mg total) by mouth every evening. 60 tablet 0    dorzolamide (TRUSOPT) 2 % ophthalmic solution INSTILL 1 DROP IN BOTH EYES TWICE DAILY 10 mL 3    ketoconazole (NIZORAL) 2 % shampoo Apply topically.      metFORMIN (GLUCOPHAGE-XR) 500 MG ER 24hr tablet TAKE 1 TABLET(500 MG) BY MOUTH EVERY DAY 90 tablet 1    prazosin (MINIPRESS) 1 MG Cap Take 1 capsule (1 mg total) by mouth every evening. 90 capsule 3    propranoloL (INDERAL) 10 MG tablet Take 0.5 tablets (5 mg total) by mouth 3 (three) times daily. 45 tablet 11    ramelteon (ROZEREM) 8 mg tablet Take 1 tablet (8 mg total) by mouth every evening. 90 tablet 3    rasagiline (AZILECT) 1 mg Tab TAKE 1 TABLET(1 MG) BY MOUTH EVERY EVENING 30 tablet 12    tirzepatide (MOUNJARO) 2.5 mg/0.5 mL PnIj Inject 2.5 mg into the skin every 7 days. (Replaces Ozempic) 4 Pen 2     Facility-Administered Encounter Medications as of 1/10/2024   Medication Dose Route Frequency Provider Last Rate Last Admin    diphenhydrAMINE capsule 25 mg  25 mg Oral Q6H PRN Leonila Richardson MD        metronidazole IVPB 500 mg  500 mg Intravenous Once Kaz Keating MD         Orders Placed This Encounter   Procedures    CPAP/BIPAP SUPPLIES     Order Specific Question:   Length of need (1-99 months):     Answer:   99     Order Specific Question:   Choose ONE mask type and its corresponding cushions and/or pillows:     Answer:    Full Face Mask, 1 per 90 days:  Full Face Cushion, (3 per 90 days)     Order Specific Question:   Choose EITHER Heated or Non-Heated Tubjing     Answer:    Non-Heated Tubing, 1 per 90 days     Order Specific Question:   All other supplies as needed as listed below:     Answer:    Headgear, 1 per 180  days     Order Specific Question:   All other supplies as needed as listed below:     Answer:    Disposable Filter, 6 per 90 days     Order Specific Question:   All other supplies as needed as listed below:     Answer:    Non-Disposable Filter, 1 per 180 days     Order Specific Question:   All other supplies as needed as listed below:     Answer:    Humidifier Chamber, 1 per 180 days     Plan:     Problem List Items Addressed This Visit          Neuro    Parkinsonism (Chronic)       Psychiatric    Adjustment disorder with depressed mood       Other    REM sleep behavior disorder (Chronic)    Severe obstructive sleep apnea - Primary    Overview     Very severe sleep apnea. /hr.   Normal AHI on CPAP therapy.  Compliant with PAP and benefits from use. Follow up annually in the sleep clinic.           Relevant Orders    CPAP/BIPAP SUPPLIES    Primary insomnia    Current Assessment & Plan     Continue psychiatry visits         Night terrors, adult    Overview     prozosin          Other Visit Diagnoses       Insomnia, unspecified type                           Insomnia controlled                 Elizabeth LeJeune, ACNP, ANP

## 2024-01-11 ENCOUNTER — OFFICE VISIT (OUTPATIENT)
Dept: PSYCHIATRY | Facility: CLINIC | Age: 69
End: 2024-01-11
Payer: MEDICARE

## 2024-01-11 ENCOUNTER — PATIENT MESSAGE (OUTPATIENT)
Dept: UROLOGY | Facility: CLINIC | Age: 69
End: 2024-01-11
Payer: MEDICARE

## 2024-01-11 DIAGNOSIS — F33.1 MODERATE EPISODE OF RECURRENT MAJOR DEPRESSIVE DISORDER: Primary | ICD-10-CM

## 2024-01-11 DIAGNOSIS — F51.01 PRIMARY INSOMNIA: ICD-10-CM

## 2024-01-11 DIAGNOSIS — F51.4 NIGHT TERRORS, ADULT: ICD-10-CM

## 2024-01-11 DIAGNOSIS — F41.1 GAD (GENERALIZED ANXIETY DISORDER): ICD-10-CM

## 2024-01-11 PROCEDURE — 99214 OFFICE O/P EST MOD 30 MIN: CPT | Mod: 95,,,

## 2024-01-11 NOTE — PROGRESS NOTES
Outpatient Psychiatry Follow-Up Visit    Clinical Status of Patient: Outpatient (Ambulatory)  01/11/2024    The patient location is:  [place], [place]  The patient phone number is: [phone]   Visit type: Virtual visit with synchronous audio and video  Each patient to whom he or she provides medical services by telemedicine is:  (1) informed of the relationship between the physician and patient and the respective role of any other health care provider with respect to management of the patient; and (2) notified that he or she may decline to receive medical services by telemedicine and may withdraw from such care at any time.     Chief Complaint: Pt is a 67 yo M who presents today for a follow-up after dc of Prozac d/t drug interactions. Assess to start new medication. Met with patient.       Interval History and Content of Current Session:  Interim Events/Subjective Report/Content of Current Session:  follow up appointment.    Pt is a 67 yo M with past psychiatric hx of Primary insomnia, Night terrors, adult, Moderate episode of recurrent major depressive disorder, CHAPARRITA (generalized anxiety disorder) who presents for follow up treatment.     Past Psychiatric hx:   Pt. is a 67 yo M with a past psychiatric hx of Moderate episode of recurrent major depressive disorder, Anxiety presenting to the clinic for an initial evaluation and treatment. Past medical history outlined below. He is currently taking FLUoxetine, clonazePAM (KLONOPIN), prazosin (MINIPRESS), prescribed and managed by Dr. Benton/Dr. Colbert. He reports that these medications have not adequately addressed anxiety.     Pt presents to OP Psychiatry for initial evaluation and medication management of MDD, CHAPARRITA, Insomnia, Night terrors. Pt reports Prazosin has greatly improved his night terrors, but still c/o symptoms, discussed increasing Prazosin, pt in agreement. Anxiety has worsening and discussed starting Buspar and increasing Inderal, pt in agreement. Pt  "recently increased Prozac but discussed decreasing due to parkinson's medication interactions, pt in agreement. Pt will continue Klonopin as prescribed and managed by neurology. Specific medication changes on instructions in AVS, pt verbalized understanding. Pt states since his medications were changed the passive/fleeting SI has resolved, "I remember the OD in 2008 and I would never do that again, I couldn't do that to my family". Pt denies SI/HI/AVH, self-harm, plan, or intent. Pt verbalizes understanding of medication instructions through teach back, will reassess symptoms in 30 days or PRN if symptoms worsen.  Chart Update: Contacted pt to dc Prozac d/t drug interactions with rasagiline (AZILECT). Will discuss other options to replace SSRI. Instructed pt that d/t Prozac long half-life, no need to taper.    Past Medical hx:   Past Medical History:   Diagnosis Date    BPH (benign prostatic hyperplasia)     Cataract     done ou    Chronic kidney disease, stage 3 02/25/2015    DDD (degenerative disc disease), lumbar     s/p epidural steroids     Diabetes mellitus     type 2    Glaucoma     OU    HTN (hypertension)     Iritis, lens-induced 01/08/2018    Obesity     Parkinson's disease     Sleep apnea     uses cpap        Interim hx:  Medication changes last visit:     2. Continue clonazePAM (KLONOPIN) 1 MG tablet - Take 2 tablets (2 mg total) by mouth every evening. Discussed risk of decreased RT, sedation, addictive potential, and not to mix with alcohol. Prescribed and managed by Neurology for REM sleep behavior disorder [G47.52]  3. Increase prazosin (MINIPRESS) 1 MG Cap - Take 2 capsule (2 mg total) by mouth every evening for night terrors.  4. Increase propranoloL (INDERAL) 10 MG tablet - Take 1 tablets (10 mg total) by mouth 2 (two) times daily for anxiety.  5. Start busPIRone (BUSPAR) 10 MG tablet - Take 0.5 tablets (5 mg total) by mouth 2 (two) times daily for 8 days, THEN 1 tablet (10 mg total) 2 (two) times " daily for anxiety.  6. Discontinue FLUoxetine 40 MG capsule     Anxiety: Same, no improvement  Depression: Same, no improvement  Sleep: Same, no improvement  Appetite: Same, no issues     Denies suicidal/homicidal ideations.  Denies hopelessness/worthlessness.    Denies auditory/visual hallucinations      Tobacco:  denies  Alcohol:  denies  Drug use:  denies  Caffeine:  denies      Review of Systems   PSYCHIATRIC: Pertinent items are noted in the narrative.        CONSTITUTIONAL: weight stable        M/S: no pain today         ENT: no allergies noted today        ABD: no n/v/d     Past Medical, Family and Social History: The patient's past medical, family and social history have been reviewed and updated as appropriate within the electronic medical record. See encounter notes.     Medication Compliance: yes      Side effects: tolerates     Risk Parameters:  Patient reports no suicidal ideation  Patient reports no homicidal ideation  Patient reports no self-injurious behavior  Patient reports no violent behavior     Exam (detailed: at least 9 elements; comprehensive: all 15 elements)   Constitutional  Vitals:  Most recent vital signs, dated less than 90 days prior to this appointment, were reviewed.     General:  unremarkable, age appropriate, casual attire, good eye contact, good rapport       Musculoskeletal  Muscle Strength/Tone:  no flaccidity, no tremor    Gait & Station:  normal      Psychiatric                       Speech:  normal tone, normal rate, rhythm, and volume   Mood & Affect:   Euthymic, congruent, appropriate         Thought Process:   Goal directed; Linear    Associations:   intact   Thought Content:   No SI/HI, delusions, or paranoia, no AV/VH   Insight & Judgement:   Good, adequate to circumstances   Orientation:   grossly intact; alert and oriented x 4    Memory:  intact for content of interview    Language:  grossly intact, can repeat    Attention Span  : Grossly intact for content of interview    Fund of Knowledge:   intact and appropriate to age and level of education        Assessment and Diagnosis   Status/Progress: Based on the examination today, the patient's problem(s) is/are under fair control.  New problems have not been presented today. Comorbidities are not currently complicating management of the primary condition.      Impression:  Pt presents to OP Psychiatry for routine follow-up for treatment/medication management of Insomnia, Night terrors, MDD, CHAPARRITA. Pt denies having further sleepless nights, has stopped taking Prozac, and has been taking Buspar 10 mg BID w/o SE or issues. Contacted Dr. Cee (Neurology) to coordinate a medication change from rasagiline to selegiline to treat anxiety and depression in addition to Parkinson's disease. Waiting for response, instructed patient that he will receive a call by the latest on Tuesday at 11:30 am with update on medication change possibility. Pt denies SI/HI/AVH, self-harm, plan, or intent. Pt verbalizes understanding of medication instructions through teach back. No other issues, concerns, or problems, will reassess symptoms and medications in 30 days or PRN if symptoms worsen.      Diagnosis:   - Primary insomnia  - Night terrors, adult  - Moderate episode of recurrent major depressive disorder  - CHAPARRITA (generalized anxiety disorder)      Intervention/Counseling/Treatment Plan   Medication Management:      1. Continue clonazePAM (KLONOPIN) 1 MG tablet - Take 2 tablets (2 mg total) by mouth every evening. Discussed risk of decreased RT, sedation, addictive potential, and not to mix with alcohol. Prescribed and managed by Neurology for REM sleep behavior disorder [G47.52]     2. Continue prazosin (MINIPRESS) 1 MG Cap - Take 2 capsule (2 mg total) by mouth every evening for night terrors.     3. Continue propranoloL (INDERAL) 10 MG tablet - Take 1 tablets (10 mg total) by mouth 2 (two) times daily for anxiety.     4. Continue busPIRone (BUSPAR) 10 MG  tablet - Take 1 tablet (10 mg total) 2 (two) times daily for anxiety.     5. In contact with Dr. Cee regarding switching from rasagiline to selegiline. Will update chart and patient by Tuesday 11:30 am.  After consulting with Dr. Cee, the patient would like to remain on rasagiline as it is the only Parkinson's medication that can slow the progression of the disease. Our plan going forward after talking to Mr. Elizabeth, is to work on his anxiety through medication and this may help resolve his depression. Instructed pt to call the office if symptoms of depression worsen, or go to the nearest ER if they become unmanageable. Pt verbalized understanding, will follow up next month to re-assess new medications.  .   6. Call to report any worsening of symptoms or problems with the medication. Pt instructed to go to ER with thoughts of harming self, others     7. Patient given contact # for psychotherapists at [facility] and also instructed she may check with insurance for list of providers.          Labs: none         Return to clinic:   30 days or PRN if symptoms worsen       -Spent 30min face to face with the pt; >50% time spent in counseling   -Supportive therapy and psychoeducation provided  -R/B/SE's of medications discussed with the pt who expresses understanding and chooses to take medications as prescribed.   -Pt instructed to call clinic, 911 or go to nearest emergency room if sxs worsen or pt is in   crisis. The pt expresses understanding.      Luna Sears NP  Psychiatric-Mental Health Nurse Practitioner  Department of Psychiatry    [facility] [place] [address] [place] [address]  Phone:  [phone]  Fax: [phone]

## 2024-01-17 ENCOUNTER — PATIENT MESSAGE (OUTPATIENT)
Dept: PSYCHIATRY | Facility: CLINIC | Age: 69
End: 2024-01-17
Payer: MEDICARE

## 2024-01-23 DIAGNOSIS — G20.C PARKINSONISM, UNSPECIFIED PARKINSONISM TYPE: ICD-10-CM

## 2024-01-23 RX ORDER — CARBIDOPA AND LEVODOPA 25; 100 MG/1; MG/1
TABLET ORAL
Qty: 300 TABLET | Refills: 11 | Status: SHIPPED | OUTPATIENT
Start: 2024-01-23

## 2024-01-23 RX ORDER — METFORMIN HYDROCHLORIDE 500 MG/1
500 TABLET, EXTENDED RELEASE ORAL
Qty: 90 TABLET | Refills: 1 | Status: SHIPPED | OUTPATIENT
Start: 2024-01-23 | End: 2024-04-19

## 2024-01-23 NOTE — TELEPHONE ENCOUNTER
Refill Routing Note   Medication(s) are not appropriate for processing by [facility] for the following reason(s):        Drug-disease interaction    [facility] action(s):  Defer        Medication Therapy Plan: metFORMIN and Type 2 diabetes mellitus with stage 2 chronic kidney disease, without long-term current use of insulin    Pharmacist review requested: Yes     Appointments  past 12m or future 3m with PCP    Date Provider   Last Visit   12/6/2023 Power Benton, DO   Next Visit   Visit date not found Power Benton, DO   ED visits in past 90 days: 1        Note composed:6:33 AM 01/23/2024

## 2024-01-23 NOTE — TELEPHONE ENCOUNTER
No care due was identified.  [facility] Embedded Care Due Messages. Reference number: [id number].   1/23/2024 6:11:34 AM CST

## 2024-01-23 NOTE — TELEPHONE ENCOUNTER
Refill Authorization Note     Refill Decision Note   Glenn Medel  is requesting a refill authorization.  Brief Assessment and Rationale for Refill:  Approve     Medication Therapy Plan:  metFORMIN and Type 2 diabetes mellitus with stage 2 chronic kidney disease, without long-term current use of insulin    Medication Reconciliation Completed: No   Comments:     No Care Gaps recommended.     Note composed:11:43 AM 01/23/2024

## 2024-01-30 DIAGNOSIS — E78.2 MIXED HYPERLIPIDEMIA: ICD-10-CM

## 2024-01-30 RX ORDER — ATORVASTATIN CALCIUM 20 MG/1
20 TABLET, FILM COATED ORAL
Qty: 90 TABLET | Refills: 3 | Status: SHIPPED | OUTPATIENT
Start: 2024-01-30

## 2024-01-30 NOTE — TELEPHONE ENCOUNTER
Refill Decision Note    Medel  is requesting a refill authorization.  Brief Assessment and Rationale for Refill:  Approve     Medication Therapy Plan:         Comments:     Note composed:10:52 AM 01/30/2024

## 2024-01-30 NOTE — TELEPHONE ENCOUNTER
No care due was identified.  Health [facility] Embedded Care Due Messages. Reference number: [id number].   1/30/2024 4:01:50 AM CST

## 2024-01-31 DIAGNOSIS — G47.52 REM SLEEP BEHAVIOR DISORDER: ICD-10-CM

## 2024-01-31 RX ORDER — CLONAZEPAM 1 MG/1
TABLET ORAL
Qty: 60 TABLET | Refills: 2 | Status: SHIPPED | OUTPATIENT
Start: 2024-01-31 | End: 2024-04-30 | Stop reason: SDUPTHER

## 2024-02-02 ENCOUNTER — LAB VISIT (OUTPATIENT)
Dept: LAB | Facility: HOSPITAL | Age: 69
End: 2024-02-02
Attending: UROLOGY
Payer: MEDICARE

## 2024-02-02 DIAGNOSIS — C61 PROSTATE CANCER: ICD-10-CM

## 2024-02-02 LAB — COMPLEXED PSA SERPL-MCNC: 5.1 NG/ML (ref 0–4)

## 2024-02-02 PROCEDURE — 84153 ASSAY OF PSA TOTAL: CPT | Performed by: UROLOGY

## 2024-02-02 PROCEDURE — 36415 COLL VENOUS BLD VENIPUNCTURE: CPT | Mod: PO | Performed by: UROLOGY

## 2024-02-04 DIAGNOSIS — H40.053 OCULAR HYPERTENSION, BILATERAL: ICD-10-CM

## 2024-02-05 ENCOUNTER — OFFICE VISIT (OUTPATIENT)
Dept: UROLOGY | Facility: CLINIC | Age: 69
End: 2024-02-05
Payer: MEDICARE

## 2024-02-05 VITALS — HEIGHT: 71 IN | BODY MASS INDEX: 27.93 KG/M2 | WEIGHT: 199.5 LBS

## 2024-02-05 DIAGNOSIS — C61 PROSTATE CANCER: Primary | ICD-10-CM

## 2024-02-05 PROCEDURE — 99999 PR PBB SHADOW E&M-EST. PATIENT-LVL III: CPT | Mod: PBBFAC,,, | Performed by: UROLOGY

## 2024-02-05 PROCEDURE — 99213 OFFICE O/P EST LOW 20 MIN: CPT | Mod: PBBFAC,PO | Performed by: UROLOGY

## 2024-02-05 PROCEDURE — 99213 OFFICE O/P EST LOW 20 MIN: CPT | Mod: S$PBB,,, | Performed by: UROLOGY

## 2024-02-05 RX ORDER — DORZOLAMIDE HCL 20 MG/ML
SOLUTION/ DROPS OPHTHALMIC
Qty: 10 ML | Refills: 3 | Status: SHIPPED | OUTPATIENT
Start: 2024-02-05

## 2024-02-05 RX ORDER — BRIMONIDINE TARTRATE 2 MG/ML
SOLUTION/ DROPS OPHTHALMIC
Qty: 10 ML | Refills: 3 | Status: SHIPPED | OUTPATIENT
Start: 2024-02-05

## 2024-02-05 NOTE — PROGRESS NOTES
Outpatient Psychiatry Follow-Up Visit    Clinical Status of Patient: Outpatient (Ambulatory)  02/06/2024    The patient location is:  [place], [place]  The patient phone number is: [phone]   Visit type: Virtual visit with synchronous audio and video  Each patient to whom he or she provides medical services by telemedicine is:  (1) informed of the relationship between the physician and patient and the respective role of any other health care provider with respect to management of the patient; and (2) notified that he or she may decline to receive medical services by telemedicine and may withdraw from such care at any time.     Chief Complaint: Pt is a 67 yo M who presents today for a follow-up. Met with patient.       Interval History and Content of Current Session:  Interim Events/Subjective Report/Content of Current Session:  follow up appointment.    Pt is a 67 yo M with past psychiatric hx of Primary insomnia, Night terrors, adult, Moderate episode of recurrent major depressive disorder, CHAPARRITA (generalized anxiety disorder) who presents for follow up treatment.     Past Psychiatric hx:   Pt. is a 67 yo M with a past psychiatric hx of Moderate episode of recurrent major depressive disorder, Anxiety presenting to the clinic for an initial evaluation and treatment. Past medical history outlined below. He is currently taking FLUoxetine, clonazePAM (KLONOPIN), prazosin (MINIPRESS), prescribed and managed by Dr. Benton/Dr. Colbert. He reports that these medications have not adequately addressed anxiety.     Pt presents to OP Psychiatry for routine follow-up for treatment/medication management of Insomnia, Night terrors, MDD, CHAPARRITA. Pt denies having further sleepless nights, has stopped taking Prozac, and has been taking Buspar 10 mg BID w/o SE or issues. Contacted Dr. Cee (Neurology) to coordinate a medication change from rasagiline to selegiline to treat anxiety and depression in addition to Parkinson's disease.  Waiting for response, instructed patient that he will receive a call by the latest on Tuesday at 11:30 am with update on medication change possibility. Pt denies SI/HI/AVH, self-harm, plan, or intent. Pt verbalizes understanding of medication instructions through teach back. No other issues, concerns, or problems, will reassess symptoms and medications in 30 days or PRN if symptoms worsen.     Past Medical hx:   Past Medical History:   Diagnosis Date    BPH (benign prostatic hyperplasia)     Cataract     done ou    Chronic kidney disease, stage 3 02/25/2015    DDD (degenerative disc disease), lumbar     s/p epidural steroids     Diabetes mellitus     type 2    Glaucoma     OU    HTN (hypertension)     Iritis, lens-induced 01/08/2018    Obesity     Parkinson's disease     Sleep apnea     uses cpap        Interim hx:  Medication changes last visit:     1. Continue clonazePAM (KLONOPIN) 1 MG tablet - Take 2 tablets (2 mg total) by mouth every evening. Discussed risk of decreased RT, sedation, addictive potential, and not to mix with alcohol. Prescribed and managed by Neurology for REM sleep behavior disorder [G47.52]  2. Continue prazosin (MINIPRESS) 1 MG Cap - Take 2 capsule (2 mg total) by mouth every evening for night terrors.  3. Continue propranoloL (INDERAL) 10 MG tablet - Take 1 tablets (10 mg total) by mouth 2 (two) times daily for anxiety.  4. Continue busPIRone (BUSPAR) 10 MG tablet - Take 1 tablet (10 mg total) 2 (two) times daily for anxiety.  5. In contact with Dr. Cee regarding switching from rasagiline to selegiline. Will update chart and patient by Tuesday 11:30 am.  After consulting with Dr. Cee, the patient would like to remain on rasagiline as it is the only Parkinson's medication that can slow the progression of the disease. Our plan going forward after talking to Mr. Elizabeth, is to work on his anxiety through medication and this may help resolve his depression. Instructed pt to call the  office if symptoms of depression worsen, or go to the nearest ER if they become unmanageable. Pt verbalized understanding, will follow up next month to re-assess new medications.    Anxiety: Improved  Depression: denies, manageable  Sleep: Improved  Appetite:  Good, no issues     Denies suicidal/homicidal ideations.  Denies hopelessness/worthlessness.    Denies auditory/visual hallucinations      Tobacco:  denies  Alcohol:  denies  Drug use:  denies  Caffeine:  denies      Review of Systems   PSYCHIATRIC: Pertinent items are noted in the narrative.        CONSTITUTIONAL: weight stable        M/S: no pain today         ENT: no allergies noted today        ABD: no n/v/d     Past Medical, Family and Social History: The patient's past medical, family and social history have been reviewed and updated as appropriate within the electronic medical record. See encounter notes.     Medication Compliance: yes      Side effects: tolerates     Risk Parameters:  Patient reports no suicidal ideation  Patient reports no homicidal ideation  Patient reports no self-injurious behavior  Patient reports no violent behavior     Exam (detailed: at least 9 elements; comprehensive: all 15 elements)   Constitutional  Vitals:  Most recent vital signs, dated less than 90 days prior to this appointment, were reviewed.     General:  unremarkable, age appropriate, casual attire, good eye contact, good rapport       Musculoskeletal  Muscle Strength/Tone:  no flaccidity, no tremor    Gait & Station:  normal      Psychiatric                       Speech:  normal tone, normal rate, rhythm, and volume   Mood & Affect:   Euthymic, congruent, appropriate         Thought Process:   Goal directed; Linear    Associations:   intact   Thought Content:   No SI/HI, delusions, or paranoia, no AV/VH   Insight & Judgement:   Good, adequate to circumstances   Orientation:   grossly intact; alert and oriented x 4    Memory:  intact for content of interview     Language:  grossly intact, can repeat    Attention Span  : Grossly intact for content of interview   Fund of Knowledge:   intact and appropriate to age and level of education        Assessment and Diagnosis   Status/Progress: Based on the examination today, the patient's problem(s) is/are under fair control.  New problems have not been presented today. Comorbidities are not currently complicating management of the primary condition.      Impression:  Pt presents to OP Psychiatry for routine follow-up for treatment/medication management of Insomnia, Night terrors, MDD, CHAPARRITA. Pt reports improvements in sleep and anxiety with current medications and no worsening of depressive symptoms w/o antidepressant. Pt reports appetite is good with no issues. Pt denies SI/HI/AVH, self-harm, plan, or intent. Pt verbalizes understanding of medication instructions through teach back. No other issues, concerns, or problems, will reassess symptoms and medications in 3 months or PRN if symptoms worsen.      Diagnosis:   - Primary insomnia  - Night terrors, adult  - Mild episode of recurrent major depressive disorder  - CHAPARRITA (generalized anxiety disorder)      Intervention/Counseling/Treatment Plan   Medication Management:      1. Continue clonazePAM (KLONOPIN) 1 MG tablet - Take 2 tablets (2 mg total) by mouth every evening. Discussed risk of decreased RT, sedation, addictive potential, and not to mix with alcohol. Prescribed and managed by Neurology for REM sleep behavior disorder [G47.52]     2. Continue prazosin (MINIPRESS) 1 MG Cap - Take 2 capsule (2 mg total) by mouth every evening for night terrors.     3. Continue propranoloL (INDERAL) 10 MG tablet - Take 1 tablets (10 mg total) by mouth 2 (two) times daily for anxiety.     4. Continue busPIRone (BUSPAR) 10 MG tablet - Take 1 tablet (10 mg total) 2 (two) times daily for anxiety.     5. Call to report any worsening of symptoms or problems with the medication. Pt instructed to go  to ER with thoughts of harming self, others     6. Patient given contact # for psychotherapists at [facility] [place] and also instructed she may check with insurance for list of providers.          Labs: none         Return to clinic:      3 months or PRN if symptoms worsen    -Spent 30min face to face with the pt; >50% time spent in counseling   -Supportive therapy and psychoeducation provided  -R/B/SE's of medications discussed with the pt who expresses understanding and chooses to take medications as prescribed.   -Pt instructed to call clinic, 911 or go to nearest emergency room if sxs worsen or pt is in   crisis. The pt expresses understanding.      Luna Sears NP  Psychiatric-Mental Health Nurse Practitioner  Department of Psychiatry    [facility] [place] [address] [place] [address]  Phone:  [phone]  Fax: [phone]

## 2024-02-05 NOTE — PROGRESS NOTES
Subjective:       Patient ID: Glenn Medel is a 68 y.o. male.    Chief Complaint: Elevated PSA    HPI    68-year-old with elevated PSA.  He underwent prostate needle biopsy in January 2023.  Two of 12 cores are positive for grade group 2 prostate cancer.   He elected active surveillance.  He is here for follow-up.  His most recent PSA is unchanged at 5.1.     Component PSA Diagnostic   Latest Ref Rng & Units 0.00 - 4.00 ng/mL   2/2/2024 5.1 (H)   8/2/2023 4.1 (H)   6/16/2023 4.3 (H)   12/12/2022 5.1 (H)   12/29/2021 4.4 (H)   2/10/2020 2.4   8/16/2017 1.9       Review of Systems   Constitutional:  Negative for fever.   Genitourinary:  Negative for dysuria and hematuria.       Objective:      Physical Exam  Vitals reviewed.   Constitutional:       Appearance: He is well-developed.   Pulmonary:      Effort: Pulmonary effort is normal.   Skin:     Findings: No rash.   Neurological:      Mental Status: He is alert and oriented to person, place, and time.         Assessment:       1. Prostate cancer        Plan:       Prostate cancer  -     MRI Prostate W W/O Contrast; Future; Expected date: 02/05/2024      Plan prostate MRI and will likely repeat targeted needle biopsy.

## 2024-02-06 ENCOUNTER — OFFICE VISIT (OUTPATIENT)
Dept: PSYCHIATRY | Facility: CLINIC | Age: 69
End: 2024-02-06
Payer: MEDICARE

## 2024-02-06 DIAGNOSIS — F33.0 MILD EPISODE OF RECURRENT MAJOR DEPRESSIVE DISORDER: ICD-10-CM

## 2024-02-06 DIAGNOSIS — F51.01 PRIMARY INSOMNIA: ICD-10-CM

## 2024-02-06 DIAGNOSIS — F51.4 NIGHT TERRORS, ADULT: ICD-10-CM

## 2024-02-06 DIAGNOSIS — F41.1 GAD (GENERALIZED ANXIETY DISORDER): Primary | ICD-10-CM

## 2024-02-06 PROCEDURE — 99214 OFFICE O/P EST MOD 30 MIN: CPT | Mod: 95,,,

## 2024-02-06 RX ORDER — BUSPIRONE HYDROCHLORIDE 10 MG/1
10 TABLET ORAL 2 TIMES DAILY
Qty: 180 TABLET | Refills: 1 | Status: SHIPPED | OUTPATIENT
Start: 2024-02-06 | End: 2024-04-03

## 2024-02-06 RX ORDER — PROPRANOLOL HYDROCHLORIDE 10 MG/1
10 TABLET ORAL 2 TIMES DAILY PRN
Qty: 180 TABLET | Refills: 1 | Status: SHIPPED | OUTPATIENT
Start: 2024-02-06 | End: 2024-04-03

## 2024-02-07 ENCOUNTER — PATIENT MESSAGE (OUTPATIENT)
Dept: RESEARCH | Facility: HOSPITAL | Age: 69
End: 2024-02-07
Payer: MEDICARE

## 2024-02-08 ENCOUNTER — PATIENT MESSAGE (OUTPATIENT)
Dept: NEUROLOGY | Facility: CLINIC | Age: 69
End: 2024-02-08
Payer: MEDICARE

## 2024-02-09 ENCOUNTER — PATIENT MESSAGE (OUTPATIENT)
Dept: NEUROLOGY | Facility: CLINIC | Age: 69
End: 2024-02-09
Payer: MEDICARE

## 2024-02-10 DIAGNOSIS — G20.A2 PARKINSON'S DISEASE WITHOUT DYSKINESIA, WITH FLUCTUATING MANIFESTATIONS: Primary | Chronic | ICD-10-CM

## 2024-02-12 RX ORDER — RASAGILINE 1 MG/1
TABLET ORAL
Qty: 30 TABLET | Refills: 11 | Status: SHIPPED | OUTPATIENT
Start: 2024-02-12

## 2024-02-16 DIAGNOSIS — M25.512 BILATERAL SHOULDER PAIN, UNSPECIFIED CHRONICITY: Primary | ICD-10-CM

## 2024-02-16 DIAGNOSIS — M25.511 BILATERAL SHOULDER PAIN, UNSPECIFIED CHRONICITY: Primary | ICD-10-CM

## 2024-02-22 ENCOUNTER — HOSPITAL ENCOUNTER (OUTPATIENT)
Dept: RADIOLOGY | Facility: HOSPITAL | Age: 69
Discharge: HOME | End: 2024-02-22
Attending: ORTHOPAEDIC SURGERY
Payer: MEDICARE

## 2024-02-22 ENCOUNTER — OFFICE VISIT (OUTPATIENT)
Dept: ORTHOPEDICS | Facility: CLINIC | Age: 69
End: 2024-02-22
Payer: MEDICARE

## 2024-02-22 VITALS — BODY MASS INDEX: 27.86 KG/M2 | HEIGHT: 71 IN | WEIGHT: 199 LBS

## 2024-02-22 DIAGNOSIS — M25.511 BILATERAL SHOULDER PAIN, UNSPECIFIED CHRONICITY: ICD-10-CM

## 2024-02-22 DIAGNOSIS — M75.41 IMPINGEMENT SYNDROME, SHOULDER, RIGHT: ICD-10-CM

## 2024-02-22 DIAGNOSIS — M75.42 IMPINGEMENT SYNDROME OF LEFT SHOULDER: Primary | ICD-10-CM

## 2024-02-22 DIAGNOSIS — M25.512 BILATERAL SHOULDER PAIN, UNSPECIFIED CHRONICITY: ICD-10-CM

## 2024-02-22 PROCEDURE — 73030 X-RAY EXAM OF SHOULDER: CPT | Mod: TC,50,PO

## 2024-02-22 PROCEDURE — 99999 PR PBB SHADOW E&M-EST. PATIENT-LVL III: CPT | Mod: PBBFAC,,, | Performed by: ORTHOPAEDIC SURGERY

## 2024-02-22 PROCEDURE — 73030 X-RAY EXAM OF SHOULDER: CPT | Mod: 26,50,, | Performed by: RADIOLOGY

## 2024-02-22 PROCEDURE — 99999PBSHW PR PBB SHADOW TECHNICAL ONLY FILED TO HB: Mod: PBBFAC,,,

## 2024-02-22 PROCEDURE — 99204 OFFICE O/P NEW MOD 45 MIN: CPT | Mod: S$PBB,25,, | Performed by: ORTHOPAEDIC SURGERY

## 2024-02-22 PROCEDURE — 99213 OFFICE O/P EST LOW 20 MIN: CPT | Mod: PBBFAC,25,PO | Performed by: ORTHOPAEDIC SURGERY

## 2024-02-22 PROCEDURE — 20610 DRAIN/INJ JOINT/BURSA W/O US: CPT | Mod: 50,PBBFAC,PO | Performed by: ORTHOPAEDIC SURGERY

## 2024-02-22 RX ADMIN — TRIAMCINOLONE ACETONIDE 40 MG: 40 INJECTION, SUSPENSION INTRA-ARTICULAR; INTRAMUSCULAR at 08:02

## 2024-02-29 RX ORDER — TRIAMCINOLONE ACETONIDE 40 MG/ML
40 INJECTION, SUSPENSION INTRA-ARTICULAR; INTRAMUSCULAR
Status: DISCONTINUED | OUTPATIENT
Start: 2024-02-22 | End: 2024-02-29 | Stop reason: HOSPADM

## 2024-02-29 NOTE — PROCEDURES
Large Joint Aspiration/Injection: bilateral subacromial bursa    Date/Time: 2/22/2024 8:30 AM    Performed by: Lukas Campbell MD  Authorized by: Lukas Campblel MD    Consent Done?:  Yes (Verbal)  Indications:  Pain  Timeout: prior to procedure the correct patient, procedure, and site was verified    Prep: patient was prepped and draped in usual sterile fashion      Local anesthesia used?: Yes    Local anesthetic:  Lidocaine 1% without epinephrine  Anesthetic total (ml):  5      Details:  Needle Size:  22 G  Ultrasonic Guidance for needle placement?: No    Approach:  Posterior  Location:  Shoulder  Laterality:  Bilateral  Site:  Bilateral subacromial bursa  Medications (Right):  40 mg triamcinolone acetonide 40 mg/mL  Medications (Left):  40 mg triamcinolone acetonide 40 mg/mL  Patient tolerance:  Patient tolerated the procedure well with no immediate complications

## 2024-02-29 NOTE — PROGRESS NOTES
Chief Complaint   Patient presents with    Right Knee - Pain    Right Shoulder - Pain    Right Hip - Pain       HPI:    This is a 68 y.o. who presents today complaining of bilateral shoulder pain for 1 months after no interval trauma. Pain is dull. No numbness or tingling. No associated signs or symptoms.      Past Medical History:   Diagnosis Date    BPH (benign prostatic hyperplasia)     Cataract     done ou    Chronic kidney disease, stage 3 02/25/2015    DDD (degenerative disc disease), lumbar     s/p epidural steroids     Diabetes mellitus     type 2    Glaucoma     OU    HTN (hypertension)     Iritis, lens-induced 01/08/2018    Obesity     Parkinson's disease     Sleep apnea     uses cpap      Past Surgical History:   Procedure Laterality Date    CATARACT EXTRACTION W/  INTRAOCULAR LENS IMPLANT Left 11/30/2017    CATARACT EXTRACTION W/  INTRAOCULAR LENS IMPLANT Right 12/11/2018    Dr Garcia    CATARACT EXTRACTION W/  INTRAOCULAR LENS IMPLANT Right 12/11/2018    Procedure: EXTRACTION, CATARACT, WITH IOL INSERTION;  Surgeon: Damaris Garcia MD;  Location: [facility] OR;  Service: Ophthalmology;  Laterality: Right;    COLONOSCOPY N/A 9/18/2017    Procedure: COLONOSCOPY;  Surgeon: Paul Feliz Jr., MD;  Location: [facility];  Service: Endoscopy;  Laterality: N/A;    COLONOSCOPY N/A 12/21/2022    Procedure: COLONOSCOPY;  Surgeon: Paul Feliz Jr., MD;  Location: [facility] ENDO;  Service: Endoscopy;  Laterality: N/A;    COLONOSCOPY W/ POLYPECTOMY  04/13/2010    YARELI.   One 1 cm polyp in the sigmoid colon.  Internal hemorrhoids.    COSMETIC SURGERY      DIGITAL RECTAL EXAMINATION UNDER ANESTHESIA N/A 7/23/2019    Procedure: EXAM UNDER ANESTHESIA, DIGITAL, RECTUM;  Surgeon: Kaz Keating MD;  Location: [facility] OR;  Service: General;  Laterality: N/A;    EXAMINATION UNDER ANESTHESIA N/A 4/24/2019    Procedure: EXAM UNDER ANESTHESIA;  Surgeon: Kaz Keating MD;  Location: [facility] OR;  Service: General;  Laterality:  N/A;    EXCISIONAL HEMORRHOIDECTOMY N/A 4/24/2019    Procedure: HEMORRHOIDECTOMY (lithotomy);  Surgeon: Kaz Keating MD;  Location: [facility] OR;  Service: General;  Laterality: N/A;    GANGLION CYST EXCISION Left     INJECTION OF ANESTHETIC AGENT AROUND PUDENDAL NERVE N/A 4/24/2019    Procedure: BLOCK, NERVE, PUDENDAL;  Surgeon: Kaz Keating MD;  Location: [facility] OR;  Service: General;  Laterality: N/A;    INJECTION OF ANESTHETIC AGENT AROUND PUDENDAL NERVE N/A 7/23/2019    Procedure: BLOCK, NERVE, PUDENDAL;  Surgeon: Kaz Keating MD;  Location: [facility] OR;  Service: General;  Laterality: N/A;    REPAIR OF RETINAL DETACHMENT WITH VITRECTOMY Right 8/1/2018    Procedure: REPAIR, RETINAL DETACHMENT, WITH VITRECTOMY;  Surgeon: SAJAN Pulido MD;  Location: [address];  Service: Ophthalmology;  Laterality: Right;  35 min    TONSILLECTOMY      TRANSRECTAL BIOPSY OF PROSTATE WITH ULTRASOUND GUIDANCE N/A 1/31/2023    Procedure: BIOPSY, PROSTATE, RECTAL APPROACH, WITH US GUIDANCE;  Surgeon: MARIZA Donahue MD;  Location: [facility] OR;  Service: Urology;  Laterality: N/A;    TRIGGER FINGER RELEASE Right     X 2    uro lift      for enlarged prostate      Current Outpatient Medications on File Prior to Visit   Medication Sig Dispense Refill    allopurinoL (ZYLOPRIM) 300 MG tablet TAKE 1 TABLET(300 MG) BY MOUTH EVERY DAY 90 tablet 3    atorvastatin (LIPITOR) 20 MG tablet TAKE 1 TABLET(20 MG) BY MOUTH EVERY DAY 90 tablet 3    brimonidine 0.2% (ALPHAGAN) 0.2 % Drop INSTILL 1 DROP IN BOTH EYES TWICE DAILY 10 mL 3    busPIRone (BUSPAR) 10 MG tablet Take 1 tablet (10 mg total) by mouth 2 (two) times daily. 180 tablet 1    carbidopa-levodopa  mg (SINEMET)  mg per tablet TAKE 2 TABLETS BY MOUTH FIVE TIMES DAILY 300 tablet 11    cholecalciferol, vitamin D3, (D3-2000 ORAL) Take by mouth.      clonazePAM (KLONOPIN) 1 MG tablet TAKE 2 TABLETS(2 MG) BY MOUTH EVERY EVENING 60 tablet 2    dorzolamide (TRUSOPT) 2  % ophthalmic solution INSTILL 1 DROP IN BOTH EYES TWICE DAILY 10 mL 3    ketoconazole (NIZORAL) 2 % shampoo Apply topically.      metFORMIN (GLUCOPHAGE-XR) 500 MG ER 24hr tablet TAKE 1 TABLET(500 MG) BY MOUTH EVERY DAY 90 tablet 1    prazosin (MINIPRESS) 1 MG Cap TAKE 1 CAPSULE(1 MG) BY MOUTH EVERY EVENING 90 capsule 3    propranoloL (INDERAL) 10 MG tablet Take 1 tablet (10 mg total) by mouth 2 (two) times daily as needed (anxiety). 180 tablet 1    ramelteon (ROZEREM) 8 mg tablet Take 1 tablet (8 mg total) by mouth every evening. 90 tablet 3    rasagiline (AZILECT) 1 mg Tab TAKE 1 TABLET(1 MG) BY MOUTH EVERY EVENING 30 tablet 11    tirzepatide (MOUNJARO) 2.5 mg/0.5 mL PnIj Inject 2.5 mg into the skin every 7 days. (Replaces Ozempic) 4 Pen 2     Current Facility-Administered Medications on File Prior to Visit   Medication Dose Route Frequency Provider Last Rate Last Admin    diphenhydrAMINE capsule 25 mg  25 mg Oral Q6H PRN Leonila Richardson MD        metronidazole IVPB 500 mg  500 mg Intravenous Once Kaz Keating MD          Review of patient's allergies indicates:   Allergen Reactions    Avelox [moxifloxacin] Hives and Rash      Family History not pertinent   Social History     Socioeconomic History    Marital status:    Tobacco Use    Smoking status: Never    Smokeless tobacco: Never   Substance and Sexual Activity    Alcohol use: Yes     Comment: occasional, no alcohol 72 hours prior to sx    Drug use: No    Sexual activity: Yes     Partners: Female   Social History Narrative            2 grown kids.         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      Social Determinants of Health     Financial Resource Strain: Patient Declined (1/29/2024)    Overall Financial Resource Strain (CARDIA)     Difficulty of Paying Living Expenses: Patient declined   Food Insecurity: Patient Declined (1/29/2024)    Hunger Vital Sign     Worried About Running Out of Food in the Last Year:  Patient declined     Ran Out of Food in the Last Year: Patient declined   Transportation Needs: Patient Declined (1/29/2024)    PRAPARE - Transportation     Lack of Transportation (Medical): Patient declined     Lack of Transportation (Non-Medical): Patient declined   Physical Activity: Sufficiently Active (1/29/2024)    Exercise Vital Sign     Days of Exercise per Week: 4 days     Minutes of Exercise per Session: 80 min   Stress: Patient Declined (1/29/2024)    [employer] [place] [employer] - Occupational Stress Questionnaire     Feeling of Stress : Patient declined   Social Connections: Unknown (1/29/2024)    Social Connection and Isolation Panel [NHANES]     Frequency of Communication with Friends and Family: Patient declined     Frequency of Social Gatherings with Friends and Family: Patient declined     Active Member of Clubs or Organizations: Patient declined     Attends Club or Organization Meetings: Patient declined     Marital Status:    Housing Stability: Patient Declined (1/29/2024)    Housing Stability Vital Sign     Unable to Pay for Housing in the Last Year: Patient declined     Number of Places Lived in the Last Year: 1     Unstable Housing in the Last Year: Patient declined         Review of Systems:   Constitutional:  Denies fever or chills    Eyes:  Denies change in visual acuity    HENT:  Denies nasal congestion or sore throat    Respiratory:  Denies cough or shortness of breath    Cardiovascular:  Denies chest pain or edema    GI:  Denies abdominal pain, nausea, vomiting, bloody stools or diarrhea    :  Denies dysuria    Integument:  Denies rash    Neurologic:  Denies headache, focal weakness or sensory changes    Endocrine:  Denies polyuria or polydipsia    Lymphatic:  Denies swollen glands    Psychiatric:  Denies depression or anxiety       Physical Exam:    Constitutional:  Well developed, well nourished, no acute distress, non-toxic appearance    Integument:  Well  hydrated, no rash    Lymphatic:  No lymphadenopathy noted    Neurologic:  Alert & oriented x 3,     Psychiatric:  Speech and behavior appropriate    Gi: abdomen soft  Eyes: EOMI     Bilateral Shoulder Exam  Tenderness   Shoulder tenderness location: diffusely about shoulder.    Range of Motion   Forward Flexion: abnormal   External Rotation: abnormal     Muscle Strength   Supraspinatus: 4/5     Tests   Hawkin's test: positive  Impingement: positive    Other   Erythema: absent  Sensation: normal  Pulse: present       Impingement syndrome of left shoulder    Impingement syndrome, shoulder, right          Using an aseptic technique, I injected 5 cc of lidocaine 1% without and 1 cc of kenalog 40mg into the bilateral Shoulder. The patient tolerated this well. I will have them return to clinic in 6 weeks.

## 2024-03-04 ENCOUNTER — TELEPHONE (OUTPATIENT)
Dept: PAIN MEDICINE | Facility: CLINIC | Age: 69
End: 2024-03-04

## 2024-03-04 ENCOUNTER — OFFICE VISIT (OUTPATIENT)
Dept: PAIN MEDICINE | Facility: CLINIC | Age: 69
End: 2024-03-04
Payer: MEDICARE

## 2024-03-04 VITALS
DIASTOLIC BLOOD PRESSURE: 66 MMHG | WEIGHT: 189.06 LBS | BODY MASS INDEX: 26.47 KG/M2 | SYSTOLIC BLOOD PRESSURE: 152 MMHG | HEIGHT: 71 IN | HEART RATE: 69 BPM

## 2024-03-04 DIAGNOSIS — M54.9 DORSALGIA, UNSPECIFIED: ICD-10-CM

## 2024-03-04 DIAGNOSIS — M47.816 LUMBAR SPONDYLOSIS: Primary | ICD-10-CM

## 2024-03-04 PROCEDURE — 99213 OFFICE O/P EST LOW 20 MIN: CPT | Mod: PBBFAC,PO

## 2024-03-04 PROCEDURE — 99999 PR PBB SHADOW E&M-EST. PATIENT-LVL III: CPT | Mod: PBBFAC,,,

## 2024-03-04 PROCEDURE — 99214 OFFICE O/P EST MOD 30 MIN: CPT | Mod: S$PBB,,,

## 2024-03-04 NOTE — PROGRESS NOTES
[facility] Follow Up Evaluation      Referred by: No ref. provider found    PCP:     CC:   Chief Complaint   Patient presents with    Low-back Pain          3/4/2024     8:50 AM 1/3/2024     8:27 AM   Last 3 PDI Scores   Pain Disability Index (PDI) 41 38     Interval HPI 3/4/2024: Glenn Medel returns to the office for follow up.  He returns for follow-up after attending formal physical therapy.  Today's reporting continued lower back pain, 4-10/10, across his lower back, worsened with physical activities, improved with rest.  He denies any pain radiating down his legs, numbness, weakness or any new changes to his bowel or bladder function.      HPI:   Glenn Medel is a 68 y.o. male who presents with back pain.  He has a history of chronic lower back pain but over the past month it has been gradually worsening.  Today he reports his pain is 7/10, intermittent, aching, sharp, throbbing, shooting.  He gets some radiating pain around the sides of the lower flanks around the lumbar area.  His pain is worse with sitting, bending, lifting and relieved with rest and lying down.  He denies any numbness or weakness.  No oliver radicular symptoms.      Pain Intervention History:  right-sided L3-4, L4-5, L5-S1 RFA on 09/29/2014 and he had a right-sided L5-S1 and S1 TFESI on 10/27/2014 with outside physician     Past Spine Surgical History:      Past and current medications:  Antineuropathics:  NSAIDs:  Physical therapy:  Antidepressants:  Muscle relaxers:  Opioids:  Antiplatelets/Anticoagulants:    History:    Current Outpatient Medications:     allopurinoL (ZYLOPRIM) 300 MG tablet, TAKE 1 TABLET(300 MG) BY MOUTH EVERY DAY, Disp: 90 tablet, Rfl: 3    atorvastatin (LIPITOR) 20 MG tablet, TAKE 1 TABLET(20 MG) BY MOUTH EVERY DAY, Disp: 90 tablet, Rfl: 3    brimonidine 0.2% (ALPHAGAN) 0.2 % Drop, INSTILL 1 DROP IN BOTH EYES TWICE DAILY, Disp: 10 mL, Rfl: 3    busPIRone (BUSPAR) 10 MG tablet, Take 1 tablet (10 mg  total) by mouth 2 (two) times daily., Disp: 180 tablet, Rfl: 1    carbidopa-levodopa  mg (SINEMET)  mg per tablet, TAKE 2 TABLETS BY MOUTH FIVE TIMES DAILY, Disp: 300 tablet, Rfl: 11    cholecalciferol, vitamin D3, (D3-2000 ORAL), Take by mouth., Disp: , Rfl:     clonazePAM (KLONOPIN) 1 MG tablet, TAKE 2 TABLETS(2 MG) BY MOUTH EVERY EVENING, Disp: 60 tablet, Rfl: 2    dorzolamide (TRUSOPT) 2 % ophthalmic solution, INSTILL 1 DROP IN BOTH EYES TWICE DAILY, Disp: 10 mL, Rfl: 3    ketoconazole (NIZORAL) 2 % shampoo, Apply topically., Disp: , Rfl:     metFORMIN (GLUCOPHAGE-XR) 500 MG ER 24hr tablet, TAKE 1 TABLET(500 MG) BY MOUTH EVERY DAY, Disp: 90 tablet, Rfl: 1    prazosin (MINIPRESS) 1 MG Cap, TAKE 1 CAPSULE(1 MG) BY MOUTH EVERY EVENING, Disp: 90 capsule, Rfl: 3    propranoloL (INDERAL) 10 MG tablet, Take 1 tablet (10 mg total) by mouth 2 (two) times daily as needed (anxiety)., Disp: 180 tablet, Rfl: 1    ramelteon (ROZEREM) 8 mg tablet, Take 1 tablet (8 mg total) by mouth every evening., Disp: 90 tablet, Rfl: 3    rasagiline (AZILECT) 1 mg Tab, TAKE 1 TABLET(1 MG) BY MOUTH EVERY EVENING, Disp: 30 tablet, Rfl: 11    tirzepatide (MOUNJARO) 2.5 mg/0.5 mL PnIj, Inject 2.5 mg into the skin every 7 days. (Replaces Ozempic), Disp: 4 Pen, Rfl: 2    Current Facility-Administered Medications:     metronidazole IVPB 500 mg, 500 mg, Intravenous, Once, Kaz Keating MD    Facility-Administered Medications Ordered in Other Visits:     diphenhydrAMINE capsule 25 mg, 25 mg, Oral, Q6H PRN, Leonila Richardson MD    gadoterate meglumine injection 20 mL, 20 mL, Intravenous, ONCE PRN, MARIZA Donahue MD    Past Medical History:   Diagnosis Date    BPH (benign prostatic hyperplasia)     Cataract     done ou    Chronic kidney disease, stage 3 02/25/2015    DDD (degenerative disc disease), lumbar     s/p epidural steroids     Diabetes mellitus     type 2    Glaucoma     OU    HTN (hypertension)     Iritis, lens-induced  01/08/2018    Obesity     Parkinson's disease     Sleep apnea     uses cpap       Past Surgical History:   Procedure Laterality Date    CATARACT EXTRACTION W/  INTRAOCULAR LENS IMPLANT Left 11/30/2017    CATARACT EXTRACTION W/  INTRAOCULAR LENS IMPLANT Right 12/11/2018    Dr Garcia    CATARACT EXTRACTION W/  INTRAOCULAR LENS IMPLANT Right 12/11/2018    Procedure: EXTRACTION, CATARACT, WITH IOL INSERTION;  Surgeon: Damaris Garcia MD;  Location: [facility] OR;  Service: Ophthalmology;  Laterality: Right;    COLONOSCOPY N/A 9/18/2017    Procedure: COLONOSCOPY;  Surgeon: Paul Feliz Jr., MD;  Location: [facility] ENDO;  Service: Endoscopy;  Laterality: N/A;    COLONOSCOPY N/A 12/21/2022    Procedure: COLONOSCOPY;  Surgeon: Paul Feliz Jr., MD;  Location: [facility] ENDO;  Service: Endoscopy;  Laterality: N/A;    COLONOSCOPY W/ POLYPECTOMY  04/13/2010    YARELI.   One 1 cm polyp in the sigmoid colon.  Internal hemorrhoids.    COSMETIC SURGERY      DIGITAL RECTAL EXAMINATION UNDER ANESTHESIA N/A 7/23/2019    Procedure: EXAM UNDER ANESTHESIA, DIGITAL, RECTUM;  Surgeon: Kaz Keating MD;  Location: [facility] OR;  Service: General;  Laterality: N/A;    EXAMINATION UNDER ANESTHESIA N/A 4/24/2019    Procedure: EXAM UNDER ANESTHESIA;  Surgeon: Kaz Keating MD;  Location: [facility] OR;  Service: General;  Laterality: N/A;    EXCISIONAL HEMORRHOIDECTOMY N/A 4/24/2019    Procedure: HEMORRHOIDECTOMY (lithotomy);  Surgeon: Kaz Keating MD;  Location: [facility] OR;  Service: General;  Laterality: N/A;    GANGLION CYST EXCISION Left     INJECTION OF ANESTHETIC AGENT AROUND PUDENDAL NERVE N/A 4/24/2019    Procedure: BLOCK, NERVE, PUDENDAL;  Surgeon: Kaz Keating MD;  Location: [facility] OR;  Service: General;  Laterality: N/A;    INJECTION OF ANESTHETIC AGENT AROUND PUDENDAL NERVE N/A 7/23/2019    Procedure: BLOCK, NERVE, PUDENDAL;  Surgeon: Kaz Keating MD;  Location: [facility] OR;  Service: General;  Laterality: N/A;    REPAIR OF  RETINAL DETACHMENT WITH VITRECTOMY Right 8/1/2018    Procedure: REPAIR, RETINAL DETACHMENT, WITH VITRECTOMY;  Surgeon: SAJAN Pulido MD;  Location: [facility] OR [address] [place] [address];  Service: Ophthalmology;  Laterality: Right;  35 min    TONSILLECTOMY      TRANSRECTAL BIOPSY OF PROSTATE WITH ULTRASOUND GUIDANCE N/A 1/31/2023    Procedure: BIOPSY, PROSTATE, RECTAL APPROACH, WITH US GUIDANCE;  Surgeon: MARIZA Donahue MD;  Location: [facility] OR;  Service: Urology;  Laterality: N/A;    TRIGGER FINGER RELEASE Right     X 2    uro lift      for enlarged prostate       Family History   Problem Relation Age of Onset    Heart disease Brother 62    Kidney disease Brother     Heart disease Father     Diabetes Father     Colon cancer Father     Cancer Father         colon    Cataracts Father     Hypertension Mother     Diabetes Mother     Stroke Mother     Cancer Mother         colon    Kidney disease Mother     Glaucoma Maternal Grandmother     No Known Problems Sister     No Known Problems Maternal Aunt     No Known Problems Maternal Uncle     No Known Problems Paternal Aunt     No Known Problems Paternal Uncle     No Known Problems Maternal Grandfather     No Known Problems Paternal Grandmother     No Known Problems Paternal Grandfather     Dementia Brother     Abnormal EKG Brother     Amblyopia Neg Hx     Blindness Neg Hx     Macular degeneration Neg Hx     Retinal detachment Neg Hx     Strabismus Neg Hx     Thyroid disease Neg Hx     Parkinsonism Neg Hx        Social History     Socioeconomic History    Marital status:    Tobacco Use    Smoking status: Never    Smokeless tobacco: Never   Substance and Sexual Activity    Alcohol use: Yes     Comment: occasional, no alcohol 72 hours prior to sx    Drug use: No    Sexual activity: Yes     Partners: Female   Social History Narrative            2 grown kids.         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      Social Determinants of Health  "    Financial Resource Strain: Patient Declined (1/29/2024)    Overall Financial Resource Strain (CARDIA)     Difficulty of Paying Living Expenses: Patient declined   Food Insecurity: Patient Declined (1/29/2024)    Hunger Vital Sign     Worried About Running Out of Food in the Last Year: Patient declined     Ran Out of Food in the Last Year: Patient declined   Transportation Needs: Patient Declined (1/29/2024)    PRAPARE - Transportation     Lack of Transportation (Medical): Patient declined     Lack of Transportation (Non-Medical): Patient declined   Physical Activity: Sufficiently Active (1/29/2024)    Exercise Vital Sign     Days of Exercise per Week: 4 days     Minutes of Exercise per Session: 80 min   Stress: Patient Declined (1/29/2024)    [employer] [place] [employer] - Occupational Stress Questionnaire     Feeling of Stress : Patient declined   Social Connections: Unknown (1/29/2024)    Social Connection and Isolation Panel [NHANES]     Frequency of Communication with Friends and Family: Patient declined     Frequency of Social Gatherings with Friends and Family: Patient declined     Active Member of Clubs or Organizations: Patient declined     Attends Club or Organization Meetings: Patient declined     Marital Status:    Housing Stability: Patient Declined (1/29/2024)    Housing Stability Vital Sign     Unable to Pay for Housing in the Last Year: Patient declined     Number of Places Lived in the Last Year: 1     Unstable Housing in the Last Year: Patient declined       Review of patient's allergies indicates:   Allergen Reactions    Avelox [moxifloxacin] Hives and Rash       Review of Systems:  12 point review of systems is negative.    Physical Exam:  Vitals:    03/04/24 0851   BP: (!) 152/66   Pulse: 69   Weight: 85.7 kg (189 lb 0.7 oz)   Height: 5' 11" (1.803 m)   PainSc:   4   PainLoc: Back     Body mass index is 26.37 kg/m².    Gen: NAD  Psych: mood appropriate for given " condition  HEENT: eyes anicteric   CV: RRR  HEENT: anicteric   Respiratory: non-labored, no signs of respiratory distress  Abd: non-distended  Skin: warm, dry and intact.  Gait: No antalgic gait.     Reproducible pain with lumbar axial facet loading    Sensory:  Intact and symmetrical to light touch in L1-S1 dermatomes bilaterally.    Motor:       Right Left   L2/3 Iliacus Hip flexion  5  5   L3/4 Qudratus Femoris Knee Extension  5  5   L4/5 Tib Anterior Ankle Dorsiflexion   5  5   L5/S1 Extensor Hallicus Longus Great toe extension  5  5   S1/S2 Gastroc/Soleus Plantar Flexion  5  5      Right Left                  Patellar DTR 2+ 2+   Achilles DTR 2+ 2+                      Imaging:  Xray lumbar spine 4/6/23  FINDINGS:  There are degenerative changes of the lumbar spine.  Vertebral body height is maintained.  The adjacent soft tissues are unremarkable.    MRI lumbar spine 5/16/23  FINDINGS:  Alignment: Normal.     Vertebrae: No acute fracture or marrow replacement.  2 cm vertebral body hemangioma at L4.     Discs: Preserved disc heights.  Mild disc desiccation at several lower lumbar levels.     Cord: Normal.  Conus terminates at L2.     Paraspinal muscles & soft tissues: There are T2 hyperintense lesions along each renal cortex, possibly cysts but incompletely characterized.     Degenerative findings:  T12-L1:  L1-L2:  L2-L3: Minimal bilateral facet degenerative change.  Minimal posterior disc bulge.  Minimal bilateral facet degenerative change.  L3-L4: Minimal broad-based posterior disc bulge.  Bilateral facet degenerative change.  L4-L5: Minimal broad-based posterior disc bulge with left foraminal zone disc fissure.  Mild bilateral facet degenerative change.  Minimal spinal canal stenosis and right neural foraminal narrowing.  L5-S1: Mild broad-based posterior disc bulge.  Mild right facet degenerative change.  Mild right neural foraminal narrowing.    Labs:  Lab Results   Component Value Date    HGBA1C 6.4 (H)  11/21/2023    HGBA1C 6.4 (H) 11/21/2023       Lab Results   Component Value Date    WBC 7.45 11/21/2023    HGB 14.6 11/21/2023    HCT 47.1 11/21/2023    MCV 88 11/21/2023     11/21/2023       Assessment:   Problem List Items Addressed This Visit    None  Visit Diagnoses       Lumbar spondylosis    -  Primary    Dorsalgia, unspecified                      68 y.o. year old male with PMH parkinsonism, HTN, BPH, CKD who presents with back pain.  He has a history of chronic lower back pain but over the past month it has been gradually worsening.  Today he reports his pain is 7/10, intermittent, aching, sharp, throbbing, shooting.  He gets some radiating pain around the sides of the lower flanks around the lumbar area.  His pain is worse with sitting, bending, lifting and relieved with rest and lying down.  He denies any numbness or weakness.  No oliver radicular symptoms.    3/4/2024: Glenn Medel returns to the office for follow up.  He returns for follow-up after attending formal physical therapy.  Today's reporting continued lower back pain, 4-10/10, across his lower back, worsened with physical activities, improved with rest.  He denies any pain radiating down his legs, numbness, weakness or any new changes to his bowel or bladder function.    - on exam he has reproducible pain with lumbar axial facet loading  - I independently reviewed his lumbar MRI and he has multilevel bilateral facet arthropathy  - he has been attending formal physical therapy with only minimal improvement in his pain.  He continues to have pain that limits his mobility interferes with his ADLs and quality of life.  - I believe his pain continues to be axial facet mediated pain.  - for his continued pain I would like to schedule him for bilateral L4/5 and L5/S1 diagnostic medial branch blocks.  If successful we will repeat the blocks prior to proceeding with radiofrequency ablation.  - he reports a history of lumbar RFA many years ago  that provided him with excellent relief.  - follow-up 4 weeks post procedure or sooner if needed.      : Not applicable      This note was completed with dictation software and grammatical errors may exist.

## 2024-03-04 NOTE — TELEPHONE ENCOUNTER
Please schedule patient for the following procedure:    Physician - Dr Taveras    Type of Procedure/Injection - Lumbar Medial Branch Block  L4/5 and L5/S1           Laterality - Bilateral      Anxiolysis- RNIV      Need to hold medication - Yes      N/A    Tirzepatide (Mounjaro) 7 days      Clearance needed - No      Follow up - phone call next day

## 2024-03-05 ENCOUNTER — TELEPHONE (OUTPATIENT)
Dept: PAIN MEDICINE | Facility: CLINIC | Age: 69
End: 2024-03-05
Payer: MEDICARE

## 2024-03-05 DIAGNOSIS — M47.816 LUMBAR SPONDYLOSIS: Primary | ICD-10-CM

## 2024-03-05 DIAGNOSIS — M47.816 SPONDYLOSIS OF LUMBAR JOINT: ICD-10-CM

## 2024-03-05 RX ORDER — SODIUM CHLORIDE, SODIUM LACTATE, POTASSIUM CHLORIDE, CALCIUM CHLORIDE 600; 310; 30; 20 MG/100ML; MG/100ML; MG/100ML; MG/100ML
INJECTION, SOLUTION INTRAVENOUS CONTINUOUS
Status: CANCELLED | OUTPATIENT
Start: 2024-03-05

## 2024-03-05 NOTE — TELEPHONE ENCOUNTER
----- Message from Cherie Williamson sent at 3/4/2024  2:27 PM CST -----  Regarding: ret call  Contact:  [phone]  Type:  Patient Returning Call    Who Called:      Who Left Message for Patient:  Anushka    Does the patient know what this is regarding?:  Yes    Best Call Back Number:  [phone]    Additional Information:

## 2024-03-07 ENCOUNTER — PATIENT MESSAGE (OUTPATIENT)
Dept: PAIN MEDICINE | Facility: CLINIC | Age: 69
End: 2024-03-07
Payer: MEDICARE

## 2024-03-07 ENCOUNTER — PATIENT MESSAGE (OUTPATIENT)
Dept: UROLOGY | Facility: CLINIC | Age: 69
End: 2024-03-07
Payer: MEDICARE

## 2024-03-07 RX ORDER — SULFAMETHOXAZOLE AND TRIMETHOPRIM 400; 80 MG/1; MG/1
1 TABLET ORAL 2 TIMES DAILY
Qty: 6 TABLET | Refills: 0 | Status: SHIPPED | OUTPATIENT
Start: 2024-03-07 | End: 2024-03-11

## 2024-03-07 NOTE — TELEPHONE ENCOUNTER
I am okay to proceed as scheduled since this injection will not have any steroid in it.      However I encourage he reach out to his primary care physician to get his glucose under control

## 2024-03-08 ENCOUNTER — TELEPHONE (OUTPATIENT)
Dept: UROLOGY | Facility: CLINIC | Age: 69
End: 2024-03-08
Payer: MEDICARE

## 2024-03-08 DIAGNOSIS — Z01.818 PREOP EXAMINATION: Primary | ICD-10-CM

## 2024-03-08 DIAGNOSIS — R97.20 ELEVATED PSA: Primary | ICD-10-CM

## 2024-03-09 NOTE — TELEPHONE ENCOUNTER
Please schedule labs/ EKG and clinic appointment for preop  Orders Placed This Encounter   Procedures    CBC Auto Differential     Standing Status:   Future     Standing Expiration Date:   5/7/2025    Comprehensive Metabolic Panel     Standing Status:   Future     Standing Expiration Date:   5/7/2025    IN OFFICE EKG 12-LEAD (to [place])     Standing Status:   Future     Standing Expiration Date:   3/8/2025

## 2024-03-11 ENCOUNTER — TELEPHONE (OUTPATIENT)
Dept: UROLOGY | Facility: CLINIC | Age: 69
End: 2024-03-11
Payer: MEDICARE

## 2024-03-11 ENCOUNTER — TELEPHONE (OUTPATIENT)
Dept: FAMILY MEDICINE | Facility: CLINIC | Age: 69
End: 2024-03-11
Payer: MEDICARE

## 2024-03-11 ENCOUNTER — LAB VISIT (OUTPATIENT)
Dept: LAB | Facility: HOSPITAL | Age: 69
End: 2024-03-11
Attending: INTERNAL MEDICINE
Payer: MEDICARE

## 2024-03-11 DIAGNOSIS — Z01.818 PREOP EXAMINATION: ICD-10-CM

## 2024-03-11 LAB
ALBUMIN SERPL BCP-MCNC: 3.6 G/DL (ref 3.5–5.2)
ALP SERPL-CCNC: 149 U/L (ref 55–135)
ALT SERPL W/O P-5'-P-CCNC: 7 U/L (ref 10–44)
ANION GAP SERPL CALC-SCNC: 9 MMOL/L (ref 8–16)
AST SERPL-CCNC: 26 U/L (ref 10–40)
BASOPHILS # BLD AUTO: 0.07 K/UL (ref 0–0.2)
BASOPHILS NFR BLD: 0.8 % (ref 0–1.9)
BILIRUB SERPL-MCNC: 0.9 MG/DL (ref 0.1–1)
BUN SERPL-MCNC: 15 MG/DL (ref 8–23)
CALCIUM SERPL-MCNC: 10 MG/DL (ref 8.7–10.5)
CHLORIDE SERPL-SCNC: 100 MMOL/L (ref 95–110)
CO2 SERPL-SCNC: 28 MMOL/L (ref 23–29)
CREAT SERPL-MCNC: 1 MG/DL (ref 0.5–1.4)
DIFFERENTIAL METHOD BLD: ABNORMAL
EOSINOPHIL # BLD AUTO: 0.1 K/UL (ref 0–0.5)
EOSINOPHIL NFR BLD: 1.3 % (ref 0–8)
ERYTHROCYTE [DISTWIDTH] IN BLOOD BY AUTOMATED COUNT: 13.9 % (ref 11.5–14.5)
EST. GFR  (NO RACE VARIABLE): >60 ML/MIN/1.73 M^2
GLUCOSE SERPL-MCNC: 256 MG/DL (ref 70–110)
HCT VFR BLD AUTO: 51 % (ref 40–54)
HGB BLD-MCNC: 16.1 G/DL (ref 14–18)
IMM GRANULOCYTES # BLD AUTO: 0.06 K/UL (ref 0–0.04)
IMM GRANULOCYTES NFR BLD AUTO: 0.7 % (ref 0–0.5)
LYMPHOCYTES # BLD AUTO: 2.3 K/UL (ref 1–4.8)
LYMPHOCYTES NFR BLD: 25.1 % (ref 18–48)
MCH RBC QN AUTO: 27.9 PG (ref 27–31)
MCHC RBC AUTO-ENTMCNC: 31.6 G/DL (ref 32–36)
MCV RBC AUTO: 88 FL (ref 82–98)
MONOCYTES # BLD AUTO: 0.7 K/UL (ref 0.3–1)
MONOCYTES NFR BLD: 7.2 % (ref 4–15)
NEUTROPHILS # BLD AUTO: 5.9 K/UL (ref 1.8–7.7)
NEUTROPHILS NFR BLD: 64.9 % (ref 38–73)
NRBC BLD-RTO: 0 /100 WBC
PLATELET # BLD AUTO: 228 K/UL (ref 150–450)
PMV BLD AUTO: 10.3 FL (ref 9.2–12.9)
POTASSIUM SERPL-SCNC: 4.5 MMOL/L (ref 3.5–5.1)
PROT SERPL-MCNC: 7 G/DL (ref 6–8.4)
RBC # BLD AUTO: 5.77 M/UL (ref 4.6–6.2)
SODIUM SERPL-SCNC: 137 MMOL/L (ref 136–145)
WBC # BLD AUTO: 9.07 K/UL (ref 3.9–12.7)

## 2024-03-11 PROCEDURE — 80053 COMPREHEN METABOLIC PANEL: CPT | Performed by: INTERNAL MEDICINE

## 2024-03-11 PROCEDURE — 85025 COMPLETE CBC W/AUTO DIFF WBC: CPT | Performed by: INTERNAL MEDICINE

## 2024-03-11 PROCEDURE — 36415 COLL VENOUS BLD VENIPUNCTURE: CPT | Mod: PO | Performed by: INTERNAL MEDICINE

## 2024-03-11 NOTE — TELEPHONE ENCOUNTER
----- Message from Bridgett Middleton sent at 3/11/2024  8:54 AM CDT -----  Regarding: pt called  Name of Who is Calling: RUBÉN SMALL [[id number]]      What is the request in detail: the system will not allow for pt blood work to be scheduled on 03/12  in [place] . Please assist.      Can the clinic reply by [facility]: No      What Number to Call Back if not in [facility]: Telephone Information:        [phone]

## 2024-03-11 NOTE — TELEPHONE ENCOUNTER
Lm on patient vm , he will need to have labs and EKG prior to upcoming procedure with Dr Donahue on 3/15/2024 .

## 2024-03-13 ENCOUNTER — TELEPHONE (OUTPATIENT)
Dept: UROLOGY | Facility: CLINIC | Age: 69
End: 2024-03-13
Payer: MEDICARE

## 2024-03-13 ENCOUNTER — OFFICE VISIT (OUTPATIENT)
Dept: FAMILY MEDICINE | Facility: CLINIC | Age: 69
End: 2024-03-13
Payer: MEDICARE

## 2024-03-13 ENCOUNTER — PATIENT MESSAGE (OUTPATIENT)
Dept: FAMILY MEDICINE | Facility: CLINIC | Age: 69
End: 2024-03-13

## 2024-03-13 VITALS
HEIGHT: 70 IN | HEART RATE: 60 BPM | SYSTOLIC BLOOD PRESSURE: 122 MMHG | OXYGEN SATURATION: 98 % | BODY MASS INDEX: 27.11 KG/M2 | DIASTOLIC BLOOD PRESSURE: 82 MMHG | WEIGHT: 189.38 LBS

## 2024-03-13 DIAGNOSIS — Z01.818 PREOP EXAMINATION: Primary | ICD-10-CM

## 2024-03-13 LAB
OHS QRS DURATION: 102 MS
OHS QTC CALCULATION: 448 MS

## 2024-03-13 PROCEDURE — 93010 ELECTROCARDIOGRAM REPORT: CPT | Mod: S$PBB,,, | Performed by: INTERNAL MEDICINE

## 2024-03-13 PROCEDURE — 99214 OFFICE O/P EST MOD 30 MIN: CPT | Mod: S$PBB,,, | Performed by: INTERNAL MEDICINE

## 2024-03-13 PROCEDURE — 99999 PR PBB SHADOW E&M-EST. PATIENT-LVL IV: CPT | Mod: PBBFAC,,, | Performed by: INTERNAL MEDICINE

## 2024-03-13 PROCEDURE — 93005 ELECTROCARDIOGRAM TRACING: CPT | Mod: PBBFAC,PO | Performed by: INTERNAL MEDICINE

## 2024-03-13 PROCEDURE — 99214 OFFICE O/P EST MOD 30 MIN: CPT | Mod: PBBFAC,PO | Performed by: INTERNAL MEDICINE

## 2024-03-13 NOTE — PROGRESS NOTES
Patient ID: Glenn Medel is a 68 y.o. male.    Chief Complaint: Pre-op Exam      Assessment / Plan      1. Preop examination  - IN OFFICE EKG 12-LEAD (to [place])    He is low risk for a low risk procedure, can proceed with prostate biopsy with standard precautions.  Mounjaro stopped on Monday  Glucose suboptimal likely secondary to starting propanolol.  Will taper propanolol after surgery   Follow-up in 1 week to work on blood sugar and blood pressure.  He will bring his home blood pressure machine to clinic.     HPI     Preoperative examination    Procedure:  Prostate biopsy  Surgeon: Dr. Donahue  Date: 3/15   EKG:  Normal sinus rhythm with sinus arrhythmia  CBC: ok   CMP: , have been elevated since starting propranolol   LOVE: yes, adherent to CPAP   New murmur:   NSAIDs:   Anticoag: no  >/= 4 METs:  Yes  (One flight of stairs, Golfing without a cart, mowing with a push mower, swimming, riding a bike, jogging, gardening)  RCRI: 0  Procedure Risk:  Low     Review of Systems   Constitutional:  Negative for fever.   Respiratory:  Negative for shortness of breath.    Cardiovascular:  Negative for chest pain.   Gastrointestinal:  Negative for abdominal pain.        Objective     Vitals:    03/13/24 0803   BP: 122/82   Pulse: 60      Wt Readings from Last 3 Encounters:   03/13/24 0803 85.9 kg (189 lb 6 oz)   03/04/24 0851 85.7 kg (189 lb 0.7 oz)   02/22/24 0839 90.3 kg (199 lb)      Body mass index is 27.17 kg/m².     Physical Exam  Cardiovascular:      Rate and Rhythm: Normal rate and regular rhythm.      Heart sounds: No murmur heard.     No gallop.   Pulmonary:      Breath sounds: Normal breath sounds. No wheezing or rhonchi.   Abdominal:      Palpations: Abdomen is soft.      Tenderness: There is no abdominal tenderness.         Diabetes Medications               metFORMIN (GLUCOPHAGE-XR) 500 MG ER 24hr tablet TAKE 1 TABLET(500 MG) BY MOUTH EVERY DAY    tirzepatide (MOUNJARO) 2.5 mg/0.5 mL PnIj Inject 2.5  mg into the skin every 7 days. (Replaces Ozempic)           Hypertension Medications               prazosin (MINIPRESS) 1 MG Cap TAKE 1 CAPSULE(1 MG) BY MOUTH EVERY EVENING    propranoloL (INDERAL) 10 MG tablet Take 1 tablet (10 mg total) by mouth 2 (two) times daily as needed (anxiety).           Hyperlipidemia Medications               atorvastatin (LIPITOR) 20 MG tablet TAKE 1 TABLET(20 MG) BY MOUTH EVERY DAY           Medication List with Changes/Refills   Current Medications    ALLOPURINOL (ZYLOPRIM) 300 MG TABLET    TAKE 1 TABLET(300 MG) BY MOUTH EVERY DAY    ATORVASTATIN (LIPITOR) 20 MG TABLET    TAKE 1 TABLET(20 MG) BY MOUTH EVERY DAY    BRIMONIDINE 0.2% (ALPHAGAN) 0.2 % DROP    INSTILL 1 DROP IN BOTH EYES TWICE DAILY    BUSPIRONE (BUSPAR) 10 MG TABLET    Take 1 tablet (10 mg total) by mouth 2 (two) times daily.    CARBIDOPA-LEVODOPA  MG (SINEMET)  MG PER TABLET    TAKE 2 TABLETS BY MOUTH FIVE TIMES DAILY    CHOLECALCIFEROL, VITAMIN D3, (D3-2000 ORAL)    Take by mouth.    CLONAZEPAM (KLONOPIN) 1 MG TABLET    TAKE 2 TABLETS(2 MG) BY MOUTH EVERY EVENING    DORZOLAMIDE (TRUSOPT) 2 % OPHTHALMIC SOLUTION    INSTILL 1 DROP IN BOTH EYES TWICE DAILY    KETOCONAZOLE (NIZORAL) 2 % SHAMPOO    Apply topically.    METFORMIN (GLUCOPHAGE-XR) 500 MG ER 24HR TABLET    TAKE 1 TABLET(500 MG) BY MOUTH EVERY DAY    PRAZOSIN (MINIPRESS) 1 MG CAP    TAKE 1 CAPSULE(1 MG) BY MOUTH EVERY EVENING    PROPRANOLOL (INDERAL) 10 MG TABLET    Take 1 tablet (10 mg total) by mouth 2 (two) times daily as needed (anxiety).    RAMELTEON (ROZEREM) 8 MG TABLET    Take 1 tablet (8 mg total) by mouth every evening.    RASAGILINE (AZILECT) 1 MG TAB    TAKE 1 TABLET(1 MG) BY MOUTH EVERY EVENING    TIRZEPATIDE (MOUNJARO) 2.5 MG/0.5 ML PNIJ    Inject 2.5 mg into the skin every 7 days. (Replaces Ozempic)       I personally reviewed past medical, family and social history.

## 2024-03-13 NOTE — TELEPHONE ENCOUNTER
Spoke with patient , advised him that we had received notice that patient procedure will need to be rescheduled from 3/15/2024 as per Anesthesia guidelines patient must stop Mounjaro a week prior to procedure and patient last reported dose of Mounjaro was Monday 3/11/2024. Advised patient we have rescheduled his procedure to 4/12/2024, patient expressed understanding.

## 2024-03-13 NOTE — TELEPHONE ENCOUNTER
Lm on patient cell , we will need to [place]chedule patient [place] from 3[place]15[place]24 [place] [facility] [place] concerns regarding patient taking mounjaro within cut off time frame for procedure.

## 2024-03-14 ENCOUNTER — E-VISIT (OUTPATIENT)
Dept: FAMILY MEDICINE | Facility: CLINIC | Age: 69
End: 2024-03-14
Payer: MEDICARE

## 2024-03-14 ENCOUNTER — PATIENT MESSAGE (OUTPATIENT)
Dept: PSYCHIATRY | Facility: CLINIC | Age: 69
End: 2024-03-14
Payer: MEDICARE

## 2024-03-14 ENCOUNTER — PATIENT MESSAGE (OUTPATIENT)
Dept: PAIN MEDICINE | Facility: CLINIC | Age: 69
End: 2024-03-14
Payer: MEDICARE

## 2024-03-14 ENCOUNTER — TELEPHONE (OUTPATIENT)
Dept: PAIN MEDICINE | Facility: CLINIC | Age: 69
End: 2024-03-14
Payer: MEDICARE

## 2024-03-14 DIAGNOSIS — M54.9 BACK PAIN, UNSPECIFIED BACK LOCATION, UNSPECIFIED BACK PAIN LATERALITY, UNSPECIFIED CHRONICITY: Primary | ICD-10-CM

## 2024-03-14 PROCEDURE — 99423 OL DIG E/M SVC 21+ MIN: CPT | Mod: ,,, | Performed by: STUDENT IN AN ORGANIZED HEALTH CARE EDUCATION/TRAINING PROGRAM

## 2024-03-14 RX ORDER — TIZANIDINE 2 MG/1
2 TABLET ORAL EVERY 8 HOURS PRN
Qty: 60 TABLET | Refills: 1 | Status: SHIPPED | OUTPATIENT
Start: 2024-03-14 | End: 2024-04-13

## 2024-03-14 RX ORDER — MELOXICAM 15 MG/1
15 TABLET ORAL DAILY PRN
Qty: 30 TABLET | Refills: 1 | Status: SHIPPED | OUTPATIENT
Start: 2024-03-14 | End: 2024-04-13

## 2024-03-14 NOTE — TELEPHONE ENCOUNTER
----- Message from Jennifer Krishna sent at 3/14/2024 12:14 PM CDT -----  Contact: self  Type: Needs Medical Advice  Who Called: Patient   Best Call Back Number: [phone]  Additional Information: Pt states he would just like a call to see if it will still be necessary to have his procedure for his nerve block has two surgeries procedure close together call pt to advise. Thanks

## 2024-03-14 NOTE — PROGRESS NOTES
Patient ID: Glenn Medel is a 68 y.o. male.    Chief Complaint: Back Pain (Entered automatically based on patient selection in Patient Portal.)          274}  The patient initiated a request through [employer] on 3/14/2024 for evaluation and management with a chief complaint of Back Pain (Entered automatically based on patient selection in Patient Portal.)     I evaluated the questionnaire submission on 03/14/2024 .    Total Time (in minutes): 21     [facility] Peq Evisit Back Pain    3/14/2024  1:10 PM CDT - Filed by Patient   Do you agree to participate in an E-Visit? Yes   If you have any of the following symptoms, please present to your local ER or call 911:    What is the main issue that you would like for your doctor to address today? Lower Back Nerve Block Procedure.   Are you able to take your vital signs? Yes   Systolic Blood Pressure: 137   Diastolic Blood Pressure: 83   Weight: 188.7   Height: 71   Pulse: 60   Temperature: 98.3   Respiration rate: 66   Pulse Oxygen: 99   Where are you having pain? Lower Back   Does the pain extend into your legs? No   How bad is the pain? The pain is moderate   Did you have an injury that caused the pain? No, I cannot remember an injury   How long has the pain been present? More than 4 weeks   Have you had back pain in the past? I have had back pain before, but this is markedly different   Please list any medications or treatments you have used for back pain and indicate if it was effective or not. I have been going [facility], it help with oneside the right i still bothering me.; Along with my knees,; More my tight knee.   Do you have a fever? No, I do not have a fever   Do you have any of the following? Fatigue;  Loss of appetite   What makes the pain worse? Any movement;  Bending over;  Sitting down;  Strenuous activity   What makes the pain better? Nothing makes it better   Have you ever been diagnosed with cancer? Yes   Have you ever been diagnosed with  degenerative disc disease (arthritis of the spine)? I am not sure   Have you ever been diagnosed with osteoporosis or any other bone weakness? I am not sure   Have you ever had surgery on your back or spine? Yes   What is your usual health status? My activity is physically restricted   Provide any information you feel is important to your history not asked above    Please attach any relevant images or files           Active Problem List with Overview Notes    Diagnosis Date Noted    CHAPARRITA (generalized anxiety disorder) 01/05/2024    Primary insomnia 01/05/2024    Night terrors, adult 01/05/2024     prozosin      Prostate cancer 12/06/2023    Mild episode of recurrent major depressive disorder 12/06/2023    Central retinal vein occlusion, left eye, stable 10/19/2023    Elevated PSA 01/31/2023    Family history of colon cancer 12/21/2022    Type 2 diabetes mellitus with stage 2 chronic kidney disease, without long-term current use of insulin 10/06/2022    CKD (chronic kidney disease) stage 2, GFR 60-89 ml/min 07/17/2021    Memory change 06/28/2021    Fall from bed 04/09/2021    Polyneuropathy associated with underlying disease 03/15/2021    Hx of acute gouty arthritis 12/14/2020    Parkinsonism 10/29/2020    Adjustment disorder with depressed mood 06/07/2020    Abnormal gait 05/14/2020    Cognitive change 05/14/2020    Abnormal involuntary movements 05/14/2020     Right hand shaking      Arachnoid cyst 04/28/2020     Followed in [facility]      REM sleep behavior disorder 04/28/2020    Internal strangulated hemorrhoids 04/24/2019    Epiretinal membrane, right 08/01/2018    Vitreomacular adhesion, right 01/29/2018    Iritis, lens-induced 01/08/2018    Glaucoma associated with ocular inflammation, left, indeterminate stage 12/15/2017    Retained lens material, left 12/04/2017    Senile nuclear sclerosis 11/30/2017    Other vitreous opacities, bilateral 10/19/2017    NS (nuclear sclerosis) 10/19/2017    Severe obstructive sleep  apnea 05/09/2017     Very severe sleep apnea. /hr.   Normal AHI on CPAP therapy.  Compliant with PAP and benefits from use. Follow up annually in the sleep clinic.        ILD (interstitial lung disease)     Hypersomnia with sleep apnea, unspecified      Dx updated per 2019 IMO Load      Itch 01/12/2016    Peripheral neuropathy 05/19/2015    Hyperlipidemia 05/19/2015    Palpitations 10/22/2014    BPH (benign prostatic hyperplasia)      Sees Dr. Ewing      HTN (hypertension)     DDD (degenerative disc disease), lumbar      s/p epidural steroids         Recent Labs Obtained:  Lab Results   Component Value Date    WBC 9.07 03/11/2024    HGB 16.1 03/11/2024    HCT 51.0 03/11/2024    MCV 88 03/11/2024     03/11/2024     03/11/2024    K 4.5 03/11/2024     (H) 03/11/2024    CREATININE 1.0 03/11/2024    EGFRNORACEVR >60.0 03/11/2024    HGBA1C 6.4 (H) 11/21/2023    HGBA1C 6.4 (H) 11/21/2023      Review of patient's allergies indicates:   Allergen Reactions    Avelox [moxifloxacin] Hives and Rash       Encounter Diagnosis   Name Primary?    Back pain, unspecified back location, unspecified back pain laterality, unspecified chronicity Yes        No orders of the defined types were placed in this encounter.     Medications Ordered This Encounter   Medications    meloxicam (MOBIC) 15 MG tablet     Sig: Take 1 tablet (15 mg total) by mouth daily as needed for Pain.     Dispense:  30 tablet     Refill:  1    tiZANidine (ZANAFLEX) 2 MG tablet     Sig: Take 1 tablet (2 mg total) by mouth every 8 (eight) hours as needed (muscle spasms).     Dispense:  60 tablet     Refill:  1      Rec continue PT has ablation scheduled.   E-Visit Time Tracking:    Day 1 Time (in minutes): 21    Total Time (in minutes): 21      274}

## 2024-03-25 ENCOUNTER — PATIENT MESSAGE (OUTPATIENT)
Dept: CARDIOLOGY | Facility: CLINIC | Age: 69
End: 2024-03-25
Payer: MEDICARE

## 2024-03-25 ENCOUNTER — PATIENT MESSAGE (OUTPATIENT)
Dept: FAMILY MEDICINE | Facility: CLINIC | Age: 69
End: 2024-03-25
Payer: MEDICARE

## 2024-03-28 ENCOUNTER — OFFICE VISIT (OUTPATIENT)
Dept: FAMILY MEDICINE | Facility: CLINIC | Age: 69
End: 2024-03-28
Payer: MEDICARE

## 2024-03-28 VITALS
OXYGEN SATURATION: 96 % | HEART RATE: 70 BPM | WEIGHT: 194 LBS | SYSTOLIC BLOOD PRESSURE: 122 MMHG | BODY MASS INDEX: 27.84 KG/M2 | DIASTOLIC BLOOD PRESSURE: 70 MMHG

## 2024-03-28 DIAGNOSIS — E78.2 MIXED HYPERLIPIDEMIA: ICD-10-CM

## 2024-03-28 DIAGNOSIS — I10 PRIMARY HYPERTENSION: ICD-10-CM

## 2024-03-28 DIAGNOSIS — N18.2 TYPE 2 DIABETES MELLITUS WITH STAGE 2 CHRONIC KIDNEY DISEASE, WITHOUT LONG-TERM CURRENT USE OF INSULIN: Primary | ICD-10-CM

## 2024-03-28 DIAGNOSIS — E11.22 TYPE 2 DIABETES MELLITUS WITH STAGE 2 CHRONIC KIDNEY DISEASE, WITHOUT LONG-TERM CURRENT USE OF INSULIN: Primary | ICD-10-CM

## 2024-03-28 PROCEDURE — 99214 OFFICE O/P EST MOD 30 MIN: CPT | Mod: PBBFAC,PO

## 2024-03-28 PROCEDURE — 99999 PR PBB SHADOW E&M-EST. PATIENT-LVL IV: CPT | Mod: PBBFAC,,,

## 2024-03-28 PROCEDURE — 99214 OFFICE O/P EST MOD 30 MIN: CPT | Mod: S$PBB,,,

## 2024-03-28 NOTE — PROGRESS NOTES
Name: Glenn Medel  MRN: [id number]  : 1955  PCP: Power Benton,     HPI    Patient follows with Dr. Benton, new to me. He presents for blood sugar concerns over the last few weeks. He is on 500mg metformin daily and tirzepatide 5mg. He was increased to 5mg tirzepatide per digital medicine program. Glucose over the last week since increase in his medication has been ranging from 150-200.     Review of Systems   Constitutional:  Negative for fatigue and fever.   Respiratory:  Negative for shortness of breath.    Cardiovascular:  Negative for chest pain.   Gastrointestinal:  Negative for nausea and vomiting.   Endocrine: Negative for polyphagia and polyuria.       Patient Active Problem List   Diagnosis    BPH (benign prostatic hyperplasia)    HTN (hypertension)    DDD (degenerative disc disease), lumbar    Palpitations    Peripheral neuropathy    Hyperlipidemia    Itch    Hypersomnia with sleep apnea, unspecified    ILD (interstitial lung disease)    Severe obstructive sleep apnea    Other vitreous opacities, bilateral    NS (nuclear sclerosis)    Senile nuclear sclerosis    Retained lens material, left    Glaucoma associated with ocular inflammation, left, indeterminate stage    Iritis, lens-induced    Vitreomacular adhesion, right    Epiretinal membrane, right    Internal strangulated hemorrhoids    Arachnoid cyst    REM sleep behavior disorder    Abnormal gait    Cognitive change    Abnormal involuntary movements    Adjustment disorder with depressed mood    Parkinsonism    Hx of acute gouty arthritis    Polyneuropathy associated with underlying disease    Fall from bed    Memory change    CKD (chronic kidney disease) stage 2, GFR 60-89 ml/min    Type 2 diabetes mellitus with stage 2 chronic kidney disease, without long-term current use of insulin    Family history of colon cancer    Elevated PSA    Central retinal vein occlusion, left eye, stable    Prostate cancer    Mild episode of recurrent  major depressive disorder    CHAPARRITA (generalized anxiety disorder)    Primary insomnia    Night terrors, adult       Vitals:    03/28/24 1340   BP: 122/70   Pulse: 70       Physical Exam  Constitutional:       General: He is not in acute distress.     Appearance: He is well-developed.   HENT:      Head: Normocephalic and atraumatic.      Right Ear: External ear normal.      Left Ear: External ear normal.   Eyes:      Conjunctiva/sclera: Conjunctivae normal.      Pupils: Pupils are equal, round, and reactive to light.   Neck:      Thyroid: No thyromegaly.   Cardiovascular:      Rate and Rhythm: Normal rate and regular rhythm.      Pulses: Normal pulses.      Heart sounds: Normal heart sounds, S1 normal and S2 normal.   Pulmonary:      Effort: Pulmonary effort is normal. No respiratory distress.      Breath sounds: Normal breath sounds.   Chest:      Chest wall: No tenderness.   Musculoskeletal:         General: No swelling or tenderness. Normal range of motion.      Cervical back: Normal range of motion and neck supple.   Skin:     General: Skin is warm and dry.      Coloration: Skin is not jaundiced or pale.   Neurological:      General: No focal deficit present.      Mental Status: He is alert and oriented to person, place, and time.      Cranial Nerves: No cranial nerve deficit.   Psychiatric:         Mood and Affect: Mood normal.         Behavior: Behavior normal.         1. Type 2 diabetes mellitus with stage 2 chronic kidney disease, without long-term current use of insulin  -     Hemoglobin A1C; Future; Expected date: 03/28/2024  -     Glucose, Fasting; Future; Expected date: 03/28/2024  -     COMPREHENSIVE METABOLIC PANEL; Future; Expected date: 03/28/2024   Continue with current medications. Keep tirzepatide at 5mg weekly.    Consider increase in metformin pending lab work    2. Primary hypertension   Stable continue with current medications    3. Mixed hyperlipidemia   Continue with statin therapy       Follow  up in 4 weeks       RACHEL Easton  03/28/2024

## 2024-03-30 ENCOUNTER — LAB VISIT (OUTPATIENT)
Dept: LAB | Facility: HOSPITAL | Age: 69
End: 2024-03-30
Payer: MEDICARE

## 2024-03-30 DIAGNOSIS — N18.2 TYPE 2 DIABETES MELLITUS WITH STAGE 2 CHRONIC KIDNEY DISEASE, WITHOUT LONG-TERM CURRENT USE OF INSULIN: ICD-10-CM

## 2024-03-30 DIAGNOSIS — E11.22 TYPE 2 DIABETES MELLITUS WITH STAGE 2 CHRONIC KIDNEY DISEASE, WITHOUT LONG-TERM CURRENT USE OF INSULIN: ICD-10-CM

## 2024-03-30 LAB
ALBUMIN SERPL BCP-MCNC: 3.6 G/DL (ref 3.5–5.2)
ALP SERPL-CCNC: 142 U/L (ref 55–135)
ALT SERPL W/O P-5'-P-CCNC: 5 U/L (ref 10–44)
ANION GAP SERPL CALC-SCNC: 8 MMOL/L (ref 8–16)
AST SERPL-CCNC: 22 U/L (ref 10–40)
BILIRUB SERPL-MCNC: 1.2 MG/DL (ref 0.1–1)
BUN SERPL-MCNC: 13 MG/DL (ref 8–23)
CALCIUM SERPL-MCNC: 9.3 MG/DL (ref 8.7–10.5)
CHLORIDE SERPL-SCNC: 104 MMOL/L (ref 95–110)
CO2 SERPL-SCNC: 26 MMOL/L (ref 23–29)
CREAT SERPL-MCNC: 1 MG/DL (ref 0.5–1.4)
EST. GFR  (NO RACE VARIABLE): >60 ML/MIN/1.73 M^2
ESTIMATED AVG GLUCOSE: 209 MG/DL (ref 68–131)
GLUCOSE SERPL-MCNC: 205 MG/DL (ref 70–110)
HBA1C MFR BLD: 8.9 % (ref 4–5.6)
POTASSIUM SERPL-SCNC: 5.2 MMOL/L (ref 3.5–5.1)
PROT SERPL-MCNC: 6.5 G/DL (ref 6–8.4)
SODIUM SERPL-SCNC: 138 MMOL/L (ref 136–145)

## 2024-03-30 PROCEDURE — 83036 HEMOGLOBIN GLYCOSYLATED A1C: CPT

## 2024-03-30 PROCEDURE — 80053 COMPREHEN METABOLIC PANEL: CPT

## 2024-03-30 PROCEDURE — 36415 COLL VENOUS BLD VENIPUNCTURE: CPT | Mod: PO

## 2024-04-01 DIAGNOSIS — N18.2 TYPE 2 DIABETES MELLITUS WITH STAGE 2 CHRONIC KIDNEY DISEASE, WITHOUT LONG-TERM CURRENT USE OF INSULIN: Primary | ICD-10-CM

## 2024-04-01 DIAGNOSIS — E11.22 TYPE 2 DIABETES MELLITUS WITH STAGE 2 CHRONIC KIDNEY DISEASE, WITHOUT LONG-TERM CURRENT USE OF INSULIN: Primary | ICD-10-CM

## 2024-04-01 DIAGNOSIS — E87.5 HYPERKALEMIA: Primary | ICD-10-CM

## 2024-04-03 ENCOUNTER — OFFICE VISIT (OUTPATIENT)
Dept: PSYCHIATRY | Facility: CLINIC | Age: 69
End: 2024-04-03
Payer: MEDICARE

## 2024-04-03 VITALS
DIASTOLIC BLOOD PRESSURE: 76 MMHG | RESPIRATION RATE: 20 BRPM | WEIGHT: 193.44 LBS | SYSTOLIC BLOOD PRESSURE: 130 MMHG | BODY MASS INDEX: 27.08 KG/M2 | TEMPERATURE: 98 F | HEART RATE: 68 BPM | HEIGHT: 71 IN

## 2024-04-03 DIAGNOSIS — F51.01 PRIMARY INSOMNIA: ICD-10-CM

## 2024-04-03 DIAGNOSIS — F41.1 GAD (GENERALIZED ANXIETY DISORDER): ICD-10-CM

## 2024-04-03 DIAGNOSIS — F51.4 NIGHT TERRORS, ADULT: ICD-10-CM

## 2024-04-03 DIAGNOSIS — F33.0 MILD EPISODE OF RECURRENT MAJOR DEPRESSIVE DISORDER: Primary | ICD-10-CM

## 2024-04-03 PROCEDURE — 99214 OFFICE O/P EST MOD 30 MIN: CPT | Mod: S$PBB,,,

## 2024-04-03 PROCEDURE — 99999 PR PBB SHADOW E&M-EST. PATIENT-LVL IV: CPT | Mod: PBBFAC,,,

## 2024-04-03 PROCEDURE — 90833 PSYTX W PT W E/M 30 MIN: CPT | Mod: ,,,

## 2024-04-03 PROCEDURE — 99214 OFFICE O/P EST MOD 30 MIN: CPT | Mod: PBBFAC,PO
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
## 2024-04-04 ENCOUNTER — OFFICE VISIT (OUTPATIENT)
Dept: ORTHOPEDICS | Facility: CLINIC | Age: 69
End: 2024-04-04
Payer: MEDICARE

## 2024-04-04 ENCOUNTER — PATIENT MESSAGE (OUTPATIENT)
Dept: PAIN MEDICINE | Facility: CLINIC | Age: 69
End: 2024-04-04
Payer: MEDICARE

## 2024-04-04 VITALS — WEIGHT: 189 LBS | HEIGHT: 71 IN | BODY MASS INDEX: 26.46 KG/M2

## 2024-04-04 DIAGNOSIS — M75.41 IMPINGEMENT SYNDROME, SHOULDER, RIGHT: Primary | ICD-10-CM

## 2024-04-04 PROCEDURE — 99499 UNLISTED E&M SERVICE: CPT | Mod: S$PBB,,, | Performed by: ORTHOPAEDIC SURGERY

## 2024-04-04 PROCEDURE — 99213 OFFICE O/P EST LOW 20 MIN: CPT | Mod: PBBFAC,PO | Performed by: ORTHOPAEDIC SURGERY

## 2024-04-04 PROCEDURE — 99999 PR PBB SHADOW E&M-EST. PATIENT-LVL III: CPT | Mod: PBBFAC,,, | Performed by: ORTHOPAEDIC SURGERY

## 2024-04-04 PROCEDURE — 99999PBSHW PR PBB SHADOW TECHNICAL ONLY FILED TO HB: Mod: PBBFAC,,,

## 2024-04-04 PROCEDURE — 20610 DRAIN/INJ JOINT/BURSA W/O US: CPT | Mod: PBBFAC,PO,RT | Performed by: ORTHOPAEDIC SURGERY

## 2024-04-04 RX ADMIN — TRIAMCINOLONE ACETONIDE 40 MG: 40 INJECTION, SUSPENSION INTRA-ARTICULAR; INTRAMUSCULAR at 08:04

## 2024-04-09 ENCOUNTER — HOSPITAL ENCOUNTER (OUTPATIENT)
Dept: RADIOLOGY | Facility: HOSPITAL | Age: 69
Discharge: HOME | End: 2024-04-09
Attending: ANESTHESIOLOGY | Admitting: ANESTHESIOLOGY
Payer: MEDICARE

## 2024-04-09 ENCOUNTER — HOSPITAL ENCOUNTER (OUTPATIENT)
Facility: HOSPITAL | Age: 69
Discharge: HOME | End: 2024-04-09
Attending: ANESTHESIOLOGY | Admitting: ANESTHESIOLOGY
Payer: MEDICARE

## 2024-04-09 VITALS
HEART RATE: 57 BPM | SYSTOLIC BLOOD PRESSURE: 152 MMHG | DIASTOLIC BLOOD PRESSURE: 86 MMHG | OXYGEN SATURATION: 97 % | RESPIRATION RATE: 18 BRPM | HEIGHT: 71 IN | TEMPERATURE: 98 F | BODY MASS INDEX: 26.45 KG/M2 | WEIGHT: 188.94 LBS

## 2024-04-09 DIAGNOSIS — M54.50 LOWER BACK PAIN: ICD-10-CM

## 2024-04-09 DIAGNOSIS — M47.816 SPONDYLOSIS OF LUMBAR JOINT: ICD-10-CM

## 2024-04-09 DIAGNOSIS — M47.816 LUMBAR SPONDYLOSIS: Primary | ICD-10-CM

## 2024-04-09 LAB — GLUCOSE SERPL-MCNC: 197 MG/DL (ref 70–110)

## 2024-04-09 PROCEDURE — 63600175 PHARM REV CODE 636 W HCPCS: Mod: JZ,JG,PO | Performed by: ANESTHESIOLOGY

## 2024-04-09 PROCEDURE — 64493 INJ PARAVERT F JNT L/S 1 LEV: CPT | Mod: 50,KX,, | Performed by: ANESTHESIOLOGY

## 2024-04-09 PROCEDURE — 76000 FLUOROSCOPY <1 HR PHYS/QHP: CPT | Mod: TC,PO

## 2024-04-09 PROCEDURE — 64493 INJ PARAVERT F JNT L/S 1 LEV: CPT | Mod: 50,PO | Performed by: ANESTHESIOLOGY

## 2024-04-09 PROCEDURE — 64494 INJ PARAVERT F JNT L/S 2 LEV: CPT | Mod: 50,PO | Performed by: ANESTHESIOLOGY

## 2024-04-09 PROCEDURE — 25000003 PHARM REV CODE 250: Mod: PO | Performed by: ANESTHESIOLOGY

## 2024-04-09 PROCEDURE — 64494 INJ PARAVERT F JNT L/S 2 LEV: CPT | Mod: 50,KX,, | Performed by: ANESTHESIOLOGY

## 2024-04-09 RX ORDER — LIDOCAINE HYDROCHLORIDE 10 MG/ML
INJECTION, SOLUTION EPIDURAL; INFILTRATION; INTRACAUDAL; PERINEURAL
Status: DISCONTINUED | OUTPATIENT
Start: 2024-04-09 | End: 2024-04-09 | Stop reason: HOSPADM

## 2024-04-09 RX ORDER — BUPIVACAINE HYDROCHLORIDE 2.5 MG/ML
INJECTION, SOLUTION EPIDURAL; INFILTRATION; INTRACAUDAL
Status: DISCONTINUED | OUTPATIENT
Start: 2024-04-09 | End: 2024-04-09 | Stop reason: HOSPADM

## 2024-04-09 RX ORDER — MIDAZOLAM HYDROCHLORIDE 1 MG/ML
INJECTION INTRAMUSCULAR; INTRAVENOUS
Status: DISCONTINUED | OUTPATIENT
Start: 2024-04-09 | End: 2024-04-09 | Stop reason: HOSPADM

## 2024-04-09 RX ORDER — SODIUM CHLORIDE, SODIUM LACTATE, POTASSIUM CHLORIDE, CALCIUM CHLORIDE 600; 310; 30; 20 MG/100ML; MG/100ML; MG/100ML; MG/100ML
INJECTION, SOLUTION INTRAVENOUS CONTINUOUS
Status: DISCONTINUED | OUTPATIENT
Start: 2024-04-09 | End: 2024-04-09 | Stop reason: HOSPADM

## 2024-04-09 NOTE — DISCHARGE SUMMARY
[facility]  Discharge Note  Short Stay    Admit Date: 4/9/2024    Discharge Date: 4/9/2024    Attending Physician: Everett Taveras     Discharge Provider: Everett Taveras    Diagnoses:  There are no hospital problems to display for this patient.      Discharged Condition: Good    Final Diagnoses: Lumbar spondylosis [M47.816]    Disposition: Home or Self Care    Hospital Course: No complications, uneventful    Outcome of Hospitalization, Treatment, Procedure, or Surgery:  Patient was admitted for outpatient interventional pain management procedure. The patient tolerated the procedure well with no complications.    Follow up/Patient Instructions:  Follow up as scheduled in Pain Management office in 2-3 weeks.  Patient has received instructions and follow up date and time.    Medications:  Continue previous medications    Discharge Procedure Orders   Notify your health care provider if you experience any of the following:  temperature >100.4     Notify your health care provider if you experience any of the following:  persistent nausea and vomiting or diarrhea     Notify your health care provider if you experience any of the following:  severe uncontrolled pain     Notify your health care provider if you experience any of the following:  redness, tenderness, or signs of infection (pain, swelling, redness, odor or green/yellow discharge around incision site)     Notify your health care provider if you experience any of the following:  difficulty breathing or increased cough     Notify your health care provider if you experience any of the following:  severe persistent headache     Notify your health care provider if you experience any of the following:  worsening rash     Notify your health care provider if you experience any of the following:  persistent dizziness, light-headedness, or visual disturbances     Notify your health care provider if you experience any of the following:  increased confusion or  weakness     Activity as tolerated

## 2024-04-09 NOTE — H&P (VIEW-ONLY)
[facility] - Surgery  History & Physical - Short Stay  Pain Management       SUBJECTIVE:     Procedure: Procedure(s) (LRB):  Block-nerve-medial branch-lumbar-L4/5-L5/S1 (Bilateral)    Chief Complaint/Reason for Admission:  Lumbar spondylosis [M47.816]    Facility-Administered Medications Prior to Admission   Medication    metronidazole IVPB 500 mg     PTA Medications   Medication Sig    allopurinoL (ZYLOPRIM) 300 MG tablet TAKE 1 TABLET(300 MG) BY MOUTH EVERY DAY    atorvastatin (LIPITOR) 20 MG tablet TAKE 1 TABLET(20 MG) BY MOUTH EVERY DAY (Patient taking differently: Take 20 mg by mouth every evening.)    brimonidine 0.2% (ALPHAGAN) 0.2 % Drop INSTILL 1 DROP IN BOTH EYES TWICE DAILY    carbidopa-levodopa  mg (SINEMET)  mg per tablet TAKE 2 TABLETS BY MOUTH FIVE TIMES DAILY    cholecalciferol, vitamin D3, (D3-2000 ORAL) Take by mouth once daily.    clonazePAM (KLONOPIN) 1 MG tablet TAKE 2 TABLETS(2 MG) BY MOUTH EVERY EVENING    dorzolamide (TRUSOPT) 2 % ophthalmic solution INSTILL 1 DROP IN BOTH EYES TWICE DAILY    ketoconazole (NIZORAL) 2 % shampoo Apply topically.    metFORMIN (GLUCOPHAGE-XR) 500 MG ER 24hr tablet TAKE 1 TABLET(500 MG) BY MOUTH EVERY DAY    prazosin (MINIPRESS) 1 MG Cap TAKE 1 CAPSULE(1 MG) BY MOUTH EVERY EVENING    ramelteon (ROZEREM) 8 mg tablet Take 1 tablet (8 mg total) by mouth every evening.    rasagiline (AZILECT) 1 mg Tab TAKE 1 TABLET(1 MG) BY MOUTH EVERY EVENING    tirzepatide 5 mg/0.5 mL PnIj Inject 5 mg into the skin every 7 days. (Increased dose)    cyanocobalamin (VITAMIN B-12) 1000 MCG tablet Take 100 mcg by mouth once daily.    meloxicam (MOBIC) 15 MG tablet Take 1 tablet (15 mg total) by mouth daily as needed for Pain. (Patient not taking: Reported on 4/8/2024)    tiZANidine (ZANAFLEX) 2 MG tablet Take 1 tablet (2 mg total) by mouth every 8 (eight) hours as needed (muscle spasms). (Patient not taking: Reported on 4/8/2024)       Review of patient's allergies indicates:    Allergen Reactions    Avelox [moxifloxacin] Hives and Rash       Past Medical History:   Diagnosis Date    BPH (benign prostatic hyperplasia)     Cataract     done ou    Chronic kidney disease, stage 3 02/25/2015    DDD (degenerative disc disease), lumbar     s/p epidural steroids     Diabetes mellitus     type 2    Elevated PSA     Glaucoma     OU    HTN (hypertension)     Iritis, lens-induced 01/08/2018    Obesity     Parkinson's disease     Sleep apnea     uses cpap     Past Surgical History:   Procedure Laterality Date    CATARACT EXTRACTION W/  INTRAOCULAR LENS IMPLANT Left 11/30/2017    CATARACT EXTRACTION W/  INTRAOCULAR LENS IMPLANT Right 12/11/2018    Dr Garcia    CATARACT EXTRACTION W/  INTRAOCULAR LENS IMPLANT Right 12/11/2018    Procedure: EXTRACTION, CATARACT, WITH IOL INSERTION;  Surgeon: Damaris Garcia MD;  Location: [facility] OR;  Service: Ophthalmology;  Laterality: Right;    COLONOSCOPY N/A 9/18/2017    Procedure: COLONOSCOPY;  Surgeon: Paul Feliz Jr., MD;  Location: [facility];  Service: Endoscopy;  Laterality: N/A;    COLONOSCOPY N/A 12/21/2022    Procedure: COLONOSCOPY;  Surgeon: Paul eFliz Jr., MD;  Location: [facility] ENDO;  Service: Endoscopy;  Laterality: N/A;    COLONOSCOPY W/ POLYPECTOMY  04/13/2010    YARELI.   One 1 cm polyp in the sigmoid colon.  Internal hemorrhoids.    COSMETIC SURGERY      DIGITAL RECTAL EXAMINATION UNDER ANESTHESIA N/A 7/23/2019    Procedure: EXAM UNDER ANESTHESIA, DIGITAL, RECTUM;  Surgeon: Kaz Keating MD;  Location: [facility] OR;  Service: General;  Laterality: N/A;    EXAMINATION UNDER ANESTHESIA N/A 4/24/2019    Procedure: EXAM UNDER ANESTHESIA;  Surgeon: Kaz Keating MD;  Location: [facility] OR;  Service: General;  Laterality: N/A;    EXCISIONAL HEMORRHOIDECTOMY N/A 4/24/2019    Procedure: HEMORRHOIDECTOMY (lithotomy);  Surgeon: Kaz Keating MD;  Location: [facility] OR;  Service: General;  Laterality: N/A;    GANGLION CYST EXCISION Left     INJECTION  OF ANESTHETIC AGENT AROUND PUDENDAL NERVE N/A 4/24/2019    Procedure: BLOCK, NERVE, PUDENDAL;  Surgeon: Kaz Keating MD;  Location: [facility] OR;  Service: General;  Laterality: N/A;    INJECTION OF ANESTHETIC AGENT AROUND PUDENDAL NERVE N/A 7/23/2019    Procedure: BLOCK, NERVE, PUDENDAL;  Surgeon: Kaz Keating MD;  Location: [facility] OR;  Service: General;  Laterality: N/A;    REPAIR OF RETINAL DETACHMENT WITH VITRECTOMY Right 8/1/2018    Procedure: REPAIR, RETINAL DETACHMENT, WITH VITRECTOMY;  Surgeon: SAJAN Pulido MD;  Location: [facility] OR [address] [place] [address];  Service: Ophthalmology;  Laterality: Right;  35 min    TONSILLECTOMY      TRANSRECTAL BIOPSY OF PROSTATE WITH ULTRASOUND GUIDANCE N/A 1/31/2023    Procedure: BIOPSY, PROSTATE, RECTAL APPROACH, WITH US GUIDANCE;  Surgeon: MARIZA Donahue MD;  Location: [facility] OR;  Service: Urology;  Laterality: N/A;    TRIGGER FINGER RELEASE Right     X 2    uro lift      for enlarged prostate     Family History   Problem Relation Age of Onset    Hypertension Mother     Diabetes Mother     Stroke Mother     Cancer Mother         colon    Kidney disease Mother     Heart disease Father     Diabetes Father     Colon cancer Father     Cancer Father         colon    Cataracts Father     No Known Problems Sister     Heart disease Brother 62    Kidney disease Brother     Dementia Brother     Abnormal EKG Brother     No Known Problems Maternal Aunt     No Known Problems Maternal Uncle     No Known Problems Paternal Aunt     No Known Problems Paternal Uncle     Glaucoma Maternal Grandmother     No Known Problems Maternal Grandfather     No Known Problems Paternal Grandmother     No Known Problems Paternal Grandfather     Amblyopia Neg Hx     Blindness Neg Hx     Macular degeneration Neg Hx     Retinal detachment Neg Hx     Strabismus Neg Hx     Thyroid disease Neg Hx     Parkinsonism Neg Hx      Social History     Tobacco Use    Smoking status: Never    Smokeless tobacco: Never    Substance Use Topics    Alcohol use: Yes     Comment: occasional, no alcohol 72 hours prior to sx    Drug use: No        Current Facility-Administered Medications:     lactated ringers infusion, , Intravenous, Continuous, Everett Taveras MD    Facility-Administered Medications Ordered in Other Encounters:     diphenhydrAMINE capsule 25 mg, 25 mg, Oral, Q6H PRN, Leonila Richardson MD    gadoterate meglumine injection 20 mL, 20 mL, Intravenous, ONCE PRN, MARIZA Donahue MD    Review of Systems:  General ROS: negative for - fever  Dermatological ROS: negative for rash    OBJECTIVE:     Vital Signs (Most Recent):  Temp: 98.2 °F (36.8 °C) (04/09/24 1340)  Pulse: (!) 50 (04/09/24 1340)  Resp: 18 (04/09/24 1340)  BP: (!) 175/80 (04/09/24 1340)  SpO2: 99 % (04/09/24 1340)  Body mass index is 26.35 kg/m².    Physical Exam:  General appearance - alert, well appearing, and in no distress  Mental status - AOx3  Eyes - pupils equal and reactive, extraocular eye movements intact  Heart - normal rate, regular rhythm, normal S1, S2, no murmurs, rubs, clicks or gallops  Chest - clear to auscultation, no wheezes, rales or rhonchi, symmetric air entry  Abdomen - soft, nontender, nondistended, no masses or organomegaly  Neurological - alert, oriented, normal speech, no focal findings or movement disorder noted  Extremities - peripheral pulses normal, no pedal edema, no clubbing or cyanosis      ASSESSMENT/PLAN:     There are no hospital problems to display for this patient.     No changes since seen on 3/4/24.  We will proceed with 1st diagnostic bilateral L4/5 and L5/S1 medial branch blocks.   The risks and benefits of this intervention, and alternative therapies were discussed with the patient.  The discussion of risks included infection, bleeding, need for additional procedures or surgery, nerve damage, paralysis, adverse medication reaction(s), stroke, and/or death.  Questions regarding the procedure, risks, expected outcome,  and possible side effects were solicited and answered to the patient's satisfaction.   wishes to proceed with the injection.  Verbal and written consent were verified.  ASA 3, MP II      Proceed with intervention as scheduled.    Everett Taveras M.D.  Interventional Pain Medicine / Anesthesiology

## 2024-04-09 NOTE — H&P
[facility] - Surgery  History & Physical - Short Stay  Pain Management       SUBJECTIVE:     Procedure: Procedure(s) (LRB):  Block-nerve-medial branch-lumbar-L4/5-L5/S1 (Bilateral)    Chief Complaint/Reason for Admission:  Lumbar spondylosis [M47.816]    Facility-Administered Medications Prior to Admission   Medication    metronidazole IVPB 500 mg     PTA Medications   Medication Sig    allopurinoL (ZYLOPRIM) 300 MG tablet TAKE 1 TABLET(300 MG) BY MOUTH EVERY DAY    atorvastatin (LIPITOR) 20 MG tablet TAKE 1 TABLET(20 MG) BY MOUTH EVERY DAY (Patient taking differently: Take 20 mg by mouth every evening.)    brimonidine 0.2% (ALPHAGAN) 0.2 % Drop INSTILL 1 DROP IN BOTH EYES TWICE DAILY    carbidopa-levodopa  mg (SINEMET)  mg per tablet TAKE 2 TABLETS BY MOUTH FIVE TIMES DAILY    cholecalciferol, vitamin D3, (D3-2000 ORAL) Take by mouth once daily.    clonazePAM (KLONOPIN) 1 MG tablet TAKE 2 TABLETS(2 MG) BY MOUTH EVERY EVENING    dorzolamide (TRUSOPT) 2 % ophthalmic solution INSTILL 1 DROP IN BOTH EYES TWICE DAILY    ketoconazole (NIZORAL) 2 % shampoo Apply topically.    metFORMIN (GLUCOPHAGE-XR) 500 MG ER 24hr tablet TAKE 1 TABLET(500 MG) BY MOUTH EVERY DAY    prazosin (MINIPRESS) 1 MG Cap TAKE 1 CAPSULE(1 MG) BY MOUTH EVERY EVENING    ramelteon (ROZEREM) 8 mg tablet Take 1 tablet (8 mg total) by mouth every evening.    rasagiline (AZILECT) 1 mg Tab TAKE 1 TABLET(1 MG) BY MOUTH EVERY EVENING    tirzepatide 5 mg/0.5 mL PnIj Inject 5 mg into the skin every 7 days. (Increased dose)    cyanocobalamin (VITAMIN B-12) 1000 MCG tablet Take 100 mcg by mouth once daily.    meloxicam (MOBIC) 15 MG tablet Take 1 tablet (15 mg total) by mouth daily as needed for Pain. (Patient not taking: Reported on 4/8/2024)    tiZANidine (ZANAFLEX) 2 MG tablet Take 1 tablet (2 mg total) by mouth every 8 (eight) hours as needed (muscle spasms). (Patient not taking: Reported on 4/8/2024)       Review of patient's allergies indicates:    Allergen Reactions    Avelox [moxifloxacin] Hives and Rash       Past Medical History:   Diagnosis Date    BPH (benign prostatic hyperplasia)     Cataract     done ou    Chronic kidney disease, stage 3 02/25/2015    DDD (degenerative disc disease), lumbar     s/p epidural steroids     Diabetes mellitus     type 2    Elevated PSA     Glaucoma     OU    HTN (hypertension)     Iritis, lens-induced 01/08/2018    Obesity     Parkinson's disease     Sleep apnea     uses cpap     Past Surgical History:   Procedure Laterality Date    CATARACT EXTRACTION W/  INTRAOCULAR LENS IMPLANT Left 11/30/2017    CATARACT EXTRACTION W/  INTRAOCULAR LENS IMPLANT Right 12/11/2018    Dr Garcia    CATARACT EXTRACTION W/  INTRAOCULAR LENS IMPLANT Right 12/11/2018    Procedure: EXTRACTION, CATARACT, WITH IOL INSERTION;  Surgeon: Damaris Garcia MD;  Location: [facility] OR;  Service: Ophthalmology;  Laterality: Right;    COLONOSCOPY N/A 9/18/2017    Procedure: COLONOSCOPY;  Surgeon: Paul Feliz Jr., MD;  Location: [facility];  Service: Endoscopy;  Laterality: N/A;    COLONOSCOPY N/A 12/21/2022    Procedure: COLONOSCOPY;  Surgeon: Paul Feliz Jr., MD;  Location: [facility] ENDO;  Service: Endoscopy;  Laterality: N/A;    COLONOSCOPY W/ POLYPECTOMY  04/13/2010    YARELI.   One 1 cm polyp in the sigmoid colon.  Internal hemorrhoids.    COSMETIC SURGERY      DIGITAL RECTAL EXAMINATION UNDER ANESTHESIA N/A 7/23/2019    Procedure: EXAM UNDER ANESTHESIA, DIGITAL, RECTUM;  Surgeon: Kaz Keating MD;  Location: [facility] OR;  Service: General;  Laterality: N/A;    EXAMINATION UNDER ANESTHESIA N/A 4/24/2019    Procedure: EXAM UNDER ANESTHESIA;  Surgeon: Kaz Keating MD;  Location: [facility] OR;  Service: General;  Laterality: N/A;    EXCISIONAL HEMORRHOIDECTOMY N/A 4/24/2019    Procedure: HEMORRHOIDECTOMY (lithotomy);  Surgeon: Kaz Keating MD;  Location: [facility] OR;  Service: General;  Laterality: N/A;    GANGLION CYST EXCISION Left     INJECTION  OF ANESTHETIC AGENT AROUND PUDENDAL NERVE N/A 4/24/2019    Procedure: BLOCK, NERVE, PUDENDAL;  Surgeon: Kaz Keating MD;  Location: [facility] OR;  Service: General;  Laterality: N/A;    INJECTION OF ANESTHETIC AGENT AROUND PUDENDAL NERVE N/A 7/23/2019    Procedure: BLOCK, NERVE, PUDENDAL;  Surgeon: Kaz Keating MD;  Location: [facility] OR;  Service: General;  Laterality: N/A;    REPAIR OF RETINAL DETACHMENT WITH VITRECTOMY Right 8/1/2018    Procedure: REPAIR, RETINAL DETACHMENT, WITH VITRECTOMY;  Surgeon: SAJAN Pulido MD;  Location: [facility] OR [address] [place] [address];  Service: Ophthalmology;  Laterality: Right;  35 min    TONSILLECTOMY      TRANSRECTAL BIOPSY OF PROSTATE WITH ULTRASOUND GUIDANCE N/A 1/31/2023    Procedure: BIOPSY, PROSTATE, RECTAL APPROACH, WITH US GUIDANCE;  Surgeon: MARIZA Donahue MD;  Location: [facility] OR;  Service: Urology;  Laterality: N/A;    TRIGGER FINGER RELEASE Right     X 2    uro lift      for enlarged prostate     Family History   Problem Relation Age of Onset    Hypertension Mother     Diabetes Mother     Stroke Mother     Cancer Mother         colon    Kidney disease Mother     Heart disease Father     Diabetes Father     Colon cancer Father     Cancer Father         colon    Cataracts Father     No Known Problems Sister     Heart disease Brother 62    Kidney disease Brother     Dementia Brother     Abnormal EKG Brother     No Known Problems Maternal Aunt     No Known Problems Maternal Uncle     No Known Problems Paternal Aunt     No Known Problems Paternal Uncle     Glaucoma Maternal Grandmother     No Known Problems Maternal Grandfather     No Known Problems Paternal Grandmother     No Known Problems Paternal Grandfather     Amblyopia Neg Hx     Blindness Neg Hx     Macular degeneration Neg Hx     Retinal detachment Neg Hx     Strabismus Neg Hx     Thyroid disease Neg Hx     Parkinsonism Neg Hx      Social History     Tobacco Use    Smoking status: Never    Smokeless tobacco: Never    Substance Use Topics    Alcohol use: Yes     Comment: occasional, no alcohol 72 hours prior to sx    Drug use: No        Current Facility-Administered Medications:     lactated ringers infusion, , Intravenous, Continuous, Everett Taveras MD    Facility-Administered Medications Ordered in Other Encounters:     diphenhydrAMINE capsule 25 mg, 25 mg, Oral, Q6H PRN, Leonila Richardson MD    gadoterate meglumine injection 20 mL, 20 mL, Intravenous, ONCE PRN, MARIZA Donahue MD    Review of Systems:  General ROS: negative for - fever  Dermatological ROS: negative for rash    OBJECTIVE:     Vital Signs (Most Recent):  Temp: 98.2 °F (36.8 °C) (04/09/24 1340)  Pulse: (!) 50 (04/09/24 1340)  Resp: 18 (04/09/24 1340)  BP: (!) 175/80 (04/09/24 1340)  SpO2: 99 % (04/09/24 1340)  Body mass index is 26.35 kg/m².    Physical Exam:  General appearance - alert, well appearing, and in no distress  Mental status - AOx3  Eyes - pupils equal and reactive, extraocular eye movements intact  Heart - normal rate, regular rhythm, normal S1, S2, no murmurs, rubs, clicks or gallops  Chest - clear to auscultation, no wheezes, rales or rhonchi, symmetric air entry  Abdomen - soft, nontender, nondistended, no masses or organomegaly  Neurological - alert, oriented, normal speech, no focal findings or movement disorder noted  Extremities - peripheral pulses normal, no pedal edema, no clubbing or cyanosis      ASSESSMENT/PLAN:     There are no hospital problems to display for this patient.     No changes since seen on 3/4/24.  We will proceed with 1st diagnostic bilateral L4/5 and L5/S1 medial branch blocks.   The risks and benefits of this intervention, and alternative therapies were discussed with the patient.  The discussion of risks included infection, bleeding, need for additional procedures or surgery, nerve damage, paralysis, adverse medication reaction(s), stroke, and/or death.  Questions regarding the procedure, risks, expected outcome,  and possible side effects were solicited and answered to the patient's satisfaction.   wishes to proceed with the injection.  Verbal and written consent were verified.  ASA 3, MP II      Proceed with intervention as scheduled.    Everett Taveras M.D.  Interventional Pain Medicine / Anesthesiology

## 2024-04-09 NOTE — OP NOTE
Procedure Note    Procedure Date: 4/9/2024    Procedure Performed:  bilateral Diagnostic Lumbar Medial Branch @ L4/5 and L5/S1, with Fluoroscopic Guidance    Indications:   Glenn Medel has chronic, moderate to severe axial lower back pain present for over the past 3 months that has failed to respond to physical therapy and PT-directed home exercises. There is no untreated radiculopathy or claudication present.  The patient has clinical and radiologic findings suggestive of facet mediated pain.     Pre-op diagnosis: Lumbar Spondylosis    Post-op diagnosis: same    Physician: Everett Taveras MD    IV Anxiolysis medications: versed 1mg    Medications injected: Bupivacaine 0.25%, 0.5 cc per level    Local anesthetic used: 1% Lidocaine, 4 ml    Estimated Blood Loss: none    Complications:  none    Technique:  The patient was interviewed in the holding area and Risks/Benefits were discussed, including, but not limited to, the possibility of new or different pain, bleeding or infection.   All questions were answered.  The patient understood and accepted risks.  Consent was reviewed.  A time out was taken to identify the patient, procedure and side of the procedure. The patient was placed in a prone position, then prepped and draped in the usual sterile fashion using ChloraPrep x2 and sterile towels.  The levels were determined under fluoroscopic guidance and then marked.  1% Lidocaine was given by raising a wheal at the skin over each site and then infiltrated approximately 2cm deeper.  A 25-gauge 3.5 inch needle was introduced to the anatomic location of the L3-5 medial branch nerves on the bilateral side.  Then, after negative aspiration, 0.5 cc of Bupivacaine 0.25% was injected @ each level.  The patient tolerated the procedure well.  The patient tolerated the procedure well and was transferred to the [facility] in stable condition.  The patient was monitored after the procedure.  Patient was given post procedure and  discharge instructions to follow at home.  The patient was discharged in a stable condition.    Event Time In   In Facility 1313   In Pre-Procedure 1329   Physician Available    Anesthesia Available    Pre-Op: Bedside Procedure Start    Pre-Op: Bedside Procedure Stop    Pre-Procedure Complete 1344   Out of Pre-Procedure    Anesthesia Start    Anesthesia Start Data Collection    Setup Start    Setup Complete    In Room 1413   Prep Start    Procedure Prep Complete    MD notified pt. ready    Procedure Start 1419   Procedure Closing    Emergence    Procedure Finish 1423   Sedation Start 1412   Scope In    Extent Reached    Scope Out    Sedation End 1426   Out of Room 1426   Cleanup Start    Cleanup Complete    Cosmetic Start    Cosmetic Stop    Pain Mgmt In Room    Pain Mgmt Out Room    In Recovery    Anesthesia Finish    Bedside Procedure Start    Bedside Procedure Stop    Recovery Care Complete    Out of Recovery    To Phase II    In Phase II    Pain Mgmt Recovery Start 1427   Pain Mgmt Recovery Stop    Obs Rec Start    Obs Rec Stop    Phase II Care Complete    Out of Phase II    Procedural Care Complete    Discharge    Pain Follow Up Needed    Pain Follow Up Complete

## 2024-04-11 RX ORDER — TRIAMCINOLONE ACETONIDE 40 MG/ML
40 INJECTION, SUSPENSION INTRA-ARTICULAR; INTRAMUSCULAR
Status: DISCONTINUED | OUTPATIENT
Start: 2024-04-04 | End: 2024-04-11 | Stop reason: HOSPADM

## 2024-04-11 NOTE — PROCEDURES
Large Joint Aspiration/Injection: R subacromial bursa    Date/Time: 4/4/2024 8:45 AM    Performed by: Lukas Campbell MD  Authorized by: Lukas Campbell MD    Consent Done?:  Yes (Verbal)  Indications:  Pain  Timeout: prior to procedure the correct patient, procedure, and site was verified    Prep: patient was prepped and draped in usual sterile fashion      Local anesthesia used?: Yes    Local anesthetic:  Lidocaine 1% without epinephrine  Anesthetic total (ml):  5      Details:  Needle Size:  22 G  Ultrasonic Guidance for needle placement?: No    Approach:  Posterior  Location:  Shoulder  Site:  R subacromial bursa  Medications:  40 mg triamcinolone acetonide 40 mg/mL  Patient tolerance:  Patient tolerated the procedure well with no immediate complications     Message sent to patient    Dear Danielle,  Please call the office to schedule an appointment for your physical, at that time we can ask Dr. Beavers if she would like lab work done prior to your appointment.  Thank you,  Nursing staff  [phone]

## 2024-04-11 NOTE — PROGRESS NOTES
Chief Complaint   Patient presents with    Right Shoulder - Pain       HPI:    This is a 68 y.o. who presents today complaining of right shoulder pain for 1 weeks after no interval trauma. Pain is dull. No numbness or tingling. No associated signs or symptoms.      Past Medical History:   Diagnosis Date    BPH (benign prostatic hyperplasia)     Cataract     done ou    Chronic kidney disease, stage 3 02/25/2015    DDD (degenerative disc disease), lumbar     s/p epidural steroids     Diabetes mellitus     type 2    Elevated PSA     Glaucoma     OU    HTN (hypertension)     Iritis, lens-induced 01/08/2018    Obesity     Parkinson's disease     Sleep apnea     uses cpap      Past Surgical History:   Procedure Laterality Date    CATARACT EXTRACTION W/  INTRAOCULAR LENS IMPLANT Left 11/30/2017    CATARACT EXTRACTION W/  INTRAOCULAR LENS IMPLANT Right 12/11/2018    Dr Garcia    CATARACT EXTRACTION W/  INTRAOCULAR LENS IMPLANT Right 12/11/2018    Procedure: EXTRACTION, CATARACT, WITH IOL INSERTION;  Surgeon: Damaris Garcia MD;  Location: [facility] OR;  Service: Ophthalmology;  Laterality: Right;    COLONOSCOPY N/A 9/18/2017    Procedure: COLONOSCOPY;  Surgeon: Paul Feliz Jr., MD;  Location: [facility] ENDO;  Service: Endoscopy;  Laterality: N/A;    COLONOSCOPY N/A 12/21/2022    Procedure: COLONOSCOPY;  Surgeon: Pual Feliz Jr., MD;  Location: [facility] ENDO;  Service: Endoscopy;  Laterality: N/A;    COLONOSCOPY W/ POLYPECTOMY  04/13/2010    YARELI.   One 1 cm polyp in the sigmoid colon.  Internal hemorrhoids.    COSMETIC SURGERY      DIGITAL RECTAL EXAMINATION UNDER ANESTHESIA N/A 7/23/2019    Procedure: EXAM UNDER ANESTHESIA, DIGITAL, RECTUM;  Surgeon: Kaz Keating MD;  Location: [facility] OR;  Service: General;  Laterality: N/A;    EXAMINATION UNDER ANESTHESIA N/A 4/24/2019    Procedure: EXAM UNDER ANESTHESIA;  Surgeon: Kaz Keating MD;  Location: [facility] OR;  Service: General;  Laterality: N/A;    EXCISIONAL  HEMORRHOIDECTOMY N/A 4/24/2019    Procedure: HEMORRHOIDECTOMY (lithotomy);  Surgeon: Kaz Keating MD;  Location: [facility] OR;  Service: General;  Laterality: N/A;    GANGLION CYST EXCISION Left     INJECTION OF ANESTHETIC AGENT AROUND MEDIAL BRANCH NERVES INNERVATING LUMBAR FACET JOINT Bilateral 4/9/2024    Procedure: Block-nerve-medial branch-lumbar-L4/5-L5/S1;  Surgeon: Everett Taveras MD;  Location: [facility] OR;  Service: Pain Management;  Laterality: Bilateral;    INJECTION OF ANESTHETIC AGENT AROUND PUDENDAL NERVE N/A 4/24/2019    Procedure: BLOCK, NERVE, PUDENDAL;  Surgeon: Kaz Keating MD;  Location: [facility] OR;  Service: General;  Laterality: N/A;    INJECTION OF ANESTHETIC AGENT AROUND PUDENDAL NERVE N/A 7/23/2019    Procedure: BLOCK, NERVE, PUDENDAL;  Surgeon: Kaz Keating MD;  Location: [facility] OR;  Service: General;  Laterality: N/A;    REPAIR OF RETINAL DETACHMENT WITH VITRECTOMY Right 8/1/2018    Procedure: REPAIR, RETINAL DETACHMENT, WITH VITRECTOMY;  Surgeon: SAJAN Pulido MD;  Location: [address];  Service: Ophthalmology;  Laterality: Right;  35 min    TONSILLECTOMY      TRANSRECTAL BIOPSY OF PROSTATE WITH ULTRASOUND GUIDANCE N/A 1/31/2023    Procedure: BIOPSY, PROSTATE, RECTAL APPROACH, WITH US GUIDANCE;  Surgeon: MARIZA Donahue MD;  Location: [facility] OR;  Service: Urology;  Laterality: N/A;    TRIGGER FINGER RELEASE Right     X 2    uro lift      for enlarged prostate      Current Outpatient Medications on File Prior to Visit   Medication Sig Dispense Refill    allopurinoL (ZYLOPRIM) 300 MG tablet TAKE 1 TABLET(300 MG) BY MOUTH EVERY DAY 90 tablet 3    atorvastatin (LIPITOR) 20 MG tablet TAKE 1 TABLET(20 MG) BY MOUTH EVERY DAY (Patient taking differently: Take 20 mg by mouth every evening.) 90 tablet 3    brimonidine 0.2% (ALPHAGAN) 0.2 % Drop INSTILL 1 DROP IN BOTH EYES TWICE DAILY 10 mL 3    carbidopa-levodopa  mg (SINEMET)  mg per tablet TAKE 2 TABLETS BY MOUTH  FIVE TIMES DAILY 300 tablet 11    cholecalciferol, vitamin D3, (D3-2000 ORAL) Take by mouth once daily.      clonazePAM (KLONOPIN) 1 MG tablet TAKE 2 TABLETS(2 MG) BY MOUTH EVERY EVENING 60 tablet 2    cyanocobalamin (VITAMIN B-12) 1000 MCG tablet Take 100 mcg by mouth once daily.      dorzolamide (TRUSOPT) 2 % ophthalmic solution INSTILL 1 DROP IN BOTH EYES TWICE DAILY 10 mL 3    ketoconazole (NIZORAL) 2 % shampoo Apply topically.      meloxicam (MOBIC) 15 MG tablet Take 1 tablet (15 mg total) by mouth daily as needed for Pain. (Patient not taking: Reported on 4/8/2024) 30 tablet 1    metFORMIN (GLUCOPHAGE-XR) 500 MG ER 24hr tablet TAKE 1 TABLET(500 MG) BY MOUTH EVERY DAY 90 tablet 1    prazosin (MINIPRESS) 1 MG Cap TAKE 1 CAPSULE(1 MG) BY MOUTH EVERY EVENING 90 capsule 3    ramelteon (ROZEREM) 8 mg tablet Take 1 tablet (8 mg total) by mouth every evening. 90 tablet 3    rasagiline (AZILECT) 1 mg Tab TAKE 1 TABLET(1 MG) BY MOUTH EVERY EVENING 30 tablet 11    tirzepatide 5 mg/0.5 mL PnIj Inject 5 mg into the skin every 7 days. (Increased dose) 4 Pen 2    tiZANidine (ZANAFLEX) 2 MG tablet Take 1 tablet (2 mg total) by mouth every 8 (eight) hours as needed (muscle spasms). (Patient not taking: Reported on 4/8/2024) 60 tablet 1     Current Facility-Administered Medications on File Prior to Visit   Medication Dose Route Frequency Provider Last Rate Last Admin    diphenhydrAMINE capsule 25 mg  25 mg Oral Q6H PRN Leonila Richardson MD        gadoterate meglumine injection 20 mL  20 mL Intravenous ONCE PRN MARIZA Donahue MD        metronidazole IVPB 500 mg  500 mg Intravenous Once Kaz Keating MD          Review of patient's allergies indicates:   Allergen Reactions    Avelox [moxifloxacin] Hives and Rash      Family History not pertinent   Social History     Socioeconomic History    Marital status:    Tobacco Use    Smoking status: Never    Smokeless tobacco: Never   Substance and Sexual Activity     Alcohol use: Yes     Comment: occasional, no alcohol 72 hours prior to sx    Drug use: No    Sexual activity: Yes     Partners: Female   Social History Narrative            2 grown kids.         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      Social Determinants of Health     Financial Resource Strain: Patient Declined (1/29/2024)    Overall Financial Resource Strain (CARDIA)     Difficulty of Paying Living Expenses: Patient declined   Food Insecurity: Patient Declined (1/29/2024)    Hunger Vital Sign     Worried About Running Out of Food in the Last Year: Patient declined     Ran Out of Food in the Last Year: Patient declined   Transportation Needs: Patient Declined (1/29/2024)    PRAPARE - Transportation     Lack of Transportation (Medical): Patient declined     Lack of Transportation (Non-Medical): Patient declined   Physical Activity: Sufficiently Active (1/29/2024)    Exercise Vital Sign     Days of Exercise per Week: 4 days     Minutes of Exercise per Session: 80 min   Stress: Patient Declined (1/29/2024)    [employer] - Occupational Stress Questionnaire     Feeling of Stress : Patient declined   Social Connections: Unknown (1/29/2024)    Social Connection and Isolation Panel [NHANES]     Frequency of Communication with Friends and Family: Patient declined     Frequency of Social Gatherings with Friends and Family: Patient declined     Active Member of Clubs or Organizations: Patient declined     Attends Club or Organization Meetings: Patient declined     Marital Status:    Housing Stability: Patient Declined (1/29/2024)    Housing Stability Vital Sign     Unable to Pay for Housing in the Last Year: Patient declined     Number of Places Lived in the Last Year: 1     Unstable Housing in the Last Year: Patient declined         Review of Systems:   Constitutional:  Denies fever or chills    Eyes:  Denies change in visual acuity    HENT:  Denies  nasal congestion or sore throat    Respiratory:  Denies cough or shortness of breath    Cardiovascular:  Denies chest pain or edema    GI:  Denies abdominal pain, nausea, vomiting, bloody stools or diarrhea    :  Denies dysuria    Integument:  Denies rash    Neurologic:  Denies headache, focal weakness or sensory changes    Endocrine:  Denies polyuria or polydipsia    Lymphatic:  Denies swollen glands    Psychiatric:  Denies depression or anxiety       Physical Exam:    Constitutional:  Well developed, well nourished, no acute distress, non-toxic appearance    Integument:  Well hydrated, no rash    Lymphatic:  No lymphadenopathy noted    Neurologic:  Alert & oriented x 3,     Psychiatric:  Speech and behavior appropriate    Gi: abdomen soft  Eyes: EOMI     Bilateral Shoulder Exam    left Shoulder Exam   Shoulder exam performed same as contralateral side and is normal.    right Shoulder Exam   Tenderness   Shoulder tenderness location: diffusely about shoulder.    Range of Motion   Forward Flexion: abnormal   External Rotation: abnormal     Muscle Strength   Supraspinatus: 4/5     Tests   Hawkin's test: positive  Impingement: positive    Other   Erythema: absent  Sensation: normal  Pulse: present       Impingement syndrome, shoulder, right          Using an aseptic technique, I injected 5 cc of lidocaine 1% without and 1 cc of kenalog 40mg into the right Shoulder. The patient tolerated this well. I will have them return to clinic in 4 weeks with right knee xrays.

## 2024-04-15 ENCOUNTER — PATIENT MESSAGE (OUTPATIENT)
Dept: CARDIOLOGY | Facility: CLINIC | Age: 69
End: 2024-04-15
Payer: MEDICARE

## 2024-04-15 ENCOUNTER — PATIENT MESSAGE (OUTPATIENT)
Dept: FAMILY MEDICINE | Facility: CLINIC | Age: 69
End: 2024-04-15
Payer: MEDICARE

## 2024-04-15 ENCOUNTER — PATIENT MESSAGE (OUTPATIENT)
Dept: NEUROLOGY | Facility: CLINIC | Age: 69
End: 2024-04-15
Payer: MEDICARE

## 2024-04-15 ENCOUNTER — TELEPHONE (OUTPATIENT)
Dept: OPHTHALMOLOGY | Facility: CLINIC | Age: 69
End: 2024-04-15
Payer: MEDICARE

## 2024-04-15 ENCOUNTER — PATIENT MESSAGE (OUTPATIENT)
Dept: NEPHROLOGY | Facility: CLINIC | Age: 69
End: 2024-04-15
Payer: MEDICARE

## 2024-04-15 ENCOUNTER — PATIENT MESSAGE (OUTPATIENT)
Dept: OTHER | Facility: OTHER | Age: 69
End: 2024-04-15
Payer: MEDICARE

## 2024-04-15 NOTE — TELEPHONE ENCOUNTER
Patient was called back and discussed recent kidney function lab results. He is aware that his labs for us are stable at this time. He was referred back to his PCP for glucose readings and control.

## 2024-04-16 ENCOUNTER — OFFICE VISIT (OUTPATIENT)
Dept: FAMILY MEDICINE | Facility: CLINIC | Age: 69
End: 2024-04-16
Payer: MEDICARE

## 2024-04-16 ENCOUNTER — TELEPHONE (OUTPATIENT)
Dept: PAIN MEDICINE | Facility: CLINIC | Age: 69
End: 2024-04-16
Payer: MEDICARE

## 2024-04-16 VITALS
HEIGHT: 71 IN | BODY MASS INDEX: 26.51 KG/M2 | HEART RATE: 66 BPM | SYSTOLIC BLOOD PRESSURE: 138 MMHG | DIASTOLIC BLOOD PRESSURE: 86 MMHG | OXYGEN SATURATION: 98 % | WEIGHT: 189.38 LBS

## 2024-04-16 DIAGNOSIS — E11.22 TYPE 2 DIABETES MELLITUS WITH STAGE 2 CHRONIC KIDNEY DISEASE, WITHOUT LONG-TERM CURRENT USE OF INSULIN: ICD-10-CM

## 2024-04-16 DIAGNOSIS — N18.2 CKD (CHRONIC KIDNEY DISEASE) STAGE 2, GFR 60-89 ML/MIN: ICD-10-CM

## 2024-04-16 DIAGNOSIS — N18.2 TYPE 2 DIABETES MELLITUS WITH STAGE 2 CHRONIC KIDNEY DISEASE, WITHOUT LONG-TERM CURRENT USE OF INSULIN: ICD-10-CM

## 2024-04-16 DIAGNOSIS — F33.0 MILD EPISODE OF RECURRENT MAJOR DEPRESSIVE DISORDER: ICD-10-CM

## 2024-04-16 DIAGNOSIS — I10 PRIMARY HYPERTENSION: ICD-10-CM

## 2024-04-16 DIAGNOSIS — N25.81 SECONDARY HYPERPARATHYROIDISM OF RENAL ORIGIN: Primary | ICD-10-CM

## 2024-04-16 PROCEDURE — 99215 OFFICE O/P EST HI 40 MIN: CPT | Mod: S$PBB,,, | Performed by: INTERNAL MEDICINE

## 2024-04-16 PROCEDURE — 99999 PR PBB SHADOW E&M-EST. PATIENT-LVL IV: CPT | Mod: PBBFAC,,, | Performed by: INTERNAL MEDICINE

## 2024-04-16 PROCEDURE — 99214 OFFICE O/P EST MOD 30 MIN: CPT | Mod: PBBFAC,PO | Performed by: INTERNAL MEDICINE

## 2024-04-16 RX ORDER — CITALOPRAM 10 MG/1
10 TABLET ORAL DAILY
Qty: 30 TABLET | Refills: 2 | Status: SHIPPED | OUTPATIENT
Start: 2024-04-16 | End: 2025-04-16

## 2024-04-16 RX ORDER — IRBESARTAN 75 MG/1
75 TABLET ORAL NIGHTLY
COMMUNITY

## 2024-04-16 RX ORDER — HYDROCHLOROTHIAZIDE 12.5 MG/1
12.5 TABLET ORAL DAILY
Qty: 30 TABLET | Refills: 11 | Status: SHIPPED | OUTPATIENT
Start: 2024-04-16 | End: 2025-04-16

## 2024-04-16 NOTE — PROGRESS NOTES
Patient ID: Glenn Medel is a 68 y.o. male.    Chief Complaint: Follow-up      Assessment / Plan      1. Secondary hyperparathyroidism of renal origin  Comments:  PTH tends to be elevated.  Phosphorus normal in the last 6 months.  Consider checking vitamin-D    2. Type 2 diabetes mellitus with stage 2 chronic kidney disease, without long-term current use of insulin  Comments:  Continue regimen of mounjaro 5 mg and metformin 500 mg.  Since we are making to changes already will consider increasing mounjaro to 7.5 mg later.    3. Primary hypertension  Comments:  Restart irbesartan 75 mg and add hydrochlorothiazide 12.5 mg to keep potassium in check.  Orders:  -     hydroCHLOROthiazide (HYDRODIURIL) 12.5 MG Tab; Take 1 tablet (12.5 mg total) by mouth once daily.  Dispense: 30 tablet; Refill: 11  -     Basic Metabolic Panel; Future; Expected date: 04/30/2024    4. CKD (chronic kidney disease) stage 2, GFR 60-89 ml/min  -     Basic Metabolic Panel; Future; Expected date: 04/30/2024    5. Mild episode of recurrent major depressive disorder  Comments:  Start Celexa 10 mg.  Two week follow-up.  Strict precautions given on serotonin syndrome  Orders:  -     citalopram (CELEXA) 10 MG tablet; Take 1 tablet (10 mg total) by mouth once daily.  Dispense: 30 tablet; Refill: 2           HPI     Status post prostate Bx. 4/7 samples + for prostate cancer.   DMII- BG still running > 200. On mounjaro 5 mg and metformin 500 mg daily.     Note he is off prozac due to interactions with resagiline but BuSpar and propanolol have not been effective for his anxiety for depression.  Current mental state is decreasing his quality of life.  We discussed potential for serotonin syndrome if we were to restart SSRI with his rasagiline and handout was given on the subject.  We reviewed some case reports and research regarding rasagiline and SSRIs.  Preferred SSRIs are sertraline and citalopram.  Though the research shows low risk for serotonin  syndrome, we did discuss that it is a possibility that serotonin syndrome could occur and it could be life-threatening.    Blood pressures have been elevated running in the high 130s to 160s over 80s to 90.  He was on irbesartan in the past with good results but his potassium does tend to run on the high side.    Has pressure like sensation from left to right ear across forehead (band like).       Review of Systems   Constitutional:  Negative for fever.   Respiratory:  Negative for shortness of breath.    Cardiovascular:  Negative for chest pain.   Gastrointestinal:  Negative for abdominal pain.      Objective     Vitals:    04/16/24 1523   BP: 138/86   Pulse: 66      Wt Readings from Last 3 Encounters:   04/16/24 1523 85.9 kg (189 lb 6 oz)   04/08/24 1047 85.3 kg (188 lb)   03/11/24 1336 85.3 kg (188 lb)   04/05/24 0847 85.7 kg (188 lb 15 oz)      Body mass index is 26.41 kg/m².     Physical Exam  Cardiovascular:      Rate and Rhythm: Normal rate and regular rhythm.      Heart sounds: No murmur heard.     No gallop.   Pulmonary:      Breath sounds: Normal breath sounds. No wheezing or rhonchi.   Abdominal:      Palpations: Abdomen is soft.      Tenderness: There is no abdominal tenderness.         Diabetes Medications               metFORMIN (GLUCOPHAGE-XR) 500 MG ER 24hr tablet TAKE 1 TABLET(500 MG) BY MOUTH EVERY DAY    tirzepatide 5 mg/0.5 mL PnIj Inject 5 mg into the skin every 7 days. (Increased dose)           Hypertension Medications               prazosin (MINIPRESS) 1 MG Cap TAKE 1 CAPSULE(1 MG) BY MOUTH EVERY EVENING           Hyperlipidemia Medications               atorvastatin (LIPITOR) 20 MG tablet TAKE 1 TABLET(20 MG) BY MOUTH EVERY DAY           Medication List with Changes/Refills   New Medications    CITALOPRAM (CELEXA) 10 MG TABLET    Take 1 tablet (10 mg total) by mouth once daily.    HYDROCHLOROTHIAZIDE (HYDRODIURIL) 12.5 MG TAB    Take 1 tablet (12.5 mg total) by mouth once daily.   Current  Medications    ALLOPURINOL (ZYLOPRIM) 300 MG TABLET    TAKE 1 TABLET(300 MG) BY MOUTH EVERY DAY    ATORVASTATIN (LIPITOR) 20 MG TABLET    TAKE 1 TABLET(20 MG) BY MOUTH EVERY DAY    BRIMONIDINE 0.2% (ALPHAGAN) 0.2 % DROP    INSTILL 1 DROP IN BOTH EYES TWICE DAILY    CARBIDOPA-LEVODOPA  MG (SINEMET)  MG PER TABLET    TAKE 2 TABLETS BY MOUTH FIVE TIMES DAILY    CHOLECALCIFEROL, VITAMIN D3, (D3-2000 ORAL)    Take by mouth once daily.    CLONAZEPAM (KLONOPIN) 1 MG TABLET    TAKE 2 TABLETS(2 MG) BY MOUTH EVERY EVENING    CYANOCOBALAMIN (VITAMIN B-12) 1000 MCG TABLET    Take 100 mcg by mouth once daily.    DORZOLAMIDE (TRUSOPT) 2 % OPHTHALMIC SOLUTION    INSTILL 1 DROP IN BOTH EYES TWICE DAILY    IRBESARTAN (AVAPRO) 75 MG TABLET    Take 75 mg by mouth every evening.    KETOCONAZOLE (NIZORAL) 2 % SHAMPOO    Apply topically.    METFORMIN (GLUCOPHAGE-XR) 500 MG ER 24HR TABLET    TAKE 1 TABLET(500 MG) BY MOUTH EVERY DAY    PRAZOSIN (MINIPRESS) 1 MG CAP    TAKE 1 CAPSULE(1 MG) BY MOUTH EVERY EVENING    RAMELTEON (ROZEREM) 8 MG TABLET    Take 1 tablet (8 mg total) by mouth every evening.    RASAGILINE (AZILECT) 1 MG TAB    TAKE 1 TABLET(1 MG) BY MOUTH EVERY EVENING    TIRZEPATIDE 5 MG/0.5 ML PNIJ    Inject 5 mg into the skin every 7 days. (Increased dose)       I personally reviewed past medical, family and social history.

## 2024-04-18 ENCOUNTER — PATIENT MESSAGE (OUTPATIENT)
Dept: PAIN MEDICINE | Facility: CLINIC | Age: 69
End: 2024-04-18
Payer: MEDICARE

## 2024-04-18 ENCOUNTER — TELEPHONE (OUTPATIENT)
Dept: PAIN MEDICINE | Facility: CLINIC | Age: 69
End: 2024-04-18
Payer: MEDICARE

## 2024-04-18 ENCOUNTER — PATIENT MESSAGE (OUTPATIENT)
Dept: FAMILY MEDICINE | Facility: CLINIC | Age: 69
End: 2024-04-18
Payer: MEDICARE

## 2024-04-18 ENCOUNTER — TELEPHONE (OUTPATIENT)
Dept: HEMATOLOGY/ONCOLOGY | Facility: CLINIC | Age: 69
End: 2024-04-18
Payer: MEDICARE

## 2024-04-18 ENCOUNTER — PATIENT MESSAGE (OUTPATIENT)
Dept: UROLOGY | Facility: CLINIC | Age: 69
End: 2024-04-18
Payer: MEDICARE

## 2024-04-18 DIAGNOSIS — E11.22 TYPE 2 DIABETES MELLITUS WITH STAGE 2 CHRONIC KIDNEY DISEASE, WITHOUT LONG-TERM CURRENT USE OF INSULIN: Primary | ICD-10-CM

## 2024-04-18 DIAGNOSIS — M47.816 SPONDYLOSIS OF LUMBAR JOINT: ICD-10-CM

## 2024-04-18 DIAGNOSIS — C61 PROSTATE CANCER: Primary | ICD-10-CM

## 2024-04-18 DIAGNOSIS — M47.816 LUMBAR SPONDYLOSIS: Primary | ICD-10-CM

## 2024-04-18 DIAGNOSIS — N18.2 TYPE 2 DIABETES MELLITUS WITH STAGE 2 CHRONIC KIDNEY DISEASE, WITHOUT LONG-TERM CURRENT USE OF INSULIN: Primary | ICD-10-CM

## 2024-04-18 RX ORDER — SODIUM CHLORIDE, SODIUM LACTATE, POTASSIUM CHLORIDE, CALCIUM CHLORIDE 600; 310; 30; 20 MG/100ML; MG/100ML; MG/100ML; MG/100ML
INJECTION, SOLUTION INTRAVENOUS CONTINUOUS
Status: CANCELLED | OUTPATIENT
Start: 2024-04-18

## 2024-04-18 NOTE — NURSING
Oncology Navigation   Intake  Date of Diagnosis: 04/16/24  Cancer Type:   Type of Referral: Internal  Date of Referral: 04/18/24  Initial Nurse Navigator Contact: 04/18/24  Referral to Initial Contact Timeline (days): 0  Date Worked: 04/18/24  First Appointment Available: 05/03/24  Appointment Date: 05/03/24  First Available Date vs. Scheduled Date (days): 0  Multiple appointments: Yes  Reason if booked > 7 days after scheduling: Specific provider / access     Treatment  Current Status: Active    Surgical Oncologist: Dr. Manjinder Donahue    Radiation Oncologist: Dr. Britney Christianson    Procedures: Biopsy; MRI; Ultrasound  Biopsy Schedule Date: 04/12/24  MRI Schedule Date: 02/27/24  Ultrasound Schedule Date: 04/12/24    Radiation Oncologist: Dr. Britney Christianson     Acuity  Stage: 1  Systemic Treatment - predicted or initiated: More than one treatment modality concurrently (chemotherapy, radiation, etc.) (+2)  Comorbidities in Medical History: 2  Hospitalization Within the Past Month: 0   Needed: 0  History of noncompliance/frequent no shows and cancellations: 0  Verbalizes the need for more education: 1  Navigation Acuity: 6     Follow Up  Follow up in about 15 days (around 5/3/2024) for  Multi-D clinic .

## 2024-04-18 NOTE — TELEPHONE ENCOUNTER
Rec'd inbetsyet msg from Dr. Donahue requesting to schedule Mr. Medel in  Multi-D clinic with urology and radonc.     Chart reviewed. Spoke to Mr. Medel and explained my role as oncology navigator. Discussed purpose and expected duration of Multi-D clinic. Scheduled to see Dr. Donahue and Dr. Christianson on Fri, 5/3. Reviewed date/time/location of this apt. Contact information provided. Encouraged Mr. Medel to contact me with any further concerns. He verbalized understanding of this and thanked me for my call.     ----- Message from MARIZA Donahue MD sent at 4/18/2024 12:44 PM CDT -----  I discussed pathology results with patient.  Risk group is favorable intermediate.  Please schedule follow-up in multidisciplinary clinic.  Include radiation oncology.

## 2024-04-18 NOTE — TELEPHONE ENCOUNTER
Regarding your Lumbar Medial Branch Block , For Date of Service:  04/09/2024    Please answer the following questions:    1. What percentage of pain relief did you receive following the block, from 0-100%? 100%    2. How many hours did pain relief last following the block?  12    3. During this time please describe in detail the activities you were able to do? Sit and watch tv. Fix food    4. Pain score from 0-10 pre procedure - 8     5. Pain score from 0-10 post procedure - 0                               Pt has been scheduled for 2nd block

## 2024-04-19 ENCOUNTER — PATIENT MESSAGE (OUTPATIENT)
Dept: CARDIOLOGY | Facility: CLINIC | Age: 69
End: 2024-04-19
Payer: MEDICARE

## 2024-04-19 RX ORDER — BLOOD SUGAR DIAGNOSTIC
1 STRIP MISCELLANEOUS DAILY
Qty: 100 EACH | Refills: 1 | Status: SHIPPED | OUTPATIENT
Start: 2024-04-19 | End: 2024-06-17 | Stop reason: SDUPTHER

## 2024-04-19 RX ORDER — INSULIN GLARGINE 100 [IU]/ML
10 INJECTION, SOLUTION SUBCUTANEOUS DAILY
Qty: 9 ML | Refills: 1 | Status: SHIPPED | OUTPATIENT
Start: 2024-04-19 | End: 2024-06-17 | Stop reason: SDUPTHER

## 2024-04-28 DIAGNOSIS — G47.52 REM SLEEP BEHAVIOR DISORDER: ICD-10-CM

## 2024-04-29 ENCOUNTER — LAB VISIT (OUTPATIENT)
Dept: LAB | Facility: HOSPITAL | Age: 69
End: 2024-04-29
Attending: INTERNAL MEDICINE
Payer: MEDICARE

## 2024-04-29 ENCOUNTER — PATIENT MESSAGE (OUTPATIENT)
Dept: PAIN MEDICINE | Facility: CLINIC | Age: 69
End: 2024-04-29
Payer: MEDICARE

## 2024-04-29 ENCOUNTER — PATIENT MESSAGE (OUTPATIENT)
Dept: FAMILY MEDICINE | Facility: CLINIC | Age: 69
End: 2024-04-29
Payer: MEDICARE

## 2024-04-29 DIAGNOSIS — N18.2 CKD (CHRONIC KIDNEY DISEASE) STAGE 2, GFR 60-89 ML/MIN: ICD-10-CM

## 2024-04-29 DIAGNOSIS — I10 PRIMARY HYPERTENSION: ICD-10-CM

## 2024-04-29 LAB
ANION GAP SERPL CALC-SCNC: 7 MMOL/L (ref 8–16)
BUN SERPL-MCNC: 21 MG/DL (ref 8–23)
CALCIUM SERPL-MCNC: 9.5 MG/DL (ref 8.7–10.5)
CHLORIDE SERPL-SCNC: 101 MMOL/L (ref 95–110)
CO2 SERPL-SCNC: 27 MMOL/L (ref 23–29)
CREAT SERPL-MCNC: 1 MG/DL (ref 0.5–1.4)
EST. GFR  (NO RACE VARIABLE): >60 ML/MIN/1.73 M^2
GLUCOSE SERPL-MCNC: 193 MG/DL (ref 70–110)
POTASSIUM SERPL-SCNC: 4.5 MMOL/L (ref 3.5–5.1)
SODIUM SERPL-SCNC: 135 MMOL/L (ref 136–145)

## 2024-04-29 PROCEDURE — 80048 BASIC METABOLIC PNL TOTAL CA: CPT | Performed by: INTERNAL MEDICINE

## 2024-04-29 PROCEDURE — 36415 COLL VENOUS BLD VENIPUNCTURE: CPT | Mod: PO | Performed by: INTERNAL MEDICINE

## 2024-04-30 ENCOUNTER — PATIENT MESSAGE (OUTPATIENT)
Dept: HEMATOLOGY/ONCOLOGY | Facility: CLINIC | Age: 69
End: 2024-04-30
Payer: MEDICARE

## 2024-04-30 ENCOUNTER — OFFICE VISIT (OUTPATIENT)
Dept: FAMILY MEDICINE | Facility: CLINIC | Age: 69
End: 2024-04-30
Payer: MEDICARE

## 2024-04-30 VITALS
DIASTOLIC BLOOD PRESSURE: 64 MMHG | SYSTOLIC BLOOD PRESSURE: 130 MMHG | HEART RATE: 75 BPM | WEIGHT: 187.81 LBS | HEIGHT: 71 IN | BODY MASS INDEX: 26.29 KG/M2 | OXYGEN SATURATION: 99 %

## 2024-04-30 DIAGNOSIS — G63 POLYNEUROPATHY ASSOCIATED WITH UNDERLYING DISEASE: ICD-10-CM

## 2024-04-30 DIAGNOSIS — N18.2 TYPE 2 DIABETES MELLITUS WITH STAGE 2 CHRONIC KIDNEY DISEASE, WITHOUT LONG-TERM CURRENT USE OF INSULIN: Primary | ICD-10-CM

## 2024-04-30 DIAGNOSIS — I10 PRIMARY HYPERTENSION: ICD-10-CM

## 2024-04-30 DIAGNOSIS — F32.A CHRONIC DEPRESSION: ICD-10-CM

## 2024-04-30 DIAGNOSIS — G47.52 REM SLEEP BEHAVIOR DISORDER: ICD-10-CM

## 2024-04-30 DIAGNOSIS — E11.22 TYPE 2 DIABETES MELLITUS WITH STAGE 2 CHRONIC KIDNEY DISEASE, WITHOUT LONG-TERM CURRENT USE OF INSULIN: Primary | ICD-10-CM

## 2024-04-30 PROCEDURE — 99999 PR PBB SHADOW E&M-EST. PATIENT-LVL IV: CPT | Mod: PBBFAC,,, | Performed by: INTERNAL MEDICINE

## 2024-04-30 PROCEDURE — 99214 OFFICE O/P EST MOD 30 MIN: CPT | Mod: PBBFAC,PO | Performed by: INTERNAL MEDICINE

## 2024-04-30 PROCEDURE — 99214 OFFICE O/P EST MOD 30 MIN: CPT | Mod: S$PBB,,, | Performed by: INTERNAL MEDICINE

## 2024-04-30 RX ORDER — CLONAZEPAM 1 MG/1
TABLET ORAL
Qty: 60 TABLET | OUTPATIENT
Start: 2024-04-30

## 2024-04-30 RX ORDER — CLONAZEPAM 1 MG/1
2 TABLET ORAL NIGHTLY
Qty: 60 TABLET | Refills: 2 | Status: SHIPPED | OUTPATIENT
Start: 2024-04-30

## 2024-04-30 NOTE — PROGRESS NOTES
Patient ID: Glenn Medel is a 68 y.o. male.    Chief Complaint: Follow-up      Problem  HPI  Plan   Depression Did trial of citalopram after discussion of risks and benefits regarding medication interaction with rasagiline (serotonin syndrome) . He did have left hand claw like rigidity. This happened once more but none since.  Continue citalopram cautiously   Hypertension Restarted irbesartan and added hydrochlorothiazide last visit. Now controlled. Lab ok Continue irbesartan and hydrochlorothiazide    Type 2 diabetes Taking metformin, mounjaro 5 mg, and we added lantus due to sub optimal control. Now fasting is less than 200 and he is titrating up 2-3 units every 2-3 days until reaching a fasting blood sugar between 90 and 130 or reaching 30 units. 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 Stable w/o med  Monitor    REM sleep disorder Stable  Continue clonazepam        Diagnoses addressed and related orders:  1. Type 2 diabetes mellitus with stage 2 chronic kidney disease, without long-term current use of insulin    2. Primary hypertension  - Basic Metabolic Panel; Future    3. Chronic depression    4. Polyneuropathy associated with underlying disease    5. REM sleep behavior disorder  - clonazePAM (KLONOPIN) 1 MG tablet; Take 2 tablets (2 mg total) by mouth every evening.  Dispense: 60 tablet; Refill: 2        Review of Systems   Constitutional:  Negative for fever.   Respiratory:  Negative for shortness of breath.    Cardiovascular:  Negative for chest pain.   Gastrointestinal:  Negative for abdominal pain.        Vitals:    04/30/24 0814   BP: 130/64   Pulse: 75      Wt Readings from Last 3 Encounters:   04/30/24 0814 85.2 kg (187 lb 13.3 oz)   04/16/24 1523 85.9 kg (189 lb 6 oz)   04/08/24 1047 85.3 kg (188 lb)   03/11/24 1336 85.3 kg (188 lb)      Body mass index is 26.2 kg/m².     Physical Exam  Cardiovascular:      Rate and Rhythm: Normal rate and regular rhythm.       Heart sounds: No murmur heard.     No gallop.   Pulmonary:      Breath sounds: Normal breath sounds. No wheezing or rhonchi.   Abdominal:      Palpations: Abdomen is soft.      Tenderness: There is no abdominal tenderness.         Diabetes Medications               insulin (LANTUS SOLOSTAR U-100 INSULIN) glargine 100 units/mL SubQ pen Inject 10 Units into the skin once daily.    metFORMIN (GLUCOPHAGE-XR) 500 MG ER 24hr tablet Take 2 tablets (1,000 mg total) by mouth once daily. (Increased dose)    tirzepatide 5 mg/0.5 mL PnIj Inject 5 mg into the skin every 7 days. (Increased dose)           Hypertension Medications               hydroCHLOROthiazide (HYDRODIURIL) 12.5 MG Tab Take 1 tablet (12.5 mg total) by mouth once daily.    irbesartan (AVAPRO) 75 MG tablet Take 75 mg by mouth every evening.    prazosin (MINIPRESS) 1 MG Cap TAKE 1 CAPSULE(1 MG) BY MOUTH EVERY EVENING           Hyperlipidemia Medications               atorvastatin (LIPITOR) 20 MG tablet TAKE 1 TABLET(20 MG) BY MOUTH EVERY DAY           Medication List with Changes/Refills   Current Medications    ALLOPURINOL (ZYLOPRIM) 300 MG TABLET    TAKE 1 TABLET(300 MG) BY MOUTH EVERY DAY    ATORVASTATIN (LIPITOR) 20 MG TABLET    TAKE 1 TABLET(20 MG) BY MOUTH EVERY DAY    BRIMONIDINE 0.2% (ALPHAGAN) 0.2 % DROP    INSTILL 1 DROP IN BOTH EYES TWICE DAILY    CARBIDOPA-LEVODOPA  MG (SINEMET)  MG PER TABLET    TAKE 2 TABLETS BY MOUTH FIVE TIMES DAILY    CHOLECALCIFEROL, VITAMIN D3, (D3-2000 ORAL)    Take by mouth once daily.    CITALOPRAM (CELEXA) 10 MG TABLET    Take 1 tablet (10 mg total) by mouth once daily.    CYANOCOBALAMIN (VITAMIN B-12) 1000 MCG TABLET    Take 100 mcg by mouth once daily.    DORZOLAMIDE (TRUSOPT) 2 % OPHTHALMIC SOLUTION    INSTILL 1 DROP IN BOTH EYES TWICE DAILY    HYDROCHLOROTHIAZIDE (HYDRODIURIL) 12.5 MG TAB    Take 1 tablet (12.5 mg total) by mouth once daily.    INSULIN (LANTUS SOLOSTAR U-100 INSULIN) GLARGINE 100 UNITS/ML  "SUBQ PEN    Inject 10 Units into the skin once daily.    IRBESARTAN (AVAPRO) 75 MG TABLET    Take 75 mg by mouth every evening.    KETOCONAZOLE (NIZORAL) 2 % SHAMPOO    Apply topically.    METFORMIN (GLUCOPHAGE-XR) 500 MG ER 24HR TABLET    Take 2 tablets (1,000 mg total) by mouth once daily. (Increased dose)    PEN NEEDLE, DIABETIC 32 GAUGE X 1/4" NDLE    1 each by Misc.(Non-Drug; Combo Route) route once daily.    PRAZOSIN (MINIPRESS) 1 MG CAP    TAKE 1 CAPSULE(1 MG) BY MOUTH EVERY EVENING    RAMELTEON (ROZEREM) 8 MG TABLET    Take 1 tablet (8 mg total) by mouth every evening.    RASAGILINE (AZILECT) 1 MG TAB    TAKE 1 TABLET(1 MG) BY MOUTH EVERY EVENING    TIRZEPATIDE 5 MG/0.5 ML PNIJ    Inject 5 mg into the skin every 7 days. (Increased dose)   Changed and/or Refilled Medications    Modified Medication Previous Medication    CLONAZEPAM (KLONOPIN) 1 MG TABLET clonazePAM (KLONOPIN) 1 MG tablet       Take 2 tablets (2 mg total) by mouth every evening.    TAKE 2 TABLETS(2 MG) BY MOUTH EVERY EVENING       I personally reviewed past medical, family and social history.         "

## 2024-05-02 DIAGNOSIS — M75.41 IMPINGEMENT SYNDROME, SHOULDER, RIGHT: Primary | ICD-10-CM

## 2024-05-02 DIAGNOSIS — M25.361 RIGHT KNEE BUCKLING: Primary | ICD-10-CM

## 2024-05-02 NOTE — PROGRESS NOTES
Subjective:       Patient ID: Glenn Medel is a 68 y.o. male.    Chief Complaint: Consult    HPI    68-year-old with a history of prostate cancer. He underwent biopsy in January 2023. Path was low risk.  He elected active surveillance.  Repeat PSA is 5.1 which is unchanged. He underwent prostate MRI which noted a PiRADS 4 lesion. Prostate volume was 77 mL. He is underwent repeat needle biopsy using [place]ron[place] guidance.  Target lesion was positive for grade group 2 prostate cancer.  Three additional cores were positive for grade group 1 prostate cancer.  No significant progression.  He is seen today with his wife.  We again discussed all treatment options for prostate cancer including radical prostatectomy, radiation therapy, androgen deprivation therapy, and active surveillance.  We discussed potential complications including the risk of incontinence and erectile dysfunction.      PSA  5.1  Stage  cT1c  Volume 77.4 ml  Rish Grp. Favorable intermediate  IPSS  6  KEY  6    1. PROSTATE, Lesion # 1, CORE biopsy   - PROSTATIC ADENOCARCINOMA, HUY SCORE 3+4=7 (GRADE GROUP 2) [5%    PATTERN 4]   - 14 MM (58% OF SUBMITTED TISSUE), 2 OF 3 COREs   - positive FOR PERINEURAL INVASION   - focal cribriform gland present     2. PROSTATE, Left base, CORE biopsy   - PROSTATIC ADENOCARCINOMA, HUY SCORE 3+3=6 (GRADE GROUP 1)   - less than 1 MM (less than 5% OF SUBMITTED TISSUE), 1 OF 2 COREs   - NEGATIVE FOR PERINEURAL INVASION     3. PROSTATE, Left mid, CORE biopsy   - PROSTATIC ADENOCARCINOMA, HUY SCORE 3+3=6 (GRADE GROUP 1)   - 3 MM (11% OF SUBMITTED TISSUE), 1 OF 2 COREs   - NEGATIVE FOR PERINEURAL INVASION     4. PROSTATE, Left apex, CORE biopsy   - BENIGN PROSTATIC GLANDS AND STROMA     5. PROSTATE, Right base, CORE biopsy   - PROSTATIC ADENOCARCINOMA, HUY SCORE 3+3=6 (GRADE GROUP 1)   - 1 MM (5% OF SUBMITTED TISSUE), 1 OF 2 COREs   - NEGATIVE FOR PERINEURAL INVASION     6. PROSTATE, Right mid, CORE biopsy   -  BENIGN PROSTATIC GLANDS AND STROMA     7. PROSTATE, Right apex, CORE biopsy   - BENIGN PROSTATIC GLANDS AND STROMA       Review of Systems   Constitutional:  Negative for fever.   Genitourinary:  Negative for dysuria and hematuria.       Objective:      Physical Exam  Vitals reviewed.   Constitutional:       Appearance: He is well-developed.   Pulmonary:      Effort: Pulmonary effort is normal.   Skin:     Findings: No rash.   Neurological:      Mental Status: He is alert and oriented to person, place, and time.         Assessment:       1. Prostate cancer        Plan:       Prostate cancer      No significant progression in the last 18 months on active surveillance.  Risks group is again favorable intermediate.  After consideration he wants to continue active surveillance.  Follow up 6 months with repeat PSA.

## 2024-05-03 ENCOUNTER — TELEPHONE (OUTPATIENT)
Dept: HEMATOLOGY/ONCOLOGY | Facility: CLINIC | Age: 69
End: 2024-05-03
Payer: MEDICARE

## 2024-05-03 ENCOUNTER — OFFICE VISIT (OUTPATIENT)
Dept: RADIATION ONCOLOGY | Facility: CLINIC | Age: 69
End: 2024-05-03
Payer: MEDICARE

## 2024-05-03 ENCOUNTER — OFFICE VISIT (OUTPATIENT)
Dept: UROLOGY | Facility: CLINIC | Age: 69
End: 2024-05-03
Payer: MEDICARE

## 2024-05-03 VITALS
HEIGHT: 71 IN | WEIGHT: 188.25 LBS | SYSTOLIC BLOOD PRESSURE: 105 MMHG | OXYGEN SATURATION: 98 % | DIASTOLIC BLOOD PRESSURE: 64 MMHG | TEMPERATURE: 97 F | HEART RATE: 60 BPM | BODY MASS INDEX: 26.35 KG/M2

## 2024-05-03 VITALS
HEART RATE: 60 BPM | OXYGEN SATURATION: 98 % | HEIGHT: 71 IN | WEIGHT: 188.25 LBS | DIASTOLIC BLOOD PRESSURE: 64 MMHG | TEMPERATURE: 97 F | BODY MASS INDEX: 26.35 KG/M2 | SYSTOLIC BLOOD PRESSURE: 105 MMHG

## 2024-05-03 DIAGNOSIS — C61 PROSTATE CANCER: ICD-10-CM

## 2024-05-03 DIAGNOSIS — C61 PROSTATE CANCER: Primary | ICD-10-CM

## 2024-05-03 PROCEDURE — 99999 PR PBB SHADOW E&M-EST. PATIENT-LVL IV: CPT | Mod: PBBFAC,,, | Performed by: UROLOGY

## 2024-05-03 PROCEDURE — 99215 OFFICE O/P EST HI 40 MIN: CPT | Mod: S$PBB,,, | Performed by: UROLOGY

## 2024-05-03 PROCEDURE — 99213 OFFICE O/P EST LOW 20 MIN: CPT | Mod: PBBFAC,PN | Performed by: RADIOLOGY

## 2024-05-03 PROCEDURE — 99213 OFFICE O/P EST LOW 20 MIN: CPT | Mod: S$PBB,,, | Performed by: RADIOLOGY

## 2024-05-03 PROCEDURE — 99999 PR PBB SHADOW E&M-EST. PATIENT-LVL III: CPT | Mod: PBBFAC,,, | Performed by: RADIOLOGY

## 2024-05-03 PROCEDURE — 99214 OFFICE O/P EST MOD 30 MIN: CPT | Mod: PBBFAC,27,PN | Performed by: UROLOGY

## 2024-05-03 NOTE — NURSING
Mr. Medel seen today in  Multi-D clinic by Dr. Donahue and Dr. Christianson. Introduced myself to Mr. Medel and his wife. Discussed today's apt.     Mr. Medel has elected to proceed with surveillance - needs repeat PSA in 6 months along with f/u visit with Dr. Christianson. Scheduled lab on 10/29 and f/u visit on 11/1. Reviewed apt date/time/location. Mr. Medel given my contact information and instructed to contact me with any further questions/concerns. He verbalized understanding of this.

## 2024-05-03 NOTE — PROGRESS NOTES
05/03/2024    [facility]   Radiation Oncology Follow up    Assessment   This is a 68 y.o. 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  He presents today to discuss definitve management with radiation therapy.        Plan     Treatment options were discussed with the patient including prostatectomy, radiation therapy (brachytherapy, SBRT, and standard fractionated radiation therapy), or active surveillance.  We discussed the goals of treatment to be curative.  The risks, benefits, scheduling, alternatives to and rationale of radiation therapy were explained in detail.  Indication, course, and potential toxicities of pelvic radiation therapy reviewed in detail, including but not limited to fatigue, increased frequency, urgency, or pain with urination, increased frequency or urgency of bowel movements, diarrhea, pelvic bone fragility, erectile dysfunction, decreased volume of ejaculate, remote risk of secondary malignancy.   After this discussion, he elected to proceed with active surveillance.  He understands that this will require semiannual PSA testing. Additionally, he understands that around half of men on surveillance will go on to definitive treatment at some point in the future, but even in these men, surveillance allows for delay of any treatment-related toxicities.  He also understands that there is a small risk of disease progression while on surveillance which could change recommended management or prognosis.   PSA q6 months     Follow up with Urology as directed  He was given our contact information, and he was told that he could call our clinic at any time if he has any questions or concerns.      Radiation Treatment Details:   No radiation therapy planned at this time.         Chief Complaint   Patient presents with    Prostate Cancer     New dx prostate cancer         Oncology History    No history exists.        HPI: The patient is a 68 y.o. year old male with a diagnosis of prostate cancer.  He had been on active surveillance.  He was noted to have an elevated PSA of 5.1 on 2/2/24.  Previous PSA history as follows:   Latest Reference Range & Units 12/12/22 15:07 06/16/23 12:17 08/02/23 08:21 02/02/24 08:54   PSA Diagnostic 0.00 - 4.00 ng/mL 5.1 (H) 4.3 (H) 4.1 (H) 5.1 (H)   (H): Data is abnormally high    1/31/23 Prostate Biopsy:   Left base: Group 2 (25% pattern 4), 1 of 2  Left mid: Group 1 1 of 2 (20% of tissue)  Total 2 of 12 cores    3/2/24 Decipher score: 0.42-Low    2/27/24 MRI Prostate: chronic prostatitis; left amorphous T2 hypointense lesion abutting capsule left apex PZ without evidence of extracapsular extension; PI-RADS 4.     On 4/12/24 he underwent confirmatory TRUS-guided biopsy of the prostate, with pathology as follows:  Lesion 1: Group 2 (5% pattern 4) in 2 of 3 cores; +PNI, focal cribriform  Left base: Group 1 in 1 of 2 (<5% of tissue)  Left mid: Group 1 in 1 of 2 (11% of tissue)  Right base: Group 1 in 1 of 2 (5% of tissue)  Total (corrected): 4 of 13    Prostate size estimated at 77cc.  He presents today to discuss the potential role of radiation therapy in his cancer care.  IPSS is 6, with +++ daytime frequency, + urgency, and nocturia x 2, no weakened stream.  He reports significant erectile difficulty at baseline.         History of prior irradiation: no  History of prior systemic anti-cancer therapy: no  History of collagen vascular disease: no  Implanted electronic device (pacer/defib/nerve stimulator): no    Past Medical History:   Diagnosis Date    BPH (benign prostatic hyperplasia)     Cataract     done ou    Chronic kidney disease, stage 3 02/25/2015    DDD (degenerative disc disease), lumbar     s/p epidural steroids     Diabetes mellitus     type 2    Elevated PSA     Glaucoma     OU    HTN (hypertension)     Iritis, lens-induced 01/08/2018    Obesity     Parkinson's disease     Sleep  apnea     uses cpap       Past Surgical History:   Procedure Laterality Date    CATARACT EXTRACTION W/  INTRAOCULAR LENS IMPLANT Left 11/30/2017    CATARACT EXTRACTION W/  INTRAOCULAR LENS IMPLANT Right 12/11/2018    Dr Garcia    CATARACT EXTRACTION W/  INTRAOCULAR LENS IMPLANT Right 12/11/2018    Procedure: EXTRACTION, CATARACT, WITH IOL INSERTION;  Surgeon: Damaris Garcia MD;  Location: [facility] OR;  Service: Ophthalmology;  Laterality: Right;    COLONOSCOPY N/A 9/18/2017    Procedure: COLONOSCOPY;  Surgeon: Paul Feliz Jr., MD;  Location: [facility] ENDO;  Service: Endoscopy;  Laterality: N/A;    COLONOSCOPY N/A 12/21/2022    Procedure: COLONOSCOPY;  Surgeon: Paul Feliz Jr., MD;  Location: [facility] ENDO;  Service: Endoscopy;  Laterality: N/A;    COLONOSCOPY W/ POLYPECTOMY  04/13/2010    YARELI.   One 1 cm polyp in the sigmoid colon.  Internal hemorrhoids.    COSMETIC SURGERY      DIGITAL RECTAL EXAMINATION UNDER ANESTHESIA N/A 7/23/2019    Procedure: EXAM UNDER ANESTHESIA, DIGITAL, RECTUM;  Surgeon: Kaz Keating MD;  Location: [facility] OR;  Service: General;  Laterality: N/A;    EXAMINATION UNDER ANESTHESIA N/A 4/24/2019    Procedure: EXAM UNDER ANESTHESIA;  Surgeon: Kaz Keating MD;  Location: [facility] OR;  Service: General;  Laterality: N/A;    EXCISIONAL HEMORRHOIDECTOMY N/A 4/24/2019    Procedure: HEMORRHOIDECTOMY (lithotomy);  Surgeon: Kaz Keating MD;  Location: [facility] OR;  Service: General;  Laterality: N/A;    GANGLION CYST EXCISION Left     INJECTION OF ANESTHETIC AGENT AROUND MEDIAL BRANCH NERVES INNERVATING LUMBAR FACET JOINT Bilateral 4/9/2024    Procedure: Block-nerve-medial branch-lumbar-L4/5-L5/S1;  Surgeon: Everett Taveras MD;  Location: [facility] OR;  Service: Pain Management;  Laterality: Bilateral;    INJECTION OF ANESTHETIC AGENT AROUND PUDENDAL NERVE N/A 4/24/2019    Procedure: BLOCK, NERVE, PUDENDAL;  Surgeon: Kaz Keating MD;  Location: [facility] OR;  Service: General;  Laterality: N/A;     INJECTION OF ANESTHETIC AGENT AROUND PUDENDAL NERVE N/A 7/23/2019    Procedure: BLOCK, NERVE, PUDENDAL;  Surgeon: Kaz Keating MD;  Location: [facility];  Service: General;  Laterality: N/A;    REPAIR OF RETINAL DETACHMENT WITH VITRECTOMY Right 8/1/2018    Procedure: REPAIR, RETINAL DETACHMENT, WITH VITRECTOMY;  Surgeon: SAJAN Pulido MD;  Location: [address];  Service: Ophthalmology;  Laterality: Right;  35 min    TONSILLECTOMY      TRANSRECTAL BIOPSY OF PROSTATE WITH ULTRASOUND GUIDANCE N/A 1/31/2023    Procedure: BIOPSY, PROSTATE, RECTAL APPROACH, WITH US GUIDANCE;  Surgeon: MARIZA Donahue MD;  Location: [facility];  Service: Urology;  Laterality: N/A;    TRANSRECTAL BIOPSY OF PROSTATE WITH ULTRASOUND GUIDANCE N/A 4/12/2024    Procedure: BIOPSY, PROSTATE, RECTAL APPROACH, WITH US GUIDANCE;  Surgeon: MARIZA Donahue MD;  Location: [facility];  Service: Urology;  Laterality: N/A;    TRIGGER FINGER RELEASE Right     X 2    uro lift      for enlarged prostate       Social History     Tobacco Use    Smoking status: Never    Smokeless tobacco: Never   Substance Use Topics    Alcohol use: Yes     Comment: occasional, no alcohol 72 hours prior to sx    Drug use: No       Cancer-related family history includes Cancer in his father and mother.    Current Outpatient Medications on File Prior to Visit   Medication Sig Dispense Refill    allopurinoL (ZYLOPRIM) 300 MG tablet TAKE 1 TABLET(300 MG) BY MOUTH EVERY DAY 90 tablet 3    atorvastatin (LIPITOR) 20 MG tablet TAKE 1 TABLET(20 MG) BY MOUTH EVERY DAY (Patient taking differently: Take 20 mg by mouth every evening.) 90 tablet 3    brimonidine 0.2% (ALPHAGAN) 0.2 % Drop INSTILL 1 DROP IN BOTH EYES TWICE DAILY 10 mL 3    carbidopa-levodopa  mg (SINEMET)  mg per tablet TAKE 2 TABLETS BY MOUTH FIVE TIMES DAILY 300 tablet 11    cholecalciferol, vitamin D3, (D3-2000 ORAL) Take by mouth once daily.      citalopram (CELEXA) 10 MG tablet Take 1 tablet  "(10 mg total) by mouth once daily. 30 tablet 2    clonazePAM (KLONOPIN) 1 MG tablet Take 2 tablets (2 mg total) by mouth every evening. 60 tablet 2    cyanocobalamin (VITAMIN B-12) 1000 MCG tablet Take 100 mcg by mouth once daily.      dorzolamide (TRUSOPT) 2 % ophthalmic solution INSTILL 1 DROP IN BOTH EYES TWICE DAILY 10 mL 3    hydroCHLOROthiazide (HYDRODIURIL) 12.5 MG Tab Take 1 tablet (12.5 mg total) by mouth once daily. 30 tablet 11    insulin (LANTUS SOLOSTAR U-100 INSULIN) glargine 100 units/mL SubQ pen Inject 10 Units into the skin once daily. 9 mL 1    irbesartan (AVAPRO) 75 MG tablet Take 75 mg by mouth every evening.      ketoconazole (NIZORAL) 2 % shampoo Apply topically.      metFORMIN (GLUCOPHAGE-XR) 500 MG ER 24hr tablet Take 2 tablets (1,000 mg total) by mouth once daily. (Increased dose) 180 tablet 1    pen needle, diabetic 32 gauge x 1/4" Ndle 1 each by Misc.(Non-Drug; Combo Route) route once daily. 100 each 1    prazosin (MINIPRESS) 1 MG Cap TAKE 1 CAPSULE(1 MG) BY MOUTH EVERY EVENING 90 capsule 3    ramelteon (ROZEREM) 8 mg tablet Take 1 tablet (8 mg total) by mouth every evening. 90 tablet 3    rasagiline (AZILECT) 1 mg Tab TAKE 1 TABLET(1 MG) BY MOUTH EVERY EVENING 30 tablet 11    tirzepatide 5 mg/0.5 mL PnIj Inject 5 mg into the skin every 7 days. (Increased dose) 4 Pen 2     No current facility-administered medications on file prior to visit.       Review of patient's allergies indicates:   Allergen Reactions    Avelox [moxifloxacin] Hives and Rash       Review of Systems   Gastrointestinal:  Positive for constipation. Negative for diarrhea.   Genitourinary:  Positive for frequency and urgency (mild).   Musculoskeletal:  Positive for back pain, joint pain and neck pain (nerve block next week).   Neurological:  Positive for tremors (improved).        Vital Signs: /64 (BP Location: Left arm, Patient Position: Sitting)   Pulse 60   Temp 97.1 °F (36.2 °C) (Temporal)   Ht 5' 11" (1.803 " m)   Wt 85.4 kg (188 lb 4.4 oz)   SpO2 98%   BMI 26.26 kg/m²     ECOG Performance Status: 0 - Fully Active    Physical Exam  Vitals and nursing note reviewed.   Constitutional:       General: He is not in acute distress.     Appearance: Normal appearance. He is not ill-appearing or toxic-appearing.   HENT:      Head: Normocephalic and atraumatic.      Nose: Nose normal.   Eyes:      Extraocular Movements: Extraocular movements intact.      Conjunctiva/sclera: Conjunctivae normal.      Pupils: Pupils are equal, round, and reactive to light.   Pulmonary:      Effort: Pulmonary effort is normal.   Musculoskeletal:         General: Normal range of motion.      Cervical back: Normal range of motion and neck supple.   Skin:     General: Skin is warm.   Neurological:      General: No focal deficit present.      Mental Status: He is alert and oriented to person, place, and time.   Psychiatric:         Mood and Affect: Mood normal.         Behavior: Behavior normal.         Thought Content: Thought content normal.         Judgment: Judgment normal.            Imaging: I have personally reviewed the patient's available images and reports and summarized pertinent findings above in HPI.     Pathology: I have personally reviewed the patient's available pathology and summarized pertinent findings above in HPI.     This case was discussed with Dr. Donahue.      - Thank you for allowing me to participate in the care of your patient.    Britney Christianson MD       Total time today for this encounter was 24 minutes. This includes preparing to see the patient (eg, review of tests), obtaining and/or reviewing separately obtained history, documenting clinical information in the electronic or other health record, independently interpreting results and communicating results to the patient/family/caregiver, and discussing the plan with other providers when necessary.

## 2024-05-06 ENCOUNTER — HOSPITAL ENCOUNTER (OUTPATIENT)
Dept: RADIOLOGY | Facility: HOSPITAL | Age: 69
Discharge: HOME | End: 2024-05-06
Attending: ANESTHESIOLOGY | Admitting: ANESTHESIOLOGY
Payer: MEDICARE

## 2024-05-06 ENCOUNTER — HOSPITAL ENCOUNTER (OUTPATIENT)
Facility: HOSPITAL | Age: 69
Discharge: HOME | End: 2024-05-06
Attending: ANESTHESIOLOGY | Admitting: ANESTHESIOLOGY
Payer: MEDICARE

## 2024-05-06 VITALS
HEIGHT: 71 IN | HEART RATE: 57 BPM | SYSTOLIC BLOOD PRESSURE: 127 MMHG | OXYGEN SATURATION: 97 % | BODY MASS INDEX: 26.18 KG/M2 | TEMPERATURE: 97 F | DIASTOLIC BLOOD PRESSURE: 74 MMHG | RESPIRATION RATE: 16 BRPM | WEIGHT: 187 LBS

## 2024-05-06 DIAGNOSIS — M47.816 LUMBAR SPONDYLOSIS: Primary | ICD-10-CM

## 2024-05-06 DIAGNOSIS — M47.816 SPONDYLOSIS OF LUMBAR JOINT: ICD-10-CM

## 2024-05-06 DIAGNOSIS — M54.50 LOWER BACK PAIN: ICD-10-CM

## 2024-05-06 LAB — GLUCOSE SERPL-MCNC: 177 MG/DL (ref 70–110)

## 2024-05-06 PROCEDURE — 64494 INJ PARAVERT F JNT L/S 2 LEV: CPT | Mod: 50,PO | Performed by: ANESTHESIOLOGY

## 2024-05-06 PROCEDURE — 64493 INJ PARAVERT F JNT L/S 1 LEV: CPT | Mod: 50,PO | Performed by: ANESTHESIOLOGY

## 2024-05-06 PROCEDURE — 64494 INJ PARAVERT F JNT L/S 2 LEV: CPT | Mod: 50,KX,, | Performed by: ANESTHESIOLOGY

## 2024-05-06 PROCEDURE — 63600175 PHARM REV CODE 636 W HCPCS: Mod: PO | Performed by: ANESTHESIOLOGY

## 2024-05-06 PROCEDURE — 82962 GLUCOSE BLOOD TEST: CPT | Mod: PO | Performed by: ANESTHESIOLOGY

## 2024-05-06 PROCEDURE — 76000 FLUOROSCOPY <1 HR PHYS/QHP: CPT | Mod: TC,PO

## 2024-05-06 PROCEDURE — 64493 INJ PARAVERT F JNT L/S 1 LEV: CPT | Mod: 50,KX,, | Performed by: ANESTHESIOLOGY

## 2024-05-06 PROCEDURE — 25000003 PHARM REV CODE 250: Mod: PO | Performed by: ANESTHESIOLOGY

## 2024-05-06 RX ORDER — SODIUM CHLORIDE, SODIUM LACTATE, POTASSIUM CHLORIDE, CALCIUM CHLORIDE 600; 310; 30; 20 MG/100ML; MG/100ML; MG/100ML; MG/100ML
INJECTION, SOLUTION INTRAVENOUS CONTINUOUS
Status: DISCONTINUED | OUTPATIENT
Start: 2024-05-06 | End: 2024-05-06 | Stop reason: HOSPADM

## 2024-05-06 RX ORDER — LIDOCAINE HYDROCHLORIDE 10 MG/ML
INJECTION, SOLUTION EPIDURAL; INFILTRATION; INTRACAUDAL; PERINEURAL
Status: DISCONTINUED | OUTPATIENT
Start: 2024-05-06 | End: 2024-05-06 | Stop reason: HOSPADM

## 2024-05-06 RX ORDER — BUPIVACAINE HYDROCHLORIDE 2.5 MG/ML
INJECTION, SOLUTION EPIDURAL; INFILTRATION; INTRACAUDAL
Status: DISCONTINUED | OUTPATIENT
Start: 2024-05-06 | End: 2024-05-06 | Stop reason: HOSPADM

## 2024-05-06 RX ORDER — MIDAZOLAM HYDROCHLORIDE 1 MG/ML
INJECTION INTRAMUSCULAR; INTRAVENOUS
Status: DISCONTINUED | OUTPATIENT
Start: 2024-05-06 | End: 2024-05-06 | Stop reason: HOSPADM

## 2024-05-06 RX ADMIN — SODIUM CHLORIDE, POTASSIUM CHLORIDE, SODIUM LACTATE AND CALCIUM CHLORIDE: 600; 310; 30; 20 INJECTION, SOLUTION INTRAVENOUS at 10:05

## 2024-05-06 NOTE — INTERVAL H&P NOTE
The patient has been examined and the H&P has been reviewed:    I concur with the findings and no changes have occurred since H&P was written.  Indication: The patient has clinical and radiologic findings suggestive of facet mediated pain and is s/p 1st diagnostic bilateral lumbar L4/5 and L5/S1 medial branch blocks with at least 80% relief of their axial back pain lasting the duration of the local anesthetic utilized.    We will proceed with 2nd diagnostic bilateral L4/5 and L5/S1 medial branch blocks. The risks and benefits of this intervention, and alternative therapies were discussed with the patient.  The discussion of risks included infection, bleeding, need for additional procedures or surgery, nerve damage.  Questions regarding the procedure, risks, expected outcome, and possible side effects were solicited and answered to the patient's satisfaction.  Glenn Medel wishes to proceed with the injection or procedure.  Written consent was obtained.  ASA 3, MP II      There are no hospital problems to display for this patient.

## 2024-05-06 NOTE — DISCHARGE SUMMARY
[facility]  Discharge Note  Short Stay    Admit Date: 5/6/2024    Discharge Date: 5/6/2024    Attending Physician: Everett Taveras     Discharge Provider: Everett Taveras    Diagnoses:  There are no hospital problems to display for this patient.      Discharged Condition: Good    Final Diagnoses: Lumbar spondylosis [M47.816]    Disposition: Home or Self Care    Hospital Course: No complications, uneventful    Outcome of Hospitalization, Treatment, Procedure, or Surgery:  Patient was admitted for outpatient interventional pain management procedure. The patient tolerated the procedure well with no complications.    Follow up/Patient Instructions:  Follow up as scheduled in Pain Management office in 2-3 weeks.  Patient has received instructions and follow up date and time.    Medications:  Continue previous medications    Discharge Procedure Orders   Notify your health care provider if you experience any of the following:  temperature >100.4     Notify your health care provider if you experience any of the following:  persistent nausea and vomiting or diarrhea     Notify your health care provider if you experience any of the following:  severe uncontrolled pain     Notify your health care provider if you experience any of the following:  redness, tenderness, or signs of infection (pain, swelling, redness, odor or green/yellow discharge around incision site)     Notify your health care provider if you experience any of the following:  difficulty breathing or increased cough     Notify your health care provider if you experience any of the following:  severe persistent headache     Notify your health care provider if you experience any of the following:  worsening rash     Notify your health care provider if you experience any of the following:  persistent dizziness, light-headedness, or visual disturbances     Notify your health care provider if you experience any of the following:  increased confusion or  weakness     Activity as tolerated

## 2024-05-06 NOTE — OP NOTE
Procedure Note    Procedure Date: 5/6/2024    Procedure Performed:  bilateral Diagnostic Lumbar Medial Branch @ L4/5 and L5/S1, with Fluoroscopic Guidance    Indications: The patient has clinical and radiologic findings suggestive of facet mediated pain and is s/p 1st diagnostic bilateral lumbar L4/5 and L5/S1 medial branch blocks with at least 80% relief of their axial back pain lasting the duration of the local anesthetic utilized.     Pre-op diagnosis: Lumbar Spondylosis    Post-op diagnosis: same    Physician: Everett Taveras MD    IV Anxiolysis medications: versed 2mg    Medications injected: Bupivacaine 0.25%, 0.5 cc per level    Local anesthetic used: 1% Lidocaine, 4 ml    Estimated Blood Loss: none    Complications:  none    Technique:  The patient was interviewed in the holding area and Risks/Benefits were discussed, including, but not limited to, the possibility of new or different pain, bleeding or infection.   All questions were answered.  The patient understood and accepted risks.  Consent was reviewed.  A time out was taken to identify the patient, procedure and side of the procedure. The patient was placed in a prone position, then prepped and draped in the usual sterile fashion using ChloraPrep x2 and sterile towels.  The levels were determined under fluoroscopic guidance and then marked.  1% Lidocaine was given by raising a wheal at the skin over each site and then infiltrated approximately 2cm deeper.  A 25-gauge 3.5 inch needle was introduced to the anatomic location of the L3-5 medial branch nerves on the bilateral side.  Then, after negative aspiration, 0.5 cc of Bupivacaine 0.25% was injected @ each level.  The patient tolerated the procedure well.  The patient tolerated the procedure well and was transferred to the [facility] in stable condition.  The patient was monitored after the procedure.  Patient was given post procedure and discharge instructions to follow at home.  The patient was  discharged in a stable condition.    Event Time In   In Facility 0948   In Pre-Procedure 1038   Physician Available    Anesthesia Available    Pre-Op: Bedside Procedure Start    Pre-Op: Bedside Procedure Stop    Pre-Procedure Complete 1058   Out of Pre-Procedure    Anesthesia Start    Anesthesia Start Data Collection    Setup Start    Setup Complete    In Room 1154   Prep Start    Procedure Prep Complete    MD notified pt. ready    Procedure Start 1158   Procedure Closing    Emergence    Procedure Finish 1202   Sedation Start 1153   Scope In    Extent Reached    Scope Out    Sedation End 1205   Out of Room 1205   Cleanup Start    Cleanup Complete    Cosmetic Start    Cosmetic Stop    Pain Mgmt In Room    Pain Mgmt Out Room    In Recovery    Anesthesia Finish    Bedside Procedure Start    Bedside Procedure Stop    Recovery Care Complete    Out of Recovery    To Phase II    In Phase II    Pain Mgmt Recovery Start    Pain Mgmt Recovery Stop    Obs Rec Start    Obs Rec Stop    Phase II Care Complete    Out of Phase II    Procedural Care Complete    Discharge    Pain Follow Up Needed    Pain Follow Up Complete

## 2024-05-07 ENCOUNTER — HOSPITAL ENCOUNTER (OUTPATIENT)
Dept: RADIOLOGY | Facility: HOSPITAL | Age: 69
Discharge: HOME | End: 2024-05-07
Attending: ORTHOPAEDIC SURGERY
Payer: MEDICARE

## 2024-05-07 ENCOUNTER — OFFICE VISIT (OUTPATIENT)
Dept: ORTHOPEDICS | Facility: CLINIC | Age: 69
End: 2024-05-07
Payer: MEDICARE

## 2024-05-07 VITALS — WEIGHT: 188.25 LBS | HEIGHT: 71 IN | BODY MASS INDEX: 26.35 KG/M2

## 2024-05-07 DIAGNOSIS — M17.11 PRIMARY OSTEOARTHRITIS OF RIGHT KNEE: Primary | ICD-10-CM

## 2024-05-07 DIAGNOSIS — M25.361 RIGHT KNEE BUCKLING: ICD-10-CM

## 2024-05-07 DIAGNOSIS — M75.41 IMPINGEMENT SYNDROME, SHOULDER, RIGHT: ICD-10-CM

## 2024-05-07 PROCEDURE — 99214 OFFICE O/P EST MOD 30 MIN: CPT | Mod: S$PBB,25,, | Performed by: ORTHOPAEDIC SURGERY

## 2024-05-07 PROCEDURE — 99999PBSHW PR PBB SHADOW TECHNICAL ONLY FILED TO HB: Mod: PBBFAC,,,

## 2024-05-07 PROCEDURE — 73560 X-RAY EXAM OF KNEE 1 OR 2: CPT | Mod: TC,59,PO,LT

## 2024-05-07 PROCEDURE — 73562 X-RAY EXAM OF KNEE 3: CPT | Mod: 26,RT,, | Performed by: RADIOLOGY

## 2024-05-07 PROCEDURE — 73560 X-RAY EXAM OF KNEE 1 OR 2: CPT | Mod: 26,59,LT, | Performed by: RADIOLOGY

## 2024-05-07 PROCEDURE — 73030 X-RAY EXAM OF SHOULDER: CPT | Mod: 26,RT,, | Performed by: RADIOLOGY

## 2024-05-07 PROCEDURE — 99999 PR PBB SHADOW E&M-EST. PATIENT-LVL III: CPT | Mod: PBBFAC,,, | Performed by: ORTHOPAEDIC SURGERY

## 2024-05-07 PROCEDURE — 99213 OFFICE O/P EST LOW 20 MIN: CPT | Mod: PBBFAC,25,PO | Performed by: ORTHOPAEDIC SURGERY

## 2024-05-07 PROCEDURE — 73030 X-RAY EXAM OF SHOULDER: CPT | Mod: TC,PO,RT

## 2024-05-07 PROCEDURE — 20610 DRAIN/INJ JOINT/BURSA W/O US: CPT | Mod: PBBFAC,PO,RT | Performed by: ORTHOPAEDIC SURGERY

## 2024-05-07 RX ADMIN — TRIAMCINOLONE ACETONIDE 40 MG: 40 INJECTION, SUSPENSION INTRA-ARTICULAR; INTRAMUSCULAR at 09:05

## 2024-05-08 ENCOUNTER — PATIENT MESSAGE (OUTPATIENT)
Dept: SLEEP MEDICINE | Facility: CLINIC | Age: 69
End: 2024-05-08
Payer: MEDICARE

## 2024-05-10 NOTE — PROGRESS NOTES
HYPERTENSION  Last 5 Patient Entered Readings                                      Current 30 Day Average: 129/80     Recent Readings 7/16/2018 7/16/2018 7/15/2018 7/15/2018 7/14/2018    SBP (mmHg) 138 138 125 125 121    DBP (mmHg) 85 85 78 78 74    Pulse 72 72 64 64 87        Patient's BP average is controlled based on 2017 ACC/AHA HTN guidelines of goal BP <130/80.      Patient denies s/s of hypotension (lightheadedness, dizziness, nausea, fatigue) associated with low readings. Instructed patient to inform me if this occurs, patient confirms understanding.      Patient denies s/s of hypertension (SOB, CP, severe headaches, changes in vision) associated with high readings. Instructed patient to go to the ED if BP > 180/110 and accompanied by hypertensive s/s, patient confirms understanding.     Patient's health , Olivier Gaines, will be following up every 3-4 weeks. I will continue to monitor regularly and will follow up in 2-3 weeks, sooner if BP begins to trend upward or downward.    Patient has my contact information and knows to call with any concerns or clinical changes.     Current HTN regimen:  Hypertension Medications             furosemide (LASIX) 20 MG tablet TAKE 1 TABLET(20 MG) BY MOUTH EVERY MORNING    metoprolol succinate (TOPROL-XL) 25 MG 24 hr tablet TAKE 1 TABLET(25 MG) BY MOUTH EVERY DAY    metoprolol succinate (TOPROL-XL) 25 MG 24 hr tablet TAKE 1 TABLET(25 MG) BY MOUTH EVERY DAY    valsartan (DIOVAN) 320 MG tablet Take 1 tablet (320 mg total) by mouth once daily.        Change to valsartan to irbesartan as patient reports his supply is affected by recall.    DIABETES  Last 6 Patient Entered Readings                                          Most Recent A1c: 8% (Goal: 7%)     Recent Readings 7/17/2018 7/17/2018 7/16/2018 7/16/2018 7/15/2018    Blood Glucose (mg/dL) 203 203 254 158 187        Current A1c level is not at goal of less than or equal to 7%     Patient true episodes of  "hypoglycemia but reports some hunger and "queasiness" with BG in 170s     Patient reports s/s of hyperglycemia dry mouth/increased thirst     Current monitoring regimen: 1-2 times daily     Medication adherence: reports adherence, has titrated lantus to 18 units once daily     Plan: Patient reports having 1-1.5 months supply remaining of victoza. Continue current lantus and metformin doses. Begin jardiance 10 mg once daily. Consider changing victoza to ozempic for once weekly dosing as he nears completion of victoza supply. Counseled patient to remain well-hydrated.     I will continue to monitor regularly and will follow up in 2-3 weeks, sooner if BG begins to trend upward or downward.     Patient has my contact information and knows to call with any concerns or clinical changes    Diabetes Medications             insulin glargine (LANTUS SOLOSTAR U-100 INSULIN) 100 unit/mL (3 mL) InPn pen Inject 10 Units into the skin every evening.    metFORMIN (GLUCOPHAGE-XR) 500 MG 24 hr tablet Take 4 tablets (2,000 mg total) by mouth once daily.    VICTOZA 3-NOEL 0.6 mg/0.1 mL (18 mg/3 mL) PnIj ADMINISTER 1.8 MG UNDER THE SKIN EVERY DAY     " 11-May-2024 00:56

## 2024-05-13 ENCOUNTER — PATIENT MESSAGE (OUTPATIENT)
Dept: NEUROLOGY | Facility: CLINIC | Age: 69
End: 2024-05-13
Payer: MEDICARE

## 2024-05-14 ENCOUNTER — OFFICE VISIT (OUTPATIENT)
Dept: ORTHOPEDICS | Facility: CLINIC | Age: 69
End: 2024-05-14
Payer: MEDICARE

## 2024-05-14 VITALS — BODY MASS INDEX: 26.35 KG/M2 | HEIGHT: 71 IN | WEIGHT: 188.25 LBS

## 2024-05-14 DIAGNOSIS — M75.41 IMPINGEMENT SYNDROME, SHOULDER, RIGHT: Primary | ICD-10-CM

## 2024-05-14 PROCEDURE — 99999 PR PBB SHADOW E&M-EST. PATIENT-LVL III: CPT | Mod: PBBFAC,,, | Performed by: ORTHOPAEDIC SURGERY

## 2024-05-14 PROCEDURE — 20610 DRAIN/INJ JOINT/BURSA W/O US: CPT | Mod: PBBFAC,PO | Performed by: ORTHOPAEDIC SURGERY

## 2024-05-14 PROCEDURE — 99499 UNLISTED E&M SERVICE: CPT | Mod: S$PBB,,, | Performed by: ORTHOPAEDIC SURGERY

## 2024-05-14 PROCEDURE — 99213 OFFICE O/P EST LOW 20 MIN: CPT | Mod: PBBFAC,PO,25 | Performed by: ORTHOPAEDIC SURGERY

## 2024-05-14 PROCEDURE — 99999PBSHW PR PBB SHADOW TECHNICAL ONLY FILED TO HB: Mod: PBBFAC,,,

## 2024-05-14 RX ADMIN — TRIAMCINOLONE ACETONIDE 40 MG: 40 INJECTION, SUSPENSION INTRA-ARTICULAR; INTRAMUSCULAR at 09:05

## 2024-05-16 ENCOUNTER — PATIENT MESSAGE (OUTPATIENT)
Dept: PAIN MEDICINE | Facility: CLINIC | Age: 69
End: 2024-05-16
Payer: MEDICARE

## 2024-05-16 RX ORDER — TRIAMCINOLONE ACETONIDE 40 MG/ML
40 INJECTION, SUSPENSION INTRA-ARTICULAR; INTRAMUSCULAR
Status: DISCONTINUED | OUTPATIENT
Start: 2024-05-07 | End: 2024-05-16 | Stop reason: HOSPADM

## 2024-05-16 NOTE — PROCEDURES
Large Joint Aspiration/Injection: R knee    Date/Time: 5/7/2024 9:45 AM    Performed by: Lukas Campbell MD  Authorized by: Lukas Campbell MD    Consent Done?:  Yes (Verbal)  Indications:  Pain  Timeout: prior to procedure the correct patient, procedure, and site was verified    Prep: patient was prepped and draped in usual sterile fashion    Local anesthetic:  Lidocaine 1% without epinephrine  Anesthetic total (ml):  5      Details:  Needle Size:  21 G  Approach:  Anterolateral  Location:  Knee  Site:  R knee  Medications:  40 mg triamcinolone acetonide 40 mg/mL  Patient tolerance:  Patient tolerated the procedure well with no immediate complications

## 2024-05-16 NOTE — PROGRESS NOTES
Chief Complaint   Patient presents with    Right Shoulder - Pain    Right Knee - Pain         HPI:   This is a 68 y.o. who presents to clinic today complaining of right knee pain for 2 weeks after no known trauma. Pain is progressively worsening. No numbness or tingling. No associated signs or symptoms.    Past Medical History:   Diagnosis Date    BPH (benign prostatic hyperplasia)     Cataract     done ou    Chronic kidney disease, stage 3 02/25/2015    DDD (degenerative disc disease), lumbar     s/p epidural steroids     Diabetes mellitus     type 2    Elevated PSA     Glaucoma     OU    HTN (hypertension)     Iritis, lens-induced 01/08/2018    Obesity     Parkinson's disease     Sleep apnea     uses cpap     Past Surgical History:   Procedure Laterality Date    CATARACT EXTRACTION W/  INTRAOCULAR LENS IMPLANT Left 11/30/2017    CATARACT EXTRACTION W/  INTRAOCULAR LENS IMPLANT Right 12/11/2018    Dr Garcia    CATARACT EXTRACTION W/  INTRAOCULAR LENS IMPLANT Right 12/11/2018    Procedure: EXTRACTION, CATARACT, WITH IOL INSERTION;  Surgeon: Damaris Garcia MD;  Location: [facility] OR;  Service: Ophthalmology;  Laterality: Right;    COLONOSCOPY N/A 9/18/2017    Procedure: COLONOSCOPY;  Surgeon: Paul Feliz Jr., MD;  Location: [facility];  Service: Endoscopy;  Laterality: N/A;    COLONOSCOPY N/A 12/21/2022    Procedure: COLONOSCOPY;  Surgeon: Paul Feliz Jr., MD;  Location: [facility] ENDO;  Service: Endoscopy;  Laterality: N/A;    COLONOSCOPY W/ POLYPECTOMY  04/13/2010    YARELI.   One 1 cm polyp in the sigmoid colon.  Internal hemorrhoids.    COSMETIC SURGERY      DIGITAL RECTAL EXAMINATION UNDER ANESTHESIA N/A 7/23/2019    Procedure: EXAM UNDER ANESTHESIA, DIGITAL, RECTUM;  Surgeon: Kaz Keating MD;  Location: [facility] OR;  Service: General;  Laterality: N/A;    EXAMINATION UNDER ANESTHESIA N/A 4/24/2019    Procedure: EXAM UNDER ANESTHESIA;  Surgeon: Kaz Keating MD;  Location: [facility] OR;  Service: General;   Laterality: N/A;    EXCISIONAL HEMORRHOIDECTOMY N/A 4/24/2019    Procedure: HEMORRHOIDECTOMY (lithotomy);  Surgeon: Kaz Keating MD;  Location: [facility];  Service: General;  Laterality: N/A;    GANGLION CYST EXCISION Left     INJECTION OF ANESTHETIC AGENT AROUND MEDIAL BRANCH NERVES INNERVATING LUMBAR FACET JOINT Bilateral 4/9/2024    Procedure: Block-nerve-medial branch-lumbar-L4/5-L5/S1;  Surgeon: Everett Taveras MD;  Location: [facility] OR;  Service: Pain Management;  Laterality: Bilateral;    INJECTION OF ANESTHETIC AGENT AROUND MEDIAL BRANCH NERVES INNERVATING LUMBAR FACET JOINT Bilateral 5/6/2024    Procedure: Block-nerve-medial branch-lumbar-L4/5-L5/S1;  Surgeon: Everett Taveras MD;  Location: [facility];  Service: Pain Management;  Laterality: Bilateral;    INJECTION OF ANESTHETIC AGENT AROUND PUDENDAL NERVE N/A 4/24/2019    Procedure: BLOCK, NERVE, PUDENDAL;  Surgeon: Kaz Keating MD;  Location: [facility];  Service: General;  Laterality: N/A;    INJECTION OF ANESTHETIC AGENT AROUND PUDENDAL NERVE N/A 7/23/2019    Procedure: BLOCK, NERVE, PUDENDAL;  Surgeon: Kaz Keating MD;  Location: [facility];  Service: General;  Laterality: N/A;    REPAIR OF RETINAL DETACHMENT WITH VITRECTOMY Right 8/1/2018    Procedure: REPAIR, RETINAL DETACHMENT, WITH VITRECTOMY;  Surgeon: SAJAN Pulido MD;  Location: [address];  Service: Ophthalmology;  Laterality: Right;  35 min    TONSILLECTOMY      TRANSRECTAL BIOPSY OF PROSTATE WITH ULTRASOUND GUIDANCE N/A 1/31/2023    Procedure: BIOPSY, PROSTATE, RECTAL APPROACH, WITH US GUIDANCE;  Surgeon: MARIZA Donahue MD;  Location: [facility] OR;  Service: Urology;  Laterality: N/A;    TRANSRECTAL BIOPSY OF PROSTATE WITH ULTRASOUND GUIDANCE N/A 4/12/2024    Procedure: BIOPSY, PROSTATE, RECTAL APPROACH, WITH US GUIDANCE;  Surgeon: MARIZA Donahue MD;  Location: [facility];  Service: Urology;  Laterality: N/A;    TRIGGER FINGER RELEASE Right     X 2    uro lift      for  "enlarged prostate     Current Outpatient Medications on File Prior to Visit   Medication Sig Dispense Refill    allopurinoL (ZYLOPRIM) 300 MG tablet TAKE 1 TABLET(300 MG) BY MOUTH EVERY DAY 90 tablet 3    atorvastatin (LIPITOR) 20 MG tablet TAKE 1 TABLET(20 MG) BY MOUTH EVERY DAY (Patient taking differently: Take 20 mg by mouth every evening.) 90 tablet 3    brimonidine 0.2% (ALPHAGAN) 0.2 % Drop INSTILL 1 DROP IN BOTH EYES TWICE DAILY 10 mL 3    carbidopa-levodopa  mg (SINEMET)  mg per tablet TAKE 2 TABLETS BY MOUTH FIVE TIMES DAILY 300 tablet 11    cholecalciferol, vitamin D3, (D3-2000 ORAL) Take by mouth once daily.      citalopram (CELEXA) 10 MG tablet Take 1 tablet (10 mg total) by mouth once daily. 30 tablet 2    clonazePAM (KLONOPIN) 1 MG tablet Take 2 tablets (2 mg total) by mouth every evening. 60 tablet 2    cyanocobalamin (VITAMIN B-12) 1000 MCG tablet Take 100 mcg by mouth once daily.      dorzolamide (TRUSOPT) 2 % ophthalmic solution INSTILL 1 DROP IN BOTH EYES TWICE DAILY 10 mL 3    hydroCHLOROthiazide (HYDRODIURIL) 12.5 MG Tab Take 1 tablet (12.5 mg total) by mouth once daily. 30 tablet 11    insulin (LANTUS SOLOSTAR U-100 INSULIN) glargine 100 units/mL SubQ pen Inject 10 Units into the skin once daily. 9 mL 1    irbesartan (AVAPRO) 75 MG tablet Take 75 mg by mouth every evening.      ketoconazole (NIZORAL) 2 % shampoo Apply topically.      metFORMIN (GLUCOPHAGE-XR) 500 MG ER 24hr tablet Take 2 tablets (1,000 mg total) by mouth once daily. (Increased dose) 180 tablet 1    pen needle, diabetic 32 gauge x 1/4" Ndle 1 each by Misc.(Non-Drug; Combo Route) route once daily. 100 each 1    prazosin (MINIPRESS) 1 MG Cap TAKE 1 CAPSULE(1 MG) BY MOUTH EVERY EVENING 90 capsule 3    ramelteon (ROZEREM) 8 mg tablet Take 1 tablet (8 mg total) by mouth every evening. 90 tablet 3    rasagiline (AZILECT) 1 mg Tab TAKE 1 TABLET(1 MG) BY MOUTH EVERY EVENING 30 tablet 11    tirzepatide 5 mg/0.5 mL PnIj Inject " 5 mg into the skin every 7 days. (Increased dose) 4 Pen 2     No current facility-administered medications on file prior to visit.     Review of patient's allergies indicates:   Allergen Reactions    Avelox [moxifloxacin] Hives and Rash     Family History   Problem Relation Name Age of Onset    Hypertension Mother      Diabetes Mother      Stroke Mother      Cancer Mother          colon    Kidney disease Mother      Heart disease Father      Diabetes Father      Colon cancer Father      Cancer Father          colon    Cataracts Father      No Known Problems Sister      Heart disease Brother  62    Kidney disease Brother      Dementia Brother Rodrigue     Abnormal EKG Brother Rodrigue     No Known Problems Maternal Aunt      No Known Problems Maternal Uncle      No Known Problems Paternal Aunt      No Known Problems Paternal Uncle      Glaucoma Maternal Grandmother      No Known Problems Maternal Grandfather      No Known Problems Paternal Grandmother      No Known Problems Paternal Grandfather      Amblyopia Neg Hx      Blindness Neg Hx      Macular degeneration Neg Hx      Retinal detachment Neg Hx      Strabismus Neg Hx      Thyroid disease Neg Hx      Parkinsonism Neg Hx       Social History     Socioeconomic History    Marital status:    Tobacco Use    Smoking status: Never    Smokeless tobacco: Never   Substance and Sexual Activity    Alcohol use: Yes     Comment: occasional, no alcohol 72 hours prior to sx    Drug use: No    Sexual activity: Yes     Partners: Female   Social History Narrative            2 grown kids.         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      Social Determinants of Health     Financial Resource Strain: Patient Declined (1/29/2024)    Overall Financial Resource Strain (CARDIA)     Difficulty of Paying Living Expenses: Patient declined   Food Insecurity: Patient Declined (1/29/2024)    Hunger Vital Sign     Worried About Running Out of Food in the Last  Year: Patient declined     Ran Out of Food in the Last Year: Patient declined   Transportation Needs: Patient Declined (1/29/2024)    PRAPARE - Transportation     Lack of Transportation (Medical): Patient declined     Lack of Transportation (Non-Medical): Patient declined   Physical Activity: Sufficiently Active (1/29/2024)    Exercise Vital Sign     Days of Exercise per Week: 4 days     Minutes of Exercise per Session: 80 min   Stress: Patient Declined (1/29/2024)    [employer] - Occupational Stress Questionnaire     Feeling of Stress : Patient declined   Housing Stability: Patient Declined (1/29/2024)    Housing Stability Vital Sign     Unable to Pay for Housing in the Last Year: Patient declined     Number of Places Lived in the Last Year: 1     Unstable Housing in the Last Year: Patient declined       Review of Systems:  Constitutional:  Denies fever or chills   Eyes:  Denies change in visual acuity   HENT:  Denies nasal congestion or sore throat   Respiratory:  Denies cough or shortness of breath   Cardiovascular:  Denies chest pain or edema   GI:  Denies abdominal pain, nausea, vomiting, bloody stools or diarrhea   :  Denies dysuria   Integument:  Denies rash   Neurologic:  Denies headache, focal weakness or sensory changes   Endocrine:  Denies polyuria or polydipsia   Lymphatic:  Denies swollen glands   Psychiatric:  Denies depression or anxiety     Physical Exam:   Constitutional:  Well developed, well nourished, no acute distress, non-toxic appearance   Integument:  Well hydrated, no rash   Lymphatic:  No lymphadenopathy noted   Neurologic:  Alert & oriented x 3, CN 2-12 normal, normal motor function, normal sensory function, no focal deficits noted   Psychiatric:  Speech and behavior appropriate   Eyes: EOMI  Gi: abdomen soft    Bilateral Knee Exam    right Knee Exam     Tenderness   The patient is experiencing tenderness in the medial joint line.    Range of Motion    Extension: abnormal   Flexion: abnormal     Muscle Strength     The patient has normal knee strength.    Tests   Mayra:  Medial - positive   Lachman:  Anterior - negative      Varus: negative  Valgus: negative  Patellar Apprehension: negative    Other   Erythema: absent  Sensation: normal  Pulse: present  Swelling: mild      left Knee Exam   left knee exam performed same as contralateral side and is normal.            X-rays were performed, personally reviewed by me and findings discussed with the patient.  3 views of the right knee show tricompartmental degenerative change most pronounced in the medial compartment with Kellgren 3 changes    Primary osteoarthritis of right knee            Using an aseptic technique, I injected 5 cc of lidocaine 1% without and 1 cc of kenalog 40mg into the right Knee. The patient tolerated this well. I will have them return to clinic in 9 days.

## 2024-05-17 NOTE — TELEPHONE ENCOUNTER
Spoke with patient, scheduled procedure and follow up appointment. Reviewed pre-op instructions. Patient verbalized understanding.

## 2024-05-22 ENCOUNTER — PATIENT MESSAGE (OUTPATIENT)
Dept: NEUROLOGY | Facility: CLINIC | Age: 69
End: 2024-05-22
Payer: MEDICARE

## 2024-05-22 ENCOUNTER — PROCEDURE VISIT (OUTPATIENT)
Dept: OPHTHALMOLOGY | Facility: CLINIC | Age: 69
End: 2024-05-22
Payer: MEDICARE

## 2024-05-22 DIAGNOSIS — H52.7 REFRACTIVE ERROR: ICD-10-CM

## 2024-05-22 DIAGNOSIS — H26.491 PCO (POSTERIOR CAPSULAR OPACIFICATION), RIGHT: Primary | ICD-10-CM

## 2024-05-22 DIAGNOSIS — E11.9 DM TYPE 2 WITHOUT RETINOPATHY: ICD-10-CM

## 2024-05-22 DIAGNOSIS — Z96.1 PSEUDOPHAKIA: ICD-10-CM

## 2024-05-22 DIAGNOSIS — H34.8122 CENTRAL RETINAL VEIN OCCLUSION, LEFT EYE, STABLE: ICD-10-CM

## 2024-05-22 PROCEDURE — 92014 COMPRE OPH EXAM EST PT 1/>: CPT | Mod: S$PBB,,, | Performed by: OPHTHALMOLOGY

## 2024-05-22 NOTE — PROGRESS NOTES
Subjective:       Patient ID: Glenn Medel is a 68 y.o. male.    Chief Complaint: Laser Consultation (/)    HPI     Laser Consultation     Additional comments:              Comments    Referred by Dr Reyes    Brimonidine ou bid   Dorzolamide ou bid     Pt here for YAG Cap OS eval per Dr Reyes.  Pt states he has difficulty   driving at night and difficulty with glare OS.  Pt states he has blurry VA   OD at distance OS.          Last edited by Michelle Ng MA on 5/22/2024  2:58 PM.             Assessment:       1. PCO (posterior capsular opacification), right    2. DM type 2 without retinopathy    3. Central retinal vein occlusion, left eye, stable    4. Refractive error    5. Pseudophakia        Plan:       Early PCO OD- Not Visually Significant.  DM-No NPDR OU.  Hx/o CRVO OS-Stable.  RE-Needs MRx for night driving.      CPM OU.  Control DM.  RTC Dr Reyes for MRx.

## 2024-05-23 RX ORDER — TRIAMCINOLONE ACETONIDE 40 MG/ML
40 INJECTION, SUSPENSION INTRA-ARTICULAR; INTRAMUSCULAR
Status: DISCONTINUED | OUTPATIENT
Start: 2024-05-14 | End: 2024-05-23 | Stop reason: HOSPADM

## 2024-05-23 NOTE — PROCEDURES
Large Joint Aspiration/Injection: R subacromial bursa    Date/Time: 5/14/2024 9:00 AM    Performed by: Lukas Campbell MD  Authorized by: Lukas Campbell MD    Consent Done?:  Yes (Verbal)  Indications:  Pain  Timeout: prior to procedure the correct patient, procedure, and site was verified    Prep: patient was prepped and draped in usual sterile fashion      Local anesthesia used?: Yes    Local anesthetic:  Lidocaine 1% without epinephrine  Anesthetic total (ml):  5      Details:  Needle Size:  22 G  Ultrasonic Guidance for needle placement?: No    Approach:  Posterior  Location:  Shoulder  Site:  R subacromial bursa  Medications:  40 mg triamcinolone acetonide 40 mg/mL  Patient tolerance:  Patient tolerated the procedure well with no immediate complications

## 2024-05-23 NOTE — PROGRESS NOTES
Chief Complaint   Patient presents with    Right Shoulder - Pain       HPI:    This is a 68 y.o. who presents today complaining of right shoulder pain for 2 months after no known trauma. Pain is dull. No numbness or tingling. No associated signs or symptoms.      Past Medical History:   Diagnosis Date    BPH (benign prostatic hyperplasia)     Cataract     done ou    Chronic kidney disease, stage 3 02/25/2015    DDD (degenerative disc disease), lumbar     s/p epidural steroids     Diabetes mellitus     type 2    Elevated PSA     Glaucoma     OU    HTN (hypertension)     Iritis, lens-induced 01/08/2018    Obesity     Parkinson's disease     Sleep apnea     uses cpap      Past Surgical History:   Procedure Laterality Date    CATARACT EXTRACTION W/  INTRAOCULAR LENS IMPLANT Left 11/30/2017    CATARACT EXTRACTION W/  INTRAOCULAR LENS IMPLANT Right 12/11/2018    Dr Garcia    CATARACT EXTRACTION W/  INTRAOCULAR LENS IMPLANT Right 12/11/2018    Procedure: EXTRACTION, CATARACT, WITH IOL INSERTION;  Surgeon: Damaris Garcia MD;  Location: [facility] OR;  Service: Ophthalmology;  Laterality: Right;    COLONOSCOPY N/A 9/18/2017    Procedure: COLONOSCOPY;  Surgeon: Paul Feliz Jr., MD;  Location: [facility] ENDO;  Service: Endoscopy;  Laterality: N/A;    COLONOSCOPY N/A 12/21/2022    Procedure: COLONOSCOPY;  Surgeon: Paul Feliz Jr., MD;  Location: [facility] ENDO;  Service: Endoscopy;  Laterality: N/A;    COLONOSCOPY W/ POLYPECTOMY  04/13/2010    YARELI.   One 1 cm polyp in the sigmoid colon.  Internal hemorrhoids.    COSMETIC SURGERY      DIGITAL RECTAL EXAMINATION UNDER ANESTHESIA N/A 7/23/2019    Procedure: EXAM UNDER ANESTHESIA, DIGITAL, RECTUM;  Surgeon: Kaz Keating MD;  Location: [facility] OR;  Service: General;  Laterality: N/A;    EXAMINATION UNDER ANESTHESIA N/A 4/24/2019    Procedure: EXAM UNDER ANESTHESIA;  Surgeon: Kaz Keating MD;  Location: [facility] OR;  Service: General;  Laterality: N/A;    EXCISIONAL  HEMORRHOIDECTOMY N/A 4/24/2019    Procedure: HEMORRHOIDECTOMY (lithotomy);  Surgeon: Kaz Keating MD;  Location: [facility] OR;  Service: General;  Laterality: N/A;    GANGLION CYST EXCISION Left     INJECTION OF ANESTHETIC AGENT AROUND MEDIAL BRANCH NERVES INNERVATING LUMBAR FACET JOINT Bilateral 4/9/2024    Procedure: Block-nerve-medial branch-lumbar-L4/5-L5/S1;  Surgeon: Everett Taveras MD;  Location: [facility] OR;  Service: Pain Management;  Laterality: Bilateral;    INJECTION OF ANESTHETIC AGENT AROUND MEDIAL BRANCH NERVES INNERVATING LUMBAR FACET JOINT Bilateral 5/6/2024    Procedure: Block-nerve-medial branch-lumbar-L4/5-L5/S1;  Surgeon: Everett Taveras MD;  Location: [facility] OR;  Service: Pain Management;  Laterality: Bilateral;    INJECTION OF ANESTHETIC AGENT AROUND PUDENDAL NERVE N/A 4/24/2019    Procedure: BLOCK, NERVE, PUDENDAL;  Surgeon: Kaz Keating MD;  Location: [facility];  Service: General;  Laterality: N/A;    INJECTION OF ANESTHETIC AGENT AROUND PUDENDAL NERVE N/A 7/23/2019    Procedure: BLOCK, NERVE, PUDENDAL;  Surgeon: Kaz Keating MD;  Location: [facility];  Service: General;  Laterality: N/A;    REPAIR OF RETINAL DETACHMENT WITH VITRECTOMY Right 8/1/2018    Procedure: REPAIR, RETINAL DETACHMENT, WITH VITRECTOMY;  Surgeon: SAJAN Pulido MD;  Location: [address];  Service: Ophthalmology;  Laterality: Right;  35 min    TONSILLECTOMY      TRANSRECTAL BIOPSY OF PROSTATE WITH ULTRASOUND GUIDANCE N/A 1/31/2023    Procedure: BIOPSY, PROSTATE, RECTAL APPROACH, WITH US GUIDANCE;  Surgeon: MARIZA Donahue MD;  Location: [facility] OR;  Service: Urology;  Laterality: N/A;    TRANSRECTAL BIOPSY OF PROSTATE WITH ULTRASOUND GUIDANCE N/A 4/12/2024    Procedure: BIOPSY, PROSTATE, RECTAL APPROACH, WITH US GUIDANCE;  Surgeon: MARIZA Donahue MD;  Location: [facility];  Service: Urology;  Laterality: N/A;    TRIGGER FINGER RELEASE Right     X 2    uro lift      for enlarged prostate      Current  "Outpatient Medications on File Prior to Visit   Medication Sig Dispense Refill    allopurinoL (ZYLOPRIM) 300 MG tablet TAKE 1 TABLET(300 MG) BY MOUTH EVERY DAY 90 tablet 3    atorvastatin (LIPITOR) 20 MG tablet TAKE 1 TABLET(20 MG) BY MOUTH EVERY DAY (Patient taking differently: Take 20 mg by mouth every evening.) 90 tablet 3    brimonidine 0.2% (ALPHAGAN) 0.2 % Drop INSTILL 1 DROP IN BOTH EYES TWICE DAILY 10 mL 3    carbidopa-levodopa  mg (SINEMET)  mg per tablet TAKE 2 TABLETS BY MOUTH FIVE TIMES DAILY 300 tablet 11    cholecalciferol, vitamin D3, (D3-2000 ORAL) Take by mouth once daily.      citalopram (CELEXA) 10 MG tablet Take 1 tablet (10 mg total) by mouth once daily. 30 tablet 2    clonazePAM (KLONOPIN) 1 MG tablet Take 2 tablets (2 mg total) by mouth every evening. 60 tablet 2    cyanocobalamin (VITAMIN B-12) 1000 MCG tablet Take 100 mcg by mouth once daily.      dorzolamide (TRUSOPT) 2 % ophthalmic solution INSTILL 1 DROP IN BOTH EYES TWICE DAILY 10 mL 3    hydroCHLOROthiazide (HYDRODIURIL) 12.5 MG Tab Take 1 tablet (12.5 mg total) by mouth once daily. 30 tablet 11    insulin (LANTUS SOLOSTAR U-100 INSULIN) glargine 100 units/mL SubQ pen Inject 10 Units into the skin once daily. 9 mL 1    irbesartan (AVAPRO) 75 MG tablet Take 75 mg by mouth every evening.      ketoconazole (NIZORAL) 2 % shampoo Apply topically.      metFORMIN (GLUCOPHAGE-XR) 500 MG ER 24hr tablet Take 2 tablets (1,000 mg total) by mouth once daily. (Increased dose) 180 tablet 1    pen needle, diabetic 32 gauge x 1/4" Ndle 1 each by Misc.(Non-Drug; Combo Route) route once daily. 100 each 1    prazosin (MINIPRESS) 1 MG Cap TAKE 1 CAPSULE(1 MG) BY MOUTH EVERY EVENING 90 capsule 3    ramelteon (ROZEREM) 8 mg tablet Take 1 tablet (8 mg total) by mouth every evening. 90 tablet 3    rasagiline (AZILECT) 1 mg Tab TAKE 1 TABLET(1 MG) BY MOUTH EVERY EVENING 30 tablet 11    tirzepatide 5 mg/0.5 mL PnIj Inject 5 mg into the skin every 7 " days. (Increased dose) 4 Pen 2     No current facility-administered medications on file prior to visit.      Review of patient's allergies indicates:   Allergen Reactions    Avelox [moxifloxacin] Hives and Rash      Family History not pertinent   Social History     Socioeconomic History    Marital status:    Tobacco Use    Smoking status: Never    Smokeless tobacco: Never   Substance and Sexual Activity    Alcohol use: Yes     Comment: occasional, no alcohol 72 hours prior to sx    Drug use: No    Sexual activity: Yes     Partners: Female   Social History Narrative            2 grown kids.         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      Social Determinants of Health     Financial Resource Strain: Patient Declined (1/29/2024)    Overall Financial Resource Strain (CARDIA)     Difficulty of Paying Living Expenses: Patient declined   Food Insecurity: Patient Declined (1/29/2024)    Hunger Vital Sign     Worried About Running Out of Food in the Last Year: Patient declined     Ran Out of Food in the Last Year: Patient declined   Transportation Needs: Patient Declined (1/29/2024)    PRAPARE - Transportation     Lack of Transportation (Medical): Patient declined     Lack of Transportation (Non-Medical): Patient declined   Physical Activity: Sufficiently Active (1/29/2024)    Exercise Vital Sign     Days of Exercise per Week: 4 days     Minutes of Exercise per Session: 80 min   Stress: Patient Declined (1/29/2024)    [employer] - Occupational Stress Questionnaire     Feeling of Stress : Patient declined   Housing Stability: Patient Declined (1/29/2024)    Housing Stability Vital Sign     Unable to Pay for Housing in the Last Year: Patient declined     Number of Places Lived in the Last Year: 1     Unstable Housing in the Last Year: Patient declined         Review of Systems:   Constitutional:  Denies fever or chills    Eyes:  Denies change in visual  acuity    HENT:  Denies nasal congestion or sore throat    Respiratory:  Denies cough or shortness of breath    Cardiovascular:  Denies chest pain or edema    GI:  Denies abdominal pain, nausea, vomiting, bloody stools or diarrhea    :  Denies dysuria    Integument:  Denies rash    Neurologic:  Denies headache, focal weakness or sensory changes    Endocrine:  Denies polyuria or polydipsia    Lymphatic:  Denies swollen glands    Psychiatric:  Denies depression or anxiety       Physical Exam:    Constitutional:  Well developed, well nourished, no acute distress, non-toxic appearance    Integument:  Well hydrated, no rash    Lymphatic:  No lymphadenopathy noted    Neurologic:  Alert & oriented x 3,     Psychiatric:  Speech and behavior appropriate    Gi: abdomen soft  Eyes: EOMI     Bilateral Shoulder Exam    left Shoulder Exam   Shoulder exam performed same as contralateral side and is normal.    right Shoulder Exam   Tenderness   Shoulder tenderness location: diffusely about shoulder.    Range of Motion   Forward Flexion: abnormal   External Rotation: abnormal     Muscle Strength   Supraspinatus: 4/5     Tests   Hawkin's test: positive  Impingement: positive    Other   Erythema: absent  Sensation: normal  Pulse: present      X-rays were performed today, personally reviewed by me and findings discussed with the patient.   3 views of the right shoulder show no fractures or dislocations      Impingement syndrome, shoulder, right          Using an aseptic technique, I injected 5 cc of lidocaine 1% without and 1 cc of kenalog 40mg into the right Shoulder. The patient tolerated this well. I will have them return to clinic in 3 months.

## 2024-05-31 ENCOUNTER — PATIENT MESSAGE (OUTPATIENT)
Dept: FAMILY MEDICINE | Facility: CLINIC | Age: 69
End: 2024-05-31
Payer: MEDICARE

## 2024-06-04 ENCOUNTER — HOSPITAL ENCOUNTER (OUTPATIENT)
Dept: RADIOLOGY | Facility: HOSPITAL | Age: 69
Discharge: HOME | End: 2024-06-04
Attending: ANESTHESIOLOGY | Admitting: ANESTHESIOLOGY
Payer: MEDICARE

## 2024-06-04 ENCOUNTER — HOSPITAL ENCOUNTER (OUTPATIENT)
Facility: HOSPITAL | Age: 69
Discharge: HOME | End: 2024-06-04
Attending: ANESTHESIOLOGY | Admitting: ANESTHESIOLOGY
Payer: MEDICARE

## 2024-06-04 VITALS
HEART RATE: 61 BPM | RESPIRATION RATE: 15 BRPM | BODY MASS INDEX: 26.32 KG/M2 | WEIGHT: 188 LBS | SYSTOLIC BLOOD PRESSURE: 136 MMHG | DIASTOLIC BLOOD PRESSURE: 74 MMHG | TEMPERATURE: 98 F | HEIGHT: 71 IN | OXYGEN SATURATION: 92 %

## 2024-06-04 DIAGNOSIS — M47.816 LUMBAR SPONDYLOSIS: Primary | ICD-10-CM

## 2024-06-04 DIAGNOSIS — M54.50 LOWER BACK PAIN: ICD-10-CM

## 2024-06-04 LAB — GLUCOSE SERPL-MCNC: 110 MG/DL (ref 70–110)

## 2024-06-04 PROCEDURE — 64636 DESTROY L/S FACET JNT ADDL: CPT | Mod: 50,,, | Performed by: ANESTHESIOLOGY

## 2024-06-04 PROCEDURE — 63600175 PHARM REV CODE 636 W HCPCS: Mod: PO | Performed by: ANESTHESIOLOGY

## 2024-06-04 PROCEDURE — 64636 DESTROY L/S FACET JNT ADDL: CPT | Mod: 50,PO | Performed by: ANESTHESIOLOGY

## 2024-06-04 PROCEDURE — 64635 DESTROY LUMB/SAC FACET JNT: CPT | Mod: 50,,, | Performed by: ANESTHESIOLOGY

## 2024-06-04 PROCEDURE — 64635 DESTROY LUMB/SAC FACET JNT: CPT | Mod: 50,PO | Performed by: ANESTHESIOLOGY

## 2024-06-04 PROCEDURE — 25000003 PHARM REV CODE 250: Mod: PO | Performed by: ANESTHESIOLOGY

## 2024-06-04 PROCEDURE — 76000 FLUOROSCOPY <1 HR PHYS/QHP: CPT | Mod: TC,PO

## 2024-06-04 PROCEDURE — 99152 MOD SED SAME PHYS/QHP 5/>YRS: CPT | Mod: ,,, | Performed by: ANESTHESIOLOGY

## 2024-06-04 RX ORDER — BUPIVACAINE HYDROCHLORIDE 2.5 MG/ML
INJECTION, SOLUTION EPIDURAL; INFILTRATION; INTRACAUDAL
Status: DISCONTINUED | OUTPATIENT
Start: 2024-06-04 | End: 2024-06-04 | Stop reason: HOSPADM

## 2024-06-04 RX ORDER — TRIAMCINOLONE ACETONIDE 40 MG/ML
INJECTION, SUSPENSION INTRA-ARTICULAR; INTRAMUSCULAR
Status: DISCONTINUED | OUTPATIENT
Start: 2024-06-04 | End: 2024-06-04 | Stop reason: HOSPADM

## 2024-06-04 RX ORDER — SODIUM CHLORIDE, SODIUM LACTATE, POTASSIUM CHLORIDE, CALCIUM CHLORIDE 600; 310; 30; 20 MG/100ML; MG/100ML; MG/100ML; MG/100ML
INJECTION, SOLUTION INTRAVENOUS CONTINUOUS
Status: DISCONTINUED | OUTPATIENT
Start: 2024-06-04 | End: 2024-06-04 | Stop reason: HOSPADM

## 2024-06-04 RX ORDER — FENTANYL CITRATE 50 UG/ML
INJECTION, SOLUTION INTRAMUSCULAR; INTRAVENOUS
Status: DISCONTINUED | OUTPATIENT
Start: 2024-06-04 | End: 2024-06-04 | Stop reason: HOSPADM

## 2024-06-04 RX ORDER — MIDAZOLAM HYDROCHLORIDE 1 MG/ML
INJECTION INTRAMUSCULAR; INTRAVENOUS
Status: DISCONTINUED | OUTPATIENT
Start: 2024-06-04 | End: 2024-06-04 | Stop reason: HOSPADM

## 2024-06-04 RX ORDER — LIDOCAINE HYDROCHLORIDE 10 MG/ML
INJECTION, SOLUTION EPIDURAL; INFILTRATION; INTRACAUDAL; PERINEURAL
Status: DISCONTINUED | OUTPATIENT
Start: 2024-06-04 | End: 2024-06-04 | Stop reason: HOSPADM

## 2024-06-04 RX ORDER — LIDOCAINE HYDROCHLORIDE 20 MG/ML
INJECTION, SOLUTION EPIDURAL; INFILTRATION; INTRACAUDAL; PERINEURAL
Status: DISCONTINUED | OUTPATIENT
Start: 2024-06-04 | End: 2024-06-04 | Stop reason: HOSPADM

## 2024-06-04 RX ADMIN — SODIUM CHLORIDE, POTASSIUM CHLORIDE, SODIUM LACTATE AND CALCIUM CHLORIDE: 600; 310; 30; 20 INJECTION, SOLUTION INTRAVENOUS at 01:06

## 2024-06-04 NOTE — OP NOTE
Procedure Note    Procedure Date: 6/4/2024    Procedure Performed:  Radiofrequency ablation of the L4/5 and L5/S1 medial branch nerves on the  bilateral side utilizing fluoroscopy    Indication: The patient has clinical and radiologic findings suggestive of facet mediated pain and is s/p 2nd diagnostic bilateral lumbar L4/5 and L5/S1 medial branch blocks with at least 80% relief of their axial back pain lasting the duration of the local anesthetic utilized.     Pre-op diagnosis: Lumbar Spondylosis    Post-op diagnosis: same    Physician: Everett Taveras MD    IV Sedation medications: Moderate sedation was achieved with midazolam 2 mg and fentanyl 50 mcg.  Continuous monitoring of EKG, blood pressure and pulse oximetry was provided by a registered nurse during the entire course of the procedure under my supervision and recorded in the patient's medical record.   Total time for sedation was 21 minutes.    Medications injected:  Pre-lesion, 1ml of 2% lidocaine at each level.  From a mixture of 5ml of 0.25% bupivacaine and 40mg of methylprednisolone, 1ml of this solution was injected at each level post-lesion.    Local anesthetic used: 1% Lidocaine, 5 ml    Estimated Blood Loss: none    Complications:  none    Technique:  The patient was interviewed in the holding area and Risks/Benefits were discussed, including, but not limited to, the possibility of new or different pain, bleeding or infection.   All questions were answered.  The patient understood and accepted risks.  Consent was reviewed.  A time out was taken to identify the patient, procedure and side of the procedure. The patient was placed in a prone position, then prepped and draped in the usual sterile fashion using ChloraPrep x2 and sterile towels.  The levels were determined under fluoroscopic guidance and then marked.  AP and oblique fluoroscopy were used to identify and sergey the junctions between the superior articular processes and transverse processes at  the L3-5 levels on the Bilateral side.  The Bilateral sacral ala was also identified and marked.  The skin and subcutaneous tissues in these identified areas were anesthetized with 1% lidocaine. A 20-gauge 100 mm [employer] RF needle was advanced towards each of these points under fluoroscopic guidance such that the tip of the needle was positioned posterior to the Neuro-foramen on lateral fluoroscopic view.  Then, each needle was positioned such that, on stimulation, the patient had an appropriate pain response between 0.3-0.5 V, with no motor response, other than Lumbar Paraspinal Muscles up to 2.0V.  One mL of 2% lidocaine was then injected at each level prior to lesioning, which was performed for 90 seconds at 80°C.  Once the lesion was complete, 1 mL of a solution consisting of 5 mL 0.25% bupivacaine and 40mg methylprednisolone was injected through each probe. The probes were removed with a 1% lidocaine flush.  The patient tolerated the procedure well.  The patient was monitored after the procedure.  Patient was given post procedure and discharge instructions to follow at home.  The patient was discharged in a stable condition.    Event Time In   In Facility 1253   In Pre-Procedure 1303   Physician Available    Anesthesia Available    Pre-Op: Bedside Procedure Start    Pre-Op: Bedside Procedure Stop    Pre-Procedure Complete 1332   Out of Pre-Procedure    Anesthesia Start    Anesthesia Start Data Collection    Setup Start    Setup Complete    In Room 1435   Prep Start    Procedure Prep Complete    MD notified pt. ready    Procedure Start 1442   Procedure Closing    Emergence    Procedure Finish 1455   Sedation Start 1434   Scope In    Extent Reached    Scope Out    Sedation End 1455   Out of Room 1456   Cleanup Start    Cleanup Complete    Cosmetic Start    Cosmetic Stop    Pain Mgmt In Room    Pain Mgmt Out Room    In Recovery    Anesthesia Finish    Bedside Procedure Start    Bedside Procedure Stop    Recovery  Care Complete    Out of Recovery    To Phase II    In Phase II    Pain Mgmt Recovery Start 1458   Pain Mgmt Recovery Stop    Obs Rec Start    Obs Rec Stop    Phase II Care Complete    Out of Phase II    Procedural Care Complete    Discharge    Pain Follow Up Needed    Pain Follow Up Complete

## 2024-06-04 NOTE — DISCHARGE SUMMARY
[facility]  Discharge Note  Short Stay    Admit Date: 6/4/2024    Discharge Date: 6/4/2024    Attending Physician: Everett Taveras     Discharge Provider: Everett Taveras    Diagnoses:  There are no hospital problems to display for this patient.      Discharged Condition: Good    Final Diagnoses: Lumbar spondylosis [M47.816]    Disposition: Home or Self Care    Hospital Course: No complications, uneventful    Outcome of Hospitalization, Treatment, Procedure, or Surgery:  Patient was admitted for outpatient interventional pain management procedure. The patient tolerated the procedure well with no complications.    Follow up/Patient Instructions:  Follow up as scheduled in Pain Management office in 2-3 weeks.  Patient has received instructions and follow up date and time.    Medications:  Continue previous medications    Discharge Procedure Orders   Notify your health care provider if you experience any of the following:  temperature >100.4     Notify your health care provider if you experience any of the following:  persistent nausea and vomiting or diarrhea     Notify your health care provider if you experience any of the following:  severe uncontrolled pain     Notify your health care provider if you experience any of the following:  redness, tenderness, or signs of infection (pain, swelling, redness, odor or green/yellow discharge around incision site)     Notify your health care provider if you experience any of the following:  difficulty breathing or increased cough     Notify your health care provider if you experience any of the following:  severe persistent headache     Notify your health care provider if you experience any of the following:  worsening rash     Notify your health care provider if you experience any of the following:  persistent dizziness, light-headedness, or visual disturbances     Notify your health care provider if you experience any of the following:  increased confusion or  weakness     Activity as tolerated

## 2024-06-04 NOTE — H&P
[place] - Surgery  History & Physical - Short Stay  Pain Management       SUBJECTIVE:     Procedure: Procedure(s) (LRB):  Radiofrequency Ablation, Nerve, Spinal, Lumbar, Medial Branch, L4/5 and L5/S1 (Bilateral)    Chief Complaint/Reason for Admission:  Lumbar spondylosis [M47.816]    PTA Medications   Medication Sig    allopurinoL (ZYLOPRIM) 300 MG tablet TAKE 1 TABLET(300 MG) BY MOUTH EVERY DAY    atorvastatin (LIPITOR) 20 MG tablet TAKE 1 TABLET(20 MG) BY MOUTH EVERY DAY (Patient taking differently: Take 20 mg by mouth every evening.)    brimonidine 0.2% (ALPHAGAN) 0.2 % Drop INSTILL 1 DROP IN BOTH EYES TWICE DAILY    carbidopa-levodopa  mg (SINEMET)  mg per tablet TAKE 2 TABLETS BY MOUTH FIVE TIMES DAILY    cholecalciferol, vitamin D3, (D3-2000 ORAL) Take by mouth once daily.    citalopram (CELEXA) 10 MG tablet Take 1 tablet (10 mg total) by mouth once daily.    clonazePAM (KLONOPIN) 1 MG tablet Take 2 tablets (2 mg total) by mouth every evening.    cyanocobalamin (VITAMIN B-12) 1000 MCG tablet Take 100 mcg by mouth once daily.    dorzolamide (TRUSOPT) 2 % ophthalmic solution INSTILL 1 DROP IN BOTH EYES TWICE DAILY    hydroCHLOROthiazide (HYDRODIURIL) 12.5 MG Tab Take 1 tablet (12.5 mg total) by mouth once daily.    insulin (LANTUS SOLOSTAR U-100 INSULIN) glargine 100 units/mL SubQ pen Inject 10 Units into the skin once daily.    irbesartan (AVAPRO) 75 MG tablet Take 75 mg by mouth every evening.    ketoconazole (NIZORAL) 2 % shampoo Apply topically.    metFORMIN (GLUCOPHAGE-XR) 500 MG ER 24hr tablet Take 2 tablets (1,000 mg total) by mouth once daily. (Increased dose)    prazosin (MINIPRESS) 1 MG Cap TAKE 1 CAPSULE(1 MG) BY MOUTH EVERY EVENING    ramelteon (ROZEREM) 8 mg tablet Take 1 tablet (8 mg total) by mouth every evening.    rasagiline (AZILECT) 1 mg Tab TAKE 1 TABLET(1 MG) BY MOUTH EVERY EVENING    tirzepatide 5 mg/0.5 mL PnIj Inject 5 mg into the skin every 7 days. (Increased dose)    pen  "needle, diabetic 32 gauge x 1/4" Ndle 1 each by Misc.(Non-Drug; Combo Route) route once daily.       Review of patient's allergies indicates:   Allergen Reactions    Avelox [moxifloxacin] Hives and Rash       Past Medical History:   Diagnosis Date    BPH (benign prostatic hyperplasia)     Cataract     done ou    Chronic kidney disease, stage 3 02/25/2015    DDD (degenerative disc disease), lumbar     s/p epidural steroids     Diabetes mellitus     type 2    Elevated PSA     Glaucoma     OU    HTN (hypertension)     Iritis, lens-induced 01/08/2018    Obesity     Parkinson's disease     Sleep apnea     uses cpap     Past Surgical History:   Procedure Laterality Date    CATARACT EXTRACTION W/  INTRAOCULAR LENS IMPLANT Left 11/30/2017    CATARACT EXTRACTION W/  INTRAOCULAR LENS IMPLANT Right 12/11/2018    Dr Garcia    CATARACT EXTRACTION W/  INTRAOCULAR LENS IMPLANT Right 12/11/2018    Procedure: EXTRACTION, CATARACT, WITH IOL INSERTION;  Surgeon: Damaris Garcia MD;  Location: [facility] OR;  Service: Ophthalmology;  Laterality: Right;    COLONOSCOPY N/A 9/18/2017    Procedure: COLONOSCOPY;  Surgeon: Paul Feliz Jr., MD;  Location: [facility] ENDO;  Service: Endoscopy;  Laterality: N/A;    COLONOSCOPY N/A 12/21/2022    Procedure: COLONOSCOPY;  Surgeon: Paul Feliz Jr., MD;  Location: [facility] ENDO;  Service: Endoscopy;  Laterality: N/A;    COLONOSCOPY W/ POLYPECTOMY  04/13/2010    YARELI.   One 1 cm polyp in the sigmoid colon.  Internal hemorrhoids.    COSMETIC SURGERY      DIGITAL RECTAL EXAMINATION UNDER ANESTHESIA N/A 7/23/2019    Procedure: EXAM UNDER ANESTHESIA, DIGITAL, RECTUM;  Surgeon: Kaz Keating MD;  Location: [facility] OR;  Service: General;  Laterality: N/A;    EXAMINATION UNDER ANESTHESIA N/A 4/24/2019    Procedure: EXAM UNDER ANESTHESIA;  Surgeon: Kaz Keating MD;  Location: [facility] OR;  Service: General;  Laterality: N/A;    EXCISIONAL HEMORRHOIDECTOMY N/A 4/24/2019    Procedure: HEMORRHOIDECTOMY " (lithotomy);  Surgeon: Kaz Keating MD;  Location: [facility] OR;  Service: General;  Laterality: N/A;    GANGLION CYST EXCISION Left     INJECTION OF ANESTHETIC AGENT AROUND MEDIAL BRANCH NERVES INNERVATING LUMBAR FACET JOINT Bilateral 4/9/2024    Procedure: Block-nerve-medial branch-lumbar-L4/5-L5/S1;  Surgeon: Everett Taveras MD;  Location: [facility] OR;  Service: Pain Management;  Laterality: Bilateral;    INJECTION OF ANESTHETIC AGENT AROUND MEDIAL BRANCH NERVES INNERVATING LUMBAR FACET JOINT Bilateral 5/6/2024    Procedure: Block-nerve-medial branch-lumbar-L4/5-L5/S1;  Surgeon: Everett Taveras MD;  Location: [facility] OR;  Service: Pain Management;  Laterality: Bilateral;    INJECTION OF ANESTHETIC AGENT AROUND PUDENDAL NERVE N/A 4/24/2019    Procedure: BLOCK, NERVE, PUDENDAL;  Surgeon: Kaz Keating MD;  Location: [facility] OR;  Service: General;  Laterality: N/A;    INJECTION OF ANESTHETIC AGENT AROUND PUDENDAL NERVE N/A 7/23/2019    Procedure: BLOCK, NERVE, PUDENDAL;  Surgeon: Kaz Keating MD;  Location: [facility] OR;  Service: General;  Laterality: N/A;    REPAIR OF RETINAL DETACHMENT WITH VITRECTOMY Right 8/1/2018    Procedure: REPAIR, RETINAL DETACHMENT, WITH VITRECTOMY;  Surgeon: SAJAN Pulido MD;  Location: [address];  Service: Ophthalmology;  Laterality: Right;  35 min    TONSILLECTOMY      TRANSRECTAL BIOPSY OF PROSTATE WITH ULTRASOUND GUIDANCE N/A 1/31/2023    Procedure: BIOPSY, PROSTATE, RECTAL APPROACH, WITH US GUIDANCE;  Surgeon: MARIZA Donahue MD;  Location: [facility];  Service: Urology;  Laterality: N/A;    TRANSRECTAL BIOPSY OF PROSTATE WITH ULTRASOUND GUIDANCE N/A 4/12/2024    Procedure: BIOPSY, PROSTATE, RECTAL APPROACH, WITH US GUIDANCE;  Surgeon: MARIZA Donahue MD;  Location: [facility];  Service: Urology;  Laterality: N/A;    TRIGGER FINGER RELEASE Right     X 2    uro lift      for enlarged prostate     Family History   Problem Relation Name Age of Onset    Hypertension Mother       Diabetes Mother      Stroke Mother      Cancer Mother          colon    Kidney disease Mother      Heart disease Father      Diabetes Father      Colon cancer Father      Cancer Father          colon    Cataracts Father      No Known Problems Sister      Heart disease Brother  62    Kidney disease Brother      Dementia Brother Rodrigue     Abnormal EKG Brother Rodrigue     No Known Problems Maternal Aunt      No Known Problems Maternal Uncle      No Known Problems Paternal Aunt      No Known Problems Paternal Uncle      Glaucoma Maternal Grandmother      No Known Problems Maternal Grandfather      No Known Problems Paternal Grandmother      No Known Problems Paternal Grandfather      Amblyopia Neg Hx      Blindness Neg Hx      Macular degeneration Neg Hx      Retinal detachment Neg Hx      Strabismus Neg Hx      Thyroid disease Neg Hx      Parkinsonism Neg Hx       Social History     Tobacco Use    Smoking status: Never    Smokeless tobacco: Never   Substance Use Topics    Alcohol use: Yes     Comment: occasional, no alcohol 72 hours prior to sx    Drug use: No        Current Facility-Administered Medications:     lactated ringers infusion, , Intravenous, Continuous, Everett Taveras MD    Review of Systems:  General ROS: negative for - fever  Dermatological ROS: negative for rash    OBJECTIVE:     Vital Signs (Most Recent):     Body mass index is 26.22 kg/m².    Physical Exam:  General appearance - alert, well appearing, and in no distress  Mental status - AOx3  Eyes - pupils equal and reactive, extraocular eye movements intact  Heart - normal rate, regular rhythm, normal S1, S2, no murmurs, rubs, clicks or gallops  Chest - clear to auscultation, no wheezes, rales or rhonchi, symmetric air entry  Abdomen - soft, nontender, nondistended, no masses or organomegaly  Neurological - alert, oriented, normal speech, no focal findings or movement disorder noted  Extremities - peripheral pulses normal, no pedal edema, no  clubbing or cyanosis      ASSESSMENT/PLAN:     There are no hospital problems to display for this patient.     Indication: The patient has clinical and radiologic findings suggestive of facet mediated pain and is s/p 2nd diagnostic bilateral lumbar L4/5 and L5/S1 medial branch blocks with at least 80% relief of their axial back pain lasting the duration of the local anesthetic utilized.    We will proceed with bilateral L4/5 and L5/S1 RFA.     The risks and benefits of this intervention, and alternative therapies were discussed with the patient.  The discussion of risks included infection, bleeding, need for additional procedures or surgery, nerve damage, paralysis, adverse medication reaction(s), stroke, and/or death.  Questions regarding the procedure, risks, expected outcome, and possible side effects were solicited and answered to the patient's satisfaction.   wishes to proceed with the injection.  Verbal and written consent were verified.  ASA 3, MP II      Proceed with intervention as scheduled.    Everett Taveras M.D.  Interventional Pain Medicine / Anesthesiology

## 2024-06-05 ENCOUNTER — PATIENT MESSAGE (OUTPATIENT)
Dept: HEMATOLOGY/ONCOLOGY | Facility: CLINIC | Age: 69
End: 2024-06-05
Payer: MEDICARE

## 2024-06-06 ENCOUNTER — LAB VISIT (OUTPATIENT)
Dept: LAB | Facility: HOSPITAL | Age: 69
End: 2024-06-06
Attending: INTERNAL MEDICINE
Payer: MEDICARE

## 2024-06-06 DIAGNOSIS — E78.2 MIXED HYPERLIPIDEMIA: ICD-10-CM

## 2024-06-06 DIAGNOSIS — E11.22 TYPE 2 DIABETES MELLITUS WITH STAGE 2 CHRONIC KIDNEY DISEASE, WITHOUT LONG-TERM CURRENT USE OF INSULIN: ICD-10-CM

## 2024-06-06 DIAGNOSIS — N18.2 TYPE 2 DIABETES MELLITUS WITH STAGE 2 CHRONIC KIDNEY DISEASE, WITHOUT LONG-TERM CURRENT USE OF INSULIN: ICD-10-CM

## 2024-06-06 DIAGNOSIS — I10 PRIMARY HYPERTENSION: ICD-10-CM

## 2024-06-06 LAB
ALBUMIN SERPL BCP-MCNC: 3.5 G/DL (ref 3.5–5.2)
ALP SERPL-CCNC: 120 U/L (ref 55–135)
ALT SERPL W/O P-5'-P-CCNC: 11 U/L (ref 10–44)
ANION GAP SERPL CALC-SCNC: 8 MMOL/L (ref 8–16)
AST SERPL-CCNC: 26 U/L (ref 10–40)
BILIRUB DIRECT SERPL-MCNC: 0.2 MG/DL (ref 0.1–0.3)
BILIRUB SERPL-MCNC: 0.6 MG/DL (ref 0.1–1)
BUN SERPL-MCNC: 17 MG/DL (ref 8–23)
CALCIUM SERPL-MCNC: 9.4 MG/DL (ref 8.7–10.5)
CHLORIDE SERPL-SCNC: 102 MMOL/L (ref 95–110)
CO2 SERPL-SCNC: 27 MMOL/L (ref 23–29)
CREAT SERPL-MCNC: 1 MG/DL (ref 0.5–1.4)
EST. GFR  (NO RACE VARIABLE): >60 ML/MIN/1.73 M^2
ESTIMATED AVG GLUCOSE: 209 MG/DL (ref 68–131)
GLUCOSE SERPL-MCNC: 191 MG/DL (ref 70–110)
HBA1C MFR BLD: 8.9 % (ref 4–5.6)
POTASSIUM SERPL-SCNC: 4.2 MMOL/L (ref 3.5–5.1)
PROT SERPL-MCNC: 6.7 G/DL (ref 6–8.4)
SODIUM SERPL-SCNC: 137 MMOL/L (ref 136–145)

## 2024-06-06 PROCEDURE — 80076 HEPATIC FUNCTION PANEL: CPT | Performed by: INTERNAL MEDICINE

## 2024-06-06 PROCEDURE — 83036 HEMOGLOBIN GLYCOSYLATED A1C: CPT | Performed by: INTERNAL MEDICINE

## 2024-06-06 PROCEDURE — 36415 COLL VENOUS BLD VENIPUNCTURE: CPT | Mod: PO | Performed by: INTERNAL MEDICINE

## 2024-06-06 PROCEDURE — 80048 BASIC METABOLIC PNL TOTAL CA: CPT | Performed by: INTERNAL MEDICINE

## 2024-06-10 ENCOUNTER — TELEPHONE (OUTPATIENT)
Dept: HEMATOLOGY/ONCOLOGY | Facility: CLINIC | Age: 69
End: 2024-06-10
Payer: MEDICARE

## 2024-06-10 NOTE — TELEPHONE ENCOUNTER
Andreia wit pt about attending the upcoming [facility] program. I explained in detail what the program was about. Pt verbalized understanding and accepted date/time. Pt asked that we call a day before to remind him. Pt stated he would be bringing his wife. Pt verbalized understanding and thanked me for following up.

## 2024-06-17 DIAGNOSIS — E11.22 TYPE 2 DIABETES MELLITUS WITH STAGE 2 CHRONIC KIDNEY DISEASE, WITHOUT LONG-TERM CURRENT USE OF INSULIN: ICD-10-CM

## 2024-06-17 DIAGNOSIS — N18.2 TYPE 2 DIABETES MELLITUS WITH STAGE 2 CHRONIC KIDNEY DISEASE, WITHOUT LONG-TERM CURRENT USE OF INSULIN: ICD-10-CM

## 2024-06-17 NOTE — TELEPHONE ENCOUNTER
Care Due:                  Date            Visit Type   Department     Provider  --------------------------------------------------------------------------------                                EP -                              PRIMARY      [facility] FAMILY  Last Visit: 04-      CARE ([facility])   MEDICINE       [facility]  Next Visit: None Scheduled  None         None Found                                                            Last  Test          Frequency    Reason                     Performed    Due Date  --------------------------------------------------------------------------------    Uric Acid...  12 months..  allopurinoL..............  09- 09-    [facility] Embedded Care Due Messages. Reference number: [id number].   6/17/2024 11:35:20 AM CDT

## 2024-06-18 RX ORDER — BLOOD SUGAR DIAGNOSTIC
1 STRIP MISCELLANEOUS DAILY
Qty: 100 EACH | Refills: 1 | Status: SHIPPED | OUTPATIENT
Start: 2024-06-18

## 2024-06-18 RX ORDER — INSULIN GLARGINE 100 [IU]/ML
22 INJECTION, SOLUTION SUBCUTANEOUS DAILY
Qty: 20 ML | Refills: 1 | Status: SHIPPED | OUTPATIENT
Start: 2024-06-18 | End: 2024-12-17

## 2024-07-02 ENCOUNTER — OFFICE VISIT (OUTPATIENT)
Dept: PODIATRY | Facility: CLINIC | Age: 69
End: 2024-07-02
Payer: MEDICARE

## 2024-07-02 ENCOUNTER — OFFICE VISIT (OUTPATIENT)
Dept: PAIN MEDICINE | Facility: CLINIC | Age: 69
End: 2024-07-02
Payer: MEDICARE

## 2024-07-02 ENCOUNTER — NURSE TRIAGE (OUTPATIENT)
Dept: ADMINISTRATIVE | Facility: CLINIC | Age: 69
End: 2024-07-02
Payer: MEDICARE

## 2024-07-02 VITALS
BODY MASS INDEX: 26.31 KG/M2 | DIASTOLIC BLOOD PRESSURE: 71 MMHG | HEIGHT: 71 IN | SYSTOLIC BLOOD PRESSURE: 137 MMHG | WEIGHT: 187.94 LBS | HEART RATE: 63 BPM

## 2024-07-02 VITALS — WEIGHT: 187.81 LBS | BODY MASS INDEX: 26.29 KG/M2 | HEIGHT: 71 IN

## 2024-07-02 DIAGNOSIS — M51.36 DISCOGENIC LOW BACK PAIN: Primary | ICD-10-CM

## 2024-07-02 DIAGNOSIS — E11.9 ENCOUNTER FOR COMPREHENSIVE DIABETIC FOOT EXAMINATION, TYPE 2 DIABETES MELLITUS: Primary | ICD-10-CM

## 2024-07-02 DIAGNOSIS — M47.816 LUMBAR SPONDYLOSIS: ICD-10-CM

## 2024-07-02 DIAGNOSIS — M54.9 DORSALGIA, UNSPECIFIED: ICD-10-CM

## 2024-07-02 DIAGNOSIS — M24.572 EQUINUS CONTRACTURE OF LEFT ANKLE: ICD-10-CM

## 2024-07-02 PROCEDURE — 99999 PR PBB SHADOW E&M-EST. PATIENT-LVL III: CPT | Mod: PBBFAC,,, | Performed by: PODIATRIST

## 2024-07-02 PROCEDURE — 99999 PR PBB SHADOW E&M-EST. PATIENT-LVL IV: CPT | Mod: PBBFAC,,,

## 2024-07-02 PROCEDURE — 99214 OFFICE O/P EST MOD 30 MIN: CPT | Mod: PBBFAC,PO

## 2024-07-02 PROCEDURE — 99213 OFFICE O/P EST LOW 20 MIN: CPT | Mod: S$PBB,,, | Performed by: PODIATRIST

## 2024-07-02 PROCEDURE — 99213 OFFICE O/P EST LOW 20 MIN: CPT | Mod: PBBFAC,27,PO | Performed by: PODIATRIST

## 2024-07-02 NOTE — TELEPHONE ENCOUNTER
Spoke to patient, patient went to [facility] ER via ambulance. Patient states they gave him fluids and sent him home. He stated they told him to stop one of his BP meds and to monitor his BP throughout the day. Patient has an upcoming visit with you on 7/17. Patient was advised to bring all of his paperwork including bloodwork, images, etc. Patient verbalized understanding.

## 2024-07-02 NOTE — TELEPHONE ENCOUNTER
Caller states that he passed out twice x 20 Mins ago and hit head. Caller states BP 73/54 at this time. Caller states he is too weak to stand on his own.  Pt advised per protocol and verbalized understanding.     Reason for Disposition   Shock suspected (e.g., cold/pale/clammy skin, too weak to stand, low BP, rapid pulse)    Additional Information   Negative: SEVERE difficulty breathing (e.g., struggling for each breath, speaks in single words)   Negative: [1] Difficulty breathing or swallowing AND [2] started suddenly after medicine, an allergic food or bee sting    Protocols used: Dizziness - [id number]

## 2024-07-02 NOTE — PROGRESS NOTES
[facility] Follow Up Evaluation      Referred by: No ref. provider found    PCP:     CC:   Chief Complaint   Patient presents with    Low-back Pain          3/4/2024     8:50 AM 1/3/2024     8:27 AM   Last 3 PDI Scores   Pain Disability Index (PDI) 41 38     Interval HPI 7/2/2024: Glenn Medel returns to the office for follow up.  He is s/p bilateral L4/5 and L5/S1 RFA on 06/04/2024 with minimal relief.  Today's reporting continued lower back pain, 4-7/10, constant, worse with forward flexion and physical activities.  Pain is only located across his lower back, no pain radiating into his legs, numbness, weakness or any new changes to his bowel bladder function.      HPI:   Glenn Medel is a 69 y.o. male who presents with back pain.  He has a history of chronic lower back pain but over the past month it has been gradually worsening.  Today he reports his pain is 7/10, intermittent, aching, sharp, throbbing, shooting.  He gets some radiating pain around the sides of the lower flanks around the lumbar area.  His pain is worse with sitting, bending, lifting and relieved with rest and lying down.  He denies any numbness or weakness.  No oliver radicular symptoms.      Pain Intervention History:  right-sided L3-4, L4-5, L5-S1 RFA on 09/29/2014 and he had a right-sided L5-S1 and S1 TFESI on 10/27/2014 with outside physician   - s/p bilateral L4/5 and L5/S1 RFA on 06/04/2024 with minimal relief.    Past Spine Surgical History:      Past and current medications:  Antineuropathics:  NSAIDs:  Physical therapy:  Antidepressants:  Muscle relaxers:  Opioids:  Antiplatelets/Anticoagulants:    History:    Current Outpatient Medications:     allopurinoL (ZYLOPRIM) 300 MG tablet, TAKE 1 TABLET(300 MG) BY MOUTH EVERY DAY, Disp: 90 tablet, Rfl: 3    atorvastatin (LIPITOR) 20 MG tablet, TAKE 1 TABLET(20 MG) BY MOUTH EVERY DAY (Patient taking differently: Take 20 mg by mouth every evening.), Disp: 90 tablet, Rfl: 3     "brimonidine 0.2% (ALPHAGAN) 0.2 % Drop, INSTILL 1 DROP IN BOTH EYES TWICE DAILY, Disp: 10 mL, Rfl: 3    carbidopa-levodopa  mg (SINEMET)  mg per tablet, TAKE 2 TABLETS BY MOUTH FIVE TIMES DAILY, Disp: 300 tablet, Rfl: 11    cholecalciferol, vitamin D3, (D3-2000 ORAL), Take by mouth once daily., Disp: , Rfl:     citalopram (CELEXA) 10 MG tablet, Take 1 tablet (10 mg total) by mouth once daily., Disp: 30 tablet, Rfl: 2    clonazePAM (KLONOPIN) 1 MG tablet, Take 2 tablets (2 mg total) by mouth every evening., Disp: 60 tablet, Rfl: 2    cyanocobalamin (VITAMIN B-12) 1000 MCG tablet, Take 100 mcg by mouth once daily., Disp: , Rfl:     dorzolamide (TRUSOPT) 2 % ophthalmic solution, INSTILL 1 DROP IN BOTH EYES TWICE DAILY, Disp: 10 mL, Rfl: 3    hydroCHLOROthiazide (HYDRODIURIL) 12.5 MG Tab, Take 1 tablet (12.5 mg total) by mouth once daily., Disp: 30 tablet, Rfl: 11    insulin glargine U-100, Lantus, (LANTUS SOLOSTAR U-100 INSULIN) 100 unit/mL (3 mL) InPn pen, Inject 10 Units into the skin once daily., Disp: , Rfl:     irbesartan (AVAPRO) 75 MG tablet, Take 75 mg by mouth every evening., Disp: , Rfl:     ketoconazole (NIZORAL) 2 % shampoo, Apply topically., Disp: , Rfl:     metFORMIN (GLUCOPHAGE-XR) 500 MG ER 24hr tablet, Take 2 tablets (1,000 mg total) by mouth once daily. (Increased dose), Disp: 180 tablet, Rfl: 1    pen needle, diabetic 32 gauge x 1/4" Ndle, 1 each by Misc.(Non-Drug; Combo Route) route once daily., Disp: 100 each, Rfl: 1    prazosin (MINIPRESS) 1 MG Cap, TAKE 1 CAPSULE(1 MG) BY MOUTH EVERY EVENING, Disp: 90 capsule, Rfl: 3    ramelteon (ROZEREM) 8 mg tablet, Take 1 tablet (8 mg total) by mouth every evening., Disp: 90 tablet, Rfl: 3    rasagiline (AZILECT) 1 mg Tab, TAKE 1 TABLET(1 MG) BY MOUTH EVERY EVENING, Disp: 30 tablet, Rfl: 11    tirzepatide 5 mg/0.5 mL PnIj, Inject 5 mg into the skin every 7 days. (Increased dose), Disp: 4 Pen, Rfl: 2    Past Medical History:   Diagnosis Date    BPH " (benign prostatic hyperplasia)     Cataract     done ou    Chronic kidney disease, stage 3 02/25/2015    DDD (degenerative disc disease), lumbar     s/p epidural steroids     Diabetes mellitus     type 2    Elevated PSA     Glaucoma     OU    HTN (hypertension)     Iritis, lens-induced 01/08/2018    Obesity     Parkinson's disease     Sleep apnea     uses cpap       Past Surgical History:   Procedure Laterality Date    CATARACT EXTRACTION W/  INTRAOCULAR LENS IMPLANT Left 11/30/2017    CATARACT EXTRACTION W/  INTRAOCULAR LENS IMPLANT Right 12/11/2018    Dr Garcia    CATARACT EXTRACTION W/  INTRAOCULAR LENS IMPLANT Right 12/11/2018    Procedure: EXTRACTION, CATARACT, WITH IOL INSERTION;  Surgeon: Damaris Garcia MD;  Location: [facility] OR;  Service: Ophthalmology;  Laterality: Right;    COLONOSCOPY N/A 9/18/2017    Procedure: COLONOSCOPY;  Surgeon: Paul Feliz Jr., MD;  Location: [facility] ENDO;  Service: Endoscopy;  Laterality: N/A;    COLONOSCOPY N/A 12/21/2022    Procedure: COLONOSCOPY;  Surgeon: Paul Feliz Jr., MD;  Location: [facility] ENDO;  Service: Endoscopy;  Laterality: N/A;    COLONOSCOPY W/ POLYPECTOMY  04/13/2010    YARELI.   One 1 cm polyp in the sigmoid colon.  Internal hemorrhoids.    COSMETIC SURGERY      DIGITAL RECTAL EXAMINATION UNDER ANESTHESIA N/A 7/23/2019    Procedure: EXAM UNDER ANESTHESIA, DIGITAL, RECTUM;  Surgeon: Kaz Keating MD;  Location: [facility] OR;  Service: General;  Laterality: N/A;    EXAMINATION UNDER ANESTHESIA N/A 4/24/2019    Procedure: EXAM UNDER ANESTHESIA;  Surgeon: Kaz Keating MD;  Location: [facility] OR;  Service: General;  Laterality: N/A;    EXCISIONAL HEMORRHOIDECTOMY N/A 4/24/2019    Procedure: HEMORRHOIDECTOMY (lithotomy);  Surgeon: Kaz Keating MD;  Location: [facility] OR;  Service: General;  Laterality: N/A;    GANGLION CYST EXCISION Left     INJECTION OF ANESTHETIC AGENT AROUND MEDIAL BRANCH NERVES INNERVATING LUMBAR FACET JOINT Bilateral 4/9/2024    Procedure:  Block-nerve-medial branch-lumbar-L4/5-L5/S1;  Surgeon: Everett Taveras MD;  Location: [facility] OR;  Service: Pain Management;  Laterality: Bilateral;    INJECTION OF ANESTHETIC AGENT AROUND MEDIAL BRANCH NERVES INNERVATING LUMBAR FACET JOINT Bilateral 5/6/2024    Procedure: Block-nerve-medial branch-lumbar-L4/5-L5/S1;  Surgeon: Everett Taveras MD;  Location: [facility] OR;  Service: Pain Management;  Laterality: Bilateral;    INJECTION OF ANESTHETIC AGENT AROUND PUDENDAL NERVE N/A 4/24/2019    Procedure: BLOCK, NERVE, PUDENDAL;  Surgeon: Kaz Keating MD;  Location: [facility] OR;  Service: General;  Laterality: N/A;    INJECTION OF ANESTHETIC AGENT AROUND PUDENDAL NERVE N/A 7/23/2019    Procedure: BLOCK, NERVE, PUDENDAL;  Surgeon: Kaz Keating MD;  Location: [facility] OR;  Service: General;  Laterality: N/A;    RADIOFREQUENCY ABLATION OF LUMBAR MEDIAL BRANCH NERVE AT SINGLE LEVEL Bilateral 6/4/2024    Procedure: Radiofrequency Ablation, Nerve, Spinal, Lumbar, Medial Branch, L4/5 and L5/S1;  Surgeon: Everett Taveras MD;  Location: [facility] OR;  Service: Pain Management;  Laterality: Bilateral;    REPAIR OF RETINAL DETACHMENT WITH VITRECTOMY Right 8/1/2018    Procedure: REPAIR, RETINAL DETACHMENT, WITH VITRECTOMY;  Surgeon: SAJAN Pulido MD;  Location: [address];  Service: Ophthalmology;  Laterality: Right;  35 min    TONSILLECTOMY      TRANSRECTAL BIOPSY OF PROSTATE WITH ULTRASOUND GUIDANCE N/A 1/31/2023    Procedure: BIOPSY, PROSTATE, RECTAL APPROACH, WITH US GUIDANCE;  Surgeon: MARIZA Donahue MD;  Location: [facility] OR;  Service: Urology;  Laterality: N/A;    TRANSRECTAL BIOPSY OF PROSTATE WITH ULTRASOUND GUIDANCE N/A 4/12/2024    Procedure: BIOPSY, PROSTATE, RECTAL APPROACH, WITH US GUIDANCE;  Surgeon: MARIZA Donahue MD;  Location: [facility];  Service: Urology;  Laterality: N/A;    TRIGGER FINGER RELEASE Right     X 2    uro lift      for enlarged prostate       Family History   Problem Relation Name Age of  Onset    Hypertension Mother      Diabetes Mother      Stroke Mother      Cancer Mother          colon    Kidney disease Mother      Heart disease Father      Diabetes Father      Colon cancer Father      Cancer Father          colon    Cataracts Father      No Known Problems Sister      Heart disease Brother  62    Kidney disease Brother      Dementia Brother Rodrigue     Abnormal EKG Brother Rodrigue     No Known Problems Maternal Aunt      No Known Problems Maternal Uncle      No Known Problems Paternal Aunt      No Known Problems Paternal Uncle      Glaucoma Maternal Grandmother      No Known Problems Maternal Grandfather      No Known Problems Paternal Grandmother      No Known Problems Paternal Grandfather      Amblyopia Neg Hx      Blindness Neg Hx      Macular degeneration Neg Hx      Retinal detachment Neg Hx      Strabismus Neg Hx      Thyroid disease Neg Hx      Parkinsonism Neg Hx         Social History     Socioeconomic History    Marital status:    Tobacco Use    Smoking status: Never    Smokeless tobacco: Never   Substance and Sexual Activity    Alcohol use: Yes     Comment: occasional, no alcohol 72 hours prior to sx    Drug use: No    Sexual activity: Yes     Partners: Female   Social History Narrative            2 grown kids.         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      Social Determinants of Health     Financial Resource Strain: Patient Declined (1/29/2024)    Overall Financial Resource Strain (CARDIA)     Difficulty of Paying Living Expenses: Patient declined   Food Insecurity: Patient Declined (1/29/2024)    Hunger Vital Sign     Worried About Running Out of Food in the Last Year: Patient declined     Ran Out of Food in the Last Year: Patient declined   Transportation Needs: Patient Declined (1/29/2024)    PRAPARE - Transportation     Lack of Transportation (Medical): Patient declined     Lack of Transportation (Non-Medical): Patient declined   Physical  "Activity: Sufficiently Active (1/29/2024)    Exercise Vital Sign     Days of Exercise per Week: 4 days     Minutes of Exercise per Session: 80 min   Stress: Patient Declined (1/29/2024)    [employer] - Occupational Stress Questionnaire     Feeling of Stress : Patient declined   Housing Stability: Patient Declined (1/29/2024)    Housing Stability Vital Sign     Unable to Pay for Housing in the Last Year: Patient declined     Number of Places Lived in the Last Year: 1     Unstable Housing in the Last Year: Patient declined       Review of patient's allergies indicates:   Allergen Reactions    Avelox [moxifloxacin] Hives and Rash       Review of Systems:  12 point review of systems is negative.    Physical Exam:  Vitals:    07/02/24 0939   BP: 137/71   Pulse: 63   Weight: 85.2 kg (187 lb 15.1 oz)   Height: 5' 11" (1.803 m)   PainSc:   7   PainLoc: Back     Body mass index is 26.21 kg/m².    Gen: NAD  Psych: mood appropriate for given condition  HEENT: eyes anicteric   CV: RRR  HEENT: anicteric   Respiratory: non-labored, no signs of respiratory distress  Abd: non-distended  Skin: warm, dry and intact.  Gait: No antalgic gait.     Increased pain with forward flexion    Sensory:  Intact and symmetrical to light touch in L1-S1 dermatomes bilaterally.    Motor:       Right Left   L2/3 Iliacus Hip flexion  5  5   L3/4 Qudratus Femoris Knee Extension  5  5   L4/5 Tib Anterior Ankle Dorsiflexion   5  5   L5/S1 Extensor Hallicus Longus Great toe extension  5  5   S1/S2 Gastroc/Soleus Plantar Flexion  5  5      Right Left                  Patellar DTR 2+ 2+   Achilles DTR 2+ 2+                      Imaging:  Xray lumbar spine 4/6/23  FINDINGS:  There are degenerative changes of the lumbar spine.  Vertebral body height is maintained.  The adjacent soft tissues are unremarkable.    MRI lumbar spine 5/16/23  FINDINGS:  Alignment: Normal.     Vertebrae: No acute fracture or marrow replacement.  2 cm " vertebral body hemangioma at L4.     Discs: Preserved disc heights.  Mild disc desiccation at several lower lumbar levels.     Cord: Normal.  Conus terminates at L2.     Paraspinal muscles & soft tissues: There are T2 hyperintense lesions along each renal cortex, possibly cysts but incompletely characterized.     Degenerative findings:  T12-L1:  L1-L2:  L2-L3: Minimal bilateral facet degenerative change.  Minimal posterior disc bulge.  Minimal bilateral facet degenerative change.  L3-L4: Minimal broad-based posterior disc bulge.  Bilateral facet degenerative change.  L4-L5: Minimal broad-based posterior disc bulge with left foraminal zone disc fissure.  Mild bilateral facet degenerative change.  Minimal spinal canal stenosis and right neural foraminal narrowing.  L5-S1: Mild broad-based posterior disc bulge.  Mild right facet degenerative change.  Mild right neural foraminal narrowing.    Labs:  Lab Results   Component Value Date    HGBA1C 8.9 (H) 06/06/2024       Lab Results   Component Value Date    WBC 9.07 03/11/2024    HGB 16.1 03/11/2024    HCT 51.0 03/11/2024    MCV 88 03/11/2024     03/11/2024       Assessment:   Problem List Items Addressed This Visit    None  Visit Diagnoses       Discogenic low back pain    -  Primary    Lumbar spondylosis        Dorsalgia, unspecified                        69 y.o. year old male with PMH parkinsonism, HTN, BPH, CKD who presents with back pain.  He has a history of chronic lower back pain but over the past month it has been gradually worsening.  Today he reports his pain is 7/10, intermittent, aching, sharp, throbbing, shooting.  He gets some radiating pain around the sides of the lower flanks around the lumbar area.  His pain is worse with sitting, bending, lifting and relieved with rest and lying down.  He denies any numbness or weakness.  No oliver radicular symptoms.    7/2/2024: Glenn Medel returns to the office for follow up.  He is s/p bilateral L4/5 and  L5/S1 RFA on 06/04/2024 with minimal relief.  Today's reporting continued lower back pain, 4-7/10, constant, worse with forward flexion and physical activities.  Pain is only located across his lower back, no pain radiating into his legs, numbness, weakness or any new changes to his bowel bladder function.    - on exam he is full strength in his lower extremities, increased pain with forward flexion  - He is s/p bilateral L4/5 and L5/S1 RFA on 06/04/2024 with minimal relief.  - I independently reviewed his lumbar MRI and he has multilevel bilateral facet arthropathy, he has multilevel disc desiccation worse at lower lumbar levels and a disc fissure at L4/5.  - unfortunately, he found no significant relief with the lumbar RFA.  - he continues to have significant pain that is limiting his mobility interfering with his ADLs and quality of life.  - I believe his remaining pain is discogenic pain likely from the fissure at L4/5.  - he would likely benefit from viadisc/injectable allograft.  - barring any contraindications we will schedule him for viadisc at L4/5  - follow-up 1 week post procedure or sooner if needed.    : Not applicable      This note was completed with dictation software and grammatical errors may exist.

## 2024-07-02 NOTE — PROGRESS NOTES
Subjective:     Patient ID: Glenn Medel is a 69 y.o. male.    Chief Complaint: Diabetic Foot Exam (Diabetic pt wears slide on shoes, PCP Power Benton last seen 4/30/2024)     is a 69 y.o. male who presents to the clinic for evaluation and treatment of high risk feet.  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 The patient's chief complaint is achilles pain that comes and goes but for most part is better overall.  This patient has documented high risk feet requiring routine maintenance secondary to diabetes mellitis and those secondary complications of diabetes, as mentioned. Patient states he had 2 falls this morning and was seen/treated in Trauma ER. Patient states he was told either dehydrated or low blood pressure.     PCP: Power Benton, DO    Date Last Seen by PCP: 04/30/2024    Current shoe gear:  Affected Foot: Slip-on shoes     Unaffected Foot: Slip-on shoes    Hemoglobin A1C   Date Value Ref Range Status   06/06/2024 8.9 (H) 4.0 - 5.6 % Final     Comment:     ADA Screening Guidelines:  5.7-6.4%  Consistent with prediabetes  >or=6.5%  Consistent with diabetes    High levels of fetal hemoglobin interfere with the HbA1C  assay. Heterozygous hemoglobin variants (HbS, HgC, etc)do  not significantly interfere with this assay.   However, presence of multiple variants may affect accuracy.     03/30/2024 8.9 (H) 4.0 - 5.6 % Final     Comment:     ADA Screening Guidelines:  5.7-6.4%  Consistent with prediabetes  >or=6.5%  Consistent with diabetes    High levels of fetal hemoglobin interfere with the HbA1C  assay. Heterozygous hemoglobin variants (HbS, HgC, etc)do  not significantly interfere with this assay.   However, presence of  multiple variants may affect accuracy.     11/21/2023 6.4 (H) 4.0 - 5.6 % Final     Comment:     ADA Screening Guidelines:  5.7-6.4%  Consistent with prediabetes  >or=6.5%  Consistent with diabetes    High levels of fetal hemoglobin interfere with the HbA1C  assay. Heterozygous hemoglobin variants (HbS, HgC, etc)do  not significantly interfere with this assay.   However, presence of multiple variants may affect accuracy.     11/21/2023 6.4 (H) 4.0 - 5.6 % Final     Comment:     ADA Screening Guidelines:  5.7-6.4%  Consistent with prediabetes  >or=6.5%  Consistent with diabetes    High levels of fetal hemoglobin interfere with the HbA1C  assay. Heterozygous hemoglobin variants (HbS, HgC, etc)do  not significantly interfere with this assay.   However, presence of multiple variants may affect accuracy.         Patient Active Problem List   Diagnosis    BPH (benign prostatic hyperplasia)    HTN (hypertension)    DDD (degenerative disc disease), lumbar    Palpitations    Peripheral neuropathy    Hyperlipidemia    Itch    Hypersomnia with sleep apnea, unspecified    Severe obstructive sleep apnea    Other vitreous opacities, bilateral    NS (nuclear sclerosis)    Senile nuclear sclerosis    Retained lens material, left    Glaucoma associated with ocular inflammation, left, indeterminate stage    Iritis, lens-induced    Vitreomacular adhesion, right    Epiretinal membrane, right    Internal strangulated hemorrhoids    Arachnoid cyst    REM sleep behavior disorder    Abnormal gait    Cognitive change    Abnormal involuntary movements    Adjustment disorder with depressed mood    Parkinsonism    Hx of acute gouty arthritis    Polyneuropathy associated with underlying disease    Fall from bed    Memory change    CKD (chronic kidney disease) stage 2, GFR 60-89 ml/min    Type 2 diabetes mellitus with stage 2 chronic kidney disease, without long-term current use of insulin    Family history of colon cancer    Elevated PSA     "Central retinal vein occlusion, left eye, stable    Prostate cancer    Mild episode of recurrent major depressive disorder    CHAPARRITA (generalized anxiety disorder)    Primary insomnia    Night terrors, adult    Secondary hyperparathyroidism of renal origin    Chronic depression       Medication List with Changes/Refills   Current Medications    ALLOPURINOL (ZYLOPRIM) 300 MG TABLET    TAKE 1 TABLET(300 MG) BY MOUTH EVERY DAY    ATORVASTATIN (LIPITOR) 20 MG TABLET    TAKE 1 TABLET(20 MG) BY MOUTH EVERY DAY    BRIMONIDINE 0.2% (ALPHAGAN) 0.2 % DROP    INSTILL 1 DROP IN BOTH EYES TWICE DAILY    CARBIDOPA-LEVODOPA  MG (SINEMET)  MG PER TABLET    TAKE 2 TABLETS BY MOUTH FIVE TIMES DAILY    CHOLECALCIFEROL, VITAMIN D3, (D3-2000 ORAL)    Take by mouth once daily.    CITALOPRAM (CELEXA) 10 MG TABLET    Take 1 tablet (10 mg total) by mouth once daily.    CLONAZEPAM (KLONOPIN) 1 MG TABLET    Take 2 tablets (2 mg total) by mouth every evening.    CYANOCOBALAMIN (VITAMIN B-12) 1000 MCG TABLET    Take 100 mcg by mouth once daily.    DORZOLAMIDE (TRUSOPT) 2 % OPHTHALMIC SOLUTION    INSTILL 1 DROP IN BOTH EYES TWICE DAILY    HYDROCHLOROTHIAZIDE (HYDRODIURIL) 12.5 MG TAB    Take 1 tablet (12.5 mg total) by mouth once daily.    INSULIN GLARGINE U-100, LANTUS, (LANTUS SOLOSTAR U-100 INSULIN) 100 UNIT/ML (3 ML) INPN PEN    Inject 10 Units into the skin once daily.    IRBESARTAN (AVAPRO) 75 MG TABLET    Take 75 mg by mouth every evening.    KETOCONAZOLE (NIZORAL) 2 % SHAMPOO    Apply topically.    METFORMIN (GLUCOPHAGE-XR) 500 MG ER 24HR TABLET    Take 2 tablets (1,000 mg total) by mouth once daily. (Increased dose)    PEN NEEDLE, DIABETIC 32 GAUGE X 1/4" NDLE    1 each by Misc.(Non-Drug; Combo Route) route once daily.    PRAZOSIN (MINIPRESS) 1 MG CAP    TAKE 1 CAPSULE(1 MG) BY MOUTH EVERY EVENING    RAMELTEON (ROZEREM) 8 MG TABLET    Take 1 tablet (8 mg total) by mouth every evening.    RASAGILINE (AZILECT) 1 MG TAB    TAKE 1 " TABLET(1 MG) BY MOUTH EVERY EVENING    TIRZEPATIDE 5 MG/0.5 ML PNIJ    Inject 5 mg into the skin every 7 days. (Increased dose)       Review of patient's allergies indicates:   Allergen Reactions    Avelox [moxifloxacin] Hives and Rash       Past Surgical History:   Procedure Laterality Date    CATARACT EXTRACTION W/  INTRAOCULAR LENS IMPLANT Left 11/30/2017    CATARACT EXTRACTION W/  INTRAOCULAR LENS IMPLANT Right 12/11/2018    Dr Garcia    CATARACT EXTRACTION W/  INTRAOCULAR LENS IMPLANT Right 12/11/2018    Procedure: EXTRACTION, CATARACT, WITH IOL INSERTION;  Surgeon: Damaris Garcia MD;  Location: [facility] OR;  Service: Ophthalmology;  Laterality: Right;    COLONOSCOPY N/A 9/18/2017    Procedure: COLONOSCOPY;  Surgeon: Paul Feliz Jr., MD;  Location: [facility] ENDO;  Service: Endoscopy;  Laterality: N/A;    COLONOSCOPY N/A 12/21/2022    Procedure: COLONOSCOPY;  Surgeon: Paul Feliz Jr., MD;  Location: [facility] ENDO;  Service: Endoscopy;  Laterality: N/A;    COLONOSCOPY W/ POLYPECTOMY  04/13/2010    YARELI.   One 1 cm polyp in the sigmoid colon.  Internal hemorrhoids.    COSMETIC SURGERY      DIGITAL RECTAL EXAMINATION UNDER ANESTHESIA N/A 7/23/2019    Procedure: EXAM UNDER ANESTHESIA, DIGITAL, RECTUM;  Surgeon: Kaz Keating MD;  Location: [facility] OR;  Service: General;  Laterality: N/A;    EXAMINATION UNDER ANESTHESIA N/A 4/24/2019    Procedure: EXAM UNDER ANESTHESIA;  Surgeon: Kaz Keating MD;  Location: [facility] OR;  Service: General;  Laterality: N/A;    EXCISIONAL HEMORRHOIDECTOMY N/A 4/24/2019    Procedure: HEMORRHOIDECTOMY (lithotomy);  Surgeon: Kaz Keating MD;  Location: [facility] OR;  Service: General;  Laterality: N/A;    GANGLION CYST EXCISION Left     INJECTION OF ANESTHETIC AGENT AROUND MEDIAL BRANCH NERVES INNERVATING LUMBAR FACET JOINT Bilateral 4/9/2024    Procedure: Block-nerve-medial branch-lumbar-L4/5-L5/S1;  Surgeon: Everett Taveras MD;  Location: [facility] OR;  Service: Pain Management;   Laterality: Bilateral;    INJECTION OF ANESTHETIC AGENT AROUND MEDIAL BRANCH NERVES INNERVATING LUMBAR FACET JOINT Bilateral 5/6/2024    Procedure: Block-nerve-medial branch-lumbar-L4/5-L5/S1;  Surgeon: Everett Taveras MD;  Location: [facility];  Service: Pain Management;  Laterality: Bilateral;    INJECTION OF ANESTHETIC AGENT AROUND PUDENDAL NERVE N/A 4/24/2019    Procedure: BLOCK, NERVE, PUDENDAL;  Surgeon: Kaz Keating MD;  Location: [facility] OR;  Service: General;  Laterality: N/A;    INJECTION OF ANESTHETIC AGENT AROUND PUDENDAL NERVE N/A 7/23/2019    Procedure: BLOCK, NERVE, PUDENDAL;  Surgeon: Kaz Keating MD;  Location: [facility];  Service: General;  Laterality: N/A;    RADIOFREQUENCY ABLATION OF LUMBAR MEDIAL BRANCH NERVE AT SINGLE LEVEL Bilateral 6/4/2024    Procedure: Radiofrequency Ablation, Nerve, Spinal, Lumbar, Medial Branch, L4/5 and L5/S1;  Surgeon: Everett Taveras MD;  Location: [facility];  Service: Pain Management;  Laterality: Bilateral;    REPAIR OF RETINAL DETACHMENT WITH VITRECTOMY Right 8/1/2018    Procedure: REPAIR, RETINAL DETACHMENT, WITH VITRECTOMY;  Surgeon: SAJAN Pulido MD;  Location: [address];  Service: Ophthalmology;  Laterality: Right;  35 min    TONSILLECTOMY      TRANSRECTAL BIOPSY OF PROSTATE WITH ULTRASOUND GUIDANCE N/A 1/31/2023    Procedure: BIOPSY, PROSTATE, RECTAL APPROACH, WITH US GUIDANCE;  Surgeon: MARIZA Donahue MD;  Location: [facility];  Service: Urology;  Laterality: N/A;    TRANSRECTAL BIOPSY OF PROSTATE WITH ULTRASOUND GUIDANCE N/A 4/12/2024    Procedure: BIOPSY, PROSTATE, RECTAL APPROACH, WITH US GUIDANCE;  Surgeon: MARIZA Donahue MD;  Location: [facility];  Service: Urology;  Laterality: N/A;    TRIGGER FINGER RELEASE Right     X 2    uro lift      for enlarged prostate       Family History   Problem Relation Name Age of Onset    Hypertension Mother      Diabetes Mother      Stroke Mother      Cancer Mother          colon    Kidney disease  Mother      Heart disease Father      Diabetes Father      Colon cancer Father      Cancer Father          colon    Cataracts Father      No Known Problems Sister      Heart disease Brother  62    Kidney disease Brother      Dementia Brother Rodrigue     Abnormal EKG Brother Rodrigue     No Known Problems Maternal Aunt      No Known Problems Maternal Uncle      No Known Problems Paternal Aunt      No Known Problems Paternal Uncle      Glaucoma Maternal Grandmother      No Known Problems Maternal Grandfather      No Known Problems Paternal Grandmother      No Known Problems Paternal Grandfather      Amblyopia Neg Hx      Blindness Neg Hx      Macular degeneration Neg Hx      Retinal detachment Neg Hx      Strabismus Neg Hx      Thyroid disease Neg Hx      Parkinsonism Neg Hx         Social History     Socioeconomic History    Marital status:    Tobacco Use    Smoking status: Never    Smokeless tobacco: Never   Substance and Sexual Activity    Alcohol use: Yes     Comment: occasional, no alcohol 72 hours prior to sx    Drug use: No    Sexual activity: Yes     Partners: Female   Social History Narrative            2 grown kids.         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      Social Determinants of Health     Financial Resource Strain: Patient Declined (1/29/2024)    Overall Financial Resource Strain (CARDIA)     Difficulty of Paying Living Expenses: Patient declined   Food Insecurity: Patient Declined (1/29/2024)    Hunger Vital Sign     Worried About Running Out of Food in the Last Year: Patient declined     Ran Out of Food in the Last Year: Patient declined   Transportation Needs: Patient Declined (1/29/2024)    PRAPARE - Transportation     Lack of Transportation (Medical): Patient declined     Lack of Transportation (Non-Medical): Patient declined   Physical Activity: Sufficiently Active (1/29/2024)    Exercise Vital Sign     Days of Exercise per Week: 4 days     Minutes of  "Exercise per Session: 80 min   Stress: Patient Declined (1/29/2024)    [employer] - Occupational Stress Questionnaire     Feeling of Stress : Patient declined   Housing Stability: Patient Declined (1/29/2024)    Housing Stability Vital Sign     Unable to Pay for Housing in the Last Year: Patient declined     Number of Places Lived in the Last Year: 1     Unstable Housing in the Last Year: Patient declined       Vitals:    07/02/24 1112   Weight: 85.2 kg (187 lb 13.3 oz)   Height: 5' 11" (1.803 m)   PainSc: 0-No pain       Hemoglobin A1C   Date Value Ref Range Status   06/06/2024 8.9 (H) 4.0 - 5.6 % Final     Comment:     ADA Screening Guidelines:  5.7-6.4%  Consistent with prediabetes  >or=6.5%  Consistent with diabetes    High levels of fetal hemoglobin interfere with the HbA1C  assay. Heterozygous hemoglobin variants (HbS, HgC, etc)do  not significantly interfere with this assay.   However, presence of multiple variants may affect accuracy.     03/30/2024 8.9 (H) 4.0 - 5.6 % Final     Comment:     ADA Screening Guidelines:  5.7-6.4%  Consistent with prediabetes  >or=6.5%  Consistent with diabetes    High levels of fetal hemoglobin interfere with the HbA1C  assay. Heterozygous hemoglobin variants (HbS, HgC, etc)do  not significantly interfere with this assay.   However, presence of multiple variants may affect accuracy.     11/21/2023 6.4 (H) 4.0 - 5.6 % Final     Comment:     ADA Screening Guidelines:  5.7-6.4%  Consistent with prediabetes  >or=6.5%  Consistent with diabetes    High levels of fetal hemoglobin interfere with the HbA1C  assay. Heterozygous hemoglobin variants (HbS, HgC, etc)do  not significantly interfere with this assay.   However, presence of multiple variants may affect accuracy.     11/21/2023 6.4 (H) 4.0 - 5.6 % Final     Comment:     ADA Screening Guidelines:  5.7-6.4%  Consistent with prediabetes  >or=6.5%  Consistent with diabetes    High levels of fetal " hemoglobin interfere with the HbA1C  assay. Heterozygous hemoglobin variants (HbS, HgC, etc)do  not significantly interfere with this assay.   However, presence of multiple variants may affect accuracy.         Review of Systems   Constitutional:  Negative for chills and fever.   Respiratory:  Negative for shortness of breath.    Cardiovascular:  Negative for chest pain, palpitations, orthopnea, claudication and leg swelling.   Gastrointestinal:  Negative for diarrhea, nausea and vomiting.   Musculoskeletal:  Negative for joint pain.   Skin:  Negative for rash.   Neurological:  Positive for tingling and sensory change.   Psychiatric/Behavioral: Negative.             Objective:      PHYSICAL EXAM: Apperance: Alert and orient in no distress,well developed, and with good attention to grooming and body habits  Patient presents ambulating in tennis shoes.  Lower Extremity Exam  VASCULAR: Dorsalis pedis pulses 2/4 bilateral and Posterior Tibial pulses 2/4 bilateral. Capillary fill time <blanched bilateral. No edema observed bilateral. Varicosities absent bilateral. Skin temperature of the lower extremities is warm to warm, proximal to distal. Hair growth WNL bilateral.  DERMATOLOGICAL: No skin rashes, subcutaneous nodules, lesions, or ulcers observed bilateral.   NEUROLOGICAL: Light touch, sharp-dull, proprioception all present and equal bilaterally. Vibratory sensation diminished at bilateral hallux and navicular. Protective sensation intact at all 10 sites as tested with a [employer] 5.07 monofilament.   MUSCULOSKELETAL: Muscle strength 5/5 for all foot inverters, everters, plantarflexors, and dorsiflexors bilateral. Ankle joints left shows decreased ROM. left ankle ROM shows greater decrease in dorsiflexion with knee extended. Ankle joint ROM is pain free and without crepitus left. No pain with palpation of left posterior 1/3 calcaneus at region of Achilles tendon insertion. Negative pain to palpation left  plantar medial tubercle. Negative pain on medial-lateral compression of the calcaneus.          Assessment:       ICD-10-CM ICD-9-CM   1. Encounter for comprehensive diabetic foot examination, type 2 diabetes mellitus  E11.9 250.00   2. Equinus contracture of left ankle - Left Foot  M24.572 718.47         Plan:   Encounter for comprehensive diabetic foot examination, type 2 diabetes mellitus    Equinus contracture of left ankle - Left Foot      I counseled the patient on his conditions, regarding findings of my examination, my impressions, and usual treatment plan.   This visit spent on counseling and coordination of care.  Appointment spent on education about the diabetic foot, neuropathy, and prevention of limb loss.  Shoe inspection. Diabetic Foot Education. Patient reminded of the importance of good nutrition and blood sugar control to help prevent podiatric complications of diabetes. Patient instructed on proper foot hygeine. We discussed wearing proper shoe gear, daily foot inspections, never walking without protective shoe gear, never putting sharp instruments to feet.    Patient instructed to continue stretching exercises and thick soled shoes.   Patient  will continue to monitor the areas daily, inspect feet, wear protective shoe gear when ambulatory, moisturizer to maintain skin integrity. Patient reminded of the importance of good nutrition and blood sugar control to help prevent podiatric complications of diabetes.  Patient to return 6 months or sooner if needed.        Everett Duarte DPM  [facility]

## 2024-07-09 ENCOUNTER — PATIENT MESSAGE (OUTPATIENT)
Dept: FAMILY MEDICINE | Facility: CLINIC | Age: 69
End: 2024-07-09
Payer: MEDICARE

## 2024-07-10 ENCOUNTER — TELEPHONE (OUTPATIENT)
Dept: FAMILY MEDICINE | Facility: CLINIC | Age: 69
End: 2024-07-10

## 2024-07-10 NOTE — TELEPHONE ENCOUNTER
----- Message from Kacy Hamilton sent at 7/10/2024  2:26 PM CDT -----  Type: Needs Medical Advice  Who Called:  PT  Symptoms (please be specific):    How long has patient had these symptoms:    Pharmacy name and phone #:    Best Call Back Number: [phone]    Additional Information: pT IS CALLING BECAUSE HE PASSED OUT AND HAD TO GO TO ER  HIS BLOOD PRESSURE IS LOW  hE WANTS TO KNOW IF THERE IS ANYTHING HE SHOULD BE DOING  PLEASE CALL TO ADVISE

## 2024-07-11 ENCOUNTER — PATIENT MESSAGE (OUTPATIENT)
Dept: FAMILY MEDICINE | Facility: CLINIC | Age: 69
End: 2024-07-11
Payer: MEDICARE

## 2024-07-11 DIAGNOSIS — F33.0 MILD EPISODE OF RECURRENT MAJOR DEPRESSIVE DISORDER: ICD-10-CM

## 2024-07-11 RX ORDER — CITALOPRAM 10 MG/1
10 TABLET ORAL
Qty: 90 TABLET | Refills: 3 | Status: SHIPPED | OUTPATIENT
Start: 2024-07-11

## 2024-07-11 NOTE — TELEPHONE ENCOUNTER
No care due was identified.  [facility] Embedded Care Due Messages. Reference number: [id number].   7/11/2024 6:27:19 AM CDT

## 2024-07-11 NOTE — TELEPHONE ENCOUNTER
Refill Routing Note   Medication(s) are not appropriate for processing by [facility] for the following reason(s):        New or recently adjusted medication    [facility] action(s):  Defer             Appointments  past 12m or future 3m with PCP    Date Provider   Last Visit   4/30/2024 Power Benton, DO   Next Visit   7/17/2024 Power Benton, DO   ED visits in past 90 days: 0        Note composed:10:11 AM 07/11/2024

## 2024-07-17 ENCOUNTER — OFFICE VISIT (OUTPATIENT)
Dept: FAMILY MEDICINE | Facility: CLINIC | Age: 69
End: 2024-07-17
Payer: MEDICARE

## 2024-07-17 VITALS
HEART RATE: 76 BPM | HEIGHT: 71 IN | WEIGHT: 193.31 LBS | DIASTOLIC BLOOD PRESSURE: 74 MMHG | SYSTOLIC BLOOD PRESSURE: 120 MMHG | BODY MASS INDEX: 27.06 KG/M2 | OXYGEN SATURATION: 98 %

## 2024-07-17 DIAGNOSIS — N18.2 TYPE 2 DIABETES MELLITUS WITH STAGE 2 CHRONIC KIDNEY DISEASE, WITHOUT LONG-TERM CURRENT USE OF INSULIN: ICD-10-CM

## 2024-07-17 DIAGNOSIS — I10 PRIMARY HYPERTENSION: ICD-10-CM

## 2024-07-17 DIAGNOSIS — R55 SYNCOPE AND COLLAPSE: Primary | ICD-10-CM

## 2024-07-17 DIAGNOSIS — F43.21 ADJUSTMENT DISORDER WITH DEPRESSED MOOD: ICD-10-CM

## 2024-07-17 DIAGNOSIS — E11.22 TYPE 2 DIABETES MELLITUS WITH STAGE 2 CHRONIC KIDNEY DISEASE, WITHOUT LONG-TERM CURRENT USE OF INSULIN: ICD-10-CM

## 2024-07-17 PROCEDURE — 99214 OFFICE O/P EST MOD 30 MIN: CPT | Mod: S$PBB,,, | Performed by: INTERNAL MEDICINE

## 2024-07-17 PROCEDURE — 99999 PR PBB SHADOW E&M-EST. PATIENT-LVL V: CPT | Mod: PBBFAC,,, | Performed by: INTERNAL MEDICINE

## 2024-07-17 PROCEDURE — 99215 OFFICE O/P EST HI 40 MIN: CPT | Mod: PBBFAC,PO | Performed by: INTERNAL MEDICINE

## 2024-07-17 NOTE — PROGRESS NOTES
"   Patient ID: Glenn Medel is a 69 y.o. male.    Chief Complaint: Follow-up    7/2/24:  [facility]  "69-year-old male status post fall. Per report, patient was getting up to use the restroom and passed out. Patient brought in by EMS. Primary and secondary surveys performed. Patient reports right shoulder pain. Patient does report a previous episode of this happening recently. Per report patient with hypotension on scene. Hypotension resolved after being given fluids.     Broad ED workup is unrevealing. CT head, C-spine, and abdomen/pelvis without acute findings. No arrhythmias on cardiac monitoring. No preexcitation rhythm on ECG. Was hypostatic in the ER, better with IV fluids. Clinically dehydrated with dry mucous membranes. Feeling well on reevaluation after hydration. Able to ambulate in the ED without difficulty. I spoke at length with patient and family regarding findings, suspicions, concerns, and recommendations for close PCP follow-up. Encouraged to hold the recently prescribed hydrochlorothiazide, as I suspect is contributing to his symptoms. Encouraged to keep a log of blood pressure twice daily and to bring this with him to his next physician's appointment to help guide further blood pressure management. Encouraged to get up and go slowly to keep from passing out. Encouraged to lie down if he gets lightheaded again to help keep from passing out. Return precautions provided. Voiced understanding and agreement with plan of care.    Labs reviewed.      Blood pressure now in range since stopping irbesartan and hctz. Note we restarted these 2 medications in April when his blood pressure were in the 130s-160s/80-90.     Problem  HPI  Plan   Hypotension/syncope and collapse It is strange that hypotension occurred 3 months after restarting blood pressure medications.  Likely multifactorial.  Carbidopa levodopa can cause orthostatic hypotension, combined with blood pressure medication, and likely volume " depletion Hold blood pressure medications for now.  May consider adding 1 back at a time.    Consider discontinuing prazosin as well.  Note he was taking this for nightmares.    Referral to cardiology   Right shoulder pain  Since syncopal event. Having some decreased ROM. Hurts to sleep in right side.  He can move it okay. Monitor, if not improving, consider PT, ortho   Depression Improved.     We have discussed risks and benefits regarding medication interaction with rasagiline (serotonin syndrome) . He did have left hand claw like rigidity. This happened once more but none since.  Continue citalopram cautiously   Hypertension As above Hold blood pressure medications   Type 2 diabetes Taking metormin, lantus 10 units, and mounjaro. 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 Stable w/o med  Monitor    REM sleep disorder Stable  Continue clonazepam         Diagnoses addressed and related orders     1. Syncope and collapse  - Ambulatory referral/consult to Cardiology; Future    2. Primary hypertension    3. Adjustment disorder with depressed mood    4. Type 2 diabetes mellitus with stage 2 chronic kidney disease, without long-term current use of insulin          Review of Systems   Constitutional:  Negative for fever.   Respiratory:  Negative for shortness of breath.    Cardiovascular:  Negative for chest pain.   Gastrointestinal:  Negative for abdominal pain.     Vitals:    07/17/24 0825   BP: 120/74   Pulse: 76      Wt Readings from Last 3 Encounters:   07/17/24 0825 87.7 kg (193 lb 5.5 oz)   07/02/24 1112 85.2 kg (187 lb 13.3 oz)   07/02/24 0939 85.2 kg (187 lb 15.1 oz)      Body mass index is 26.97 kg/m².     Physical Exam  Cardiovascular:      Rate and Rhythm: Normal rate and regular rhythm.      Heart sounds: No murmur heard.     No gallop.   Pulmonary:      Breath sounds: Normal breath sounds. No wheezing or rhonchi.   Abdominal:      Palpations: Abdomen is soft.       Tenderness: There is no abdominal tenderness.         Diabetes Medications               insulin glargine U-100, Lantus, (LANTUS SOLOSTAR U-100 INSULIN) 100 unit/mL (3 mL) InPn pen Inject 10 Units into the skin once daily.    metFORMIN (GLUCOPHAGE-XR) 500 MG ER 24hr tablet Take 2 tablets (1,000 mg total) by mouth once daily. (Increased dose)    tirzepatide 5 mg/0.5 mL PnIj Inject 5 mg into the skin every 7 days. (Increased dose)           Hypertension Medications               hydroCHLOROthiazide (HYDRODIURIL) 12.5 MG Tab Take 1 tablet (12.5 mg total) by mouth once daily.    irbesartan (AVAPRO) 75 MG tablet Take 75 mg by mouth every evening.    prazosin (MINIPRESS) 1 MG Cap TAKE 1 CAPSULE(1 MG) BY MOUTH EVERY EVENING           Hyperlipidemia Medications               atorvastatin (LIPITOR) 20 MG tablet TAKE 1 TABLET(20 MG) BY MOUTH EVERY DAY           Medication List with Changes/Refills   Current Medications    ALLOPURINOL (ZYLOPRIM) 300 MG TABLET    TAKE 1 TABLET(300 MG) BY MOUTH EVERY DAY    ATORVASTATIN (LIPITOR) 20 MG TABLET    TAKE 1 TABLET(20 MG) BY MOUTH EVERY DAY    BRIMONIDINE 0.2% (ALPHAGAN) 0.2 % DROP    INSTILL 1 DROP IN BOTH EYES TWICE DAILY    CARBIDOPA-LEVODOPA  MG (SINEMET)  MG PER TABLET    TAKE 2 TABLETS BY MOUTH FIVE TIMES DAILY    CHOLECALCIFEROL, VITAMIN D3, (D3-2000 ORAL)    Take by mouth once daily.    CITALOPRAM (CELEXA) 10 MG TABLET    TAKE 1 TABLET(10 MG) BY MOUTH DAILY    CLONAZEPAM (KLONOPIN) 1 MG TABLET    Take 2 tablets (2 mg total) by mouth every evening.    CYANOCOBALAMIN (VITAMIN B-12) 1000 MCG TABLET    Take 100 mcg by mouth once daily.    DORZOLAMIDE (TRUSOPT) 2 % OPHTHALMIC SOLUTION    INSTILL 1 DROP IN BOTH EYES TWICE DAILY    INSULIN GLARGINE U-100, LANTUS, (LANTUS SOLOSTAR U-100 INSULIN) 100 UNIT/ML (3 ML) INPN PEN    Inject 10 Units into the skin once daily.    IRBESARTAN (AVAPRO) 75 MG TABLET    Take 75 mg by mouth every evening.    KETOCONAZOLE (NIZORAL) 2 %  "SHAMPOO    Apply topically.    METFORMIN (GLUCOPHAGE-XR) 500 MG ER 24HR TABLET    Take 2 tablets (1,000 mg total) by mouth once daily. (Increased dose)    PEN NEEDLE, DIABETIC 32 GAUGE X 1/4" NDLE    1 each by Misc.(Non-Drug; Combo Route) route once daily.    PRAZOSIN (MINIPRESS) 1 MG CAP    TAKE 1 CAPSULE(1 MG) BY MOUTH EVERY EVENING    RAMELTEON (ROZEREM) 8 MG TABLET    Take 1 tablet (8 mg total) by mouth every evening.    RASAGILINE (AZILECT) 1 MG TAB    TAKE 1 TABLET(1 MG) BY MOUTH EVERY EVENING    TIRZEPATIDE 5 MG/0.5 ML PNIJ    Inject 5 mg into the skin every 7 days. (Increased dose)   Discontinued Medications    HYDROCHLOROTHIAZIDE (HYDRODIURIL) 12.5 MG TAB    Take 1 tablet (12.5 mg total) by mouth once daily.       I personally reviewed past medical, family and social history.         "

## 2024-07-20 DIAGNOSIS — G20.C PARKINSONISM, UNSPECIFIED PARKINSONISM TYPE: ICD-10-CM

## 2024-07-22 RX ORDER — CARBIDOPA AND LEVODOPA 25; 100 MG/1; MG/1
1.5 TABLET ORAL 4 TIMES DAILY
Qty: 180 TABLET | Refills: 0 | Status: SHIPPED | OUTPATIENT
Start: 2024-07-22

## 2024-07-24 DIAGNOSIS — G47.52 REM SLEEP BEHAVIOR DISORDER: ICD-10-CM

## 2024-07-24 RX ORDER — CLONAZEPAM 1 MG/1
2 TABLET ORAL NIGHTLY
Qty: 60 TABLET | Refills: 2 | Status: SHIPPED | OUTPATIENT
Start: 2024-07-24

## 2024-07-24 NOTE — TELEPHONE ENCOUNTER
Unable to retrieve patient chart and identify care due.  [facility] Embedded Care Due Messages. Reference number: [id number].   7/24/2024 11:33:59 AM CDT

## 2024-08-01 ENCOUNTER — PATIENT MESSAGE (OUTPATIENT)
Dept: HEMATOLOGY/ONCOLOGY | Facility: CLINIC | Age: 69
End: 2024-08-01
Payer: MEDICARE

## 2024-08-05 ENCOUNTER — PATIENT MESSAGE (OUTPATIENT)
Dept: FAMILY MEDICINE | Facility: CLINIC | Age: 69
End: 2024-08-05
Payer: MEDICARE

## 2024-08-12 ENCOUNTER — NURSE TRIAGE (OUTPATIENT)
Dept: ADMINISTRATIVE | Facility: CLINIC | Age: 69
End: 2024-08-12
Payer: MEDICARE

## 2024-08-12 NOTE — TELEPHONE ENCOUNTER
Woke up about 30 min ago and felt dizzy and fell on his but 64/44 pulse 110. Repeat 73/50 pulse 109 15 min ago. Advised to call EMS now.  Reason for Disposition   Fainted    Additional Information   Negative: Started suddenly after an allergic medicine, an allergic food, or bee sting   Negative: Shock suspected (e.g., cold/pale/clammy skin, too weak to stand, low BP, rapid pulse)   Negative: Difficult to awaken or acting confused (e.g., disoriented, slurred speech)    Protocols used: Blood Pressure - Low-A-AH

## 2024-08-13 ENCOUNTER — PATIENT MESSAGE (OUTPATIENT)
Dept: NEUROLOGY | Facility: CLINIC | Age: 69
End: 2024-08-13

## 2024-08-13 ENCOUNTER — OFFICE VISIT (OUTPATIENT)
Dept: NEUROLOGY | Facility: CLINIC | Age: 69
End: 2024-08-13
Payer: MEDICARE

## 2024-08-13 VITALS
DIASTOLIC BLOOD PRESSURE: 65 MMHG | HEIGHT: 71 IN | SYSTOLIC BLOOD PRESSURE: 99 MMHG | WEIGHT: 187.94 LBS | BODY MASS INDEX: 26.31 KG/M2 | HEART RATE: 80 BPM

## 2024-08-13 DIAGNOSIS — G20.C PARKINSONISM, UNSPECIFIED PARKINSONISM TYPE: ICD-10-CM

## 2024-08-13 DIAGNOSIS — I95.1 ORTHOSTASIS: Primary | ICD-10-CM

## 2024-08-13 PROCEDURE — 99999 PR PBB SHADOW E&M-EST. PATIENT-LVL III: CPT | Mod: PBBFAC,,, | Performed by: PSYCHIATRY & NEUROLOGY

## 2024-08-13 PROCEDURE — 99213 OFFICE O/P EST LOW 20 MIN: CPT | Mod: PBBFAC | Performed by: PSYCHIATRY & NEUROLOGY

## 2024-08-13 PROCEDURE — 99214 OFFICE O/P EST MOD 30 MIN: CPT | Mod: S$PBB,,, | Performed by: PSYCHIATRY & NEUROLOGY

## 2024-08-13 RX ORDER — MIDODRINE HYDROCHLORIDE 2.5 MG/1
2.5 TABLET ORAL 3 TIMES DAILY
Qty: 90 TABLET | Refills: 11 | Status: SHIPPED | OUTPATIENT
Start: 2024-08-13 | End: 2025-08-13

## 2024-08-13 RX ORDER — CARBIDOPA AND LEVODOPA 25; 100 MG/1; MG/1
TABLET ORAL
Qty: 736 TABLET | Refills: 12 | Status: SHIPPED | OUTPATIENT
Start: 2024-08-13

## 2024-08-13 RX ORDER — CARBIDOPA AND LEVODOPA 25; 100 MG/1; MG/1
2 TABLET ORAL 4 TIMES DAILY
Qty: 180 TABLET | Refills: 0 | Status: SHIPPED | OUTPATIENT
Start: 2024-08-13 | End: 2024-08-13

## 2024-08-13 NOTE — PROGRESS NOTES
"  Notes:   Glenn STOCKTON Chief Complaints during this visit:  New Patient visit for  Parkinsonism.   No referring provider defined for this encounter.    Primary Care Physician  Power Benton, DO  [address] [place] [address]    Interval Hx    Since last visit,     Doing well    Carbidopa/levodopa 25/100mg 2 tabs five times daily - walking more confidently  Tremor well controlled until Ldopa wears off  Trouble typing  Dizziness when he works outside for hours  Some facial dyskinesias    Syncope occurring on standing  2x in the last month  Low BPs    REM sleep dz sporadic now- once every 3 weeks  Take clonazepam 1.5mg QHS at 9PM  Stopped using CPAP    Takes azilect 1mg QHS    Falls: none  Assist Device: none  Walks 2 miles without stopping  With stairs he must hand onto railing    MRI brain revealed a possible posterior fossa arachnoid cyst - followed by Dr. Saldivar - carly per last scan  Also having Lspine MRI through pain management soon    Brother with dementia since age 60 - mild hand tremors and some issues walking  2 sisters/2 brothers healthy  Mother with dementia at age 80 - also hand hand dystonia  Father also reported fingers getting stuck    Ceruloplasmin NL    Neuropsych revealed mild cognitive impairments but no diagnosis    PD Review of Symptoms:  Anosmia: some stable  Dysarthria/Hypophonia: none  Dysphagia/Sialorrhea: none  Depression: yes  Cognitive slowing: mild attention issues - replaced CPAP  Hallucinations: none  Urinary changes: none  Constipation: yes  Orthostasis: at times  Falls: occasional  Freezing: none  Micrographia: none  Sleep issues:  -RBD: 1 year of REM sleep dz      "History of present illness:   69 y.o.  male seen in consultation at the request of  Dr. Martinez for evaluation of parkinsonism.      Falls often, onto knee.  Right hand will suddenly start shaking.  Memory a little worse.  Needs new mask for his LOVE, hasn't used in a while.  Not sure who to see.    Has " "DM, had an emg about 2 years ago.      1/20/20 Knoop:  Glenn Medel is a 64 y.o. right-handed male with DM2, HTN, LOVE, anxiety and depression who presents today for an initial evaluation of tremos and balance and is accompanied by wife.    Tremor- started in RH a couple of years ago but seemed to have gotten worse. Will watch TV and ev hands shake. Started noting in LH in the last year. Also notes shaking in legs for several years. He says the legs in fact started before the hands. Nothing makes it better. Not noted any aggravating factors. Drinks very little alcohol. Has not noted benefit with alcohol. Handwriting is pretty good. No changes with this.    Balance- sometimes will be working on knees and then fall back when he goes to get up. Sometimes with walking, he will wobble. No falls while walking. Has fallen backwards when he is trying to get up from being on knees. Has noted balance issues the past couple of months. Has not noted progression.     Acting out dreams. Not nightly but is significant when does happen. He actually ended up in hospital in the last year because of fighting in sleep. Has LOVE, used to wear CPAP and wife wonders if this helped when he was wearing this. He says he will wear it a couple of days, aggravates him and then he will not use for month or longer. He had sleep doctor, Dr. Hinton, but dismissed him as she did everything she could do for him.    Denies stiffness in muscles.   Sometimes feels slow with walking. Wife says he is slower to answer questions.   Memory is getting worse. Been bad couple of years but worse the past year. Wife agrees but attributes some of this to care home- lazier life.    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   He left on his terms. Was not asked to leave. "      II.  Review of systems:  As in HPI,  otherwise, balance 10 systems reviewed and are negative.    III.  Past Medical History: "   Diagnosis Date    BPH (benign prostatic hyperplasia)     Cataract     done ou    Chronic kidney disease, stage 3 02/25/2015    DDD (degenerative disc disease), lumbar     s/p epidural steroids     Diabetes mellitus     type 2    Elevated PSA     Glaucoma     OU    HTN (hypertension)     Iritis, lens-induced 01/08/2018    Obesity     Parkinson's disease     Sleep apnea     uses cpap     Family History   Problem Relation Name Age of Onset    Hypertension Mother      Diabetes Mother      Stroke Mother      Cancer Mother          colon    Kidney disease Mother      Heart disease Father      Diabetes Father      Colon cancer Father      Cancer Father          colon    Cataracts Father      No Known Problems Sister      Heart disease Brother  62    Kidney disease Brother      Dementia Brother Rodrigue     Abnormal EKG Brother Rodrigue     No Known Problems Maternal Aunt      No Known Problems Maternal Uncle      No Known Problems Paternal Aunt      No Known Problems Paternal Uncle      Glaucoma Maternal Grandmother      No Known Problems Maternal Grandfather      No Known Problems Paternal Grandmother      No Known Problems Paternal Grandfather      Amblyopia Neg Hx      Blindness Neg Hx      Macular degeneration Neg Hx      Retinal detachment Neg Hx      Strabismus Neg Hx      Thyroid disease Neg Hx      Parkinsonism Neg Hx       Social history:  , retired.      Current Outpatient Medications on File Prior to Visit   Medication Sig Dispense Refill    allopurinoL (ZYLOPRIM) 300 MG tablet TAKE 1 TABLET(300 MG) BY MOUTH EVERY DAY 90 tablet 3    atorvastatin (LIPITOR) 20 MG tablet TAKE 1 TABLET(20 MG) BY MOUTH EVERY DAY (Patient taking differently: Take 20 mg by mouth every evening.) 90 tablet 3    brimonidine 0.2% (ALPHAGAN) 0.2 % Drop INSTILL 1 DROP IN BOTH EYES TWICE DAILY 10 mL 3    carbidopa-levodopa  mg (SINEMET)  mg per tablet TAKE 1 AND 1/2 TABLETS BY MOUTH FOUR TIMES DAILY 180 tablet 0     "cholecalciferol, vitamin D3, (D3-2000 ORAL) Take by mouth once daily.      citalopram (CELEXA) 10 MG tablet TAKE 1 TABLET(10 MG) BY MOUTH DAILY 90 tablet 3    clonazePAM (KLONOPIN) 1 MG tablet Take 2 tablets (2 mg total) by mouth every evening. 60 tablet 2    cyanocobalamin (VITAMIN B-12) 1000 MCG tablet Take 100 mcg by mouth once daily.      dorzolamide (TRUSOPT) 2 % ophthalmic solution INSTILL 1 DROP IN BOTH EYES TWICE DAILY 10 mL 3    insulin glargine U-100, Lantus, (LANTUS SOLOSTAR U-100 INSULIN) 100 unit/mL (3 mL) InPn pen Inject 10 Units into the skin once daily.      ketoconazole (NIZORAL) 2 % shampoo Apply topically.      metFORMIN (GLUCOPHAGE-XR) 500 MG ER 24hr tablet Take 2 tablets (1,000 mg total) by mouth once daily. (Increased dose) 180 tablet 1    pen needle, diabetic 32 gauge x 1/4" Ndle 1 each by Misc.(Non-Drug; Combo Route) route once daily. 100 each 1    prazosin (MINIPRESS) 1 MG Cap TAKE 1 CAPSULE(1 MG) BY MOUTH EVERY EVENING 90 capsule 3    ramelteon (ROZEREM) 8 mg tablet Take 1 tablet (8 mg total) by mouth every evening. 90 tablet 3    rasagiline (AZILECT) 1 mg Tab TAKE 1 TABLET(1 MG) BY MOUTH EVERY EVENING 30 tablet 11    tirzepatide (MOUNJARO) 5 mg/0.5 mL PnIj Inject 5 mg into the skin every 7 days. (Increased dose) 4 Pen 5     No current facility-administered medications on file prior to visit.       PRIOR problem-specific medications tried:  none    Review of patient's allergies indicates:   Allergen Reactions    Avelox [moxifloxacin] Hives and Rash       IV. Physical Exam    Physical Exam  Constitutional: Well-developed, well-nourished, appears stated age  Eyes: No scleral icterus  ENT: Moist oral mucosa  Cardiovascular: No lower extremity edema   Respiratory: No labored breathing   Skin: No rash   Hematologic: No bruising    Other: GI/ deferred   Mental status: Alert and oriented to person, place, time, and situation;   follows commands  Speech: normal (not dysarthric), no " "aphasia  Cranial nerves:            CN II: Pupils mid-position and equal, not tested light or accommodation  CN III, IV, VI: Extraocular movements full, no nystagmus visualized  CN V: Not tested   CN VII: Face strong and symmetric bilaterally   CN VIII: Hearing intact to voice and conversation   CN IX, X: Palate raises midline and symmetric   CN XI: Strong shoulder shrug B/L  CN XII: Tongue appears midline   Motor: Normal bulk by appearance, no drift   Sensory: Not tested    Gait: Can stand on either leg- barely a shuffle  Deep tendon reflexes: Not tested  Movement/Coordination                    Mod hypophonic speech.                     Mod facial masking.    Mild orofacial dyskinesias                      No other dystonia, chorea, athetosis, myoclonus, or tics visualized.    Bradykinesia    ? Finger taps Finger flicks DAVE Heel taps   Left 0 0 0 0   Right 1+ 1+ 0 0   __________________________________________________________      MOTOR EXAMINATION (ON)       Speech  1 - Slight loss of expression, dictation, and/or volumn.   Facial Expression  1 - Minimal Hypomimia, could be normal "Poker Face".   Tremor at Rest:      Face, lips, chin 0 - Absent.    Hands:      right 0 - Absent.    left 0 - Absent.    Feet:      right 1   left 1   Action or Postural Tremor of Hands      right 1 - Slight; present with action.   left 1 - Slight; present with action.                           Finger Taps      right 0 - Normal.   left 0 - Normal.   Hand Movements      right 0 - Normal.   left 0 - Normal.   Rapid Alternating Movements of Hands      right 0 - Normal.   left 1   Leg Agility      right 0 - Normal.   left 1 - Mild slowing and/or reduction in amplitude.   Arising from Chair  1   Posture  1 - Not quite erect, slightly stooped posture; could be normal for older person.   Gait  0 - Normal.       Body Bradykinesia and Hypokinesia (Combining slowness, hesitancy, decreased armswing, small amplitude, and poverty of movement in " "general)  1 - Minimal slowness, giving movement a deliberate character; could be normal for some persons. Possibly reduced amplitude.     11      V.  Laboratory/ Radiological Data: (Personally reviewed images)         MRI 1/29/20  Impression       1. There is no acute abnormality.  There is mild volume loss without significant white matter disease.  There is no hemorrhage, parenchymal mass or acute infarction.  There is a probable arachnoid cyst in the right posterior fossa.  Please see above discussion.     Memory/Encephalopathy Labs:  Lab Results   Component Value Date    TSH 0.976 01/06/2020    EUOTIVKD55 391 05/14/2020    THIAMINEBLOO 59 05/14/2020     LGi and CASPR  No results found for: "ENCESLGI1", "ENCESCASPR2"    Movement Labs:  Lab Results   Component Value Date    GUOJTTNV03KP 51 09/28/2022    CALCIUM 8.3 (L) 08/12/2024    .6 (H) 09/28/2023    ALBUMIN 3.4 (L) 08/12/2024    BILITOT 0.7 08/12/2024    BILIDIR 0.2 06/06/2024    ALT <5 08/12/2024    AST 24 08/12/2024    ALKPHOS 101 08/12/2024    CERULOPLSM 29.0 08/26/2020    FERRITIN 88 03/29/2021    ALPHATOCOPHE 1275 05/14/2020       IRON STUDIES:  Lab Results   Component Value Date    IRON 60 03/29/2021    TRANSFERRIN 243 03/29/2021    TIBC 360 03/29/2021    FESATURATED 17 (L) 03/29/2021       Malignancy Labs:  Lab Results   Component Value Date    PSADIAG 5.1 (H) 02/02/2024     PARANEOPLASTIC PANEL:  No results found for: "NMOINTERPRET", "REFLEXTEST", "CRMP5", "STMAB", "LABACHR", "ACHRGANGLION", "NEUROVG"    CSF:  No results found for: "CSFWBC", "CSFRBC", "LYMPHS", "MONOMACCSF", "PROTEINCSF", "GLUCCSF", "GADCSF", "LUME", "MYELINPROT"  Flow cytometry: No results found for: "CSFC"  [place]: No results found for: "WESTNILEIGGA"    CNS Demyelinating Labs:  Nmo serum, csf igg:  No results found for: "NMOFSFACSS", "NMOFCFACSC"  No results found for: "JCVIRUS"    CSF oligoclonal bands:  No results found for: "CSFB", "CSFOLI", "SERB", "IGGINDEXCSF", " ""IGGCSF", "ALBQ", "GALB", "IGGSYNRATE", "IGGS", "ALBSER", "IGGALBS"    Myopathy labs:  No results found for: "CPK", "ALDOLASE", "AMMONIA", "ACFCRATIO", "ACETYLCARNIT", "CARNITINEPLA", "LACTATE"    Myasthenia panel:  No results found for: "LABACHR"  No results found for: "ACETMOD"  No results found for: "STMAB"   No results found for: "ACHRGANGLION"    Neuropathy Labs:  Lab Results   Component Value Date    [id number] 391 05/14/2020    HGBA1C 8.9 (H) 06/06/2024     SPEP:  Lab Results   Component Value Date    PROTEINSERUM 6.7 07/27/2015    PATHINTPSPE REVIEWED 07/27/2015       Rheum labs:  Lab Results   Component Value Date    URICACID 4.5 09/28/2022       Neuropsych"  Mr. Medel is a 64 year old male with cognitive complaints over the past several years. He has a slightly ataxic gait of unclear etiology, but no clear parkinsonism. He has symptoms suggestive of REM sleep behavior disorder over the past year. He denied presence, passage, or recurrent visual hallucinations. He remains functionally independent, albeit more error prone than in the past. His history is remarkable for multiple vascular risk factors, including currently untreated sleep apnea.      His neuropsychological profile was largely within normal limits with the exception of mild memory inefficiencies, primarily related to encoding and cognitive organization/retrieval. His test scores and functioning are not at the level to warrant a cognitive diagnosis at this time. He did not present with the type of oliver forgetting seen in Alzheimer's disease. He also did not demonstrate the type of visuospatial dysfunction often seen early in Lewy Body Dementia, which is also in keeping with the fact that he does not have visual hallucinations. It will be important to track his cognition over time as mild cognitive changes in the setting of ataxia of unclear etiology and possible REM sleep behavior disorder could be indicative of a neurodegenerative condition. " "Otherwise, his mild cognitive weaknesses could simply be attributable to mild cerebrovascular disease.      Mr. Medel also reported clinically significant anxiety and depression. The transition to [facility] has been admittedly more challenging than expected. His wife has also noticed reduced frustration tolerance with an increased tendency to perseverate on his frustrations, which has led to multiple confrontations. Treatment is indicated. "    MRI brain 2020      VI. Assessment and Plan            Problem List Items Addressed This Visit          Neuro    Parkinsonism (Chronic)    Current Assessment & Plan     Typical  with some nonmotor features.    Orthostasis is new    Continue- carbidopa/levodopa 25/100mg 2 tabs  PO five times a day  Continue azilect 1mg QHS            Cardiac/Vascular    Orthostasis - Primary    Current Assessment & Plan     Passing out spells  Likely orthostasis from PD  Salt foods  Take midodrine 2.5mg TID to increase Bps >120mmHg and <160mmHg                  Coni Cee MD, MS  [facility]  Department of Neurology  Movement Disorders      "

## 2024-08-13 NOTE — ASSESSMENT & PLAN NOTE
Typical  with some nonmotor features.    Orthostasis is new    Continue- carbidopa/levodopa 25/100mg 2 tabs  PO five times a day  Continue azilect 1mg QHS

## 2024-08-13 NOTE — ASSESSMENT & PLAN NOTE
Passing out spells  Likely orthostasis from PD  Salt foods  Take midodrine 2.5mg TID to increase Bps >120mmHg and <160mmHg

## 2024-08-15 ENCOUNTER — HOSPITAL ENCOUNTER (OUTPATIENT)
Dept: RADIOLOGY | Facility: HOSPITAL | Age: 69
Discharge: HOME | End: 2024-08-15
Attending: ORTHOPAEDIC SURGERY
Payer: MEDICARE

## 2024-08-15 ENCOUNTER — OFFICE VISIT (OUTPATIENT)
Dept: ORTHOPEDICS | Facility: CLINIC | Age: 69
End: 2024-08-15
Payer: MEDICARE

## 2024-08-15 DIAGNOSIS — M75.21 BICEPS TENDINITIS OF RIGHT UPPER EXTREMITY: ICD-10-CM

## 2024-08-15 DIAGNOSIS — M75.41 IMPINGEMENT SYNDROME, SHOULDER, RIGHT: ICD-10-CM

## 2024-08-15 DIAGNOSIS — M75.41 IMPINGEMENT SYNDROME, SHOULDER, RIGHT: Primary | ICD-10-CM

## 2024-08-15 PROCEDURE — 73030 X-RAY EXAM OF SHOULDER: CPT | Mod: TC,PO,RT

## 2024-08-15 PROCEDURE — 99999PBSHW PR PBB SHADOW TECHNICAL ONLY FILED TO HB: Mod: PBBFAC,,,

## 2024-08-15 PROCEDURE — 99999 PR PBB SHADOW E&M-EST. PATIENT-LVL III: CPT | Mod: PBBFAC,,, | Performed by: ORTHOPAEDIC SURGERY

## 2024-08-15 PROCEDURE — 73030 X-RAY EXAM OF SHOULDER: CPT | Mod: 26,RT,, | Performed by: RADIOLOGY

## 2024-08-15 PROCEDURE — 99213 OFFICE O/P EST LOW 20 MIN: CPT | Mod: PBBFAC,25,PO | Performed by: ORTHOPAEDIC SURGERY

## 2024-08-15 PROCEDURE — 20550 NJX 1 TENDON SHEATH/LIGAMENT: CPT | Mod: PBBFAC,PO,RT | Performed by: ORTHOPAEDIC SURGERY

## 2024-08-15 PROCEDURE — 20610 DRAIN/INJ JOINT/BURSA W/O US: CPT | Mod: PBBFAC,PO,RT | Performed by: ORTHOPAEDIC SURGERY

## 2024-08-15 PROCEDURE — 99213 OFFICE O/P EST LOW 20 MIN: CPT | Mod: S$PBB,25,, | Performed by: ORTHOPAEDIC SURGERY

## 2024-08-15 RX ADMIN — TRIAMCINOLONE ACETONIDE 40 MG: 40 INJECTION, SUSPENSION INTRA-ARTICULAR; INTRAMUSCULAR at 08:08

## 2024-08-19 ENCOUNTER — PATIENT MESSAGE (OUTPATIENT)
Dept: ADMINISTRATIVE | Facility: OTHER | Age: 69
End: 2024-08-19
Payer: MEDICARE

## 2024-08-19 ENCOUNTER — PATIENT MESSAGE (OUTPATIENT)
Dept: NEUROLOGY | Facility: CLINIC | Age: 69
End: 2024-08-19
Payer: MEDICARE

## 2024-08-19 NOTE — TELEPHONE ENCOUNTER
Called mobile phone and left message to stop the midodrine.   Called home phone and spoke with pt advising him to stop the midodrine due to symptoms of urinary retention and burning. He has an appointment with PCP on Weds. Advised if he is continuing to have symptoms, to let him know and ask for urine test for infection.  He verbalized understanding.

## 2024-08-21 ENCOUNTER — OFFICE VISIT (OUTPATIENT)
Dept: FAMILY MEDICINE | Facility: CLINIC | Age: 69
End: 2024-08-21
Payer: MEDICARE

## 2024-08-21 ENCOUNTER — PATIENT MESSAGE (OUTPATIENT)
Dept: FAMILY MEDICINE | Facility: CLINIC | Age: 69
End: 2024-08-21

## 2024-08-21 VITALS
HEIGHT: 71 IN | SYSTOLIC BLOOD PRESSURE: 102 MMHG | BODY MASS INDEX: 25.56 KG/M2 | DIASTOLIC BLOOD PRESSURE: 64 MMHG | WEIGHT: 182.56 LBS | OXYGEN SATURATION: 98 % | HEART RATE: 70 BPM

## 2024-08-21 DIAGNOSIS — I95.1 ORTHOSTATIC HYPOTENSION: ICD-10-CM

## 2024-08-21 DIAGNOSIS — E11.22 TYPE 2 DIABETES MELLITUS WITH STAGE 2 CHRONIC KIDNEY DISEASE, WITHOUT LONG-TERM CURRENT USE OF INSULIN: Primary | ICD-10-CM

## 2024-08-21 DIAGNOSIS — N18.2 TYPE 2 DIABETES MELLITUS WITH STAGE 2 CHRONIC KIDNEY DISEASE, WITHOUT LONG-TERM CURRENT USE OF INSULIN: Primary | ICD-10-CM

## 2024-08-21 PROCEDURE — 99214 OFFICE O/P EST MOD 30 MIN: CPT | Mod: S$PBB,,, | Performed by: INTERNAL MEDICINE

## 2024-08-21 PROCEDURE — 99999 PR PBB SHADOW E&M-EST. PATIENT-LVL IV: CPT | Mod: PBBFAC,,, | Performed by: INTERNAL MEDICINE

## 2024-08-21 PROCEDURE — 99214 OFFICE O/P EST MOD 30 MIN: CPT | Mod: PBBFAC,PO | Performed by: INTERNAL MEDICINE

## 2024-08-21 RX ORDER — FLUDROCORTISONE ACETATE 0.1 MG/1
100 TABLET ORAL DAILY
Qty: 30 TABLET | Refills: 0 | Status: SHIPPED | OUTPATIENT
Start: 2024-08-21 | End: 2024-09-20

## 2024-08-21 NOTE — PROGRESS NOTES
"   Patient ID: Glenn Medel is a 69 y.o. male.    Chief Complaint: Follow-up     Diagnosis and orders   1. Type 2 diabetes mellitus with stage 2 chronic kidney disease, without long-term current use of insulin  - Hemoglobin A1C; Future    2. Orthostatic hypotension  - fludrocortisone (FLORINEF) 0.1 mg Tab; Take 1 tablet (100 mcg total) by mouth once daily.  Dispense: 30 tablet; Refill: 0      Plan     Start Florinef with a meal daily.  Monitor blood sugars carefully, may need to increase Lantus.  Monitor blood pressures carefully. Blood pressure may increase more than usual when laying down     HPI     Had covid on 8/4. ER visit 8/12 syncope. Saw neuro and midodrine added. Caused urinary retention so he discontinued it (Likely due to the alpha-1 receptor-mediated constriction)     "Pt to ED via [facility] from home c/o sycopal episode.     with EMS. Pt was initially hypotensive with EMS with BP 78/54. Pt received 1 L NS with improvement to 110/64.    Pt reports when he stood up to go to the restroom he "blacked out" and slid on the wall to the floor. Denies hitting head. Pt denies current complaints. Denies fever. PERRL. Pt moves all extremities well. Pt reports feelings of lightheadedness from sitting position.     Pt reports he did have some chest pain after standing back up that went across his shoulders. Pt reports he was diagnosed with Covid a week ago. "    Review of Systems   Constitutional:  Negative for fever.   Respiratory:  Negative for shortness of breath.    Cardiovascular:  Negative for chest pain.   Gastrointestinal:  Negative for abdominal pain.     Diabetes Medications               insulin glargine U-100, Lantus, (LANTUS SOLOSTAR U-100 INSULIN) 100 unit/mL (3 mL) InPn pen Inject 10 Units into the skin once daily.    metFORMIN (GLUCOPHAGE-XR) 500 MG ER 24hr tablet Take 2 tablets (1,000 mg total) by mouth once daily. (Increased dose)    tirzepatide (MOUNJARO) 5 mg/0.5 mL PnIj Inject 5 mg into the " "skin every 7 days. (Increased dose)           Hypertension Medications               prazosin (MINIPRESS) 1 MG Cap TAKE 1 CAPSULE(1 MG) BY MOUTH EVERY EVENING           Hyperlipidemia Medications               atorvastatin (LIPITOR) 20 MG tablet TAKE 1 TABLET(20 MG) BY MOUTH EVERY DAY           Medication List with Changes/Refills   New Medications    FLUDROCORTISONE (FLORINEF) 0.1 MG TAB    Take 1 tablet (100 mcg total) by mouth once daily.   Current Medications    ALLOPURINOL (ZYLOPRIM) 300 MG TABLET    TAKE 1 TABLET(300 MG) BY MOUTH EVERY DAY    ATORVASTATIN (LIPITOR) 20 MG TABLET    TAKE 1 TABLET(20 MG) BY MOUTH EVERY DAY    BRIMONIDINE 0.2% (ALPHAGAN) 0.2 % DROP    INSTILL 1 DROP IN BOTH EYES TWICE DAILY    CARBIDOPA-LEVODOPA  MG (SINEMET)  MG PER TABLET    TAKE 2 TABLETS BY MOUTH FOUR TIMES DAILY    CHOLECALCIFEROL, VITAMIN D3, (D3-2000 ORAL)    Take by mouth once daily.    CITALOPRAM (CELEXA) 10 MG TABLET    TAKE 1 TABLET(10 MG) BY MOUTH DAILY    CLONAZEPAM (KLONOPIN) 1 MG TABLET    Take 2 tablets (2 mg total) by mouth every evening.    CYANOCOBALAMIN (VITAMIN B-12) 1000 MCG TABLET    Take 100 mcg by mouth once daily.    DORZOLAMIDE (TRUSOPT) 2 % OPHTHALMIC SOLUTION    INSTILL 1 DROP IN BOTH EYES TWICE DAILY    INSULIN GLARGINE U-100, LANTUS, (LANTUS SOLOSTAR U-100 INSULIN) 100 UNIT/ML (3 ML) INPN PEN    Inject 10 Units into the skin once daily.    KETOCONAZOLE (NIZORAL) 2 % SHAMPOO    Apply topically.    METFORMIN (GLUCOPHAGE-XR) 500 MG ER 24HR TABLET    Take 2 tablets (1,000 mg total) by mouth once daily. (Increased dose)    MIDODRINE (PROAMATINE) 2.5 MG TAB    Take 1 tablet (2.5 mg total) by mouth 3 (three) times daily.    PEN NEEDLE, DIABETIC 32 GAUGE X 1/4" NDLE    1 each by Misc.(Non-Drug; Combo Route) route once daily.    PRAZOSIN (MINIPRESS) 1 MG CAP    TAKE 1 CAPSULE(1 MG) BY MOUTH EVERY EVENING    RAMELTEON (ROZEREM) 8 MG TABLET    Take 1 tablet (8 mg total) by mouth every evening.    " RASAGILINE (AZILECT) 1 MG TAB    TAKE 1 TABLET(1 MG) BY MOUTH EVERY EVENING    TIRZEPATIDE (MOUNJARO) 5 MG/0.5 ML PNIJ    Inject 5 mg into the skin every 7 days. (Increased dose)       I personally reviewed past medical, family and social history.     Objective      Vitals:    08/21/24 0908   BP: 102/64   Pulse: 70      Wt Readings from Last 3 Encounters:   08/21/24 0908 82.8 kg (182 lb 8.7 oz)   08/13/24 1422 85.2 kg (187 lb 15.1 oz)   07/17/24 0825 87.7 kg (193 lb 5.5 oz)      Body mass index is 25.46 kg/m².     Physical Exam  Cardiovascular:      Rate and Rhythm: Normal rate and regular rhythm.      Heart sounds: No murmur heard.     No gallop.   Pulmonary:      Breath sounds: Normal breath sounds. No wheezing or rhonchi.   Abdominal:      Palpations: Abdomen is soft.      Tenderness: There is no abdominal tenderness.

## 2024-08-22 ENCOUNTER — PATIENT MESSAGE (OUTPATIENT)
Dept: NEUROLOGY | Facility: CLINIC | Age: 69
End: 2024-08-22
Payer: MEDICARE

## 2024-08-22 ENCOUNTER — TELEPHONE (OUTPATIENT)
Dept: FAMILY MEDICINE | Facility: CLINIC | Age: 69
End: 2024-08-22
Payer: MEDICARE

## 2024-08-22 ENCOUNTER — OFFICE VISIT (OUTPATIENT)
Dept: CARDIOLOGY | Facility: CLINIC | Age: 69
End: 2024-08-22
Payer: MEDICARE

## 2024-08-22 VITALS
HEIGHT: 71 IN | SYSTOLIC BLOOD PRESSURE: 112 MMHG | BODY MASS INDEX: 25.5 KG/M2 | HEART RATE: 79 BPM | WEIGHT: 182.13 LBS | DIASTOLIC BLOOD PRESSURE: 68 MMHG

## 2024-08-22 DIAGNOSIS — Z80.0 FAMILY HISTORY OF COLON CANCER: ICD-10-CM

## 2024-08-22 DIAGNOSIS — I70.0 AORTIC ATHEROSCLEROSIS: Primary | ICD-10-CM

## 2024-08-22 DIAGNOSIS — E11.22 TYPE 2 DIABETES MELLITUS WITH STAGE 2 CHRONIC KIDNEY DISEASE, WITHOUT LONG-TERM CURRENT USE OF INSULIN: ICD-10-CM

## 2024-08-22 DIAGNOSIS — R55 SYNCOPE AND COLLAPSE: ICD-10-CM

## 2024-08-22 DIAGNOSIS — R97.20 ELEVATED PSA: ICD-10-CM

## 2024-08-22 DIAGNOSIS — R55 SYNCOPE AND COLLAPSE: Primary | ICD-10-CM

## 2024-08-22 DIAGNOSIS — I10 PRIMARY HYPERTENSION: ICD-10-CM

## 2024-08-22 DIAGNOSIS — N18.2 TYPE 2 DIABETES MELLITUS WITH STAGE 2 CHRONIC KIDNEY DISEASE, WITHOUT LONG-TERM CURRENT USE OF INSULIN: ICD-10-CM

## 2024-08-22 DIAGNOSIS — I70.0 AORTIC ATHEROSCLEROSIS: ICD-10-CM

## 2024-08-22 DIAGNOSIS — R25.9 ABNORMAL INVOLUNTARY MOVEMENTS: ICD-10-CM

## 2024-08-22 DIAGNOSIS — E78.2 MIXED HYPERLIPIDEMIA: ICD-10-CM

## 2024-08-22 DIAGNOSIS — N40.0 BENIGN PROSTATIC HYPERPLASIA, UNSPECIFIED WHETHER LOWER URINARY TRACT SYMPTOMS PRESENT: ICD-10-CM

## 2024-08-22 DIAGNOSIS — G47.33 SEVERE OBSTRUCTIVE SLEEP APNEA: ICD-10-CM

## 2024-08-22 PROCEDURE — 99204 OFFICE O/P NEW MOD 45 MIN: CPT | Mod: S$PBB,,, | Performed by: INTERNAL MEDICINE

## 2024-08-22 PROCEDURE — 99999 PR PBB SHADOW E&M-EST. PATIENT-LVL IV: CPT | Mod: PBBFAC,,, | Performed by: INTERNAL MEDICINE

## 2024-08-22 PROCEDURE — 99214 OFFICE O/P EST MOD 30 MIN: CPT | Mod: PBBFAC,PO | Performed by: INTERNAL MEDICINE

## 2024-08-22 NOTE — TELEPHONE ENCOUNTER
----- Message from Izabella Kaplan sent at 8/22/2024 11:13 AM CDT -----  Regarding: patient advice  Contact: [phone]  Patient is calling because it looks like [facility] called in a prescriptions for Mr Medel   ( fludrocortisone (FLORINEF) 0.1 mg Tab ) and he wants to make sure this is the replacement medications for the one he was told to stop taking ( midodrine (PROAMATINE) 2.5 MG Tab )  Patient also wants to make sure Dr Cee is aware of this new medication. please contact patient at [phone]

## 2024-08-22 NOTE — PROGRESS NOTES
Subjective:    Patient ID:  Glenn Medel is a 69 y.o. male patient here for evaluation Establish Care      History of Present Illness:  Cardiac evaluation.  Patient referred for orthostatic hypotension, syncope x2 in last month.  Underlying history of Parkinson's disease on medications.  No prior history of structural arrhythmic or ischemic heart disease.  Denies prodrome symptomatology.  No chest pain/dyspnea/palpitations.     Positive family history.  Nonsmoker.   Diabetes mellitus,  dyslipidemia        Review of patient's allergies indicates:   Allergen Reactions    Avelox [moxifloxacin] Hives and Rash       Past Medical History:   Diagnosis Date    BPH (benign prostatic hyperplasia)     Cataract     done ou    Chronic kidney disease, stage 3 02/25/2015    DDD (degenerative disc disease), lumbar     s/p epidural steroids     Diabetes mellitus     type 2    Elevated PSA     Glaucoma     OU    HTN (hypertension)     Iritis, lens-induced 01/08/2018    Obesity     Parkinson's disease     Sleep apnea     uses cpap     Past Surgical History:   Procedure Laterality Date    CATARACT EXTRACTION W/  INTRAOCULAR LENS IMPLANT Left 11/30/2017    CATARACT EXTRACTION W/  INTRAOCULAR LENS IMPLANT Right 12/11/2018    Dr Garcia    CATARACT EXTRACTION W/  INTRAOCULAR LENS IMPLANT Right 12/11/2018    Procedure: EXTRACTION, CATARACT, WITH IOL INSERTION;  Surgeon: Damaris Garcia MD;  Location: [facility] OR;  Service: Ophthalmology;  Laterality: Right;    COLONOSCOPY N/A 9/18/2017    Procedure: COLONOSCOPY;  Surgeon: Paul Feliz Jr., MD;  Location: [facility] ENDO;  Service: Endoscopy;  Laterality: N/A;    COLONOSCOPY N/A 12/21/2022    Procedure: COLONOSCOPY;  Surgeon: Paul Feliz Jr., MD;  Location: [facility] ENDO;  Service: Endoscopy;  Laterality: N/A;    COLONOSCOPY W/ POLYPECTOMY  04/13/2010    YARELI.   One 1 cm polyp in the sigmoid colon.  Internal hemorrhoids.    COSMETIC SURGERY      DIGITAL RECTAL EXAMINATION UNDER ANESTHESIA  N/A 7/23/2019    Procedure: EXAM UNDER ANESTHESIA, DIGITAL, RECTUM;  Surgeon: Kaz Keating MD;  Location: [facility] OR;  Service: General;  Laterality: N/A;    EXAMINATION UNDER ANESTHESIA N/A 4/24/2019    Procedure: EXAM UNDER ANESTHESIA;  Surgeon: Kaz Keating MD;  Location: [facility] OR;  Service: General;  Laterality: N/A;    EXCISIONAL HEMORRHOIDECTOMY N/A 4/24/2019    Procedure: HEMORRHOIDECTOMY (lithotomy);  Surgeon: Kaz Keating MD;  Location: [facility] OR;  Service: General;  Laterality: N/A;    GANGLION CYST EXCISION Left     INJECTION OF ANESTHETIC AGENT AROUND MEDIAL BRANCH NERVES INNERVATING LUMBAR FACET JOINT Bilateral 4/9/2024    Procedure: Block-nerve-medial branch-lumbar-L4/5-L5/S1;  Surgeon: Everett Taveras MD;  Location: [facility] OR;  Service: Pain Management;  Laterality: Bilateral;    INJECTION OF ANESTHETIC AGENT AROUND MEDIAL BRANCH NERVES INNERVATING LUMBAR FACET JOINT Bilateral 5/6/2024    Procedure: Block-nerve-medial branch-lumbar-L4/5-L5/S1;  Surgeon: Everett Taveras MD;  Location: [facility] OR;  Service: Pain Management;  Laterality: Bilateral;    INJECTION OF ANESTHETIC AGENT AROUND PUDENDAL NERVE N/A 4/24/2019    Procedure: BLOCK, NERVE, PUDENDAL;  Surgeon: Kaz Keating MD;  Location: [facility] OR;  Service: General;  Laterality: N/A;    INJECTION OF ANESTHETIC AGENT AROUND PUDENDAL NERVE N/A 7/23/2019    Procedure: BLOCK, NERVE, PUDENDAL;  Surgeon: Kaz Keating MD;  Location: [facility] OR;  Service: General;  Laterality: N/A;    RADIOFREQUENCY ABLATION OF LUMBAR MEDIAL BRANCH NERVE AT SINGLE LEVEL Bilateral 6/4/2024    Procedure: Radiofrequency Ablation, Nerve, Spinal, Lumbar, Medial Branch, L4/5 and L5/S1;  Surgeon: Everett Taveras MD;  Location: [facility] OR;  Service: Pain Management;  Laterality: Bilateral;    REPAIR OF RETINAL DETACHMENT WITH VITRECTOMY Right 8/1/2018    Procedure: REPAIR, RETINAL DETACHMENT, WITH VITRECTOMY;  Surgeon: SAJAN Pulido MD;  Location: [facility];  Service: Ophthalmology;  Laterality: Right;  35 min    TONSILLECTOMY      TRANSRECTAL BIOPSY OF PROSTATE WITH ULTRASOUND GUIDANCE N/A 1/31/2023    Procedure: BIOPSY, PROSTATE, RECTAL APPROACH, WITH US GUIDANCE;  Surgeon: MARIZA Donahue MD;  Location: [facility];  Service: Urology;  Laterality: N/A;    TRANSRECTAL BIOPSY OF PROSTATE WITH ULTRASOUND GUIDANCE N/A 4/12/2024    Procedure: BIOPSY, PROSTATE, RECTAL APPROACH, WITH US GUIDANCE;  Surgeon: MARIZA Donahue MD;  Location: [facility];  Service: Urology;  Laterality: N/A;    TRIGGER FINGER RELEASE Right     X 2    uro lift      for enlarged prostate     Social History     Tobacco Use    Smoking status: Never    Smokeless tobacco: Never   Substance Use Topics    Alcohol use: Yes     Comment: occasional, no alcohol 72 hours prior to sx    Drug use: No        Review of Systems:    As noted in HPI in addition         REVIEW OF SYSTEMS  Review of Systems   Constitutional: Negative for decreased appetite, diaphoresis, night sweats, weight gain and weight loss.   HENT:  Negative for nosebleeds and odynophagia.    Eyes:  Negative for double vision and photophobia.   Cardiovascular:  Positive for syncope. Negative for chest pain, claudication, cyanosis, dyspnea on exertion, irregular heartbeat, leg swelling, near-syncope, orthopnea, palpitations and paroxysmal nocturnal dyspnea.   Respiratory:  Negative for cough, hemoptysis, shortness of breath and wheezing.    Hematologic/Lymphatic: Negative for adenopathy.   Skin:  Negative for flushing, skin cancer and suspicious lesions.   Musculoskeletal:  Negative for gout, myalgias and neck pain.   Gastrointestinal:  Negative for abdominal pain, heartburn, hematemesis and hematochezia.   Genitourinary:  Negative for hesitancy and nocturia.   Neurological:  Negative for focal weakness, headaches, light-headedness and paresthesias.   Psychiatric/Behavioral:  Negative for memory loss and substance abuse.        Objective:         Vitals:    08/22/24 0903   BP: 112/68   Pulse: 79       Lab Results   Component Value Date    WBC 9.07 03/11/2024    HGB 16.1 03/11/2024    HCT 51.0 03/11/2024     03/11/2024    CHOL 117 (L) 11/21/2023    TRIG 119 11/21/2023    HDL 33 (L) 11/21/2023    ALT <5 08/12/2024    AST 24 08/12/2024     08/12/2024    K 4.0 08/12/2024     06/06/2024    CREATININE 0.97 08/12/2024    BUN 12 08/12/2024    CO2 26 08/12/2024    TSH 0.976 01/06/2020    PSA 4.4 (H) 12/29/2021    HGBA1C 8.9 (H) 06/06/2024      CARDIOGRAM RESULTS  No results found for this or any previous visit.    No results found for this or any previous visit.          CURRENT/PREVIOUS VISIT EKG  Results for orders placed or performed during the hospital encounter of 04/12/24   EKG 12-lead    Collection Time: 04/12/24 11:49 AM   Result Value Ref Range    QRS Duration 96 ms    [facility] QTC Calculation 420 ms    Narrative    Test Reason : Z01.818,    Vent. Rate : 058 BPM     Atrial Rate : 058 BPM     P-R Int : 164 ms          QRS Dur : 096 ms      QT Int : 428 ms       P-R-T Axes : 043 041 028 degrees     QTc Int : 420 ms    Sinus bradycardia with sinus arrhythmia  Otherwise normal ECG  When compared with ECG of 13-MAR-2024 07:22,  Nonspecific T wave abnormality no longer evident in Anterior leads  Confirmed by Mckay Hale MD ([id number]) on 4/12/2024 2:22:14 PM    Referred By: MARIZA VACA           Confirmed By:Mckay Hale MD     No valid procedures specified.   No results found for this or any previous visit.    No valid procedures specified.        PHYSICAL EXAM  GENERAL: well built, well nourished, well-developed in no apparent distress alert and oriented.   HEENT: Normocephalic. Pupils normal and conjunctivae normal.  Mucous membranes normal, no cyanosis or icterus, trachea central,no pallor or icterus is noted..   NECK: No JVD. No bruit..   THYROID: Thyroid not enlarged. No nodules present..   CARDIAC:  Normal S1-S2.  No murmur rub click  "or gallop.  PMI nondisplaced.  CHEST ANATOMY: normal.   LUNGS: Clear to auscultation. No wheezing or rhonchi..   ABDOMEN: Soft no masses or organomegaly.  No abdomen pulsations or bruits.  Normal bowel sounds. No pulsations and no masses felt, No guarding or rebound.   URINARY: No mcgowan catheter   EXTREMITIES: No cyanosis, clubbing or edema noted at this time., no calf tenderness bilaterally.   PERIPHERAL VASCULAR SYSTEM: Good palpable distal pulses.  2+ femoral, popliteal and pedal pulses.  No bruits    CENTRAL NERVOUS SYSTEM: No focal motor or sensory deficits noted.   SKIN: Skin without lesions, moist, well perfused.   MUSCLE STRENGTH & TONE: No noteable weakness, atrophy or abnormal movement.     I HAVE REVIEWED :    The vital signs, nurses notes, and all the pertinent radiology and labs.         Current Outpatient Medications   Medication Instructions    allopurinoL (ZYLOPRIM) 300 MG tablet TAKE 1 TABLET(300 MG) BY MOUTH EVERY DAY    atorvastatin (LIPITOR) 20 mg, Oral    brimonidine 0.2% (ALPHAGAN) 0.2 % Drop INSTILL 1 DROP IN BOTH EYES TWICE DAILY    carbidopa-levodopa  mg (SINEMET)  mg per tablet TAKE 2 TABLETS BY MOUTH FOUR TIMES DAILY    cholecalciferol, vitamin D3, (D3-2000 ORAL) Oral, Daily    citalopram (CELEXA) 10 mg, Oral    clonazePAM (KLONOPIN) 2 mg, Oral, Nightly    cyanocobalamin (VITAMIN B-12) 100 mcg, Oral, Daily    dorzolamide (TRUSOPT) 2 % ophthalmic solution INSTILL 1 DROP IN BOTH EYES TWICE DAILY    fludrocortisone (FLORINEF) 100 mcg, Oral, Daily    ketoconazole (NIZORAL) 2 % shampoo Topical (Top)    LANTUS SOLOSTAR U-100 INSULIN 10 Units, Subcutaneous, Daily    metFORMIN (GLUCOPHAGE-XR) 1,000 mg, Oral, Daily, (Increased dose)    midodrine (PROAMATINE) 2.5 mg, Oral, 3 times daily    MOUNJARO 5 mg, Subcutaneous, Every 7 days, (Increased dose)    pen needle, diabetic 32 gauge x 1/4" Ndle 1 each, Misc.(Non-Drug; Combo Route), Daily    prazosin (MINIPRESS) 1 MG Cap TAKE 1 CAPSULE(1 " MG) BY MOUTH EVERY EVENING    ramelteon (ROZEREM) 8 mg, Oral, Nightly    rasagiline (AZILECT) 1 mg Tab TAKE 1 TABLET(1 MG) BY MOUTH EVERY EVENING          Assessment:   Syncope.  Parkinson's disease on medications    Past workup for ischemic heart disease negative including left heart catheterization 2013 nuclear study 2018.    Past EKGs with sinus bradycardia    Diabetes mellitus, dyslipidemia, positive family history         Plan:   Holter monitor, echo.  Recurrent syncope consider ILR  Repeat Lexiscan  Carotid ultrasound      No follow-ups on file.

## 2024-08-22 NOTE — TELEPHONE ENCOUNTER
Received message from pt reporting new med from PCP  Orthostatic hypotension  - fludrocortisone (FLORINEF) 0.1 mg Tab; Take 1 tablet (100 mcg total) by mouth once daily.  Dispense: 30 tablet; Refill: 0    He is also inquiring about the Rx change of cd/ld to qid.  Prescription currently reflects this.  Responded to Pt via my [facility].

## 2024-08-26 ENCOUNTER — HOSPITAL ENCOUNTER (OUTPATIENT)
Dept: CARDIOLOGY | Facility: HOSPITAL | Age: 69
Discharge: HOME | End: 2024-08-26
Attending: INTERNAL MEDICINE
Payer: MEDICARE

## 2024-08-26 VITALS — BODY MASS INDEX: 25.48 KG/M2 | HEIGHT: 71 IN | WEIGHT: 182 LBS

## 2024-08-26 DIAGNOSIS — R55 SYNCOPE AND COLLAPSE: ICD-10-CM

## 2024-08-26 DIAGNOSIS — E78.2 MIXED HYPERLIPIDEMIA: ICD-10-CM

## 2024-08-26 DIAGNOSIS — I70.0 AORTIC ATHEROSCLEROSIS: ICD-10-CM

## 2024-08-26 DIAGNOSIS — I10 PRIMARY HYPERTENSION: ICD-10-CM

## 2024-08-26 PROCEDURE — 93306 TTE W/DOPPLER COMPLETE: CPT | Mod: PO

## 2024-08-26 PROCEDURE — 93306 TTE W/DOPPLER COMPLETE: CPT | Mod: 26,,, | Performed by: INTERNAL MEDICINE

## 2024-08-27 ENCOUNTER — HOSPITAL ENCOUNTER (OUTPATIENT)
Dept: CARDIOLOGY | Facility: HOSPITAL | Age: 69
Discharge: HOME | End: 2024-08-27
Attending: INTERNAL MEDICINE
Payer: MEDICARE

## 2024-08-27 DIAGNOSIS — E11.22 TYPE 2 DIABETES MELLITUS WITH STAGE 2 CHRONIC KIDNEY DISEASE, WITHOUT LONG-TERM CURRENT USE OF INSULIN: ICD-10-CM

## 2024-08-27 DIAGNOSIS — E78.2 MIXED HYPERLIPIDEMIA: ICD-10-CM

## 2024-08-27 DIAGNOSIS — R55 SYNCOPE AND COLLAPSE: ICD-10-CM

## 2024-08-27 DIAGNOSIS — I70.0 AORTIC ATHEROSCLEROSIS: ICD-10-CM

## 2024-08-27 DIAGNOSIS — R97.20 ELEVATED PSA: ICD-10-CM

## 2024-08-27 DIAGNOSIS — R25.9 ABNORMAL INVOLUNTARY MOVEMENTS: ICD-10-CM

## 2024-08-27 DIAGNOSIS — Z80.0 FAMILY HISTORY OF COLON CANCER: ICD-10-CM

## 2024-08-27 DIAGNOSIS — N40.0 BENIGN PROSTATIC HYPERPLASIA, UNSPECIFIED WHETHER LOWER URINARY TRACT SYMPTOMS PRESENT: ICD-10-CM

## 2024-08-27 DIAGNOSIS — N18.2 TYPE 2 DIABETES MELLITUS WITH STAGE 2 CHRONIC KIDNEY DISEASE, WITHOUT LONG-TERM CURRENT USE OF INSULIN: ICD-10-CM

## 2024-08-27 DIAGNOSIS — I10 PRIMARY HYPERTENSION: ICD-10-CM

## 2024-08-27 DIAGNOSIS — G47.33 SEVERE OBSTRUCTIVE SLEEP APNEA: ICD-10-CM

## 2024-08-27 LAB
ASCENDING AORTA: 3.03 CM
AV INDEX (PROSTH): 0.88
AV MEAN GRADIENT: 4 MMHG
AV PEAK GRADIENT: 6 MMHG
AV VALVE AREA BY VELOCITY RATIO: 4.05 CM²
AV VALVE AREA: 3.83 CM²
AV VELOCITY RATIO: 0.93
BSA FOR ECHO PROCEDURE: 2.03 M2
CV ECHO LV RWT: 0.45 CM
DOP CALC AO PEAK VEL: 1.23 M/S
DOP CALC AO VTI: 28.1 CM
DOP CALC LVOT AREA: 4.3 CM2
DOP CALC LVOT DIAMETER: 2.35 CM
DOP CALC LVOT PEAK VEL: 1.15 M/S
DOP CALC LVOT STROKE VOLUME: 107.51 CM3
DOP CALCLVOT PEAK VEL VTI: 24.8 CM
E WAVE DECELERATION TIME: 220.64 MSEC
E/A RATIO: 0.89
E/E' RATIO: 11 M/S
ECHO LV POSTERIOR WALL: 1.07 CM (ref 0.6–1.1)
FRACTIONAL SHORTENING: 39 % (ref 28–44)
INTERVENTRICULAR SEPTUM: 1.23 CM (ref 0.6–1.1)
IVRT: 81.83 MSEC
LEFT ATRIUM AREA SYSTOLIC (APICAL 2 CHAMBER): 17.29 CM2
LEFT ATRIUM AREA SYSTOLIC (APICAL 4 CHAMBER): 17.98 CM2
LEFT ATRIUM SIZE: 4.04 CM
LEFT ATRIUM VOLUME INDEX MOD: 22.4 ML/M2
LEFT ATRIUM VOLUME MOD: 45.45 CM3
LEFT INTERNAL DIMENSION IN SYSTOLE: 2.88 CM (ref 2.1–4)
LEFT VENTRICLE DIASTOLIC VOLUME INDEX: 51.44 ML/M2
LEFT VENTRICLE DIASTOLIC VOLUME: 104.43 ML
LEFT VENTRICLE END SYSTOLIC VOLUME APICAL 2 CHAMBER: 44.36 ML
LEFT VENTRICLE END SYSTOLIC VOLUME APICAL 4 CHAMBER: 45.93 ML
LEFT VENTRICLE MASS INDEX: 100 G/M2
LEFT VENTRICLE SYSTOLIC VOLUME INDEX: 15.7 ML/M2
LEFT VENTRICLE SYSTOLIC VOLUME: 31.79 ML
LEFT VENTRICULAR INTERNAL DIMENSION IN DIASTOLE: 4.74 CM (ref 3.5–6)
LEFT VENTRICULAR MASS: 202.29 G
LV LATERAL E/E' RATIO: 11 M/S
LV SEPTAL E/E' RATIO: 11 M/S
LVED V (TEICH): 104.43 ML
LVES V (TEICH): 31.79 ML
LVOT MG: 2.76 MMHG
LVOT MV: 0.77 CM/S
MV PEAK A VEL: 0.99 M/S
MV PEAK E VEL: 0.88 M/S
MV STENOSIS PRESSURE HALF TIME: 63.99 MS
MV VALVE AREA P 1/2 METHOD: 3.44 CM2
OHS CV RV/LV RATIO: 0.83 CM
PISA TR MAX VEL: 1.11 M/S
PULM VEIN S/D RATIO: 1.54
PV PEAK D VEL: 0.52 M/S
PV PEAK S VEL: 0.8 M/S
RA PRESSURE ESTIMATED: 3 MMHG
RIGHT VENTRICLE DIASTOLIC LENGTH: 5.9 CM
RIGHT VENTRICLE DIASTOLIC MID DIMENSION: 2.3 CM
RIGHT VENTRICULAR END-DIASTOLIC DIMENSION: 3.94 CM
RIGHT VENTRICULAR LENGTH IN DIASTOLE (APICAL 4-CHAMBER VIEW): 5.89 CM
RV MID DIAMA: 2.29 CM
RV TB RVSP: 4 MMHG
RV TISSUE DOPPLER FREE WALL SYSTOLIC VELOCITY 1 (APICAL 4 CHAMBER VIEW): 19.78 CM/S
SINUS: 3.34 CM
STJ: 2.88 CM
TDI LATERAL: 0.08 M/S
TDI SEPTAL: 0.08 M/S
TDI: 0.08 M/S
TR MAX PG: 5 MMHG
TRICUSPID ANNULAR PLANE SYSTOLIC EXCURSION: 2.74 CM
TV REST PULMONARY ARTERY PRESSURE: 8 MMHG
Z-SCORE OF LEFT VENTRICULAR DIMENSION IN END DIASTOLE: -2.38
Z-SCORE OF LEFT VENTRICULAR DIMENSION IN END SYSTOLE: -1.95

## 2024-08-27 PROCEDURE — 93225 XTRNL ECG REC<48 HRS REC: CPT | Mod: PO

## 2024-08-27 PROCEDURE — 93226 XTRNL ECG REC<48 HR SCAN A/R: CPT | Mod: PO

## 2024-08-27 RX ORDER — TRIAMCINOLONE ACETONIDE 40 MG/ML
40 INJECTION, SUSPENSION INTRA-ARTICULAR; INTRAMUSCULAR
Status: DISCONTINUED | OUTPATIENT
Start: 2024-08-15 | End: 2024-08-27 | Stop reason: HOSPADM

## 2024-08-27 NOTE — PROGRESS NOTES
Chief Complaint   Patient presents with    Right Shoulder - Injury, Pain       HPI:    This is a 69 y.o. who presents today complaining of right shoulder pain for 1 months after no interval trauma. Pain is dull. No numbness or tingling. No associated signs or symptoms.      Past Medical History:   Diagnosis Date    BPH (benign prostatic hyperplasia)     Cataract     done ou    Chronic kidney disease, stage 3 02/25/2015    DDD (degenerative disc disease), lumbar     s/p epidural steroids     Diabetes mellitus     type 2    Elevated PSA     Glaucoma     OU    HTN (hypertension)     Iritis, lens-induced 01/08/2018    Obesity     Parkinson's disease     Sleep apnea     uses cpap      Past Surgical History:   Procedure Laterality Date    CATARACT EXTRACTION W/  INTRAOCULAR LENS IMPLANT Left 11/30/2017    CATARACT EXTRACTION W/  INTRAOCULAR LENS IMPLANT Right 12/11/2018    Dr Garcia    CATARACT EXTRACTION W/  INTRAOCULAR LENS IMPLANT Right 12/11/2018    Procedure: EXTRACTION, CATARACT, WITH IOL INSERTION;  Surgeon: Damaris Garcia MD;  Location: [facility] OR;  Service: Ophthalmology;  Laterality: Right;    COLONOSCOPY N/A 9/18/2017    Procedure: COLONOSCOPY;  Surgeon: Paul Feliz Jr., MD;  Location: [facility] ENDO;  Service: Endoscopy;  Laterality: N/A;    COLONOSCOPY N/A 12/21/2022    Procedure: COLONOSCOPY;  Surgeon: Paul Feliz Jr., MD;  Location: [facility] ENDO;  Service: Endoscopy;  Laterality: N/A;    COLONOSCOPY W/ POLYPECTOMY  04/13/2010    YARELI.   One 1 cm polyp in the sigmoid colon.  Internal hemorrhoids.    COSMETIC SURGERY      DIGITAL RECTAL EXAMINATION UNDER ANESTHESIA N/A 7/23/2019    Procedure: EXAM UNDER ANESTHESIA, DIGITAL, RECTUM;  Surgeon: Kaz Keating MD;  Location: [facility] OR;  Service: General;  Laterality: N/A;    EXAMINATION UNDER ANESTHESIA N/A 4/24/2019    Procedure: EXAM UNDER ANESTHESIA;  Surgeon: Kaz Keating MD;  Location: [facility] OR;  Service: General;  Laterality: N/A;    EXCISIONAL  HEMORRHOIDECTOMY N/A 4/24/2019    Procedure: HEMORRHOIDECTOMY (lithotomy);  Surgeon: Kaz Keating MD;  Location: [facility] OR;  Service: General;  Laterality: N/A;    GANGLION CYST EXCISION Left     INJECTION OF ANESTHETIC AGENT AROUND MEDIAL BRANCH NERVES INNERVATING LUMBAR FACET JOINT Bilateral 4/9/2024    Procedure: Block-nerve-medial branch-lumbar-L4/5-L5/S1;  Surgeon: Everett Taveras MD;  Location: [facility] OR;  Service: Pain Management;  Laterality: Bilateral;    INJECTION OF ANESTHETIC AGENT AROUND MEDIAL BRANCH NERVES INNERVATING LUMBAR FACET JOINT Bilateral 5/6/2024    Procedure: Block-nerve-medial branch-lumbar-L4/5-L5/S1;  Surgeon: Everett Taveras MD;  Location: [facility] OR;  Service: Pain Management;  Laterality: Bilateral;    INJECTION OF ANESTHETIC AGENT AROUND PUDENDAL NERVE N/A 4/24/2019    Procedure: BLOCK, NERVE, PUDENDAL;  Surgeon: Kaz Keating MD;  Location: [facility] OR;  Service: General;  Laterality: N/A;    INJECTION OF ANESTHETIC AGENT AROUND PUDENDAL NERVE N/A 7/23/2019    Procedure: BLOCK, NERVE, PUDENDAL;  Surgeon: Kaz Keating MD;  Location: [facility] OR;  Service: General;  Laterality: N/A;    RADIOFREQUENCY ABLATION OF LUMBAR MEDIAL BRANCH NERVE AT SINGLE LEVEL Bilateral 6/4/2024    Procedure: Radiofrequency Ablation, Nerve, Spinal, Lumbar, Medial Branch, L4/5 and L5/S1;  Surgeon: Everett Taveras MD;  Location: [facility] OR;  Service: Pain Management;  Laterality: Bilateral;    REPAIR OF RETINAL DETACHMENT WITH VITRECTOMY Right 8/1/2018    Procedure: REPAIR, RETINAL DETACHMENT, WITH VITRECTOMY;  Surgeon: SAJAN Pulido MD;  Location: [address];  Service: Ophthalmology;  Laterality: Right;  35 min    TONSILLECTOMY      TRANSRECTAL BIOPSY OF PROSTATE WITH ULTRASOUND GUIDANCE N/A 1/31/2023    Procedure: BIOPSY, PROSTATE, RECTAL APPROACH, WITH US GUIDANCE;  Surgeon: MARIZA Donahue MD;  Location: [facility] OR;  Service: Urology;  Laterality: N/A;    TRANSRECTAL BIOPSY OF PROSTATE WITH  "ULTRASOUND GUIDANCE N/A 4/12/2024    Procedure: BIOPSY, PROSTATE, RECTAL APPROACH, WITH US GUIDANCE;  Surgeon: MARIZA Donahue MD;  Location: [facility];  Service: Urology;  Laterality: N/A;    TRIGGER FINGER RELEASE Right     X 2    uro lift      for enlarged prostate      Current Outpatient Medications on File Prior to Visit   Medication Sig Dispense Refill    allopurinoL (ZYLOPRIM) 300 MG tablet TAKE 1 TABLET(300 MG) BY MOUTH EVERY DAY 90 tablet 3    atorvastatin (LIPITOR) 20 MG tablet TAKE 1 TABLET(20 MG) BY MOUTH EVERY DAY (Patient not taking: Reported on 8/22/2024) 90 tablet 3    brimonidine 0.2% (ALPHAGAN) 0.2 % Drop INSTILL 1 DROP IN BOTH EYES TWICE DAILY 10 mL 3    carbidopa-levodopa  mg (SINEMET)  mg per tablet TAKE 2 TABLETS BY MOUTH FOUR TIMES DAILY 736 tablet 12    cholecalciferol, vitamin D3, (D3-2000 ORAL) Take by mouth once daily.      citalopram (CELEXA) 10 MG tablet TAKE 1 TABLET(10 MG) BY MOUTH DAILY 90 tablet 3    clonazePAM (KLONOPIN) 1 MG tablet Take 2 tablets (2 mg total) by mouth every evening. 60 tablet 2    cyanocobalamin (VITAMIN B-12) 1000 MCG tablet Take 100 mcg by mouth once daily.      dorzolamide (TRUSOPT) 2 % ophthalmic solution INSTILL 1 DROP IN BOTH EYES TWICE DAILY 10 mL 3    insulin glargine U-100, Lantus, (LANTUS SOLOSTAR U-100 INSULIN) 100 unit/mL (3 mL) InPn pen Inject 10 Units into the skin once daily.      ketoconazole (NIZORAL) 2 % shampoo Apply topically.      metFORMIN (GLUCOPHAGE-XR) 500 MG ER 24hr tablet Take 2 tablets (1,000 mg total) by mouth once daily. (Increased dose) 180 tablet 1    pen needle, diabetic 32 gauge x 1/4" Ndle 1 each by Misc.(Non-Drug; Combo Route) route once daily. 100 each 1    prazosin (MINIPRESS) 1 MG Cap TAKE 1 CAPSULE(1 MG) BY MOUTH EVERY EVENING 90 capsule 3    ramelteon (ROZEREM) 8 mg tablet Take 1 tablet (8 mg total) by mouth every evening. 90 tablet 3    rasagiline (AZILECT) 1 mg Tab TAKE 1 TABLET(1 MG) BY MOUTH EVERY EVENING " 30 tablet 11    tirzepatide (MOUNJARO) 5 mg/0.5 mL PnIj Inject 5 mg into the skin every 7 days. (Increased dose) 4 Pen 5     No current facility-administered medications on file prior to visit.      Review of patient's allergies indicates:   Allergen Reactions    Avelox [moxifloxacin] Hives and Rash      Family History not pertinent   Social History     Socioeconomic History    Marital status:    Tobacco Use    Smoking status: Never    Smokeless tobacco: Never   Substance and Sexual Activity    Alcohol use: Yes     Comment: occasional, no alcohol 72 hours prior to sx    Drug use: No    Sexual activity: Yes     Partners: Female   Social History Narrative            2 grown kids.         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      Social Determinants of Health     Financial Resource Strain: Patient Declined (1/29/2024)    Overall Financial Resource Strain (CARDIA)     Difficulty of Paying Living Expenses: Patient declined   Food Insecurity: Patient Declined (1/29/2024)    Hunger Vital Sign     Worried About Running Out of Food in the Last Year: Patient declined     Ran Out of Food in the Last Year: Patient declined   Transportation Needs: Patient Declined (1/29/2024)    PRAPARE - Transportation     Lack of Transportation (Medical): Patient declined     Lack of Transportation (Non-Medical): Patient declined   Physical Activity: Sufficiently Active (1/29/2024)    Exercise Vital Sign     Days of Exercise per Week: 4 days     Minutes of Exercise per Session: 80 min   Stress: Patient Declined (1/29/2024)    [employer] [place] [employer] - Occupational Stress Questionnaire     Feeling of Stress : Patient declined   Housing Stability: Patient Declined (1/29/2024)    Housing Stability Vital Sign     Unable to Pay for Housing in the Last Year: Patient declined     Number of Places Lived in the Last Year: 1     Unstable Housing in the Last Year: Patient declined         Review  of Systems:   Constitutional:  Denies fever or chills    Eyes:  Denies change in visual acuity    HENT:  Denies nasal congestion or sore throat    Respiratory:  Denies cough or shortness of breath    Cardiovascular:  Denies chest pain or edema    GI:  Denies abdominal pain, nausea, vomiting, bloody stools or diarrhea    :  Denies dysuria    Integument:  Denies rash    Neurologic:  Denies headache, focal weakness or sensory changes    Endocrine:  Denies polyuria or polydipsia    Lymphatic:  Denies swollen glands    Psychiatric:  Denies depression or anxiety       Physical Exam:    Constitutional:  Well developed, well nourished, no acute distress, non-toxic appearance    Integument:  Well hydrated, no rash    Lymphatic:  No lymphadenopathy noted    Neurologic:  Alert & oriented x 3,     Psychiatric:  Speech and behavior appropriate    Gi: abdomen soft  Eyes: EOMI     Bilateral Shoulder Exam    left Shoulder Exam   Shoulder exam performed same as contralateral side and is normal.    right Shoulder Exam   Tenderness   Shoulder tenderness location: diffusely about shoulder.    Range of Motion   Forward Flexion: abnormal   External Rotation: abnormal     Muscle Strength   Supraspinatus: 4/5     Tests   Hawkin's test: positive  Impingement: positive    Other   Erythema: absent  Sensation: normal  Pulse: present       Impingement syndrome, shoulder, right    Biceps tendinitis of right upper extremity          Using an aseptic technique, I injected 5 cc of lidocaine 1% without and 1 cc of kenalog 40mg into the right Shoulder and 1:1 in the right biceps. The patient tolerated this well. I will have them return to clinic in 6 weeks.

## 2024-08-27 NOTE — PROCEDURES
Large Joint Aspiration/Injection: R subacromial bursa    Date/Time: 8/15/2024 8:30 AM    Performed by: Lukas Campbell MD  Authorized by: Lukas Campbell MD    Consent Done?:  Yes (Verbal)  Indications:  Pain  Timeout: prior to procedure the correct patient, procedure, and site was verified    Prep: patient was prepped and draped in usual sterile fashion      Local anesthesia used?: Yes    Local anesthetic:  Lidocaine 1% without epinephrine  Anesthetic total (ml):  5      Details:  Needle Size:  22 G  Ultrasonic Guidance for needle placement?: No    Approach:  Posterior  Location:  Shoulder  Site:  R subacromial bursa  Medications:  40 mg triamcinolone acetonide 40 mg/mL  Patient tolerance:  Patient tolerated the procedure well with no immediate complications  Tendon Sheath    Date/Time: 8/15/2024 8:30 AM    Performed by: Lukas Campbell MD  Authorized by: Lukas Campbell MD    Consent Done?:  Yes (Verbal)  Indications:  Pain  Timeout: prior to procedure the correct patient, procedure, and site was verified    Local anesthesia used?: No    Location:  Shoulder  Site:  R bicep tendon  Ultrasonic guidance for needle placement?: No    Needle size:  25 G  Medications:  40 mg triamcinolone acetonide 40 mg/mL  Patient tolerance:  Patient tolerated the procedure well with no immediate complications

## 2024-08-30 LAB
OHS CV EVENT MONITOR DAY: 0
OHS CV HOLTER LENGTH DECIMAL HOURS: 48
OHS CV HOLTER LENGTH HOURS: 48
OHS CV HOLTER LENGTH MINUTES: 0
OHS CV HOLTER SINUS AVERAGE HR: 74
OHS CV HOLTER SINUS MAX HR: 106
OHS CV HOLTER SINUS MIN HR: 46

## 2024-09-04 ENCOUNTER — LAB VISIT (OUTPATIENT)
Dept: LAB | Facility: HOSPITAL | Age: 69
End: 2024-09-04
Attending: INTERNAL MEDICINE
Payer: MEDICARE

## 2024-09-04 ENCOUNTER — PATIENT MESSAGE (OUTPATIENT)
Dept: PAIN MEDICINE | Facility: CLINIC | Age: 69
End: 2024-09-04
Payer: MEDICARE

## 2024-09-04 DIAGNOSIS — E11.22 TYPE 2 DIABETES MELLITUS WITH STAGE 2 CHRONIC KIDNEY DISEASE, WITHOUT LONG-TERM CURRENT USE OF INSULIN: ICD-10-CM

## 2024-09-04 DIAGNOSIS — N18.2 TYPE 2 DIABETES MELLITUS WITH STAGE 2 CHRONIC KIDNEY DISEASE, WITHOUT LONG-TERM CURRENT USE OF INSULIN: ICD-10-CM

## 2024-09-04 DIAGNOSIS — N18.2 CKD (CHRONIC KIDNEY DISEASE) STAGE 2, GFR 60-89 ML/MIN: ICD-10-CM

## 2024-09-04 LAB
ALBUMIN SERPL BCP-MCNC: 3.4 G/DL (ref 3.5–5.2)
ANION GAP SERPL CALC-SCNC: 10 MMOL/L (ref 8–16)
BUN SERPL-MCNC: 12 MG/DL (ref 8–23)
CALCIUM SERPL-MCNC: 9.1 MG/DL (ref 8.7–10.5)
CHLORIDE SERPL-SCNC: 106 MMOL/L (ref 95–110)
CO2 SERPL-SCNC: 26 MMOL/L (ref 23–29)
CREAT SERPL-MCNC: 0.9 MG/DL (ref 0.5–1.4)
EST. GFR  (NO RACE VARIABLE): >60 ML/MIN/1.73 M^2
ESTIMATED AVG GLUCOSE: 134 MG/DL (ref 68–131)
GLUCOSE SERPL-MCNC: 126 MG/DL (ref 70–110)
HBA1C MFR BLD: 6.3 % (ref 4–5.6)
PHOSPHATE SERPL-MCNC: 4.1 MG/DL (ref 2.7–4.5)
POTASSIUM SERPL-SCNC: 3.9 MMOL/L (ref 3.5–5.1)
PTH-INTACT SERPL-MCNC: 53.7 PG/ML (ref 9–77)
SODIUM SERPL-SCNC: 142 MMOL/L (ref 136–145)

## 2024-09-04 PROCEDURE — 80069 RENAL FUNCTION PANEL: CPT | Performed by: INTERNAL MEDICINE

## 2024-09-04 PROCEDURE — 83970 ASSAY OF PARATHORMONE: CPT | Performed by: INTERNAL MEDICINE

## 2024-09-04 PROCEDURE — 83036 HEMOGLOBIN GLYCOSYLATED A1C: CPT | Performed by: INTERNAL MEDICINE

## 2024-09-05 NOTE — TELEPHONE ENCOUNTER
I have discussed his case further with Dr. Taveras.  Please schedule him for the following procedure:    Physician - Dr Taveras    Type of Procedure/Injection - Lumbar nucleus pulposus allograft(viadisc) at  L4/5           Laterality - NA      Anxiolysis- Local      Need to hold medication - No      NSAIDs for 2 days      Clearance needed - No      Follow up - 1 week with Dr. Tavears    He will need antibiotics done at preop.  Please coordinate this with the surgery Center.

## 2024-09-10 ENCOUNTER — PATIENT MESSAGE (OUTPATIENT)
Dept: ADMINISTRATIVE | Facility: OTHER | Age: 69
End: 2024-09-10
Payer: MEDICARE

## 2024-09-10 DIAGNOSIS — M51.36 DISCOGENIC LOW BACK PAIN: Primary | ICD-10-CM

## 2024-09-10 RX ORDER — CEFAZOLIN SODIUM 2 G/50ML
2 SOLUTION INTRAVENOUS
OUTPATIENT
Start: 2024-09-10

## 2024-09-10 RX ORDER — SODIUM CHLORIDE, SODIUM LACTATE, POTASSIUM CHLORIDE, CALCIUM CHLORIDE 600; 310; 30; 20 MG/100ML; MG/100ML; MG/100ML; MG/100ML
INJECTION, SOLUTION INTRAVENOUS CONTINUOUS
OUTPATIENT
Start: 2024-09-10

## 2024-09-10 RX ORDER — ALPRAZOLAM 1 MG/1
1 TABLET ORAL ONCE
OUTPATIENT
Start: 2024-09-10 | End: 2024-09-10

## 2024-09-13 ENCOUNTER — HOSPITAL ENCOUNTER (OUTPATIENT)
Dept: RADIOLOGY | Facility: HOSPITAL | Age: 69
Discharge: HOME | End: 2024-09-13
Attending: INTERNAL MEDICINE
Payer: MEDICARE

## 2024-09-13 ENCOUNTER — HOSPITAL ENCOUNTER (OUTPATIENT)
Dept: CARDIOLOGY | Facility: HOSPITAL | Age: 69
Discharge: HOME | End: 2024-09-13
Attending: INTERNAL MEDICINE
Payer: MEDICARE

## 2024-09-13 VITALS — WEIGHT: 182 LBS | HEIGHT: 71 IN | BODY MASS INDEX: 25.48 KG/M2

## 2024-09-13 DIAGNOSIS — N18.2 TYPE 2 DIABETES MELLITUS WITH STAGE 2 CHRONIC KIDNEY DISEASE, WITHOUT LONG-TERM CURRENT USE OF INSULIN: ICD-10-CM

## 2024-09-13 DIAGNOSIS — R97.20 ELEVATED PSA: ICD-10-CM

## 2024-09-13 DIAGNOSIS — R25.9 ABNORMAL INVOLUNTARY MOVEMENTS: ICD-10-CM

## 2024-09-13 DIAGNOSIS — N40.0 BENIGN PROSTATIC HYPERPLASIA, UNSPECIFIED WHETHER LOWER URINARY TRACT SYMPTOMS PRESENT: ICD-10-CM

## 2024-09-13 DIAGNOSIS — I70.0 AORTIC ATHEROSCLEROSIS: ICD-10-CM

## 2024-09-13 DIAGNOSIS — E78.2 MIXED HYPERLIPIDEMIA: ICD-10-CM

## 2024-09-13 DIAGNOSIS — I10 PRIMARY HYPERTENSION: ICD-10-CM

## 2024-09-13 DIAGNOSIS — Z80.0 FAMILY HISTORY OF COLON CANCER: ICD-10-CM

## 2024-09-13 DIAGNOSIS — R55 SYNCOPE AND COLLAPSE: ICD-10-CM

## 2024-09-13 DIAGNOSIS — E11.22 TYPE 2 DIABETES MELLITUS WITH STAGE 2 CHRONIC KIDNEY DISEASE, WITHOUT LONG-TERM CURRENT USE OF INSULIN: ICD-10-CM

## 2024-09-13 DIAGNOSIS — G47.33 SEVERE OBSTRUCTIVE SLEEP APNEA: ICD-10-CM

## 2024-09-13 PROCEDURE — A9502 TC99M TETROFOSMIN: HCPCS | Mod: PO | Performed by: INTERNAL MEDICINE

## 2024-09-13 PROCEDURE — 93016 CV STRESS TEST SUPVJ ONLY: CPT | Mod: S$PBB,,, | Performed by: INTERNAL MEDICINE

## 2024-09-13 PROCEDURE — 63600175 PHARM REV CODE 636 W HCPCS: Mod: PO | Performed by: INTERNAL MEDICINE

## 2024-09-13 PROCEDURE — 78452 HT MUSCLE IMAGE SPECT MULT: CPT | Mod: 26,,, | Performed by: INTERNAL MEDICINE

## 2024-09-13 PROCEDURE — 78452 HT MUSCLE IMAGE SPECT MULT: CPT | Mod: PO

## 2024-09-13 PROCEDURE — 93017 CV STRESS TEST TRACING ONLY: CPT | Mod: PBBFAC,PO

## 2024-09-13 PROCEDURE — 93018 CV STRESS TEST I&R ONLY: CPT | Mod: S$PBB,,, | Performed by: INTERNAL MEDICINE

## 2024-09-13 PROCEDURE — 93017 CV STRESS TEST TRACING ONLY: CPT | Mod: PO

## 2024-09-13 RX ORDER — REGADENOSON 0.08 MG/ML
0.4 INJECTION, SOLUTION INTRAVENOUS
Status: COMPLETED | OUTPATIENT
Start: 2024-09-13 | End: 2024-09-13

## 2024-09-13 RX ADMIN — TETROFOSMIN 10.7 MILLICURIE: 1.38 INJECTION, POWDER, LYOPHILIZED, FOR SOLUTION INTRAVENOUS at 09:09

## 2024-09-13 RX ADMIN — TETROFOSMIN 33 MILLICURIE: 1.38 INJECTION, POWDER, LYOPHILIZED, FOR SOLUTION INTRAVENOUS at 09:09

## 2024-09-13 RX ADMIN — REGADENOSON 0.4 MG: 0.08 INJECTION, SOLUTION INTRAVENOUS at 08:09

## 2024-09-16 ENCOUNTER — TELEPHONE (OUTPATIENT)
Dept: ORTHOPEDICS | Facility: CLINIC | Age: 69
End: 2024-09-16
Payer: MEDICARE

## 2024-09-16 ENCOUNTER — HOSPITAL ENCOUNTER (OUTPATIENT)
Dept: CARDIOLOGY | Facility: HOSPITAL | Age: 69
Discharge: HOME | End: 2024-09-16
Attending: INTERNAL MEDICINE
Payer: MEDICARE

## 2024-09-16 DIAGNOSIS — R55 SYNCOPE AND COLLAPSE: ICD-10-CM

## 2024-09-16 DIAGNOSIS — I70.0 AORTIC ATHEROSCLEROSIS: ICD-10-CM

## 2024-09-16 DIAGNOSIS — M25.519 SHOULDER PAIN, UNSPECIFIED CHRONICITY, UNSPECIFIED LATERALITY: Primary | ICD-10-CM

## 2024-09-16 LAB
CV PHARM DOSE: 0.4 MG
CV STRESS BASE HR: 58 BPM
DIASTOLIC BLOOD PRESSURE: 75 MMHG
LEFT ARM DIASTOLIC BLOOD PRESSURE: 68 MMHG
LEFT ARM SYSTOLIC BLOOD PRESSURE: 112 MMHG
LEFT CBA DIAS: 12 CM/S
LEFT CBA SYS: 78 CM/S
LEFT CCA DIST DIAS: 12 CM/S
LEFT CCA DIST SYS: 78 CM/S
LEFT CCA MID DIAS: 10 CM/S
LEFT CCA MID SYS: 100 CM/S
LEFT CCA PROX DIAS: 10 CM/S
LEFT CCA PROX SYS: 120 CM/S
LEFT ECA DIAS: 8 CM/S
LEFT ECA SYS: 90 CM/S
LEFT ICA DIST DIAS: 12 CM/S
LEFT ICA DIST SYS: 39 CM/S
LEFT ICA MID DIAS: 11 CM/S
LEFT ICA MID SYS: 42 CM/S
LEFT ICA PROX DIAS: 12 CM/S
LEFT ICA PROX SYS: 49 CM/S
LEFT VERTEBRAL DIAS: 6 CM/S
LEFT VERTEBRAL SYS: 45 CM/S
NUC STRESS EJECTION FRACTION: 73 %
OHS CV CAROTID RIGHT ICA EDV HIGHEST: 14
OHS CV CAROTID ULTRASOUND LEFT ICA/CCA RATIO: 0.63
OHS CV CAROTID ULTRASOUND RIGHT ICA/CCA RATIO: 0.73
OHS CV CPX 1 MINUTE RECOVERY HEART RATE: 90 BPM
OHS CV CPX 85 PERCENT MAX PREDICTED HEART RATE MALE: 128
OHS CV CPX MAX PREDICTED HEART RATE: 150
OHS CV CPX PATIENT IS FEMALE: 0
OHS CV CPX PATIENT IS MALE: 1
OHS CV CPX PEAK DIASTOLIC BLOOD PRESSURE: 75 MMHG
OHS CV CPX PEAK HEAR RATE: 96 BPM
OHS CV CPX PEAK RATE PRESSURE PRODUCT: NORMAL
OHS CV CPX PEAK SYSTOLIC BLOOD PRESSURE: 123 MMHG
OHS CV CPX PERCENT MAX PREDICTED HEART RATE ACHIEVED: 64
OHS CV CPX RATE PRESSURE PRODUCT PRESENTING: 7134
OHS CV PHARM TIME: 858 MIN
OHS CV PV CAROTID LEFT HIGHEST CCA: 120
OHS CV PV CAROTID LEFT HIGHEST ICA: 49
OHS CV PV CAROTID RIGHT HIGHEST CCA: 145
OHS CV PV CAROTID RIGHT HIGHEST ICA: 61
OHS CV US CAROTID LEFT HIGHEST EDV: 12
RIGHT ARM DIASTOLIC BLOOD PRESSURE: 68 MMHG
RIGHT ARM SYSTOLIC BLOOD PRESSURE: 112 MMHG
RIGHT CBA DIAS: 7 CM/S
RIGHT CBA SYS: 60 CM/S
RIGHT CCA DIST DIAS: 8 CM/S
RIGHT CCA DIST SYS: 83 CM/S
RIGHT CCA MID DIAS: 6 CM/S
RIGHT CCA MID SYS: 104 CM/S
RIGHT CCA PROX DIAS: 16 CM/S
RIGHT CCA PROX SYS: 145 CM/S
RIGHT ECA DIAS: 8 CM/S
RIGHT ECA SYS: 119 CM/S
RIGHT ICA DIST DIAS: 13 CM/S
RIGHT ICA DIST SYS: 61 CM/S
RIGHT ICA MID DIAS: 14 CM/S
RIGHT ICA MID SYS: 53 CM/S
RIGHT ICA PROX DIAS: 9 CM/S
RIGHT ICA PROX SYS: 49 CM/S
RIGHT VERTEBRAL DIAS: 10 CM/S
RIGHT VERTEBRAL SYS: 53 CM/S
SYSTOLIC BLOOD PRESSURE: 123 MMHG

## 2024-09-16 PROCEDURE — 93880 EXTRACRANIAL BILAT STUDY: CPT | Mod: PO

## 2024-09-16 PROCEDURE — 93880 EXTRACRANIAL BILAT STUDY: CPT | Mod: 26,,, | Performed by: INTERNAL MEDICINE

## 2024-09-17 DIAGNOSIS — I95.1 ORTHOSTATIC HYPOTENSION: ICD-10-CM

## 2024-09-17 NOTE — TELEPHONE ENCOUNTER
Care Due:                  Date            Visit Type   Department     Provider  --------------------------------------------------------------------------------                                EP [facility] FAMILY  Last Visit: 08-      CARE ([facility])   SHIRA Benton                              EP -                              PRIMARY      [facility] FAMILY  Next Visit: 10-      CARE ([facility])   MEDICINE       Power Benton                                                            Last  Test          Frequency    Reason                     Performed    Due Date  --------------------------------------------------------------------------------    Lipid Panel.  12 months..  atorvastatin.............  11-   11-    Uric Acid...  12 months..  allopurinoL..............  09- 09-    [facility] Embedded Care Due Messages. Reference number: [id number].   9/17/2024 12:01:17 PM CDT

## 2024-09-18 RX ORDER — FLUDROCORTISONE ACETATE 0.1 MG/1
TABLET ORAL
Qty: 90 TABLET | Refills: 1 | Status: SHIPPED | OUTPATIENT
Start: 2024-09-18

## 2024-09-18 NOTE — TELEPHONE ENCOUNTER
Refill Routing Note   Medication(s) are not appropriate for processing by [facility] for the following reason(s):        Outside of protocol    [facility] action(s):  Route     Requires labs : Yes             Appointments  past 12m or future 3m with PCP    Date Provider   Last Visit   8/21/2024 Power Benton, DO   Next Visit   10/3/2024 Power Benton, DO   ED visits in past 90 days: 0        Note composed:8:45 AM 09/18/2024

## 2024-09-23 ENCOUNTER — PATIENT MESSAGE (OUTPATIENT)
Dept: NEPHROLOGY | Facility: CLINIC | Age: 69
End: 2024-09-23
Payer: MEDICARE

## 2024-09-25 ENCOUNTER — TELEPHONE (OUTPATIENT)
Dept: ORTHOPEDICS | Facility: CLINIC | Age: 69
End: 2024-09-25
Payer: MEDICARE

## 2024-09-25 NOTE — TELEPHONE ENCOUNTER
Attempted to call patient regarding results. No answer. MRI reviewed by Lizette and Dr. Campbell. Needs shoulder injection and physical therapy. Message left to call back for appointment for shoulder injection.

## 2024-09-26 ENCOUNTER — OFFICE VISIT (OUTPATIENT)
Dept: ORTHOPEDICS | Facility: CLINIC | Age: 69
End: 2024-09-26
Payer: MEDICARE

## 2024-09-26 ENCOUNTER — OFFICE VISIT (OUTPATIENT)
Dept: NEPHROLOGY | Facility: CLINIC | Age: 69
End: 2024-09-26
Payer: MEDICARE

## 2024-09-26 VITALS — HEIGHT: 71 IN | BODY MASS INDEX: 25.48 KG/M2 | WEIGHT: 182 LBS

## 2024-09-26 DIAGNOSIS — E11.22 TYPE 2 DIABETES MELLITUS WITH STAGE 2 CHRONIC KIDNEY DISEASE, WITHOUT LONG-TERM CURRENT USE OF INSULIN: ICD-10-CM

## 2024-09-26 DIAGNOSIS — N18.2 CKD (CHRONIC KIDNEY DISEASE) STAGE 2, GFR 60-89 ML/MIN: Primary | ICD-10-CM

## 2024-09-26 DIAGNOSIS — E55.9 VITAMIN D DEFICIENCY: ICD-10-CM

## 2024-09-26 DIAGNOSIS — M75.41 IMPINGEMENT SYNDROME, SHOULDER, RIGHT: Primary | ICD-10-CM

## 2024-09-26 DIAGNOSIS — N25.81 SECONDARY HYPERPARATHYROIDISM OF RENAL ORIGIN: ICD-10-CM

## 2024-09-26 DIAGNOSIS — I10 PRIMARY HYPERTENSION: ICD-10-CM

## 2024-09-26 DIAGNOSIS — N18.2 TYPE 2 DIABETES MELLITUS WITH STAGE 2 CHRONIC KIDNEY DISEASE, WITHOUT LONG-TERM CURRENT USE OF INSULIN: ICD-10-CM

## 2024-09-26 PROCEDURE — 99215 OFFICE O/P EST HI 40 MIN: CPT | Mod: 95,,, | Performed by: INTERNAL MEDICINE

## 2024-09-26 PROCEDURE — 99999 PR PBB SHADOW E&M-EST. PATIENT-LVL III: CPT | Mod: PBBFAC,,, | Performed by: ORTHOPAEDIC SURGERY

## 2024-09-26 PROCEDURE — 99999PBSHW PR PBB SHADOW TECHNICAL ONLY FILED TO HB: Mod: PBBFAC,,,

## 2024-09-26 PROCEDURE — 20610 DRAIN/INJ JOINT/BURSA W/O US: CPT | Mod: PBBFAC,PO | Performed by: ORTHOPAEDIC SURGERY

## 2024-09-26 PROCEDURE — 99213 OFFICE O/P EST LOW 20 MIN: CPT | Mod: PBBFAC,PO | Performed by: ORTHOPAEDIC SURGERY

## 2024-09-26 RX ORDER — PEN NEEDLE, DIABETIC 32GX 5/32"
NEEDLE, DISPOSABLE MISCELLANEOUS
COMMUNITY
Start: 2024-08-30

## 2024-09-26 RX ORDER — DOXYCYCLINE 100 MG/1
100 CAPSULE ORAL 2 TIMES DAILY
COMMUNITY
Start: 2024-09-20

## 2024-09-26 RX ORDER — TRIAMCINOLONE ACETONIDE 40 MG/ML
40 INJECTION, SUSPENSION INTRA-ARTICULAR; INTRAMUSCULAR
Status: DISCONTINUED | OUTPATIENT
Start: 2024-09-26 | End: 2024-09-26 | Stop reason: HOSPADM

## 2024-09-26 RX ADMIN — TRIAMCINOLONE ACETONIDE 40 MG: 40 INJECTION, SUSPENSION INTRA-ARTICULAR; INTRAMUSCULAR at 08:09

## 2024-09-26 NOTE — PROGRESS NOTES
Chief Complaint   Patient presents with    Right Shoulder - Pain     MRI results       HPI:    This is a 69 y.o. who presents today complaining of right shoulder pain for 4 weeks after no interval trauma. He notes injection lasted 1 week. Pain is dull. No numbness or tingling. No associated signs or symptoms.      Past Medical History:   Diagnosis Date    BPH (benign prostatic hyperplasia)     Cataract     done ou    Chronic kidney disease, stage 3 02/25/2015    DDD (degenerative disc disease), lumbar     s/p epidural steroids     Diabetes mellitus     type 2    Elevated PSA     Glaucoma     OU    HTN (hypertension)     Iritis, lens-induced 01/08/2018    Obesity     Parkinson's disease     Sleep apnea     uses cpap      Past Surgical History:   Procedure Laterality Date    CATARACT EXTRACTION W/  INTRAOCULAR LENS IMPLANT Left 11/30/2017    CATARACT EXTRACTION W/  INTRAOCULAR LENS IMPLANT Right 12/11/2018    Dr Garcia    CATARACT EXTRACTION W/  INTRAOCULAR LENS IMPLANT Right 12/11/2018    Procedure: EXTRACTION, CATARACT, WITH IOL INSERTION;  Surgeon: Damaris Garcia MD;  Location: [facility] OR;  Service: Ophthalmology;  Laterality: Right;    COLONOSCOPY N/A 9/18/2017    Procedure: COLONOSCOPY;  Surgeon: Paul Feliz Jr., MD;  Location: [facility];  Service: Endoscopy;  Laterality: N/A;    COLONOSCOPY N/A 12/21/2022    Procedure: COLONOSCOPY;  Surgeon: Paul Feliz Jr., MD;  Location: [facility] ENDO;  Service: Endoscopy;  Laterality: N/A;    COLONOSCOPY W/ POLYPECTOMY  04/13/2010    YARELI.   One 1 cm polyp in the sigmoid colon.  Internal hemorrhoids.    COSMETIC SURGERY      DIGITAL RECTAL EXAMINATION UNDER ANESTHESIA N/A 7/23/2019    Procedure: EXAM UNDER ANESTHESIA, DIGITAL, RECTUM;  Surgeon: Kaz Keating MD;  Location: [facility] OR;  Service: General;  Laterality: N/A;    EXAMINATION UNDER ANESTHESIA N/A 4/24/2019    Procedure: EXAM UNDER ANESTHESIA;  Surgeon: Kaz Keating MD;  Location: [facility] OR;  Service:  General;  Laterality: N/A;    EXCISIONAL HEMORRHOIDECTOMY N/A 4/24/2019    Procedure: HEMORRHOIDECTOMY (lithotomy);  Surgeon: Kaz Keating MD;  Location: [facility];  Service: General;  Laterality: N/A;    GANGLION CYST EXCISION Left     INJECTION OF ANESTHETIC AGENT AROUND MEDIAL BRANCH NERVES INNERVATING LUMBAR FACET JOINT Bilateral 4/9/2024    Procedure: Block-nerve-medial branch-lumbar-L4/5-L5/S1;  Surgeon: Everett Taveras MD;  Location: [facility] OR;  Service: Pain Management;  Laterality: Bilateral;    INJECTION OF ANESTHETIC AGENT AROUND MEDIAL BRANCH NERVES INNERVATING LUMBAR FACET JOINT Bilateral 5/6/2024    Procedure: Block-nerve-medial branch-lumbar-L4/5-L5/S1;  Surgeon: Everett Taveras MD;  Location: [facility];  Service: Pain Management;  Laterality: Bilateral;    INJECTION OF ANESTHETIC AGENT AROUND PUDENDAL NERVE N/A 4/24/2019    Procedure: BLOCK, NERVE, PUDENDAL;  Surgeon: Kaz Keating MD;  Location: [facility] OR;  Service: General;  Laterality: N/A;    INJECTION OF ANESTHETIC AGENT AROUND PUDENDAL NERVE N/A 7/23/2019    Procedure: BLOCK, NERVE, PUDENDAL;  Surgeon: Kaz Keating MD;  Location: [facility];  Service: General;  Laterality: N/A;    RADIOFREQUENCY ABLATION OF LUMBAR MEDIAL BRANCH NERVE AT SINGLE LEVEL Bilateral 6/4/2024    Procedure: Radiofrequency Ablation, Nerve, Spinal, Lumbar, Medial Branch, L4/5 and L5/S1;  Surgeon: Evreett Taveras MD;  Location: [facility] OR;  Service: Pain Management;  Laterality: Bilateral;    REPAIR OF RETINAL DETACHMENT WITH VITRECTOMY Right 8/1/2018    Procedure: REPAIR, RETINAL DETACHMENT, WITH VITRECTOMY;  Surgeon: SAJAN Pulido MD;  Location: [address];  Service: Ophthalmology;  Laterality: Right;  35 min    TONSILLECTOMY      TRANSRECTAL BIOPSY OF PROSTATE WITH ULTRASOUND GUIDANCE N/A 1/31/2023    Procedure: BIOPSY, PROSTATE, RECTAL APPROACH, WITH US GUIDANCE;  Surgeon: MARIZA Donahue MD;  Location: [facility];  Service: Urology;  Laterality: N/A;  "   TRANSRECTAL BIOPSY OF PROSTATE WITH ULTRASOUND GUIDANCE N/A 4/12/2024    Procedure: BIOPSY, PROSTATE, RECTAL APPROACH, WITH US GUIDANCE;  Surgeon: MARIZA Donahue MD;  Location: [facility];  Service: Urology;  Laterality: N/A;    TRIGGER FINGER RELEASE Right     X 2    uro lift      for enlarged prostate      Current Outpatient Medications on File Prior to Visit   Medication Sig Dispense Refill    allopurinoL (ZYLOPRIM) 300 MG tablet TAKE 1 TABLET(300 MG) BY MOUTH EVERY DAY 90 tablet 3    atorvastatin (LIPITOR) 20 MG tablet TAKE 1 TABLET(20 MG) BY MOUTH EVERY DAY 90 tablet 3    brimonidine 0.2% (ALPHAGAN) 0.2 % Drop INSTILL 1 DROP IN BOTH EYES TWICE DAILY 10 mL 3    carbidopa-levodopa  mg (SINEMET)  mg per tablet TAKE 2 TABLETS BY MOUTH FOUR TIMES DAILY 736 tablet 12    cholecalciferol, vitamin D3, (D3-2000 ORAL) Take by mouth once daily.      citalopram (CELEXA) 10 MG tablet TAKE 1 TABLET(10 MG) BY MOUTH DAILY 90 tablet 3    clonazePAM (KLONOPIN) 1 MG tablet Take 2 tablets (2 mg total) by mouth every evening. 60 tablet 2    cyanocobalamin (VITAMIN B-12) 1000 MCG tablet Take 100 mcg by mouth once daily.      dorzolamide (TRUSOPT) 2 % ophthalmic solution INSTILL 1 DROP IN BOTH EYES TWICE DAILY 10 mL 3    fludrocortisone (FLORINEF) 0.1 mg Tab TAKE 1 TABLET(100 MCG) BY MOUTH DAILY 90 tablet 1    insulin glargine U-100, Lantus, (LANTUS SOLOSTAR U-100 INSULIN) 100 unit/mL (3 mL) InPn pen Inject 10 Units into the skin once daily.      ketoconazole (NIZORAL) 2 % shampoo Apply topically.      metFORMIN (GLUCOPHAGE-XR) 500 MG ER 24hr tablet Take 2 tablets (1,000 mg total) by mouth once daily. (Increased dose) 180 tablet 1    pen needle, diabetic 32 gauge x 1/4" Ndle 1 each by Misc.(Non-Drug; Combo Route) route once daily. 100 each 1    prazosin (MINIPRESS) 1 MG Cap TAKE 1 CAPSULE(1 MG) BY MOUTH EVERY EVENING 90 capsule 3    ramelteon (ROZEREM) 8 mg tablet Take 1 tablet (8 mg total) by mouth every evening. 90 " tablet 3    rasagiline (AZILECT) 1 mg Tab TAKE 1 TABLET(1 MG) BY MOUTH EVERY EVENING 30 tablet 11    tirzepatide (MOUNJARO) 5 mg/0.5 mL PnIj Inject 5 mg into the skin every 7 days. (Increased dose) 4 Pen 5     No current facility-administered medications on file prior to visit.      Review of patient's allergies indicates:   Allergen Reactions    Avelox [moxifloxacin] Hives and Rash      Family History not pertinent   Social History     Socioeconomic History    Marital status:    Tobacco Use    Smoking status: Never    Smokeless tobacco: Never   Substance and Sexual Activity    Alcohol use: Yes     Comment: occasional, no alcohol 72 hours prior to sx    Drug use: No    Sexual activity: Yes     Partners: Female   Social History Narrative            2 grown kids.         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      Social Determinants of Health     Financial Resource Strain: Patient Declined (1/29/2024)    Overall Financial Resource Strain (CARDIA)     Difficulty of Paying Living Expenses: Patient declined   Food Insecurity: Patient Declined (1/29/2024)    Hunger Vital Sign     Worried About Running Out of Food in the Last Year: Patient declined     Ran Out of Food in the Last Year: Patient declined   Transportation Needs: Patient Declined (1/29/2024)    PRAPARE - Transportation     Lack of Transportation (Medical): Patient declined     Lack of Transportation (Non-Medical): Patient declined   Physical Activity: Sufficiently Active (1/29/2024)    Exercise Vital Sign     Days of Exercise per Week: 4 days     Minutes of Exercise per Session: 80 min   Stress: Patient Declined (1/29/2024)    [employer] [place] [employer] - Occupational Stress Questionnaire     Feeling of Stress : Patient declined   Housing Stability: Patient Declined (1/29/2024)    Housing Stability Vital Sign     Unable to Pay for Housing in the Last Year: Patient declined     Number of Places Lived in  the Last Year: 1     Unstable Housing in the Last Year: Patient declined         Review of Systems:   Constitutional:  Denies fever or chills    Eyes:  Denies change in visual acuity    HENT:  Denies nasal congestion or sore throat    Respiratory:  Denies cough or shortness of breath    Cardiovascular:  Denies chest pain or edema    GI:  Denies abdominal pain, nausea, vomiting, bloody stools or diarrhea    :  Denies dysuria    Integument:  Denies rash    Neurologic:  Denies headache, focal weakness or sensory changes    Endocrine:  Denies polyuria or polydipsia    Lymphatic:  Denies swollen glands    Psychiatric:  Denies depression or anxiety       Physical Exam:    Constitutional:  Well developed, well nourished, no acute distress, non-toxic appearance    Integument:  Well hydrated, no rash    Lymphatic:  No lymphadenopathy noted    Neurologic:  Alert & oriented x 3,     Psychiatric:  Speech and behavior appropriate    Gi: abdomen soft  Eyes: EOMI     Bilateral Shoulder Exam    left Shoulder Exam   Shoulder exam performed same as contralateral side and is normal.    right Shoulder Exam   Tenderness   Shoulder tenderness location: diffusely about shoulder.    Range of Motion   Forward Flexion: abnormal   External Rotation: abnormal     Muscle Strength   Supraspinatus: 4/5     Tests   Hawkin's test: positive  Impingement: positive    Other   Erythema: absent  Sensation: normal  Pulse: present       Impingement syndrome, shoulder, right  -     Ambulatory referral/consult to Physical/Occupational Therapy; Future; Expected date: 10/03/2024          Using an aseptic technique, I injected 5 cc of lidocaine 1% without and 1 cc of kenalog 40mg into the right Shoulder. The patient tolerated this well. I will have them return to clinic as needed. Begin PT.

## 2024-09-26 NOTE — PROGRESS NOTES
Subjective:       Patient ID: Glenn Medel is a 69 y.o. White male who presents for follow-up evaluation of Chronic Kidney Disease and Proteinuria      HPI  He reports he is doing well. He has intentionally lost weight. His Hba1c improved to 7.4 from >8. No LE and no SOB. No LUTS.     Interval history April 2019: he reports he is feeling well. No LE edema and no SOB. Weight is down a little. Last Hba1c was 6.6 He is asking about a podiatrist.     Interval history Feb 2020: Doing well. Last Hba1c was 6.2. Seeing Neurology for tremors. Had episode of hemorrhoids and also required circumcision. Needs new sleep doctor. He retired     Interval history Sep 2020: He has lost weight, Hba1c is down and he is on less insulin. No LE edema. He feels great    Interval history July 2021: Parkinson's symptoms. Overall doing well. Last Hba1c was 6.2. Going to [place]     Sep 2022: He is doing over all well. No gout on allopurinol. Lost weight on Ozempic. Poor sleep.     Oct 2023:  Hypotensive on Ozempic-- ER visit for hypotension, worked outside at same time, 'dehydration'. Resolved. Parkinson's OK while on meds. Prosate cancer. On vitamin D3. LE edema. Questions on Lasix     Sep 2024:  The patient location is: [place]  The chief complaint leading to consultation is: CKD  Visit type: audiovisual  45 minutes of total time spent on the encounter, which includes face to face time and non-face to face time preparing to see the patient (eg, review of tests), Obtaining and/or reviewing separately obtained history, Documenting clinical information in the electronic or other health record, Independently interpreting results (not separately reported) and communicating results to the patient/family/caregiver, or Care coordination (not separately reported).   Each patient to whom he or she provides medical services by telemedicine is:  (1) informed of the relationship between the physician and patient and the respective role of any other  health care provider with respect to management of the patient; and (2) notified that he or she may decline to receive medical services by telemedicine and may withdraw from such care at any time.  Notes: No further hypotension. On Florinef. /88  today. Feeling well       Review of Systems   Constitutional:  Positive for fatigue. Negative for activity change, appetite change and unexpected weight change.   HENT:  Negative for facial swelling.    Respiratory:  Negative for cough and shortness of breath.    Cardiovascular:  Negative for chest pain and leg swelling.   Gastrointestinal:  Negative for nausea and vomiting.   Genitourinary:  Positive for frequency. Negative for difficulty urinating.   Musculoskeletal:  Positive for arthralgias.   Allergic/Immunologic: Positive for immunocompromised state (due to DM).   Neurological:  Negative for weakness and headaches.   Psychiatric/Behavioral:  Positive for sleep disturbance. Negative for confusion and decreased concentration.        Objective:      Physical Exam  Constitutional:       General: He is not in acute distress.  Pulmonary:      Effort: Pulmonary effort is normal. No respiratory distress.   Neurological:      Mental Status: He is alert and oriented to person, place, and time. Mental status is at baseline.   Psychiatric:         Mood and Affect: Mood normal.         Assessment:       1. CKD (chronic kidney disease) stage 2, GFR 60-89 ml/min    2. Secondary hyperparathyroidism of renal origin    3. Primary hypertension    4. Type 2 diabetes mellitus with stage 2 chronic kidney disease, without long-term current use of insulin    5. Vitamin D deficiency               Plan:             CKD 2/3 is stable with only 90-100mg of proteinuria. Unable to tolerate RAAS blockade due to HTN. Now on GLP1a. Consider resuming SGLT2i (Jardiance)     HTN--now with chronic hypotension     Mineral and Bone Disease--continue D3, trend PTH     DM2--much improved from recent  A1c's in 8's. Continue Endocrine follow up    Parkinson's-- Continue Neuro follow up          RTC 9mo

## 2024-09-30 ENCOUNTER — HOSPITAL ENCOUNTER (OUTPATIENT)
Dept: RADIOLOGY | Facility: HOSPITAL | Age: 69
Discharge: HOME | End: 2024-09-30
Attending: ANESTHESIOLOGY | Admitting: ANESTHESIOLOGY
Payer: MEDICARE

## 2024-09-30 ENCOUNTER — HOSPITAL ENCOUNTER (OUTPATIENT)
Facility: HOSPITAL | Age: 69
Discharge: HOME | End: 2024-09-30
Attending: ANESTHESIOLOGY | Admitting: ANESTHESIOLOGY
Payer: MEDICARE

## 2024-09-30 ENCOUNTER — TELEPHONE (OUTPATIENT)
Dept: ORTHOPEDICS | Facility: CLINIC | Age: 69
End: 2024-09-30
Payer: MEDICARE

## 2024-09-30 ENCOUNTER — PATIENT MESSAGE (OUTPATIENT)
Dept: NEPHROLOGY | Facility: CLINIC | Age: 69
End: 2024-09-30
Payer: MEDICARE

## 2024-09-30 VITALS
RESPIRATION RATE: 18 BRPM | OXYGEN SATURATION: 97 % | WEIGHT: 182 LBS | DIASTOLIC BLOOD PRESSURE: 92 MMHG | HEART RATE: 72 BPM | SYSTOLIC BLOOD PRESSURE: 179 MMHG | HEIGHT: 71 IN | BODY MASS INDEX: 25.48 KG/M2 | TEMPERATURE: 98 F

## 2024-09-30 DIAGNOSIS — M54.50 LOWER BACK PAIN: ICD-10-CM

## 2024-09-30 DIAGNOSIS — M51.360 DISCOGENIC LOW BACK PAIN: Primary | ICD-10-CM

## 2024-09-30 LAB — GLUCOSE SERPL-MCNC: 159 MG/DL (ref 70–110)

## 2024-09-30 PROCEDURE — 36000705 HC OR TIME LEV I EA ADD 15 MIN: Mod: PO | Performed by: ANESTHESIOLOGY

## 2024-09-30 PROCEDURE — 25000003 PHARM REV CODE 250: Mod: PO | Performed by: ANESTHESIOLOGY

## 2024-09-30 PROCEDURE — 36000704 HC OR TIME LEV I 1ST 15 MIN: Mod: PO | Performed by: ANESTHESIOLOGY

## 2024-09-30 PROCEDURE — 99153 MOD SED SAME PHYS/QHP EA: CPT | Mod: PO | Performed by: ANESTHESIOLOGY

## 2024-09-30 PROCEDURE — 63600175 PHARM REV CODE 636 W HCPCS: Mod: PO | Performed by: ANESTHESIOLOGY

## 2024-09-30 PROCEDURE — 0627T PERQ NJX ALGC FLUOR LMBR 1ST: CPT | Mod: ,,, | Performed by: ANESTHESIOLOGY

## 2024-09-30 PROCEDURE — C1889 IMPLANT/INSERT DEVICE, NOC: HCPCS | Mod: PO | Performed by: ANESTHESIOLOGY

## 2024-09-30 PROCEDURE — 99152 MOD SED SAME PHYS/QHP 5/>YRS: CPT | Mod: PO | Performed by: ANESTHESIOLOGY

## 2024-09-30 RX ORDER — FENTANYL CITRATE 50 UG/ML
INJECTION, SOLUTION INTRAMUSCULAR; INTRAVENOUS
Status: DISCONTINUED | OUTPATIENT
Start: 2024-09-30 | End: 2024-09-30 | Stop reason: HOSPADM

## 2024-09-30 RX ORDER — SODIUM CHLORIDE, SODIUM LACTATE, POTASSIUM CHLORIDE, CALCIUM CHLORIDE 600; 310; 30; 20 MG/100ML; MG/100ML; MG/100ML; MG/100ML
INJECTION, SOLUTION INTRAVENOUS CONTINUOUS
Status: DISCONTINUED | OUTPATIENT
Start: 2024-09-30 | End: 2024-09-30 | Stop reason: HOSPADM

## 2024-09-30 RX ORDER — LIDOCAINE HYDROCHLORIDE 10 MG/ML
INJECTION, SOLUTION INFILTRATION; PERINEURAL
Status: DISCONTINUED | OUTPATIENT
Start: 2024-09-30 | End: 2024-09-30 | Stop reason: HOSPADM

## 2024-09-30 RX ORDER — CEFAZOLIN SODIUM 2 G/50ML
2 SOLUTION INTRAVENOUS
Status: COMPLETED | OUTPATIENT
Start: 2024-09-30 | End: 2024-09-30

## 2024-09-30 RX ORDER — ALPRAZOLAM 0.5 MG/1
1 TABLET ORAL ONCE
Status: DISCONTINUED | OUTPATIENT
Start: 2024-09-30 | End: 2024-09-30 | Stop reason: HOSPADM

## 2024-09-30 RX ORDER — MIDAZOLAM HYDROCHLORIDE 1 MG/ML
INJECTION INTRAMUSCULAR; INTRAVENOUS
Status: DISCONTINUED | OUTPATIENT
Start: 2024-09-30 | End: 2024-09-30 | Stop reason: HOSPADM

## 2024-09-30 RX ADMIN — SODIUM CHLORIDE, POTASSIUM CHLORIDE, SODIUM LACTATE AND CALCIUM CHLORIDE: 600; 310; 30; 20 INJECTION, SOLUTION INTRAVENOUS at 09:09

## 2024-09-30 RX ADMIN — CEFAZOLIN SODIUM 2 G: 2 SOLUTION INTRAVENOUS at 09:09

## 2024-09-30 NOTE — OP NOTE
DIAGNOSIS: Lumbar Degenerative Disc Disease   PROCEDURE: Implantation of disc tissue allograft, VIA Disc NP at the L4 level     Physician: Everett Taveras MD    Medications injected:  VIA Disc NP tissue    Local anesthetic used: Lidocaine 1%, 1 ml.    Sedation Medications: Moderate sedation was achieved with midazolam 2 mg and fentanyl 50 mcg.  Continuous monitoring of EKG, blood pressure and pulse oximetry was provided by a registered nurse during the entire course of the procedure under my supervision and recorded in the patient's medical record.   Total time for sedation was 29 minutes.    Complications:  none    Estimated blood loss: none    PROCEDURE IN DETAIL: The patient has a diagnosis of lumbar degenerative disk disease.  A thorough discussion was had with the patient regarding risks, benefits, and alternatives to the above-named procedure.  Patient was informed about the risks and related risks of discitis, neural injury, bleeding, and potential death.  All questions were answered to the patient's satisfaction.  The patient agreed to proceed and written informed consent was obtained with a witness present.  A universal time-out protocol was performed per the usual clinical fashion.    The patient was able to position themselves prone on the fluoroscopy table.  Proper padding and adequate positioning were achieved with bolsters, pillows, and cushions.  The lumbar spine was prepped with chlorhexidine and draped in the usual sterile fashion.  The intended trajectory was mapped by bringing the facet joint 30% across the lined endplate of the targeted lumbar vertebra of L4.  Kambins triangle was identified to avoid the potential target of the descending nerve root exiting from the foramen of the L3 level from the left side. The visualization of the foramen was noted to be open and spacious to allow the instrument to be introduced to the annulus of the affected target, consistent live fluoroscopic guidance was  used to avoid the nerve root and vascular structures adjacent to the affected disc level.)  The supero-lateral aspect of the pedicle was marked.  The skin was anesthetized with 1% Lidocaine .   An 18G introducer was placed in the skin in a coaxial manner towards the affected disc.  Then a curved 20G 7-inch instrument was introduced through the introducer.  Once passed the superior articular process (SAP), entering Kambins triangle and avoiding the exiting nerve root from the foramen, a lateral and AP fluoroscopic view was used to monitor the instrument into the disc under live fluoroscopic guidance.        The instrument was introduced into the disc under fluoroscopic guidance.  Once approximately 50% across under AP view and 50% across lateral view, we turned our attention to the preparation of the VIA Disc NP implant.  In a sterile manner and technique, the mixing device containing the disc tissue particulate was used to appropriately mix the VIA Disc NP tissue together with sterile saline to achieve the proper total contents to be administered into the disc being treated.     The puncture site(s) was/were closed using the proper skin closure. There were no apparent complications during the procedure.  The patient remained in good stable condition throughout and after the procedure.    DISPOSITION:   The patient tolerated the procedure well. There were no apparent complications.  Vital signs remained stable throughout the procedure. The patient was able to ambulate out of the clinic in the same fashion in which he/she entered.    Event Time In   In Facility 0837   In Pre-Procedure 0859   Physician Available    Anesthesia Available    Pre-Op: Bedside Procedure Start    Pre-Op: Bedside Procedure Stop    Pre-Procedure Complete 0941   Out of Pre-Procedure    Anesthesia Start    Anesthesia Start Data Collection    Setup Start    Setup Complete    In Room 0957   Prep Start    Procedure Prep Complete    MD notified pt.  ready    Procedure Start 1112   Procedure Closing    Emergence    Procedure Finish 1023   Sedation Start 0956   Scope In    Extent Reached    Scope Out    Sedation End    Out of Room 1025   Cleanup Start    Cleanup Complete    Cosmetic Start    Cosmetic Stop    Pain Mgmt In Room    Pain Mgmt Out Room    In Recovery    Anesthesia Finish    Bedside Procedure Start    Bedside Procedure Stop    Recovery Care Complete    Out of Recovery    To Phase II    In Phase II    Pain Mgmt Recovery Start    Pain Mgmt Recovery Stop    Obs Rec Start    Obs Rec Stop    Phase II Care Complete    Out of Phase II    Procedural Care Complete    Discharge    Pain Follow Up Needed    Pain Follow Up Complete

## 2024-09-30 NOTE — DISCHARGE SUMMARY
[facility]  Discharge Note  Short Stay    Admit Date: 9/30/2024    Discharge Date: 9/30/2024    Attending Physician: Everett Taveras     Discharge Provider: Everett Taveras    Diagnoses:  There are no hospital problems to display for this patient.      Discharged Condition: Good    Final Diagnoses: Discogenic low back pain [M51.36]    Disposition: Home or Self Care    Hospital Course: No complications, uneventful    Outcome of Hospitalization, Treatment, Procedure, or Surgery:  Patient was admitted for outpatient interventional pain management procedure. The patient tolerated the procedure well with no complications.    Follow up/Patient Instructions:  Follow up as scheduled in Pain Management office in 2-3 weeks.  Patient has received instructions and follow up date and time.    Medications:  Continue previous medications    Discharge Procedure Orders   Notify your health care provider if you experience any of the following:  temperature >100.4     Notify your health care provider if you experience any of the following:  persistent nausea and vomiting or diarrhea     Notify your health care provider if you experience any of the following:  severe uncontrolled pain     Notify your health care provider if you experience any of the following:  redness, tenderness, or signs of infection (pain, swelling, redness, odor or green/yellow discharge around incision site)     Notify your health care provider if you experience any of the following:  difficulty breathing or increased cough     Notify your health care provider if you experience any of the following:  severe persistent headache     Notify your health care provider if you experience any of the following:  worsening rash     Notify your health care provider if you experience any of the following:  persistent dizziness, light-headedness, or visual disturbances     Notify your health care provider if you experience any of the following:  increased confusion or  weakness     Activity as tolerated

## 2024-09-30 NOTE — H&P
"[facility]  History & Physical - Short Stay  Pain Management       SUBJECTIVE:     Procedure: Procedure(s) (LRB):  INJECTION,ALLOGRAFT,INTERVERTEBRAL DISC,1ST LEVEL  L4/5 (N/A)    Chief Complaint/Reason for Admission:  Discogenic low back pain [M51.36]    PTA Medications   Medication Sig    atorvastatin (LIPITOR) 20 MG tablet TAKE 1 TABLET(20 MG) BY MOUTH EVERY DAY    carbidopa-levodopa  mg (SINEMET)  mg per tablet TAKE 2 TABLETS BY MOUTH FOUR TIMES DAILY    citalopram (CELEXA) 10 MG tablet TAKE 1 TABLET(10 MG) BY MOUTH DAILY    clonazePAM (KLONOPIN) 1 MG tablet Take 2 tablets (2 mg total) by mouth every evening.    doxycycline (MONODOX) 100 MG capsule Take 100 mg by mouth 2 (two) times daily.    fludrocortisone (FLORINEF) 0.1 mg Tab TAKE 1 TABLET(100 MCG) BY MOUTH DAILY    insulin glargine U-100, Lantus, (LANTUS SOLOSTAR U-100 INSULIN) 100 unit/mL (3 mL) InPn pen Inject 10 Units into the skin once daily.    metFORMIN (GLUCOPHAGE-XR) 500 MG ER 24hr tablet Take 2 tablets (1,000 mg total) by mouth once daily. (Increased dose)    prazosin (MINIPRESS) 1 MG Cap TAKE 1 CAPSULE(1 MG) BY MOUTH EVERY EVENING    rasagiline (AZILECT) 1 mg Tab TAKE 1 TABLET(1 MG) BY MOUTH EVERY EVENING    allopurinoL (ZYLOPRIM) 300 MG tablet TAKE 1 TABLET(300 MG) BY MOUTH EVERY DAY    BD HIMANSHU 2ND GEN PEN NEEDLE 32 gauge x 5/32" Ndle SMARTSIG:Injection Daily    brimonidine 0.2% (ALPHAGAN) 0.2 % Drop INSTILL 1 DROP IN BOTH EYES TWICE DAILY    cholecalciferol, vitamin D3, (D3-2000 ORAL) Take by mouth once daily.    cyanocobalamin (VITAMIN B-12) 1000 MCG tablet Take 100 mcg by mouth once daily.    dorzolamide (TRUSOPT) 2 % ophthalmic solution INSTILL 1 DROP IN BOTH EYES TWICE DAILY    ketoconazole (NIZORAL) 2 % shampoo Apply topically.    pen needle, diabetic 32 gauge x 1/4" Ndle 1 each by Misc.(Non-Drug; Combo Route) route once daily.    ramelteon (ROZEREM) 8 mg tablet Take 1 tablet (8 mg total) by mouth every evening.    " tirzepatide (MOUNJARO) 5 mg/0.5 mL PnIj Inject 5 mg into the skin every 7 days. 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    Obesity     Parkinson's disease     Sleep apnea     uses cpap     Past Surgical History:   Procedure Laterality Date    CATARACT EXTRACTION W/  INTRAOCULAR LENS IMPLANT Left 11/30/2017    CATARACT EXTRACTION W/  INTRAOCULAR LENS IMPLANT Right 12/11/2018    Dr Garcia    CATARACT EXTRACTION W/  INTRAOCULAR LENS IMPLANT Right 12/11/2018    Procedure: EXTRACTION, CATARACT, WITH IOL INSERTION;  Surgeon: Damaris Garcia MD;  Location: [facility] OR;  Service: Ophthalmology;  Laterality: Right;    COLONOSCOPY N/A 9/18/2017    Procedure: COLONOSCOPY;  Surgeon: Paul Feliz Jr., MD;  Location: [facility] ENDO;  Service: Endoscopy;  Laterality: N/A;    COLONOSCOPY N/A 12/21/2022    Procedure: COLONOSCOPY;  Surgeon: Paul Feliz Jr., MD;  Location: [facility] ENDO;  Service: Endoscopy;  Laterality: N/A;    COLONOSCOPY W/ POLYPECTOMY  04/13/2010    YARELI.   One 1 cm polyp in the sigmoid colon.  Internal hemorrhoids.    COSMETIC SURGERY      DIGITAL RECTAL EXAMINATION UNDER ANESTHESIA N/A 7/23/2019    Procedure: EXAM UNDER ANESTHESIA, DIGITAL, RECTUM;  Surgeon: Kaz Keating MD;  Location: [facility] OR;  Service: General;  Laterality: N/A;    EXAMINATION UNDER ANESTHESIA N/A 4/24/2019    Procedure: EXAM UNDER ANESTHESIA;  Surgeon: Kaz Keating MD;  Location: [facility] OR;  Service: General;  Laterality: N/A;    EXCISIONAL HEMORRHOIDECTOMY N/A 4/24/2019    Procedure: HEMORRHOIDECTOMY  (lithotomy);  Surgeon: Kaz Keating MD;  Location: [facility] OR;  Service: General;  Laterality: N/A;    GANGLION CYST EXCISION Left     INJECTION OF ANESTHETIC AGENT AROUND MEDIAL BRANCH NERVES INNERVATING LUMBAR FACET JOINT Bilateral 4/9/2024    Procedure: Block-nerve-medial branch-lumbar-L4/5-L5/S1;  Surgeon: Everett Taveras MD;  Location: [facility] OR;  Service: Pain Management;  Laterality: Bilateral;    INJECTION OF ANESTHETIC AGENT AROUND MEDIAL BRANCH NERVES INNERVATING LUMBAR FACET JOINT Bilateral 5/6/2024    Procedure: Block-nerve-medial branch-lumbar-L4/5-L5/S1;  Surgeon: Everett Taveras MD;  Location: [facility] OR;  Service: Pain Management;  Laterality: Bilateral;    INJECTION OF ANESTHETIC AGENT AROUND PUDENDAL NERVE N/A 4/24/2019    Procedure: BLOCK, NERVE, PUDENDAL;  Surgeon: Kaz Keating MD;  Location: [facility] OR;  Service: General;  Laterality: N/A;    INJECTION OF ANESTHETIC AGENT AROUND PUDENDAL NERVE N/A 7/23/2019    Procedure: BLOCK, NERVE, PUDENDAL;  Surgeon: Kaz Keating MD;  Location: [facility] OR;  Service: General;  Laterality: N/A;    RADIOFREQUENCY ABLATION OF LUMBAR MEDIAL BRANCH NERVE AT SINGLE LEVEL Bilateral 6/4/2024    Procedure: Radiofrequency Ablation, Nerve, Spinal, Lumbar, Medial Branch, L4/5 and L5/S1;  Surgeon: Everett Taveras MD;  Location: [facility] OR;  Service: Pain Management;  Laterality: Bilateral;    REPAIR OF RETINAL DETACHMENT WITH VITRECTOMY Right 8/1/2018    Procedure: REPAIR, RETINAL DETACHMENT, WITH VITRECTOMY;  Surgeon: SAJAN Pulido MD;  Location: [address];  Service: Ophthalmology;  Laterality: Right;  35 min    TONSILLECTOMY      TRANSRECTAL BIOPSY OF PROSTATE WITH ULTRASOUND GUIDANCE N/A 1/31/2023    Procedure: BIOPSY, PROSTATE, RECTAL APPROACH, WITH US GUIDANCE;  Surgeon: MARIZA Donahue MD;  Location: [facility] OR;  Service: Urology;  Laterality: N/A;    TRANSRECTAL BIOPSY OF PROSTATE WITH ULTRASOUND GUIDANCE N/A 4/12/2024    Procedure: BIOPSY,  PROSTATE, RECTAL APPROACH, WITH US GUIDANCE;  Surgeon: MARIZA Donahue MD;  Location: [facility];  Service: Urology;  Laterality: N/A;    TRIGGER FINGER RELEASE Right     X 2    uro lift      for enlarged prostate     Family History   Problem Relation Name Age of Onset    Hypertension Mother      Diabetes Mother      Stroke Mother      Cancer Mother          colon    Kidney disease Mother      Heart disease Father      Diabetes Father      Colon cancer Father      Cancer Father          colon    Cataracts Father      No Known Problems Sister      Heart disease Brother  62    Kidney disease Brother      Dementia Brother Rodrigue     Abnormal EKG Brother Rodrigue     No Known Problems Maternal Aunt      No Known Problems Maternal Uncle      No Known Problems Paternal Aunt      No Known Problems Paternal Uncle      Glaucoma Maternal Grandmother      No Known Problems Maternal Grandfather      No Known Problems Paternal Grandmother      No Known Problems Paternal Grandfather      Amblyopia Neg Hx      Blindness Neg Hx      Macular degeneration Neg Hx      Retinal detachment Neg Hx      Strabismus Neg Hx      Thyroid disease Neg Hx      Parkinsonism Neg Hx       Social History     Tobacco Use    Smoking status: Never    Smokeless tobacco: Never   Substance Use Topics    Alcohol use: Yes     Comment: very rare    Drug use: No        Current Facility-Administered Medications:     ALPRAZolam tablet 1 mg, 1 mg, Oral, Once, Everett Taveras MD    cefazolin (ANCEF) 2 gram in dextrose 5% 50 mL IVPB (premix), 2 g, Intravenous, On Call Procedure, Everett Taveras MD, Last Rate: 100 mL/hr at 09/30/24 0935, 2 g at 09/30/24 0935    lactated ringers infusion, , Intravenous, Continuous, Everett Taveras MD, Last Rate: 25 mL/hr at 09/30/24 0930, New Bag at 09/30/24 0930    Review of Systems:  General ROS: negative for - fever  Dermatological ROS: negative for rash    OBJECTIVE:     Vital Signs (Most Recent):  Temp: 97.7 °F (36.5 °C) (09/30/24  0924)  Pulse: 72 (09/30/24 0924)  Resp: 17 (09/30/24 0924)  BP: (!) 193/103 (09/30/24 0944)  SpO2: 97 % (09/30/24 0924)  Body mass index is 25.38 kg/m².    Physical Exam:  General appearance - alert, well appearing, and in no distress  Mental status - AOx3  Eyes - pupils equal and reactive, extraocular eye movements intact  Heart - normal rate, regular rhythm, normal S1, S2, no murmurs, rubs, clicks or gallops  Chest - clear to auscultation, no wheezes, rales or rhonchi, symmetric air entry  Abdomen - soft, nontender, nondistended, no masses or organomegaly  Neurological - alert, oriented, normal speech, no focal findings or movement disorder noted  Extremities - peripheral pulses normal, no pedal edema, no clubbing or cyanosis      ASSESSMENT/PLAN:     There are no hospital problems to display for this patient.     Recent seen on 07/02/2024.  He continues to have low back pain that is present even with rest and back flexion.  MRI with mild multilevel disc desiccation throughout the lower lumbar spine.  Annular fissure at L4.  Reports has been continues to limit his mobility and interfering with the quality of his life.  He is not on anticoagulation.  He received 2gm ancef preoperatively.  We will proceed with viadisc allograft at L4.    The risks and benefits of this intervention, and alternative therapies were discussed with the patient.  The discussion of risks included infection, bleeding, need for additional procedures or surgery, nerve damage, paralysis, adverse medication reaction(s), stroke, and/or death.  Questions regarding the procedure, risks, expected outcome, and possible side effects were solicited and answered to the patient's satisfaction.   wishes to proceed with the injection.  Verbal and written consent were verified.  ASA 2, MPII      Proceed with intervention as scheduled.    Everett Taveras M.D.  Interventional Pain Medicine / Anesthesiology

## 2024-09-30 NOTE — TELEPHONE ENCOUNTER
----- Message from Med Assistant Chang sent at 9/30/2024  1:23 PM CDT -----  Contact: [facility] pt  Right shoulder orders ??  Call back   Fax

## 2024-09-30 NOTE — DISCHARGE INSTRUCTIONS

## 2024-10-03 ENCOUNTER — OFFICE VISIT (OUTPATIENT)
Dept: FAMILY MEDICINE | Facility: CLINIC | Age: 69
End: 2024-10-03
Payer: MEDICARE

## 2024-10-03 VITALS
SYSTOLIC BLOOD PRESSURE: 160 MMHG | HEART RATE: 68 BPM | DIASTOLIC BLOOD PRESSURE: 90 MMHG | WEIGHT: 187.19 LBS | BODY MASS INDEX: 26.21 KG/M2 | HEIGHT: 71 IN | OXYGEN SATURATION: 98 %

## 2024-10-03 DIAGNOSIS — N18.2 TYPE 2 DIABETES MELLITUS WITH STAGE 2 CHRONIC KIDNEY DISEASE, WITHOUT LONG-TERM CURRENT USE OF INSULIN: ICD-10-CM

## 2024-10-03 DIAGNOSIS — E11.22 TYPE 2 DIABETES MELLITUS WITH STAGE 2 CHRONIC KIDNEY DISEASE, WITHOUT LONG-TERM CURRENT USE OF INSULIN: ICD-10-CM

## 2024-10-03 DIAGNOSIS — I95.1 ORTHOSTATIC HYPOTENSION: ICD-10-CM

## 2024-10-03 DIAGNOSIS — I10 PRIMARY HYPERTENSION: Primary | ICD-10-CM

## 2024-10-03 DIAGNOSIS — E78.2 MIXED HYPERLIPIDEMIA: ICD-10-CM

## 2024-10-03 PROCEDURE — G2211 COMPLEX E/M VISIT ADD ON: HCPCS | Mod: S$PBB,,, | Performed by: INTERNAL MEDICINE

## 2024-10-03 PROCEDURE — 99214 OFFICE O/P EST MOD 30 MIN: CPT | Mod: S$PBB,,, | Performed by: INTERNAL MEDICINE

## 2024-10-03 PROCEDURE — 99999 PR PBB SHADOW E&M-EST. PATIENT-LVL IV: CPT | Mod: PBBFAC,,, | Performed by: INTERNAL MEDICINE

## 2024-10-03 PROCEDURE — 99214 OFFICE O/P EST MOD 30 MIN: CPT | Mod: PBBFAC,PO | Performed by: INTERNAL MEDICINE

## 2024-10-03 RX ORDER — METFORMIN HYDROCHLORIDE 500 MG/1
500 TABLET, EXTENDED RELEASE ORAL DAILY
COMMUNITY

## 2024-10-03 RX ORDER — BLOOD-GLUCOSE SENSOR
EACH MISCELLANEOUS
Qty: 9 EACH | Refills: 1 | Status: SHIPPED | OUTPATIENT
Start: 2024-10-03

## 2024-10-03 NOTE — PROGRESS NOTES
Patient ID: Glenn Medel is a 69 y.o. male.    Chief Complaint: Follow-up     Assessment and Plan   1. Primary hypertension    2. Mixed hyperlipidemia    3. Type 2 diabetes mellitus with stage 2 chronic kidney disease, without long-term current use of insulin  - blood-glucose meter,continuous Misc; 1 each by Misc.(Non-Drug; Combo Route) route 4 (four) times daily.  Dispense: 1 each; Refill: 0  - blood-glucose sensor (DEXCOM G7 SENSOR) Daisy; Change sensor every 10 days to measure BG at least 4x daily.  Dispense: 9 each; Refill: 1    4. Orthostatic hypotension       1. Primary hypertension  - now hypertensive with fludrocortisone.  Will go ahead and start taking half of the 0.1 mg tablets.  Next step would be 0.05 mg every other day.  Close follow-up and monitor blood pressures at home.    2. Mixed hyperlipidemia   - controlled, continue atorvastatin 20 mg   3. Type 2 diabetes mellitus with stage 2 chronic kidney disease, without long-term current use of insulin   - A1c 6.3.  He is not having any lows.  He is taking 10 units of Lantus, metformin 500 mg twice daily, Mounjaro 5 mg weekly.  He is interested in getting CGM.  He may be eligible since he is now on insulin.  We will continue Mounjaro at current dose, insulin at 10 units, and we will reduce the metformin down to 500 mg once daily.  Ordered Dexcom.   4. Orthostatic hypotension   - as above       HPI     Past medical history of hypertension/ now orthostatic hypotension likely from Parkinson's disease., hyperlipidemia, type 2 diabetes, Parkinson's disease, prostate cancer, sleep apnea    Orthostatic hypotension is now resolved but now he is in to the hypertensive range.  Blood pressures as high as 190 over 90.  He is not having any more episodes of dizziness but now is having headaches.    Review of Systems   Constitutional:  Negative for fever.   Respiratory:  Negative for shortness of breath.    Cardiovascular:  Negative for chest pain.    Gastrointestinal:  Negative for abdominal pain.       I personally reviewed past medical, family and social history.   Objective    Vitals:    10/03/24 0756   BP: (!) 160/90   Pulse: 68      Wt Readings from Last 3 Encounters:   10/03/24 0756 84.9 kg (187 lb 2.7 oz)   09/26/24 1603 82.6 kg (182 lb)   09/26/24 1526 82.6 kg (182 lb)      Body mass index is 26.11 kg/m².     Physical Exam  Cardiovascular:      Rate and Rhythm: Normal rate and regular rhythm.      Heart sounds: No murmur heard.     No gallop.   Pulmonary:      Breath sounds: Normal breath sounds. No wheezing or rhonchi.   Abdominal:      Palpations: Abdomen is soft.      Tenderness: There is no abdominal tenderness.        Reference     : Visit today included increased complexity associated with the care of the episodic problem hypertension addressed and managing the longitudinal care of the patient due to the serious and/or complex managed problem(s)     Active Problem List with Overview Notes    Diagnosis Date Noted    Orthostatic hypotension 08/13/2024    Chronic depression 04/30/2024    Prostate cancer 12/06/2023    Type 2 diabetes mellitus with stage 2 chronic kidney disease, without long-term current use of insulin 10/06/2022    CKD (chronic kidney disease) stage 2, GFR 60-89 ml/min 07/17/2021    Hx of acute gouty arthritis 12/14/2020    Parkinsonism 10/29/2020    Adjustment disorder with depressed mood 06/07/2020    Arachnoid cyst 04/28/2020     Followed in [facility]      Severe obstructive sleep apnea 05/09/2017     Very severe sleep apnea. /hr.   Normal AHI on CPAP therapy.  Compliant with PAP and benefits from use. Follow up annually in the sleep clinic.        Hyperlipidemia 05/19/2015    BPH (benign prostatic hyperplasia)      Sees Dr. Ewing      HTN (hypertension)     Aortic atherosclerosis 08/22/2024    Secondary hyperparathyroidism of renal origin 04/16/2024    CHAPARRITA (generalized anxiety disorder) 01/05/2024    Primary insomnia  01/05/2024    Night terrors, adult 01/05/2024     prozosin      Mild episode of recurrent major depressive disorder 12/06/2023    Central retinal vein occlusion, left eye, stable 10/19/2023    Elevated PSA 01/31/2023    Family history of colon cancer 12/21/2022    Memory change 06/28/2021    Fall from bed 04/09/2021    Polyneuropathy associated with underlying disease 03/15/2021    Abnormal gait 05/14/2020    Cognitive change 05/14/2020    Abnormal involuntary movements 05/14/2020     Right hand shaking      REM sleep behavior disorder 04/28/2020    Internal strangulated hemorrhoids 04/24/2019    Epiretinal membrane, right 08/01/2018    Vitreomacular adhesion, right 01/29/2018    Iritis, lens-induced 01/08/2018    Glaucoma associated with ocular inflammation, left, indeterminate stage 12/15/2017    Retained lens material, left 12/04/2017    Senile nuclear sclerosis 11/30/2017    Other vitreous opacities, bilateral 10/19/2017    NS (nuclear sclerosis) 10/19/2017    Hypersomnia with sleep apnea, unspecified      Dx updated per 2019 IMO Load      Itch 01/12/2016    Peripheral neuropathy 05/19/2015    Palpitations 10/22/2014    DDD (degenerative disc disease), lumbar      s/p epidural steroids           Diabetes Medications               insulin glargine U-100, Lantus, (LANTUS SOLOSTAR U-100 INSULIN) 100 unit/mL (3 mL) InPn pen Inject 10 Units into the skin once daily.    metFORMIN (GLUCOPHAGE-XR) 500 MG ER 24hr tablet Take 2 tablets (1,000 mg total) by mouth once daily. (Increased dose)    tirzepatide (MOUNJARO) 5 mg/0.5 mL PnIj Inject 5 mg into the skin every 7 days. (Increased dose)           Hypertension Medications               prazosin (MINIPRESS) 1 MG Cap TAKE 1 CAPSULE(1 MG) BY MOUTH EVERY EVENING           Hyperlipidemia Medications               atorvastatin (LIPITOR) 20 MG tablet TAKE 1 TABLET(20 MG) BY MOUTH EVERY DAY           Medication List with Changes/Refills   New Medications    BLOOD-GLUCOSE  "METER,CONTINUOUS MISC    1 each by Misc.(Non-Drug; Combo Route) route 4 (four) times daily.    BLOOD-GLUCOSE SENSOR (DEXCOM G7 SENSOR) BRAN    Change sensor every 10 days to measure BG at least 4x daily.   Current Medications    ALLOPURINOL (ZYLOPRIM) 300 MG TABLET    TAKE 1 TABLET(300 MG) BY MOUTH EVERY DAY    ATORVASTATIN (LIPITOR) 20 MG TABLET    TAKE 1 TABLET(20 MG) BY MOUTH EVERY DAY    BD HIMANSHU 2ND GEN PEN NEEDLE 32 GAUGE X 5/32" NDLE    SMARTSIG:Injection Daily    BRIMONIDINE 0.2% (ALPHAGAN) 0.2 % DROP    INSTILL 1 DROP IN BOTH EYES TWICE DAILY    CARBIDOPA-LEVODOPA  MG (SINEMET)  MG PER TABLET    TAKE 2 TABLETS BY MOUTH FOUR TIMES DAILY    CHOLECALCIFEROL, VITAMIN D3, (D3-2000 ORAL)    Take by mouth once daily.    CITALOPRAM (CELEXA) 10 MG TABLET    TAKE 1 TABLET(10 MG) BY MOUTH DAILY    CLONAZEPAM (KLONOPIN) 1 MG TABLET    Take 2 tablets (2 mg total) by mouth every evening.    CYANOCOBALAMIN (VITAMIN B-12) 1000 MCG TABLET    Take 100 mcg by mouth once daily.    DORZOLAMIDE (TRUSOPT) 2 % OPHTHALMIC SOLUTION    INSTILL 1 DROP IN BOTH EYES TWICE DAILY    DOXYCYCLINE (MONODOX) 100 MG CAPSULE    Take 100 mg by mouth 2 (two) times daily.    FLUDROCORTISONE (FLORINEF) 0.1 MG TAB    TAKE 1 TABLET(100 MCG) BY MOUTH DAILY    INSULIN GLARGINE U-100, LANTUS, (LANTUS SOLOSTAR U-100 INSULIN) 100 UNIT/ML (3 ML) INPN PEN    Inject 10 Units into the skin once daily.    KETOCONAZOLE (NIZORAL) 2 % SHAMPOO    Apply topically.    METFORMIN (GLUCOPHAGE-XR) 500 MG ER 24HR TABLET    Take 500 mg by mouth once daily.    PEN NEEDLE, DIABETIC 32 GAUGE X 1/4" NDLE    1 each by Misc.(Non-Drug; Combo Route) route once daily.    PRAZOSIN (MINIPRESS) 1 MG CAP    TAKE 1 CAPSULE(1 MG) BY MOUTH EVERY EVENING    RAMELTEON (ROZEREM) 8 MG TABLET    Take 1 tablet (8 mg total) by mouth every evening.    RASAGILINE (AZILECT) 1 MG TAB    TAKE 1 TABLET(1 MG) BY MOUTH EVERY EVENING    TIRZEPATIDE (MOUNJARO) 5 MG/0.5 ML GREG    Inject 5 mg " into the skin every 7 days. (Increased dose)   Discontinued Medications    METFORMIN (GLUCOPHAGE-XR) 500 MG ER 24HR TABLET    Take 2 tablets (1,000 mg total) by mouth once daily. (Increased dose)         Sodium   Date Value Ref Range Status   09/04/2024 142 136 - 145 mmol/L Final     Potassium   Date Value Ref Range Status   09/04/2024 3.9 3.5 - 5.1 mmol/L Final     Chloride   Date Value Ref Range Status   09/04/2024 106 95 - 110 mmol/L Final     CO2   Date Value Ref Range Status   09/04/2024 26 23 - 29 mmol/L Final     Glucose   Date Value Ref Range Status   09/04/2024 126 (H) 70 - 110 mg/dL Final     BUN   Date Value Ref Range Status   09/04/2024 12 8 - 23 mg/dL Final     Creatinine   Date Value Ref Range Status   09/04/2024 0.9 0.5 - 1.4 mg/dL Final     Calcium   Date Value Ref Range Status   09/04/2024 9.1 8.7 - 10.5 mg/dL Final     Total Protein   Date Value Ref Range Status   08/12/2024 6.0 (L) 6.1 - 7.9 g/dL Final   06/06/2024 6.7 6.0 - 8.4 g/dL Final     Albumin   Date Value Ref Range Status   09/04/2024 3.4 (L) 3.5 - 5.2 g/dL Final     Total Bilirubin   Date Value Ref Range Status   08/12/2024 0.7 0.4 - 2.0 mg/dL Final   06/06/2024 0.6 0.1 - 1.0 mg/dL Final     Comment:     For infants and newborns, interpretation of results should be based  on gestational age, weight and in agreement with clinical  observations.    Premature Infant recommended reference ranges:  Up to 24 hours.............<8.0 mg/dL  Up to 48 hours............<12.0 mg/dL  3-5 days..................<15.0 mg/dL  6-29 days.................<15.0 mg/dL       Alkaline Phosphatase   Date Value Ref Range Status   08/12/2024 101 28 - 116 U/L Final   06/06/2024 120 55 - 135 U/L Final     AST   Date Value Ref Range Status   08/12/2024 24 12 - 40 U/L Final   06/06/2024 26 10 - 40 U/L Final     ALT   Date Value Ref Range Status   08/12/2024 <5 5 - 56 U/L Final   06/06/2024 11 10 - 44 U/L Final     Anion Gap   Date Value Ref Range Status   09/04/2024 10  8 - 16 mmol/L Final     eGFR   Date Value Ref Range Status   09/04/2024 >60.0 >60 mL/min/1.73 m^2 Final      Lab Results   Component Value Date    HGBA1C 6.3 (H) 09/04/2024    HGBA1C 8.9 (H) 06/06/2024    HGBA1C 8.9 (H) 03/30/2024      Lab Results   Component Value Date    TSH 0.976 01/06/2020      Lab Results   Component Value Date    CHOL 117 (L) 11/21/2023    CHOL 132 12/12/2022    CHOL 125 11/02/2021     Lab Results   Component Value Date    HDL 33 (L) 11/21/2023    HDL 33 (L) 12/12/2022    HDL 34 (L) 11/02/2021     Lab Results   Component Value Date    LDLCALC 60.2 (L) 11/21/2023    LDLCALC 41.8 (L) 12/12/2022    LDLCALC 60.0 (L) 11/02/2021     Lab Results   Component Value Date    TRIG 119 11/21/2023    TRIG 286 (H) 12/12/2022    TRIG 155 (H) 11/02/2021     Lab Results   Component Value Date    CHOLHDL 28.2 11/21/2023    CHOLHDL 25.0 12/12/2022    CHOLHDL 27.2 11/02/2021       Results for orders placed during the hospital encounter of 08/26/24    Echo    Interpretation Summary    Left Ventricle: The left ventricle is normal in size. Normal wall thickness. There is normal systolic function with a visually estimated ejection fraction of 60 - 65%. There is normal diastolic function.    Right Ventricle: Normal right ventricular cavity size. Wall thickness is normal. Systolic function is normal.    Pulmonary Artery: No pulmonary hypertension. The estimated pulmonary artery systolic pressure is 8 mmHg.    IVC/SVC: Normal venous pressure at 3 mmHg.     Results for orders placed during the hospital encounter of 09/13/24    Nuclear Stress - Cardiology Interpreted    Interpretation Summary    Normal myocardial perfusion scan. There is no evidence of myocardial ischemia or infarction.    The gated perfusion images showed an ejection fraction of 73% post stress.    There is normal wall motion at post-stress.    LV cavity size is normal at rest and normal at post-stress.    The ECG portion of the study is negative for  ischemia.    The patient reported no chest pain during the stress test.

## 2024-10-08 ENCOUNTER — OFFICE VISIT (OUTPATIENT)
Dept: PAIN MEDICINE | Facility: CLINIC | Age: 69
End: 2024-10-08
Payer: MEDICARE

## 2024-10-08 VITALS
HEIGHT: 71 IN | DIASTOLIC BLOOD PRESSURE: 89 MMHG | HEART RATE: 73 BPM | WEIGHT: 186.06 LBS | BODY MASS INDEX: 26.05 KG/M2 | SYSTOLIC BLOOD PRESSURE: 145 MMHG

## 2024-10-08 DIAGNOSIS — M51.360 DISCOGENIC LOW BACK PAIN: Primary | ICD-10-CM

## 2024-10-08 PROCEDURE — 99213 OFFICE O/P EST LOW 20 MIN: CPT | Mod: S$PBB,,, | Performed by: ANESTHESIOLOGY

## 2024-10-08 PROCEDURE — 99999 PR PBB SHADOW E&M-EST. PATIENT-LVL III: CPT | Mod: PBBFAC,,, | Performed by: ANESTHESIOLOGY

## 2024-10-08 PROCEDURE — 99213 OFFICE O/P EST LOW 20 MIN: CPT | Mod: PBBFAC,PO | Performed by: ANESTHESIOLOGY

## 2024-10-08 NOTE — PROGRESS NOTES
"[facility] Follow Up Evaluation      Referred by: No ref. provider found    PCP:     CC:   Chief Complaint   Patient presents with    Follow-up     PAIN - f/u Viadisc L4/5            10/8/2024     9:16 AM 3/4/2024     8:50 AM 1/3/2024     8:27 AM   Last 3 PDI Scores   Pain Disability Index (PDI) 1 41 38       Interval HPI 10/8/2024: Glenn Medel returns to the office for follow up.  He is s/p viadisc allograft at L4 on 9/30/24 today he reports his lower back pain is 0/10.  He feels near 100% relief of his typical lower back pain.  Denies any fever or radicular pain.      HPI:   Glenn Medel is a 69 y.o. male who presents with back pain.  He has a history of chronic lower back pain but over the past month it has been gradually worsening.  Today he reports his pain is 7/10, intermittent, aching, sharp, throbbing, shooting.  He gets some radiating pain around the sides of the lower flanks around the lumbar area.  His pain is worse with sitting, bending, lifting and relieved with rest and lying down.  He denies any numbness or weakness.  No oliver radicular symptoms.      Pain Intervention History:  right-sided L3-4, L4-5, L5-S1 RFA on 09/29/2014 and he had a right-sided L5-S1 and S1 TFESI on 10/27/2014 with outside physician   - s/p bilateral L4/5 and L5/S1 RFA on 06/04/2024 with minimal relief  - s/p viadisc allograft at L4 on 9/30/24    Past Spine Surgical History:      Past and current medications:  Antineuropathics:  NSAIDs:  Physical therapy:  Antidepressants:  Muscle relaxers:  Opioids:  Antiplatelets/Anticoagulants:    History:    Current Outpatient Medications:     allopurinoL (ZYLOPRIM) 300 MG tablet, TAKE 1 TABLET(300 MG) BY MOUTH EVERY DAY, Disp: 90 tablet, Rfl: 3    atorvastatin (LIPITOR) 20 MG tablet, TAKE 1 TABLET(20 MG) BY MOUTH EVERY DAY, Disp: 90 tablet, Rfl: 3    BD HIMANSHU 2ND GEN PEN NEEDLE 32 gauge x 5/32" Ndle, SMARTSIG:Injection Daily, Disp: , Rfl:     blood-glucose meter,continuous " "Misc, 1 each by Misc.(Non-Drug; Combo Route) route 4 (four) times daily., Disp: 1 each, Rfl: 0    blood-glucose sensor (DEXCOM G7 SENSOR) Daisy, Change sensor every 10 days to measure BG at least 4x daily., Disp: 9 each, Rfl: 1    brimonidine 0.2% (ALPHAGAN) 0.2 % Drop, INSTILL 1 DROP IN BOTH EYES TWICE DAILY, Disp: 10 mL, Rfl: 3    carbidopa-levodopa  mg (SINEMET)  mg per tablet, TAKE 2 TABLETS BY MOUTH FOUR TIMES DAILY, Disp: 736 tablet, Rfl: 12    cholecalciferol, vitamin D3, (D3-2000 ORAL), Take by mouth once daily., Disp: , Rfl:     citalopram (CELEXA) 10 MG tablet, TAKE 1 TABLET(10 MG) BY MOUTH DAILY, Disp: 90 tablet, Rfl: 3    clonazePAM (KLONOPIN) 1 MG tablet, Take 2 tablets (2 mg total) by mouth every evening., Disp: 60 tablet, Rfl: 2    cyanocobalamin (VITAMIN B-12) 1000 MCG tablet, Take 100 mcg by mouth once daily., Disp: , Rfl:     dorzolamide (TRUSOPT) 2 % ophthalmic solution, INSTILL 1 DROP IN BOTH EYES TWICE DAILY, Disp: 10 mL, Rfl: 3    doxycycline (MONODOX) 100 MG capsule, Take 100 mg by mouth 2 (two) times daily., Disp: , Rfl:     fludrocortisone (FLORINEF) 0.1 mg Tab, TAKE 1 TABLET(100 MCG) BY MOUTH DAILY, Disp: 90 tablet, Rfl: 1    insulin glargine U-100, Lantus, (LANTUS SOLOSTAR U-100 INSULIN) 100 unit/mL (3 mL) InPn pen, Inject 10 Units into the skin once daily., Disp: , Rfl:     ketoconazole (NIZORAL) 2 % shampoo, Apply topically., Disp: , Rfl:     metFORMIN (GLUCOPHAGE-XR) 500 MG ER 24hr tablet, Take 500 mg by mouth once daily., Disp: , Rfl:     pen needle, diabetic 32 gauge x 1/4" Ndle, 1 each by Misc.(Non-Drug; Combo Route) route once daily., Disp: 100 each, Rfl: 1    prazosin (MINIPRESS) 1 MG Cap, TAKE 1 CAPSULE(1 MG) BY MOUTH EVERY EVENING, Disp: 90 capsule, Rfl: 3    ramelteon (ROZEREM) 8 mg tablet, Take 1 tablet (8 mg total) by mouth every evening., Disp: 90 tablet, Rfl: 3    rasagiline (AZILECT) 1 mg Tab, TAKE 1 TABLET(1 MG) BY MOUTH EVERY EVENING, Disp: 30 tablet, Rfl: 11   "  tirzepatide (MOUNJARO) 5 mg/0.5 mL PnIj, Inject 5 mg into the skin every 7 days. (Increased dose), Disp: 4 Pen, Rfl: 5    Past Medical History:   Diagnosis Date    BPH (benign prostatic hyperplasia)     Cataract     done ou    Chronic kidney disease, stage 3 02/25/2015    DDD (degenerative disc disease), lumbar     s/p epidural steroids     Diabetes mellitus     type 2    Elevated PSA     Glaucoma     OU    HTN (hypertension)     Iritis, lens-induced 01/08/2018    Obesity     Parkinson's disease     Sleep apnea     uses cpap       Past Surgical History:   Procedure Laterality Date    CATARACT EXTRACTION W/  INTRAOCULAR LENS IMPLANT Left 11/30/2017    CATARACT EXTRACTION W/  INTRAOCULAR LENS IMPLANT Right 12/11/2018    Dr Garcia    CATARACT EXTRACTION W/  INTRAOCULAR LENS IMPLANT Right 12/11/2018    Procedure: EXTRACTION, CATARACT, WITH IOL INSERTION;  Surgeon: Damaris Garcia MD;  Location: [facility] OR;  Service: Ophthalmology;  Laterality: Right;    COLONOSCOPY N/A 9/18/2017    Procedure: COLONOSCOPY;  Surgeon: Paul Feliz Jr., MD;  Location: [facility] ENDO;  Service: Endoscopy;  Laterality: N/A;    COLONOSCOPY N/A 12/21/2022    Procedure: COLONOSCOPY;  Surgeon: Paul Feliz Jr., MD;  Location: [facility] ENDO;  Service: Endoscopy;  Laterality: N/A;    COLONOSCOPY W/ POLYPECTOMY  04/13/2010    YARELI.   One 1 cm polyp in the sigmoid colon.  Internal hemorrhoids.    COSMETIC SURGERY      DIGITAL RECTAL EXAMINATION UNDER ANESTHESIA N/A 7/23/2019    Procedure: EXAM UNDER ANESTHESIA, DIGITAL, RECTUM;  Surgeon: Kaz Keating MD;  Location: [facility] OR;  Service: General;  Laterality: N/A;    EXAMINATION UNDER ANESTHESIA N/A 4/24/2019    Procedure: EXAM UNDER ANESTHESIA;  Surgeon: Kaz Keating MD;  Location: [facility] OR;  Service: General;  Laterality: N/A;    EXCISIONAL HEMORRHOIDECTOMY N/A 4/24/2019    Procedure: HEMORRHOIDECTOMY (lithotomy);  Surgeon: Kaz Keating MD;  Location: [facility] OR;  Service: General;   Laterality: N/A;    GANGLION CYST EXCISION Left     INJECTION OF ANESTHETIC AGENT AROUND MEDIAL BRANCH NERVES INNERVATING LUMBAR FACET JOINT Bilateral 4/9/2024    Procedure: Block-nerve-medial branch-lumbar-L4/5-L5/S1;  Surgeon: Everett Taveras MD;  Location: [facility] OR;  Service: Pain Management;  Laterality: Bilateral;    INJECTION OF ANESTHETIC AGENT AROUND MEDIAL BRANCH NERVES INNERVATING LUMBAR FACET JOINT Bilateral 5/6/2024    Procedure: Block-nerve-medial branch-lumbar-L4/5-L5/S1;  Surgeon: Everett Taveras MD;  Location: [facility] OR;  Service: Pain Management;  Laterality: Bilateral;    INJECTION OF ANESTHETIC AGENT AROUND PUDENDAL NERVE N/A 4/24/2019    Procedure: BLOCK, NERVE, PUDENDAL;  Surgeon: Kaz Keating MD;  Location: [facility];  Service: General;  Laterality: N/A;    INJECTION OF ANESTHETIC AGENT AROUND PUDENDAL NERVE N/A 7/23/2019    Procedure: BLOCK, NERVE, PUDENDAL;  Surgeon: Kaz Keating MD;  Location: [facility] OR;  Service: General;  Laterality: N/A;    INJECTION, ALLOGRAFT, INTERVERTEBRAL DISC N/A 9/30/2024    Procedure: INJECTION,ALLOGRAFT,INTERVERTEBRAL DISC,1ST LEVEL  L4/5;  Surgeon: Everett Taveras MD;  Location: [facility];  Service: Pain Management;  Laterality: N/A;    RADIOFREQUENCY ABLATION OF LUMBAR MEDIAL BRANCH NERVE AT SINGLE LEVEL Bilateral 6/4/2024    Procedure: Radiofrequency Ablation, Nerve, Spinal, Lumbar, Medial Branch, L4/5 and L5/S1;  Surgeon: Everett Taveras MD;  Location: [facility] OR;  Service: Pain Management;  Laterality: Bilateral;    REPAIR OF RETINAL DETACHMENT WITH VITRECTOMY Right 8/1/2018    Procedure: REPAIR, RETINAL DETACHMENT, WITH VITRECTOMY;  Surgeon: SAJAN Pulido MD;  Location: [address];  Service: Ophthalmology;  Laterality: Right;  35 min    TONSILLECTOMY      TRANSRECTAL BIOPSY OF PROSTATE WITH ULTRASOUND GUIDANCE N/A 1/31/2023    Procedure: BIOPSY, PROSTATE, RECTAL APPROACH, WITH US GUIDANCE;  Surgeon: MARIZA Donahue MD;  Location: [facility];   Service: Urology;  Laterality: N/A;    TRANSRECTAL BIOPSY OF PROSTATE WITH ULTRASOUND GUIDANCE N/A 4/12/2024    Procedure: BIOPSY, PROSTATE, RECTAL APPROACH, WITH US GUIDANCE;  Surgeon: MARIZA Donahue MD;  Location: [facility];  Service: Urology;  Laterality: N/A;    TRIGGER FINGER RELEASE Right     X 2    uro lift      for enlarged prostate       Family History   Problem Relation Name Age of Onset    Hypertension Mother      Diabetes Mother      Stroke Mother      Cancer Mother          colon    Kidney disease Mother      Heart disease Father      Diabetes Father      Colon cancer Father      Cancer Father          colon    Cataracts Father      No Known Problems Sister      Heart disease Brother  62    Kidney disease Brother      Dementia Brother Rodrigue     Abnormal EKG Brother Rodrigue     No Known Problems Maternal Aunt      No Known Problems Maternal Uncle      No Known Problems Paternal Aunt      No Known Problems Paternal Uncle      Glaucoma Maternal Grandmother      No Known Problems Maternal Grandfather      No Known Problems Paternal Grandmother      No Known Problems Paternal Grandfather      Amblyopia Neg Hx      Blindness Neg Hx      Macular degeneration Neg Hx      Retinal detachment Neg Hx      Strabismus Neg Hx      Thyroid disease Neg Hx      Parkinsonism Neg Hx         Social History     Socioeconomic History    Marital status:    Tobacco Use    Smoking status: Never    Smokeless tobacco: Never   Substance and Sexual Activity    Alcohol use: Yes     Comment: very rare    Drug use: No    Sexual activity: Yes     Partners: Female   Social History Narrative            2 grown kids.         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      Social Drivers of Health     Financial Resource Strain: Patient Declined (1/29/2024)    Overall Financial Resource Strain (CARDIA)     Difficulty of Paying Living Expenses: Patient declined   Food Insecurity: Patient Declined  "(1/29/2024)    Hunger Vital Sign     Worried About Running Out of Food in the Last Year: Patient declined     Ran Out of Food in the Last Year: Patient declined   Transportation Needs: Patient Declined (1/29/2024)    PRAPARE - Transportation     Lack of Transportation (Medical): Patient declined     Lack of Transportation (Non-Medical): Patient declined   Physical Activity: Sufficiently Active (1/29/2024)    Exercise Vital Sign     Days of Exercise per Week: 4 days     Minutes of Exercise per Session: 80 min   Stress: Patient Declined (1/29/2024)    [employer] - Occupational Stress Questionnaire     Feeling of Stress : Patient declined   Housing Stability: Patient Declined (1/29/2024)    Housing Stability Vital Sign     Unable to Pay for Housing in the Last Year: Patient declined     Number of Places Lived in the Last Year: 1     Unstable Housing in the Last Year: Patient declined       Review of patient's allergies indicates:   Allergen Reactions    Avelox [moxifloxacin] Hives and Rash       Review of Systems:  12 point review of systems is negative.    Physical Exam:  Vitals:    10/08/24 0919   BP: (!) 145/89   Pulse: 73   Weight: 84.4 kg (186 lb 1.1 oz)   Height: 5' 11" (1.803 m)   PainSc: 0-No pain   PainLoc: Back     Body mass index is 25.95 kg/m².    Gen: NAD  Psych: mood appropriate for given condition  HEENT: eyes anicteric   CV: RRR  HEENT: anicteric   Respiratory: non-labored, no signs of respiratory distress  Abd: non-distended  Skin: warm, dry and intact.  Gait: No antalgic gait.     Increased pain with forward flexion    Sensory:  Intact and symmetrical to light touch in L1-S1 dermatomes bilaterally.    Motor:       Right Left   L2/3 Iliacus Hip flexion  5  5   L3/4 Qudratus Femoris Knee Extension  5  5   L4/5 Tib Anterior Ankle Dorsiflexion   5  5   L5/S1 Extensor Hallicus Longus Great toe extension  5  5   S1/S2 Gastroc/Soleus Plantar Flexion  5  5      Right Left         "          Patellar DTR 2+ 2+   Achilles DTR 2+ 2+                      Imaging:  Xray lumbar spine 4/6/23  FINDINGS:  There are degenerative changes of the lumbar spine.  Vertebral body height is maintained.  The adjacent soft tissues are unremarkable.    MRI lumbar spine 5/16/23  FINDINGS:  Alignment: Normal.     Vertebrae: No acute fracture or marrow replacement.  2 cm vertebral body hemangioma at L4.     Discs: Preserved disc heights.  Mild disc desiccation at several lower lumbar levels.     Cord: Normal.  Conus terminates at L2.     Paraspinal muscles & soft tissues: There are T2 hyperintense lesions along each renal cortex, possibly cysts but incompletely characterized.     Degenerative findings:  T12-L1:  L1-L2:  L2-L3: Minimal bilateral facet degenerative change.  Minimal posterior disc bulge.  Minimal bilateral facet degenerative change.  L3-L4: Minimal broad-based posterior disc bulge.  Bilateral facet degenerative change.  L4-L5: Minimal broad-based posterior disc bulge with left foraminal zone disc fissure.  Mild bilateral facet degenerative change.  Minimal spinal canal stenosis and right neural foraminal narrowing.  L5-S1: Mild broad-based posterior disc bulge.  Mild right facet degenerative change.  Mild right neural foraminal narrowing.    Labs:  Lab Results   Component Value Date    HGBA1C 6.3 (H) 09/04/2024       Lab Results   Component Value Date    WBC 9.07 03/11/2024    HGB 16.1 03/11/2024    HCT 51.0 03/11/2024    MCV 88 03/11/2024     03/11/2024       Assessment:   Problem List Items Addressed This Visit    None  Visit Diagnoses       Discogenic low back pain    -  Primary                69 y.o. year old male with PMH parkinsonism, HTN, BPH, CKD who presents with back pain.  He has a history of chronic lower back pain but over the past month it has been gradually worsening.  Today he reports his pain is 7/10, intermittent, aching, sharp, throbbing, shooting.  He gets some radiating pain  around the sides of the lower flanks around the lumbar area.  His pain is worse with sitting, bending, lifting and relieved with rest and lying down.  He denies any numbness or weakness.  No oliver radicular symptoms.      10/8/24 - Glenn Medel returns to the office for follow up.  He is s/p viadisc allograft at L4 on 9/30/24 today he reports his lower back pain is 0/10.  He feels near 100% relief of his typical lower back pain.  Denies any fever or radicular pain.  It appears he has had an excellent response to the via disc allograft.  We did discuss that sometimes he can take 6-8 weeks for full effect.  He reports an improvement in his mobility and quality of life.  He will follow up with me on an as needed basis.      : Not applicable      This note was completed with dictation software and grammatical errors may exist.

## 2024-10-10 ENCOUNTER — PATIENT MESSAGE (OUTPATIENT)
Dept: FAMILY MEDICINE | Facility: CLINIC | Age: 69
End: 2024-10-10
Payer: MEDICARE

## 2024-10-10 ENCOUNTER — PATIENT MESSAGE (OUTPATIENT)
Dept: NEUROLOGY | Facility: CLINIC | Age: 69
End: 2024-10-10
Payer: MEDICARE

## 2024-10-11 ENCOUNTER — TELEPHONE (OUTPATIENT)
Dept: PAIN MEDICINE | Facility: CLINIC | Age: 69
End: 2024-10-11
Payer: MEDICARE

## 2024-10-11 ENCOUNTER — HOSPITAL ENCOUNTER (OUTPATIENT)
Dept: RADIOLOGY | Facility: HOSPITAL | Age: 69
Discharge: HOME | End: 2024-10-11
Attending: ANESTHESIOLOGY
Payer: MEDICARE

## 2024-10-11 ENCOUNTER — PATIENT MESSAGE (OUTPATIENT)
Dept: PAIN MEDICINE | Facility: CLINIC | Age: 69
End: 2024-10-11
Payer: MEDICARE

## 2024-10-11 DIAGNOSIS — M54.9 DORSALGIA, UNSPECIFIED: ICD-10-CM

## 2024-10-11 DIAGNOSIS — M54.9 DORSALGIA, UNSPECIFIED: Primary | ICD-10-CM

## 2024-10-11 PROCEDURE — 72080 X-RAY EXAM THORACOLMB 2/> VW: CPT | Mod: TC,PO

## 2024-10-11 PROCEDURE — 72080 X-RAY EXAM THORACOLMB 2/> VW: CPT | Mod: 26,,, | Performed by: RADIOLOGY

## 2024-10-12 DIAGNOSIS — N18.2 TYPE 2 DIABETES MELLITUS WITH STAGE 2 CHRONIC KIDNEY DISEASE, WITHOUT LONG-TERM CURRENT USE OF INSULIN: ICD-10-CM

## 2024-10-12 DIAGNOSIS — E11.22 TYPE 2 DIABETES MELLITUS WITH STAGE 2 CHRONIC KIDNEY DISEASE, WITHOUT LONG-TERM CURRENT USE OF INSULIN: ICD-10-CM

## 2024-10-12 NOTE — TELEPHONE ENCOUNTER
No care due was identified.  [facility] Embedded Care Due Messages. Reference number: [id number].   10/12/2024 10:12:49 AM CDT

## 2024-10-12 NOTE — TELEPHONE ENCOUNTER
Yes, switch to office visit. Please check my basket ( bottom basket) for surgical clearance form. If it's not there please request

## 2024-10-14 ENCOUNTER — TELEPHONE (OUTPATIENT)
Dept: PAIN MEDICINE | Facility: CLINIC | Age: 69
End: 2024-10-14
Payer: MEDICARE

## 2024-10-14 RX ORDER — BLOOD-GLUCOSE SENSOR
EACH MISCELLANEOUS
Qty: 9 EACH | Refills: 1 | Status: SHIPPED | OUTPATIENT
Start: 2024-10-14 | End: 2024-10-17 | Stop reason: SDUPTHER

## 2024-10-14 NOTE — TELEPHONE ENCOUNTER
Please let the patient know I have reviewed his x-ray and he does not have any broken bones in his back.    If he is still having back pain please schedule him a follow up with either me, Felipe Dunbar Jessica

## 2024-10-17 ENCOUNTER — LAB VISIT (OUTPATIENT)
Dept: LAB | Facility: HOSPITAL | Age: 69
End: 2024-10-17
Attending: RADIOLOGY
Payer: MEDICARE

## 2024-10-17 ENCOUNTER — OFFICE VISIT (OUTPATIENT)
Dept: FAMILY MEDICINE | Facility: CLINIC | Age: 69
End: 2024-10-17
Payer: MEDICARE

## 2024-10-17 VITALS
SYSTOLIC BLOOD PRESSURE: 124 MMHG | BODY MASS INDEX: 25.8 KG/M2 | HEIGHT: 71 IN | WEIGHT: 184.31 LBS | HEART RATE: 90 BPM | OXYGEN SATURATION: 98 % | DIASTOLIC BLOOD PRESSURE: 82 MMHG

## 2024-10-17 DIAGNOSIS — C61 PROSTATE CANCER: ICD-10-CM

## 2024-10-17 DIAGNOSIS — I95.1 ORTHOSTATIC HYPOTENSION: ICD-10-CM

## 2024-10-17 DIAGNOSIS — Z01.818 PREOP EXAMINATION: Primary | ICD-10-CM

## 2024-10-17 DIAGNOSIS — N18.2 TYPE 2 DIABETES MELLITUS WITH STAGE 2 CHRONIC KIDNEY DISEASE, WITHOUT LONG-TERM CURRENT USE OF INSULIN: ICD-10-CM

## 2024-10-17 DIAGNOSIS — E11.22 TYPE 2 DIABETES MELLITUS WITH STAGE 2 CHRONIC KIDNEY DISEASE, WITHOUT LONG-TERM CURRENT USE OF INSULIN: ICD-10-CM

## 2024-10-17 LAB — COMPLEXED PSA SERPL-MCNC: 4.3 NG/ML (ref 0–4)

## 2024-10-17 PROCEDURE — 36415 COLL VENOUS BLD VENIPUNCTURE: CPT | Mod: PN | Performed by: INTERNAL MEDICINE

## 2024-10-17 PROCEDURE — 99215 OFFICE O/P EST HI 40 MIN: CPT | Mod: PBBFAC,PO | Performed by: INTERNAL MEDICINE

## 2024-10-17 PROCEDURE — 99999 PR PBB SHADOW E&M-EST. PATIENT-LVL V: CPT | Mod: PBBFAC,,, | Performed by: INTERNAL MEDICINE

## 2024-10-17 PROCEDURE — G2211 COMPLEX E/M VISIT ADD ON: HCPCS | Mod: S$PBB,,, | Performed by: INTERNAL MEDICINE

## 2024-10-17 PROCEDURE — 84153 ASSAY OF PSA TOTAL: CPT | Performed by: INTERNAL MEDICINE

## 2024-10-17 PROCEDURE — 99214 OFFICE O/P EST MOD 30 MIN: CPT | Mod: S$PBB,,, | Performed by: INTERNAL MEDICINE

## 2024-10-17 RX ORDER — BLOOD-GLUCOSE SENSOR
EACH MISCELLANEOUS
Qty: 9 EACH | Refills: 1 | Status: SHIPPED | OUTPATIENT
Start: 2024-10-17

## 2024-10-17 NOTE — PROGRESS NOTES
Patient ID: Glenn Medel is a 69 y.o. male.    Chief Complaint: Pre-op Exam (Shoulder surgery taking place on 10.30.24)     Assessment and Plan     1. Type 2 diabetes mellitus with stage 2 chronic kidney disease, without long-term current use of insulin  - Hemoglobin A1C; Future  - blood-glucose sensor (DEXCOM G7 SENSOR) Daisy; Change sensor every 10 days to measure BG at least 4x daily.  Dispense: 9 each; Refill: 1  - blood-glucose meter,continuous Misc; 1 each by Misc.(Non-Drug; Combo Route) route 4 (four) times daily.  Dispense: 1 each; Refill: 0    2. Prostate cancer  - PROSTATE SPECIFIC ANTIGEN, DIAGNOSTIC; Future    3. Preop examination    4. Orthostatic hypotension     He is elevated risk (type 2 diabetes with insulin use) for low risk procedure.  However, he is medically optimized for said procedure.  Proceed with standard precautions.  Continue to monitor blood pressure  Three-month follow-up     HPI     Here for surgical clearance. Having right shoulder arthroscopy on 10/30 with Dr. Carlin at [facility]. He had normal nuclear stress in September.  He is currently not having any chest pain or shortness of breath.  EKG done at [facility] shows sinus bradycardia but otherwise normal EKG.    Had fall last Wednesday. He was walking and could not stop (parkinsons) He fell forward and hit his head on left side.  He went to [facility] and CT scans were without acute pathology.    He is checking his blood sugars twice a day.  He is currently on Lantus 10 units.  He has difficulty using the lancets and glucose strips due to his Parkinson's disease.  His blood sugars have been  running between 115-135 fasting.    His blood pressures have been normal or high.  This is where we want him due to his history of syncope and falls that he had with orthostatic hypotension.  He is currently on fludrocortisone    Review of Systems   Respiratory:  Negative for shortness of breath.    Cardiovascular:  Negative for  chest pain and palpitations.   Musculoskeletal:  Negative for neck pain.   Neurological:  Positive for headaches.       I personally reviewed past medical, family and social history.     Objective    Vitals:    10/17/24 0838   BP: 124/82   Pulse: 90      Wt Readings from Last 3 Encounters:   10/17/24 0838 83.6 kg (184 lb 4.9 oz)   10/08/24 0919 84.4 kg (186 lb 1.1 oz)   10/03/24 0756 84.9 kg (187 lb 2.7 oz)      Body mass index is 25.71 kg/m².     Physical Exam  Cardiovascular:      Rate and Rhythm: Normal rate and regular rhythm.      Heart sounds: No murmur heard.     No gallop.   Pulmonary:      Breath sounds: Normal breath sounds. No wheezing or rhonchi.   Abdominal:      Palpations: Abdomen is soft.      Tenderness: There is no abdominal tenderness.        Reference     : Visit today included increased complexity associated with the care of the episodic problem Preop examination [Z01.818] addressed and managing the longitudinal care of the patient due to the serious and/or complex managed problem(s)     Active Problem List with Overview Notes    Diagnosis Date Noted    Orthostatic hypotension 08/13/2024    Chronic depression 04/30/2024    Prostate cancer 12/06/2023    Type 2 diabetes mellitus with stage 2 chronic kidney disease, without long-term current use of insulin 10/06/2022    CKD (chronic kidney disease) stage 2, GFR 60-89 ml/min 07/17/2021    Hx of acute gouty arthritis 12/14/2020    Parkinsonism 10/29/2020    Adjustment disorder with depressed mood 06/07/2020    Arachnoid cyst 04/28/2020     Followed in [facility]      Severe obstructive sleep apnea 05/09/2017     Very severe sleep apnea. /hr.   Normal AHI on CPAP therapy.  Compliant with PAP and benefits from use. Follow up annually in the sleep clinic.        Hyperlipidemia 05/19/2015    BPH (benign prostatic hyperplasia)      Sees Dr. Ewing      HTN (hypertension)     Aortic atherosclerosis 08/22/2024    Secondary hyperparathyroidism of renal  origin 04/16/2024    CHAPARRITA (generalized anxiety disorder) 01/05/2024    Primary insomnia 01/05/2024    Night terrors, adult 01/05/2024     prozosin      Mild episode of recurrent major depressive disorder 12/06/2023    Central retinal vein occlusion, left eye, stable 10/19/2023    Elevated PSA 01/31/2023    Family history of colon cancer 12/21/2022    Memory change 06/28/2021    Fall from bed 04/09/2021    Polyneuropathy associated with underlying disease 03/15/2021    Abnormal gait 05/14/2020    Cognitive change 05/14/2020    Abnormal involuntary movements 05/14/2020     Right hand shaking      REM sleep behavior disorder 04/28/2020    Internal strangulated hemorrhoids 04/24/2019    Epiretinal membrane, right 08/01/2018    Vitreomacular adhesion, right 01/29/2018    Iritis, lens-induced 01/08/2018    Glaucoma associated with ocular inflammation, left, indeterminate stage 12/15/2017    Retained lens material, left 12/04/2017    Senile nuclear sclerosis 11/30/2017    Other vitreous opacities, bilateral 10/19/2017    NS (nuclear sclerosis) 10/19/2017    Hypersomnia with sleep apnea, unspecified      Dx updated per 2019 IMO Load      Itch 01/12/2016    Peripheral neuropathy 05/19/2015    Palpitations 10/22/2014    DDD (degenerative disc disease), lumbar      s/p epidural steroids           Diabetes Medications               insulin glargine U-100, Lantus, (LANTUS SOLOSTAR U-100 INSULIN) 100 unit/mL (3 mL) InPn pen Inject 10 Units into the skin once daily.    metFORMIN (GLUCOPHAGE-XR) 500 MG ER 24hr tablet Take 500 mg by mouth once daily.    tirzepatide (MOUNJARO) 5 mg/0.5 mL PnIj Inject 5 mg into the skin every 7 days. (Increased dose)           Hypertension Medications               prazosin (MINIPRESS) 1 MG Cap TAKE 1 CAPSULE(1 MG) BY MOUTH EVERY EVENING           Hyperlipidemia Medications               atorvastatin (LIPITOR) 20 MG tablet TAKE 1 TABLET(20 MG) BY MOUTH EVERY DAY           Medication List with  "Changes/Refills   Current Medications    ALLOPURINOL (ZYLOPRIM) 300 MG TABLET    TAKE 1 TABLET(300 MG) BY MOUTH EVERY DAY    ATORVASTATIN (LIPITOR) 20 MG TABLET    TAKE 1 TABLET(20 MG) BY MOUTH EVERY DAY    BD HIMANSHU 2ND GEN PEN NEEDLE 32 GAUGE X 5/32" NDLE    SMARTSIG:Injection Daily    BRIMONIDINE 0.2% (ALPHAGAN) 0.2 % DROP    INSTILL 1 DROP IN BOTH EYES TWICE DAILY    CARBIDOPA-LEVODOPA  MG (SINEMET)  MG PER TABLET    TAKE 2 TABLETS BY MOUTH FOUR TIMES DAILY    CHOLECALCIFEROL, VITAMIN D3, (D3-2000 ORAL)    Take by mouth once daily.    CITALOPRAM (CELEXA) 10 MG TABLET    TAKE 1 TABLET(10 MG) BY MOUTH DAILY    CLONAZEPAM (KLONOPIN) 1 MG TABLET    Take 2 tablets (2 mg total) by mouth every evening.    CYANOCOBALAMIN (VITAMIN B-12) 1000 MCG TABLET    Take 100 mcg by mouth once daily.    DORZOLAMIDE (TRUSOPT) 2 % OPHTHALMIC SOLUTION    INSTILL 1 DROP IN BOTH EYES TWICE DAILY    DOXYCYCLINE (MONODOX) 100 MG CAPSULE    Take 100 mg by mouth 2 (two) times daily.    FLUDROCORTISONE (FLORINEF) 0.1 MG TAB    TAKE 1 TABLET(100 MCG) BY MOUTH DAILY    INSULIN GLARGINE U-100, LANTUS, (LANTUS SOLOSTAR U-100 INSULIN) 100 UNIT/ML (3 ML) INPN PEN    Inject 10 Units into the skin once daily.    KETOCONAZOLE (NIZORAL) 2 % SHAMPOO    Apply topically.    METFORMIN (GLUCOPHAGE-XR) 500 MG ER 24HR TABLET    Take 500 mg by mouth once daily.    PEN NEEDLE, DIABETIC 32 GAUGE X 1/4" NDLE    1 each by Misc.(Non-Drug; Combo Route) route once daily.    PRAZOSIN (MINIPRESS) 1 MG CAP    TAKE 1 CAPSULE(1 MG) BY MOUTH EVERY EVENING    RAMELTEON (ROZEREM) 8 MG TABLET    Take 1 tablet (8 mg total) by mouth every evening.    RASAGILINE (AZILECT) 1 MG TAB    TAKE 1 TABLET(1 MG) BY MOUTH EVERY EVENING    TIRZEPATIDE (MOUNJARO) 5 MG/0.5 ML PNIJ    Inject 5 mg into the skin every 7 days. (Increased dose)   Changed and/or Refilled Medications    Modified Medication Previous Medication    BLOOD-GLUCOSE METER,CONTINUOUS MISC blood-glucose " meter,continuous Misc       1 each by Misc.(Non-Drug; Combo Route) route 4 (four) times daily.    1 each by Misc.(Non-Drug; Combo Route) route 4 (four) times daily.    BLOOD-GLUCOSE SENSOR (DEXCOM G7 SENSOR) BRAN blood-glucose sensor (DEXCOM G7 SENSOR) Bran       Change sensor every 10 days to measure BG at least 4x daily.    Change sensor every 10 days to measure BG at least 4x daily.

## 2024-10-18 DIAGNOSIS — E79.0 HYPERURICEMIA: ICD-10-CM

## 2024-10-18 DIAGNOSIS — Z87.39 HISTORY OF GOUT: ICD-10-CM

## 2024-10-18 RX ORDER — METFORMIN HYDROCHLORIDE 500 MG/1
TABLET, EXTENDED RELEASE ORAL
Qty: 180 TABLET | Refills: 3 | Status: SHIPPED | OUTPATIENT
Start: 2024-10-18

## 2024-10-18 RX ORDER — RAMELTEON 8 MG/1
TABLET ORAL
Qty: 90 TABLET | Refills: 3 | Status: SHIPPED | OUTPATIENT
Start: 2024-10-18

## 2024-10-18 RX ORDER — ALLOPURINOL 300 MG/1
TABLET ORAL
Qty: 90 TABLET | Refills: 0 | Status: SHIPPED | OUTPATIENT
Start: 2024-10-18

## 2024-10-18 NOTE — TELEPHONE ENCOUNTER
Refill Routing Note   Medication(s) are not appropriate for processing by [facility] for the following reason(s):        Outside of protocol  Required labs outdated  No active prescription written by provider    [facility] action(s):  Route  Defer               Appointments  past 12m or future 3m with PCP    Date Provider   Last Visit   10/17/2024 Power Benton, DO   Next Visit   1/21/2025 Power Benton, DO   ED visits in past 90 days: 0        Note composed:4:59 PM 10/18/2024

## 2024-10-18 NOTE — TELEPHONE ENCOUNTER
No care due was identified.  [facility] Embedded Care Due Messages. Reference number: [id number].   10/18/2024 5:42:26 AM CDT

## 2024-10-26 DIAGNOSIS — G47.52 REM SLEEP BEHAVIOR DISORDER: ICD-10-CM

## 2024-10-28 DIAGNOSIS — G47.52 REM SLEEP BEHAVIOR DISORDER: ICD-10-CM

## 2024-10-28 RX ORDER — CLONAZEPAM 1 MG/1
2 TABLET ORAL NIGHTLY
Qty: 60 TABLET | Refills: 2 | Status: SHIPPED | OUTPATIENT
Start: 2024-10-28

## 2024-10-28 RX ORDER — CLONAZEPAM 1 MG/1
2 TABLET ORAL NIGHTLY
Qty: 60 TABLET | Refills: 2 | OUTPATIENT
Start: 2024-10-28

## 2024-11-04 ENCOUNTER — PATIENT MESSAGE (OUTPATIENT)
Dept: CARDIOLOGY | Facility: CLINIC | Age: 69
End: 2024-11-04
Payer: MEDICARE

## 2024-11-05 ENCOUNTER — TELEPHONE (OUTPATIENT)
Dept: UROLOGY | Facility: CLINIC | Age: 69
End: 2024-11-05
Payer: MEDICARE

## 2024-11-05 ENCOUNTER — OFFICE VISIT (OUTPATIENT)
Dept: RADIATION ONCOLOGY | Facility: CLINIC | Age: 69
End: 2024-11-05
Payer: MEDICARE

## 2024-11-05 VITALS
HEART RATE: 68 BPM | TEMPERATURE: 98 F | OXYGEN SATURATION: 95 % | RESPIRATION RATE: 20 BRPM | SYSTOLIC BLOOD PRESSURE: 121 MMHG | HEIGHT: 71 IN | WEIGHT: 183.44 LBS | BODY MASS INDEX: 25.68 KG/M2 | DIASTOLIC BLOOD PRESSURE: 69 MMHG

## 2024-11-05 DIAGNOSIS — C61 PROSTATE CANCER: Primary | ICD-10-CM

## 2024-11-05 PROCEDURE — 99999 PR PBB SHADOW E&M-EST. PATIENT-LVL V: CPT | Mod: PBBFAC,,, | Performed by: RADIOLOGY

## 2024-11-05 PROCEDURE — 99215 OFFICE O/P EST HI 40 MIN: CPT | Mod: PBBFAC,PN | Performed by: RADIOLOGY

## 2024-11-05 NOTE — PROGRESS NOTES
11/05/2024    [facility]  [facility]   Radiation Oncology Follow up    Assessment   This is a 69 y.o. 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  He presents today to discuss definitve management with radiation therapy.        Plan     Treatment options were discussed with the patient including prostatectomy, radiation therapy (brachytherapy, SBRT, and standard fractionated radiation therapy), or active surveillance.  We discussed the goals of treatment to be curative.  The risks, benefits, scheduling, alternatives to and rationale of radiation therapy were explained in detail.  Indication, course, and potential toxicities of pelvic radiation therapy reviewed in detail, including but not limited to fatigue, increased frequency, urgency, or pain with urination, increased frequency or urgency of bowel movements, diarrhea, pelvic bone fragility, erectile dysfunction, decreased volume of ejaculate, remote risk of secondary malignancy.   After this discussion, he elected to proceed with active surveillance.  He understands that this will require semiannual PSA testing. Additionally, he understands that around half of men on surveillance will go on to definitive treatment at some point in the future, but even in these men, surveillance allows for delay of any treatment-related toxicities.  He also understands that there is a small risk of disease progression while on surveillance which could change recommended management or prognosis.   PSA with Urology q6 months  (pt will schedule for 5/2025)  Follow up with Urology as directed  He was given our contact information, and he was told that he could call our clinic at any time if he has any questions or concerns.      Radiation Treatment Details:   No radiation therapy planned at this time.         Chief Complaint   Patient presents with    Prostate Cancer         Oncology History    No history  exists.     Interval history:  He was last seen in clinic 5/2024.   PSA Diagnostic Prostate Specific Antigen   Latest Ref Rng 0.00 - 4.00 ng/mL 0.00 - 4.00 ng/mL   2/10/2020 2.4     12/29/2021  4.4 (H)    12/12/2022 5.1 (H)     6/16/2023 4.3 (H)     8/2/2023 4.1 (H)     2/2/2024 5.1 (H)     10/17/2024 4.3 (H)        Legend:  (H) High    IPSS today is 5    KEY is 5    HPI: The patient is a 69 y.o. year old male with a diagnosis of prostate cancer.  He had been on active surveillance.  He was noted to have an elevated PSA of 5.1 on 2/2/24.  Previous PSA history as follows:   Latest Reference Range & Units 12/12/22 15:07 06/16/23 12:17 08/02/23 08:21 02/02/24 08:54   PSA Diagnostic 0.00 - 4.00 ng/mL 5.1 (H) 4.3 (H) 4.1 (H) 5.1 (H)   (H): Data is abnormally high    1/31/23 Prostate Biopsy:   Left base: Group 2 (25% pattern 4), 1 of 2  Left mid: Group 1 1 of 2 (20% of tissue)  Total 2 of 12 cores    3/2/24 Decipher score: 0.42-Low    2/27/24 MRI Prostate: chronic prostatitis; left amorphous T2 hypointense lesion abutting capsule left apex PZ without evidence of extracapsular extension; PI-RADS 4.     On 4/12/24 he underwent confirmatory TRUS-guided biopsy of the prostate, with pathology as follows:  Lesion 1: Group 2 (5% pattern 4) in 2 of 3 cores; +PNI, focal cribriform  Left base: Group 1 in 1 of 2 (<5% of tissue)  Left mid: Group 1 in 1 of 2 (11% of tissue)  Right base: Group 1 in 1 of 2 (5% of tissue)  Total (corrected): 4 of 13    Prostate size estimated at 77cc.  He presents today to discuss the potential role of radiation therapy in his cancer care.  IPSS is 6, with +++ daytime frequency, + urgency, and nocturia x 2, no weakened stream.  He reports significant erectile difficulty at baseline.         History of prior irradiation: no  History of prior systemic anti-cancer therapy: no  History of collagen vascular disease: no  Implanted electronic device (pacer/defib/nerve stimulator): no    Past Medical History:    Diagnosis Date    BPH (benign prostatic hyperplasia)     Cataract     done ou    Chronic kidney disease, stage 3 02/25/2015    DDD (degenerative disc disease), lumbar     s/p epidural steroids     Diabetes mellitus     type 2    Elevated PSA     Glaucoma     OU    HTN (hypertension)     Iritis, lens-induced 01/08/2018    Obesity     Parkinson's disease     Sleep apnea     uses cpap       Past Surgical History:   Procedure Laterality Date    CATARACT EXTRACTION W/  INTRAOCULAR LENS IMPLANT Left 11/30/2017    CATARACT EXTRACTION W/  INTRAOCULAR LENS IMPLANT Right 12/11/2018    Dr Garcia    CATARACT EXTRACTION W/  INTRAOCULAR LENS IMPLANT Right 12/11/2018    Procedure: EXTRACTION, CATARACT, WITH IOL INSERTION;  Surgeon: Damaris Garcia MD;  Location: [facility] OR;  Service: Ophthalmology;  Laterality: Right;    COLONOSCOPY N/A 9/18/2017    Procedure: COLONOSCOPY;  Surgeon: Paul Feliz Jr., MD;  Location: [facility] ENDO;  Service: Endoscopy;  Laterality: N/A;    COLONOSCOPY N/A 12/21/2022    Procedure: COLONOSCOPY;  Surgeon: Paul Feliz Jr., MD;  Location: [facility] END[facility];  Service: Endoscopy;  Laterality: N/A;    COLONOSCOPY W/ POLYPECTOMY  04/13/2010    YARELI.   One 1 cm polyp in the sigmoid colon.  Internal hemorrhoids.    COSMETIC SURGERY      DIGITAL RECTAL EXAMINATION UNDER ANESTHESIA N/A 7/23/2019    Procedure: EXAM UNDER ANESTHESIA, DIGITAL, RECTUM;  Surgeon: Kaz Keating MD;  Location: [facility] OR;  Service: General;  Laterality: N/A;    EXAMINATION UNDER ANESTHESIA N/A 4/24/2019    Procedure: EXAM UNDER ANESTHESIA;  Surgeon: Kaz Keating MD;  Location: [facility] OR;  Service: General;  Laterality: N/A;    EXCISIONAL HEMORRHOIDECTOMY N/A 4/24/2019    Procedure: HEMORRHOIDECTOMY (lithotomy);  Surgeon: Kaz Keating MD;  Location: [facility] OR;  Service: General;  Laterality: N/A;    GANGLION CYST EXCISION Left     INJECTION OF ANESTHETIC AGENT AROUND MEDIAL BRANCH NERVES INNERVATING LUMBAR FACET JOINT Bilateral  4/9/2024    Procedure: Block-nerve-medial branch-lumbar-L4/5-L5/S1;  Surgeon: Everett Tavears MD;  Location: [facility] OR;  Service: Pain Management;  Laterality: Bilateral;    INJECTION OF ANESTHETIC AGENT AROUND MEDIAL BRANCH NERVES INNERVATING LUMBAR FACET JOINT Bilateral 5/6/2024    Procedure: Block-nerve-medial branch-lumbar-L4/5-L5/S1;  Surgeon: Everett Taveras MD;  Location: [facility] OR;  Service: Pain Management;  Laterality: Bilateral;    INJECTION OF ANESTHETIC AGENT AROUND PUDENDAL NERVE N/A 4/24/2019    Procedure: BLOCK, NERVE, PUDENDAL;  Surgeon: Kaz Keating MD;  Location: [facility];  Service: General;  Laterality: N/A;    INJECTION OF ANESTHETIC AGENT AROUND PUDENDAL NERVE N/A 7/23/2019    Procedure: BLOCK, NERVE, PUDENDAL;  Surgeon: Kaz Keating MD;  Location: [facility];  Service: General;  Laterality: N/A;    INJECTION, ALLOGRAFT, INTERVERTEBRAL DISC N/A 9/30/2024    Procedure: INJECTION,ALLOGRAFT,INTERVERTEBRAL DISC,1ST LEVEL  L4/5;  Surgeon: Everett Taveras MD;  Location: [facility] OR;  Service: Pain Management;  Laterality: N/A;    RADIOFREQUENCY ABLATION OF LUMBAR MEDIAL BRANCH NERVE AT SINGLE LEVEL Bilateral 6/4/2024    Procedure: Radiofrequency Ablation, Nerve, Spinal, Lumbar, Medial Branch, L4/5 and L5/S1;  Surgeon: Everett Taveras MD;  Location: [facility] OR;  Service: Pain Management;  Laterality: Bilateral;    REPAIR OF RETINAL DETACHMENT WITH VITRECTOMY Right 8/1/2018    Procedure: REPAIR, RETINAL DETACHMENT, WITH VITRECTOMY;  Surgeon: SAJAN Pulido MD;  Location: [address];  Service: Ophthalmology;  Laterality: Right;  35 min    TONSILLECTOMY      TRANSRECTAL BIOPSY OF PROSTATE WITH ULTRASOUND GUIDANCE N/A 1/31/2023    Procedure: BIOPSY, PROSTATE, RECTAL APPROACH, WITH US GUIDANCE;  Surgeon: MARIZA Donahue MD;  Location: [facility] OR;  Service: Urology;  Laterality: N/A;    TRANSRECTAL BIOPSY OF PROSTATE WITH ULTRASOUND GUIDANCE N/A 4/12/2024    Procedure: BIOPSY, PROSTATE, RECTAL  "APPROACH, WITH US GUIDANCE;  Surgeon: MARIZA Donahue MD;  Location: [facility];  Service: Urology;  Laterality: N/A;    TRIGGER FINGER RELEASE Right     X 2    uro lift      for enlarged prostate       Social History     Tobacco Use    Smoking status: Never    Smokeless tobacco: Never   Substance Use Topics    Alcohol use: Yes     Comment: very rare    Drug use: No       Cancer-related family history includes Cancer in his father and mother.    Current Outpatient Medications on File Prior to Visit   Medication Sig Dispense Refill    allopurinoL (ZYLOPRIM) 300 MG tablet TAKE 1 TABLET(300 MG) BY MOUTH EVERY DAY 90 tablet 0    atorvastatin (LIPITOR) 20 MG tablet TAKE 1 TABLET(20 MG) BY MOUTH EVERY DAY 90 tablet 3    BD HIMANSHU 2ND GEN PEN NEEDLE 32 gauge x 5/32" Ndle SMARTSIG:Injection Daily      blood-glucose meter,continuous Misc 1 each by Misc.(Non-Drug; Combo Route) route 4 (four) times daily. 1 each 0    blood-glucose sensor (DEXCOM G7 SENSOR) Daisy Change sensor every 10 days to measure BG at least 4x daily. 9 each 1    brimonidine 0.2% (ALPHAGAN) 0.2 % Drop INSTILL 1 DROP IN BOTH EYES TWICE DAILY 10 mL 3    carbidopa-levodopa  mg (SINEMET)  mg per tablet TAKE 2 TABLETS BY MOUTH FOUR TIMES DAILY 736 tablet 12    cholecalciferol, vitamin D3, (D3-2000 ORAL) Take by mouth once daily.      citalopram (CELEXA) 10 MG tablet TAKE 1 TABLET(10 MG) BY MOUTH DAILY 90 tablet 3    clonazePAM (KLONOPIN) 1 MG tablet Take 2 tablets (2 mg total) by mouth every evening. 60 tablet 2    cyanocobalamin (VITAMIN B-12) 1000 MCG tablet Take 100 mcg by mouth once daily.      dorzolamide (TRUSOPT) 2 % ophthalmic solution INSTILL 1 DROP IN BOTH EYES TWICE DAILY 10 mL 3    doxycycline (MONODOX) 100 MG capsule Take 100 mg by mouth 2 (two) times daily.      fludrocortisone (FLORINEF) 0.1 mg Tab TAKE 1 TABLET(100 MCG) BY MOUTH DAILY 90 tablet 1    insulin glargine U-100, Lantus, (LANTUS SOLOSTAR U-100 INSULIN) 100 unit/mL (3 mL) InPn " "pen Inject 10 Units into the skin once daily.      ketoconazole (NIZORAL) 2 % shampoo Apply topically.      metFORMIN (GLUCOPHAGE-XR) 500 MG ER 24hr tablet TAKE 2 TABLETS(1000 MG) BY MOUTH DAILY. INCREASE DOSE 180 tablet 3    pen needle, diabetic 32 gauge x 1/4" Ndle 1 each by Misc.(Non-Drug; Combo Route) route once daily. 100 each 1    prazosin (MINIPRESS) 1 MG Cap TAKE 1 CAPSULE(1 MG) BY MOUTH EVERY EVENING 90 capsule 3    ramelteon (ROZEREM) 8 mg tablet TAKE 1 TABLET(8 MG) BY MOUTH EVERY EVENING 90 tablet 3    rasagiline (AZILECT) 1 mg Tab TAKE 1 TABLET(1 MG) BY MOUTH EVERY EVENING 30 tablet 11    tirzepatide (MOUNJARO) 5 mg/0.5 mL PnIj Inject 5 mg into the skin every 7 days. (Increased dose) 4 Pen 5     No current facility-administered medications on file prior to visit.       Review of patient's allergies indicates:   Allergen Reactions    Avelox [moxifloxacin] Hives and Rash       Review of Systems   Gastrointestinal:  Positive for constipation. Negative for diarrhea.   Genitourinary:  Positive for frequency and urgency (mild).   Musculoskeletal:  Positive for back pain, joint pain and neck pain (nerve block next week).   Neurological:  Positive for tremors (improved).        Vital Signs: /69   Pulse 68   Temp 98.2 °F (36.8 °C)   Resp 20   Ht 5' 11" (1.803 m)   Wt 83.2 kg (183 lb 6.8 oz)   SpO2 95%   BMI 25.58 kg/m²     ECOG Performance Status: 0 - Fully Active    Physical Exam  Vitals and nursing note reviewed.   Constitutional:       General: He is not in acute distress.     Appearance: Normal appearance. He is not ill-appearing or toxic-appearing.   HENT:      Head: Normocephalic and atraumatic.      Nose: Nose normal.   Eyes:      Extraocular Movements: Extraocular movements intact.      Conjunctiva/sclera: Conjunctivae normal.      Pupils: Pupils are equal, round, and reactive to light.   Pulmonary:      Effort: Pulmonary effort is normal.   Musculoskeletal:         General: Normal range of " motion.      Cervical back: Normal range of motion and neck supple.   Skin:     General: Skin is warm.   Neurological:      General: No focal deficit present.      Mental Status: He is alert and oriented to person, place, and time.   Psychiatric:         Mood and Affect: Mood normal.         Behavior: Behavior normal.         Thought Content: Thought content normal.         Judgment: Judgment normal.            Imaging: I have personally reviewed the patient's available images and reports and summarized pertinent findings above in HPI.     Pathology: I have personally reviewed the patient's available pathology and summarized pertinent findings above in HPI.         - Thank you for allowing me to participate in the care of your patient.    Britney Christianson MD       Total time today for this encounter was 12 minutes. This includes preparing to see the patient (eg, review of tests), obtaining and/or reviewing separately obtained history, documenting clinical information in the electronic or other health record, independently interpreting results and communicating results to the patient/family/caregiver, and discussing the plan with other providers when necessary.

## 2024-11-05 NOTE — TELEPHONE ENCOUNTER
----- Message from LAURA Denny sent at 11/5/2024 11:49 AM CST -----  Regarding: PSA and f/u in 6 months  Pt will need PSA and F/u in 6mths with Dr. Donahue

## 2024-11-07 ENCOUNTER — PATIENT MESSAGE (OUTPATIENT)
Dept: OTHER | Facility: OTHER | Age: 69
End: 2024-11-07
Payer: MEDICARE

## 2024-11-12 ENCOUNTER — TELEPHONE (OUTPATIENT)
Dept: FAMILY MEDICINE | Facility: CLINIC | Age: 69
End: 2024-11-12

## 2024-11-12 NOTE — TELEPHONE ENCOUNTER
----- Message from Adenike sent at 11/11/2024  9:55 AM CST -----  Type: Needs Medical Advice  Who Called:  [employer]     Best Call Back Number: [phone]  Additional Information: calling to confirm office received fax please advise

## 2024-11-12 NOTE — TELEPHONE ENCOUNTER
Spoke with the patient informed him that Dr. Benton does not do this testing. Patient verbalized understanding.

## 2024-11-13 ENCOUNTER — PATIENT MESSAGE (OUTPATIENT)
Dept: FAMILY MEDICINE | Facility: CLINIC | Age: 69
End: 2024-11-13
Payer: MEDICARE

## 2024-11-13 DIAGNOSIS — E11.22 TYPE 2 DIABETES MELLITUS WITH STAGE 2 CHRONIC KIDNEY DISEASE, WITHOUT LONG-TERM CURRENT USE OF INSULIN: ICD-10-CM

## 2024-11-13 DIAGNOSIS — N18.2 TYPE 2 DIABETES MELLITUS WITH STAGE 2 CHRONIC KIDNEY DISEASE, WITHOUT LONG-TERM CURRENT USE OF INSULIN: ICD-10-CM

## 2024-11-13 RX ORDER — BLOOD-GLUCOSE SENSOR
EACH MISCELLANEOUS
Qty: 9 EACH | Refills: 3 | Status: SHIPPED | OUTPATIENT
Start: 2024-11-13

## 2024-11-13 NOTE — TELEPHONE ENCOUNTER
Spoke with [employer] [facility] informed her that Dr. Benton does not sign off for this testing she verified understanding.

## 2024-11-13 NOTE — TELEPHONE ENCOUNTER
----- Message from Cherie sent at 11/13/2024  3:11 PM CST -----  Contact: Localler  Type:  Patient Returning Call    Who Called: [employer]    Who Left Message for Patient: Merna     Does the patient know what this is regarding?: lab order request they faxed over 11/8    Would the patient rather a call back or a response via [facility]?  Call [employer] back    Best Call Back Number:     Additional Information:  ref# [id number]    They are needing either an approval or denial from provider    Please call to advise    Thanks

## 2024-11-13 NOTE — TELEPHONE ENCOUNTER
No care due was identified.  [facility] Embedded Care Due Messages. Reference number: [id number].   11/13/2024 4:24:04 PM CST

## 2024-11-13 NOTE — TELEPHONE ENCOUNTER
Refill Decision Note    [facility]  is requesting a refill authorization.  Brief Assessment and Rationale for Refill:  Approve     Medication Therapy Plan:  change of pharmacy to [facility]      Comments:     Note composed:4:36 PM 11/13/2024

## 2024-11-14 ENCOUNTER — PATIENT MESSAGE (OUTPATIENT)
Dept: SLEEP MEDICINE | Facility: CLINIC | Age: 69
End: 2024-11-14
Payer: MEDICARE

## 2024-11-18 ENCOUNTER — PATIENT MESSAGE (OUTPATIENT)
Dept: ADMINISTRATIVE | Facility: HOSPITAL | Age: 69
End: 2024-11-18
Payer: MEDICARE

## 2024-11-19 ENCOUNTER — PATIENT OUTREACH (OUTPATIENT)
Dept: ADMINISTRATIVE | Facility: HOSPITAL | Age: 69
End: 2024-11-19
Payer: MEDICARE

## 2024-11-19 DIAGNOSIS — E11.22 TYPE 2 DIABETES MELLITUS WITH STAGE 2 CHRONIC KIDNEY DISEASE, WITHOUT LONG-TERM CURRENT USE OF INSULIN: Primary | ICD-10-CM

## 2024-11-19 DIAGNOSIS — N18.2 TYPE 2 DIABETES MELLITUS WITH STAGE 2 CHRONIC KIDNEY DISEASE, WITHOUT LONG-TERM CURRENT USE OF INSULIN: Primary | ICD-10-CM

## 2024-11-19 NOTE — PROGRESS NOTES
Population Health Chart Review & Patient Outreach Details      Additional [facility] Health Notes:               Updates Requested / Reviewed:      Updated Care Coordination Note         Health Maintenance Topics Overdue:      VB Score: 0     Patient is not due for any topics at this time.                       Health Maintenance Topic(s) Outreach Outcomes & Actions Taken:    Lab(s) - Outreach Outcomes & Actions Taken  : Overdue Lab(s) Ordered

## 2024-11-20 ENCOUNTER — TELEPHONE (OUTPATIENT)
Dept: FAMILY MEDICINE | Facility: CLINIC | Age: 69
End: 2024-11-20
Payer: MEDICARE

## 2024-11-20 DIAGNOSIS — E11.65 TYPE 2 DIABETES MELLITUS WITH HYPERGLYCEMIA, WITH LONG-TERM CURRENT USE OF INSULIN: Primary | ICD-10-CM

## 2024-11-20 DIAGNOSIS — Z79.4 TYPE 2 DIABETES MELLITUS WITH HYPERGLYCEMIA, WITH LONG-TERM CURRENT USE OF INSULIN: Primary | ICD-10-CM

## 2024-11-20 RX ORDER — BLOOD-GLUCOSE SENSOR
EACH MISCELLANEOUS
Qty: 9 EACH | Refills: 3 | Status: SHIPPED | OUTPATIENT
Start: 2024-11-20 | End: 2024-12-16

## 2024-11-20 NOTE — TELEPHONE ENCOUNTER
----- Message from Ml sent at 11/19/2024  2:38 PM CST -----  Regarding: Needs Medical Order Status  Contact: Marie at [employer] [phone]  Type: Needs Medical Order Status  Who Called:  Marie at [employer] [phone] fax #[phone]    Additional Information: calling to see if fax was received for the full glucose kit. Please call and advise. Thank you     blood-glucose meter,continuous Misc 1 each 0 10/17/2024 10/17/2025   Sig - Route: 1 each by Misc.(Non-Drug; Combo Route) route 4 (four) times daily. - Misc.(Non-Drug; Combo Route)   Class: Print

## 2024-11-21 ENCOUNTER — LAB VISIT (OUTPATIENT)
Dept: LAB | Facility: HOSPITAL | Age: 69
End: 2024-11-21
Attending: INTERNAL MEDICINE
Payer: MEDICARE

## 2024-11-21 ENCOUNTER — PATIENT MESSAGE (OUTPATIENT)
Dept: FAMILY MEDICINE | Facility: CLINIC | Age: 69
End: 2024-11-21
Payer: MEDICARE

## 2024-11-21 DIAGNOSIS — E11.22 TYPE 2 DIABETES MELLITUS WITH STAGE 2 CHRONIC KIDNEY DISEASE, WITHOUT LONG-TERM CURRENT USE OF INSULIN: ICD-10-CM

## 2024-11-21 DIAGNOSIS — N18.2 TYPE 2 DIABETES MELLITUS WITH STAGE 2 CHRONIC KIDNEY DISEASE, WITHOUT LONG-TERM CURRENT USE OF INSULIN: ICD-10-CM

## 2024-11-21 LAB
CHOLEST SERPL-MCNC: 118 MG/DL (ref 120–199)
CHOLEST/HDLC SERPL: 3.7 {RATIO} (ref 2–5)
HDLC SERPL-MCNC: 32 MG/DL (ref 40–75)
HDLC SERPL: 27.1 % (ref 20–50)
LDLC SERPL CALC-MCNC: 60.6 MG/DL (ref 63–159)
NONHDLC SERPL-MCNC: 86 MG/DL
TRIGL SERPL-MCNC: 127 MG/DL (ref 30–150)

## 2024-11-21 PROCEDURE — 80061 LIPID PANEL: CPT | Performed by: INTERNAL MEDICINE

## 2024-11-21 PROCEDURE — 36415 COLL VENOUS BLD VENIPUNCTURE: CPT | Mod: PO | Performed by: INTERNAL MEDICINE

## 2024-11-21 NOTE — TELEPHONE ENCOUNTER
----- Message from Ml sent at 11/19/2024  2:38 PM CST -----  Regarding: Needs Medical Order Status  Contact: Marie at [employer] [phone]  Type: Needs Medical Order Status  Who Called:  Marie at [employer] [phone] fax #[phone]    Additional Information: calling to see if fax was received for the full glucose kit. Please call and advise. Thank you     blood-glucose meter,continuous Misc 1 each 0 10/17/2024 10/17/2025   Sig - Route: 1 each by Misc.(Non-Drug; Combo Route) route 4 (four) times daily. - Misc.(Non-Drug; Combo Route)   Class: Print

## 2024-11-21 NOTE — TELEPHONE ENCOUNTER
----- Message from Yenni sent at 11/21/2024  2:21 PM CST -----  Contact: Faith  Type:  Needs Medical Advice    Who Called: Faith from [employer]  Symptoms (please be specific): following up on request that was sent for his diabetic medication,Freestyle Malissa.    Would the patient rather a call back or a response via [facility]? call  Best Call Back Number:   Additional Information: please advise and thank you.

## 2024-11-21 NOTE — TELEPHONE ENCOUNTER
----- Message from Lissette sent at 11/21/2024  9:48 AM CST -----  Regarding: Needs return call  Type: Needs Medical Advice  Who Called:  [employer]    Best Call Back Number: [phone] Fax: [phone]    Additional Information: Letting office know they called to follow up cgm request for freestyle faith, wants to confirm if the office got the request for that and the clinic notes from the last 6 months, last message left was on 11/19.

## 2024-11-21 NOTE — TELEPHONE ENCOUNTER
I don't want to use them. Ask the patient if he has spoken with them. I'd [facility] the [facility] to see what is needed to get this covered.

## 2024-11-21 NOTE — TELEPHONE ENCOUNTER
----- Message from Rosemary sent at 11/21/2024  1:50 PM CST -----  Contact: Patient  Type:  Needs Medical Advice    Who Called:  Patient    Pharmacy name and phone #:        [id number] - [place], [place] - [address] AT [facility] [address] [facility] [address]  [address] [facility] [address]  Phone: [phone] Fax: [phone]    Would the patient rather a call back or a response via [facility]?  Call back  Best Call Back Number:    [phone]    Additional Information:   States he would like to speak with someone about the Dexcom system - please call (no texting please) - thank you

## 2024-11-21 NOTE — TELEPHONE ENCOUNTER
Spoke with the patient he states that he spoke with [employer] and they told him that they can get this covered for him so he wants to go through them for his faith -3 .

## 2024-11-22 ENCOUNTER — TELEPHONE (OUTPATIENT)
Dept: FAMILY MEDICINE | Facility: CLINIC | Age: 69
End: 2024-11-22
Payer: MEDICARE

## 2024-11-22 NOTE — TELEPHONE ENCOUNTER
----- Message from Ruma sent at 11/22/2024  9:10 AM CST -----  Regarding: Call back  Type:  Needs Medical Advice    Who Called: Faith/  [employer]    Would the patient rather a call back or a response via [facility]? Call back    Best Call Back Number: [phone] fax [phone]    Additional Information: Office notes and prescription notes were faxed over for free style arron on 11/19/2024 and 11/20/2024.Please sign and return . Thank you

## 2024-11-22 NOTE — TELEPHONE ENCOUNTER
----- Message from Chelita sent at 11/22/2024  1:14 PM CST -----  Regarding: orders for a different glucose monitor  Contact: abelardo from [employer]  Type:  Needs Medical Advice    Who Called: abelardo from [employer]    Would the patient rather a call back or a response via [facility]? Call back    Best Call Back Number: abelardo from [employer] phone     Additional Information:  dexcom prescription was sent to different pharm.  Pt does not want dexcom. Pt requesting [employer] to furnish freestyle faith.  Please fax order

## 2024-11-22 NOTE — TELEPHONE ENCOUNTER
Please have them resend the forms, see if he will need a visit to have them filled out. If you can fill them out and attach notes, great, I will sign when I get back. If not, he will need some type of visit (ask the company if an evisit or a virtual is enough). I will not go back and addend notes to satisfy requirements

## 2024-11-22 NOTE — TELEPHONE ENCOUNTER
Called [employer] [facility] for them to return our call also provided them with correct fax number as we have not even received any paperwork regarding this.

## 2024-11-25 ENCOUNTER — TELEPHONE (OUTPATIENT)
Dept: FAMILY MEDICINE | Facility: CLINIC | Age: 69
End: 2024-11-25
Payer: MEDICARE

## 2024-11-25 ENCOUNTER — PATIENT MESSAGE (OUTPATIENT)
Dept: NEPHROLOGY | Facility: CLINIC | Age: 69
End: 2024-11-25
Payer: MEDICARE

## 2024-11-25 NOTE — TELEPHONE ENCOUNTER
----- Message from Skylar sent at 11/25/2024  9:22 AM CST -----  Contact: Verena [phone]  Verena calling from [employer] in regards to a fax that was sent over for a prescription and recent progress notes.  It should be signed and dated.  CGM usage should be included.  Please initial any mistakes.  Please fax back to [phone].

## 2024-11-26 ENCOUNTER — TELEPHONE (OUTPATIENT)
Dept: FAMILY MEDICINE | Facility: CLINIC | Age: 69
End: 2024-11-26
Payer: MEDICARE

## 2024-11-26 NOTE — TELEPHONE ENCOUNTER
----- Message from Brian sent at 11/25/2024  4:52 PM CST -----  Contact: Marie  Type: Needs Medical Advice    Who Called:  Marie - [employer]    Best Call Back Number: [phone]  Additional Information: Marie is requesting a call back regarding some papers that she states were faxed to the clinic.

## 2024-11-27 ENCOUNTER — PATIENT MESSAGE (OUTPATIENT)
Dept: FAMILY MEDICINE | Facility: CLINIC | Age: 69
End: 2024-11-27
Payer: MEDICARE

## 2024-11-29 ENCOUNTER — TELEPHONE (OUTPATIENT)
Dept: FAMILY MEDICINE | Facility: CLINIC | Age: 69
End: 2024-11-29
Payer: MEDICARE

## 2024-11-29 NOTE — TELEPHONE ENCOUNTER
----- Message from Elizabeth sent at 11/27/2024  4:30 PM CST -----  Contact: self  Type: Needs Medical Advice  Who Called:  pt  Best Call Back Number: [phone]    Additional Information: pt is needing office to call [employer] [phone]

## 2024-11-29 NOTE — TELEPHONE ENCOUNTER
Spoke with the patient, informed him that we have faxed over the documentation asked. Also let him know that I will refax the paperwork for him.

## 2024-12-02 ENCOUNTER — PATIENT MESSAGE (OUTPATIENT)
Dept: NEUROLOGY | Facility: CLINIC | Age: 69
End: 2024-12-02
Payer: MEDICARE

## 2024-12-04 ENCOUNTER — PATIENT MESSAGE (OUTPATIENT)
Dept: ADMINISTRATIVE | Facility: OTHER | Age: 69
End: 2024-12-04
Payer: MEDICARE

## 2024-12-06 ENCOUNTER — PATIENT MESSAGE (OUTPATIENT)
Dept: FAMILY MEDICINE | Facility: CLINIC | Age: 69
End: 2024-12-06
Payer: MEDICARE

## 2024-12-07 NOTE — TELEPHONE ENCOUNTER
"Just an fyi.    Also I did see in pts chart that he stated he will be switching to Dr Cherie Lion in [place] "late January"  "

## 2024-12-09 ENCOUNTER — PATIENT MESSAGE (OUTPATIENT)
Dept: FAMILY MEDICINE | Facility: CLINIC | Age: 69
End: 2024-12-09
Payer: MEDICARE

## 2024-12-12 ENCOUNTER — OFFICE VISIT (OUTPATIENT)
Dept: PAIN MEDICINE | Facility: CLINIC | Age: 69
End: 2024-12-12
Payer: MEDICARE

## 2024-12-12 VITALS
SYSTOLIC BLOOD PRESSURE: 116 MMHG | HEART RATE: 72 BPM | DIASTOLIC BLOOD PRESSURE: 77 MMHG | WEIGHT: 192 LBS | HEIGHT: 71 IN | BODY MASS INDEX: 26.88 KG/M2

## 2024-12-12 DIAGNOSIS — M47.816 LUMBAR SPONDYLOSIS: ICD-10-CM

## 2024-12-12 DIAGNOSIS — M51.360 DISCOGENIC LOW BACK PAIN: ICD-10-CM

## 2024-12-12 DIAGNOSIS — M54.9 DORSALGIA, UNSPECIFIED: Primary | ICD-10-CM

## 2024-12-12 PROCEDURE — 99214 OFFICE O/P EST MOD 30 MIN: CPT | Mod: PBBFAC,PO | Performed by: ANESTHESIOLOGY

## 2024-12-12 PROCEDURE — 99999 PR PBB SHADOW E&M-EST. PATIENT-LVL IV: CPT | Mod: PBBFAC,,, | Performed by: ANESTHESIOLOGY

## 2024-12-12 RX ORDER — GABAPENTIN 300 MG/1
300 CAPSULE ORAL 2 TIMES DAILY
Qty: 60 CAPSULE | Refills: 1 | Status: SHIPPED | OUTPATIENT
Start: 2024-12-12 | End: 2025-02-10

## 2024-12-12 NOTE — PROGRESS NOTES
"[facility] Follow Up Evaluation      Referred by: Matt Velazquez    PCP:     CC:   Chief Complaint   Patient presents with    Low-back Pain          12/12/2024    11:18 AM 10/8/2024     9:16 AM 3/4/2024     8:50 AM   Last 3 PDI Scores   Pain Disability Index (PDI) 36 1 41       Interval HPI 12/12/2024: Glenn Medel returns to the office for follow up.  Today he reports return in his lower back pain.  His pain can range between 3-6/10.  He denies any new numbness or weakness.  Denies any fevers.    HPI:   Glenn Medel is a 69 y.o. male who presents with back pain.  He has a history of chronic lower back pain but over the past month it has been gradually worsening.  Today he reports his pain is 7/10, intermittent, aching, sharp, throbbing, shooting.  He gets some radiating pain around the sides of the lower flanks around the lumbar area.  His pain is worse with sitting, bending, lifting and relieved with rest and lying down.  He denies any numbness or weakness.  No oliver radicular symptoms.      Pain Intervention History:  right-sided L3-4, L4-5, L5-S1 RFA on 09/29/2014 and he had a right-sided L5-S1 and S1 TFESI on 10/27/2014 with outside physician   - s/p bilateral L4/5 and L5/S1 RFA on 06/04/2024 with minimal relief  - s/p viadisc allograft at L4 on 9/30/24    Past Spine Surgical History:      Past and current medications:  Antineuropathics:  NSAIDs:  Physical therapy:  Antidepressants:  Muscle relaxers:  Opioids:  Antiplatelets/Anticoagulants:    History:    Current Outpatient Medications:     allopurinoL (ZYLOPRIM) 300 MG tablet, TAKE 1 TABLET(300 MG) BY MOUTH EVERY DAY, Disp: 90 tablet, Rfl: 0    atorvastatin (LIPITOR) 20 MG tablet, TAKE 1 TABLET(20 MG) BY MOUTH EVERY DAY, Disp: 90 tablet, Rfl: 3    BD HIMANSHU 2ND GEN PEN NEEDLE 32 gauge x 5/32" Ndle, SMARTSIG:Injection Daily, Disp: , Rfl:     blood-glucose meter,continuous Misc, 1 each by Misc.(Non-Drug; Combo Route) route 4 (four) times daily., " "Disp: 1 each, Rfl: 0    brimonidine 0.2% (ALPHAGAN) 0.2 % Drop, INSTILL 1 DROP IN BOTH EYES TWICE DAILY, Disp: 10 mL, Rfl: 3    carbidopa-levodopa  mg (SINEMET)  mg per tablet, TAKE 2 TABLETS BY MOUTH FOUR TIMES DAILY, Disp: 736 tablet, Rfl: 12    cholecalciferol, vitamin D3, (D3-2000 ORAL), Take by mouth once daily., Disp: , Rfl:     citalopram (CELEXA) 10 MG tablet, TAKE 1 TABLET(10 MG) BY MOUTH DAILY, Disp: 90 tablet, Rfl: 3    clonazePAM (KLONOPIN) 1 MG tablet, Take 2 tablets (2 mg total) by mouth every evening., Disp: 60 tablet, Rfl: 2    cyanocobalamin (VITAMIN B-12) 1000 MCG tablet, Take 100 mcg by mouth once daily., Disp: , Rfl:     dorzolamide (TRUSOPT) 2 % ophthalmic solution, INSTILL 1 DROP IN BOTH EYES TWICE DAILY, Disp: 10 mL, Rfl: 3    doxycycline (MONODOX) 100 MG capsule, Take 100 mg by mouth 2 (two) times daily., Disp: , Rfl:     fludrocortisone (FLORINEF) 0.1 mg Tab, TAKE 1 TABLET(100 MCG) BY MOUTH DAILY, Disp: 90 tablet, Rfl: 1    insulin glargine U-100, Lantus, (LANTUS SOLOSTAR U-100 INSULIN) 100 unit/mL (3 mL) InPn pen, Inject 5 Units into the skin once daily., Disp: , Rfl:     ketoconazole (NIZORAL) 2 % shampoo, Apply topically., Disp: , Rfl:     metFORMIN (GLUCOPHAGE-XR) 500 MG ER 24hr tablet, TAKE 2 TABLETS(1000 MG) BY MOUTH DAILY. INCREASE DOSE, Disp: 180 tablet, Rfl: 3    pen needle, diabetic 32 gauge x 1/4" Ndle, 1 each by Misc.(Non-Drug; Combo Route) route once daily., Disp: 100 each, Rfl: 1    prazosin (MINIPRESS) 1 MG Cap, TAKE 1 CAPSULE(1 MG) BY MOUTH EVERY EVENING, Disp: 90 capsule, Rfl: 3    ramelteon (ROZEREM) 8 mg tablet, TAKE 1 TABLET(8 MG) BY MOUTH EVERY EVENING, Disp: 90 tablet, Rfl: 3    rasagiline (AZILECT) 1 mg Tab, TAKE 1 TABLET(1 MG) BY MOUTH EVERY EVENING, Disp: 30 tablet, Rfl: 11    tirzepatide 7.5 mg/0.5 mL PnIj, Inject 7.5 mg into the skin every 7 days. (Increased dose), Disp: 2 mL, Rfl: 2    blood-glucose sensor (DEXCOM G7 SENSOR) Daisy, Change sensor every " 10 days to measure blood glucose at least 4x daily., Disp: 9 each, Rfl: 3    gabapentin (NEURONTIN) 300 MG capsule, Take 1 capsule (300 mg total) by mouth 2 (two) times daily., Disp: 60 capsule, Rfl: 1    Past Medical History:   Diagnosis Date    BPH (benign prostatic hyperplasia)     Cataract     done ou    Chronic kidney disease, stage 3 02/25/2015    DDD (degenerative disc disease), lumbar     s/p epidural steroids     Diabetes mellitus     type 2    Elevated PSA     Glaucoma     OU    HTN (hypertension)     Iritis, lens-induced 01/08/2018    Obesity     Parkinson's disease     Sleep apnea     uses cpap       Past Surgical History:   Procedure Laterality Date    CATARACT EXTRACTION W/  INTRAOCULAR LENS IMPLANT Left 11/30/2017    CATARACT EXTRACTION W/  INTRAOCULAR LENS IMPLANT Right 12/11/2018    Dr Garcia    CATARACT EXTRACTION W/  INTRAOCULAR LENS IMPLANT Right 12/11/2018    Procedure: EXTRACTION, CATARACT, WITH IOL INSERTION;  Surgeon: Damaris Garcia MD;  Location: [facility] OR;  Service: Ophthalmology;  Laterality: Right;    COLONOSCOPY N/A 9/18/2017    Procedure: COLONOSCOPY;  Surgeon: Paul Feliz Jr., MD;  Location: [facility] ENDO;  Service: Endoscopy;  Laterality: N/A;    COLONOSCOPY N/A 12/21/2022    Procedure: COLONOSCOPY;  Surgeon: Paul Feliz Jr., MD;  Location: [facility] ENDO;  Service: Endoscopy;  Laterality: N/A;    COLONOSCOPY W/ POLYPECTOMY  04/13/2010    YARELI.   One 1 cm polyp in the sigmoid colon.  Internal hemorrhoids.    COSMETIC SURGERY      DIGITAL RECTAL EXAMINATION UNDER ANESTHESIA N/A 7/23/2019    Procedure: EXAM UNDER ANESTHESIA, DIGITAL, RECTUM;  Surgeon: Kaz Keating MD;  Location: [facility] OR;  Service: General;  Laterality: N/A;    EXAMINATION UNDER ANESTHESIA N/A 4/24/2019    Procedure: EXAM UNDER ANESTHESIA;  Surgeon: Kaz Keating MD;  Location: [facility] OR;  Service: General;  Laterality: N/A;    EXCISIONAL HEMORRHOIDECTOMY N/A 4/24/2019    Procedure: HEMORRHOIDECTOMY  (lithotomy);  Surgeon: Kaz Keating MD;  Location: [facility] OR;  Service: General;  Laterality: N/A;    GANGLION CYST EXCISION Left     INJECTION OF ANESTHETIC AGENT AROUND MEDIAL BRANCH NERVES INNERVATING LUMBAR FACET JOINT Bilateral 4/9/2024    Procedure: Block-nerve-medial branch-lumbar-L4/5-L5/S1;  Surgeon: Everett Taveras MD;  Location: [facility] OR;  Service: Pain Management;  Laterality: Bilateral;    INJECTION OF ANESTHETIC AGENT AROUND MEDIAL BRANCH NERVES INNERVATING LUMBAR FACET JOINT Bilateral 5/6/2024    Procedure: Block-nerve-medial branch-lumbar-L4/5-L5/S1;  Surgeon: Everett Taveras MD;  Location: [facility] OR;  Service: Pain Management;  Laterality: Bilateral;    INJECTION OF ANESTHETIC AGENT AROUND PUDENDAL NERVE N/A 4/24/2019    Procedure: BLOCK, NERVE, PUDENDAL;  Surgeon: Kaz Keating MD;  Location: [facility] OR;  Service: General;  Laterality: N/A;    INJECTION OF ANESTHETIC AGENT AROUND PUDENDAL NERVE N/A 7/23/2019    Procedure: BLOCK, NERVE, PUDENDAL;  Surgeon: Kaz Keating MD;  Location: [facility] OR;  Service: General;  Laterality: N/A;    INJECTION, ALLOGRAFT, INTERVERTEBRAL DISC N/A 9/30/2024    Procedure: INJECTION,ALLOGRAFT,INTERVERTEBRAL DISC,1ST LEVEL  L4/5;  Surgeon: Everett Taveras MD;  Location: [facility] OR;  Service: Pain Management;  Laterality: N/A;    RADIOFREQUENCY ABLATION OF LUMBAR MEDIAL BRANCH NERVE AT SINGLE LEVEL Bilateral 6/4/2024    Procedure: Radiofrequency Ablation, Nerve, Spinal, Lumbar, Medial Branch, L4/5 and L5/S1;  Surgeon: Everett Taveras MD;  Location: [facility] OR;  Service: Pain Management;  Laterality: Bilateral;    REPAIR OF RETINAL DETACHMENT WITH VITRECTOMY Right 8/1/2018    Procedure: REPAIR, RETINAL DETACHMENT, WITH VITRECTOMY;  Surgeon: SAJAN Pulido MD;  Location: [facility] OR [address] [place] [address];  Service: Ophthalmology;  Laterality: Right;  35 min    TONSILLECTOMY      TRANSRECTAL BIOPSY OF PROSTATE WITH ULTRASOUND GUIDANCE N/A 1/31/2023    Procedure: BIOPSY, PROSTATE,  RECTAL APPROACH, WITH US GUIDANCE;  Surgeon: MARIZA Donahue MD;  Location: [facility];  Service: Urology;  Laterality: N/A;    TRANSRECTAL BIOPSY OF PROSTATE WITH ULTRASOUND GUIDANCE N/A 4/12/2024    Procedure: BIOPSY, PROSTATE, RECTAL APPROACH, WITH US GUIDANCE;  Surgeon: MARIZA Donahue MD;  [facility];  Service: Urology;  Laterality: N/A;    TRIGGER FINGER RELEASE Right     X 2    uro lift      for enlarged prostate       Family History   Problem Relation Name Age of Onset    Hypertension Mother      Diabetes Mother      Stroke Mother      Cancer Mother          colon    Kidney disease Mother      Heart disease Father      Diabetes Father      Colon cancer Father      Cancer Father          colon    Cataracts Father      No Known Problems Sister      Heart disease Brother  62    Kidney disease Brother      Dementia Brother Rodrigue     Abnormal EKG Brother Rodrigue     No Known Problems Maternal Aunt      No Known Problems Maternal Uncle      No Known Problems Paternal Aunt      No Known Problems Paternal Uncle      Glaucoma Maternal Grandmother      No Known Problems Maternal Grandfather      No Known Problems Paternal Grandmother      No Known Problems Paternal Grandfather      Amblyopia Neg Hx      Blindness Neg Hx      Macular degeneration Neg Hx      Retinal detachment Neg Hx      Strabismus Neg Hx      Thyroid disease Neg Hx      Parkinsonism Neg Hx         Social History     Socioeconomic History    Marital status:    Tobacco Use    Smoking status: Never    Smokeless tobacco: Never   Substance and Sexual Activity    Alcohol use: Yes     Comment: very rare    Drug use: No    Sexual activity: Yes     Partners: Female   Social History Narrative            2 grown kids.         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      Social Drivers of Health     Financial Resource Strain: Patient Declined (1/29/2024)    Overall Financial Resource Strain (CARDIA)     Difficulty of  "Paying Living Expenses: Patient declined   Food Insecurity: Patient Declined (1/29/2024)    Hunger Vital Sign     Worried About Running Out of Food in the Last Year: Patient declined     Ran Out of Food in the Last Year: Patient declined   Transportation Needs: Patient Declined (1/29/2024)    PRAPARE - Transportation     Lack of Transportation (Medical): Patient declined     Lack of Transportation (Non-Medical): Patient declined   Physical Activity: Sufficiently Active (1/29/2024)    Exercise Vital Sign     Days of Exercise per Week: 4 days     Minutes of Exercise per Session: 80 min   Stress: Patient Declined (1/29/2024)    [employer] [place] [employer] - Occupational Stress Questionnaire     Feeling of Stress : Patient declined   Housing Stability: Patient Declined (1/29/2024)    Housing Stability Vital Sign     Unable to Pay for Housing in the Last Year: Patient declined     Number of Places Lived in the Last Year: 1     Unstable Housing in the Last Year: Patient declined       Review of patient's allergies indicates:   Allergen Reactions    Avelox [moxifloxacin] Hives and Rash       Review of Systems:  12 point review of systems is negative.    Physical Exam:  Vitals:    12/12/24 1121   BP: 116/77   Pulse: 72   Weight: 87.1 kg (192 lb 0.3 oz)   Height: 5' 11" (1.803 m)   PainSc:   3   PainLoc: Back     Body mass index is 26.78 kg/m².    Gen: NAD  Psych: mood appropriate for given condition  HEENT: eyes anicteric   CV: RRR  HEENT: anicteric   Respiratory: non-labored, no signs of respiratory distress  Abd: non-distended  Skin: warm, dry and intact.  Gait: No antalgic gait.     Increased pain with forward flexion    Sensory:  Intact and symmetrical to light touch in L1-S1 dermatomes bilaterally.    Motor:       Right Left   L2/3 Iliacus Hip flexion  5  5   L3/4 Qudratus Femoris Knee Extension  5  5   L4/5 Tib Anterior Ankle Dorsiflexion   5  5   L5/S1 Extensor Hallicus Longus Great toe extension  5  5 "   S1/S2 Gastroc/Soleus Plantar Flexion  5  5      Right Left                  Patellar DTR 2+ 2+   Achilles DTR 2+ 2+                      Imaging:  Xray lumbar spine 4/6/23  FINDINGS:  There are degenerative changes of the lumbar spine.  Vertebral body height is maintained.  The adjacent soft tissues are unremarkable.    MRI lumbar spine 5/16/23  FINDINGS:  Alignment: Normal.     Vertebrae: No acute fracture or marrow replacement.  2 cm vertebral body hemangioma at L4.     Discs: Preserved disc heights.  Mild disc desiccation at several lower lumbar levels.     Cord: Normal.  Conus terminates at L2.     Paraspinal muscles & soft tissues: There are T2 hyperintense lesions along each renal cortex, possibly cysts but incompletely characterized.     Degenerative findings:  T12-L1:  L1-L2:  L2-L3: Minimal bilateral facet degenerative change.  Minimal posterior disc bulge.  Minimal bilateral facet degenerative change.  L3-L4: Minimal broad-based posterior disc bulge.  Bilateral facet degenerative change.  L4-L5: Minimal broad-based posterior disc bulge with left foraminal zone disc fissure.  Mild bilateral facet degenerative change.  Minimal spinal canal stenosis and right neural foraminal narrowing.  L5-S1: Mild broad-based posterior disc bulge.  Mild right facet degenerative change.  Mild right neural foraminal narrowing.    Labs:  Lab Results   Component Value Date    HGBA1C 6.3 (H) 09/04/2024       Lab Results   Component Value Date    WBC 9.07 03/11/2024    HGB 16.1 03/11/2024    HCT 51.0 03/11/2024    MCV 88 03/11/2024     03/11/2024       Assessment:   Problem List Items Addressed This Visit    None  Visit Diagnoses       Dorsalgia, unspecified    -  Primary    Relevant Medications    gabapentin (NEURONTIN) 300 MG capsule    Other Relevant Orders    MRI Lumbar Spine Without Contrast    Discogenic low back pain        Lumbar spondylosis                  69 y.o. year old male with PMH parkinsonism, HTN, BPH,  CKD who presents with back pain.  He has a history of chronic lower back pain but over the past month it has been gradually worsening.  Today he reports his pain is 7/10, intermittent, aching, sharp, throbbing, shooting.  He gets some radiating pain around the sides of the lower flanks around the lumbar area.  His pain is worse with sitting, bending, lifting and relieved with rest and lying down.  He denies any numbness or weakness.  No oliver radicular symptoms.      12/12/24 - Glenn Medel returns to the office for follow up.  Today he reports return in his lower back pain.  His pain can range between 3-6/10.  He denies any new numbness or weakness.  Denies any fevers.    - on exam he has full strength in his lower extremities.  Intact sensation to light touch bilateral L2-S1  - I independently reviewed his previous lumbar MRI from May 2023 and he has multilevel bilateral facet arthropathy.  He has mild disc desiccation at multiple levels in the lumbar spine.  L4-5 had left foraminal zone disc fissure.  - over the past 12 months he has done formal physical therapy.  He continues to report significant lower back pain that he feels like is limiting his mobility and interfering with the quality of his life  - I independently reviewed his most recent thoracolumbar x-ray and there was no evidence of bony abnormality  - I am going to get an updated lumbar MRI to look for any interval changes to his neural anatomy  - I have also prescribed him gabapentin to start for any neuropathic component of his pain.  - we will call him once his imaging is complete to discuss further treatment options      : Not applicable      This note was completed with dictation software and grammatical errors may exist.

## 2024-12-13 ENCOUNTER — OFFICE VISIT (OUTPATIENT)
Dept: NEUROLOGY | Facility: CLINIC | Age: 69
End: 2024-12-13
Payer: MEDICARE

## 2024-12-13 ENCOUNTER — PATIENT MESSAGE (OUTPATIENT)
Dept: NEUROLOGY | Facility: CLINIC | Age: 69
End: 2024-12-13

## 2024-12-13 DIAGNOSIS — G20.C PARKINSONISM, UNSPECIFIED PARKINSONISM TYPE: Primary | ICD-10-CM

## 2024-12-13 DIAGNOSIS — I95.1 ORTHOSTATIC HYPOTENSION: ICD-10-CM

## 2024-12-13 PROCEDURE — 99214 OFFICE O/P EST MOD 30 MIN: CPT | Mod: 95,,, | Performed by: PSYCHIATRY & NEUROLOGY

## 2024-12-13 NOTE — PROGRESS NOTES
The patient location is: HOME  The chief complaint leading to visit is: iPD  1. Parkinsonism, unspecified Parkinsonism type        2. Orthostatic hypotension          Visit type: Virtual visit with synchronous audio and video    Face to Face time with patient: 20mins  20 minutes of total time spent on the encounter, which includes face to face time and non-face to face time preparing to see the patient (eg, review of tests), Obtaining and/or reviewing separately obtained history, Documenting clinical information in the electronic or other health record, Independently interpreting results (not separately reported) and communicating results to the patient/family/caregiver, or Care coordination (not separately reported).     Each patient to whom he or she provides medical services by telemedicine is:  (1) informed of the relationship between the physician and patient and the respective role of any other health care provider with respect to management of the patient; and (2) notified that he or she may decline to receive medical services by telemedicine and may withdraw from such care at any time.      Notes:   Glenn STOCKTON Chief Complaints during this visit:  New Patient visit for  Parkinsonism.   No referring provider defined for this encounter.    Primary Care Physician  Cherie Lion,   [id number] [facility] [place] [address]    Interval Hx    Since last visit,   I put him on midodrine - he is unsure if he's on this  PCP put him on florinef  No more orthostasis    Took a fall in the driveway    Doing well PD wise    Carbidopa/levodopa 25/100mg 2 tabs four times daily  Some facial dyskinesias  Some offtime gaps    REM sleep dz sporadic now- once every 3 weeks  Take clonazepam 1.5mg QHS at 9PM  Stopped using CPAP    Takes azilect 1mg QHS    Falls: none  Assist Device: none  Walks 2 miles without stopping  With stairs he must hand onto railing    MRI brain revealed a possible posterior fossa arachnoid cyst -  "followed by Dr. Saldivar - carly per last scan  Also having Lspine MRI through pain management soon    Brother with dementia since age 60 - mild hand tremors and some issues walking  2 sisters/2 brothers healthy  Mother with dementia at age 80 - also hand hand dystonia  Father also reported fingers getting stuck    Ceruloplasmin NL  Neuropsych revealed mild cognitive impairments but no diagnosis    PD Review of Symptoms:  Anosmia: some stable  Dysarthria/Hypophonia: none  Dysphagia/Sialorrhea: none  Depression: yes  Cognitive slowing: mild attention issues - replaced CPAP  Hallucinations: none  Urinary changes: none  Constipation: yes  Orthostasis: at times  Falls: occasional  Freezing: none  Micrographia: none  Sleep issues:  -RBD: 1 year of REM sleep dz    "History of present illness:   69 y.o.  male seen in consultation at the request of  Dr. Martinez for evaluation of parkinsonism.      Falls often, onto knee.  Right hand will suddenly start shaking.  Memory a little worse.  Needs new mask for his LOVE, hasn't used in a while.  Not sure who to see.    Has DM, had an emg about 2 years ago.      1/20/20 Juan:  Glenn Medel is a 64 y.o. right-handed male with DM2, HTN, LOVE, anxiety and depression who presents today for an initial evaluation of tremos and balance and is accompanied by wife.    Tremor- started in RH a couple of years ago but seemed to have gotten worse. Will watch TV and ev hands shake. Started noting in LH in the last year. Also notes shaking in legs for several years. He says the legs in fact started before the hands. Nothing makes it better. Not noted any aggravating factors. Drinks very little alcohol. Has not noted benefit with alcohol. Handwriting is pretty good. No changes with this.    Balance- sometimes will be working on knees and then fall back when he goes to get up. Sometimes with walking, he will wobble. No falls while walking. Has fallen backwards when he is trying to get up from being " "on knees. Has noted balance issues the past couple of months. Has not noted progression.     Acting out dreams. Not nightly but is significant when does happen. He actually ended up in hospital in the last year because of fighting in sleep. Has LOVE, used to wear CPAP and wife wonders if this helped when he was wearing this. He says he will wear it a couple of days, aggravates him and then he will not use for month or longer. He had sleep doctor, Dr. Hinton, but dismissed him as she did everything she could do for him.    Denies stiffness in muscles.   Sometimes feels slow with walking. Wife says he is slower to answer questions.   Memory is getting worse. Been bad couple of years but worse the past year. Wife agrees but attributes some of this to intermediate- lazier life.    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   He left on his terms. Was not asked to leave. "      II.  Review of systems:  As in HPI,  otherwise, balance 10 systems reviewed and are negative.    III.  Past Medical History:   Diagnosis Date    BPH (benign prostatic hyperplasia)     Cataract     done ou    Chronic kidney disease, stage 3 02/25/2015    DDD (degenerative disc disease), lumbar     s/p epidural steroids     Diabetes mellitus     type 2    Elevated PSA     Glaucoma     OU    HTN (hypertension)     Iritis, lens-induced 01/08/2018    Obesity     Parkinson's disease     Sleep apnea     uses cpap     Family History   Problem Relation Name Age of Onset    Hypertension Mother      Diabetes Mother      Stroke Mother      Cancer Mother          colon    Kidney disease Mother      Heart disease Father      Diabetes Father      Colon cancer Father      Cancer Father          colon    Cataracts Father      No Known Problems Sister      Heart disease Brother  62    Kidney disease Brother      Dementia Brother Rodrigue     Abnormal EKG Brother Rodrigue     No Known Problems Maternal Aunt      No " Known Problems Maternal Uncle      No Known Problems Paternal Aunt      No Known Problems Paternal Uncle      Glaucoma Maternal Grandmother      No Known Problems Maternal Grandfather      No Known Problems Paternal Grandmother      No Known Problems Paternal Grandfather      Amblyopia Neg Hx      Blindness Neg Hx      Macular degeneration Neg Hx      Retinal detachment Neg Hx      Strabismus Neg Hx      Thyroid disease Neg Hx      Parkinsonism Neg Hx       Social history:  , retired.      Current Outpatient Medications on File Prior to Visit   Medication Sig Dispense Refill    blood-glucose meter,continuous Misc 1 each by Misc.(Non-Drug; Combo Route) route 4 (four) times daily. 1 each 0    brimonidine 0.2% (ALPHAGAN) 0.2 % Drop INSTILL 1 DROP IN BOTH EYES TWICE DAILY 10 mL 3    carbidopa-levodopa  mg (SINEMET)  mg per tablet TAKE 2 TABLETS BY MOUTH FOUR TIMES DAILY 736 tablet 12    cholecalciferol, vitamin D3, (D3-2000 ORAL) Take by mouth once daily.      cyanocobalamin (VITAMIN B-12) 1000 MCG tablet Take 100 mcg by mouth once daily.      dorzolamide (TRUSOPT) 2 % ophthalmic solution INSTILL 1 DROP IN BOTH EYES TWICE DAILY 10 mL 3    gabapentin (NEURONTIN) 300 MG capsule Take 1 capsule (300 mg total) by mouth 2 (two) times daily. 60 capsule 1    ketoconazole (NIZORAL) 2 % shampoo Apply topically.      rasagiline (AZILECT) 1 mg Tab TAKE 1 TABLET(1 MG) BY MOUTH EVERY EVENING 30 tablet 11    [DISCONTINUED] allopurinoL (ZYLOPRIM) 300 MG tablet TAKE 1 TABLET(300 MG) BY MOUTH EVERY DAY 90 tablet 0    [DISCONTINUED] atorvastatin (LIPITOR) 20 MG tablet TAKE 1 TABLET(20 MG) BY MOUTH EVERY DAY 90 tablet 3    [DISCONTINUED] blood-glucose sensor (DEXCOM G7 SENSOR) Daisy Change sensor every 10 days to measure blood glucose at least 4x daily. 9 each 3    [DISCONTINUED] citalopram (CELEXA) 10 MG tablet TAKE 1 TABLET(10 MG) BY MOUTH DAILY 90 tablet 3    [DISCONTINUED] clonazePAM (KLONOPIN) 1 MG tablet Take 2  "tablets (2 mg total) by mouth every evening. 60 tablet 2    [DISCONTINUED] doxycycline (MONODOX) 100 MG capsule Take 100 mg by mouth 2 (two) times daily. (Patient not taking: Reported on 12/16/2024)      [DISCONTINUED] fludrocortisone (FLORINEF) 0.1 mg Tab TAKE 1 TABLET(100 MCG) BY MOUTH DAILY 90 tablet 1    [DISCONTINUED] insulin glargine U-100, Lantus, (LANTUS SOLOSTAR U-100 INSULIN) 100 unit/mL (3 mL) InPn pen Inject 5 Units into the skin once daily.      [DISCONTINUED] metFORMIN (GLUCOPHAGE-XR) 500 MG ER 24hr tablet TAKE 2 TABLETS(1000 MG) BY MOUTH DAILY. INCREASE DOSE 180 tablet 3    [DISCONTINUED] pen needle, diabetic 32 gauge x 1/4" Ndle 1 each by Misc.(Non-Drug; Combo Route) route once daily. 100 each 1    [DISCONTINUED] prazosin (MINIPRESS) 1 MG Cap TAKE 1 CAPSULE(1 MG) BY MOUTH EVERY EVENING 90 capsule 3    [DISCONTINUED] ramelteon (ROZEREM) 8 mg tablet TAKE 1 TABLET(8 MG) BY MOUTH EVERY EVENING 90 tablet 3    [DISCONTINUED] tirzepatide 7.5 mg/0.5 mL PnIj Inject 7.5 mg into the skin every 7 days. (Increased dose) 2 mL 2     No current facility-administered medications on file prior to visit.       PRIOR problem-specific medications tried:  none    Review of patient's allergies indicates:   Allergen Reactions    Avelox [moxifloxacin] Hives and Rash       IV. Physical Exam    Physical Exam  Constitutional: Well-developed, well-nourished, appears stated age  Eyes: No scleral icterus  ENT: Moist oral mucosa  Cardiovascular: No lower extremity edema   Respiratory: No labored breathing   Skin: No rash   Hematologic: No bruising    Other: GI/ deferred   Mental status: Alert and oriented to person, place, time, and situation;   follows commands  Speech: normal (not dysarthric), no aphasia  Cranial nerves:            CN II: Pupils mid-position and equal, not tested light or accommodation  CN III, IV, VI: Extraocular movements full, no nystagmus visualized  CN V: Not tested   CN VII: Face strong and symmetric " "bilaterally   CN VIII: Hearing intact to voice and conversation   CN IX, X: Palate raises midline and symmetric   CN XI: Strong shoulder shrug B/L  CN XII: Tongue appears midline   Motor: Normal bulk by appearance, no drift   Sensory: Not tested    Gait: Can stand on either leg- barely a shuffle  Deep tendon reflexes: Not tested  Movement/Coordination                    Mod hypophonic speech.                     Mod facial masking.    Mild orofacial dyskinesias                    No other dystonia, chorea, athetosis, myoclonus, or tics visualized.    Bradykinesia    ? Finger taps Finger flicks DAVE Heel taps   Left 0 0 0 0   Right 1+ 1+ 0 0   __________________________________________________________      MOTOR EXAMINATION (ON)       Speech  1 - Slight loss of expression, dictation, and/or volumn.   Facial Expression  1 - Minimal Hypomimia, could be normal "Poker Face".   Tremor at Rest:      Face, lips, chin 0 - Absent.    Hands:      right 0 - Absent.    left 0 - Absent.    Feet:      right 1   left 1   Action or Postural Tremor of Hands      right 1 - Slight; present with action.   left 1 - Slight; present with action.                           Finger Taps      right 0 - Normal.   left 0 - Normal.   Hand Movements      right 0 - Normal.   left 0 - Normal.   Rapid Alternating Movements of Hands      right 0 - Normal.   left 1   Leg Agility      right 0 - Normal.   left 1 - Mild slowing and/or reduction in amplitude.   Arising from Chair  1   Posture  1 - Not quite erect, slightly stooped posture; could be normal for older person.   Gait  0 - Normal.       Body Bradykinesia and Hypokinesia (Combining slowness, hesitancy, decreased armswing, small amplitude, and poverty of movement in general)  1 - Minimal slowness, giving movement a deliberate character; could be normal for some persons. Possibly reduced amplitude.     11      V.  Laboratory/ Radiological Data: (Personally reviewed images)         MRI " "1/29/20  Impression       1. There is no acute abnormality.  There is mild volume loss without significant white matter disease.  There is no hemorrhage, parenchymal mass or acute infarction.  There is a probable arachnoid cyst in the right posterior fossa.  Please see above discussion.     Memory/Encephalopathy Labs:  Lab Results   Component Value Date    TSH 0.976 01/06/2020    [id number] 391 05/14/2020    THIAMINEBLOO 59 05/14/2020     LGi and CASPR  No results found for: "ENCESLGI1", "ENCESCASPR2"    Movement Labs:  Lab Results   Component Value Date    [id number] 51 09/28/2022    CALCIUM 9.1 09/04/2024    PTH 53.7 09/04/2024    ALBUMIN 3.4 (L) 09/04/2024    BILITOT 0.7 08/12/2024    BILIDIR 0.2 06/06/2024    ALT <5 08/12/2024    AST 24 08/12/2024    ALKPHOS 101 08/12/2024    CERULOPLSM 29.0 08/26/2020    FERRITIN 88 03/29/2021    ALPHATOCOPHE 1275 05/14/2020       IRON STUDIES:  Lab Results   Component Value Date    IRON 60 03/29/2021    TRANSFERRIN 243 03/29/2021    TIBC 360 03/29/2021    FESATURATED 17 (L) 03/29/2021       Malignancy Labs:  Lab Results   Component Value Date    PSADIAG 4.3 (H) 10/17/2024     PARANEOPLASTIC PANEL:  No results found for: "NMOINTERPRET", "REFLEXTEST", "CRMP5", "STMAB", "LABACHR", "ACHRGANGLION", "NEUROVG"    CSF:  No results found for: "CSFWBC", "CSFRBC", "LYMPHS", "MONOMACCSF", "PROTEINCSF", "GLUCCSF", "GADCSF", "LUME", "MYELINPROT"  Flow cytometry: No results found for: "CSFC"  [place]: No results found for: "WESTNILEIGGA"    CNS Demyelinating Labs:  Nmo serum, csf igg:  No results found for: "NMOFSFACSS", "NMOFCFACSC"  No results found for: "JCVIRUS"    CSF oligoclonal bands:  No results found for: "CSFB", "CSFOLI", "SERB", "IGGINDEXCSF", "IGGCSF", "ALBQ", "GALB", "IGGSYNRATE", "IGGS", "ALBSER", "IGGALBS"    Myopathy labs:  No results found for: "CPK", "ALDOLASE", "AMMONIA", "ACFCRATIO", "ACETYLCARNIT", "CARNITINEPLA", "LACTATE"    Myasthenia panel:  No results found for: " ""LABACHR"  No results found for: "ACETMOD"  No results found for: "STMAB"   No results found for: "ACHRGANGLION"    Neuropathy Labs:  Lab Results   Component Value Date    [id number] 391 05/14/2020    HGBA1C 6.3 (H) 09/04/2024     SPEP:  Lab Results   Component Value Date    PROTEINSERUM 6.7 07/27/2015    PATHINTPSPE REVIEWED 07/27/2015       Rheum labs:  Lab Results   Component Value Date    URICACID 4.5 09/28/2022       Neuropsych"  Mr. Medel is a 64 year old male with cognitive complaints over the past several years. He has a slightly ataxic gait of unclear etiology, but no clear parkinsonism. He has symptoms suggestive of REM sleep behavior disorder over the past year. He denied presence, passage, or recurrent visual hallucinations. He remains functionally independent, albeit more error prone than in the past. His history is remarkable for multiple vascular risk factors, including currently untreated sleep apnea.      His neuropsychological profile was largely within normal limits with the exception of mild memory inefficiencies, primarily related to encoding and cognitive organization/retrieval. His test scores and functioning are not at the level to warrant a cognitive diagnosis at this time. He did not present with the type of oliver forgetting seen in Alzheimer's disease. He also did not demonstrate the type of visuospatial dysfunction often seen early in Lewy Body Dementia, which is also in keeping with the fact that he does not have visual hallucinations. It will be important to track his cognition over time as mild cognitive changes in the setting of ataxia of unclear etiology and possible REM sleep behavior disorder could be indicative of a neurodegenerative condition. Otherwise, his mild cognitive weaknesses could simply be attributable to mild cerebrovascular disease.      Mr. Medel also reported clinically significant anxiety and depression. The transition to nursing home has been admittedly more " "challenging than expected. His wife has also noticed reduced frustration tolerance with an increased tendency to perseverate on his frustrations, which has led to multiple confrontations. Treatment is indicated. "    MRI brain 2020      VI. Assessment and Plan            Problem List Items Addressed This Visit          Neuro    Parkinsonism - Primary (Chronic)    Current Assessment & Plan     Typical  with some nonmotor features.    Orthostasis is new    Appears to have falls during offtime gaps  Suggsted try  Carbidopa/levodopa 25/100mg 2 tab PO 5x daily  PT            Cardiac/Vascular    Orthostatic hypotension    Current Assessment & Plan     Dizziness on standig at times  He's unclear if he's on florinef or midodrine or both  He will call back witth this  information                    Coni Cee MD, MS  [facility]  Department of Neurology  Movement Disorders        "

## 2024-12-13 NOTE — TELEPHONE ENCOUNTER
Care Due:                  Date            Visit Type   Department     Provider  --------------------------------------------------------------------------------                                EP -                              PRIMARY      [facility] FAMILY  Last Visit: 10-      CARE ([facility])   MEDICINE       Power Benton  Next Visit: None Scheduled  None         None Found                                                            Last  Test          Frequency    Reason                     Performed    Due Date  --------------------------------------------------------------------------------    CBC.........  12 months..  allopurinoL..............  03- 03-    HBA1C.......  6 months...  insulin, metFORMIN,        09- 03-                             tirzepatide..............    Uric Acid...  12 months..  allopurinoL..............  Not Found    Overdue    [employer] Embedded Care Due Messages. Reference number: [id number].   12/13/2024 4:08:49 PM CST

## 2024-12-14 RX ORDER — PEN NEEDLE, DIABETIC 32GX 5/32"
NEEDLE, DISPOSABLE MISCELLANEOUS
Qty: 100 EACH | Refills: 3 | Status: SHIPPED | OUTPATIENT
Start: 2024-12-14 | End: 2024-12-16 | Stop reason: SDUPTHER

## 2024-12-14 NOTE — TELEPHONE ENCOUNTER
Provider Staff:  Action required for this patient    Requires labs      Please see care gap opportunities below in Care Due Message.    Thanks!  [facility]     Appointments      Date Provider   Last Visit   10/17/2024 Power Benton,    Next Visit   Visit date not found Power Benton, DO     Refill Decision Note   Glenn Medel  is requesting a refill authorization.  Brief Assessment and Rationale for Refill:  Approve     Medication Therapy Plan:         Comments:     Note composed:9:12 AM 12/14/2024

## 2024-12-16 ENCOUNTER — LAB VISIT (OUTPATIENT)
Dept: LAB | Facility: HOSPITAL | Age: 69
End: 2024-12-16
Attending: DIETITIAN, REGISTERED
Payer: MEDICARE

## 2024-12-16 ENCOUNTER — PATIENT MESSAGE (OUTPATIENT)
Dept: NEUROLOGY | Facility: CLINIC | Age: 69
End: 2024-12-16
Payer: MEDICARE

## 2024-12-16 ENCOUNTER — OFFICE VISIT (OUTPATIENT)
Dept: FAMILY MEDICINE | Facility: CLINIC | Age: 69
End: 2024-12-16
Payer: MEDICARE

## 2024-12-16 ENCOUNTER — PATIENT MESSAGE (OUTPATIENT)
Dept: FAMILY MEDICINE | Facility: CLINIC | Age: 69
End: 2024-12-16

## 2024-12-16 VITALS
SYSTOLIC BLOOD PRESSURE: 128 MMHG | BODY MASS INDEX: 26.36 KG/M2 | WEIGHT: 188.31 LBS | DIASTOLIC BLOOD PRESSURE: 70 MMHG | RESPIRATION RATE: 18 BRPM | HEIGHT: 71 IN | HEART RATE: 79 BPM | TEMPERATURE: 98 F | OXYGEN SATURATION: 97 %

## 2024-12-16 DIAGNOSIS — Z79.4 TYPE 2 DIABETES MELLITUS WITH DIABETIC NEPHROPATHY, WITH LONG-TERM CURRENT USE OF INSULIN: ICD-10-CM

## 2024-12-16 DIAGNOSIS — G47.52 REM SLEEP BEHAVIOR DISORDER: ICD-10-CM

## 2024-12-16 DIAGNOSIS — T14.8XXA SKIN ABRASION: ICD-10-CM

## 2024-12-16 DIAGNOSIS — Z87.39 HISTORY OF GOUT: ICD-10-CM

## 2024-12-16 DIAGNOSIS — E11.21 TYPE 2 DIABETES MELLITUS WITH DIABETIC NEPHROPATHY, WITH LONG-TERM CURRENT USE OF INSULIN: ICD-10-CM

## 2024-12-16 DIAGNOSIS — I95.1 ORTHOSTATIC HYPOTENSION: ICD-10-CM

## 2024-12-16 DIAGNOSIS — F33.0 MILD EPISODE OF RECURRENT MAJOR DEPRESSIVE DISORDER: ICD-10-CM

## 2024-12-16 DIAGNOSIS — F51.5 NIGHTMARES: ICD-10-CM

## 2024-12-16 DIAGNOSIS — E79.0 HYPERURICEMIA: ICD-10-CM

## 2024-12-16 DIAGNOSIS — E78.2 MIXED HYPERLIPIDEMIA: ICD-10-CM

## 2024-12-16 DIAGNOSIS — G20.A2 PARKINSON'S DISEASE WITHOUT DYSKINESIA, WITH FLUCTUATING MANIFESTATIONS: Primary | ICD-10-CM

## 2024-12-16 LAB
ESTIMATED AVG GLUCOSE: 157 MG/DL (ref 68–131)
HBA1C MFR BLD: 7.1 % (ref 4–5.6)

## 2024-12-16 PROCEDURE — 83036 HEMOGLOBIN GLYCOSYLATED A1C: CPT | Performed by: INTERNAL MEDICINE

## 2024-12-16 PROCEDURE — G2211 COMPLEX E/M VISIT ADD ON: HCPCS | Mod: S$PBB,,, | Performed by: DIETITIAN, REGISTERED

## 2024-12-16 PROCEDURE — 36415 COLL VENOUS BLD VENIPUNCTURE: CPT | Mod: PO | Performed by: INTERNAL MEDICINE

## 2024-12-16 PROCEDURE — 99999 PR PBB SHADOW E&M-EST. PATIENT-LVL V: CPT | Mod: PBBFAC,,, | Performed by: DIETITIAN, REGISTERED

## 2024-12-16 PROCEDURE — 99215 OFFICE O/P EST HI 40 MIN: CPT | Mod: PBBFAC,PO | Performed by: DIETITIAN, REGISTERED

## 2024-12-16 PROCEDURE — 99214 OFFICE O/P EST MOD 30 MIN: CPT | Mod: S$PBB,,, | Performed by: DIETITIAN, REGISTERED

## 2024-12-16 RX ORDER — INSULIN GLARGINE 100 [IU]/ML
5 INJECTION, SOLUTION SUBCUTANEOUS DAILY
Start: 2024-12-16 | End: 2025-06-16

## 2024-12-16 RX ORDER — CITALOPRAM 10 MG/1
10 TABLET ORAL DAILY
Qty: 90 TABLET | Refills: 3 | Status: SHIPPED | OUTPATIENT
Start: 2024-12-16

## 2024-12-16 RX ORDER — CLONAZEPAM 1 MG/1
2 TABLET ORAL NIGHTLY
Qty: 60 TABLET | Refills: 2 | Status: SHIPPED | OUTPATIENT
Start: 2024-12-23

## 2024-12-16 RX ORDER — PEN NEEDLE, DIABETIC 30 GX3/16"
1 NEEDLE, DISPOSABLE MISCELLANEOUS DAILY
Qty: 100 EACH | Refills: 3 | Status: SHIPPED | OUTPATIENT
Start: 2024-12-16

## 2024-12-16 RX ORDER — CARBIDOPA AND LEVODOPA 25; 100 MG/1; MG/1
2 TABLET ORAL
Qty: 736 TABLET | Refills: 12 | Status: SHIPPED | OUTPATIENT
Start: 2024-12-16

## 2024-12-16 RX ORDER — MUPIROCIN 20 MG/G
OINTMENT TOPICAL 3 TIMES DAILY
Qty: 15 G | Refills: 0 | Status: SHIPPED | OUTPATIENT
Start: 2024-12-16

## 2024-12-16 RX ORDER — ALLOPURINOL 300 MG/1
300 TABLET ORAL DAILY
Qty: 90 TABLET | Refills: 3 | Status: SHIPPED | OUTPATIENT
Start: 2024-12-16

## 2024-12-16 RX ORDER — RAMELTEON 8 MG/1
8 TABLET ORAL NIGHTLY
Qty: 90 TABLET | Refills: 3 | Status: SHIPPED | OUTPATIENT
Start: 2024-12-16

## 2024-12-16 RX ORDER — ATORVASTATIN CALCIUM 20 MG/1
20 TABLET, FILM COATED ORAL NIGHTLY
Qty: 90 TABLET | Refills: 3 | Status: SHIPPED | OUTPATIENT
Start: 2024-12-16

## 2024-12-16 RX ORDER — FLUDROCORTISONE ACETATE 0.1 MG/1
100 TABLET ORAL EVERY OTHER DAY
Qty: 90 TABLET | Refills: 1 | Status: SHIPPED | OUTPATIENT
Start: 2024-12-16

## 2024-12-16 RX ORDER — INSULIN GLARGINE 100 [IU]/ML
7 INJECTION, SOLUTION SUBCUTANEOUS DAILY
Start: 2024-12-16 | End: 2024-12-16

## 2024-12-16 RX ORDER — PRAZOSIN HYDROCHLORIDE 1 MG/1
1 CAPSULE ORAL NIGHTLY
Qty: 90 CAPSULE | Refills: 3 | Status: SHIPPED | OUTPATIENT
Start: 2024-12-16

## 2024-12-16 RX ORDER — METFORMIN HYDROCHLORIDE 500 MG/1
1000 TABLET, EXTENDED RELEASE ORAL DAILY
Qty: 180 TABLET | Refills: 3 | Status: SHIPPED | OUTPATIENT
Start: 2024-12-16

## 2024-12-16 NOTE — ASSESSMENT & PLAN NOTE
Dizziness on standig at times  He's unclear if he's on florinef or midodrine or both  He will call back witth this  information

## 2024-12-16 NOTE — ASSESSMENT & PLAN NOTE
Typical  with some nonmotor features.    Orthostasis is new    Appears to have falls during offtime gaps  Suggsted try  Carbidopa/levodopa 25/100mg 2 tab PO 5x daily  PT   Yes

## 2024-12-16 NOTE — PROGRESS NOTES
PLAN:    Assessment & Plan    IMPRESSION:  - Reviewed complex medication regimen and identified potential interactions, particularly between Celexa and Azlacet  - Considered reducing gabapentin due to possible association with increased nightmares and fall risk  - Assessed recent steroid injections' impact on glucose levels  - Evaluated prostate cancer status, currently under active surveillance  - Considered adjusting Lantus dosage in light of recent steroid injections and upcoming increase in Mounjaro dose  - Reviewed recent lab results, noting stable renal function and good lipid panel  - Aim to reduce overall medication burden to minimize fall risk and potential interactions  - Send message to Dr. Cee regarding concerns about Celexa and Azlacet interactions, as well as gabapentin and nightmares and Klonopin dose (consider decreasing to 1 mg from 2 mg).    DEPRESSION AND ANXIETY MANAGEMENT:  - Discussed risk of serotonin syndrome with current medication regimen.  - Explained potential interaction between Celexa and Azlacet.  - Patient to assess effectiveness of Celexa in managing depression symptoms.  - Continued Klonopin 2mg, with next refill available after December 26th, consider decreasing dose to 1 mg.  - Continued Prazosin 1mg.    SLEEP DISORDERS:  - Patient to monitor effects of gabapentin on nightmares and pain relief.  - Continued Ramelteon 8mg nightly.  - Contact the office if gabapentin is ineffective or nightmares persist.    ENDOCRINE DISORDERS:  - Educated on the importance of consistent dosing for Cortef (fludrocortisone).  - Changed Cortef (fludrocortisone) 0.1mg to every other day instead of Monday/Wednesday/Friday.  - Continued Lantus insulin, maintaining current dose of 5 units.  - Refilled needles for Lantus insulin administration.  - Ordered A1C test to be done today.    CARDIOVASCULAR MANAGEMENT:  - Continued Lipitor 20mg for cholesterol management.    GOUT MANAGEMENT:  - Continued  "allopurinol 300mg for gout management.    SKIN CARE:  - Ordered Bactroban ointment for potential use on knee abrasion.    FOLLOW-UP:  - Follow up in 3 months (March).        Problem List Items Addressed This Visit       Hyperlipidemia    Relevant Medications    atorvastatin (LIPITOR) 20 MG tablet    REM sleep behavior disorder (Chronic)    Relevant Medications    clonazePAM (KLONOPIN) 1 MG tablet (Start on 12/23/2024)    ramelteon (ROZEREM) 8 mg tablet    Parkinsonism - Primary (Chronic)    Relevant Orders    Ambulatory Referral/Consult to Physical Therapy    History of gout    Relevant Medications    allopurinoL (ZYLOPRIM) 300 MG tablet    Mild episode of recurrent major depressive disorder    Relevant Medications    citalopram (CELEXA) 10 MG tablet    Orthostatic hypotension    Relevant Medications    fludrocortisone (FLORINEF) 0.1 mg Tab    Type 2 diabetes mellitus with diabetic nephropathy, with long-term current use of insulin    Relevant Medications    insulin glargine U-100, Lantus, (LANTUS SOLOSTAR U-100 INSULIN) 100 unit/mL (3 mL) InPn pen    metFORMIN (GLUCOPHAGE-XR) 500 MG ER 24hr tablet    pen needle, diabetic (BD HIMANSHU 2ND GEN PEN NEEDLE) 32 gauge x 5/32" Ndle    tirzepatide 7.5 mg/0.5 mL PnIj    Hyperuricemia    Relevant Medications    allopurinoL (ZYLOPRIM) 300 MG tablet    Nightmares    Relevant Medications    prazosin (MINIPRESS) 1 MG Cap     Other Visit Diagnoses       Skin abrasion        Relevant Medications    mupirocin (BACTROBAN) 2 % ointment          Future Appointments       Date Provider Specialty Appt Notes    1/4/2025  Radiology mri    1/7/2025 Lejeune, Elizabeth B, NP Sleep Medicine wants to discuss [facility]. 1yr f/u    1/13/2025  Lab labs    1/21/2025 Everett Duarte DPM Podiatry 6 mo foot check    3/14/2025 Cherie Lion, DO Family Medicine 3m follow up    5/5/2025  Lab PSA    5/7/2025 MARIZA Donahue MD Urology 6 mo f/u with PSA prior    8/27/2025  Lab [facility]    8/27/2025  Lab " jf           Medication Management for assessment above:   Medication List with Changes/Refills   New Medications    MUPIROCIN (BACTROBAN) 2 % OINTMENT    Apply topically 3 (three) times daily.   Current Medications    BLOOD-GLUCOSE METER,CONTINUOUS MISC    1 each by Misc.(Non-Drug; Combo Route) route 4 (four) times daily.    BRIMONIDINE 0.2% (ALPHAGAN) 0.2 % DROP    INSTILL 1 DROP IN BOTH EYES TWICE DAILY    CARBIDOPA-LEVODOPA  MG (SINEMET)  MG PER TABLET    TAKE 2 TABLETS BY MOUTH FOUR TIMES DAILY    CHOLECALCIFEROL, VITAMIN D3, (D3-2000 ORAL)    Take by mouth once daily.    CYANOCOBALAMIN (VITAMIN B-12) 1000 MCG TABLET    Take 100 mcg by mouth once daily.    DORZOLAMIDE (TRUSOPT) 2 % OPHTHALMIC SOLUTION    INSTILL 1 DROP IN BOTH EYES TWICE DAILY    GABAPENTIN (NEURONTIN) 300 MG CAPSULE    Take 1 capsule (300 mg total) by mouth 2 (two) times daily.    KETOCONAZOLE (NIZORAL) 2 % SHAMPOO    Apply topically.    RASAGILINE (AZILECT) 1 MG TAB    TAKE 1 TABLET(1 MG) BY MOUTH EVERY EVENING   Changed and/or Refilled Medications    Modified Medication Previous Medication    ALLOPURINOL (ZYLOPRIM) 300 MG TABLET allopurinoL (ZYLOPRIM) 300 MG tablet       Take 1 tablet (300 mg total) by mouth once daily.    TAKE 1 TABLET(300 MG) BY MOUTH EVERY DAY    ATORVASTATIN (LIPITOR) 20 MG TABLET atorvastatin (LIPITOR) 20 MG tablet       Take 1 tablet (20 mg total) by mouth every evening.    TAKE 1 TABLET(20 MG) BY MOUTH EVERY DAY    CITALOPRAM (CELEXA) 10 MG TABLET citalopram (CELEXA) 10 MG tablet       Take 1 tablet (10 mg total) by mouth once daily.    TAKE 1 TABLET(10 MG) BY MOUTH DAILY    CLONAZEPAM (KLONOPIN) 1 MG TABLET clonazePAM (KLONOPIN) 1 MG tablet       Take 2 tablets (2 mg total) by mouth every evening.    Take 2 tablets (2 mg total) by mouth every evening.    FLUDROCORTISONE (FLORINEF) 0.1 MG TAB fludrocortisone (FLORINEF) 0.1 mg Tab       Take 1 tablet (100 mcg total) by mouth every other day.    TAKE 1  "TABLET(100 MCG) BY MOUTH DAILY    INSULIN GLARGINE U-100, LANTUS, (LANTUS SOLOSTAR U-100 INSULIN) 100 UNIT/ML (3 ML) INPN PEN insulin glargine U-100, Lantus, (LANTUS SOLOSTAR U-100 INSULIN) 100 unit/mL (3 mL) InPn pen       Inject 5 Units into the skin once daily.    Inject 5 Units into the skin once daily.    METFORMIN (GLUCOPHAGE-XR) 500 MG ER 24HR TABLET metFORMIN (GLUCOPHAGE-XR) 500 MG ER 24hr tablet       Take 2 tablets (1,000 mg total) by mouth once daily.    TAKE 2 TABLETS(1000 MG) BY MOUTH DAILY. INCREASE DOSE    PEN NEEDLE, DIABETIC (BD HIMANSHU 2ND GEN PEN NEEDLE) 32 GAUGE X 5/32" NDLE pen needle, diabetic (BD HIMANSHU 2ND GEN PEN NEEDLE) 32 gauge x 5/32" Ndle       Inject 1 each into the skin once daily.    USE TO INJECT INUSLIN EVERY DAY    PRAZOSIN (MINIPRESS) 1 MG CAP prazosin (MINIPRESS) 1 MG Cap       Take 1 capsule (1 mg total) by mouth every evening.    TAKE 1 CAPSULE(1 MG) BY MOUTH EVERY EVENING    RAMELTEON (ROZEREM) 8 MG TABLET ramelteon (ROZEREM) 8 mg tablet       Take 1 tablet (8 mg total) by mouth nightly.    TAKE 1 TABLET(8 MG) BY MOUTH EVERY EVENING    TIRZEPATIDE 7.5 MG/0.5 ML PNIJ tirzepatide 7.5 mg/0.5 mL PnIj       Inject 7.5 mg into the skin every 7 days. (Increased dose)    Inject 7.5 mg into the skin every 7 days. (Increased dose)   Discontinued Medications    BLOOD-GLUCOSE SENSOR (DEXCOM G7 SENSOR) BRAN    Change sensor every 10 days to measure blood glucose at least 4x daily.    DOXYCYCLINE (MONODOX) 100 MG CAPSULE    Take 100 mg by mouth 2 (two) times daily.    PEN NEEDLE, DIABETIC 32 GAUGE X 1/4" NDLE    1 each by Misc.(Non-Drug; Combo Route) route once daily.       Cherie Lion DO, RDN  ==========================================================================  Subjective:   Patient ID: Glenn Medel is a 69 y.o. male.  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   Chief Complaint: Establish Care      History of Present Illness    CHIEF COMPLAINT:  Patient presents today to establish care.    MUSCULOSKELETAL:  He experiences sharp, stabbing pain in the lower back. He also reports shoulder pain and is attending physical therapy sessions twice weekly.    PARKINSON'S DISEASE:  He was diagnosed with Parkinson's Disease in 2018 and is currently taking Carbidopa/Levodopa. He experiences REM sleep disorder with nightmares, including recent episodes of fighting in dreams. He has a history of falls with the most recent fall occurring yesterday, totaling 4 falls since diagnosis.    DIABETES:  He recently started using Malissa device for glucose monitoring. Blood sugars have been elevated following steroid injections. He is currently taking Lantus insulin with recent dose increase from 10 units.    CANCER HISTORY:  He has prostate cancer, noted to be non-aggressive and currently being monitored. He has a history of two melanomas, with one located on his face in an area where his beard no longer grows. He receives regular monitoring with head and eye treatments every 30 days. Several spots are being monitored for potential malignant transformation.      ROS:  General: -fever, -chills, -fatigue, -weight gain, -weight loss  Eyes: -vision changes, -redness, -discharge  ENT: -ear pain, -nasal congestion, -sore throat  Cardiovascular: -chest pain, -palpitations, -lower extremity edema  Respiratory: -cough, -shortness of breath  Gastrointestinal: -abdominal pain, -nausea, -vomiting, -diarrhea, -constipation, -blood in stool  Genitourinary: -dysuria, -hematuria, -frequency  Musculoskeletal: +joint pain, -muscle pain  Skin: -rash, -lesion  Neurological: -headache, -dizziness, -numbness, -tingling  Psychiatric: -anxiety, -depression, +sleep difficulty      "     Problem List Items Addressed This Visit       Hyperlipidemia    REM sleep behavior disorder (Chronic)    Parkinsonism - Primary (Chronic)    History of gout    Mild episode of recurrent major depressive disorder    Orthostatic hypotension    Type 2 diabetes mellitus with diabetic nephropathy, with long-term current use of insulin    Hyperuricemia    Nightmares     Other Visit Diagnoses       Skin abrasion                 Review of patient's allergies indicates:   Allergen Reactions    Avelox [moxifloxacin] Hives and Rash     Current Outpatient Medications   Medication Instructions    allopurinoL (ZYLOPRIM) 300 mg, Oral, Daily    atorvastatin (LIPITOR) 20 mg, Oral, Nightly    blood-glucose meter,continuous Misc 1 each, Misc.(Non-Drug; Combo Route), 4 times daily    brimonidine 0.2% (ALPHAGAN) 0.2 % Drop INSTILL 1 DROP IN BOTH EYES TWICE DAILY    carbidopa-levodopa  mg (SINEMET)  mg per tablet TAKE 2 TABLETS BY MOUTH FOUR TIMES DAILY    cholecalciferol, vitamin D3, (D3-2000 ORAL) Daily    citalopram (CELEXA) 10 mg, Oral, Daily    [START ON 12/23/2024] clonazePAM (KLONOPIN) 2 mg, Oral, Nightly    cyanocobalamin (VITAMIN B-12) 100 mcg, Daily    dorzolamide (TRUSOPT) 2 % ophthalmic solution INSTILL 1 DROP IN BOTH EYES TWICE DAILY    fludrocortisone (FLORINEF) 100 mcg, Oral, Every other day    gabapentin (NEURONTIN) 300 mg, Oral, 2 times daily    ketoconazole (NIZORAL) 2 % shampoo Apply topically.    LANTUS SOLOSTAR U-100 INSULIN 5 Units, Subcutaneous, Daily    metFORMIN (GLUCOPHAGE-XR) 1,000 mg, Oral, Daily    mupirocin (BACTROBAN) 2 % ointment Topical (Top), 3 times daily    pen needle, diabetic (BD HIMANSHU 2ND GEN PEN NEEDLE) 32 gauge x 5/32" Ndle 1 each, Subcutaneous, Daily    prazosin (MINIPRESS) 1 mg, Oral, Nightly    ramelteon (ROZEREM) 8 mg, Oral, Nightly    rasagiline (AZILECT) 1 mg Tab TAKE 1 TABLET(1 MG) BY MOUTH EVERY EVENING    tirzepatide 7.5 mg, Subcutaneous, Every 7 days, (Increased dose)    " "  I have reviewed the PMH, social history, FamilyHx, surgical history, allergies and medications documented / confirmed by the patient at the time of this visit.    Objective:   /70   Pulse 79   Temp 97.8 °F (36.6 °C)   Resp 18   Ht 5' 11" (1.803 m)   Wt 85.4 kg (188 lb 4.8 oz)   SpO2 97%   BMI 26.26 kg/m²   Physical Exam    General: No acute distress. Well-developed. Well-nourished.  Eyes: EOMI. Sclerae anicteric.  HENT: Normocephalic. Atraumatic. Nares patent. Moist oral mucosa.  Ears: Bilateral TMs clear. Bilateral EACs clear.  Cardiovascular: Regular rate. Regular rhythm. No murmurs. No rubs. No gallops. Normal S1, S2.  Respiratory: Normal respiratory effort. Clear to auscultation bilaterally. No rales. No rhonchi. No wheezing.  Abdomen: Soft. Non-tender. Non-distended. Normoactive bowel sounds.  Musculoskeletal: No  obvious deformity.  Extremities: No lower extremity edema.  Neurological: Alert & oriented x3. No slurred speech. Fine tremor at baseline.  Psychiatric: Normal mood. Normal affect. Good insight. Good judgment.  Skin: Warm. Dry. No rash.          Assessment:     1. Parkinson's disease without dyskinesia, with fluctuating manifestations    2. Type 2 diabetes mellitus with diabetic nephropathy, with long-term current use of insulin    3. REM sleep behavior disorder    4. Nightmares    5. History of gout    6. Hyperuricemia    7. Mixed hyperlipidemia    8. Mild episode of recurrent major depressive disorder    9. Orthostatic hypotension    10. Skin abrasion      MDM:   Moderate complexity, more than 2 chronic conditions requiring medication management, unique testing, interpretation of testing and follow up.    Visit today included increased complexity associated with the care of the episodic problem see above assessment addressed and managing the longitudinal care of the patient due to the serious and/or complex managed problem(s) see above.    I have reviewed and summarized old records: " previous PCP, Neurology, Pain, [facility], Urology, Sleep Med  I have performed thorough medication reconciliation today and discussed risk and benefits of medications.  I have reviewed labs and discussed with patient.  All questions were answered.    I have signed for the following orders AND/OR meds.  Orders Placed This Encounter   Procedures    Ambulatory Referral/Consult to Physical Therapy     Standing Status:   Future     Standing Expiration Date:   1/16/2026     Referral Priority:   Routine     Referral Type:   Physical Medicine     Referral Reason:   Specialty Services Required     Referred to Provider:   [facility] -     Requested Specialty:   Physical Therapy     Number of Visits Requested:   1     Medications Ordered This Encounter   Medications    allopurinoL (ZYLOPRIM) 300 MG tablet     Sig: Take 1 tablet (300 mg total) by mouth once daily.     Dispense:  90 tablet     Refill:  3    atorvastatin (LIPITOR) 20 MG tablet     Sig: Take 1 tablet (20 mg total) by mouth every evening.     Dispense:  90 tablet     Refill:  3    citalopram (CELEXA) 10 MG tablet     Sig: Take 1 tablet (10 mg total) by mouth once daily.     Dispense:  90 tablet     Refill:  3    clonazePAM (KLONOPIN) 1 MG tablet     Sig: Take 2 tablets (2 mg total) by mouth every evening.     Dispense:  60 tablet     Refill:  2    fludrocortisone (FLORINEF) 0.1 mg Tab     Sig: Take 1 tablet (100 mcg total) by mouth every other day.     Dispense:  90 tablet     Refill:  1    insulin glargine U-100, Lantus, (LANTUS SOLOSTAR U-100 INSULIN) 100 unit/mL (3 mL) InPn pen     Sig: Inject 5 Units into the skin once daily.    metFORMIN (GLUCOPHAGE-XR) 500 MG ER 24hr tablet     Sig: Take 2 tablets (1,000 mg total) by mouth once daily.     Dispense:  180 tablet     Refill:  3    mupirocin (BACTROBAN) 2 % ointment     Sig: Apply topically 3 (three) times daily.     Dispense:  15 g     Refill:  0    pen needle, diabetic (BD HIMANSHU 2ND GEN PEN  "NEEDLE) 32 gauge x 5/32" Ndle     Sig: Inject 1 each into the skin once daily.     Dispense:  100 each     Refill:  3    prazosin (MINIPRESS) 1 MG Cap     Sig: Take 1 capsule (1 mg total) by mouth every evening.     Dispense:  90 capsule     Refill:  3    ramelteon (ROZEREM) 8 mg tablet     Sig: Take 1 tablet (8 mg total) by mouth nightly.     Dispense:  90 tablet     Refill:  3    tirzepatide 7.5 mg/0.5 mL PnIj     Sig: Inject 7.5 mg into the skin every 7 days. (Increased dose)     Dispense:  2 mL     Refill:  2        Follow up in about 3 months (around 3/16/2025).  Future Appointments       Date Provider Specialty Appt Notes    1/4/2025  Radiology mri    1/7/2025 Lejeune, Elizabeth B, NP Sleep Medicine wants to discuss [facility]. 1yr f/u    1/13/2025  Lab labs    1/21/2025 Everett Duarte DPM Podiatry 6 mo foot check    3/14/2025 Cherie Lion DO Family Medicine 3m follow up    5/5/2025  Lab PSA    5/7/2025 MARIZA Donahue MD Urology 6 mo f/u with PSA prior    8/27/2025  Lab [facility]    8/27/2025  Lab [facility]            If no improvement in symptoms or symptoms worsen, advised to call/follow-up at clinic or go to ER. Patient voiced understanding and all questions/concerns were addressed.     DISCLAIMER: This note was compiled by using a speech recognition dictation system and therefore please be aware that typographical / speech recognition errors can and do occur.  Please contact me if you see any errors specifically.     This note was generated with the assistance of ambient listening technology. Verbal consent was obtained by the patient and accompanying visitor(s) for the recording of patient appointment to facilitate this note. I attest to having reviewed and edited the generated note for accuracy, though some syntax or spelling errors may persist. Please contact the author of this note for any clarification.      Cherie Lion DO, RDN    Office: [phone] [address] [place], [place] " [address]  FAX: [phone]

## 2025-01-02 ENCOUNTER — TELEPHONE (OUTPATIENT)
Dept: FAMILY MEDICINE | Facility: CLINIC | Age: 70
End: 2025-01-02
Payer: MEDICARE

## 2025-01-02 ENCOUNTER — PATIENT MESSAGE (OUTPATIENT)
Dept: FAMILY MEDICINE | Facility: CLINIC | Age: 70
End: 2025-01-02
Payer: MEDICARE

## 2025-01-02 NOTE — TELEPHONE ENCOUNTER
----- Message from [place] sent at 1/2/2025  1:25 PM CST -----  Type: Needs Medical Advice  Who Called:  Patient   Symptoms (please be specific):  blood in urine  How long has patient had these symptoms:    Pharmacy name and phone #:   Best Call Back Number: [phone]  Additional Information: Patient is requesting a call back from the nurse regarding blood in urine for a couple of days.

## 2025-01-03 ENCOUNTER — TELEPHONE (OUTPATIENT)
Dept: NEUROLOGY | Facility: CLINIC | Age: 70
End: 2025-01-03
Payer: MEDICARE

## 2025-01-04 ENCOUNTER — HOSPITAL ENCOUNTER (OUTPATIENT)
Dept: RADIOLOGY | Facility: HOSPITAL | Age: 70
Discharge: HOME | End: 2025-01-04
Attending: ANESTHESIOLOGY
Payer: MEDICARE

## 2025-01-04 DIAGNOSIS — M54.9 DORSALGIA, UNSPECIFIED: ICD-10-CM

## 2025-01-04 PROCEDURE — 72148 MRI LUMBAR SPINE W/O DYE: CPT | Mod: 26,,, | Performed by: RADIOLOGY

## 2025-01-04 PROCEDURE — 72148 MRI LUMBAR SPINE W/O DYE: CPT | Mod: TC,PO

## 2025-01-06 ENCOUNTER — TELEPHONE (OUTPATIENT)
Dept: FAMILY MEDICINE | Facility: CLINIC | Age: 70
End: 2025-01-06
Payer: MEDICARE

## 2025-01-06 NOTE — TELEPHONE ENCOUNTER
----- Message from Kathrine sent at 1/6/2025 10:14 AM CST -----  PT WENT TO URGENT CARE WITH BLOOD IN URINE.  NEEDS TO KNOW WHAT NEXT STEP IS.  PLEASE ADVISE

## 2025-01-07 ENCOUNTER — OFFICE VISIT (OUTPATIENT)
Dept: SLEEP MEDICINE | Facility: CLINIC | Age: 70
End: 2025-01-07
Payer: MEDICARE

## 2025-01-07 VITALS
OXYGEN SATURATION: 98 % | WEIGHT: 185.19 LBS | RESPIRATION RATE: 18 BRPM | HEART RATE: 66 BPM | HEIGHT: 71 IN | BODY MASS INDEX: 25.93 KG/M2 | SYSTOLIC BLOOD PRESSURE: 126 MMHG | DIASTOLIC BLOOD PRESSURE: 74 MMHG

## 2025-01-07 DIAGNOSIS — F51.4 NIGHT TERRORS, ADULT: ICD-10-CM

## 2025-01-07 DIAGNOSIS — F51.01 PRIMARY INSOMNIA: ICD-10-CM

## 2025-01-07 DIAGNOSIS — G47.33 SEVERE OBSTRUCTIVE SLEEP APNEA: Primary | ICD-10-CM

## 2025-01-07 DIAGNOSIS — G47.52 REM SLEEP BEHAVIOR DISORDER: Chronic | ICD-10-CM

## 2025-01-07 PROCEDURE — 99215 OFFICE O/P EST HI 40 MIN: CPT | Mod: PBBFAC | Performed by: NURSE PRACTITIONER

## 2025-01-07 PROCEDURE — 99214 OFFICE O/P EST MOD 30 MIN: CPT | Mod: S$PBB,,, | Performed by: NURSE PRACTITIONER

## 2025-01-07 PROCEDURE — 99999 PR PBB SHADOW E&M-EST. PATIENT-LVL V: CPT | Mod: PBBFAC,,, | Performed by: NURSE PRACTITIONER

## 2025-01-07 RX ORDER — METOPROLOL SUCCINATE 25 MG/1
1 TABLET, EXTENDED RELEASE ORAL
COMMUNITY

## 2025-01-07 RX ORDER — ALFUZOSIN HYDROCHLORIDE 10 MG/1
1 TABLET, EXTENDED RELEASE ORAL
COMMUNITY

## 2025-01-07 NOTE — PROGRESS NOTES
Subjective:      Patient ID: Glenn Medel is a 69 y.o. male.    Chief Complaint: Sleep Apnea and Hypersomnia    HPI  He presents for obstructive sleep apnea, REM movement with Parkinsons, night terrors with anxiety. He has very severe sleep apnea with /hr.  He has had weight loss of 30 lb since his sleep study.  He is wearing his CPAP nightly and benefits from use.  He does fight the mask at times.  He is interested in discussing option of inspire implant.  He takes Klonopin for REM movement this order.  Prazosin for nightmares.  Rozerem for insomnia.    Patient Active Problem List   Diagnosis    BPH (benign prostatic hyperplasia)    HTN (hypertension)    DDD (degenerative disc disease), lumbar    Palpitations    Peripheral neuropathy    Hyperlipidemia    Hypersomnia with sleep apnea, unspecified    Severe obstructive sleep apnea    Other vitreous opacities, bilateral    NS (nuclear sclerosis)    Senile nuclear sclerosis    Retained lens material, left    Glaucoma associated with ocular inflammation, left, indeterminate stage    Iritis, lens-induced    Vitreomacular adhesion, right    Epiretinal membrane, right    Internal strangulated hemorrhoids    Arachnoid cyst    REM sleep behavior disorder    Abnormal gait    Cognitive change    Abnormal involuntary movements    Adjustment disorder with depressed mood    Parkinsonism    History of gout    Polyneuropathy associated with underlying disease    Fall from bed    Memory change    CKD (chronic kidney disease) stage 2, GFR 60-89 ml/min    Type 2 diabetes mellitus with stage 2 chronic kidney disease, without long-term current use of insulin    Family history of colon cancer    Elevated PSA    Central retinal vein occlusion, left eye, stable    Prostate cancer    Mild episode of recurrent major depressive disorder    CHAPARRITA (generalized anxiety disorder)    Primary insomnia    Night terrors, adult    Secondary hyperparathyroidism of renal origin    Chronic  "depression    Orthostatic hypotension    Aortic atherosclerosis    Type 2 diabetes mellitus with diabetic nephropathy, with long-term current use of insulin    Hyperuricemia    Nightmares     /74 (Patient Position: Sitting)   Pulse 66   Resp 18   Ht 5' 11" (1.803 m)   Wt 84 kg (185 lb 3 oz)   SpO2 98%   BMI 25.83 kg/m²   Body mass index is 25.83 kg/m².    Review of Systems   Constitutional:  Positive for fatigue.   HENT: Negative.     Respiratory: Negative.  Negative for shortness of breath.    Cardiovascular: Negative.    Psychiatric/Behavioral:  Positive for sleep disturbance.      Objective:      Physical Exam  Constitutional:       Appearance: Normal appearance.   HENT:      Head: Normocephalic and atraumatic.      Nose: Nose normal.   Cardiovascular:      Rate and Rhythm: Normal rate and regular rhythm.   Pulmonary:      Effort: Pulmonary effort is normal.      Breath sounds: Normal breath sounds.   Abdominal:      Palpations: Abdomen is soft.      Tenderness: There is no abdominal tenderness.   Musculoskeletal:      Cervical back: Normal range of motion and neck supple.   Skin:     General: Skin is warm and dry.   Neurological:      General: No focal deficit present.      Mental Status: He is alert and oriented to person, place, and time.   Psychiatric:         Mood and Affect: Mood normal.         Behavior: Behavior normal.       Personal Diagnostic Review      1/7/2025     8:24 AM   EPWORTH SLEEPINESS SCALE   Sitting and reading 3   Watching TV 3   Sitting, inactive in a public place (e.g. a theatre or a meeting) 1   As a passenger in a car for an hour without a break 3   Lying down to rest in the afternoon when circumstances permit 3   Sitting and talking to someone 1   Sitting quietly after a lunch without alcohol 3   In a car, while stopped for a few minutes in traffic 1   Total score 18              Assessment:       1. Severe obstructive sleep apnea    2. REM sleep behavior disorder    3. " Primary insomnia    4. Night terrors, adult        Outpatient Encounter Medications as of 1/7/2025   Medication Sig Dispense Refill    alfuzosin (UROXATRAL) 10 mg Tb24 Take 1 tablet by mouth.      allopurinoL (ZYLOPRIM) 300 MG tablet Take 1 tablet (300 mg total) by mouth once daily. 90 tablet 3    atorvastatin (LIPITOR) 20 MG tablet Take 1 tablet (20 mg total) by mouth every evening. 90 tablet 3    blood-glucose meter,continuous Misc 1 each by Misc.(Non-Drug; Combo Route) route 4 (four) times daily. 1 each 0    brimonidine 0.2% (ALPHAGAN) 0.2 % Drop INSTILL 1 DROP IN BOTH EYES TWICE DAILY 10 mL 3    carbidopa-levodopa  mg (SINEMET)  mg per tablet Take 2 tablets by mouth 5 (five) times daily. 736 tablet 12    cholecalciferol, vitamin D3, (D3-2000 ORAL) Take by mouth once daily.      citalopram (CELEXA) 10 MG tablet Take 1 tablet (10 mg total) by mouth once daily. 90 tablet 3    clonazePAM (KLONOPIN) 1 MG tablet Take 2 tablets (2 mg total) by mouth every evening. 60 tablet 2    cyanocobalamin (VITAMIN B-12) 1000 MCG tablet Take 100 mcg by mouth once daily.      dorzolamide (TRUSOPT) 2 % ophthalmic solution INSTILL 1 DROP IN BOTH EYES TWICE DAILY 10 mL 3    fludrocortisone (FLORINEF) 0.1 mg Tab Take 1 tablet (100 mcg total) by mouth every other day. 90 tablet 1    gabapentin (NEURONTIN) 300 MG capsule Take 1 capsule (300 mg total) by mouth 2 (two) times daily. 60 capsule 1    insulin glargine U-100, Lantus, (LANTUS SOLOSTAR U-100 INSULIN) 100 unit/mL (3 mL) InPn pen Inject 5 Units into the skin once daily.      ketoconazole (NIZORAL) 2 % shampoo Apply topically.      metFORMIN (GLUCOPHAGE-XR) 500 MG ER 24hr tablet Take 2 tablets (1,000 mg total) by mouth once daily. 180 tablet 3    metoprolol succinate (TOPROL-XL) 25 MG 24 hr tablet Take 1 tablet by mouth.      mupirocin (BACTROBAN) 2 % ointment Apply topically 3 (three) times daily. 15 g 0    pen needle, diabetic (BD HIMANSHU 2ND GEN PEN NEEDLE) 32 gauge x  "5/32" Ndle Inject 1 each into the skin once daily. 100 each 3    prazosin (MINIPRESS) 1 MG Cap Take 1 capsule (1 mg total) by mouth every evening. 90 capsule 3    ramelteon (ROZEREM) 8 mg tablet Take 1 tablet (8 mg total) by mouth nightly. 90 tablet 3    rasagiline (AZILECT) 1 mg Tab TAKE 1 TABLET(1 MG) BY MOUTH EVERY EVENING 30 tablet 11    tirzepatide 7.5 mg/0.5 mL PnIj Inject 7.5 mg into the skin every 7 days. (Increased dose) 2 mL 2    [DISCONTINUED] allopurinoL (ZYLOPRIM) 300 MG tablet TAKE 1 TABLET(300 MG) BY MOUTH EVERY DAY 90 tablet 0    [DISCONTINUED] atorvastatin (LIPITOR) 20 MG tablet TAKE 1 TABLET(20 MG) BY MOUTH EVERY DAY 90 tablet 3    [DISCONTINUED] BD HIMANSHU 2ND GEN PEN NEEDLE 32 gauge x 5/32" Ndle SMARTSIG:Injection Daily      [DISCONTINUED] blood-glucose sensor (DEXCOM G7 SENSOR) Daisy Change sensor every 10 days to measure blood glucose at least 4x daily. 9 each 3    [DISCONTINUED] carbidopa-levodopa  mg (SINEMET)  mg per tablet TAKE 2 TABLETS BY MOUTH FOUR TIMES DAILY 736 tablet 12    [DISCONTINUED] citalopram (CELEXA) 10 MG tablet TAKE 1 TABLET(10 MG) BY MOUTH DAILY 90 tablet 3    [DISCONTINUED] clonazePAM (KLONOPIN) 1 MG tablet Take 2 tablets (2 mg total) by mouth every evening. 60 tablet 2    [DISCONTINUED] doxycycline (MONODOX) 100 MG capsule Take 100 mg by mouth 2 (two) times daily. (Patient not taking: Reported on 12/16/2024)      [DISCONTINUED] fludrocortisone (FLORINEF) 0.1 mg Tab TAKE 1 TABLET(100 MCG) BY MOUTH DAILY 90 tablet 1    [DISCONTINUED] insulin glargine U-100, Lantus, (LANTUS SOLOSTAR U-100 INSULIN) 100 unit/mL (3 mL) InPn pen Inject 10 Units into the skin once daily.      [DISCONTINUED] insulin glargine U-100, Lantus, (LANTUS SOLOSTAR U-100 INSULIN) 100 unit/mL (3 mL) InPn pen Inject 5 Units into the skin once daily.      [DISCONTINUED] metFORMIN (GLUCOPHAGE-XR) 500 MG ER 24hr tablet TAKE 2 TABLETS(1000 MG) BY MOUTH DAILY. INCREASE DOSE 180 tablet 3    [DISCONTINUED] " "pen needle, diabetic (BD HIMANSHU 2ND GEN PEN NEEDLE) 32 gauge x 5/32" Ndle USE TO INJECT INUSLIN EVERY  each 3    [DISCONTINUED] pen needle, diabetic 32 gauge x 1/4" Ndle 1 each by Misc.(Non-Drug; Combo Route) route once daily. 100 each 1    [DISCONTINUED] prazosin (MINIPRESS) 1 MG Cap TAKE 1 CAPSULE(1 MG) BY MOUTH EVERY EVENING 90 capsule 3    [DISCONTINUED] ramelteon (ROZEREM) 8 mg tablet TAKE 1 TABLET(8 MG) BY MOUTH EVERY EVENING 90 tablet 3    [DISCONTINUED] tirzepatide (MOUNJARO) 5 mg/0.5 mL PnIj Inject 5 mg into the skin every 7 days. (Increased dose) 4 Pen 5    [DISCONTINUED] tirzepatide 7.5 mg/0.5 mL PnIj Inject 7.5 mg into the skin every 7 days. (Increased dose) 2 mL 2     No facility-administered encounter medications on file as of 1/7/2025.     Orders Placed This Encounter   Procedures    CPAP FOR HOME USE     Replacement. Over 5 years     Order Specific Question:   Length of need (1-99 months):     Answer:   99     Order Specific Question:   Type ():     Answer:   Auto CPAP     Order Specific Question:   Auto CPAP pressure setting range (cmH20):     Answer:   4-16     Order Specific Question:   Fulfillment Priority:     Answer:   Level 4:  all others     Order Specific Question:   Humidification ():     Answer:   Heated     Order Specific Question:   Choose ONE mask type and its corresponding cushions and/or pillows:     Answer:    Full Face Mask, 1 per 90 days:  Full Face Cushion, (3 per 90 days)     Order Specific Question:   Choose EITHER Heated or Non-Heated Tubjing     Answer:    Heated Tubing, 1 per 6 months     Order Specific Question:   All other supplies as needed as listed below:     Answer:    Headgear, 1 per 180 days     Order Specific Question:   All other supplies as needed as listed below:     Answer:    Disposable Filter, 6 per 90 days     Order Specific Question:   All other supplies as needed as listed below:     Answer:    Non-Disposable " Filter, 1 per 180 days     Order Specific Question:   All other supplies as needed as listed below:     Answer:    Humidifier Chamber, 1 per 180 days     Plan:   Discussed Inspire implant procedure.  Opting for new CPAP.  Adequate settings.  Follow-up in 10 weeks to review download.    1. Severe obstructive sleep apnea  Overview:  Very severe sleep apnea. /hr.   Normal AHI on CPAP therapy.  Compliant with PAP and benefits from use.    Assessment & Plan:  CPAP -dreamstation1. Order for new APAP machine.   Follow up 10 weeks for download.     Orders:  -     CPAP FOR HOME USE    2. REM sleep behavior disorder  Assessment & Plan:  Klonopin       3. Primary insomnia  Assessment & Plan:  Continue psychiatry visits. Rozerem.       4. Night terrors, adult  Overview:  inosin                          Elizabeth LeJeune, ACNP, ANP

## 2025-01-09 ENCOUNTER — TELEPHONE (OUTPATIENT)
Dept: PAIN MEDICINE | Facility: CLINIC | Age: 70
End: 2025-01-09
Payer: MEDICARE

## 2025-01-09 ENCOUNTER — OFFICE VISIT (OUTPATIENT)
Dept: FAMILY MEDICINE | Facility: CLINIC | Age: 70
End: 2025-01-09
Payer: MEDICARE

## 2025-01-09 ENCOUNTER — LAB VISIT (OUTPATIENT)
Dept: LAB | Facility: HOSPITAL | Age: 70
End: 2025-01-09
Attending: NURSE PRACTITIONER
Payer: MEDICARE

## 2025-01-09 VITALS
TEMPERATURE: 98 F | DIASTOLIC BLOOD PRESSURE: 86 MMHG | BODY MASS INDEX: 26.27 KG/M2 | HEART RATE: 82 BPM | SYSTOLIC BLOOD PRESSURE: 138 MMHG | HEIGHT: 71 IN | OXYGEN SATURATION: 99 % | WEIGHT: 187.69 LBS

## 2025-01-09 DIAGNOSIS — Z12.5 ENCOUNTER FOR SCREENING FOR MALIGNANT NEOPLASM OF PROSTATE: ICD-10-CM

## 2025-01-09 DIAGNOSIS — M79.604 RIGHT LEG PAIN: ICD-10-CM

## 2025-01-09 DIAGNOSIS — M47.816 LUMBAR SPONDYLOSIS: ICD-10-CM

## 2025-01-09 DIAGNOSIS — M54.9 DORSALGIA, UNSPECIFIED: Primary | ICD-10-CM

## 2025-01-09 DIAGNOSIS — R31.9 HEMATURIA, UNSPECIFIED TYPE: ICD-10-CM

## 2025-01-09 DIAGNOSIS — R31.9 HEMATURIA, UNSPECIFIED TYPE: Primary | ICD-10-CM

## 2025-01-09 LAB
ALBUMIN SERPL BCP-MCNC: 3.7 G/DL (ref 3.5–5.2)
ANION GAP SERPL CALC-SCNC: 9 MMOL/L (ref 8–16)
BACTERIA #/AREA URNS HPF: ABNORMAL /HPF
BILIRUB UR QL STRIP: NEGATIVE
BUN SERPL-MCNC: 15 MG/DL (ref 8–23)
CALCIUM SERPL-MCNC: 9.5 MG/DL (ref 8.7–10.5)
CHLORIDE SERPL-SCNC: 104 MMOL/L (ref 95–110)
CLARITY UR: CLEAR
CO2 SERPL-SCNC: 28 MMOL/L (ref 23–29)
COLOR UR: YELLOW
COMPLEXED PSA SERPL-MCNC: 4.2 NG/ML (ref 0–4)
CREAT SERPL-MCNC: 0.9 MG/DL (ref 0.5–1.4)
ERYTHROCYTE [DISTWIDTH] IN BLOOD BY AUTOMATED COUNT: 13.9 % (ref 11.5–14.5)
EST. GFR  (NO RACE VARIABLE): >60 ML/MIN/1.73 M^2
GLUCOSE SERPL-MCNC: 253 MG/DL (ref 70–110)
GLUCOSE UR QL STRIP: ABNORMAL
HCT VFR BLD AUTO: 52.5 % (ref 40–54)
HGB BLD-MCNC: 16.2 G/DL (ref 14–18)
HGB UR QL STRIP: ABNORMAL
KETONES UR QL STRIP: NEGATIVE
LEUKOCYTE ESTERASE UR QL STRIP: ABNORMAL
MCH RBC QN AUTO: 27.5 PG (ref 27–31)
MCHC RBC AUTO-ENTMCNC: 30.9 G/DL (ref 32–36)
MCV RBC AUTO: 89 FL (ref 82–98)
MICROSCOPIC COMMENT: ABNORMAL
NITRITE UR QL STRIP: NEGATIVE
PH UR STRIP: 6 [PH] (ref 5–8)
PHOSPHATE SERPL-MCNC: 3.5 MG/DL (ref 2.7–4.5)
PLATELET # BLD AUTO: 185 K/UL (ref 150–450)
PMV BLD AUTO: 9.7 FL (ref 9.2–12.9)
POTASSIUM SERPL-SCNC: 4.3 MMOL/L (ref 3.5–5.1)
PROT UR QL STRIP: ABNORMAL
RBC # BLD AUTO: 5.89 M/UL (ref 4.6–6.2)
RBC #/AREA URNS HPF: 10 /HPF (ref 0–4)
SODIUM SERPL-SCNC: 141 MMOL/L (ref 136–145)
SP GR UR STRIP: 1.02 (ref 1–1.03)
SQUAMOUS #/AREA URNS HPF: ABNORMAL /HPF
URN SPEC COLLECT METH UR: ABNORMAL
WBC # BLD AUTO: 6.96 K/UL (ref 3.9–12.7)
WBC #/AREA URNS HPF: 1 /HPF (ref 0–5)

## 2025-01-09 PROCEDURE — 36415 COLL VENOUS BLD VENIPUNCTURE: CPT | Mod: PO | Performed by: NURSE PRACTITIONER

## 2025-01-09 PROCEDURE — 85027 COMPLETE CBC AUTOMATED: CPT | Performed by: NURSE PRACTITIONER

## 2025-01-09 PROCEDURE — 87086 URINE CULTURE/COLONY COUNT: CPT | Performed by: NURSE PRACTITIONER

## 2025-01-09 PROCEDURE — 84153 ASSAY OF PSA TOTAL: CPT | Performed by: NURSE PRACTITIONER

## 2025-01-09 PROCEDURE — 81000 URINALYSIS NONAUTO W/SCOPE: CPT | Mod: PO | Performed by: NURSE PRACTITIONER

## 2025-01-09 PROCEDURE — 99215 OFFICE O/P EST HI 40 MIN: CPT | Mod: PBBFAC,PO | Performed by: NURSE PRACTITIONER

## 2025-01-09 PROCEDURE — 99214 OFFICE O/P EST MOD 30 MIN: CPT | Mod: S$PBB,,, | Performed by: NURSE PRACTITIONER

## 2025-01-09 PROCEDURE — 80069 RENAL FUNCTION PANEL: CPT | Performed by: NURSE PRACTITIONER

## 2025-01-09 PROCEDURE — 99999 PR PBB SHADOW E&M-EST. PATIENT-LVL V: CPT | Mod: PBBFAC,,, | Performed by: NURSE PRACTITIONER

## 2025-01-09 NOTE — TELEPHONE ENCOUNTER
Please let the patient know I reviewed his MRI.  I do not see any significant changes or narrowing.  I recommend maximize conservative treatment and restarting formal physical therapy.  If he knows he where he would like to do this we can send a referral.  If he would like to come in to review his MRI please help him get an appointment set up

## 2025-01-09 NOTE — PROGRESS NOTES
Subjective     Patient ID: Glenn Medel is a 69 y.o. male.    Chief Complaint: Hematuria    Hematuria  This is a new (Went to urgent care on 1/2/2025; trace hematuria; has not f/u with urologist as advised) problem. The current episode started 1 to 4 weeks ago. The problem has been waxing and waning since onset. He describes the hematuria as gross hematuria. The hematuria occurs during the terminal portion of his urinary stream. He reports no clotting in his urine stream. He is experiencing no pain. He describes his urine color as tea colored. Irritative symptoms do not include frequency, nocturia or urgency. Pertinent negatives include no abdominal pain, chills, dysuria, facial swelling, fever, flank pain, genital pain, hesitancy, inability to urinate, nausea, vomiting or sore throat. He is sexually active. His past medical history is significant for hypertension. There is no history of  trauma, kidney stones, recent infection, sickle cell disease, STDs, tobacco use or UTI.   Leg Pain   The incident occurred more than 1 week ago (chronic recurrent; states recurred 2 w ago; prescribed gabapentin 2 w ago). The pain is present in the right thigh. The quality of the pain is described as burning. Pertinent negatives include no inability to bear weight, loss of motion, loss of sensation, muscle weakness, numbness or tingling. Associated symptoms comments: Pt states concerned about possible DVT. He reports no foreign bodies present. Nothing aggravates the symptoms. Treatments tried: gabapentin; recently started medication. The treatment provided mild relief.     Past Medical History:   Diagnosis Date    BPH (benign prostatic hyperplasia)     Cataract     done ou    Chronic kidney disease, stage 3 02/25/2015    DDD (degenerative disc disease), lumbar     s/p epidural steroids     Diabetes mellitus     type 2    Elevated PSA     Glaucoma     OU    HTN (hypertension)     Iritis, lens-induced 01/08/2018    Obesity      Parkinson's disease     Sleep apnea     uses cpap     Social History     Socioeconomic History    Marital status:    Tobacco Use    Smoking status: Never    Smokeless tobacco: Never   Substance and Sexual Activity    Alcohol use: Yes     Comment: very rare    Drug use: No    Sexual activity: Yes     Partners: Female   Social History Narrative            2 grown kids.         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      Social Drivers of Health     Financial Resource Strain: Patient Declined (1/29/2024)    Overall Financial Resource Strain (CARDIA)     Difficulty of Paying Living Expenses: Patient declined   Food Insecurity: Patient Declined (1/29/2024)    Hunger Vital Sign     Worried About Running Out of Food in the Last Year: Patient declined     Ran Out of Food in the Last Year: Patient declined   Transportation Needs: Patient Declined (1/29/2024)    PRAPARE - Transportation     Lack of Transportation (Medical): Patient declined     Lack of Transportation (Non-Medical): Patient declined   Physical Activity: Sufficiently Active (1/29/2024)    Exercise Vital Sign     Days of Exercise per Week: 4 days     Minutes of Exercise per Session: 80 min   Stress: Patient Declined (1/29/2024)    [employer] - Occupational Stress Questionnaire     Feeling of Stress : Patient declined   Housing Stability: Patient Declined (1/29/2024)    Housing Stability Vital Sign     Unable to Pay for Housing in the Last Year: Patient declined     Number of Places Lived in the Last Year: 1     Unstable Housing in the Last Year: Patient declined     Past Surgical History:   Procedure Laterality Date    CATARACT EXTRACTION W/  INTRAOCULAR LENS IMPLANT Left 11/30/2017    CATARACT EXTRACTION W/  INTRAOCULAR LENS IMPLANT Right 12/11/2018    Dr Garcia    CATARACT EXTRACTION W/  INTRAOCULAR LENS IMPLANT Right 12/11/2018    Procedure: EXTRACTION, CATARACT, WITH IOL INSERTION;   Surgeon: Damaris Garcia MD;  Location: [facility] OR;  Service: Ophthalmology;  Laterality: Right;    COLONOSCOPY N/A 9/18/2017    Procedure: COLONOSCOPY;  Surgeon: Paul Feliz Jr., MD;  Location: [facility] ENDO;  Service: Endoscopy;  Laterality: N/A;    COLONOSCOPY N/A 12/21/2022    Procedure: COLONOSCOPY;  Surgeon: Paul Feliz Jr., MD;  Location: [facility] ENDO;  Service: Endoscopy;  Laterality: N/A;    COLONOSCOPY W/ POLYPECTOMY  04/13/2010    YARELI.   One 1 cm polyp in the sigmoid colon.  Internal hemorrhoids.    COSMETIC SURGERY      DIGITAL RECTAL EXAMINATION UNDER ANESTHESIA N/A 7/23/2019    Procedure: EXAM UNDER ANESTHESIA, DIGITAL, RECTUM;  Surgeon: Kaz Keating MD;  Location: [facility] OR;  Service: General;  Laterality: N/A;    EXAMINATION UNDER ANESTHESIA N/A 4/24/2019    Procedure: EXAM UNDER ANESTHESIA;  Surgeon: Kaz Keating MD;  Location: [facility] OR;  Service: General;  Laterality: N/A;    EXCISIONAL HEMORRHOIDECTOMY N/A 4/24/2019    Procedure: HEMORRHOIDECTOMY (lithotomy);  Surgeon: Kaz Keating MD;  Location: [facility] OR;  Service: General;  Laterality: N/A;    GANGLION CYST EXCISION Left     INJECTION OF ANESTHETIC AGENT AROUND MEDIAL BRANCH NERVES INNERVATING LUMBAR FACET JOINT Bilateral 4/9/2024    Procedure: Block-nerve-medial branch-lumbar-L4/5-L5/S1;  Surgeon: Everett Taveras MD;  Location: [facility] OR;  Service: Pain Management;  Laterality: Bilateral;    INJECTION OF ANESTHETIC AGENT AROUND MEDIAL BRANCH NERVES INNERVATING LUMBAR FACET JOINT Bilateral 5/6/2024    Procedure: Block-nerve-medial branch-lumbar-L4/5-L5/S1;  Surgeon: Everett Taveras MD;  Location: [facility] OR;  Service: Pain Management;  Laterality: Bilateral;    INJECTION OF ANESTHETIC AGENT AROUND PUDENDAL NERVE N/A 4/24/2019    Procedure: BLOCK, NERVE, PUDENDAL;  Surgeon: Kaz Keating MD;  Location: [facility] OR;  Service: General;  Laterality: N/A;    INJECTION OF ANESTHETIC AGENT AROUND PUDENDAL NERVE N/A 7/23/2019     Procedure: BLOCK, NERVE, PUDENDAL;  Surgeon: Kaz Keating MD;  Location: [facility];  Service: General;  Laterality: N/A;    INJECTION, ALLOGRAFT, INTERVERTEBRAL DISC N/A 9/30/2024    Procedure: INJECTION,ALLOGRAFT,INTERVERTEBRAL DISC,1ST LEVEL  L4/5;  Surgeon: Everett Taveras MD;  Location: [facility];  Service: Pain Management;  Laterality: N/A;    RADIOFREQUENCY ABLATION OF LUMBAR MEDIAL BRANCH NERVE AT SINGLE LEVEL Bilateral 6/4/2024    Procedure: Radiofrequency Ablation, Nerve, Spinal, Lumbar, Medial Branch, L4/5 and L5/S1;  Surgeon: Everett Taveras MD;  Location: [facility];  Service: Pain Management;  Laterality: Bilateral;    REPAIR OF RETINAL DETACHMENT WITH VITRECTOMY Right 8/1/2018    Procedure: REPAIR, RETINAL DETACHMENT, WITH VITRECTOMY;  Surgeon: SAJAN Pulido MD;  Location: [address];  Service: Ophthalmology;  Laterality: Right;  35 min    TONSILLECTOMY      TRANSRECTAL BIOPSY OF PROSTATE WITH ULTRASOUND GUIDANCE N/A 1/31/2023    Procedure: BIOPSY, PROSTATE, RECTAL APPROACH, WITH US GUIDANCE;  Surgeon: MARIZA Donahue MD;  Location: [facility];  Service: Urology;  Laterality: N/A;    TRANSRECTAL BIOPSY OF PROSTATE WITH ULTRASOUND GUIDANCE N/A 4/12/2024    Procedure: BIOPSY, PROSTATE, RECTAL APPROACH, WITH US GUIDANCE;  Surgeon: MARIZA Donahue MD;  Location: [facility];  Service: Urology;  Laterality: N/A;    TRIGGER FINGER RELEASE Right     X 2    uro lift      for enlarged prostate       Review of Systems   Constitutional: Negative.  Negative for chills and fever.   HENT: Negative.  Negative for facial swelling and sore throat.    Eyes: Negative.    Respiratory: Negative.     Cardiovascular: Negative.    Gastrointestinal: Negative.  Negative for abdominal pain, nausea and vomiting.   Endocrine: Negative.    Genitourinary:  Positive for hematuria. Negative for dysuria, flank pain, frequency, hesitancy, nocturia and urgency.   Musculoskeletal: Negative.  Positive for leg pain.    Integumentary:  Negative.   Allergic/Immunologic: Negative.    Neurological: Negative.  Negative for tingling and numbness.   Psychiatric/Behavioral: Negative.            Objective     Physical Exam  Vitals and nursing note reviewed.   Constitutional:       Appearance: Normal appearance.   HENT:      Head: Normocephalic.      Right Ear: Tympanic membrane, ear canal and external ear normal.      Left Ear: Tympanic membrane, ear canal and external ear normal.      Nose: Nose normal.      Mouth/Throat:      Mouth: Mucous membranes are moist.      Pharynx: Oropharynx is clear.   Eyes:      Conjunctiva/sclera: Conjunctivae normal.      Pupils: Pupils are equal, round, and reactive to light.   Cardiovascular:      Rate and Rhythm: Normal rate and regular rhythm.      Pulses: Normal pulses.      Heart sounds: Normal heart sounds.   Pulmonary:      Effort: Pulmonary effort is normal.      Breath sounds: Normal breath sounds.   Abdominal:      General: Bowel sounds are normal.      Palpations: Abdomen is soft.   Musculoskeletal:         General: Normal range of motion.      Cervical back: Normal range of motion and neck supple.      Right upper leg: No swelling, edema, deformity, lacerations, tenderness or bony tenderness.      Right lower leg: No swelling, deformity, lacerations, tenderness or bony tenderness. No edema.   Skin:     General: Skin is warm and dry.      Capillary Refill: Capillary refill takes 2 to 3 seconds.   Neurological:      Mental Status: He is alert and oriented to person, place, and time.   Psychiatric:         Mood and Affect: Mood normal.         Behavior: Behavior normal.         Thought Content: Thought content normal.         Judgment: Judgment normal.            Assessment and Plan     1. Hematuria, unspecified type  -     Ambulatory referral/consult to Urology; Future; Expected date: 01/16/2025  -     Urine Culture High Risk  -     Urinalysis  -     PSA, Screening; Future; Expected date:  01/09/2025  -     RENAL FUNCTION PANEL; Future; Expected date: 01/09/2025  -     CBC Without Differential; Future; Expected date: 01/09/2025  -     CT Renal Stone Study ABD Pelvis WO; Future; Expected date: 01/09/2025  -     Urinalysis Microscopic    2. Right leg pain  -     US Lower Extremity Veins Right; Future; Expected date: 01/09/2025    3. Encounter for screening for malignant neoplasm of prostate  -     PSA, Screening; Future; Expected date: 01/09/2025      Hydrate well  Rest  F/U with urology ASAP  Report to ER immediately if symptoms worsen or persist             No follow-ups on file.

## 2025-01-09 NOTE — PATIENT INSTRUCTIONS
Hydrate well  Rest  F/U with urology ASAP  Report to ER immediately if symptoms worsen or persist    Randy Elizabeth,     If you are due for any health screening(s) below please notify me so we can arrange them to be ordered and scheduled. Most healthy patients at your age complete them, but you are free to accept or refuse.     If you can't do it, I'll definitely understand. If you can, I'd certainly appreciate it!    All of your core healthy metrics are met.

## 2025-01-09 NOTE — TELEPHONE ENCOUNTER
Spoke with patient and relayed Dr. Taveras's findings and recommendations. He would like PT referral sent to [facility]'s PT where he gets his shoulder PT

## 2025-01-09 NOTE — TELEPHONE ENCOUNTER
Referral ordered for [facility] in [place].    The referral is in the printer.  Can you please fax it to [facility]

## 2025-01-10 LAB — BACTERIA UR CULT: NORMAL

## 2025-01-13 DIAGNOSIS — Z00.00 ENCOUNTER FOR MEDICARE ANNUAL WELLNESS EXAM: ICD-10-CM

## 2025-01-15 ENCOUNTER — PATIENT MESSAGE (OUTPATIENT)
Dept: NEPHROLOGY | Facility: CLINIC | Age: 70
End: 2025-01-15
Payer: MEDICARE

## 2025-01-15 ENCOUNTER — HOSPITAL ENCOUNTER (OUTPATIENT)
Dept: RADIOLOGY | Facility: HOSPITAL | Age: 70
Discharge: HOME | End: 2025-01-15
Attending: NURSE PRACTITIONER
Payer: MEDICARE

## 2025-01-15 DIAGNOSIS — M79.604 RIGHT LEG PAIN: ICD-10-CM

## 2025-01-15 DIAGNOSIS — R31.9 HEMATURIA, UNSPECIFIED TYPE: ICD-10-CM

## 2025-01-15 PROCEDURE — 74176 CT ABD & PELVIS W/O CONTRAST: CPT | Mod: 26,,, | Performed by: RADIOLOGY

## 2025-01-15 PROCEDURE — 93971 EXTREMITY STUDY: CPT | Mod: TC,PO,RT

## 2025-01-15 PROCEDURE — 93971 EXTREMITY STUDY: CPT | Mod: 26,RT,, | Performed by: RADIOLOGY

## 2025-01-15 PROCEDURE — 74176 CT ABD & PELVIS W/O CONTRAST: CPT | Mod: TC,PO

## 2025-01-17 ENCOUNTER — OFFICE VISIT (OUTPATIENT)
Dept: UROLOGY | Facility: CLINIC | Age: 70
End: 2025-01-17
Payer: MEDICARE

## 2025-01-17 VITALS — WEIGHT: 185.44 LBS | BODY MASS INDEX: 25.96 KG/M2 | HEIGHT: 71 IN

## 2025-01-17 DIAGNOSIS — C61 PROSTATE CA: Primary | ICD-10-CM

## 2025-01-17 DIAGNOSIS — R31.0 GROSS HEMATURIA: ICD-10-CM

## 2025-01-17 LAB
BILIRUBIN, UA POC OHS: NEGATIVE
BLOOD, UA POC OHS: ABNORMAL
CLARITY, UA POC OHS: ABNORMAL
COLOR, UA POC OHS: YELLOW
GLUCOSE, UA POC OHS: 250
KETONES, UA POC OHS: ABNORMAL
LEUKOCYTES, UA POC OHS: NEGATIVE
NITRITE, UA POC OHS: NEGATIVE
PH, UA POC OHS: 5.5
PROTEIN, UA POC OHS: 30
SPECIFIC GRAVITY, UA POC OHS: >=1.03
UROBILINOGEN, UA POC OHS: 1

## 2025-01-17 PROCEDURE — 99213 OFFICE O/P EST LOW 20 MIN: CPT | Mod: S$PBB,,,

## 2025-01-17 PROCEDURE — 99999 PR PBB SHADOW E&M-EST. PATIENT-LVL IV: CPT | Mod: PBBFAC,,,

## 2025-01-17 PROCEDURE — 99999PBSHW POCT URINALYSIS(INSTRUMENT): Mod: PBBFAC,,,

## 2025-01-17 PROCEDURE — 99214 OFFICE O/P EST MOD 30 MIN: CPT | Mod: PBBFAC,PO

## 2025-01-17 PROCEDURE — 81003 URINALYSIS AUTO W/O SCOPE: CPT | Mod: PBBFAC,PO

## 2025-01-19 DIAGNOSIS — G20.A2 PARKINSON'S DISEASE WITHOUT DYSKINESIA, WITH FLUCTUATING MANIFESTATIONS: Chronic | ICD-10-CM

## 2025-01-22 RX ORDER — RASAGILINE 1 MG/1
TABLET ORAL
Qty: 30 TABLET | Refills: 11 | Status: SHIPPED | OUTPATIENT
Start: 2025-01-22

## 2025-01-23 NOTE — PROGRESS NOTES
[facility] Note  Staff: XENA Pacheco    PCP: DO Ayad    Chief Complaint: Gross Hematuria    Subjective:        HPI: Glenn Medel is a 69 y.o. male presents today for evaluation of gross hematuria. He states this started a few days ago. He has a known history of prostate cancer. He had a urine culture done 1/9/2025 which was negative for infection. His PCP also ordered a CT RSS done 1/15/2025 which showed Impression:     6 mm, 2 mm and 1 mm left intrarenal stone without hydronephrosis.  1 mm right intrarenal stone without hydronephrosis.  Prostate hypertrophy.  Metal clips are noted in the prostate.    I reviewed all imaging with him today. He denies dysuria and difficulty urinating.     Questions asked the pt during ov today:  Urgency: no, urge incontinence? no  Dysuria: No  Gross Hematuria:Yes   Straining:No, Hesistancy:No, Intermittency:No}, Weak stream:No    Last PSA Screening:   Lab Results   Component Value Date    PSA 4.2 (H) 01/09/2025    PSA 4.4 (H) 12/29/2021    PSA 1.5 08/08/2014    PSADIAG 4.3 (H) 10/17/2024    PSADIAG 5.1 (H) 02/02/2024    PSADIAG 4.1 (H) 08/02/2023       History of Kidney Stones?:  yes    Constipation issues?:  no      REVIEW OF SYSTEMS:  Review of Systems   Constitutional: Negative.  Negative for chills and fever.   Gastrointestinal: Negative.  Negative for abdominal pain, constipation, diarrhea, nausea and vomiting.   Genitourinary:  Positive for hematuria. Negative for dysuria, flank pain, frequency and urgency.   Musculoskeletal: Negative.  Negative for back pain.       PMHx:  Past Medical History:   Diagnosis Date    BPH (benign prostatic hyperplasia)     Cataract     done ou    Chronic kidney disease, stage 3 02/25/2015    DDD (degenerative disc disease), lumbar     s/p epidural steroids     Diabetes mellitus     type 2    Elevated PSA     Glaucoma     OU    HTN (hypertension)     Iritis, lens-induced 01/08/2018    Obesity     Parkinson's disease      Sleep apnea     uses cpap       PSHx:  Past Surgical History:   Procedure Laterality Date    CATARACT EXTRACTION W/  INTRAOCULAR LENS IMPLANT Left 11/30/2017    CATARACT EXTRACTION W/  INTRAOCULAR LENS IMPLANT Right 12/11/2018    Dr Garcia    CATARACT EXTRACTION W/  INTRAOCULAR LENS IMPLANT Right 12/11/2018    Procedure: EXTRACTION, CATARACT, WITH IOL INSERTION;  Surgeon: Damaris Garcia MD;  Location: [facility] OR;  Service: Ophthalmology;  Laterality: Right;    COLONOSCOPY N/A 9/18/2017    Procedure: COLONOSCOPY;  Surgeon: Paul Feliz Jr., MD;  Location: [facility] ENDO;  Service: Endoscopy;  Laterality: N/A;    COLONOSCOPY N/A 12/21/2022    Procedure: COLONOSCOPY;  Surgeon: Paul Feliz Jr., MD;  Location: [facility] ENDO;  Service: Endoscopy;  Laterality: N/A;    COLONOSCOPY W/ POLYPECTOMY  04/13/2010    YARELI.   One 1 cm polyp in the sigmoid colon.  Internal hemorrhoids.    COSMETIC SURGERY      DIGITAL RECTAL EXAMINATION UNDER ANESTHESIA N/A 7/23/2019    Procedure: EXAM UNDER ANESTHESIA, DIGITAL, RECTUM;  Surgeon: Kaz Keating MD;  Location: [facility] OR;  Service: General;  Laterality: N/A;    EXAMINATION UNDER ANESTHESIA N/A 4/24/2019    Procedure: EXAM UNDER ANESTHESIA;  Surgeon: Kaz Keating MD;  Location: [facility] OR;  Service: General;  Laterality: N/A;    EXCISIONAL HEMORRHOIDECTOMY N/A 4/24/2019    Procedure: HEMORRHOIDECTOMY (lithotomy);  Surgeon: Kaz Keating MD;  Location: [facility] OR;  Service: General;  Laterality: N/A;    GANGLION CYST EXCISION Left     INJECTION OF ANESTHETIC AGENT AROUND MEDIAL BRANCH NERVES INNERVATING LUMBAR FACET JOINT Bilateral 4/9/2024    Procedure: Block-nerve-medial branch-lumbar-L4/5-L5/S1;  Surgeon: Everett Taveras MD;  Location: [facility] OR;  Service: Pain Management;  Laterality: Bilateral;    INJECTION OF ANESTHETIC AGENT AROUND MEDIAL BRANCH NERVES INNERVATING LUMBAR FACET JOINT Bilateral 5/6/2024    Procedure: Block-nerve-medial branch-lumbar-L4/5-L5/S1;  Surgeon:  Everett Taveras MD;  Location: [facility];  Service: Pain Management;  Laterality: Bilateral;    INJECTION OF ANESTHETIC AGENT AROUND PUDENDAL NERVE N/A 4/24/2019    Procedure: BLOCK, NERVE, PUDENDAL;  Surgeon: Kaz Keating MD;  Location: [facility];  Service: General;  Laterality: N/A;    INJECTION OF ANESTHETIC AGENT AROUND PUDENDAL NERVE N/A 7/23/2019    Procedure: BLOCK, NERVE, PUDENDAL;  Surgeon: Kaz Keating MD;  Location: [facility] OR;  Service: General;  Laterality: N/A;    INJECTION, ALLOGRAFT, INTERVERTEBRAL DISC N/A 9/30/2024    Procedure: INJECTION,ALLOGRAFT,INTERVERTEBRAL DISC,1ST LEVEL  L4/5;  Surgeon: Everett Taveras MD;  Location: [facility];  Service: Pain Management;  Laterality: N/A;    RADIOFREQUENCY ABLATION OF LUMBAR MEDIAL BRANCH NERVE AT SINGLE LEVEL Bilateral 6/4/2024    Procedure: Radiofrequency Ablation, Nerve, Spinal, Lumbar, Medial Branch, L4/5 and L5/S1;  Surgeon: Everett Taveras MD;  Location: [facility];  Service: Pain Management;  Laterality: Bilateral;    REPAIR OF RETINAL DETACHMENT WITH VITRECTOMY Right 8/1/2018    Procedure: REPAIR, RETINAL DETACHMENT, WITH VITRECTOMY;  Surgeon: SAJAN Pulido MD;  Location: [address];  Service: Ophthalmology;  Laterality: Right;  35 min    TONSILLECTOMY      TRANSRECTAL BIOPSY OF PROSTATE WITH ULTRASOUND GUIDANCE N/A 1/31/2023    Procedure: BIOPSY, PROSTATE, RECTAL APPROACH, WITH US GUIDANCE;  Surgeon: MARIZA Donahue MD;  Location: [facility];  Service: Urology;  Laterality: N/A;    TRANSRECTAL BIOPSY OF PROSTATE WITH ULTRASOUND GUIDANCE N/A 4/12/2024    Procedure: BIOPSY, PROSTATE, RECTAL APPROACH, WITH US GUIDANCE;  Surgeon: MARIZA Donahue MD;  Location: [facility];  Service: Urology;  Laterality: N/A;    TRIGGER FINGER RELEASE Right     X 2    uro lift      for enlarged prostate       Fam Hx:   malignancies: No    kidney stones: No     Soc Hx:  , lives in [place]    Allergies:  Avelox [moxifloxacin]    Medications:  reviewed     Objective:   There were no vitals filed for this visit.    Physical Exam  Constitutional:       Appearance: Normal appearance.   Pulmonary:      Effort: Pulmonary effort is normal.   Abdominal:      General: There is no distension.      Palpations: Abdomen is soft.      Tenderness: There is no abdominal tenderness. There is no right CVA tenderness or left CVA tenderness.   Musculoskeletal:         General: Normal range of motion.      Cervical back: Normal range of motion.   Skin:     General: Skin is warm.   Neurological:      Mental Status: He is oriented to person, place, and time.   Psychiatric:         Mood and Affect: Mood normal.         Behavior: Behavior normal.         LABS REVIEW:  UA today:  Color:Clear, Yellow  Spec. Grav.  >1.030  PH  5.5  Negative for leukocytes, nitrates, urobili, bili  Positive glucose,knes, protein  Moderate blood    Assessment:       1. Prostate CA    2. Gross hematuria          Plan:     Cystoscopy ordered and scheduled with Dr. Donahue    F/u per plan    [facility]: active    XENA Pacheco

## 2025-01-24 ENCOUNTER — PATIENT MESSAGE (OUTPATIENT)
Dept: FAMILY MEDICINE | Facility: CLINIC | Age: 70
End: 2025-01-24
Payer: MEDICARE

## 2025-01-25 ENCOUNTER — OFFICE VISIT (OUTPATIENT)
Dept: FAMILY MEDICINE | Facility: CLINIC | Age: 70
End: 2025-01-25
Payer: MEDICARE

## 2025-01-25 VITALS
TEMPERATURE: 98 F | BODY MASS INDEX: 25.72 KG/M2 | OXYGEN SATURATION: 98 % | HEART RATE: 76 BPM | HEIGHT: 71 IN | RESPIRATION RATE: 17 BRPM | SYSTOLIC BLOOD PRESSURE: 130 MMHG | WEIGHT: 183.69 LBS | DIASTOLIC BLOOD PRESSURE: 72 MMHG

## 2025-01-25 DIAGNOSIS — E11.21 TYPE 2 DIABETES MELLITUS WITH DIABETIC NEPHROPATHY, WITH LONG-TERM CURRENT USE OF INSULIN: ICD-10-CM

## 2025-01-25 DIAGNOSIS — G63 POLYNEUROPATHY ASSOCIATED WITH UNDERLYING DISEASE: ICD-10-CM

## 2025-01-25 DIAGNOSIS — I70.0 AORTIC ATHEROSCLEROSIS: ICD-10-CM

## 2025-01-25 DIAGNOSIS — G47.52 REM SLEEP BEHAVIOR DISORDER: ICD-10-CM

## 2025-01-25 DIAGNOSIS — G20.A2 PARKINSON'S DISEASE WITHOUT DYSKINESIA, WITH FLUCTUATING MANIFESTATIONS: Primary | ICD-10-CM

## 2025-01-25 DIAGNOSIS — Z79.4 TYPE 2 DIABETES MELLITUS WITH DIABETIC NEPHROPATHY, WITH LONG-TERM CURRENT USE OF INSULIN: ICD-10-CM

## 2025-01-25 DIAGNOSIS — F33.0 MILD EPISODE OF RECURRENT MAJOR DEPRESSIVE DISORDER: ICD-10-CM

## 2025-01-25 DIAGNOSIS — H34.8122 CENTRAL RETINAL VEIN OCCLUSION, LEFT EYE, STABLE: ICD-10-CM

## 2025-01-25 DIAGNOSIS — M79.604 RIGHT LEG PAIN: ICD-10-CM

## 2025-01-25 DIAGNOSIS — E27.40 ADRENAL INSUFFICIENCY: ICD-10-CM

## 2025-01-25 DIAGNOSIS — N25.81 SECONDARY HYPERPARATHYROIDISM OF RENAL ORIGIN: ICD-10-CM

## 2025-01-25 PROCEDURE — 99215 OFFICE O/P EST HI 40 MIN: CPT | Mod: PBBFAC,PO | Performed by: DIETITIAN, REGISTERED

## 2025-01-25 PROCEDURE — 99214 OFFICE O/P EST MOD 30 MIN: CPT | Mod: S$PBB,,, | Performed by: DIETITIAN, REGISTERED

## 2025-01-25 PROCEDURE — 99999 PR PBB SHADOW E&M-EST. PATIENT-LVL V: CPT | Mod: PBBFAC,,, | Performed by: DIETITIAN, REGISTERED

## 2025-01-25 PROCEDURE — G2211 COMPLEX E/M VISIT ADD ON: HCPCS | Mod: S$PBB,,, | Performed by: DIETITIAN, REGISTERED

## 2025-01-25 RX ORDER — CLONAZEPAM 1 MG/1
1 TABLET ORAL NIGHTLY
Qty: 60 TABLET | Refills: 2 | Status: SHIPPED | OUTPATIENT
Start: 2025-01-25

## 2025-01-25 RX ORDER — DULOXETIN HYDROCHLORIDE 30 MG/1
30 CAPSULE, DELAYED RELEASE ORAL DAILY
Qty: 30 CAPSULE | Refills: 2 | Status: SHIPPED | OUTPATIENT
Start: 2025-01-25

## 2025-01-25 RX ORDER — MELOXICAM 15 MG/1
15 TABLET ORAL DAILY
Qty: 30 TABLET | Refills: 0 | Status: SHIPPED | OUTPATIENT
Start: 2025-01-25

## 2025-01-25 NOTE — PROGRESS NOTES
PLAN:    Assessment & Plan    IMPRESSION:  - Switched Celexa to Cymbalta 30mg for depression management, potentially addressing aggression and chronic pain  - Prescribed short course of Mobic for leg pain, considering good kidney function  - Recommend reducing Klonopin and Prazosin doses to address potential iatrogenic orthostatic hypotension  - Considering gradual reduction of fludrocortisone (steroid) and diabetes medications after blood pressure stabilizes, does not have formal diagnosis of adrenal insufficiency and I see no ACTH stim testing for this. Will send to Endocrinology to clarify diagnosis.  - Suspect current blood pressure issues may be medication-induced rather than true adrenal insufficiency  - Aim to pare down medications to reduce interactions and side effects    PARKINSON'S DISEASE:  - Monitor the patient's nightmares and aggressive behavior  - Evaluate the patient's increased aggression and memory issues, potentially related to Parkinson's progression.  - Refer the patient to neurologist Dr. Cee for follow-up to assess recent changes in memory, behavior, and aggression related to Parkinson's progression.  - Continue Sinemet for Parkinson's treatment.  - Continue Ramelteon to help with sleep and REM sleep issues related to Parkinson's.  - Contact Dr. Cee about recent changes in memory, behavior, and aggression: I sent her a message to summarize my medication changes today and the recent changes in behavior.  - Note the family's report of worsening memory and judgment issues.    DEMENTIA:  - Discuss risks and benefits of continuing Klonopin, including potential impact on dementia.  - Express concern about Klonopin use, which may contribute to dementia, but acknowledge its necessity for sleep.  - Decreased dose from 2 mg to 1 mg and will monitor effect.    DEPRESSION:  - Educate the patient on the difference between SSRI (Celexa) and SNRI (Cymbalta) medications.  - Patient reports feeling  much better on Cymbalta when he was on this in the past, reports worsening in depression recently.  - Initiate Cymbalta 30mg daily, replacing Celexa.  - Assess that managing depression better might help with aggression issues.    ORTHOSTATIC HYPOTENSION:  - Decrease Prazosin from 2mg to 1mg at bedtime.  - Continue fludrocortisone 0.1mg every other day to raise blood pressure.  - Suspect iatrogenic orthostatic hypotension due to multiple medications rather than true adrenal insufficiency, no record of ACTH stim test in the chart.  - Plan to reassess blood pressure management and potentially reduce medications causing low blood pressure.  - Reduce dosage of Klonopin from 2mg to 1mg and Prazosin from 2 mg to 1 mg to address blood pressure issues.    DIABETES:  - Monitor the patient's A1c, currently at 7.6  - Evaluate that no adjustments are needed to the diabetes regimen at this time, he is monitored by digital medicine  - Assess that for older patients, higher A1c is less dangerous than hypoglycemia.  - Continue Lantus for diabetes management, with plan to reduce dose if glucose drops.  - Goal to stop Fludrocortisone to help with diabetes management. Goal is also to reduce total diabetes medication.     LEG PAIN: IT BAND TENDERNESS:  - Initiate Mobic (meloxicam) for 1 month for leg pain.  - Note the patient's report of pain in left leg, sensitive from hip to knee, worsening throughout the day.  - Examine the leg and suspect IT band issue rather than deep vein thrombosis.  - Demonstrate stretching exercises for IT band and recommend using a foam roller.  - Explained IT band stretching technique and potential benefits of foam roller use.  - Perform IT band stretch as demonstrated during visit.  - Consider using a foam roller for IT band, if able.    KIDNEY STONES:  - Monitor the patient's kidney stones; note scheduled cystoscopy.  - Evaluate that the patient's creatinine and GFR are within normal limits.    SLEEP  APNEA:  - Note the patient's report of improved sleep with new continuous positive airway pressure (CPAP) machine.  - Continue CPAP machine for sleep apnea management.    ANXIETY:  - Decrease Klonopin from 2mg to 1mg at bedtime for anxiety and sleep issues.    ENDOCRINOLOGY REFERRAL:  - Referred to endocrinology for evaluation of adrenal insufficiency and steroid use.    FOLLOW UP:  - Follow up in 4 weeks to assess medication changes.        Problem List Items Addressed This Visit       REM sleep behavior disorder (Chronic)    Relevant Medications    clonazePAM (KLONOPIN) 1 MG tablet    Parkinsonism - Primary (Chronic)    Polyneuropathy associated with underlying disease    Central retinal vein occlusion, left eye, stable    Mild episode of recurrent major depressive disorder    Relevant Medications    DULoxetine (CYMBALTA) 30 MG capsule    Secondary hyperparathyroidism of renal origin    Aortic atherosclerosis    Type 2 diabetes mellitus with diabetic nephropathy, with long-term current use of insulin     Other Visit Diagnoses       Right leg pain        Relevant Medications    meloxicam (MOBIC) 15 MG tablet    Adrenal insufficiency        Relevant Orders    Ambulatory referral/consult to Endocrinology          Future Appointments       Date Provider Specialty Appt Notes    1/29/2025 Marlo Ayala DO Endocrinology  Arrive at: Telehealth Adrenal insufficiency [E27.40]    1/30/2025 MARIZA Donahue MD Urology Cysto for Gross Hematuria (prostate cancer)pt coming in at 1430    2/25/2025 Cherie Lion DO Family Medicine 1 mth f/u.    3/17/2025 Cherie Lion DO Family Medicine 3m follow up    4/9/2025 Lejeune, Elizabeth B, NP Sleep Medicine [facility] ([facility])    5/5/2025  Lab PSA    5/7/2025 MARIZA Donahue MD Urology 6 mo f/u with PSA prior    8/27/2025  Lab larroque    8/27/2025  Lab [facility]que           Medication Management for assessment above:   Medication List with Changes/Refills   New Medications     "DULOXETINE (CYMBALTA) 30 MG CAPSULE    Take 1 capsule (30 mg total) by mouth once daily.    MELOXICAM (MOBIC) 15 MG TABLET    Take 1 tablet (15 mg total) by mouth once daily.   Current Medications    ALFUZOSIN (UROXATRAL) 10 MG TB24    Take 1 tablet by mouth.    ALLOPURINOL (ZYLOPRIM) 300 MG TABLET    Take 1 tablet (300 mg total) by mouth once daily.    ATORVASTATIN (LIPITOR) 20 MG TABLET    Take 1 tablet (20 mg total) by mouth every evening.    BLOOD-GLUCOSE METER,CONTINUOUS MISC    1 each by Misc.(Non-Drug; Combo Route) route 4 (four) times daily.    BRIMONIDINE 0.2% (ALPHAGAN) 0.2 % DROP    INSTILL 1 DROP IN BOTH EYES TWICE DAILY    CARBIDOPA-LEVODOPA  MG (SINEMET)  MG PER TABLET    Take 2 tablets by mouth 5 (five) times daily.    CHOLECALCIFEROL, VITAMIN D3, (D3-2000 ORAL)    Take by mouth once daily.    CYANOCOBALAMIN (VITAMIN B-12) 1000 MCG TABLET    Take 100 mcg by mouth once daily.    DORZOLAMIDE (TRUSOPT) 2 % OPHTHALMIC SOLUTION    INSTILL 1 DROP IN BOTH EYES TWICE DAILY    FLUDROCORTISONE (FLORINEF) 0.1 MG TAB    Take 1 tablet (100 mcg total) by mouth every other day.    GABAPENTIN (NEURONTIN) 300 MG CAPSULE    Take 1 capsule (300 mg total) by mouth 2 (two) times daily.    INSULIN GLARGINE U-100, LANTUS, (LANTUS SOLOSTAR U-100 INSULIN) 100 UNIT/ML (3 ML) INPN PEN    Inject 7 Units into the skin once daily.    KETOCONAZOLE (NIZORAL) 2 % SHAMPOO    Apply topically.    METFORMIN (GLUCOPHAGE-XR) 500 MG ER 24HR TABLET    Take 2 tablets (1,000 mg total) by mouth once daily.    METOPROLOL SUCCINATE (TOPROL-XL) 25 MG 24 HR TABLET    Take 1 tablet by mouth.    MUPIROCIN (BACTROBAN) 2 % OINTMENT    Apply topically 3 (three) times daily.    PEN NEEDLE, DIABETIC (BD HIMANSHU 2ND GEN PEN NEEDLE) 32 GAUGE X 5/32" NDLE    Inject 1 each into the skin once daily.    PRAZOSIN (MINIPRESS) 1 MG CAP    Take 1 capsule (1 mg total) by mouth every evening.    RAMELTEON (ROZEREM) 8 MG TABLET    Take 1 tablet (8 mg total) " by mouth nightly.    RASAGILINE (AZILECT) 1 MG TAB    TAKE 1 TABLET(1 MG) BY MOUTH EVERY EVENING    TIRZEPATIDE 10 MG/0.5 ML PNIJ    Inject 10 mg into the skin every 7 days. (Increased dose)   Changed and/or Refilled Medications    Modified Medication Previous Medication    CLONAZEPAM (KLONOPIN) 1 MG TABLET clonazePAM (KLONOPIN) 1 MG tablet       Take 1 tablet (1 mg total) by mouth every evening.    Take 2 tablets (2 mg total) by mouth every evening.   Discontinued Medications    CITALOPRAM (CELEXA) 10 MG TABLET    Take 1 tablet (10 mg total) by mouth once daily.       Cherie Lion, DO, RDN  ==========================================================================  Subjective:   Patient ID: Glenn Medel is a 69 y.o. male.  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   Chief Complaint: Leg Pain (Right leg.)      History of Present Illness    CHIEF COMPLAINT:  Patient presents today with leg pain.    HISTORY OF PRESENT ILLNESS:  He reports leg pain that progressively worsens throughout the day, improving with elevation. He experienced radiating pain from leg to shoulder during self-massage of the affected area. Ultrasound of the leg showed no abnormalities.    NEUROLOGICAL AND BEHAVIORAL:  He has experienced increased aggression in recent weeks along with memory and judgment issues. His caregiver reports difficulty controlling his behavior, which may be related to his Parkinson's disease. He experiences nightmares during REM sleep but notes improved sleep quality with a new CPAP device.    DEPRESSION:  He reports ongoing depression symptoms without therapeutic benefit from current Celexa. He previously had success with Cymbalta for 5-6 years before  discontinuing due to prescribing physician's halfway.    MEDICAL HISTORY:  He was diagnosed with Parkinson's Disease during the pandemic. He has current kidney stones with upcoming specialist appointment. He has a history of medication overdose in 2008.    CURRENT MEDICATIONS:  He takes Klonopin 2mg for sleep, fludrocortisone every other day for blood pressure management, and Lantus for diabetes management.    LABS:  A1c was 7.6. Kidney function tests showed normal creatinine and GFR values.      ROS:  General: -fever, -chills, -fatigue, -weight gain, -weight loss  Eyes: -vision changes, -redness, -discharge  ENT: -ear pain, -nasal congestion, -sore throat  Cardiovascular: -chest pain, -palpitations, -lower extremity edema  Respiratory: -cough, -shortness of breath  Gastrointestinal: -abdominal pain, -nausea, -vomiting, -diarrhea, -constipation, -blood in stool  Genitourinary: -dysuria, -hematuria, -frequency  Musculoskeletal: -joint pain, +muscle pain  Skin: -rash, -lesion  Neurological: -headache, -dizziness, -numbness, -tingling  Psychiatric: -anxiety, +depression, -sleep difficulty, +memory problems          Problem List Items Addressed This Visit       REM sleep behavior disorder (Chronic)    Parkinsonism - Primary (Chronic)    Polyneuropathy associated with underlying disease    Central retinal vein occlusion, left eye, stable    Mild episode of recurrent major depressive disorder    Secondary hyperparathyroidism of renal origin    Aortic atherosclerosis    Type 2 diabetes mellitus with diabetic nephropathy, with long-term current use of insulin     Other Visit Diagnoses       Right leg pain        Adrenal insufficiency                 Review of patient's allergies indicates:   Allergen Reactions    Avelox [moxifloxacin] Hives and Rash     Current Outpatient Medications   Medication Instructions    alfuzosin (UROXATRAL) 10 mg Tb24 1 tablet    allopurinoL (ZYLOPRIM) 300 mg, Oral, Daily    atorvastatin  "(LIPITOR) 20 mg, Oral, Nightly    blood-glucose meter,continuous Misc 1 each, Misc.(Non-Drug; Combo Route), 4 times daily    brimonidine 0.2% (ALPHAGAN) 0.2 % Drop INSTILL 1 DROP IN BOTH EYES TWICE DAILY    carbidopa-levodopa  mg (SINEMET)  mg per tablet 2 tablets, Oral, 5 times daily    cholecalciferol, vitamin D3, (D3-2000 ORAL) Daily    clonazePAM (KLONOPIN) 1 mg, Oral, Nightly    cyanocobalamin (VITAMIN B-12) 100 mcg, Daily    dorzolamide (TRUSOPT) 2 % ophthalmic solution INSTILL 1 DROP IN BOTH EYES TWICE DAILY    DULoxetine (CYMBALTA) 30 mg, Oral, Daily    fludrocortisone (FLORINEF) 100 mcg, Oral, Every other day    gabapentin (NEURONTIN) 300 mg, Oral, 2 times daily    ketoconazole (NIZORAL) 2 % shampoo Apply topically.    LANTUS SOLOSTAR U-100 INSULIN 7 Units, Subcutaneous, Daily    meloxicam (MOBIC) 15 mg, Oral, Daily    metFORMIN (GLUCOPHAGE-XR) 1,000 mg, Oral, Daily    metoprolol succinate (TOPROL-XL) 25 MG 24 hr tablet 1 tablet    mupirocin (BACTROBAN) 2 % ointment Topical (Top), 3 times daily    pen needle, diabetic (BD HIMANSHU 2ND GEN PEN NEEDLE) 32 gauge x 5/32" Ndle 1 each, Subcutaneous, Daily    prazosin (MINIPRESS) 1 mg, Oral, Nightly    ramelteon (ROZEREM) 8 mg, Oral, Nightly    rasagiline (AZILECT) 1 mg Tab TAKE 1 TABLET(1 MG) BY MOUTH EVERY EVENING    tirzepatide 10 mg, Subcutaneous, Every 7 days, (Increased dose)      I have reviewed the PMH, social history, FamilyHx, surgical history, allergies and medications documented / confirmed by the patient at the time of this visit.    Objective:   /72   Pulse 76   Temp 98.2 °F (36.8 °C) (Oral)   Resp 17   Ht 5' 11" (1.803 m)   Wt 83.3 kg (183 lb 11.2 oz)   SpO2 98%   BMI 25.62 kg/m²   Physical Exam    General: No acute distress. Well-developed. Well-nourished.  Eyes: EOMI. Sclerae anicteric.  HENT: Normocephalic. Atraumatic. Nares patent. Moist oral mucosa.  Ears: Bilateral TMs clear. Bilateral EACs clear.  Cardiovascular: Regular " rate. Regular rhythm. No murmurs. No rubs. No gallops. Normal S1, S2.  Respiratory: Normal respiratory effort. Clear to auscultation bilaterally. No rales. No rhonchi. No wheezing.  Abdomen: Soft. Non-tender. Non-distended. Normoactive bowel sounds.  Musculoskeletal: No  obvious deformity. Pain on palpation.  Extremities: No lower extremity edema. Tenderness to palpation of R IT band.  Neurological: Alert & oriented x3. No slurred speech. Shuffling gait.  Psychiatric: Normal mood. Normal affect. Good insight. Good judgment.  Skin: Warm. Dry. No rash.          Assessment:     1. Parkinson's disease without dyskinesia, with fluctuating manifestations    2. Right leg pain    3. Adrenal insufficiency    4. Type 2 diabetes mellitus with diabetic nephropathy, with long-term current use of insulin    5. Central retinal vein occlusion, left eye, stable    6. Secondary hyperparathyroidism of renal origin    7. Mild episode of recurrent major depressive disorder    8. Aortic atherosclerosis    9. Polyneuropathy associated with underlying disease    10. REM sleep behavior disorder      MDM:   Moderate complexity, more than 2 chronic conditions requiring medication management, unique testing, interpretation of testing and follow up.    Visit today included increased complexity associated with the care of the episodic problem see above assessment addressed and managing the longitudinal care of the patient due to the serious and/or complex managed problem(s) see above.    I have reviewed and summarized old records.  I have performed thorough medication reconciliation today and discussed risk and benefits of medications.  I have reviewed labs and discussed with patient.  All questions were answered.    I have signed for the following orders AND/OR meds.  Orders Placed This Encounter   Procedures    Ambulatory referral/consult to Endocrinology     Standing Status:   Future     Standing Expiration Date:   2/25/2026     Referral  Priority:   Routine     Referral Type:   Consultation     Requested Specialty:   Endocrinology     Number of Visits Requested:   1     Medications Ordered This Encounter   Medications    clonazePAM (KLONOPIN) 1 MG tablet     Sig: Take 1 tablet (1 mg total) by mouth every evening.     Dispense:  60 tablet     Refill:  2    DULoxetine (CYMBALTA) 30 MG capsule     Sig: Take 1 capsule (30 mg total) by mouth once daily.     Dispense:  30 capsule     Refill:  2    meloxicam (MOBIC) 15 MG tablet     Sig: Take 1 tablet (15 mg total) by mouth once daily.     Dispense:  30 tablet     Refill:  0        Follow up in about 4 weeks (around 2/22/2025).  Future Appointments       Date Provider Specialty Appt Notes    1/29/2025 Marlo Ayala DO Endocrinology  Arrive at: Telehealth Adrenal insufficiency [E27.40]    1/30/2025 MARIZA Donahue MD Urology Cysto for Gross Hematuria (prostate cancer)pt coming in at 1430    2/25/2025 Cherie Lion DO Family Medicine 1 mth f/u.    3/17/2025 Cehrie Lion DO Family Medicine 3m follow up    4/9/2025 Lejeune, Elizabeth B, NP Sleep Medicine [facility] ([facility])    5/5/2025  Lab PSA    5/7/2025 MARIZA Donahue MD Urology 6 mo f/u with PSA prior    8/27/2025  Lab larroque    8/27/2025  Lab [facility]            If no improvement in symptoms or symptoms worsen, advised to call/follow-up at clinic or go to ER. Patient voiced understanding and all questions/concerns were addressed.     DISCLAIMER: This note was compiled by using a speech recognition dictation system and therefore please be aware that typographical / speech recognition errors can and do occur.  Please contact me if you see any errors specifically.     This note was generated with the assistance of ambient listening technology. Verbal consent was obtained by the patient and accompanying visitor(s) for the recording of patient appointment to facilitate this note. I attest to having reviewed and edited the generated note for  accuracy, though some syntax or spelling errors may persist. Please contact the author of this note for any clarification.      Cherie Lion DO, RDN    Office: [phone]   [address] [place] [address]  FAX: [phone]

## 2025-01-27 ENCOUNTER — TELEPHONE (OUTPATIENT)
Facility: CLINIC | Age: 70
End: 2025-01-27
Payer: MEDICARE

## 2025-01-27 NOTE — TELEPHONE ENCOUNTER
Pts spouse is concerned about pts recent aggressive behavior and how pt has been recently sending money to various scams. Requesting a call back to discuss to see if medication is causing this behavior.  Cymbalta was adjusted by PCP  Has neelima't with endocrinologist to go over meds on Weds.      Pt states he is falling apart, his voice gets hoarse.  Stopped citalopram last Sunday or Saturday night.     Attempted to schedule for Feb 3 in person visit, but system is blocked. Manager notified for assistance.

## 2025-01-27 NOTE — TELEPHONE ENCOUNTER
----- Message from Oanh sent at 1/24/2025  1:15 PM CST -----  Regarding: Pt Advice  Contact: Michelle Medel ( Spouse)  CONSULT/ADVISORY    Name of Caller:  Michelle Medel ( Spouse)    Contact Preference:  [phone]      Nature of Call:  Pts spouse is concerned about pts recent aggressive behavior and how pt has been recently sending money to various scams.  Requesting a call back to discuss to see if medication is causing this behavior.    States when he goes to his PCP or to see Dr Cee his behavior is normal, but she's afraid he will do this in public one day and something happens.

## 2025-01-29 ENCOUNTER — OFFICE VISIT (OUTPATIENT)
Facility: CLINIC | Age: 70
End: 2025-01-29
Payer: MEDICARE

## 2025-01-29 ENCOUNTER — TELEPHONE (OUTPATIENT)
Facility: CLINIC | Age: 70
End: 2025-01-29
Payer: MEDICARE

## 2025-01-29 DIAGNOSIS — Z79.4 TYPE 2 DIABETES MELLITUS WITH DIABETIC NEPHROPATHY, WITH LONG-TERM CURRENT USE OF INSULIN: ICD-10-CM

## 2025-01-29 DIAGNOSIS — E27.40 ADRENAL INSUFFICIENCY: ICD-10-CM

## 2025-01-29 DIAGNOSIS — I95.1 ORTHOSTATIC HYPOTENSION: Primary | ICD-10-CM

## 2025-01-29 DIAGNOSIS — E11.21 TYPE 2 DIABETES MELLITUS WITH DIABETIC NEPHROPATHY, WITH LONG-TERM CURRENT USE OF INSULIN: ICD-10-CM

## 2025-01-29 PROCEDURE — 98002 SYNCH AUDIO-VIDEO NEW MOD 45: CPT | Mod: 95,,, | Performed by: STUDENT IN AN ORGANIZED HEALTH CARE EDUCATION/TRAINING PROGRAM

## 2025-01-29 NOTE — TELEPHONE ENCOUNTER
Pt called back to state (reluctantly)  he could do a VV on Feb 3 at 4:20 pm to discuss current issues.    Informed MD and manager for assistance to create slot.

## 2025-01-29 NOTE — ASSESSMENT & PLAN NOTE
Managed by primary care.  Now on multidrug regimen with insulin, metformin and GLP-1 RA.  A1c has been variable, most recently in the 7's.      Lab Results   Component Value Date    HGBA1C 7.6 (H) 01/15/2025

## 2025-01-29 NOTE — ASSESSMENT & PLAN NOTE
Issues with orthostatic symptoms over the past 6-7 months which improved when the fludrocortisone was started.  Still with some symptoms but no falls.  No AI evaluation performed prior to starting fludrocortisone.  Without him having need for glucocorticoids it is unlikely for there to be a true adrenal issue.

## 2025-01-29 NOTE — PATIENT INSTRUCTIONS
Thank you for visiting with me during our virtual appointment.  Below is some relevant info about use of fludrocortisone and evaluation for adrenal insufficiency.      I will have our staff set those labs up as a morning blood draw next week and I will be in touch when I have those results.  Reach out if questions come up.        Adrenal Insufficiency Evaluation    You have been referred to our clinic due to concerns about adrenal insufficiency, which is a condition where the adrenal glands do not produce enough hormones. You are currently taking a medication called fludrocortisone. This medication is often used to treat adrenal insufficiency, but it is also commonly prescribed to help manage orthostatic hypotension, a condition where blood pressure drops significantly when standing up, causing dizziness or fainting.    The Endocrine Society recommends fludrocortisone for patients with confirmed aldosterone deficiency as part of the treatment for primary adrenal insufficiency.[1] However, in your case, it is suspected that the original prescribing physician may have prescribed fludrocortisone to help with orthostatic hypotension rather than adrenal insufficiency.    To ensure there is no component of adrenal insufficiency, we will check your morning cortisol level. This test is important because cortisol levels are typically highest in the morning, and a low level at this time can indicate adrenal insufficiency. If your morning cortisol level is normal, it would suggest that you do not have adrenal insufficiency.  We will also check a aldosterone level which is another hormone the adrenal gland makes.      Ultimately, the decision to continue fludrocortisone will depend on the results of the morning blood tests and the judgment of the physician who originally prescribed the medication. If adrenal insufficiency is ruled out, the prescribing physician may decide whether to continue fludrocortisone based on its  effectiveness in managing your orthostatic hypotension.    Please feel free to ask any questions you may have about this process during your visit.    References  Diagnosis and Treatment of Primary Adrenal Insufficiency: An Endocrine Society Clinical Practice Guideline. Luis Eduardo SR, Nura B, Tamela W, et al. The Journal of Clinical Endocrinology and Metabolism. 2016;101(2):364-89. doi:[email or web address].

## 2025-01-29 NOTE — PROGRESS NOTES
ENDOCRINOLOGY NEW PATIENT VISIT: 01/29/2025    The patient location is: Home  The chief complaint leading to consultation is: AI concerns    Visit type: audiovisual    Face to Face time with patient: 30 minutes  45 minutes of total time spent on the encounter, which includes face to face time and non-face to face time preparing to see the patient (eg, review of tests), Obtaining and/or reviewing separately obtained history, Documenting clinical information in the electronic or other health record, Independently interpreting results (not separately reported) and communicating results to the patient/family/caregiver, or Care coordination (not separately reported).     Each patient to whom he or she provides medical services by telemedicine is:  (1) informed of the relationship between the physician and patient and the respective role of any other health care provider with respect to management of the patient; and (2) notified that he or she may decline to receive medical services by telemedicine and may withdraw from such care at any time.      Subjective:      Patient ID: Glenn Medel is a 69 y.o. male.    Chief Complaint:  Consult and Adrenal Insufficiency    History of Present Illness  Glenn Medel presents for evaluation of adrenal insufficiency.  He has been on Florinef and there were concerns by his primary care about AI given the use of this med.  He recalls having issues with orthostatic hypotension events in July/August 2024 associated with falls.  It looks like Florinef was then started after those events.  No prior cortisol or aldosterone testing to show concerns for true adrenal insufficiency.  Today is his first visit with me and he has family with him for the visit.      With regards to adrenal insufficiency:     No confirmed AI.  Orthostatic hypotension events in the summer of 2024 which led to start of Fludrocortisone      Lab Results   Component Value Date    K 4.3 01/09/2025    K 3.9 09/04/2024      01/09/2025     09/04/2024    CREATININE 0.9 01/09/2025    CREATININE 0.9 09/04/2024     There were no vitals taken for this visit.    - Most recent pituitary and/or adrenal gland imaging:  Jan 15th renal stone CT - normal adrenal glands    - Pertinent factors:  No   Yes  []    [x]   Chronic Steroids (oral, inhaled, topical, or injected)  [x]    []   Check point inhibitor exposure  [x]    []   Autoimmune diseases (hypothyroidism, vitiligo, Type 1 diabetes, celiac disease)  [x]    []   Infiltrative diseases (TB, fungal infections, hemochromatosis, sarcoidosis)  [x]    []   Recent infection, surgeries, injuries, emotional stress  [x]    []   Adrenal, pituitary surgery or radiation to head/neck    Timeline of steroid use if present:  Had joint injection of his shoulder on January 10th.  Does have injections every couple months.     - Symptoms:  No   Yes  []    [x]   Fatigue  [x]    []   Hypotension  []    [x]   Orthostatic hypotension  [x]    []   Nausea/Vomiting  [x]    []   Abdominal pain  [x]    []   Hypoglycemia  [x]    []   Weakness/Body aches  [x]    []   Salt cravings  [x]    []   Hyperpigmentation  [x]    []   Headache  [x]    []   Peripheral vision changes    Family history of autoimmune disease, [place]'s disease or pituitary disorders:  None    Regarding Diabetes:    Managed by his PCP    Metformin 1000 mg once daily  Lantus 7 units daily  Mounjaro 10 mg     Lab Results   Component Value Date    HGBA1C 7.6 (H) 01/15/2025       ROS:   As above    Objective:     There were no vitals taken for this visit.  BP Readings from Last 3 Encounters:   01/25/25 130/72   01/09/25 138/86   01/07/25 126/74     Wt Readings from Last 1 Encounters:   01/25/25 0807 83.3 kg (183 lb 11.2 oz)     There is no height or weight on file to calculate BMI.    Physical Exam  Constitutional:       Appearance: Normal appearance.   Neurological:      Mental Status: He is alert.   Psychiatric:         Mood and Affect: Mood  normal.         Behavior: Behavior normal.        Lab Review:   Lab Results   Component Value Date    HGBA1C 7.6 (H) 01/15/2025     Lab Results   Component Value Date    CHOL 118 (L) 11/21/2024    HDL 32 (L) 11/21/2024    LDLCALC 60.6 (L) 11/21/2024    TRIG 127 11/21/2024    CHOLHDL 27.1 11/21/2024     Lab Results   Component Value Date     01/09/2025    K 4.3 01/09/2025     01/09/2025    CO2 28 01/09/2025     (H) 01/09/2025    BUN 15 01/09/2025    CREATININE 0.9 01/09/2025    CALCIUM 9.5 01/09/2025    PROT 6.0 (L) 08/12/2024    ALBUMIN 3.7 01/09/2025    BILITOT 0.7 08/12/2024    ALKPHOS 101 08/12/2024    AST 24 08/12/2024    ALT <5 08/12/2024    ANIONGAP 9 01/09/2025    ESTGFRAFRICA >60.0 12/29/2021    EGFRNONAA >60.0 12/29/2021    TSH 0.976 01/06/2020     Vit D, 25-Hydroxy   Date Value Ref Range Status   09/28/2022 51 30 - 96 ng/mL Final     Comment:     Vitamin D deficiency.........<10 ng/mL                              Vitamin D insufficiency......10-29 ng/mL       Vitamin D sufficiency........> or equal to 30 ng/mL  Vitamin D toxicity............>100 ng/mL         Assessment and Plan     Type 2 diabetes mellitus with diabetic nephropathy, with long-term current use of insulin  Managed by primary care.  Now on multidrug regimen with insulin, metformin and GLP-1 RA.  A1c has been variable, most recently in the 7's.      Lab Results   Component Value Date    HGBA1C 7.6 (H) 01/15/2025         Adrenal insufficiency  No definite AI diagnosis.  Likely fludrocortisone started in setting of orthostatic hypotension.  Labs planned to evaluate for AI with morning fasting cortisol and aldosterone testing.  We will have him wait 1 week for these labs since he had recent steroid injection.  Unlikely for their to be AI present.  Adrenal glands also had a normal appearance on imaging from earlier this month.       If no concerns on labs for aldosterone or cortisol then the decision for continuing with use of  fludrocortisone would be up to the prescribing physician.  Endocrine does not specifically treat orthostatic hypotension outside of true adrenal insufficiency.  Cardiology does sometimes treat with fludrocortisone or midodrine type therapies in these settings.      Orthostatic hypotension  Issues with orthostatic symptoms over the past 6-7 months which improved when the fludrocortisone was started.  Still with some symptoms but no falls.  No AI evaluation performed prior to starting fludrocortisone.  Without him having need for glucocorticoids it is unlikely for there to be a true adrenal issue.         RTC as needed based on planned testing.  Labs in a week, fasting 8 am.       Marlo Ayala DO     Disclaimer: This note has been generated using voice-recognition software. There may be typographical errors that have been missed during proof-reading.

## 2025-01-29 NOTE — ASSESSMENT & PLAN NOTE
No definite AI diagnosis.  Likely fludrocortisone started in setting of orthostatic hypotension.  Labs planned to evaluate for AI with morning fasting cortisol and aldosterone testing.  We will have him wait 1 week for these labs since he had recent steroid injection.  Unlikely for their to be AI present.  Adrenal glands also had a normal appearance on imaging from earlier this month.       If no concerns on labs for aldosterone or cortisol then the decision for continuing with use of fludrocortisone would be up to the prescribing physician.  Endocrine does not specifically treat orthostatic hypotension outside of true adrenal insufficiency.  Cardiology does sometimes treat with fludrocortisone or midodrine type therapies in these settings.

## 2025-01-29 NOTE — Clinical Note
Please arrange labs for this patient next week sometime with an 8 am fasting blood draw.  No follow up just yet  Thanks

## 2025-01-30 ENCOUNTER — PROCEDURE VISIT (OUTPATIENT)
Dept: UROLOGY | Facility: CLINIC | Age: 70
End: 2025-01-30
Payer: MEDICARE

## 2025-01-30 ENCOUNTER — TELEPHONE (OUTPATIENT)
Dept: UROLOGY | Facility: CLINIC | Age: 70
End: 2025-01-30

## 2025-01-30 VITALS — WEIGHT: 182.75 LBS | HEIGHT: 71 IN | BODY MASS INDEX: 25.58 KG/M2

## 2025-01-30 DIAGNOSIS — R31.0 GROSS HEMATURIA: Primary | ICD-10-CM

## 2025-01-30 DIAGNOSIS — R31.0 GROSS HEMATURIA: ICD-10-CM

## 2025-01-30 LAB
BILIRUBIN, UA POC OHS: NEGATIVE
BLOOD, UA POC OHS: ABNORMAL
CLARITY, UA POC OHS: ABNORMAL
COLOR, UA POC OHS: YELLOW
GLUCOSE, UA POC OHS: NEGATIVE
KETONES, UA POC OHS: ABNORMAL
LEUKOCYTES, UA POC OHS: NEGATIVE
NITRITE, UA POC OHS: NEGATIVE
PH, UA POC OHS: 6
PROTEIN, UA POC OHS: 30
SPECIFIC GRAVITY, UA POC OHS: 1.02
UROBILINOGEN, UA POC OHS: 1

## 2025-01-30 PROCEDURE — 99999PBSHW POCT URINALYSIS(INSTRUMENT): Mod: PBBFAC,,,

## 2025-01-30 PROCEDURE — 52000 CYSTOURETHROSCOPY: CPT | Mod: S$PBB,,, | Performed by: UROLOGY

## 2025-01-30 PROCEDURE — 81003 URINALYSIS AUTO W/O SCOPE: CPT | Mod: PBBFAC,PO | Performed by: UROLOGY

## 2025-01-30 PROCEDURE — 52000 CYSTOURETHROSCOPY: CPT | Mod: PBBFAC,PO | Performed by: UROLOGY

## 2025-01-30 NOTE — PROCEDURES
Cystoscopy    Date/Time: 1/30/2025 2:00 PM    Performed by: MARIZA Donahue MD  Authorized by: Candice Mariee NP    Consent Done?:  Yes (Written)  Timeout: prior to procedure the correct patient, procedure, and site was verified    Prep: patient was prepped and draped in usual sterile fashion    Anesthesia:  Lidocaine jelly  Indications: hematuria    Position:  Supine  Anesthesia:  Lidocaine jelly  Patient sedated?: No    Preparation: Patient was prepped and draped in usual sterile fashion    Scope type:  Flexible cystoscope   patient tolerated the procedure well with no immediate complications    Blood Loss:  None    69-year-old with a history of favorable intermediate prostate cancer.  He is on active surveillance.  He had an episode of gross hematuria and he is scheduled for cystoscopy.    The flexible cystoscope was placed into the urethra and carefully advanced into the bladder.  A careful cystoscopic exam was then performed.  The entire bladder mucosa was systematically visualized.  Findings include moderate bladder wall trabeculation.  There was a tumor at the trigone near the right ureteral orifice measuring about 3 cm in diameter.  There was surrounding inflammatory tissue with some overlying necrosis.  There were no other lesions, masses foreign bodies or stones.   Each ureteral orifices were visualized and both had clear efflux of urine.  On retroflexion there was a moderate sized intravesical gland.  The cystoscope was then removed and I examined the entire length of the urethra.  There was moderate trilobar enlargement of the prostate otherwise the urethra appeared normal.  He tolerated the procedure well.  There were no complications    Impression:  Bladder tumor.    Plan:  TURBT with instillation of gemcitabine

## 2025-01-31 ENCOUNTER — E-CONSULT (OUTPATIENT)
Dept: CARDIOLOGY | Facility: CLINIC | Age: 70
End: 2025-01-31
Payer: MEDICARE

## 2025-01-31 DIAGNOSIS — Z01.810 PRE-OPERATIVE CARDIOVASCULAR EXAMINATION: Primary | ICD-10-CM

## 2025-01-31 PROCEDURE — 99451 NTRPROF PH1/NTRNET/EHR 5/>: CPT | Mod: S$PBB,,, | Performed by: INTERNAL MEDICINE

## 2025-01-31 NOTE — CONSULTS
[facility] - Cardiology  Response for E-Consult     Patient Name: Glenn Medel  MRN: [id number]  Primary Care Provider: Cherie Lion DO   Requesting Provider: MARIZA Donahue MD  E-Consult to General Cardiology  Consult performed by: Sterling Solo MD  Consult ordered by: MARIZA Donahue MD          Chart reviewed personally.    As long as no significant chest pain or shortness of breath with exertion, patient is at acceptable risk to proceed from a Cardiology standpoint.      Additional future steps to consider: NA    Total time of Consultation: 5 minute    I did not speak to the requesting provider verbally about this.     *This eConsult is based on the clinical data available to me and is furnished without benefit of a physical examination. The eConsult will need to be interpreted in light of any clinical issues or changes in patient status not available to me at the time of filing this eConsults. Significant changes in patient condition or level of acuity should result in immediate formal consultation and reevaluation. Please alert me if you have further questions.    Thank you for this eConsult referral.     Sterling Solo MD  [facility]

## 2025-02-03 ENCOUNTER — TELEPHONE (OUTPATIENT)
Dept: NEUROLOGY | Facility: CLINIC | Age: 70
End: 2025-02-03
Payer: MEDICARE

## 2025-02-03 ENCOUNTER — OFFICE VISIT (OUTPATIENT)
Facility: CLINIC | Age: 70
End: 2025-02-03
Payer: MEDICARE

## 2025-02-03 DIAGNOSIS — M54.9 DORSALGIA, UNSPECIFIED: ICD-10-CM

## 2025-02-03 DIAGNOSIS — G20.A2 PARKINSON'S DISEASE WITHOUT DYSKINESIA, WITH FLUCTUATING MANIFESTATIONS: Primary | Chronic | ICD-10-CM

## 2025-02-03 DIAGNOSIS — R46.89 DISINHIBITION BEHAVIOR: ICD-10-CM

## 2025-02-03 PROCEDURE — 98006 SYNCH AUDIO-VIDEO EST MOD 30: CPT | Mod: 95,,, | Performed by: PSYCHIATRY & NEUROLOGY

## 2025-02-03 RX ORDER — GABAPENTIN 300 MG/1
300 CAPSULE ORAL 2 TIMES DAILY
Qty: 60 CAPSULE | Refills: 1 | Status: SHIPPED | OUTPATIENT
Start: 2025-02-03 | End: 2025-02-09 | Stop reason: SDUPTHER

## 2025-02-03 RX ORDER — DONEPEZIL HYDROCHLORIDE 5 MG/1
5 TABLET, FILM COATED ORAL NIGHTLY
Qty: 30 TABLET | Refills: 11 | Status: SHIPPED | OUTPATIENT
Start: 2025-02-03 | End: 2026-02-03

## 2025-02-03 NOTE — ASSESSMENT & PLAN NOTE
Typical  with some nonmotor features.    Orthostasis is new    Appears to have falls during offtime gaps  Suggsted due to disinhibition decrease  Carbidopa/levodopa 25/100mg 2 tab PO 5x daily to 1.5tabs 5x daily  PT

## 2025-02-03 NOTE — PROGRESS NOTES
The patient location is: HOME  The chief complaint leading to visit is: iPD  1. Parkinson's disease without dyskinesia, with fluctuating manifestations        2. Disinhibition behavior            Visit type: Virtual visit with synchronous audio and video    Face to Face time with patient: 20mins  30 minutes of total time spent on the encounter, which includes face to face time and non-face to face time preparing to see the patient (eg, review of tests), Obtaining and/or reviewing separately obtained history, Documenting clinical information in the electronic or other health record, Independently interpreting results (not separately reported) and communicating results to the patient/family/caregiver, or Care coordination (not separately reported).     Each patient to whom he or she provides medical services by telemedicine is:  (1) informed of the relationship between the physician and patient and the respective role of any other health care provider with respect to management of the patient; and (2) notified that he or she may decline to receive medical services by telemedicine and may withdraw from such care at any time.      Notes:   Glenn STOCKTON Chief Complaints during this visit:  New Patient visit for  Parkinsonism.   No referring provider defined for this encounter.    Primary Care Physician  Cherie Lion,   [address] [facility] [address]    Interval Hx    Since last visit,   Mood and memory issues    Stopped celexa and started cymbalta  Moods are labile  He feels like he's being scammed  Wants to spend money in crypto currency  No hallucinations    PCP put him on florinef  No more orthostasis    Doing well PD wise    Carbidopa/levodopa 25/100mg 2 tabs five times daily  Some facial dyskinesias  Some offtime gaps    REM sleep dz sporadic now- once every 3 weeks  Takes clonazepam 1 QHS at 9PM  Stopped using CPAP    Takes azilect 1mg QHS    Falls: none  Assist Device: none  Walks 2 miles without  "stopping  With stairs he must hand onto railing    MRI brain revealed a possible posterior fossa arachnoid cyst - followed by Dr. Saldivar - stable per last scan  Also having Lspine MRI through pain management soon    Brother with dementia since age 60 - mild hand tremors and some issues walking  2 sisters/2 brothers healthy  Mother with dementia at age 80 - also hand hand dystonia  Father also reported fingers getting stuck    Ceruloplasmin NL  Neuropsych revealed mild cognitive impairments but no diagnosis    PD Review of Symptoms:  Anosmia: some stable  Dysarthria/Hypophonia: none  Dysphagia/Sialorrhea: none  Depression: yes  Cognitive slowing: mild attention issues - replaced CPAP  Hallucinations: none  Urinary changes: none  Constipation: yes  Orthostasis: at times  Falls: occasional  Freezing: none  Micrographia: none  Sleep issues:  -RBD: 1 year of REM sleep dz    "History of present illness:   69 y.o.  male seen in consultation at the request of  Dr. Martinez for evaluation of parkinsonism.      Falls often, onto knee.  Right hand will suddenly start shaking.  Memory a little worse.  Needs new mask for his LOVE, hasn't used in a while.  Not sure who to see.    Has DM, had an emg about 2 years ago.      1/20/20 Juan:  Glenn Medel is a 64 y.o. right-handed male with DM2, HTN, LOVE, anxiety and depression who presents today for an initial evaluation of tremos and balance and is accompanied by wife.    Tremor- started in RH a couple of years ago but seemed to have gotten worse. Will watch TV and ev hands shake. Started noting in LH in the last year. Also notes shaking in legs for several years. He says the legs in fact started before the hands. Nothing makes it better. Not noted any aggravating factors. Drinks very little alcohol. Has not noted benefit with alcohol. Handwriting is pretty good. No changes with this.    Balance- sometimes will be working on knees and then fall back when he goes to get up. Sometimes " "with walking, he will wobble. No falls while walking. Has fallen backwards when he is trying to get up from being on knees. Has noted balance issues the past couple of months. Has not noted progression.     Acting out dreams. Not nightly but is significant when does happen. He actually ended up in hospital in the last year because of fighting in sleep. Has LOVE, used to wear CPAP and wife wonders if this helped when he was wearing this. He says he will wear it a couple of days, aggravates him and then he will not use for month or longer. He had sleep doctor, Dr. Hinton, but dismissed him as she did everything she could do for him.    Denies stiffness in muscles.   Sometimes feels slow with walking. Wife says he is slower to answer questions.   Memory is getting worse. Been bad couple of years but worse the past year. Wife agrees but attributes some of this to prison- lazier life.    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   He left on his terms. Was not asked to leave. "      II.  Review of systems:  As in HPI,  otherwise, balance 10 systems reviewed and are negative.    III.  Past Medical History:   Diagnosis Date    BPH (benign prostatic hyperplasia)     Cataract     done ou    Chronic kidney disease, stage 3 02/25/2015    DDD (degenerative disc disease), lumbar     s/p epidural steroids     Diabetes mellitus     type 2    Elevated PSA     Glaucoma     OU    HTN (hypertension)     Iritis, lens-induced 01/08/2018    Obesity     Parkinson's disease     Sleep apnea     uses cpap     Family History   Problem Relation Name Age of Onset    Hypertension Mother      Diabetes Mother      Stroke Mother      Cancer Mother          colon    Kidney disease Mother      Heart disease Father      Diabetes Father      Colon cancer Father      Cancer Father          colon    Cataracts Father      No Known Problems Sister      Heart disease Brother  62    Kidney " disease Brother      Dementia Brother Rodrigue     Abnormal EKG Brother Rodrigue     No Known Problems Maternal Aunt      No Known Problems Maternal Uncle      No Known Problems Paternal Aunt      No Known Problems Paternal Uncle      Glaucoma Maternal Grandmother      No Known Problems Maternal Grandfather      No Known Problems Paternal Grandmother      No Known Problems Paternal Grandfather      Amblyopia Neg Hx      Blindness Neg Hx      Macular degeneration Neg Hx      Retinal detachment Neg Hx      Strabismus Neg Hx      Thyroid disease Neg Hx      Parkinsonism Neg Hx       Social history:  , retired.      Current Outpatient Medications on File Prior to Visit   Medication Sig Dispense Refill    allopurinoL (ZYLOPRIM) 300 MG tablet Take 1 tablet (300 mg total) by mouth once daily. 90 tablet 3    atorvastatin (LIPITOR) 20 MG tablet Take 1 tablet (20 mg total) by mouth every evening. 90 tablet 3    blood-glucose meter,continuous Misc 1 each by Misc.(Non-Drug; Combo Route) route 4 (four) times daily. 1 each 0    brimonidine 0.2% (ALPHAGAN) 0.2 % Drop INSTILL 1 DROP IN BOTH EYES TWICE DAILY 10 mL 3    carbidopa-levodopa  mg (SINEMET)  mg per tablet Take 2 tablets by mouth 5 (five) times daily. 736 tablet 12    cholecalciferol, vitamin D3, (D3-2000 ORAL) Take by mouth once daily.      clonazePAM (KLONOPIN) 1 MG tablet Take 1 tablet (1 mg total) by mouth every evening. 60 tablet 2    cyanocobalamin (VITAMIN B-12) 1000 MCG tablet Take 100 mcg by mouth once daily.      dorzolamide (TRUSOPT) 2 % ophthalmic solution INSTILL 1 DROP IN BOTH EYES TWICE DAILY 10 mL 3    DULoxetine (CYMBALTA) 30 MG capsule Take 1 capsule (30 mg total) by mouth once daily. 30 capsule 2    fludrocortisone (FLORINEF) 0.1 mg Tab Take 1 tablet (100 mcg total) by mouth every other day. 90 tablet 1    insulin glargine U-100, Lantus, (LANTUS SOLOSTAR U-100 INSULIN) 100 unit/mL (3 mL) InPn pen Inject 7 Units into the skin once daily.       "ketoconazole (NIZORAL) 2 % shampoo Apply topically.      meloxicam (MOBIC) 15 MG tablet Take 1 tablet (15 mg total) by mouth once daily. 30 tablet 0    metFORMIN (GLUCOPHAGE-XR) 500 MG ER 24hr tablet Take 2 tablets (1,000 mg total) by mouth once daily. 180 tablet 3    mupirocin (BACTROBAN) 2 % ointment Apply topically 3 (three) times daily. 15 g 0    pen needle, diabetic (BD HIMANSHU 2ND GEN PEN NEEDLE) 32 gauge x 5/32" Ndle Inject 1 each into the skin once daily. 100 each 3    prazosin (MINIPRESS) 1 MG Cap Take 1 capsule (1 mg total) by mouth every evening. 90 capsule 3    ramelteon (ROZEREM) 8 mg tablet Take 1 tablet (8 mg total) by mouth nightly. 90 tablet 3    rasagiline (AZILECT) 1 mg Tab TAKE 1 TABLET(1 MG) BY MOUTH EVERY EVENING 30 tablet 11    tirzepatide 10 mg/0.5 mL PnIj Inject 10 mg into the skin every 7 days. (Increased dose) 2 mL 2    [DISCONTINUED] gabapentin (NEURONTIN) 300 MG capsule Take 1 capsule (300 mg total) by mouth 2 (two) times daily. 60 capsule 1     No current facility-administered medications on file prior to visit.       PRIOR problem-specific medications tried:  none    Review of patient's allergies indicates:   Allergen Reactions    Avelox [moxifloxacin] Hives and Rash       IV. Physical Exam    Physical Exam  Constitutional: Well-developed, well-nourished, appears stated age  Eyes: No scleral icterus  ENT: Moist oral mucosa  Cardiovascular: No lower extremity edema   Respiratory: No labored breathing   Skin: No rash   Hematologic: No bruising    Other: GI/ deferred   Mental status: Alert and oriented to person, place, time, and situation;   follows commands  Speech: normal (not dysarthric), no aphasia - NONpressured speech  Cranial nerves:            CN II: Pupils mid-position and equal, not tested light or accommodation  CN III, IV, VI: Extraocular movements full, no nystagmus visualized  CN V: Not tested   CN VII: Face strong and symmetric bilaterally   CN VIII: Hearing intact to voice " "and conversation   CN IX, X: Palate raises midline and symmetric   CN XI: Strong shoulder shrug B/L  CN XII: Tongue appears midline   Motor: Normal bulk by appearance, no drift   Sensory: Not tested    Gait: Can stand on either leg- barely a shuffle  Deep tendon reflexes: Not tested  Movement/Coordination                    Mod hypophonic speech.                     Mod facial masking.    Mild orofacial dyskinesias                    No other dystonia, chorea, athetosis, myoclonus, or tics visualized.    Bradykinesia    ? Finger taps Finger flicks DAVE Heel taps   Left 0 0 0 0   Right 1+ 1+ 0 0   __________________________________________________________      MOTOR EXAMINATION (ON)       Speech  1 - Slight loss of expression, dictation, and/or volumn.   Facial Expression  1 - Minimal Hypomimia, could be normal "Poker Face".   Tremor at Rest:      Face, lips, chin 0 - Absent.    Hands:      right 0 - Absent.    left 0 - Absent.    Feet:      right 1   left 1   Action or Postural Tremor of Hands      right 1 - Slight; present with action.   left 1 - Slight; present with action.                           Finger Taps      right 0 - Normal.   left 0 - Normal.   Hand Movements      right 0 - Normal.   left 0 - Normal.   Rapid Alternating Movements of Hands      right 0 - Normal.   left 1   Leg Agility      right 0 - Normal.   left 1 - Mild slowing and/or reduction in amplitude.   Arising from Chair  1   Posture  1 - Not quite erect, slightly stooped posture; could be normal for older person.   Gait  0 - Normal.       Body Bradykinesia and Hypokinesia (Combining slowness, hesitancy, decreased armswing, small amplitude, and poverty of movement in general)  1 - Minimal slowness, giving movement a deliberate character; could be normal for some persons. Possibly reduced amplitude.     11      V.  Laboratory/ Radiological Data: (Personally reviewed images)         MRI 1/29/20  Impression       1. There is no acute abnormality.  " "There is mild volume loss without significant white matter disease.  There is no hemorrhage, parenchymal mass or acute infarction.  There is a probable arachnoid cyst in the right posterior fossa.  Please see above discussion.     Memory/Encephalopathy Labs:  Lab Results   Component Value Date    TSH 0.976 01/06/2020    FWAMTEHG31 391 05/14/2020    THIAMINEBLOO 59 05/14/2020     LGi and CASPR  No results found for: "ENCESLGI1", "ENCESCASPR2"    Movement Labs:  Lab Results   Component Value Date    AULOUDPL03YW 51 09/28/2022    CALCIUM 9.5 01/09/2025    PTH 53.7 09/04/2024    ALBUMIN 3.7 01/09/2025    BILITOT 0.7 08/12/2024    BILIDIR 0.2 06/06/2024    ALT <5 08/12/2024    AST 24 08/12/2024    ALKPHOS 101 08/12/2024    CERULOPLSM 29.0 08/26/2020    FERRITIN 88 03/29/2021    ALPHATOCOPHE 1275 05/14/2020       IRON STUDIES:  Lab Results   Component Value Date    IRON 60 03/29/2021    TRANSFERRIN 243 03/29/2021    TIBC 360 03/29/2021    FESATURATED 17 (L) 03/29/2021       Malignancy Labs:  Lab Results   Component Value Date    PSADIAG 4.3 (H) 10/17/2024     PARANEOPLASTIC PANEL:  No results found for: "NMOINTERPRET", "REFLEXTEST", "CRMP5", "STMAB", "LABACHR", "ACHRGANGLION", "NEUROVG"    CSF:  No results found for: "CSFWBC", "CSFRBC", "LYMPHS", "MONOMACCSF", "PROTEINCSF", "GLUCCSF", "GADCSF", "LUME", "MYELINPROT"  Flow cytometry: No results found for: "CSFC"  [place]: No results found for: "WESTNILEIGGA"    CNS Demyelinating Labs:  Nmo serum, csf igg:  No results found for: "NMOFSFACSS", "NMOFCFACSC"  No results found for: "JCVIRUS"    CSF oligoclonal bands:  No results found for: "CSFB", "CSFOLI", "SERB", "IGGINDEXCSF", "IGGCSF", "ALBQ", "GALB", "IGGSYNRATE", "IGGS", "ALBSER", "IGGALBS"    Myopathy labs:  No results found for: "CPK", "ALDOLASE", "AMMONIA", "ACFCRATIO", "ACETYLCARNIT", "CARNITINEPLA", "LACTATE"    Myasthenia panel:  No results found for: "LABACHR"  No results found for: "ACETMOD"  No results found " "for: "STMAB"   No results found for: "ACHRGANGLION"    Neuropathy Labs:  Lab Results   Component Value Date    SDKQOPRR97 391 05/14/2020    HGBA1C 7.6 (H) 01/15/2025     SPEP:  Lab Results   Component Value Date    PROTEINSERUM 6.7 07/27/2015    PATHINTPSPE REVIEWED 07/27/2015       Rheum labs:  Lab Results   Component Value Date    URICACID 4.5 09/28/2022       Neuropsych"  Mr. Medel is a 64 year old male with cognitive complaints over the past several years. He has a slightly ataxic gait of unclear etiology, but no clear parkinsonism. He has symptoms suggestive of REM sleep behavior disorder over the past year. He denied presence, passage, or recurrent visual hallucinations. He remains functionally independent, albeit more error prone than in the past. His history is remarkable for multiple vascular risk factors, including currently untreated sleep apnea.      His neuropsychological profile was largely within normal limits with the exception of mild memory inefficiencies, primarily related to encoding and cognitive organization/retrieval. His test scores and functioning are not at the level to warrant a cognitive diagnosis at this time. He did not present with the type of oliver forgetting seen in Alzheimer's disease. He also did not demonstrate the type of visuospatial dysfunction often seen early in Lewy Body Dementia, which is also in keeping with the fact that he does not have visual hallucinations. It will be important to track his cognition over time as mild cognitive changes in the setting of ataxia of unclear etiology and possible REM sleep behavior disorder could be indicative of a neurodegenerative condition. Otherwise, his mild cognitive weaknesses could simply be attributable to mild cerebrovascular disease.      Mr. Medel also reported clinically significant anxiety and depression. The transition to assisted has been admittedly more challenging than expected. His wife has also noticed reduced " "frustration tolerance with an increased tendency to perseverate on his frustrations, which has led to multiple confrontations. Treatment is indicated. "    MRI brain 2020      VI. Assessment and Plan            Problem List Items Addressed This Visit          Neuro    Parkinsonism - Primary (Chronic)    Current Assessment & Plan     Typical  with some nonmotor features.    Orthostasis is new    Appears to have falls during offtime gaps  Suggsted due to disinhibition decrease  Carbidopa/levodopa 25/100mg 2 tab PO 5x daily to 1.5tabs 5x daily  PT              Psychiatric    Disinhibition behavior    Current Assessment & Plan     Investing to cryptocurrencies and falling for scams  No manic behavior today on exam  Suggsted SW talk to wife  Changed to cymbalta which has started to help  No hallucinations or delusions    Add  Donepezil 5mg QHS                    Coni Cee MD, MS  [facility]  Department of Neurology  Movement Disorders  "

## 2025-02-03 NOTE — ASSESSMENT & PLAN NOTE
Investing to cryptocurrencies and falling for scams  No manic behavior today on exam  Suggsted SW talk to wife  Changed to cymbalta which has started to help  No hallucinations or delusions    Add  Donepezil 5mg QHS

## 2025-02-05 ENCOUNTER — LAB VISIT (OUTPATIENT)
Dept: LAB | Facility: HOSPITAL | Age: 70
End: 2025-02-05
Attending: STUDENT IN AN ORGANIZED HEALTH CARE EDUCATION/TRAINING PROGRAM
Payer: MEDICARE

## 2025-02-05 DIAGNOSIS — I95.1 ORTHOSTATIC HYPOTENSION: ICD-10-CM

## 2025-02-05 LAB — CORTIS SERPL-MCNC: 8.3 UG/DL (ref 4.3–22.4)

## 2025-02-05 PROCEDURE — 82088 ASSAY OF ALDOSTERONE: CPT | Performed by: STUDENT IN AN ORGANIZED HEALTH CARE EDUCATION/TRAINING PROGRAM

## 2025-02-05 PROCEDURE — 82533 TOTAL CORTISOL: CPT | Performed by: STUDENT IN AN ORGANIZED HEALTH CARE EDUCATION/TRAINING PROGRAM

## 2025-02-09 DIAGNOSIS — Z87.39 HISTORY OF GOUT: ICD-10-CM

## 2025-02-09 DIAGNOSIS — G20.A2 PARKINSON'S DISEASE WITHOUT DYSKINESIA, WITH FLUCTUATING MANIFESTATIONS: Chronic | ICD-10-CM

## 2025-02-09 DIAGNOSIS — M54.9 DORSALGIA, UNSPECIFIED: ICD-10-CM

## 2025-02-09 DIAGNOSIS — E79.0 HYPERURICEMIA: ICD-10-CM

## 2025-02-09 NOTE — TELEPHONE ENCOUNTER
No care due was identified.  [facility] Embedded Care Due Messages. Reference number: [id number].   2/09/2025 12:48:35 PM CST

## 2025-02-10 ENCOUNTER — TELEPHONE (OUTPATIENT)
Dept: FAMILY MEDICINE | Facility: CLINIC | Age: 70
End: 2025-02-10
Payer: MEDICARE

## 2025-02-10 DIAGNOSIS — E79.0 HYPERURICEMIA: Primary | ICD-10-CM

## 2025-02-10 LAB — ALDOST SERPL-MCNC: 9.5 NG/DL

## 2025-02-10 RX ORDER — ALLOPURINOL 300 MG/1
300 TABLET ORAL DAILY
Qty: 90 TABLET | Refills: 0 | Status: SHIPPED | OUTPATIENT
Start: 2025-02-10

## 2025-02-10 RX ORDER — RASAGILINE 1 MG/1
TABLET ORAL
Qty: 30 TABLET | Refills: 11 | Status: SHIPPED | OUTPATIENT
Start: 2025-02-10

## 2025-02-10 RX ORDER — GABAPENTIN 300 MG/1
300 CAPSULE ORAL 2 TIMES DAILY
Qty: 60 CAPSULE | Refills: 1 | Status: SHIPPED | OUTPATIENT
Start: 2025-02-10 | End: 2025-04-11

## 2025-02-10 NOTE — TELEPHONE ENCOUNTER
Returned call and spoke with patient in regards to scheduling labs needed for refill. Lab scheduled.

## 2025-02-10 NOTE — TELEPHONE ENCOUNTER
----- Message from Majo sent at 2/10/2025 11:08 AM CST -----  Contact: Self/ [phone]  Patient is returning a phone call.    Who left a message for the patient:     Does patient know what this is regarding:      Would you like a call back, or a response through your [facility] portal?:   Call back     Comments:

## 2025-02-10 NOTE — TELEPHONE ENCOUNTER
Refill Routing Note   Medication(s) are not appropriate for processing by [facility] for the following reason(s):        Required labs outdated    [facility] action(s):  Defer             Appointments  past 12m or future 3m with PCP    Date Provider   Last Visit   1/25/2025 Cherie Lion, DO   Next Visit   2/25/2025 Cherie Lion DO   ED visits in past 90 days: 0        Note composed:7:05 AM 02/10/2025

## 2025-02-11 PROBLEM — D49.4 BLADDER TUMOR: Status: ACTIVE | Noted: 2025-02-11

## 2025-02-13 DIAGNOSIS — E11.22 TYPE 2 DIABETES MELLITUS WITH STAGE 2 CHRONIC KIDNEY DISEASE, WITHOUT LONG-TERM CURRENT USE OF INSULIN: ICD-10-CM

## 2025-02-13 DIAGNOSIS — N18.2 TYPE 2 DIABETES MELLITUS WITH STAGE 2 CHRONIC KIDNEY DISEASE, WITHOUT LONG-TERM CURRENT USE OF INSULIN: ICD-10-CM

## 2025-02-13 NOTE — TELEPHONE ENCOUNTER
Refill Routing Note   Medication(s) are not appropriate for processing by [facility] for the following reason(s):        No active prescription written by provider    [facility] action(s):  Defer     Requires labs : Yes             Appointments  past 12m or future 3m with PCP    Date Provider   Last Visit   1/25/2025 Cherie Lion, DO   Next Visit   2/25/2025 Cherie Lion,    ED visits in past 90 days: 0        Note composed:2:51 PM 02/13/2025

## 2025-02-13 NOTE — TELEPHONE ENCOUNTER
Care Due:                  Date            Visit Type   Department     Provider  --------------------------------------------------------------------------------                                EP -                              PRIMARY      [facility]  Last Visit: 01-      CARE ([facility])   SHIRA Lion                               -                              PRIMARY      [facility]  Next Visit: 02-      CARE ([facility])   MEDICINE       Cherie Lion                                                            Last  Test          Frequency    Reason                     Performed    Due Date  --------------------------------------------------------------------------------    Uric Acid...  12 months..  allopurinoL..............  Not Found    Overdue    [employer] Embedded Care Due Messages. Reference number: [id number].   2/13/2025 10:21:54 AM CST

## 2025-02-14 ENCOUNTER — OUTPATIENT CASE MANAGEMENT (OUTPATIENT)
Dept: ADMINISTRATIVE | Facility: OTHER | Age: 70
End: 2025-02-14
Payer: MEDICARE

## 2025-02-14 ENCOUNTER — TELEPHONE (OUTPATIENT)
Dept: UROLOGY | Facility: CLINIC | Age: 70
End: 2025-02-14
Payer: MEDICARE

## 2025-02-14 ENCOUNTER — PATIENT MESSAGE (OUTPATIENT)
Dept: ADMINISTRATIVE | Facility: OTHER | Age: 70
End: 2025-02-14
Payer: MEDICARE

## 2025-02-14 ENCOUNTER — TELEPHONE (OUTPATIENT)
Dept: NEPHROLOGY | Facility: CLINIC | Age: 70
End: 2025-02-14
Payer: MEDICARE

## 2025-02-14 NOTE — LETTER
Glenn Medel  [address] [facility] [address]    Dear ,    Welcome to [facility] Complex Care Management Program.  It was a pleasure talking with you today.  My name is Shakila Shilpi, and I look forward to being your Care Manager.  My goal is to help you function at the healthiest and highest level possible.  You can contact me directly at [phone].    As an [facility] patient, some of the services we may be able to provide include:     Development of an individualized care plan with a Registered Nurse   Connection with a   Connection with available resources and services    Coordinate communication among your care team members   Provide coaching and education   Help you understand your doctors treatment plan  Help you obtain information about your insurance coverage.     All services provided by [facility] Complex Care Managers and other care team members are coordinated with and communicated to your primary care team.      As part of your enrollment, you will be receiving education materials and more information about these services in your [facility] account, by phone or through the mail.  If you do not wish to participate or receive information, please contact our office at [phone].      Sincerely,        Shakila Dobbins RN  [facility]   Outpatient RN Complex Care Manager

## 2025-02-14 NOTE — PROGRESS NOTES
Outpatient Care Management  Initial Patient Assessment    Patient: Glenn Medel  MRN: [id number]  Date of Service: 02/14/2025  Completed by: Shakila Dobbins RN  Referral Date: 02/11/2025  Date of Eligibility: 2/12/2025  Program:   High Risk  Status: Ongoing  Effective Dates: 2/14/2025 - present  Responsible Staff: Shakila Dobbins RN        Reason for Visit   Patient presents with    OPCM Enrollment Call       Brief Summary:  Glenn Medel was referred by MARIZA Donahue MD  for bladder tumor. Patient qualifies for program based on high risk score of 75.4%.   Active problem list, medical, surgical and social history reviewed. Active comorbidities include Parkinsonism, degenerative disc disease (lumbar), Peripheral neuropathy, generalized anxiety disorder, Chronic depression, nuclear sclerosis, Retained lens material, Glaucoma, Central retinal vein occlusion, HTN, HLD, Orthostatic hypotension, Aortic atherosclerosis, benign prostatic hyperplasia, CKD stage 2, Prostate cancer, Bladder tumor, Type 2 diabetes mellitus, and Severe obstructive sleep apnea. Areas of need identified by patient include fatigue, fall risk, and decreased appetite.   Next steps: OPCM follow up on 2/21/25.    Disability Status  Is the patient alert and oriented (person, place, time, and situation)?: Alert and oriented x 4  Hearing Difficulty or Deaf: no  Visual Difficulty or Blind: yes  Visual and Hearing Needs Conclusion: Denies hearing needs. Wears glasses to assist with vision.  Difficulty Concentrating, Remembering or Making Decisions: yes  Communication Difficulty: no  Eating/Swallowing Difficulty: no  Walking or Climbing Stairs Difficulty: no  Dressing/Bathing Difficulty: no  Toileting : Independent  Continence : Continence - Not a problem  Difficulty Managing Errands Independently: no  Equipment Currently Used at Home: blood pressure machine; cane, quad; CPAP; glucometer  ADL Conclusion Statement: Independent with ADLs and  errands.  Change in Functional Status Since Onset of Current Illness/Injury: no        Spiritual Beliefs  Spiritual, Cultural Beliefs, Moravian Practices, Values that Affect Care: no      Social History     Socioeconomic History    Marital status:    Tobacco Use    Smoking status: Never    Smokeless tobacco: Never   Substance and Sexual Activity    Alcohol use: Yes     Comment: very rare    Drug use: No    Sexual activity: Yes     Partners: Female   Social History Narrative            2 grown kids.         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      Social Drivers of Health     Financial Resource Strain: Low Risk  (2/14/2025)    Overall Financial Resource Strain (CARDIA)     Difficulty of Paying Living Expenses: Not hard at all   Food Insecurity: No Food Insecurity (2/14/2025)    Hunger Vital Sign     Worried About Running Out of Food in the Last Year: Never true     Ran Out of Food in the Last Year: Never true   Transportation Needs: No Transportation Needs (2/14/2025)    PRAPARE - Transportation     Lack of Transportation (Medical): No     Lack of Transportation (Non-Medical): No   Physical Activity: Inactive (1/29/2025)    Exercise Vital Sign     Days of Exercise per Week: 0 days     Minutes of Exercise per Session: 0 min   Stress: Stress Concern Present (1/29/2025)    [employer] - Occupational Stress Questionnaire     Feeling of Stress : To some extent   Housing Stability: Low Risk  (2/14/2025)    Housing Stability Vital Sign     Unable to Pay for Housing in the Last Year: No     Number of Times Moved in the Last Year: 0     Homeless in the Last Year: No       Roles and Relationships  Primary Source of Support/Comfort: spouse  Name of Support/Comfort Primary Source: Michelle Medel (Spouse)      Advance Directives (For Healthcare)  Advance Directive  (If Adv Dir status is received, view document under Adv Dir in header or Chart Review Media  tab): Advance Directive currently in Epic.        Patient Reported Insurance  Verified current insurance plan:: Medicare; Blue Cross            2/14/2025     1:43 PM 1/9/2025     8:38 AM 12/16/2024     8:17 AM 4/16/2024     3:23 PM 5/28/2020    10:00 AM 6/12/2019     7:56 AM   Depression Patient Health Questionnaire   Over the last two weeks how often have you been bothered by little interest or pleasure in doing things Several days Not at all Not at all Not at all  Not at all   Over the last two weeks how often have you been bothered by feeling down, depressed or hopeless Several days Not at all Not at all Not at all  Not at all   PHQ-2 Total Score 2 0 0 0  0   Over the last two weeks how often have you been bothered by trouble falling or staying asleep, or sleeping too much         Over the last two weeks how often have you been bothered by feeling tired or having little energy         Over the last two weeks how often have you been bothered by a poor appetite or overeating         Over the last two weeks how often have you been bothered by feeling bad about yourself - or that you are a failure or have let yourself or your family down         Over the last two weeks how often have you been bothered by trouble concentrating on things, such as reading the newspaper or watching television         Over the last two weeks how often have you been bothered by moving or speaking so slowly that other people could have noticed. Or the opposite - being so fidgety or restless that you have been moving around a lot more than usual.         Over the last two weeks how often have you been bothered by thoughts that you would be better off dead, or of hurting yourself         If you checked off any problems, how difficult have these problems made it for you to do your work, take care of things at home or get along with other people?         PHQ-9 Score             Information is confidential and restricted. Go to Review Flowsheets  to unlock data.       Learning Assessment       02/14/2025 1434 [facility] (2/14/2025 - Present)   Created by Shakila Dobbins RN - RN (Nurse) Status: Complete                 PRIMARY LEARNER     Primary Learner Name:  Glenn Medel  - 02/14/2025 1434    Relationship:  Patient BW - 02/14/2025 1434    Does the primary learner have any barriers to learning?:  No Barriers BW - 02/14/2025 1434    What is the preferred language of the primary learner?:  English BW - 02/14/2025 1434    Is an  required?:  No BW - 02/14/2025 1434    How does the primary learner prefer to learn new concepts?:  Listening, Reading, Demonstration, Pictures/Video BW - 02/14/2025 1434    How often do you need to have someone help you read instructions, pamphlets, or written material from your doctor or pharmacy?:  Never BW - 02/14/2025 1434        CO-LEARNER #1     No question answered        CO-LEARNER #2     No question answered        SPECIAL TOPICS     No question answered        ANSWERED BY:     No question answered        Comments         Edit History       Shakila Dobbins, RN - RN (Nurse)   02/14/2025 1434

## 2025-02-14 NOTE — LETTER
Glenn Medel  [address] [address] [address]      Dear Glenn Medel,     I work with [facility] Outpatient Care Management Department. We received a referral to call you to discuss your medical history. These services are free of charge and are offered to [facility] patients who have recently been discharged from any of our facilities or who have medical conditions that may require the skill of a nurse to assist with management.     I am a Registered Nurse who specializes in connecting patients with available medical and financial resources as well as addressing any educational needs that may be indicated.    I attempted to reach you by telephone, but I was unsuccessful. Please call our department so that we can go over some questions with you, regarding your health.    The Outpatient Care Management Department can be reached at [phone], from 8:00AM to 4:30 PM, on Monday thru Friday.     Additionally, [facility] also has a program where a nurse is available 24/7 to answer questions or provide medical advice, their number is [phone].      Thanks,        Shakila Dobbins RN  Outpatient Care Management  Phone #: [phone]

## 2025-02-14 NOTE — PROGRESS NOTES
2/14/2025  1st attempt to complete Initial Assessment for Outpatient Care Management, left message.  Will send via portal -  unable to assess letter.

## 2025-02-14 NOTE — TELEPHONE ENCOUNTER
Patient was called back and reports flank pain and nausea. He was informed he needs to speak with urology. Please call him for advice.

## 2025-02-14 NOTE — TELEPHONE ENCOUNTER
----- Message from Meenakshi sent at 2/14/2025  9:55 AM CST -----  Contact: self  Type: Needs Medical Advice  Who Called:  pt   Symptoms (please be specific):  woke up this morning and he is having pains are running from his kidneys down to bladder on right side   How long has patient had these symptoms:  this morning  Pharmacy name and phone #:    [id number] - [place], [place] - [address]  AT [facility] [address]  [address]  [facility] [address]  Phone: [phone] Fax: [phone]  Best Call Back Number: [phone]  Additional Information: thanks he is getting very concerned the pains are about a 10. With 10 being the highest level

## 2025-02-14 NOTE — TELEPHONE ENCOUNTER
----- Message from InFanshout sent at 2/14/2025  9:33 AM CST -----  .Type: Patient Call Back      Who called:    patient  What is the request in detail:   calling concerning experiencing pain on the right  side in kidneys. patient stated he is wondering if the kidney stones have moved      Can the clinic reply by [facility]?         Would the patient rather a call back or a response via [facility]?    call  Best call back number:      [phone]

## 2025-02-15 DIAGNOSIS — F51.5 NIGHTMARES: ICD-10-CM

## 2025-02-15 NOTE — TELEPHONE ENCOUNTER
No care due was identified.  [facility] Embedded Care Due Messages. Reference number: [id number].   2/15/2025 11:22:34 AM CST

## 2025-02-17 RX ORDER — PRAZOSIN HYDROCHLORIDE 1 MG/1
1 CAPSULE ORAL NIGHTLY
Qty: 90 CAPSULE | Refills: 3 | Status: SHIPPED | OUTPATIENT
Start: 2025-02-17

## 2025-02-17 NOTE — TELEPHONE ENCOUNTER
Refill Decision Note    Medel  is requesting a refill authorization.  Brief Assessment and Rationale for Refill:  Approve     Medication Therapy Plan:        Pharmacist review requested: Yes   Extended chart review required: Yes   Comments:     Note composed:10:19 AM 02/17/2025

## 2025-02-17 NOTE — TELEPHONE ENCOUNTER
Refill Routing Note   Medication(s) are not appropriate for processing by [facility] for the following reason(s):        Drug-disease interaction    [facility] action(s):  Defer      Medication Therapy Plan: Drug-Disease: prazosin and Orthostatic hypotension    Pharmacist review requested: Yes     Appointments  past 12m or future 3m with PCP    Date Provider   Last Visit   1/25/2025 Cherie Lion DO   Next Visit   2/25/2025 Cherie Lion DO   ED visits in past 90 days: 0        Note composed:9:51 AM 02/17/2025

## 2025-02-18 ENCOUNTER — LAB VISIT (OUTPATIENT)
Dept: LAB | Facility: HOSPITAL | Age: 70
End: 2025-02-18
Attending: DIETITIAN, REGISTERED
Payer: MEDICARE

## 2025-02-18 DIAGNOSIS — E79.0 HYPERURICEMIA: ICD-10-CM

## 2025-02-18 LAB — URATE SERPL-MCNC: 2.9 MG/DL (ref 3.4–7)

## 2025-02-18 PROCEDURE — 84550 ASSAY OF BLOOD/URIC ACID: CPT | Performed by: DIETITIAN, REGISTERED

## 2025-02-18 PROCEDURE — 36415 COLL VENOUS BLD VENIPUNCTURE: CPT | Mod: PO | Performed by: DIETITIAN, REGISTERED

## 2025-02-19 ENCOUNTER — PATIENT MESSAGE (OUTPATIENT)
Dept: NEPHROLOGY | Facility: CLINIC | Age: 70
End: 2025-02-19
Payer: MEDICARE

## 2025-02-19 DIAGNOSIS — E11.22 TYPE 2 DIABETES MELLITUS WITH STAGE 2 CHRONIC KIDNEY DISEASE, WITHOUT LONG-TERM CURRENT USE OF INSULIN: ICD-10-CM

## 2025-02-19 DIAGNOSIS — N18.2 TYPE 2 DIABETES MELLITUS WITH STAGE 2 CHRONIC KIDNEY DISEASE, WITHOUT LONG-TERM CURRENT USE OF INSULIN: ICD-10-CM

## 2025-02-19 DIAGNOSIS — Z79.4 TYPE 2 DIABETES MELLITUS WITH DIABETIC NEPHROPATHY, WITH LONG-TERM CURRENT USE OF INSULIN: ICD-10-CM

## 2025-02-19 DIAGNOSIS — E11.21 TYPE 2 DIABETES MELLITUS WITH DIABETIC NEPHROPATHY, WITH LONG-TERM CURRENT USE OF INSULIN: ICD-10-CM

## 2025-02-19 NOTE — TELEPHONE ENCOUNTER
No care due was identified.  Health [facility] Embedded Care Due Messages. Reference number: [id number].   2/19/2025 9:30:12 AM CST

## 2025-02-21 ENCOUNTER — OUTPATIENT CASE MANAGEMENT (OUTPATIENT)
Dept: ADMINISTRATIVE | Facility: OTHER | Age: 70
End: 2025-02-21
Payer: MEDICARE

## 2025-02-21 NOTE — PROGRESS NOTES
Outpatient Care Management  Plan of Care Follow Up Visit    Patient: Glenn Medel  MRN: [id number]  Date of Service: 02/21/2025  Completed by: Shakila Dobbins RN  Referral Date: 02/11/2025    Reason for Visit   Patient presents with    OPCM RN Follow Up Call       Brief Summary: OPCM RN followed up with Mr. Elizabeth today for care plan review.    Next Steps:   Mr. Elizabeth agreed to OPCM RN follow up on or around 3/7/25.  Follow up regarding nausea.  Assess recent weight loss.  Assess appetite and last thing he ate.  Assess blood glucose levels.  Mr. Elizabeth agreed to be evaluated by urgent care today since he feels as bad as he does.

## 2025-02-22 DIAGNOSIS — H40.053 OCULAR HYPERTENSION, BILATERAL: ICD-10-CM

## 2025-02-24 ENCOUNTER — TELEPHONE (OUTPATIENT)
Dept: FAMILY MEDICINE | Facility: CLINIC | Age: 70
End: 2025-02-24
Payer: MEDICARE

## 2025-02-24 RX ORDER — BRIMONIDINE TARTRATE 2 MG/ML
1 SOLUTION/ DROPS OPHTHALMIC 2 TIMES DAILY
Qty: 10 ML | Refills: 0 | Status: SHIPPED | OUTPATIENT
Start: 2025-02-24

## 2025-02-24 NOTE — TELEPHONE ENCOUNTER
Spoke with patient and he wanted to reschedule his appt for tomorrow due to him currently being in the hospital and not knowing when he will be discharged. Advised patient to give me a call back when his is discharged and we can get him scheduled. Patient verbalized understanding.

## 2025-02-24 NOTE — TELEPHONE ENCOUNTER
----- Message from Maia sent at 2/24/2025  9:50 AM CST -----  Contact:   .Patient is calling to speak with the nurse regarding appt tomorrow. Reports needing to reschedule appt due to the pt being in the hospital right now  . Please give patient a call back at [phone].

## 2025-02-25 ENCOUNTER — PATIENT MESSAGE (OUTPATIENT)
Facility: CLINIC | Age: 70
End: 2025-02-25
Payer: MEDICARE

## 2025-02-25 DIAGNOSIS — G20.C PARKINSONISM, UNSPECIFIED PARKINSONISM TYPE: ICD-10-CM

## 2025-02-26 ENCOUNTER — PATIENT MESSAGE (OUTPATIENT)
Dept: UROLOGY | Facility: CLINIC | Age: 70
End: 2025-02-26
Payer: MEDICARE

## 2025-02-26 ENCOUNTER — PATIENT MESSAGE (OUTPATIENT)
Facility: CLINIC | Age: 70
End: 2025-02-26
Payer: MEDICARE

## 2025-02-26 ENCOUNTER — PATIENT MESSAGE (OUTPATIENT)
Dept: FAMILY MEDICINE | Facility: CLINIC | Age: 70
End: 2025-02-26
Payer: MEDICARE

## 2025-02-28 ENCOUNTER — TELEPHONE (OUTPATIENT)
Dept: FAMILY MEDICINE | Facility: CLINIC | Age: 70
End: 2025-02-28
Payer: MEDICARE

## 2025-02-28 ENCOUNTER — PATIENT MESSAGE (OUTPATIENT)
Dept: FAMILY MEDICINE | Facility: CLINIC | Age: 70
End: 2025-02-28
Payer: MEDICARE

## 2025-02-28 RX ORDER — CARBIDOPA AND LEVODOPA 25; 100 MG/1; MG/1
1.5 TABLET ORAL
Qty: 675 TABLET | Refills: 3 | Status: SHIPPED | OUTPATIENT
Start: 2025-02-28

## 2025-02-28 NOTE — TELEPHONE ENCOUNTER
Spoke with patient and he wanted to see if  could write an order for him to go to the infusion center for his antibiotics through is [facility] . Patient notified that he would need an appointment for that and also because he was just release from the hospital. Patient was scheduled for 1:40 pm on 3/5/25, patient accepted appointment.

## 2025-02-28 NOTE — TELEPHONE ENCOUNTER
----- Message from Hadley sent at 2/28/2025  4:20 PM CST -----  Who called: ptWhat is the request in detail: Pt would like a call back.Can the clinic reply by [facility]? No Would the patient rather a call back or a response via [facility]? Call back Best call back number: Telephone Information:Mobile          [phone] Information: Thank you.

## 2025-03-05 ENCOUNTER — PATIENT MESSAGE (OUTPATIENT)
Dept: FAMILY MEDICINE | Facility: CLINIC | Age: 70
End: 2025-03-05

## 2025-03-05 ENCOUNTER — OFFICE VISIT (OUTPATIENT)
Dept: UROLOGY | Facility: CLINIC | Age: 70
End: 2025-03-05
Payer: MEDICARE

## 2025-03-05 ENCOUNTER — OFFICE VISIT (OUTPATIENT)
Dept: FAMILY MEDICINE | Facility: CLINIC | Age: 70
End: 2025-03-05
Payer: MEDICARE

## 2025-03-05 VITALS
HEART RATE: 72 BPM | OXYGEN SATURATION: 98 % | WEIGHT: 179.81 LBS | BODY MASS INDEX: 25.17 KG/M2 | DIASTOLIC BLOOD PRESSURE: 75 MMHG | HEIGHT: 71 IN | SYSTOLIC BLOOD PRESSURE: 120 MMHG

## 2025-03-05 VITALS — BODY MASS INDEX: 25.24 KG/M2 | HEIGHT: 71 IN | WEIGHT: 180.31 LBS

## 2025-03-05 DIAGNOSIS — B95.2 ENTEROCOCCAL BACTEREMIA: ICD-10-CM

## 2025-03-05 DIAGNOSIS — C67.0 MALIGNANT NEOPLASM OF TRIGONE OF URINARY BLADDER: Primary | ICD-10-CM

## 2025-03-05 DIAGNOSIS — F51.4 NIGHT TERRORS, ADULT: ICD-10-CM

## 2025-03-05 DIAGNOSIS — C67.9 MALIGNANT NEOPLASM OF URINARY BLADDER, UNSPECIFIED SITE: Primary | ICD-10-CM

## 2025-03-05 DIAGNOSIS — F33.0 MILD EPISODE OF RECURRENT MAJOR DEPRESSIVE DISORDER: ICD-10-CM

## 2025-03-05 DIAGNOSIS — I95.1 ORTHOSTATIC HYPOTENSION: ICD-10-CM

## 2025-03-05 DIAGNOSIS — Z79.4 TYPE 2 DIABETES MELLITUS WITH DIABETIC NEPHROPATHY, WITH LONG-TERM CURRENT USE OF INSULIN: Primary | ICD-10-CM

## 2025-03-05 DIAGNOSIS — C67.9 SMALL CELL CARCINOMA OF BLADDER: ICD-10-CM

## 2025-03-05 DIAGNOSIS — G20.A2 PARKINSON'S DISEASE WITHOUT DYSKINESIA, WITH FLUCTUATING MANIFESTATIONS: ICD-10-CM

## 2025-03-05 DIAGNOSIS — R78.81 ENTEROCOCCAL BACTEREMIA: ICD-10-CM

## 2025-03-05 DIAGNOSIS — E11.21 TYPE 2 DIABETES MELLITUS WITH DIABETIC NEPHROPATHY, WITH LONG-TERM CURRENT USE OF INSULIN: Primary | ICD-10-CM

## 2025-03-05 PROBLEM — N17.9 AKI (ACUTE KIDNEY INJURY): Status: RESOLVED | Noted: 2025-02-21 | Resolved: 2025-03-05

## 2025-03-05 PROBLEM — N18.9 CHRONIC KIDNEY DISEASE: Status: ACTIVE | Noted: 2021-07-17

## 2025-03-05 PROBLEM — H53.8 BLURRED VISION: Status: RESOLVED | Noted: 2020-09-08 | Resolved: 2025-03-05

## 2025-03-05 PROBLEM — R55 SYNCOPE AND COLLAPSE: Status: RESOLVED | Noted: 2024-07-02 | Resolved: 2025-03-05

## 2025-03-05 PROBLEM — R20.0 LUE NUMBNESS: Status: RESOLVED | Noted: 2020-09-08 | Resolved: 2025-03-05

## 2025-03-05 PROBLEM — R09.89 SUSPECTED CEREBROVASCULAR ACCIDENT (CVA): Status: RESOLVED | Noted: 2020-09-08 | Resolved: 2025-03-05

## 2025-03-05 PROBLEM — G47.30 HYPERSOMNIA WITH SLEEP APNEA: Status: ACTIVE | Noted: 2025-03-05

## 2025-03-05 PROBLEM — F51.5 NIGHTMARES: Status: RESOLVED | Noted: 2024-12-16 | Resolved: 2025-03-05

## 2025-03-05 PROBLEM — A41.9 SEPSIS: Status: RESOLVED | Noted: 2023-06-09 | Resolved: 2025-03-05

## 2025-03-05 PROBLEM — I95.9 HYPOTENSION: Status: RESOLVED | Noted: 2023-06-09 | Resolved: 2025-03-05

## 2025-03-05 PROBLEM — H40.9 GLAUCOMA: Status: ACTIVE | Noted: 2023-06-09

## 2025-03-05 PROBLEM — I10 HTN (HYPERTENSION): Status: ACTIVE | Noted: 2020-09-08
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
## 2025-03-05 PROBLEM — N20.1 URETERAL CALCULUS: Status: ACTIVE | Noted: 2025-02-21

## 2025-03-05 PROBLEM — G47.419 NARCOLEPSY: Status: ACTIVE | Noted: 2020-04-28

## 2025-03-05 PROBLEM — N12 PYELONEPHRITIS: Status: RESOLVED | Noted: 2025-02-21 | Resolved: 2025-03-05

## 2025-03-05 PROBLEM — M10.9 GOUT: Status: ACTIVE | Noted: 2023-06-09

## 2025-03-05 PROBLEM — E11.9 DIABETES MELLITUS: Status: ACTIVE | Noted: 2020-09-08

## 2025-03-05 PROBLEM — G20.A1 PARKINSON DISEASE: Status: ACTIVE | Noted: 2020-10-29

## 2025-03-05 PROBLEM — G47.10 HYPERSOMNIA WITH SLEEP APNEA: Status: ACTIVE | Noted: 2025-03-05

## 2025-03-05 PROBLEM — F41.9 ANXIETY: Status: ACTIVE | Noted: 2023-06-09

## 2025-03-05 PROCEDURE — 99214 OFFICE O/P EST MOD 30 MIN: CPT | Mod: S$PBB,,, | Performed by: UROLOGY

## 2025-03-05 PROCEDURE — 99215 OFFICE O/P EST HI 40 MIN: CPT | Mod: PBBFAC,PO | Performed by: DIETITIAN, REGISTERED

## 2025-03-05 PROCEDURE — 99999 PR PBB SHADOW E&M-EST. PATIENT-LVL III: CPT | Mod: PBBFAC,,, | Performed by: UROLOGY

## 2025-03-05 PROCEDURE — 99213 OFFICE O/P EST LOW 20 MIN: CPT | Mod: PBBFAC,27,PO | Performed by: UROLOGY

## 2025-03-05 PROCEDURE — 99999 PR PBB SHADOW E&M-EST. PATIENT-LVL V: CPT | Mod: PBBFAC,,, | Performed by: DIETITIAN, REGISTERED

## 2025-03-05 RX ORDER — DULOXETIN HYDROCHLORIDE 30 MG/1
30 CAPSULE, DELAYED RELEASE ORAL DAILY
Qty: 90 CAPSULE | Refills: 1 | Status: SHIPPED | OUTPATIENT
Start: 2025-03-05

## 2025-03-05 RX ORDER — AMMONIUM LACTATE 12 G/100G
LOTION TOPICAL 2 TIMES DAILY
COMMUNITY
Start: 2025-02-18

## 2025-03-05 NOTE — PROGRESS NOTES
PLAN:    Assessment & Plan    IMPRESSION:  - Reviewed recent hospitalization for urosepsis secondary to kidney stone obstruction  - Assessed current treatment plan for bladder cancer, including upcoming lithotripsy and intravesical therapy  - Evaluated medication regimen, including recent changes to Carbidopa/Levodopa dosing  - Considered continuation of Fludrocortisone for orthostatic hypotension, despite negative adrenal insufficiency workup  - Monitored slightly elevated alkaline phosphatase level  - Assessed effectiveness of Cymbalta for depression management    E. FAECALIS BACTEREMIA:  - Confirmed E. faecalis as the causative organism for the patient's recent sepsis based on the discharge summary.  - Patient was hospitalized on the 21st at [place] due to sepsis caused by urine reflux into the kidney and subsequent bacteremia.  - Blood cultures were initially positive for e faecalis in 2 sets, then negative for three subsequent tests.  - Administered intravenous ampicilin every 6 hours for 4 days in the hospital, followed by continuous antibiotics at home via PICC line until at least a day after the upcoming procedure on Monday.  - Scheduled a follow-up visit with infectious disease specialist Dr. Love on Friday, the 14th, after the surgery.    KIDNEY STONE:  - Identified a 6mm kidney stone causing obstruction and leading to urine backup and infection, as revealed by CT scans.  - Patient experienced pain on the left side about 1.5 weeks after bladder surgery.  - Urologist Dr. Kebede placed a stent to relieve the obstruction.  - Scheduled lithotripsy on Monday to remove the stent and fragment the stone.    INVASIVE SMALL CELL BLADDER CANCER:  - Patient to follow with Dr. Donahue  - S/P transurethral resection of bladder tumor with subsequent intravesical chemotherapy Gemcitabine   - Once infection resolves will get 4-6 weeks of intravesical Gemcitabine with Dr. Donahue  - May consider PET scan at some  point, will defer to Urology     DIABETES, ON INSULIN:  - Instructed the patient to continue monitoring glucose levels at home.  - Ordered CMP and HbA1c to be performed in April.  - Noted elevated blood sugar during hospitalization, reaching 305 at one point.  - Patient was using Mal[facility] for continuous glucose monitoring.  - Previous HbA1c was approximately 7.6%, considered acceptable for the patient's age and condition.  - Plan to reassess HbA1c in April to evaluate diabetes control.  - Resumed Mounjaro for diabetes management, which was temporarily discontinued during hospitalization.  - Repeat A1C in April with CMP and CBC  Lab Results   Component Value Date    HGBA1C 7.6 (H) 01/15/2025       PARKINSON'S DISEASE:  - Prescribed Carbidopa-Levodopa 1.5 tablets 5 times daily for Parkinson's management.  - Addressed confusion about the dosage in the hospital, which was subsequently corrected.    REM SLEEP BEHAVIOR DISORDER:  - Patient reported episodes of acting out dreams, including kicking the rail and fighting someone in their sleep.  - Reduced Clonazepam to 1 mg tablet daily.    SLEEP APNEA:  - Patient uses a CPAP machine for sleep apnea management, with pressure settings ranging from 4 to 14.  - Adjusted CPAP settings in the hospital for patient comfort.    DEPRESSION:  - Continued Duloxetine at 30 mg.  - Patient reports feeling well on the current antidepressant medication (Duloxetine).  - Assessed the effectiveness of the current antidepressant dose.  - Reordered a 90-day supply of Duloxetine as it is working effectively for the patient.    URINARY STENT:  - Patient has a urinary stent in place, which is being managed by home health care.  - Scheduled removal of the stent during the upcoming procedure on Monday.        Problem List Items Addressed This Visit       Parkinson disease    Mild episode of recurrent major depressive disorder    Relevant Medications    DULoxetine (CYMBALTA) 30 MG capsule    Night  janusz, adult    Orthostatic hypotension    Type 2 diabetes mellitus with diabetic nephropathy, with long-term current use of insulin - Primary    Relevant Orders    Hemoglobin A1C    Comprehensive Metabolic Panel    CBC Without Differential    Small cell carcinoma of bladder    Enterococcal bacteremia     Future Appointments       Date Provider Specialty Appt Notes    4/9/2025 Lejeune, Elizabeth B, NP Sleep Medicine [facility] ([facility])    4/15/2025  Lab labs    5/5/2025  Lab PSA    5/7/2025 MARIZA Donahue MD Urology 6 mo f/u with PSA prior    7/10/2025 MARIZA Donahue MD Urology Cysto for Hx of BC    8/27/2025  Lab [place]    8/27/2025  Lab [place]           Medication Management for assessment above:   Medication List with Changes/Refills   Current Medications    0.9% NACL SOLP 100 ML WITH AMPICILLIN 2 GRAM SOLR 2 G    Inject 3 g into the vein once daily. Continuous Drip    ALLOPURINOL (ZYLOPRIM) 300 MG TABLET    Take 1 tablet (300 mg total) by mouth once daily.    AMMONIUM LACTATE (LAC-HYDRIN) 12 % LOTION    Apply topically 2 (two) times daily.    ATORVASTATIN (LIPITOR) 20 MG TABLET    Take 1 tablet (20 mg total) by mouth every evening.    BLOOD-GLUCOSE METER,CONTINUOUS MISC    1 each by Misc.(Non-Drug; Combo Route) route 4 (four) times daily.    BRIMONIDINE 0.2% (ALPHAGAN) 0.2 % DROP    INSTILL 1 DROP IN BOTH EYES TWICE DAILY    CARBIDOPA-LEVODOPA  MG (SINEMET)  MG PER TABLET    Take 1.5 tablets by mouth 5 (five) times daily.    CHOLECALCIFEROL, VITAMIN D3, (D3-2000 ORAL)    Take by mouth once daily.    CLONAZEPAM (KLONOPIN) 1 MG TABLET    Take 1 tablet (1 mg total) by mouth every evening.    CYANOCOBALAMIN (VITAMIN B-12) 1000 MCG TABLET    Take 100 mcg by mouth once daily.    DONEPEZIL (ARICEPT) 5 MG TABLET    Take 1 tablet (5 mg total) by mouth every evening.    DORZOLAMIDE (TRUSOPT) 2 % OPHTHALMIC SOLUTION    INSTILL 1 DROP IN BOTH EYES TWICE DAILY    FLUDROCORTISONE (FLORINEF) 0.1 MG TAB   "  Take 1 tablet (100 mcg total) by mouth every other day.    GABAPENTIN (NEURONTIN) 300 MG CAPSULE    Take 1 capsule (300 mg total) by mouth 2 (two) times daily.    INSULIN GLARGINE U-100, LANTUS, (LANTUS SOLOSTAR U-100 INSULIN) 100 UNIT/ML (3 ML) INPN PEN    Inject 7 Units into the skin once daily.    KETOCONAZOLE (NIZORAL) 2 % SHAMPOO    Apply topically.    MELOXICAM (MOBIC) 15 MG TABLET    Take 1 tablet (15 mg total) by mouth once daily.    METFORMIN (GLUCOPHAGE-XR) 500 MG ER 24HR TABLET    Take 2 tablets (1,000 mg total) by mouth once daily.    PEN NEEDLE, DIABETIC (BD HIMANSHU 2ND GEN PEN NEEDLE) 32 GAUGE X 5/32" NDLE    Inject 1 each into the skin once daily.    PRAZOSIN (MINIPRESS) 1 MG CAP    Take 1 capsule (1 mg total) by mouth every evening.    RAMELTEON (ROZEREM) 8 MG TABLET    Take 1 tablet (8 mg total) by mouth nightly.    RASAGILINE (AZILECT) 1 MG TAB    TAKE 1 TABLET(1 MG) BY MOUTH EVERY EVENING    TIRZEPATIDE 10 MG/0.5 ML PNIJ    Inject 10 mg into the skin every 7 days. (Increased dose)   Changed and/or Refilled Medications    Modified Medication Previous Medication    DULOXETINE (CYMBALTA) 30 MG CAPSULE DULoxetine (CYMBALTA) 30 MG capsule       Take 1 capsule (30 mg total) by mouth once daily.    Take 1 capsule (30 mg total) by mouth once daily.   Discontinued Medications    HYDROCODONE-ACETAMINOPHEN (NORCO) 5-325 MG PER TABLET    Take 1 tablet by mouth every 6 (six) hours as needed for Pain.    MUPIROCIN (BACTROBAN) 2 % OINTMENT    Apply topically 3 (three) times daily.       Cherie Lion, , ADOREN  ==========================================================================  Subjective:   Patient ID: Glenn Medel is a 69 y.o. male.  has a past medical history of KENTON (acute kidney injury) (02/21/2025), Blurred vision (09/08/2020), BPH (benign prostatic hyperplasia), Cancer, Cataract, Chronic kidney disease, stage 3 (02/25/2015), DDD (degenerative disc disease), lumbar, Diabetes mellitus, Elevated " "PSA, Fall (07/02/2024), Glaucoma, HTN (hypertension), Iritis, lens-induced (01/08/2018), LUE numbness (09/08/2020), Obesity, Parkinson's disease, Pyelonephritis (02/21/2025), Sepsis (06/09/2023), Sleep apnea, Suspected cerebrovascular accident (CVA) (09/08/2020), and Syncope and collapse (07/02/2024).   Chief Complaint: Follow-up      History of Present Illness    CHIEF COMPLAINT:  Patient presents today for follow up after hospitalization for urosepsis    RECENT HOSPITALIZATION AND UROSEPSIS:  He was hospitalized at [place] on the 21st for urosepsis. Blood cultures were positive for Enterococcus fecalis. He received IV antibiotics every 6 hours for 4 days during hospitalization and currently continues antibiotics via PICC line until at least one day after his upcoming procedure. A 6mm kidney stone moved into his urethra causing urinary obstruction, which led to urine backup into the kidney resulting in infection and bacteremia. Dr. Hernandez placed a stent the morning after admission. He is scheduled for stent removal and lithotripsy on Monday.    DIABETES:  He reports elevated blood sugar during hospitalization, with one reading as high as 305. He ran out of his [employer] continuous glucose monitor prior to hospitalization and is awaiting a refill.    SLEEP DISORDER:  He experienced two episodes of disruptive sleep behavior during hospitalization, including kicking the bed rail and fighting in his sleep, nearly rolling out of bed during one episode. He had difficulty with the hospital CPAP machine set at 10 without ramping capability, causing breathing difficulties and inability to fall asleep. His home CPAP typically starts at 4 and can increase up to 14.    CURRENT MEDICATIONS:  He continues Cymbalta for depression and reports feeling good at the current dose. Klonopin has been reduced to one pill daily which he reports is "adequate." CARBIDOPA LEVODOPA dosing is 1.5 pills 5 times daily, which was initially " administered incorrectly in the hospital but has since been corrected.      ROS:  General: -fever, -chills, -fatigue, -weight gain, -weight loss  Eyes: -vision changes, -redness, -discharge  ENT: -ear pain, -nasal congestion, -sore throat  Cardiovascular: -chest pain, -palpitations, -lower extremity edema  Respiratory: -cough, -shortness of breath, +difficulty breathing  Gastrointestinal: -abdominal pain, -nausea, -vomiting, -diarrhea, -constipation, -blood in stool  Genitourinary: -dysuria, -hematuria, -frequency  Musculoskeletal: -joint pain, -muscle pain  Skin: -rash, -lesion  Neurological: -headache, -dizziness, -numbness, -tingling  Psychiatric: -anxiety, -depression, +sleep difficulty          Problem List Items Addressed This Visit       Parkinson disease    Overview   Cpap setting 16         Mild episode of recurrent major depressive disorder    Night terrors, adult    Overview   prozosin         Orthostatic hypotension    Type 2 diabetes mellitus with diabetic nephropathy, with long-term current use of insulin - Primary    Small cell carcinoma of bladder    Enterococcal bacteremia        Review of patient's allergies indicates:   Allergen Reactions    Avelox [moxifloxacin] Hives and Rash     Current Outpatient Medications   Medication Instructions    0.9% NaCl SolP 100 mL with ampicillin 2 gram SolR 2 g 3 g, Daily    allopurinoL (ZYLOPRIM) 300 mg, Oral, Daily    ammonium lactate (LAC-HYDRIN) 12 % lotion 2 times daily    atorvastatin (LIPITOR) 20 mg, Oral, Nightly    blood-glucose meter,continuous Misc 1 each, Misc.(Non-Drug; Combo Route), 4 times daily    brimonidine 0.2% (ALPHAGAN) 0.2 % Drop 1 drop, Both Eyes, 2 times daily    carbidopa-levodopa  mg (SINEMET)  mg per tablet 1.5 tablets, Oral, 5 times daily    cholecalciferol, vitamin D3, (D3-2000 ORAL) Daily    clonazePAM (KLONOPIN) 1 mg, Oral, Nightly    cyanocobalamin (VITAMIN B-12) 100 mcg, Daily    donepeziL (ARICEPT) 5 mg, Oral, Nightly  "   dorzolamide (TRUSOPT) 2 % ophthalmic solution INSTILL 1 DROP IN BOTH EYES TWICE DAILY    DULoxetine (CYMBALTA) 30 mg, Oral, Daily    fludrocortisone (FLORINEF) 100 mcg, Oral, Every other day    gabapentin (NEURONTIN) 300 mg, Oral, 2 times daily    ketoconazole (NIZORAL) 2 % shampoo Apply topically.    LANTUS SOLOSTAR U-100 INSULIN 7 Units, Subcutaneous, Daily    meloxicam (MOBIC) 15 mg, Oral, Daily    metFORMIN (GLUCOPHAGE-XR) 1,000 mg, Oral, Daily    MOUNJARO 10 mg, Subcutaneous, Every 7 days, (Increased dose)    pen needle, diabetic (BD HIMANSHU 2ND GEN PEN NEEDLE) 32 gauge x 5/32" Ndle 1 each, Subcutaneous, Daily    prazosin (MINIPRESS) 1 mg, Oral, Nightly    ramelteon (ROZEREM) 8 mg, Oral, Nightly    rasagiline (AZILECT) 1 mg Tab TAKE 1 TABLET(1 MG) BY MOUTH EVERY EVENING      I have reviewed the PMH, social history, FamilyHx, surgical history, allergies and medications documented / confirmed by the patient at the time of this visit.    [facility] DISCHARGE SUMMARY        Patient ID:  Glenn Medel  [id number]  69 y.o.  1955     Admit Date: 2/21/2025  5:38 PM  Discharge Date:  2/28/2025  Admitting Physician: No admitting provider for patient encounter.   Current Attending: Cristi Garcia *     Consultants/Treatment Team:  Consultants           Provider   Service Role Specialty       Provider,        -- Consulting Physician Gen Prov Case Mgmt     * Korina Love MD       -- Consulting Physician Infectious Diseases       Provider, Pharmacist       -- Consulting Physician Gen Prov Rx     * Alfredo Samano MD       Oncology Consulting Physician Hematology and Oncology     * Michele Shen NP       -- Nurse Practitioner Nurse Practitioner Family     * Vijay Kebede MD       Urology Surgeon Urology     * Lenny Esquivel NP       -- Nurse Practitioner Nurse Practitioner Family     * Vijay Kebede MD       Urology Consulting Physician Urology                   Reason for Admission/Admission " Diagnoses:   Present on Admission:   Pyelonephritis   LOVE on CPAP   HTN (hypertension)   BPH (benign prostatic hyperplasia)   Diabetes mellitus (HCC)   KENTON (acute kidney injury) (HCC)   Ureteral calculus   Parkinson disease (HCC)   Enterococcal bacteremia        Discharge Diagnoses:        Active Hospital Problems     Diagnosis Date Noted    Pyelonephritis 02/21/2025    Enterococcal bacteremia 02/24/2025    KENTON (acute kidney injury) (HCC) 02/21/2025    Ureteral calculus 02/21/2025    Parkinson disease (HCC) 06/09/2023    LOVE on CPAP 09/08/2020    HTN (hypertension) 09/08/2020    Diabetes mellitus (HCC) 09/08/2020    BPH (benign prostatic hyperplasia) 09/07/2016       Resolved Hospital Problems         History of Present Illness:  68 yo M w PMH BPH, CKD, DM, HTN, Parkinson's disease, prostate cancer and sleep apnea presented to the ED for lower back pain.       He recently underwent surgery for bladder tumor removal on 2/11/2025 with Dr. Donahue.  He received a single dose of chemotherapy after his procedure.  Gradual onset of symptoms that began approximately 1 week ago.  Initially his lower back pain was mild and intermittent but yesterday evening his bilateral lower back pain gradually worsened and became constant.  Reports he was told to take Azo.  He reports associated fatigue and nausea.       Hospital Course and Treatment:   Admission Information         Date & Time  2/21/2025 Provider  Cristi Garcia MD Department  [facility] General Surgery Dept. Phone  [phone]             Pyelonephritis, acute/subacute d/t or related to recent bladder instrumentation   Ureteral calculus with hydronephrosis  Enterococcal sepsis / bacteremia  Severe sepsis  - Recently diagnosed w bladder/prostate CA, underwent recent bladder/urethral instrumentation at [facility] on 2/14/2025.  He developed systemic symptoms soon thereafter.  Confirmed sepsis based on blood CX results.  Urology/ID  evaluation, recommendations noted.  WBC normal.  Continued IV ABX based on urine Cx and blood CX results and ID recommendations.  Patient requires IV ABX because of recurrent instrumentation in the bladder upon through the scheduled cystoscopy on 3/11 with Dr. Hernandez.  Evaluated by urology and ID during this hospital stay, follow recommendations.  Clinically and hemodynamically stable, improved gradually.  PICC line placement prior to DC.  Afebrile, WBC normal, KENTON resolved.  Hemodynamically stable, DC'd home with IV ABX on 2/28.  Patient and family agreeable to POC        Small cell carcinoma of bladder- s/p TURBT on 2/11/25, pathology revealing invasive SCC.  Oncology evaluation, plans on doing an outpatient PET scan before deciding about chemotherapy.     Disposition home w IV ABX and home health     Condition improved     Diet ADA     Activity as tolerated     Follow-up w urology clinic at scheduled appointment Dr. Hernandez.     Continue IV ABX w IV infusion company at home until 3/11/2025           I discussed with the patient and the family disease process and treatment.      I have personally seen and examined the patient, Glenn Medel, in a face to face encounter on the date of discharge.      He is cleared for discharge with instructions to follow up as directed.   Total time in the care and discharge planning of this patient was greater than 30 minutes.     Significant Diagnostic Studies:  Recent Imaging and Procedure Results         Procedure Component Value Ref Range Date/Time     Echo Complete [[id number]] Collected: 02/25/2025 0932       Updated: 02/25/2025 1419       PatientHeight [id number] cm         PatientWeight 87.0912 kg         Systolic Pressure 139 mmHg         Diastolic Pressure 79 mmHg         BSA 2.1 m         2D/MMode measurements and calculations: MMode 2D Measurements & Calculations       Interventricular Septum in Diastole 0.93 cm         Left Ventricle in Diastole 4.8 cm          Left Ventricle in Systole 2.7 cm         LV post wall in Diastole 0.92 cm         IVS/LVPW 1       FS 43.8 %         EDV(Teich) 105.2 ml         ESV(Teich) 26.3 ml         EF(Teich) 75 %         EDV(cubed) 107.5 ml         ESV(cubed) 19.1 ml         EF(cubed) 82.3 %         LV mass(C)d 151.3 grams         LV mass(C)dI 73 grams/m         SV(Teich) 78.8 ml         SI(Teich) 38 ml/m         SV(cubed) 88.4 ml         SI(cubed) 42.7 ml/m         Ao root diameter 3.5 cm         Ao root area 9.9 cm         LA Dimension 4.2 cm         LA/Ao 1.2       LVOT Diameter 2.1 cm         LVOT area 3.6 cm         LVOT area (traced) 3.5 cm         LVLd ap4 8.7 cm         EDV(MOD-sp4) 112 ml         LVLs ap4 7.8 cm         ESV(MOD-sp4) 51.6 ml         EF(MOD-sp4) 53.9 %         LVLd ap2 8.2 cm         EDV(MOD-sp2) 86.5 ml         LVLs ap2 7 cm         ESV(MOD-sp2) 33.3 ml         EF(MOD-sp2) 61.5 %         SV(MOD-sp4) 60.4 ml         SI(MOD-sp4) 29.1 ml/m         SV(MOD-sp2) 53.2 ml         SI(MOD-sp2) 25.7 ml/m         Doppler measurements and calculations: Doppler Measurements & Calculations       MV e max velocity 125 cm/sec         MV a velocity 89.5 cm/sec         MV e/a ratio 1.4       MV Vmax 148.7 cm/sec         MV MEAN PG 8.8 mmHg         MV Vmean 64.3 cm/sec         MV MAX PG 2.1 mmHg         MV V2 VTI 46 cm         MVA(VTI) 1.7 cm         MV P ½ time max mike 144.2 cm/sec         MV P ½ 83 msec         MVA ( P ½ ) 2.7 cm         MV Dec slope 509 cm/sec         MV dec time 0.19 sec         Ao V2 Max 120.3 cm/sec         Ao max PG 5.8 mmHg         Ao max PG (full) 1.5 mmHg         Ao V2 mean 86.2 cm/sec         Ao mean PG 3.3 mmHg         Ao mean PG (full) 1 mmHg         Ao V2 VTI 25.7 cm         NICOLE(I,A) 3.1 cm         NICOLE(I,D) 3.1 cm         NICOLE(V,A) 3.1 cm         NICOLE(V,D) 3.1 cm         LV V1 max PG 4.3 mmHg         LV V1 mean PG 2.3 mmHg         LV V1 max 104.1 cm/sec         LV V1 mean 71.5 cm/sec         LV V1 VTI 22.1  cm         CO(Ao) 18.6 l/min         CI(Ao) 9 l/min/m         SV(Ao) 254.3 ml         SI(Ao) 122.7 ml/m         CO(LVOT) 5.7 l/min         CI(LVOT) 2.8 l/min/m         SV(LVOT) 78.6 ml         SI(LVOT) 37.9 ml/m         Pulm sys velocity 82.8 cm/sec         Pulm velez velocity 77.1 cm/sec         Pulm S/D ratio 1.1       Aortic R-R 0.82 sec         Aortic HR 73 BPM         EF MIN = 55 %         EF MAX = 60 %       Narrative:                                          Adult Echocardiogram  Name: RUBNÉ SMALL          Study Date: 2025  MRN: [id number]                     Age: 69 yrs  Patient Location: [facility]^[address]22                                Height: 71 in  : 1955                                                Weight: 192 lb  Gender: Male                                                   BSA: 2.1 m2  Reason For Study: Pt has enterococcal bacteremia. Pls evaluate valves for  vegetation. thanks  BP: 139/79 mmHg     Ordering Physician: BILL RIDLEY  Performed By: ALTHEA Riddle     Interpretation Summary  Aortic Valve calcified.  Ejection Fraction = 55-60%.  There is mild mitral regurgitation.  There is trace tricuspid regurgitation.     Measurements  IVSd: 0.93 cm                          LVIDd: 4.8 cm  LVPWd: 0.92 cm                         LVIDs: 2.7 cm  Ao root diam: 3.5 cm  LVOT diam: 2.1 cm                      LA dimension: 4.2 cm     MMode/2D Measurements & Calculations  FS: 43.8 %          Ao root area:      LVOT area:      LVLd ap4: 8.7 cm  EDV(Teich):         9.9 cm2            3.6 cm2         EDV(MOD-sp4): 112.0 ml  105.2 ml                                               LVLs ap4: 7.8 cm  ESV(Teich): 26.3 ml                                    ESV(MOD-sp4): 51.6 ml  EF(Teich): 75.0 %                                      EF(MOD-sp4): 53.9 %          _______________________________________________________________  EDV(MOD-sp2):       SV(MOD-sp4):       TAPSE_phl:      IVC Diam Ins_phl:  86.5  ml             60.4 ml            2.7 cm          0.91 cm                                                         IVC Diam_phl: 2.3 cm          _______________________________________________________________  RV Base_phl: 3.5 cm  RV Length_phl:  8.0 cm  RV Mid_phl: 2.8 cm     Time Measurements  Aortic R-R: 0.82 sec  Aortic HR: 73.0 BPM     Doppler Measurements & Calculations  MV E max justin:      MV V2 max:         MV P1/2t max justin:    Ao V2 max:  125.0 cm/sec       148.7 cm/sec       144.2 cm/sec         120.3 cm/sec  MV A max justin:      MV max PG:         MV P1/2t: 83.0 msec  Ao max P.5 cm/sec        8.8 mmHg           MVA(P1/2t): 2.7 cm2  5.8 mmHg  MV E/A: 1.4        MV V2 mean:        MV dec slope:        Ao V2 mean:                     64.3 cm/sec                             86.2 cm/sec                     MV mean P.0 cm/sec2        Ao mean P.1 mmHg           MV dec time: 0.19 sec3.3 mmHg                     MV V2 VTI: 46.0 cm                      Ao V2 VTI: 25.7 cm                     MVA(VTI): 1.7 cm2                       NICOLE(I,D): 3.1 cm2                                                                NICOLE(V,D): 3.1 cm2          _______________________________________________________________  LV V1 max PG:      CO(Ao): 18.6 l/min Pulm Sys Justin:        AV VR_phl: 0.87  4.3 mmHg           CO(LVOT):          82.8 cm/sec          NICOLE(VTI)/BSA_phl:  LV V1 mean P.7 l/min          Pulm [place] Justin:       1.4  2.3 mmHg           CI(LVOT):          77.1 cm/sec  LV V1 max:         2.8 l/min/m2       Pulm A Revs Justin:  104.1 cm/sec       SV(LVOT): 78.6 ml  42.6 cm/sec  LV V1 mean:                           Pulm A Revs Dur:  71.5 cm/sec                           0.14 sec  LV V1 VTI: 22.1 cm                    Pulm S/D: 1.1          _______________________________________________________________  MV P1/2t-pr_phl:   RV S Vel_phl:  86.0 msec          14.6 cm/sec  LEFT VENTRICLE         o The left ventricle is normal in size.     o The left ventricular ejection fraction is normal.     o Ejection Fraction = 55-60%.     o There is normal left ventricular wall thickness.     DIASTOLOGY        o Lateral e'= '8' cm/s.     o Septal e'= '10' cm/s.     o E/e' ='15'.     o LAESV index = '36' ml/m2.     o The transmitral spectral Doppler flow pattern is normal for age.     RIGHT VENTRICLE        o The right ventricle is normal size.     o The right ventricular systolic function is normal.     ATRIA        o The left atrium is mildly dilated.     o Right Atrium Grossly Normal.     o IVC diameter >2.1cm with > 50% collapse.     MITRAL VALVE        o There is mild mitral annular calcification.     o Mitral valve leaflets appear to be mildly thickened.     o There is mild mitral regurgitation.     o There is no mitral valve stenosis.     TRICUSPID VALVE        o The tricuspid valve is not well visualized, but is grossly normal.     o There is trace tricuspid regurgitation.     o There is no tricuspid stenosis.     AORTIC VALVE        o Aortic Valve calcified.     o The aortic valve is trileaflet.     o The aortic valve opens well.     o Mild aortic regurgitation.     o There is no aortic valvular stenosis.     PULMONIC VALVE        o The pulmonic valve is not well seen, but is grossly normal.     o Mild pulmonic valvular regurgitation.     o There is no pulmonic valvular stenosis.     GREAT VESSELS        o Normal size aorta.     PERICARDIUM/PLEURAL EFFUSION        o There is no pericardial effusion.     ______________________________________________________________________________  Reading Physician:                    JORJE Ryan 02/25/2025 02:19 PM  Ordering Physician: BILL RIDLEY  Performed By: Lizette Wyatt RCS  InterpretingPhysician: JORJE Ryan,  electronically signed on 2025-02-25 14:19:07.617     Blood Culture #2 [[id number]]  (Abnormal) Collected: 02/21/2025 2110      Specimen: N/A from Blood Updated: 02/24/2025 1513       Micro Culture Result preliminary positive culture; see gram stain results*       Blood Culture #2 Result Enterococcus faecalis*       Micro Culture Result --       presumptive  Refer to prior positive Blood culture collected on 2/21/25  for complete ID and susceptibility testing.        Blood Culture #1 [[id number]]  (Abnormal)  (Susceptibility) Collected: 02/21/2025 2050     Specimen: N/A from Blood Updated: 02/24/2025 1511       Micro Culture Result preliminary positive culture; see gram stain results*       Blood Culture #1 Result Enterococcus faecalis*     Susceptibility         Enterococcus  faecalis (1)         Antibiotic Interpretation Method Status     Ampicillin Sensitive MINIMAL INHIBITORY CONCENTRATION       Vancomycin Sensitive MINIMAL INHIBITORY CONCENTRATION       Gentamicin Synergy Sensitive MINIMAL INHIBITORY CONCENTRATION       Streptomycin  Synergy Sensitive MINIMAL INHIBITORY CONCENTRATION                              Urine Culture [[id number]]  (Abnormal)  (Susceptibility) Collected: 02/21/2025 1834     Specimen: N/A from Urine CC Updated: 02/24/2025 0751       Urine Culture Result Enterococcus faecalis*       Micro Culture Result --       >100,000 CFU/mL  Cephalosporins are not recommended for the treatment of  urinary tract infections due to Enterococcus species.        Susceptibility         Enterococcus  faecalis (1)         Antibiotic Interpretation Method Status     Ampicillin Sensitive MINIMAL INHIBITORY CONCENTRATION       Levofloxacin Sensitive MINIMAL INHIBITORY CONCENTRATION       Vancomycin Sensitive MINIMAL INHIBITORY CONCENTRATION       Nitrofurantoin Sensitive MINIMAL INHIBITORY CONCENTRATION                              CT Chest W Contrast [[id number]] Collected: 02/23/2025 1744       Updated: 02/23/2025 1752     Narrative:       Indication: Bladder cancer staging      Technique: Helical CT images were obtained through the  chest after administration of intravenous contrast. Automated exposure control was utilized for radiation dose reduction.      Comparison: None     Findings:   Heart size is normal without pericardial effusion. There are shotty mediastinal and hilar lymph nodes. Great vessels are unremarkable.     There are small pleural effusions with bibasilar atelectasis. There is dependent atelectasis. There is bibasilar airspace disease, right greater than left, that may be infectious/inflammatory in nature. No definitive pulmonary mass. There is a lingular   pulmonary nodule measuring 4 mm (image 68, series 2). Additional lingular nodule measures 3 mm (image 73, series 2). There is a right lower lobe calcified granuloma. There is bronchial wall thickening. There is a right upper lobe calcified granuloma.   There is a right middle lobe calcified granuloma.     There is no aggressive osseous lesion.     Impression:   Bilateral pleural effusions with compressive and dependent atelectasis. Airspace disease in the right greater left lower lungs is favored to be infectious/inflammatory in nature.     Left lingular pulmonary nodule, recommend surveillance.     Electronically signed by Fabricio Siu MD on 2/23/2025 5:48 PM        Blood Culture ID by PCR [[id number]]  (Abnormal) Collected: 02/21/2025 2050     Specimen: N/A Updated: 02/22/2025 1339       GRAM-POSITIVE BACTERIA SEE BELOW       Enterococcus faecalis Detected* Not Detected         Enterococcus faecium Not Detected Not Detected         Bimal/B (VRE) Not Detected Not Detected         Staphylococcus spp.  Not Detected Not Detected         Staph aureus Not Detected Not Detected         Staph epidermidis Not Detected Not Detected         Staph lugdunensis Not Detected Not Detected         mecA/C N/A Not Detected         mecA/C and MREJ (MRSA) N/A Not Detected         Streptococcus spp. Not Detected Not Detected         Strep agalactiae Not Detected Not Detected         Strep  pneumoniae Not Detected Not Detected         Strep pyogenes Not Detected Not Detected         Listeria monocytogenes Not Detected Not Detected         GRAM-NEGATIVE BACTERIA SEE BELOW       Haemophilus influenzae Not Detected Not Detected         Neisseria meningitidis Not Detected Not Detected         Pseudomonas aeruginosa Not Detected Not Detected         Stenotrophomonas maltophilia Not Detected Not Detected         Acinetobacter calcoac-baumanni cplx Not Detected Not Detected         Bacteroides fragilis Not Detected Not Detected         Enterobacterales spp. Not Detected Not Detected         Enterobacter cloacae cplx Not Detected Not Detected         Serratia marcescens Not Detected Not Detected         Escherichia coli Not Detected Not Detected         CTX-M N/A Not Detected         Klebsiella aerogenes Not Detected Not Detected         Klebsiella oxytoca Not Detected Not Detected         Klebsiella pneumoniae group Not Detected Not Detected         KPC N/A Not Detected         Proteus spp. Not Detected Not Detected         Salmonella spp. Not Detected Not Detected         IMP N/A Not Detected         OXA 48 N/A Not Detected         NDM N/A Not Detected         VIM N/A Not Detected         mcr-1 N/A Not Detected         YEAST SEE BELOW       Candida albicans Not Detected Not Detected         Candida auris Not Detected Not Detected         Candida glabrata Not Detected Not Detected         Candida krusei Not Detected Not Detected         Candida parapsilosis Not Detected Not Detected         Candida tropicalis Not Detected Not Detected         Cryptococcus neoformans/catherine Not Detected Not Detected         Blood Culture ID Comment SEE BELOW     Gram stain [[id number]] Collected: 02/21/2025 2110     Specimen: N/A from Blood Updated: 02/22/2025 1259       Gram Stain Result gram positive cocci pairs,chains.       Gram Stain Result CRITICAL VALUE CALLED TO AND READ BACK BY:Julianne Wynn RN 02/22/2025       Gram  Stain Result 12:58 sb     Gram stain [[id number]] Collected: 02/21/2025 2050     Specimen: N/A from Blood Updated: 02/22/2025 1259       Gram Stain Result gram positive cocci pairs,chains.       Gram Stain Result CRITICAL VALUE CALLED TO AND READ BACK BY:Julianne Wynn RN 02/22/2025       Gram Stain Result 12:58 sb     CT Abdomen Pelvis W Contrast [[id number]] Collected: 02/21/2025 1942       Updated: 02/21/2025 1946     Narrative:       EXAM: CT ABDOMEN PELVIS W CONTRAST     CLINICAL INDICATION: Flank pain, kidney stones.     TECHNIQUE: Postcontrast CT scan performed through the abdomen and pelvis.     FINDINGS: Atelectatic change in both posterior lung bases with trace pleural fluid collections.  The liver is normal.   The gallbladder appears normal.   The spleen is normal.   No pancreatic abnormality is identified.  The adrenal glands are normal.    The right kidney appears normal.  There is a 2 mm stone in the lower pole left kidney.  There is mild left-sided hydroureteronephrosis with fairly extensive left perinephric edema.  There is a 5 mm stone in the distal left ureter just above the UVJ.    Aortic and branch structure atherosclerosis.  No enlarged lymph nodes or ascites identified.  No bowel dilatation or acute bowel abnormality is appreciated.  No CT evidence of appendicitis.  There are some scattered left colonic diverticula.  The   prostate is markedly enlarged.  The bladder is unremarkable.  No acute osseous abnormalities are identified.         5 mm stone distal left ureter just above the UVJ with left-sided hydronephrosis and perinephric edema.  Small nonobstructing left kidney stone.  Bibasal atelectatic change with small pleural fluid collections.  Colonic diverticulosis.  Enlarged   prostate.     All CT scans at [this location] are performed using dose modulation techniques as appropriate to a performed exam including the following:  Automated exposure control; adjustment of the mA and/or kV  according to patient size (this includes techniques or   standardized protocols for targeted exams where dose is matched to indication / reason for exam; i.e. extremities or head); use of iterative reconstruction technique.     Finalized on: 2/21/2025 7:44 PM By:  Ron Acosta MD  [facility]# [id number]      2025-02-21 19:46:42.730     [facility]                Surgical Procedures during this visit:     Procedure(s):  CYSTOSCOPY, STENT PLACEMENT  Date  2/22/2025  Primary Surgeon  Vijay Kebede MD  -------------------     Patient's Condition On Discharge: Stable     Physical Exam  Vitals and nursing note reviewed.   Constitutional:       General: He is not in acute distress.     Appearance: Normal appearance. He is normal weight. He is not toxic-appearing.   HENT:      Head: Normocephalic.      Nose: Nose normal.      Mouth/Throat:      Mouth: Mucous membranes are moist.   Eyes:      Pupils: Pupils are equal, round, and reactive to light.   Cardiovascular:      Rate and Rhythm: Regular rhythm.      Heart sounds: Normal heart sounds.   Pulmonary:      Effort: Pulmonary effort is normal. No respiratory distress.      Breath sounds: Normal breath sounds.   Abdominal:      General: Bowel sounds are normal.      Palpations: Abdomen is soft.   Musculoskeletal:         General: Normal range of motion.      Cervical back: Normal range of motion and neck supple.      Comments: R arm PICC line in place   Skin:     General: Skin is warm and dry.   Neurological:      General: No focal deficit present.      Mental Status: He is alert and oriented to person, place, and time.   Psychiatric:         Mood and Affect: Mood normal.            Discharge Disposition:   Order for Discharge (From admission, onward)          Start     Ordered     02/28/25 1047   Discharge Disposition to: Home Health  Once        Expected Discharge Date: 02/28/25   Expected Discharge Time: Morning       Question:  Discharge Disposition to  Answer:  Home Health     "02/28/25 1047                          Discharge Orders:     Future Appointments:     Immunizations Administered for This Admission         No immunizations given this admission.                 Medication List          START taking these medications       sodium chloride 0.9 % MBP PgBk 50 mL with ampicillin-sulbactam 3 gram SolR 3 g  Inject 3 g into the vein every 6 (six) hours for 11 days                CONTINUE taking these medications       allopurinoL 300 MG Tab tablet  Commonly known as: ZYLOPRIM      atorvastatin 20 MG Tab tablet  Commonly known as: LIPITOR      BD Niesha 2nd Gen Pen Needle 32 gauge x 5/32" Ndle  Generic drug: pen needle, diabetic      blood-glucose meter,continuous Misc      brimonidine 0.2 % Drop ophthalmic solution  Commonly known as: ALPHAGAN      carbidopa-levodopa  mg Tab per tablet  Commonly known as: SINEMET      citalopram 10 MG Tab tablet  Commonly known as: CeleXA      clonazePAM 1 MG Tab tablet  Commonly known as: KlonoPIN      cyanocobalamin 1000 MCG Tab tablet  Commonly known as: VITAMIN B-12      donepeziL 5 MG Tab tablet  Commonly known as: ARICEPT      dorzolamide 2 % Drop eye drops  Commonly known as: TRUSOPT      DULoxetine 30 MG Cpdr capsule  Commonly known as: CYMBALTA      fludrocortisone 0.1 mg Tab tablet  Commonly known as: FLORINEF      gabapentin 300 MG Cap capsule  Commonly known as: NEURONTIN      HYDROcodone-acetaminophen 5-325 mg Tab per tablet  Commonly known as: NORCO      irbesartan 75 MG Tab tablet  Commonly known as: AVAPRO      ketoconazole 2 % Sham shampoo  Commonly known as: NIZORAL      Lantus Solostar U-100 Insulin 100 unit/mL (3 mL) Inpn insulin pen  Generic drug: insulin glargine      melatonin 10 mg Tab      meloxicam 15 MG Tab tablet  Commonly known as: MOBIC      metFORMIN 500 MG Tb24 24 hr tablet  Commonly known as: GLUCOPHAGE-XR      Mounjaro 2.5 mg/0.5 mL Pnij  Generic drug: tirzepatide      mupirocin 2 % Oint topical ointment  Commonly " "known as: BACTROBAN      prazosin 1 MG Cap capsule  Commonly known as: MINIPRESS      ramelteon 8 mg Tab tablet  Commonly known as: ROZEREM      rasagiline 1 mg Tab      UroxatraL 10 mg Tb24 24 hr tablet  Generic drug: alfuzosin      Vitamin D3 25 mcg (1,000 unit) Cap capsule  Generic drug: cholecalciferol (vitamin D3)                STOP taking these medications       azithromycin 250 MG Tab tablet  Commonly known as: Zithromax Z-Kalia      benzonatate 200 MG Cap capsule  Commonly known as: TESSALON                    Where to Get Your Medications          You can get these medications from any pharmacy    Bring a paper prescription for each of these medications  sodium chloride 0.9 % MBP PgBk 50 mL with ampicillin-sulbactam 3 gram SolR 3 g         Discharge Orders         Future Labs/Procedures Expected by Expires     Activity as tolerated  As directed       Diet (Select type) Consistent Carb 6 (90g/meal, 1900cal)  As directed       Questions:         Diet Type: Consistent Carb 6 (90g/meal, 1900cal)     Restriction(s):      Solid Consistency:      Liquid Consistency:      Sodium Restriction:      Fluid restriction:      Follow-up with PCP  As directed       Questions:         Schedule for:      when:      Follow-up  As directed       Questions:         with: CHELSEY HAMMOND     Schedule for:      when:                                   *Some images could not be shown.      Electronically signed by Cristi Garcia MD at 2/28/2025 10:53 AM       Objective:   /75   Pulse 72   Ht 5' 11" (1.803 m)   Wt 81.6 kg (179 lb 12.8 oz)   SpO2 98%   BMI 25.08 kg/m²   Physical Exam    General: No acute distress. Well-developed. Well-nourished.  Eyes: EOMI. Sclerae anicteric.  HENT: Normocephalic. Atraumatic. Nares patent. Moist oral mucosa.  Ears: Bilateral TMs clear. Bilateral EACs clear.  Cardiovascular: Regular rate. Regular rhythm. No murmurs. No rubs. No gallops. Normal S1, S2.  Respiratory: Normal " respiratory effort. Clear to auscultation bilaterally. No rales. No rhonchi. No wheezing.  Abdomen: Soft. Non-tender. Non-distended. Normoactive bowel sounds.  Musculoskeletal: No  obvious deformity.  Extremities: No lower extremity edema.  Neurological: Alert & oriented x3. No slurred speech. Normal gait.  Psychiatric: Normal mood. Normal affect. Good insight. Good judgment.  Skin: Warm. Dry. No rash.          Assessment:     1. Type 2 diabetes mellitus with diabetic nephropathy, with long-term current use of insulin    2. Mild episode of recurrent major depressive disorder    3. Parkinson's disease without dyskinesia, with fluctuating manifestations    4. Orthostatic hypotension    5. Enterococcal bacteremia    6. Small cell carcinoma of bladder    7. Night terrors, adult      MDM:   Hospital Discharge Follow Up  Transitional Care Attestation    Glenn Medel was discharged from [facility] on 2/28/25.      The following elements were performed or reviewed with patient:  1. Hospital Discharge Follow Up   - Interactive communication regarding the patient's post discharge date status: yes  - Discharge summary obtained and reviewed: yes  - Current and discharge medications reviewed and reconciled: yes  - Pertinent studies from the hospitalization, SNF, or other acute stay were reviewed, including pending studies: yes  - Patient/Caregiver/Family education performed: yes  - Communication with home health was performed, if needed: Already set up with [facility]      Follow up: Follow up in about 7 weeks (around 4/21/2025).    Visit today included increased complexity associated with the care of the episodic problem see above assessment addressed and managing the longitudinal care of the patient due to the serious and/or complex managed problem(s) see above.    I have reviewed and summarized old records.  I have performed thorough medication reconciliation today and discussed risk and benefits of  medications.  I have reviewed labs and discussed with patient.  All questions were answered.    I have signed for the following orders AND/OR meds.  Orders Placed This Encounter   Procedures    Hemoglobin A1C     Standing Status:   Future     Expected Date:   4/15/2025     Expiration Date:   3/5/2026     Send normal result to authorizing provider's In Basket if patient is active on MyChart::   Yes    Comprehensive Metabolic Panel     Standing Status:   Future     Expected Date:   4/15/2025     Expiration Date:   3/5/2026     Send normal result to authorizing provider's In Basket if patient is active on MyChart::   No    CBC Without Differential     Standing Status:   Future     Expected Date:   3/5/2025     Expiration Date:   6/3/2026     Send normal result to authorizing provider's In Basket if patient is active on MyChart::   Yes     Medications Ordered This Encounter   Medications    DULoxetine (CYMBALTA) 30 MG capsule     Sig: Take 1 capsule (30 mg total) by mouth once daily.     Dispense:  90 capsule     Refill:  1        Follow up in about 7 weeks (around 4/21/2025).  Future Appointments       Date Provider Specialty Appt Notes    4/9/2025 Lejeune, Elizabeth B, NP Sleep Medicine ID[facility] ([facility]ACHINE)    4/15/2025  Lab labs    5/5/2025  Lab PSA    5/7/2025 MARIZA Donahue MD Urology 6 mo f/u with PSA prior    7/10/2025 MARIZA Donahue MD Urology Cysto for Hx of BC    8/27/2025  Lab [place]    8/27/2025  Lab [place]            If no improvement in symptoms or symptoms worsen, advised to call/follow-up at clinic or go to ER. Patient voiced understanding and all questions/concerns were addressed.     DISCLAIMER: This note was compiled by using a speech recognition dictation system and therefore please be aware that typographical / speech recognition errors can and do occur.  Please contact me if you see any errors specifically.     This note was generated with the assistance of ambient listening technology.  Verbal consent was obtained by the patient and accompanying visitor(s) for the recording of patient appointment to facilitate this note. I attest to having reviewed and edited the generated note for accuracy, though some syntax or spelling errors may persist. Please contact the author of this note for any clarification.      Cherie Lion DO, RDN    Office: [phone]   [address] [place] [address]  FAX: [phone]

## 2025-03-05 NOTE — PATIENT INSTRUCTIONS
Randy Elizabeth,     If you are due for any health screening(s) below please notify me so we can arrange them to be ordered and scheduled. Most healthy patients at your age complete them, but you are free to accept or refuse.     If you can't do it, I'll definitely understand. If you can, I'd certainly appreciate it!    All of your core healthy metrics are met.

## 2025-03-05 NOTE — PROGRESS NOTES
Subjective:       Patient ID: Glenn Medel is a 69 y.o. male.    Chief Complaint: Follow-up    HPI    69-year-old with a history of favorable intermediate prostate cancer on active surveillance.  He was found to have a bladder tumor on cystoscopy after an episode of gross hematuria as well.  He underwent TURBT with instillation of gemcitabine 2 weeks ago.  Since his discharge he was having left flank pain was found to have a left UVJ stone and a stent was placed at an outside hospital.   He is here for follow-up.  We discussed the pathology.    URINARY BLADDER, TUMOR, TRANSURETHRAL RESECTION:   --INVASIVE SMALL CELL CARCINOMA (OF UROTHELIAL ORIGIN),    AMID A BACKGROUND OF UROTHELIAL CARCINOMA IN SITU.   --MUSCULARIS PROPRIA IS FOCALLY PRESENT AND IS NOT INVOLVED    BY NEOPLASM.     Review of Systems   Constitutional:  Negative for fever.   Genitourinary:  Negative for dysuria and hematuria.       Objective:      Physical Exam  Vitals reviewed.   Constitutional:       Appearance: He is well-developed.   Pulmonary:      Effort: Pulmonary effort is normal.   Neurological:      Mental Status: He is alert and oriented to person, place, and time.         Assessment:       1. Malignant neoplasm of trigone of urinary bladder        Plan:       Malignant neoplasm of trigone of urinary bladder      Small cell bladder cancer.  Will refer to Dr. Zapata for another opinion regarding further treatment recommendations

## 2025-03-06 DIAGNOSIS — H40.053 OCULAR HYPERTENSION, BILATERAL: ICD-10-CM

## 2025-03-06 RX ORDER — DORZOLAMIDE HCL 20 MG/ML
1 SOLUTION/ DROPS OPHTHALMIC 2 TIMES DAILY
Qty: 10 ML | Refills: 3 | Status: SHIPPED | OUTPATIENT
Start: 2025-03-06

## 2025-03-07 ENCOUNTER — OUTPATIENT CASE MANAGEMENT (OUTPATIENT)
Dept: ADMINISTRATIVE | Facility: OTHER | Age: 70
End: 2025-03-07
Payer: MEDICARE

## 2025-03-07 NOTE — PROGRESS NOTES
Outpatient Care Management  Plan of Care Follow Up Visit    Patient: Glenn Medel  MRN: [id number]  Date of Service: 03/07/2025  Completed by: Shakila Dobbins RN  Referral Date: 02/11/2025    Reason for Visit   Patient presents with    OPCM RN Follow Up Call       Brief Summary: OPCM RN followed up with Mr. Elizabeth today for care plan review.    Next Steps:   Mr. Elizabeth agreed to OPCM RN follow up on or around 3/28/25.  Follow up regarding readiness for step goal.  Follow up regarding treatment plan from urologist.  Follow up regarding kidney stone.  Follow up regarding falls.  Mr. Elizabeth agreed to complete the full course of IV antibiotics while monitoring for signs of infection daily.

## 2025-03-12 ENCOUNTER — OFFICE VISIT (OUTPATIENT)
Dept: UROLOGY | Facility: CLINIC | Age: 70
End: 2025-03-12
Payer: MEDICARE

## 2025-03-12 VITALS
HEART RATE: 76 BPM | SYSTOLIC BLOOD PRESSURE: 141 MMHG | WEIGHT: 180.75 LBS | BODY MASS INDEX: 25.31 KG/M2 | DIASTOLIC BLOOD PRESSURE: 87 MMHG | HEIGHT: 71 IN

## 2025-03-12 DIAGNOSIS — C67.9 MALIGNANT NEOPLASM OF URINARY BLADDER, UNSPECIFIED SITE: Primary | ICD-10-CM

## 2025-03-12 PROCEDURE — G2211 COMPLEX E/M VISIT ADD ON: HCPCS | Mod: S$PBB,,, | Performed by: UROLOGY

## 2025-03-12 PROCEDURE — 99215 OFFICE O/P EST HI 40 MIN: CPT | Mod: S$PBB,,, | Performed by: UROLOGY

## 2025-03-12 PROCEDURE — 99999 PR PBB SHADOW E&M-EST. PATIENT-LVL V: CPT | Mod: PBBFAC,,, | Performed by: UROLOGY

## 2025-03-12 PROCEDURE — 99215 OFFICE O/P EST HI 40 MIN: CPT | Mod: PBBFAC | Performed by: UROLOGY

## 2025-03-12 RX ORDER — PHENAZOPYRIDINE HYDROCHLORIDE 200 MG/1
200 TABLET, FILM COATED ORAL 3 TIMES DAILY PRN
COMMUNITY
Start: 2025-03-10

## 2025-03-12 NOTE — PROGRESS NOTES
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 Medel presents today for evaluation of invasive small cell carcinoma of the bladder    HISTORY OF PRESENT ILLNESS:  He initially experienced hematuria and was found to have multiple kidney stones: three in the left kidney and two in the right kidney, with one measuring 6mm. He developed a kidney infection due to stone obstruction which spread to his bloodstream. On Monday, he underwent ureteroscopy for stone removal. Following the procedure, he received 90 minutes of intravesical chemotherapy, experiencing significant pain during the latter 45 minutes after emerging from sedation. On Friday, he experienced severe bladder pain, which he describes as the worst pain ever experienced, causing him to remain in bed until 2 PM. After consulting with a digital care nurse, he was evaluated at a walk-in clinic and subsequently directed to the emergency room for further assessment.    MEDICAL HISTORY:  He has Parkinson's Disease diagnosed in 2020 and stable, non-aggressive prostate cancer for the past two years.    CURRENT MEDICATIONS:  He takes Carbidopa for Parkinson's disease and has been taking Azo for 2.5 days for post-procedure bladder spasms.         Imaging: I have reviewed the imaging study CT renal stone abdomen and pelvis on 01/15/2025 personally, have independently interpreted this study, and agree with the findings    Allergies:  Review of patient's allergies indicates:   Allergen Reactions    Avelox  [moxifloxacin] Hives and Rash        Medications per EMR:  Prescriptions Prior to Admission[1]    Past Medical History:  Past Medical History:   Diagnosis Date    KENTON (acute kidney injury) 02/21/2025    Blurred vision 09/08/2020    BPH (benign prostatic hyperplasia)     Cancer     prostate cancer- skin cancer to  face legs    Cataract     done ou    Chronic kidney disease, stage 3 02/25/2015    DDD (degenerative disc disease), lumbar     s/p epidural steroids     Diabetes mellitus     type 2    Elevated PSA     Fall 07/02/2024    Glaucoma     OU    HTN (hypertension)     Iritis, lens-induced 01/08/2018    LUE numbness 09/08/2020    Obesity     Parkinson's disease     Pyelonephritis 02/21/2025    Sepsis 06/09/2023    Sleep apnea     uses cpap    Suspected cerebrovascular accident (CVA) 09/08/2020    Syncope and collapse 07/02/2024        Past Surgical History:  Past Surgical History:   Procedure Laterality Date    CATARACT EXTRACTION W/  INTRAOCULAR LENS IMPLANT Left 11/30/2017    CATARACT EXTRACTION W/  INTRAOCULAR LENS IMPLANT Right 12/11/2018    Dr Garcia    CATARACT EXTRACTION W/  INTRAOCULAR LENS IMPLANT Right 12/11/2018    Procedure: EXTRACTION, CATARACT, WITH IOL INSERTION;  Surgeon: Damaris Garcia MD;  Location: [facility] OR;  Service: Ophthalmology;  Laterality: Right;    COLONOSCOPY N/A 9/18/2017    Procedure: COLONOSCOPY;  Surgeon: Paul Feliz Jr., MD;  Location: [facility] ENDO;  Service: Endoscopy;  Laterality: N/A;    COLONOSCOPY N/A 12/21/2022    Procedure: COLONOSCOPY;  Surgeon: Paul Feliz Jr., MD;  Location: [facility] ENDO;  Service: Endoscopy;  Laterality: N/A;    COLONOSCOPY W/ POLYPECTOMY  04/13/2010    YARELI.   One 1 cm polyp in the sigmoid colon.  Internal hemorrhoids.    COSMETIC SURGERY      DIGITAL RECTAL EXAMINATION UNDER ANESTHESIA N/A 7/23/2019    Procedure: EXAM UNDER ANESTHESIA, DIGITAL, RECTUM;  Surgeon: Kaz Keating MD;  Location: [facility] OR;  Service: General;  Laterality: N/A;     EXAMINATION UNDER ANESTHESIA N/A 4/24/2019    Procedure: EXAM UNDER ANESTHESIA;  Surgeon: Kaz Kaeting MD;  Location: [facility] OR;  Service: General;  Laterality: N/A;    EXCISIONAL HEMORRHOIDECTOMY N/A 4/24/2019    Procedure: HEMORRHOIDECTOMY (lithotomy);  Surgeon: Kaz Keating MD;  Location: [facility] OR;  Service: General;  Laterality: N/A;    GANGLION CYST EXCISION Left     INJECTION OF ANESTHETIC AGENT AROUND MEDIAL BRANCH NERVES INNERVATING LUMBAR FACET JOINT Bilateral 4/9/2024    Procedure: Block-nerve-medial branch-lumbar-L4/5-L5/S1;  Surgeon: Everett Taveras MD;  Location: [facility] OR;  Service: Pain Management;  Laterality: Bilateral;    INJECTION OF ANESTHETIC AGENT AROUND MEDIAL BRANCH NERVES INNERVATING LUMBAR FACET JOINT Bilateral 5/6/2024    Procedure: Block-nerve-medial branch-lumbar-L4/5-L5/S1;  Surgeon: Everett Taveras MD;  Location: [facility] OR;  Service: Pain Management;  Laterality: Bilateral;    INJECTION OF ANESTHETIC AGENT AROUND PUDENDAL NERVE N/A 4/24/2019    Procedure: BLOCK, NERVE, PUDENDAL;  Surgeon: Kaz Keating MD;  Location: [facility] OR;  Service: General;  Laterality: N/A;    INJECTION OF ANESTHETIC AGENT AROUND PUDENDAL NERVE N/A 7/23/2019    Procedure: BLOCK, NERVE, PUDENDAL;  Surgeon: Kaz Keating MD;  Location: [facility] OR;  Service: General;  Laterality: N/A;    INJECTION, ALLOGRAFT, INTERVERTEBRAL DISC N/A 9/30/2024    Procedure: INJECTION,ALLOGRAFT,INTERVERTEBRAL DISC,1ST LEVEL  L4/5;  Surgeon: Everett Taveras MD;  Location: [facility] OR;  Service: Pain Management;  Laterality: N/A;    INSTILLATION OF URINARY BLADDER  2/11/2025    Procedure: INSTILLATION, BLADDER w/ gemcitabine;  Surgeon: MARIZA Donahue MD;  Location: [facility] OR;  Service: Urology;;    RADIOFREQUENCY ABLATION OF LUMBAR MEDIAL BRANCH NERVE AT SINGLE LEVEL Bilateral 6/4/2024    Procedure: Radiofrequency Ablation, Nerve, Spinal, Lumbar, Medial Branch, L4/5 and L5/S1;  Surgeon: Everett Taveras MD;  Location: [facility]  OR;  Service: Pain Management;  Laterality: Bilateral;    REPAIR OF RETINAL DETACHMENT WITH VITRECTOMY Right 8/1/2018    Procedure: REPAIR, RETINAL DETACHMENT, WITH VITRECTOMY;  Surgeon: SAJAN Pulido MD;  Location: [address];  Service: Ophthalmology;  Laterality: Right;  35 min    TONSILLECTOMY      TRANSRECTAL BIOPSY OF PROSTATE WITH ULTRASOUND GUIDANCE N/A 1/31/2023    Procedure: BIOPSY, PROSTATE, RECTAL APPROACH, WITH US GUIDANCE;  Surgeon: MARIZA Donahue MD;  Location: [facility] OR;  Service: Urology;  Laterality: N/A;    TRANSRECTAL BIOPSY OF PROSTATE WITH ULTRASOUND GUIDANCE N/A 4/12/2024    Procedure: BIOPSY, PROSTATE, RECTAL APPROACH, WITH US GUIDANCE;  Surgeon: MARIZA Donahue MD;  Location: [facility];  Service: Urology;  Laterality: N/A;    TRIGGER FINGER RELEASE Right     X 2    TURBT (TRANSURETHRAL RESECTION OF BLADDER TUMOR) N/A 2/11/2025    Procedure: TURBT (TRANSURETHRAL RESECTION OF BLADDER TUMOR);  Surgeon: MARIZA Donahue MD;  Location: [facility] OR;  Service: Urology;  Laterality: N/A;    uro lift      for enlarged prostate        Family History:  Family History   Problem Relation Name Age of Onset    Hypertension Mother      Diabetes Mother      Stroke Mother      Cancer Mother          colon    Kidney disease Mother      Heart disease Father      Diabetes Father      Colon cancer Father      Cancer Father          colon    Cataracts Father      No Known Problems Sister      Heart disease Brother  62    Kidney disease Brother      Dementia Brother Rodrigue     Abnormal EKG Brother Rodrigue     No Known Problems Maternal Aunt      No Known Problems Maternal Uncle      No Known Problems Paternal Aunt      No Known Problems Paternal Uncle      Glaucoma Maternal Grandmother      No Known Problems Maternal Grandfather      No Known Problems Paternal Grandmother      No Known Problems Paternal Grandfather      Amblyopia Neg Hx      Blindness Neg Hx      Macular degeneration Neg Hx      Retinal  "detachment Neg Hx      Strabismus Neg Hx      Thyroid disease Neg Hx      Parkinsonism Neg Hx          Social History:  Social History     Tobacco Use    Smoking status: Never    Smokeless tobacco: Never   Substance Use Topics    Alcohol use: Yes     Comment: very rare          OBJECTIVE:     Vitals:    03/12/25 1343   BP: (!) 141/87   BP Location: Right arm   Patient Position: Sitting   Pulse: 76   Weight: 82 kg (180 lb 12.4 oz)   Height: 5' 11" (1.803 m)        Physical Exam    General: No acute distress. Nontoxic appearing.  HENT: Normocephalic. Atraumatic.  Respiratory: Normal respiratory effort. No conversational dyspnea. No audible wheezing.  Abdomen: No obvious distension.  Skin: No visible abnormalities.  Extremities: No edema upper extremities. No edema lower extremities.  Neurological: Alert and oriented x3. Normal speech.  Psychiatric: Normal mood. Normal affect. No evidence of SI.           LABS:    CBC:  Lab Results   Component Value Date    WBC 6.55 02/06/2025    HGB 15.1 02/06/2025    HCT 45.9 02/06/2025    MCV 87 02/06/2025     02/06/2025         BMP:  Lab Results   Component Value Date     03/11/2025    K 4.4 03/11/2025     02/06/2025    CO2 29 03/11/2025    BUN 12 03/11/2025    CREATININE 0.76 (L) 03/11/2025    CALCIUM 9 03/11/2025    ANIONGAP 6 (L) 03/11/2025    EGFRNORACEVR >60 02/06/2025         ASSESSMENT/PLAN:     Assessment & Plan      MALIGNANT NEOPLASM OF BLADDER:  - Diagnosed patient with invasive small cell carcinoma of the bladder, a rare (1% of bladder cancers) and aggressive subtype.  - Recommend chemotherapy followed by surgery for best chance of cure.  - Chemotherapy to include cisplatin and etoposide, typically 4 cycles over 3-4 months, with treatment weeks followed by recovery periods.  - Surgery would involve removal of bladder and prostate, with urostomy for urine diversion.  - Ordering additional imaging to exclude metastasis: PET scan, MRI of brain, and CT of " abdomen ordered.  - Considered patient's age (69) and physical condition, deemed good candidate for proposed treatment plan.  - Discussed potential benefits and implications of bladder removal surgery, including elimination of bladder spasms associated with Parkinson's disease.  - Informed about urostomy (external bag) for urine diversion post-surgery, noting it should not interfere with most activities except space travel and scuba diving.    MALIGNANT NEOPLASM OF PROSTATE:  - Surgery would involve removal of bladder and prostate, with urostomy for urine diversion.    PARKINSON'S DISEASE:  - Noted patient's Parkinson's disease, stable since 2020.  - Discussed potential benefits and implications of bladder removal surgery, including elimination of bladder spasms associated with Parkinson's disease.    FOLLOW-UP:  - Referred to Dr. Vicky Naranjo (oncologist) on the [place] for chemotherapy management.  - Follow up to start chemotherapy within next 2 weeks.  - Follow up with Dr. Perry (oncologist), even if scan results are not back yet.  - Virtual follow-ups available for convenience, except for in-person discussion about surgery.         I spent a total of 40 minutes on the day of the visit.This includes face to face time and non-face to face time preparing to see the patient (eg, review of tests), obtaining and/or reviewing separately obtained history, documenting clinical information in the electronic or other health record, independently interpreting results and communicating results to the patient/family/caregiver, or care coordinator    - code applied: patient requires or will require a continuous, longitudinal, and active collaborative plan of care related to this patient's health condition, bladder cancer --the management of which requires the direction of a practitioner with specialized clinical knowledge, skill, and expertise.     This encounter was dictated and transcribed using DeepScribe and  [employer], please excuse any typographical or grammatical errors.         [1] (Not in a hospital admission)

## 2025-03-13 ENCOUNTER — PATIENT MESSAGE (OUTPATIENT)
Dept: UROLOGY | Facility: CLINIC | Age: 70
End: 2025-03-13
Payer: MEDICARE

## 2025-03-13 ENCOUNTER — PATIENT MESSAGE (OUTPATIENT)
Dept: HEMATOLOGY/ONCOLOGY | Facility: CLINIC | Age: 70
End: 2025-03-13
Payer: MEDICARE

## 2025-03-13 ENCOUNTER — TUMOR BOARD CONFERENCE (OUTPATIENT)
Dept: HEMATOLOGY/ONCOLOGY | Facility: CLINIC | Age: 70
End: 2025-03-13
Payer: MEDICARE

## 2025-03-14 ENCOUNTER — DOCUMENTATION ONLY (OUTPATIENT)
Dept: HEMATOLOGY/ONCOLOGY | Facility: CLINIC | Age: 70
End: 2025-03-14
Payer: MEDICARE

## 2025-03-14 ENCOUNTER — TELEPHONE (OUTPATIENT)
Dept: HEMATOLOGY/ONCOLOGY | Facility: CLINIC | Age: 70
End: 2025-03-14
Payer: MEDICARE

## 2025-03-14 NOTE — PROGRESS NOTES
[facility]       MULTIDISCIPLINARY TUMOR BOARD      Date: 3/13/2024  MRN: [id number]    Site:   Cancer Type: Bladder cancer     Cancer Staging Complete: No     Presenting Hospital / Clinic: [facility]     Virtual Tumor Board Conference: In person     PRESENTER:   Presenter: Dr. MARIZA Donahue     Specialties Present: Medical Oncology; Hematology; Radiation Oncology; Pathology; Navigation; Research; Genetics; Integrative Oncology; Radiology; Urology     PATIENT SUMMARY:   69-year-old with a history of favorable intermediate prostate cancer on active surveillance.  He was found to have a bladder tumor on cystoscopy after an episode of gross hematuria as well.  He underwent TURBT with instillation of gemcitabine 2 weeks ago.  Since his discharge he was having left flank pain was found to have a left UVJ stone and a stent was placed at an outside hospital.   He is here for follow-up.  We discussed the pathology:     URINARY BLADDER, TUMOR, TRANSURETHRAL RESECTION:   --INVASIVE SMALL CELL CARCINOMA (OF UROTHELIAL ORIGIN),    AMID A BACKGROUND OF UROTHELIAL CARCINOMA IN SITU.   --MUSCULARIS PROPRIA IS FOCALLY PRESENT AND IS NOT INVOLVED    BY NEOPLASM.     Background Information  Patient Status: a current patient; for new tumor(s)  Reason for Consultation: Initial Presentation  Biopsy Date: 02/12/25  Biopsy Results: Cancer      BOARD RECOMMENDATIONS:   Recommended Plan (General)  Recommended Plan: Imaging; Chemotherapy  Recommended Plan Note: Consult medical oncology. Obtain brain MRI and PET scan for staging.       Consult: Hem/Onc    Cancer Staging   No matching staging information was found for the patient.      [facility] Treatment Guidelines reviewed and care planned is consistent with guidelines.   (i.e., NCCN, NCI, PD, ACO, AUA, etc.)    PRESENTATION AT CANCER CONFERENCE:   Presentation at Cancer Conference:  Prospective     CLINICAL TRIAL ELIGIBILITY:   Clinical Trial Eligibility  Clinical Trial Eligibility: Not discussed       Hillary Cherry

## 2025-03-14 NOTE — NURSING
CT urogran r/s to 3/19 at [facility]. Lvm with new appointment information. Requested call back to confirm.

## 2025-03-14 NOTE — NURSING
Chart reviewed. Mr. Medel presented at  tumor board on 3/13 by Dr. Donahue.     New dx of invasive small cell carcinoma of the bladder. Mr. Medel also has hx prostate cancer for which he has been on surveillance.     Called Mr. Medel - discussed new diagnosis and recommendation to establish care with a medical oncologist. Scheduled 1st available NP visit with Dr. Lowery on 3/21/25. Reviewed date/time/location of this visit.     I encouraged Mr. Medel to call me with any questions or concerns moving forward. My contact information sent via Docin message - Mr. Elizabeth confirms he is able to access his Docin account.     Noted scheduled brain MRI, PETpsma, and CT urogram. Will attempt to expedite this imaging if possible.     Brain MRI r/s to 3/18. MIGUEL Cristobal messaged via Teams to r/s PET if possible.     Juan Antonio tool updated. Will continue to follow for navigation needs.     Oncology Navigation   Intake  Date of Diagnosis: 02/19/25  Cancer Type: -- (prostate cancer, small cell bladder cancer)  Type of Referral: Internal  Date of Referral: 04/18/24  Initial Nurse Navigator Contact: 04/18/24  Referral to Initial Contact Timeline (days): 0  Date Worked: 05/03/24  First Appointment Available: 03/21/25  Appointment Date: 03/21/25  First Available Date vs. Scheduled Date (days): 0  Multiple appointments: No  Reason if booked > 7 days after scheduling: Specific provider / access; Additional tests/procedures  Reason for Treatment Delay: Further work-up     Treatment  Current Status: Active  Date Presented to Tumor Board: 03/13/25    Surgery: Planned  Surgical Oncologist: Dr. Hebert Zapata  Type of Surgery: Cystoprostatectomy  Consult Date: 03/13/25    Medical Oncologist: Dr. Moris Lowery  Consult Date: 03/21/25  Chemotherapy: Planned    Radiation Oncologist: Dr. Britney Christianson    Procedures: Biopsy; CT  Biopsy Schedule Date: 02/12/25  CT Schedule Date: 02/23/25  MRI Schedule Date: 02/27/24  Ultrasound Schedule Date:  "04/12/24    Radiation Oncologist: Dr. Britney Christianson    Barriers of Care: Barriers to Care "Assessment completed-no barriers noted"     Acuity  Stage: 1  Systemic Treatment - predicted or initiated: Chemotherapy Regimen with Multiple drugs (+1)  Surgical Procedure Complexity: 2  Treatment Tolerability: Has not started treatment yet/treatment fully completed and side effects resolved  Comorbidities in Medical History: 2  Hospitalization Within the Past Month: 1   Needed: 0  Support: 0  Verbalizes Financial Concerns: 0  Transportation: 0  History of noncompliance/frequent no shows and cancellations: 0  Verbalizes the need for more education: 1  Navigation Acuity: 8     Follow Up  Follow up in about 1 week (around 3/21/2025).     "

## 2025-03-14 NOTE — NURSING
Faxed request to [place] to radha previous CT chest and CT abdomen/pelvis completed Feb. 2025.     IGNACIO Infante messaged via secure chat to r/s CT urogram ASA.

## 2025-03-18 ENCOUNTER — PATIENT MESSAGE (OUTPATIENT)
Dept: NEUROSURGERY | Facility: CLINIC | Age: 70
End: 2025-03-18
Payer: MEDICARE

## 2025-03-18 ENCOUNTER — TELEPHONE (OUTPATIENT)
Dept: HEMATOLOGY/ONCOLOGY | Facility: CLINIC | Age: 70
End: 2025-03-18
Payer: MEDICARE

## 2025-03-18 ENCOUNTER — PATIENT MESSAGE (OUTPATIENT)
Facility: CLINIC | Age: 70
End: 2025-03-18
Payer: MEDICARE

## 2025-03-18 DIAGNOSIS — C67.9 SMALL CELL CARCINOMA OF BLADDER: Primary | ICD-10-CM

## 2025-03-18 NOTE — NURSING
CT urogram r/s to Friday 3/21. PETpsma changed to FDG PET per Dr. Lowery.     Called Mr. Medel and reviewed these schedule changes. Reviewed NPO requirements for PET scan tomorrow. Instructed to fast x 6 hours.     Mr. Medel would like to avoid CT urogram if possible - he recently had CT scans completed at [place]. This imaging was requested on 3/14 - request pending. I informed him that we can have our physicians view this imaging once received and determine necessity of repeat imaging. Explained that Dr. Lowery / Dr. Zapata may require repeat CT scan as these images were completed at an outside facility.     Mr. Medel v/u. Will f/u at scheduled visit on Friday, 3/21.

## 2025-03-19 ENCOUNTER — RESULTS FOLLOW-UP (OUTPATIENT)
Dept: UROLOGY | Facility: CLINIC | Age: 70
End: 2025-03-19

## 2025-03-19 ENCOUNTER — HOSPITAL ENCOUNTER (OUTPATIENT)
Dept: RADIOLOGY | Facility: HOSPITAL | Age: 70
Discharge: HOME | End: 2025-03-19
Attending: UROLOGY
Payer: MEDICARE

## 2025-03-19 ENCOUNTER — TELEPHONE (OUTPATIENT)
Dept: HEMATOLOGY/ONCOLOGY | Facility: CLINIC | Age: 70
End: 2025-03-19
Payer: MEDICARE

## 2025-03-19 ENCOUNTER — PATIENT MESSAGE (OUTPATIENT)
Dept: HEMATOLOGY/ONCOLOGY | Facility: CLINIC | Age: 70
End: 2025-03-19
Payer: MEDICARE

## 2025-03-19 DIAGNOSIS — C67.9 MALIGNANT NEOPLASM OF URINARY BLADDER, UNSPECIFIED SITE: ICD-10-CM

## 2025-03-19 LAB — GLUCOSE SERPL-MCNC: 137 MG/DL (ref 70–110)

## 2025-03-19 PROCEDURE — 78815 PET IMAGE W/CT SKULL-THIGH: CPT | Mod: TC,PN

## 2025-03-19 PROCEDURE — A9552 F18 FDG: HCPCS | Mod: PN | Performed by: UROLOGY

## 2025-03-19 PROCEDURE — 78815 PET IMAGE W/CT SKULL-THIGH: CPT | Mod: 26,PI,, | Performed by: RADIOLOGY

## 2025-03-19 RX ORDER — FLUDEOXYGLUCOSE F18 500 MCI/ML
12 INJECTION INTRAVENOUS
Status: COMPLETED | OUTPATIENT
Start: 2025-03-19 | End: 2025-03-19

## 2025-03-19 RX ADMIN — FLUDEOXYGLUCOSE F-18 13.8 MILLICURIE: 500 INJECTION INTRAVENOUS at 03:03

## 2025-03-19 NOTE — PROGRESS NOTES
PET Imaging Questionnaire    Are you a Diabetic? Recent Blood Sugar level? Yes    Are you anemic? Bone Marrow Stimulation Meds? No    Have you had a CT Scan, if so when & where was your last one? Yes -     Have you had a PET Scan, if so when & where was your last one? No    Chemotherapy or currently on Chemotherapy? No    Radiation therapy? No    Surgical History:   Past Surgical History:   Procedure Laterality Date    CATARACT EXTRACTION W/  INTRAOCULAR LENS IMPLANT Left 11/30/2017    CATARACT EXTRACTION W/  INTRAOCULAR LENS IMPLANT Right 12/11/2018    Dr Garcia    CATARACT EXTRACTION W/  INTRAOCULAR LENS IMPLANT Right 12/11/2018    Procedure: EXTRACTION, CATARACT, WITH IOL INSERTION;  Surgeon: Damaris Garcia MD;  Location: [facility] OR;  Service: Ophthalmology;  Laterality: Right;    COLONOSCOPY N/A 9/18/2017    Procedure: COLONOSCOPY;  Surgeon: Paul Feliz Jr., MD;  Location: [facility] ENDO;  Service: Endoscopy;  Laterality: N/A;    COLONOSCOPY N/A 12/21/2022    Procedure: COLONOSCOPY;  Surgeon: Paul Feliz Jr., MD;  Location: [facility] ENDO;  Service: Endoscopy;  Laterality: N/A;    COLONOSCOPY W/ POLYPECTOMY  04/13/2010    YARELI.   One 1 cm polyp in the sigmoid colon.  Internal hemorrhoids.    COSMETIC SURGERY      DIGITAL RECTAL EXAMINATION UNDER ANESTHESIA N/A 7/23/2019    Procedure: EXAM UNDER ANESTHESIA, DIGITAL, RECTUM;  Surgeon: Kaz Keating MD;  Location: [facility] OR;  Service: General;  Laterality: N/A;    EXAMINATION UNDER ANESTHESIA N/A 4/24/2019    Procedure: EXAM UNDER ANESTHESIA;  Surgeon: Kaz Keating MD;  Location: [facility] OR;  Service: General;  Laterality: N/A;    EXCISIONAL HEMORRHOIDECTOMY N/A 4/24/2019    Procedure: HEMORRHOIDECTOMY (lithotomy);  Surgeon: Kaz Keating MD;  Location: [facility] OR;  Service: General;  Laterality: N/A;    GANGLION CYST EXCISION Left     INJECTION OF ANESTHETIC AGENT AROUND MEDIAL BRANCH NERVES INNERVATING LUMBAR FACET JOINT Bilateral 4/9/2024    Procedure:  Block-nerve-medial branch-lumbar-L4/5-L5/S1;  Surgeon: Everett Taveras MD;  Location: [facility] OR;  Service: Pain Management;  Laterality: Bilateral;    INJECTION OF ANESTHETIC AGENT AROUND MEDIAL BRANCH NERVES INNERVATING LUMBAR FACET JOINT Bilateral 5/6/2024    Procedure: Block-nerve-medial branch-lumbar-L4/5-L5/S1;  Surgeon: Everett Taveras MD;  Location: [facility] OR;  Service: Pain Management;  Laterality: Bilateral;    INJECTION OF ANESTHETIC AGENT AROUND PUDENDAL NERVE N/A 4/24/2019    Procedure: BLOCK, NERVE, PUDENDAL;  Surgeon: Kaz Keating MD;  Location: [facility] OR;  Service: General;  Laterality: N/A;    INJECTION OF ANESTHETIC AGENT AROUND PUDENDAL NERVE N/A 7/23/2019    Procedure: BLOCK, NERVE, PUDENDAL;  Surgeon: Kaz Keating MD;  Location: [facility] OR;  Service: General;  Laterality: N/A;    INJECTION, ALLOGRAFT, INTERVERTEBRAL DISC N/A 9/30/2024    Procedure: INJECTION,ALLOGRAFT,INTERVERTEBRAL DISC,1ST LEVEL  L4/5;  Surgeon: Everett Taveras MD;  Location: [facility] OR;  Service: Pain Management;  Laterality: N/A;    INSTILLATION OF URINARY BLADDER  2/11/2025    Procedure: INSTILLATION, BLADDER w/ gemcitabine;  Surgeon: MARIZA Donahue MD;  Location: [facility] OR;  Service: Urology;;    RADIOFREQUENCY ABLATION OF LUMBAR MEDIAL BRANCH NERVE AT SINGLE LEVEL Bilateral 6/4/2024    Procedure: Radiofrequency Ablation, Nerve, Spinal, Lumbar, Medial Branch, L4/5 and L5/S1;  Surgeon: Everett Taveras MD;  Location: [facility] OR;  Service: Pain Management;  Laterality: Bilateral;    REPAIR OF RETINAL DETACHMENT WITH VITRECTOMY Right 8/1/2018    Procedure: REPAIR, RETINAL DETACHMENT, WITH VITRECTOMY;  Surgeon: SAJAN Pulido MD;  Location: [address];  Service: Ophthalmology;  Laterality: Right;  35 min    TONSILLECTOMY      TRANSRECTAL BIOPSY OF PROSTATE WITH ULTRASOUND GUIDANCE N/A 1/31/2023    Procedure: BIOPSY, PROSTATE, RECTAL APPROACH, WITH US GUIDANCE;  Surgeon: MARIZA Donahue MD;  Location: [facility] OR;   Service: Urology;  Laterality: N/A;    TRANSRECTAL BIOPSY OF PROSTATE WITH ULTRASOUND GUIDANCE N/A 4/12/2024    Procedure: BIOPSY, PROSTATE, RECTAL APPROACH, WITH US GUIDANCE;  Surgeon: MARIZA Donahue MD;  Location: [facility];  Service: Urology;  Laterality: N/A;    TRIGGER FINGER RELEASE Right     X 2    TURBT (TRANSURETHRAL RESECTION OF BLADDER TUMOR) N/A 2/11/2025    Procedure: TURBT (TRANSURETHRAL RESECTION OF BLADDER TUMOR);  Surgeon: MARIZA Donahue MD;  Location: [facility];  Service: Urology;  Laterality: N/A;    uro lift      for enlarged prostate        Have you been fasting for at least 6 hours? Yes    Is there any chance you may be pregnant or breastfeeding? No    Assay: 15.3 MCi@:1449   Injection Site:rt ac     Residual: 1.53 mCi@: 1457   Technologist: Vero Rahman Injected:13.8mCi

## 2025-03-19 NOTE — NURSING
Met with Mr. Elizabeth and his wife, Ms. Martinez, following PET scan today.     Mr. Elizabeth would like to postpone CT urogram at this time - this is OK per Dr. Lowery. Previous CT imaging from [place] uploaded into Epic under media per WILNER Ibrahim. CT urogram scheduled 3/21 cancelled - will reschedule at a later date if indicated by Dr. Lowery.     We spent some time discussing Mr. Medel's recent medical history. We discussed what to expect at upcoming NP visit with Dr. Loewry. We discussed potential port placement, chemo school, and process of scheduling chemo once insurance auth rec'd.     Mr. Medel states he is ready to begin treatment ASAP.     We discussed genetic testing - Mr. Medel has a hx prostate cancer along with his new diagnosis of small cell bladder carcinoma. His mother and father both had cancer - mother had colon ca, father had colon ca and ca of unknown origin. Mr. Medel would like to proceed with genetic testing - will place referral after NP visit with Dr. Lowery.     Of note, Mr. Medel has a dx of Parkinson's disease. He states he has notified his neurologist of his cancer diagnosis.     He is pleased with the care he has received from Dr. Zapata. He expressed disappointment in the amount of time it took Dr. Donahue to follow-up with him regarding his pathology results after they became available to him on [employer]. Emotional support provided. Overall, he is eager to get things moving and start treatment.     I encouraged Mr. Medel to contact me at any time with any questions/concerns. He v/u and thanked me for taking time to meet with him today.     PET scan reviewed - no evidence of cancer metastasis at this time. Brain  MRI with [facility]. Will f/u with Mr. Medel after NP visit on 3/21.

## 2025-03-21 ENCOUNTER — DOCUMENTATION ONLY (OUTPATIENT)
Dept: HEMATOLOGY/ONCOLOGY | Facility: CLINIC | Age: 70
End: 2025-03-21
Payer: MEDICARE

## 2025-03-21 ENCOUNTER — OFFICE VISIT (OUTPATIENT)
Dept: HEMATOLOGY/ONCOLOGY | Facility: CLINIC | Age: 70
End: 2025-03-21
Payer: MEDICARE

## 2025-03-21 VITALS
WEIGHT: 177 LBS | TEMPERATURE: 98 F | HEART RATE: 73 BPM | RESPIRATION RATE: 14 BRPM | SYSTOLIC BLOOD PRESSURE: 130 MMHG | HEIGHT: 71 IN | DIASTOLIC BLOOD PRESSURE: 80 MMHG | OXYGEN SATURATION: 97 % | BODY MASS INDEX: 24.78 KG/M2

## 2025-03-21 DIAGNOSIS — H40.9 GLAUCOMA, UNSPECIFIED GLAUCOMA TYPE, UNSPECIFIED LATERALITY: ICD-10-CM

## 2025-03-21 DIAGNOSIS — C67.9 MALIGNANT NEOPLASM OF URINARY BLADDER, UNSPECIFIED SITE: Primary | ICD-10-CM

## 2025-03-21 DIAGNOSIS — G20.A1 PARKINSON'S DISEASE, UNSPECIFIED WHETHER DYSKINESIA PRESENT, UNSPECIFIED WHETHER MANIFESTATIONS FLUCTUATE: ICD-10-CM

## 2025-03-21 DIAGNOSIS — I10 HYPERTENSION, UNSPECIFIED TYPE: ICD-10-CM

## 2025-03-21 DIAGNOSIS — C67.9 SMALL CELL CARCINOMA OF BLADDER: Primary | ICD-10-CM

## 2025-03-21 DIAGNOSIS — E11.69 TYPE 2 DIABETES MELLITUS WITH OTHER SPECIFIED COMPLICATION, WITH LONG-TERM CURRENT USE OF INSULIN: ICD-10-CM

## 2025-03-21 DIAGNOSIS — Z79.4 TYPE 2 DIABETES MELLITUS WITH OTHER SPECIFIED COMPLICATION, WITH LONG-TERM CURRENT USE OF INSULIN: ICD-10-CM

## 2025-03-21 DIAGNOSIS — C67.9 MALIGNANT NEOPLASM OF URINARY BLADDER, UNSPECIFIED SITE: ICD-10-CM

## 2025-03-21 DIAGNOSIS — M79.604 RIGHT LEG PAIN: ICD-10-CM

## 2025-03-21 PROCEDURE — 99205 OFFICE O/P NEW HI 60 MIN: CPT | Mod: S$PBB,,, | Performed by: INTERNAL MEDICINE

## 2025-03-21 PROCEDURE — G2211 COMPLEX E/M VISIT ADD ON: HCPCS | Mod: S$PBB,,, | Performed by: INTERNAL MEDICINE

## 2025-03-21 PROCEDURE — 99999 PR PBB SHADOW E&M-EST. PATIENT-LVL IV: CPT | Mod: PBBFAC,,, | Performed by: INTERNAL MEDICINE

## 2025-03-21 PROCEDURE — 99214 OFFICE O/P EST MOD 30 MIN: CPT | Mod: PBBFAC,PN | Performed by: INTERNAL MEDICINE

## 2025-03-21 NOTE — NURSING
Mr. Medel seen today in clinic by Dr. Lowery.     Dr. Lowery to schedule P2P with MD David for 2nd opinion per patient request.    Met with Mr. Medel and his wife following clinic.    New orders placed today for chemo ed, port placement. FRANCI Zapata messaged via secure chat to schedule port consult ASAP.     Referrals to SW/RD placed. Scheduled chemo ed on Friday, 2/28 at 1PM. Reviewed date/time/location of this visit.     Audiogram ordered today and pending.     Mr. Medel escorted out of clinic to [facility] stacey. He will contact me prn for questions/concerns.

## 2025-03-21 NOTE — PROGRESS NOTES
PATIENT: Glenn Medel  MRN: [id number]  DATE: 3/22/2025      Diagnosis:   1. Small cell carcinoma of bladder    2. Malignant neoplasm of urinary bladder, unspecified site    3. Hypertension, unspecified type    4. Parkinson's disease, unspecified whether dyskinesia present, unspecified whether manifestations fluctuate    5. Type 2 diabetes mellitus with other specified complication, with long-term current use of insulin    6. Glaucoma, unspecified glaucoma type, unspecified laterality        Chief Complaint: Bladder Cancer (New bladder cancer and hx of prostate cancer)      Oncologic History:      Oncologic History     Oncologic Treatment     Pathology           Subjective:    Initial History: Mr. Medel is a 69 y.o. male with BPH, CKD, DMII, Glaucoma, HTN, obesity, Parkinson's disease, LOVE who presents for small cell bladder cancer.  The patient initially presented after developing hematuria.  The patient underwent cystoscopy on 01/30/2025 showing a tumor at the trigone near the right ureteral orifice measuring about 3 cm in diameter.  Pt underwent TURBT on 2/12/25 with path showing invasive small-cell carcinoma of urothelial origin and a background of urothelial carcinoma in Situ.  Muscularis propria was present on biopsy but was not involved.  The patient saw urologist Dr. Zapata on 03/12/2024 whom recommended chemotherapy followed by surgery.  The patient was discussed at tumor board on 03/13/2025 with recommendation on chemotherapy, PET-CT, MRI brain.  MRI brain on 03/18/2025 showed 4 x 3 mm probable arachnoid cyst in the left posterior sella.  PET-CT on 03/19/2025 showed no evidence of disease.      The patient states that his hematuria has resolved.  Currently the patient endorses constipation, urinary frequency, arthritis in his knees, and anxiety.  The patient denies CP, cough, SOB, abdominal pain, nausea, vomiting, constipation, diarrhea.  The patient denies fever, chills, night sweats, weight loss, new  lumps or bumps, easy bruising or bleeding.    Past Medical History:   Past Medical History:   Diagnosis Date    KENTON (acute kidney injury) 02/21/2025    Bladder cancer 2025    Blurred vision 09/08/2020    BPH (benign prostatic hyperplasia)     Cancer     prostate cancer- skin cancer to  face legs    Cataract     done ou    Chronic kidney disease, stage 3 02/25/2015    DDD (degenerative disc disease), lumbar     s/p epidural steroids     Diabetes mellitus     type 2    Elevated PSA     Fall 07/02/2024    Glaucoma     OU    HTN (hypertension)     Iritis, lens-induced 01/08/2018    LUE numbness 09/08/2020    Obesity     Parkinson's disease     Pyelonephritis 02/21/2025    Sepsis 06/09/2023    Sleep apnea     uses cpap    Suspected cerebrovascular accident (CVA) 09/08/2020    Syncope and collapse 07/02/2024       Past Surgical HIstory:   Past Surgical History:   Procedure Laterality Date    CATARACT EXTRACTION W/  INTRAOCULAR LENS IMPLANT Left 11/30/2017    CATARACT EXTRACTION W/  INTRAOCULAR LENS IMPLANT Right 12/11/2018    Dr Garcia    CATARACT EXTRACTION W/  INTRAOCULAR LENS IMPLANT Right 12/11/2018    Procedure: EXTRACTION, CATARACT, WITH IOL INSERTION;  Surgeon: Damaris Garcia MD;  Location: [facility] OR;  Service: Ophthalmology;  Laterality: Right;    COLONOSCOPY N/A 9/18/2017    Procedure: COLONOSCOPY;  Surgeon: Paul Feliz Jr., MD;  Location: [facility] ENDO;  Service: Endoscopy;  Laterality: N/A;    COLONOSCOPY N/A 12/21/2022    Procedure: COLONOSCOPY;  Surgeon: Paul Feliz Jr., MD;  Location: [facility] ENDO;  Service: Endoscopy;  Laterality: N/A;    COLONOSCOPY W/ POLYPECTOMY  04/13/2010    YARELI.   One 1 cm polyp in the sigmoid colon.  Internal hemorrhoids.    COSMETIC SURGERY      DIGITAL RECTAL EXAMINATION UNDER ANESTHESIA N/A 7/23/2019    Procedure: EXAM UNDER ANESTHESIA, DIGITAL, RECTUM;  Surgeon: Kaz Keating MD;  Location: [facility] OR;  Service: General;  Laterality: N/A;    EXAMINATION UNDER ANESTHESIA  N/A 4/24/2019    Procedure: EXAM UNDER ANESTHESIA;  Surgeon: Kaz Keating MD;  Location: [facility] OR;  Service: General;  Laterality: N/A;    EXCISIONAL HEMORRHOIDECTOMY N/A 4/24/2019    Procedure: HEMORRHOIDECTOMY (lithotomy);  Surgeon: Kaz Keating MD;  Location: [facility] OR;  Service: General;  Laterality: N/A;    GANGLION CYST EXCISION Left     INJECTION OF ANESTHETIC AGENT AROUND MEDIAL BRANCH NERVES INNERVATING LUMBAR FACET JOINT Bilateral 4/9/2024    Procedure: Block-nerve-medial branch-lumbar-L4/5-L5/S1;  Surgeon: Everett Taveras MD;  Location: [facility] OR;  Service: Pain Management;  Laterality: Bilateral;    INJECTION OF ANESTHETIC AGENT AROUND MEDIAL BRANCH NERVES INNERVATING LUMBAR FACET JOINT Bilateral 5/6/2024    Procedure: Block-nerve-medial branch-lumbar-L4/5-L5/S1;  Surgeon: Everett Taveras MD;  Location: [facility] OR;  Service: Pain Management;  Laterality: Bilateral;    INJECTION OF ANESTHETIC AGENT AROUND PUDENDAL NERVE N/A 4/24/2019    Procedure: BLOCK, NERVE, PUDENDAL;  Surgeon: Kaz Keating MD;  Location: [facility] OR;  Service: General;  Laterality: N/A;    INJECTION OF ANESTHETIC AGENT AROUND PUDENDAL NERVE N/A 7/23/2019    Procedure: BLOCK, NERVE, PUDENDAL;  Surgeon: Kaz Keating MD;  Location: [facility] OR;  Service: General;  Laterality: N/A;    INJECTION, ALLOGRAFT, INTERVERTEBRAL DISC N/A 9/30/2024    Procedure: INJECTION,ALLOGRAFT,INTERVERTEBRAL DISC,1ST LEVEL  L4/5;  Surgeon: Everett Taveras MD;  Location: [facility] OR;  Service: Pain Management;  Laterality: N/A;    INSTILLATION OF URINARY BLADDER  2/11/2025    Procedure: INSTILLATION, BLADDER w/ gemcitabine;  Surgeon: MARIZA Donahue MD;  Location: [facility] OR;  Service: Urology;;    RADIOFREQUENCY ABLATION OF LUMBAR MEDIAL BRANCH NERVE AT SINGLE LEVEL Bilateral 6/4/2024    Procedure: Radiofrequency Ablation, Nerve, Spinal, Lumbar, Medial Branch, L4/5 and L5/S1;  Surgeon: Everett Taveras MD;  Location: [facility] OR;  Service: Pain Management;   Laterality: Bilateral;    REPAIR OF RETINAL DETACHMENT WITH VITRECTOMY Right 8/1/2018    Procedure: REPAIR, RETINAL DETACHMENT, WITH VITRECTOMY;  Surgeon: SAJAN Pulido MD;  Location: [address];  Service: Ophthalmology;  Laterality: Right;  35 min    TONSILLECTOMY      TRANSRECTAL BIOPSY OF PROSTATE WITH ULTRASOUND GUIDANCE N/A 1/31/2023    Procedure: BIOPSY, PROSTATE, RECTAL APPROACH, WITH US GUIDANCE;  Surgeon: MARIZA Donahue MD;  Location: [facility] OR;  Service: Urology;  Laterality: N/A;    TRANSRECTAL BIOPSY OF PROSTATE WITH ULTRASOUND GUIDANCE N/A 4/12/2024    Procedure: BIOPSY, PROSTATE, RECTAL APPROACH, WITH US GUIDANCE;  Surgeon: MARIZA Donahue MD;  Location: [facility];  Service: Urology;  Laterality: N/A;    TRIGGER FINGER RELEASE Right     X 2    TURBT (TRANSURETHRAL RESECTION OF BLADDER TUMOR) N/A 2/11/2025    Procedure: TURBT (TRANSURETHRAL RESECTION OF BLADDER TUMOR);  Surgeon: MARIZA Donahue MD;  Location: [facility] OR;  Service: Urology;  Laterality: N/A;    uro lift      for enlarged prostate       Family History:   Family History   Problem Relation Name Age of Onset    Colon cancer Mother      Hypertension Mother      Diabetes Mother      Stroke Mother      Cancer Mother          colon    Kidney disease Mother      Heart disease Father      Diabetes Father      Colon cancer Father      Cancer Father          colon    Cataracts Father      No Known Problems Sister      Heart disease Brother  62    Kidney disease Brother      Dementia Brother Rodrigue     Abnormal EKG Brother Rodrigue     Breast cancer Maternal Aunt      Cancer Maternal Aunt          breast    Cancer Maternal Aunt          brain    Brain cancer Maternal Aunt      No Known Problems Maternal Uncle      No Known Problems Paternal Aunt      No Known Problems Paternal Uncle      Glaucoma Maternal Grandmother      No Known Problems Maternal Grandfather      No Known Problems Paternal Grandmother      No Known Problems Paternal  "Grandfather      Amblyopia Neg Hx      Blindness Neg Hx      Macular degeneration Neg Hx      Retinal detachment Neg Hx      Strabismus Neg Hx      Thyroid disease Neg Hx      Parkinsonism Neg Hx         Social History:  reports that he has never smoked. He has never used smokeless tobacco. He reports current alcohol use. He reports that he does not use drugs.    Allergies:  Review of patient's allergies indicates:   Allergen Reactions    Avelox [moxifloxacin] Hives and Rash       Medications:  Current Medications[1]    Review of Systems   Constitutional:  Negative for appetite change, chills, fever and unexpected weight change.   HENT:  Negative for mouth sores, sore throat and trouble swallowing.    Eyes:  Negative for photophobia and visual disturbance.   Respiratory:  Negative for cough, chest tightness and shortness of breath.    Cardiovascular:  Negative for chest pain, palpitations and leg swelling.   Gastrointestinal:  Positive for constipation. Negative for abdominal pain, diarrhea, nausea and vomiting.   Endocrine: Negative for cold intolerance and heat intolerance.   Genitourinary:  Positive for frequency. Negative for difficulty urinating, dysuria and hematuria.   Musculoskeletal:  Positive for arthralgias (knees). Negative for back pain and myalgias.   Skin:  Negative for color change and rash.   Neurological:  Negative for dizziness, light-headedness, numbness and headaches.   Hematological:  Negative for adenopathy. Does not bruise/bleed easily.   Psychiatric/Behavioral:  The patient is nervous/anxious.        ECOG Performance Status: 1   Objective:      Vitals:   Vitals:    03/21/25 1455   BP: 130/80   BP Location: Right arm   Patient Position: Sitting   Pulse: 73   Resp: 14   Temp: 97.7 °F (36.5 °C)   SpO2: 97%   Weight: 80.3 kg (177 lb 0.5 oz)   Height: 5' 11" (1.803 m)       Physical Exam  Constitutional:       General: He is not in acute distress.     Appearance: He is well-developed. He is not " diaphoretic.   HENT:      Head: Normocephalic and atraumatic.   Eyes:      General: No scleral icterus.        Right eye: No discharge.         Left eye: No discharge.   Cardiovascular:      Rate and Rhythm: Normal rate and regular rhythm.      Heart sounds: Normal heart sounds. No murmur heard.     No friction rub. No gallop.   Pulmonary:      Effort: Pulmonary effort is normal. No respiratory distress.      Breath sounds: Normal breath sounds. No wheezing or rales.   Chest:      Chest wall: No tenderness.   Abdominal:      General: Bowel sounds are normal. There is no distension.      Palpations: Abdomen is soft. There is no mass.      Tenderness: There is no abdominal tenderness. There is no rebound.   Skin:     General: Skin is warm and dry.      Findings: No erythema or rash.   Neurological:      Mental Status: He is alert and oriented to person, place, and time.   Psychiatric:         Behavior: Behavior normal.         Laboratory Data:  Hospital Outpatient Visit on 03/19/2025   Component Date Value Ref Range Status    POC Glucose 03/19/2025 137 (A)  70 - 110 MG/DL Final         Imaging:     MRI Brain  3/18/25  INTRACRANIAL: Mild age-appropriate volume loss. Unchanged prominent right retrocerebellar CSF/arachnoid cyst. Few scattered T2 FLAIR hyperintense white matter foci are minimally increased from prior study on 05/18/2023, likely related to chronic microvascular ischemic change. No abnormal enhancement demonstrated. No parenchymal restricted diffusion. No evidence of intracranial hemorrhage. No intracranial mass effect. No hydrocephalus. 4 x 3 mm probable arachnoid cyst in the left posterior sella (series 21, image 118). Otherwise, visualized pituitary gland and infundibulum are normal. Visualized major intracranial vascular structures demonstrate normal flow voids and are normal in course and caliber.     SINUSES: Paranasal sinuses are clear. Left mastoid effusion.     ORBITS: Bilateral lens  replacements.      PET/CT 3/19/25  Head and Neck:     Brain demonstrates normal metabolism. However, FDG-PET has an approximate 60% sensitivity for brain metastases which are best detected by MRI with gadolinium. Physiologic uptake is in the neck.     Chest:     No FDG avid pulmonary lesions are present. No enlarged or FDG avid lymph nodes are in the chest.     Abdomen and Pelvis:     Physiologic uptake is in the liver, spleen, urinary system and bowel. No enlarged or FDG avid lymph nodes are in the abdomen or pelvis. Adrenal glands are normal. Urinary bladder is decompressed. Prostate is enlarged.     Musculoskeletal:     No FDG avid osseous lesions are present.       Assessment:       1. Small cell carcinoma of bladder    2. Malignant neoplasm of urinary bladder, unspecified site    3. Hypertension, unspecified type    4. Parkinson's disease, unspecified whether dyskinesia present, unspecified whether manifestations fluctuate    5. Type 2 diabetes mellitus with other specified complication, with long-term current use of insulin    6. Glaucoma, unspecified glaucoma type, unspecified laterality           Plan:     Small Cell Carcinoma of the Bladder - the patient diagnosed with small-cell carcinoma of the bladder on biopsy 02/12/2025  Urologist Dr. Zapata whom patient met on 03/12/2024 recommended chemotherapy followed by cystectomy  The patient discussed at tumor board on 03/13/2024 with recommendations for chemotherapy   MRI brain on 03/18/2025 showed no evidence of disease  PET-CT on 03/1925 showed no evidence of disease   Treatment with cisplatin and etoposide discussed with the patient.  Patient given information packet on medications and side effects discussed   The patient requested peer to peer with MD David.  We will discuss case with Medical Oncology as well as urologic surgeon  Patient undergo chemo class and to be set up for chemo care companion  Patient to see general surgeon for port  placement  Patient undergo audiogram  Visit today included increased complexity associated with the care of the episodic problem (chemotherapy) addressed and managing the longitudinal care of the patient due to the serious and/or complex managed problem(s)  Small Cell Carcinoma of the bladder .    HTN - pt on prazosin  -BP controlled  -Will monitor    Parkinson's - pt on sinemet, rasagiline  -Symptoms controlled  -Will monitor    DMII - pt on insulin, metformin, tirzepatide  Lab Results   Component Value Date    HGBA1C 7.6 (H) 01/15/2025   -Will monitor     Glaucoma - pt using alphagan, trusopt eye drops  -Ophthalmology managing    Route Chart for Scheduling    Med Onc Chart Routing      Follow up with physician Other. Pt needs approval for chemo.  Pt needs a chemo class and to be set up for chemo care companion.  Pt needs appt with general surgery for PORT placement.  Pt needs [id number] with Md David.  Pt needs an audiogram set up.  Pt needs a CBC, CMP and Mg with appt with me the day prior to chemo start.   Follow up with GILMAR    Infusion scheduling note    Injection scheduling note    Labs    Imaging    Pharmacy appointment    Other referrals                Treatment Plan Information   OP EP (CISplatin etoposide) Q3W Moris Lowery MD   Associated diagnosis: Malignant neoplasm of urinary bladder   noted on 3/21/2025  Associated diagnosis: Small cell carcinoma of bladder Stage I cT1, cN0, cM0 noted on 2/11/2025   Line of treatment: Adjuvant  Treatment Goal: Curative     Upcoming Treatment Dates - OP EP (CISplatin etoposide) Q3W    4/6/2025       Antiemetics       dexAMETHasone (DECADRON) 4 MG Tab  4/7/2025       Chemotherapy       CISplatin (Platinol) 75 mg/m2 = 151 mg in 0.9% NaCl 651 mL chemo infusion       etoposide (VEPESID) 100 mg/m2 = 202 mg in 0.9% NaCl 510.1 mL chemo infusion       Pre-Hydration       0.9% NaCl 1,000 mL with magnesium sulfate 1 g, potassium chloride 20 mEq infusion        Post-Hydration       0.9% NaCl 1,000 mL with magnesium sulfate 1 g, potassium chloride 20 mEq infusion       Antiemetics       aprepitant (Cinvanti) injection 130 mg       palonosetron 0.25mg/dexAMETHasone 12mg in NS IVPB 0.25 mg 50 mL  4/8/2025       Chemotherapy       etoposide (VEPESID) 100 mg/m2 = 202 mg in 0.9% NaCl 510.1 mL chemo infusion  4/9/2025       Chemotherapy       etoposide (VEPESID) 100 mg/m2 = 202 mg in 0.9% NaCl 510.1 mL chemo infusion    Therapy Plan Information  URO GEMCITABINE (INTRAVESICAL) for Small cell carcinoma of bladder, noted on 2/11/2025  Chemotherapy  gemcitabine 2,000 mg in 0.9% NaCl SolP 100 mL bladder instillation  2,000 mg, Intravesical, 1 time a week      No therapy plan of the specified type found.    No therapy plan of the specified type found.    Moris Lowery MD  [facility]  Hematology and Oncology  [facility]   [address] [facility] [place] [address]   O: [phone]  F: [phone]             [1]   Current Outpatient Medications   Medication Sig Dispense Refill    allopurinoL (ZYLOPRIM) 300 MG tablet Take 1 tablet (300 mg total) by mouth once daily. 90 tablet 0    ammonium lactate (LAC-HYDRIN) 12 % lotion Apply topically 2 (two) times daily.      atorvastatin (LIPITOR) 20 MG tablet Take 1 tablet (20 mg total) by mouth every evening. 90 tablet 3    blood-glucose meter,continuous Misc 1 each by Misc.(Non-Drug; Combo Route) route 4 (four) times daily. 1 each 0    brimonidine 0.2% (ALPHAGAN) 0.2 % Drop INSTILL 1 DROP IN BOTH EYES TWICE DAILY 10 mL 0    carbidopa-levodopa  mg (SINEMET)  mg per tablet Take 1.5 tablets by mouth 5 (five) times daily. 675 tablet 3    cholecalciferol, vitamin D3, (D3-2000 ORAL) Take by mouth once daily.      clonazePAM (KLONOPIN) 1 MG tablet Take 1 tablet (1 mg total) by mouth every evening. 60 tablet 2    cyanocobalamin (VITAMIN B-12) 1000 MCG tablet Take 100 mcg by mouth once daily.      donepeziL  "(ARICEPT) 5 MG tablet Take 1 tablet (5 mg total) by mouth every evening. 30 tablet 11    dorzolamide (TRUSOPT) 2 % ophthalmic solution Place 1 drop into both eyes 2 (two) times daily. 10 mL 3    DULoxetine (CYMBALTA) 30 MG capsule Take 1 capsule (30 mg total) by mouth once daily. 90 capsule 1    fludrocortisone (FLORINEF) 0.1 mg Tab Take 1 tablet (100 mcg total) by mouth every other day. 90 tablet 1    gabapentin (NEURONTIN) 300 MG capsule Take 1 capsule (300 mg total) by mouth 2 (two) times daily. 60 capsule 1    insulin glargine U-100, Lantus, (LANTUS SOLOSTAR U-100 INSULIN) 100 unit/mL (3 mL) InPn pen Inject 7 Units into the skin once daily.      ketoconazole (NIZORAL) 2 % shampoo Apply topically.      meloxicam (MOBIC) 15 MG tablet Take 1 tablet (15 mg total) by mouth once daily. 30 tablet 0    metFORMIN (GLUCOPHAGE-XR) 500 MG ER 24hr tablet Take 2 tablets (1,000 mg total) by mouth once daily. 180 tablet 3    pen needle, diabetic (BD HIMANSHU 2ND GEN PEN NEEDLE) 32 gauge x 5/32" Ndle Inject 1 each into the skin once daily. 100 each 3    phenazopyridine (PYRIDIUM) 200 MG tablet Take 200 mg by mouth 3 times daily as needed.      prazosin (MINIPRESS) 1 MG Cap Take 1 capsule (1 mg total) by mouth every evening. 90 capsule 3    ramelteon (ROZEREM) 8 mg tablet Take 1 tablet (8 mg total) by mouth nightly. 90 tablet 3    rasagiline (AZILECT) 1 mg Tab TAKE 1 TABLET(1 MG) BY MOUTH EVERY EVENING 30 tablet 11    tirzepatide 10 mg/0.5 mL PnIj Inject 10 mg into the skin every 7 days. (Increased dose) 2 mL 2     No current facility-administered medications for this visit.     "

## 2025-03-21 NOTE — PLAN OF CARE
START OFF PATHWAY REGIMEN - Other            Etoposide       Cisplatin     **Always confirm dose/schedule in your pharmacy ordering system**    Patient Characteristics:  Intent of Therapy:  Curative Intent, Discussed with Patient

## 2025-03-21 NOTE — TELEPHONE ENCOUNTER
Care Due:                  Date            Visit Type   Department     Provider  --------------------------------------------------------------------------------                                \[facility]\"" FAMILY  Last Visit: 03-      FOLLOW UP    MEDICINE       Cherie Lion  Next Visit: None Scheduled  None         None Found                                                            Last  Test          Frequency    Reason                     Performed    Due Date  --------------------------------------------------------------------------------    CMP.........  12 months..  allopurinoL,               03- 06-                             atorvastatin, meloxicam..    [facility] Embedded Care Due Messages. Reference number: [id number].   3/21/2025 4:02:23 AM CDT

## 2025-03-21 NOTE — TELEPHONE ENCOUNTER
Refill Routing Note   Medication(s) are not appropriate for processing by [facility] for the following reason(s):        Outside of protocol    [facility] action(s):  Route     Requires labs : Yes             Appointments  past 12m or future 3m with PCP    Date Provider   Last Visit   3/5/2025 Cherie Lion DO   Next Visit   Visit date not found Cherie Lion DO   ED visits in past 90 days: 0        Note composed:10:04 AM 03/21/2025

## 2025-03-22 ENCOUNTER — PATIENT MESSAGE (OUTPATIENT)
Dept: HEMATOLOGY/ONCOLOGY | Facility: CLINIC | Age: 70
End: 2025-03-22
Payer: MEDICARE

## 2025-03-22 ENCOUNTER — PATIENT MESSAGE (OUTPATIENT)
Dept: SLEEP MEDICINE | Facility: CLINIC | Age: 70
End: 2025-03-22
Payer: MEDICARE

## 2025-03-24 ENCOUNTER — TELEPHONE (OUTPATIENT)
Dept: HEMATOLOGY/ONCOLOGY | Facility: CLINIC | Age: 70
End: 2025-03-24
Payer: MEDICARE

## 2025-03-24 ENCOUNTER — DOCUMENTATION ONLY (OUTPATIENT)
Dept: HEMATOLOGY/ONCOLOGY | Facility: CLINIC | Age: 70
End: 2025-03-24
Payer: MEDICARE

## 2025-03-24 ENCOUNTER — PATIENT MESSAGE (OUTPATIENT)
Dept: HEMATOLOGY/ONCOLOGY | Facility: CLINIC | Age: 70
End: 2025-03-24
Payer: MEDICARE

## 2025-03-24 DIAGNOSIS — C67.9 SMALL CELL CARCINOMA OF BLADDER: Primary | ICD-10-CM

## 2025-03-24 RX ORDER — MELOXICAM 15 MG/1
15 TABLET ORAL DAILY PRN
Qty: 30 TABLET | Refills: 0 | Status: SHIPPED | OUTPATIENT
Start: 2025-03-24 | End: 2025-03-25

## 2025-03-24 NOTE — NURSING
Mr. Medel self-scheduled port consult with Dr. Gardner for 3/28.     MARIZA Glez LPN messaged via secure chat. R/s port consult to 3/25 with possible placement on 3/26.     Port placement also scheduled with Dr. Myers on 3/26 via FRANCI Zapata. Mr. Medel states he prefers to see the physician who can place port the soonest. He elected to cancel visit with Dr. Myers.

## 2025-03-24 NOTE — NURSING
Chart reviewed - port placement scheduled in [place] per patient.     Terry Mr. Medel - he states he scheduled port consult via [facility] but would prefer to have port placed here in [place]. [place] visit cancelled.     Tabitha Zapata messaged via secure chat to schedule STAT port consult/placement at [facility].     Scheduled audiogram on Tuesday, 4/1. Discussed purpose of chemo care companion. Discussed f/u visit with Dr. Lowery required prior to chemo start. Awaiting insurance auth at this time. Will aim to start chemo on 4/2 if we can get auth.     Encouraged Mr. Medel to call back with questions/concerns. He v/u.   
no

## 2025-03-25 ENCOUNTER — TELEPHONE (OUTPATIENT)
Dept: HEMATOLOGY/ONCOLOGY | Facility: CLINIC | Age: 70
End: 2025-03-25
Payer: MEDICARE

## 2025-03-25 ENCOUNTER — OFFICE VISIT (OUTPATIENT)
Dept: SURGERY | Facility: CLINIC | Age: 70
End: 2025-03-25
Payer: MEDICARE

## 2025-03-25 VITALS
HEIGHT: 71 IN | WEIGHT: 175.06 LBS | SYSTOLIC BLOOD PRESSURE: 155 MMHG | BODY MASS INDEX: 24.51 KG/M2 | HEART RATE: 76 BPM | DIASTOLIC BLOOD PRESSURE: 85 MMHG | TEMPERATURE: 97 F

## 2025-03-25 DIAGNOSIS — Z45.2 ENCOUNTER FOR INSERTION OF VENOUS ACCESS PORT: Primary | ICD-10-CM

## 2025-03-25 DIAGNOSIS — C67.9 SMALL CELL CARCINOMA OF BLADDER: ICD-10-CM

## 2025-03-25 PROCEDURE — 99999 PR PBB SHADOW E&M-EST. PATIENT-LVL V: CPT | Mod: PBBFAC,,, | Performed by: STUDENT IN AN ORGANIZED HEALTH CARE EDUCATION/TRAINING PROGRAM

## 2025-03-25 PROCEDURE — 99215 OFFICE O/P EST HI 40 MIN: CPT | Mod: PBBFAC,PO | Performed by: STUDENT IN AN ORGANIZED HEALTH CARE EDUCATION/TRAINING PROGRAM

## 2025-03-25 RX ORDER — CEFAZOLIN SODIUM 2 G/50ML
2 SOLUTION INTRAVENOUS
Status: CANCELLED | OUTPATIENT
Start: 2025-03-25

## 2025-03-25 RX ORDER — SODIUM CHLORIDE 9 MG/ML
INJECTION, SOLUTION INTRAVENOUS CONTINUOUS
Status: CANCELLED | OUTPATIENT
Start: 2025-03-25

## 2025-03-25 NOTE — H&P (VIEW-ONLY)
History & Physical    Subjective     History of Present Illness:  Patient is a 69 y.o. male presents with bladder cancer.  He was referred to me for port placement.  Chief Complaint   Patient presents with    Consult     Port placement       Review of patient's allergies indicates:   Allergen Reactions    Avelox [moxifloxacin] Hives and Rash       Current Medications[1]    Past Medical History:   Diagnosis Date    KENTON (acute kidney injury) 02/21/2025    Bladder cancer 2025    Blurred vision 09/08/2020    BPH (benign prostatic hyperplasia)     Cancer     prostate cancer- skin cancer to  face legs    Cataract     done ou    Chronic kidney disease, stage 3 02/25/2015    DDD (degenerative disc disease), lumbar     s/p epidural steroids     Diabetes mellitus     type 2    Elevated PSA     Fall 07/02/2024    Glaucoma     OU    HTN (hypertension)     Iritis, lens-induced 01/08/2018    LUE numbness 09/08/2020    Obesity     Parkinson's disease     Pyelonephritis 02/21/2025    Sepsis 06/09/2023    Sleep apnea     uses cpap    Suspected cerebrovascular accident (CVA) 09/08/2020    Syncope and collapse 07/02/2024     Past Surgical History:   Procedure Laterality Date    CATARACT EXTRACTION W/  INTRAOCULAR LENS IMPLANT Left 11/30/2017    CATARACT EXTRACTION W/  INTRAOCULAR LENS IMPLANT Right 12/11/2018    Dr Garcia    CATARACT EXTRACTION W/  INTRAOCULAR LENS IMPLANT Right 12/11/2018    Procedure: EXTRACTION, CATARACT, WITH IOL INSERTION;  Surgeon: Damaris Garcia MD;  Location: [facility] OR;  Service: Ophthalmology;  Laterality: Right;    COLONOSCOPY N/A 9/18/2017    Procedure: COLONOSCOPY;  Surgeon: Paul Feliz Jr., MD;  Location: [facility] ENDO;  Service: Endoscopy;  Laterality: N/A;    COLONOSCOPY N/A 12/21/2022    Procedure: COLONOSCOPY;  Surgeon: Paul Feliz Jr., MD;  Location: [facility] ENDO;  Service: Endoscopy;  Laterality: N/A;    COLONOSCOPY W/ POLYPECTOMY  04/13/2010    YARELI.   One 1 cm polyp in the sigmoid colon.   Internal hemorrhoids.    COSMETIC SURGERY      DIGITAL RECTAL EXAMINATION UNDER ANESTHESIA N/A 7/23/2019    Procedure: EXAM UNDER ANESTHESIA, DIGITAL, RECTUM;  Surgeon: Kaz Keating MD;  Location: [facility] OR;  Service: General;  Laterality: N/A;    EXAMINATION UNDER ANESTHESIA N/A 4/24/2019    Procedure: EXAM UNDER ANESTHESIA;  Surgeon: Kaz Keating MD;  Location: [facility] OR;  Service: General;  Laterality: N/A;    EXCISIONAL HEMORRHOIDECTOMY N/A 4/24/2019    Procedure: HEMORRHOIDECTOMY (lithotomy);  Surgeon: Kaz Keating MD;  Location: [facility] OR;  Service: General;  Laterality: N/A;    GANGLION CYST EXCISION Left     INJECTION OF ANESTHETIC AGENT AROUND MEDIAL BRANCH NERVES INNERVATING LUMBAR FACET JOINT Bilateral 4/9/2024    Procedure: Block-nerve-medial branch-lumbar-L4/5-L5/S1;  Surgeon: Everett Taveras MD;  Location: [facility] OR;  Service: Pain Management;  Laterality: Bilateral;    INJECTION OF ANESTHETIC AGENT AROUND MEDIAL BRANCH NERVES INNERVATING LUMBAR FACET JOINT Bilateral 5/6/2024    Procedure: Block-nerve-medial branch-lumbar-L4/5-L5/S1;  Surgeon: Everett Taveras MD;  Location: [facility] OR;  Service: Pain Management;  Laterality: Bilateral;    INJECTION OF ANESTHETIC AGENT AROUND PUDENDAL NERVE N/A 4/24/2019    Procedure: BLOCK, NERVE, PUDENDAL;  Surgeon: Kaz Keating MD;  Location: [facility] OR;  Service: General;  Laterality: N/A;    INJECTION OF ANESTHETIC AGENT AROUND PUDENDAL NERVE N/A 7/23/2019    Procedure: BLOCK, NERVE, PUDENDAL;  Surgeon: Kaz Keating MD;  Location: [facility] OR;  Service: General;  Laterality: N/A;    INJECTION, ALLOGRAFT, INTERVERTEBRAL DISC N/A 9/30/2024    Procedure: INJECTION,ALLOGRAFT,INTERVERTEBRAL DISC,1ST LEVEL  L4/5;  Surgeon: Everett Taveras MD;  Location: [facility] OR;  Service: Pain Management;  Laterality: N/A;    INSTILLATION OF URINARY BLADDER  2/11/2025    Procedure: INSTILLATION, BLADDER w/ gemcitabine;  Surgeon: MARIZA Donahue MD;  Location: [facility] OR;   Service: Urology;;    RADIOFREQUENCY ABLATION OF LUMBAR MEDIAL BRANCH NERVE AT SINGLE LEVEL Bilateral 6/4/2024    Procedure: Radiofrequency Ablation, Nerve, Spinal, Lumbar, Medial Branch, L4/5 and L5/S1;  Surgeon: Everett Taveras MD;  Location: [facility];  Service: Pain Management;  Laterality: Bilateral;    REPAIR OF RETINAL DETACHMENT WITH VITRECTOMY Right 8/1/2018    Procedure: REPAIR, RETINAL DETACHMENT, WITH VITRECTOMY;  Surgeon: SAJAN Pulido MD;  Location: [address];  Service: Ophthalmology;  Laterality: Right;  35 min    TONSILLECTOMY      TRANSRECTAL BIOPSY OF PROSTATE WITH ULTRASOUND GUIDANCE N/A 1/31/2023    Procedure: BIOPSY, PROSTATE, RECTAL APPROACH, WITH US GUIDANCE;  Surgeon: MARIZA Donahue MD;  Location: [facility];  Service: Urology;  Laterality: N/A;    TRANSRECTAL BIOPSY OF PROSTATE WITH ULTRASOUND GUIDANCE N/A 4/12/2024    Procedure: BIOPSY, PROSTATE, RECTAL APPROACH, WITH US GUIDANCE;  Surgeon: MARIZA Donahue MD;  Location: [facility];  Service: Urology;  Laterality: N/A;    TRIGGER FINGER RELEASE Right     X 2    TURBT (TRANSURETHRAL RESECTION OF BLADDER TUMOR) N/A 2/11/2025    Procedure: TURBT (TRANSURETHRAL RESECTION OF BLADDER TUMOR);  Surgeon: MARIZA Donahue MD;  Location: [facility];  Service: Urology;  Laterality: N/A;    uro lift      for enlarged prostate     Family History   Problem Relation Name Age of Onset    Colon cancer Mother      Hypertension Mother      Diabetes Mother      Stroke Mother      Cancer Mother          colon    Kidney disease Mother      Heart disease Father      Diabetes Father      Colon cancer Father      Cancer Father          colon    Cataracts Father      No Known Problems Sister      Heart disease Brother  62    Kidney disease Brother      Dementia Brother Rodrigue     Abnormal EKG Brother Rodrigue     Breast cancer Maternal Aunt      Cancer Maternal Aunt          breast    Cancer Maternal Aunt          brain    Brain cancer Maternal Aunt      No  "Known Problems Maternal Uncle      No Known Problems Paternal Aunt      No Known Problems Paternal Uncle      Glaucoma Maternal Grandmother      No Known Problems Maternal Grandfather      No Known Problems Paternal Grandmother      No Known Problems Paternal Grandfather      Amblyopia Neg Hx      Blindness Neg Hx      Macular degeneration Neg Hx      Retinal detachment Neg Hx      Strabismus Neg Hx      Thyroid disease Neg Hx      Parkinsonism Neg Hx       Social History[2]     Review of Systems:  Review of Systems   Constitutional: Negative.  Negative for fatigue and fever.   HENT: Negative.     Eyes: Negative.    Respiratory: Negative.  Negative for shortness of breath.    Cardiovascular: Negative.  Negative for chest pain.   Gastrointestinal: Negative.    Endocrine: Negative.    Genitourinary: Negative.    Musculoskeletal: Negative.    Skin: Negative.    Allergic/Immunologic: Negative.    Neurological: Negative.    Hematological: Negative.    Psychiatric/Behavioral: Negative.            Objective     Vital Signs (Most Recent)  Temp: 97 °F (36.1 °C) (03/25/25 1008)  Pulse: 76 (03/25/25 1008)  BP: (!) 155/85 (03/25/25 1008)  5' 11" (1.803 m)  79.4 kg (175 lb 0.7 oz)     Physical Exam:  Physical Exam  Constitutional:       General: He is not in acute distress.     Appearance: Normal appearance. He is not ill-appearing, toxic-appearing or diaphoretic.   HENT:      Head: Normocephalic.      Nose: Nose normal.   Eyes:      Conjunctiva/sclera: Conjunctivae normal.   Cardiovascular:      Rate and Rhythm: Normal rate and regular rhythm.   Pulmonary:      Effort: Pulmonary effort is normal.   Abdominal:      Palpations: Abdomen is soft.   Musculoskeletal:         General: Normal range of motion.      Cervical back: Normal range of motion.   Skin:     General: Skin is warm.   Neurological:      General: No focal deficit present.      Mental Status: He is alert.   Psychiatric:         Mood and Affect: Mood normal. "              Assessment and Plan   69-year-old male with bladder cancer    PLAN:  Risks versus benefits of port placement were discussed.  Tentative right IJ approach.  Consent was obtained for the planned procedure.  Surgery was scheduled for this Friday.                [1]   Current Outpatient Medications   Medication Sig Dispense Refill    allopurinoL (ZYLOPRIM) 300 MG tablet Take 1 tablet (300 mg total) by mouth once daily. 90 tablet 0    ammonium lactate (LAC-HYDRIN) 12 % lotion Apply topically 2 (two) times daily.      atorvastatin (LIPITOR) 20 MG tablet Take 1 tablet (20 mg total) by mouth every evening. 90 tablet 3    BACILLUS COAGULANS ORAL Take 1 tablet by mouth once daily.      brimonidine 0.2% (ALPHAGAN) 0.2 % Drop INSTILL 1 DROP IN BOTH EYES TWICE DAILY 10 mL 0    carbidopa-levodopa  mg (SINEMET)  mg per tablet Take 1.5 tablets by mouth 5 (five) times daily. 675 tablet 3    cholecalciferol, vitamin D3, (D3-2000 ORAL) Take by mouth once daily.      clonazePAM (KLONOPIN) 1 MG tablet Take 1 tablet (1 mg total) by mouth every evening. 60 tablet 2    cyanocobalamin (VITAMIN B-12) 1000 MCG tablet Take 100 mcg by mouth once daily.      donepeziL (ARICEPT) 5 MG tablet Take 1 tablet (5 mg total) by mouth every evening. 30 tablet 11    dorzolamide (TRUSOPT) 2 % ophthalmic solution Place 1 drop into both eyes 2 (two) times daily. 10 mL 3    DULoxetine (CYMBALTA) 30 MG capsule Take 1 capsule (30 mg total) by mouth once daily. 90 capsule 1    gabapentin (NEURONTIN) 300 MG capsule Take 1 capsule (300 mg total) by mouth 2 (two) times daily. 60 capsule 1    insulin glargine U-100, Lantus, (LANTUS SOLOSTAR U-100 INSULIN) 100 unit/mL (3 mL) InPn pen Inject 7 Units into the skin once daily.      ketoconazole (NIZORAL) 2 % shampoo Apply topically.      metFORMIN (GLUCOPHAGE-XR) 500 MG ER 24hr tablet Take 2 tablets (1,000 mg total) by mouth once daily. 180 tablet 3    prazosin (MINIPRESS) 1 MG Cap Take 1 capsule  "(1 mg total) by mouth every evening. 90 capsule 3    ramelteon (ROZEREM) 8 mg tablet Take 1 tablet (8 mg total) by mouth nightly. 90 tablet 3    rasagiline (AZILECT) 1 mg Tab TAKE 1 TABLET(1 MG) BY MOUTH EVERY EVENING 30 tablet 11    tirzepatide 10 mg/0.5 mL PnIj Inject 10 mg into the skin every 7 days. (Increased dose) 2 mL 2    blood-glucose meter,continuous Misc 1 each by Misc.(Non-Drug; Combo Route) route 4 (four) times daily. (Patient not taking: Reported on 3/25/2025) 1 each 0    pen needle, diabetic (BD HIMANSHU 2ND GEN PEN NEEDLE) 32 gauge x 5/32" Ndle Inject 1 each into the skin once daily. (Patient not taking: Reported on 3/25/2025) 100 each 3     No current facility-administered medications for this visit.   [2]   Social History  Tobacco Use    Smoking status: Never    Smokeless tobacco: Never   Substance Use Topics    Alcohol use: Yes     Comment: very rare    Drug use: No     "

## 2025-03-25 NOTE — NURSING
Called Mr. Medel to review upcoming appointments. Port placement scheduled with Dr. Gardner on 3/28. Mr. Medel is aware that surgery center will call him with arrival time on Thursday. He confirms he has hibiclens to bathe with prior to this procedure.     Chemo school, audiogram r/s to 4/3. Reviewed date/time/location of these new appointments.     Scheduled chemo start on Monday, 4/7. Mr. Medel and I discussed the anticipated duration of chemo school and what will be discussed at this visit.     We reviewed the overall schedule of chemo (3 week cycles, chemo 1st 3 days of each cycle). We discussed the usual process of labs/clearance appointment/chemo.     Mr. Medel confirmed dates of all appointments. He was instructed to call back with any conflicts. He v/u and thanked me for my call.     Juan Antonio tool udpated.     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 Scheduled Date (days): 0  Multiple appointments: No  Reason if booked > 7 days after scheduling: Specific provider / access; Additional tests/procedures  Start of Treatment: 04/07/25  Diagnosis to Treat Timeline (days): 47 days  Reason for Treatment Delay: Further work-up     Treatment  Current Status: Active  Date Presented to Tumor Board: 03/13/25    Surgery: Planned  Surgical Oncologist: Dr. Hebert Zapata  Type of Surgery: Cystoprostatectomy  Consult Date: 03/13/25    Medical Oncologist: Dr. Moris Lowery  Consult Date: 03/21/25  Chemotherapy: Planned  Chemotherapy Regimen: Cisplatin etoposide Q3W    Radiation Oncologist: Dr. Britney Christianson    Procedures: Port / PICC  Biopsy Schedule Date: 02/12/25  CT Schedule Date: 02/23/25  MRI  "Schedule Date: 24  Port / PICC Schedule Date: 25 (port)  Ultrasound Schedule Date: 24    General Referrals: Social Work; Dietitian  Dietitian Referral Date: 25  Social Work Referral Date: 25    Radiation Oncologist: Dr. 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     Acuity  Stage: 1  Systemic Treatment - predicted or initiated: Chemotherapy Regimen with Multiple drugs (+1)  Surgical Procedure Complexity: 2  Treatment Tolerability: Has not started treatment yet/treatment fully completed and side effects resolved  ECO  Comorbidities in Medical History: 2  Hospitalization Within the Past Month: 1   Needed: 0  Support: 0  Verbalizes Financial Concerns: 0  Transportation: 0  History of noncompliance/frequent no shows and cancellations: 0  Verbalizes the need for more education: 1  Navigation Acuity: 8     Follow Up  Follow up for oncology navigation needs.       "

## 2025-03-25 NOTE — PROGRESS NOTES
History & Physical    Subjective     History of Present Illness:  Patient is a 69 y.o. male presents with bladder cancer.  He was referred to me for port placement.  Chief Complaint   Patient presents with    Consult     Port placement       Review of patient's allergies indicates:   Allergen Reactions    Avelox [moxifloxacin] Hives and Rash       Current Medications[1]    Past Medical History:   Diagnosis Date    KENTON (acute kidney injury) 02/21/2025    Bladder cancer 2025    Blurred vision 09/08/2020    BPH (benign prostatic hyperplasia)     Cancer     prostate cancer- skin cancer to  face legs    Cataract     done ou    Chronic kidney disease, stage 3 02/25/2015    DDD (degenerative disc disease), lumbar     s/p epidural steroids     Diabetes mellitus     type 2    Elevated PSA     Fall 07/02/2024    Glaucoma     OU    HTN (hypertension)     Iritis, lens-induced 01/08/2018    LUE numbness 09/08/2020    Obesity     Parkinson's disease     Pyelonephritis 02/21/2025    Sepsis 06/09/2023    Sleep apnea     uses cpap    Suspected cerebrovascular accident (CVA) 09/08/2020    Syncope and collapse 07/02/2024     Past Surgical History:   Procedure Laterality Date    CATARACT EXTRACTION W/  INTRAOCULAR LENS IMPLANT Left 11/30/2017    CATARACT EXTRACTION W/  INTRAOCULAR LENS IMPLANT Right 12/11/2018    Dr Garcia    CATARACT EXTRACTION W/  INTRAOCULAR LENS IMPLANT Right 12/11/2018    Procedure: EXTRACTION, CATARACT, WITH IOL INSERTION;  Surgeon: Damaris Garcia MD;  Location: [facility] OR;  Service: Ophthalmology;  Laterality: Right;    COLONOSCOPY N/A 9/18/2017    Procedure: COLONOSCOPY;  Surgeon: Paul Feliz Jr., MD;  Location: [facility] ENDO;  Service: Endoscopy;  Laterality: N/A;    COLONOSCOPY N/A 12/21/2022    Procedure: COLONOSCOPY;  Surgeon: Paul Feliz Jr., MD;  Location: [facility] ENDO;  Service: Endoscopy;  Laterality: N/A;    COLONOSCOPY W/ POLYPECTOMY  04/13/2010    YARELI.   One 1 cm polyp in the sigmoid colon.   Internal hemorrhoids.    COSMETIC SURGERY      DIGITAL RECTAL EXAMINATION UNDER ANESTHESIA N/A 7/23/2019    Procedure: EXAM UNDER ANESTHESIA, DIGITAL, RECTUM;  Surgeon: Kaz Keating MD;  Location: [facility] OR;  Service: General;  Laterality: N/A;    EXAMINATION UNDER ANESTHESIA N/A 4/24/2019    Procedure: EXAM UNDER ANESTHESIA;  Surgeon: Kaz Keating MD;  Location: [facility] OR;  Service: General;  Laterality: N/A;    EXCISIONAL HEMORRHOIDECTOMY N/A 4/24/2019    Procedure: HEMORRHOIDECTOMY (lithotomy);  Surgeon: Kaz Keaitng MD;  Location: [facility] OR;  Service: General;  Laterality: N/A;    GANGLION CYST EXCISION Left     INJECTION OF ANESTHETIC AGENT AROUND MEDIAL BRANCH NERVES INNERVATING LUMBAR FACET JOINT Bilateral 4/9/2024    Procedure: Block-nerve-medial branch-lumbar-L4/5-L5/S1;  Surgeon: Everett Taveras MD;  Location: [facility] OR;  Service: Pain Management;  Laterality: Bilateral;    INJECTION OF ANESTHETIC AGENT AROUND MEDIAL BRANCH NERVES INNERVATING LUMBAR FACET JOINT Bilateral 5/6/2024    Procedure: Block-nerve-medial branch-lumbar-L4/5-L5/S1;  Surgeon: Everett Taveras MD;  Location: [facility] OR;  Service: Pain Management;  Laterality: Bilateral;    INJECTION OF ANESTHETIC AGENT AROUND PUDENDAL NERVE N/A 4/24/2019    Procedure: BLOCK, NERVE, PUDENDAL;  Surgeon: Kaz Keating MD;  Location: [facility] OR;  Service: General;  Laterality: N/A;    INJECTION OF ANESTHETIC AGENT AROUND PUDENDAL NERVE N/A 7/23/2019    Procedure: BLOCK, NERVE, PUDENDAL;  Surgeon: Kaz Keating MD;  Location: [facility] OR;  Service: General;  Laterality: N/A;    INJECTION, ALLOGRAFT, INTERVERTEBRAL DISC N/A 9/30/2024    Procedure: INJECTION,ALLOGRAFT,INTERVERTEBRAL DISC,1ST LEVEL  L4/5;  Surgeon: Everett Taveras MD;  Location: [facility] OR;  Service: Pain Management;  Laterality: N/A;    INSTILLATION OF URINARY BLADDER  2/11/2025    Procedure: INSTILLATION, BLADDER w/ gemcitabine;  Surgeon: MARIZA Donahue MD;  Location: [facility] OR;   Service: Urology;;    RADIOFREQUENCY ABLATION OF LUMBAR MEDIAL BRANCH NERVE AT SINGLE LEVEL Bilateral 6/4/2024    Procedure: Radiofrequency Ablation, Nerve, Spinal, Lumbar, Medial Branch, L4/5 and L5/S1;  Surgeon: Everett Taveras MD;  Location: [facility];  Service: Pain Management;  Laterality: Bilateral;    REPAIR OF RETINAL DETACHMENT WITH VITRECTOMY Right 8/1/2018    Procedure: REPAIR, RETINAL DETACHMENT, WITH VITRECTOMY;  Surgeon: SAJAN Pulido MD;  Location: [address];  Service: Ophthalmology;  Laterality: Right;  35 min    TONSILLECTOMY      TRANSRECTAL BIOPSY OF PROSTATE WITH ULTRASOUND GUIDANCE N/A 1/31/2023    Procedure: BIOPSY, PROSTATE, RECTAL APPROACH, WITH US GUIDANCE;  Surgeon: MARIZA Donahue MD;  Location: [facility];  Service: Urology;  Laterality: N/A;    TRANSRECTAL BIOPSY OF PROSTATE WITH ULTRASOUND GUIDANCE N/A 4/12/2024    Procedure: BIOPSY, PROSTATE, RECTAL APPROACH, WITH US GUIDANCE;  Surgeon: MARIZA Donahue MD;  Location: [facility];  Service: Urology;  Laterality: N/A;    TRIGGER FINGER RELEASE Right     X 2    TURBT (TRANSURETHRAL RESECTION OF BLADDER TUMOR) N/A 2/11/2025    Procedure: TURBT (TRANSURETHRAL RESECTION OF BLADDER TUMOR);  Surgeon: MARIZA Donahue MD;  Location: [facility];  Service: Urology;  Laterality: N/A;    uro lift      for enlarged prostate     Family History   Problem Relation Name Age of Onset    Colon cancer Mother      Hypertension Mother      Diabetes Mother      Stroke Mother      Cancer Mother          colon    Kidney disease Mother      Heart disease Father      Diabetes Father      Colon cancer Father      Cancer Father          colon    Cataracts Father      No Known Problems Sister      Heart disease Brother  62    Kidney disease Brother      Dementia Brother Rodrigue     Abnormal EKG Brother Rodrigue     Breast cancer Maternal Aunt      Cancer Maternal Aunt          breast    Cancer Maternal Aunt          brain    Brain cancer Maternal Aunt      No  "Known Problems Maternal Uncle      No Known Problems Paternal Aunt      No Known Problems Paternal Uncle      Glaucoma Maternal Grandmother      No Known Problems Maternal Grandfather      No Known Problems Paternal Grandmother      No Known Problems Paternal Grandfather      Amblyopia Neg Hx      Blindness Neg Hx      Macular degeneration Neg Hx      Retinal detachment Neg Hx      Strabismus Neg Hx      Thyroid disease Neg Hx      Parkinsonism Neg Hx       Social History[2]     Review of Systems:  Review of Systems   Constitutional: Negative.  Negative for fatigue and fever.   HENT: Negative.     Eyes: Negative.    Respiratory: Negative.  Negative for shortness of breath.    Cardiovascular: Negative.  Negative for chest pain.   Gastrointestinal: Negative.    Endocrine: Negative.    Genitourinary: Negative.    Musculoskeletal: Negative.    Skin: Negative.    Allergic/Immunologic: Negative.    Neurological: Negative.    Hematological: Negative.    Psychiatric/Behavioral: Negative.            Objective     Vital Signs (Most Recent)  Temp: 97 °F (36.1 °C) (03/25/25 1008)  Pulse: 76 (03/25/25 1008)  BP: (!) 155/85 (03/25/25 1008)  5' 11" (1.803 m)  79.4 kg (175 lb 0.7 oz)     Physical Exam:  Physical Exam  Constitutional:       General: He is not in acute distress.     Appearance: Normal appearance. He is not ill-appearing, toxic-appearing or diaphoretic.   HENT:      Head: Normocephalic.      Nose: Nose normal.   Eyes:      Conjunctiva/sclera: Conjunctivae normal.   Cardiovascular:      Rate and Rhythm: Normal rate and regular rhythm.   Pulmonary:      Effort: Pulmonary effort is normal.   Abdominal:      Palpations: Abdomen is soft.   Musculoskeletal:         General: Normal range of motion.      Cervical back: Normal range of motion.   Skin:     General: Skin is warm.   Neurological:      General: No focal deficit present.      Mental Status: He is alert.   Psychiatric:         Mood and Affect: Mood normal. "              Assessment and Plan   69-year-old male with bladder cancer    PLAN:  Risks versus benefits of port placement were discussed.  Tentative right IJ approach.  Consent was obtained for the planned procedure.  Surgery was scheduled for this Friday.                [1]   Current Outpatient Medications   Medication Sig Dispense Refill    allopurinoL (ZYLOPRIM) 300 MG tablet Take 1 tablet (300 mg total) by mouth once daily. 90 tablet 0    ammonium lactate (LAC-HYDRIN) 12 % lotion Apply topically 2 (two) times daily.      atorvastatin (LIPITOR) 20 MG tablet Take 1 tablet (20 mg total) by mouth every evening. 90 tablet 3    BACILLUS COAGULANS ORAL Take 1 tablet by mouth once daily.      brimonidine 0.2% (ALPHAGAN) 0.2 % Drop INSTILL 1 DROP IN BOTH EYES TWICE DAILY 10 mL 0    carbidopa-levodopa  mg (SINEMET)  mg per tablet Take 1.5 tablets by mouth 5 (five) times daily. 675 tablet 3    cholecalciferol, vitamin D3, (D3-2000 ORAL) Take by mouth once daily.      clonazePAM (KLONOPIN) 1 MG tablet Take 1 tablet (1 mg total) by mouth every evening. 60 tablet 2    cyanocobalamin (VITAMIN B-12) 1000 MCG tablet Take 100 mcg by mouth once daily.      donepeziL (ARICEPT) 5 MG tablet Take 1 tablet (5 mg total) by mouth every evening. 30 tablet 11    dorzolamide (TRUSOPT) 2 % ophthalmic solution Place 1 drop into both eyes 2 (two) times daily. 10 mL 3    DULoxetine (CYMBALTA) 30 MG capsule Take 1 capsule (30 mg total) by mouth once daily. 90 capsule 1    gabapentin (NEURONTIN) 300 MG capsule Take 1 capsule (300 mg total) by mouth 2 (two) times daily. 60 capsule 1    insulin glargine U-100, Lantus, (LANTUS SOLOSTAR U-100 INSULIN) 100 unit/mL (3 mL) InPn pen Inject 7 Units into the skin once daily.      ketoconazole (NIZORAL) 2 % shampoo Apply topically.      metFORMIN (GLUCOPHAGE-XR) 500 MG ER 24hr tablet Take 2 tablets (1,000 mg total) by mouth once daily. 180 tablet 3    prazosin (MINIPRESS) 1 MG Cap Take 1 capsule  "(1 mg total) by mouth every evening. 90 capsule 3    ramelteon (ROZEREM) 8 mg tablet Take 1 tablet (8 mg total) by mouth nightly. 90 tablet 3    rasagiline (AZILECT) 1 mg Tab TAKE 1 TABLET(1 MG) BY MOUTH EVERY EVENING 30 tablet 11    tirzepatide 10 mg/0.5 mL PnIj Inject 10 mg into the skin every 7 days. (Increased dose) 2 mL 2    blood-glucose meter,continuous Misc 1 each by Misc.(Non-Drug; Combo Route) route 4 (four) times daily. (Patient not taking: Reported on 3/25/2025) 1 each 0    pen needle, diabetic (BD HIMANSHU 2ND GEN PEN NEEDLE) 32 gauge x 5/32" Ndle Inject 1 each into the skin once daily. (Patient not taking: Reported on 3/25/2025) 100 each 3     No current facility-administered medications for this visit.   [2]   Social History  Tobacco Use    Smoking status: Never    Smokeless tobacco: Never   Substance Use Topics    Alcohol use: Yes     Comment: very rare    Drug use: No     "

## 2025-03-27 ENCOUNTER — ANESTHESIA EVENT (OUTPATIENT)
Dept: SURGERY | Facility: HOSPITAL | Age: 70
End: 2025-03-27
Payer: MEDICARE

## 2025-03-28 ENCOUNTER — OUTPATIENT CASE MANAGEMENT (OUTPATIENT)
Dept: ADMINISTRATIVE | Facility: OTHER | Age: 70
End: 2025-03-28
Payer: MEDICARE

## 2025-03-28 ENCOUNTER — ANESTHESIA (OUTPATIENT)
Dept: SURGERY | Facility: HOSPITAL | Age: 70
End: 2025-03-28
Payer: MEDICARE

## 2025-03-28 ENCOUNTER — NURSE TRIAGE (OUTPATIENT)
Dept: ADMINISTRATIVE | Facility: CLINIC | Age: 70
End: 2025-03-28
Payer: MEDICARE

## 2025-03-28 ENCOUNTER — HOSPITAL ENCOUNTER (OUTPATIENT)
Facility: HOSPITAL | Age: 70
Discharge: HOME | End: 2025-03-28
Attending: STUDENT IN AN ORGANIZED HEALTH CARE EDUCATION/TRAINING PROGRAM | Admitting: STUDENT IN AN ORGANIZED HEALTH CARE EDUCATION/TRAINING PROGRAM
Payer: MEDICARE

## 2025-03-28 VITALS
TEMPERATURE: 98 F | SYSTOLIC BLOOD PRESSURE: 158 MMHG | OXYGEN SATURATION: 99 % | HEART RATE: 72 BPM | DIASTOLIC BLOOD PRESSURE: 86 MMHG | RESPIRATION RATE: 16 BRPM

## 2025-03-28 DIAGNOSIS — Z45.2 ENCOUNTER FOR INSERTION OF VENOUS ACCESS PORT: ICD-10-CM

## 2025-03-28 DIAGNOSIS — C67.9 MALIGNANT NEOPLASM OF URINARY BLADDER, UNSPECIFIED SITE: Primary | ICD-10-CM

## 2025-03-28 DIAGNOSIS — C67.9 SMALL CELL CARCINOMA OF BLADDER: ICD-10-CM

## 2025-03-28 PROCEDURE — C1788 PORT, INDWELLING, IMP: HCPCS | Performed by: STUDENT IN AN ORGANIZED HEALTH CARE EDUCATION/TRAINING PROGRAM

## 2025-03-28 PROCEDURE — 36000706: Performed by: STUDENT IN AN ORGANIZED HEALTH CARE EDUCATION/TRAINING PROGRAM

## 2025-03-28 PROCEDURE — 37000008 HC ANESTHESIA 1ST 15 MINUTES: Performed by: STUDENT IN AN ORGANIZED HEALTH CARE EDUCATION/TRAINING PROGRAM

## 2025-03-28 PROCEDURE — 36000707: Performed by: STUDENT IN AN ORGANIZED HEALTH CARE EDUCATION/TRAINING PROGRAM

## 2025-03-28 PROCEDURE — 63600175 PHARM REV CODE 636 W HCPCS: Performed by: STUDENT IN AN ORGANIZED HEALTH CARE EDUCATION/TRAINING PROGRAM

## 2025-03-28 PROCEDURE — 77001 FLUOROGUIDE FOR VEIN DEVICE: CPT | Mod: 26,,, | Performed by: STUDENT IN AN ORGANIZED HEALTH CARE EDUCATION/TRAINING PROGRAM

## 2025-03-28 PROCEDURE — 71000015 HC POSTOP RECOV 1ST HR: Performed by: STUDENT IN AN ORGANIZED HEALTH CARE EDUCATION/TRAINING PROGRAM

## 2025-03-28 PROCEDURE — 63600175 PHARM REV CODE 636 W HCPCS: Performed by: NURSE ANESTHETIST, CERTIFIED REGISTERED

## 2025-03-28 PROCEDURE — 82962 GLUCOSE BLOOD TEST: CPT | Performed by: STUDENT IN AN ORGANIZED HEALTH CARE EDUCATION/TRAINING PROGRAM

## 2025-03-28 PROCEDURE — 25000003 PHARM REV CODE 250: Performed by: STUDENT IN AN ORGANIZED HEALTH CARE EDUCATION/TRAINING PROGRAM

## 2025-03-28 PROCEDURE — 37000009 HC ANESTHESIA EA ADD 15 MINS: Performed by: STUDENT IN AN ORGANIZED HEALTH CARE EDUCATION/TRAINING PROGRAM

## 2025-03-28 PROCEDURE — 36561 INSERT TUNNELED CV CATH: CPT | Mod: RT,,, | Performed by: STUDENT IN AN ORGANIZED HEALTH CARE EDUCATION/TRAINING PROGRAM

## 2025-03-28 DEVICE — KIT POWERPORT SINGLE 8FR: Type: IMPLANTABLE DEVICE | Site: CHEST | Status: FUNCTIONAL

## 2025-03-28 RX ORDER — SODIUM CHLORIDE 9 MG/ML
INJECTION, SOLUTION INTRAVENOUS CONTINUOUS
Status: DISCONTINUED | OUTPATIENT
Start: 2025-03-28 | End: 2025-03-28 | Stop reason: HOSPADM

## 2025-03-28 RX ORDER — PROPOFOL 10 MG/ML
VIAL (ML) INTRAVENOUS
Status: DISCONTINUED | OUTPATIENT
Start: 2025-03-28 | End: 2025-03-28

## 2025-03-28 RX ORDER — CEFAZOLIN 2 G/1
2 INJECTION, POWDER, FOR SOLUTION INTRAMUSCULAR; INTRAVENOUS
Status: DISCONTINUED | OUTPATIENT
Start: 2025-03-28 | End: 2025-03-28 | Stop reason: HOSPADM

## 2025-03-28 RX ORDER — BUPIVACAINE HYDROCHLORIDE AND EPINEPHRINE 2.5; 5 MG/ML; UG/ML
INJECTION, SOLUTION EPIDURAL; INFILTRATION; INTRACAUDAL; PERINEURAL
Status: DISCONTINUED | OUTPATIENT
Start: 2025-03-28 | End: 2025-03-28 | Stop reason: HOSPADM

## 2025-03-28 RX ORDER — OXYCODONE HYDROCHLORIDE 5 MG/1
5 TABLET ORAL EVERY 4 HOURS PRN
Refills: 0 | Status: DISCONTINUED | OUTPATIENT
Start: 2025-03-28 | End: 2025-03-28

## 2025-03-28 RX ORDER — OXYCODONE HYDROCHLORIDE 5 MG/1
5 TABLET ORAL ONCE AS NEEDED
Refills: 0 | Status: DISCONTINUED | OUTPATIENT
Start: 2025-03-28 | End: 2025-03-28 | Stop reason: HOSPADM

## 2025-03-28 RX ORDER — HEPARIN SODIUM 1000 [USP'U]/ML
INJECTION, SOLUTION INTRAVENOUS; SUBCUTANEOUS
Status: DISCONTINUED | OUTPATIENT
Start: 2025-03-28 | End: 2025-03-28 | Stop reason: HOSPADM

## 2025-03-28 RX ADMIN — PROPOFOL 50 MG: 10 INJECTION, EMULSION INTRAVENOUS at 09:03

## 2025-03-28 RX ADMIN — SODIUM CHLORIDE, SODIUM LACTATE, POTASSIUM CHLORIDE, AND CALCIUM CHLORIDE: .6; .31; .03; .02 INJECTION, SOLUTION INTRAVENOUS at 08:03

## 2025-03-28 RX ADMIN — CEFAZOLIN 2 G: 2 INJECTION, POWDER, FOR SOLUTION INTRAMUSCULAR; INTRAVENOUS at 08:03

## 2025-03-28 NOTE — DISCHARGE SUMMARY
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    DISPOSITION: Home or Self Care    FINAL DIAGNOSIS:  Malignant neoplasm of urinary bladder    FOLLOWUP: In clinic    DISCHARGE INSTRUCTIONS:    Discharge Procedure Orders   Diet Adult Regular     Notify your health care provider if you experience any of the following:  redness, tenderness, or signs of infection (pain, swelling, redness, odor or green/yellow discharge around incision site)     Notify your health care provider if you experience any of the following:  severe uncontrolled pain     Notify your health care provider if you experience any of the following:  temperature >100.4     No dressing needed   Order Comments: Okay to shower.  No swimming or soaking for 3 weeks.     Activity as tolerated

## 2025-03-28 NOTE — PROGRESS NOTES
3/28/2025  Mr. Elizabeth is currently in surgery to have a port-a-cath inserted. Will follow up with him on Monday when he is discharged and more recovered.

## 2025-03-28 NOTE — ANESTHESIA POSTPROCEDURE EVALUATION
Anesthesia Post Evaluation    Patient: Glenn HERNANDEZ Medel    Procedure(s) Performed: Procedure(s) (LRB):  [id number] (Right)    Final Anesthesia Type: general      Patient location: ASU.  Patient participation: Yes- Able to Participate  Level of consciousness: awake and alert  Post-procedure vital signs: reviewed and stable  Pain management: adequate  Airway patency: patent    PONV status at discharge: No PONV  Anesthetic complications: no      Cardiovascular status: blood pressure returned to baseline and stable  Respiratory status: unassisted and room air  Hydration status: euvolemic  Follow-up not needed.  Comments: Vital signs stable.  Chest x-ray reviewed.  No pneumothorax.                    No case tracking events are documented in the log.      Pain/Ezequiel Score: No data recorded

## 2025-03-28 NOTE — TRANSFER OF CARE
Anesthesia Transfer of Care Note    Patient:  MARY Medel    Procedure(s) Performed: Procedure(s) (LRB):  [id number] (Right)    Patient location: [facility]    Anesthesia Type: general    Transport from OR: Transported from OR on room air with adequate spontaneous ventilation    Post pain: adequate analgesia    Post assessment: no apparent anesthetic complications and tolerated procedure well    Post vital signs: stable    Level of consciousness: awake, alert and oriented    Nausea/Vomiting: no nausea/vomiting    Complications: none    Transfer of care protocol was followed      Last vitals: Visit Vitals  BP (!) 159/94 (BP Location: Right arm, Patient Position: Lying)   Pulse 72   Temp 36.5 °C (97.7 °F) (Oral)   Resp 16   SpO2 97%

## 2025-03-28 NOTE — ANESTHESIA PREPROCEDURE EVALUATION
03/28/2025  Glenn Medel is a 69 y.o., male.    Problem List[1]    Past Surgical History:   Procedure Laterality Date    CATARACT EXTRACTION W/  INTRAOCULAR LENS IMPLANT Left 11/30/2017    CATARACT EXTRACTION W/  INTRAOCULAR LENS IMPLANT Right 12/11/2018    Dr Garcia    CATARACT EXTRACTION W/  INTRAOCULAR LENS IMPLANT Right 12/11/2018    Procedure: EXTRACTION, CATARACT, WITH IOL INSERTION;  Surgeon: Damaris Garcia MD;  Location: [facility] OR;  Service: Ophthalmology;  Laterality: Right;    COLONOSCOPY N/A 09/18/2017    Procedure: COLONOSCOPY;  Surgeon: Paul Feliz Jr., MD;  Location: [facility] ENDO;  Service: Endoscopy;  Laterality: N/A;    COLONOSCOPY N/A 12/21/2022    Procedure: COLONOSCOPY;  Surgeon: Paul Feliz Jr., MD;  Location: [facility] ENDO;  Service: Endoscopy;  Laterality: N/A;    COLONOSCOPY W/ POLYPECTOMY  04/13/2010    YARELI.   One 1 cm polyp in the sigmoid colon.  Internal hemorrhoids.    COSMETIC SURGERY      DIGITAL RECTAL EXAMINATION UNDER ANESTHESIA N/A 07/23/2019    Procedure: EXAM UNDER ANESTHESIA, DIGITAL, RECTUM;  Surgeon: Kaz Keating MD;  Location: [facility] OR;  Service: General;  Laterality: N/A;    EXAMINATION UNDER ANESTHESIA N/A 04/24/2019    Procedure: EXAM UNDER ANESTHESIA;  Surgeon: Kaz Keating MD;  Location: [facility] OR;  Service: General;  Laterality: N/A;    EXCISIONAL HEMORRHOIDECTOMY N/A 04/24/2019    Procedure: HEMORRHOIDECTOMY (lithotomy);  Surgeon: Kaz Keating MD;  Location: [facility] OR;  Service: General;  Laterality: N/A;    EYE SURGERY      GANGLION CYST EXCISION Left     INJECTION OF ANESTHETIC AGENT AROUND MEDIAL BRANCH NERVES INNERVATING LUMBAR FACET JOINT Bilateral 04/09/2024    Procedure: Block-nerve-medial branch-lumbar-L4/5-L5/S1;  Surgeon: Everett Taveras MD;  Location: [facility] OR;  Service: Pain Management;  Laterality: Bilateral;    INJECTION OF  ANESTHETIC AGENT AROUND MEDIAL BRANCH NERVES INNERVATING LUMBAR FACET JOINT Bilateral 05/06/2024    Procedure: Block-nerve-medial branch-lumbar-L4/5-L5/S1;  Surgeon: Everett Taveras MD;  Location: [facility] OR;  Service: Pain Management;  Laterality: Bilateral;    INJECTION OF ANESTHETIC AGENT AROUND PUDENDAL NERVE N/A 04/24/2019    Procedure: BLOCK, NERVE, PUDENDAL;  Surgeon: Kaz Keating MD;  Location: [facility] OR;  Service: General;  Laterality: N/A;    INJECTION OF ANESTHETIC AGENT AROUND PUDENDAL NERVE N/A 07/23/2019    Procedure: BLOCK, NERVE, PUDENDAL;  Surgeon: Kaz Keating MD;  Location: [facility] OR;  Service: General;  Laterality: N/A;    INJECTION, ALLOGRAFT, INTERVERTEBRAL DISC N/A 09/30/2024    Procedure: INJECTION,ALLOGRAFT,INTERVERTEBRAL DISC,1ST LEVEL  L4/5;  Surgeon: Everett Taveras MD;  Location: [facility] OR;  Service: Pain Management;  Laterality: N/A;    INSTILLATION OF URINARY BLADDER  02/11/2025    Procedure: INSTILLATION, BLADDER w/ gemcitabine;  Surgeon: MARIZA Donahue MD;  Location: [facility] OR;  Service: Urology;;    RADIOFREQUENCY ABLATION OF LUMBAR MEDIAL BRANCH NERVE AT SINGLE LEVEL Bilateral 06/04/2024    Procedure: Radiofrequency Ablation, Nerve, Spinal, Lumbar, Medial Branch, L4/5 and L5/S1;  Surgeon: Everett Taveras MD;  Location: [facility] OR;  Service: Pain Management;  Laterality: Bilateral;    REPAIR OF RETINAL DETACHMENT WITH VITRECTOMY Right 08/01/2018    Procedure: REPAIR, RETINAL DETACHMENT, WITH VITRECTOMY;  Surgeon: SAJAN Pulido MD;  Location: [address];  Service: Ophthalmology;  Laterality: Right;  35 min    SKIN BIOPSY      TONSILLECTOMY      TRANSRECTAL BIOPSY OF PROSTATE WITH ULTRASOUND GUIDANCE N/A 01/31/2023    Procedure: BIOPSY, PROSTATE, RECTAL APPROACH, WITH US GUIDANCE;  Surgeon: MARIZA Donahue MD;  Location: [facility] OR;  Service: Urology;  Laterality: N/A;    TRANSRECTAL BIOPSY OF PROSTATE WITH ULTRASOUND GUIDANCE N/A 04/12/2024    Procedure: BIOPSY,  PROSTATE, RECTAL APPROACH, WITH US GUIDANCE;  Surgeon: MARIZA Donahue MD;  Location: [facility];  Service: Urology;  Laterality: N/A;    TRIGGER FINGER RELEASE Right     X 2    TURBT (TRANSURETHRAL RESECTION OF BLADDER TUMOR) N/A 02/11/2025    Procedure: TURBT (TRANSURETHRAL RESECTION OF BLADDER TUMOR);  Surgeon: MARIZA Donahue MD;  Location: [facility] OR;  Service: Urology;  Laterality: N/A;    uro lift      for enlarged prostate        Tobacco Use:  The patient  reports that he has never smoked. He has never used smokeless tobacco.     Results for orders placed or performed during the hospital encounter of 04/12/24   EKG 12-lead    Collection Time: 04/12/24 11:49 AM   Result Value Ref Range    QRS Duration 96 ms    [facility] QTC Calculation 420 ms    Narrative    Test Reason : Z01.818,    Vent. Rate : 058 BPM     Atrial Rate : 058 BPM     P-R Int : 164 ms          QRS Dur : 096 ms      QT Int : 428 ms       P-R-T Axes : 043 041 028 degrees     QTc Int : 420 ms    Sinus bradycardia with sinus arrhythmia  Otherwise normal ECG  When compared with ECG of 13-MAR-2024 07:22,  Nonspecific T wave abnormality no longer evident in Anterior leads  Confirmed by Mckay Hale MD ([id number]) on 4/12/2024 2:22:14 PM    Referred By: MARIZA DONAHUE           Confirmed By:Mckay Hale MD             Lab Results   Component Value Date    WBC 6.55 02/06/2025    HGB 15.1 02/06/2025    HCT 45.9 02/06/2025    MCV 87 02/06/2025     02/06/2025     BMP  Lab Results   Component Value Date     03/11/2025    K 4.4 03/11/2025     02/06/2025    CO2 29 03/11/2025    BUN 12 03/11/2025    CREATININE 0.76 (L) 03/11/2025    CALCIUM 9 03/11/2025    ANIONGAP 6 (L) 03/11/2025     (H) 02/06/2025     (H) 01/09/2025     (H) 09/04/2024       Results for orders placed during the hospital encounter of 08/26/24    Echo    Interpretation Summary    Left Ventricle: The left ventricle is normal in size. Normal wall thickness.  There is normal systolic function with a visually estimated ejection fraction of 60 - 65%. There is normal diastolic function.    Right Ventricle: Normal right ventricular cavity size. Wall thickness is normal. Systolic function is normal.    Pulmonary Artery: No pulmonary hypertension. The estimated pulmonary artery systolic pressure is 8 mmHg.    IVC/SVC: Normal venous pressure at 3 mmHg.            Pre-op Assessment    I have reviewed the Patient Summary Reports.     I have reviewed the Nursing Notes. I have reviewed the NPO Status.   I have reviewed the Medications.     Review of Systems  Anesthesia Hx:  No problems with previous Anesthesia             Denies Family Hx of Anesthesia complications.    Denies Personal Hx of Anesthesia complications.                    Social:  Non-Smoker       Hematology/Oncology:  Hematology Normal                     Current/Recent Cancer. (Bladder cancer.)                EENT/Dental:  EENT/Dental Normal           Cardiovascular:     Hypertension              ECG has been reviewed.                            Pulmonary:        Sleep Apnea Compliant with CPAP          Education provided regarding risk of obstructive sleep apnea            Renal/:  Chronic Renal Disease, CKD                Hepatic/GI:  Hepatic/GI Normal                    Musculoskeletal:  Musculoskeletal Normal                Neurological:        Peripheral Neuropathy                          Endocrine:  Diabetes           Psych:   anxiety depression                Physical Exam  General: Well nourished    Airway:  Mallampati: II   Mouth Opening: Normal  TM Distance: Normal  Tongue: Normal  Neck ROM: Normal ROM    Dental:  Intact    Chest/Lungs:  Clear to auscultation, Normal Respiratory Rate    Heart:  Rate: Normal  Rhythm: Regular Rhythm        Anesthesia Plan  Type of Anesthesia, risks & benefits discussed:    Anesthesia Type: Gen Natural Airway  Intra-op Monitoring Plan: Standard ASA Monitors  Post Op Pain  Control Plan:   (medical reason for not using multimodal pain management)  Induction:  IV  Informed Consent: Informed consent signed with the Patient and all parties understand the risks and agree with anesthesia plan.  All questions answered.   ASA Score: 3  Anesthesia Plan Notes: General with natural airway.    Propofol  Sleep apnea precautions   POM      Ready For Surgery From Anesthesia Perspective.     .           [1]   Patient Active Problem List  Diagnosis    BPH (benign prostatic hyperplasia)    HTN (hypertension)    DDD (degenerative disc disease), lumbar    Palpitations    Peripheral neuropathy    High triglycerides    Hypersomnia with sleep apnea    LOVE on CPAP    Other vitreous opacities, bilateral    NS (nuclear sclerosis)    Senile nuclear sclerosis    Retained lens material, left    Glaucoma associated with ocular inflammation, left, indeterminate stage    Iritis, lens-induced    Vitreomacular adhesion, right    Epiretinal membrane, right    Internal strangulated hemorrhoids    Arachnoid cyst    Narcolepsy    Abnormal gait    Cognitive change    Abnormal involuntary movements    Adjustment disorder with depressed mood    Parkinson disease    History of gout    Polyneuropathy associated with underlying disease    Fall from bed    Memory change    Chronic kidney disease    Diabetes mellitus    Family history of colon cancer    Elevated PSA    Central retinal vein occlusion, left eye, stable    Prostate cancer    Mild episode of recurrent major depressive disorder    CHAPARRITA (generalized anxiety disorder)    Primary insomnia    Night terrors, adult    Secondary hyperparathyroidism of renal origin    Chronic depression    Orthostatic hypotension    Aortic atherosclerosis    Type 2 diabetes mellitus with diabetic nephropathy, with long-term current use of insulin    Gout    Adrenal insufficiency    Disinhibition behavior    Small cell carcinoma of bladder    Anxiety    Enterococcal bacteremia    Glaucoma     Ureteral calculus    Malignant neoplasm of urinary bladder

## 2025-03-28 NOTE — DISCHARGE INSTRUCTIONS
Do not scrub at or attempt to remove surgical glue from your incision(s).  It will wear off on it's own as the incision heals.  No driving, operating machinery or signing legal documents for 24 hours.  You are at risk for falling for 24 hours due to anesthesia/sedation/pain medications received today.  Rest for the remainder of the day and make sure to wear nonskid socks or shoes when ambulating.  Use assistive devices to ambulate if applicable.

## 2025-03-28 NOTE — OP NOTE
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  It was recommended that he have a port placed for chemotherapy.  He did consent to the planned procedure.  He was taken to the OR, placed supine, sedated by Anesthesia, the neck and chest were prepped and draped in the usual fashion, a time-out was completed.  Using ultrasound, the right internal jugular vein was cannulated with a hollow bore needle.  A wire was advanced and confirmed to be in appropriate location using ultrasound and fluoroscopy.  A location on the right chest wall was chosen for the port site.  A 3 cm incision was made sharply.  A port pocket was developed using electrocautery.  The port was secured within the pocket using Vicryl sutures.  The catheter was then tunneled from the chest wall to the wire exit site in the neck.  Using Seldinger technique and under fluoroscopic guidance, the wire was exchanged for a split sheath dilator without apparent complication.  The catheter was advanced down the sheath and the sheath was then removed.  Repeat fluoroscopic imaging confirmed appropriate location of the catheter without kinking.  The port was accessed, aspirated, and then flushed with heparinized saline with ease.  The port pocket was closed in layers with Vicryl  and Monocryl.  The wire exit site in the neck was closed with a buried Vicryl suture.  Dermabond was applied as a dressing.  Patient tolerated the entire procedure without apparent complication, was woken from anesthesia, brought to recovery in stable condition.  All counts were correct.    Significant Surgical Tasks Conducted by the Assistant(s), if Applicable: n/a    Estimated Blood Loss (EBL): min           Implants: * No implants in log *    Specimens:   Specimen (24h ago, onward)      None           * No specimens in log *           Condition: Good    Disposition: PACU - hemodynamically stable.    Attestation: Op Note Attestation: I was physically present and scrubbed for the entire procedure.

## 2025-03-28 NOTE — TELEPHONE ENCOUNTER
Patient says he had a port insertion this morning at [facility]. He says where the port was inserted it looks like the skin is  and the glue is oozing. Spoke to on call provider, Dr. Gardner. He states patient should cover the site with a waterproof bandage and go in to clinic to be seen on Monday. Patient advised to contact office first thing on Monday morning and he would need to be seen on Monday. Patient voices understanding. Please contact caller directly to discuss further care advice.    Reason for Disposition   Message left on identified voice mail   [1] Caller has NON-URGENT question AND [2] triager unable to answer question    Additional Information   Negative: SEVERE difficulty breathing (e.g., struggling for each breath, speaks in single words)   Negative: Air embolism suspected (e.g., sudden onset of difficulty breathing while needle is in PORT)   Negative: Shock suspected (e.g., cold/pale/clammy skin, too weak to stand, low BP, rapid pulse)   Negative: Sounds like a life-threatening emergency to the triager   Negative: Fever in a cancer patient who is currently (or recently) receiving chemotherapy or radiation therapy, or cancer patient who has metastatic or end-stage cancer and is receiving palliative care   Negative: Peripheral or PICC IV not running or running slowly   Negative: [1] Pain, redness, swelling, or pus at peripheral or PICC IV site AND [2] IV running normally   Negative: Chest pain   Negative: [1] MILD to MODERATE difficulty breathing (e.g., short of breath with walking or at rest) AND [2] new-onset or worse than normal   Negative: New-onset of arm or neck swelling   Negative: Patient sounds very sick or weak to the triager   Negative: [1] Skin redness at PORT (e.g., mediport, Port-a-Cath, PowerPort) site AND [2] size > 1 inches (2.5 cm)   Negative: [1] Skin bruise at PORT site AND [2] size > 1 inches (2.5 cm) or growing   Negative: Skin swelling at PORT site   Negative:  Pus or cloudy fluid from PORT site   Negative: PORT appears to be eroding through the skin   Negative: [1] Mild bleeding at PORT site AND [2] not stopped after following Care Advice   Negative: [1] PORT does not flush normally (e.g., blocked, slow or hard to flush) AND [2] pain at PORT site   Negative: Fever > 100.0 F (37.8 C)   Negative: Severe chills (i.e., feeling extremely cold WITH shaking chills)   Negative: [1] Pain at PORT site AND [2] no fever   Negative: [1] PORT does not flush normally (e.g., blocked, slow or hard to flush) OR IV running slowly (or not running) AND [2] NO improvement after using Care Advice (e.g., checked port site, IV tubing, pump)   Negative: [1] Caller has URGENT question AND [2] triager unable to answer question   Negative: [1] Small area of skin bruising or redness at PORT site (< 1 inch or 2.5 cm) AND [2] no fever    Protocols used: No Contact or Duplicate Contact Call-A-AH, Central Venous Catheter - Port Symptoms and Kcfgdsrsb-A-FO

## 2025-03-31 ENCOUNTER — OUTPATIENT CASE MANAGEMENT (OUTPATIENT)
Dept: ADMINISTRATIVE | Facility: OTHER | Age: 70
End: 2025-03-31
Payer: MEDICARE

## 2025-03-31 ENCOUNTER — PATIENT MESSAGE (OUTPATIENT)
Dept: SURGERY | Facility: CLINIC | Age: 70
End: 2025-03-31
Payer: MEDICARE

## 2025-03-31 VITALS — SYSTOLIC BLOOD PRESSURE: 188 MMHG | DIASTOLIC BLOOD PRESSURE: 96 MMHG

## 2025-03-31 NOTE — PROGRESS NOTES
Outpatient Care Management  Plan of Care Follow Up Visit    Patient: Glenn Medel  MRN: [id number]  Date of Service: 03/31/2025  Completed by: Shakila Dobbins RN  Referral Date: 02/11/2025    Reason for Visit   Patient presents with    OPCM RN Follow Up Call       Brief Summary: OPCM RN followed up with Mr. Elizabeth today for care plan review.    Next Steps:   Mr. Elizabeth agreed to OPCM RN follow up on or around 4/21/25.  Follow up regarding incision site.  Follow up regarding BP.  Follow up regarding start of chemo.  Mr. Elizabeth agreed to seek help if his BP doesn't come down.

## 2025-04-02 ENCOUNTER — PATIENT MESSAGE (OUTPATIENT)
Dept: HEMATOLOGY/ONCOLOGY | Facility: CLINIC | Age: 70
End: 2025-04-02
Payer: MEDICARE

## 2025-04-03 ENCOUNTER — CLINICAL SUPPORT (OUTPATIENT)
Dept: AUDIOLOGY | Facility: CLINIC | Age: 70
End: 2025-04-03
Payer: MEDICARE

## 2025-04-03 ENCOUNTER — TELEPHONE (OUTPATIENT)
Dept: HEMATOLOGY/ONCOLOGY | Facility: CLINIC | Age: 70
End: 2025-04-03

## 2025-04-03 ENCOUNTER — CLINICAL SUPPORT (OUTPATIENT)
Dept: HEMATOLOGY/ONCOLOGY | Facility: CLINIC | Age: 70
End: 2025-04-03
Payer: MEDICARE

## 2025-04-03 ENCOUNTER — PATIENT MESSAGE (OUTPATIENT)
Dept: HEMATOLOGY/ONCOLOGY | Facility: CLINIC | Age: 70
End: 2025-04-03

## 2025-04-03 VITALS
BODY MASS INDEX: 24.94 KG/M2 | HEART RATE: 68 BPM | OXYGEN SATURATION: 98 % | DIASTOLIC BLOOD PRESSURE: 82 MMHG | SYSTOLIC BLOOD PRESSURE: 143 MMHG | RESPIRATION RATE: 17 BRPM | TEMPERATURE: 98 F | HEIGHT: 71 IN | WEIGHT: 178.13 LBS

## 2025-04-03 DIAGNOSIS — C67.9 SMALL CELL CARCINOMA OF BLADDER: ICD-10-CM

## 2025-04-03 DIAGNOSIS — H91.92 HIGH FREQUENCY HEARING LOSS, LEFT: Primary | ICD-10-CM

## 2025-04-03 DIAGNOSIS — Z71.9 ENCOUNTER FOR EDUCATION: ICD-10-CM

## 2025-04-03 DIAGNOSIS — C67.9 SMALL CELL CARCINOMA OF BLADDER: Primary | ICD-10-CM

## 2025-04-03 DIAGNOSIS — C67.9 MALIGNANT NEOPLASM OF URINARY BLADDER, UNSPECIFIED SITE: ICD-10-CM

## 2025-04-03 PROCEDURE — 99215 OFFICE O/P EST HI 40 MIN: CPT | Mod: S$PBB,,, | Performed by: NURSE PRACTITIONER

## 2025-04-03 PROCEDURE — 92567 TYMPANOMETRY: CPT | Mod: PBBFAC,PO | Performed by: AUDIOLOGIST

## 2025-04-03 PROCEDURE — 92557 COMPREHENSIVE HEARING TEST: CPT | Mod: PBBFAC,PO | Performed by: AUDIOLOGIST

## 2025-04-03 PROCEDURE — 99211 OFF/OP EST MAY X REQ PHY/QHP: CPT | Mod: PBBFAC,27,PO | Performed by: AUDIOLOGIST

## 2025-04-03 PROCEDURE — 99999 PR PBB SHADOW E&M-EST. PATIENT-LVL I: CPT | Mod: PBBFAC,,, | Performed by: AUDIOLOGIST

## 2025-04-03 PROCEDURE — 99215 OFFICE O/P EST HI 40 MIN: CPT | Mod: PBBFAC,PN | Performed by: NURSE PRACTITIONER

## 2025-04-03 PROCEDURE — 99999 PR PBB SHADOW E&M-EST. PATIENT-LVL V: CPT | Mod: PBBFAC,,, | Performed by: NURSE PRACTITIONER

## 2025-04-03 PROCEDURE — G2211 COMPLEX E/M VISIT ADD ON: HCPCS | Mod: S$PBB,,, | Performed by: NURSE PRACTITIONER

## 2025-04-03 RX ORDER — OLANZAPINE 5 MG/1
5 TABLET ORAL NIGHTLY
Qty: 4 TABLET | Refills: 5 | Status: SHIPPED | OUTPATIENT
Start: 2025-04-06

## 2025-04-03 RX ORDER — DEXAMETHASONE 4 MG/1
8 TABLET ORAL DAILY
Qty: 6 TABLET | Refills: 5 | Status: SHIPPED | OUTPATIENT
Start: 2025-04-03

## 2025-04-03 RX ORDER — PROCHLORPERAZINE MALEATE 5 MG
5 TABLET ORAL EVERY 6 HOURS PRN
Qty: 20 TABLET | Refills: 5 | Status: SHIPPED | OUTPATIENT
Start: 2025-04-06

## 2025-04-03 RX ORDER — LIDOCAINE AND PRILOCAINE 25; 25 MG/G; MG/G
CREAM TOPICAL
Qty: 30 G | Refills: 0 | Status: SHIPPED | OUTPATIENT
Start: 2025-04-03

## 2025-04-03 NOTE — PROGRESS NOTES
PATIENT: Glenn Medel  MRN: [id number]  DATE: 4/3/2025      Diagnosis:   1. Small cell carcinoma of bladder    2. Malignant neoplasm of urinary bladder, unspecified site    3. Encounter for education          Chief Complaint: Bladder Cancer (Treatment clearance) and Chemotherapy      Subjective:    Initial History: Mr. Medel is a 69 y.o. male with BPH, CKD, DMII, Glaucoma, HTN, obesity, Parkinson's disease, LOVE who presents for chemo education for small cell bladder cancer.      Currently the patient endorses constipation but is recently improving, urinary frequency (every 3 hours), arthritis in his knees, and anxiety.  The patient denies CP, cough, SOB, abdominal pain, nausea, vomiting, constipation, diarrhea.  The patient denies fever, chills, night sweats, weight loss, new lumps or bumps, easy bruising or bleeding.    History:    The patient initially presented after developing hematuria.  The patient underwent cystoscopy on 01/30/2025 showing a tumor at the trigone near the right ureteral orifice measuring about 3 cm in diameter.  Pt underwent TURBT on 2/12/25 with path showing invasive small-cell carcinoma of urothelial origin and a background of urothelial carcinoma in Situ.  Muscularis propria was present on biopsy but was not involved.  The patient saw urologist Dr. Zapata on 03/12/2024 whom recommended chemotherapy followed by surgery.  The patient was discussed at tumor board on 03/13/2025 with recommendation on chemotherapy, PET-CT, MRI brain.  MRI brain on 03/18/2025 showed 4 x 3 mm probable arachnoid cyst in the left posterior sella.  PET-CT on 03/19/2025 showed no evidence of disease.      Past Medical History:   Past Medical History:   Diagnosis Date    KENTON (acute kidney injury) 02/21/2025    Arthritis     Bladder cancer 2025    Blurred vision 09/08/2020    BPH (benign prostatic hyperplasia)     Cancer     prostate cancer- skin cancer to  face legs    Cataract     done ou    Chronic kidney disease,  stage 3 02/25/2015    DDD (degenerative disc disease), lumbar     s/p epidural steroids     Diabetes mellitus     type 2    Elevated PSA     Fall 07/02/2024    Glaucoma     OU    HTN (hypertension)     Iritis, lens-induced 01/08/2018    LUE numbness 09/08/2020    Obesity     Parkinson's disease     Pyelonephritis 02/21/2025    Sepsis 06/09/2023    Sleep apnea     uses cpap    Suspected cerebrovascular accident (CVA) 09/08/2020    Syncope and collapse 07/02/2024       Past Surgical HIstory:   Past Surgical History:   Procedure Laterality Date    CATARACT EXTRACTION W/  INTRAOCULAR LENS IMPLANT Left 11/30/2017    CATARACT EXTRACTION W/  INTRAOCULAR LENS IMPLANT Right 12/11/2018    Dr Garcia    CATARACT EXTRACTION W/  INTRAOCULAR LENS IMPLANT Right 12/11/2018    Procedure: EXTRACTION, CATARACT, WITH IOL INSERTION;  Surgeon: Damaris Garcia MD;  Location: [facility] OR;  Service: Ophthalmology;  Laterality: Right;    COLONOSCOPY N/A 09/18/2017    Procedure: COLONOSCOPY;  Surgeon: Paul Feliz Jr., MD;  Location: [facility] ENDO;  Service: Endoscopy;  Laterality: N/A;    COLONOSCOPY N/A 12/21/2022    Procedure: COLONOSCOPY;  Surgeon: Paul Feliz Jr., MD;  Location: [facility] ENDO;  Service: Endoscopy;  Laterality: N/A;    COLONOSCOPY W/ POLYPECTOMY  04/13/2010    YARELI.   One 1 cm polyp in the sigmoid colon.  Internal hemorrhoids.    COSMETIC SURGERY      DIGITAL RECTAL EXAMINATION UNDER ANESTHESIA N/A 07/23/2019    Procedure: EXAM UNDER ANESTHESIA, DIGITAL, RECTUM;  Surgeon: Kaz Keating MD;  Location: [facility] OR;  Service: General;  Laterality: N/A;    EXAMINATION UNDER ANESTHESIA N/A 04/24/2019    Procedure: EXAM UNDER ANESTHESIA;  Surgeon: Kaz Keating MD;  Location: [facility] OR;  Service: General;  Laterality: N/A;    EXCISIONAL HEMORRHOIDECTOMY N/A 04/24/2019    Procedure: HEMORRHOIDECTOMY (lithotomy);  Surgeon: Kaz Keating MD;  Location: [facility] OR;  Service: General;  Laterality: N/A;    EYE SURGERY       GANGLION CYST EXCISION Left     INJECTION OF ANESTHETIC AGENT AROUND MEDIAL BRANCH NERVES INNERVATING LUMBAR FACET JOINT Bilateral 04/09/2024    Procedure: Block-nerve-medial branch-lumbar-L4/5-L5/S1;  Surgeon: Everett Taveras MD;  Location: [facility] OR;  Service: Pain Management;  Laterality: Bilateral;    INJECTION OF ANESTHETIC AGENT AROUND MEDIAL BRANCH NERVES INNERVATING LUMBAR FACET JOINT Bilateral 05/06/2024    Procedure: Block-nerve-medial branch-lumbar-L4/5-L5/S1;  Surgeon: Everett Taveras MD;  Location: [facility] OR;  Service: Pain Management;  Laterality: Bilateral;    INJECTION OF ANESTHETIC AGENT AROUND PUDENDAL NERVE N/A 04/24/2019    Procedure: BLOCK, NERVE, PUDENDAL;  Surgeon: Kaz Keating MD;  Location: [facility] OR;  Service: General;  Laterality: N/A;    INJECTION OF ANESTHETIC AGENT AROUND PUDENDAL NERVE N/A 07/23/2019    Procedure: BLOCK, NERVE, PUDENDAL;  Surgeon: Kaz Keating MD;  Location: [facility] OR;  Service: General;  Laterality: N/A;    INJECTION, ALLOGRAFT, INTERVERTEBRAL DISC N/A 09/30/2024    Procedure: INJECTION,ALLOGRAFT,INTERVERTEBRAL DISC,1ST LEVEL  L4/5;  Surgeon: Everett Taveras MD;  Location: [facility] OR;  Service: Pain Management;  Laterality: N/A;    INSERTION OF TUNNELED CENTRAL VENOUS CATHETER (CVC) WITH SUBCUTANEOUS PORT Right 3/28/2025    Procedure: [id number];  Surgeon: Hebert Gardner MD;  Location: [facility] OR;  Service: General;  Laterality: Right;    INSTILLATION OF URINARY BLADDER  02/11/2025    Procedure: INSTILLATION, BLADDER w/ gemcitabine;  Surgeon: MARIZA Donahue MD;  Location: [facility] OR;  Service: Urology;;    RADIOFREQUENCY ABLATION OF LUMBAR MEDIAL BRANCH NERVE AT SINGLE LEVEL Bilateral 06/04/2024    Procedure: Radiofrequency Ablation, Nerve, Spinal, Lumbar, Medial Branch, L4/5 and L5/S1;  Surgeon: Everett Taveras MD;  Location: [facility] OR;  Service: Pain Management;  Laterality: Bilateral;    REPAIR OF RETINAL DETACHMENT WITH VITRECTOMY Right 08/01/2018     Procedure: REPAIR, RETINAL DETACHMENT, WITH VITRECTOMY;  Surgeon: SAJNA Pulido MD;  [facility];  Service: Ophthalmology;  Laterality: Right;  35 min    SKIN BIOPSY      TONSILLECTOMY      TRANSRECTAL BIOPSY OF PROSTATE WITH ULTRASOUND GUIDANCE N/A 01/31/2023    Procedure: BIOPSY, PROSTATE, RECTAL APPROACH, WITH US GUIDANCE;  Surgeon: MARIZA Donahue MD;  Location: [facility] OR;  Service: Urology;  Laterality: N/A;    TRANSRECTAL BIOPSY OF PROSTATE WITH ULTRASOUND GUIDANCE N/A 04/12/2024    Procedure: BIOPSY, PROSTATE, RECTAL APPROACH, WITH US GUIDANCE;  Surgeon: MARIZA Donahue MD;  Location: [facility];  Service: Urology;  Laterality: N/A;    TRIGGER FINGER RELEASE Right     X 2    TURBT (TRANSURETHRAL RESECTION OF BLADDER TUMOR) N/A 02/11/2025    Procedure: TURBT (TRANSURETHRAL RESECTION OF BLADDER TUMOR);  Surgeon: MARIZA Donahue MD;  Location: [facility] OR;  Service: Urology;  Laterality: N/A;    uro lift      for enlarged prostate       Family History:   Family History   Problem Relation Name Age of Onset    Colon cancer Mother      Hypertension Mother      Diabetes Mother      Stroke Mother      Cancer Mother          colon    Kidney disease Mother      Heart disease Father      Diabetes Father      Colon cancer Father      Cancer Father          colon    Cataracts Father      No Known Problems Sister      Heart disease Brother  62    Kidney disease Brother      Dementia Brother Rodrigue     Abnormal EKG Brother Rodrigue     Breast cancer Maternal Aunt      Cancer Maternal Aunt          breast    Cancer Maternal Aunt          brain    Brain cancer Maternal Aunt      No Known Problems Maternal Uncle      No Known Problems Paternal Aunt      No Known Problems Paternal Uncle      Glaucoma Maternal Grandmother      No Known Problems Maternal Grandfather      No Known Problems Paternal Grandmother      No Known Problems Paternal Grandfather      Amblyopia Neg Hx      Blindness Neg Hx      Macular  "degeneration Neg Hx      Retinal detachment Neg Hx      Strabismus Neg Hx      Thyroid disease Neg Hx      Parkinsonism Neg Hx         Social History:  reports that he has never smoked. He has never used smokeless tobacco. He reports current alcohol use. He reports that he does not use drugs.    Allergies:  Review of patient's allergies indicates:   Allergen Reactions    Avelox [moxifloxacin] Hives and Rash       Medications:  Current Medications[1]    Review of Systems   Constitutional:  Negative for appetite change, chills, fever and unexpected weight change.   HENT:  Negative for mouth sores, sore throat and trouble swallowing.    Eyes:  Negative for photophobia and visual disturbance.   Respiratory:  Negative for cough, chest tightness and shortness of breath.    Cardiovascular:  Negative for chest pain, palpitations and leg swelling.   Gastrointestinal:  Positive for constipation. Negative for abdominal pain, diarrhea, nausea and vomiting.   Endocrine: Negative for cold intolerance and heat intolerance.   Genitourinary:  Positive for frequency. Negative for difficulty urinating, dysuria and hematuria.   Musculoskeletal:  Positive for arthralgias (knees). Negative for back pain and myalgias.   Skin:  Negative for color change and rash.   Neurological:  Negative for dizziness, light-headedness, numbness and headaches.   Hematological:  Negative for adenopathy. Does not bruise/bleed easily.   Psychiatric/Behavioral:  The patient is nervous/anxious.        ECOG Performance Status: 1   Objective:      Vitals:   Vitals:    04/03/25 1405   BP: (!) 143/82   BP Location: Right arm   Patient Position: Sitting   Pulse: 68   Resp: 17   Temp: 97.9 °F (36.6 °C)   TempSrc: Temporal   SpO2: 98%   Weight: 80.8 kg (178 lb 2.1 oz)   Height: 5' 11" (1.803 m)         Physical Exam  Constitutional:       General: He is not in acute distress.     Appearance: He is well-developed. He is not diaphoretic.   HENT:      Head: " Normocephalic and atraumatic.   Pulmonary:      Effort: Pulmonary effort is normal. No respiratory distress.      Breath sounds: Normal breath sounds.   Skin:     General: Skin is warm.   Neurological:      Mental Status: He is alert and oriented to person, place, and time.   Psychiatric:         Behavior: Behavior normal.         Laboratory Data:  No visits with results within 1 Week(s) from this visit.   Latest known visit with results is:   Hospital Outpatient Visit on 03/19/2025   Component Date Value Ref Range Status    POC Glucose 03/19/2025 137 (A)  70 - 110 MG/DL Final         Imaging:     MRI Brain  3/18/25  INTRACRANIAL: Mild age-appropriate volume loss. Unchanged prominent right retrocerebellar CSF/arachnoid cyst. Few scattered T2 FLAIR hyperintense white matter foci are minimally increased from prior study on 05/18/2023, likely related to chronic microvascular ischemic change. No abnormal enhancement demonstrated. No parenchymal restricted diffusion. No evidence of intracranial hemorrhage. No intracranial mass effect. No hydrocephalus. 4 x 3 mm probable arachnoid cyst in the left posterior sella (series 21, image 118). Otherwise, visualized pituitary gland and infundibulum are normal. Visualized major intracranial vascular structures demonstrate normal flow voids and are normal in course and caliber.     SINUSES: Paranasal sinuses are clear. Left mastoid effusion.     ORBITS: Bilateral lens replacements.      PET/CT 3/19/25  Head and Neck:     Brain demonstrates normal metabolism. However, FDG-PET has an approximate 60% sensitivity for brain metastases which are best detected by MRI with gadolinium. Physiologic uptake is in the neck.     Chest:     No FDG avid pulmonary lesions are present. No enlarged or FDG avid lymph nodes are in the chest.     Abdomen and Pelvis:     Physiologic uptake is in the liver, spleen, urinary system and bowel. No enlarged or FDG avid lymph nodes are in the abdomen or pelvis.  Adrenal glands are normal. Urinary bladder is decompressed. Prostate is enlarged.     Musculoskeletal:     No FDG avid osseous lesions are present.       Assessment:       1. Small cell carcinoma of bladder    2. Malignant neoplasm of urinary bladder, unspecified site    3. Encounter for education             Plan:     Small Cell Carcinoma of the Bladder - the patient diagnosed with small-cell carcinoma of the bladder on biopsy 02/12/2025  Urologist Dr. Zapata whom patient met on 03/12/2024 recommended chemotherapy followed by cystectomy  The patient discussed at tumor board on 03/13/2024 with recommendations for chemotherapy   MRI brain on 03/18/2025 showed no evidence of disease  PET-CT on 03/1925 showed no evidence of disease   Treatment with cisplatin and etoposide discussed with the patient.  Patient given information packet on medications and side effects discussed   The patient requested peer to peer with MD David.  We will discuss case with Medical Oncology as well as urologic surgeon  Patient undergo chemo class and to be set up for chemo care companion  Patient to see general surgeon for port placement  Patient undergo audiogram    - Ambulatory referral/consult to Chemo School    1. Treatment plan of cisplatin and etoposide every 21 days discussed with patient.  2. Handouts provided on chemotherapy agents. Premeds, supportive meds explained.  3. Discussed the mechanism of action, potential side effects of this treatment as well as ways to manage them at home.   4. Dietary modifications discussed. Detail instructions to be provided by dietician.   5. Chemotherapy portfolio supplied with contact information.   6. Discussed follow-up with the physician for toxicity monitoring throughout treatment.    7. Scripts were escribed prior and explained for home use.   8. Consented the patient to the treatment plan and the patient was educated on the planned duration of the treatment and schedule of the treatment  administration.  9. Cycle 1, Day 1 scheduled for 4/7/25; Olanzapine, dexamethasone and compazine sent to pharmacy - EMLA also sent.  10. Encouraged to call office - phone number provided - to assist with any concerns.      Visit today included increased complexity associated with the care of the episodic problem (chemotherapy) addressed and managing the longitudinal care of the patient due to the serious and/or complex managed problem(s)  Small Cell Carcinoma of the bladder .      Route Chart for Scheduling  Med Onc Route Chart for Scheduling  Treatment Plan Information   OP EP (CISplatin etoposide) Q3W Moris Lowery MD   Associated diagnosis: Malignant neoplasm of urinary bladder   noted on 3/21/2025  Associated diagnosis: Small cell carcinoma of bladder Stage I cT1, cN0, cM0 noted on 2/11/2025   Line of treatment: Adjuvant  Treatment Goal: Curative     Upcoming Treatment Dates - OP EP (CISplatin etoposide) Q3W    4/6/2025       Antiemetics       dexAMETHasone (DECADRON) 4 MG Tab  4/7/2025       Chemotherapy       CISplatin (Platinol) 80 mg/m2 = 161 mg in 0.9% NaCl 661 mL chemo infusion       etoposide (VEPESID) 100 mg/m2 = 202 mg in 0.9% NaCl 510.1 mL chemo infusion       Pre-Hydration       0.9% NaCl 1,000 mL with magnesium sulfate 1 g, potassium chloride 20 mEq infusion       Post-Hydration       0.9% NaCl 1,000 mL with magnesium sulfate 1 g, potassium chloride 20 mEq infusion       Antiemetics       aprepitant (Cinvanti) injection 130 mg       palonosetron 0.25mg/dexAMETHasone 12mg in NS IVPB 0.25 mg 50 mL  4/8/2025       Chemotherapy       etoposide (VEPESID) 100 mg/m2 = 202 mg in 0.9% NaCl 510.1 mL chemo infusion  4/9/2025       Chemotherapy       etoposide (VEPESID) 100 mg/m2 = 202 mg in 0.9% NaCl 510.1 mL chemo infusion       Growth Factor       pegfilgrastim (NEULASTA (ON BODY INJECTOR)) injection 6 mg    Therapy Plan Information  URO GEMCITABINE (INTRAVESICAL) for Small cell carcinoma of bladder, noted  on 2/11/2025  Chemotherapy  gemcitabine 2,000 mg in 0.9% NaCl SolP 100 mL bladder instillation  2,000 mg, Intravesical, 1 time a week      No therapy plan of the specified type found.    No therapy plan of the specified type found.    XENA Dominique  Hematology and Medical Oncology  [facility]  [facility] [place] [facility]    52 minutes of total time spent on the encounter, which includes face to face time and non-face to face time preparing to see the patient (eg, review of tests), Obtaining and/or reviewing separately obtained history, documenting clinical information in the electronic or other health record, independently interpreting results if documented above (not separately reported) and communicating results to the patient/family/caregiver, or Care coordination (not separately reported).            [1]   Current Outpatient Medications   Medication Sig Dispense Refill    allopurinoL (ZYLOPRIM) 300 MG tablet Take 1 tablet (300 mg total) by mouth once daily. 90 tablet 0    ammonium lactate (LAC-HYDRIN) 12 % lotion Apply topically 2 (two) times daily.      atorvastatin (LIPITOR) 20 MG tablet Take 1 tablet (20 mg total) by mouth every evening. 90 tablet 3    BACILLUS COAGULANS ORAL Take 1 tablet by mouth once daily.      brimonidine 0.2% (ALPHAGAN) 0.2 % Drop INSTILL 1 DROP IN BOTH EYES TWICE DAILY 10 mL 0    carbidopa-levodopa  mg (SINEMET)  mg per tablet Take 1.5 tablets by mouth 5 (five) times daily. 675 tablet 3    cholecalciferol, vitamin D3, (D3-2000 ORAL) Take by mouth once daily.      clonazePAM (KLONOPIN) 1 MG tablet Take 1 tablet (1 mg total) by mouth every evening. 60 tablet 2    cyanocobalamin (VITAMIN B-12) 1000 MCG tablet Take 100 mcg by mouth once daily.      dexAMETHasone (DECADRON) 4 MG Tab Take 2 tablets (8 mg total) by mouth once daily. on days 2-4 of each chemotherapy cycle. 6 tablet 5    donepeziL (ARICEPT) 5 MG tablet Take 1 tablet (5 mg total) by  "mouth every evening. 30 tablet 11    dorzolamide (TRUSOPT) 2 % ophthalmic solution Place 1 drop into both eyes 2 (two) times daily. 10 mL 3    DULoxetine (CYMBALTA) 30 MG capsule Take 1 capsule (30 mg total) by mouth once daily. 90 capsule 1    gabapentin (NEURONTIN) 300 MG capsule Take 1 capsule (300 mg total) by mouth 2 (two) times daily. 60 capsule 1    insulin glargine U-100, Lantus, (LANTUS SOLOSTAR U-100 INSULIN) 100 unit/mL (3 mL) InPn pen Inject 7 Units into the skin once daily.      ketoconazole (NIZORAL) 2 % shampoo Apply topically.      metFORMIN (GLUCOPHAGE-XR) 500 MG ER 24hr tablet Take 2 tablets (1,000 mg total) by mouth once daily. 180 tablet 3    [START ON 4/6/2025] OLANZapine (ZYPREXA) 5 MG tablet Take 1 tablet (5 mg total) by mouth every evening. on days 1-4 of each chemotherapy cycle. 4 tablet 5    pen needle, diabetic (BD HIMANSHU 2ND GEN PEN NEEDLE) 32 gauge x 5/32" Ndle Inject 1 each into the skin once daily. 100 each 3    prazosin (MINIPRESS) 1 MG Cap Take 1 capsule (1 mg total) by mouth every evening. 90 capsule 3    [START ON 4/6/2025] prochlorperazine (COMPAZINE) 5 MG tablet Take 1 tablet (5 mg total) by mouth every 6 (six) hours as needed for Nausea. 20 tablet 5    ramelteon (ROZEREM) 8 mg tablet Take 1 tablet (8 mg total) by mouth nightly. 90 tablet 3    rasagiline (AZILECT) 1 mg Tab TAKE 1 TABLET(1 MG) BY MOUTH EVERY EVENING 30 tablet 11    tirzepatide 10 mg/0.5 mL PnIj Inject 10 mg into the skin every 7 days. (Increased dose) 2 mL 2     No current facility-administered medications for this visit.     "

## 2025-04-03 NOTE — PROGRESS NOTES
The patient was referred by Dr. Moris Lowery for a baseline audiogram prior to treatment with chemotherapy.     Report from the patient was as follows:    Patient does not notice having hearing difficulty.  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 Test results indicated hearing within normal limits in the right ear and a mild-to-moderate high frequency hearing loss in the left ear. Impedance testing showed a Type A tympanogram for the right ear and a Type As tympanogram for the left ear.    The recommendations were as follows:    (1)  Medical evaluation due to slight asymmetry  (2)  Ear protection in loud noise   (3)  Hearing evaluation in 3 months for monitoring due to treatment with chemotherapy     Today's test results and recommendations were discussed with the patient.

## 2025-04-03 NOTE — TELEPHONE ENCOUNTER
----- Message from Walker sent at 4/3/2025  2:42 PM CDT -----  Type: Needs Medical AdviceWho Called:  Qi with [facility] Symptoms (please be specific):  How long has patient had these symptoms:  Pharmacy name and phone #:  Best Call Back Number: [phone] Information: Qi is requesting a call back from nurse regarding questions she has about meds and treatment

## 2025-04-04 ENCOUNTER — PATIENT MESSAGE (OUTPATIENT)
Dept: HEMATOLOGY/ONCOLOGY | Facility: CLINIC | Age: 70
End: 2025-04-04
Payer: MEDICARE

## 2025-04-04 ENCOUNTER — TELEPHONE (OUTPATIENT)
Dept: HEMATOLOGY/ONCOLOGY | Facility: CLINIC | Age: 70
End: 2025-04-04
Payer: MEDICARE

## 2025-04-04 ENCOUNTER — DOCUMENTATION ONLY (OUTPATIENT)
Dept: INFUSION THERAPY | Facility: HOSPITAL | Age: 70
End: 2025-04-04
Payer: MEDICARE

## 2025-04-04 ENCOUNTER — PATIENT MESSAGE (OUTPATIENT)
Facility: CLINIC | Age: 70
End: 2025-04-04
Payer: MEDICARE

## 2025-04-04 NOTE — NURSING
Called Mr. Medel to check in following chemo school. We reviewed his TAMMY medications. Instructions for EMLA cream provided.     We reviewed all scheduled appointments on Monday, 4/7. Mr. Medel v/u.     Infusion clinic messaged to r/s 4/29 infusion to later time to ensure he can make his visit with his optometrist.

## 2025-04-04 NOTE — TELEPHONE ENCOUNTER
Received message that pt's REM sleep disorder is worse and he is concerned about recent MRI. Called and left message on voicemail.   Forwarded to provider to advise.

## 2025-04-04 NOTE — PROGRESS NOTES
Oncology Nutrition   New Patient Education  Glenn Medel   1955  Late entry, RD visit on 04/03/25  Nutrition Education   This is a 69 y.o.male with a medical diagnosis of bladder cancer.      Met w/ pt to discuss current nutritional status and nutrition as it relates to cancer and cancer treatment. Pt currently moderate nutrition risk. Pt pmh of prostate cancer, Parkinsons, HTN, CKD, and T2DM. Pt reports his appetite has not been great and feels he has been losing about 1# per week. Pt recently started new GLP-1 for T2DM. RD discussed increasing fluid intake and eating smaller, more frequent meals/snacks. RD discussed role in POC.     Wt Readings from Last 10 Encounters:   04/03/25 80.8 kg (178 lb 2.1 oz)   03/27/25 78.9 kg (174 lb)   03/25/25 79.4 kg (175 lb 0.7 oz)   03/21/25 80.3 kg (177 lb 0.5 oz)   03/12/25 82 kg (180 lb 12.4 oz)   03/05/25 81.6 kg (179 lb 12.8 oz)   03/05/25 81.8 kg (180 lb 5.4 oz)   02/06/25 80.7 kg (178 lb)   01/30/25 82.9 kg (182 lb 12.2 oz)   01/25/25 83.3 kg (183 lb 11.2 oz)      [x] PMHx reviewed  [x] Labs reviewed    Educated on food safety and common nutrition impact symptoms associated with chemotherapy treatment. Reinforced the importance of good hydration. Handouts provided.    Answered all nutrition related questions.     Patient provided with dietitian contact number and advised to call with questions or make future appointment if further intervention is needed. RD to follow throughout tx prn.    Xiomara Ryan, MARIO, LDN  04/04/2025  9:15 AM          8

## 2025-04-05 ENCOUNTER — PATIENT MESSAGE (OUTPATIENT)
Dept: HEMATOLOGY/ONCOLOGY | Facility: CLINIC | Age: 70
End: 2025-04-05
Payer: MEDICARE

## 2025-04-06 ENCOUNTER — PATIENT MESSAGE (OUTPATIENT)
Dept: HEMATOLOGY/ONCOLOGY | Facility: CLINIC | Age: 70
End: 2025-04-06
Payer: MEDICARE

## 2025-04-07 ENCOUNTER — PATIENT MESSAGE (OUTPATIENT)
Dept: HEMATOLOGY/ONCOLOGY | Facility: CLINIC | Age: 70
End: 2025-04-07

## 2025-04-07 ENCOUNTER — LAB VISIT (OUTPATIENT)
Dept: LAB | Facility: HOSPITAL | Age: 70
End: 2025-04-07
Attending: INTERNAL MEDICINE
Payer: MEDICARE

## 2025-04-07 ENCOUNTER — DOCUMENTATION ONLY (OUTPATIENT)
Dept: INFUSION THERAPY | Facility: HOSPITAL | Age: 70
End: 2025-04-07
Payer: MEDICARE

## 2025-04-07 ENCOUNTER — INFUSION (OUTPATIENT)
Dept: INFUSION THERAPY | Facility: HOSPITAL | Age: 70
End: 2025-04-07
Attending: INTERNAL MEDICINE
Payer: MEDICARE

## 2025-04-07 ENCOUNTER — OFFICE VISIT (OUTPATIENT)
Dept: HEMATOLOGY/ONCOLOGY | Facility: CLINIC | Age: 70
End: 2025-04-07
Payer: MEDICARE

## 2025-04-07 VITALS
TEMPERATURE: 98 F | HEART RATE: 96 BPM | HEIGHT: 71 IN | SYSTOLIC BLOOD PRESSURE: 128 MMHG | BODY MASS INDEX: 24.88 KG/M2 | DIASTOLIC BLOOD PRESSURE: 79 MMHG | OXYGEN SATURATION: 98 % | RESPIRATION RATE: 20 BRPM | WEIGHT: 177.69 LBS

## 2025-04-07 VITALS
DIASTOLIC BLOOD PRESSURE: 84 MMHG | RESPIRATION RATE: 14 BRPM | SYSTOLIC BLOOD PRESSURE: 149 MMHG | BODY MASS INDEX: 24.88 KG/M2 | OXYGEN SATURATION: 96 % | WEIGHT: 177.69 LBS | TEMPERATURE: 98 F | HEIGHT: 71 IN | HEART RATE: 101 BPM

## 2025-04-07 DIAGNOSIS — H40.9 GLAUCOMA, UNSPECIFIED GLAUCOMA TYPE, UNSPECIFIED LATERALITY: ICD-10-CM

## 2025-04-07 DIAGNOSIS — I10 HYPERTENSION, UNSPECIFIED TYPE: ICD-10-CM

## 2025-04-07 DIAGNOSIS — G20.A1 PARKINSON'S DISEASE, UNSPECIFIED WHETHER DYSKINESIA PRESENT, UNSPECIFIED WHETHER MANIFESTATIONS FLUCTUATE: ICD-10-CM

## 2025-04-07 DIAGNOSIS — C67.9 SMALL CELL CARCINOMA OF BLADDER: ICD-10-CM

## 2025-04-07 DIAGNOSIS — R10.9 RIGHT FLANK PAIN: ICD-10-CM

## 2025-04-07 DIAGNOSIS — C67.9 SMALL CELL CARCINOMA OF BLADDER: Primary | ICD-10-CM

## 2025-04-07 DIAGNOSIS — C67.9 MALIGNANT NEOPLASM OF URINARY BLADDER, UNSPECIFIED SITE: ICD-10-CM

## 2025-04-07 DIAGNOSIS — C67.9 MALIGNANT NEOPLASM OF URINARY BLADDER, UNSPECIFIED SITE: Primary | ICD-10-CM

## 2025-04-07 LAB
ABSOLUTE EOSINOPHIL (OHS): 0.28 K/UL
ABSOLUTE MONOCYTE (OHS): 0.51 K/UL (ref 0.3–1)
ABSOLUTE NEUTROPHIL COUNT (OHS): 4.24 K/UL (ref 1.8–7.7)
ALBUMIN SERPL BCP-MCNC: 3.7 G/DL (ref 3.5–5.2)
ALP SERPL-CCNC: 124 UNIT/L (ref 40–150)
ALT SERPL W/O P-5'-P-CCNC: 6 UNIT/L (ref 10–44)
ANION GAP (OHS): 12 MMOL/L (ref 8–16)
AST SERPL-CCNC: 23 UNIT/L (ref 11–45)
BASOPHILS # BLD AUTO: 0.06 K/UL
BASOPHILS NFR BLD AUTO: 0.8 %
BILIRUB SERPL-MCNC: 0.8 MG/DL (ref 0.1–1)
BUN SERPL-MCNC: 16 MG/DL (ref 8–23)
CALCIUM SERPL-MCNC: 9 MG/DL (ref 8.7–10.5)
CHLORIDE SERPL-SCNC: 103 MMOL/L (ref 95–110)
CO2 SERPL-SCNC: 24 MMOL/L (ref 23–29)
CREAT SERPL-MCNC: 0.9 MG/DL (ref 0.5–1.4)
ERYTHROCYTE [DISTWIDTH] IN BLOOD BY AUTOMATED COUNT: 14.7 % (ref 11.5–14.5)
GFR SERPLBLD CREATININE-BSD FMLA CKD-EPI: >60 ML/MIN/1.73/M2
GLUCOSE SERPL-MCNC: 167 MG/DL (ref 70–110)
HCT VFR BLD AUTO: 43.1 % (ref 40–54)
HGB BLD-MCNC: 14.1 GM/DL (ref 14–18)
IMM GRANULOCYTES # BLD AUTO: 0.02 K/UL (ref 0–0.04)
IMM GRANULOCYTES NFR BLD AUTO: 0.3 % (ref 0–0.5)
LYMPHOCYTES # BLD AUTO: 2.43 K/UL (ref 1–4.8)
MAGNESIUM SERPL-MCNC: 1.7 MG/DL (ref 1.6–2.6)
MCH RBC QN AUTO: 28.4 PG (ref 27–31)
MCHC RBC AUTO-ENTMCNC: 32.7 G/DL (ref 32–36)
MCV RBC AUTO: 87 FL (ref 82–98)
NUCLEATED RBC (/100WBC) (OHS): 0 /100 WBC
PLATELET # BLD AUTO: 195 K/UL (ref 150–450)
PMV BLD AUTO: 8.5 FL (ref 9.2–12.9)
POTASSIUM SERPL-SCNC: 3.6 MMOL/L (ref 3.5–5.1)
PROT SERPL-MCNC: 7.1 GM/DL (ref 6–8.4)
RBC # BLD AUTO: 4.96 M/UL (ref 4.6–6.2)
RELATIVE EOSINOPHIL (OHS): 3.7 %
RELATIVE LYMPHOCYTE (OHS): 32.2 % (ref 18–48)
RELATIVE MONOCYTE (OHS): 6.8 % (ref 4–15)
RELATIVE NEUTROPHIL (OHS): 56.2 % (ref 38–73)
SODIUM SERPL-SCNC: 139 MMOL/L (ref 136–145)
WBC # BLD AUTO: 7.54 K/UL (ref 3.9–12.7)

## 2025-04-07 PROCEDURE — 99215 OFFICE O/P EST HI 40 MIN: CPT | Mod: PBBFAC,PN | Performed by: INTERNAL MEDICINE

## 2025-04-07 PROCEDURE — 25000003 PHARM REV CODE 250: Mod: PN | Performed by: INTERNAL MEDICINE

## 2025-04-07 PROCEDURE — 96375 TX/PRO/DX INJ NEW DRUG ADDON: CPT | Mod: PN

## 2025-04-07 PROCEDURE — 96417 CHEMO IV INFUS EACH ADDL SEQ: CPT | Mod: PN

## 2025-04-07 PROCEDURE — 83735 ASSAY OF MAGNESIUM: CPT | Mod: PN

## 2025-04-07 PROCEDURE — G2211 COMPLEX E/M VISIT ADD ON: HCPCS | Mod: S$PBB,,, | Performed by: INTERNAL MEDICINE

## 2025-04-07 PROCEDURE — 96367 TX/PROPH/DG ADDL SEQ IV INF: CPT | Mod: PN

## 2025-04-07 PROCEDURE — 63600175 PHARM REV CODE 636 W HCPCS: Mod: PN | Performed by: INTERNAL MEDICINE

## 2025-04-07 PROCEDURE — 85025 COMPLETE CBC W/AUTO DIFF WBC: CPT | Mod: PN

## 2025-04-07 PROCEDURE — 96366 THER/PROPH/DIAG IV INF ADDON: CPT | Mod: PN

## 2025-04-07 PROCEDURE — 36415 COLL VENOUS BLD VENIPUNCTURE: CPT | Mod: PN

## 2025-04-07 PROCEDURE — 96413 CHEMO IV INFUSION 1 HR: CPT | Mod: PN

## 2025-04-07 PROCEDURE — 99215 OFFICE O/P EST HI 40 MIN: CPT | Mod: S$PBB,,, | Performed by: INTERNAL MEDICINE

## 2025-04-07 PROCEDURE — 99999 PR PBB SHADOW E&M-EST. PATIENT-LVL V: CPT | Mod: PBBFAC,,, | Performed by: INTERNAL MEDICINE

## 2025-04-07 PROCEDURE — 80053 COMPREHEN METABOLIC PANEL: CPT | Mod: PN

## 2025-04-07 RX ORDER — HEPARIN 100 UNIT/ML
500 SYRINGE INTRAVENOUS
Status: DISCONTINUED | OUTPATIENT
Start: 2025-04-07 | End: 2025-04-07 | Stop reason: HOSPADM

## 2025-04-07 RX ORDER — EPINEPHRINE 0.3 MG/.3ML
0.3 INJECTION SUBCUTANEOUS ONCE AS NEEDED
Status: DISCONTINUED | OUTPATIENT
Start: 2025-04-07 | End: 2025-04-07 | Stop reason: HOSPADM

## 2025-04-07 RX ORDER — HEPARIN 100 UNIT/ML
500 SYRINGE INTRAVENOUS
Status: CANCELLED | OUTPATIENT
Start: 2025-04-08

## 2025-04-07 RX ORDER — PROCHLORPERAZINE EDISYLATE 5 MG/ML
5 INJECTION INTRAMUSCULAR; INTRAVENOUS ONCE AS NEEDED
Status: CANCELLED | OUTPATIENT
Start: 2025-04-07

## 2025-04-07 RX ORDER — PROCHLORPERAZINE EDISYLATE 5 MG/ML
5 INJECTION INTRAMUSCULAR; INTRAVENOUS ONCE AS NEEDED
Status: DISCONTINUED | OUTPATIENT
Start: 2025-04-07 | End: 2025-04-07 | Stop reason: HOSPADM

## 2025-04-07 RX ORDER — DIPHENHYDRAMINE HYDROCHLORIDE 50 MG/ML
50 INJECTION, SOLUTION INTRAMUSCULAR; INTRAVENOUS ONCE AS NEEDED
Status: CANCELLED | OUTPATIENT
Start: 2025-04-08

## 2025-04-07 RX ORDER — PROCHLORPERAZINE EDISYLATE 5 MG/ML
5 INJECTION INTRAMUSCULAR; INTRAVENOUS ONCE AS NEEDED
Status: CANCELLED | OUTPATIENT
Start: 2025-04-09

## 2025-04-07 RX ORDER — SODIUM CHLORIDE 0.9 % (FLUSH) 0.9 %
10 SYRINGE (ML) INJECTION
Status: CANCELLED | OUTPATIENT
Start: 2025-04-07

## 2025-04-07 RX ORDER — DIPHENHYDRAMINE HYDROCHLORIDE 50 MG/ML
50 INJECTION, SOLUTION INTRAMUSCULAR; INTRAVENOUS ONCE AS NEEDED
Status: DISCONTINUED | OUTPATIENT
Start: 2025-04-07 | End: 2025-04-07 | Stop reason: HOSPADM

## 2025-04-07 RX ORDER — SODIUM CHLORIDE 0.9 % (FLUSH) 0.9 %
10 SYRINGE (ML) INJECTION
Status: CANCELLED | OUTPATIENT
Start: 2025-04-08

## 2025-04-07 RX ORDER — EPINEPHRINE 0.3 MG/.3ML
0.3 INJECTION SUBCUTANEOUS ONCE AS NEEDED
Status: CANCELLED | OUTPATIENT
Start: 2025-04-09

## 2025-04-07 RX ORDER — EPINEPHRINE 0.3 MG/.3ML
0.3 INJECTION SUBCUTANEOUS ONCE AS NEEDED
Status: CANCELLED | OUTPATIENT
Start: 2025-04-08

## 2025-04-07 RX ORDER — SODIUM CHLORIDE 0.9 % (FLUSH) 0.9 %
10 SYRINGE (ML) INJECTION
Status: DISCONTINUED | OUTPATIENT
Start: 2025-04-07 | End: 2025-04-07 | Stop reason: HOSPADM

## 2025-04-07 RX ORDER — HEPARIN 100 UNIT/ML
500 SYRINGE INTRAVENOUS
Status: CANCELLED | OUTPATIENT
Start: 2025-04-09

## 2025-04-07 RX ORDER — DIPHENHYDRAMINE HYDROCHLORIDE 50 MG/ML
50 INJECTION, SOLUTION INTRAMUSCULAR; INTRAVENOUS ONCE AS NEEDED
Status: CANCELLED | OUTPATIENT
Start: 2025-04-09

## 2025-04-07 RX ORDER — EPINEPHRINE 0.3 MG/.3ML
0.3 INJECTION SUBCUTANEOUS ONCE AS NEEDED
Status: CANCELLED | OUTPATIENT
Start: 2025-04-07

## 2025-04-07 RX ORDER — DIPHENHYDRAMINE HYDROCHLORIDE 50 MG/ML
50 INJECTION, SOLUTION INTRAMUSCULAR; INTRAVENOUS ONCE AS NEEDED
Status: CANCELLED | OUTPATIENT
Start: 2025-04-07

## 2025-04-07 RX ORDER — SODIUM CHLORIDE 0.9 % (FLUSH) 0.9 %
10 SYRINGE (ML) INJECTION
Status: CANCELLED | OUTPATIENT
Start: 2025-04-09

## 2025-04-07 RX ORDER — PROCHLORPERAZINE EDISYLATE 5 MG/ML
5 INJECTION INTRAMUSCULAR; INTRAVENOUS ONCE AS NEEDED
Status: CANCELLED | OUTPATIENT
Start: 2025-04-08

## 2025-04-07 RX ORDER — HEPARIN 100 UNIT/ML
500 SYRINGE INTRAVENOUS
Status: CANCELLED | OUTPATIENT
Start: 2025-04-07

## 2025-04-07 RX ADMIN — SODIUM CHLORIDE: 9 INJECTION, SOLUTION INTRAVENOUS at 11:04

## 2025-04-07 RX ADMIN — ETOPOSIDE 202 MG: 20 INJECTION INTRAVENOUS at 12:04

## 2025-04-07 RX ADMIN — DEXAMETHASONE SODIUM PHOSPHATE 0.25 MG: 10 INJECTION, SOLUTION INTRAMUSCULAR; INTRAVENOUS at 11:04

## 2025-04-07 RX ADMIN — Medication 500 UNITS: at 04:04

## 2025-04-07 RX ADMIN — APREPITANT 130 MG: 130 INJECTION, EMULSION INTRAVENOUS at 11:04

## 2025-04-07 RX ADMIN — POTASSIUM CHLORIDE 500 ML/HR: 2 INJECTION, SOLUTION, CONCENTRATE INTRAVENOUS at 01:04

## 2025-04-07 RX ADMIN — POTASSIUM CHLORIDE 500 ML/HR: 2 INJECTION, SOLUTION, CONCENTRATE INTRAVENOUS at 08:04

## 2025-04-07 RX ADMIN — CISPLATIN 161 MG: 100 INJECTION, SOLUTION INTRAVENOUS at 11:04

## 2025-04-07 NOTE — PROGRESS NOTES
9:16 AM  Late entry from April 3, 2025    Oncology   [employer]  Glenn Medel   1955    Social Service Education   This is a 69 y.o.male with a medical diagnosis of bladder cancer.    Reviewed role of  during cancer treatment. Educated on role of SW on care team and resources available at the cancer center.     Current Support System: The pt was accompanied by his wife, Michelle who he reports to be his main support to him. The pt and his wife reported having family that will help and they do feel supported overall by family and friends.    This pt and his wife were provided with a tour of infusion today and then brought to the [facility] to meet [facility].     Patient provided with social contact number and advised to call with questions or make future appointment if further intervention is needed. SW to follow throughout tx.    Colleen Naidu LCSW  04/07/2025  9:17 AM

## 2025-04-07 NOTE — PROGRESS NOTES
PATIENT: Glenn Medel  MRN: [id number]  DATE: 4/7/2025      Diagnosis:   1. Small cell carcinoma of bladder    2. Malignant neoplasm of urinary bladder, unspecified site    3. Hypertension, unspecified type    4. Parkinson's disease, unspecified whether dyskinesia present, unspecified whether manifestations fluctuate    5. Glaucoma, unspecified glaucoma type, unspecified laterality    6. Right flank pain          Chief Complaint: Follow-up (Smell Cell Bladder Cancer)      Oncologic History:      Oncologic History     Oncologic Treatment     Pathology           Subjective:    Interval History: Mr. Medel is a 69 y.o. male with BPH, CKD, DMII, Glaucoma, HTN, obesity, Parkinson's disease, LOVE who presents for small cell bladder cancer.  Since the last clinic visit the patient's case was discussed with Urology and Medical oncology form MD David with recommendation for 4 cycles of platinum/Etoposide followed by cystectomy.  Pt endorses right flank pain for 3 days a couple of weeks ago with no pain currently.  The patient denies CP, cough, SOB, abdominal pain, nausea, vomiting, constipation, diarrhea.  The patient denies fever, chills, night sweats, weight loss, new lumps or bumps, easy bruising or bleeding.    Prior History:  The patient initially presented after developing hematuria.  The patient underwent cystoscopy on 01/30/2025 showing a tumor at the trigone near the right ureteral orifice measuring about 3 cm in diameter.  Pt underwent TURBT on 2/12/25 with path showing invasive small-cell carcinoma of urothelial origin and a background of urothelial carcinoma in Situ.  Muscularis propria was present on biopsy but was not involved.  The patient saw urologist Dr. Zapata on 03/12/2024 whom recommended chemotherapy followed by surgery.  The patient was discussed at tumor board on 03/13/2025 with recommendation on chemotherapy, PET-CT, MRI brain.  MRI brain on 03/18/2025 showed 4 x 3 mm probable arachnoid cyst in the  left posterior sella.  PET-CT on 03/19/2025 showed no evidence of disease.      Past Medical History:   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/08/2018    LUE numbness 09/08/2020    Obesity     Parkinson's disease     Pyelonephritis 02/21/2025    Sepsis 06/09/2023    Sleep apnea     uses cpap    Suspected cerebrovascular accident (CVA) 09/08/2020    Syncope and collapse 07/02/2024       Past Surgical HIstory:   Past Surgical History:   Procedure Laterality Date    CATARACT EXTRACTION W/  INTRAOCULAR LENS IMPLANT Left 11/30/2017    CATARACT EXTRACTION W/  INTRAOCULAR LENS IMPLANT Right 12/11/2018    Dr Garcia    CATARACT EXTRACTION W/  INTRAOCULAR LENS IMPLANT Right 12/11/2018    Procedure: EXTRACTION, CATARACT, WITH IOL INSERTION;  Surgeon: Damaris Garcia MD;  Location: [facility] OR;  Service: Ophthalmology;  Laterality: Right;    COLONOSCOPY N/A 09/18/2017    Procedure: COLONOSCOPY;  Surgeon: Paul Feliz Jr., MD;  Location: [facility] ENDO;  Service: Endoscopy;  Laterality: N/A;    COLONOSCOPY N/A 12/21/2022    Procedure: COLONOSCOPY;  Surgeon: Paul Feliz Jr., MD;  Location: [facility] ENDO;  Service: Endoscopy;  Laterality: N/A;    COLONOSCOPY W/ POLYPECTOMY  04/13/2010    YARELI.   One 1 cm polyp in the sigmoid colon.  Internal hemorrhoids.    COSMETIC SURGERY      DIGITAL RECTAL EXAMINATION UNDER ANESTHESIA N/A 07/23/2019    Procedure: EXAM UNDER ANESTHESIA, DIGITAL, RECTUM;  Surgeon: Kaz Keating MD;  Location: [facility] OR;  Service: General;   Laterality: N/A;    EXAMINATION UNDER ANESTHESIA N/A 04/24/2019    Procedure: EXAM UNDER ANESTHESIA;  Surgeon: Kaz Keating MD;  Location: [facility] OR;  Service: General;  Laterality: N/A;    EXCISIONAL HEMORRHOIDECTOMY N/A 04/24/2019    Procedure: HEMORRHOIDECTOMY (lithotomy);  Surgeon: Kaz Keating MD;  Location: [facility] OR;  Service: General;  Laterality: N/A;    EYE SURGERY      GANGLION CYST EXCISION Left     INJECTION OF ANESTHETIC AGENT AROUND MEDIAL BRANCH NERVES INNERVATING LUMBAR FACET JOINT Bilateral 04/09/2024    Procedure: Block-nerve-medial branch-lumbar-L4/5-L5/S1;  Surgeon: Everett Taveras MD;  Location: [facility] OR;  Service: Pain Management;  Laterality: Bilateral;    INJECTION OF ANESTHETIC AGENT AROUND MEDIAL BRANCH NERVES INNERVATING LUMBAR FACET JOINT Bilateral 05/06/2024    Procedure: Block-nerve-medial branch-lumbar-L4/5-L5/S1;  Surgeon: Everett Taveras MD;  Location: [facility] OR;  Service: Pain Management;  Laterality: Bilateral;    INJECTION OF ANESTHETIC AGENT AROUND PUDENDAL NERVE N/A 04/24/2019    Procedure: BLOCK, NERVE, PUDENDAL;  Surgeon: Kaz Keating MD;  Location: [facility] OR;  Service: General;  Laterality: N/A;    INJECTION OF ANESTHETIC AGENT AROUND PUDENDAL NERVE N/A 07/23/2019    Procedure: BLOCK, NERVE, PUDENDAL;  Surgeon: Kaz Keating MD;  Location: [facility] OR;  Service: General;  Laterality: N/A;    INJECTION, ALLOGRAFT, INTERVERTEBRAL DISC N/A 09/30/2024    Procedure: INJECTION,ALLOGRAFT,INTERVERTEBRAL DISC,1ST LEVEL  L4/5;  Surgeon: Everett Taveras MD;  Location: [facility] OR;  Service: Pain Management;  Laterality: N/A;    INSERTION OF TUNNELED CENTRAL VENOUS CATHETER (CVC) WITH SUBCUTANEOUS PORT Right 3/28/2025    Procedure: [id number];  Surgeon: Hebert Gardner MD;  Location: [facility] OR;  Service: General;  Laterality: Right;    INSTILLATION OF URINARY BLADDER  02/11/2025    Procedure: INSTILLATION, BLADDER w/ gemcitabine;  Surgeon: MARIZA Donahue MD;   Location: [facility] OR;  Service: Urology;;    RADIOFREQUENCY ABLATION OF LUMBAR MEDIAL BRANCH NERVE AT SINGLE LEVEL Bilateral 06/04/2024    Procedure: Radiofrequency Ablation, Nerve, Spinal, Lumbar, Medial Branch, L4/5 and L5/S1;  Surgeon: Everett Taveras MD;  Location: [facility];  Service: Pain Management;  Laterality: Bilateral;    REPAIR OF RETINAL DETACHMENT WITH VITRECTOMY Right 08/01/2018    Procedure: REPAIR, RETINAL DETACHMENT, WITH VITRECTOMY;  Surgeon: SAJAN Pulido MD;  Location: [address];  Service: Ophthalmology;  Laterality: Right;  35 min    SKIN BIOPSY      TONSILLECTOMY      TRANSRECTAL BIOPSY OF PROSTATE WITH ULTRASOUND GUIDANCE N/A 01/31/2023    Procedure: BIOPSY, PROSTATE, RECTAL APPROACH, WITH US GUIDANCE;  Surgeon: MARIZA Donaheu MD;  Location: [facility] OR;  Service: Urology;  Laterality: N/A;    TRANSRECTAL BIOPSY OF PROSTATE WITH ULTRASOUND GUIDANCE N/A 04/12/2024    Procedure: BIOPSY, PROSTATE, RECTAL APPROACH, WITH US GUIDANCE;  Surgeon: MARIZA Donahue MD;  Location: [facility];  Service: Urology;  Laterality: N/A;    TRIGGER FINGER RELEASE Right     X 2    TURBT (TRANSURETHRAL RESECTION OF BLADDER TUMOR) N/A 02/11/2025    Procedure: TURBT (TRANSURETHRAL RESECTION OF BLADDER TUMOR);  Surgeon: MARIZA Donahue MD;  Location: [facility] OR;  Service: Urology;  Laterality: N/A;    uro lift      for enlarged prostate       Family History:   Family History   Problem Relation Name Age of Onset    Colon cancer Mother      Hypertension Mother      Diabetes Mother      Stroke Mother      Cancer Mother          colon    Kidney disease Mother      Heart disease Father      Diabetes Father      Colon cancer Father      Cancer Father          colon    Cataracts Father      No Known Problems Sister      Heart disease Brother  62    Kidney disease Brother      Dementia Brother Rodrigue     Abnormal EKG Brother Rodrigue     Breast cancer Maternal Aunt      Cancer Maternal Aunt          breast    Cancer  "Maternal Aunt          brain    Brain cancer Maternal Aunt      No Known Problems Maternal Uncle      No Known Problems Paternal Aunt      No Known Problems Paternal Uncle      Glaucoma Maternal Grandmother      No Known Problems Maternal Grandfather      No Known Problems Paternal Grandmother      No Known Problems Paternal Grandfather      Amblyopia Neg Hx      Blindness Neg Hx      Macular degeneration Neg Hx      Retinal detachment Neg Hx      Strabismus Neg Hx      Thyroid disease Neg Hx      Parkinsonism Neg Hx         Social History:  reports that he has never smoked. He has never used smokeless tobacco. He reports current alcohol use. He reports that he does not use drugs.    Allergies:  Review of patient's allergies indicates:   Allergen Reactions    Avelox [moxifloxacin] Hives and Rash       Medications:  Current Medications[1]    Review of Systems   Constitutional:  Negative for chills, diaphoresis, fatigue, fever and unexpected weight change.   Respiratory:  Negative for cough and shortness of breath.    Cardiovascular:  Negative for chest pain and palpitations.   Gastrointestinal:  Negative for abdominal pain, constipation, diarrhea, nausea and vomiting.   Musculoskeletal:         Right flank pain now improved   Skin:  Negative for color change and rash.   Neurological:  Negative for headaches.   Hematological:  Negative for adenopathy. Does not bruise/bleed easily.       ECOG Performance Status: 1   Objective:      Vitals:   Vitals:    04/07/25 0803   BP: 128/79   BP Location: Left arm   Patient Position: Sitting   Pulse: 96   Resp: 20   Temp: 98.2 °F (36.8 °C)   TempSrc: Temporal   SpO2: 98%   Weight: 80.6 kg (177 lb 11.1 oz)   Height: 5' 11" (1.803 m)         Physical Exam  Constitutional:       General: He is not in acute distress.     Appearance: He is well-developed. He is not diaphoretic.   HENT:      Head: Normocephalic and atraumatic.   Eyes:      General: No scleral icterus.        Right eye: " No discharge.         Left eye: No discharge.   Cardiovascular:      Rate and Rhythm: Normal rate and regular rhythm.      Heart sounds: Normal heart sounds. No murmur heard.     No friction rub. No gallop.   Pulmonary:      Effort: Pulmonary effort is normal. No respiratory distress.      Breath sounds: Normal breath sounds. No wheezing or rales.   Chest:      Chest wall: No tenderness.   Abdominal:      General: Bowel sounds are normal. There is no distension.      Palpations: Abdomen is soft. There is no mass.      Tenderness: There is no abdominal tenderness. There is no rebound.   Musculoskeletal:      Comments: PORT in right chest   Skin:     General: Skin is warm and dry.      Findings: No erythema or rash.   Neurological:      Mental Status: He is alert and oriented to person, place, and time.   Psychiatric:         Behavior: Behavior normal.         Laboratory Data:  Lab Visit on 04/07/2025   Component Date Value Ref Range Status    Sodium 04/07/2025 139  136 - 145 mmol/L Final    Potassium 04/07/2025 3.6  3.5 - 5.1 mmol/L Final    Chloride 04/07/2025 103  95 - 110 mmol/L Final    CO2 04/07/2025 24  23 - 29 mmol/L Final    Glucose 04/07/2025 167 (H)  70 - 110 mg/dL Final    BUN 04/07/2025 16  8 - 23 mg/dL Final    Creatinine 04/07/2025 0.9  0.5 - 1.4 mg/dL Final    Calcium 04/07/2025 9.0  8.7 - 10.5 mg/dL Final    Protein Total 04/07/2025 7.1  6.0 - 8.4 gm/dL Final    Albumin 04/07/2025 3.7  3.5 - 5.2 g/dL Final    Bilirubin Total 04/07/2025 0.8  0.1 - 1.0 mg/dL Final    For infants and newborns, interpretation of results should be based   on gestational age, weight and in agreement with clinical   observations.    Premature Infant recommended reference ranges:   0-24 hours:  <8.0 mg/dL   24-48 hours: <12.0 mg/dL   3-5 days:    <15.0 mg/dL   6-29 days:   <15.0 mg/dL    ALP 04/07/2025 124  40 - 150 unit/L Final    AST 04/07/2025 23  11 - 45 unit/L Final    ALT 04/07/2025 6 (L)  10 - 44 unit/L Final    Anion  Gap 04/07/2025 12  8 - 16 mmol/L Final    eGFR 04/07/2025 >60  >60 mL/min/1.73/m2 Final    Estimated GFR calculated using the CKD-EPI creatinine (2021) equation.    Magnesium  04/07/2025 1.7  1.6 - 2.6 mg/dL Final    WBC 04/07/2025 7.54  3.90 - 12.70 K/uL Final    RBC 04/07/2025 4.96  4.60 - 6.20 M/uL Final    HGB 04/07/2025 14.1  14.0 - 18.0 gm/dL Final    HCT 04/07/2025 43.1  40.0 - 54.0 % Final    MCV 04/07/2025 87  82 - 98 fL Final    MCH 04/07/2025 28.4  27.0 - 31.0 pg Final    MCHC 04/07/2025 32.7  32.0 - 36.0 g/dL Final    RDW 04/07/2025 14.7 (H)  11.5 - 14.5 % Final    Platelet Count 04/07/2025 195  150 - 450 K/uL Final    MPV 04/07/2025 8.5 (L)  9.2 - 12.9 fL Final    Nucleated RBC 04/07/2025 0  <=0 /100 WBC Final    Neut % 04/07/2025 56.2  38 - 73 % Final    Lymph % 04/07/2025 32.2  18 - 48 % Final    Mono % 04/07/2025 6.8  4 - 15 % Final    Eos % 04/07/2025 3.7  <=8 % Final    Basophil % 04/07/2025 0.8  <=1.9 % Final    Imm Grans % 04/07/2025 0.3  0.0 - 0.5 % Final    Neut # 04/07/2025 4.24  1.8 - 7.7 K/uL Final    Lymph # 04/07/2025 2.43  1 - 4.8 K/uL Final    Mono # 04/07/2025 0.51  0.3 - 1 K/uL Final    Eos # 04/07/2025 0.28  <=0.5 K/uL Final    Baso # 04/07/2025 0.06  <=0.2 K/uL Final    Imm Grans # 04/07/2025 0.02  0.00 - 0.04 K/uL Final    Mild elevation in immature granulocytes is non specific and can be seen in a variety of conditions including stress response, acute inflammation, trauma and pregnancy. Correlation with other laboratory and clinical findings is essential.         Imaging:     MRI Brain  3/18/25  INTRACRANIAL: Mild age-appropriate volume loss. Unchanged prominent right retrocerebellar CSF/arachnoid cyst. Few scattered T2 FLAIR hyperintense white matter foci are minimally increased from prior study on 05/18/2023, likely related to chronic microvascular ischemic change. No abnormal enhancement demonstrated. No parenchymal restricted diffusion. No evidence of intracranial hemorrhage.  No intracranial mass effect. No hydrocephalus. 4 x 3 mm probable arachnoid cyst in the left posterior sella (series 21, image 118). Otherwise, visualized pituitary gland and infundibulum are normal. Visualized major intracranial vascular structures demonstrate normal flow voids and are normal in course and caliber.     SINUSES: Paranasal sinuses are clear. Left mastoid effusion.     ORBITS: Bilateral lens replacements.      PET/CT 3/19/25  Head and Neck:     Brain demonstrates normal metabolism. However, FDG-PET has an approximate 60% sensitivity for brain metastases which are best detected by MRI with gadolinium. Physiologic uptake is in the neck.     Chest:     No FDG avid pulmonary lesions are present. No enlarged or FDG avid lymph nodes are in the chest.     Abdomen and Pelvis:     Physiologic uptake is in the liver, spleen, urinary system and bowel. No enlarged or FDG avid lymph nodes are in the abdomen or pelvis. Adrenal glands are normal. Urinary bladder is decompressed. Prostate is enlarged.     Musculoskeletal:     No FDG avid osseous lesions are present.       Assessment:       1. Small cell carcinoma of bladder    2. Malignant neoplasm of urinary bladder, unspecified site    3. Hypertension, unspecified type    4. Parkinson's disease, unspecified whether dyskinesia present, unspecified whether manifestations fluctuate    5. Glaucoma, unspecified glaucoma type, unspecified laterality    6. Right flank pain             Plan:     Small Cell Carcinoma of the Bladder - the patient diagnosed with small-cell carcinoma of the bladder on biopsy 02/12/2025  -Urologist Dr. Zapata whom patient met on 03/12/2024 recommended chemotherapy followed by cystectomy  -The patient discussed at tumor board on 03/13/2024 with recommendations for chemotherapy   -MRI brain on 03/18/2025 showed no evidence of disease  -PET-CT on 03/1925 showed no evidence of disease   -Case discussed with MD David with MD [facility] and  medical oncology with recommendation for 4 cycels of platinum and etoposide followed by cystectomy  -Will proceed with cycle 1 today  Visit today included increased complexity associated with the care of the episodic problem (chemotherapy) addressed and managing the longitudinal care of the patient due to the serious and/or complex managed problem(s)  Small Cell Carcinoma of the bladder .    HTN - pt on prazosin  -BP controlled  -Will monitor    Parkinson's - pt on sinemet, rasagiline  -Symptoms controlled  -Will monitor    DMII - pt on insulin, metformin, tirzepatide  Lab Results   Component Value Date    HGBA1C 7.6 (H) 01/15/2025   -Will monitor     Glaucoma - pt using alphagan, trusopt eye drops  -Ophthalmology managing    Right Flank Pain - one episode in the last couple of weeks  -Likely musculoskeletal   -Will monitor    Route Chart for Scheduling    Med Onc Chart Routing      Follow up with physician 6 weeks. PT can proceed with treatment.  Pt needs a CBC, CMP and Mg on 4/25/25 with appt with NP.  Pt  needs the same labs and an appt with me on 5/16/25.   Follow up with GILMAR 3 weeks.   Infusion scheduling note    Injection scheduling note    Labs    Imaging    Pharmacy appointment    Other referrals                Treatment Plan Information   OP EP (CISplatin etoposide) Q3W Moris Lowery MD   Associated diagnosis: Malignant neoplasm of urinary bladder   noted on 3/21/2025  Associated diagnosis: Small cell carcinoma of bladder Stage I cT1, cN0, cM0 noted on 2/11/2025   Line of treatment: Adjuvant  Treatment Goal: Curative     Upcoming Treatment Dates - OP EP (CISplatin etoposide) Q3W    4/8/2025       Chemotherapy       etoposide (VEPESID) 100 mg/m2 = 202 mg in 0.9% NaCl 510.1 mL chemo infusion  4/9/2025       Chemotherapy       etoposide (VEPESID) 100 mg/m2 = 202 mg in 0.9% NaCl 510.1 mL chemo infusion       Growth Factor       pegfilgrastim (NEULASTA (ON BODY INJECTOR)) injection 6 mg  4/28/2025        Chemotherapy       CISplatin (Platinol) in 0.9% NaCl 661 mL chemo infusion       etoposide (VEPESID) in 0.9% NaCl 510.1 mL chemo infusion       Pre-Hydration       0.9% NaCl 1,000 mL with magnesium sulfate 1 g, potassium chloride 20 mEq infusion       Post-Hydration       0.9% NaCl 1,000 mL with magnesium sulfate 1 g, potassium chloride 20 mEq infusion       Antiemetics       aprepitant (Cinvanti) injection 130 mg       palonosetron 0.25mg/dexAMETHasone 12mg in NS IVPB 0.25 mg 50 mL  4/29/2025       Chemotherapy       etoposide (VEPESID) in 0.9% NaCl 510.1 mL chemo infusion    Therapy Plan Information  URO GEMCITABINE (INTRAVESICAL) for Small cell carcinoma of bladder, noted on 2/11/2025  Chemotherapy  gemcitabine 2,000 mg in 0.9% NaCl SolP 100 mL bladder instillation  2,000 mg, Intravesical, 1 time a week      No therapy plan of the specified type found.    No therapy plan of the specified type found.    Moris Lowery MD  [facility]  Hematology and Oncology  [facility] [address] [place] [address]   O: [phone]  F: [phone]               [1]   Current Outpatient Medications   Medication Sig Dispense Refill    allopurinoL (ZYLOPRIM) 300 MG tablet Take 1 tablet (300 mg total) by mouth once daily. 90 tablet 0    ammonium lactate (LAC-HYDRIN) 12 % lotion Apply topically 2 (two) times daily.      atorvastatin (LIPITOR) 20 MG tablet Take 1 tablet (20 mg total) by mouth every evening. 90 tablet 3    BACILLUS COAGULANS ORAL Take 1 tablet by mouth once daily.      brimonidine 0.2% (ALPHAGAN) 0.2 % Drop INSTILL 1 DROP IN BOTH EYES TWICE DAILY 10 mL 0    carbidopa-levodopa  mg (SINEMET)  mg per tablet Take 1.5 tablets by mouth 5 (five) times daily. 675 tablet 3    cholecalciferol, vitamin D3, (D3-2000 ORAL) Take by mouth once daily.      clonazePAM (KLONOPIN) 1 MG tablet Take 1 tablet (1 mg total) by mouth every evening. 60 tablet 2    cyanocobalamin (VITAMIN  "B-12) 1000 MCG tablet Take 100 mcg by mouth once daily.      dexAMETHasone (DECADRON) 4 MG Tab Take 2 tablets (8 mg total) by mouth once daily. on days 2-4 of each chemotherapy cycle. 6 tablet 5    donepeziL (ARICEPT) 5 MG tablet Take 1 tablet (5 mg total) by mouth every evening. 30 tablet 11    dorzolamide (TRUSOPT) 2 % ophthalmic solution Place 1 drop into both eyes 2 (two) times daily. 10 mL 3    DULoxetine (CYMBALTA) 30 MG capsule Take 1 capsule (30 mg total) by mouth once daily. 90 capsule 1    gabapentin (NEURONTIN) 300 MG capsule Take 1 capsule (300 mg total) by mouth 2 (two) times daily. 60 capsule 1    insulin glargine U-100, Lantus, (LANTUS SOLOSTAR U-100 INSULIN) 100 unit/mL (3 mL) InPn pen Inject 7 Units into the skin once daily.      ketoconazole (NIZORAL) 2 % shampoo Apply topically.      LIDOcaine-prilocaine (EMLA) cream Apply to port site one hour prior to chemotherapy 30 g 0    metFORMIN (GLUCOPHAGE-XR) 500 MG ER 24hr tablet Take 2 tablets (1,000 mg total) by mouth once daily. 180 tablet 3    OLANZapine (ZYPREXA) 5 MG tablet Take 1 tablet (5 mg total) by mouth every evening. on days 1-4 of each chemotherapy cycle. 4 tablet 5    pen needle, diabetic (BD HIMANSHU 2ND GEN PEN NEEDLE) 32 gauge x 5/32" Ndle Inject 1 each into the skin once daily. 100 each 3    prazosin (MINIPRESS) 1 MG Cap Take 1 capsule (1 mg total) by mouth every evening. 90 capsule 3    prochlorperazine (COMPAZINE) 5 MG tablet Take 1 tablet (5 mg total) by mouth every 6 (six) hours as needed for Nausea. 20 tablet 5    ramelteon (ROZEREM) 8 mg tablet Take 1 tablet (8 mg total) by mouth nightly. 90 tablet 3    rasagiline (AZILECT) 1 mg Tab TAKE 1 TABLET(1 MG) BY MOUTH EVERY EVENING 30 tablet 11    tirzepatide 10 mg/0.5 mL PnIj Inject 10 mg into the skin every 7 days. (Increased dose) 2 mL 2     No current facility-administered medications for this visit.     Facility-Administered Medications Ordered in Other Visits   Medication Dose Route " Frequency Provider Last Rate Last Admin    0.9% NaCl 1,000 mL with magnesium sulfate 1 g, potassium chloride 20 mEq infusion  500 mL/hr Intravenous 1 time in Clinic/HOD Moris Lowery MD        0.9% NaCl 250 mL flush bag   Intravenous PRN Moris Lowery MD 25 mL/hr at 04/07/25 1104 New Bag at 04/07/25 1104    CISplatin (Platinol) 80 mg/m2 = 161 mg in 0.9% NaCl 565 mL chemo infusion  80 mg/m2 Intravenous 1 time in Clinic/HOD Moris Lowery MD        diphenhydrAMINE injection 50 mg  50 mg Intravenous Once PRN Moris Lowery MD        EPINEPHrine (EPIPEN) 0.3 mg/0.3 mL pen injection 0.3 mg  0.3 mg Intramuscular Once PRN Moris Lowery MD        etoposide (VEPESID) 100 mg/m2 = 202 mg in 0.9% NaCl 575.1 mL chemo infusion  100 mg/m2 Intravenous 1 time in Clinic/HOD Moris Lowery MD        heparin, porcine (PF) 100 unit/mL injection flush 500 Units  500 Units Intravenous PRN Moris Lowery MD        hydrocortisone sodium succinate injection 100 mg  100 mg Intravenous Once PRN Moris Lowery MD        palonosetron 0.25mg/dexAMETHasone 12mg in NS IVPB 0.25 mg 50 mL  0.25 mg Intravenous 1 time in Clinic/HOD Moris Lowery MD        prochlorperazine injection Soln 5 mg  5 mg Intravenous Once PRN Moris Lowery MD        sodium chloride 0.9% flush 10 mL  10 mL Intravenous PRN Moris Lowery MD

## 2025-04-07 NOTE — PLAN OF CARE
Pt arrived to clinic today for Cisplatin and VP16 infusions and tolerated well. No changes throughout therapy. Pt aware of follow up appointments and side effects of drugs. Discharged to home. NAD.

## 2025-04-08 ENCOUNTER — INFUSION (OUTPATIENT)
Dept: INFUSION THERAPY | Facility: HOSPITAL | Age: 70
End: 2025-04-08
Attending: INTERNAL MEDICINE
Payer: MEDICARE

## 2025-04-08 ENCOUNTER — PATIENT MESSAGE (OUTPATIENT)
Dept: HEMATOLOGY/ONCOLOGY | Facility: CLINIC | Age: 70
End: 2025-04-08
Payer: MEDICARE

## 2025-04-08 ENCOUNTER — TELEPHONE (OUTPATIENT)
Dept: HEMATOLOGY/ONCOLOGY | Facility: CLINIC | Age: 70
End: 2025-04-08
Payer: MEDICARE

## 2025-04-08 VITALS
HEART RATE: 88 BPM | SYSTOLIC BLOOD PRESSURE: 159 MMHG | BODY MASS INDEX: 24.88 KG/M2 | WEIGHT: 177.69 LBS | TEMPERATURE: 98 F | OXYGEN SATURATION: 97 % | DIASTOLIC BLOOD PRESSURE: 85 MMHG | RESPIRATION RATE: 18 BRPM | HEIGHT: 71 IN

## 2025-04-08 DIAGNOSIS — C67.9 MALIGNANT NEOPLASM OF URINARY BLADDER, UNSPECIFIED SITE: Primary | ICD-10-CM

## 2025-04-08 DIAGNOSIS — C67.9 SMALL CELL CARCINOMA OF BLADDER: ICD-10-CM

## 2025-04-08 PROCEDURE — 63600175 PHARM REV CODE 636 W HCPCS: Mod: PN | Performed by: INTERNAL MEDICINE

## 2025-04-08 PROCEDURE — A4216 STERILE WATER/SALINE, 10 ML: HCPCS | Mod: PN | Performed by: INTERNAL MEDICINE

## 2025-04-08 PROCEDURE — 25000003 PHARM REV CODE 250: Mod: PN | Performed by: INTERNAL MEDICINE

## 2025-04-08 PROCEDURE — 96413 CHEMO IV INFUSION 1 HR: CPT | Mod: PN

## 2025-04-08 RX ORDER — SODIUM CHLORIDE 0.9 % (FLUSH) 0.9 %
10 SYRINGE (ML) INJECTION
Status: DISCONTINUED | OUTPATIENT
Start: 2025-04-08 | End: 2025-04-08 | Stop reason: HOSPADM

## 2025-04-08 RX ORDER — EPINEPHRINE 0.3 MG/.3ML
0.3 INJECTION SUBCUTANEOUS ONCE AS NEEDED
Status: DISCONTINUED | OUTPATIENT
Start: 2025-04-08 | End: 2025-04-08 | Stop reason: HOSPADM

## 2025-04-08 RX ORDER — HEPARIN 100 UNIT/ML
500 SYRINGE INTRAVENOUS
Status: DISCONTINUED | OUTPATIENT
Start: 2025-04-08 | End: 2025-04-08 | Stop reason: HOSPADM

## 2025-04-08 RX ORDER — PROCHLORPERAZINE EDISYLATE 5 MG/ML
5 INJECTION INTRAMUSCULAR; INTRAVENOUS ONCE AS NEEDED
Status: DISCONTINUED | OUTPATIENT
Start: 2025-04-08 | End: 2025-04-08 | Stop reason: HOSPADM

## 2025-04-08 RX ORDER — DIPHENHYDRAMINE HYDROCHLORIDE 50 MG/ML
50 INJECTION, SOLUTION INTRAMUSCULAR; INTRAVENOUS ONCE AS NEEDED
Status: DISCONTINUED | OUTPATIENT
Start: 2025-04-08 | End: 2025-04-08 | Stop reason: HOSPADM

## 2025-04-08 RX ADMIN — SODIUM CHLORIDE: 9 INJECTION, SOLUTION INTRAVENOUS at 03:04

## 2025-04-08 RX ADMIN — Medication 10 ML: at 04:04

## 2025-04-08 RX ADMIN — Medication 500 UNITS: at 04:04

## 2025-04-08 RX ADMIN — ETOPOSIDE 202 MG: 20 INJECTION INTRAVENOUS at 03:04

## 2025-04-08 NOTE — ASSESSMENT & PLAN NOTE
Therapy/Counseling: Recommended in the setting of depression, anxiety, and reduced frustration tolerance.     Psychiatry: He may also consider establishing care with psychiatry for medication management.    Samples Given: .\\n- VTAMA 1% topical cream (2) Plan: .\\n- Eczema was improving significantly with Vtama; however, pt is flaring as she ran out of samples. \\n- Patient has tried and failed tacrolimus, fluocinonide, trimacinolone with no benefit.\\n- If unable to get Vtama prescription, we will consider Dupixent. Mom would highly prefer to use Vtama rather than proceed to Dupixent as they have seen very good improvement with Vtama and she does not want to start a systemic medication and subject pt to injections if not fully necessary. Detail Level: Zone Continue Regimen: .\\n- Vtama 1 % topical cream, Apply a thin layer to affected areas once daily\\n- Zyrtec daily\\n- Castor oil (Mother sees improvement) Discontinue Regimen: .\\n- Mupirocin ointment (no longer needed)

## 2025-04-08 NOTE — TELEPHONE ENCOUNTER
Spoke to patient regarding hypertension seen on chemo . He is feeling fine with no issues he thinks it may be the steroids because its also elevating his glucose for which he has had to take some extra insulin for. Patient to continue to monitor BP TID for the next couple days and let us know of any s/s of hypertension.

## 2025-04-08 NOTE — PLAN OF CARE
Problem: Fatigue  Goal: Improved Activity Tolerance  Intervention: Promote Improved Energy  Flowsheets (Taken 4/8/2025 1515)  Fatigue Management:   activity schedule adjusted   fatigue-related activity identified   frequent rest breaks encouraged   paced activity encouraged  Sleep/Rest Enhancement:   regular sleep/rest pattern promoted   relaxation techniques promoted   natural light exposure provided  Activity Management:   Ambulated -L4   Ambulated to bathroom - L4   Ambulated in [place] - L4   Up in stretcher chair - L1  Environmental Support:   personal routine supported   rest periods encouraged     Problem: Adult Inpatient Plan of Care  Goal: Patient-Specific Goal (Individualized)  Outcome: Progressing  Flowsheets (Taken 4/8/2025 1515)  Individualized Care Needs: recliner, tv, dim lights, wife at chairside, conversation, snacks  Anxieties, Fears or Concerns: none  Patient/Family-Specific Goals (Include Timeframe): no s/s of reaction during treatment     Problem: Adult Inpatient Plan of Care  Goal: Plan of Care Review  Outcome: Progressing  Flowsheets (Taken 4/8/2025 1625)  Plan of Care Reviewed With:   patient   spouse   Pt tolerated his Etoposide infusion well, NAD. No new c/o voiced. Pt given a schedule and reviewed, pt verbalized understanding. Pt ambulated out of the clinic without difficulty accompanied by his wife.

## 2025-04-09 ENCOUNTER — TELEPHONE (OUTPATIENT)
Dept: FAMILY MEDICINE | Facility: CLINIC | Age: 70
End: 2025-04-09
Payer: MEDICARE

## 2025-04-09 ENCOUNTER — OFFICE VISIT (OUTPATIENT)
Dept: SLEEP MEDICINE | Facility: CLINIC | Age: 70
End: 2025-04-09
Payer: MEDICARE

## 2025-04-09 ENCOUNTER — INFUSION (OUTPATIENT)
Dept: INFUSION THERAPY | Facility: HOSPITAL | Age: 70
End: 2025-04-09
Attending: INTERNAL MEDICINE
Payer: MEDICARE

## 2025-04-09 VITALS — HEIGHT: 71 IN | WEIGHT: 177.69 LBS | BODY MASS INDEX: 24.88 KG/M2

## 2025-04-09 VITALS
SYSTOLIC BLOOD PRESSURE: 177 MMHG | HEART RATE: 89 BPM | HEIGHT: 71 IN | DIASTOLIC BLOOD PRESSURE: 95 MMHG | TEMPERATURE: 98 F | WEIGHT: 177.69 LBS | OXYGEN SATURATION: 98 % | RESPIRATION RATE: 16 BRPM | BODY MASS INDEX: 24.88 KG/M2

## 2025-04-09 DIAGNOSIS — F51.01 PRIMARY INSOMNIA: ICD-10-CM

## 2025-04-09 DIAGNOSIS — G47.33 SEVERE OBSTRUCTIVE SLEEP APNEA: ICD-10-CM

## 2025-04-09 DIAGNOSIS — G47.00 INSOMNIA, UNSPECIFIED TYPE: ICD-10-CM

## 2025-04-09 DIAGNOSIS — C67.9 SMALL CELL CARCINOMA OF BLADDER: ICD-10-CM

## 2025-04-09 DIAGNOSIS — C67.9 MALIGNANT NEOPLASM OF URINARY BLADDER, UNSPECIFIED SITE: Primary | ICD-10-CM

## 2025-04-09 DIAGNOSIS — F51.4 NIGHT TERRORS, ADULT: ICD-10-CM

## 2025-04-09 DIAGNOSIS — G47.33 OSA ON CPAP: Primary | ICD-10-CM

## 2025-04-09 PROCEDURE — 25000003 PHARM REV CODE 250: Mod: PN | Performed by: INTERNAL MEDICINE

## 2025-04-09 PROCEDURE — 96413 CHEMO IV INFUSION 1 HR: CPT | Mod: PN

## 2025-04-09 PROCEDURE — 63600175 PHARM REV CODE 636 W HCPCS: Mod: PN | Performed by: INTERNAL MEDICINE

## 2025-04-09 PROCEDURE — 96377 APPLICATON ON-BODY INJECTOR: CPT | Mod: PN

## 2025-04-09 RX ORDER — EPINEPHRINE 0.3 MG/.3ML
0.3 INJECTION SUBCUTANEOUS ONCE AS NEEDED
Status: DISCONTINUED | OUTPATIENT
Start: 2025-04-09 | End: 2025-04-09 | Stop reason: HOSPADM

## 2025-04-09 RX ORDER — SODIUM CHLORIDE 0.9 % (FLUSH) 0.9 %
10 SYRINGE (ML) INJECTION
Status: DISCONTINUED | OUTPATIENT
Start: 2025-04-09 | End: 2025-04-09 | Stop reason: HOSPADM

## 2025-04-09 RX ORDER — PROCHLORPERAZINE EDISYLATE 5 MG/ML
5 INJECTION INTRAMUSCULAR; INTRAVENOUS ONCE AS NEEDED
Status: DISCONTINUED | OUTPATIENT
Start: 2025-04-09 | End: 2025-04-09 | Stop reason: HOSPADM

## 2025-04-09 RX ORDER — HEPARIN 100 UNIT/ML
500 SYRINGE INTRAVENOUS
Status: DISCONTINUED | OUTPATIENT
Start: 2025-04-09 | End: 2025-04-09 | Stop reason: HOSPADM

## 2025-04-09 RX ORDER — DIPHENHYDRAMINE HYDROCHLORIDE 50 MG/ML
50 INJECTION, SOLUTION INTRAMUSCULAR; INTRAVENOUS ONCE AS NEEDED
Status: DISCONTINUED | OUTPATIENT
Start: 2025-04-09 | End: 2025-04-09 | Stop reason: HOSPADM

## 2025-04-09 RX ADMIN — ETOPOSIDE 202 MG: 20 INJECTION INTRAVENOUS at 03:04

## 2025-04-09 RX ADMIN — PEGFILGRASTIM 6 MG: KIT SUBCUTANEOUS at 04:04

## 2025-04-09 RX ADMIN — SODIUM CHLORIDE: 9 INJECTION, SOLUTION INTRAVENOUS at 03:04

## 2025-04-09 NOTE — PROGRESS NOTES
"Subjective:      Patient ID: Glenn Medel is a 69 y.o. male.    Chief Complaint: Sleep Apnea  The patient location is: [place]  The chief complaint leading to consultation is: sleep apnea  Visit type: Virtual visit with synchronous audio and video  Total time spent with patient: 14 min   Each patient to whom he or she provides medical services by telemedicine is:  (1) informed of the relationship between the physician and patient and the respective role of any other health care provider with respect to management of the patient; and (2) notified that he or she may decline to receive medical services by telemedicine and may withdraw from such care at any time.    HPI  Presents for sleep apnea on AutoPAP therapy. Recent replacement.  Patient states improved symptoms with use of AutoPAP. Sleeping more soundly. Waking up feeling more refreshed. Improved daytime sleepiness. Patient states he is benefiting from use of the AutoPAP.   He has obstructive sleep apnea, REM movement with Parkinsons, night terrors with anxiety. He has very severe sleep apnea with /hr.   He takes Klonopin for REM movement this order.  Prazosin for nightmares. Rozerem for insomnia.        Problem List[1]  Ht 5' 11" (1.803 m)   Wt 80.6 kg (177 lb 11.1 oz)   BMI 24.78 kg/m²   Body mass index is 24.78 kg/m².    Review of Systems   Constitutional: Negative.    HENT: Negative.     Respiratory: Negative.     Cardiovascular: Negative.    Musculoskeletal: Negative.    Gastrointestinal: Negative.    Neurological: Negative.    Psychiatric/Behavioral: Negative.       Objective:      Physical Exam  Constitutional:       Appearance: He is well-developed. He is obese.   HENT:      Head: Normocephalic and atraumatic.      Nose: Nose normal.   Eyes:      General: Lids are normal.   Pulmonary:      Effort: Pulmonary effort is normal. No tachypnea, bradypnea, accessory muscle usage or respiratory distress.   Musculoskeletal:      Cervical back: Normal " range of motion.   Skin:     Findings: No rash.   Neurological:      Mental Status: He is alert and oriented to person, place, and time.   Psychiatric:         Behavior: Behavior normal.         Thought Content: Thought content normal.         Judgment: Judgment normal.       Personal Diagnostic Review      4/9/2025    10:30 AM   EPWORTH SLEEPINESS SCALE   Sitting and reading 2   Watching TV 2   Sitting, inactive in a public place (e.g. a theatre or a meeting) 0   As a passenger in a car for an hour without a break 2   Lying down to rest in the afternoon when circumstances permit 2   Sitting and talking to someone 0   Sitting quietly after a lunch without alcohol 2   In a car, while stopped for a few minutes in traffic 0   Total score 10            Assessment:       1. LOVE on CPAP    2. Night terrors, adult    3. Insomnia, unspecified type    4. Severe obstructive sleep apnea    5. Primary insomnia        Encounter Medications[2]  No orders of the defined types were placed in this encounter.    Plan:       1. LOVE on CPAP  Overview:  Very severe sleep apnea. /hr.   Normal AHI on CPAP therapy.  Compliant with PAP and benefits from use.      2. Night terrors, adult  Overview:  prozosin      3. Insomnia, unspecified type  Comments:  Klonopin    4. Severe obstructive sleep apnea    5. Primary insomnia  Comments:  Continue psychiatry visits. Rozerem.     Follow up one year.                     Elizabeth LeJeune, ACNP, ANP         [1]   Patient Active Problem List  Diagnosis    BPH (benign prostatic hyperplasia)    HTN (hypertension)    DDD (degenerative disc disease), lumbar    Palpitations    Peripheral neuropathy    High triglycerides    Hypersomnia with sleep apnea    LOVE on CPAP    Other vitreous opacities, bilateral    NS (nuclear sclerosis)    Senile nuclear sclerosis    Retained lens material, left    Glaucoma associated with ocular inflammation, left, indeterminate stage    Iritis, lens-induced     Vitreomacular adhesion, right    Epiretinal membrane, right    Internal strangulated hemorrhoids    Arachnoid cyst    Narcolepsy    Abnormal gait    Cognitive change    Abnormal involuntary movements    Adjustment disorder with depressed mood    Parkinson disease    History of gout    Polyneuropathy associated with underlying disease    Fall from bed    Memory change    Chronic kidney disease    Diabetes mellitus    Family history of colon cancer    Elevated PSA    Central retinal vein occlusion, left eye, stable    Prostate cancer    Mild episode of recurrent major depressive disorder    CHAPARRITA (generalized anxiety disorder)    Primary insomnia    Night terrors, adult    Secondary hyperparathyroidism of renal origin    Chronic depression    Orthostatic hypotension    Aortic atherosclerosis    Type 2 diabetes mellitus with diabetic nephropathy, with long-term current use of insulin    Gout    Adrenal insufficiency    Disinhibition behavior    Small cell carcinoma of bladder    Anxiety    Enterococcal bacteremia    Glaucoma    Ureteral calculus    Malignant neoplasm of urinary bladder    Encounter for education   [2]   Outpatient Encounter Medications as of 4/9/2025   Medication Sig Dispense Refill    allopurinoL (ZYLOPRIM) 300 MG tablet Take 1 tablet (300 mg total) by mouth once daily. 90 tablet 0    ammonium lactate (LAC-HYDRIN) 12 % lotion Apply topically 2 (two) times daily.      atorvastatin (LIPITOR) 20 MG tablet Take 1 tablet (20 mg total) by mouth every evening. 90 tablet 3    BACILLUS COAGULANS ORAL Take 1 tablet by mouth once daily.      brimonidine 0.2% (ALPHAGAN) 0.2 % Drop INSTILL 1 DROP IN BOTH EYES TWICE DAILY 10 mL 0    carbidopa-levodopa  mg (SINEMET)  mg per tablet Take 1.5 tablets by mouth 5 (five) times daily. 675 tablet 3    cholecalciferol, vitamin D3, (D3-2000 ORAL) Take by mouth once daily.      clonazePAM (KLONOPIN) 1 MG tablet Take 1 tablet (1 mg total) by mouth every evening. 60  "tablet 2    cyanocobalamin (VITAMIN B-12) 1000 MCG tablet Take 100 mcg by mouth once daily.      dexAMETHasone (DECADRON) 4 MG Tab Take 2 tablets (8 mg total) by mouth once daily. on days 2-4 of each chemotherapy cycle. 6 tablet 5    donepeziL (ARICEPT) 5 MG tablet Take 1 tablet (5 mg total) by mouth every evening. 30 tablet 11    dorzolamide (TRUSOPT) 2 % ophthalmic solution Place 1 drop into both eyes 2 (two) times daily. 10 mL 3    DULoxetine (CYMBALTA) 30 MG capsule Take 1 capsule (30 mg total) by mouth once daily. 90 capsule 1    gabapentin (NEURONTIN) 300 MG capsule Take 1 capsule (300 mg total) by mouth 2 (two) times daily. 60 capsule 1    insulin glargine U-100, Lantus, (LANTUS SOLOSTAR U-100 INSULIN) 100 unit/mL (3 mL) InPn pen Inject 7 Units into the skin once daily.      ketoconazole (NIZORAL) 2 % shampoo Apply topically.      LIDOcaine-prilocaine (EMLA) cream Apply to port site one hour prior to chemotherapy 30 g 0    metFORMIN (GLUCOPHAGE-XR) 500 MG ER 24hr tablet Take 2 tablets (1,000 mg total) by mouth once daily. 180 tablet 3    OLANZapine (ZYPREXA) 5 MG tablet Take 1 tablet (5 mg total) by mouth every evening. on days 1-4 of each chemotherapy cycle. 4 tablet 5    pen needle, diabetic (BD HIMANSHU 2ND GEN PEN NEEDLE) 32 gauge x 5/32" Ndle Inject 1 each into the skin once daily. 100 each 3    prazosin (MINIPRESS) 1 MG Cap Take 1 capsule (1 mg total) by mouth every evening. 90 capsule 3    prochlorperazine (COMPAZINE) 5 MG tablet Take 1 tablet (5 mg total) by mouth every 6 (six) hours as needed for Nausea. 20 tablet 5    ramelteon (ROZEREM) 8 mg tablet Take 1 tablet (8 mg total) by mouth nightly. 90 tablet 3    rasagiline (AZILECT) 1 mg Tab TAKE 1 TABLET(1 MG) BY MOUTH EVERY EVENING 30 tablet 11    tirzepatide 10 mg/0.5 mL PnIj Inject 10 mg into the skin every 7 days. (Increased dose) 2 mL 2     No facility-administered encounter medications on file as of 4/9/2025.     "

## 2025-04-09 NOTE — TELEPHONE ENCOUNTER
Called patient to see if he was affiliated with this company before giving out any information. No answer,left voicemail to return my call.

## 2025-04-09 NOTE — TELEPHONE ENCOUNTER
----- Message from Victoriano sent at 4/9/2025  4:54 PM CDT -----  Contact: self  Type:  Patient Returning CallWho Called:Glenn Almodovar Message for Patient:Marbella the patient know what this is regarding?:yesWould the patient rather a call back or a response via [facility]? Call [facility] Call Back Number:[phone] Information: the patient was returning the call back.

## 2025-04-09 NOTE — TELEPHONE ENCOUNTER
----- Message from Aylin sent at 4/9/2025  4:07 PM CDT -----  Regarding: Pharmacy Authorization  Type:  Pharmacy Calling to Clarify an RXName of Caller: Kevin [email or web address][phone] option 2  Fax:[phone] Name: Glucose Monitor What do they need to clarify?: Pt appears to have switched pcp from [facility] to [facility] and they need to have proof of him being seen as well as update that he still needs the glucose monitor sent over to them [facility] Call Back Number: [phone] option 2 Additional Information: Please call back to advise. Thanks!

## 2025-04-09 NOTE — PLAN OF CARE
Problem: Adult Inpatient Plan of Care  Goal: Plan of Care Review  Outcome: Progressing  Flowsheets (Taken 4/9/2025 1650)  Plan of Care Reviewed With:   patient   spouse  Goal: Patient-Specific Goal (Individualized)  Outcome: Progressing  Flowsheets (Taken 4/9/2025 1650)  Individualized Care Needs: recliner, tv, conversation, wife at chairside  Anxieties, Fears or Concerns: none today  Patient/Family-Specific Goals (Include Timeframe): no s/s of reaction with treatment     Problem: Fatigue  Goal: Improved Activity Tolerance  Outcome: Progressing  Intervention: Promote Improved Energy  Flowsheets (Taken 4/9/2025 1650)  Activity Management:   Ambulated in [place] - L4   Up in chair - L3  Environmental Support:   calm environment promoted   distractions minimized   Pt tolerated -16 infusion well.  No adverse reaction noted.   PAD flushed with NS and heparin and de-accessed per protocol.  Patient left clinic in no acute distress with spouse.

## 2025-04-10 ENCOUNTER — TELEPHONE (OUTPATIENT)
Dept: HEMATOLOGY/ONCOLOGY | Facility: CLINIC | Age: 70
End: 2025-04-10
Payer: MEDICARE

## 2025-04-10 ENCOUNTER — PATIENT MESSAGE (OUTPATIENT)
Dept: HEMATOLOGY/ONCOLOGY | Facility: CLINIC | Age: 70
End: 2025-04-10
Payer: MEDICARE

## 2025-04-10 NOTE — NURSING
"Called Mr. Elizabeth to check in following his first chemo cycle. He states he is feeling "pretty good" following chemo. Denies any nausea. He states he received "excellent care" from the infusion clinic staff. He's eating/drinking fine today.     We reviewed all upcoming scheduled visits. I encouraged him to call Dr. Lowery's office with any questions or concerns. He v/u and thanked me for my call.           "

## 2025-04-11 ENCOUNTER — PATIENT MESSAGE (OUTPATIENT)
Dept: HEMATOLOGY/ONCOLOGY | Facility: CLINIC | Age: 70
End: 2025-04-11
Payer: MEDICARE

## 2025-04-12 ENCOUNTER — PATIENT MESSAGE (OUTPATIENT)
Dept: HEMATOLOGY/ONCOLOGY | Facility: CLINIC | Age: 70
End: 2025-04-12
Payer: MEDICARE

## 2025-04-15 ENCOUNTER — TELEPHONE (OUTPATIENT)
Dept: HEMATOLOGY/ONCOLOGY | Facility: CLINIC | Age: 70
End: 2025-04-15
Payer: MEDICARE

## 2025-04-15 NOTE — TELEPHONE ENCOUNTER
"----- Message from LAURA Thornton sent at 4/15/2025  1:39 PM CDT -----  Regarding: Tinitus Following Cisplatin  Good afternoon, Mr. Medel started cisplatin etoposide on Monday, 4/7. He states he began noting "ringing in his ears" that began about last Wednesday, 4/9. He states this mainly bothers him at night while he's trying to sleep and is bothersome. He did have an audiogram on 4/3/25 prior to treatment start. Wondering is there is any intervention needed for this? Would this lead to a change in dosage/treatment? Please contact Mr. Medel to discuss. Thank you, Hillary  "

## 2025-04-15 NOTE — NURSING
"Rec'd incoming call from Mr. Medel. He states that following initiation of chemo he has been getting "ringing in his ears". He states it started "last week" - possibly last Wednesday 4/9.     I informed him that I will send a message to Dr. Lowery to inform him of this change.     [facility] sent to Dr. Lowery and OCTAVIO Guzman MA.   "

## 2025-04-15 NOTE — TELEPHONE ENCOUNTER
I called the patient whom stated he was having ringing in his ears after his 1st treatment.  I instructed him that it was likely a side effect of the treatment.  We discussed having the patient contact us of the symptoms become persistent or worsen.  The patient expressed understanding.  All questions were answered to his satisfaction.    Moris Lowery MD  [facility]  Hematology and Oncology  [facility]   [address] [place] [address]   O: [phone]  F: [phone]

## 2025-04-16 ENCOUNTER — PATIENT MESSAGE (OUTPATIENT)
Dept: HEMATOLOGY/ONCOLOGY | Facility: CLINIC | Age: 70
End: 2025-04-16
Payer: MEDICARE

## 2025-04-19 ENCOUNTER — PATIENT MESSAGE (OUTPATIENT)
Dept: HEMATOLOGY/ONCOLOGY | Facility: CLINIC | Age: 70
End: 2025-04-19
Payer: MEDICARE

## 2025-04-21 ENCOUNTER — OUTPATIENT CASE MANAGEMENT (OUTPATIENT)
Dept: ADMINISTRATIVE | Facility: OTHER | Age: 70
End: 2025-04-21
Payer: MEDICARE

## 2025-04-21 ENCOUNTER — PATIENT MESSAGE (OUTPATIENT)
Dept: HEMATOLOGY/ONCOLOGY | Facility: CLINIC | Age: 70
End: 2025-04-21
Payer: MEDICARE

## 2025-04-21 ENCOUNTER — PATIENT MESSAGE (OUTPATIENT)
Dept: FAMILY MEDICINE | Facility: CLINIC | Age: 70
End: 2025-04-21
Payer: MEDICARE

## 2025-04-21 DIAGNOSIS — M54.9 DORSALGIA, UNSPECIFIED: ICD-10-CM

## 2025-04-21 RX ORDER — GABAPENTIN 300 MG/1
300 CAPSULE ORAL 2 TIMES DAILY
Qty: 60 CAPSULE | Refills: 1 | Status: SHIPPED | OUTPATIENT
Start: 2025-04-21 | End: 2025-06-20

## 2025-04-21 NOTE — PROGRESS NOTES
Outpatient Care Management  Plan of Care Follow Up Visit    Patient: Glenn Medel  MRN: [id number]  Date of Service: 04/21/2025  Completed by: Shakila Dobbins RN  Referral Date: 02/11/2025    Reason for Visit   Patient presents with    OPCM RN Follow Up Call       Brief Summary: OPCM RN followed up with Mr. Elizabeth today for care plan review.    Next Steps:   Mr. Elizabeth agreed to OPCM RN follow up on or around 5/13/25.  Follow up regarding eye pain.  Follow up regarding falls.  Follow up regarding ringing in his ears and dizziness.  Mr. Elizabeth agreed to follow up with his physicians and notify them if any new symptoms arise daily.

## 2025-04-22 ENCOUNTER — PATIENT MESSAGE (OUTPATIENT)
Dept: ADMINISTRATIVE | Facility: OTHER | Age: 70
End: 2025-04-22
Payer: MEDICARE

## 2025-04-22 ENCOUNTER — PATIENT MESSAGE (OUTPATIENT)
Dept: HEMATOLOGY/ONCOLOGY | Facility: CLINIC | Age: 70
End: 2025-04-22
Payer: MEDICARE

## 2025-04-24 ENCOUNTER — OFFICE VISIT (OUTPATIENT)
Dept: HEMATOLOGY/ONCOLOGY | Facility: CLINIC | Age: 70
End: 2025-04-24
Payer: MEDICARE

## 2025-04-24 ENCOUNTER — LAB VISIT (OUTPATIENT)
Dept: LAB | Facility: HOSPITAL | Age: 70
End: 2025-04-24
Attending: INTERNAL MEDICINE
Payer: MEDICARE

## 2025-04-24 VITALS
DIASTOLIC BLOOD PRESSURE: 78 MMHG | TEMPERATURE: 98 F | HEART RATE: 68 BPM | OXYGEN SATURATION: 98 % | BODY MASS INDEX: 25.15 KG/M2 | WEIGHT: 180.31 LBS | SYSTOLIC BLOOD PRESSURE: 130 MMHG

## 2025-04-24 DIAGNOSIS — I10 PRIMARY HYPERTENSION: ICD-10-CM

## 2025-04-24 DIAGNOSIS — C61 PROSTATE CANCER: ICD-10-CM

## 2025-04-24 DIAGNOSIS — G20.A2 PARKINSON'S DISEASE WITHOUT DYSKINESIA, WITH FLUCTUATING MANIFESTATIONS: ICD-10-CM

## 2025-04-24 DIAGNOSIS — E11.21 TYPE 2 DIABETES MELLITUS WITH DIABETIC NEPHROPATHY, WITH LONG-TERM CURRENT USE OF INSULIN: ICD-10-CM

## 2025-04-24 DIAGNOSIS — Z79.4 TYPE 2 DIABETES MELLITUS WITH DIABETIC NEPHROPATHY, WITH LONG-TERM CURRENT USE OF INSULIN: ICD-10-CM

## 2025-04-24 DIAGNOSIS — C67.9 SMALL CELL CARCINOMA OF BLADDER: Primary | ICD-10-CM

## 2025-04-24 DIAGNOSIS — C67.9 MALIGNANT NEOPLASM OF URINARY BLADDER, UNSPECIFIED SITE: ICD-10-CM

## 2025-04-24 LAB
ABSOLUTE NEUTROPHIL MANUAL (OHS): 4.3 K/UL
ALBUMIN SERPL BCP-MCNC: 3.5 G/DL (ref 3.5–5.2)
ALBUMIN SERPL BCP-MCNC: 3.5 G/DL (ref 3.5–5.2)
ALBUMIN/CREAT UR: 10.8 UG/MG
ALP SERPL-CCNC: 126 UNIT/L (ref 40–150)
ALP SERPL-CCNC: 127 UNIT/L (ref 40–150)
ALT SERPL W/O P-5'-P-CCNC: 7 UNIT/L (ref 10–44)
ALT SERPL W/O P-5'-P-CCNC: 8 UNIT/L (ref 10–44)
ANION GAP (OHS): 7 MMOL/L (ref 8–16)
ANION GAP (OHS): 9 MMOL/L (ref 8–16)
ANISOCYTOSIS BLD QL SMEAR: SLIGHT
AST SERPL-CCNC: 27 UNIT/L (ref 11–45)
AST SERPL-CCNC: 28 UNIT/L (ref 11–45)
BASOPHILS NFR BLD MANUAL: 1 %
BILIRUB SERPL-MCNC: 0.3 MG/DL (ref 0.1–1)
BILIRUB SERPL-MCNC: 0.4 MG/DL (ref 0.1–1)
BUN SERPL-MCNC: 19 MG/DL (ref 8–23)
BUN SERPL-MCNC: 19 MG/DL (ref 8–23)
CALCIUM SERPL-MCNC: 9.2 MG/DL (ref 8.7–10.5)
CALCIUM SERPL-MCNC: 9.2 MG/DL (ref 8.7–10.5)
CHLORIDE SERPL-SCNC: 106 MMOL/L (ref 95–110)
CHLORIDE SERPL-SCNC: 106 MMOL/L (ref 95–110)
CO2 SERPL-SCNC: 24 MMOL/L (ref 23–29)
CO2 SERPL-SCNC: 26 MMOL/L (ref 23–29)
CREAT SERPL-MCNC: 0.8 MG/DL (ref 0.5–1.4)
CREAT SERPL-MCNC: 0.8 MG/DL (ref 0.5–1.4)
CREAT UR-MCNC: 74 MG/DL (ref 23–375)
DOHLE BOD BLD QL SMEAR: PRESENT
EAG (OHS): 151 MG/DL (ref 68–131)
EOSINOPHIL NFR BLD MANUAL: 1 % (ref 0–8)
ERYTHROCYTE [DISTWIDTH] IN BLOOD BY AUTOMATED COUNT: 14 % (ref 11.5–14.5)
GFR SERPLBLD CREATININE-BSD FMLA CKD-EPI: >60 ML/MIN/1.73/M2
GFR SERPLBLD CREATININE-BSD FMLA CKD-EPI: >60 ML/MIN/1.73/M2
GLUCOSE SERPL-MCNC: 149 MG/DL (ref 70–110)
GLUCOSE SERPL-MCNC: 149 MG/DL (ref 70–110)
HBA1C MFR BLD: 6.9 % (ref 4–5.6)
HCT VFR BLD AUTO: 35.4 % (ref 40–54)
HGB BLD-MCNC: 12 GM/DL (ref 14–18)
LYMPHOCYTES NFR BLD MANUAL: 27 % (ref 18–48)
MAGNESIUM SERPL-MCNC: 1.7 MG/DL (ref 1.6–2.6)
MCH RBC QN AUTO: 28.9 PG (ref 27–31)
MCHC RBC AUTO-ENTMCNC: 33.9 G/DL (ref 32–36)
MCV RBC AUTO: 85 FL (ref 82–98)
METAMYELOCYTES NFR BLD MANUAL: 4 %
MICROALBUMIN UR-MCNC: 8 UG/ML (ref ?–5000)
MONOCYTES NFR BLD MANUAL: 5 % (ref 4–15)
MYELOCYTES NFR BLD MANUAL: 4 %
NEUTROPHILS NFR BLD MANUAL: 58 % (ref 38–73)
NUCLEATED RBC (/100WBC) (OHS): 0 /100 WBC
PLATELET # BLD AUTO: 178 K/UL (ref 150–450)
PLATELET BLD QL SMEAR: NORMAL
PMV BLD AUTO: 8.5 FL (ref 9.2–12.9)
POTASSIUM SERPL-SCNC: 4.2 MMOL/L (ref 3.5–5.1)
POTASSIUM SERPL-SCNC: 4.3 MMOL/L (ref 3.5–5.1)
PROT SERPL-MCNC: 6.9 GM/DL (ref 6–8.4)
PROT SERPL-MCNC: 6.9 GM/DL (ref 6–8.4)
PSA SERPL-MCNC: 5.4 NG/ML
RBC # BLD AUTO: 4.15 M/UL (ref 4.6–6.2)
SODIUM SERPL-SCNC: 139 MMOL/L (ref 136–145)
SODIUM SERPL-SCNC: 139 MMOL/L (ref 136–145)
TOXIC GRANULES BLD QL SMEAR: PRESENT
WBC # BLD AUTO: 7.47 K/UL (ref 3.9–12.7)

## 2025-04-24 PROCEDURE — 85027 COMPLETE CBC AUTOMATED: CPT | Mod: PN

## 2025-04-24 PROCEDURE — 83735 ASSAY OF MAGNESIUM: CPT | Mod: PN

## 2025-04-24 PROCEDURE — 83036 HEMOGLOBIN GLYCOSYLATED A1C: CPT

## 2025-04-24 PROCEDURE — 82043 UR ALBUMIN QUANTITATIVE: CPT

## 2025-04-24 PROCEDURE — 99215 OFFICE O/P EST HI 40 MIN: CPT | Mod: S$PBB,,, | Performed by: NURSE PRACTITIONER

## 2025-04-24 PROCEDURE — 82040 ASSAY OF SERUM ALBUMIN: CPT | Mod: PN

## 2025-04-24 PROCEDURE — G2211 COMPLEX E/M VISIT ADD ON: HCPCS | Mod: S$PBB,,, | Performed by: NURSE PRACTITIONER

## 2025-04-24 PROCEDURE — 84153 ASSAY OF PSA TOTAL: CPT

## 2025-04-24 PROCEDURE — 36415 COLL VENOUS BLD VENIPUNCTURE: CPT | Mod: PN

## 2025-04-24 PROCEDURE — 99215 OFFICE O/P EST HI 40 MIN: CPT | Mod: PBBFAC,PN | Performed by: NURSE PRACTITIONER

## 2025-04-24 PROCEDURE — 84075 ASSAY ALKALINE PHOSPHATASE: CPT | Mod: PN

## 2025-04-24 PROCEDURE — 99999 PR PBB SHADOW E&M-EST. PATIENT-LVL V: CPT | Mod: PBBFAC,,, | Performed by: NURSE PRACTITIONER

## 2025-04-24 RX ORDER — PROCHLORPERAZINE EDISYLATE 5 MG/ML
5 INJECTION INTRAMUSCULAR; INTRAVENOUS ONCE AS NEEDED
OUTPATIENT
Start: 2025-04-28

## 2025-04-24 RX ORDER — SODIUM CHLORIDE 0.9 % (FLUSH) 0.9 %
10 SYRINGE (ML) INJECTION
OUTPATIENT
Start: 2025-04-28

## 2025-04-24 RX ORDER — SODIUM CHLORIDE 0.9 % (FLUSH) 0.9 %
10 SYRINGE (ML) INJECTION
OUTPATIENT
Start: 2025-04-29

## 2025-04-24 RX ORDER — EPINEPHRINE 0.3 MG/.3ML
0.3 INJECTION SUBCUTANEOUS ONCE AS NEEDED
OUTPATIENT
Start: 2025-04-28

## 2025-04-24 RX ORDER — HEPARIN 100 UNIT/ML
500 SYRINGE INTRAVENOUS
OUTPATIENT
Start: 2025-04-29

## 2025-04-24 RX ORDER — DIPHENHYDRAMINE HYDROCHLORIDE 50 MG/ML
50 INJECTION, SOLUTION INTRAMUSCULAR; INTRAVENOUS ONCE AS NEEDED
OUTPATIENT
Start: 2025-04-28

## 2025-04-24 RX ORDER — EPINEPHRINE 0.3 MG/.3ML
0.3 INJECTION SUBCUTANEOUS ONCE AS NEEDED
OUTPATIENT
Start: 2025-04-30

## 2025-04-24 RX ORDER — HEPARIN 100 UNIT/ML
500 SYRINGE INTRAVENOUS
OUTPATIENT
Start: 2025-04-28

## 2025-04-24 RX ORDER — EPINEPHRINE 0.3 MG/.3ML
0.3 INJECTION SUBCUTANEOUS ONCE AS NEEDED
OUTPATIENT
Start: 2025-04-29

## 2025-04-24 RX ORDER — DIPHENHYDRAMINE HYDROCHLORIDE 50 MG/ML
50 INJECTION, SOLUTION INTRAMUSCULAR; INTRAVENOUS ONCE AS NEEDED
OUTPATIENT
Start: 2025-04-29

## 2025-04-24 RX ORDER — SODIUM CHLORIDE 0.9 % (FLUSH) 0.9 %
10 SYRINGE (ML) INJECTION
OUTPATIENT
Start: 2025-04-30

## 2025-04-24 RX ORDER — PROCHLORPERAZINE EDISYLATE 5 MG/ML
5 INJECTION INTRAMUSCULAR; INTRAVENOUS ONCE AS NEEDED
OUTPATIENT
Start: 2025-04-29

## 2025-04-24 RX ORDER — PROCHLORPERAZINE EDISYLATE 5 MG/ML
5 INJECTION INTRAMUSCULAR; INTRAVENOUS ONCE AS NEEDED
OUTPATIENT
Start: 2025-04-30

## 2025-04-24 RX ORDER — DIPHENHYDRAMINE HYDROCHLORIDE 50 MG/ML
50 INJECTION, SOLUTION INTRAMUSCULAR; INTRAVENOUS ONCE AS NEEDED
OUTPATIENT
Start: 2025-04-30

## 2025-04-24 RX ORDER — HEPARIN 100 UNIT/ML
500 SYRINGE INTRAVENOUS
OUTPATIENT
Start: 2025-04-30

## 2025-04-24 NOTE — PROGRESS NOTES
PATIENT: Glenn Medel  MRN: [id number]  DATE: 4/24/2025      Diagnosis:   1. Small cell carcinoma of bladder    2. Primary hypertension    3. Parkinson's disease without dyskinesia, with fluctuating manifestations    4. Type 2 diabetes mellitus with diabetic nephropathy, with long-term current use of insulin          Chief Complaint: Small cell carcinoma of bladder (Treatment clearance)      Subjective:    Interval History: Mr. Medel is a 69 y.o. male with BPH, CKD, DMII, Glaucoma, HTN, obesity, Parkinson's disease, LOVE who presents for small cell bladder cancer.  Since the last clinic visit patient endorses ringing in the ear, noting left is a little worse than the right.  Patient states that the last several months, patient states when he lies flat, he feels a pain on right side of chest that runs to where his heart is and states he feels tightness. When he sits up or lays on side, this discomfort is resolved. Endorses this started well before his diagnosis and prior to his port placement. Denies that it has worsened or improved. Pt endorses right flank pain for 3 days a couple of weeks ago with no pain currently, endorses that it is more noticeable with movement. The patient denies CP, cough, SOB, abdominal pain, nausea, vomiting, constipation, diarrhea.  The patient denies fever, chills, night sweats, weight loss, new lumps or bumps, easy bruising or bleeding.    Prior History:  The patient initially presented after developing hematuria.  The patient underwent cystoscopy on 01/30/2025 showing a tumor at the trigone near the right ureteral orifice measuring about 3 cm in diameter.  Pt underwent TURBT on 2/12/25 with path showing invasive small-cell carcinoma of urothelial origin and a background of urothelial carcinoma in Situ.  Muscularis propria was present on biopsy but was not involved.  The patient saw urologist Dr. Zapata on 03/12/2024 whom recommended chemotherapy followed by surgery.  The patient was  discussed at tumor board on 03/13/2025 with recommendation on chemotherapy, PET-CT, MRI brain.  MRI brain on 03/18/2025 showed 4 x 3 mm probable arachnoid cyst in the left posterior sella.  PET-CT on 03/19/2025 showed no evidence of disease.     The patient's case was discussed with Urology and Medical oncology form MD David with recommendation for 4 cycles of platinum/Etoposide followed by cystectomy.    Past Medical History:   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/08/2018    LUE numbness 09/08/2020    Obesity     Parkinson's disease     Pyelonephritis 02/21/2025    Sepsis 06/09/2023    Sleep apnea     uses cpap    Suspected cerebrovascular accident (CVA) 09/08/2020    Syncope and collapse 07/02/2024       Past Surgical HIstory:   Past Surgical History:   Procedure Laterality Date    CATARACT EXTRACTION W/  INTRAOCULAR LENS IMPLANT Left 11/30/2017    CATARACT EXTRACTION W/  INTRAOCULAR LENS IMPLANT Right 12/11/2018    Dr Garcia    CATARACT EXTRACTION W/  INTRAOCULAR LENS IMPLANT Right 12/11/2018    Procedure: EXTRACTION, CATARACT, WITH IOL INSERTION;  Surgeon: Damaris Garcia MD;  Location: [facility] OR;  Service: Ophthalmology;  Laterality: Right;    COLONOSCOPY N/A 09/18/2017    Procedure: COLONOSCOPY;  Surgeon: Paul Feliz Jr., MD;  Location: [facility] ENDO;  Service: Endoscopy;  Laterality: N/A;    COLONOSCOPY N/A 12/21/2022    Procedure: COLONOSCOPY;  Surgeon: Paul Feliz Jr., MD;  Location: [facility] ENDO;  Service: Endoscopy;   Laterality: N/A;    COLONOSCOPY W/ POLYPECTOMY  04/13/2010    YARELI.   One 1 cm polyp in the sigmoid colon.  Internal hemorrhoids.    COSMETIC SURGERY      DIGITAL RECTAL EXAMINATION UNDER ANESTHESIA N/A 07/23/2019    Procedure: EXAM UNDER ANESTHESIA, DIGITAL, RECTUM;  Surgeon: Kaz Keating MD;  Location: [facility] OR;  Service: General;  Laterality: N/A;    EXAMINATION UNDER ANESTHESIA N/A 04/24/2019    Procedure: EXAM UNDER ANESTHESIA;  Surgeon: Kaz Keating MD;  Location: [facility] OR;  Service: General;  Laterality: N/A;    EXCISIONAL HEMORRHOIDECTOMY N/A 04/24/2019    Procedure: HEMORRHOIDECTOMY (lithotomy);  Surgeon: Kaz Keating MD;  Location: [facility] OR;  Service: General;  Laterality: N/A;    EYE SURGERY      GANGLION CYST EXCISION Left     INJECTION OF ANESTHETIC AGENT AROUND MEDIAL BRANCH NERVES INNERVATING LUMBAR FACET JOINT Bilateral 04/09/2024    Procedure: Block-nerve-medial branch-lumbar-L4/5-L5/S1;  Surgeon: Everett Taveras MD;  Location: [facility] OR;  Service: Pain Management;  Laterality: Bilateral;    INJECTION OF ANESTHETIC AGENT AROUND MEDIAL BRANCH NERVES INNERVATING LUMBAR FACET JOINT Bilateral 05/06/2024    Procedure: Block-nerve-medial branch-lumbar-L4/5-L5/S1;  Surgeon: Everett Taveras MD;  Location: [facility] OR;  Service: Pain Management;  Laterality: Bilateral;    INJECTION OF ANESTHETIC AGENT AROUND PUDENDAL NERVE N/A 04/24/2019    Procedure: BLOCK, NERVE, PUDENDAL;  Surgeon: Kaz Keating MD;  Location: [facility] OR;  Service: General;  Laterality: N/A;    INJECTION OF ANESTHETIC AGENT AROUND PUDENDAL NERVE N/A 07/23/2019    Procedure: BLOCK, NERVE, PUDENDAL;  Surgeon: Kaz Keating MD;  Location: [facility] OR;  Service: General;  Laterality: N/A;    INJECTION, ALLOGRAFT, INTERVERTEBRAL DISC N/A 09/30/2024    Procedure: INJECTION,ALLOGRAFT,INTERVERTEBRAL DISC,1ST LEVEL  L4/5;  Surgeon: Everett Taveras MD;  Location: [facility] OR;  Service: Pain Management;  Laterality: N/A;    INSERTION OF  TUNNELED CENTRAL VENOUS CATHETER (CVC) WITH SUBCUTANEOUS PORT Right 3/28/2025    Procedure: VKAFOIMPP-GPAL-P-CATH;  Surgeon: Hebert Gardner MD;  Location: [facility] OR;  Service: General;  Laterality: Right;    INSTILLATION OF URINARY BLADDER  02/11/2025    Procedure: INSTILLATION, BLADDER w/ gemcitabine;  Surgeon: MARIZA Donahue MD;  Location: [facility] OR;  Service: Urology;;    RADIOFREQUENCY ABLATION OF LUMBAR MEDIAL BRANCH NERVE AT SINGLE LEVEL Bilateral 06/04/2024    Procedure: Radiofrequency Ablation, Nerve, Spinal, Lumbar, Medial Branch, L4/5 and L5/S1;  Surgeon: Everett Taveras MD;  Location: [facility] OR;  Service: Pain Management;  Laterality: Bilateral;    REPAIR OF RETINAL DETACHMENT WITH VITRECTOMY Right 08/01/2018    Procedure: REPAIR, RETINAL DETACHMENT, WITH VITRECTOMY;  Surgeon: SAJAN Pulido MD;  Location: [address];  Service: Ophthalmology;  Laterality: Right;  35 min    SKIN BIOPSY      TONSILLECTOMY      TRANSRECTAL BIOPSY OF PROSTATE WITH ULTRASOUND GUIDANCE N/A 01/31/2023    Procedure: BIOPSY, PROSTATE, RECTAL APPROACH, WITH US GUIDANCE;  Surgeon: MARIZA Donahue MD;  Location: [facility] OR;  Service: Urology;  Laterality: N/A;    TRANSRECTAL BIOPSY OF PROSTATE WITH ULTRASOUND GUIDANCE N/A 04/12/2024    Procedure: BIOPSY, PROSTATE, RECTAL APPROACH, WITH US GUIDANCE;  Surgeon: MARIZA Donahue MD;  Location: [facility];  Service: Urology;  Laterality: N/A;    TRIGGER FINGER RELEASE Right     X 2    TURBT (TRANSURETHRAL RESECTION OF BLADDER TUMOR) N/A 02/11/2025    Procedure: TURBT (TRANSURETHRAL RESECTION OF BLADDER TUMOR);  Surgeon: MARIZA Donahue MD;  Location: [facility] OR;  Service: Urology;  Laterality: N/A;    uro lift      for enlarged prostate       Family History:   Family History   Problem Relation Name Age of Onset    Colon cancer Mother      Hypertension Mother      Diabetes Mother      Stroke Mother      Cancer Mother          colon    Kidney disease Mother      Heart disease  Father      Diabetes Father      Colon cancer Father      Cancer Father          colon    Cataracts Father      No Known Problems Sister      Heart disease Brother  62    Kidney disease Brother      Dementia Brother Rodrigue     Abnormal EKG Brother Rodrigue     Breast cancer Maternal Aunt      Cancer Maternal Aunt          breast    Cancer Maternal Aunt          brain    Brain cancer Maternal Aunt      No Known Problems Maternal Uncle      No Known Problems Paternal Aunt      No Known Problems Paternal Uncle      Glaucoma Maternal Grandmother      No Known Problems Maternal Grandfather      No Known Problems Paternal Grandmother      No Known Problems Paternal Grandfather      Amblyopia Neg Hx      Blindness Neg Hx      Macular degeneration Neg Hx      Retinal detachment Neg Hx      Strabismus Neg Hx      Thyroid disease Neg Hx      Parkinsonism Neg Hx         Social History:  reports that he has never smoked. He has never used smokeless tobacco. He reports current alcohol use. He reports that he does not use drugs.    Allergies:  Review of patient's allergies indicates:   Allergen Reactions    Avelox [moxifloxacin] Hives and Rash       Medications:  Current Medications[1]    Review of Systems   Constitutional:  Negative for appetite change, chills, diaphoresis, fatigue, fever and unexpected weight change.   HENT:  Negative for mouth sores.    Eyes:  Negative for visual disturbance.   Respiratory:  Negative for cough and shortness of breath.    Cardiovascular:  Negative for palpitations.   Gastrointestinal:  Negative for abdominal pain, constipation, diarrhea, nausea and vomiting.   Genitourinary:  Negative for frequency.   Musculoskeletal:  Negative for back pain.        Right flank pain - intermittent with certain movements   Skin:  Negative for color change and rash.   Neurological:  Negative for headaches.   Hematological:  Negative for adenopathy. Does not bruise/bleed easily.   Psychiatric/Behavioral:  The patient is  not nervous/anxious.        ECOG Performance Status: 1   Objective:      Vitals:   Vitals:    04/24/25 1015   BP: 130/78   BP Location: Right arm   Patient Position: Sitting   Pulse: 68   Temp: 98.1 °F (36.7 °C)   TempSrc: Temporal   SpO2: 98%   Weight: 81.8 kg (180 lb 5.4 oz)     Wt Readings from Last 3 Encounters:   04/24/25 81.8 kg (180 lb 5.4 oz)   04/09/25 80.6 kg (177 lb 11.1 oz)   04/09/25 80.6 kg (177 lb 11.1 oz)       Physical Exam  Constitutional:       General: He is not in acute distress.     Appearance: He is well-developed. He is not diaphoretic.   HENT:      Head: Normocephalic and atraumatic.      Mouth/Throat:      Mouth: Mucous membranes are moist.      Pharynx: No posterior oropharyngeal erythema.   Cardiovascular:      Rate and Rhythm: Normal rate and regular rhythm.      Heart sounds: Normal heart sounds. No murmur heard.  Pulmonary:      Effort: Pulmonary effort is normal. No respiratory distress.      Breath sounds: Normal breath sounds. No wheezing.   Abdominal:      General: Bowel sounds are normal. There is no distension.      Palpations: Abdomen is soft.      Tenderness: There is no abdominal tenderness.   Musculoskeletal:      Right lower leg: No edema.      Left lower leg: No edema.      Comments: PORT in right chest   Skin:     General: Skin is warm and dry.      Findings: No rash.   Neurological:      Mental Status: He is alert and oriented to person, place, and time.   Psychiatric:         Behavior: Behavior normal.         Laboratory Data:  Lab Visit on 04/24/2025   Component Date Value Ref Range Status    Sodium 04/24/2025 139  136 - 145 mmol/L Final    Potassium 04/24/2025 4.2  3.5 - 5.1 mmol/L Final    Chloride 04/24/2025 106  95 - 110 mmol/L Final    CO2 04/24/2025 24  23 - 29 mmol/L Final    Glucose 04/24/2025 149 (H)  70 - 110 mg/dL Final    BUN 04/24/2025 19  8 - 23 mg/dL Final    Creatinine 04/24/2025 0.8  0.5 - 1.4 mg/dL Final    Calcium 04/24/2025 9.2  8.7 - 10.5 mg/dL  Final    Protein Total 04/24/2025 6.9  6.0 - 8.4 gm/dL Final    Albumin 04/24/2025 3.5  3.5 - 5.2 g/dL Final    Bilirubin Total 04/24/2025 0.3  0.1 - 1.0 mg/dL Final    For infants and newborns, interpretation of results should be based   on gestational age, weight and in agreement with clinical   observations.    Premature Infant recommended reference ranges:   0-24 hours:  <8.0 mg/dL   24-48 hours: <12.0 mg/dL   3-5 days:    <15.0 mg/dL   6-29 days:   <15.0 mg/dL    ALP 04/24/2025 126  40 - 150 unit/L Final    AST 04/24/2025 28  11 - 45 unit/L Final    ALT 04/24/2025 8 (L)  10 - 44 unit/L Final    Anion Gap 04/24/2025 9  8 - 16 mmol/L Final    eGFR 04/24/2025 >60  >60 mL/min/1.73/m2 Final    Estimated GFR calculated using the CKD-EPI creatinine (2021) equation.    Sodium 04/24/2025 139  136 - 145 mmol/L Final    Potassium 04/24/2025 4.3  3.5 - 5.1 mmol/L Final    Chloride 04/24/2025 106  95 - 110 mmol/L Final    CO2 04/24/2025 26  23 - 29 mmol/L Final    Glucose 04/24/2025 149 (H)  70 - 110 mg/dL Final    BUN 04/24/2025 19  8 - 23 mg/dL Final    Creatinine 04/24/2025 0.8  0.5 - 1.4 mg/dL Final    Calcium 04/24/2025 9.2  8.7 - 10.5 mg/dL Final    Protein Total 04/24/2025 6.9  6.0 - 8.4 gm/dL Final    Albumin 04/24/2025 3.5  3.5 - 5.2 g/dL Final    Bilirubin Total 04/24/2025 0.4  0.1 - 1.0 mg/dL Final    For infants and newborns, interpretation of results should be based   on gestational age, weight and in agreement with clinical   observations.    Premature Infant recommended reference ranges:   0-24 hours:  <8.0 mg/dL   24-48 hours: <12.0 mg/dL   3-5 days:    <15.0 mg/dL   6-29 days:   <15.0 mg/dL    ALP 04/24/2025 127  40 - 150 unit/L Final    AST 04/24/2025 27  11 - 45 unit/L Final    ALT 04/24/2025 7 (L)  10 - 44 unit/L Final    Anion Gap 04/24/2025 7 (L)  8 - 16 mmol/L Final    eGFR 04/24/2025 >60  >60 mL/min/1.73/m2 Final    Estimated GFR calculated using the CKD-EPI creatinine (2021) equation.    Magnesium   04/24/2025 1.7  1.6 - 2.6 mg/dL Final    WBC 04/24/2025 7.47  3.90 - 12.70 K/uL Final    RBC 04/24/2025 4.15 (L)  4.60 - 6.20 M/uL Final    HGB 04/24/2025 12.0 (L)  14.0 - 18.0 gm/dL Final    HCT 04/24/2025 35.4 (L)  40.0 - 54.0 % Final    MCV 04/24/2025 85  82 - 98 fL Final    MCH 04/24/2025 28.9  27.0 - 31.0 pg Final    MCHC 04/24/2025 33.9  32.0 - 36.0 g/dL Final    RDW 04/24/2025 14.0  11.5 - 14.5 % Final    Platelet Count 04/24/2025 178  150 - 450 K/uL Final    MPV 04/24/2025 8.5 (L)  9.2 - 12.9 fL Final    Nucleated RBC 04/24/2025 0  <=0 /100 WBC Final    Gran # (ANC) 04/24/2025 4.3  K/uL Final    Segmented Neutrophil % 04/24/2025 58.0  38.0 - 73.0 % Final    Lymphocyte % 04/24/2025 27.0  18.0 - 48.0 % Final    Monocyte % 04/24/2025 5.0  4.0 - 15.0 % Final    Eosinophil % 04/24/2025 1.0  0.0 - 8.0 % Final    Basophil % 04/24/2025 1.0  <=1.9 % Final    Metamyelocyte % 04/24/2025 4.0  % Final    Myelocyte % 04/24/2025 4.0  % Final    Platelet Estimate 04/24/2025 Appears Normal    Final    Anisocytosis 04/24/2025 Slight   Final    Dohle Bodies 04/24/2025 Present   Final    Toxic Gran 04/24/2025 Present   Final         Imaging:     MRI Brain  3/18/25  INTRACRANIAL: Mild age-appropriate volume loss. Unchanged prominent right retrocerebellar CSF/arachnoid cyst. Few scattered T2 FLAIR hyperintense white matter foci are minimally increased from prior study on 05/18/2023, likely related to chronic microvascular ischemic change. No abnormal enhancement demonstrated. No parenchymal restricted diffusion. No evidence of intracranial hemorrhage. No intracranial mass effect. No hydrocephalus. 4 x 3 mm probable arachnoid cyst in the left posterior sella (series 21, image 118). Otherwise, visualized pituitary gland and infundibulum are normal. Visualized major intracranial vascular structures demonstrate normal flow voids and are normal in course and caliber.     SINUSES: Paranasal sinuses are clear. Left mastoid effusion.      ORBITS: Bilateral lens replacements.      PET/CT 3/19/25  Head and Neck:     Brain demonstrates normal metabolism. However, FDG-PET has an approximate 60% sensitivity for brain metastases which are best detected by MRI with gadolinium. Physiologic uptake is in the neck.     Chest:     No FDG avid pulmonary lesions are present. No enlarged or FDG avid lymph nodes are in the chest.     Abdomen and Pelvis:     Physiologic uptake is in the liver, spleen, urinary system and bowel. No enlarged or FDG avid lymph nodes are in the abdomen or pelvis. Adrenal glands are normal. Urinary bladder is decompressed. Prostate is enlarged.     Musculoskeletal:     No FDG avid osseous lesions are present.       Assessment:       1. Small cell carcinoma of bladder    2. Primary hypertension    3. Parkinson's disease without dyskinesia, with fluctuating manifestations    4. Type 2 diabetes mellitus with diabetic nephropathy, with long-term current use of insulin               Plan:     Small Cell Carcinoma of the Bladder - the patient diagnosed with small-cell carcinoma of the bladder on biopsy 02/12/2025  -Urologist Dr. Zapata whom patient met on 03/12/2024 recommended chemotherapy followed by cystectomy  -The patient discussed at tumor board on 03/13/2024 with recommendations for chemotherapy   -MRI brain on 03/18/2025 showed no evidence of disease  -PET-CT on 03/19/25 showed no evidence of disease   -Case discussed with MD David with MD Frankie [facility] and medical oncology with recommendation for 4 cycels of platinum and etoposide followed by cystectomy  -Will proceed with cycle 2 on 4/28/25    HTN - pt on prazosin  -BP controlled - 130/78  -Will monitor    Parkinson's - pt on sinemet, rasagiline  -Symptoms controlled  -Will monitor    DMII - pt on insulin, metformin, tirzepatide  Lab Results   Component Value Date    HGBA1C 7.6 (H) 01/15/2025   -Will monitor     Right Flank Pain - intermittent with movement  -Likely  musculoskeletal   -Will monitor    Route Chart for Scheduling    Med Onc Chart Routing      Follow up with physician . As scheduled for CBC, CMP and mag on 5/16/25 prior to seeing Dr. Lowery; treatment scheduled on 5/20/25   Follow up with GILMAR    Infusion scheduling note    Injection scheduling note    Labs    Imaging    Pharmacy appointment    Other referrals                Treatment Plan Information   OP EP (CISplatin etoposide) Q3W Moris Lowery MD   Associated diagnosis: Malignant neoplasm of urinary bladder   noted on 3/21/2025  Associated diagnosis: Small cell carcinoma of bladder Stage I cT1, cN0, cM0 noted on 2/11/2025   Line of treatment: Adjuvant  Treatment Goal: Curative     Upcoming Treatment Dates - OP EP (CISplatin etoposide) Q3W    4/28/2025       Chemotherapy       CISplatin (Platinol) 80 mg/m2 = 161 mg in 0.9% NaCl 661 mL chemo infusion       etoposide (VEPESID) 100 mg/m2 = 202 mg in 0.9% NaCl 510.1 mL chemo infusion       Pre-Hydration       0.9% NaCl 1,000 mL with magnesium sulfate 1 g, potassium chloride 20 mEq infusion       Post-Hydration       0.9% NaCl 1,000 mL with magnesium sulfate 1 g, potassium chloride 20 mEq infusion       Antiemetics       aprepitant (Cinvanti) injection 130 mg       palonosetron 0.25mg/dexAMETHasone 12mg in NS IVPB 0.25 mg 50 mL  4/29/2025       Chemotherapy       etoposide (VEPESID) in 0.9% NaCl 510.1 mL chemo infusion  4/30/2025       Chemotherapy       etoposide (VEPESID) in 0.9% NaCl 510.1 mL chemo infusion       Growth Factor       pegfilgrastim (NEULASTA (ON BODY INJECTOR)) injection 6 mg  5/19/2025       Chemotherapy       CISplatin (Platinol) 80 mg/m2 = 161 mg in 0.9% NaCl 661 mL chemo infusion       etoposide (VEPESID) 100 mg/m2 = 202 mg in 0.9% NaCl 510.1 mL chemo infusion       Pre-Hydration       0.9% NaCl 1,000 mL with magnesium sulfate 1 g, potassium chloride 20 mEq infusion       Post-Hydration       0.9% NaCl 1,000 mL with magnesium sulfate 1 g,  potassium chloride 20 mEq infusion       Antiemetics       aprepitant (Cinvanti) injection 130 mg       palonosetron 0.25mg/dexAMETHasone 12mg in NS IVPB 0.25 mg 50 mL    Therapy Plan Information  URO GEMCITABINE (INTRAVESICAL) for Small cell carcinoma of bladder, noted on 2/11/2025  Chemotherapy  gemcitabine 2,000 mg in 0.9% NaCl SolP 100 mL bladder instillation  2,000 mg, Intravesical, 1 time a week      No therapy plan of the specified type found.    No therapy plan of the specified type found.    Visit today included increased complexity associated with the care of the episodic problem (chemotherapy) addressed and managing the longitudinal care of the patient due to the serious and/or complex managed problem(s) Small Cell Carcinoma of the bladder.    XENA Dominique  Hematology and Medical Oncology  [facility]  [facility]    45 minutes of total time spent on the encounter, which includes face to face time and non-face to face time preparing to see the patient (eg, review of tests), Obtaining and/or reviewing separately obtained history, documenting clinical information in the electronic or other health record, independently interpreting results if documented above (not separately reported) and communicating results to the patient/family/caregiver, or Care coordination (not separately reported).                [1]   Current Outpatient Medications   Medication Sig Dispense Refill    allopurinoL (ZYLOPRIM) 300 MG tablet Take 1 tablet (300 mg total) by mouth once daily. 90 tablet 0    ammonium lactate (LAC-HYDRIN) 12 % lotion Apply topically 2 (two) times daily.      atorvastatin (LIPITOR) 20 MG tablet Take 1 tablet (20 mg total) by mouth every evening. 90 tablet 3    BACILLUS COAGULANS ORAL Take 1 tablet by mouth once daily.      brimonidine 0.2% (ALPHAGAN) 0.2 % Drop INSTILL 1 DROP IN BOTH EYES TWICE DAILY 10 mL 0    carbidopa-levodopa  mg (SINEMET)  mg  "per tablet Take 1.5 tablets by mouth 5 (five) times daily. 675 tablet 3    cholecalciferol, vitamin D3, (D3-2000 ORAL) Take by mouth once daily.      clonazePAM (KLONOPIN) 1 MG tablet Take 1 tablet (1 mg total) by mouth every evening. 60 tablet 2    cyanocobalamin (VITAMIN B-12) 1000 MCG tablet Take 100 mcg by mouth once daily.      dexAMETHasone (DECADRON) 4 MG Tab Take 2 tablets (8 mg total) by mouth once daily. on days 2-4 of each chemotherapy cycle. 6 tablet 5    donepeziL (ARICEPT) 5 MG tablet Take 1 tablet (5 mg total) by mouth every evening. 30 tablet 11    dorzolamide (TRUSOPT) 2 % ophthalmic solution Place 1 drop into both eyes 2 (two) times daily. 10 mL 3    DULoxetine (CYMBALTA) 30 MG capsule Take 1 capsule (30 mg total) by mouth once daily. 90 capsule 1    gabapentin (NEURONTIN) 300 MG capsule Take 1 capsule (300 mg total) by mouth 2 (two) times daily. 60 capsule 1    insulin glargine U-100, Lantus, (LANTUS SOLOSTAR U-100 INSULIN) 100 unit/mL (3 mL) InPn pen Inject 7 Units into the skin once daily.      ketoconazole (NIZORAL) 2 % shampoo Apply topically.      LIDOcaine-prilocaine (EMLA) cream Apply to port site one hour prior to chemotherapy 30 g 0    metFORMIN (GLUCOPHAGE-XR) 500 MG ER 24hr tablet Take 2 tablets (1,000 mg total) by mouth once daily. 180 tablet 3    OLANZapine (ZYPREXA) 5 MG tablet Take 1 tablet (5 mg total) by mouth every evening. on days 1-4 of each chemotherapy cycle. 4 tablet 5    pen needle, diabetic (BD HIMANSHU 2ND GEN PEN NEEDLE) 32 gauge x 5/32" Ndle Inject 1 each into the skin once daily. 100 each 3    prazosin (MINIPRESS) 1 MG Cap Take 1 capsule (1 mg total) by mouth every evening. 90 capsule 3    prochlorperazine (COMPAZINE) 5 MG tablet Take 1 tablet (5 mg total) by mouth every 6 (six) hours as needed for Nausea. 20 tablet 5    ramelteon (ROZEREM) 8 mg tablet Take 1 tablet (8 mg total) by mouth nightly. 90 tablet 3    rasagiline (AZILECT) 1 mg Tab TAKE 1 TABLET(1 MG) BY MOUTH " EVERY EVENING 30 tablet 11    tirzepatide 10 mg/0.5 mL PnIj Inject 10 mg into the skin every 7 days. (Increased dose) 2 mL 2     No current facility-administered medications for this visit.

## 2025-04-25 ENCOUNTER — PATIENT MESSAGE (OUTPATIENT)
Dept: FAMILY MEDICINE | Facility: CLINIC | Age: 70
End: 2025-04-25
Payer: MEDICARE

## 2025-04-25 ENCOUNTER — TELEPHONE (OUTPATIENT)
Dept: FAMILY MEDICINE | Facility: CLINIC | Age: 70
End: 2025-04-25
Payer: MEDICARE

## 2025-04-25 NOTE — TELEPHONE ENCOUNTER
----- Message from Alcira sent at 4/25/2025 12:54 PM CDT -----  Contact: [facility]  Type:  Needs Medical AdviceWho Called: Idaould the patient rather a call back or a response via [facility]? Call [facility] Call Back Number: [phone] ext 2Additional Information: leyla requesting pt office visit notes be faxed over to them. Fax number: [phone]

## 2025-04-25 NOTE — TELEPHONE ENCOUNTER
----- Message from Yousif sent at 4/25/2025  8:19 AM CDT -----  Type:  Needs Medical AdviceWho Called: [facility]Symptoms (please be specific):  How long has patient had these symptoms:  Pharmacy name and phone #:  Would the patient rather a call back or a response via [facility]? [facility] Call Back Number: [phone]  [phone] Information: [employer] called in regards to fax received 4-21 for dietibic progress notes and prescription for free style Liebre

## 2025-04-25 NOTE — TELEPHONE ENCOUNTER
Called patient to see if he was affiliated with this company before returning call to company. Patient did not answer, lvm to return call.

## 2025-04-25 NOTE — TELEPHONE ENCOUNTER
Spoke with patient and he stated he was affilated with this company and it was ok to send his progress notes.I returned call and spoke with Nonabell and notified that I would fax back prescription and progress notes on Monday when  returned and able to sign.

## 2025-04-28 ENCOUNTER — PATIENT MESSAGE (OUTPATIENT)
Dept: HEMATOLOGY/ONCOLOGY | Facility: CLINIC | Age: 70
End: 2025-04-28
Payer: MEDICARE

## 2025-04-28 ENCOUNTER — DOCUMENTATION ONLY (OUTPATIENT)
Dept: INFUSION THERAPY | Facility: HOSPITAL | Age: 70
End: 2025-04-28
Payer: MEDICARE

## 2025-04-28 ENCOUNTER — INFUSION (OUTPATIENT)
Dept: INFUSION THERAPY | Facility: HOSPITAL | Age: 70
End: 2025-04-28
Attending: INTERNAL MEDICINE
Payer: MEDICARE

## 2025-04-28 ENCOUNTER — PATIENT MESSAGE (OUTPATIENT)
Dept: UROLOGY | Facility: CLINIC | Age: 70
End: 2025-04-28
Payer: MEDICARE

## 2025-04-28 ENCOUNTER — DOCUMENTATION ONLY (OUTPATIENT)
Dept: HEMATOLOGY/ONCOLOGY | Facility: CLINIC | Age: 70
End: 2025-04-28
Payer: MEDICARE

## 2025-04-28 ENCOUNTER — RESULTS FOLLOW-UP (OUTPATIENT)
Dept: FAMILY MEDICINE | Facility: CLINIC | Age: 70
End: 2025-04-28

## 2025-04-28 ENCOUNTER — TELEPHONE (OUTPATIENT)
Dept: HEMATOLOGY/ONCOLOGY | Facility: CLINIC | Age: 70
End: 2025-04-28
Payer: MEDICARE

## 2025-04-28 VITALS
BODY MASS INDEX: 25.5 KG/M2 | OXYGEN SATURATION: 97 % | RESPIRATION RATE: 16 BRPM | SYSTOLIC BLOOD PRESSURE: 156 MMHG | HEART RATE: 97 BPM | TEMPERATURE: 98 F | DIASTOLIC BLOOD PRESSURE: 86 MMHG | WEIGHT: 182.13 LBS | HEIGHT: 71 IN

## 2025-04-28 DIAGNOSIS — C67.9 SMALL CELL CARCINOMA OF BLADDER: ICD-10-CM

## 2025-04-28 DIAGNOSIS — C67.9 MALIGNANT NEOPLASM OF URINARY BLADDER, UNSPECIFIED SITE: Primary | ICD-10-CM

## 2025-04-28 PROCEDURE — 96375 TX/PRO/DX INJ NEW DRUG ADDON: CPT | Mod: PN

## 2025-04-28 PROCEDURE — 96367 TX/PROPH/DG ADDL SEQ IV INF: CPT | Mod: PN

## 2025-04-28 PROCEDURE — 96413 CHEMO IV INFUSION 1 HR: CPT | Mod: PN

## 2025-04-28 PROCEDURE — 96417 CHEMO IV INFUS EACH ADDL SEQ: CPT | Mod: PN

## 2025-04-28 PROCEDURE — 25000003 PHARM REV CODE 250: Mod: PN | Performed by: NURSE PRACTITIONER

## 2025-04-28 PROCEDURE — A4216 STERILE WATER/SALINE, 10 ML: HCPCS | Mod: PN | Performed by: NURSE PRACTITIONER

## 2025-04-28 PROCEDURE — 96366 THER/PROPH/DIAG IV INF ADDON: CPT | Mod: PN

## 2025-04-28 PROCEDURE — 63600175 PHARM REV CODE 636 W HCPCS: Mod: PN | Performed by: NURSE PRACTITIONER

## 2025-04-28 RX ORDER — SODIUM CHLORIDE 0.9 % (FLUSH) 0.9 %
10 SYRINGE (ML) INJECTION
Status: DISCONTINUED | OUTPATIENT
Start: 2025-04-28 | End: 2025-04-28 | Stop reason: HOSPADM

## 2025-04-28 RX ORDER — HEPARIN 100 UNIT/ML
500 SYRINGE INTRAVENOUS
Status: DISCONTINUED | OUTPATIENT
Start: 2025-04-28 | End: 2025-04-28 | Stop reason: HOSPADM

## 2025-04-28 RX ADMIN — CISPLATIN 162 MG: 1 INJECTION INTRAVENOUS at 11:04

## 2025-04-28 RX ADMIN — Medication 10 ML: at 03:04

## 2025-04-28 RX ADMIN — SODIUM CHLORIDE: 9 INJECTION, SOLUTION INTRAVENOUS at 08:04

## 2025-04-28 RX ADMIN — POTASSIUM CHLORIDE 500 ML/HR: 2 INJECTION, SOLUTION, CONCENTRATE INTRAVENOUS at 01:04

## 2025-04-28 RX ADMIN — POTASSIUM CHLORIDE 500 ML/HR: 2 INJECTION, SOLUTION, CONCENTRATE INTRAVENOUS at 08:04

## 2025-04-28 RX ADMIN — APREPITANT 130 MG: 130 INJECTION, EMULSION INTRAVENOUS at 11:04

## 2025-04-28 RX ADMIN — ETOPOSIDE 202 MG: 20 INJECTION INTRAVENOUS at 12:04

## 2025-04-28 RX ADMIN — Medication 500 UNITS: at 03:04

## 2025-04-28 RX ADMIN — DEXAMETHASONE SODIUM PHOSPHATE 0.25 MG: 10 INJECTION, SOLUTION INTRAMUSCULAR; INTRAVENOUS at 11:04

## 2025-04-28 NOTE — PLAN OF CARE
Problem: Adult Inpatient Plan of Care  Goal: Plan of Care Review  4/28/2025 1553 by Chloé Cruz, RN  Flowsheets (Taken 4/28/2025 1553)  Plan of Care Reviewed With:   patient   spouse  4/28/2025 1553 by Chloé Cruz, RN  Outcome: Progressing  Goal: Patient-Specific Goal (Individualized)  4/28/2025 1553 by Chloé Cruz RN  Flowsheets (Taken 4/28/2025 1553)  Individualized Care Needs: recliner, blanket, tv, wife at chairside  Anxieties, Fears or Concerns: none  Patient/Family-Specific Goals (Include Timeframe): no reaction to treatment  4/28/2025 1553 by Chloé Cruz RN  Outcome: Progressing     Problem: Fatigue  Goal: Improved Activity Tolerance  Outcome: Progressing  Intervention: Promote Improved Energy  Flowsheets (Taken 4/28/2025 1553)  Sleep/Rest Enhancement: regular sleep/rest pattern promoted  Activity Management: Ambulated in [place] -   Environmental Support:   calm environment promoted   distractions minimized   Pt tolerated Day 1 of VP16/Cisplatin treatment.  No s/s of reaction noted.  Encouraged to increase oral fluid intake as tolerated.  Port left access for use tomorrow.  Instructed to call MD with any problems

## 2025-04-28 NOTE — PROGRESS NOTES
9:19 AM    This LCSW met with this pt and his wife at chairside to check in and provide support.   The pt said he has had some challenges, but is working through them.  He inquired about a men's beanie. This LCSW brought him baseball caps, crocheted beanies and male knit hats for him to choose from today. The pt said he has many baseball caps and chose a knitted beanie today.  The pt and wife thanked me for coming to check on them today.  This LCSW reminded them we are here as an extra layer of support for any questions, concerns or just to talk.     no

## 2025-04-28 NOTE — PROGRESS NOTES
Met with Mr. Medel chairside in infusion clinic. He has lost most of his heard/beard as of today. Pleased to have gotten a beanie today for warmth. He c/o sciatic pain after bending over to remove luggage from his trunk. Suggested using an ice pack 20 min/hr for comfort.     Mr. Medel desires to cancel office f/u visit and cystoscopy with Dr. Donahue. He prefers to f/u with Dr. Rutherford moving forward.     He states he's tolerating chemo pretty well so far. Looking forward to finishing chemo and completing surgery so he can get back to watching [employer] football. He and his wife have season tickets to most [employer] sports.     We spent some time discussing his home life/hobbies. No immediate navigation needs today. Will continue to follow.

## 2025-04-28 NOTE — TELEPHONE ENCOUNTER
"----- Message from LAURA Thornton sent at 4/28/2025  2:02 PM CDT -----  Regarding: Penile Drainage  Good afternoon, Mr. Medel states he has noticed some drainage from his urethral meatus following his last cycle of chemotherapy. He describes this as "thick, white" drainage. He denies any fever or abdominal pain. He is scheduled for a CT c/a/p today and a f/u visit with Dr. Lowery on 4/30. He wanted to pass this along to Dr. Lowery to see if there is any concern about this drainage. Thank you!Hillary  "

## 2025-04-29 ENCOUNTER — OFFICE VISIT (OUTPATIENT)
Dept: OPTOMETRY | Facility: CLINIC | Age: 70
End: 2025-04-29
Payer: MEDICARE

## 2025-04-29 ENCOUNTER — INFUSION (OUTPATIENT)
Dept: INFUSION THERAPY | Facility: HOSPITAL | Age: 70
End: 2025-04-29
Attending: INTERNAL MEDICINE
Payer: MEDICARE

## 2025-04-29 VITALS
RESPIRATION RATE: 16 BRPM | DIASTOLIC BLOOD PRESSURE: 86 MMHG | WEIGHT: 183.44 LBS | TEMPERATURE: 98 F | HEART RATE: 89 BPM | HEIGHT: 71 IN | SYSTOLIC BLOOD PRESSURE: 148 MMHG | OXYGEN SATURATION: 98 % | BODY MASS INDEX: 25.68 KG/M2

## 2025-04-29 DIAGNOSIS — C67.9 MALIGNANT NEOPLASM OF URINARY BLADDER, UNSPECIFIED SITE: Primary | ICD-10-CM

## 2025-04-29 DIAGNOSIS — H34.8120 CENTRAL RETINAL VEIN OCCLUSION WITH MACULAR EDEMA OF LEFT EYE: ICD-10-CM

## 2025-04-29 DIAGNOSIS — H40.053 OCULAR HYPERTENSION, BILATERAL: ICD-10-CM

## 2025-04-29 DIAGNOSIS — Z96.1 PSEUDOPHAKIA, BOTH EYES: ICD-10-CM

## 2025-04-29 DIAGNOSIS — C67.9 SMALL CELL CARCINOMA OF BLADDER: ICD-10-CM

## 2025-04-29 DIAGNOSIS — E11.9 DIABETES MELLITUS TYPE 2 WITHOUT RETINOPATHY: Primary | ICD-10-CM

## 2025-04-29 DIAGNOSIS — Z98.890 H/O VITRECTOMY: ICD-10-CM

## 2025-04-29 DIAGNOSIS — H35.341 LAMELLAR MACULAR HOLE, RIGHT: ICD-10-CM

## 2025-04-29 DIAGNOSIS — H35.033 HYPERTENSIVE RETINOPATHY OF BOTH EYES: ICD-10-CM

## 2025-04-29 PROCEDURE — 96413 CHEMO IV INFUSION 1 HR: CPT | Mod: PN

## 2025-04-29 PROCEDURE — 92134 CPTRZ OPH DX IMG PST SGM RTA: CPT | Mod: PBBFAC,PO | Performed by: OPTOMETRIST

## 2025-04-29 PROCEDURE — 99214 OFFICE O/P EST MOD 30 MIN: CPT | Mod: PBBFAC,PO | Performed by: OPTOMETRIST

## 2025-04-29 PROCEDURE — 99214 OFFICE O/P EST MOD 30 MIN: CPT | Mod: S$PBB,,, | Performed by: OPTOMETRIST

## 2025-04-29 PROCEDURE — 63600175 PHARM REV CODE 636 W HCPCS: Mod: PN | Performed by: NURSE PRACTITIONER

## 2025-04-29 PROCEDURE — 25000003 PHARM REV CODE 250: Mod: PN | Performed by: NURSE PRACTITIONER

## 2025-04-29 PROCEDURE — A4216 STERILE WATER/SALINE, 10 ML: HCPCS | Mod: PN | Performed by: NURSE PRACTITIONER

## 2025-04-29 PROCEDURE — 99999 PR PBB SHADOW E&M-EST. PATIENT-LVL IV: CPT | Mod: PBBFAC,,, | Performed by: OPTOMETRIST

## 2025-04-29 RX ORDER — HEPARIN 100 UNIT/ML
500 SYRINGE INTRAVENOUS
Status: DISCONTINUED | OUTPATIENT
Start: 2025-04-29 | End: 2025-04-29 | Stop reason: HOSPADM

## 2025-04-29 RX ORDER — SODIUM CHLORIDE 0.9 % (FLUSH) 0.9 %
10 SYRINGE (ML) INJECTION
Status: DISCONTINUED | OUTPATIENT
Start: 2025-04-29 | End: 2025-04-29 | Stop reason: HOSPADM

## 2025-04-29 RX ADMIN — ETOPOSIDE 204 MG: 20 INJECTION INTRAVENOUS at 01:04

## 2025-04-29 RX ADMIN — Medication 500 UNITS: at 02:04

## 2025-04-29 RX ADMIN — SODIUM CHLORIDE: 9 INJECTION, SOLUTION INTRAVENOUS at 01:04

## 2025-04-29 RX ADMIN — Medication 10 ML: at 02:04

## 2025-04-29 NOTE — PLAN OF CARE
Problem: Fatigue  Goal: Improved Activity Tolerance  Outcome: Progressing  Intervention: Promote Improved Energy  Flowsheets (Taken 4/29/2025 1340)  Activity Management: Ambulated -L4  Environmental Support: rest periods encouraged

## 2025-04-29 NOTE — PROGRESS NOTES
HPI    Pt presents today for a DM dfe.  Pt states he started Chemo in March for Bladder Cancer.  Pt c/o blurred VA OS.  Pt wears OTC +1.50 readers.  Instills Brimonidine bid OU and Dorzolamide bid OU.  Denies ocular pain/disc.  Denies f/f.    Pt reports BSL are all over the place due to current steroid use for   cancer.    Hemoglobin A1C       Date                     Value               Ref Range             Status                01/15/2025               7.6 (H)             4.0 - 5.6 %           Final              Comment:    ADA Screening Guidelines:  5.7-6.4%  Consistent with   prediabetes  >or=6.5%  Consistent with diabetes    High levels of fetal   hemoglobin interfere with the HbA1C  assay. Heterozygous hemoglobin   variants (HbS, HgC, etc)do  not significantly interfere with this assay.     However, presence of multiple variants may affect accuracy.         12/16/2024               7.1 (H)             4.0 - 5.6 %           Final              Comment:    ADA Screening Guidelines:  5.7-6.4%  Consistent with   prediabetes  >or=6.5%  Consistent with diabetes    High levels of fetal   hemoglobin interfere with the HbA1C  assay. Heterozygous hemoglobin   variants (HbS, HgC, etc)do  not significantly interfere with this assay.     However, presence of multiple variants may affect accuracy.         09/04/2024               6.3 (H)             4.0 - 5.6 %           Final              Comment:    ADA Screening Guidelines:  5.7-6.4%  Consistent with   prediabetes  >or=6.5%  Consistent with diabetes    High levels of fetal   hemoglobin interfere with the HbA1C  assay. Heterozygous hemoglobin   variants (HbS, HgC, etc)do  not significantly interfere with this assay.     However, presence of multiple variants may affect accuracy.    ----------  Hemoglobin A1c       Date                     Value               Ref Range             Status                04/24/2025               6.9 (H)             4.0 - 5.6 %            Final              Comment:    ADA Screening Guidelines:  5.7-6.4%  Consistent with prediabetes  >=6.5%    Consistent with diabetes    High levels of fetal hemoglobin interfere   with the HbA1C  assay. Heterozygous hemoglobin variants (HbS, HgC,   etc)do  not significantly interfere with this assay.   However, presence   of multiple variants may affect accuracy.  ----------    Last edited by Aida Montes on 4/29/2025  9:59 AM.            Assessment /Plan     For exam results, see Encounter Report.    Diabetes mellitus type 2 without retinopathy    H/O vitrectomy  -     Posterior Segment OCT Retina-Both eyes    Hypertensive retinopathy of both eyes  -     Posterior Segment OCT Retina-Both eyes  -     Ambulatory referral/consult to Ophthalmology; Future; Expected date: 05/06/2025    Ocular hypertension, bilateral    Central retinal vein occlusion with macular edema of left eye  -     Ambulatory referral/consult to Ophthalmology; Future; Expected date: 05/06/2025    Lamellar macular hole, right  -     Ambulatory referral/consult to Ophthalmology; Future; Expected date: 05/06/2025    Pseudophakia, both eyes      1-3.   s/p 25g PPV/partial AFx OS for vitreous opacities/NCVH OS 11/15/17     Doing well, good IOP  HAD PC violation during anterior vitreous removal -      Had CE with dropped lens material give PC violation 11/30/17     Increased IOP - though improved with K edema     Will need PPV to remove lens material.  Pt would prefer more prompt resolution     s/p 25g PPV OS for retained lens material OS 12/6/17     Doing well, but IOP improved to 18 - ? Steroid response, inflammation and cortical material improving, AC deep     1/8/18 - some increased inflammation after steroid taper, no CME  1/18 - still low grade inflammation -   6/18 - stable     s/p 25g PPV/partial AFx OD for vitreous opacities and NCVH OD 7/1/18    4.   Longstanding w/ good compliance and tx following issues with CE   Continue as previous    OU  Dorz / brimonidine bid     5.  OS w/ presumed impending CRVO, mild edema in posterior pole, not grossly vis sig   OCT mac 4/2025   Advise re establish with Dr Pulido     6.  OD longstanding following above w/ lamellar hole appearance, again good uncorrected vision   7.  Stable OU     Ok continue uncorrected / use otc for near prn     To retina

## 2025-04-29 NOTE — Clinical Note
Hi, previous [facility] patient overdue for recheck ---has probable impending CRVO--OS, with some mild macular edema Schedule Vale 2-3 weeks plz

## 2025-04-30 ENCOUNTER — TELEPHONE (OUTPATIENT)
Dept: FAMILY MEDICINE | Facility: CLINIC | Age: 70
End: 2025-04-30
Payer: MEDICARE

## 2025-04-30 ENCOUNTER — INFUSION (OUTPATIENT)
Dept: INFUSION THERAPY | Facility: HOSPITAL | Age: 70
End: 2025-04-30
Attending: INTERNAL MEDICINE
Payer: MEDICARE

## 2025-04-30 VITALS
HEART RATE: 97 BPM | OXYGEN SATURATION: 99 % | DIASTOLIC BLOOD PRESSURE: 91 MMHG | RESPIRATION RATE: 16 BRPM | SYSTOLIC BLOOD PRESSURE: 171 MMHG | TEMPERATURE: 98 F

## 2025-04-30 DIAGNOSIS — C67.9 SMALL CELL CARCINOMA OF BLADDER: ICD-10-CM

## 2025-04-30 DIAGNOSIS — C67.9 MALIGNANT NEOPLASM OF URINARY BLADDER, UNSPECIFIED SITE: Primary | ICD-10-CM

## 2025-04-30 PROCEDURE — 96413 CHEMO IV INFUSION 1 HR: CPT | Mod: PN

## 2025-04-30 PROCEDURE — 96377 APPLICATON ON-BODY INJECTOR: CPT | Mod: PN

## 2025-04-30 PROCEDURE — A4216 STERILE WATER/SALINE, 10 ML: HCPCS | Mod: PN | Performed by: NURSE PRACTITIONER

## 2025-04-30 PROCEDURE — 25000003 PHARM REV CODE 250: Mod: PN | Performed by: NURSE PRACTITIONER

## 2025-04-30 PROCEDURE — 63600175 PHARM REV CODE 636 W HCPCS: Mod: JZ,TB,PN | Performed by: NURSE PRACTITIONER

## 2025-04-30 RX ORDER — EPINEPHRINE 0.3 MG/.3ML
0.3 INJECTION SUBCUTANEOUS ONCE AS NEEDED
Status: DISCONTINUED | OUTPATIENT
Start: 2025-04-30 | End: 2025-04-30 | Stop reason: HOSPADM

## 2025-04-30 RX ORDER — DIPHENHYDRAMINE HYDROCHLORIDE 50 MG/ML
50 INJECTION, SOLUTION INTRAMUSCULAR; INTRAVENOUS ONCE AS NEEDED
Status: DISCONTINUED | OUTPATIENT
Start: 2025-04-30 | End: 2025-04-30 | Stop reason: HOSPADM

## 2025-04-30 RX ORDER — HEPARIN 100 UNIT/ML
500 SYRINGE INTRAVENOUS
Status: DISCONTINUED | OUTPATIENT
Start: 2025-04-30 | End: 2025-04-30 | Stop reason: HOSPADM

## 2025-04-30 RX ORDER — PROCHLORPERAZINE EDISYLATE 5 MG/ML
5 INJECTION INTRAMUSCULAR; INTRAVENOUS ONCE AS NEEDED
Status: DISCONTINUED | OUTPATIENT
Start: 2025-04-30 | End: 2025-04-30 | Stop reason: HOSPADM

## 2025-04-30 RX ORDER — SODIUM CHLORIDE 0.9 % (FLUSH) 0.9 %
10 SYRINGE (ML) INJECTION
Status: DISCONTINUED | OUTPATIENT
Start: 2025-04-30 | End: 2025-04-30 | Stop reason: HOSPADM

## 2025-04-30 RX ADMIN — PEGFILGRASTIM 6 MG: KIT SUBCUTANEOUS at 02:04

## 2025-04-30 RX ADMIN — Medication 10 ML: at 01:04

## 2025-04-30 RX ADMIN — ETOPOSIDE 204 MG: 20 INJECTION INTRAVENOUS at 01:04

## 2025-04-30 RX ADMIN — SODIUM CHLORIDE: 9 INJECTION, SOLUTION INTRAVENOUS at 01:04

## 2025-04-30 RX ADMIN — Medication 500 UNITS: at 02:04

## 2025-04-30 NOTE — TELEPHONE ENCOUNTER
----- Message from Kelly sent at 4/30/2025  3:22 PM CDT -----  Contact: RUBÉN SMALL [[id number]]  .Type:  Patient Requesting CallWho Called:RUBÉN SMALL [[id number]]Does the patient know what this is regarding?:Patient requesting a follow up call with a question he forgot to askWould the patient rather a call back or a response via [facility]? Call [facility] Call Back Number:.[phone] (home)  Additional Information:

## 2025-04-30 NOTE — TELEPHONE ENCOUNTER
Spoke with patient and he wanted a refill of Meloxicam for the pain he was having in his hip and knee. Patient notified that I did not see that med on his list and offered an appointment for  to assess his pain. Patient agreed and appointment was scheduled.

## 2025-04-30 NOTE — PLAN OF CARE
Problem: Adult Inpatient Plan of Care  Goal: Plan of Care Review  Outcome: Met     Problem: Fatigue  Goal: Improved Activity Tolerance  Outcome: Progressing   Tolerated infusion well today and obi placement Able to be d/c to home  Discharge instructions given with copy of avs and pt ambulated to private vehicle without difficulty NAD

## 2025-05-01 ENCOUNTER — OFFICE VISIT (OUTPATIENT)
Dept: FAMILY MEDICINE | Facility: CLINIC | Age: 70
End: 2025-05-01
Payer: MEDICARE

## 2025-05-01 VITALS
WEIGHT: 175.13 LBS | HEART RATE: 64 BPM | DIASTOLIC BLOOD PRESSURE: 78 MMHG | SYSTOLIC BLOOD PRESSURE: 142 MMHG | TEMPERATURE: 98 F | BODY MASS INDEX: 24.52 KG/M2 | HEIGHT: 71 IN

## 2025-05-01 DIAGNOSIS — R06.6 HICCUPS: ICD-10-CM

## 2025-05-01 DIAGNOSIS — D64.81 ANEMIA DUE TO ANTINEOPLASTIC CHEMOTHERAPY: ICD-10-CM

## 2025-05-01 DIAGNOSIS — T45.1X5A ANEMIA DUE TO ANTINEOPLASTIC CHEMOTHERAPY: ICD-10-CM

## 2025-05-01 DIAGNOSIS — Z79.4 TYPE 2 DIABETES MELLITUS WITH DIABETIC NEPHROPATHY, WITH LONG-TERM CURRENT USE OF INSULIN: ICD-10-CM

## 2025-05-01 DIAGNOSIS — R00.2 PALPITATIONS: ICD-10-CM

## 2025-05-01 DIAGNOSIS — C67.9 SMALL CELL CARCINOMA OF BLADDER: ICD-10-CM

## 2025-05-01 DIAGNOSIS — M51.362 DEGENERATION OF INTERVERTEBRAL DISC OF LUMBAR REGION WITH DISCOGENIC BACK PAIN AND LOWER EXTREMITY PAIN: Primary | ICD-10-CM

## 2025-05-01 DIAGNOSIS — E11.21 TYPE 2 DIABETES MELLITUS WITH DIABETIC NEPHROPATHY, WITH LONG-TERM CURRENT USE OF INSULIN: ICD-10-CM

## 2025-05-01 DIAGNOSIS — F51.4 NIGHT TERRORS, ADULT: ICD-10-CM

## 2025-05-01 PROCEDURE — 99214 OFFICE O/P EST MOD 30 MIN: CPT | Mod: PBBFAC,PO | Performed by: DIETITIAN, REGISTERED

## 2025-05-01 PROCEDURE — 99214 OFFICE O/P EST MOD 30 MIN: CPT | Mod: S$PBB,,, | Performed by: DIETITIAN, REGISTERED

## 2025-05-01 PROCEDURE — G2211 COMPLEX E/M VISIT ADD ON: HCPCS | Mod: S$PBB,,, | Performed by: DIETITIAN, REGISTERED

## 2025-05-01 PROCEDURE — 99999 PR PBB SHADOW E&M-EST. PATIENT-LVL IV: CPT | Mod: PBBFAC,,, | Performed by: DIETITIAN, REGISTERED

## 2025-05-01 RX ORDER — MELOXICAM 15 MG/1
15 TABLET ORAL DAILY
Qty: 30 TABLET | Refills: 3 | Status: SHIPPED | OUTPATIENT
Start: 2025-05-01

## 2025-05-01 RX ORDER — METHOCARBAMOL 750 MG/1
750 TABLET, FILM COATED ORAL 4 TIMES DAILY
Qty: 40 TABLET | Refills: 3 | Status: SHIPPED | OUTPATIENT
Start: 2025-05-01

## 2025-05-01 RX ORDER — OMEPRAZOLE 40 MG/1
40 CAPSULE, DELAYED RELEASE ORAL DAILY
Qty: 90 CAPSULE | Refills: 3 | Status: SHIPPED | OUTPATIENT
Start: 2025-05-01 | End: 2026-05-01

## 2025-05-01 NOTE — PROGRESS NOTES
PLAN:    Assessment & Plan    IMPRESSION:  - Groin pain likely related to L2-L3 and L3-L4 nerve irritation from multiple bulging discs.  - A1C improved to 6.9.  - Hemoglobin and hematocrit levels likely affected by cancer treatment.  - Plan to reassess insulin needs after completion of chemotherapy. Stopped Metformin.  - Cardiac workup unnecessary at this time based on recent normal stress test results.  - Palpitations possibly due to reflux and existing PACs/PVCs.    SMALL CELL CARCINOMA OF THE BLADDER:  - Patient is currently on the second series of a four-series treatment for bladder cancer, with each series lasting 3 weeks.  - Treatment consists of Monday chemotherapy sessions (8 hours with two different medicines, including an aggressive one) and Tuesday/Thursday sessions (90 minutes with regular chemotherapy).  - Patient reports feeling great with no severe nausea, though hair loss is noted.  - Patient was informed about chemotherapy's potential impact on heart health.  - Comprehensive scans will be performed after the last treatment in June to assess condition and determine further surgical intervention, including possible bladder and prostate removal.    ANEMIA:  - Patient's hemoglobin has dropped to 12 from a baseline of 16, likely due to cancer treatment.  - Will monitor hemoglobin and hematocrit levels to ensure they do not drop too low due to chemotherapy.    DEGENERATIVE DISC DISEASE WITH RIGHT RADICULOPATHY:  - Patient reports pain in the groin area radiating down to the side, likely related to nerve irritation.  - Previous MRI and CTs show multiple bulging discs in the lumbar spine with abnormalities from L1 to L5 (T12 to L1 being normal).  - Pain is likely due to irritation of the L2, L3, and possibly L4 nerves, as indicated by the dermatome map which was explained to the patient.  - Prescribed Mobic daily for pain management and Robaxin as a muscle relaxer for back pain.  - Referred patient back to  Dr. Taveras for further evaluation and possible epidural steroid injection.  - Avoiding systemic steroids due to use of dexamethazone during chemotherapy.    GASTROESOPHAGEAL REFLUX DISEASE:  - Patient experiences acid reflux, especially when lying down, and has been using Tums for relief.  - Prescribed Prilosec for acid reflux management.  - Patient instructed to contact the office if Prilosec does not improve symptoms.  - Will monitor the effect of Prilosec and consider the impact of Mounjaro on reflux.    HICCUPS:  - Patient reports experiencing hiccups, especially when sitting down to eat.  - Prescribed Prilosec to help manage symptoms.  - Will monitor hiccups in relation to reflux and other symptoms.    PALPITATIONS:  - Patient experiences palpitations, especially when lying down, possibly related to reflux or existing PACs and PVCs.  - Previous Holter monitor showed rare PACs and PVCs, and nuclear stress test was normal with EF of 73% and negative for ischemia.  - Discussed potential causes including reflux and existing arrhythmias.  - Prescribed Prilosec to manage reflux.  - Will consider repeating Holter monitor to check for any changes.  - Patient instructed to contact the office if palpitations worsen.    NIGHT TERRORS:  - Patient reports nightmares occurring between 4:00 AM and 5:00 AM, causing feelings of being drained upon waking.  - Nightmares are less severe, not causing physical reactions like fighting or falling out of bed.  - Plan to consult with Dr. Kang regarding management of nightmares and possible adjustment of Klonopin dosage.  - Ordered small increase in magnesium supplementation.    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  - Continued Mounjaro 10 mg and discontinued Metformin.  - Plan to revisit Lantus dosage after completion of chemotherapy, considering A1C level, and reassess overall diabetes management  at that time.    FOLLOW-UP:  - Follow up after completion of chemotherapy to reassess Lantus dosage and overall treatment plan.        Problem List Items Addressed This Visit       DDD (degenerative disc disease), lumbar - Primary    Relevant Medications    meloxicam (MOBIC) 15 MG tablet    methocarbamoL (ROBAXIN) 750 MG Tab    omeprazole (PRILOSEC) 40 MG capsule    Palpitations    Relevant Orders    Holter monitor - 48 hour    Night terrors, adult    Type 2 diabetes mellitus with diabetic nephropathy, with long-term current use of insulin    Relevant Medications    tirzepatide 10 mg/0.5 mL PnIj    Small cell carcinoma of bladder    Anemia due to antineoplastic chemotherapy    Hiccups     Future Appointments       Next 10 Appointments       Date Provider Specialty Appt Notes    5/15/2025 SAJAN Pulido MD Ophthalmology last seen 2023 5/16/2025  Lab [facility] ([facility],cmp,mag)    5/16/2025 Moris Lowery MD Hematology and Oncology 6 wk f/u    5/19/2025  Chemotherapy C3D1 Cisplatin / VP16 Q21d    5/20/2025  Chemotherapy C3D2 -16    5/21/2025  Chemotherapy C3D3 -16 / OBI    6/12/2025 Cherie Lion DO Family Medicine  Arrive at: Telehealth 6wk follo wup    8/1/2025 Cherie Lion DO Family Medicine 3 month    8/27/2025  [facility]    8/27/2025  [facility]              Displaying the next 10 appointments. This patient has additional appointments scheduled.           Medication Management for assessment above:   Medication List with Changes/Refills   New Medications    MELOXICAM (MOBIC) 15 MG TABLET    Take 1 tablet (15 mg total) by mouth once daily.    METHOCARBAMOL (ROBAXIN) 750 MG TAB    Take 1 tablet (750 mg total) by mouth 4 (four) times daily.    OMEPRAZOLE (PRILOSEC) 40 MG CAPSULE    Take 1 capsule (40 mg total) by mouth once daily.   Current Medications    ALLOPURINOL (ZYLOPRIM) 300 MG TABLET    Take 1 tablet (300 mg total) by mouth once daily.    AMMONIUM LACTATE (LAC-HYDRIN) 12 % LOTION    Apply  "topically 2 (two) times daily.    ATORVASTATIN (LIPITOR) 20 MG TABLET    Take 1 tablet (20 mg total) by mouth every evening.    BACILLUS COAGULANS ORAL    Take 1 tablet by mouth once daily.    BRIMONIDINE 0.2% (ALPHAGAN) 0.2 % DROP    INSTILL 1 DROP IN BOTH EYES TWICE DAILY    CARBIDOPA-LEVODOPA  MG (SINEMET)  MG PER TABLET    Take 1.5 tablets by mouth 5 (five) times daily.    CHOLECALCIFEROL, VITAMIN D3, (D3-2000 ORAL)    Take by mouth once daily.    CLONAZEPAM (KLONOPIN) 1 MG TABLET    Take 1 tablet (1 mg total) by mouth every evening.    CYANOCOBALAMIN (VITAMIN B-12) 1000 MCG TABLET    Take 100 mcg by mouth once daily.    DEXAMETHASONE (DECADRON) 4 MG TAB    Take 2 tablets (8 mg total) by mouth once daily. on days 2-4 of each chemotherapy cycle.    DONEPEZIL (ARICEPT) 5 MG TABLET    Take 1 tablet (5 mg total) by mouth every evening.    DORZOLAMIDE (TRUSOPT) 2 % OPHTHALMIC SOLUTION    Place 1 drop into both eyes 2 (two) times daily.    DULOXETINE (CYMBALTA) 30 MG CAPSULE    Take 1 capsule (30 mg total) by mouth once daily.    GABAPENTIN (NEURONTIN) 300 MG CAPSULE    Take 1 capsule (300 mg total) by mouth 2 (two) times daily.    INSULIN GLARGINE U-100, LANTUS, (LANTUS SOLOSTAR U-100 INSULIN) 100 UNIT/ML (3 ML) INPN PEN    Inject 7 Units into the skin once daily.    KETOCONAZOLE (NIZORAL) 2 % SHAMPOO    Apply topically.    LIDOCAINE-PRILOCAINE (EMLA) CREAM    Apply to port site one hour prior to chemotherapy    OLANZAPINE (ZYPREXA) 5 MG TABLET    Take 1 tablet (5 mg total) by mouth every evening. on days 1-4 of each chemotherapy cycle.    PEN NEEDLE, DIABETIC (BD HIMANSHU 2ND GEN PEN NEEDLE) 32 GAUGE X 5/32" NDLE    Inject 1 each into the skin once daily.    PRAZOSIN (MINIPRESS) 1 MG CAP    Take 1 capsule (1 mg total) by mouth every evening.    PROCHLORPERAZINE (COMPAZINE) 5 MG TABLET    Take 1 tablet (5 mg total) by mouth every 6 (six) hours as needed for Nausea.    RAMELTEON (ROZEREM) 8 MG TABLET    Take 1 " tablet (8 mg total) by mouth nightly.    RASAGILINE (AZILECT) 1 MG TAB    TAKE 1 TABLET(1 MG) BY MOUTH EVERY EVENING   Changed and/or Refilled Medications    Modified Medication Previous Medication    TIRZEPATIDE 10 MG/0.5 ML PNIJ tirzepatide 10 mg/0.5 mL PnIj       Inject 10 mg into the skin every 7 days. (Increased dose)    Inject 10 mg into the skin every 7 days. (Increased dose)   Discontinued Medications    METFORMIN (GLUCOPHAGE-XR) 500 MG ER 24HR TABLET    Take 2 tablets (1,000 mg total) by mouth once daily.       Cherie Lion, DO, RDN  ==========================================================================  Subjective:   Patient ID: Glenn Medel is a 69 y.o. male.  has a past medical history of [place] (acute kidney injury) (02/21/2025), Arthritis, Bladder cancer (2025), Blurred vision (09/08/2020), BPH (benign prostatic hyperplasia), Cancer, Cataract, Chronic kidney disease, stage 3 (02/25/2015), DDD (degenerative disc disease), lumbar, Diabetes mellitus, Elevated PSA, Fall (07/02/2024), Glaucoma, HTN (hypertension), Iritis, lens-induced (01/08/2018), LUE numbness (09/08/2020), Obesity, Parkinson's disease, Pyelonephritis (02/21/2025), Sepsis (06/09/2023), Sleep apnea, Suspected cerebrovascular accident (CVA) (09/08/2020), and Syncope and collapse (07/02/2024).   Chief Complaint: Hip Pain and Knee Pain      History of Present Illness    CHIEF COMPLAINT:  Patient presents today with groin pain that won't go away    MUSCULOSKELETAL PAIN:  He reports groin and leg pain that began while retrieving a seat case from his car in [place]. The pain is described as shooting down his side and across to his groin, suggesting possible nerve involvement. Ice application provided minimal relief. Pain worsens when sleeping on his right side, leading him to favor his left side.    ONCOLOGY:  He is currently on the second of four series of bladder cancer treatment. Treatment consists of 8-hour Monday sessions with two  "chemotherapy medications (one aggressive and one standard), followed by 90-minute sessions on Tuesdays and Thursdays. He reports hair loss affecting head, beard, back, and chest, but denies severe nausea. Hemoglobin has dropped from baseline 16 to 12 due to chemotherapy.    SLEEP:  He continues to experience nightmares, though less severe and no longer resulting in falling out of bed or physical distress. Nightmares have shifted from 1-2 AM to 4-5 AM, leaving him feeling drained upon waking at 9 AM.    CARDIOVASCULAR/GI:  He experiences palpitations and acid reflux while lying in bed watching TV. The palpitations are described as a "flutter" sensation. Acid reflux resolves upon standing and walking to bathroom. He also reports hiccups, particularly after drinking water and during meals, with one episode lasting about an hour.    LABS:  A1C was 6.9. PSA was elevated.      ROS:  General: -fever, -chills, -fatigue, -weight gain, -weight loss  Eyes: -vision changes, -redness, -discharge  ENT: -ear pain, -nasal congestion, -sore throat  Cardiovascular: -chest pain, +palpitations, -lower extremity edema  Respiratory: -cough, -shortness of breath  Gastrointestinal: -abdominal pain, -nausea, -vomiting, -diarrhea, -constipation, -blood in stool, +indigestion  Genitourinary: -dysuria, -hematuria, -frequency  Musculoskeletal: -joint pain, -muscle pain, +limb pain  Skin: -rash, -lesion, +abnormal hair loss  Neurological: -headache, -dizziness, -numbness, -tingling  Psychiatric: -anxiety, -depression, -sleep difficulty          Problem List Items Addressed This Visit       DDD (degenerative disc disease), lumbar - Primary    Overview   s/p epidural steroids          Palpitations    Night terrors, adult    Overview   prozosin         Type 2 diabetes mellitus with diabetic nephropathy, with long-term current use of insulin    Small cell carcinoma of bladder    Anemia due to antineoplastic chemotherapy    Hiccups        Review of " "patient's allergies indicates:   Allergen Reactions    Avelox [moxifloxacin] Hives and Rash     Current Outpatient Medications   Medication Instructions    allopurinoL (ZYLOPRIM) 300 mg, Oral, Daily    ammonium lactate (LAC-HYDRIN) 12 % lotion 2 times daily    atorvastatin (LIPITOR) 20 mg, Oral, Nightly    BACILLUS COAGULANS ORAL 1 tablet, Daily    brimonidine 0.2% (ALPHAGAN) 0.2 % Drop 1 drop, Both Eyes, 2 times daily    carbidopa-levodopa  mg (SINEMET)  mg per tablet 1.5 tablets, Oral, 5 times daily    cholecalciferol, vitamin D3, (D3-2000 ORAL) Daily    clonazePAM (KLONOPIN) 1 mg, Oral, Nightly    cyanocobalamin (VITAMIN B-12) 100 mcg, Daily    dexAMETHasone (DECADRON) 8 mg, Oral, Daily, on days 2-4 of each chemotherapy cycle.    donepeziL (ARICEPT) 5 mg, Oral, Nightly    dorzolamide (TRUSOPT) 2 % ophthalmic solution 1 drop, Both Eyes, 2 times daily    DULoxetine (CYMBALTA) 30 mg, Oral, Daily    gabapentin (NEURONTIN) 300 mg, Oral, 2 times daily    ketoconazole (NIZORAL) 2 % shampoo Apply topically.    LANTUS SOLOSTAR U-100 INSULIN 7 Units, Subcutaneous, Daily    LIDOcaine-prilocaine (EMLA) cream Apply to port site one hour prior to chemotherapy    meloxicam (MOBIC) 15 mg, Oral, Daily    methocarbamoL (ROBAXIN) 750 mg, Oral, 4 times daily    OLANZapine (ZYPREXA) 5 mg, Oral, Nightly, on days 1-4 of each chemotherapy cycle.    omeprazole (PRILOSEC) 40 mg, Oral, Daily    pen needle, diabetic (BD HIMANSHU 2ND GEN PEN NEEDLE) 32 gauge x 5/32" Ndle 1 each, Subcutaneous, Daily    prazosin (MINIPRESS) 1 mg, Oral, Nightly    prochlorperazine (COMPAZINE) 5 mg, Oral, Every 6 hours PRN    ramelteon (ROZEREM) 8 mg, Oral, Nightly    rasagiline (AZILECT) 1 mg Tab TAKE 1 TABLET(1 MG) BY MOUTH EVERY EVENING    tirzepatide 10 mg, Subcutaneous, Every 7 days, (Increased dose)      I have reviewed the PMH, social history, FamilyHx, surgical history, allergies and medications documented / confirmed by the patient at the time " "of this visit.    Objective:   BP (!) 142/78   Pulse 64   Temp 97.8 °F (36.6 °C) (Oral)   Ht 5' 11" (1.803 m)   Wt 79.4 kg (175 lb 1.6 oz)   BMI 24.42 kg/m²   Physical Exam    General: No acute distress. Well-developed. Well-nourished.  Eyes: EOMI. Sclerae anicteric.  HENT: Normocephalic. Atraumatic. Nares patent. Moist oral mucosa.  Ears: Bilateral TMs clear. Bilateral EACs clear.  Cardiovascular: Regular rate. Regular rhythm. No murmurs. No rubs. No gallops. Normal S1, S2.  Respiratory: Normal respiratory effort. Clear to auscultation bilaterally. No rales. No rhonchi. No wheezing.  Abdomen: Soft. Non-tender. Non-distended. Normoactive bowel sounds.  Musculoskeletal: No  obvious deformity.  Extremities: No lower extremity edema.  Neurological: Alert & oriented x3. No slurred speech. Normal gait.  Psychiatric: Normal mood. Normal affect. Good insight. Good judgment.  Skin: Warm. Dry. No rash.          Assessment:     1. Degeneration of intervertebral disc of lumbar region with discogenic back pain and lower extremity pain    2. Type 2 diabetes mellitus with diabetic nephropathy, with long-term current use of insulin    3. Palpitations    4. Small cell carcinoma of bladder    5. Anemia due to antineoplastic chemotherapy    6. Hiccups    7. Night terrors, adult      MDM:   Moderate complexity, more than 2 chronic conditions requiring medication management, unique testing, interpretation of testing and follow up.    Visit today included increased complexity associated with the care of the episodic problem see above assessment addressed and managing the longitudinal care of the patient due to the serious and/or complex managed problem(s) see above.    I have reviewed and summarized old records.  I have performed thorough medication reconciliation today and discussed risk and benefits of medications.  I have reviewed labs and discussed with patient.  All questions were answered.    I have signed for the following " orders AND/OR meds.  Orders Placed This Encounter   Procedures    Holter monitor - 48 hour     Standing Status:   Future     Expiration Date:   5/1/2026     Release to patient:   Immediate     Medications Ordered This Encounter   Medications    meloxicam (MOBIC) 15 MG tablet     Sig: Take 1 tablet (15 mg total) by mouth once daily.     Dispense:  30 tablet     Refill:  3    methocarbamoL (ROBAXIN) 750 MG Tab     Sig: Take 1 tablet (750 mg total) by mouth 4 (four) times daily.     Dispense:  40 tablet     Refill:  3    omeprazole (PRILOSEC) 40 MG capsule     Sig: Take 1 capsule (40 mg total) by mouth once daily.     Dispense:  90 capsule     Refill:  3    tirzepatide 10 mg/0.5 mL PnIj     Sig: Inject 10 mg into the skin every 7 days. (Increased dose)     Dispense:  2 mL     Refill:  3     Needs this delivered by Saturday.        Follow up in about 3 months (around 8/1/2025).  Future Appointments       Next 10 Appointments       Date Provider Specialty Appt Notes    5/15/2025 SAJAN Pulido MD Ophthalmology last seen 2023 5/16/2025  Lab hemonc (cbc,cmp,mag)    5/16/2025 Moris Lowery MD Hematology and Oncology 6 wk f/u    5/19/2025  Chemotherapy C3D1 Cisplatin / VP16 Q21d    5/20/2025  Chemotherapy C3D2 -16    5/21/2025  Chemotherapy C3D3 -16 / OBI    6/12/2025 Cherie Lion DO Family Medicine  Arrive at: Telehealth 6wk follo wup    8/1/2025 Cherie Lion DO Family Medicine 3 month    8/27/2025  [facility]    8/27/2025  [facility]              Displaying the next 10 appointments. This patient has additional appointments scheduled.            If no improvement in symptoms or symptoms worsen, advised to call/follow-up at clinic or go to ER. Patient voiced understanding and all questions/concerns were addressed.     DISCLAIMER: This note was compiled by using a speech recognition dictation system and therefore please be aware that typographical / speech recognition errors can and do occur.  Please  contact me if you see any errors specifically.     This note was generated with the assistance of ambient listening technology. Verbal consent was obtained by the patient and accompanying visitor(s) for the recording of patient appointment to facilitate this note. I attest to having reviewed and edited the generated note for accuracy, though some syntax or spelling errors may persist. Please contact the author of this note for any clarification.      Cherie Lion DO, ADOREN    Office: [phone] [id number] [place] [address]  FAX: [phone]

## 2025-05-02 ENCOUNTER — PATIENT MESSAGE (OUTPATIENT)
Dept: HEMATOLOGY/ONCOLOGY | Facility: CLINIC | Age: 70
End: 2025-05-02
Payer: MEDICARE

## 2025-05-02 ENCOUNTER — PATIENT MESSAGE (OUTPATIENT)
Dept: CARDIOLOGY | Facility: HOSPITAL | Age: 70
End: 2025-05-02
Payer: MEDICARE

## 2025-05-05 ENCOUNTER — PATIENT MESSAGE (OUTPATIENT)
Dept: CARDIOLOGY | Facility: HOSPITAL | Age: 70
End: 2025-05-05
Payer: MEDICARE

## 2025-05-05 ENCOUNTER — TELEPHONE (OUTPATIENT)
Dept: FAMILY MEDICINE | Facility: CLINIC | Age: 70
End: 2025-05-05
Payer: MEDICARE

## 2025-05-05 ENCOUNTER — PATIENT MESSAGE (OUTPATIENT)
Dept: HEMATOLOGY/ONCOLOGY | Facility: CLINIC | Age: 70
End: 2025-05-05
Payer: MEDICARE

## 2025-05-05 NOTE — TELEPHONE ENCOUNTER
----- Message from Moira sent at 5/5/2025  3:57 PM CDT -----  Regarding: Return Call  Contact: patient  Type:  Patient Returning CallWho Called: Who Left Message for Patient: Cherie Does the patient know what this is regarding?: Holter Monitoring Would the patient rather a call back or a response via [facility]?  Call [facility] Call Back Number:[phone] (home) Additional Information: The caller indicated that someone form cardiology came you a call to schedule an appointment but did not indicate naun the cardiolgoy SCARLETT Griffith

## 2025-05-05 NOTE — TELEPHONE ENCOUNTER
Spoke with patient and he wanted to let me know that some from Cardiology sent him a message via my chart to schedule his Holter. He stated he will get it figured out and scheduled.

## 2025-05-06 ENCOUNTER — PATIENT MESSAGE (OUTPATIENT)
Dept: HEMATOLOGY/ONCOLOGY | Facility: CLINIC | Age: 70
End: 2025-05-06
Payer: MEDICARE

## 2025-05-06 DIAGNOSIS — G47.52 REM SLEEP BEHAVIOR DISORDER: ICD-10-CM

## 2025-05-06 RX ORDER — CLONAZEPAM 1 MG/1
1.5 TABLET ORAL NIGHTLY
Qty: 60 TABLET | Refills: 2 | Status: SHIPPED | OUTPATIENT
Start: 2025-05-06

## 2025-05-07 ENCOUNTER — DOCUMENTATION ONLY (OUTPATIENT)
Dept: HEMATOLOGY/ONCOLOGY | Facility: CLINIC | Age: 70
End: 2025-05-07
Payer: MEDICARE

## 2025-05-09 ENCOUNTER — PATIENT MESSAGE (OUTPATIENT)
Dept: FAMILY MEDICINE | Facility: CLINIC | Age: 70
End: 2025-05-09
Payer: MEDICARE

## 2025-05-15 ENCOUNTER — OFFICE VISIT (OUTPATIENT)
Dept: OPHTHALMOLOGY | Facility: CLINIC | Age: 70
End: 2025-05-15
Payer: MEDICARE

## 2025-05-15 DIAGNOSIS — H34.8120 CENTRAL RETINAL VEIN OCCLUSION WITH MACULAR EDEMA OF LEFT EYE: ICD-10-CM

## 2025-05-15 DIAGNOSIS — E11.3293 CONTROLLED TYPE 2 DIABETES MELLITUS WITH BOTH EYES AFFECTED BY MILD NONPROLIFERATIVE RETINOPATHY WITHOUT MACULAR EDEMA, WITH LONG-TERM CURRENT USE OF INSULIN: Primary | ICD-10-CM

## 2025-05-15 DIAGNOSIS — H35.033 HYPERTENSIVE RETINOPATHY OF BOTH EYES: ICD-10-CM

## 2025-05-15 DIAGNOSIS — H35.341 LAMELLAR MACULAR HOLE, RIGHT: ICD-10-CM

## 2025-05-15 DIAGNOSIS — H43.821 VITREOMACULAR ADHESION, RIGHT: ICD-10-CM

## 2025-05-15 DIAGNOSIS — Z79.4 CONTROLLED TYPE 2 DIABETES MELLITUS WITH BOTH EYES AFFECTED BY MILD NONPROLIFERATIVE RETINOPATHY WITHOUT MACULAR EDEMA, WITH LONG-TERM CURRENT USE OF INSULIN: Primary | ICD-10-CM

## 2025-05-15 DIAGNOSIS — H35.371 EPIRETINAL MEMBRANE, RIGHT: ICD-10-CM

## 2025-05-15 DIAGNOSIS — H34.8122 CENTRAL RETINAL VEIN OCCLUSION, LEFT EYE, STABLE: ICD-10-CM

## 2025-05-15 PROCEDURE — 92014 COMPRE OPH EXAM EST PT 1/>: CPT | Mod: S$PBB,,, | Performed by: OPHTHALMOLOGY

## 2025-05-15 PROCEDURE — 99214 OFFICE O/P EST MOD 30 MIN: CPT | Mod: PBBFAC,PO,25 | Performed by: OPHTHALMOLOGY

## 2025-05-15 PROCEDURE — 99999 PR PBB SHADOW E&M-EST. PATIENT-LVL IV: CPT | Mod: PBBFAC,,, | Performed by: OPHTHALMOLOGY

## 2025-05-15 PROCEDURE — 92201 OPSCPY EXTND RTA DRAW UNI/BI: CPT | Mod: S$PBB,,, | Performed by: OPHTHALMOLOGY

## 2025-05-15 PROCEDURE — 92134 CPTRZ OPH DX IMG PST SGM RTA: CPT | Mod: PBBFAC,PO | Performed by: OPHTHALMOLOGY

## 2025-05-15 PROCEDURE — 92201 OPSCPY EXTND RTA DRAW UNI/BI: CPT | Mod: PBBFAC,PO | Performed by: OPHTHALMOLOGY

## 2025-05-15 NOTE — PROGRESS NOTES
PATIENT: Glenn Medel  MRN: [id number]  DATE: 5/16/2025      Diagnosis:   1. Small cell carcinoma of bladder    2. Malignant poorly differentiated neuroendocrine tumors    3. Primary hypertension    4. Parkinson's disease without dyskinesia, with fluctuating manifestations    5. Type 2 diabetes mellitus with diabetic nephropathy, with long-term current use of insulin    6. Immunodeficiency due to chemotherapy            Chief Complaint: Follow-up (Small cell carcinoma of bladder)      Oncologic History:      Oncologic History     Oncologic Treatment     Pathology           Subjective:    Interval History: Mr. Medel is a 69 y.o. male with BPH, CKD, DMII, Glaucoma, HTN, obesity, Parkinson's disease, LOVE who presents for small cell bladder cancer.  Since the last clinic visit the patient states he has occasional palpitations and is to have a holter monitor placed.  The patient denies CP, cough, SOB, abdominal pain, nausea, vomiting, constipation, diarrhea.  The patient denies fever, chills, night sweats, weight loss, new lumps or bumps, easy bruising or bleeding.    Prior History:  The patient initially presented after developing hematuria.  The patient underwent cystoscopy on 01/30/2025 showing a tumor at the trigone near the right ureteral orifice measuring about 3 cm in diameter.  Pt underwent TURBT on 2/12/25 with path showing invasive small-cell carcinoma of urothelial origin and a background of urothelial carcinoma in Situ.  Muscularis propria was present on biopsy but was not involved.  The patient saw urologist Dr. Zapata on 03/12/2024 whom recommended chemotherapy followed by surgery.  The patient was discussed at tumor board on 03/13/2025 with recommendation on chemotherapy, PET-CT, MRI brain.  MRI brain on 03/18/2025 showed 4 x 3 mm probable arachnoid cyst in the left posterior sella.  PET-CT on 03/19/2025 showed no evidence of disease.     The patient's case was discussed with Urology and Medical oncology  vanessa David with recommendation for 4 cycles of platinum/Etoposide followed by cystectomy.    Past Medical History:   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/08/2018    LUE numbness 09/08/2020    Obesity     Parkinson's disease     Pyelonephritis 02/21/2025    Sepsis 06/09/2023    Sleep apnea     uses cpap    Suspected cerebrovascular accident (CVA) 09/08/2020    Syncope and collapse 07/02/2024       Past Surgical HIstory:   Past Surgical History:   Procedure Laterality Date    CATARACT EXTRACTION W/  INTRAOCULAR LENS IMPLANT Left 11/30/2017    CATARACT EXTRACTION W/  INTRAOCULAR LENS IMPLANT Right 12/11/2018    Dr Garcia    CATARACT EXTRACTION W/  INTRAOCULAR LENS IMPLANT Right 12/11/2018    Procedure: EXTRACTION, CATARACT, WITH IOL INSERTION;  Surgeon: Damaris Garcia MD;  Location: [facility] OR;  Service: Ophthalmology;  Laterality: Right;    COLONOSCOPY N/A 09/18/2017    Procedure: COLONOSCOPY;  Surgeon: Paul Feliz Jr., MD;  Location: [facility];  Service: Endoscopy;  Laterality: N/A;    COLONOSCOPY N/A 12/21/2022    Procedure: COLONOSCOPY;  Surgeon: Paul Feliz Jr., MD;  Location: [facility] ENDO;  Service: Endoscopy;  Laterality: N/A;    COLONOSCOPY W/ POLYPECTOMY  04/13/2010    YARELI.   One 1 cm polyp in the sigmoid colon.  Internal hemorrhoids.    COSMETIC SURGERY      DIGITAL RECTAL EXAMINATION UNDER ANESTHESIA N/A 07/23/2019    Procedure: EXAM UNDER ANESTHESIA, DIGITAL, RECTUM;  Surgeon: Kaz Keating MD;  Location: [facility];   Service: General;  Laterality: N/A;    EXAMINATION UNDER ANESTHESIA N/A 04/24/2019    Procedure: EXAM UNDER ANESTHESIA;  Surgeon: Kaz Keating MD;  Location: [facility] OR;  Service: General;  Laterality: N/A;    EXCISIONAL HEMORRHOIDECTOMY N/A 04/24/2019    Procedure: HEMORRHOIDECTOMY (lithotomy);  Surgeon: Kaz Keating MD;  Location: [facility] OR;  Service: General;  Laterality: N/A;    EYE SURGERY      GANGLION CYST EXCISION Left     INJECTION OF ANESTHETIC AGENT AROUND MEDIAL BRANCH NERVES INNERVATING LUMBAR FACET JOINT Bilateral 04/09/2024    Procedure: Block-nerve-medial branch-lumbar-L4/5-L5/S1;  Surgeon: Everett Taveras MD;  Location: [facility] OR;  Service: Pain Management;  Laterality: Bilateral;    INJECTION OF ANESTHETIC AGENT AROUND MEDIAL BRANCH NERVES INNERVATING LUMBAR FACET JOINT Bilateral 05/06/2024    Procedure: Block-nerve-medial branch-lumbar-L4/5-L5/S1;  Surgeon: Everett Taveras MD;  Location: [facility] OR;  Service: Pain Management;  Laterality: Bilateral;    INJECTION OF ANESTHETIC AGENT AROUND PUDENDAL NERVE N/A 04/24/2019    Procedure: BLOCK, NERVE, PUDENDAL;  Surgeon: Kaz Keating MD;  Location: [facility] OR;  Service: General;  Laterality: N/A;    INJECTION OF ANESTHETIC AGENT AROUND PUDENDAL NERVE N/A 07/23/2019    Procedure: BLOCK, NERVE, PUDENDAL;  Surgeon: Kaz Keating MD;  Location: [facility] OR;  Service: General;  Laterality: N/A;    INJECTION, ALLOGRAFT, INTERVERTEBRAL DISC N/A 09/30/2024    Procedure: INJECTION,ALLOGRAFT,INTERVERTEBRAL DISC,1ST LEVEL  L4/5;  Surgeon: Everett Taveras MD;  Location: [facility] OR;  Service: Pain Management;  Laterality: N/A;    INSERTION OF TUNNELED CENTRAL VENOUS CATHETER (CVC) WITH SUBCUTANEOUS PORT Right 3/28/2025    Procedure: [id number];  Surgeon: Hebert Gardner MD;  Location: [facility] OR;  Service: General;  Laterality: Right;    INSTILLATION OF URINARY BLADDER  02/11/2025    Procedure: INSTILLATION, BLADDER w/ gemcitabine;  Surgeon: Rowan  MARIZA Woods MD;  Location: [facility] OR;  Service: Urology;;    RADIOFREQUENCY ABLATION OF LUMBAR MEDIAL BRANCH NERVE AT SINGLE LEVEL Bilateral 06/04/2024    Procedure: Radiofrequency Ablation, Nerve, Spinal, Lumbar, Medial Branch, L4/5 and L5/S1;  Surgeon: Everett Taveras MD;  Location: [facility];  Service: Pain Management;  Laterality: Bilateral;    REPAIR OF RETINAL DETACHMENT WITH VITRECTOMY Right 08/01/2018    Procedure: REPAIR, RETINAL DETACHMENT, WITH VITRECTOMY;  Surgeon: SAJAN Pulido MD;  Location: [address];  Service: Ophthalmology;  Laterality: Right;  35 min    SKIN BIOPSY      TONSILLECTOMY      TRANSRECTAL BIOPSY OF PROSTATE WITH ULTRASOUND GUIDANCE N/A 01/31/2023    Procedure: BIOPSY, PROSTATE, RECTAL APPROACH, WITH US GUIDANCE;  Surgeon: MARIZA Donahue MD;  Location: [facility] OR;  Service: Urology;  Laterality: N/A;    TRANSRECTAL BIOPSY OF PROSTATE WITH ULTRASOUND GUIDANCE N/A 04/12/2024    Procedure: BIOPSY, PROSTATE, RECTAL APPROACH, WITH US GUIDANCE;  Surgeon: MARIZA Donahue MD;  Location: [facility];  Service: Urology;  Laterality: N/A;    TRIGGER FINGER RELEASE Right     X 2    TURBT (TRANSURETHRAL RESECTION OF BLADDER TUMOR) N/A 02/11/2025    Procedure: TURBT (TRANSURETHRAL RESECTION OF BLADDER TUMOR);  Surgeon: MARIZA Donahue MD;  Location: [facility] OR;  Service: Urology;  Laterality: N/A;    uro lift      for enlarged prostate       Family History:   Family History   Problem Relation Name Age of Onset    Colon cancer Mother      Hypertension Mother      Diabetes Mother      Stroke Mother      Cancer Mother          colon    Kidney disease Mother      Heart disease Father      Diabetes Father      Colon cancer Father      Cancer Father          colon    Cataracts Father      No Known Problems Sister      Heart disease Brother  62    Kidney disease Brother      Dementia Brother Rodrigue     Abnormal EKG Brother Rodrigue     Breast cancer Maternal Aunt      Cancer Maternal Aunt          breast     "Cancer Maternal Aunt          brain    Brain cancer Maternal Aunt      No Known Problems Maternal Uncle      No Known Problems Paternal Aunt      No Known Problems Paternal Uncle      Glaucoma Maternal Grandmother      No Known Problems Maternal Grandfather      No Known Problems Paternal Grandmother      No Known Problems Paternal Grandfather      Amblyopia Neg Hx      Blindness Neg Hx      Macular degeneration Neg Hx      Retinal detachment Neg Hx      Strabismus Neg Hx      Thyroid disease Neg Hx      Parkinsonism Neg Hx         Social History:  reports that he has never smoked. He has never used smokeless tobacco. He reports current alcohol use. He reports that he does not use drugs.    Allergies:  Review of patient's allergies indicates:   Allergen Reactions    Avelox [moxifloxacin] Hives and Rash       Medications:  Current Medications[1]    Review of Systems   Constitutional:  Negative for chills, diaphoresis, fatigue, fever and unexpected weight change.   Respiratory:  Negative for cough and shortness of breath.    Cardiovascular:  Positive for palpitations. Negative for chest pain.   Gastrointestinal:  Negative for abdominal pain, constipation, diarrhea, nausea and vomiting.   Skin:  Negative for color change and rash.   Neurological:  Negative for headaches.   Hematological:  Negative for adenopathy. Does not bruise/bleed easily.       ECOG Performance Status: 1   Objective:      Vitals:   Vitals:    05/16/25 0853   BP: 121/73   BP Location: Left arm   Patient Position: Sitting   Pulse: 68   Resp: 17   Temp: 98.2 °F (36.8 °C)   TempSrc: Temporal   SpO2: 97%   Weight: 81 kg (178 lb 9.2 oz)   Height: 5' 11" (1.803 m)           Physical Exam  Constitutional:       General: He is not in acute distress.     Appearance: He is well-developed. He is not diaphoretic.   HENT:      Head: Normocephalic and atraumatic.   Eyes:      General: No scleral icterus.        Right eye: No discharge.         Left eye: No " discharge.   Cardiovascular:      Rate and Rhythm: Normal rate and regular rhythm.      Heart sounds: Normal heart sounds. No murmur heard.     No friction rub. No gallop.   Pulmonary:      Effort: Pulmonary effort is normal. No respiratory distress.      Breath sounds: Normal breath sounds. No wheezing or rales.   Chest:      Chest wall: No tenderness.   Abdominal:      General: Bowel sounds are normal. There is no distension.      Palpations: Abdomen is soft. There is no mass.      Tenderness: There is no abdominal tenderness. There is no rebound.   Musculoskeletal:      Comments: PORT in right chest   Skin:     General: Skin is warm and dry.      Findings: No erythema or rash.   Neurological:      Mental Status: He is alert and oriented to person, place, and time.   Psychiatric:         Behavior: Behavior normal.         Laboratory Data:  Lab Visit on 05/16/2025   Component Date Value Ref Range Status    Sodium 05/16/2025 137  136 - 145 mmol/L Final    Potassium 05/16/2025 4.6  3.5 - 5.1 mmol/L Final    Chloride 05/16/2025 105  95 - 110 mmol/L Final    CO2 05/16/2025 24  23 - 29 mmol/L Final    Glucose 05/16/2025 185 (H)  70 - 110 mg/dL Final    BUN 05/16/2025 22  8 - 23 mg/dL Final    Creatinine 05/16/2025 1.0  0.5 - 1.4 mg/dL Final    Calcium 05/16/2025 8.9  8.7 - 10.5 mg/dL Final    Protein Total 05/16/2025 6.7  6.0 - 8.4 gm/dL Final    Albumin 05/16/2025 3.6  3.5 - 5.2 g/dL Final    Bilirubin Total 05/16/2025 0.4  0.1 - 1.0 mg/dL Final    For infants and newborns, interpretation of results should be based   on gestational age, weight and in agreement with clinical   observations.    Premature Infant recommended reference ranges:   0-24 hours:  <8.0 mg/dL   24-48 hours: <12.0 mg/dL   3-5 days:    <15.0 mg/dL   6-29 days:   <15.0 mg/dL    ALP 05/16/2025 127  40 - 150 unit/L Final    AST 05/16/2025 27  11 - 45 unit/L Final    ALT 05/16/2025 5 (L)  10 - 44 unit/L Final    Anion Gap 05/16/2025 8  8 - 16 mmol/L  Final    eGFR 05/16/2025 >60  >60 mL/min/1.73/m2 Final    Estimated GFR calculated using the CKD-EPI creatinine (2021) equation.    Magnesium  05/16/2025 1.9  1.6 - 2.6 mg/dL Final    WBC 05/16/2025 10.64  3.90 - 12.70 K/uL Final    RBC 05/16/2025 3.94 (L)  4.60 - 6.20 M/uL Final    HGB 05/16/2025 11.7 (L)  14.0 - 18.0 gm/dL Final    HCT 05/16/2025 35.1 (L)  40.0 - 54.0 % Final    MCV 05/16/2025 89  82 - 98 fL Final    MCH 05/16/2025 29.7  27.0 - 31.0 pg Final    MCHC 05/16/2025 33.3  32.0 - 36.0 g/dL Final    RDW 05/16/2025 16.6 (H)  11.5 - 14.5 % Final    Platelet Count 05/16/2025 98 (L)  150 - 450 K/uL Final    MPV 05/16/2025 8.7 (L)  9.2 - 12.9 fL Final    Nucleated RBC 05/16/2025 0  <=0 /100 WBC Final    Gran # (ANC) 05/16/2025 6.6  K/uL Final    Segmented Neutrophil % 05/16/2025 61.0  38.0 - 73.0 % Final    Bands % 05/16/2025 1.0  % Final    Lymphocyte % 05/16/2025 24.0  18.0 - 48.0 % Final    Monocyte % 05/16/2025 8.0  4.0 - 15.0 % Final    Metamyelocyte % 05/16/2025 5.0  % Final    Myelocyte % 05/16/2025 1.0  % Final    Other Identified Cells % 05/16/2025 0.0  % Final    Platelet Estimate 05/16/2025 Decreased (A)    Final    Anisocytosis 05/16/2025 Slight   Final         Imaging:     MRI Brain  3/18/25  INTRACRANIAL: Mild age-appropriate volume loss. Unchanged prominent right retrocerebellar CSF/arachnoid cyst. Few scattered T2 FLAIR hyperintense white matter foci are minimally increased from prior study on 05/18/2023, likely related to chronic microvascular ischemic change. No abnormal enhancement demonstrated. No parenchymal restricted diffusion. No evidence of intracranial hemorrhage. No intracranial mass effect. No hydrocephalus. 4 x 3 mm probable arachnoid cyst in the left posterior sella (series 21, image 118). Otherwise, visualized pituitary gland and infundibulum are normal. Visualized major intracranial vascular structures demonstrate normal flow voids and are normal in course and caliber.      SINUSES: Paranasal sinuses are clear. Left mastoid effusion.     ORBITS: Bilateral lens replacements.      PET/CT 3/19/25  Head and Neck:     Brain demonstrates normal metabolism. However, FDG-PET has an approximate 60% sensitivity for brain metastases which are best detected by MRI with gadolinium. Physiologic uptake is in the neck.     Chest:     No FDG avid pulmonary lesions are present. No enlarged or FDG avid lymph nodes are in the chest.     Abdomen and Pelvis:     Physiologic uptake is in the liver, spleen, urinary system and bowel. No enlarged or FDG avid lymph nodes are in the abdomen or pelvis. Adrenal glands are normal. Urinary bladder is decompressed. Prostate is enlarged.     Musculoskeletal:     No FDG avid osseous lesions are present.       Assessment:       1. Small cell carcinoma of bladder    2. Malignant poorly differentiated neuroendocrine tumors    3. Primary hypertension    4. Parkinson's disease without dyskinesia, with fluctuating manifestations    5. Type 2 diabetes mellitus with diabetic nephropathy, with long-term current use of insulin    6. Immunodeficiency due to chemotherapy               Plan:     Small Cell Carcinoma of the Bladder - the patient diagnosed with small-cell carcinoma of the bladder on biopsy 02/12/2025  -Urologist Dr. Zapata whom patient met on 03/12/2024 recommended chemotherapy followed by cystectomy  -The patient discussed at tumor board on 03/13/2024 with recommendations for chemotherapy   -MRI brain on 03/18/2025 showed no evidence of disease  -PET-CT on 03/1925 showed no evidence of disease   -Case previously discussed with MD David with MD [facility] and medical oncology with recommendation for 4 cycels of platinum and etoposide followed by cystectomy  -Will proceed with cycle 3 next week  -Will repeat MRI brain and PET/CT prior to cycle 4  -Pt to follow up with Dr. Zapata with Urology 5/28/25  Visit today included increased complexity associated with the  care of the episodic problem (chemotherapy) addressed and managing the longitudinal care of the patient due to the serious and/or complex managed problem(s) Small Cell Carcinoma of the bladder.    Immunodeficiency due to chemo - pt at increased risk of infection  -No current signs of infection  -Will monitor    HTN - pt on prazosin  -BP controlled  -Will monitor    Parkinson's - pt on sinemet, rasagiline  -Symptoms controlled  -Will monitor    DMII - pt on insulin, metformin, tirzepatide  Lab Results   Component Value Date    HGBA1C 6.9 (H) 04/24/2025   -Will monitor     Glaucoma - pt using alphagan, trusopt eye drops  -Ophthalmology managing    Thrombocytopenia - due to chemo  Lab Results   Component Value Date    PLT 98 (L) 05/16/2025   -Will monitor    Normocytic Anemia - likely from chemo  Lab Results   Component Value Date    HGB 11.7 (L) 05/16/2025   -Will monitor    Route Chart for Scheduling    Med Onc Chart Routing      Follow up with physician 3 weeks. Pt can proceed with treatment.  Pt needs a CBC and CMP, PET/CT and MRI brain in 3 weeks with an appt with me the friday before treatment.   Follow up with GILMAR    Infusion scheduling note    Injection scheduling note    Labs    Imaging    Pharmacy appointment    Other referrals              Treatment Plan Information   OP EP (CISplatin etoposide) Q3W Moris Lowery MD   Associated diagnosis: Malignant neoplasm of urinary bladder   noted on 3/21/2025  Associated diagnosis: Small cell carcinoma of bladder Stage I cT1, cN0, cM0 noted on 2/11/2025   Line of treatment: Adjuvant  Treatment Goal: Curative     Upcoming Treatment Dates - OP EP (CISplatin etoposide) Q3W    5/19/2025       Chemotherapy       CISplatin (Platinol) 80 mg/m2 = 161 mg in 0.9% NaCl 661 mL chemo infusion       etoposide (VEPESID) 100 mg/m2 = 202 mg in 0.9% NaCl 510.1 mL chemo infusion       Pre-Hydration       0.9% NaCl 1,000 mL with magnesium sulfate 1 g, potassium chloride 20 mEq  infusion       Post-Hydration       0.9% NaCl 1,000 mL with magnesium sulfate 1 g, potassium chloride 20 mEq infusion       Antiemetics       aprepitant (Cinvanti) injection 130 mg       palonosetron 0.25mg/dexAMETHasone 12mg in NS IVPB 0.25 mg 50 mL  5/20/2025       Chemotherapy       etoposide (VEPESID) 100 mg/m2 = 202 mg in 0.9% NaCl 510.1 mL chemo infusion  5/21/2025       Chemotherapy       etoposide (VEPESID) 100 mg/m2 = 202 mg in 0.9% NaCl 510.1 mL chemo infusion       Growth Factor       pegfilgrastim (NEULASTA (ON BODY INJECTOR)) injection 6 mg  6/9/2025       Chemotherapy       CISplatin (Platinol) in 0.9% NaCl 661 mL chemo infusion       etoposide (VEPESID) in 0.9% NaCl 510.1 mL chemo infusion       Pre-Hydration       0.9% NaCl 1,000 mL with magnesium sulfate 1 g, potassium chloride 20 mEq infusion       Post-Hydration       0.9% NaCl 1,000 mL with magnesium sulfate 1 g, potassium chloride 20 mEq infusion       Antiemetics       aprepitant (Cinvanti) injection 130 mg       palonosetron 0.25mg/dexAMETHasone 12mg in NS IVPB 0.25 mg 50 mL    Therapy Plan Information  URO GEMCITABINE (INTRAVESICAL) for Small cell carcinoma of bladder, noted on 2/11/2025  Chemotherapy  gemcitabine 2,000 mg in 0.9% NaCl SolP 100 mL bladder instillation  2,000 mg, Intravesical, 1 time a week      No therapy plan of the specified type found.    No therapy plan of the specified type found.    Moris Lowery MD  [facility]  Hematology and Oncology  [facility]   [address] [place] [address]   O: [phone]  F: [phone]                 [1]   Current Outpatient Medications   Medication Sig Dispense Refill    allopurinoL (ZYLOPRIM) 300 MG tablet Take 1 tablet (300 mg total) by mouth once daily. 90 tablet 0    ammonium lactate (LAC-HYDRIN) 12 % lotion Apply topically 2 (two) times daily.      atorvastatin (LIPITOR) 20 MG tablet Take 1 tablet (20 mg total) by mouth every evening. 90  "tablet 3    BACILLUS COAGULANS ORAL Take 1 tablet by mouth once daily.      brimonidine 0.2% (ALPHAGAN) 0.2 % Drop INSTILL 1 DROP IN BOTH EYES TWICE DAILY 10 mL 0    carbidopa-levodopa  mg (SINEMET)  mg per tablet Take 1.5 tablets by mouth 5 (five) times daily. 675 tablet 3    cholecalciferol, vitamin D3, (D3-2000 ORAL) Take by mouth once daily.      clonazePAM (KLONOPIN) 1 MG tablet Take 1.5 tablets (1.5 mg total) by mouth every evening. 60 tablet 2    cyanocobalamin (VITAMIN B-12) 1000 MCG tablet Take 100 mcg by mouth once daily.      dexAMETHasone (DECADRON) 4 MG Tab Take 2 tablets (8 mg total) by mouth once daily. on days 2-4 of each chemotherapy cycle. 6 tablet 5    donepeziL (ARICEPT) 5 MG tablet Take 1 tablet (5 mg total) by mouth every evening. 30 tablet 11    dorzolamide (TRUSOPT) 2 % ophthalmic solution Place 1 drop into both eyes 2 (two) times daily. 10 mL 3    DULoxetine (CYMBALTA) 30 MG capsule Take 1 capsule (30 mg total) by mouth once daily. 90 capsule 1    gabapentin (NEURONTIN) 300 MG capsule Take 1 capsule (300 mg total) by mouth 2 (two) times daily. 60 capsule 1    insulin glargine U-100, Lantus, (LANTUS SOLOSTAR U-100 INSULIN) 100 unit/mL (3 mL) InPn pen Inject 7 Units into the skin once daily.      ketoconazole (NIZORAL) 2 % shampoo Apply topically.      LIDOcaine-prilocaine (EMLA) cream Apply to port site one hour prior to chemotherapy 30 g 0    meloxicam (MOBIC) 15 MG tablet Take 1 tablet (15 mg total) by mouth once daily. 30 tablet 3    methocarbamoL (ROBAXIN) 750 MG Tab Take 1 tablet (750 mg total) by mouth 4 (four) times daily. 40 tablet 3    OLANZapine (ZYPREXA) 5 MG tablet Take 1 tablet (5 mg total) by mouth every evening. on days 1-4 of each chemotherapy cycle. 4 tablet 5    omeprazole (PRILOSEC) 40 MG capsule Take 1 capsule (40 mg total) by mouth once daily. 90 capsule 3    pen needle, diabetic (BD HIMANSHU 2ND GEN PEN NEEDLE) 32 gauge x 5/32" Ndle Inject 1 each into the skin " once daily. 100 each 3    prazosin (MINIPRESS) 1 MG Cap Take 1 capsule (1 mg total) by mouth every evening. 90 capsule 3    prochlorperazine (COMPAZINE) 5 MG tablet Take 1 tablet (5 mg total) by mouth every 6 (six) hours as needed for Nausea. 20 tablet 5    ramelteon (ROZEREM) 8 mg tablet Take 1 tablet (8 mg total) by mouth nightly. 90 tablet 3    rasagiline (AZILECT) 1 mg Tab TAKE 1 TABLET(1 MG) BY MOUTH EVERY EVENING 30 tablet 11    tirzepatide 10 mg/0.5 mL PnIj Inject 10 mg into the skin every 7 days. (Increased dose) 2 mL 3     No current facility-administered medications for this visit.

## 2025-05-15 NOTE — PROGRESS NOTES
HPI     dm     erm  oct     me          Additional comments: Dm   Last bs     158  Last A1c    6.9      Erm  S/p ppv  os  11/2017  Distortion in va OD  Dls 10/19/2023          Last edited by Yuridia Whitaker on 5/15/2025  2:01 PM.            OCT - OD ERM, no sig traction  No ME OS    WFFA - delayed filling OS  Some microangiopathy but no sig NP or NV        A/P    1. Dense vitreous opacities OU with NCVH  No Improvement over last several months  ?prior VH with PVD OU - no breaks or tears    s/p 25g PPV/partial AFx OS for vitreous opacities/NCVH OS 11/15/17    Doing well, good IOP  HAD PC violation during anterior vitreous removal -     Had CE with dropped lens material give PC violation 11/30/17    Increased IOP - though improved with K edema    Will need PPV to remove lens material.  Pt would prefer more prompt resolution    s/p 25g PPV OS for retained lens material OS 12/6/17    Doing well, but IOP improved to 18 - ? Steroid response, inflammation and cortical material improving, AC deep    1/8/18 - some increased inflammation after steroid taper, no CME  1/18 - still low grade inflammation -   6/18 - stable    s/p 25g PPV/partial AFx OD for vitreous opacities and NCVH OD 7/1/18      2. PCIOL OD  Ok for YAG  Sulcus IOL OS    Great result    3. DM  Controlled No DR  BS/BP/chol control  Ok for yearly DFE during optometry visit    4. HTN Ret OU  Collateral vessels on ONH OS - longstanding on OCT  Some peripheral microangiopathy OS - likely silent CRVO in past when BP's elevated  10/23 - no sig NP/NV on FA  5/25 - some posterior microangiopathy noticeable, but no sig ME    5. POAG  On simbrinza BID  May continue OU once inflammation down  8/22 - some nasalization of vessels OS, but no overt vascular occlusion    6. ERM OD  Small - not sig    7. TCC of bladder   Undergoing chemo        12 months OCT and dilate

## 2025-05-16 ENCOUNTER — PATIENT MESSAGE (OUTPATIENT)
Facility: CLINIC | Age: 70
End: 2025-05-16
Payer: MEDICARE

## 2025-05-16 ENCOUNTER — LAB VISIT (OUTPATIENT)
Dept: LAB | Facility: HOSPITAL | Age: 70
End: 2025-05-16
Attending: INTERNAL MEDICINE
Payer: MEDICARE

## 2025-05-16 ENCOUNTER — HOSPITAL ENCOUNTER (OUTPATIENT)
Dept: CARDIOLOGY | Facility: HOSPITAL | Age: 70
Discharge: HOME | End: 2025-05-16
Attending: DIETITIAN, REGISTERED
Payer: MEDICARE

## 2025-05-16 ENCOUNTER — OFFICE VISIT (OUTPATIENT)
Dept: HEMATOLOGY/ONCOLOGY | Facility: CLINIC | Age: 70
End: 2025-05-16
Payer: MEDICARE

## 2025-05-16 VITALS
TEMPERATURE: 98 F | SYSTOLIC BLOOD PRESSURE: 121 MMHG | HEART RATE: 68 BPM | BODY MASS INDEX: 25 KG/M2 | HEIGHT: 71 IN | OXYGEN SATURATION: 97 % | WEIGHT: 178.56 LBS | DIASTOLIC BLOOD PRESSURE: 73 MMHG | RESPIRATION RATE: 17 BRPM

## 2025-05-16 DIAGNOSIS — C67.9 MALIGNANT NEOPLASM OF URINARY BLADDER, UNSPECIFIED SITE: ICD-10-CM

## 2025-05-16 DIAGNOSIS — C7A.1 MALIGNANT POORLY DIFFERENTIATED NEUROENDOCRINE TUMORS: ICD-10-CM

## 2025-05-16 DIAGNOSIS — D84.821 IMMUNODEFICIENCY DUE TO CHEMOTHERAPY: ICD-10-CM

## 2025-05-16 DIAGNOSIS — R00.2 PALPITATIONS: ICD-10-CM

## 2025-05-16 DIAGNOSIS — I10 PRIMARY HYPERTENSION: ICD-10-CM

## 2025-05-16 DIAGNOSIS — C67.9 SMALL CELL CARCINOMA OF BLADDER: Primary | ICD-10-CM

## 2025-05-16 DIAGNOSIS — E11.21 TYPE 2 DIABETES MELLITUS WITH DIABETIC NEPHROPATHY, WITH LONG-TERM CURRENT USE OF INSULIN: ICD-10-CM

## 2025-05-16 DIAGNOSIS — G20.A2 PARKINSON'S DISEASE WITHOUT DYSKINESIA, WITH FLUCTUATING MANIFESTATIONS: ICD-10-CM

## 2025-05-16 DIAGNOSIS — T45.1X5A IMMUNODEFICIENCY DUE TO CHEMOTHERAPY: ICD-10-CM

## 2025-05-16 DIAGNOSIS — Z79.69 IMMUNODEFICIENCY DUE TO CHEMOTHERAPY: ICD-10-CM

## 2025-05-16 DIAGNOSIS — Z79.4 TYPE 2 DIABETES MELLITUS WITH DIABETIC NEPHROPATHY, WITH LONG-TERM CURRENT USE OF INSULIN: ICD-10-CM

## 2025-05-16 LAB
ABSOLUTE NEUTROPHIL MANUAL (OHS): 6.6 K/UL
ALBUMIN SERPL BCP-MCNC: 3.6 G/DL (ref 3.5–5.2)
ALP SERPL-CCNC: 127 UNIT/L (ref 40–150)
ALT SERPL W/O P-5'-P-CCNC: 5 UNIT/L (ref 10–44)
ANION GAP (OHS): 8 MMOL/L (ref 8–16)
ANISOCYTOSIS BLD QL SMEAR: SLIGHT
AST SERPL-CCNC: 27 UNIT/L (ref 11–45)
BILIRUB SERPL-MCNC: 0.4 MG/DL (ref 0.1–1)
BUN SERPL-MCNC: 22 MG/DL (ref 8–23)
CALCIUM SERPL-MCNC: 8.9 MG/DL (ref 8.7–10.5)
CHLORIDE SERPL-SCNC: 105 MMOL/L (ref 95–110)
CO2 SERPL-SCNC: 24 MMOL/L (ref 23–29)
CREAT SERPL-MCNC: 1 MG/DL (ref 0.5–1.4)
ERYTHROCYTE [DISTWIDTH] IN BLOOD BY AUTOMATED COUNT: 16.6 % (ref 11.5–14.5)
GFR SERPLBLD CREATININE-BSD FMLA CKD-EPI: >60 ML/MIN/1.73/M2
GLUCOSE SERPL-MCNC: 185 MG/DL (ref 70–110)
HCT VFR BLD AUTO: 35.1 % (ref 40–54)
HGB BLD-MCNC: 11.7 GM/DL (ref 14–18)
LYMPHOCYTES NFR BLD MANUAL: 24 % (ref 18–48)
MAGNESIUM SERPL-MCNC: 1.9 MG/DL (ref 1.6–2.6)
MCH RBC QN AUTO: 29.7 PG (ref 27–31)
MCHC RBC AUTO-ENTMCNC: 33.3 G/DL (ref 32–36)
MCV RBC AUTO: 89 FL (ref 82–98)
METAMYELOCYTES NFR BLD MANUAL: 5 %
MONOCYTES NFR BLD MANUAL: 8 % (ref 4–15)
MYELOCYTES NFR BLD MANUAL: 1 %
NEUTROPHILS NFR BLD MANUAL: 61 % (ref 38–73)
NEUTS BAND NFR BLD MANUAL: 1 %
NUCLEATED RBC (/100WBC) (OHS): 0 /100 WBC
PLATELET # BLD AUTO: 98 K/UL (ref 150–450)
PLATELET BLD QL SMEAR: ABNORMAL
PMV BLD AUTO: 8.7 FL (ref 9.2–12.9)
POTASSIUM SERPL-SCNC: 4.6 MMOL/L (ref 3.5–5.1)
PROT SERPL-MCNC: 6.7 GM/DL (ref 6–8.4)
RBC # BLD AUTO: 3.94 M/UL (ref 4.6–6.2)
SODIUM SERPL-SCNC: 137 MMOL/L (ref 136–145)
WBC # BLD AUTO: 10.64 K/UL (ref 3.9–12.7)
WBC OTHER NFR BLD MANUAL: 0 %

## 2025-05-16 PROCEDURE — 93226 XTRNL ECG REC<48 HR SCAN A/R: CPT | Mod: PO

## 2025-05-16 PROCEDURE — 99999 PR PBB SHADOW E&M-EST. PATIENT-LVL V: CPT | Mod: PBBFAC,,, | Performed by: INTERNAL MEDICINE

## 2025-05-16 PROCEDURE — 99215 OFFICE O/P EST HI 40 MIN: CPT | Mod: PBBFAC,PN,25 | Performed by: INTERNAL MEDICINE

## 2025-05-16 PROCEDURE — 85007 BL SMEAR W/DIFF WBC COUNT: CPT | Mod: PN

## 2025-05-16 PROCEDURE — 80053 COMPREHEN METABOLIC PANEL: CPT | Mod: PN

## 2025-05-16 PROCEDURE — 93227 XTRNL ECG REC<48 HR R&I: CPT | Mod: ,,, | Performed by: INTERNAL MEDICINE

## 2025-05-16 PROCEDURE — 83735 ASSAY OF MAGNESIUM: CPT | Mod: PN

## 2025-05-16 PROCEDURE — 36415 COLL VENOUS BLD VENIPUNCTURE: CPT | Mod: PN

## 2025-05-16 RX ORDER — PROCHLORPERAZINE EDISYLATE 5 MG/ML
5 INJECTION INTRAMUSCULAR; INTRAVENOUS ONCE AS NEEDED
OUTPATIENT
Start: 2025-05-20

## 2025-05-16 RX ORDER — DIPHENHYDRAMINE HYDROCHLORIDE 50 MG/ML
50 INJECTION, SOLUTION INTRAMUSCULAR; INTRAVENOUS ONCE AS NEEDED
OUTPATIENT
Start: 2025-05-20

## 2025-05-16 RX ORDER — EPINEPHRINE 0.3 MG/.3ML
0.3 INJECTION SUBCUTANEOUS ONCE AS NEEDED
OUTPATIENT
Start: 2025-05-19

## 2025-05-16 RX ORDER — DIPHENHYDRAMINE HYDROCHLORIDE 50 MG/ML
50 INJECTION, SOLUTION INTRAMUSCULAR; INTRAVENOUS ONCE AS NEEDED
OUTPATIENT
Start: 2025-05-21

## 2025-05-16 RX ORDER — HEPARIN 100 UNIT/ML
500 SYRINGE INTRAVENOUS
OUTPATIENT
Start: 2025-05-19

## 2025-05-16 RX ORDER — PROCHLORPERAZINE EDISYLATE 5 MG/ML
5 INJECTION INTRAMUSCULAR; INTRAVENOUS ONCE AS NEEDED
OUTPATIENT
Start: 2025-05-21

## 2025-05-16 RX ORDER — PROCHLORPERAZINE EDISYLATE 5 MG/ML
5 INJECTION INTRAMUSCULAR; INTRAVENOUS ONCE AS NEEDED
OUTPATIENT
Start: 2025-05-19

## 2025-05-16 RX ORDER — SODIUM CHLORIDE 0.9 % (FLUSH) 0.9 %
10 SYRINGE (ML) INJECTION
OUTPATIENT
Start: 2025-05-20

## 2025-05-16 RX ORDER — HEPARIN 100 UNIT/ML
500 SYRINGE INTRAVENOUS
OUTPATIENT
Start: 2025-05-21

## 2025-05-16 RX ORDER — EPINEPHRINE 0.3 MG/.3ML
0.3 INJECTION SUBCUTANEOUS ONCE AS NEEDED
OUTPATIENT
Start: 2025-05-21

## 2025-05-16 RX ORDER — HEPARIN 100 UNIT/ML
500 SYRINGE INTRAVENOUS
OUTPATIENT
Start: 2025-05-20

## 2025-05-16 RX ORDER — EPINEPHRINE 0.3 MG/.3ML
0.3 INJECTION SUBCUTANEOUS ONCE AS NEEDED
OUTPATIENT
Start: 2025-05-20

## 2025-05-16 RX ORDER — SODIUM CHLORIDE 0.9 % (FLUSH) 0.9 %
10 SYRINGE (ML) INJECTION
OUTPATIENT
Start: 2025-05-19

## 2025-05-16 RX ORDER — SODIUM CHLORIDE 0.9 % (FLUSH) 0.9 %
10 SYRINGE (ML) INJECTION
OUTPATIENT
Start: 2025-05-21

## 2025-05-16 RX ORDER — DIPHENHYDRAMINE HYDROCHLORIDE 50 MG/ML
50 INJECTION, SOLUTION INTRAMUSCULAR; INTRAVENOUS ONCE AS NEEDED
OUTPATIENT
Start: 2025-05-19

## 2025-05-17 ENCOUNTER — PATIENT MESSAGE (OUTPATIENT)
Dept: HEMATOLOGY/ONCOLOGY | Facility: CLINIC | Age: 70
End: 2025-05-17
Payer: MEDICARE

## 2025-05-18 ENCOUNTER — PATIENT MESSAGE (OUTPATIENT)
Dept: HEMATOLOGY/ONCOLOGY | Facility: CLINIC | Age: 70
End: 2025-05-18
Payer: MEDICARE

## 2025-05-19 ENCOUNTER — INFUSION (OUTPATIENT)
Dept: INFUSION THERAPY | Facility: HOSPITAL | Age: 70
End: 2025-05-19
Attending: INTERNAL MEDICINE
Payer: MEDICARE

## 2025-05-19 ENCOUNTER — DOCUMENTATION ONLY (OUTPATIENT)
Dept: INFUSION THERAPY | Facility: HOSPITAL | Age: 70
End: 2025-05-19
Payer: MEDICARE

## 2025-05-19 ENCOUNTER — PATIENT MESSAGE (OUTPATIENT)
Dept: HEMATOLOGY/ONCOLOGY | Facility: CLINIC | Age: 70
End: 2025-05-19
Payer: MEDICARE

## 2025-05-19 VITALS
TEMPERATURE: 98 F | SYSTOLIC BLOOD PRESSURE: 153 MMHG | HEIGHT: 71 IN | OXYGEN SATURATION: 97 % | DIASTOLIC BLOOD PRESSURE: 92 MMHG | WEIGHT: 180.31 LBS | RESPIRATION RATE: 16 BRPM | BODY MASS INDEX: 25.24 KG/M2 | HEART RATE: 82 BPM

## 2025-05-19 DIAGNOSIS — C67.9 SMALL CELL CARCINOMA OF BLADDER: ICD-10-CM

## 2025-05-19 DIAGNOSIS — C67.9 MALIGNANT NEOPLASM OF URINARY BLADDER, UNSPECIFIED SITE: Primary | ICD-10-CM

## 2025-05-19 PROCEDURE — 96366 THER/PROPH/DIAG IV INF ADDON: CPT | Mod: PN

## 2025-05-19 PROCEDURE — 96367 TX/PROPH/DG ADDL SEQ IV INF: CPT | Mod: PN

## 2025-05-19 PROCEDURE — 96413 CHEMO IV INFUSION 1 HR: CPT | Mod: PN

## 2025-05-19 PROCEDURE — 63600175 PHARM REV CODE 636 W HCPCS: Mod: PN | Performed by: INTERNAL MEDICINE

## 2025-05-19 PROCEDURE — A4216 STERILE WATER/SALINE, 10 ML: HCPCS | Mod: PN | Performed by: INTERNAL MEDICINE

## 2025-05-19 PROCEDURE — 96375 TX/PRO/DX INJ NEW DRUG ADDON: CPT | Mod: PN

## 2025-05-19 PROCEDURE — 25000003 PHARM REV CODE 250: Mod: PN | Performed by: INTERNAL MEDICINE

## 2025-05-19 PROCEDURE — 96417 CHEMO IV INFUS EACH ADDL SEQ: CPT | Mod: PN

## 2025-05-19 RX ORDER — HEPARIN 100 UNIT/ML
500 SYRINGE INTRAVENOUS
Status: DISCONTINUED | OUTPATIENT
Start: 2025-05-19 | End: 2025-05-19 | Stop reason: HOSPADM

## 2025-05-19 RX ORDER — SODIUM CHLORIDE 0.9 % (FLUSH) 0.9 %
10 SYRINGE (ML) INJECTION
Status: DISCONTINUED | OUTPATIENT
Start: 2025-05-19 | End: 2025-05-19 | Stop reason: HOSPADM

## 2025-05-19 RX ADMIN — Medication 10 ML: at 03:05

## 2025-05-19 RX ADMIN — DEXAMETHASONE SODIUM PHOSPHATE 0.25 MG: 10 INJECTION, SOLUTION INTRAMUSCULAR; INTRAVENOUS at 11:05

## 2025-05-19 RX ADMIN — POTASSIUM CHLORIDE 500 ML/HR: 2 INJECTION, SOLUTION, CONCENTRATE INTRAVENOUS at 01:05

## 2025-05-19 RX ADMIN — ETOPOSIDE 202 MG: 20 INJECTION INTRAVENOUS at 12:05

## 2025-05-19 RX ADMIN — SODIUM CHLORIDE: 9 INJECTION, SOLUTION INTRAVENOUS at 08:05

## 2025-05-19 RX ADMIN — APREPITANT 130 MG: 130 INJECTION, EMULSION INTRAVENOUS at 11:05

## 2025-05-19 RX ADMIN — CISPLATIN 161 MG: 1 INJECTION INTRAVENOUS at 11:05

## 2025-05-19 RX ADMIN — Medication 500 UNITS: at 03:05

## 2025-05-19 RX ADMIN — POTASSIUM CHLORIDE 500 ML/HR: 2 INJECTION, SOLUTION, CONCENTRATE INTRAVENOUS at 08:05

## 2025-05-19 NOTE — PLAN OF CARE
Problem: Adult Inpatient Plan of Care  Goal: Patient-Specific Goal (Individualized)  Outcome: Progressing  Flowsheets (Taken 5/19/2025 1625)  Individualized Care Needs: recliner, blanket, tv, wife at chairside  Anxieties, Fears or Concerns: none  Patient/Family-Specific Goals (Include Timeframe): no reaction to tx  Goal: Absence of Hospital-Acquired Illness or Injury  Outcome: Progressing  Goal: Optimal Comfort and Wellbeing  Outcome: Progressing  Goal: Readiness for Transition of Care  Outcome: Progressing     Problem: Fatigue  Goal: Improved Activity Tolerance  Outcome: Progressing  Intervention: Promote Improved Energy  Flowsheets (Taken 5/19/2025 1625)  Fatigue Management: paced activity encouraged  Sleep/Rest Enhancement: regular sleep/rest pattern promoted  Activity Management: Ambulated in [place] - L4  Environmental Support:   calm environment promoted   distractions minimized   Pt tolerated Cisplatin/VP16 well.  No s/s of reaction noted. Encouraged to increase oral fluid intake.  To RTC tomorrow for Day 2 of VP16.  Instructed to call MD with any problems.

## 2025-05-19 NOTE — PROGRESS NOTES
"Oncology Nutrition   Chemotherapy Infusion Visit    Nutrition Follow Up   RD met with pt at chairside during C3 of Cisplatin/VP16. Pt states his appetite has been "okay". Pt is eating 3 meals plus snacks. Pt reports he does not want to lose anymore weight. Pt denies any N/V/D/C or any food intolerances. Pt eating guacamole and crackers at time of RD visit. Pt weight is trending upward.     Wt Readings from Last 10 Encounters:   05/19/25 81.8 kg (180 lb 5.4 oz)   05/16/25 81 kg (178 lb 9.2 oz)   05/01/25 79.4 kg (175 lb 1.6 oz)   04/29/25 83.2 kg (183 lb 6.8 oz)   04/28/25 82.6 kg (182 lb 1.6 oz)   04/24/25 81.8 kg (180 lb 5.4 oz)   04/09/25 80.6 kg (177 lb 11.1 oz)   04/09/25 80.6 kg (177 lb 11.1 oz)   04/08/25 80.6 kg (177 lb 11.1 oz)   04/07/25 80.6 kg (177 lb 11.1 oz)       All other nutrition questions/concerns addressed as appropriate. Will continue to monitor prn throughout treatment.     Xiomara Ryan, MARIO, LDN  05/19/2025  9:54 AM         "

## 2025-05-20 ENCOUNTER — INFUSION (OUTPATIENT)
Dept: INFUSION THERAPY | Facility: HOSPITAL | Age: 70
End: 2025-05-20
Attending: INTERNAL MEDICINE
Payer: MEDICARE

## 2025-05-20 ENCOUNTER — OUTPATIENT CASE MANAGEMENT (OUTPATIENT)
Dept: ADMINISTRATIVE | Facility: OTHER | Age: 70
End: 2025-05-20
Payer: MEDICARE

## 2025-05-20 VITALS
HEIGHT: 71 IN | RESPIRATION RATE: 16 BRPM | BODY MASS INDEX: 25.4 KG/M2 | SYSTOLIC BLOOD PRESSURE: 163 MMHG | HEART RATE: 87 BPM | WEIGHT: 181.44 LBS | TEMPERATURE: 97 F | DIASTOLIC BLOOD PRESSURE: 90 MMHG | OXYGEN SATURATION: 98 %

## 2025-05-20 DIAGNOSIS — C67.9 MALIGNANT NEOPLASM OF URINARY BLADDER, UNSPECIFIED SITE: Primary | ICD-10-CM

## 2025-05-20 DIAGNOSIS — C67.9 SMALL CELL CARCINOMA OF BLADDER: ICD-10-CM

## 2025-05-20 PROCEDURE — 96413 CHEMO IV INFUSION 1 HR: CPT | Mod: PN

## 2025-05-20 PROCEDURE — A4216 STERILE WATER/SALINE, 10 ML: HCPCS | Mod: PN | Performed by: INTERNAL MEDICINE

## 2025-05-20 PROCEDURE — 25000003 PHARM REV CODE 250: Mod: PN | Performed by: INTERNAL MEDICINE

## 2025-05-20 PROCEDURE — 63600175 PHARM REV CODE 636 W HCPCS: Mod: PN | Performed by: INTERNAL MEDICINE

## 2025-05-20 RX ORDER — HEPARIN 100 UNIT/ML
500 SYRINGE INTRAVENOUS
Status: DISCONTINUED | OUTPATIENT
Start: 2025-05-20 | End: 2025-05-20 | Stop reason: HOSPADM

## 2025-05-20 RX ORDER — PROCHLORPERAZINE EDISYLATE 5 MG/ML
5 INJECTION INTRAMUSCULAR; INTRAVENOUS ONCE AS NEEDED
Status: DISCONTINUED | OUTPATIENT
Start: 2025-05-20 | End: 2025-05-20 | Stop reason: HOSPADM

## 2025-05-20 RX ORDER — SODIUM CHLORIDE 0.9 % (FLUSH) 0.9 %
10 SYRINGE (ML) INJECTION
Status: DISCONTINUED | OUTPATIENT
Start: 2025-05-20 | End: 2025-05-20 | Stop reason: HOSPADM

## 2025-05-20 RX ORDER — EPINEPHRINE 0.3 MG/.3ML
0.3 INJECTION SUBCUTANEOUS ONCE AS NEEDED
Status: DISCONTINUED | OUTPATIENT
Start: 2025-05-20 | End: 2025-05-20 | Stop reason: HOSPADM

## 2025-05-20 RX ORDER — DIPHENHYDRAMINE HYDROCHLORIDE 50 MG/ML
50 INJECTION, SOLUTION INTRAMUSCULAR; INTRAVENOUS ONCE AS NEEDED
Status: DISCONTINUED | OUTPATIENT
Start: 2025-05-20 | End: 2025-05-20 | Stop reason: HOSPADM

## 2025-05-20 RX ADMIN — Medication 500 UNITS: at 02:05

## 2025-05-20 RX ADMIN — Medication 10 ML: at 02:05

## 2025-05-20 RX ADMIN — ETOPOSIDE 202 MG: 20 INJECTION INTRAVENOUS at 01:05

## 2025-05-20 RX ADMIN — SODIUM CHLORIDE: 9 INJECTION, SOLUTION INTRAVENOUS at 01:05

## 2025-05-20 NOTE — PROGRESS NOTES
Outpatient Care Management  Plan of Care Follow Up Visit    Patient: Glenn Medel  MRN: [id number]  Date of Service: 05/20/2025  Completed by: Shakila Dobbins RN  Referral Date: 02/11/2025    Reason for Visit   Patient presents with    OPCM RN Follow Up Call    Case Closure     Graduation.

## 2025-05-20 NOTE — PLAN OF CARE
Problem: Adult Inpatient Plan of Care  Goal: Patient-Specific Goal (Individualized)  Outcome: Progressing  Flowsheets (Taken 5/20/2025 1324)  Individualized Care Needs: recliner, pillow and blanket  Anxieties, Fears or Concerns: none  Patient/Family-Specific Goals (Include Timeframe): no signs of reaction  Goal: Absence of Hospital-Acquired Illness or Injury  Outcome: Progressing  Goal: Optimal Comfort and Wellbeing  Outcome: Progressing  Intervention: Provide Person-Centered Care  Flowsheets (Taken 5/20/2025 1324)  Trust Relationship/Rapport:   care explained   questions answered   thoughts/feelings acknowledged  Goal: Readiness for Transition of Care  Outcome: Progressing     Problem: Fatigue  Goal: Improved Activity Tolerance  Outcome: Progressing  Intervention: Promote Improved Energy  Flowsheets (Taken 5/20/2025 1324)  Activity Management:   Ambulated -L4   Ambulated to bathroom - L4  Environmental Support: calm environment promoted   Pt here for C3D2 of VP16, PAC in place on arrival, securely taped, flushes easily and positive blood return noted. Pt tolerated infusion well, discharged home in satisfactory condition. PAC remains accessed, capped and securely taped.

## 2025-05-21 ENCOUNTER — INFUSION (OUTPATIENT)
Dept: INFUSION THERAPY | Facility: HOSPITAL | Age: 70
End: 2025-05-21
Attending: INTERNAL MEDICINE
Payer: MEDICARE

## 2025-05-21 VITALS
HEART RATE: 76 BPM | HEIGHT: 71 IN | DIASTOLIC BLOOD PRESSURE: 87 MMHG | OXYGEN SATURATION: 98 % | SYSTOLIC BLOOD PRESSURE: 158 MMHG | BODY MASS INDEX: 25.4 KG/M2 | TEMPERATURE: 98 F | WEIGHT: 181.44 LBS | RESPIRATION RATE: 18 BRPM

## 2025-05-21 DIAGNOSIS — C67.9 MALIGNANT NEOPLASM OF URINARY BLADDER, UNSPECIFIED SITE: Primary | ICD-10-CM

## 2025-05-21 DIAGNOSIS — C67.9 SMALL CELL CARCINOMA OF BLADDER: ICD-10-CM

## 2025-05-21 LAB
OHS CV EVENT MONITOR DAY: 0
OHS CV HOLTER LENGTH DECIMAL HOURS: 48
OHS CV HOLTER LENGTH HOURS: 48
OHS CV HOLTER LENGTH MINUTES: 0
OHS CV HOLTER SINUS AVERAGE HR: 77
OHS CV HOLTER SINUS MAX HR: 114
OHS CV HOLTER SINUS MIN HR: 51

## 2025-05-21 PROCEDURE — 25000003 PHARM REV CODE 250: Mod: PN | Performed by: INTERNAL MEDICINE

## 2025-05-21 PROCEDURE — A4216 STERILE WATER/SALINE, 10 ML: HCPCS | Mod: PN | Performed by: INTERNAL MEDICINE

## 2025-05-21 PROCEDURE — 63600175 PHARM REV CODE 636 W HCPCS: Mod: PN | Performed by: INTERNAL MEDICINE

## 2025-05-21 PROCEDURE — 96413 CHEMO IV INFUSION 1 HR: CPT | Mod: PN

## 2025-05-21 RX ORDER — PROCHLORPERAZINE EDISYLATE 5 MG/ML
5 INJECTION INTRAMUSCULAR; INTRAVENOUS ONCE AS NEEDED
Status: DISCONTINUED | OUTPATIENT
Start: 2025-05-21 | End: 2025-05-21 | Stop reason: HOSPADM

## 2025-05-21 RX ORDER — HEPARIN 100 UNIT/ML
500 SYRINGE INTRAVENOUS
Status: DISCONTINUED | OUTPATIENT
Start: 2025-05-21 | End: 2025-05-21 | Stop reason: HOSPADM

## 2025-05-21 RX ORDER — DIPHENHYDRAMINE HYDROCHLORIDE 50 MG/ML
50 INJECTION, SOLUTION INTRAMUSCULAR; INTRAVENOUS ONCE AS NEEDED
Status: DISCONTINUED | OUTPATIENT
Start: 2025-05-21 | End: 2025-05-21 | Stop reason: HOSPADM

## 2025-05-21 RX ORDER — SODIUM CHLORIDE 0.9 % (FLUSH) 0.9 %
10 SYRINGE (ML) INJECTION
Status: DISCONTINUED | OUTPATIENT
Start: 2025-05-21 | End: 2025-05-21 | Stop reason: HOSPADM

## 2025-05-21 RX ORDER — EPINEPHRINE 0.3 MG/.3ML
0.3 INJECTION SUBCUTANEOUS ONCE AS NEEDED
Status: DISCONTINUED | OUTPATIENT
Start: 2025-05-21 | End: 2025-05-21 | Stop reason: HOSPADM

## 2025-05-21 RX ADMIN — SODIUM CHLORIDE: 9 INJECTION, SOLUTION INTRAVENOUS at 01:05

## 2025-05-21 RX ADMIN — ETOPOSIDE 202 MG: 20 INJECTION INTRAVENOUS at 01:05

## 2025-05-21 RX ADMIN — Medication 500 UNITS: at 02:05

## 2025-05-21 RX ADMIN — Medication 10 ML: at 02:05

## 2025-05-21 RX ADMIN — PEGFILGRASTIM 6 MG: KIT SUBCUTANEOUS at 02:05

## 2025-05-21 NOTE — PLAN OF CARE
Problem: Adult Inpatient Plan of Care  Goal: Plan of Care Review  5/21/2025 1531 by Zahida Pardo RN  Outcome: Progressing  Flowsheets (Taken 5/21/2025 1445)  Plan of Care Reviewed With: patient   Pt tolerated his Etoposide infusion well, NAD.  No changes throughout therapy with no new c/o voiced. Pt given a schedule and reviewed, pt verbalized understanding. Pt ambulated out of the clinic without difficulty.

## 2025-05-21 NOTE — PLAN OF CARE
Problem: Fatigue  Goal: Improved Activity Tolerance  Intervention: Promote Improved Energy  Flowsheets (Taken 5/21/2025 1341)  Fatigue Management:   activity schedule adjusted   fatigue-related activity identified   frequent rest breaks encouraged   paced activity encouraged  Sleep/Rest Enhancement:   relaxation techniques promoted   regular sleep/rest pattern promoted   natural light exposure provided  Activity Management:   Ambulated -L4   Ambulated to bathroom - L4   Ambulated in [place] - L4   Up in stretcher chair - L1  Environmental Support:   personal routine supported   rest periods encouraged     Problem: Adult Inpatient Plan of Care  Goal: Patient-Specific Goal (Individualized)  Outcome: Progressing  Flowsheets (Taken 5/21/2025 1341)  Individualized Care Needs: recliner, warm blanket, pillow, dim lights  Anxieties, Fears or Concerns: none  Patient/Family-Specific Goals (Include Timeframe): no s/s of reaction during treatment

## 2025-05-22 ENCOUNTER — PATIENT MESSAGE (OUTPATIENT)
Dept: HEMATOLOGY/ONCOLOGY | Facility: CLINIC | Age: 70
End: 2025-05-22
Payer: MEDICARE

## 2025-05-22 ENCOUNTER — TELEPHONE (OUTPATIENT)
Dept: HEMATOLOGY/ONCOLOGY | Facility: CLINIC | Age: 70
End: 2025-05-22
Payer: MEDICARE

## 2025-05-22 ENCOUNTER — PATIENT MESSAGE (OUTPATIENT)
Dept: CARDIOLOGY | Facility: CLINIC | Age: 70
End: 2025-05-22
Payer: MEDICARE

## 2025-05-22 ENCOUNTER — RESULTS FOLLOW-UP (OUTPATIENT)
Dept: FAMILY MEDICINE | Facility: CLINIC | Age: 70
End: 2025-05-22

## 2025-05-22 DIAGNOSIS — R94.31 LONG QT INTERVAL: Primary | ICD-10-CM

## 2025-05-22 NOTE — TELEPHONE ENCOUNTER
Left message for patient checking on his hypertension seen on chemo  and multiple checks of his oxygen today to see how he is doing.

## 2025-05-22 NOTE — TELEPHONE ENCOUNTER
Patient returned call , he feels fine with no symptoms. Is being followed by cardiology and had an abnormal holter reading so is being seen by cardiologist tomorrow. Will keep us updated

## 2025-05-23 ENCOUNTER — TELEPHONE (OUTPATIENT)
Dept: HEMATOLOGY/ONCOLOGY | Facility: CLINIC | Age: 70
End: 2025-05-23
Payer: MEDICARE

## 2025-05-23 ENCOUNTER — PATIENT MESSAGE (OUTPATIENT)
Dept: HEMATOLOGY/ONCOLOGY | Facility: CLINIC | Age: 70
End: 2025-05-23
Payer: MEDICARE

## 2025-05-23 NOTE — TELEPHONE ENCOUNTER
Patient asking about BP meds as running hypertensive. Explained it would be best to follow up with cardiologist on this as they are running many tests for evaluation of the heart. Patient agreed to contact them .

## 2025-05-23 NOTE — TELEPHONE ENCOUNTER
----- Message from MyDROBE Martinez sent at 5/23/2025 11:04 AM CDT -----  Type: Needs Medical AdviceWho Called:  Shane Call Back Number: [phone] Information:  states he spoke to you yesterday but has concerns about his high blood pressure and would like to talk to someone office today , please call to further assist thank you .

## 2025-05-28 ENCOUNTER — PATIENT MESSAGE (OUTPATIENT)
Dept: UROLOGY | Facility: CLINIC | Age: 70
End: 2025-05-28
Payer: MEDICARE

## 2025-05-29 ENCOUNTER — TELEPHONE (OUTPATIENT)
Dept: HEMATOLOGY/ONCOLOGY | Facility: CLINIC | Age: 70
End: 2025-05-29
Payer: MEDICARE

## 2025-05-29 NOTE — TELEPHONE ENCOUNTER
Copied from CRM #[id number]. Topic: Appointments - Appointment Rescheduling  >> May 29, 2025  8:02 AM Rodolfo Martinez wrote:  Type: Needs Medical Advice  Who Called:  yarelis ( wife)   Best Call Back Number: [phone]    Additional Information: yarelis states above patient is in hospital and needs  to reschedule his 5/30 mri til 6/6 when he see wadge next week , please call to further assist thank you .

## 2025-05-29 NOTE — TELEPHONE ENCOUNTER
Spoke to wife and he is in Er for SOB and weakness. She assumes he will be admitted and asked that I reschedule his MRI for tomorrow. I was able to move to 6/5 in [place] at 8am and she agreed to date/time.

## 2025-05-29 NOTE — TELEPHONE ENCOUNTER
Advised wife unable to move back to 5/30 as spot has been taken already. She verbalized understanidngl.

## 2025-05-30 ENCOUNTER — PATIENT MESSAGE (OUTPATIENT)
Dept: SLEEP MEDICINE | Facility: CLINIC | Age: 70
End: 2025-05-30
Payer: MEDICARE

## 2025-06-03 ENCOUNTER — PATIENT MESSAGE (OUTPATIENT)
Dept: HEMATOLOGY/ONCOLOGY | Facility: CLINIC | Age: 70
End: 2025-06-03
Payer: MEDICARE

## 2025-06-03 ENCOUNTER — HOSPITAL ENCOUNTER (OUTPATIENT)
Dept: RADIOLOGY | Facility: HOSPITAL | Age: 70
Discharge: HOME | End: 2025-06-03
Attending: INTERNAL MEDICINE
Payer: MEDICARE

## 2025-06-03 DIAGNOSIS — C67.9 SMALL CELL CARCINOMA OF BLADDER: ICD-10-CM

## 2025-06-03 DIAGNOSIS — C7A.1 MALIGNANT POORLY DIFFERENTIATED NEUROENDOCRINE TUMORS: ICD-10-CM

## 2025-06-03 LAB — GLUCOSE SERPL-MCNC: 170 MG/DL (ref 70–110)

## 2025-06-03 PROCEDURE — A9552 F18 FDG: HCPCS | Mod: PN | Performed by: INTERNAL MEDICINE

## 2025-06-03 PROCEDURE — 78815 PET IMAGE W/CT SKULL-THIGH: CPT | Mod: TC,PN

## 2025-06-03 PROCEDURE — 78815 PET IMAGE W/CT SKULL-THIGH: CPT | Mod: 26,PS,, | Performed by: RADIOLOGY

## 2025-06-03 RX ORDER — FLUDEOXYGLUCOSE F18 500 MCI/ML
12.1 INJECTION INTRAVENOUS
Status: COMPLETED | OUTPATIENT
Start: 2025-06-03 | End: 2025-06-03

## 2025-06-03 RX ADMIN — FLUDEOXYGLUCOSE F-18 12.1 MILLICURIE: 500 INJECTION INTRAVENOUS at 08:06

## 2025-06-05 ENCOUNTER — HOSPITAL ENCOUNTER (OUTPATIENT)
Dept: RADIOLOGY | Facility: HOSPITAL | Age: 70
Discharge: HOME | End: 2025-06-05
Attending: INTERNAL MEDICINE
Payer: MEDICARE

## 2025-06-05 DIAGNOSIS — C67.9 SMALL CELL CARCINOMA OF BLADDER: ICD-10-CM

## 2025-06-05 PROCEDURE — 25500020 PHARM REV CODE 255: Performed by: INTERNAL MEDICINE

## 2025-06-05 PROCEDURE — 70553 MRI BRAIN STEM W/O & W/DYE: CPT | Mod: 26,,, | Performed by: RADIOLOGY

## 2025-06-05 PROCEDURE — A9585 GADOBUTROL INJECTION: HCPCS | Performed by: INTERNAL MEDICINE

## 2025-06-05 PROCEDURE — 70553 MRI BRAIN STEM W/O & W/DYE: CPT | Mod: TC

## 2025-06-05 RX ORDER — GADOBUTROL 604.72 MG/ML
8 INJECTION INTRAVENOUS
Status: COMPLETED | OUTPATIENT
Start: 2025-06-05 | End: 2025-06-05

## 2025-06-05 RX ADMIN — GADOBUTROL 8 ML: 604.72 INJECTION INTRAVENOUS at 08:06

## 2025-06-06 ENCOUNTER — OFFICE VISIT (OUTPATIENT)
Dept: HEMATOLOGY/ONCOLOGY | Facility: CLINIC | Age: 70
End: 2025-06-06
Payer: MEDICARE

## 2025-06-06 ENCOUNTER — TELEPHONE (OUTPATIENT)
Dept: HEMATOLOGY/ONCOLOGY | Facility: CLINIC | Age: 70
End: 2025-06-06
Payer: MEDICARE

## 2025-06-06 ENCOUNTER — LAB VISIT (OUTPATIENT)
Dept: LAB | Facility: HOSPITAL | Age: 70
End: 2025-06-06
Attending: INTERNAL MEDICINE
Payer: MEDICARE

## 2025-06-06 VITALS
HEART RATE: 67 BPM | TEMPERATURE: 99 F | SYSTOLIC BLOOD PRESSURE: 131 MMHG | HEIGHT: 71 IN | RESPIRATION RATE: 18 BRPM | DIASTOLIC BLOOD PRESSURE: 75 MMHG | BODY MASS INDEX: 24.78 KG/M2 | WEIGHT: 177 LBS | OXYGEN SATURATION: 98 %

## 2025-06-06 DIAGNOSIS — D69.6 THROMBOCYTOPENIA, UNSPECIFIED: ICD-10-CM

## 2025-06-06 DIAGNOSIS — C67.9 SMALL CELL CARCINOMA OF BLADDER: ICD-10-CM

## 2025-06-06 DIAGNOSIS — E23.7 PITUITARY ABNORMALITY: Primary | ICD-10-CM

## 2025-06-06 DIAGNOSIS — C7A.1 MALIGNANT POORLY DIFFERENTIATED NEUROENDOCRINE TUMORS: ICD-10-CM

## 2025-06-06 DIAGNOSIS — E23.7 PITUITARY ABNORMALITY: ICD-10-CM

## 2025-06-06 DIAGNOSIS — T45.1X5A IMMUNODEFICIENCY DUE TO CHEMOTHERAPY: ICD-10-CM

## 2025-06-06 DIAGNOSIS — I10 PRIMARY HYPERTENSION: ICD-10-CM

## 2025-06-06 DIAGNOSIS — Z79.4 TYPE 2 DIABETES MELLITUS WITH DIABETIC NEPHROPATHY, WITH LONG-TERM CURRENT USE OF INSULIN: ICD-10-CM

## 2025-06-06 DIAGNOSIS — E11.21 TYPE 2 DIABETES MELLITUS WITH DIABETIC NEPHROPATHY, WITH LONG-TERM CURRENT USE OF INSULIN: ICD-10-CM

## 2025-06-06 DIAGNOSIS — D61.818 PANCYTOPENIA: ICD-10-CM

## 2025-06-06 DIAGNOSIS — C7A.1 MALIGNANT POORLY DIFFERENTIATED NEUROENDOCRINE TUMORS: Primary | ICD-10-CM

## 2025-06-06 DIAGNOSIS — Z79.69 IMMUNODEFICIENCY DUE TO CHEMOTHERAPY: ICD-10-CM

## 2025-06-06 DIAGNOSIS — D84.821 IMMUNODEFICIENCY DUE TO CHEMOTHERAPY: ICD-10-CM

## 2025-06-06 DIAGNOSIS — G20.A2 PARKINSON'S DISEASE WITHOUT DYSKINESIA, WITH FLUCTUATING MANIFESTATIONS: ICD-10-CM

## 2025-06-06 LAB
ABSOLUTE EOSINOPHIL (OHS): 0.18 K/UL
ABSOLUTE MONOCYTE (OHS): 0.42 K/UL (ref 0.3–1)
ABSOLUTE NEUTROPHIL COUNT (OHS): 0.87 K/UL (ref 1.8–7.7)
BASOPHILS # BLD AUTO: 0.03 K/UL
BASOPHILS NFR BLD AUTO: 0.9 %
ERYTHROCYTE [DISTWIDTH] IN BLOOD BY AUTOMATED COUNT: 17.2 % (ref 11.5–14.5)
HCT VFR BLD AUTO: 34.5 % (ref 40–54)
HGB BLD-MCNC: 11.5 GM/DL (ref 14–18)
IMM GRANULOCYTES # BLD AUTO: 0.01 K/UL (ref 0–0.04)
IMM GRANULOCYTES NFR BLD AUTO: 0.3 % (ref 0–0.5)
LYMPHOCYTES # BLD AUTO: 1.86 K/UL (ref 1–4.8)
MCH RBC QN AUTO: 29.8 PG (ref 27–31)
MCHC RBC AUTO-ENTMCNC: 33.3 G/DL (ref 32–36)
MCV RBC AUTO: 89 FL (ref 82–98)
NUCLEATED RBC (/100WBC) (OHS): 0 /100 WBC
PLATELET # BLD AUTO: 145 K/UL (ref 150–450)
PLATELET BLD QL SMEAR: ABNORMAL
PMV BLD AUTO: 8.5 FL (ref 9.2–12.9)
RBC # BLD AUTO: 3.86 M/UL (ref 4.6–6.2)
RELATIVE EOSINOPHIL (OHS): 5.3 %
RELATIVE LYMPHOCYTE (OHS): 55.2 % (ref 18–48)
RELATIVE MONOCYTE (OHS): 12.5 % (ref 4–15)
RELATIVE NEUTROPHIL (OHS): 25.8 % (ref 38–73)
WBC # BLD AUTO: 3.37 K/UL (ref 3.9–12.7)

## 2025-06-06 PROCEDURE — 85025 COMPLETE CBC W/AUTO DIFF WBC: CPT | Mod: PN

## 2025-06-06 PROCEDURE — 99999 PR PBB SHADOW E&M-EST. PATIENT-LVL V: CPT | Mod: PBBFAC,,, | Performed by: INTERNAL MEDICINE

## 2025-06-06 PROCEDURE — 36415 COLL VENOUS BLD VENIPUNCTURE: CPT | Mod: PN

## 2025-06-06 PROCEDURE — 99215 OFFICE O/P EST HI 40 MIN: CPT | Mod: PBBFAC,PN | Performed by: INTERNAL MEDICINE

## 2025-06-07 ENCOUNTER — PATIENT MESSAGE (OUTPATIENT)
Dept: HEMATOLOGY/ONCOLOGY | Facility: CLINIC | Age: 70
End: 2025-06-07
Payer: MEDICARE

## 2025-06-09 ENCOUNTER — LAB VISIT (OUTPATIENT)
Dept: LAB | Facility: HOSPITAL | Age: 70
End: 2025-06-09
Attending: INTERNAL MEDICINE
Payer: MEDICARE

## 2025-06-09 DIAGNOSIS — C7A.1 MALIGNANT POORLY DIFFERENTIATED NEUROENDOCRINE TUMORS: ICD-10-CM

## 2025-06-09 DIAGNOSIS — C7A.1 MALIGNANT POORLY DIFFERENTIATED NEUROENDOCRINE TUMORS: Primary | ICD-10-CM

## 2025-06-09 LAB
ABSOLUTE EOSINOPHIL (OHS): 0.22 K/UL
ABSOLUTE MONOCYTE (OHS): 0.51 K/UL (ref 0.3–1)
ABSOLUTE NEUTROPHIL COUNT (OHS): 0.79 K/UL (ref 1.8–7.7)
BASOPHILS # BLD AUTO: 0.03 K/UL
BASOPHILS NFR BLD AUTO: 0.9 %
ERYTHROCYTE [DISTWIDTH] IN BLOOD BY AUTOMATED COUNT: 16.9 % (ref 11.5–14.5)
HCT VFR BLD AUTO: 36.1 % (ref 40–54)
HGB BLD-MCNC: 12 GM/DL (ref 14–18)
IMM GRANULOCYTES # BLD AUTO: 0.01 K/UL (ref 0–0.04)
IMM GRANULOCYTES NFR BLD AUTO: 0.3 % (ref 0–0.5)
LYMPHOCYTES # BLD AUTO: 1.79 K/UL (ref 1–4.8)
MCH RBC QN AUTO: 30.1 PG (ref 27–31)
MCHC RBC AUTO-ENTMCNC: 33.2 G/DL (ref 32–36)
MCV RBC AUTO: 91 FL (ref 82–98)
NUCLEATED RBC (/100WBC) (OHS): 0 /100 WBC
PLATELET # BLD AUTO: 178 K/UL (ref 150–450)
PMV BLD AUTO: 8.2 FL (ref 9.2–12.9)
RBC # BLD AUTO: 3.99 M/UL (ref 4.6–6.2)
RELATIVE EOSINOPHIL (OHS): 6.6 %
RELATIVE LYMPHOCYTE (OHS): 53.4 % (ref 18–48)
RELATIVE MONOCYTE (OHS): 15.2 % (ref 4–15)
RELATIVE NEUTROPHIL (OHS): 23.6 % (ref 38–73)
WBC # BLD AUTO: 3.35 K/UL (ref 3.9–12.7)

## 2025-06-09 PROCEDURE — 85025 COMPLETE CBC W/AUTO DIFF WBC: CPT | Mod: PN

## 2025-06-09 PROCEDURE — 36415 COLL VENOUS BLD VENIPUNCTURE: CPT | Mod: PN

## 2025-06-10 ENCOUNTER — PATIENT MESSAGE (OUTPATIENT)
Dept: FAMILY MEDICINE | Facility: CLINIC | Age: 70
End: 2025-06-10
Payer: MEDICARE

## 2025-06-10 ENCOUNTER — OFFICE VISIT (OUTPATIENT)
Dept: CARDIOLOGY | Facility: CLINIC | Age: 70
End: 2025-06-10
Payer: MEDICARE

## 2025-06-10 DIAGNOSIS — I70.0 AORTIC ATHEROSCLEROSIS: ICD-10-CM

## 2025-06-10 DIAGNOSIS — R94.31 LONG QT INTERVAL: ICD-10-CM

## 2025-06-10 DIAGNOSIS — R00.2 PALPITATIONS: Primary | ICD-10-CM

## 2025-06-10 DIAGNOSIS — E78.1 HIGH TRIGLYCERIDES: ICD-10-CM

## 2025-06-10 DIAGNOSIS — I10 PRIMARY HYPERTENSION: ICD-10-CM

## 2025-06-10 DIAGNOSIS — G47.33 OSA ON CPAP: ICD-10-CM

## 2025-06-10 NOTE — PROGRESS NOTES
Subjective:   Patient ID:  Glenn Medel is a 69 y.o. male who presents for follow-up of No chief complaint on file.      HPI  The patient location is: home  The chief complaint leading to consultation is:   Visit type: Virtual visit with synchronous audio and video  Total time spent with patient: 10 minutes including chart review  Each patient to whom he or she provides medical services by telemedicine is:  (1) informed of the relationship between the physician and patient and the respective role of any other health care provider with respect to management of the patient; and (2) notified that he or she may decline to receive medical services by telemedicine and may withdraw from such care at any time.     Pt with holter for palpitations wnl  Pt with CP and cath at [facility] normal  Pt with rare palpitations now      Holter 48 hr:   The predominant rhythm is sinus.                         Monitoring started at 2:30 PM and continued for 47 hr 59 min. The average heart rate was 77 BPM. The minimum heart rate was 51 BPM, occurring at 6:17:28 PM D1. The maximum heart rate was 114 BPM, occurring at 9:41:23 AM D1.                   Ventricular ectopic activity consisted of 1 beat, of which, 1 was in single PVCs.                   The patient's rhythm included 1 hr 34 min 43 sec of bradycardia. The slowest single episode of bradycardia occurred at 6:21:42 PM D1, lasting 1 min 13 sec, with minimum heart rate of 51 BPM.                   The patient's rhythm included 1 hr 16 min 35 sec of tachycardia. The fastest single episode of tachycardia occurred at 9:39:40 AM D1, lasting 2 min 23 sec, with maximum heart rate of 114 BPM.                   Supraventricular ectopic activity consisted of 6 beats, of which, 6 were single PACs. The longest R-R interval was 1.3 seconds occurring at 2:51:52 PM D1. The longest N-N interval was 1.3 seconds occurring at 6:21:17 PM D1.                   QT interval averaged 409 ms during the  monitored period. QTc interval averaged 461 ms during the monitored period. Maximum QT interval was 651 ms during the monitored period, occurring at 2:34:00 PM. Maximum QTc interval was 718 ms during the monitored period, occurring at 9:41:30 AM. Minimum QT interval was 244 ms during the monitored period, occurring at 9:54:00 AM. Minimum QTc interval was 291 ms during the monitored period, occurring at 9:54:00 AM. The percent of average QTc greater than 450 ms was 64%.    Cath 5-25    CONCLUSIONS:     1. Normal coronary arteries.      2. Normal left ventricular function.     Hyperlipidemia  This is a chronic problem. The current episode started more than 1 year ago. The problem is controlled. Pertinent negatives include no chest pain or shortness of breath. Current antihyperlipidemic treatment includes statins. The current treatment provides moderate improvement of lipids. There are no compliance problems.     Hypertension  This is a chronic problem. The current episode started more than 1 year ago. The problem has been gradually improving since onset. The problem is controlled. Pertinent negatives include no chest pain, palpitations or shortness of breath. Past treatments include alpha blockers . The current treatment provides moderate improvement. There are no compliance problems.       Review of Systems   Constitutional: Negative. Negative for weight gain.   HENT: Negative.     Eyes: Negative.    Cardiovascular: Negative.  Negative for chest pain, leg swelling and palpitations.   Respiratory: Negative.  Negative for shortness of breath.    Endocrine: Negative.    Hematologic/Lymphatic: Negative.    Skin: Negative.    Musculoskeletal:  Negative for muscle weakness.   Gastrointestinal: Negative.    Genitourinary: Negative.    Neurological: Negative.  Negative for dizziness.   Psychiatric/Behavioral: Negative.     Allergic/Immunologic: Negative.    All other systems reviewed and are negative.    Family History    Problem Relation Name Age of Onset    Colon cancer Mother      Hypertension Mother      Diabetes Mother      Stroke Mother      Cancer Mother          colon    Kidney disease Mother      Heart disease Father      Diabetes Father      Colon cancer Father      Cancer Father          colon    Cataracts Father      No Known Problems Sister      Heart disease Brother  62    Kidney disease Brother      Dementia Brother Rodrigue     Abnormal EKG Brother Rodrigue     Breast cancer Maternal Aunt      Cancer Maternal Aunt          breast    Cancer Maternal Aunt          brain    Brain cancer Maternal Aunt      No Known Problems Maternal Uncle      No Known Problems Paternal Aunt      No Known Problems Paternal Uncle      Glaucoma Maternal Grandmother      No Known Problems Maternal Grandfather      No Known Problems Paternal Grandmother      No Known Problems Paternal Grandfather      Amblyopia Neg Hx      Blindness Neg Hx      Macular degeneration Neg Hx      Retinal detachment Neg Hx      Strabismus Neg Hx      Thyroid disease Neg Hx      Parkinsonism Neg Hx       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/08/2018    LUE numbness 09/08/2020    Obesity     Parkinson's disease     Pyelonephritis 02/21/2025    Sepsis 06/09/2023    Sleep apnea     uses cpap    Suspected cerebrovascular accident (CVA) 09/08/2020    Syncope and collapse 07/02/2024     Social History[1]  Medications Ordered Prior to Encounter[2]  Review of patient's allergies indicates:   Allergen  Reactions    Avelox [moxifloxacin] Hives and Rash       Objective:     Physical Exam  Constitutional:       Appearance: Normal appearance.   Neurological:      Mental Status: He is alert.         Assessment:     1. Palpitations    2. Long QT interval    3. Primary hypertension    4. High triglycerides    5. Aortic atherosclerosis    6. LOVE on CPAP        Plan:     Palpitations    Long QT interval  -     Ambulatory referral/consult to Cardiology    Primary hypertension    High triglycerides    Aortic atherosclerosis    LOVE on CPAP      Continue statin-hlp  Continue prazosin- htn         [1]   Social History  Socioeconomic History    Marital status:    Tobacco Use    Smoking status: Never    Smokeless tobacco: Never   Substance and Sexual Activity    Alcohol use: Yes     Comment: very rare    Drug use: No    Sexual activity: Yes     Partners: Female   Social History Narrative            2 grown kids.         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      Social Drivers of Health     Financial Resource Strain: Low Risk  (2/14/2025)    Overall Financial Resource Strain (CARDIA)     Difficulty of Paying Living Expenses: Not hard at all   Food Insecurity: No Food Insecurity (5/28/2025)    Received from [facility]    Hunger Vital Sign     Worried About Running Out of Food in the Last Year: Never true     Ran Out of Food in the Last Year: Never true   Transportation Needs: No Transportation Needs (5/28/2025)    Received from [facility]    PRAPARE - Transportation     Lack of Transportation (Medical): No     Lack of Transportation (Non-Medical): No   Physical Activity: Inactive (1/29/2025)    Exercise Vital Sign     Days of Exercise per Week: 0 days     Minutes of Exercise per Session: 0 min   Stress: Stress Concern Present (1/29/2025)    [employer] - Occupational Stress Questionnaire     Feeling of Stress : To some extent    Housing Stability: Unknown (5/28/2025)    Received from [facility]    Housing Stability Vital Sign     Unable to Pay for Housing in the Last Year: No   [2]   Current Outpatient Medications on File Prior to Visit   Medication Sig Dispense Refill    allopurinoL (ZYLOPRIM) 300 MG tablet Take 1 tablet (300 mg total) by mouth once daily. 90 tablet 0    ammonium lactate (LAC-HYDRIN) 12 % lotion Apply topically 2 (two) times daily.      atorvastatin (LIPITOR) 20 MG tablet Take 1 tablet (20 mg total) by mouth every evening. 90 tablet 3    BACILLUS COAGULANS ORAL Take 1 tablet by mouth once daily.      brimonidine 0.2% (ALPHAGAN) 0.2 % Drop INSTILL 1 DROP IN BOTH EYES TWICE DAILY 10 mL 0    carbidopa-levodopa  mg (SINEMET)  mg per tablet Take 1.5 tablets by mouth 5 (five) times daily. 675 tablet 3    cholecalciferol, vitamin D3, (D3-2000 ORAL) Take by mouth once daily.      clonazePAM (KLONOPIN) 1 MG tablet Take 1.5 tablets (1.5 mg total) by mouth every evening. 60 tablet 2    cyanocobalamin (VITAMIN B-12) 1000 MCG tablet Take 100 mcg by mouth once daily.      dexAMETHasone (DECADRON) 4 MG Tab Take 2 tablets (8 mg total) by mouth once daily. on days 2-4 of each chemotherapy cycle. 6 tablet 5    donepeziL (ARICEPT) 5 MG tablet Take 1 tablet (5 mg total) by mouth every evening. 30 tablet 11    dorzolamide (TRUSOPT) 2 % ophthalmic solution Place 1 drop into both eyes 2 (two) times daily. 10 mL 3    DULoxetine (CYMBALTA) 30 MG capsule Take 1 capsule (30 mg total) by mouth once daily. 90 capsule 1    gabapentin (NEURONTIN) 300 MG capsule Take 1 capsule (300 mg total) by mouth 2 (two) times daily. 60 capsule 1    insulin glargine U-100, Lantus, (LANTUS SOLOSTAR U-100 INSULIN) 100 unit/mL (3 mL) InPn pen Inject 7 Units into the skin once daily.      ketoconazole (NIZORAL) 2 % shampoo Apply topically.      LIDOcaine-prilocaine (EMLA) cream Apply to port site one hour prior to chemotherapy 30 g 0     "meloxicam (MOBIC) 15 MG tablet Take 1 tablet (15 mg total) by mouth once daily. 30 tablet 3    methocarbamoL (ROBAXIN) 750 MG Tab Take 1 tablet (750 mg total) by mouth 4 (four) times daily. 40 tablet 3    OLANZapine (ZYPREXA) 5 MG tablet Take 1 tablet (5 mg total) by mouth every evening. on days 1-4 of each chemotherapy cycle. 4 tablet 5    omeprazole (PRILOSEC) 40 MG capsule Take 1 capsule (40 mg total) by mouth once daily. 90 capsule 3    pen needle, diabetic (BD HIMANSHU 2ND GEN PEN NEEDLE) 32 gauge x 5/32" Ndle Inject 1 each into the skin once daily. 100 each 3    prazosin (MINIPRESS) 1 MG Cap Take 1 capsule (1 mg total) by mouth every evening. 90 capsule 3    prochlorperazine (COMPAZINE) 5 MG tablet Take 1 tablet (5 mg total) by mouth every 6 (six) hours as needed for Nausea. 20 tablet 5    ramelteon (ROZEREM) 8 mg tablet Take 1 tablet (8 mg total) by mouth nightly. 90 tablet 3    rasagiline (AZILECT) 1 mg Tab TAKE 1 TABLET(1 MG) BY MOUTH EVERY EVENING 30 tablet 11    tirzepatide 10 mg/0.5 mL PnIj Inject 10 mg into the skin every 7 days. (Increased dose) 2 mL 3     No current facility-administered medications on file prior to visit.     "

## 2025-06-11 ENCOUNTER — OFFICE VISIT (OUTPATIENT)
Dept: UROLOGY | Facility: CLINIC | Age: 70
End: 2025-06-11
Payer: MEDICARE

## 2025-06-11 DIAGNOSIS — R39.89 OTHER SYMPTOMS AND SIGNS INVOLVING THE GENITOURINARY SYSTEM: ICD-10-CM

## 2025-06-11 DIAGNOSIS — C67.9 MALIGNANT NEOPLASM OF URINARY BLADDER, UNSPECIFIED SITE: Primary | ICD-10-CM

## 2025-06-11 PROCEDURE — 98006 SYNCH AUDIO-VIDEO EST MOD 30: CPT | Mod: 95,,, | Performed by: UROLOGY

## 2025-06-11 PROCEDURE — G2211 COMPLEX E/M VISIT ADD ON: HCPCS | Mod: 95,,, | Performed by: UROLOGY

## 2025-06-12 ENCOUNTER — PATIENT MESSAGE (OUTPATIENT)
Dept: HEMATOLOGY/ONCOLOGY | Facility: CLINIC | Age: 70
End: 2025-06-12
Payer: MEDICARE

## 2025-06-12 ENCOUNTER — OFFICE VISIT (OUTPATIENT)
Dept: FAMILY MEDICINE | Facility: CLINIC | Age: 70
End: 2025-06-12
Payer: MEDICARE

## 2025-06-12 VITALS — DIASTOLIC BLOOD PRESSURE: 82 MMHG | SYSTOLIC BLOOD PRESSURE: 158 MMHG

## 2025-06-12 DIAGNOSIS — T45.1X5A ANEMIA DUE TO ANTINEOPLASTIC CHEMOTHERAPY: ICD-10-CM

## 2025-06-12 DIAGNOSIS — D44.3: ICD-10-CM

## 2025-06-12 DIAGNOSIS — M51.369 DEGENERATION OF INTERVERTEBRAL DISC OF LUMBAR REGION, UNSPECIFIED WHETHER PAIN PRESENT: ICD-10-CM

## 2025-06-12 DIAGNOSIS — D64.81 ANEMIA DUE TO ANTINEOPLASTIC CHEMOTHERAPY: ICD-10-CM

## 2025-06-12 DIAGNOSIS — I15.8 OTHER SECONDARY HYPERTENSION: Primary | ICD-10-CM

## 2025-06-12 DIAGNOSIS — E11.21 TYPE 2 DIABETES MELLITUS WITH DIABETIC NEPHROPATHY, WITH LONG-TERM CURRENT USE OF INSULIN: ICD-10-CM

## 2025-06-12 DIAGNOSIS — Z79.4 TYPE 2 DIABETES MELLITUS WITH DIABETIC NEPHROPATHY, WITH LONG-TERM CURRENT USE OF INSULIN: ICD-10-CM

## 2025-06-12 DIAGNOSIS — R07.9 CHEST PAIN, UNSPECIFIED TYPE: ICD-10-CM

## 2025-06-12 PROCEDURE — G2211 COMPLEX E/M VISIT ADD ON: HCPCS | Mod: 95,,, | Performed by: DIETITIAN, REGISTERED

## 2025-06-12 PROCEDURE — 98006 SYNCH AUDIO-VIDEO EST MOD 30: CPT | Mod: 95,,, | Performed by: DIETITIAN, REGISTERED

## 2025-06-12 RX ORDER — LOSARTAN POTASSIUM 25 MG/1
25 TABLET ORAL DAILY
Qty: 90 TABLET | Refills: 3 | Status: SHIPPED | OUTPATIENT
Start: 2025-06-12 | End: 2026-06-12

## 2025-06-12 NOTE — PROGRESS NOTES
Primary Care Telemedicine Note    The patient location is:  Patient Home- [place]  The chief complaint leading to consultation is: blood pressure    Visit type: Virtual visit with synchronous audio and video  Each patient to whom he or she provides medical services by telemedicine is:  (1) informed of the relationship between the physician and patient and the respective role of any other health care provider with respect to management of the patient; and (2) notified that he or she may decline to receive medical services by telemedicine and may withdraw from such care at any time.    ===================================================================================  PLAN:    Assessment & Plan    IMPRESSION:  - BP consistently elevated (158/82, 163/90, 167/91, 158/87) likely due to steroids from chemotherapy.  - Recent cardiac workup (echo, angiogram) with recent ER visit, showed normal results.  - Recent A1C (6.9 in April) and current glucose trends reviewed.  - Lantus insulin discontinued due to improved glycemic control with Mounjaro.  - Nearly pancytopenic, affecting chemotherapy schedule.  - Considered neurosurgery follow-up for pituitary lesion found on brain MRI.    SECONDARY HYPERTENSION:  - Blood pressure readings have been consistently elevated (163/90, 167/91, 158/87), with today's reading at 158/82.  - This elevation is likely due to steroid use during chemotherapy.  - Initiated Losartan 25 mg daily to mitigate stroke risk associated with steroid-induced hypertension.  - Plan to discontinue Losartan after completion of chemotherapy and steroids.  - Discussed with patient that Losartan has a more favorable safety profile compared to Lantus insulin.    TYPE 2 DIABETES MELLITUS:  - Blood sugar levels are well-controlled, mostly under 130 in the morning, with last A1C at 6.9 in April.  - Based on stable glycemic control with Mounjaro, Lantus insulin can be discontinued.  - Will continue treatment with  Mounjaro 10 mg for diabetes management.    CHEST PAIN:  - Patient experienced chest pain two weeks ago resulting in emergency room admission.  - Comprehensive cardiac evaluation including cardiogram, echocardiogram, and heart ultrasound showed normal results with no blockages.  - Dr. Palomo reviewed heart monitor results and found no concerning issues.    ANEMIA SECONDARY TO CANCER CHEMOTHERAPY:  - Patient is approaching pancytopenia with low ANC and white cell count due to chemotherapy side effects.  - These low counts are affecting the chemotherapy schedule.    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  - Clinical correlation and further pituitary imaging evaluation is warranted.  - Scheduled appointment with Dr. Saldivar for further evaluation and considering neurosurgery follow-up for the pituitary lesion.    LOW BACK PAIN:  - Patient reports recurrence of lower back pain starting two days ago.  - Has previously seen Dr. Taveras for back pain and received injections.  - Prescribed muscle relaxants to be taken twice daily.    FOLLOW-UP:  - Scheduled virtual follow-up visit in 2 weeks to check blood pressure and ensure proper control with the newly prescribed Losartan.        Problem List Items Addressed This Visit       HTN (hypertension) - Primary    Relevant Medications    losartan (COZAAR) 25 MG tablet    DDD (degenerative disc disease), lumbar    Type 2 diabetes mellitus with diabetic nephropathy, with long-term current use of insulin    Anemia due to antineoplastic chemotherapy    Neoplasm of uncertain behavior-pituitary     Other Visit Diagnoses         Chest pain, unspecified type              Future Appointments       Next 10 Appointments       Date Provider Specialty Appt Notes    6/13/2025  Lab lab    6/13/2025 Moris Lowery MD Hematology and Oncology fu lab and tx per Molly    6/16/2025  Chemotherapy  C4D1 Cisplatin / VP16 Q21d    6/17/2025  Chemotherapy C4D2 -16    6/18/2025  Chemotherapy C4D3 -16    6/27/2025 Cherie Lion DO [facility]  Arrive at: Telehealth bp check    7/1/2025 Everett Taveras MD Pain Medicine I have been having some pain in my lower back, my primary care doctors said that I have some bulging disc.    7/7/2025  Chemotherapy C5D1 Cisplatin / VP16 Q21d    7/8/2025  Chemotherapy C5D2 -16    7/9/2025  Chemotherapy C5D3 -16              Displaying the next 10 appointments. This patient has additional appointments scheduled.             Medication List with Changes/Refills   New Medications    LOSARTAN (COZAAR) 25 MG TABLET    Take 1 tablet (25 mg total) by mouth once daily.   Current Medications    ALLOPURINOL (ZYLOPRIM) 300 MG TABLET    Take 1 tablet (300 mg total) by mouth once daily.    AMMONIUM LACTATE (LAC-HYDRIN) 12 % LOTION    Apply topically 2 (two) times daily.    ATORVASTATIN (LIPITOR) 20 MG TABLET    Take 1 tablet (20 mg total) by mouth every evening.    BACILLUS COAGULANS ORAL    Take 1 tablet by mouth once daily.    BRIMONIDINE 0.2% (ALPHAGAN) 0.2 % DROP    INSTILL 1 DROP IN BOTH EYES TWICE DAILY    CARBIDOPA-LEVODOPA  MG (SINEMET)  MG PER TABLET    Take 1.5 tablets by mouth 5 (five) times daily.    CHOLECALCIFEROL, VITAMIN D3, (D3-2000 ORAL)    Take by mouth once daily.    CLONAZEPAM (KLONOPIN) 1 MG TABLET    Take 1.5 tablets (1.5 mg total) by mouth every evening.    CYANOCOBALAMIN (VITAMIN B-12) 1000 MCG TABLET    Take 100 mcg by mouth once daily.    DEXAMETHASONE (DECADRON) 4 MG TAB    Take 2 tablets (8 mg total) by mouth once daily. on days 2-4 of each chemotherapy cycle.    DONEPEZIL (ARICEPT) 5 MG TABLET    Take 1 tablet (5 mg total) by mouth every evening.    DORZOLAMIDE (TRUSOPT) 2 % OPHTHALMIC SOLUTION    Place 1 drop into both eyes 2 (two) times daily.    DULOXETINE (CYMBALTA) 30 MG CAPSULE    Take 1 capsule (30 mg total) by mouth once daily.     "GABAPENTIN (NEURONTIN) 300 MG CAPSULE    Take 1 capsule (300 mg total) by mouth 2 (two) times daily.    KETOCONAZOLE (NIZORAL) 2 % SHAMPOO    Apply topically.    LIDOCAINE-PRILOCAINE (EMLA) CREAM    Apply to port site one hour prior to chemotherapy    MELOXICAM (MOBIC) 15 MG TABLET    Take 1 tablet (15 mg total) by mouth once daily.    METHOCARBAMOL (ROBAXIN) 750 MG TAB    Take 1 tablet (750 mg total) by mouth 4 (four) times daily.    OLANZAPINE (ZYPREXA) 5 MG TABLET    Take 1 tablet (5 mg total) by mouth every evening. on days 1-4 of each chemotherapy cycle.    OMEPRAZOLE (PRILOSEC) 40 MG CAPSULE    Take 1 capsule (40 mg total) by mouth once daily.    PEN NEEDLE, DIABETIC (BD HIMANSHU 2ND GEN PEN NEEDLE) 32 GAUGE X 5/32" NDLE    Inject 1 each into the skin once daily.    PRAZOSIN (MINIPRESS) 1 MG CAP    Take 1 capsule (1 mg total) by mouth every evening.    PROCHLORPERAZINE (COMPAZINE) 5 MG TABLET    Take 1 tablet (5 mg total) by mouth every 6 (six) hours as needed for Nausea.    RAMELTEON (ROZEREM) 8 MG TABLET    Take 1 tablet (8 mg total) by mouth nightly.    RASAGILINE (AZILECT) 1 MG TAB    TAKE 1 TABLET(1 MG) BY MOUTH EVERY EVENING    TIRZEPATIDE 10 MG/0.5 ML PNIJ    Inject 10 mg into the skin every 7 days. (Increased dose)   Discontinued Medications    INSULIN GLARGINE U-100, LANTUS, (LANTUS SOLOSTAR U-100 INSULIN) 100 UNIT/ML (3 ML) INPN PEN    Inject 7 Units into the skin once daily.       Cherie Lion, , RDN    ==========================================================================  Subjective:      Patient ID: Glenn Medel is a 69 y.o. male.  has a past medical history of KENTON (acute kidney injury) (02/21/2025), Arthritis, Bladder cancer (2025), Blurred vision (09/08/2020), BPH (benign prostatic hyperplasia), Cancer, Cataract, Chronic kidney disease, stage 3 (02/25/2015), DDD (degenerative disc disease), lumbar, Diabetes mellitus, Elevated PSA, Fall (07/02/2024), Glaucoma, HTN (hypertension), Iritis, " lens-induced (01/08/2018), LUE numbness (09/08/2020), Obesity, Parkinson's disease, Pyelonephritis (02/21/2025), Sepsis (06/09/2023), Sleep apnea, Suspected cerebrovascular accident (CVA) (09/08/2020), and Syncope and collapse (07/02/2024).     Chief Complaint: Follow-up (6 week follow)      History of Present Illness    CHIEF COMPLAINT:  Patient presents today for follow up of blood pressure and insulin management    HYPERTENSION:  He reports elevated home blood pressure readings of 163/90, 167/91, and 158/87. Current blood pressure is 158/82. He is taking Prednisone for night terrors, which has been insufficient in controlling blood pressure.    DIABETES:  Morning blood sugars are typically under 130. He is currently taking Mounjaro 10mg but missed his weekly dose on Sunday, resulting in elevated blood sugar. He has only needed to take Lantus once since the weekend. His most recent A1C was 6.9 in April.    RECENT CARDIAC EVENT:  Two weeks ago, he experienced chest pain requiring emergency room visit and overnight stay. Echocardiogram showed normal systolic function with mild mitral regurgitation and normal EF of 50-55%. Cardiologist review indicated no significant concerns.    ONCOLOGY:  Chemotherapy has been delayed due to low blood counts, with ANC approximately 100 points below required threshold. Next session scheduled for following Monday, pending improved blood counts.    NEUROLOGICAL:  Brain MRI showed sub-centimeter hypo-enhancing focus within posterior inferior pituitary, with possible underlying pituitary adenoma that could not be excluded. He has been referred to neurosurgeon for evaluation. He reports a previous brain MRI from several years ago also revealed a spot, initially thought to be potentially Parkinson's related, though he is unsure if current finding is the same.    MUSCULOSKELETAL:  He reports lower back pain flare up starting 2 days ago. Takes muscle relaxers limited to twice daily due to  excessive daytime drowsiness.      ROS:  General: -fever, -chills, -fatigue, -weight gain, -weight loss  Eyes: -vision changes, -redness, -discharge  ENT: -ear pain, -nasal congestion, -sore throat  Cardiovascular: +chest pain, -palpitations, -lower extremity edema  Respiratory: -cough, -shortness of breath  Gastrointestinal: -abdominal pain, -nausea, -vomiting, -diarrhea, -constipation, -blood in stool  Genitourinary: -dysuria, -hematuria, -frequency  Musculoskeletal: -joint pain, -muscle pain, +back pain  Skin: -rash, -lesion  Neurological: -headache, -dizziness, -numbness, -tingling  Psychiatric: -anxiety, -depression, -sleep difficulty, +sleep disturbances          Problem List Items Addressed This Visit       HTN (hypertension) - Primary    DDD (degenerative disc disease), lumbar    Overview   s/p epidural steroids          Type 2 diabetes mellitus with diabetic nephropathy, with long-term current use of insulin    Anemia due to antineoplastic chemotherapy    Neoplasm of uncertain behavior-pituitary     Other Visit Diagnoses         Chest pain, unspecified type                 Past Medical History:  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/08/2018    LUE numbness 09/08/2020    Obesity     Parkinson's disease     Pyelonephritis 02/21/2025    Sepsis 06/09/2023    Sleep apnea     uses cpap    Suspected cerebrovascular accident (CVA) 09/08/2020    Syncope and collapse 07/02/2024     Past Surgical History:   Procedure Laterality Date    CATARACT  EXTRACTION W/  INTRAOCULAR LENS IMPLANT Left 11/30/2017    CATARACT EXTRACTION W/  INTRAOCULAR LENS IMPLANT Right 12/11/2018    Dr Garcia    CATARACT EXTRACTION W/  INTRAOCULAR LENS IMPLANT Right 12/11/2018    Procedure: EXTRACTION, CATARACT, WITH IOL INSERTION;  Surgeon: Damaris Garcia MD;  Location: [facility] OR;  Service: Ophthalmology;  Laterality: Right;    COLONOSCOPY N/A 09/18/2017    Procedure: COLONOSCOPY;  Surgeon: Paul Feliz Jr., MD;  Location: [facility];  Service: Endoscopy;  Laterality: N/A;    COLONOSCOPY N/A 12/21/2022    Procedure: COLONOSCOPY;  Surgeon: Paul Feliz Jr., MD;  Location: [facility];  Service: Endoscopy;  Laterality: N/A;    COLONOSCOPY W/ POLYPECTOMY  04/13/2010    YARELI.   One 1 cm polyp in the sigmoid colon.  Internal hemorrhoids.    COSMETIC SURGERY      DIGITAL RECTAL EXAMINATION UNDER ANESTHESIA N/A 07/23/2019    Procedure: EXAM UNDER ANESTHESIA, DIGITAL, RECTUM;  Surgeon: Kaz Keating MD;  Location: [facility] OR;  Service: General;  Laterality: N/A;    EXAMINATION UNDER ANESTHESIA N/A 04/24/2019    Procedure: EXAM UNDER ANESTHESIA;  Surgeon: Kaz Keating MD;  Location: [facility] OR;  Service: General;  Laterality: N/A;    EXCISIONAL HEMORRHOIDECTOMY N/A 04/24/2019    Procedure: HEMORRHOIDECTOMY (lithotomy);  Surgeon: Kaz Keating MD;  Location: [facility] OR;  Service: General;  Laterality: N/A;    EYE SURGERY      GANGLION CYST EXCISION Left     INJECTION OF ANESTHETIC AGENT AROUND MEDIAL BRANCH NERVES INNERVATING LUMBAR FACET JOINT Bilateral 04/09/2024    Procedure: Block-nerve-medial branch-lumbar-L4/5-L5/S1;  Surgeon: Everett Taveras MD;  Location: [facility] OR;  Service: Pain Management;  Laterality: Bilateral;    INJECTION OF ANESTHETIC AGENT AROUND MEDIAL BRANCH NERVES INNERVATING LUMBAR FACET JOINT Bilateral 05/06/2024    Procedure: Block-nerve-medial branch-lumbar-L4/5-L5/S1;  Surgeon: Everett Taveras MD;  Location: [facility] OR;  Service: Pain Management;  Laterality:  Bilateral;    INJECTION OF ANESTHETIC AGENT AROUND PUDENDAL NERVE N/A 04/24/2019    Procedure: BLOCK, NERVE, PUDENDAL;  Surgeon: Kaz Keating MD;  Location: [facility] OR;  Service: General;  Laterality: N/A;    INJECTION OF ANESTHETIC AGENT AROUND PUDENDAL NERVE N/A 07/23/2019    Procedure: BLOCK, NERVE, PUDENDAL;  Surgeon: Kaz Keating MD;  Location: [facility] OR;  Service: General;  Laterality: N/A;    INJECTION, ALLOGRAFT, INTERVERTEBRAL DISC N/A 09/30/2024    Procedure: INJECTION,ALLOGRAFT,INTERVERTEBRAL DISC,1ST LEVEL  L4/5;  Surgeon: Everett Taveras MD;  Location: [facility] OR;  Service: Pain Management;  Laterality: N/A;    INSERTION OF TUNNELED CENTRAL VENOUS CATHETER (CVC) WITH SUBCUTANEOUS PORT Right 3/28/2025    Procedure: [id number]-[id number];  Surgeon: Hebert Gardner MD;  Location: [facility] OR;  Service: General;  Laterality: Right;    INSTILLATION OF URINARY BLADDER  02/11/2025    Procedure: INSTILLATION, BLADDER w/ gemcitabine;  Surgeon: MARIZA Donahue MD;  Location: [facility] OR;  Service: Urology;;    RADIOFREQUENCY ABLATION OF LUMBAR MEDIAL BRANCH NERVE AT SINGLE LEVEL Bilateral 06/04/2024    Procedure: Radiofrequency Ablation, Nerve, Spinal, Lumbar, Medial Branch, L4/5 and L5/S1;  Surgeon: Everett Taveras MD;  Location: [facility] OR;  Service: Pain Management;  Laterality: Bilateral;    REPAIR OF RETINAL DETACHMENT WITH VITRECTOMY Right 08/01/2018    Procedure: REPAIR, RETINAL DETACHMENT, WITH VITRECTOMY;  Surgeon: SAJAN Pulido MD;  Location: [address];  Service: Ophthalmology;  Laterality: Right;  35 min    SKIN BIOPSY      TONSILLECTOMY      TRANSRECTAL BIOPSY OF PROSTATE WITH ULTRASOUND GUIDANCE N/A 01/31/2023    Procedure: BIOPSY, PROSTATE, RECTAL APPROACH, WITH US GUIDANCE;  Surgeon: MARIZA Donahue MD;  Location: [facility] OR;  Service: Urology;  Laterality: N/A;    TRANSRECTAL BIOPSY OF PROSTATE WITH ULTRASOUND GUIDANCE N/A 04/12/2024    Procedure: BIOPSY, PROSTATE, RECTAL APPROACH, WITH  US GUIDANCE;  Surgeon: MARIZA Donahue MD;  Location: [facility];  Service: Urology;  Laterality: N/A;    TRIGGER FINGER RELEASE Right     X 2    TURBT (TRANSURETHRAL RESECTION OF BLADDER TUMOR) N/A 02/11/2025    Procedure: TURBT (TRANSURETHRAL RESECTION OF BLADDER TUMOR);  Surgeon: MARIZA Donahue MD;  Location: [facility];  Service: Urology;  Laterality: N/A;    uro lift      for enlarged prostate     Review of patient's allergies indicates:   Allergen Reactions    Avelox [moxifloxacin] Hives and Rash     Medication List with Changes/Refills   New Medications    LOSARTAN (COZAAR) 25 MG TABLET    Take 1 tablet (25 mg total) by mouth once daily.   Current Medications    ALLOPURINOL (ZYLOPRIM) 300 MG TABLET    Take 1 tablet (300 mg total) by mouth once daily.    AMMONIUM LACTATE (LAC-HYDRIN) 12 % LOTION    Apply topically 2 (two) times daily.    ATORVASTATIN (LIPITOR) 20 MG TABLET    Take 1 tablet (20 mg total) by mouth every evening.    BACILLUS COAGULANS ORAL    Take 1 tablet by mouth once daily.    BRIMONIDINE 0.2% (ALPHAGAN) 0.2 % DROP    INSTILL 1 DROP IN BOTH EYES TWICE DAILY    CARBIDOPA-LEVODOPA  MG (SINEMET)  MG PER TABLET    Take 1.5 tablets by mouth 5 (five) times daily.    CHOLECALCIFEROL, VITAMIN D3, (D3-2000 ORAL)    Take by mouth once daily.    CLONAZEPAM (KLONOPIN) 1 MG TABLET    Take 1.5 tablets (1.5 mg total) by mouth every evening.    CYANOCOBALAMIN (VITAMIN B-12) 1000 MCG TABLET    Take 100 mcg by mouth once daily.    DEXAMETHASONE (DECADRON) 4 MG TAB    Take 2 tablets (8 mg total) by mouth once daily. on days 2-4 of each chemotherapy cycle.    DONEPEZIL (ARICEPT) 5 MG TABLET    Take 1 tablet (5 mg total) by mouth every evening.    DORZOLAMIDE (TRUSOPT) 2 % OPHTHALMIC SOLUTION    Place 1 drop into both eyes 2 (two) times daily.    DULOXETINE (CYMBALTA) 30 MG CAPSULE    Take 1 capsule (30 mg total) by mouth once daily.    GABAPENTIN (NEURONTIN) 300 MG CAPSULE    Take 1 capsule (300 mg  "total) by mouth 2 (two) times daily.    KETOCONAZOLE (NIZORAL) 2 % SHAMPOO    Apply topically.    LIDOCAINE-PRILOCAINE (EMLA) CREAM    Apply to port site one hour prior to chemotherapy    MELOXICAM (MOBIC) 15 MG TABLET    Take 1 tablet (15 mg total) by mouth once daily.    METHOCARBAMOL (ROBAXIN) 750 MG TAB    Take 1 tablet (750 mg total) by mouth 4 (four) times daily.    OLANZAPINE (ZYPREXA) 5 MG TABLET    Take 1 tablet (5 mg total) by mouth every evening. on days 1-4 of each chemotherapy cycle.    OMEPRAZOLE (PRILOSEC) 40 MG CAPSULE    Take 1 capsule (40 mg total) by mouth once daily.    PEN NEEDLE, DIABETIC (BD HIMANSHU 2ND GEN PEN NEEDLE) 32 GAUGE X 5/32" NDLE    Inject 1 each into the skin once daily.    PRAZOSIN (MINIPRESS) 1 MG CAP    Take 1 capsule (1 mg total) by mouth every evening.    PROCHLORPERAZINE (COMPAZINE) 5 MG TABLET    Take 1 tablet (5 mg total) by mouth every 6 (six) hours as needed for Nausea.    RAMELTEON (ROZEREM) 8 MG TABLET    Take 1 tablet (8 mg total) by mouth nightly.    RASAGILINE (AZILECT) 1 MG TAB    TAKE 1 TABLET(1 MG) BY MOUTH EVERY EVENING    TIRZEPATIDE 10 MG/0.5 ML PNIJ    Inject 10 mg into the skin every 7 days. (Increased dose)   Discontinued Medications    INSULIN GLARGINE U-100, LANTUS, (LANTUS SOLOSTAR U-100 INSULIN) 100 UNIT/ML (3 ML) INPN PEN    Inject 7 Units into the skin once daily.      Social History     Tobacco Use    Smoking status: Never    Smokeless tobacco: Never   Substance Use Topics    Alcohol use: Yes     Comment: very rare      Family History   Problem Relation Name Age of Onset    Colon cancer Mother      Hypertension Mother      Diabetes Mother      Stroke Mother      Cancer Mother          colon    Kidney disease Mother      Heart disease Father      Diabetes Father      Colon cancer Father      Cancer Father          colon    Cataracts Father      No Known Problems Sister      Heart disease Brother  62    Kidney disease Brother      Dementia Brother Rodrigue     " Abnormal EKG Brother Rodrigue     Breast cancer Maternal Aunt      Cancer Maternal Aunt          breast    Cancer Maternal Aunt          brain    Brain cancer Maternal Aunt      No Known Problems Maternal Uncle      No Known Problems Paternal Aunt      No Known Problems Paternal Uncle      Glaucoma Maternal Grandmother      No Known Problems Maternal Grandfather      No Known Problems Paternal Grandmother      No Known Problems Paternal Grandfather      Amblyopia Neg Hx      Blindness Neg Hx      Macular degeneration Neg Hx      Retinal detachment Neg Hx      Strabismus Neg Hx      Thyroid disease Neg Hx      Parkinsonism Neg Hx         I have reviewed the complete PMH, social history, surgical history, allergies and medications.  As well as family history.    Review of Systems   Constitutional:  Negative for activity change and unexpected weight change.   HENT:  Positive for rhinorrhea. Negative for hearing loss and trouble swallowing.    Eyes:  Negative for discharge and visual disturbance.   Respiratory:  Negative for chest tightness and wheezing.    Cardiovascular:  Negative for chest pain and palpitations.   Gastrointestinal:  Negative for blood in stool, constipation, diarrhea and vomiting.   Endocrine: Negative for polydipsia and polyuria.   Genitourinary:  Negative for difficulty urinating, hematuria and urgency.   Musculoskeletal:  Negative for arthralgias, joint swelling and neck pain.   Neurological:  Negative for weakness and headaches.   Psychiatric/Behavioral:  Negative for confusion and dysphoric mood.      Objective:   (Vitals taken at home and reported to us and documented)   Pulse If Self Reported:      6/12/2025     9:16 AM 6/12/2025     8:18 AM 6/11/2025     6:13 PM 6/11/2025     6:12 PM 6/11/2025     9:04 AM 6/11/2025     7:28 AM 6/10/2025     2:49 PM   Patient Entered Pulse   Pulse 82 87 58 61 79 96 64        Last 5 Patient Entered Readings                Current 30 Day Average: 151/87  Recent  Readings 6/12/2025 6/11/2025 6/11/2025 6/11/2025 6/10/2025   SBP (mmHg) 152 165 159 143 155   DBP (mmHg) 82 81 78 83 82   Pulse 82 87 58 61 79                BP If Self Reported:      6/12/2025     9:16 AM 6/11/2025     6:13 PM 6/11/2025     6:12 PM 6/11/2025     9:04 AM 6/10/2025     2:49 PM 6/10/2025     9:15 AM 6/10/2025     9:14 AM   Patient Entered Blood Pressure   Systolic Blood Pressure 152 165 159 143 155 148 145   Diastolic Blood Pressure 82 81 78 83 82 90 90      Pulse Readings from Last 3 Encounters:   06/06/25 67   05/21/25 76   05/20/25 87      Weight If Self Reported:      6/12/2025     8:17 AM 6/11/2025     7:28 AM 6/10/2025     8:42 AM 6/8/2025     8:01 AM 6/7/2025     9:00 AM 6/6/2025     8:44 AM 6/4/2025     8:28 AM   Patient Entered Weight   Weight 172.8 lb    172.8 lb 171.5 lb 171.1 lb 172.8 lb 174.2 lb 173.2 lb 172.5 lb      Glucose If Self Reported:      6/4/2025     5:39 PM 3/28/2025     7:26 PM 3/27/2025     9:27 AM 3/26/2025     9:08 PM 3/26/2025     9:05 PM 3/20/2025     8:43 AM 3/19/2025     7:16 AM   Patient Entered Glucose   Glucose 109 mg/dL  95 mg/dL 124 mg/dL 151 mg/dL 140 mg/dL 130 mg/dL 120 mg/dL       Patient-reported      Last 5 Blood Glucose Readings      Date Blood Sugar   6/4/2025 109 mg/dL       Details          Patient-reported              BP (!) 158/82  if verbally reported and entered.    Constitutional: The patient is oriented to person, place, and time and appears well-developed and well-nourished with no distress.    Eyes: EOM are normal.    Pulmonary/Chest: Effort normal. No respiratory distress.    Neurological: The patient is alert and oriented to person, place, and time.    Skin: the patient is not diaphoretic.    Psychiatric: The patient has a normal mood and affect. The behavior is normal. Judgment, thought and content normal.      Assessment:     1. Other secondary hypertension    2. Type 2 diabetes mellitus with diabetic nephropathy, with long-term current use  of insulin    3. Anemia due to antineoplastic chemotherapy    4. Neoplasm of uncertain behavior-pituitary    5. Degeneration of intervertebral disc of lumbar region, unspecified whether pain present    6. Chest pain, unspecified type      MDM:   Moderate complexity, more than 2 chronic conditions requiring medication management, unique testing, interpretation of testing and follow up.    I have performed thorough medication reconciliation today and discussed risk and benefits of each medication.    I have signed for the following orders AND/OR meds.  No orders of the defined types were placed in this encounter.    Medications Ordered This Encounter   Medications    losartan (COZAAR) 25 MG tablet     Sig: Take 1 tablet (25 mg total) by mouth once daily.     Dispense:  90 tablet     Refill:  3     .        Follow up in about 2 weeks (around 6/26/2025) for Virtual Visit for BP check.    If no improvement in symptoms or symptoms worsen, advised to call/follow-up at clinic or go to ER. Patient voiced understanding and all questions/concerns were addressed.     Follow up:  If there are any questions or problems with the prescription, call [phone] anytime for assistance.   please schedule a virtual follow up visit first.  Please see an in-person provider if your symptoms are worsening or not improving in 2-3 days.   Please print a copy of this note and send it to your regular doctor, or take it to your next visit so it may be included in your medical record.   Contact customer support at [phone] for questions or concerns   You must understand that you've received a Telehealth Urgent Care treatment only and that you may be released before all your medical problems are known or treated. You, the patient, will arrange for follow up care as instructed.  Follow up with your PCP or specialty clinic as directed in the next 1-2 days if not improved or as needed.  You can call [phone] to schedule an appointment  with the appropriate provider in [place].   If your condition worsens we recommend that you receive another evaluation at the emergency room immediately or contact your primary medical clinics after hours call service to discuss your concerns.  Please go to an Urgent Care or go to the Emergency Department for any concerns or worsening of condition.    DISCLAIMER: This note was compiled by using a speech recognition dictation system and therefore please be aware that typographical / speech recognition errors can and do occur.  Please contact me if you see any errors specifically.     This note was generated with the assistance of ambient listening technology. Verbal consent was obtained by the patient and accompanying visitor(s) for the recording of patient appointment to facilitate this note. I attest to having reviewed and edited the generated note for accuracy, though some syntax or spelling errors may persist. Please contact the author of this note for any clarification.      Cherie Lion DO, RDN    Office: [phone] [id number] [place] [address]  FAX: [phone]

## 2025-06-12 NOTE — PROGRESS NOTES
PATIENT: Glenn Medel  MRN: [id number]  DATE: 6/16/2025      Diagnosis:   1. Small cell carcinoma of bladder    2. Malignant poorly differentiated neuroendocrine tumors    3. Immunodeficiency due to chemotherapy    4. Primary hypertension    5. Type 2 diabetes mellitus with diabetic nephropathy, with long-term current use of insulin    6. Parkinson's disease without dyskinesia, with fluctuating manifestations    7. Normocytic anemia    8. Chemotherapy-induced neutropenia                Chief Complaint: Follow-up (Small cell carcinoma of bladder)      Oncologic History:      Oncologic History     Oncologic Treatment     Pathology           Subjective:    Interval History: Mr. Medel is a 69 y.o. male with BPH, CKD, DMII, Glaucoma, HTN, obesity, Parkinson's disease, LOVE who presents for small cell bladder cancer.  Since the last clinic visit the patient could not receive chemo this week due to neutropenia.  The patient denies CP, cough, SOB, abdominal pain, nausea, vomiting, constipation, diarrhea.  The patient denies fever, chills, night sweats, weight loss, new lumps or bumps, easy bruising or bleeding.    Prior History:  The patient initially presented after developing hematuria.  The patient underwent cystoscopy on 01/30/2025 showing a tumor at the trigone near the right ureteral orifice measuring about 3 cm in diameter.  Pt underwent TURBT on 2/12/25 with path showing invasive small-cell carcinoma of urothelial origin and a background of urothelial carcinoma in Situ.  Muscularis propria was present on biopsy but was not involved.  The patient saw urologist Dr. Zapata on 03/12/2024 whom recommended chemotherapy followed by surgery.  The patient was discussed at tumor board on 03/13/2025 with recommendation on chemotherapy, PET-CT, MRI brain.  MRI brain on 03/18/2025 showed 4 x 3 mm probable arachnoid cyst in the left posterior sella.  PET-CT on 03/19/2025 showed no evidence of disease.     The patient's case was  discussed with Urology and Medical oncology form MD David with recommendation for 4 cycles of platinum/Etoposide followed by cystectomy.    The patient underwent PET/CT on 6/03/25 showing LINDSAY.  MRI brain on 6/05/25 showed no acute process.     Past Medical History:   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/08/2018    LUE numbness 09/08/2020    Obesity     Parkinson's disease     Pyelonephritis 02/21/2025    Sepsis 06/09/2023    Sleep apnea     uses cpap    Suspected cerebrovascular accident (CVA) 09/08/2020    Syncope and collapse 07/02/2024       Past Surgical HIstory:   Past Surgical History:   Procedure Laterality Date    CATARACT EXTRACTION W/  INTRAOCULAR LENS IMPLANT Left 11/30/2017    CATARACT EXTRACTION W/  INTRAOCULAR LENS IMPLANT Right 12/11/2018    Dr Garcia    CATARACT EXTRACTION W/  INTRAOCULAR LENS IMPLANT Right 12/11/2018    Procedure: EXTRACTION, CATARACT, WITH IOL INSERTION;  Surgeon: Damaris Garcia MD;  Location: [facility] OR;  Service: Ophthalmology;  Laterality: Right;    COLONOSCOPY N/A 09/18/2017    Procedure: COLONOSCOPY;  Surgeon: Paul Feliz Jr., MD;  Location: [facility] ENDO;  Service: Endoscopy;  Laterality: N/A;    COLONOSCOPY N/A 12/21/2022    Procedure: COLONOSCOPY;  Surgeon: Paul Feliz Jr., MD;  Location: [facility] ENDO;  Service: Endoscopy;  Laterality: N/A;    COLONOSCOPY W/ POLYPECTOMY  04/13/2010    YARELI.   One 1 cm polyp in the sigmoid colon.  Internal hemorrhoids.    COSMETIC SURGERY      DIGITAL RECTAL  EXAMINATION UNDER ANESTHESIA N/A 07/23/2019    Procedure: EXAM UNDER ANESTHESIA, DIGITAL, RECTUM;  Surgeon: Kaz Keating MD;  Location: [facility] OR;  Service: General;  Laterality: N/A;    EXAMINATION UNDER ANESTHESIA N/A 04/24/2019    Procedure: EXAM UNDER ANESTHESIA;  Surgeon: Kaz Keating MD;  Location: [facility] OR;  Service: General;  Laterality: N/A;    EXCISIONAL HEMORRHOIDECTOMY N/A 04/24/2019    Procedure: HEMORRHOIDECTOMY (lithotomy);  Surgeon: Kaz Keating MD;  Location: [facility] OR;  Service: General;  Laterality: N/A;    EYE SURGERY      GANGLION CYST EXCISION Left     INJECTION OF ANESTHETIC AGENT AROUND MEDIAL BRANCH NERVES INNERVATING LUMBAR FACET JOINT Bilateral 04/09/2024    Procedure: Block-nerve-medial branch-lumbar-L4/5-L5/S1;  Surgeon: Everett Taveras MD;  Location: [facility] OR;  Service: Pain Management;  Laterality: Bilateral;    INJECTION OF ANESTHETIC AGENT AROUND MEDIAL BRANCH NERVES INNERVATING LUMBAR FACET JOINT Bilateral 05/06/2024    Procedure: Block-nerve-medial branch-lumbar-L4/5-L5/S1;  Surgeon: Everett Taveras MD;  Location: [facility] OR;  Service: Pain Management;  Laterality: Bilateral;    INJECTION OF ANESTHETIC AGENT AROUND PUDENDAL NERVE N/A 04/24/2019    Procedure: BLOCK, NERVE, PUDENDAL;  Surgeon: Kaz Keating MD;  Location: [facility] OR;  Service: General;  Laterality: N/A;    INJECTION OF ANESTHETIC AGENT AROUND PUDENDAL NERVE N/A 07/23/2019    Procedure: BLOCK, NERVE, PUDENDAL;  Surgeon: Kaz Keating MD;  Location: [facility] OR;  Service: General;  Laterality: N/A;    INJECTION, ALLOGRAFT, INTERVERTEBRAL DISC N/A 09/30/2024    Procedure: INJECTION,ALLOGRAFT,INTERVERTEBRAL DISC,1ST LEVEL  L4/5;  Surgeon: Everett Taveras MD;  Location: [facility] OR;  Service: Pain Management;  Laterality: N/A;    INSERTION OF TUNNELED CENTRAL VENOUS CATHETER (CVC) WITH SUBCUTANEOUS PORT Right 3/28/2025    Procedure: ECXCALADW-NIUK-N-CATH;  Surgeon: Hebert Gardner MD;  Location: [facility] OR;   Service: General;  Laterality: Right;    INSTILLATION OF URINARY BLADDER  02/11/2025    Procedure: INSTILLATION, BLADDER w/ gemcitabine;  Surgeon: MARIZA Donahue MD;  Location: [facility] OR;  Service: Urology;;    RADIOFREQUENCY ABLATION OF LUMBAR MEDIAL BRANCH NERVE AT SINGLE LEVEL Bilateral 06/04/2024    Procedure: Radiofrequency Ablation, Nerve, Spinal, Lumbar, Medial Branch, L4/5 and L5/S1;  Surgeon: Everett Taveras MD;  Location: [facility] OR;  Service: Pain Management;  Laterality: Bilateral;    REPAIR OF RETINAL DETACHMENT WITH VITRECTOMY Right 08/01/2018    Procedure: REPAIR, RETINAL DETACHMENT, WITH VITRECTOMY;  Surgeon: SAJAN Pulido MD;  Location: [address];  Service: Ophthalmology;  Laterality: Right;  35 min    SKIN BIOPSY      TONSILLECTOMY      TRANSRECTAL BIOPSY OF PROSTATE WITH ULTRASOUND GUIDANCE N/A 01/31/2023    Procedure: BIOPSY, PROSTATE, RECTAL APPROACH, WITH US GUIDANCE;  Surgeon: MARIZA Donahue MD;  Location: [facility] OR;  Service: Urology;  Laterality: N/A;    TRANSRECTAL BIOPSY OF PROSTATE WITH ULTRASOUND GUIDANCE N/A 04/12/2024    Procedure: BIOPSY, PROSTATE, RECTAL APPROACH, WITH US GUIDANCE;  Surgeon: MARIZA Donahue MD;  Location: [facility];  Service: Urology;  Laterality: N/A;    TRIGGER FINGER RELEASE Right     X 2    TURBT (TRANSURETHRAL RESECTION OF BLADDER TUMOR) N/A 02/11/2025    Procedure: TURBT (TRANSURETHRAL RESECTION OF BLADDER TUMOR);  Surgeon: MARIZA Donahue MD;  Location: [facility] OR;  Service: Urology;  Laterality: N/A;    uro lift      for enlarged prostate       Family History:   Family History   Problem Relation Name Age of Onset    Colon cancer Mother      Hypertension Mother      Diabetes Mother      Stroke Mother      Cancer Mother          colon    Kidney disease Mother      Heart disease Father      Diabetes Father      Colon cancer Father      Cancer Father          colon    Cataracts Father      No Known Problems Sister      Heart disease Brother  62     Kidney disease Brother      Dementia Brother Rodrigue     Abnormal EKG Brother Rodrigue     Breast cancer Maternal Aunt      Cancer Maternal Aunt          breast    Cancer Maternal Aunt          brain    Brain cancer Maternal Aunt      No Known Problems Maternal Uncle      No Known Problems Paternal Aunt      No Known Problems Paternal Uncle      Glaucoma Maternal Grandmother      No Known Problems Maternal Grandfather      No Known Problems Paternal Grandmother      No Known Problems Paternal Grandfather      Amblyopia Neg Hx      Blindness Neg Hx      Macular degeneration Neg Hx      Retinal detachment Neg Hx      Strabismus Neg Hx      Thyroid disease Neg Hx      Parkinsonism Neg Hx         Social History:  reports that he has never smoked. He has never used smokeless tobacco. He reports current alcohol use. He reports that he does not use drugs.    Allergies:  Review of patient's allergies indicates:   Allergen Reactions    Avelox [moxifloxacin] Hives and Rash       Medications:  Current Medications[1]    Review of Systems   Constitutional:  Negative for appetite change, chills, diaphoresis, fatigue, fever and unexpected weight change.   HENT:  Negative for mouth sores.    Eyes:  Negative for visual disturbance.   Respiratory:  Negative for cough and shortness of breath.    Cardiovascular:  Negative for chest pain and palpitations.   Gastrointestinal:  Negative for abdominal pain, constipation, diarrhea, nausea and vomiting.   Genitourinary:  Negative for frequency.   Musculoskeletal:  Negative for back pain.   Skin:  Negative for color change and rash.   Neurological:  Negative for headaches.   Hematological:  Negative for adenopathy. Does not bruise/bleed easily.   Psychiatric/Behavioral:  The patient is not nervous/anxious.        ECOG Performance Status: 1   Objective:      Vitals:   Vitals:    06/13/25 1313   BP: (!) 149/80   BP Location: Right arm   Patient Position: Sitting   Pulse: 72   Resp: 17   Temp: 98 °F  "(36.7 °C)   TempSrc: Temporal   SpO2: 99%   Weight: 81 kg (178 lb 9.2 oz)   Height: 5' 11" (1.803 m)               Physical Exam  Constitutional:       General: He is not in acute distress.     Appearance: He is well-developed. He is not diaphoretic.   HENT:      Head: Normocephalic and atraumatic.   Cardiovascular:      Rate and Rhythm: Normal rate and regular rhythm.      Heart sounds: Normal heart sounds. No murmur heard.     No friction rub. No gallop.   Pulmonary:      Effort: Pulmonary effort is normal. No respiratory distress.      Breath sounds: Normal breath sounds. No wheezing or rales.   Chest:      Chest wall: No tenderness.   Abdominal:      General: Bowel sounds are normal. There is no distension.      Palpations: Abdomen is soft. There is no mass.      Tenderness: There is no abdominal tenderness. There is no rebound.   Musculoskeletal:      Cervical back: Normal range of motion.   Lymphadenopathy:      Cervical: No cervical adenopathy.      Upper Body:      Right upper body: No supraclavicular or axillary adenopathy.      Left upper body: No supraclavicular or axillary adenopathy.   Skin:     General: Skin is warm and dry.      Findings: No erythema or rash.   Neurological:      Mental Status: He is alert and oriented to person, place, and time.   Psychiatric:         Behavior: Behavior normal.         Laboratory Data:  Lab Visit on 06/13/2025   Component Date Value Ref Range Status    Sodium 06/13/2025 139  136 - 145 mmol/L Final    Potassium 06/13/2025 4.3  3.5 - 5.1 mmol/L Final    Chloride 06/13/2025 102  95 - 110 mmol/L Final    CO2 06/13/2025 27  23 - 29 mmol/L Final    Glucose 06/13/2025 198 (H)  70 - 110 mg/dL Final    BUN 06/13/2025 19  8 - 23 mg/dL Final    Creatinine 06/13/2025 1.0  0.5 - 1.4 mg/dL Final    Calcium 06/13/2025 8.7  8.7 - 10.5 mg/dL Final    Protein Total 06/13/2025 6.9  6.0 - 8.4 gm/dL Final    Albumin 06/13/2025 3.7  3.5 - 5.2 g/dL Final    Bilirubin Total 06/13/2025 0.3  " 0.1 - 1.0 mg/dL Final    For infants and newborns, interpretation of results should be based   on gestational age, weight and in agreement with clinical   observations.    Premature Infant recommended reference ranges:   0-24 hours:  <8.0 mg/dL   24-48 hours: <12.0 mg/dL   3-5 days:    <15.0 mg/dL   6-29 days:   <15.0 mg/dL    ALP 06/13/2025 130  40 - 150 unit/L Final    AST 06/13/2025 23  11 - 45 unit/L Final    ALT 06/13/2025 5 (L)  10 - 44 unit/L Final    Anion Gap 06/13/2025 10  8 - 16 mmol/L Final    eGFR 06/13/2025 >60  >60 mL/min/1.73/m2 Final    Estimated GFR calculated using the CKD-EPI creatinine (2021) equation.    WBC 06/13/2025 5.79  3.90 - 12.70 K/uL Final    RBC 06/13/2025 4.23 (L)  4.60 - 6.20 M/uL Final    HGB 06/13/2025 12.7 (L)  14.0 - 18.0 gm/dL Final    HCT 06/13/2025 38.3 (L)  40.0 - 54.0 % Final    MCV 06/13/2025 91  82 - 98 fL Final    MCH 06/13/2025 30.0  27.0 - 31.0 pg Final    MCHC 06/13/2025 33.2  32.0 - 36.0 g/dL Final    RDW 06/13/2025 16.2 (H)  11.5 - 14.5 % Final    Platelet Count 06/13/2025 168  150 - 450 K/uL Final    MPV 06/13/2025 8.2 (L)  9.2 - 12.9 fL Final    Nucleated RBC 06/13/2025 0  <=0 /100 WBC Final    Neut % 06/13/2025 41.2  38 - 73 % Final    Lymph % 06/13/2025 38.9  18 - 48 % Final    Mono % 06/13/2025 14.0  4 - 15 % Final    Eos % 06/13/2025 4.0  <=8 % Final    Basophil % 06/13/2025 1.6  <=1.9 % Final    Imm Grans % 06/13/2025 0.3  0.0 - 0.5 % Final    Neut # 06/13/2025 2.39  1.8 - 7.7 K/uL Final    Lymph # 06/13/2025 2.25  1 - 4.8 K/uL Final    Mono # 06/13/2025 0.81  0.3 - 1 K/uL Final    Eos # 06/13/2025 0.23  <=0.5 K/uL Final    Baso # 06/13/2025 0.09  <=0.2 K/uL Final    Imm Grans # 06/13/2025 0.02  0.00 - 0.04 K/uL Final    Mild elevation in immature granulocytes is non specific and can be seen in a variety of conditions including stress response, acute inflammation, trauma and pregnancy. Correlation with other laboratory and clinical findings is essential.    Lab Visit on 06/09/2025   Component Date Value Ref Range Status    WBC 06/09/2025 3.35 (L)  3.90 - 12.70 K/uL Final    RBC 06/09/2025 3.99 (L)  4.60 - 6.20 M/uL Final    HGB 06/09/2025 12.0 (L)  14.0 - 18.0 gm/dL Final    HCT 06/09/2025 36.1 (L)  40.0 - 54.0 % Final    MCV 06/09/2025 91  82 - 98 fL Final    MCH 06/09/2025 30.1  27.0 - 31.0 pg Final    MCHC 06/09/2025 33.2  32.0 - 36.0 g/dL Final    RDW 06/09/2025 16.9 (H)  11.5 - 14.5 % Final    Platelet Count 06/09/2025 178  150 - 450 K/uL Final    MPV 06/09/2025 8.2 (L)  9.2 - 12.9 fL Final    Nucleated RBC 06/09/2025 0  <=0 /100 WBC Final    Neut % 06/09/2025 23.6 (L)  38 - 73 % Final    Lymph % 06/09/2025 53.4 (H)  18 - 48 % Final    Mono % 06/09/2025 15.2 (H)  4 - 15 % Final    Eos % 06/09/2025 6.6  <=8 % Final    Basophil % 06/09/2025 0.9  <=1.9 % Final    Imm Grans % 06/09/2025 0.3  0.0 - 0.5 % Final    Neut # 06/09/2025 0.79 (L)  1.8 - 7.7 K/uL Final    Lymph # 06/09/2025 1.79  1 - 4.8 K/uL Final    Mono # 06/09/2025 0.51  0.3 - 1 K/uL Final    Eos # 06/09/2025 0.22  <=0.5 K/uL Final    Baso # 06/09/2025 0.03  <=0.2 K/uL Final    Imm Grans # 06/09/2025 0.01  0.00 - 0.04 K/uL Final    Mild elevation in immature granulocytes is non specific and can be seen in a variety of conditions including stress response, acute inflammation, trauma and pregnancy. Correlation with other laboratory and clinical findings is essential.         Imaging:     MRI Brain  3/18/25  INTRACRANIAL: Mild age-appropriate volume loss. Unchanged prominent right retrocerebellar CSF/arachnoid cyst. Few scattered T2 FLAIR hyperintense white matter foci are minimally increased from prior study on 05/18/2023, likely related to chronic microvascular ischemic change. No abnormal enhancement demonstrated. No parenchymal restricted diffusion. No evidence of intracranial hemorrhage. No intracranial mass effect. No hydrocephalus. 4 x 3 mm probable arachnoid cyst in the left posterior sella (series  21, image 118). Otherwise, visualized pituitary gland and infundibulum are normal. Visualized major intracranial vascular structures demonstrate normal flow voids and are normal in course and caliber.     SINUSES: Paranasal sinuses are clear. Left mastoid effusion.     ORBITS: Bilateral lens replacements.      PET/CT 3/19/25  Head and Neck:     Brain demonstrates normal metabolism. However, FDG-PET has an approximate 60% sensitivity for brain metastases which are best detected by MRI with gadolinium. Physiologic uptake is in the neck.     Chest:     No FDG avid pulmonary lesions are present. No enlarged or FDG avid lymph nodes are in the chest.     Abdomen and Pelvis:     Physiologic uptake is in the liver, spleen, urinary system and bowel. No enlarged or FDG avid lymph nodes are in the abdomen or pelvis. Adrenal glands are normal. Urinary bladder is decompressed. Prostate is enlarged.     Musculoskeletal:     No FDG avid osseous lesions are present.       Assessment:       1. Small cell carcinoma of bladder    2. Malignant poorly differentiated neuroendocrine tumors    3. Immunodeficiency due to chemotherapy    4. Primary hypertension    5. Type 2 diabetes mellitus with diabetic nephropathy, with long-term current use of insulin    6. Parkinson's disease without dyskinesia, with fluctuating manifestations    7. Normocytic anemia    8. Chemotherapy-induced neutropenia                   Plan:     Small Cell Carcinoma of the Bladder - the patient diagnosed with small-cell carcinoma of the bladder on biopsy 02/12/2025  -Urologist Dr. Zapata whom patient met on 03/12/2024 recommended chemotherapy followed by cystectomy  -The patient discussed at tumor board on 03/13/2024 with recommendations for chemotherapy   -MRI brain on 03/18/2025 showed no evidence of disease  -PET-CT on 03/1925 showed no evidence of disease   -Case previously discussed with MD David with MD [facility] and medical oncology with  recommendation for 4 cycels of platinum and etoposide followed by cystectomy  -PET/CT 6/03/25 showed LINDSAY  -MRI brain 6/05/25 showed no acute abnormalities  -Pt to proceed with cycle 4 of Carboplatin and Etoposide  -PT to undergo cystectomy 8/05/25  -Pt to return 2 weeks after surgery  Visit today included increased complexity associated with the care of the episodic problem (chemotherapy) addressed and managing the longitudinal care of the patient due to the serious and/or complex managed problem(s) Small Cell Carcinoma of the bladder.    Immunodeficiency due to chemo - pt at increased risk of infection  -No current signs of infection  -Will monitor    HTN - pt on prazosin  -BP controlled  -Will monitor    Parkinson's - pt on sinemet, rasagiline  -Symptoms controlled  -Will monitor    DMII - pt on insulin, metformin, tirzepatide  Lab Results   Component Value Date    HGBA1C 6.9 (H) 04/24/2025   -Will monitor     Glaucoma - pt using alphagan, trusopt eye drops  -Ophthalmology managing    Neutropenia - ANC improved  -Delayed last treatment  -PT receiving neulasta with treatment  -Will monitor    Normocytic Anemia - due to chemo  Lab Results   Component Value Date    HGB 12.7 (L) 06/13/2025   -Stable  -Will monitor    Route Chart for Scheduling    Med Onc Chart Routing      Follow up with physician 2 months. Pt can proceed with treatment next week.  Pt needs an appt with em the week of 8/18/25 with CBC and CMP   Follow up with GILMAR    Infusion scheduling note    Injection scheduling note    Labs    Imaging    Pharmacy appointment    Other referrals              Treatment Plan Information   OP EP (CISplatin etoposide) Q3W Moris Lowery MD   Associated diagnosis: Malignant neoplasm of urinary bladder   noted on 3/21/2025  Associated diagnosis: Small cell carcinoma of bladder Stage I cT1, cN0, cM0 noted on 2/11/2025   Line of treatment: Adjuvant  Treatment Goal: Curative     Upcoming Treatment Dates - OP EP (CISplatin  etoposide) Q3W    6/17/2025       Chemotherapy       etoposide (VEPESID) 100 mg/m2 = 204 mg in 0.9% NaCl 510.2 mL chemo infusion  6/18/2025       Chemotherapy       etoposide (VEPESID) 100 mg/m2 = 204 mg in 0.9% NaCl 510.2 mL chemo infusion       Growth Factor       pegfilgrastim (NEULASTA (ON BODY INJECTOR)) injection 6 mg    Therapy Plan Information  URO GEMCITABINE (INTRAVESICAL) for Small cell carcinoma of bladder, noted on 2/11/2025  Chemotherapy  gemcitabine 2,000 mg in 0.9% NaCl SolP 100 mL bladder instillation  2,000 mg, Intravesical, 1 time a week      No therapy plan of the specified type found.    No therapy plan of the specified type found.    Moris Lowery MD  [facility]  Hematology and Oncology  [facility]   [address] [place] [address]   O: [phone]  F: [phone]                     [1]   Current Outpatient Medications   Medication Sig Dispense Refill    allopurinoL (ZYLOPRIM) 300 MG tablet Take 1 tablet (300 mg total) by mouth once daily. 90 tablet 0    ammonium lactate (LAC-HYDRIN) 12 % lotion Apply topically 2 (two) times daily.      atorvastatin (LIPITOR) 20 MG tablet Take 1 tablet (20 mg total) by mouth every evening. 90 tablet 3    BACILLUS COAGULANS ORAL Take 1 tablet by mouth once daily.      brimonidine 0.2% (ALPHAGAN) 0.2 % Drop INSTILL 1 DROP IN BOTH EYES TWICE DAILY 10 mL 0    carbidopa-levodopa  mg (SINEMET)  mg per tablet Take 1.5 tablets by mouth 5 (five) times daily. 675 tablet 3    cholecalciferol, vitamin D3, (D3-2000 ORAL) Take by mouth once daily.      clonazePAM (KLONOPIN) 1 MG tablet Take 1.5 tablets (1.5 mg total) by mouth every evening. 60 tablet 2    cyanocobalamin (VITAMIN B-12) 1000 MCG tablet Take 100 mcg by mouth once daily.      dexAMETHasone (DECADRON) 4 MG Tab Take 2 tablets (8 mg total) by mouth once daily. on days 2-4 of each chemotherapy cycle. 6 tablet 5    donepeziL (ARICEPT) 5 MG tablet Take 1 tablet  "(5 mg total) by mouth every evening. 30 tablet 11    dorzolamide (TRUSOPT) 2 % ophthalmic solution Place 1 drop into both eyes 2 (two) times daily. 10 mL 3    DULoxetine (CYMBALTA) 30 MG capsule Take 1 capsule (30 mg total) by mouth once daily. 90 capsule 1    gabapentin (NEURONTIN) 300 MG capsule Take 1 capsule (300 mg total) by mouth 2 (two) times daily. 60 capsule 1    ketoconazole (NIZORAL) 2 % shampoo Apply topically.      LIDOcaine-prilocaine (EMLA) cream Apply to port site one hour prior to chemotherapy 30 g 0    losartan (COZAAR) 25 MG tablet Take 1 tablet (25 mg total) by mouth once daily. 90 tablet 3    meloxicam (MOBIC) 15 MG tablet Take 1 tablet (15 mg total) by mouth once daily. 30 tablet 3    methocarbamoL (ROBAXIN) 750 MG Tab Take 1 tablet (750 mg total) by mouth 4 (four) times daily. 40 tablet 3    OLANZapine (ZYPREXA) 5 MG tablet Take 1 tablet (5 mg total) by mouth every evening. on days 1-4 of each chemotherapy cycle. 4 tablet 5    omeprazole (PRILOSEC) 40 MG capsule Take 1 capsule (40 mg total) by mouth once daily. 90 capsule 3    pen needle, diabetic (BD HIMANSHU 2ND GEN PEN NEEDLE) 32 gauge x 5/32" Ndle Inject 1 each into the skin once daily. 100 each 3    prazosin (MINIPRESS) 1 MG Cap Take 1 capsule (1 mg total) by mouth every evening. 90 capsule 3    prochlorperazine (COMPAZINE) 5 MG tablet Take 1 tablet (5 mg total) by mouth every 6 (six) hours as needed for Nausea. 20 tablet 5    ramelteon (ROZEREM) 8 mg tablet Take 1 tablet (8 mg total) by mouth nightly. 90 tablet 3    rasagiline (AZILECT) 1 mg Tab TAKE 1 TABLET(1 MG) BY MOUTH EVERY EVENING 30 tablet 11    tirzepatide 10 mg/0.5 mL PnIj Inject 10 mg into the skin every 7 days. (Increased dose) 2 mL 3     No current facility-administered medications for this visit.     "

## 2025-06-12 NOTE — PROGRESS NOTES
[facility]  Urologic Oncology    The patient location is: [facility]  The chief complaint leading to consultation is: MIBC    Visit type: audiovisual    Face to Face time with patient: 15  30 minutes of total time spent on the encounter, which includes face to face time and non-face to face time preparing to see the patient (eg, review of tests), Obtaining and/or reviewing separately obtained history, Documenting clinical information in the electronic or other health record, Independently interpreting results (not separately reported) and communicating results to the patient/family/caregiver, or Care coordination (not separately reported).     Each patient to whom he or she provides medical services by telemedicine is:  (1) informed of the relationship between the physician and patient and the respective role of any other health care provider with respect to management of the patient; and (2) notified that he or she may decline to receive medical services by telemedicine and may withdraw from such care at any time.    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  He is currently on neoadjuvant chemotherapy with cisplatin-etoposide.  He has had to delay his last round of chemotherapy due to leukopenia, however he has done well on neoadjuvant chemotherapy.    He had restaging nuclear medicine PET scan on 06/03/2025, which showed no FDG avid malignancy present on that exam.  I have  reviewed this imaging study personally, have independently interpreted this, and agree with the findings    Allergies:  Review of patient's allergies indicates:   Allergen Reactions    Avelox [moxifloxacin] Hives and Rash        Medications per EMR:  Prescriptions Prior to Admission[1]    Past Medical History:  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/08/2018    LUE numbness 09/08/2020    Obesity     Parkinson's disease     Pyelonephritis 02/21/2025    Sepsis 06/09/2023    Sleep apnea     uses cpap    Suspected cerebrovascular accident (CVA) 09/08/2020    Syncope and collapse 07/02/2024        Past Surgical History:  Past Surgical History:   Procedure Laterality Date    CATARACT EXTRACTION W/  INTRAOCULAR LENS IMPLANT Left 11/30/2017    CATARACT EXTRACTION W/  INTRAOCULAR LENS IMPLANT Right 12/11/2018    Dr Garcia    CATARACT EXTRACTION W/  INTRAOCULAR LENS IMPLANT Right 12/11/2018    Procedure: EXTRACTION, CATARACT, WITH IOL INSERTION;  Surgeon: Damaris Garcia MD;  Location: [facility] OR;  Service: Ophthalmology;  Laterality: Right;    COLONOSCOPY N/A 09/18/2017    Procedure: COLONOSCOPY;  Surgeon: Paul Feliz Jr., MD;  Location: [facility] ENDO;  Service: Endoscopy;  Laterality: N/A;    COLONOSCOPY N/A 12/21/2022    Procedure: COLONOSCOPY;  Surgeon: Paul Feliz Jr., MD;  Location: [facility] ENDO;  Service: Endoscopy;  Laterality: N/A;    COLONOSCOPY W/ POLYPECTOMY  04/13/2010    YARELI.   One 1 cm polyp in the sigmoid colon.  Internal hemorrhoids.     COSMETIC SURGERY      DIGITAL RECTAL EXAMINATION UNDER ANESTHESIA N/A 07/23/2019    Procedure: EXAM UNDER ANESTHESIA, DIGITAL, RECTUM;  Surgeon: Kaz Keating MD;  Location: [facility] OR;  Service: General;  Laterality: N/A;    EXAMINATION UNDER ANESTHESIA N/A 04/24/2019    Procedure: EXAM UNDER ANESTHESIA;  Surgeon: Kaz Keating MD;  Location: [facility] OR;  Service: General;  Laterality: N/A;    EXCISIONAL HEMORRHOIDECTOMY N/A 04/24/2019    Procedure: HEMORRHOIDECTOMY (lithotomy);  Surgeon: Kaz Keating MD;  Location: [facility] OR;  Service: General;  Laterality: N/A;    EYE SURGERY      GANGLION CYST EXCISION Left     INJECTION OF ANESTHETIC AGENT AROUND MEDIAL BRANCH NERVES INNERVATING LUMBAR FACET JOINT Bilateral 04/09/2024    Procedure: Block-nerve-medial branch-lumbar-L4/5-L5/S1;  Surgeon: Everett Taveras MD;  Location: [facility] OR;  Service: Pain Management;  Laterality: Bilateral;    INJECTION OF ANESTHETIC AGENT AROUND MEDIAL BRANCH NERVES INNERVATING LUMBAR FACET JOINT Bilateral 05/06/2024    Procedure: Block-nerve-medial branch-lumbar-L4/5-L5/S1;  Surgeon: Everett Taveras MD;  Location: [facility] OR;  Service: Pain Management;  Laterality: Bilateral;    INJECTION OF ANESTHETIC AGENT AROUND PUDENDAL NERVE N/A 04/24/2019    Procedure: BLOCK, NERVE, PUDENDAL;  Surgeon: Kaz Keating MD;  Location: [facility] OR;  Service: General;  Laterality: N/A;    INJECTION OF ANESTHETIC AGENT AROUND PUDENDAL NERVE N/A 07/23/2019    Procedure: BLOCK, NERVE, PUDENDAL;  Surgeon: Kaz Keating MD;  Location: [facility] OR;  Service: General;  Laterality: N/A;    INJECTION, ALLOGRAFT, INTERVERTEBRAL DISC N/A 09/30/2024    Procedure: INJECTION,ALLOGRAFT,INTERVERTEBRAL DISC,1ST LEVEL  L4/5;  Surgeon: Everett Taveras MD;  Location: [facility] OR;  Service: Pain Management;  Laterality: N/A;    INSERTION OF TUNNELED CENTRAL VENOUS CATHETER (CVC) WITH SUBCUTANEOUS PORT Right 3/28/2025    Procedure: [id number];  Surgeon: Kendra  Hebert DARBY MD;  Location: [facility] OR;  Service: General;  Laterality: Right;    INSTILLATION OF URINARY BLADDER  02/11/2025    Procedure: INSTILLATION, BLADDER w/ gemcitabine;  Surgeon: MARIZA Donahue MD;  Location: [facility];  Service: Urology;;    RADIOFREQUENCY ABLATION OF LUMBAR MEDIAL BRANCH NERVE AT SINGLE LEVEL Bilateral 06/04/2024    Procedure: Radiofrequency Ablation, Nerve, Spinal, Lumbar, Medial Branch, L4/5 and L5/S1;  Surgeon: Everett Taveras MD;  Location: [facility] OR;  Service: Pain Management;  Laterality: Bilateral;    REPAIR OF RETINAL DETACHMENT WITH VITRECTOMY Right 08/01/2018    Procedure: REPAIR, RETINAL DETACHMENT, WITH VITRECTOMY;  Surgeon: SAJAN Pulido MD;  Location: [address];  Service: Ophthalmology;  Laterality: Right;  35 min    SKIN BIOPSY      TONSILLECTOMY      TRANSRECTAL BIOPSY OF PROSTATE WITH ULTRASOUND GUIDANCE N/A 01/31/2023    Procedure: BIOPSY, PROSTATE, RECTAL APPROACH, WITH US GUIDANCE;  Surgeon: MARIZA Donahue MD;  Location: [facility] OR;  Service: Urology;  Laterality: N/A;    TRANSRECTAL BIOPSY OF PROSTATE WITH ULTRASOUND GUIDANCE N/A 04/12/2024    Procedure: BIOPSY, PROSTATE, RECTAL APPROACH, WITH US GUIDANCE;  Surgeon: MARIZA Donahue MD;  Location: [facility];  Service: Urology;  Laterality: N/A;    TRIGGER FINGER RELEASE Right     X 2    TURBT (TRANSURETHRAL RESECTION OF BLADDER TUMOR) N/A 02/11/2025    Procedure: TURBT (TRANSURETHRAL RESECTION OF BLADDER TUMOR);  Surgeon: MARIZA Donahue MD;  Location: [facility];  Service: Urology;  Laterality: N/A;    uro lift      for enlarged prostate        Family History:  Family History   Problem Relation Name Age of Onset    Colon cancer Mother      Hypertension Mother      Diabetes Mother      Stroke Mother      Cancer Mother          colon    Kidney disease Mother      Heart disease Father      Diabetes Father      Colon cancer Father      Cancer Father          colon    Cataracts Father      No Known Problems Sister       Heart disease Brother  62    Kidney disease Brother      Dementia Brother Rodrigue     Abnormal EKG Brother Rodrigue     Breast cancer Maternal Aunt      Cancer Maternal Aunt          breast    Cancer Maternal Aunt          brain    Brain cancer Maternal Aunt      No Known Problems Maternal Uncle      No Known Problems Paternal Aunt      No Known Problems Paternal Uncle      Glaucoma Maternal Grandmother      No Known Problems Maternal Grandfather      No Known Problems Paternal Grandmother      No Known Problems Paternal Grandfather      Amblyopia Neg Hx      Blindness Neg Hx      Macular degeneration Neg Hx      Retinal detachment Neg Hx      Strabismus Neg Hx      Thyroid disease Neg Hx      Parkinsonism Neg Hx          Social History:  Social History     Tobacco Use    Smoking status: Never    Smokeless tobacco: Never   Substance Use Topics    Alcohol use: Yes     Comment: very rare          OBJECTIVE:     There were no vitals filed for this visit.     Physical Exam    General: No acute distress. Nontoxic appearing.  HENT: Normocephalic. Atraumatic.  Respiratory: Normal respiratory effort. No conversational dyspnea. No audible wheezing.  Abdomen: No obvious distension.  Skin: No visible abnormalities.  Extremities: No edema upper extremities. No edema lower extremities.  Neurological: Alert and oriented x3. Normal speech.  Psychiatric: Normal mood. Normal affect. No evidence of SI.     LABS:    CBC:  Lab Results   Component Value Date    WBC 3.35 (L) 06/09/2025    HGB 12.0 (L) 06/09/2025    HCT 36.1 (L) 06/09/2025    MCV 91 06/09/2025     06/09/2025         BMP:  Lab Results   Component Value Date     06/03/2025    K 4.4 06/03/2025     06/03/2025    CO2 24 06/03/2025    BUN 18 06/03/2025    CREATININE 0.9 06/03/2025    CALCIUM 8.6 (L) 06/03/2025    ANIONGAP 8 06/03/2025    EGFRNORACEVR >60 06/03/2025         ASSESSMENT/PLAN:     Assessment: 69-year-old male with invasive small-cell bladder  cancer    Plan:   -patient's last dose of chemotherapy as next week, we will plan for a cystectomy 6-8 weeks after his last dose  -patient's updated imaging shows no evidence of local recurrence or metastasis, excellent response  -discussed with patient urinary diversion such as ileal conduit, neobladder, continent cutaneous pouch, we will discuss these further closer to surgery, we discussed the required catheterization with an ileal neobladder and nocturnal incontinence  -reviewed his CBC/BMP from 6/3 and 6/92025  -we will arrange surgery date as detailed above, of a full preoperative discussion on the risks and benefits of surgery as well as urinary diversion techniques.    I spent a total of 30 minutes on the day of the visit.This includes face to face time and non-face to face time preparing to see the patient (eg, review of tests), obtaining and/or reviewing separately obtained history, documenting clinical information in the electronic or other health record, independently interpreting results and communicating results to the patient/family/caregiver, or care coordinator    - code applied: patient requires or will require a continuous, longitudinal, and active collaborative plan of care related to this patient's health condition, bladder cancer --the management of which requires the direction of a practitioner with specialized clinical knowledge, skill, and expertise.     This encounter was dictated and transcribed using DeepScribe and FluencyDirect, please excuse any typographical or grammatical errors.       [1] (Not in a hospital admission)

## 2025-06-13 ENCOUNTER — OFFICE VISIT (OUTPATIENT)
Dept: HEMATOLOGY/ONCOLOGY | Facility: CLINIC | Age: 70
End: 2025-06-13
Payer: MEDICARE

## 2025-06-13 ENCOUNTER — LAB VISIT (OUTPATIENT)
Dept: LAB | Facility: HOSPITAL | Age: 70
End: 2025-06-13
Attending: INTERNAL MEDICINE
Payer: MEDICARE

## 2025-06-13 VITALS
BODY MASS INDEX: 25 KG/M2 | DIASTOLIC BLOOD PRESSURE: 80 MMHG | TEMPERATURE: 98 F | OXYGEN SATURATION: 99 % | RESPIRATION RATE: 17 BRPM | WEIGHT: 178.56 LBS | SYSTOLIC BLOOD PRESSURE: 149 MMHG | HEIGHT: 71 IN | HEART RATE: 72 BPM

## 2025-06-13 DIAGNOSIS — C67.9 SMALL CELL CARCINOMA OF BLADDER: Primary | ICD-10-CM

## 2025-06-13 DIAGNOSIS — T45.1X5A CHEMOTHERAPY-INDUCED NEUTROPENIA: ICD-10-CM

## 2025-06-13 DIAGNOSIS — D84.821 IMMUNODEFICIENCY DUE TO CHEMOTHERAPY: ICD-10-CM

## 2025-06-13 DIAGNOSIS — E11.21 TYPE 2 DIABETES MELLITUS WITH DIABETIC NEPHROPATHY, WITH LONG-TERM CURRENT USE OF INSULIN: ICD-10-CM

## 2025-06-13 DIAGNOSIS — D70.1 CHEMOTHERAPY-INDUCED NEUTROPENIA: ICD-10-CM

## 2025-06-13 DIAGNOSIS — C7A.1 MALIGNANT POORLY DIFFERENTIATED NEUROENDOCRINE TUMORS: ICD-10-CM

## 2025-06-13 DIAGNOSIS — Z79.4 TYPE 2 DIABETES MELLITUS WITH DIABETIC NEPHROPATHY, WITH LONG-TERM CURRENT USE OF INSULIN: ICD-10-CM

## 2025-06-13 DIAGNOSIS — D64.9 NORMOCYTIC ANEMIA: ICD-10-CM

## 2025-06-13 DIAGNOSIS — G20.A2 PARKINSON'S DISEASE WITHOUT DYSKINESIA, WITH FLUCTUATING MANIFESTATIONS: ICD-10-CM

## 2025-06-13 DIAGNOSIS — I10 PRIMARY HYPERTENSION: ICD-10-CM

## 2025-06-13 DIAGNOSIS — Z79.69 IMMUNODEFICIENCY DUE TO CHEMOTHERAPY: ICD-10-CM

## 2025-06-13 DIAGNOSIS — T45.1X5A IMMUNODEFICIENCY DUE TO CHEMOTHERAPY: ICD-10-CM

## 2025-06-13 LAB
ABSOLUTE EOSINOPHIL (OHS): 0.23 K/UL
ABSOLUTE MONOCYTE (OHS): 0.81 K/UL (ref 0.3–1)
ABSOLUTE NEUTROPHIL COUNT (OHS): 2.39 K/UL (ref 1.8–7.7)
ALBUMIN SERPL BCP-MCNC: 3.7 G/DL (ref 3.5–5.2)
ALP SERPL-CCNC: 130 UNIT/L (ref 40–150)
ALT SERPL W/O P-5'-P-CCNC: 5 UNIT/L (ref 10–44)
ANION GAP (OHS): 10 MMOL/L (ref 8–16)
AST SERPL-CCNC: 23 UNIT/L (ref 11–45)
BASOPHILS # BLD AUTO: 0.09 K/UL
BASOPHILS NFR BLD AUTO: 1.6 %
BILIRUB SERPL-MCNC: 0.3 MG/DL (ref 0.1–1)
BUN SERPL-MCNC: 19 MG/DL (ref 8–23)
CALCIUM SERPL-MCNC: 8.7 MG/DL (ref 8.7–10.5)
CHLORIDE SERPL-SCNC: 102 MMOL/L (ref 95–110)
CO2 SERPL-SCNC: 27 MMOL/L (ref 23–29)
CREAT SERPL-MCNC: 1 MG/DL (ref 0.5–1.4)
ERYTHROCYTE [DISTWIDTH] IN BLOOD BY AUTOMATED COUNT: 16.2 % (ref 11.5–14.5)
GFR SERPLBLD CREATININE-BSD FMLA CKD-EPI: >60 ML/MIN/1.73/M2
GLUCOSE SERPL-MCNC: 198 MG/DL (ref 70–110)
HCT VFR BLD AUTO: 38.3 % (ref 40–54)
HGB BLD-MCNC: 12.7 GM/DL (ref 14–18)
IMM GRANULOCYTES # BLD AUTO: 0.02 K/UL (ref 0–0.04)
IMM GRANULOCYTES NFR BLD AUTO: 0.3 % (ref 0–0.5)
LYMPHOCYTES # BLD AUTO: 2.25 K/UL (ref 1–4.8)
MCH RBC QN AUTO: 30 PG (ref 27–31)
MCHC RBC AUTO-ENTMCNC: 33.2 G/DL (ref 32–36)
MCV RBC AUTO: 91 FL (ref 82–98)
NUCLEATED RBC (/100WBC) (OHS): 0 /100 WBC
PLATELET # BLD AUTO: 168 K/UL (ref 150–450)
PMV BLD AUTO: 8.2 FL (ref 9.2–12.9)
POTASSIUM SERPL-SCNC: 4.3 MMOL/L (ref 3.5–5.1)
PROT SERPL-MCNC: 6.9 GM/DL (ref 6–8.4)
RBC # BLD AUTO: 4.23 M/UL (ref 4.6–6.2)
RELATIVE EOSINOPHIL (OHS): 4 %
RELATIVE LYMPHOCYTE (OHS): 38.9 % (ref 18–48)
RELATIVE MONOCYTE (OHS): 14 % (ref 4–15)
RELATIVE NEUTROPHIL (OHS): 41.2 % (ref 38–73)
SODIUM SERPL-SCNC: 139 MMOL/L (ref 136–145)
WBC # BLD AUTO: 5.79 K/UL (ref 3.9–12.7)

## 2025-06-13 PROCEDURE — 99215 OFFICE O/P EST HI 40 MIN: CPT | Mod: S$PBB,,, | Performed by: INTERNAL MEDICINE

## 2025-06-13 PROCEDURE — G2211 COMPLEX E/M VISIT ADD ON: HCPCS | Mod: ,,, | Performed by: INTERNAL MEDICINE

## 2025-06-13 PROCEDURE — 80053 COMPREHEN METABOLIC PANEL: CPT | Mod: PN

## 2025-06-13 PROCEDURE — 85025 COMPLETE CBC W/AUTO DIFF WBC: CPT | Mod: PN

## 2025-06-13 PROCEDURE — 99215 OFFICE O/P EST HI 40 MIN: CPT | Mod: PBBFAC,PN | Performed by: INTERNAL MEDICINE

## 2025-06-13 PROCEDURE — 36415 COLL VENOUS BLD VENIPUNCTURE: CPT | Mod: PN

## 2025-06-13 PROCEDURE — 99999 PR PBB SHADOW E&M-EST. PATIENT-LVL V: CPT | Mod: PBBFAC,,, | Performed by: INTERNAL MEDICINE

## 2025-06-13 RX ORDER — HEPARIN 100 UNIT/ML
500 SYRINGE INTRAVENOUS
Status: CANCELLED | OUTPATIENT
Start: 2025-06-18

## 2025-06-13 RX ORDER — SODIUM CHLORIDE 0.9 % (FLUSH) 0.9 %
10 SYRINGE (ML) INJECTION
Status: CANCELLED | OUTPATIENT
Start: 2025-06-18

## 2025-06-13 RX ORDER — PROCHLORPERAZINE EDISYLATE 5 MG/ML
5 INJECTION INTRAMUSCULAR; INTRAVENOUS ONCE AS NEEDED
Status: CANCELLED | OUTPATIENT
Start: 2025-06-18

## 2025-06-13 RX ORDER — PROCHLORPERAZINE EDISYLATE 5 MG/ML
5 INJECTION INTRAMUSCULAR; INTRAVENOUS ONCE AS NEEDED
Status: CANCELLED | OUTPATIENT
Start: 2025-06-16

## 2025-06-13 RX ORDER — DIPHENHYDRAMINE HYDROCHLORIDE 50 MG/ML
50 INJECTION, SOLUTION INTRAMUSCULAR; INTRAVENOUS ONCE AS NEEDED
Status: CANCELLED | OUTPATIENT
Start: 2025-06-18

## 2025-06-13 RX ORDER — DIPHENHYDRAMINE HYDROCHLORIDE 50 MG/ML
50 INJECTION, SOLUTION INTRAMUSCULAR; INTRAVENOUS ONCE AS NEEDED
Status: CANCELLED | OUTPATIENT
Start: 2025-06-17

## 2025-06-13 RX ORDER — DIPHENHYDRAMINE HYDROCHLORIDE 50 MG/ML
50 INJECTION, SOLUTION INTRAMUSCULAR; INTRAVENOUS ONCE AS NEEDED
Status: CANCELLED | OUTPATIENT
Start: 2025-06-16

## 2025-06-13 RX ORDER — SODIUM CHLORIDE 0.9 % (FLUSH) 0.9 %
10 SYRINGE (ML) INJECTION
Status: CANCELLED | OUTPATIENT
Start: 2025-06-17

## 2025-06-13 RX ORDER — HEPARIN 100 UNIT/ML
500 SYRINGE INTRAVENOUS
Status: CANCELLED | OUTPATIENT
Start: 2025-06-17

## 2025-06-13 RX ORDER — EPINEPHRINE 0.3 MG/.3ML
0.3 INJECTION SUBCUTANEOUS ONCE AS NEEDED
Status: CANCELLED | OUTPATIENT
Start: 2025-06-17

## 2025-06-13 RX ORDER — EPINEPHRINE 0.3 MG/.3ML
0.3 INJECTION SUBCUTANEOUS ONCE AS NEEDED
Status: CANCELLED | OUTPATIENT
Start: 2025-06-18

## 2025-06-13 RX ORDER — EPINEPHRINE 0.3 MG/.3ML
0.3 INJECTION SUBCUTANEOUS ONCE AS NEEDED
Status: CANCELLED | OUTPATIENT
Start: 2025-06-16

## 2025-06-13 RX ORDER — SODIUM CHLORIDE 0.9 % (FLUSH) 0.9 %
10 SYRINGE (ML) INJECTION
Status: CANCELLED | OUTPATIENT
Start: 2025-06-16

## 2025-06-13 RX ORDER — HEPARIN 100 UNIT/ML
500 SYRINGE INTRAVENOUS
Status: CANCELLED | OUTPATIENT
Start: 2025-06-16

## 2025-06-13 RX ORDER — PROCHLORPERAZINE EDISYLATE 5 MG/ML
5 INJECTION INTRAMUSCULAR; INTRAVENOUS ONCE AS NEEDED
Status: CANCELLED | OUTPATIENT
Start: 2025-06-17

## 2025-06-15 ENCOUNTER — PATIENT MESSAGE (OUTPATIENT)
Dept: HEMATOLOGY/ONCOLOGY | Facility: CLINIC | Age: 70
End: 2025-06-15
Payer: MEDICARE

## 2025-06-16 ENCOUNTER — PATIENT MESSAGE (OUTPATIENT)
Dept: HEMATOLOGY/ONCOLOGY | Facility: CLINIC | Age: 70
End: 2025-06-16
Payer: MEDICARE

## 2025-06-16 ENCOUNTER — NURSE TRIAGE (OUTPATIENT)
Dept: ADMINISTRATIVE | Facility: CLINIC | Age: 70
End: 2025-06-16
Payer: MEDICARE

## 2025-06-16 ENCOUNTER — INFUSION (OUTPATIENT)
Dept: INFUSION THERAPY | Facility: HOSPITAL | Age: 70
End: 2025-06-16
Attending: INTERNAL MEDICINE
Payer: MEDICARE

## 2025-06-16 ENCOUNTER — DOCUMENTATION ONLY (OUTPATIENT)
Dept: INFUSION THERAPY | Facility: HOSPITAL | Age: 70
End: 2025-06-16
Payer: MEDICARE

## 2025-06-16 ENCOUNTER — DOCUMENTATION ONLY (OUTPATIENT)
Dept: HEMATOLOGY/ONCOLOGY | Facility: CLINIC | Age: 70
End: 2025-06-16
Payer: MEDICARE

## 2025-06-16 VITALS
BODY MASS INDEX: 25.4 KG/M2 | TEMPERATURE: 98 F | DIASTOLIC BLOOD PRESSURE: 95 MMHG | SYSTOLIC BLOOD PRESSURE: 177 MMHG | WEIGHT: 181.44 LBS | HEART RATE: 86 BPM | OXYGEN SATURATION: 98 % | HEIGHT: 71 IN | RESPIRATION RATE: 16 BRPM

## 2025-06-16 DIAGNOSIS — C67.9 MALIGNANT NEOPLASM OF URINARY BLADDER, UNSPECIFIED SITE: Primary | ICD-10-CM

## 2025-06-16 DIAGNOSIS — C67.9 SMALL CELL CARCINOMA OF BLADDER: ICD-10-CM

## 2025-06-16 PROCEDURE — 63600175 PHARM REV CODE 636 W HCPCS: Mod: JZ,TB,PN | Performed by: INTERNAL MEDICINE

## 2025-06-16 PROCEDURE — 25000003 PHARM REV CODE 250: Mod: PN | Performed by: INTERNAL MEDICINE

## 2025-06-16 PROCEDURE — 96375 TX/PRO/DX INJ NEW DRUG ADDON: CPT | Mod: PN

## 2025-06-16 PROCEDURE — A4216 STERILE WATER/SALINE, 10 ML: HCPCS | Mod: PN | Performed by: INTERNAL MEDICINE

## 2025-06-16 PROCEDURE — 96367 TX/PROPH/DG ADDL SEQ IV INF: CPT | Mod: PN

## 2025-06-16 PROCEDURE — 96413 CHEMO IV INFUSION 1 HR: CPT | Mod: PN

## 2025-06-16 PROCEDURE — 96417 CHEMO IV INFUS EACH ADDL SEQ: CPT | Mod: PN

## 2025-06-16 PROCEDURE — 96366 THER/PROPH/DIAG IV INF ADDON: CPT | Mod: PN

## 2025-06-16 RX ORDER — SODIUM CHLORIDE 0.9 % (FLUSH) 0.9 %
10 SYRINGE (ML) INJECTION
Status: DISCONTINUED | OUTPATIENT
Start: 2025-06-16 | End: 2025-06-16 | Stop reason: HOSPADM

## 2025-06-16 RX ADMIN — DEXAMETHASONE SODIUM PHOSPHATE 0.25 MG: 10 INJECTION, SOLUTION INTRAMUSCULAR; INTRAVENOUS at 11:06

## 2025-06-16 RX ADMIN — POTASSIUM CHLORIDE 500 ML/HR: 2 INJECTION, SOLUTION, CONCENTRATE INTRAVENOUS at 09:06

## 2025-06-16 RX ADMIN — ETOPOSIDE 202 MG: 20 INJECTION INTRAVENOUS at 01:06

## 2025-06-16 RX ADMIN — Medication 10 ML: at 04:06

## 2025-06-16 RX ADMIN — POTASSIUM CHLORIDE 500 ML/HR: 2 INJECTION, SOLUTION, CONCENTRATE INTRAVENOUS at 01:06

## 2025-06-16 RX ADMIN — CISPLATIN 161 MG: 1 INJECTION INTRAVENOUS at 11:06

## 2025-06-16 RX ADMIN — APREPITANT 130 MG: 130 INJECTION, EMULSION INTRAVENOUS at 11:06

## 2025-06-16 RX ADMIN — SODIUM CHLORIDE: 9 INJECTION, SOLUTION INTRAVENOUS at 09:06

## 2025-06-16 NOTE — PLAN OF CARE
Problem: Adult Inpatient Plan of Care  Goal: Plan of Care Review  Outcome: Progressing  Flowsheets (Taken 6/16/2025 1623)  Plan of Care Reviewed With:   patient   spouse  Goal: Patient-Specific Goal (Individualized)  Outcome: Progressing  Flowsheets (Taken 6/16/2025 1623)  Individualized Care Needs: recliner, blanket, pillow, lights dimmed.  wife at chairside  Anxieties, Fears or Concerns: none  Patient/Family-Specific Goals (Include Timeframe): no reaction to tx     Problem: Fatigue  Goal: Improved Activity Tolerance  Outcome: Progressing  Intervention: Promote Improved Energy  Flowsheets (Taken 6/16/2025 1623)  Fatigue Management: paced activity encouraged  Sleep/Rest Enhancement: regular sleep/rest pattern promoted  Activity Management: Ambulated in [place] -   Environmental Support:   calm environment promoted   distractions minimized   Pt tolerated Cisplatin/VP16 infusions well.  Encouraged to increase oral fluid intake for the next few days.  Port left accessed and clamped for use tomorrow.  Instructed to call MD with any problems.

## 2025-06-16 NOTE — NURSING
Spoke with wife, Ms. Martinez, today at bedside. Mr. Medel was asleep at the time of my visit.     Ms. Martinez had some questions about hotel accommodations for upcoming surgery scheduled 8/5/25 with Dr. Zapata. Referred to  for further detail.     Ms. Martinez states that Mr. Elizabeth is still trying to decide how to proceed following cystectomy (neobladder vs ileal conduit vs continent cutaneous pouch). Mr. Elizabeth is concerned about the effect of his Parkinson's disease on his final decision. Encouraged to discuss this further with Dr. Zapata pre-operatively.     This is cycle 4 of treatment for Mr. Elizabeth. Ms. Martinez states he may want to ring the bell this week vs waiting until after surgery.     Discussed timing of treatments with infusion clinic via Teams msg.    Encouraged Ms. Martinez to reach out with any further questions/concerns. She v/u.

## 2025-06-16 NOTE — PROGRESS NOTES
12:35 PM    This LCSW met with this pt to check in and provide support while he was receiving his infusion. The pt was accompanied by his daughter, Juana at chairside.   The pt had on and [employer] hat and shirt and shared his love for [employer] and spoke about the playoffs taking place this week.  The pt was pleasant and reported no practical or emotional needs today. He thanked me for coming to check on him.  This LCSW encouraged this pt to reach out to social work if he has any questions, concerns or just wants to talk.

## 2025-06-17 ENCOUNTER — PATIENT MESSAGE (OUTPATIENT)
Dept: HEMATOLOGY/ONCOLOGY | Facility: CLINIC | Age: 70
End: 2025-06-17
Payer: MEDICARE

## 2025-06-17 ENCOUNTER — TELEPHONE (OUTPATIENT)
Dept: NEUROSURGERY | Facility: CLINIC | Age: 70
End: 2025-06-17
Payer: MEDICARE

## 2025-06-17 ENCOUNTER — INFUSION (OUTPATIENT)
Dept: INFUSION THERAPY | Facility: HOSPITAL | Age: 70
End: 2025-06-17
Attending: INTERNAL MEDICINE
Payer: MEDICARE

## 2025-06-17 ENCOUNTER — PATIENT MESSAGE (OUTPATIENT)
Dept: FAMILY MEDICINE | Facility: CLINIC | Age: 70
End: 2025-06-17
Payer: MEDICARE

## 2025-06-17 VITALS
HEIGHT: 71 IN | SYSTOLIC BLOOD PRESSURE: 174 MMHG | TEMPERATURE: 98 F | WEIGHT: 179.44 LBS | OXYGEN SATURATION: 98 % | DIASTOLIC BLOOD PRESSURE: 92 MMHG | BODY MASS INDEX: 25.12 KG/M2 | HEART RATE: 85 BPM | RESPIRATION RATE: 19 BRPM

## 2025-06-17 DIAGNOSIS — C67.9 MALIGNANT NEOPLASM OF URINARY BLADDER, UNSPECIFIED SITE: Primary | ICD-10-CM

## 2025-06-17 DIAGNOSIS — H40.053 OCULAR HYPERTENSION, BILATERAL: ICD-10-CM

## 2025-06-17 DIAGNOSIS — C67.9 SMALL CELL CARCINOMA OF BLADDER: ICD-10-CM

## 2025-06-17 PROCEDURE — 63600175 PHARM REV CODE 636 W HCPCS: Mod: PN | Performed by: INTERNAL MEDICINE

## 2025-06-17 PROCEDURE — 25000003 PHARM REV CODE 250: Mod: PN | Performed by: INTERNAL MEDICINE

## 2025-06-17 PROCEDURE — 96413 CHEMO IV INFUSION 1 HR: CPT | Mod: PN

## 2025-06-17 PROCEDURE — A4216 STERILE WATER/SALINE, 10 ML: HCPCS | Mod: PN | Performed by: INTERNAL MEDICINE

## 2025-06-17 RX ORDER — SODIUM CHLORIDE 0.9 % (FLUSH) 0.9 %
10 SYRINGE (ML) INJECTION
Status: DISCONTINUED | OUTPATIENT
Start: 2025-06-17 | End: 2025-06-17 | Stop reason: HOSPADM

## 2025-06-17 RX ORDER — DIPHENHYDRAMINE HYDROCHLORIDE 50 MG/ML
50 INJECTION, SOLUTION INTRAMUSCULAR; INTRAVENOUS ONCE AS NEEDED
Status: DISCONTINUED | OUTPATIENT
Start: 2025-06-17 | End: 2025-06-17 | Stop reason: HOSPADM

## 2025-06-17 RX ORDER — HEPARIN 100 UNIT/ML
500 SYRINGE INTRAVENOUS
Status: DISCONTINUED | OUTPATIENT
Start: 2025-06-17 | End: 2025-06-17 | Stop reason: HOSPADM

## 2025-06-17 RX ORDER — PROCHLORPERAZINE EDISYLATE 5 MG/ML
5 INJECTION INTRAMUSCULAR; INTRAVENOUS ONCE AS NEEDED
Status: DISCONTINUED | OUTPATIENT
Start: 2025-06-17 | End: 2025-06-17 | Stop reason: HOSPADM

## 2025-06-17 RX ORDER — EPINEPHRINE 0.3 MG/.3ML
0.3 INJECTION SUBCUTANEOUS ONCE AS NEEDED
Status: DISCONTINUED | OUTPATIENT
Start: 2025-06-17 | End: 2025-06-17 | Stop reason: HOSPADM

## 2025-06-17 RX ORDER — BRIMONIDINE TARTRATE 2 MG/ML
1 SOLUTION/ DROPS OPHTHALMIC 2 TIMES DAILY
Qty: 10 ML | Refills: 2 | Status: SHIPPED | OUTPATIENT
Start: 2025-06-17

## 2025-06-17 RX ADMIN — Medication 10 ML: at 04:06

## 2025-06-17 RX ADMIN — SODIUM CHLORIDE: 9 INJECTION, SOLUTION INTRAVENOUS at 02:06

## 2025-06-17 RX ADMIN — Medication 500 UNITS: at 04:06

## 2025-06-17 RX ADMIN — ETOPOSIDE 204 MG: 20 INJECTION INTRAVENOUS at 03:06

## 2025-06-17 NOTE — TELEPHONE ENCOUNTER
Had 7 hour chemo for bladder cancer today.  B/P was 177/95 after the treatment. Blood pressure 177/104 @ 834 pm. Present blood pressure 161/96. pulse 99. Advised to ED now.    Reason for Disposition   [1] Systolic BP >= 160 OR Diastolic >= 100 AND [2] cardiac (e.g., breathing difficulty, chest pain) or neurologic symptoms (e.g., new-onset blurred or double vision, unsteady gait)    Additional Information   Negative: Difficult to awaken or acting confused (e.g., disoriented, slurred speech)   Negative: SEVERE difficulty breathing (e.g., struggling for each breath, speaks in single words)   Negative: [1] Weakness of the face, arm or leg on one side of the body AND [2] new-onset   Negative: [1] Numbness (i.e., loss of sensation) of the face, arm or leg on one side of the body AND [2] new-onset   Negative: [1] Chest pain lasts > 5 minutes AND [2] history of heart disease (i.e., heart attack, bypass surgery, angina, angioplasty, CHF)   Negative: [1] Chest pain AND [2] took nitroglycerin AND [3] pain was not relieved   Negative: Sounds like a life-threatening emergency to the triager    Protocols used: Blood Pressure - High-A-AH

## 2025-06-17 NOTE — PLAN OF CARE
Problem: Adult Inpatient Plan of Care  Goal: Plan of Care Review  Outcome: Progressing  Flowsheets (Taken 6/17/2025 1504)  Plan of Care Reviewed With:   spouse   patient  Goal: Patient-Specific Goal (Individualized)  Outcome: Progressing  Flowsheets (Taken 6/17/2025 1504)  Individualized Care Needs: recliner, blanket, pillow  Anxieties, Fears or Concerns: BP elevated  Patient/Family-Specific Goals (Include Timeframe): no signs of reaction     Problem: Fatigue  Goal: Improved Activity Tolerance  Outcome: Progressing  Intervention: Promote Improved Energy  Flowsheets (Taken 6/17/2025 1504)  Activity Management:   Ambulated -L4   Ambulated to bathroom - L4  Environmental Support: calm environment promoted   Pt here for C4D2 VP16. Tolerated treatment well, discharged home in satisfactory condition with wife. Pt states he will monitor BP at home, he has also notified his PCP.

## 2025-06-18 ENCOUNTER — TELEPHONE (OUTPATIENT)
Dept: NEUROSURGERY | Facility: CLINIC | Age: 70
End: 2025-06-18
Payer: MEDICARE

## 2025-06-18 ENCOUNTER — PATIENT MESSAGE (OUTPATIENT)
Dept: HEMATOLOGY/ONCOLOGY | Facility: CLINIC | Age: 70
End: 2025-06-18
Payer: MEDICARE

## 2025-06-18 ENCOUNTER — INFUSION (OUTPATIENT)
Dept: INFUSION THERAPY | Facility: HOSPITAL | Age: 70
End: 2025-06-18
Attending: INTERNAL MEDICINE
Payer: MEDICARE

## 2025-06-18 ENCOUNTER — PATIENT MESSAGE (OUTPATIENT)
Dept: CARDIOLOGY | Facility: CLINIC | Age: 70
End: 2025-06-18
Payer: MEDICARE

## 2025-06-18 VITALS
HEIGHT: 71 IN | HEART RATE: 74 BPM | DIASTOLIC BLOOD PRESSURE: 93 MMHG | BODY MASS INDEX: 25.12 KG/M2 | OXYGEN SATURATION: 98 % | WEIGHT: 179.44 LBS | RESPIRATION RATE: 16 BRPM | SYSTOLIC BLOOD PRESSURE: 176 MMHG | TEMPERATURE: 98 F

## 2025-06-18 DIAGNOSIS — C67.9 MALIGNANT NEOPLASM OF URINARY BLADDER, UNSPECIFIED SITE: Primary | ICD-10-CM

## 2025-06-18 DIAGNOSIS — C67.9 SMALL CELL CARCINOMA OF BLADDER: ICD-10-CM

## 2025-06-18 PROCEDURE — 96377 APPLICATON ON-BODY INJECTOR: CPT | Mod: PN

## 2025-06-18 PROCEDURE — 63600175 PHARM REV CODE 636 W HCPCS: Mod: JZ,TB,PN | Performed by: INTERNAL MEDICINE

## 2025-06-18 PROCEDURE — A4216 STERILE WATER/SALINE, 10 ML: HCPCS | Mod: PN | Performed by: INTERNAL MEDICINE

## 2025-06-18 PROCEDURE — 25000003 PHARM REV CODE 250: Mod: PN | Performed by: INTERNAL MEDICINE

## 2025-06-18 PROCEDURE — 96413 CHEMO IV INFUSION 1 HR: CPT | Mod: PN

## 2025-06-18 RX ORDER — PROCHLORPERAZINE EDISYLATE 5 MG/ML
5 INJECTION INTRAMUSCULAR; INTRAVENOUS ONCE AS NEEDED
Status: DISCONTINUED | OUTPATIENT
Start: 2025-06-18 | End: 2025-06-18 | Stop reason: HOSPADM

## 2025-06-18 RX ORDER — EPINEPHRINE 0.3 MG/.3ML
0.3 INJECTION SUBCUTANEOUS ONCE AS NEEDED
Status: DISCONTINUED | OUTPATIENT
Start: 2025-06-18 | End: 2025-06-18 | Stop reason: HOSPADM

## 2025-06-18 RX ORDER — DIPHENHYDRAMINE HYDROCHLORIDE 50 MG/ML
50 INJECTION, SOLUTION INTRAMUSCULAR; INTRAVENOUS ONCE AS NEEDED
Status: DISCONTINUED | OUTPATIENT
Start: 2025-06-18 | End: 2025-06-18 | Stop reason: HOSPADM

## 2025-06-18 RX ORDER — HEPARIN 100 UNIT/ML
500 SYRINGE INTRAVENOUS
Status: DISCONTINUED | OUTPATIENT
Start: 2025-06-18 | End: 2025-06-18 | Stop reason: HOSPADM

## 2025-06-18 RX ORDER — SODIUM CHLORIDE 0.9 % (FLUSH) 0.9 %
10 SYRINGE (ML) INJECTION
Status: DISCONTINUED | OUTPATIENT
Start: 2025-06-18 | End: 2025-06-18 | Stop reason: HOSPADM

## 2025-06-18 RX ADMIN — ETOPOSIDE 204 MG: 20 INJECTION INTRAVENOUS at 02:06

## 2025-06-18 RX ADMIN — SODIUM CHLORIDE: 9 INJECTION, SOLUTION INTRAVENOUS at 02:06

## 2025-06-18 RX ADMIN — Medication 500 UNITS: at 03:06

## 2025-06-18 RX ADMIN — PEGFILGRASTIM 6 MG: KIT SUBCUTANEOUS at 03:06

## 2025-06-18 RX ADMIN — Medication 10 ML: at 03:06

## 2025-06-18 NOTE — PLAN OF CARE
Problem: Adult Inpatient Plan of Care  Goal: Plan of Care Review  Outcome: Progressing  Flowsheets (Taken 6/18/2025 1457)  Plan of Care Reviewed With:   patient   spouse  Goal: Patient-Specific Goal (Individualized)  Outcome: Progressing  Flowsheets (Taken 6/18/2025 1457)  Individualized Care Needs: recliner, wife at chairside, education  Anxieties, Fears or Concerns: none  Patient/Family-Specific Goals (Include Timeframe): no s/s of reaction during treatment  Goal: Optimal Comfort and Wellbeing  Outcome: Progressing  Intervention: Provide Person-Centered Care  Flowsheets (Taken 6/18/2025 1457)  Trust Relationship/Rapport:   care explained   choices provided   emotional support provided   empathic listening provided   questions answered   questions encouraged   reassurance provided   thoughts/feelings acknowledged     Problem: Adult Inpatient Plan of Care  Goal: Plan of Care Review  Outcome: Progressing  Flowsheets (Taken 6/18/2025 1457)  Plan of Care Reviewed With:   patient   spouse  Goal: Patient-Specific Goal (Individualized)  Outcome: Progressing  Flowsheets (Taken 6/18/2025 1457)  Individualized Care Needs: recliner, wife at chairside, education  Anxieties, Fears or Concerns: none  Patient/Family-Specific Goals (Include Timeframe): no s/s of reaction during treatment  Goal: Optimal Comfort and Wellbeing  Outcome: Progressing  Intervention: Provide Person-Centered Care  Flowsheets (Taken 6/18/2025 1457)  Trust Relationship/Rapport:   care explained   choices provided   emotional support provided   empathic listening provided   questions answered   questions encouraged   reassurance provided   thoughts/feelings acknowledged    Pt tolerated etoposide infusion well.  No adverse reaction noted.   PAD flushed with NS and heparin and de-accessed per protocol. Neulasta on-body applied per MAR.  IV flushed with NS and D/C per protocol.  Patient left clinic in no acute distress with wife.

## 2025-06-18 NOTE — TELEPHONE ENCOUNTER
Can someone assist with getting this patient scheduled with a provider please for adult hydrocephalus.     Thanks,  MT

## 2025-06-18 NOTE — TELEPHONE ENCOUNTER
Attempted to contact pt to inform him  does not see patients with hydrocephalus, no answer left detailed message

## 2025-06-18 NOTE — TELEPHONE ENCOUNTER
Called to schedule patient per referral placed to [facility].  No answer.  LVM.  Patient does have current imaging in chart and can be scheduled with Dr. Aguirre.

## 2025-06-19 ENCOUNTER — PATIENT MESSAGE (OUTPATIENT)
Dept: HEMATOLOGY/ONCOLOGY | Facility: CLINIC | Age: 70
End: 2025-06-19
Payer: MEDICARE

## 2025-06-20 ENCOUNTER — PATIENT MESSAGE (OUTPATIENT)
Dept: HEMATOLOGY/ONCOLOGY | Facility: CLINIC | Age: 70
End: 2025-06-20
Payer: MEDICARE

## 2025-06-20 DIAGNOSIS — M54.9 DORSALGIA, UNSPECIFIED: ICD-10-CM

## 2025-06-20 RX ORDER — GABAPENTIN 300 MG/1
300 CAPSULE ORAL 2 TIMES DAILY
Qty: 60 CAPSULE | Refills: 1 | Status: SHIPPED | OUTPATIENT
Start: 2025-06-20 | End: 2025-08-19

## 2025-06-22 ENCOUNTER — PATIENT MESSAGE (OUTPATIENT)
Dept: HEMATOLOGY/ONCOLOGY | Facility: CLINIC | Age: 70
End: 2025-06-22
Payer: MEDICARE

## 2025-06-26 ENCOUNTER — PATIENT MESSAGE (OUTPATIENT)
Dept: HEMATOLOGY/ONCOLOGY | Facility: CLINIC | Age: 70
End: 2025-06-26
Payer: MEDICARE

## 2025-06-27 ENCOUNTER — PATIENT MESSAGE (OUTPATIENT)
Dept: NEPHROLOGY | Facility: CLINIC | Age: 70
End: 2025-06-27
Payer: MEDICARE

## 2025-06-27 ENCOUNTER — PATIENT MESSAGE (OUTPATIENT)
Dept: HEMATOLOGY/ONCOLOGY | Facility: CLINIC | Age: 70
End: 2025-06-27
Payer: MEDICARE

## 2025-06-27 ENCOUNTER — PATIENT MESSAGE (OUTPATIENT)
Facility: CLINIC | Age: 70
End: 2025-06-27
Payer: MEDICARE

## 2025-06-27 ENCOUNTER — OFFICE VISIT (OUTPATIENT)
Dept: FAMILY MEDICINE | Facility: CLINIC | Age: 70
End: 2025-06-27
Payer: MEDICARE

## 2025-06-27 VITALS — HEART RATE: 85 BPM | DIASTOLIC BLOOD PRESSURE: 87 MMHG | SYSTOLIC BLOOD PRESSURE: 150 MMHG

## 2025-06-27 DIAGNOSIS — Z79.4 TYPE 2 DIABETES MELLITUS WITH DIABETIC NEPHROPATHY, WITH LONG-TERM CURRENT USE OF INSULIN: Primary | ICD-10-CM

## 2025-06-27 DIAGNOSIS — E11.21 TYPE 2 DIABETES MELLITUS WITH DIABETIC NEPHROPATHY, WITH LONG-TERM CURRENT USE OF INSULIN: Primary | ICD-10-CM

## 2025-06-27 DIAGNOSIS — G20.A2 PARKINSON'S DISEASE WITHOUT DYSKINESIA, WITH FLUCTUATING MANIFESTATIONS: ICD-10-CM

## 2025-06-27 DIAGNOSIS — I10 PRIMARY HYPERTENSION: ICD-10-CM

## 2025-06-27 DIAGNOSIS — C67.9 SMALL CELL CARCINOMA OF BLADDER: ICD-10-CM

## 2025-06-27 DIAGNOSIS — F51.4 NIGHT TERRORS, ADULT: ICD-10-CM

## 2025-06-27 RX ORDER — INSULIN GLARGINE 100 [IU]/ML
9 INJECTION, SOLUTION SUBCUTANEOUS NIGHTLY
Qty: 3 ML | Refills: 3 | Status: SHIPPED | OUTPATIENT
Start: 2025-06-27 | End: 2026-06-27

## 2025-06-27 RX ORDER — PRAZOSIN HYDROCHLORIDE 2 MG/1
2 CAPSULE ORAL NIGHTLY
Qty: 30 CAPSULE | Refills: 3 | Status: SHIPPED | OUTPATIENT
Start: 2025-06-27 | End: 2025-06-27

## 2025-06-27 RX ORDER — PRAZOSIN HYDROCHLORIDE 1 MG/1
1 CAPSULE ORAL NIGHTLY
Qty: 90 CAPSULE | Refills: 3 | Status: SHIPPED | OUTPATIENT
Start: 2025-06-27

## 2025-06-27 NOTE — PROGRESS NOTES
Primary Care Telemedicine Note    The patient location is:  Patient Home- [place]  The chief complaint leading to consultation is: BP    Visit type: Virtual visit with synchronous audio and video  Each patient to whom he or she provides medical services by telemedicine is:  (1) informed of the relationship between the physician and patient and the respective role of any other health care provider with respect to management of the patient; and (2) notified that he or she may decline to receive medical services by telemedicine and may withdraw from such care at any time.    ===================================================================================  PLAN:    Assessment & Plan    IMPRESSION:  - BP control on losartan 25 mg daily, with readings in 120s-130s/80s when medication taken regularly.  - Maintained current losartan regimen despite recent chemotherapy, to reassess in 1-2 weeks.  - Diabetes management: average glucose of 212 mg/dL. Increased Lantus from 7 units to 9 units nightly.  - Considering an increase in prazosin to 2 mg nightly for nightmares while monitoring BP. However, patient didn't realize his Klonopin dose had been increased to 1.5 mg nightly, so will try this first then if still having nightmares will increase prazosin.  - Considered potential bladder surgery options in context of Parkinson's disease. Discuss with Dr. Cee.    ESSENTIAL HYPERTENSION:  - Blood pressure readings have been 126-136/85-89 with losartan 25 mg, but rise to 147-150/87-96 when medication is not taken.  - Continue losartan 25 mg daily.  - Patient to send updated BP readings when taking losartan regularly.  - Contact office if BP drops below 90/60 mmHg.  - Update provider on BP readings if prazosin dose is increased.    TYPE 2 DIABETES MELLITUS WITH HYPERGLYCEMIA:  - Average blood sugar for the last 7 days is 212 on 7 units of Lantus, with fasting blood sugar of 140 at 3:00 AM and 175 upon waking.  - Increasing  Lantus from 7 units to 9 units nightly if fasting blood sugar is over 140.  - Explained timing of Lantus administration and its long-acting nature.  - Patient to check fasting glucose levels and adjust dose accordingly.  - Ordered A1C test to reassess diabetes control (last result was 6.9).  - See if insurance will send a list of approved insulins.     PARKINSONISM:  - Patient experiences hand tremors, especially when using a computer or mouse.  - Condition is mostly under control except for these occasional hand tremors.  - Patient advised to discuss Parkinson's management with Dr. Cee.    REM SLEEP BEHAVIOR DISORDER:  - Patient has been experiencing severe nightmares for the last week, lasting up to 2 hours.  - Considering increasing prazosin to 2 mg nightly, however he needs to see if he has improvement with the recommended dose of  clonazepam 1.5 tablets nightly, prescribed by Dr. Cee.  - Patient to track frequency and intensity of nightmares with increased dose and message provider with decision on prazosin dosage and any changes in sleep quality within 1-2 weeks.    MILD COGNITIVE IMPAIRMENT:  - Continue donepezil nightly for memory disorder.    ABNORMAL FINDINGS ON BRAIN IMAGING:  - Scheduled appointment with neurosurgeon regarding spots on brain MRI.    FOLLOW-UP:  - Follow up in-person on the first of next month as scheduled.        Problem List Items Addressed This Visit       HTN (hypertension)    Parkinson disease    Night terrors, adult    Relevant Medications    prazosin (MINIPRESS) 1 MG Cap    Type 2 diabetes mellitus with diabetic nephropathy, with long-term current use of insulin - Primary    Relevant Medications    insulin glargine U-100, Lantus, (LANTUS SOLOSTAR U-100 INSULIN) 100 unit/mL (3 mL) InPn pen    Small cell carcinoma of bladder     Future Appointments       Next 10 Appointments       Date Provider Specialty Appt Notes    7/1/2025 Everett Taveras MD Pain Medicine I have been  having some pain in my lower back, my primary care doctors said that I have some bulging disc.    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    8/1/2025 Cherie Lion,  Family Medicine 3 month    8/22/2025  Lab hemonc    8/22/2025 Moris Lowery MD Hematology and Oncology f/u with labs and tx              Displaying the next 10 appointments. This patient has additional appointments scheduled.             Medication List with Changes/Refills   New Medications    INSULIN GLARGINE U-100, LANTUS, (LANTUS SOLOSTAR U-100 INSULIN) 100 UNIT/ML (3 ML) INPN PEN    Inject 9 Units into the skin every evening.   Current Medications    ALLOPURINOL (ZYLOPRIM) 300 MG TABLET    Take 1 tablet (300 mg total) by mouth once daily.    AMMONIUM LACTATE (LAC-HYDRIN) 12 % LOTION    Apply topically 2 (two) times daily.    ATORVASTATIN (LIPITOR) 20 MG TABLET    Take 1 tablet (20 mg total) by mouth every evening.    BACILLUS COAGULANS ORAL    Take 1 tablet by mouth once daily.    BRIMONIDINE 0.2% (ALPHAGAN) 0.2 % DROP    INSTILL 1 DROP IN BOTH EYES TWICE DAILY    CARBIDOPA-LEVODOPA  MG (SINEMET)  MG PER TABLET    Take 1.5 tablets by mouth 5 (five) times daily.    CHOLECALCIFEROL, VITAMIN D3, (D3-2000 ORAL)    Take by mouth once daily.    CLONAZEPAM (KLONOPIN) 1 MG TABLET    Take 1.5 tablets (1.5 mg total) by mouth every evening.    CYANOCOBALAMIN (VITAMIN B-12) 1000 MCG TABLET    Take 100 mcg by mouth once daily.    DEXAMETHASONE (DECADRON) 4 MG TAB    Take 2 tablets (8 mg total) by mouth once daily. on days 2-4 of each chemotherapy cycle.    DONEPEZIL (ARICEPT) 5 MG TABLET    Take  "1 tablet (5 mg total) by mouth every evening.    DORZOLAMIDE (TRUSOPT) 2 % OPHTHALMIC SOLUTION    Place 1 drop into both eyes 2 (two) times daily.    DULOXETINE (CYMBALTA) 30 MG CAPSULE    Take 1 capsule (30 mg total) by mouth once daily.    GABAPENTIN (NEURONTIN) 300 MG CAPSULE    Take 1 capsule (300 mg total) by mouth 2 (two) times daily.    KETOCONAZOLE (NIZORAL) 2 % SHAMPOO    Apply topically.    LIDOCAINE-PRILOCAINE (EMLA) CREAM    Apply to port site one hour prior to chemotherapy    LOSARTAN (COZAAR) 25 MG TABLET    Take 1 tablet (25 mg total) by mouth once daily.    MELOXICAM (MOBIC) 15 MG TABLET    Take 1 tablet (15 mg total) by mouth once daily.    METHOCARBAMOL (ROBAXIN) 750 MG TAB    Take 1 tablet (750 mg total) by mouth 4 (four) times daily.    OLANZAPINE (ZYPREXA) 5 MG TABLET    Take 1 tablet (5 mg total) by mouth every evening. on days 1-4 of each chemotherapy cycle.    OMEPRAZOLE (PRILOSEC) 40 MG CAPSULE    Take 1 capsule (40 mg total) by mouth once daily.    PEN NEEDLE, DIABETIC (BD HIMANSHU 2ND GEN PEN NEEDLE) 32 GAUGE X 5/32" NDLE    Inject 1 each into the skin once daily.    PROCHLORPERAZINE (COMPAZINE) 5 MG TABLET    Take 1 tablet (5 mg total) by mouth every 6 (six) hours as needed for Nausea.    RAMELTEON (ROZEREM) 8 MG TABLET    Take 1 tablet (8 mg total) by mouth nightly.    RASAGILINE (AZILECT) 1 MG TAB    TAKE 1 TABLET(1 MG) BY MOUTH EVERY EVENING    TIRZEPATIDE 10 MG/0.5 ML PNIJ    Inject 10 mg into the skin every 7 days. (Increased dose)   Changed and/or Refilled Medications    Modified Medication Previous Medication    PRAZOSIN (MINIPRESS) 1 MG CAP prazosin (MINIPRESS) 1 MG Cap       Take 1 capsule (1 mg total) by mouth every evening.    Take 1 capsule (1 mg total) by mouth every evening.       Cherie Lion, DO, RDN    ==========================================================================  Subjective:      Patient ID: Glenn Medel is a 69 y.o. male.  has a past medical history of KENTON " (acute kidney injury) (02/21/2025), Arthritis, Bladder cancer (2025), Blurred vision (09/08/2020), BPH (benign prostatic hyperplasia), Cancer, Cataract, Chronic kidney disease, stage 3 (02/25/2015), DDD (degenerative disc disease), lumbar, Diabetes mellitus, Elevated PSA, Fall (07/02/2024), Glaucoma, HTN (hypertension), Iritis, lens-induced (01/08/2018), LUE numbness (09/08/2020), Obesity, Parkinson's disease, Pyelonephritis (02/21/2025), Sepsis (06/09/2023), Sleep apnea, Suspected cerebrovascular accident (CVA) (09/08/2020), and Syncope and collapse (07/02/2024).     Chief Complaint: Hypertension      History of Present Illness    CHIEF COMPLAINT:  Patient presents today for follow up of blood pressure and medication management.    BLOOD PRESSURE:  He reports inconsistent adherence to losartan 25mg, typically checking blood pressure before taking medication. When compliant, his blood pressure readings range between 126-136/85-89. Most recent reading was 150/87, which he attributes to missing his morning dose.    DIABETES:  His average blood sugar over last 7 days is 212. Morning readings include 140 at 3:00 AM and 175 upon waking. Last A1C was 6.9 with target range . He takes Lantus 7 units but did not take it this morning. He denies any hypoglycemic events.    SLEEP:  He reports ongoing sleep disturbances with severe nightmares. He experienced a two-hour episode of nightmares earlier this week, with a pattern of falling back asleep and recurring nightmares for three consecutive days. Last night was relatively symptom-free. He takes prazosin at night for nightmare management, which is being considered for dose adjustment.    NEUROLOGICAL:  He has Parkinson's disease with intermittent hand tremors primarily affecting fine motor skills such as computer mouse usage and typing. His tremors are localized and Parkinson's is otherwise largely controlled. Brain MRI revealed unspecified spots, with pending  neurosurgeon consultation next week for further evaluation as part of cancer screening.      ROS:  General: -fever, -chills, -fatigue, -weight gain, -weight loss  Eyes: -vision changes, -redness, -discharge  ENT: -ear pain, -nasal congestion, -sore throat  Cardiovascular: -chest pain, -palpitations, -lower extremity edema  Respiratory: -cough, -shortness of breath  Gastrointestinal: -abdominal pain, -nausea, -vomiting, -diarrhea, -constipation, -blood in stool  Genitourinary: -dysuria, -hematuria, -frequency  Musculoskeletal: -joint pain, -muscle pain  Skin: -rash, -lesion  Neurological: -headache, -dizziness, -numbness, -tingling, +tremors  Psychiatric: -anxiety, -depression, -sleep difficulty, +nightmares          Problem List Items Addressed This Visit       HTN (hypertension)    Parkinson disease    Overview   Cpap setting 16         Night terrors, adult    Overview   prozosin         Type 2 diabetes mellitus with diabetic nephropathy, with long-term current use of insulin - Primary    Small cell carcinoma of bladder        Past Medical History:  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/08/2018    LUE numbness 09/08/2020    Obesity     Parkinson's disease     Pyelonephritis 02/21/2025    Sepsis 06/09/2023    Sleep apnea     uses cpap    Suspected cerebrovascular accident (CVA) 09/08/2020    Syncope and collapse 07/02/2024     Past Surgical History:   Procedure Laterality Date    CATARACT EXTRACTION W/  INTRAOCULAR LENS IMPLANT Left 11/30/2017     CATARACT EXTRACTION W/  INTRAOCULAR LENS IMPLANT Right 12/11/2018    Dr Garcia    CATARACT EXTRACTION W/  INTRAOCULAR LENS IMPLANT Right 12/11/2018    Procedure: EXTRACTION, CATARACT, WITH IOL INSERTION;  Surgeon: Damaris Garcia MD;  Location: [facility] OR;  Service: Ophthalmology;  Laterality: Right;    COLONOSCOPY N/A 09/18/2017    Procedure: COLONOSCOPY;  Surgeon: Paul Feliz Jr., MD;  Location: [facility] ENDO;  Service: Endoscopy;  Laterality: N/A;    COLONOSCOPY N/A 12/21/2022    Procedure: COLONOSCOPY;  Surgeon: Paul Feliz Jr., MD;  Location: [facility] ENDO;  Service: Endoscopy;  Laterality: N/A;    COLONOSCOPY W/ POLYPECTOMY  04/13/2010    YARELI.   One 1 cm polyp in the sigmoid colon.  Internal hemorrhoids.    COSMETIC SURGERY      DIGITAL RECTAL EXAMINATION UNDER ANESTHESIA N/A 07/23/2019    Procedure: EXAM UNDER ANESTHESIA, DIGITAL, RECTUM;  Surgeon: Kaz Keating MD;  Location: [facility] OR;  Service: General;  Laterality: N/A;    EXAMINATION UNDER ANESTHESIA N/A 04/24/2019    Procedure: EXAM UNDER ANESTHESIA;  Surgeon: Kaz Keating MD;  Location: [facility] OR;  Service: General;  Laterality: N/A;    EXCISIONAL HEMORRHOIDECTOMY N/A 04/24/2019    Procedure: HEMORRHOIDECTOMY (lithotomy);  Surgeon: Kaz Keating MD;  Location: [facility] OR;  Service: General;  Laterality: N/A;    EYE SURGERY      GANGLION CYST EXCISION Left     INJECTION OF ANESTHETIC AGENT AROUND MEDIAL BRANCH NERVES INNERVATING LUMBAR FACET JOINT Bilateral 04/09/2024    Procedure: Block-nerve-medial branch-lumbar-L4/5-L5/S1;  Surgeon: Everett Taveras MD;  Location: [facility] OR;  Service: Pain Management;  Laterality: Bilateral;    INJECTION OF ANESTHETIC AGENT AROUND MEDIAL BRANCH NERVES INNERVATING LUMBAR FACET JOINT Bilateral 05/06/2024    Procedure: Block-nerve-medial branch-lumbar-L4/5-L5/S1;  Surgeon: Everett Taveras MD;  Location: [facility] OR;  Service: Pain Management;  Laterality: Bilateral;    INJECTION OF ANESTHETIC AGENT AROUND  PUDENDAL NERVE N/A 04/24/2019    Procedure: BLOCK, NERVE, PUDENDAL;  Surgeon: Kaz Keating MD;  Location: [facility] OR;  Service: General;  Laterality: N/A;    INJECTION OF ANESTHETIC AGENT AROUND PUDENDAL NERVE N/A 07/23/2019    Procedure: BLOCK, NERVE, PUDENDAL;  Surgeon: Kaz Keating MD;  Location: [facility] OR;  Service: General;  Laterality: N/A;    INJECTION, ALLOGRAFT, INTERVERTEBRAL DISC N/A 09/30/2024    Procedure: INJECTION,ALLOGRAFT,INTERVERTEBRAL DISC,1ST LEVEL  L4/5;  Surgeon: Everett Taveras MD;  Location: [facility] OR;  Service: Pain Management;  Laterality: N/A;    INSERTION OF TUNNELED CENTRAL VENOUS CATHETER (CVC) WITH SUBCUTANEOUS PORT Right 3/28/2025    Procedure: [id number];  Surgeon: Hebert Gardner MD;  Location: [facility] OR;  Service: General;  Laterality: Right;    INSTILLATION OF URINARY BLADDER  02/11/2025    Procedure: INSTILLATION, BLADDER w/ gemcitabine;  Surgeon: MARIZA Donahue MD;  Location: [facility] OR;  Service: Urology;;    RADIOFREQUENCY ABLATION OF LUMBAR MEDIAL BRANCH NERVE AT SINGLE LEVEL Bilateral 06/04/2024    Procedure: Radiofrequency Ablation, Nerve, Spinal, Lumbar, Medial Branch, L4/5 and L5/S1;  Surgeon: Everett Taveras MD;  Location: [facility] OR;  Service: Pain Management;  Laterality: Bilateral;    REPAIR OF RETINAL DETACHMENT WITH VITRECTOMY Right 08/01/2018    Procedure: REPAIR, RETINAL DETACHMENT, WITH VITRECTOMY;  Surgeon: SAJAN Pulido MD;  Location: [address];  Service: Ophthalmology;  Laterality: Right;  35 min    SKIN BIOPSY      TONSILLECTOMY      TRANSRECTAL BIOPSY OF PROSTATE WITH ULTRASOUND GUIDANCE N/A 01/31/2023    Procedure: BIOPSY, PROSTATE, RECTAL APPROACH, WITH US GUIDANCE;  Surgeon: MARIZA Donahue MD;  Location: [facility] OR;  Service: Urology;  Laterality: N/A;    TRANSRECTAL BIOPSY OF PROSTATE WITH ULTRASOUND GUIDANCE N/A 04/12/2024    Procedure: BIOPSY, PROSTATE, RECTAL APPROACH, WITH US GUIDANCE;  Surgeon: MARIZA Donahue MD;   Location: [facility];  Service: Urology;  Laterality: N/A;    TRIGGER FINGER RELEASE Right     X 2    TURBT (TRANSURETHRAL RESECTION OF BLADDER TUMOR) N/A 02/11/2025    Procedure: TURBT (TRANSURETHRAL RESECTION OF BLADDER TUMOR);  Surgeon: MARIZA Donahue MD;  Location: [facility];  Service: Urology;  Laterality: N/A;    uro lift      for enlarged prostate     Review of patient's allergies indicates:   Allergen Reactions    Avelox [moxifloxacin] Hives and Rash     Medication List with Changes/Refills   New Medications    INSULIN GLARGINE U-100, LANTUS, (LANTUS SOLOSTAR U-100 INSULIN) 100 UNIT/ML (3 ML) INPN PEN    Inject 9 Units into the skin every evening.   Current Medications    ALLOPURINOL (ZYLOPRIM) 300 MG TABLET    Take 1 tablet (300 mg total) by mouth once daily.    AMMONIUM LACTATE (LAC-HYDRIN) 12 % LOTION    Apply topically 2 (two) times daily.    ATORVASTATIN (LIPITOR) 20 MG TABLET    Take 1 tablet (20 mg total) by mouth every evening.    BACILLUS COAGULANS ORAL    Take 1 tablet by mouth once daily.    BRIMONIDINE 0.2% (ALPHAGAN) 0.2 % DROP    INSTILL 1 DROP IN BOTH EYES TWICE DAILY    CARBIDOPA-LEVODOPA  MG (SINEMET)  MG PER TABLET    Take 1.5 tablets by mouth 5 (five) times daily.    CHOLECALCIFEROL, VITAMIN D3, (D3-2000 ORAL)    Take by mouth once daily.    CLONAZEPAM (KLONOPIN) 1 MG TABLET    Take 1.5 tablets (1.5 mg total) by mouth every evening.    CYANOCOBALAMIN (VITAMIN B-12) 1000 MCG TABLET    Take 100 mcg by mouth once daily.    DEXAMETHASONE (DECADRON) 4 MG TAB    Take 2 tablets (8 mg total) by mouth once daily. on days 2-4 of each chemotherapy cycle.    DONEPEZIL (ARICEPT) 5 MG TABLET    Take 1 tablet (5 mg total) by mouth every evening.    DORZOLAMIDE (TRUSOPT) 2 % OPHTHALMIC SOLUTION    Place 1 drop into both eyes 2 (two) times daily.    DULOXETINE (CYMBALTA) 30 MG CAPSULE    Take 1 capsule (30 mg total) by mouth once daily.    GABAPENTIN (NEURONTIN) 300 MG CAPSULE    Take 1 capsule  "(300 mg total) by mouth 2 (two) times daily.    KETOCONAZOLE (NIZORAL) 2 % SHAMPOO    Apply topically.    LIDOCAINE-PRILOCAINE (EMLA) CREAM    Apply to port site one hour prior to chemotherapy    LOSARTAN (COZAAR) 25 MG TABLET    Take 1 tablet (25 mg total) by mouth once daily.    MELOXICAM (MOBIC) 15 MG TABLET    Take 1 tablet (15 mg total) by mouth once daily.    METHOCARBAMOL (ROBAXIN) 750 MG TAB    Take 1 tablet (750 mg total) by mouth 4 (four) times daily.    OLANZAPINE (ZYPREXA) 5 MG TABLET    Take 1 tablet (5 mg total) by mouth every evening. on days 1-4 of each chemotherapy cycle.    OMEPRAZOLE (PRILOSEC) 40 MG CAPSULE    Take 1 capsule (40 mg total) by mouth once daily.    PEN NEEDLE, DIABETIC (BD HIMANSHU 2ND GEN PEN NEEDLE) 32 GAUGE X 5/32" NDLE    Inject 1 each into the skin once daily.    PROCHLORPERAZINE (COMPAZINE) 5 MG TABLET    Take 1 tablet (5 mg total) by mouth every 6 (six) hours as needed for Nausea.    RAMELTEON (ROZEREM) 8 MG TABLET    Take 1 tablet (8 mg total) by mouth nightly.    RASAGILINE (AZILECT) 1 MG TAB    TAKE 1 TABLET(1 MG) BY MOUTH EVERY EVENING    TIRZEPATIDE 10 MG/0.5 ML PNIJ    Inject 10 mg into the skin every 7 days. (Increased dose)   Changed and/or Refilled Medications    Modified Medication Previous Medication    PRAZOSIN (MINIPRESS) 1 MG CAP prazosin (MINIPRESS) 1 MG Cap       Take 1 capsule (1 mg total) by mouth every evening.    Take 1 capsule (1 mg total) by mouth every evening.      Social History     Tobacco Use    Smoking status: Never    Smokeless tobacco: Never   Substance Use Topics    Alcohol use: Yes     Comment: very rare      Family History   Problem Relation Name Age of Onset    Colon cancer Mother      Hypertension Mother      Diabetes Mother      Stroke Mother      Cancer Mother          colon    Kidney disease Mother      Heart disease Father      Diabetes Father      Colon cancer Father      Cancer Father          colon    Cataracts Father      No Known " Problems Sister      Heart disease Brother  62    Kidney disease Brother      Dementia Brother Rodrigue     Abnormal EKG Brother Rodrigue     Breast cancer Maternal Aunt      Cancer Maternal Aunt          breast    Cancer Maternal Aunt          brain    Brain cancer Maternal Aunt      No Known Problems Maternal Uncle      No Known Problems Paternal Aunt      No Known Problems Paternal Uncle      Glaucoma Maternal Grandmother      No Known Problems Maternal Grandfather      No Known Problems Paternal Grandmother      No Known Problems Paternal Grandfather      Amblyopia Neg Hx      Blindness Neg Hx      Macular degeneration Neg Hx      Retinal detachment Neg Hx      Strabismus Neg Hx      Thyroid disease Neg Hx      Parkinsonism Neg Hx         I have reviewed the complete PMH, social history, surgical history, allergies and medications.  As well as family history.    Review of Systems   Constitutional:  Negative for activity change and unexpected weight change.   HENT:  Positive for rhinorrhea. Negative for hearing loss and trouble swallowing.    Eyes:  Negative for discharge and visual disturbance.   Respiratory:  Negative for chest tightness and wheezing.    Cardiovascular:  Positive for palpitations. Negative for chest pain.   Gastrointestinal:  Negative for blood in stool, constipation, diarrhea and vomiting.   Endocrine: Negative for polydipsia and polyuria.   Genitourinary:  Negative for difficulty urinating, hematuria and urgency.   Musculoskeletal:  Negative for arthralgias, joint swelling and neck pain.   Neurological:  Negative for headaches.   Psychiatric/Behavioral:  Negative for confusion and dysphoric mood.      Objective:   (Vitals taken at home and reported to us and documented)   Pulse If Self Reported:      6/27/2025     5:32 PM 6/27/2025     9:31 AM 6/27/2025     9:30 AM 6/27/2025     9:29 AM 6/27/2025     8:55 AM 6/26/2025     4:47 PM 6/26/2025     9:06 AM   Patient Entered Pulse   Pulse 71 84 85    83 85  96 80 90        Last 5 Patient Entered Readings                Current 30 Day Average: 148/86  Recent Readings 6/27/2025 6/27/2025 6/27/2025 6/27/2025 6/27/2025   SBP (mmHg) 149 151 150 153 148   DBP (mmHg) 81 88 87 87 89   Pulse 71 84 85 83 85                BP If Self Reported:      6/27/2025     5:32 PM 6/27/2025     9:31 AM 6/27/2025     9:30 AM 6/27/2025     9:29 AM 6/26/2025     4:47 PM 6/26/2025     9:06 AM 6/26/2025     9:05 AM   Patient Entered Blood Pressure   Systolic Blood Pressure 149 151 150    153 148 144 126 134   Diastolic Blood Pressure 81 88 87    87 89 81 84 85      Pulse Readings from Last 3 Encounters:   06/27/25 85   06/18/25 74   06/17/25 85      Weight If Self Reported:      6/27/2025     8:54 AM 6/26/2025     8:43 AM 6/25/2025     8:11 AM 6/24/2025     7:39 AM 6/24/2025     7:38 AM 6/23/2025     9:20 AM 6/22/2025    10:47 AM   Patient Entered Weight   Weight 172.6 lb 172.1 lb 172.6 lb 170.5 lb 171 lb 170.8 lb 169.1 lb      Glucose If Self Reported:      6/4/2025     5:39 PM 3/28/2025     7:26 PM 3/27/2025     9:27 AM 3/26/2025     9:08 PM 3/26/2025     9:05 PM 3/20/2025     8:43 AM 3/19/2025     7:16 AM   Patient Entered Glucose   Glucose 109 mg/dL  95 mg/dL 124 mg/dL 151 mg/dL 140 mg/dL 130 mg/dL 120 mg/dL       Patient-reported      Last 5 Blood Glucose Readings      Date Blood Sugar   6/4/2025 109 mg/dL       Details          Patient-reported              BP (!) 150/87   Pulse 85  if verbally reported and entered.    Constitutional: The patient is oriented to person, place, and time and appears well-developed and well-nourished with no distress.    Eyes: EOM are normal.    Pulmonary/Chest: Effort normal. No respiratory distress.    Neurological: The patient is alert and oriented to person, place, and time.    Skin: the patient is not diaphoretic.    Psychiatric: The patient has a normal mood and affect. The behavior is normal. Judgment, thought and content normal.      Assessment:      1. Type 2 diabetes mellitus with diabetic nephropathy, with long-term current use of insulin    2. Primary hypertension    3. Parkinson's disease without dyskinesia, with fluctuating manifestations    4. Small cell carcinoma of bladder    5. Night terrors, adult      MDM:   Moderate complexity, more than 2 chronic conditions requiring medication management, unique testing, interpretation of testing and follow up.    I have performed thorough medication reconciliation today and discussed risk and benefits of each medication.    I have signed for the following orders AND/OR meds.  No orders of the defined types were placed in this encounter.    Medications Ordered This Encounter   Medications    insulin glargine U-100, Lantus, (LANTUS SOLOSTAR U-100 INSULIN) 100 unit/mL (3 mL) InPn pen     Sig: Inject 9 Units into the skin every evening.     Dispense:  3 mL     Refill:  3    prazosin (MINIPRESS) 1 MG Cap     Sig: Take 1 capsule (1 mg total) by mouth every evening.     Dispense:  90 capsule     Refill:  3     Please discontinue all prior scripts with the same name and strength including any scripts on hold.        Follow up in about 1 month (around 7/27/2025) for keep appt August 1..    If no improvement in symptoms or symptoms worsen, advised to call/follow-up at clinic or go to ER. Patient voiced understanding and all questions/concerns were addressed.     Follow up:  If there are any questions or problems with the prescription, call [phone] anytime for assistance.   please schedule a virtual follow up visit first.  Please see an in-person provider if your symptoms are worsening or not improving in 2-3 days.   Please print a copy of this note and send it to your regular doctor, or take it to your next visit so it may be included in your medical record.   Contact customer support at [phone] for questions or concerns   You must understand that you've received a Telehealth Urgent Care treatment only and  that you may be released before all your medical problems are known or treated. You, the patient, will arrange for follow up care as instructed.  Follow up with your PCP or specialty clinic as directed in the next 1-2 days if not improved or as needed.  You can call [phone] to schedule an appointment with the appropriate provider in [place].   If your condition worsens we recommend that you receive another evaluation at the emergency room immediately or contact your primary medical clinics after hours call service to discuss your concerns.  Please go to an Urgent Care or go to the Emergency Department for any concerns or worsening of condition.    DISCLAIMER: This note was compiled by using a speech recognition dictation system and therefore please be aware that typographical / speech recognition errors can and do occur.  Please contact me if you see any errors specifically.     This note was generated with the assistance of ambient listening technology. Verbal consent was obtained by the patient and accompanying visitor(s) for the recording of patient appointment to facilitate this note. I attest to having reviewed and edited the generated note for accuracy, though some syntax or spelling errors may persist. Please contact the author of this note for any clarification.      Cherie Lion DO, RDN    Office: [phone]   [id number] [place] [address]  FAX: [phone]

## 2025-06-28 ENCOUNTER — PATIENT MESSAGE (OUTPATIENT)
Dept: HEMATOLOGY/ONCOLOGY | Facility: CLINIC | Age: 70
End: 2025-06-28
Payer: MEDICARE

## 2025-06-30 ENCOUNTER — PATIENT MESSAGE (OUTPATIENT)
Dept: HEMATOLOGY/ONCOLOGY | Facility: CLINIC | Age: 70
End: 2025-06-30
Payer: MEDICARE

## 2025-07-01 ENCOUNTER — OFFICE VISIT (OUTPATIENT)
Dept: PAIN MEDICINE | Facility: CLINIC | Age: 70
End: 2025-07-01
Payer: MEDICARE

## 2025-07-01 ENCOUNTER — PATIENT MESSAGE (OUTPATIENT)
Dept: HEMATOLOGY/ONCOLOGY | Facility: CLINIC | Age: 70
End: 2025-07-01
Payer: MEDICARE

## 2025-07-01 VITALS
DIASTOLIC BLOOD PRESSURE: 71 MMHG | HEART RATE: 84 BPM | SYSTOLIC BLOOD PRESSURE: 142 MMHG | BODY MASS INDEX: 25.04 KG/M2 | WEIGHT: 179.56 LBS

## 2025-07-01 DIAGNOSIS — M54.9 DORSALGIA, UNSPECIFIED: ICD-10-CM

## 2025-07-01 DIAGNOSIS — M47.816 LUMBAR SPONDYLOSIS: Primary | ICD-10-CM

## 2025-07-01 PROCEDURE — 99999 PR PBB SHADOW E&M-EST. PATIENT-LVL III: CPT | Mod: PBBFAC,,, | Performed by: ANESTHESIOLOGY

## 2025-07-01 PROCEDURE — 99213 OFFICE O/P EST LOW 20 MIN: CPT | Mod: PBBFAC,PO | Performed by: ANESTHESIOLOGY

## 2025-07-01 PROCEDURE — 99214 OFFICE O/P EST MOD 30 MIN: CPT | Mod: S$PBB,,, | Performed by: ANESTHESIOLOGY

## 2025-07-01 NOTE — PROGRESS NOTES
[facility] Follow Up Evaluation      Referred by: Matt Velazquez    PCP:     CC:   Chief Complaint   Patient presents with    Mid-back Pain          7/1/2025     8:46 AM 12/12/2024    11:18 AM 10/8/2024     9:16 AM   Last 3 PDI Scores   Pain Disability Index (PDI) 0 36 1       Interval HPI 7/1/2025: Glenn Medel returns to the office for follow up.  Since I last saw him he reports he has been diagnosed with bladder cancer.  Overall he feels like he is doing pretty well but he can still aggravate his back on occasion.  Currently his pain is 0/10.  He continues to use gabapentin twice a day for neuropathic component of his pain as well as muscle relaxants on an as needed basis up to twice a day for myofascial pain.    HPI:   Glenn Medel is a 70 y.o. male who presents with back pain.  He has a history of chronic lower back pain but over the past month it has been gradually worsening.  Today he reports his pain is 7/10, intermittent, aching, sharp, throbbing, shooting.  He gets some radiating pain around the sides of the lower flanks around the lumbar area.  His pain is worse with sitting, bending, lifting and relieved with rest and lying down.  He denies any numbness or weakness.  No oliver radicular symptoms.      Pain Intervention History:  right-sided L3-4, L4-5, L5-S1 RFA on 09/29/2014 and he had a right-sided L5-S1 and S1 TFESI on 10/27/2014 with outside physician   - s/p bilateral L4/5 and L5/S1 RFA on 06/04/2024 with minimal relief  - s/p viadisc allograft at L4 on 9/30/24    Past Spine Surgical History:      Past and current medications:  Antineuropathics:  NSAIDs:  Physical therapy:  Antidepressants:  Muscle relaxers:  Opioids:  Antiplatelets/Anticoagulants:    History:    Current Outpatient Medications:     allopurinoL (ZYLOPRIM) 300 MG tablet, Take 1 tablet (300 mg total) by mouth once daily., Disp: 90 tablet, Rfl: 0    ammonium lactate (LAC-HYDRIN) 12 % lotion, Apply topically 2 (two)  times daily., Disp: , Rfl:     atorvastatin (LIPITOR) 20 MG tablet, Take 1 tablet (20 mg total) by mouth every evening., Disp: 90 tablet, Rfl: 3    BACILLUS COAGULANS ORAL, Take 1 tablet by mouth once daily., Disp: , Rfl:     brimonidine 0.2% (ALPHAGAN) 0.2 % Drop, INSTILL 1 DROP IN BOTH EYES TWICE DAILY, Disp: 10 mL, Rfl: 2    carbidopa-levodopa  mg (SINEMET)  mg per tablet, Take 1.5 tablets by mouth 5 (five) times daily., Disp: 675 tablet, Rfl: 3    cholecalciferol, vitamin D3, (D3-2000 ORAL), Take by mouth once daily., Disp: , Rfl:     clonazePAM (KLONOPIN) 1 MG tablet, Take 1.5 tablets (1.5 mg total) by mouth every evening., Disp: 60 tablet, Rfl: 2    cyanocobalamin (VITAMIN B-12) 1000 MCG tablet, Take 100 mcg by mouth once daily., Disp: , Rfl:     dexAMETHasone (DECADRON) 4 MG Tab, Take 2 tablets (8 mg total) by mouth once daily. on days 2-4 of each chemotherapy cycle., Disp: 6 tablet, Rfl: 5    donepeziL (ARICEPT) 5 MG tablet, Take 1 tablet (5 mg total) by mouth every evening., Disp: 30 tablet, Rfl: 11    dorzolamide (TRUSOPT) 2 % ophthalmic solution, Place 1 drop into both eyes 2 (two) times daily., Disp: 10 mL, Rfl: 3    DULoxetine (CYMBALTA) 30 MG capsule, Take 1 capsule (30 mg total) by mouth once daily., Disp: 90 capsule, Rfl: 1    gabapentin (NEURONTIN) 300 MG capsule, Take 1 capsule (300 mg total) by mouth 2 (two) times daily., Disp: 60 capsule, Rfl: 1    insulin glargine U-100, Lantus, (LANTUS SOLOSTAR U-100 INSULIN) 100 unit/mL (3 mL) InPn pen, Inject 9 Units into the skin every evening., Disp: 3 mL, Rfl: 3    ketoconazole (NIZORAL) 2 % shampoo, Apply topically., Disp: , Rfl:     LIDOcaine-prilocaine (EMLA) cream, Apply to port site one hour prior to chemotherapy, Disp: 30 g, Rfl: 0    losartan (COZAAR) 25 MG tablet, Take 1 tablet (25 mg total) by mouth once daily., Disp: 90 tablet, Rfl: 3    meloxicam (MOBIC) 15 MG tablet, Take 1 tablet (15 mg total) by mouth once daily., Disp: 30 tablet,  "Rfl: 3    methocarbamoL (ROBAXIN) 750 MG Tab, Take 1 tablet (750 mg total) by mouth 4 (four) times daily., Disp: 40 tablet, Rfl: 3    OLANZapine (ZYPREXA) 5 MG tablet, Take 1 tablet (5 mg total) by mouth every evening. on days 1-4 of each chemotherapy cycle., Disp: 4 tablet, Rfl: 5    omeprazole (PRILOSEC) 40 MG capsule, Take 1 capsule (40 mg total) by mouth once daily., Disp: 90 capsule, Rfl: 3    pen needle, diabetic (BD HIMANSHU 2ND GEN PEN NEEDLE) 32 gauge x 5/32" Ndle, Inject 1 each into the skin once daily., Disp: 100 each, Rfl: 3    prazosin (MINIPRESS) 1 MG Cap, Take 1 capsule (1 mg total) by mouth every evening., Disp: 90 capsule, Rfl: 3    prochlorperazine (COMPAZINE) 5 MG tablet, Take 1 tablet (5 mg total) by mouth every 6 (six) hours as needed for Nausea., Disp: 20 tablet, Rfl: 5    ramelteon (ROZEREM) 8 mg tablet, Take 1 tablet (8 mg total) by mouth nightly., Disp: 90 tablet, Rfl: 3    rasagiline (AZILECT) 1 mg Tab, TAKE 1 TABLET(1 MG) BY MOUTH EVERY EVENING, Disp: 30 tablet, Rfl: 11    tirzepatide 10 mg/0.5 mL PnIj, Inject 10 mg into the skin every 7 days. (Increased dose), Disp: 2 mL, Rfl: 3    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/08/2018    LUE numbness 09/08/2020    Obesity     Parkinson's disease     Pyelonephritis 02/21/2025    Sepsis 06/09/2023    Sleep apnea     uses cpap    Suspected cerebrovascular accident (CVA) 09/08/2020    Syncope and collapse 07/02/2024       Past Surgical History:   Procedure Laterality Date    CATARACT " EXTRACTION W/  INTRAOCULAR LENS IMPLANT Left 11/30/2017    CATARACT EXTRACTION W/  INTRAOCULAR LENS IMPLANT Right 12/11/2018    Dr Garcia    CATARACT EXTRACTION W/  INTRAOCULAR LENS IMPLANT Right 12/11/2018    Procedure: EXTRACTION, CATARACT, WITH IOL INSERTION;  Surgeon: Damaris Garcia MD;  Location: [facility] OR;  Service: Ophthalmology;  Laterality: Right;    COLONOSCOPY N/A 09/18/2017    Procedure: COLONOSCOPY;  Surgeon: Paul Feliz Jr., MD;  Location: [facility] ENDO;  Service: Endoscopy;  Laterality: N/A;    COLONOSCOPY N/A 12/21/2022    Procedure: COLONOSCOPY;  Surgeon: Paul Feliz Jr., MD;  Location: [facility] ENDO;  Service: Endoscopy;  Laterality: N/A;    COLONOSCOPY W/ POLYPECTOMY  04/13/2010    YARELI.   One 1 cm polyp in the sigmoid colon.  Internal hemorrhoids.    COSMETIC SURGERY      DIGITAL RECTAL EXAMINATION UNDER ANESTHESIA N/A 07/23/2019    Procedure: EXAM UNDER ANESTHESIA, DIGITAL, RECTUM;  Surgeon: Kaz Keating MD;  Location: [facility] OR;  Service: General;  Laterality: N/A;    EXAMINATION UNDER ANESTHESIA N/A 04/24/2019    Procedure: EXAM UNDER ANESTHESIA;  Surgeon: Kaz Keating MD;  Location: [facility] OR;  Service: General;  Laterality: N/A;    EXCISIONAL HEMORRHOIDECTOMY N/A 04/24/2019    Procedure: HEMORRHOIDECTOMY (lithotomy);  Surgeon: Kaz Keating MD;  Location: [facility] OR;  Service: General;  Laterality: N/A;    EYE SURGERY      GANGLION CYST EXCISION Left     INJECTION OF ANESTHETIC AGENT AROUND MEDIAL BRANCH NERVES INNERVATING LUMBAR FACET JOINT Bilateral 04/09/2024    Procedure: Block-nerve-medial branch-lumbar-L4/5-L5/S1;  Surgeon: Everett Taveras MD;  Location: [facility] OR;  Service: Pain Management;  Laterality: Bilateral;    INJECTION OF ANESTHETIC AGENT AROUND MEDIAL BRANCH NERVES INNERVATING LUMBAR FACET JOINT Bilateral 05/06/2024    Procedure: Block-nerve-medial branch-lumbar-L4/5-L5/S1;  Surgeon: Everett Taveras MD;  Location: [facility] OR;  Service: Pain Management;  Laterality:  Bilateral;    INJECTION OF ANESTHETIC AGENT AROUND PUDENDAL NERVE N/A 04/24/2019    Procedure: BLOCK, NERVE, PUDENDAL;  Surgeon: Kaz Keating MD;  Location: [facility] OR;  Service: General;  Laterality: N/A;    INJECTION OF ANESTHETIC AGENT AROUND PUDENDAL NERVE N/A 07/23/2019    Procedure: BLOCK, NERVE, PUDENDAL;  Surgeon: Kaz Keating MD;  Location: [facility] OR;  Service: General;  Laterality: N/A;    INJECTION, ALLOGRAFT, INTERVERTEBRAL DISC N/A 09/30/2024    Procedure: INJECTION,ALLOGRAFT,INTERVERTEBRAL DISC,1ST LEVEL  L4/5;  Surgeon: Everett Taveras MD;  Location: [facility] OR;  Service: Pain Management;  Laterality: N/A;    INSERTION OF TUNNELED CENTRAL VENOUS CATHETER (CVC) WITH SUBCUTANEOUS PORT Right 3/28/2025    Procedure: [id number];  Surgeon: Hebert Gardner MD;  Location: [facility] OR;  Service: General;  Laterality: Right;    INSTILLATION OF URINARY BLADDER  02/11/2025    Procedure: INSTILLATION, BLADDER w/ gemcitabine;  Surgeon: MARIZA Donahue MD;  Location: [facility] OR;  Service: Urology;;    RADIOFREQUENCY ABLATION OF LUMBAR MEDIAL BRANCH NERVE AT SINGLE LEVEL Bilateral 06/04/2024    Procedure: Radiofrequency Ablation, Nerve, Spinal, Lumbar, Medial Branch, L4/5 and L5/S1;  Surgeon: Everett Taveras MD;  Location: [facility] OR;  Service: Pain Management;  Laterality: Bilateral;    REPAIR OF RETINAL DETACHMENT WITH VITRECTOMY Right 08/01/2018    Procedure: REPAIR, RETINAL DETACHMENT, WITH VITRECTOMY;  Surgeon: SAJAN Pulido MD;  Location: [address];  Service: Ophthalmology;  Laterality: Right;  35 min    SKIN BIOPSY      TONSILLECTOMY      TRANSRECTAL BIOPSY OF PROSTATE WITH ULTRASOUND GUIDANCE N/A 01/31/2023    Procedure: BIOPSY, PROSTATE, RECTAL APPROACH, WITH US GUIDANCE;  Surgeon: MARIZA Donahue MD;  Location: [facility] OR;  Service: Urology;  Laterality: N/A;    TRANSRECTAL BIOPSY OF PROSTATE WITH ULTRASOUND GUIDANCE N/A 04/12/2024    Procedure: BIOPSY, PROSTATE, RECTAL APPROACH, WITH  US GUIDANCE;  Surgeon: MARIZA Donahue MD;  Location: [facility];  Service: Urology;  Laterality: N/A;    TRIGGER FINGER RELEASE Right     X 2    TURBT (TRANSURETHRAL RESECTION OF BLADDER TUMOR) N/A 02/11/2025    Procedure: TURBT (TRANSURETHRAL RESECTION OF BLADDER TUMOR);  Surgeon: MARIZA Donahue MD;  Location: [facility] OR;  Service: Urology;  Laterality: N/A;    uro lift      for enlarged prostate       Family History   Problem Relation Name Age of Onset    Colon cancer Mother      Hypertension Mother      Diabetes Mother      Stroke Mother      Cancer Mother          colon    Kidney disease Mother      Heart disease Father      Diabetes Father      Colon cancer Father      Cancer Father          colon    Cataracts Father      No Known Problems Sister      Heart disease Brother  62    Kidney disease Brother      Dementia Brother Rodrigue     Abnormal EKG Brother Rodrigue     Breast cancer Maternal Aunt      Cancer Maternal Aunt          breast    Cancer Maternal Aunt          brain    Brain cancer Maternal Aunt      No Known Problems Maternal Uncle      No Known Problems Paternal Aunt      No Known Problems Paternal Uncle      Glaucoma Maternal Grandmother      No Known Problems Maternal Grandfather      No Known Problems Paternal Grandmother      No Known Problems Paternal Grandfather      Amblyopia Neg Hx      Blindness Neg Hx      Macular degeneration Neg Hx      Retinal detachment Neg Hx      Strabismus Neg Hx      Thyroid disease Neg Hx      Parkinsonism Neg Hx         Social History     Socioeconomic History    Marital status:    Tobacco Use    Smoking status: Never    Smokeless tobacco: Never   Substance and Sexual Activity    Alcohol use: Yes     Comment: very rare    Drug use: No    Sexual activity: Yes     Partners: Female   Social History Narrative            2 grown kids.         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      Social Drivers of Health     Financial  Resource Strain: Low Risk  (2/14/2025)    Overall Financial Resource Strain (CARDIA)     Difficulty of Paying Living Expenses: Not hard at all   Food Insecurity: No Food Insecurity (5/28/2025)    Received from [facility]    Hunger Vital Sign     Worried About Running Out of Food in the Last Year: Never true     Ran Out of Food in the Last Year: Never true   Transportation Needs: No Transportation Needs (5/28/2025)    Received from [facility]    PRAPARE - Transportation     Lack of Transportation (Medical): No     Lack of Transportation (Non-Medical): No   Physical Activity: Inactive (1/29/2025)    Exercise Vital Sign     Days of Exercise per Week: 0 days     Minutes of Exercise per Session: 0 min   Stress: Stress Concern Present (1/29/2025)    [employer] [place] [employer] - Occupational Stress Questionnaire     Feeling of Stress : To some extent   Housing Stability: Unknown (5/28/2025)    Received from [facility]    Housing Stability Vital Sign     Unable to Pay for Housing in the Last Year: No       Review of patient's allergies indicates:   Allergen Reactions    Avelox [moxifloxacin] Hives and Rash       Review of Systems:  12 point review of systems is negative.    Physical Exam:  Vitals:    07/01/25 0848   BP: (!) 142/71   Pulse: 84   Weight: 81.4 kg (179 lb 9 oz)   PainSc: 0-No pain   PainLoc: Back     Body mass index is 25.04 kg/m².    Gen: NAD  Psych: mood appropriate for given condition  HEENT: eyes anicteric   CV: RRR  HEENT: anicteric   Respiratory: non-labored, no signs of respiratory distress  Abd: non-distended  Skin: warm, dry and intact.  Gait: No antalgic gait.    Sensory:  Intact and symmetrical to light touch in L1-S1 dermatomes bilaterally.    Motor:       Right Left   L2/3 Iliacus Hip flexion  5  5   L3/4 Qudratus Femoris Knee Extension  5  5   L4/5 Tib Anterior Ankle Dorsiflexion   5  5   L5/S1 Extensor Hallicus Longus Great toe extension  5   5   S1/S2 Gastroc/Soleus Plantar Flexion  5  5      Right Left                  Patellar DTR 2+ 2+   Achilles DTR 2+ 2+                      Imaging:  Xray lumbar spine 4/6/23  FINDINGS:  There are degenerative changes of the lumbar spine.  Vertebral body height is maintained.  The adjacent soft tissues are unremarkable.    MRI lumbar spine 5/16/23  FINDINGS:  Alignment: Normal.     Vertebrae: No acute fracture or marrow replacement.  2 cm vertebral body hemangioma at L4.     Discs: Preserved disc heights.  Mild disc desiccation at several lower lumbar levels.     Cord: Normal.  Conus terminates at L2.     Paraspinal muscles & soft tissues: There are T2 hyperintense lesions along each renal cortex, possibly cysts but incompletely characterized.     Degenerative findings:  T12-L1:  L1-L2:  L2-L3: Minimal bilateral facet degenerative change.  Minimal posterior disc bulge.  Minimal bilateral facet degenerative change.  L3-L4: Minimal broad-based posterior disc bulge.  Bilateral facet degenerative change.  L4-L5: Minimal broad-based posterior disc bulge with left foraminal zone disc fissure.  Mild bilateral facet degenerative change.  Minimal spinal canal stenosis and right neural foraminal narrowing.  L5-S1: Mild broad-based posterior disc bulge.  Mild right facet degenerative change.  Mild right neural foraminal narrowing.    MRI lumbar spine 1/4/25  FINDINGS:  Alignment: Normal.     Vertebral column: Vertebral body heights are maintained. No acute fracture is identified; however, if trauma is suspected, a CT scan would be a more sensitive examination for fractures. Scattered ovoid T1/T2 hyperintensities most compatible with hemangiomas again demonstrated, the largest of which is located in the L4 vertebral body.  Multilevel disc degeneration with disc desiccation and mild disc bulges, most pronounced at L5-S1, as detailed below.     Cord: Normal.  Conus terminates at L1-2.     Degenerative findings:     T12-L1: Disc  is normal configuration.  No significant neural foraminal or spinal canal stenosis.  L1-L2: Mild circumferential disc bulge.   No significant neural foraminal or spinal canal stenosis.  L2-L3: Mild circumferential disc bulge.  Mild bilateral facet arthropathy and ligamentum flavum thickening.   No significant neural foraminal or spinal canal stenosis.  L3-L4: Mild circumferential disc bulge.  Mild bilateral facet arthropathy and ligamentum flavum thickening.  Mild right greater than left neural foraminal stenosis.  No significant spinal canal stenosis.  L4-L5: Mild circumferential disc bulge with superimposed right foraminal/extraforaminal disc protrusion through an annular fissure, which possibly impinges upon the exiting right L4 nerve root.  Moderate bilateral facet arthropathy.  Ligamentum flavum thickening.  Mild-to-moderate right and mild left neural foraminal stenosis.  No significant spinal canal stenosis.  L5-S1: Mild circumferential disc bulge.  Moderate bilateral facet arthropathy and ligamentum flavum thickening.  Moderate right and mild left neural foraminal stenosis.  No significant spinal canal stenosis.     Paraspinal muscles & soft tissues: There is a 2.8 cm T2 isointense indeterminate splenic mass seen only on  images (series 1, image 15), which appears new in comparison to the prior exam.  Scattered subcentimeter T2 hyperintense renal cortical lesions again noted, nonspecific but probably cysts.    Labs:  Lab Results   Component Value Date    HGBA1C 6.9 (H) 04/24/2025       Lab Results   Component Value Date    WBC 5.79 06/13/2025    HGB 12.7 (L) 06/13/2025    HCT 38.3 (L) 06/13/2025    MCV 91 06/13/2025     06/13/2025       Assessment:   Problem List Items Addressed This Visit    None  Visit Diagnoses         Lumbar spondylosis    -  Primary    Relevant Orders    Ambulatory Referral/Consult to Physical Therapy      Dorsalgia, unspecified        Relevant Orders    Ambulatory  Referral/Consult to Physical Therapy                70 y.o. year old male with PMH parkinsonism, HTN, BPH, CKD who presents with back pain.  He has a history of chronic lower back pain but over the past month it has been gradually worsening.  Today he reports his pain is 7/10, intermittent, aching, sharp, throbbing, shooting.  He gets some radiating pain around the sides of the lower flanks around the lumbar area.  His pain is worse with sitting, bending, lifting and relieved with rest and lying down.  He denies any numbness or weakness.  No oliver radicular symptoms.      7/1/25 - Glenn Medel returns to the office for follow up.  Since I last saw him he reports he has been diagnosed with bladder cancer.  Overall he feels like he is doing pretty well but he can still aggravate his back on occasion.  Currently his pain is 0/10.  He continues to use gabapentin twice a day for neuropathic component of his pain as well as muscle relaxants on an as needed basis up to twice a day for myofascial pain.    - I independently reviewed his updated lumbar MRI with him today.  I do not see any significant central or foraminal narrowing  - overall he is doing pretty well.  At rest and currently his back pain is 0/10.  He reports that he can get some exacerbations when he does some strenuous activities.  I have recommended he avoid any activities he knows that we will reproduce his pain.  - he has upcoming cancer treatments.  I have recommended we continue to maximize conservative treatment.  I have placed a referral for him to restart formal physical therapy an anatomic in [place].  We will also continue gabapentin and Robaxin.  He can follow up with me on an as needed basis if he develops any new or worsening pain symptoms      : Not applicable      This note was completed with dictation software and grammatical errors may exist.

## 2025-07-02 ENCOUNTER — OFFICE VISIT (OUTPATIENT)
Dept: NEUROSURGERY | Facility: CLINIC | Age: 70
End: 2025-07-02
Payer: MEDICARE

## 2025-07-02 ENCOUNTER — PATIENT MESSAGE (OUTPATIENT)
Dept: FAMILY MEDICINE | Facility: CLINIC | Age: 70
End: 2025-07-02
Payer: MEDICARE

## 2025-07-02 VITALS — SYSTOLIC BLOOD PRESSURE: 134 MMHG | DIASTOLIC BLOOD PRESSURE: 87 MMHG

## 2025-07-02 VITALS
HEIGHT: 71 IN | BODY MASS INDEX: 25.04 KG/M2 | SYSTOLIC BLOOD PRESSURE: 113 MMHG | DIASTOLIC BLOOD PRESSURE: 71 MMHG | HEART RATE: 76 BPM

## 2025-07-02 DIAGNOSIS — E23.7 PITUITARY ABNORMALITY: Primary | ICD-10-CM

## 2025-07-02 DIAGNOSIS — R90.89 OTHER ABNORMAL FINDINGS ON DIAGNOSTIC IMAGING OF CENTRAL NERVOUS SYSTEM: ICD-10-CM

## 2025-07-02 NOTE — PROGRESS NOTES
"Neurosurgery History & Physical    Patient ID: Glenn Medel is a 70 y.o. male.    No chief complaint on file.      HPI:  Mr. Medel is a 71yo male with PMHx of overweight, HTN, T2DM, CKD stage 3, Parkinson's disease, arthritis, BPH, cataracts OU, glaucoma OU, lumbar DDD s/p SRIDHAR, LOVE on CPAP, bladder cancer, and hx of prostate cancer who presents today for pituitary abnormality.      Patient currently follows with Dr. Lowery for small cell carcinoma of the bladder.  He is scheduled to undergo radical cystectomy on 08/05/2025 with Dr. Zapata.  He underwent MRI brain with and without contrast on 06/05/2025 prior to starting cycle 4 of platinum/Etoposide which had no acute findings but an incidental finding of "subcentimeter hypoenhancing focus within the posteroinferior pituitary".  He is here today for further evaluation.      Patient denies breast discharge and acromegaly.  Patient also denies neurologic symptoms including headaches, visual disturbances, etc.  Patient currently follows with Endocrine.  Last visit on 01/29/2025 for concern of possible adrenal insufficiency.  Cortisol and aldosterone completed on 02/05/2025 were WNL, no other Endocrine labs completed at that time.  Patient currently follows with Ophthalmology.  Last eye exam within the past few months.  No abnormalities.  Wears glasses for corrective vision.       Review of Systems   Constitutional:  Negative for activity change and fever.   Eyes:  Negative for visual disturbance.   Respiratory:  Negative for shortness of breath.    Cardiovascular:  Negative for chest pain.   Gastrointestinal:  Negative for nausea and vomiting.   Endocrine: Negative for cold intolerance, heat intolerance, polydipsia, polyphagia and polyuria.   Genitourinary:  Negative for decreased urine volume, difficulty urinating and frequency.   Musculoskeletal:  Negative for back pain, gait problem and neck pain.   Neurological:  Negative for dizziness, tremors, seizures, " syncope, facial asymmetry, speech difficulty, weakness, light-headedness, numbness and headaches.   Psychiatric/Behavioral:  Negative for confusion.        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/08/2018    LUE numbness 09/08/2020    Obesity     Parkinson's disease     Pyelonephritis 02/21/2025    Sepsis 06/09/2023    Sleep apnea     uses cpap    Suspected cerebrovascular accident (CVA) 09/08/2020    Syncope and collapse 07/02/2024     Social History[1]  Family History   Problem Relation Name Age of Onset    Colon cancer Mother      Hypertension Mother      Diabetes Mother      Stroke Mother      Cancer Mother          colon    Kidney disease Mother      Heart disease Father      Diabetes Father      Colon cancer Father      Cancer Father          colon    Cataracts Father      No Known Problems Sister      Heart disease Brother  62    Kidney disease Brother      Dementia Brother Rodrigue     Abnormal EKG Brother Rodrigue     Breast cancer Maternal Aunt      Cancer Maternal Aunt          breast    Cancer Maternal Aunt          brain    Brain cancer Maternal Aunt      No Known Problems Maternal Uncle      No Known Problems Paternal Aunt      No Known Problems Paternal Uncle      Glaucoma Maternal Grandmother      No Known Problems Maternal Grandfather      No Known Problems Paternal Grandmother      No Known Problems Paternal Grandfather      Amblyopia Neg Hx      Blindness Neg Hx      Macular degeneration Neg Hx      Retinal detachment Neg Hx       "Strabismus Neg Hx      Thyroid disease Neg Hx      Parkinsonism Neg Hx       Review of patient's allergies indicates:   Allergen Reactions    Avelox [moxifloxacin] Hives and Rash     Current Medications[2]  Blood pressure 113/71, pulse 76, height 5' 11" (1.803 m).      Neurological Exam  Mental Status  Awake, alert and oriented to person, place and time. Speech is normal. Language is fluent with no aphasia.    Cranial Nerves  CN II: Vision test: Wears glasses . Visual acuity is normal. Visual fields full to confrontation.  CN III, IV, VI: Extraocular movements intact bilaterally. Normal lids and orbits bilaterally. Pupils equal round and reactive to light bilaterally.  CN V: Facial sensation is normal.  CN VII: Full and symmetric facial movement.  CN VIII: Hearing is normal.  CN IX, X: Palate elevates symmetrically. Normal gag reflex.  CN XI: Shoulder shrug strength is normal.  CN XII: Tongue midline without atrophy or fasciculations.    Motor   Strength is 5/5 throughout all four extremities.    Sensory  Light touch is normal in upper and lower extremities.     Gait  Casual gait is normal including stance, stride, and arm swing.      Physical Exam  Eyes:      General: Lids are normal.      Extraocular Movements: Extraocular movements intact.      Pupils: Pupils are equal, round, and reactive to light.      Comments: Wears glasses    Neurological:      Motor: Motor strength is normal.  Psychiatric:         Speech: Speech normal.         Imaging:  MRI brain with and without contrast dated 06/05/2025 personally reviewed and discussed with patient.   FINDINGS:  Brain parenchyma stable in contour.  No restricted diffusion to suggest acute infarction.  Ventricles stable without hydrocephalus.  Retro cerebellar extra-axial cystic focus suggestive for an arachnoid cyst versus prominent cisterna magna unchanged.  Continued prominent extra-axial space overlying the right parasagittal posterior frontal lobe suggestive for " arachnoid cyst also unchanged.  No abnormal parenchymal susceptibility to suggest parenchymal hemorrhage.  Few punctate T2 FLAIR signal hyperintensities within the supratentorial white matter and pradip again identified while nonspecific may represent mild chronic ischemic change.  No abnormal parenchymal enhancement.     There is partial fluid opacification of the left mastoid air cells which is similar.  Subcentimeter hypoenhancing focus posterior inferior pituitary stable.     Impression:     No significant change from prior specifically without evidence for intracranial enhancing lesion to suggest metastatic disease in light of history.     There is a subcentimeter hypoenhancing focus within the posteroinferior pituitary which is nonspecific underlying pituitary adenoma not excluded clinical correlation and further evaluation with dedicated pituitary imaging as warranted.     Electronically signed by:Fabricio Ortega DO  Date:                                            06/05/2025  Time:                                           09:01    Assessment/Plan:   Mr. Medel is a 69yo male with a subcentimeter hypoenhancing focus within the posteroinferior pituitary on MRI brain dated 06/05/2025.  I ordered MRI pituitary with and without contrast for further evaluation of this finding.  I also ordered Endocrine labs to be drawn at patient's convenience.  Once imaging is obtained, we will plan for Mr. Medel to follow up in clinic to review findings.  Patient should schedule an appointment with Endocrinology for further evaluation and continue following with Ophthalmology for yearly eye exams.  He is agreeable to the plan and will notify our office if he has any questions or concerns in the meantime.           [1]   Social History  Socioeconomic History    Marital status:    Tobacco Use    Smoking status: Never    Smokeless tobacco: Never   Substance and Sexual Activity    Alcohol use: Yes     Comment: very rare     Drug use: No    Sexual activity: Yes     Partners: Female   Social History Narrative            2 grown kids.         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      Social Drivers of Health     Financial Resource Strain: Low Risk  (2/14/2025)    Overall Financial Resource Strain (CARDIA)     Difficulty of Paying Living Expenses: Not hard at all   Food Insecurity: No Food Insecurity (5/28/2025)    Received from [facility]    Hunger Vital Sign     Worried About Running Out of Food in the Last Year: Never true     Ran Out of Food in the Last Year: Never true   Transportation Needs: No Transportation Needs (5/28/2025)    Received from [facility]    PRAPARE - Transportation     Lack of Transportation (Medical): No     Lack of Transportation (Non-Medical): No   Physical Activity: Inactive (1/29/2025)    Exercise Vital Sign     Days of Exercise per Week: 0 days     Minutes of Exercise per Session: 0 min   Stress: Stress Concern Present (1/29/2025)    [employer] - Occupational Stress Questionnaire     Feeling of Stress : To some extent   Housing Stability: Unknown (5/28/2025)    Received from [facility]    Housing Stability Vital Sign     Unable to Pay for Housing in the Last Year: No   [2]   Current Outpatient Medications:     allopurinoL (ZYLOPRIM) 300 MG tablet, Take 1 tablet (300 mg total) by mouth once daily., Disp: 90 tablet, Rfl: 0    ammonium lactate (LAC-HYDRIN) 12 % lotion, Apply topically 2 (two) times daily., Disp: , Rfl:     atorvastatin (LIPITOR) 20 MG tablet, Take 1 tablet (20 mg total) by mouth every evening., Disp: 90 tablet, Rfl: 3    BACILLUS COAGULANS ORAL, Take 1 tablet by mouth once daily., Disp: , Rfl:     brimonidine 0.2% (ALPHAGAN) 0.2 % Drop, INSTILL 1 DROP IN BOTH EYES TWICE DAILY, Disp: 10 mL, Rfl: 2    carbidopa-levodopa  mg (SINEMET)  mg per tablet, Take 1.5 tablets by mouth 5  "(five) times daily., Disp: 675 tablet, Rfl: 3    cholecalciferol, vitamin D3, (D3-2000 ORAL), Take by mouth once daily., Disp: , Rfl:     clonazePAM (KLONOPIN) 1 MG tablet, Take 1.5 tablets (1.5 mg total) by mouth every evening., Disp: 60 tablet, Rfl: 2    cyanocobalamin (VITAMIN B-12) 1000 MCG tablet, Take 100 mcg by mouth once daily., Disp: , Rfl:     donepeziL (ARICEPT) 5 MG tablet, Take 1 tablet (5 mg total) by mouth every evening., Disp: 30 tablet, Rfl: 11    dorzolamide (TRUSOPT) 2 % ophthalmic solution, Place 1 drop into both eyes 2 (two) times daily., Disp: 10 mL, Rfl: 3    DULoxetine (CYMBALTA) 30 MG capsule, Take 1 capsule (30 mg total) by mouth once daily., Disp: 90 capsule, Rfl: 1    gabapentin (NEURONTIN) 300 MG capsule, Take 1 capsule (300 mg total) by mouth 2 (two) times daily., Disp: 60 capsule, Rfl: 1    insulin glargine U-100, Lantus, (LANTUS SOLOSTAR U-100 INSULIN) 100 unit/mL (3 mL) InPn pen, Inject 9 Units into the skin every evening., Disp: 3 mL, Rfl: 3    losartan (COZAAR) 25 MG tablet, Take 1 tablet (25 mg total) by mouth once daily., Disp: 90 tablet, Rfl: 3    meloxicam (MOBIC) 15 MG tablet, Take 1 tablet (15 mg total) by mouth once daily., Disp: 30 tablet, Rfl: 3    methocarbamoL (ROBAXIN) 750 MG Tab, Take 1 tablet (750 mg total) by mouth 4 (four) times daily., Disp: 40 tablet, Rfl: 3    OLANZapine (ZYPREXA) 5 MG tablet, Take 1 tablet (5 mg total) by mouth every evening. on days 1-4 of each chemotherapy cycle., Disp: 4 tablet, Rfl: 5    omeprazole (PRILOSEC) 40 MG capsule, Take 1 capsule (40 mg total) by mouth once daily., Disp: 90 capsule, Rfl: 3    pen needle, diabetic (BD HIMANSHU 2ND GEN PEN NEEDLE) 32 gauge x 5/32" Ndle, Inject 1 each into the skin once daily., Disp: 100 each, Rfl: 3    prazosin (MINIPRESS) 1 MG Cap, Take 1 capsule (1 mg total) by mouth every evening., Disp: 90 capsule, Rfl: 3    prochlorperazine (COMPAZINE) 5 MG tablet, Take 1 tablet (5 mg total) by mouth every 6 (six) " hours as needed for Nausea., Disp: 20 tablet, Rfl: 5    ramelteon (ROZEREM) 8 mg tablet, Take 1 tablet (8 mg total) by mouth nightly., Disp: 90 tablet, Rfl: 3    rasagiline (AZILECT) 1 mg Tab, TAKE 1 TABLET(1 MG) BY MOUTH EVERY EVENING, Disp: 30 tablet, Rfl: 11    tirzepatide 10 mg/0.5 mL PnIj, Inject 10 mg into the skin every 7 days. (Increased dose), Disp: 2 mL, Rfl: 3    dexAMETHasone (DECADRON) 4 MG Tab, Take 2 tablets (8 mg total) by mouth once daily. on days 2-4 of each chemotherapy cycle. (Patient not taking: Reported on 7/2/2025), Disp: 6 tablet, Rfl: 5    ketoconazole (NIZORAL) 2 % shampoo, Apply topically. (Patient not taking: Reported on 7/2/2025), Disp: , Rfl:     LIDOcaine-prilocaine (EMLA) cream, Apply to port site one hour prior to chemotherapy (Patient not taking: Reported on 7/2/2025), Disp: 30 g, Rfl: 0

## 2025-07-06 ENCOUNTER — PATIENT MESSAGE (OUTPATIENT)
Dept: HEMATOLOGY/ONCOLOGY | Facility: CLINIC | Age: 70
End: 2025-07-06
Payer: MEDICARE

## 2025-07-07 ENCOUNTER — TELEPHONE (OUTPATIENT)
Facility: HOSPITAL | Age: 70
End: 2025-07-07
Payer: MEDICARE

## 2025-07-07 ENCOUNTER — ANESTHESIA EVENT (OUTPATIENT)
Dept: SURGERY | Facility: HOSPITAL | Age: 70
End: 2025-07-07
Payer: MEDICARE

## 2025-07-07 NOTE — TELEPHONE ENCOUNTER
Received a call from , Ms. Borrego requesting T&S be moved to AM of surgery.  Pt's PreOp appointments fall outside of 14 day window prior to surgery date and pt lives out of town.  T&S order placed for AM of surgery and previous order was cancelled.  
Detail Level: Detailed

## 2025-07-08 ENCOUNTER — TELEPHONE (OUTPATIENT)
Dept: FAMILY MEDICINE | Facility: CLINIC | Age: 70
End: 2025-07-08
Payer: MEDICARE

## 2025-07-08 ENCOUNTER — PATIENT MESSAGE (OUTPATIENT)
Dept: HEMATOLOGY/ONCOLOGY | Facility: CLINIC | Age: 70
End: 2025-07-08
Payer: MEDICARE

## 2025-07-08 NOTE — TELEPHONE ENCOUNTER
Copied from CRM #[id number]. Topic: General Inquiry - Patient Advice  >> Jul 8, 2025 10:01 AM Tamanna wrote:  Type:  Patient Returning Call    Who Called:[employer]   Who Left Message for Patient:  Does the patient know what this is regarding?:Supplies  Would the patient rather a call back or a response via [facility]? Callback  Best Call Back Number:[phone] option 2  fax  [phone]  Additional Information: Following up on request  note was faxed, but didn't state that the patient use [facility]. Please amend and send back  >> Jul 8, 2025  3:44 PM Cherie Lion DO wrote:  Please see re-addended and further addended note. Thanks  ----- Message -----  From: Neli Brown CMA  Sent: 7/8/2025  10:23 AM CDT  To: Cherie Lion DO      ----- Message -----  From: Tamanna Lion  Sent: 7/8/2025  10:04 AM CDT  To: Ayad Crespo Staff    >> Jul 8, 2025 10:23 AM Med Assistant Neli wrote:    ----- Message -----  From: Tamanna Lion  Sent: 7/8/2025  10:04 AM CDT  To: Ayad Crespo Staff

## 2025-07-10 ENCOUNTER — TELEPHONE (OUTPATIENT)
Dept: FAMILY MEDICINE | Facility: CLINIC | Age: 70
End: 2025-07-10
Payer: MEDICARE

## 2025-07-10 NOTE — TELEPHONE ENCOUNTER
Copied from CRM #[id number]. Topic: General Inquiry - Patient Advice  >> Jul 10, 2025  3:35 PM Nadir wrote:  Type:  Patient Requesting Call    Who Called: Alanna ( [employer])  Does the patient know what this is regarding?: Progress Notes for date of service on 6/27/2025  Would the patient rather a call back or a response via [facility]? Call back   Best Call Back Number:please call back [phone] Option 2  Additional Information: Alanna stated that are needing the noted to include the information regarding which one the patient is using either the [facility] or [employer] .

## 2025-07-13 ENCOUNTER — PATIENT MESSAGE (OUTPATIENT)
Dept: HEMATOLOGY/ONCOLOGY | Facility: CLINIC | Age: 70
End: 2025-07-13
Payer: MEDICARE

## 2025-07-13 NOTE — PROGRESS NOTES
Urology ([facility]) H&P for upcoming procedure    CC: Bladder cancer    HPI:Glenn Medel is a 70 y.o. male  with a PMHx of Parkinson's Disease diagnosed in 2020 and stable, non-aggressive prostate cancer, and [id number] invasive small cell bladder cancer.    The patient initially presented with gross hematuria in January 2025.Subsequent work up included cystoscopy with Dr. Donahue on 1/30/25. This revealed a tumor at the trigone near the right ureteral orifice measuring about 3 cm in diameter.  There was surrounding inflammatory tissue with some overlying necrosis.  There were no other lesions, masses foreign bodies or stones. He then underwent TURBT on 2/11/25 with gemcitabine instillation.    He has a hx of kidney stones found by [facility] on 1/9/2025, three in left kidney and two in the right on initial imaging in January 2025. Now s/p laser lithotripsy on 3/12/2025     Upper tract imaging (modality: CT A/P, NM PET CT) -   CT A/P (2/21/2025)  5 mm stone distal left ureter just above the UVJ with left-sided hydronephrosis and perinephric edema.  Small nonobstructing left kidney stone.  Bibasal atelectatic change with small pleural fluid collections.  Colonic diverticulosis.  Enlarged   prostate.     NM PET CT (3/19, restaging on 6/3/2025) No FDG avid malignancy is identified    TURBT (2/22/2025):   --INVASIVE SMALL CELL CARCINOMA (OF UROTHELIAL ORIGIN),   AMID A BACKGROUND OF UROTHELIAL CARCINOMA IN SITU.   --MUSCULARIS PROPRIA IS FOCALLY PRESENT AND IS NOT INVOLVED    BY NEOPLASM.     The patient did undergo neoadjuvant chemotherapy with cisplatin-etopside with Dr. Lowery.      Of note, patient recently was seen at [facility] for heart palpitations on 5/28/25. Heart cath was performed which was normal. Holter monitor showed no abnormalities.    Hx of DM: Yes, on insulin and tirzepetide.   Hx of abdominal surgeries: No    ROS:  No weight loss, fevers, chills  No back pain, bone pain  No bleeding disorders  No SOB, No  CP  Otherwise neg except per HPI    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/08/2018    LUE numbness 09/08/2020    Obesity     Parkinson's disease     Pyelonephritis 02/21/2025    Sepsis 06/09/2023    Sleep apnea     uses cpap    Suspected cerebrovascular accident (CVA) 09/08/2020    Syncope and collapse 07/02/2024       Past Surgical History:   Procedure Laterality Date    CATARACT EXTRACTION W/  INTRAOCULAR LENS IMPLANT Left 11/30/2017    CATARACT EXTRACTION W/  INTRAOCULAR LENS IMPLANT Right 12/11/2018    Dr Garcia    CATARACT EXTRACTION W/  INTRAOCULAR LENS IMPLANT Right 12/11/2018    Procedure: EXTRACTION, CATARACT, WITH IOL INSERTION;  Surgeon: Damaris Garcia MD;  Location: [facility] OR;  Service: Ophthalmology;  Laterality: Right;    COLONOSCOPY N/A 09/18/2017    Procedure: COLONOSCOPY;  Surgeon: Paul Feliz Jr., MD;  Location: [facility] ENDO;  Service: Endoscopy;  Laterality: N/A;    COLONOSCOPY N/A 12/21/2022    Procedure: COLONOSCOPY;  Surgeon: Paul Feliz Jr., MD;  Location: [facility] ENDO;  Service: Endoscopy;  Laterality: N/A;    COLONOSCOPY W/ POLYPECTOMY  04/13/2010    YARELI.   One 1 cm polyp in the sigmoid colon.  Internal hemorrhoids.    COSMETIC SURGERY      DIGITAL RECTAL EXAMINATION UNDER ANESTHESIA N/A 07/23/2019    Procedure: EXAM UNDER ANESTHESIA, DIGITAL, RECTUM;  Surgeon: Kaz Keating MD;  Location: [facility] OR;  Service: General;  Laterality: N/A;    EXAMINATION UNDER ANESTHESIA N/A 04/24/2019    Procedure: EXAM UNDER  ANESTHESIA;  Surgeon: Kaz Keating MD;  Location: [facility] OR;  Service: General;  Laterality: N/A;    EXCISIONAL HEMORRHOIDECTOMY N/A 04/24/2019    Procedure: HEMORRHOIDECTOMY (lithotomy);  Surgeon: Kaz Keating MD;  Location: [facility] OR;  Service: General;  Laterality: N/A;    EYE SURGERY      GANGLION CYST EXCISION Left     INJECTION OF ANESTHETIC AGENT AROUND MEDIAL BRANCH NERVES INNERVATING LUMBAR FACET JOINT Bilateral 04/09/2024    Procedure: Block-nerve-medial branch-lumbar-L4/5-L5/S1;  Surgeon: Everett Taveras MD;  Location: [facility] OR;  Service: Pain Management;  Laterality: Bilateral;    INJECTION OF ANESTHETIC AGENT AROUND MEDIAL BRANCH NERVES INNERVATING LUMBAR FACET JOINT Bilateral 05/06/2024    Procedure: Block-nerve-medial branch-lumbar-L4/5-L5/S1;  Surgeon: Everett Taveras MD;  Location: [facility] OR;  Service: Pain Management;  Laterality: Bilateral;    INJECTION OF ANESTHETIC AGENT AROUND PUDENDAL NERVE N/A 04/24/2019    Procedure: BLOCK, NERVE, PUDENDAL;  Surgeon: Kaz Keating MD;  Location: [facility] OR;  Service: General;  Laterality: N/A;    INJECTION OF ANESTHETIC AGENT AROUND PUDENDAL NERVE N/A 07/23/2019    Procedure: BLOCK, NERVE, PUDENDAL;  Surgeon: Kaz Keating MD;  Location: [facility] OR;  Service: General;  Laterality: N/A;    INJECTION, ALLOGRAFT, INTERVERTEBRAL DISC N/A 09/30/2024    Procedure: INJECTION,ALLOGRAFT,INTERVERTEBRAL DISC,1ST LEVEL  L4/5;  Surgeon: Everett Taveras MD;  Location: [facility] OR;  Service: Pain Management;  Laterality: N/A;    INSERTION OF TUNNELED CENTRAL VENOUS CATHETER (CVC) WITH SUBCUTANEOUS PORT Right 3/28/2025    Procedure: [id number]-[id number]-V-CATH;  Surgeon: Hebert Gardner MD;  Location: [facility] OR;  Service: General;  Laterality: Right;    INSTILLATION OF URINARY BLADDER  02/11/2025    Procedure: INSTILLATION, BLADDER w/ gemcitabine;  Surgeon: MARIZA Donahue MD;  Location: [facility] OR;  Service: Urology;;    RADIOFREQUENCY ABLATION OF LUMBAR MEDIAL BRANCH  NERVE AT SINGLE LEVEL Bilateral 06/04/2024    Procedure: Radiofrequency Ablation, Nerve, Spinal, Lumbar, Medial Branch, L4/5 and L5/S1;  Surgeon: Everett Taveras MD;  Location: [facility] OR;  Service: Pain Management;  Laterality: Bilateral;    REPAIR OF RETINAL DETACHMENT WITH VITRECTOMY Right 08/01/2018    Procedure: REPAIR, RETINAL DETACHMENT, WITH VITRECTOMY;  Surgeon: SAJAN Pulido MD;  Location: [address];  Service: Ophthalmology;  Laterality: Right;  35 min    SKIN BIOPSY      TONSILLECTOMY      TRANSRECTAL BIOPSY OF PROSTATE WITH ULTRASOUND GUIDANCE N/A 01/31/2023    Procedure: BIOPSY, PROSTATE, RECTAL APPROACH, WITH US GUIDANCE;  Surgeon: MARIZA Donahue MD;  Location: [facility] OR;  Service: Urology;  Laterality: N/A;    TRANSRECTAL BIOPSY OF PROSTATE WITH ULTRASOUND GUIDANCE N/A 04/12/2024    Procedure: BIOPSY, PROSTATE, RECTAL APPROACH, WITH US GUIDANCE;  Surgeon: MARIZA Donahue MD;  Location: [facility];  Service: Urology;  Laterality: N/A;    TRIGGER FINGER RELEASE Right     X 2    TURBT (TRANSURETHRAL RESECTION OF BLADDER TUMOR) N/A 02/11/2025    Procedure: TURBT (TRANSURETHRAL RESECTION OF BLADDER TUMOR);  Surgeon: MARIZA Donahue MD;  Location: [facility] OR;  Service: Urology;  Laterality: N/A;    uro lift      for enlarged prostate       Soc Hx:  Tob: No  Alc: Yes - Moderate  Illicit drug use:No  Occupation:   : Yes  Lives in: [facility] Hx:   Malignancies:  No  Kidney stones:  Yes Refer to HPI    Review of patient's allergies indicates:   Allergen Reactions    Avelox [moxifloxacin] Hives and Rash       Medications Ordered Prior to Encounter[1]    Anticoagulation:  No    Physical Exam:  weight 179 lb/ 81.4kg  BMI 25.04    Physical Exam  Cardiovascular:      Rate and Rhythm: Normal rate and regular rhythm.   Abdominal:      General: Abdomen is flat.      Palpations: Abdomen is soft.   Skin:     General: Skin is warm.      Capillary Refill: Capillary refill takes  less than 2 seconds.   Neurological:      General: No focal deficit present.      Mental Status: He is alert and oriented to person, place, and time.      Labs:   Urine dipstick today shows negative for all components    Lab Results   Component Value Date    WBC 5.79 06/13/2025    HGB 12.7 (L) 06/13/2025    HCT 38.3 (L) 06/13/2025    MCV 91 06/13/2025     06/13/2025       CMP  Sodium   Date Value Ref Range Status   06/13/2025 139 136 - 145 mmol/L Final   05/29/2025 138 136 - 144 mmol/L Final   02/06/2025 141 136 - 145 mmol/L Final     Potassium   Date Value Ref Range Status   06/13/2025 4.3 3.5 - 5.1 mmol/L Final   05/29/2025 4.1 3.6 - 5.1 mmol/L Final   02/06/2025 4.1 3.5 - 5.1 mmol/L Final     Comment:     Anion Gap reference range revised on 4/28/2023     Chloride   Date Value Ref Range Status   06/13/2025 102 95 - 110 mmol/L Final   02/06/2025 103 95 - 110 mmol/L Final     CO2   Date Value Ref Range Status   06/13/2025 27 23 - 29 mmol/L Final   05/29/2025 26 22 - 32 mmol/L Final   02/06/2025 29 23 - 29 mmol/L Final     Glucose   Date Value Ref Range Status   06/13/2025 198 (H) 70 - 110 mg/dL Final   02/06/2025 240 (H) 70 - 110 mg/dL Final     Comment:     The ADA recommends the following guidelines for fasting glucose:    Normal:       less than 100 mg/dL    Prediabetes:  100 mg/dL to 125 mg/dL    Diabetes:     126 mg/dL or higher       BUN   Date Value Ref Range Status   06/13/2025 19 8 - 23 mg/dL Final     Creatinine   Date Value Ref Range Status   06/13/2025 1.0 0.5 - 1.4 mg/dL Final     Calcium   Date Value Ref Range Status   06/13/2025 8.7 8.7 - 10.5 mg/dL Final   05/29/2025 8.3 (L) 8.9 - 10.3 mg/dL Final   02/06/2025 8.8 8.7 - 10.5 mg/dL Final     Protein Total   Date Value Ref Range Status   06/13/2025 6.9 6.0 - 8.4 gm/dL Final     Total Protein   Date Value Ref Range Status   05/28/2025 6 (L) 6.1 - 7.9 g/dL Final   06/06/2024 6.7 6.0 - 8.4 g/dL Final     Albumin   Date Value Ref Range Status    06/13/2025 3.7 3.5 - 5.2 g/dL Final   05/28/2025 3.5 3.5 - 4.8 g/dL Final   01/09/2025 3.7 3.5 - 5.2 g/dL Final     Total Bilirubin   Date Value Ref Range Status   05/28/2025 0.9 0.4 - 2.0 mg/dL Final   06/06/2024 0.6 0.1 - 1.0 mg/dL Final     Comment:     For infants and newborns, interpretation of results should be based  on gestational age, weight and in agreement with clinical  observations.    Premature Infant recommended reference ranges:  Up to 24 hours.............<8.0 mg/dL  Up to 48 hours............<12.0 mg/dL  3-5 days..................<15.0 mg/dL  6-29 days.................<15.0 mg/dL       Bilirubin Total   Date Value Ref Range Status   06/13/2025 0.3 0.1 - 1.0 mg/dL Final     Comment:     For infants and newborns, interpretation of results should be based   on gestational age, weight and in agreement with clinical   observations.    Premature Infant recommended reference ranges:   0-24 hours:  <8.0 mg/dL   24-48 hours: <12.0 mg/dL   3-5 days:    <15.0 mg/dL   6-29 days:   <15.0 mg/dL     Alkaline Phosphatase   Date Value Ref Range Status   05/28/2025 112 28 - 116 U/L Final   06/06/2024 120 55 - 135 U/L Final     ALP   Date Value Ref Range Status   06/13/2025 130 40 - 150 unit/L Final     AST   Date Value Ref Range Status   06/13/2025 23 11 - 45 unit/L Final   05/28/2025 22 12 - 40 U/L Final   06/06/2024 26 10 - 40 U/L Final     ALT   Date Value Ref Range Status   06/13/2025 5 (L) 10 - 44 unit/L Final   05/28/2025 15 5 - 56 U/L Final   06/06/2024 11 10 - 44 U/L Final     Anion Gap   Date Value Ref Range Status   06/13/2025 10 8 - 16 mmol/L Final     eGFR   Date Value Ref Range Status   06/13/2025 >60 >60 mL/min/1.73/m2 Final     Comment:     Estimated GFR calculated using the CKD-EPI creatinine (2021) equation.   02/06/2025 >60 >60 mL/min/1.73 m^2 Final        TURBT pathology (Feb 2025)  --INVASIVE SMALL CELL CARCINOMA (OF UROTHELIAL ORIGIN),   AMID A BACKGROUND OF UROTHELIAL CARCINOMA IN SITU.    --MUSCULARIS PROPRIA IS FOCALLY PRESENT AND IS NOT INVOLVED    BY NEOPLASM     Imaging:  CTRSS (1/9/2025)   6 mm, 2 mm and 1 mm left intrarenal stone without hydronephrosis.  1 mm right intrarenal stone without hydronephrosis.  Prostate hypertrophy.  Metal clips are noted in the prostate.    CT A/P (2/21/2025)    5 mm stone distal left ureter just above the UVJ with left-sided hydronephrosis and perinephric edema.  Small nonobstructing left kidney stone.  Bibasal atelectatic change with small pleural fluid collections.  Colonic diverticulosis.  Enlarged   prostate.     NM PET CT (3/19)   No FDG avid malignancy is identified on today's exam.  Please note that bladder evaluation is limited due to normal urinary system activity.    NM PET CT (6/3/2025)   No FDG avid malignancy is present on today's exam     Assessment: 70 y.o.  yo M with invasive small-cell bladder cancer.     Plan:  1. To OR on 08/05/2025 for Radical Cystectomy w/ Dr. Zapata  2. Consents signed   3. I have explained the risk, benefits, and alternatives of the procedure in detail. The patient voices understanding and all questions have been answered. The patient agrees to proceed as planned.   4. The risks and benefits of participating in our clinical trial have been discussed and the patient has consented for the research study here at [facility].   5. Has appointment with Dr. East on 7/14. Will follow up results    Shannan Ontiveros MD          [1]   Current Outpatient Medications on File Prior to Visit   Medication Sig Dispense Refill    allopurinoL (ZYLOPRIM) 300 MG tablet Take 1 tablet (300 mg total) by mouth once daily. 90 tablet 0    ammonium lactate (LAC-HYDRIN) 12 % lotion Apply topically 2 (two) times daily.      atorvastatin (LIPITOR) 20 MG tablet Take 1 tablet (20 mg total) by mouth every evening. 90 tablet 3    BACILLUS COAGULANS ORAL Take 1 tablet by mouth once daily.      brimonidine 0.2% (ALPHAGAN) 0.2 % Drop INSTILL 1 DROP IN BOTH EYES  TWICE DAILY 10 mL 2    carbidopa-levodopa  mg (SINEMET)  mg per tablet Take 1.5 tablets by mouth 5 (five) times daily. 675 tablet 3    cholecalciferol, vitamin D3, (D3-2000 ORAL) Take by mouth once daily.      clonazePAM (KLONOPIN) 1 MG tablet Take 1.5 tablets (1.5 mg total) by mouth every evening. 60 tablet 2    cyanocobalamin (VITAMIN B-12) 1000 MCG tablet Take 100 mcg by mouth once daily.      dexAMETHasone (DECADRON) 4 MG Tab Take 2 tablets (8 mg total) by mouth once daily. on days 2-4 of each chemotherapy cycle. (Patient not taking: Reported on 7/2/2025) 6 tablet 5    donepeziL (ARICEPT) 5 MG tablet Take 1 tablet (5 mg total) by mouth every evening. 30 tablet 11    dorzolamide (TRUSOPT) 2 % ophthalmic solution Place 1 drop into both eyes 2 (two) times daily. 10 mL 3    DULoxetine (CYMBALTA) 30 MG capsule Take 1 capsule (30 mg total) by mouth once daily. 90 capsule 1    gabapentin (NEURONTIN) 300 MG capsule Take 1 capsule (300 mg total) by mouth 2 (two) times daily. 60 capsule 1    insulin glargine U-100, Lantus, (LANTUS SOLOSTAR U-100 INSULIN) 100 unit/mL (3 mL) InPn pen Inject 9 Units into the skin every evening. 3 mL 3    ketoconazole (NIZORAL) 2 % shampoo Apply topically. (Patient not taking: Reported on 7/2/2025)      LIDOcaine-prilocaine (EMLA) cream Apply to port site one hour prior to chemotherapy (Patient not taking: Reported on 7/2/2025) 30 g 0    losartan (COZAAR) 25 MG tablet Take 1 tablet (25 mg total) by mouth once daily. 90 tablet 3    meloxicam (MOBIC) 15 MG tablet Take 1 tablet (15 mg total) by mouth once daily. 30 tablet 3    methocarbamoL (ROBAXIN) 750 MG Tab Take 1 tablet (750 mg total) by mouth 4 (four) times daily. 40 tablet 3    OLANZapine (ZYPREXA) 5 MG tablet Take 1 tablet (5 mg total) by mouth every evening. on days 1-4 of each chemotherapy cycle. 4 tablet 5    omeprazole (PRILOSEC) 40 MG capsule Take 1 capsule (40 mg total) by mouth once daily. 90 capsule 3    pen needle,  "diabetic (BD HIMANSHU 2ND GEN PEN NEEDLE) 32 gauge x 5/32" Ndle Inject 1 each into the skin once daily. 100 each 3    prazosin (MINIPRESS) 1 MG Cap Take 1 capsule (1 mg total) by mouth every evening. 90 capsule 3    prochlorperazine (COMPAZINE) 5 MG tablet Take 1 tablet (5 mg total) by mouth every 6 (six) hours as needed for Nausea. 20 tablet 5    ramelteon (ROZEREM) 8 mg tablet Take 1 tablet (8 mg total) by mouth nightly. 90 tablet 3    rasagiline (AZILECT) 1 mg Tab TAKE 1 TABLET(1 MG) BY MOUTH EVERY EVENING 30 tablet 11    tirzepatide 10 mg/0.5 mL PnIj Inject 10 mg into the skin every 7 days. (Increased dose) 2 mL 3     No current facility-administered medications on file prior to visit.     "

## 2025-07-14 ENCOUNTER — OFFICE VISIT (OUTPATIENT)
Dept: UROLOGY | Facility: CLINIC | Age: 70
End: 2025-07-14
Payer: MEDICARE

## 2025-07-14 ENCOUNTER — HOSPITAL ENCOUNTER (OUTPATIENT)
Dept: CARDIOLOGY | Facility: CLINIC | Age: 70
Discharge: HOME | End: 2025-07-14
Payer: MEDICARE

## 2025-07-14 ENCOUNTER — TELEPHONE (OUTPATIENT)
Facility: HOSPITAL | Age: 70
End: 2025-07-14
Payer: MEDICARE

## 2025-07-14 ENCOUNTER — TELEPHONE (OUTPATIENT)
Facility: CLINIC | Age: 70
End: 2025-07-14
Payer: MEDICARE

## 2025-07-14 ENCOUNTER — RESEARCH ENCOUNTER (OUTPATIENT)
Dept: UROLOGY | Facility: CLINIC | Age: 70
End: 2025-07-14

## 2025-07-14 ENCOUNTER — TELEPHONE (OUTPATIENT)
Dept: FAMILY MEDICINE | Facility: CLINIC | Age: 70
End: 2025-07-14
Payer: MEDICARE

## 2025-07-14 ENCOUNTER — OFFICE VISIT (OUTPATIENT)
Dept: WOUND CARE | Facility: CLINIC | Age: 70
End: 2025-07-14
Payer: MEDICARE

## 2025-07-14 DIAGNOSIS — C67.9 MALIGNANT NEOPLASM OF URINARY BLADDER, UNSPECIFIED SITE: Primary | ICD-10-CM

## 2025-07-14 DIAGNOSIS — Z71.89 ENCOUNTER FOR OSTOMY CARE EDUCATION: ICD-10-CM

## 2025-07-14 DIAGNOSIS — C67.0 MALIGNANT NEOPLASM OF TRIGONE OF URINARY BLADDER: Primary | ICD-10-CM

## 2025-07-14 DIAGNOSIS — Z43.6 ATTENTION TO UROSTOMY: Primary | ICD-10-CM

## 2025-07-14 DIAGNOSIS — Z01.818 PREOPERATIVE TESTING: ICD-10-CM

## 2025-07-14 DIAGNOSIS — R30.0 DYSURIA: ICD-10-CM

## 2025-07-14 LAB
OHS QRS DURATION: 104 MS
OHS QTC CALCULATION: 416 MS

## 2025-07-14 PROCEDURE — 93010 ELECTROCARDIOGRAM REPORT: CPT | Mod: S$PBB,,, | Performed by: INTERNAL MEDICINE

## 2025-07-14 PROCEDURE — 99212 OFFICE O/P EST SF 10 MIN: CPT | Mod: PBBFAC,25 | Performed by: CLINICAL NURSE SPECIALIST

## 2025-07-14 PROCEDURE — 93005 ELECTROCARDIOGRAM TRACING: CPT | Mod: PBBFAC | Performed by: INTERNAL MEDICINE

## 2025-07-14 PROCEDURE — 87086 URINE CULTURE/COLONY COUNT: CPT

## 2025-07-14 PROCEDURE — 99999 PR PBB SHADOW E&M-EST. PATIENT-LVL II: CPT | Mod: PBBFAC,,, | Performed by: CLINICAL NURSE SPECIALIST

## 2025-07-14 PROCEDURE — 99214 OFFICE O/P EST MOD 30 MIN: CPT | Mod: S$PBB,,, | Performed by: CLINICAL NURSE SPECIALIST

## 2025-07-14 PROCEDURE — 99499 UNLISTED E&M SERVICE: CPT | Mod: S$PBB,,, | Performed by: UROLOGY

## 2025-07-14 NOTE — TELEPHONE ENCOUNTER
Copied from CRM #[id number]. Topic: General Inquiry - Patient Advice  >> Jul 11, 2025  3:48 PM Christiano wrote:  Judy with [employer] is asking for an return call in reference to needing to verify if pt MRN: [id number] Glenn Medel is still using the [employer] so they could continue to send out supplies, please call back fax records to [phone]. Please call back at [phone] ext [phone]

## 2025-07-14 NOTE — TELEPHONE ENCOUNTER
----- Message from Sai Palomo MD sent at 7/12/2025  8:39 AM CDT -----  Pt cleared for procedure/surgery at moderate CV risk  ----- Message -----  From: Qi Mueller RN  Sent: 7/7/2025   2:02 PM CDT  To: Sai Palomo MD; Dewey GUERRERO#    Counts include 234 beds at the [facility] Dr. Palomo,       Your patient indicated above requires Pre-Op Clearance/ Risk Stratification for a Radical Cystectomy with ileal conduit creation and pelvic lymphadenectomy with Dr. Zapata on 8/5/2025 (General anesthesia, 390 minutes).  Anesthesia review is in progress.     If a chart review is appropriate for clearance, please indicate in a note in the EMR.       If not, please advise timely, so that your clinic may schedule an appointment.  The following labs/tests have been ordered:   A1C  EKG  T&C  T&S    If further diagnostics are required please feel free to initiate.       Thank you,   Qi Mueller, LAURA   [facility]

## 2025-07-14 NOTE — ANESTHESIA PREPROCEDURE EVALUATION
07/14/2025    Patient Name: Glenn Medel  YOB: 1955  MRN: [id number]  Code Status: Full code  NPO Status:     Procedure:  CYSTECTOMY, RADICAL (N/A), CREATION, ILEAL CONDUIT (N/A), LYMPHADENECTOMY, PELVIS (N/A)     History:  Glenn Medel is a 70 y.o. male, BMI 25, with a diagnosis of Malignant neoplasm of urinary bladder.  Medical history includes Parkinson's disease with tremor, polyneuropathy, orthostatic hypotension - told to take Parkinson's meds as usual, will give pill immediately after procedure.  LOVE compliant with CPAP - told to bring to hospital.  DM-2 well managed with dexcom.  CKD.    Review of patient's allergies indicates:   Allergen Reactions    Avelox [moxifloxacin] Hives and Rash       Current Outpatient Medications   Medication Instructions    allopurinoL (ZYLOPRIM) 300 mg, Oral, Daily    ammonium lactate (LAC-HYDRIN) 12 % lotion 2 times daily    atorvastatin (LIPITOR) 20 mg, Oral, Nightly    BACILLUS COAGULANS ORAL 1 tablet, Daily    brimonidine 0.2% (ALPHAGAN) 0.2 % Drop 1 drop, Both Eyes, 2 times daily    carbidopa-levodopa  mg (SINEMET)  mg per tablet 1.5 tablets, Oral, 5 times daily    cholecalciferol, vitamin D3, (D3-2000 ORAL) Daily    clonazePAM (KLONOPIN) 1.5 mg, Oral, Nightly    cyanocobalamin (VITAMIN B-12) 100 mcg, Daily    dexAMETHasone (DECADRON) 8 mg, Oral, Daily, on days 2-4 of each chemotherapy cycle.    donepeziL (ARICEPT) 5 mg, Oral, Nightly    dorzolamide (TRUSOPT) 2 % ophthalmic solution 1 drop, Both Eyes, 2 times daily    DULoxetine (CYMBALTA) 30 mg, Oral, Daily    gabapentin (NEURONTIN) 300 mg, Oral, 2 times daily    ketoconazole (NIZORAL) 2 % shampoo Apply topically.    LANTUS SOLOSTAR U-100 INSULIN 9 Units, Subcutaneous, Nightly    LIDOcaine-prilocaine (EMLA) cream Apply to port site one hour prior to chemotherapy    losartan (COZAAR) 25 mg, Oral, Daily    meloxicam (MOBIC) 15 mg, Oral, Daily    methocarbamoL (ROBAXIN) 750 mg, Oral, 4 times  "daily    MOUNJARO 10 mg, Subcutaneous, Every 7 days, (Increased dose)    OLANZapine (ZYPREXA) 5 mg, Oral, Nightly, on days 1-4 of each chemotherapy cycle.    omeprazole (PRILOSEC) 40 mg, Oral, Daily    pen needle, diabetic (BD HIMANSHU 2ND GEN PEN NEEDLE) 32 gauge x 5/32" Ndle 1 each, Subcutaneous, Daily    prazosin (MINIPRESS) 1 mg, Oral, Nightly    prochlorperazine (COMPAZINE) 5 mg, Oral, Every 6 hours PRN    ramelteon (ROZEREM) 8 mg, Oral, Nightly    rasagiline (AZILECT) 1 mg Tab TAKE 1 TABLET(1 MG) BY MOUTH EVERY EVENING         Past Surgical History:   Procedure Laterality Date    CATARACT EXTRACTION W/  INTRAOCULAR LENS IMPLANT Left 11/30/2017    CATARACT EXTRACTION W/  INTRAOCULAR LENS IMPLANT Right 12/11/2018    Dr Garcia    CATARACT EXTRACTION W/  INTRAOCULAR LENS IMPLANT Right 12/11/2018    Procedure: EXTRACTION, CATARACT, WITH IOL INSERTION;  Surgeon: Damaris Garcia MD;  Location: [facility] OR;  Service: Ophthalmology;  Laterality: Right;    COLONOSCOPY N/A 09/18/2017    Procedure: COLONOSCOPY;  Surgeon: Paul Feliz Jr., MD;  Location: [facility];  Service: Endoscopy;  Laterality: N/A;    COLONOSCOPY N/A 12/21/2022    Procedure: COLONOSCOPY;  Surgeon: Paul Feliz Jr., MD;  Location: [facility] ENDO;  Service: Endoscopy;  Laterality: N/A;    COLONOSCOPY W/ POLYPECTOMY  04/13/2010    YARELI.   One 1 cm polyp in the sigmoid colon.  Internal hemorrhoids.    COSMETIC SURGERY      DIGITAL RECTAL EXAMINATION UNDER ANESTHESIA N/A 07/23/2019    Procedure: EXAM UNDER ANESTHESIA, DIGITAL, RECTUM;  Surgeon: Kaz Keating MD;  Location: [facility] OR;  Service: General;  Laterality: N/A;    EXAMINATION UNDER ANESTHESIA N/A 04/24/2019    Procedure: EXAM UNDER ANESTHESIA;  Surgeon: Kaz Keating MD;  Location: [facility] OR;  Service: General;  Laterality: N/A;    EXCISIONAL HEMORRHOIDECTOMY N/A 04/24/2019    Procedure: HEMORRHOIDECTOMY (lithotomy);  Surgeon: Kaz Keating MD;  Location: [facility] OR;  Service: General;  " Laterality: N/A;    EYE SURGERY      GANGLION CYST EXCISION Left     INJECTION OF ANESTHETIC AGENT AROUND MEDIAL BRANCH NERVES INNERVATING LUMBAR FACET JOINT Bilateral 04/09/2024    Procedure: Block-nerve-medial branch-lumbar-L4/5-L5/S1;  Surgeon: Everett Taveras MD;  Location: [facility] OR;  Service: Pain Management;  Laterality: Bilateral;    INJECTION OF ANESTHETIC AGENT AROUND MEDIAL BRANCH NERVES INNERVATING LUMBAR FACET JOINT Bilateral 05/06/2024    Procedure: Block-nerve-medial branch-lumbar-L4/5-L5/S1;  Surgeon: Everett Taveras MD;  Location: [facility] OR;  Service: Pain Management;  Laterality: Bilateral;    INJECTION OF ANESTHETIC AGENT AROUND PUDENDAL NERVE N/A 04/24/2019    Procedure: BLOCK, NERVE, PUDENDAL;  Surgeon: Kaz Keating MD;  Location: [facility] OR;  Service: General;  Laterality: N/A;    INJECTION OF ANESTHETIC AGENT AROUND PUDENDAL NERVE N/A 07/23/2019    Procedure: BLOCK, NERVE, PUDENDAL;  Surgeon: Kaz Keating MD;  Location: [facility] OR;  Service: General;  Laterality: N/A;    INJECTION, ALLOGRAFT, INTERVERTEBRAL DISC N/A 09/30/2024    Procedure: INJECTION,ALLOGRAFT,INTERVERTEBRAL DISC,1ST LEVEL  L4/5;  Surgeon: Everett Taveras MD;  Location: [facility] OR;  Service: Pain Management;  Laterality: N/A;    INSERTION OF TUNNELED CENTRAL VENOUS CATHETER (CVC) WITH SUBCUTANEOUS PORT Right 3/28/2025    Procedure: NUNVKEBTQ-DBAQ-C-CATH;  Surgeon: Hebert Gardner MD;  Location: [facility] OR;  Service: General;  Laterality: Right;    INSTILLATION OF URINARY BLADDER  02/11/2025    Procedure: INSTILLATION, BLADDER w/ gemcitabine;  Surgeon: MARIZA Donahue MD;  Location: [facility] OR;  Service: Urology;;    RADIOFREQUENCY ABLATION OF LUMBAR MEDIAL BRANCH NERVE AT SINGLE LEVEL Bilateral 06/04/2024    Procedure: Radiofrequency Ablation, Nerve, Spinal, Lumbar, Medial Branch, L4/5 and L5/S1;  Surgeon: Everett Taveras MD;  Location: [facility] OR;  Service: Pain Management;  Laterality: Bilateral;    REPAIR OF RETINAL  DETACHMENT WITH VITRECTOMY Right 08/01/2018    Procedure: REPAIR, RETINAL DETACHMENT, WITH VITRECTOMY;  Surgeon: SAJAN Pulido MD;  [facility];  Service: Ophthalmology;  Laterality: Right;  35 min    SKIN BIOPSY      TONSILLECTOMY      TRANSRECTAL BIOPSY OF PROSTATE WITH ULTRASOUND GUIDANCE N/A 01/31/2023    Procedure: BIOPSY, PROSTATE, RECTAL APPROACH, WITH US GUIDANCE;  Surgeon: MARIZA Donahue MD;  Location: [facility] OR;  Service: Urology;  Laterality: N/A;    TRANSRECTAL BIOPSY OF PROSTATE WITH ULTRASOUND GUIDANCE N/A 04/12/2024    Procedure: BIOPSY, PROSTATE, RECTAL APPROACH, WITH US GUIDANCE;  Surgeon: MARIZA Donahue MD;  Location: [facility];  Service: Urology;  Laterality: N/A;    TRIGGER FINGER RELEASE Right     X 2    TURBT (TRANSURETHRAL RESECTION OF BLADDER TUMOR) N/A 02/11/2025    Procedure: TURBT (TRANSURETHRAL RESECTION OF BLADDER TUMOR);  Surgeon: MARIZA Donahue MD;  Location: [facility] OR;  Service: Urology;  Laterality: N/A;    uro lift      for enlarged prostate             Vital Signs Range (Last 24H):             Lab Results   Component Value Date    WBC 5.79 06/13/2025    HGB 12.7 (L) 06/13/2025    HCT 38.3 (L) 06/13/2025     06/13/2025    CHOL 118 (L) 11/21/2024    TRIG 127 11/21/2024    HDL 32 (L) 11/21/2024    ALT 5 (L) 06/13/2025    AST 23 06/13/2025     06/13/2025    K 4.3 06/13/2025     06/13/2025    CREATININE 1.0 06/13/2025    BUN 19 06/13/2025    CO2 27 06/13/2025    TSH 0.976 01/06/2020    PSA 5.40 (H) 04/24/2025    INR 1.0 02/06/2025    HGBA1C 6.9 (H) 04/24/2025         EKG:   Results for orders placed or performed during the hospital encounter of 04/12/24   EKG 12-lead    Collection Time: 04/12/24 11:49 AM   Result Value Ref Range    QRS Duration 96 ms    [facility] QTC Calculation 420 ms    Narrative    Test Reason : Z01.818,    Vent. Rate : 058 BPM     Atrial Rate : 058 BPM     P-R Int : 164 ms          QRS Dur : 096 ms      QT Int : 428 ms        P-R-T Axes : 043 041 028 degrees     QTc Int : 420 ms    Sinus bradycardia with sinus arrhythmia  Otherwise normal ECG  When compared with ECG of 13-MAR-2024 07:22,  Nonspecific T wave abnormality no longer evident in Anterior leads  Confirmed by Mckay Hale MD ([id number]) on 4/12/2024 2:22:14 PM    Referred By: MARIZA VACA           Confirmed By:Mckay Hale MD         X-Ray Chest AP Portable 5/28/25  FINDINGS: The lungs are clear.  The cardiac silhouette and pulmonary vascularity are within normal limits.  There is no evidence of pleural effusion or pneumothorax.  Right internal jugular approach Port-A-Cath, the distal tip terminating at the level of   the superior vena cava. Degenerative changes of the spine and shoulders         TTE:  Results for orders placed or performed during the hospital encounter of 08/26/24   Echo   Result Value Ref Range    BSA 2.03 m2    LVOT stroke volume 107.51 cm3    LVIDd 4.74 3.5 - 6.0 cm    LV Systolic Volume 31.79 mL    LV Systolic Volume Index 15.7 mL/m2    LVIDs 2.88 2.1 - 4.0 cm    LV ESV A2C 44.36 mL    LV Diastolic Volume 104.43 mL    LV ESV A4C 45.93 mL    LV Diastolic Volume Index 51.44 mL/m2    Left Ventricular End Systolic Volume by Teichholz Method 31.79 mL    Left Ventricular End Diastolic Volume by Teichholz Method 104.43 mL    IVS 1.23 (A) 0.6 - 1.1 cm    LVOT diameter 2.35 cm    LVOT area 4.3 cm2    FS 39 28 - 44 %    Left Ventricle Relative Wall Thickness 0.45 cm    PW 1.07 0.6 - 1.1 cm    LV mass 202.29 g    LV Mass Index 100 g/m2    MV Peak E Justin 0.88 m/s    TDI LATERAL 0.08 m/s    TDI SEPTAL 0.08 m/s    E/E' ratio 11.00 m/s    MV Peak A Justin 0.99 m/s    TR Max Justin 1.11 m/s    E/A ratio 0.89     IVRT 81.83 msec    E wave deceleration time 220.64 msec    LV SEPTAL E/E' RATIO 11.00 m/s    LV LATERAL E/E' RATIO 11.00 m/s    PV Peak S Justin 0.80 m/s    PV Peak D Justin 0.52 m/s    Pulm vein S/D ratio 1.54     LVOT peak justin 1.15 m/s    Left Ventricular Outflow Tract  Mean Velocity 0.77 cm/s    Left Ventricular Outflow Tract Mean Gradient 2.76 mmHg    RV-velez mid d 2.3 cm    RV Basal Diameter 5.89 cm    RV-velez length 5.9 cm    RV mid diameter 2.29 cm    RV S' 19.78 cm/s    TAPSE 2.74 cm    RV/LV Ratio 0.83 cm    LA size 4.04 cm    LA Vol (MOD) 45.45 cm3    ADOLFO (MOD) 22.4 mL/m2    AV mean gradient 4 mmHg    AV peak gradient 6 mmHg    Ao peak mike 1.23 m/s    Ao VTI 28.10 cm    LVOT peak VTI 24.80 cm    AV valve area 3.83 cm²    AV Velocity Ratio 0.93     AV index (prosthetic) 0.88     NICOLE by Velocity Ratio 4.05 cm²    MV stenosis pressure 1/2 time 63.99 ms    MV valve area p 1/2 method 3.44 cm2    Triscuspid Valve Regurgitation Peak Gradient 5 mmHg    Sinus 3.34 cm    STJ 2.88 cm    Ascending aorta 3.03 cm    Mean e' 0.08 m/s    ZLVIDS -1.95     ZLVIDD -2.38     LA area A4C 17.98 cm2    LA area A2C 17.29 cm2    RVDD 3.94 cm    TV resting pulmonary artery pressure 8 mmHg    RV TB RVSP 4 mmHg    Est. RA pres 3 mmHg    Narrative      Left Ventricle: The left ventricle is normal in size. Normal wall   thickness. There is normal systolic function with a visually estimated   ejection fraction of 60 - 65%. There is normal diastolic function.    Right Ventricle: Normal right ventricular cavity size. Wall thickness   is normal. Systolic function is normal.    Pulmonary Artery: No pulmonary hypertension. The estimated pulmonary   artery systolic pressure is 8 mmHg.    IVC/SVC: Normal venous pressure at 3 mmHg.         Nuclear Stress Test (9/13/24):  Normal myocardial perfusion scan. There is no evidence of myocardial ischemia or infarction.    The gated perfusion images showed an ejection fraction of 73% post stress.    There is normal wall motion at post-stress.    LV cavity size is normal at rest and normal at post-stress.    The ECG portion of the study is negative for ischemia.    The patient reported no chest pain during the stress test.    Left Heart Catheterization (5/29/25)     1.  Normal coronary arteries.      2. Normal left ventricular function.                                                                                                                    07/14/2025  Glenn Medel is a 70 y.o., male.      Pre-op Assessment    I have reviewed the Patient Summary Reports.          Review of Systems  Anesthesia Hx:  No problems with previous Anesthesia                Social:  Non-Smoker       Hematology/Oncology:  Hematology Normal                     Current/Recent Cancer. (Bladder cancer)                EENT/Dental:   Central retinal vein occlusion          Cardiovascular:     Hypertension               Orthostatic Hypotension                           Pulmonary:        Sleep Apnea                Renal/:  Chronic Renal Disease, CKD                Hepatic/GI:  Hepatic/GI Normal                    Musculoskeletal:         Spine Disorders: lumbar            Neurological:    Neuromuscular Disease, (Parkinson's Disease)                                   Endocrine:  Diabetes, type 2           Dermatological:  Skin Normal    Psych:   anxiety depression                Physical Exam  General: Alert and Oriented    Airway:  Mallampati: II / II  Mouth Opening: Normal  TM Distance: Normal  Tongue: Normal  Neck ROM: Normal ROM    Dental:  Intact    Chest/Lungs:  Clear to auscultation, Normal Respiratory Rate    Heart:  Rate: Normal  Rhythm: Regular Rhythm  Sounds: Normal      Anesthesia Plan  Type of Anesthesia, risks & benefits discussed:    Anesthesia Type: Gen ETT  Intra-op Monitoring Plan: Standard ASA Monitors and Art Line  Post Op Pain Control Plan: multimodal analgesia  Airway Plan: Direct  Informed Consent: Informed consent signed with the Patient and all parties understand the risks and agree with anesthesia plan.  All questions answered. Patient consented to blood products? Yes  ASA Score: 3  Day of Surgery Review of History & Physical: H&P Update referred to the  surgeon/provider.    Ready For Surgery From Anesthesia Perspective.     .

## 2025-07-14 NOTE — PROGRESS NOTES
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 Medel presents today for pre-operative counseling for radical cystectomy for small cell carcinoma of the bladder.    MEDICAL HISTORY:  He has Parkinson's Disease managed with Carbidopa/Levodopa 1.5 pills 5 times daily and denies current dexterity problems. He has been diagnosed with Obstructive Sleep Apnea.    SLEEP:  He experiences nightmares with active nocturnal movement, often moving extensively in bed and occasionally ending up penitentiary off the bed. He typically sleeps on his side and reports significant sleep disturbances related to nightmares.    IMAGING:  PET scan revealed no evidence of disease. He appears optimistic about long-term prognosis following imaging results.    URINARY DIVERSION CONCERNS:  He expresses concerns regarding potential urinary diversion options. He has specific apprehensions about sleeping position with an external bag, wondering if he will be limited to sleeping only on his left side or back. He articulated general concerns about managing activities with a potential urinary diversion bag. He is currently a side sleeper and is interested in understanding how the bag might impact his positioning and daily activities. He has researched neobladder options and has heard potential contraindications related to Parkinson's disease. He is particularly focused on understanding practical aspects of bag management, including potential use of supportive belts to secure the bag and prevent  accidental dislodgement during activities.     Imaging: I have reviewed the imaging study nuclear medicine PET scan on 06/03/2025 personally, have independently interpreted this study, and agree with the findings    Allergies:  Review of patient's allergies indicates:   Allergen Reactions    Avelox [moxifloxacin] Hives and Rash        Medications per EMR:  Prescriptions Prior to Admission[1]    Past Medical History:  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/08/2018    LUE numbness 09/08/2020    Obesity     Parkinson's disease     Pyelonephritis 02/21/2025    Sepsis 06/09/2023    Sleep apnea     uses cpap    Suspected cerebrovascular accident (CVA) 09/08/2020    Syncope and collapse 07/02/2024        Past Surgical History:  Past Surgical History:   Procedure Laterality Date    CATARACT EXTRACTION W/  INTRAOCULAR LENS IMPLANT Left 11/30/2017    CATARACT EXTRACTION W/  INTRAOCULAR LENS IMPLANT Right 12/11/2018    Dr Garcia    CATARACT EXTRACTION W/  INTRAOCULAR LENS IMPLANT Right 12/11/2018    Procedure: EXTRACTION, CATARACT, WITH IOL INSERTION;  Surgeon: Damaris Garcia MD;  Location: [facility] OR;  Service: Ophthalmology;  Laterality: Right;    COLONOSCOPY N/A 09/18/2017    Procedure: COLONOSCOPY;  Surgeon: Paul Feliz Jr., MD;  Location: [facility] ENDO;  Service: Endoscopy;  Laterality: N/A;    COLONOSCOPY N/A 12/21/2022    Procedure: COLONOSCOPY;  Surgeon: Paul Feliz Jr., MD;  Location: [facility] ENDO;  Service: Endoscopy;  Laterality: N/A;     COLONOSCOPY W/ POLYPECTOMY  04/13/2010    YARELI.   One 1 cm polyp in the sigmoid colon.  Internal hemorrhoids.    COSMETIC SURGERY      DIGITAL RECTAL EXAMINATION UNDER ANESTHESIA N/A 07/23/2019    Procedure: EXAM UNDER ANESTHESIA, DIGITAL, RECTUM;  Surgeon: Kaz Keating MD;  Location: [facility] OR;  Service: General;  Laterality: N/A;    EXAMINATION UNDER ANESTHESIA N/A 04/24/2019    Procedure: EXAM UNDER ANESTHESIA;  Surgeon: Kaz Keating MD;  Location: [facility] OR;  Service: General;  Laterality: N/A;    EXCISIONAL HEMORRHOIDECTOMY N/A 04/24/2019    Procedure: HEMORRHOIDECTOMY (lithotomy);  Surgeon: Kaz Keating MD;  Location: [facility] OR;  Service: General;  Laterality: N/A;    EYE SURGERY      GANGLION CYST EXCISION Left     INJECTION OF ANESTHETIC AGENT AROUND MEDIAL BRANCH NERVES INNERVATING LUMBAR FACET JOINT Bilateral 04/09/2024    Procedure: Block-nerve-medial branch-lumbar-L4/5-L5/S1;  Surgeon: Everett Taveras MD;  Location: [facility] OR;  Service: Pain Management;  Laterality: Bilateral;    INJECTION OF ANESTHETIC AGENT AROUND MEDIAL BRANCH NERVES INNERVATING LUMBAR FACET JOINT Bilateral 05/06/2024    Procedure: Block-nerve-medial branch-lumbar-L4/5-L5/S1;  Surgeon: Everett Taveras MD;  Location: [facility] OR;  Service: Pain Management;  Laterality: Bilateral;    INJECTION OF ANESTHETIC AGENT AROUND PUDENDAL NERVE N/A 04/24/2019    Procedure: BLOCK, NERVE, PUDENDAL;  Surgeon: Kaz Keating MD;  Location: [facility] OR;  Service: General;  Laterality: N/A;    INJECTION OF ANESTHETIC AGENT AROUND PUDENDAL NERVE N/A 07/23/2019    Procedure: BLOCK, NERVE, PUDENDAL;  Surgeon: Kaz Keating MD;  Location: [facility] OR;  Service: General;  Laterality: N/A;    INJECTION, ALLOGRAFT, INTERVERTEBRAL DISC N/A 09/30/2024    Procedure: INJECTION,ALLOGRAFT,INTERVERTEBRAL DISC,1ST LEVEL  L4/5;  Surgeon: Everett Taveras MD;  Location: [facility] OR;  Service: Pain Management;  Laterality: N/A;    INSERTION OF TUNNELED CENTRAL  VENOUS CATHETER (CVC) WITH SUBCUTANEOUS PORT Right 3/28/2025    Procedure: [id number];  Surgeon: Hebert Gardner MD;  Location: [facility] OR;  Service: General;  Laterality: Right;    INSTILLATION OF URINARY BLADDER  02/11/2025    Procedure: INSTILLATION, BLADDER w/ gemcitabine;  Surgeon: MARIZA Donahue MD;  Location: [facility] OR;  Service: Urology;;    RADIOFREQUENCY ABLATION OF LUMBAR MEDIAL BRANCH NERVE AT SINGLE LEVEL Bilateral 06/04/2024    Procedure: Radiofrequency Ablation, Nerve, Spinal, Lumbar, Medial Branch, L4/5 and L5/S1;  Surgeon: Everett Taveras MD;  Location: [facility] OR;  Service: Pain Management;  Laterality: Bilateral;    REPAIR OF RETINAL DETACHMENT WITH VITRECTOMY Right 08/01/2018    Procedure: REPAIR, RETINAL DETACHMENT, WITH VITRECTOMY;  Surgeon: SAJAN Pulido MD;  Location: [address];  Service: Ophthalmology;  Laterality: Right;  35 min    SKIN BIOPSY      TONSILLECTOMY      TRANSRECTAL BIOPSY OF PROSTATE WITH ULTRASOUND GUIDANCE N/A 01/31/2023    Procedure: BIOPSY, PROSTATE, RECTAL APPROACH, WITH US GUIDANCE;  Surgeon: MARIZA Donahue MD;  Location: [facility] OR;  Service: Urology;  Laterality: N/A;    TRANSRECTAL BIOPSY OF PROSTATE WITH ULTRASOUND GUIDANCE N/A 04/12/2024    Procedure: BIOPSY, PROSTATE, RECTAL APPROACH, WITH US GUIDANCE;  Surgeon: MARIZA Donahue MD;  Location: [facility];  Service: Urology;  Laterality: N/A;    TRIGGER FINGER RELEASE Right     X 2    TURBT (TRANSURETHRAL RESECTION OF BLADDER TUMOR) N/A 02/11/2025    Procedure: TURBT (TRANSURETHRAL RESECTION OF BLADDER TUMOR);  Surgeon: MARIZA Donahue MD;  Location: [facility] OR;  Service: Urology;  Laterality: N/A;    uro lift      for enlarged prostate        Family History:  Family History   Problem Relation Name Age of Onset    Colon cancer Mother      Hypertension Mother      Diabetes Mother      Stroke Mother      Cancer Mother          colon    Kidney disease Mother      Heart disease Father      Diabetes  Father      Colon cancer Father      Cancer Father          colon    Cataracts Father      No Known Problems Sister      Heart disease Brother  62    Kidney disease Brother      Dementia Brother Rodrigue     Abnormal EKG Brother Rodrigue     Breast cancer Maternal Aunt      Cancer Maternal Aunt          breast    Cancer Maternal Aunt          brain    Brain cancer Maternal Aunt      No Known Problems Maternal Uncle      No Known Problems Paternal Aunt      No Known Problems Paternal Uncle      Glaucoma Maternal Grandmother      No Known Problems Maternal Grandfather      No Known Problems Paternal Grandmother      No Known Problems Paternal Grandfather      Amblyopia Neg Hx      Blindness Neg Hx      Macular degeneration Neg Hx      Retinal detachment Neg Hx      Strabismus Neg Hx      Thyroid disease Neg Hx      Parkinsonism Neg Hx          Social History:  Social History     Tobacco Use    Smoking status: Never    Smokeless tobacco: Never   Substance Use Topics    Alcohol use: Yes     Comment: very rare          OBJECTIVE:     There were no vitals filed for this visit.     Physical Exam    General: No acute distress. Nontoxic appearing.  HENT: Normocephalic. Atraumatic.  Respiratory: Normal respiratory effort. No conversational dyspnea. No audible wheezing.  Abdomen: No obvious distension.  Skin: No visible abnormalities.  Extremities: No edema upper extremities. No edema lower extremities.  Neurological: Alert and oriented x3. Normal speech.  Psychiatric: Normal mood. Normal affect. No evidence of SI.           LABS:    CBC:  Lab Results   Component Value Date    WBC 5.79 06/13/2025    HGB 12.7 (L) 06/13/2025    HCT 38.3 (L) 06/13/2025    MCV 91 06/13/2025     06/13/2025         BMP:  Lab Results   Component Value Date     06/13/2025    K 4.3 06/13/2025     06/13/2025    CO2 27 06/13/2025    BUN 19 06/13/2025    CREATININE 1.0 06/13/2025    CALCIUM 8.7 06/13/2025    ANIONGAP 10 06/13/2025     EGFRNORACEVR >60 06/13/2025         ASSESSMENT/PLAN:     Assessment & Plan      BLADDER CANCER:  - Plan radical cystectomy with lymph node dissection and urinary diversion.  - Recommend ileal conduit as likely best option due to lower complication rate, easier long-term care, and shorter surgery time.  - Recent PET scan showed no evidence of disease.  - Final prognosis and recurrence risk to be determined based on post-op pathology results.  - Explained radical cystectomy procedure, including bladder removal, lymph node dissection, and urinary diversion.  - Discussed differences between ileal conduit and neobladder urinary diversion options.  - Detailed potential complications of neobladder, including need for catheterization and nocturnal incontinence.  - Described ileal conduit care, including bag placement and compatibility with most activities.  - Reviewed risks of surgery including anesthesia complications, bleeding, infection, injury to surrounding structures, lymphocele formation, bowel obstruction, urine leakage, and stoma-related issues.  - Addressed possibility of cancer recurrence and need for follow-up monitoring.    We then discussed radical cystectomy with urinary diversion.  We discussed that I would expect them to stay in the hospital for 4-7 nights, and hopefully will be discharged home.  We discussed the surgery can take anywhere from 3-5 hours, would be performed open, and require general anesthesia.  We discussed the high complication rate associated with the surgery, which I quoted for major complications being from 30-50%, with a 30% readmission rate in some prior studies.  We discussed enhanced recovery protocol which have improved perioperative patient outcomes, however they are still at risk for these complications and they understand this.  We discussed the 3 forms of urinary diversion which include an ileal conduit, ileal neobladder, and right colon pouch.  He would like to move forward  with an ileal conduit    We then discussed risks, benefits, alternatives associated with this surgery, including complications, detailed below:  -General anesthesia:  Associated risks include pneumonia, cerebrovascular accident, cardiac events including myocardial infarction, pulmonary embolism, deep vein thrombosis  -Radical cystectomy, Pelvic lymphadenectomy, Ileal Conduit:  Associated risks include infection of the superficial and deep space, bleeding requiring transfusion, injury to the surrounding structures such as the small bowel, colon, sigmoid colon, rectum, arteries and veins of the abdomen and pelvis.  We discussed lymphadenectomy risk including lymphocele formation, lymphatic leak, and injury to the arteries veins and nerves of the pelvis which could lead to lower extremity weakness or focal loss of function.  For the urinary diversion, we discussed reconstitution of the bowel and the risk of anastomotic leak, anastomotic obstruction, bowel obstruction, ileus.  We discussed for the uretero-intestinal implantation, possible urine leak versus ureteral strictures requiring percutaneous nephrostomy tubes.  For the creation of the ileal conduit stoma, we discussed parastomal hernia, stomal stenosis, and stomal ischemia.  Lastly, we discussed fascial dehiscence, hernia formation, and possible evisceration.  I was oliver with the patient and explained that the range of complications includes pain of the IV site, which is almost certain, to perioperative or intraoperative mortality- for which the risk is low but never zero.    PARKINSON'S DISEASE:  - Considered Parkinson's disease and potential long-term implications for neobladder management.    FOLLOW-UP:  - Follow up with Grecia for pre-surgical stoma site marking.     - code applied: patient requires or will require a continuous, longitudinal, and active collaborative plan of care related to this patient's health condition, bladder cancer --the  management of which requires the direction of a practitioner with specialized clinical knowledge, skill, and expertise.     This encounter was dictated and transcribed using DeepScribe and Fluency[employer], please excuse any typographical or grammatical errors.           [1] (Not in a hospital admission)

## 2025-07-15 ENCOUNTER — PATIENT MESSAGE (OUTPATIENT)
Dept: HEMATOLOGY/ONCOLOGY | Facility: CLINIC | Age: 70
End: 2025-07-15
Payer: MEDICARE

## 2025-07-15 LAB — BACTERIA UR CULT: NO GROWTH

## 2025-07-15 NOTE — PROGRESS NOTES
This is a new [facility] to [facility] who is here today for pre op teaching and pre op site marking for upcoming urostomy, to have cystectomy by Dr Zapata  on 8/5/25. The patient comes with his wife today.      Review of Systems   Constitutional: Negative for activity change and appetite change.   Respiratory: Negative for cough and shortness of breath.    Cardiovascular: Negative.    Gastrointestinal: Negative.    Genitourinary:   Musculoskeletal: Negative for arthralgias and back pain.   Skin: Negative.    Neurological: Negative for weakness and headaches.   Psychiatric/Behavioral: Negative.        Objective:      Physical Exam   Constitutional:  Appears well-developed and well-nourished.   Pulmonary/Chest: Effort normal. No respiratory distress.  no wheezes.   Abdominal: Soft.  Exhibits no distension.   Musculoskeletal: Normal range of motion. Exhibits no edema.    Skin: Skin is warm and dry. No erythema.     Pre op Ostomy marking:     This patient was seen today per request  in preparation for upcoming ostomy surgery on 8/5  Stoma marking/siting was performed per protocol and patient marked with a permanent marker and skin staining with silver nitrate.     The proposed surgery was discussed and patient educated on expected postoperative course and expectations. The patient was allowed to ask questions and was also given pre-op information kit from  the [employer] for review at home prior to surgery.     Assessment:       1. Attention to urostomy    2. Encounter for ostomy care education        Plan:       Pre op siting for urinary diversion /urostomy  Pre op teaching with aid of [employer] ( [employer] ) pre op skills kit  Allowed to ask and answered all questions  I spent a total of 30 minutes on the day of the visit.  This includes face to face time and non-face to face time preparing to see the patient (eg, review of tests), obtaining and/or reviewing separately  obtained history, documenting clinical information in the electronic or other health record, independently interpreting results and communicating results to the patient/family/caregiver, or care coordinator.

## 2025-07-17 ENCOUNTER — PATIENT MESSAGE (OUTPATIENT)
Dept: HEMATOLOGY/ONCOLOGY | Facility: CLINIC | Age: 70
End: 2025-07-17
Payer: MEDICARE

## 2025-07-17 NOTE — PROGRESS NOTES
Participant was consented for Dr. Jyothi Roe's Bladder Stem Cell Study, IRB 2011.043 and the informed consent process was conducted by Dr. Ontiveros. Please see scanned documents in Media tab reflecting the consenting process.  Roxane Asencio RN

## 2025-07-18 ENCOUNTER — PATIENT MESSAGE (OUTPATIENT)
Dept: ADMINISTRATIVE | Facility: OTHER | Age: 70
End: 2025-07-18
Payer: MEDICARE

## 2025-07-18 ENCOUNTER — PATIENT MESSAGE (OUTPATIENT)
Dept: HEMATOLOGY/ONCOLOGY | Facility: CLINIC | Age: 70
End: 2025-07-18
Payer: MEDICARE

## 2025-07-21 ENCOUNTER — HOSPITAL ENCOUNTER (OUTPATIENT)
Dept: RADIOLOGY | Facility: HOSPITAL | Age: 70
Discharge: HOME | End: 2025-07-21
Payer: MEDICARE

## 2025-07-21 ENCOUNTER — PATIENT MESSAGE (OUTPATIENT)
Dept: HEMATOLOGY/ONCOLOGY | Facility: CLINIC | Age: 70
End: 2025-07-21
Payer: MEDICARE

## 2025-07-21 DIAGNOSIS — E23.7 PITUITARY ABNORMALITY: ICD-10-CM

## 2025-07-21 PROCEDURE — 25500020 PHARM REV CODE 255: Mod: PO

## 2025-07-21 PROCEDURE — A9585 GADOBUTROL INJECTION: HCPCS | Mod: PO

## 2025-07-21 PROCEDURE — 70553 MRI BRAIN STEM W/O & W/DYE: CPT | Mod: TC,PO

## 2025-07-21 PROCEDURE — 70553 MRI BRAIN STEM W/O & W/DYE: CPT | Mod: 26,,, | Performed by: RADIOLOGY

## 2025-07-21 RX ORDER — GADOBUTROL 604.72 MG/ML
7 INJECTION INTRAVENOUS
Status: COMPLETED | OUTPATIENT
Start: 2025-07-21 | End: 2025-07-21

## 2025-07-21 RX ADMIN — GADOBUTROL 7 ML: 604.72 INJECTION INTRAVENOUS at 02:07

## 2025-07-22 ENCOUNTER — PATIENT MESSAGE (OUTPATIENT)
Dept: HEMATOLOGY/ONCOLOGY | Facility: CLINIC | Age: 70
End: 2025-07-22
Payer: MEDICARE

## 2025-07-25 ENCOUNTER — PATIENT MESSAGE (OUTPATIENT)
Dept: HEMATOLOGY/ONCOLOGY | Facility: CLINIC | Age: 70
End: 2025-07-25
Payer: MEDICARE

## 2025-07-26 ENCOUNTER — PATIENT MESSAGE (OUTPATIENT)
Dept: HEMATOLOGY/ONCOLOGY | Facility: CLINIC | Age: 70
End: 2025-07-26
Payer: MEDICARE

## 2025-07-27 ENCOUNTER — PATIENT MESSAGE (OUTPATIENT)
Dept: HEMATOLOGY/ONCOLOGY | Facility: CLINIC | Age: 70
End: 2025-07-27
Payer: MEDICARE

## 2025-07-29 ENCOUNTER — PATIENT MESSAGE (OUTPATIENT)
Dept: HEMATOLOGY/ONCOLOGY | Facility: CLINIC | Age: 70
End: 2025-07-29
Payer: MEDICARE

## 2025-07-29 ENCOUNTER — OFFICE VISIT (OUTPATIENT)
Facility: CLINIC | Age: 70
End: 2025-07-29
Payer: MEDICARE

## 2025-07-29 DIAGNOSIS — K59.00 CONSTIPATION, UNSPECIFIED CONSTIPATION TYPE: ICD-10-CM

## 2025-07-29 DIAGNOSIS — D64.81 ANEMIA DUE TO ANTINEOPLASTIC CHEMOTHERAPY: ICD-10-CM

## 2025-07-29 DIAGNOSIS — I10 PRIMARY HYPERTENSION: ICD-10-CM

## 2025-07-29 DIAGNOSIS — N18.9 CHRONIC KIDNEY DISEASE, UNSPECIFIED CKD STAGE: ICD-10-CM

## 2025-07-29 DIAGNOSIS — Z80.0 FAMILY HISTORY OF COLON CANCER: ICD-10-CM

## 2025-07-29 DIAGNOSIS — D44.3: ICD-10-CM

## 2025-07-29 DIAGNOSIS — N20.1 URETERAL CALCULUS: ICD-10-CM

## 2025-07-29 DIAGNOSIS — Z79.4 TYPE 2 DIABETES MELLITUS WITH DIABETIC NEPHROPATHY, WITH LONG-TERM CURRENT USE OF INSULIN: ICD-10-CM

## 2025-07-29 DIAGNOSIS — G93.0 ARACHNOID CYST: ICD-10-CM

## 2025-07-29 DIAGNOSIS — D69.6 THROMBOCYTOPENIA, UNSPECIFIED: ICD-10-CM

## 2025-07-29 DIAGNOSIS — Z87.39 HISTORY OF GOUT: ICD-10-CM

## 2025-07-29 DIAGNOSIS — R41.89 COGNITIVE CHANGE: ICD-10-CM

## 2025-07-29 DIAGNOSIS — E11.21 TYPE 2 DIABETES MELLITUS WITH DIABETIC NEPHROPATHY, WITH LONG-TERM CURRENT USE OF INSULIN: ICD-10-CM

## 2025-07-29 DIAGNOSIS — K21.9 GASTROESOPHAGEAL REFLUX DISEASE, UNSPECIFIED WHETHER ESOPHAGITIS PRESENT: ICD-10-CM

## 2025-07-29 DIAGNOSIS — C67.9 MALIGNANT NEOPLASM OF URINARY BLADDER, UNSPECIFIED SITE: ICD-10-CM

## 2025-07-29 DIAGNOSIS — Z86.16 HISTORY OF COVID-19: ICD-10-CM

## 2025-07-29 DIAGNOSIS — C61 PROSTATE CANCER: ICD-10-CM

## 2025-07-29 DIAGNOSIS — G47.33 OSA ON CPAP: ICD-10-CM

## 2025-07-29 DIAGNOSIS — M51.369 DEGENERATION OF INTERVERTEBRAL DISC OF LUMBAR REGION, UNSPECIFIED WHETHER PAIN PRESENT: ICD-10-CM

## 2025-07-29 DIAGNOSIS — R25.9 ABNORMAL INVOLUNTARY MOVEMENTS: Primary | ICD-10-CM

## 2025-07-29 DIAGNOSIS — T45.1X5A ANEMIA DUE TO ANTINEOPLASTIC CHEMOTHERAPY: ICD-10-CM

## 2025-07-29 DIAGNOSIS — I70.0 AORTIC ATHEROSCLEROSIS: ICD-10-CM

## 2025-07-29 DIAGNOSIS — F41.9 ANXIETY: ICD-10-CM

## 2025-07-29 PROBLEM — K64.8: Status: RESOLVED | Noted: 2019-04-24 | Resolved: 2025-07-29

## 2025-07-29 PROBLEM — B95.2 ENTEROCOCCAL BACTEREMIA: Status: RESOLVED | Noted: 2025-02-24 | Resolved: 2025-07-29

## 2025-07-29 PROBLEM — R41.3 MEMORY CHANGE: Status: RESOLVED | Noted: 2021-06-28 | Resolved: 2025-07-29
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
## 2025-07-29 PROBLEM — R06.6 HICCUPS: Status: RESOLVED | Noted: 2025-05-01 | Resolved: 2025-07-29

## 2025-07-29 PROBLEM — I95.1 ORTHOSTATIC HYPOTENSION: Status: RESOLVED | Noted: 2024-08-13 | Resolved: 2025-07-29

## 2025-07-29 PROBLEM — R78.81 ENTEROCOCCAL BACTEREMIA: Status: RESOLVED | Noted: 2025-02-24 | Resolved: 2025-07-29

## 2025-07-29 PROBLEM — E27.40 ADRENAL INSUFFICIENCY: Status: RESOLVED | Noted: 2025-01-29 | Resolved: 2025-07-29

## 2025-07-29 PROBLEM — G63 POLYNEUROPATHY ASSOCIATED WITH UNDERLYING DISEASE: Status: RESOLVED | Noted: 2021-03-15 | Resolved: 2025-07-29

## 2025-07-29 PROBLEM — F41.1 GAD (GENERALIZED ANXIETY DISORDER): Status: RESOLVED | Noted: 2024-01-05 | Resolved: 2025-07-29

## 2025-07-29 PROBLEM — M10.9 GOUT: Status: RESOLVED | Noted: 2023-06-09 | Resolved: 2025-07-29

## 2025-07-29 PROBLEM — E11.9 DIABETES MELLITUS: Status: RESOLVED | Noted: 2020-09-08 | Resolved: 2025-07-29

## 2025-07-29 NOTE — ASSESSMENT & PLAN NOTE
Currently taking prazosin and losartan  His blood pressure  Blood pressure fluctuating        Hypertension-  Blood pressure is acceptable . I suggest continuation of ----prazosin---- during the entire perioperative period. I suggest holding ----losartan---- on the morning of the surgery and can continue that  post operatively under blood pressure, electrolyte and renal function monitoring as long as they are acceptable.I suggest addressing pain control as uncontrolled pain can increased blood pressure

## 2025-07-29 NOTE — PROGRESS NOTES
Problem List[1]         [1]   Patient Active Problem List  Diagnosis    BPH (benign prostatic hyperplasia)    HTN (hypertension)    DDD (degenerative disc disease), lumbar    Palpitations    Peripheral neuropathy    High triglycerides    Hypersomnia with sleep apnea    LOVE on CPAP    Other vitreous opacities, bilateral    NS (nuclear sclerosis)    Senile nuclear sclerosis    Retained lens material, left    Glaucoma associated with ocular inflammation, left, indeterminate stage    Iritis, lens-induced    Vitreomacular adhesion, right    Epiretinal membrane, right    Internal strangulated hemorrhoids    Arachnoid cyst    Narcolepsy    Abnormal gait    Cognitive change    Abnormal involuntary movements    Adjustment disorder with depressed mood    Parkinson disease    History of gout    Polyneuropathy associated with underlying disease    Fall from bed    Memory change    Chronic kidney disease    Diabetes mellitus    Family history of colon cancer    Elevated PSA    Central retinal vein occlusion, left eye, stable    Prostate cancer    Mild episode of recurrent major depressive disorder    CHAPARRITA (generalized anxiety disorder)    Primary insomnia    Night terrors, adult    Secondary hyperparathyroidism of renal origin    Chronic depression    Orthostatic hypotension    Aortic atherosclerosis    Type 2 diabetes mellitus with diabetic nephropathy, with long-term current use of insulin    Gout    Adrenal insufficiency    Disinhibition behavior    Small cell carcinoma of bladder    Anxiety    Enterococcal bacteremia    Glaucoma    Ureteral calculus    Malignant neoplasm of urinary bladder    Encounter for education    Anemia due to antineoplastic chemotherapy    Hiccups    Thrombocytopenia, unspecified    Neoplasm of uncertain behavior-pituitary

## 2025-07-29 NOTE — OUTPATIENT SUBJECTIVE & OBJECTIVE
Outpatient Subjective & Objective     Chief complaint-Preoperative evaluation, Perioperative Medical management, complication reduction plan     Active cardiac conditions- none    Revised cardiac risk index predictors- high-risk type of surgery    Functional capacity -Examples of physical activity- prior to the cancer diagnosis he was walking about 3 days a week about 2-1/2 miles each time-he started back back on his walks can take 1 flight of stairs----- He can undertake all the above activities without  chest pain,chest tightness, Shortness of breath ,dizziness,lightheadedness making his exercise tolerance more,   than 4 Mets.       Review of Systems   Constitutional:  Negative for fever.   HENT:          Sleep apnea sleep apnea   Eyes:         No unusual vision changes   Respiratory:          No cough , phlegm     No Hemoptysis   Cardiovascular:         As noted   Gastrointestinal:         Bowels-   Constipated No overt GI/ blood losses   Endocrine:        Prednisone use > 20 mg daily for 3 weeks- none   Genitourinary:  Negative for dysuria.        No urinary hesitancy    Musculoskeletal:         As above      Skin:  Negative for rash.   Neurological:  Negative for syncope.        No unilateral weakness   Hematological:         Current use of Anticoagulants  None       No past medical history pertinent negatives.        No anesthesia, bleeding, cardiac problems, PONV with previous surgeries/procedures.  Medications and Allergies reviewed in epic.     FH- No anesthesia,bleeding / venous thrombosis ,   in family      Physical Exam  There were no vitals taken for this visit.      Investigations  Lab and Imaging have been reviewed in [facility].    Review of Medicine tests    EKG- I had independently reviewed the EKG from--7/14/2025  It was reported to be showing     Sinus bradycardia   Otherwise normal ECG   When compared with ECG of 13-Sep-2024 08:57,   Premature atrial complexes are no longer Present     Sept 2024      Normal myocardial perfusion scan. There is no evidence of myocardial ischemia or infarction.    The gated perfusion images showed an ejection fraction of 73% post stress.    There is normal wall motion at post-stress.    LV cavity size is normal at rest and normal at post-stress.    The ECG portion of the study is negative for ischemia.    The patient reported no chest pain during the stress test.      Sept 2024     Grayscale color Doppler exam performed right left carotid systems.  Mild nonobstructive heterogenous plaque noted to involve the right and left carotid bulbs.  No other significant atheromatous plaque.  Doppler velocities and waveforms are otherwise normal.  No significant disease noted to involve the right or left common, right or left internal carotid arteries bilaterally.  Vertebral flow antegrade.          Currently not symptomatic    Review of clinical lab tests:  Lab Results   Component Value Date    CREATININE 1.0 06/13/2025    HGB 12.7 (L) 06/13/2025     06/13/2025         Review of old records- Was done and information gathered regards to events leading to surgery and health conditions of significance in the perioperative period       Sept 2020     No flow-limiting stenosis in the intracranial vasculature.         Review of old records- Was done and information gathered regards to events leading to surgery and health conditions of significance in the perioperative period.    Outpatient Subjective & Objective

## 2025-07-29 NOTE — ASSESSMENT & PLAN NOTE
He has depression and is taking Cymbalta  He gets agitated quick    Doing good from a mental health stand point   Not under psychiatry , Therapist care   Has supportive family   On Medication that is helping   No Suicidal / Homicidal ideation      I suggest monitoring the sodium as SIADH from Cymbalta  use and hypersecretion of ADH associated with surgery can reduce sodium in the perioperative period    Discussed increases the risk of bleeding ..

## 2025-07-29 NOTE — ASSESSMENT & PLAN NOTE
Doing good  He is unaware of the nature of the kidney stone  He has gout not known to have parathyroid problem or elevated calcium problems  Discussed hydration

## 2025-07-29 NOTE — ASSESSMENT & PLAN NOTE
No suggestions of lower extremity intermittent claudication   He is on Lipitor    HLD-I  suggest continuation of statin during the entire perioperative period.

## 2025-07-29 NOTE — ASSESSMENT & PLAN NOTE
Doing good  Does not sound Cardiac         GERD-  I suggest continuation of the Proton pump inhibitor in the perioperative period . I suggest aspiration precautions

## 2025-07-29 NOTE — ASSESSMENT & PLAN NOTE
Currently has no visible blood losses  No urinary bleeding  Meloxicam could be contributing to the anemia  No also problem number  The likely cause for his mild anemia could be from chemotherapy plus anti-inflammatory medication use    In the event that he requests transfusion, he is willing to take transfusion and he is not a Jehovah Witness

## 2025-07-29 NOTE — PROGRESS NOTES
[facility]-PreOp Consults  Progress Note    Patient Name: Glenn Medel  MRN: [id number]  Date of Evaluation- 07/29/2025  PCP- Cherie Lion, DO    Future cases for Glenn Medel [[id number]]       Case ID Status Date Time Gopal Procedure Provider Location    [id number] [facility] 8/5/2025  7:00  CYSTECTOMY, RADICAL Hebert Zapata MD [[id number]] [facility] OR 2ND FLR            HPI:  History of present illness- I had the pleasure of meeting this pleasant 70 y.o. gentleman in the pre op clinic prior to his elective Urological surgery. The patient is new to me .Mr Elizabeth was accompanied by wife Ms Martinez.  Offered to have family/ friends on the phone during the evaluation process      I have obtained the history by speaking to the patient and by reviewing the electronic health records.    Virtual pre op evaluation   Video, Audio   [place]- [place]  Consent obtained for virtual evaluation     Each patient to whom he or she provides medical services by telemedicine is:  (1) informed of the relationship between the physician and patient and the respective role of any other health care provider with respect to management of the patient; and (2) notified that he or she may decline to receive medical services by telemedicine and may withdraw from such care at any time.    Chief complaint-Preoperative evaluation , Perioperative Medical management, complication reduction plan      Events leading up to surgery / History of presenting illness -    He presented with blood in the urine January of 2025  Its gross hematuria  He had further evaluation and was diagnosed with bladder cancer      Small Cell Carcinoma of the Bladder - the patient diagnosed with small-cell carcinoma of the bladder on biopsy 02/12/2025  He had a port placed and started chemotherapy April 7, 2025  He had 4 cycles of chemotherapy  He had chemotherapy every 3 weeks and had last chemotherapy that finished on June 18th    He did well with the chemotherapy    He lost some  weight which he is putting back since he had the last chemotherapy  He lost hair but had tolerated chemotherapy fairly well    He had low platelet count leading to postponement of the last cycle of chemotherapy  The low platelet count was attributed to chemotherapy  No cirrhosis of liver    Since he had Transurethral resection of bladder tumor in February 2025, he does not have anymore blood in the urine    Currently has no abdominal pain    Few days after his TURBT, he had a stone that required stenting following which he had lithotripsy and had stent removal      He was diagnosed with prostate cancer which to his understanding is not aggressive around 2022 and is on active surveillance    He has been having nighttime urinary frequency-small amounts    He feels that he is emptying his bladder well      Secondhand smoking exposure  No chronic chemical exposure      Relevant health conditions of significance for the perioperative period/ History of presenting illness -    Type 2 diabetes  Parkinson's  Hypertension  Sleep apnea  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 Normal coronary arteries.   Nephrolithiasis  Gout      Lives with wife in a single-level house  He retired at 64 years -involving travels towards the later part of his work career   Pets- none  Children - grown son and a daughter- 45 years, 42 years  His children live close by  Has help post op    Not known to have  , Stroke , Thyroid problem, deep vein thrombosis, pulmonary embolism,   fatty liver , blood vessels stent  edema, mental health problems            Subjective/ Objective:     Chief complaint-Preoperative evaluation, Perioperative Medical management, complication reduction plan     Active cardiac conditions- none    Revised cardiac risk index predictors- high-risk type of surgery    Functional capacity -Examples of physical activity- prior to the cancer diagnosis he was walking about 3 days a week about 2-1/2 miles each time-he started back  back on his walks can take 1 flight of stairs----- He can undertake all the above activities without  chest pain,chest tightness, Shortness of breath ,dizziness,lightheadedness making his exercise tolerance more,   than 4 Mets.       Review of Systems   Constitutional:  Negative for fever.   HENT:          Sleep apnea sleep apnea   Eyes:         No unusual vision changes   Respiratory:          No cough , phlegm     No Hemoptysis   Cardiovascular:         As noted   Gastrointestinal:         Bowels-   Constipated No overt GI/ blood losses   Endocrine:        Prednisone use > 20 mg daily for 3 weeks- none   Genitourinary:  Negative for dysuria.        No urinary hesitancy    Musculoskeletal:         As above      Skin:  Negative for rash.   Neurological:  Negative for syncope.        No unilateral weakness   Hematological:         Current use of Anticoagulants  None       No past medical history pertinent negatives.        No anesthesia, bleeding, cardiac problems, PONV with previous surgeries/procedures.  Medications and Allergies reviewed in epic.     FH- No anesthesia,bleeding / venous thrombosis ,   in family      Physical Exam  There were no vitals taken for this visit.      Investigations  Lab and Imaging have been reviewed in [facility].    Review of Medicine tests    EKG- I had independently reviewed the EKG from--7/14/2025  It was reported to be showing     Sinus bradycardia   Otherwise normal ECG   When compared with ECG of 13-Sep-2024 08:57,   Premature atrial complexes are no longer Present     Sept 2024     Normal myocardial perfusion scan. There is no evidence of myocardial ischemia or infarction.    The gated perfusion images showed an ejection fraction of 73% post stress.    There is normal wall motion at post-stress.    LV cavity size is normal at rest and normal at post-stress.    The ECG portion of the study is negative for ischemia.    The patient reported no chest pain during the stress  test.      Sept 2024     Grayscale color Doppler exam performed right left carotid systems.  Mild nonobstructive heterogenous plaque noted to involve the right and left carotid bulbs.  No other significant atheromatous plaque.  Doppler velocities and waveforms are otherwise normal.  No significant disease noted to involve the right or left common, right or left internal carotid arteries bilaterally.  Vertebral flow antegrade.          Currently not symptomatic    Review of clinical lab tests:  Lab Results   Component Value Date    CREATININE 1.0 06/13/2025    HGB 12.7 (L) 06/13/2025     06/13/2025         Review of old records- Was done and information gathered regards to events leading to surgery and health conditions of significance in the perioperative period       Sept 2020     No flow-limiting stenosis in the intracranial vasculature.         Review of old records- Was done and information gathered regards to events leading to surgery and health conditions of significance in the perioperative period.        Preoperative cardiac risk assessment-  The patient does not have any active cardiac conditions . Revised cardiac risk index predictors- 1-.Functional capacity is more than 4 Mets. He will be undergoing a open abdominal procedure that carries a High Risk risk     Risk of a major Cardiac event ( Defined as death, myocardial infarction, or cardiac arrest at 30 days after noncardiac surgery), based on RCRI score     6.0%       No further cardiac work up is indicated prior to proceeding with the surgery          American Society of Anesthesiologists Physical status classification ( ASA ) class: 3     Postoperative pulmonary complication risk assessment:      ARISCAT ( Canet) risk index- risk class -  Low, if duration of surgery is under 3 hours, intermediate, if duration of surgery is over 3 hours          Assessment/Plan:     Abnormal involuntary movements  At nighttime  He is using clonazepam at  nighttime to help with sleeping  Suggested care with the benzodiazepine use  Suggested care with opioid use and Klonopin use due to their effects on his breathing and I suggested spacing those medication out      He has been taking Klonopin for about 4 years    Suggest the following measures to reduce the risk of postoperative delirium     Early ambulation, as able to  Early hospital discharge, as able to  Having the patient in a room with a window, clock, calendar  Having family members/familiar faces around the patient so that they can help with orientation with place time and person  Addressing hydration   Addressing constipation   Letting the patient have adequate sleep  Watching benzodiazepine use   Watching opioid use     Arachnoid cyst  MRI showed  . 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   He is having neurosurgery evaluation August 27th  To his understanding, he is planning on proceeding with the surgery    Cognitive change  As per his wife, he has some memory problems with the recall  No diagnosis of dementia was made    DDD (degenerative disc disease), lumbar  Doing better  He is currently taking meloxicam on a daily basis for his back pain and given his chronic kidney disease I suggested care with that and he chose to hold off on that  Not known to have also problems  He is holding the meloxicam for surgery    Parkinson disease  Parkinson's Sinemet Azilect  He is doing better from a Parkinson's standpoint and is able to walk better and has not had any falls in over 1 year    Suggested continuation of Parkinson medication hermann op  He has no difficulty swallowing or choking        Anxiety  He has depression and is taking Cymbalta  He gets agitated quick    Doing good from a mental health stand point   Not under psychiatry , Therapist care   Has supportive family    On Medication that is helping   No Suicidal / Homicidal ideation      I suggest monitoring the sodium as SIADH from Cymbalta  use and hypersecretion of ADH associated with surgery can reduce sodium in the perioperative period    Discussed increases the risk of bleeding ..     Glaucoma  Doing good from a glaucoma standpoint    Aortic atherosclerosis  No suggestions of lower extremity intermittent claudication   He is on Lipitor    HLD-I  suggest continuation of statin during the entire perioperative period.     HTN (hypertension)  Currently taking prazosin and losartan  His blood pressure  Blood pressure fluctuating        Hypertension-  Blood pressure is acceptable . I suggest continuation of ----prazosin---- during the entire perioperative period. I suggest holding ----losartan---- on the morning of the surgery and can continue that  post operatively under blood pressure, electrolyte and renal function monitoring as long as they are acceptable.I suggest addressing pain control as uncontrolled pain can increased blood pressure      Chronic kidney disease  Creatinine stable  Stages of CKD discussed  Deleterious effects NSAID's , Beneficial effects of Hydration discussed   Acetaminophen ( If not intolerant ) as needed for pain     I  suggest monitoring renal function, in put and out put status hermann-operatively. I  suggest avoiding nephrotoxic medication including NSAIDs, COX2 inhibitors, intravenous contrast agent,avoiding hypotension to prevent further renal impairment.   Care with intravenous contrast discussed   His kidney function is stable    Discussed care with meloxicam    Ureteral calculus  Doing good  He is unaware of the nature of the kidney stone  He has gout not known to have parathyroid problem or elevated calcium problems  Discussed hydration    Thrombocytopenia, unspecified  Chemo related  Most recent platelet count is normal    Anemia due to antineoplastic chemotherapy  Currently has no visible  blood losses  No urinary bleeding  Meloxicam could be contributing to the anemia  No also problem number  The likely cause for his mild anemia could be from chemotherapy plus anti-inflammatory medication use    In the event that he requests transfusion, he is willing to take transfusion and he is not a Jehovah Witness    Family history of colon cancer  He gets colonoscopies procedures    Malignant neoplasm of urinary bladder  Plan for surgery August 5th  His chemotherapies currently on hold    Neoplasm of uncertain behavior-pituitary  No vision problems- new     No nipple discharge or breast pain    No overt hypo or hyperthyroid symptoms    No    - Increased hand/ Foot size       NO    -Acne  - Striae       nO    - Polyuria  -Polydipsia          Prostate cancer  He had UroLift procedure in the past    Type 2 diabetes mellitus with diabetic nephropathy, with long-term current use of insulin  Type 2  Diabetes Mellitus  He has been not requiring Lantus in 2 weeks- July 13th and he has been off metformin  Currently taking Mounjaro that he will be skipping 1 dose prior to surgery  Hemoglobin A1c- 7  Capillary glucose check-  Pre breakfast -120-130  He has hypoglycemia awareness  He is on a continuous glucose monitoring with the alarm set for high and low glucose      Diabetes Complications     Microvascular       No tingling numbness of hands and feet  No reported open areas on the feet   Feet care suggested     Macrovascular     No stroke/ TIA  Not known to have CAD  No suggestion of  lower extremity claudication      Diabetes Mellitus-I suggest monitoring the glucose in the perioperative period ( Before meals and bed time,if the patient is on oral feeds or every 6 hourly ,if the patient is NPO )  Blood glucose target in hospitalized patients is 140-180. Oral Hypoglycemic agents are generally avoided during the hospital stay . If glucose is consistently elevated ,I suggest using basal ,prandial Insulin regimen to  control the glucose , as elevated glucose can be associated with adverse surgical out comes. Please consider involving Hospital Medicine or Endocrinology ,if any help is needed with Glucose control. Patient will be instructed based on the pre op clinic guidelines  about adjustment of diabetic treatment (If applicable )  considering the NPO status for Surgery      I had educated that uncontrolled DM can cause post op complications,risk of infection, wound healing problem,increased length of stay in hospital and its associated complications.I suggest exercise as much as possible and follow diabetic diet     Coronary angiogram May 2025-1. Normal coronary arteries.             History of gout  Gout   - Controlled   - I suggest continuation of the maintenance treatment for gout   - I suggest to keep the patient adequately hydrated.  - Please note that surgical stress ,dehydration can precipitate acute gout        LOVE on CPAP  Sleep apnea   Uses CPAP .  Suggested bringing for hospital use .   Informed the risk of worsening sleep apnea in the perioperative period and suggest using CPAP  use any time in 24 hrs ( day or night )for planned sleep     I suggest  caution with usage of medication that can cause respiratory suppression in the perioperative period    Avoidance of  supine sleep, weight gain , alcoholic beverages , care with , sedative , CNS depressant use indicated  since all of these can worsen LOVE         Acid reflux  Doing good  Does not sound Cardiac         GERD-  I suggest continuation of the Proton pump inhibitor in the perioperative period . I suggest aspiration precautions    History of COVID-19  Did not require hospitalization, intubation or supplemental oxygen use   Had been vaccinated   Recovered from COVID    COVID screening     No fever   No cough   No SOB  No sore throat   No loss of taste or smell   No muscle aches   No nausea, vomiting , diarrhea     Constipation  From what I gather, he is going to  have bowel prep prior to surgery    Suggested working on bowel movements in preparation for surgery   Constipation- I suggest giving bowel movement regimen as opioid use,reduced ambulation  can increase the constipation          Preventive perioperative care    Thromboembolic prophylaxis:  His risk factors for thrombosis include cancer  surgical procedure and age.I suggest  thromboembolic prophylaxis ( mechanical/pharmacological, weighing the risk benefits of pharmacological agent use considering hermann procedural bleeding )  during the perioperative period.I suggested being active in the post operative period.      Postoperative pulmonary complication prophylaxis-Risk factors for post operative pulmonary complications include age over 65 years, surgery lasting over 3 hours, ASA class >2, and proximity of the surgical site to the lungs- I suggest incentive spirometry use, early ambulation, end tidal carbon dioxide monitoring, and pain control so as to avoid diaphragmatic splinting  , oral care , head end of bed elevation      Renal complication prophylaxis-Risk factors for renal complications include pre-existing renal disease, diabetes mellitus, and hypertension . I suggest keeping him well hydrated and avoidance/ minimizing the use of  NSAID's,LANDRUM 2 Inhibitors ,IV contrast if possible in the perioperative period.    Surgical site Infection Prophylaxis-I  suggest appropriate antibiotic for Prophylaxis against Surgical site infections  No reported Staph infection  Skin antibacterial discussed       Delirium prophylaxis-Risk factors - benzodiazepine use - I suggest avoidance / minimizing the use of  Benzodiazepines ( unless the patient has been taking it on a regular basis ),Anticholinergic medication,Antihistamines ( like  Benadryl).I suggest minimizing the use of opioid medication and use of IV tylenol,if it is appropriate. I suggest using the lowest possible dose of opioids for the shortest duration possible in the  perioperative period. I suggest to Keep shades/blinds open during the day, lights off and shades closed at night to encourage normal sleep/wake cycle.I encourage the presence of the family member with the patient at all times, if at all possible as mental status changes can be picked up early by the family members and they help with reorientation. I encouraged the presence of family to help with orientation in the perioperative period. Benadryl avoidance suggested      This visit was focused on Preoperative evaluation, Perioperative Medical management, complication reduction plans. I suggest that the patient follows up with primary care or relevant sub specialists for ongoing health care.    I appreciate the opportunity to be involved in this patients care. Please feel free to contact me if there were any questions about this consultation.    Patient is optimized    Patient/ care giver/ Family member was instructed to call and update me about any changes to health,  medication, office visits ,testing out side of the [facility] operative care center , hospitalizations between now and surgery      Pallavi East MD  Internal Medicine  [facility]   Cell Phone- [phone]    No conjunctival icterus  No thyromegaly  No JVD  No audible wheezing  No finger clubbing    Discussed the limitation of virtual evaluation    Checked for over-the-counter medication      In summary this pleasant 70 year male is heading for radical cystectomy on August 5th and is doing well for that    He was diagnosed with bladder cancer in February of 2025 and had chemotherapy until June 2025    He is recovering from chemotherapy and has put on some weight    He is doing good with his diabetic control    His blood pressure medicine requirement has come down over the years from him losing weight some of which is intentional and some with chemotherapy and some with Mounjaro use      Around July of last year he had suggestions of  orthostatic hypotension  He was given fludrocortisone for that-he took that about 6 months  He stopped taking that February of 2025  It was felt that the fludrocortisone may have contributed to possible adrenal insufficiency  It was felt that he has no adrenal insufficiency   In retrospect, it is likely that his low hydration may have contributed to orthostatic hypotension    He was considered to have adrenal insufficiency but later felt that he does not have adrenal insufficiency    He currently does not have any pituitary hormonal dysfunction symptoms       As per the cardiologist-     Pt cleared for procedure/surgery at moderate CV risk    I have spent -130----- minutes of time which includes, time spent to prepare to see the patient , obtaining history ,performing examination, counseling/Educating the patient , Documenting clinical information in the record       =----    7/31- 15 57     Corresponded with the endocrinologist  It was felt that-no concern with  adrenal insufficiency  We will let him proceed with the surgery    --    8/1- 16 38     Called to follow up , spoke to him to address any concerns with the up coming surgery or any questions on Medication instructions -  Doing well ,No changes to Medication, Health -  Taking prazosin-no blood pressure problems

## 2025-07-29 NOTE — ASSESSMENT & PLAN NOTE
Sleep apnea   Uses CPAP .  Suggested bringing for hospital use .   Informed the risk of worsening sleep apnea in the perioperative period and suggest using CPAP  use any time in 24 hrs ( day or night )for planned sleep     I suggest  caution with usage of medication that can cause respiratory suppression in the perioperative period    Avoidance of  supine sleep, weight gain , alcoholic beverages , care with , sedative , CNS depressant use indicated  since all of these can worsen LOVE

## 2025-07-29 NOTE — HPI
History of present illness- I had the pleasure of meeting this pleasant 70 y.o. gentleman in the pre op clinic prior to his elective Urological surgery. The patient is new to me .Mr Elizabeth was accompanied by wife Ms Martinez.  Offered to have family/ friends on the phone during the evaluation process      I have obtained the history by speaking to the patient and by reviewing the electronic health records.    Virtual pre op evaluation   Video, Audio   [place]- [place]  Consent obtained for virtual evaluation     Each patient to whom he or she provides medical services by telemedicine is:  (1) informed of the relationship between the physician and patient and the respective role of any other health care provider with respect to management of the patient; and (2) notified that he or she may decline to receive medical services by telemedicine and may withdraw from such care at any time.    Chief complaint-Preoperative evaluation , Perioperative Medical management, complication reduction plan      Events leading up to surgery / History of presenting illness -    He presented with blood in the urine January of 2025  Its gross hematuria  He had further evaluation and was diagnosed with bladder cancer      Small Cell Carcinoma of the Bladder - the patient diagnosed with small-cell carcinoma of the bladder on biopsy 02/12/2025  He had a port placed and started chemotherapy April 7, 2025  He had 4 cycles of chemotherapy  He had chemotherapy every 3 weeks and had last chemotherapy that finished on June 18th    He did well with the chemotherapy    He lost some weight which he is putting back since he had the last chemotherapy  He lost hair but had tolerated chemotherapy fairly well    He had low platelet count leading to postponement of the last cycle of chemotherapy  The low platelet count was attributed to chemotherapy  No cirrhosis of liver    Since he had Transurethral resection of bladder tumor in February 2025,  he does not have anymore blood in the urine    Currently has no abdominal pain    Few days after his TURBT, he had a stone that required stenting following which he had lithotripsy and had stent removal      He was diagnosed with prostate cancer which to his understanding is not aggressive around 2022 and is on active surveillance    He has been having nighttime urinary frequency-small amounts    He feels that he is emptying his bladder well      Secondhand smoking exposure  No chronic chemical exposure      Relevant health conditions of significance for the perioperative period/ History of presenting illness -    Type 2 diabetes  Parkinson's  Hypertension  Sleep apnea  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 Normal coronary arteries.   Nephrolithiasis  Gout      Lives with wife in a single-level house  He retired at 64 years -involving travels towards the later part of his work career   Pets- none  Children - grown son and a daughter- 45 years, 42 years  His children live close by  Has help post op    Not known to have  , Stroke , Thyroid problem, deep vein thrombosis, pulmonary embolism,   fatty liver , blood vessels stent  edema, mental health problems

## 2025-07-29 NOTE — ASSESSMENT & PLAN NOTE
Type 2  Diabetes Mellitus  He has been not requiring Lantus in 2 weeks- July 13th and he has been off metformin  Currently taking Mounjaro that he will be skipping 1 dose prior to surgery  Hemoglobin A1c- 7  Capillary glucose check-  Pre breakfast -120-130  He has hypoglycemia awareness  He is on a continuous glucose monitoring with the alarm set for high and low glucose      Diabetes Complications     Microvascular       No tingling numbness of hands and feet  No reported open areas on the feet   Feet care suggested     Macrovascular     No stroke/ TIA  Not known to have CAD  No suggestion of  lower extremity claudication      Diabetes Mellitus-I suggest monitoring the glucose in the perioperative period ( Before meals and bed time,if the patient is on oral feeds or every 6 hourly ,if the patient is NPO )  Blood glucose target in hospitalized patients is 140-180. Oral Hypoglycemic agents are generally avoided during the hospital stay . If glucose is consistently elevated ,I suggest using basal ,prandial Insulin regimen to control the glucose , as elevated glucose can be associated with adverse surgical out comes. Please consider involving Hospital Medicine or Endocrinology ,if any help is needed with Glucose control. Patient will be instructed based on the pre op clinic guidelines  about adjustment of diabetic treatment (If applicable )  considering the NPO status for Surgery      I had educated that uncontrolled DM can cause post op complications,risk of infection, wound healing problem,increased length of stay in hospital and its associated complications.I suggest exercise as much as possible and follow diabetic diet     Coronary angiogram May 2025-1. Normal coronary arteries.

## 2025-07-29 NOTE — ASSESSMENT & PLAN NOTE
Parkinson's Sinemet Azilect  He is doing better from a Parkinson's standpoint and is able to walk better and has not had any falls in over 1 year    Suggested continuation of Parkinson medication hermann op  He has no difficulty swallowing or choking

## 2025-07-29 NOTE — ASSESSMENT & PLAN NOTE
From what I gather, he is going to have bowel prep prior to surgery    Suggested working on bowel movements in preparation for surgery   Constipation- I suggest giving bowel movement regimen as opioid use,reduced ambulation  can increase the constipation

## 2025-07-29 NOTE — ASSESSMENT & PLAN NOTE
At nighttime  He is using clonazepam at nighttime to help with sleeping  Suggested care with the benzodiazepine use  Suggested care with opioid use and Klonopin use due to their effects on his breathing and I suggested spacing those medication out      He has been taking Klonopin for about 4 years    Suggest the following measures to reduce the risk of postoperative delirium     Early ambulation, as able to  Early hospital discharge, as able to  Having the patient in a room with a window, clock, calendar  Having family members/familiar faces around the patient so that they can help with orientation with place time and person  Addressing hydration   Addressing constipation   Letting the patient have adequate sleep  Watching benzodiazepine use   Watching opioid use

## 2025-07-29 NOTE — ASSESSMENT & PLAN NOTE
Creatinine stable  Stages of CKD discussed  Deleterious effects NSAID's , Beneficial effects of Hydration discussed   Acetaminophen ( If not intolerant ) as needed for pain     I  suggest monitoring renal function, in put and out put status hermann-operatively. I  suggest avoiding nephrotoxic medication including NSAIDs, COX2 inhibitors, intravenous contrast agent,avoiding hypotension to prevent further renal impairment.   Care with intravenous contrast discussed   His kidney function is stable    Discussed care with meloxicam

## 2025-07-29 NOTE — ASSESSMENT & PLAN NOTE
Gout   - Controlled   - I suggest continuation of the maintenance treatment for gout   - I suggest to keep the patient adequately hydrated.  - Please note that surgical stress ,dehydration can precipitate acute gout

## 2025-07-29 NOTE — ASSESSMENT & PLAN NOTE
No vision problems- new     No nipple discharge or breast pain    No overt hypo or hyperthyroid symptoms    No    - Increased hand/ Foot size       NO    -Acne  - Striae       nO    - Polyuria  -Polydipsia

## 2025-07-29 NOTE — ASSESSMENT & PLAN NOTE
Doing better  He is currently taking meloxicam on a daily basis for his back pain and given his chronic kidney disease I suggested care with that and he chose to hold off on that  Not known to have also problems  He is holding the meloxicam for surgery

## 2025-07-29 NOTE — ASSESSMENT & PLAN NOTE
MRI showed  . 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   He is having neurosurgery evaluation August 27th  To his understanding, he is planning on proceeding with the surgery

## 2025-07-31 ENCOUNTER — PATIENT MESSAGE (OUTPATIENT)
Dept: HEMATOLOGY/ONCOLOGY | Facility: CLINIC | Age: 70
End: 2025-07-31
Payer: MEDICARE

## 2025-07-31 ENCOUNTER — TELEPHONE (OUTPATIENT)
Dept: HEMATOLOGY/ONCOLOGY | Facility: CLINIC | Age: 70
End: 2025-07-31
Payer: MEDICARE

## 2025-07-31 ENCOUNTER — PATIENT MESSAGE (OUTPATIENT)
Dept: UROLOGY | Facility: CLINIC | Age: 70
End: 2025-07-31
Payer: MEDICARE

## 2025-07-31 NOTE — NURSING
"Chart reviewed. Cystectomy with ileal conduit procedure scheduled with Dr. Zapata on 8/5.     Called Mr. Medel to check in - he states he is feeling nervous prior to surgery. He states he has been "watching too many videos". I encouraged him to take things day by day and acknowledged his fears. He states he is comfortable with the decision to proceed with the ileal conduit. He knows he is in good hands with Dr. Zapata.     I encouraged him to call me if he needs anything here in [place]. Wished him well during surgery and notified him that I will touch base with him post-operatively. He v/u and thanked me for checking in.   "

## 2025-08-01 ENCOUNTER — PATIENT MESSAGE (OUTPATIENT)
Dept: HEMATOLOGY/ONCOLOGY | Facility: CLINIC | Age: 70
End: 2025-08-01
Payer: MEDICARE

## 2025-08-01 ENCOUNTER — OFFICE VISIT (OUTPATIENT)
Dept: FAMILY MEDICINE | Facility: CLINIC | Age: 70
End: 2025-08-01
Payer: MEDICARE

## 2025-08-01 VITALS
RESPIRATION RATE: 18 BRPM | HEART RATE: 88 BPM | HEIGHT: 71 IN | SYSTOLIC BLOOD PRESSURE: 102 MMHG | WEIGHT: 172.38 LBS | BODY MASS INDEX: 24.13 KG/M2 | DIASTOLIC BLOOD PRESSURE: 76 MMHG | TEMPERATURE: 97 F | OXYGEN SATURATION: 99 %

## 2025-08-01 DIAGNOSIS — E11.21 TYPE 2 DIABETES MELLITUS WITH DIABETIC NEPHROPATHY, WITH LONG-TERM CURRENT USE OF INSULIN: Primary | ICD-10-CM

## 2025-08-01 DIAGNOSIS — I10 PRIMARY HYPERTENSION: ICD-10-CM

## 2025-08-01 DIAGNOSIS — G20.A2 PARKINSON'S DISEASE WITHOUT DYSKINESIA, WITH FLUCTUATING MANIFESTATIONS: ICD-10-CM

## 2025-08-01 DIAGNOSIS — Z79.4 TYPE 2 DIABETES MELLITUS WITH DIABETIC NEPHROPATHY, WITH LONG-TERM CURRENT USE OF INSULIN: Primary | ICD-10-CM

## 2025-08-01 DIAGNOSIS — C67.9 SMALL CELL CARCINOMA OF BLADDER: ICD-10-CM

## 2025-08-01 PROCEDURE — 99999 PR PBB SHADOW E&M-EST. PATIENT-LVL V: CPT | Mod: PBBFAC,,, | Performed by: DIETITIAN, REGISTERED

## 2025-08-01 PROCEDURE — 99215 OFFICE O/P EST HI 40 MIN: CPT | Mod: PBBFAC,PO | Performed by: DIETITIAN, REGISTERED

## 2025-08-01 NOTE — PROGRESS NOTES
PLAN:    Assessment & Plan    IMPRESSION:  - Reviewed medication list and discontinued meloxicam, gabapentin, and methocarbamol (Robaxin) as patient wishes to stop pain medications.  - Discussed prazosin dosage; patient reports taking 2 mg but prescription history shows 1 mg. Agreed to continue 2 mg dose for nightmares. Monitor for dizziness, lightheadedness, or BP drops.  - Noted low magnesium level (1.8, target 2.0); considered supplementation to address foot cramps.  - Acknowledged upcoming surgery and potential for elevated glucose due to surgical stress response.  - Performed diabetic foot exam; feet in good condition.    PARKINSON'S DISEASE:  - Evaluated the patient's sleep issues related to Parkinson's disease.  - Patient is taking clonazepam 1.5 mg to help with sleep disturbances.    REM SLEEP BEHAVIOR DISORDER:  - Patient experiences severe nightmares lasting up to 2 hours.  - Current medication regimen includes clonazepam 1.5 mg for REM sleep behavior disorder.  - Discussed prazosin dosage; although prescription history shows 1 mg, patient reports taking 2 mg.  - Agreed to continue the 2 mg dose for managing nightmares.    TYPE 2 DIABETES:  - Patient has not taken insulin since 07/13 and is using a [employer] continuous glucose monitor (CGM).  - Continue Mounjaro 10 mg weekly.  - Discussed insurance coverage for the CGM.  - Emphasized the importance of wearing the CGM after hospital discharge following surgery to maintain proper glycemic control.    MUSCLE CRAMPS:  - Patient experiences charley horse or cramps in feet at night.  - Magnesium level is low at 1.8, potentially contributing to these symptoms.  - Recommend magnesium supplementation to address the cramps.    SMALL CELL BLADDER CANCER:  - Has surgery coming up that will remove the bladder, opted for external bag  - Already cleared for surgery, ASA class 3.    FOLLOW-UP:  - Schedule follow up in 2-3 months, or after neurosurgery appointment with   Brown.        Problem List Items Addressed This Visit       HTN (hypertension)    Parkinson disease    Type 2 diabetes mellitus with diabetic nephropathy, with long-term current use of insulin - Primary    Small cell carcinoma of bladder     Future Appointments       Date Provider Specialty Appt Notes    8/22/2025  Lab hemonc    8/22/2025 Moris Lowery MD Hematology and Oncology f/u with labs and tx    8/27/2025  Lab [facility]    8/27/2025  Lab [facility]    8/27/2025 Steffen Aguirre MD Neurosurgery imaging follow up    9/15/2025 Juan Mcdermott MD Nephrology 9 mos    11/19/2025 Cherie Lion DO Family Medicine 3 month follow up    12/16/2025 Sai Palomo MD Cardiology 6 month f/u    4/7/2026 Lejeune, Elizabeth B, NP Sleep Medicine  Arrive at: Telehealth 1yr f/u cpap           Medication Management for assessment above:   Medication List with Changes/Refills   Current Medications    ALLOPURINOL (ZYLOPRIM) 300 MG TABLET    Take 1 tablet (300 mg total) by mouth once daily.    AMMONIUM LACTATE (LAC-HYDRIN) 12 % LOTION    Apply topically 2 (two) times daily.    ATORVASTATIN (LIPITOR) 20 MG TABLET    Take 1 tablet (20 mg total) by mouth every evening.    BRIMONIDINE 0.2% (ALPHAGAN) 0.2 % DROP    INSTILL 1 DROP IN BOTH EYES TWICE DAILY    CARBIDOPA-LEVODOPA  MG (SINEMET)  MG PER TABLET    Take 1.5 tablets by mouth 5 (five) times daily.    CHOLECALCIFEROL, VITAMIN D3, (D3-2000 ORAL)    Take by mouth once daily.    CLONAZEPAM (KLONOPIN) 1 MG TABLET    Take 1.5 tablets (1.5 mg total) by mouth every evening.    CYANOCOBALAMIN (VITAMIN B-12) 1000 MCG TABLET    Take 100 mcg by mouth once daily.    DEXAMETHASONE (DECADRON) 4 MG TAB    Take 2 tablets (8 mg total) by mouth once daily. on days 2-4 of each chemotherapy cycle.    DONEPEZIL (ARICEPT) 5 MG TABLET    Take 1 tablet (5 mg total) by mouth every evening.    DORZOLAMIDE (TRUSOPT) 2 % OPHTHALMIC SOLUTION    Place 1 drop into both eyes 2 (two)  "times daily.    DULOXETINE (CYMBALTA) 30 MG CAPSULE    Take 1 capsule (30 mg total) by mouth once daily.    INSULIN GLARGINE U-100, LANTUS, (LANTUS SOLOSTAR U-100 INSULIN) 100 UNIT/ML (3 ML) INPN PEN    Inject 9 Units into the skin every evening.    KETOCONAZOLE (NIZORAL) 2 % SHAMPOO    Apply topically.    LIDOCAINE-PRILOCAINE (EMLA) CREAM    Apply to port site one hour prior to chemotherapy    LOSARTAN (COZAAR) 25 MG TABLET    Take 0.5 tablets (12.5 mg total) by mouth 2 (two) times a day.    OLANZAPINE (ZYPREXA) 5 MG TABLET    Take 1 tablet (5 mg total) by mouth every evening. on days 1-4 of each chemotherapy cycle.    OMEPRAZOLE (PRILOSEC) 40 MG CAPSULE    Take 1 capsule (40 mg total) by mouth once daily.    PEN NEEDLE, DIABETIC (BD HIMANSHU 2ND GEN PEN NEEDLE) 32 GAUGE X 5/32" NDLE    Inject 1 each into the skin once daily.    PRAZOSIN (MINIPRESS) 1 MG CAP    Take 1 capsule (1 mg total) by mouth every evening.    PROCHLORPERAZINE (COMPAZINE) 5 MG TABLET    Take 1 tablet (5 mg total) by mouth every 6 (six) hours as needed for Nausea.    RAMELTEON (ROZEREM) 8 MG TABLET    Take 1 tablet (8 mg total) by mouth nightly.    RASAGILINE (AZILECT) 1 MG TAB    TAKE 1 TABLET(1 MG) BY MOUTH EVERY EVENING    TIRZEPATIDE 10 MG/0.5 ML PNIJ    Inject 10 mg into the skin every 7 days. (Increased dose)   Discontinued Medications    BACILLUS COAGULANS ORAL    Take 1 tablet by mouth once daily.    GABAPENTIN (NEURONTIN) 300 MG CAPSULE    Take 1 capsule (300 mg total) by mouth 2 (two) times daily.    MELOXICAM (MOBIC) 15 MG TABLET    Take 1 tablet (15 mg total) by mouth once daily.    METHOCARBAMOL (ROBAXIN) 750 MG TAB    Take 1 tablet (750 mg total) by mouth 4 (four) times daily.       Cherie Lion, DO, RDN  ==========================================================================  Subjective:   Patient ID: Glenn Medel is a 70 y.o. male.  has a past medical history of [place]ON (acute kidney injury) (02/21/2025), Arthritis, Bladder " cancer (2025), Blurred vision (09/08/2020), BPH (benign prostatic hyperplasia), Cancer, Cataract, Chronic kidney disease, stage 3 (02/25/2015), DDD (degenerative disc disease), lumbar, Diabetes mellitus, Elevated PSA, Fall (07/02/2024), Glaucoma, HTN (hypertension), Iritis, lens-induced (01/08/2018), LUE numbness (09/08/2020), Obesity, Parkinson's disease, Pyelonephritis (02/21/2025), Sepsis (06/09/2023), Sleep apnea, Suspected cerebrovascular accident (CVA) (09/08/2020), and Syncope and collapse (07/02/2024).   Chief Complaint: No chief complaint on file.      History of Present Illness    CHIEF COMPLAINT:  Patient presents today for medication review prior to upcoming surgery.    SLEEP AND NIGHTMARES:  He reports ongoing sleep disturbances characterized by severe nightmares lasting up to 2 hours and occurring with increased frequency over the past week. He describes experiencing a particularly intense nightmare on Tuesday night during which he physically reacted, nearly knocking items off the nightstand. His Klonopin dosage was recently increased to 2 mg to manage nightmares associated with his Parkinson's disease. After taking 2 mg, he did not experience a nightmare the subsequent night. His Prazosin dose is currently 2 mg with improved symptoms and reduction in nightmares. He is currently monitoring nightmare intensity and frequency with the adjusted medication dosage.    MEDICATIONS:  He is discontinuing pain medications including meloxicam, gabapentin, and methocarbamol after discussion with provider. He has not taken insulin since 07/13. He continues Prazosin 2 mg with good effect and Klonopin recently increased to 2 mg for nightmare management.    DIABETES MANAGEMENT:  He utilizes a [employer] continuous glucose monitoring device and manually transmits device data to digital medicines once weekly, as the device does not automatically upload information. He acknowledges glucose readings can fluctuate and vary  between home and clinical measurements.    MUSCULOSKELETAL:  He experiences intermittent foot cramps at night described as similar to a charley horse when he goes to bed. He denies associated numbness, tingling, or persistent pain.      ROS:  General: -fever, -chills, -fatigue, -weight gain, -weight loss  Eyes: -vision changes, -redness, -discharge  ENT: -ear pain, -nasal congestion, -sore throat  Cardiovascular: -chest pain, -palpitations, -lower extremity edema  Respiratory: -cough, -shortness of breath  Gastrointestinal: -abdominal pain, -nausea, -vomiting, -diarrhea, -constipation, -blood in stool  Genitourinary: -dysuria, -hematuria, -frequency  Musculoskeletal: -joint pain, -muscle pain, +muscle cramps, +leg cramping  Skin: -rash, -lesion  Neurological: -headache, -dizziness, -numbness, -tingling  Psychiatric: -anxiety, -depression, -sleep difficulty, +nightmares          Problem List Items Addressed This Visit       HTN (hypertension)    Parkinson disease    Overview   Cpap setting 16         Type 2 diabetes mellitus with diabetic nephropathy, with long-term current use of insulin - Primary    Small cell carcinoma of bladder        Review of patient's allergies indicates:   Allergen Reactions    Avelox [moxifloxacin] Hives and Rash     Current Outpatient Medications   Medication Instructions    allopurinoL (ZYLOPRIM) 300 mg, Oral, Daily    ammonium lactate (LAC-HYDRIN) 12 % lotion 2 times daily    atorvastatin (LIPITOR) 20 mg, Oral, Nightly    brimonidine 0.2% (ALPHAGAN) 0.2 % Drop 1 drop, Both Eyes, 2 times daily    carbidopa-levodopa  mg (SINEMET)  mg per tablet 1.5 tablets, Oral, 5 times daily    cholecalciferol, vitamin D3, (D3-2000 ORAL) Daily    clonazePAM (KLONOPIN) 1.5 mg, Oral, Nightly    cyanocobalamin (VITAMIN B-12) 100 mcg, Daily    dexAMETHasone (DECADRON) 8 mg, Oral, Daily, on days 2-4 of each chemotherapy cycle.    donepeziL (ARICEPT) 5 mg, Oral, Nightly    dorzolamide (TRUSOPT) 2 %  "ophthalmic solution 1 drop, Both Eyes, 2 times daily    DULoxetine (CYMBALTA) 30 mg, Oral, Daily    ketoconazole (NIZORAL) 2 % shampoo Apply topically.    LANTUS SOLOSTAR U-100 INSULIN 9 Units, Subcutaneous, Nightly    LIDOcaine-prilocaine (EMLA) cream Apply to port site one hour prior to chemotherapy    losartan (COZAAR) 12.5 mg, Oral, 2 times daily    MOUNJARO 10 mg, Subcutaneous, Every 7 days, (Increased dose)    OLANZapine (ZYPREXA) 5 mg, Oral, Nightly, on days 1-4 of each chemotherapy cycle.    omeprazole (PRILOSEC) 40 mg, Oral, Daily    pen needle, diabetic (BD HIMANSHU 2ND GEN PEN NEEDLE) 32 gauge x 5/32" Ndle 1 each, Subcutaneous, Daily    prazosin (MINIPRESS) 1 mg, Oral, Nightly    prochlorperazine (COMPAZINE) 5 mg, Oral, Every 6 hours PRN    ramelteon (ROZEREM) 8 mg, Oral, Nightly    rasagiline (AZILECT) 1 mg Tab TAKE 1 TABLET(1 MG) BY MOUTH EVERY EVENING      I have reviewed the PMH, social history, FamilyHx, surgical history, allergies and medications documented / confirmed by the patient at the time of this visit.    Objective:   /76   Pulse 88   Temp 97.4 °F (36.3 °C)   Resp 18   Ht 5' 11" (1.803 m)   Wt 78.2 kg (172 lb 6.4 oz)   SpO2 99%   BMI 24.04 kg/m²   Physical Exam    Vitals: Low blood pressure.  General: No acute distress. Well-developed. Well-nourished.  Eyes: EOMI. Sclerae anicteric.  HENT: Normocephalic. Atraumatic. Nares patent. Moist oral mucosa.  Ears: Bilateral TMs clear. Bilateral EACs clear.  Cardiovascular: Regular rate. Regular rhythm. No murmurs. No rubs. No gallops. Normal S1, S2.  Respiratory: Normal respiratory effort. Clear to auscultation bilaterally. No rales. No rhonchi. No wheezing.  Abdomen: Soft. Non-tender. Non-distended. Normoactive bowel sounds.  Musculoskeletal: No  obvious deformity.  Extremities: No lower extremity edema.  Neurological: Alert & oriented x3. No slurred speech. Normal gait.  Psychiatric: Normal mood. Normal affect. Good insight. Good " judgment.  Skin: Warm. Dry. No rash.  Diabetic Foot: Feet in good condition.          Protective Sensation (w/ 10 gram monofilament):  Right: Intact  Left: Intact    Visual Inspection:  Normal -  Bilateral    Pedal Pulses:   Right: Present  Left: Present    Posterior Tibialis Pulses:   Right:Present  Left: Present      Assessment:     1. Type 2 diabetes mellitus with diabetic nephropathy, with long-term current use of insulin    2. Primary hypertension    3. Parkinson's disease without dyskinesia, with fluctuating manifestations    4. Small cell carcinoma of bladder      MDM:   Moderate complexity, more than 2 chronic conditions requiring medication management, unique testing, interpretation of testing and follow up.    Total time: 37 minutes.  This includes total time spent on the encounter, which includes face to face time and non-face to face time preparing to see the patient (eg, review of previous medical records, tests), Obtaining and/or reviewing separately obtained history, documenting clinical information in the electronic or other health record, independently interpreting results (not separately reported)/communicating results to the patient/family/caregiver, and/or care coordination (not separately reported).        Visit today included increased complexity associated with the care of the episodic problem see above assessment addressed and managing the longitudinal care of the patient due to the serious and/or complex managed problem(s) see above.    I have reviewed and summarized old records.  I have performed thorough medication reconciliation today and discussed risk and benefits of medications.  I have reviewed labs and discussed with patient.  All questions were answered.    I have signed for the following orders AND/OR meds.  No orders of the defined types were placed in this encounter.          Follow up in about 3 months (around 11/1/2025), or if symptoms worsen or fail to improve.  Future  Appointments       Date Provider Specialty Appt Notes    8/22/2025  Lab hemonc    8/22/2025 Moris Lowery MD Hematology and Oncology f/u with labs and tx    8/27/2025  Lab [facility]    8/27/2025  Lab [facility]    8/27/2025 Steffen Aguirre MD Neurosurgery imaging follow up    9/15/2025 Juan Mcdermott MD Nephrology 9 mos    11/19/2025 Cherie Lion DO Family Medicine 3 month follow up    12/16/2025 Sai Palomo MD Cardiology 6 month f/u    4/7/2026 Lejeune, Elizabeth B, NP Sleep Medicine  Arrive at: Telehealth 1yr f/u cpap            If no improvement in symptoms or symptoms worsen, advised to call/follow-up at clinic or go to ER. Patient voiced understanding and all questions/concerns were addressed.     DISCLAIMER: This note was compiled by using a speech recognition dictation system and therefore please be aware that typographical / speech recognition errors can and do occur.  Please contact me if you see any errors specifically.     This note was generated with the assistance of ambient listening technology. Verbal consent was obtained by the patient and accompanying visitor(s) for the recording of patient appointment to facilitate this note. I attest to having reviewed and edited the generated note for accuracy, though some syntax or spelling errors may persist. Please contact the author of this note for any clarification.      Cherie Lion DO, RDN    Office: [phone] [address] [place] [address]  FAX: [phone]

## 2025-08-03 ENCOUNTER — PATIENT MESSAGE (OUTPATIENT)
Dept: HEMATOLOGY/ONCOLOGY | Facility: CLINIC | Age: 70
End: 2025-08-03
Payer: MEDICARE

## 2025-08-04 ENCOUNTER — PATIENT MESSAGE (OUTPATIENT)
Dept: UROLOGY | Facility: CLINIC | Age: 70
End: 2025-08-04
Payer: MEDICARE

## 2025-08-04 ENCOUNTER — TELEPHONE (OUTPATIENT)
Dept: UROLOGY | Facility: CLINIC | Age: 70
End: 2025-08-04
Payer: MEDICARE

## 2025-08-04 ENCOUNTER — PATIENT MESSAGE (OUTPATIENT)
Dept: FAMILY MEDICINE | Facility: CLINIC | Age: 70
End: 2025-08-04
Payer: MEDICARE

## 2025-08-04 DIAGNOSIS — F51.4 NIGHT TERRORS, ADULT: ICD-10-CM

## 2025-08-04 RX ORDER — PRAZOSIN HYDROCHLORIDE 2 MG/1
2 CAPSULE ORAL NIGHTLY
Qty: 90 CAPSULE | Refills: 3 | Status: ON HOLD | OUTPATIENT
Start: 2025-08-04

## 2025-08-05 ENCOUNTER — ANESTHESIA (OUTPATIENT)
Dept: SURGERY | Facility: HOSPITAL | Age: 70
End: 2025-08-05
Payer: MEDICARE

## 2025-08-05 ENCOUNTER — HOSPITAL ENCOUNTER (INPATIENT)
Facility: HOSPITAL | Age: 70
LOS: 5 days | Discharge: HOME HEALTH | DRG: 708 | End: 2025-08-10
Attending: UROLOGY | Admitting: UROLOGY
Payer: MEDICARE

## 2025-08-05 DIAGNOSIS — R00.0 TACHYCARDIA: ICD-10-CM

## 2025-08-05 DIAGNOSIS — E11.21 TYPE 2 DIABETES MELLITUS WITH DIABETIC NEPHROPATHY, WITH LONG-TERM CURRENT USE OF INSULIN: ICD-10-CM

## 2025-08-05 DIAGNOSIS — Z01.818 PREOPERATIVE TESTING: ICD-10-CM

## 2025-08-05 DIAGNOSIS — Z79.4 TYPE 2 DIABETES MELLITUS WITH DIABETIC NEPHROPATHY, WITH LONG-TERM CURRENT USE OF INSULIN: ICD-10-CM

## 2025-08-05 DIAGNOSIS — C67.9 MALIGNANT NEOPLASM OF URINARY BLADDER, UNSPECIFIED SITE: Primary | ICD-10-CM

## 2025-08-05 LAB
ABSOLUTE EOSINOPHIL (OHS): 0.01 K/UL
ABSOLUTE MONOCYTE (OHS): 0.68 K/UL (ref 0.3–1)
ABSOLUTE NEUTROPHIL COUNT (OHS): 7.82 K/UL (ref 1.8–7.7)
ANION GAP (OHS): 8 MMOL/L (ref 8–16)
BASOPHILS # BLD AUTO: 0.02 K/UL
BASOPHILS NFR BLD AUTO: 0.2 %
BUN SERPL-MCNC: 19 MG/DL (ref 8–23)
CALCIUM SERPL-MCNC: 7.4 MG/DL (ref 8.7–10.5)
CHLORIDE SERPL-SCNC: 110 MMOL/L (ref 95–110)
CO2 SERPL-SCNC: 22 MMOL/L (ref 23–29)
CREAT SERPL-MCNC: 0.9 MG/DL (ref 0.5–1.4)
ERYTHROCYTE [DISTWIDTH] IN BLOOD BY AUTOMATED COUNT: 13.8 % (ref 11.5–14.5)
GFR SERPLBLD CREATININE-BSD FMLA CKD-EPI: >60 ML/MIN/1.73/M2
GLUCOSE SERPL-MCNC: 145 MG/DL (ref 70–110)
GLUCOSE SERPL-MCNC: 149 MG/DL (ref 70–110)
GLUCOSE SERPL-MCNC: 153 MG/DL (ref 70–110)
HCO3 UR-SCNC: 21.6 MMOL/L (ref 24–28)
HCO3 UR-SCNC: 22.9 MMOL/L (ref 24–28)
HCT VFR BLD AUTO: 32.2 % (ref 40–54)
HCT VFR BLD CALC: 30 %PCV (ref 36–54)
HCT VFR BLD CALC: 30 %PCV (ref 36–54)
HGB BLD-MCNC: 10.7 GM/DL (ref 14–18)
IMM GRANULOCYTES # BLD AUTO: 0.02 K/UL (ref 0–0.04)
IMM GRANULOCYTES NFR BLD AUTO: 0.2 % (ref 0–0.5)
INDIRECT COOMBS: NORMAL
LYMPHOCYTES # BLD AUTO: 0.68 K/UL (ref 1–4.8)
MAGNESIUM SERPL-MCNC: 1.8 MG/DL (ref 1.6–2.6)
MCH RBC QN AUTO: 30.1 PG (ref 27–31)
MCHC RBC AUTO-ENTMCNC: 33.2 G/DL (ref 32–36)
MCV RBC AUTO: 90 FL (ref 82–98)
NUCLEATED RBC (/100WBC) (OHS): 0 /100 WBC
PCO2 BLDA: 36.4 MMHG (ref 35–45)
PCO2 BLDA: 40.1 MMHG (ref 35–45)
PH SMN: 7.34 [PH] (ref 7.35–7.45)
PH SMN: 7.41 [PH] (ref 7.35–7.45)
PHOSPHATE SERPL-MCNC: 3.7 MG/DL (ref 2.7–4.5)
PLATELET # BLD AUTO: 103 K/UL (ref 150–450)
PMV BLD AUTO: 8.7 FL (ref 9.2–12.9)
PO2 BLDA: 154 MMHG (ref 80–100)
PO2 BLDA: 206 MMHG (ref 80–100)
POC BE: -2 MMOL/L (ref -2–2)
POC BE: -4 MMOL/L (ref -2–2)
POC IONIZED CALCIUM: 1.14 MMOL/L (ref 1.06–1.42)
POC IONIZED CALCIUM: 1.15 MMOL/L (ref 1.06–1.42)
POC SATURATED O2: 100 % (ref 95–100)
POC SATURATED O2: 99 % (ref 95–100)
POC TCO2: 23 MMOL/L (ref 23–27)
POC TCO2: 24 MMOL/L (ref 23–27)
POCT GLUCOSE: 123 MG/DL (ref 70–110)
POCT GLUCOSE: 147 MG/DL (ref 70–110)
POTASSIUM BLD-SCNC: 3.9 MMOL/L (ref 3.5–5.1)
POTASSIUM BLD-SCNC: 4.2 MMOL/L (ref 3.5–5.1)
POTASSIUM SERPL-SCNC: 4.4 MMOL/L (ref 3.5–5.1)
RBC # BLD AUTO: 3.56 M/UL (ref 4.6–6.2)
RELATIVE EOSINOPHIL (OHS): 0.1 %
RELATIVE LYMPHOCYTE (OHS): 7.4 % (ref 18–48)
RELATIVE MONOCYTE (OHS): 7.4 % (ref 4–15)
RELATIVE NEUTROPHIL (OHS): 84.7 % (ref 38–73)
RH BLD: NORMAL
SAMPLE: ABNORMAL
SAMPLE: ABNORMAL
SODIUM BLD-SCNC: 139 MMOL/L (ref 136–145)
SODIUM BLD-SCNC: 140 MMOL/L (ref 136–145)
SODIUM SERPL-SCNC: 140 MMOL/L (ref 136–145)
SPECIMEN OUTDATE: NORMAL
WBC # BLD AUTO: 9.23 K/UL (ref 3.9–12.7)

## 2025-08-05 PROCEDURE — 0T180ZC BYPASS BILATERAL URETERS TO ILEOCUTANEOUS, OPEN APPROACH: ICD-10-PCS | Performed by: UROLOGY

## 2025-08-05 PROCEDURE — 25000003 PHARM REV CODE 250: Performed by: NURSE ANESTHETIST, CERTIFIED REGISTERED

## 2025-08-05 PROCEDURE — C2617 STENT, NON-COR, TEM W/O DEL: HCPCS | Performed by: UROLOGY

## 2025-08-05 PROCEDURE — 63600175 PHARM REV CODE 636 W HCPCS

## 2025-08-05 PROCEDURE — 0TTB0ZZ RESECTION OF BLADDER, OPEN APPROACH: ICD-10-PCS | Performed by: UROLOGY

## 2025-08-05 PROCEDURE — 36000709 HC OR TIME LEV III EA ADD 15 MIN: Performed by: UROLOGY

## 2025-08-05 PROCEDURE — 25000003 PHARM REV CODE 250: Performed by: STUDENT IN AN ORGANIZED HEALTH CARE EDUCATION/TRAINING PROGRAM

## 2025-08-05 PROCEDURE — 37000009 HC ANESTHESIA EA ADD 15 MINS: Performed by: UROLOGY

## 2025-08-05 PROCEDURE — 71000015 HC POSTOP RECOV 1ST HR: Performed by: UROLOGY

## 2025-08-05 PROCEDURE — C1729 CATH, DRAINAGE: HCPCS | Performed by: UROLOGY

## 2025-08-05 PROCEDURE — 63600175 PHARM REV CODE 636 W HCPCS: Mod: JZ,TB

## 2025-08-05 PROCEDURE — 07BC0ZX EXCISION OF PELVIS LYMPHATIC, OPEN APPROACH, DIAGNOSTIC: ICD-10-PCS | Performed by: UROLOGY

## 2025-08-05 PROCEDURE — 63600175 PHARM REV CODE 636 W HCPCS: Performed by: STUDENT IN AN ORGANIZED HEALTH CARE EDUCATION/TRAINING PROGRAM

## 2025-08-05 PROCEDURE — 63600175 PHARM REV CODE 636 W HCPCS: Performed by: NURSE ANESTHETIST, CERTIFIED REGISTERED

## 2025-08-05 PROCEDURE — 27200750 HC INSULATED NEEDLE/ STIMUPLEX

## 2025-08-05 PROCEDURE — 37000008 HC ANESTHESIA 1ST 15 MINUTES: Performed by: UROLOGY

## 2025-08-05 PROCEDURE — 76942 ECHO GUIDE FOR BIOPSY: CPT

## 2025-08-05 PROCEDURE — 71000033 HC RECOVERY, INTIAL HOUR: Performed by: UROLOGY

## 2025-08-05 PROCEDURE — 71000016 HC POSTOP RECOV ADDL HR: Performed by: UROLOGY

## 2025-08-05 PROCEDURE — 0VT00ZZ RESECTION OF PROSTATE, OPEN APPROACH: ICD-10-PCS | Performed by: UROLOGY

## 2025-08-05 PROCEDURE — 27201423 OPTIME MED/SURG SUP & DEVICES STERILE SUPPLY: Performed by: UROLOGY

## 2025-08-05 PROCEDURE — 25000003 PHARM REV CODE 250

## 2025-08-05 PROCEDURE — 82962 GLUCOSE BLOOD TEST: CPT | Performed by: UROLOGY

## 2025-08-05 PROCEDURE — 51595 REMOVE BLADDER/REVISE TRACT: CPT | Mod: ,,, | Performed by: UROLOGY

## 2025-08-05 PROCEDURE — 80048 BASIC METABOLIC PNL TOTAL CA: CPT | Performed by: STUDENT IN AN ORGANIZED HEALTH CARE EDUCATION/TRAINING PROGRAM

## 2025-08-05 PROCEDURE — 64469 THRC FASCIAL PLN BLK BI NFS: CPT | Mod: XU,,, | Performed by: STUDENT IN AN ORGANIZED HEALTH CARE EDUCATION/TRAINING PROGRAM

## 2025-08-05 PROCEDURE — 84100 ASSAY OF PHOSPHORUS: CPT | Performed by: STUDENT IN AN ORGANIZED HEALTH CARE EDUCATION/TRAINING PROGRAM

## 2025-08-05 PROCEDURE — 88305 TISSUE EXAM BY PATHOLOGIST: CPT | Mod: TC | Performed by: UROLOGY

## 2025-08-05 PROCEDURE — 36000708 HC OR TIME LEV III 1ST 15 MIN: Performed by: UROLOGY

## 2025-08-05 PROCEDURE — 11000001 HC ACUTE MED/SURG PRIVATE ROOM

## 2025-08-05 PROCEDURE — 0VT30ZZ RESECTION OF BILATERAL SEMINAL VESICLES, OPEN APPROACH: ICD-10-PCS | Performed by: UROLOGY

## 2025-08-05 PROCEDURE — 83735 ASSAY OF MAGNESIUM: CPT | Performed by: STUDENT IN AN ORGANIZED HEALTH CARE EDUCATION/TRAINING PROGRAM

## 2025-08-05 PROCEDURE — 25000003 PHARM REV CODE 250: Performed by: UROLOGY

## 2025-08-05 PROCEDURE — 86901 BLOOD TYPING SEROLOGIC RH(D): CPT | Performed by: ANESTHESIOLOGY

## 2025-08-05 PROCEDURE — 85025 COMPLETE CBC W/AUTO DIFF WBC: CPT | Performed by: STUDENT IN AN ORGANIZED HEALTH CARE EDUCATION/TRAINING PROGRAM

## 2025-08-05 PROCEDURE — 86920 COMPATIBILITY TEST SPIN: CPT | Performed by: ANESTHESIOLOGY

## 2025-08-05 DEVICE — STENT SET URET DIV 7FR 75CM: Type: IMPLANTABLE DEVICE | Site: URETER | Status: FUNCTIONAL

## 2025-08-05 RX ORDER — PROPOFOL 10 MG/ML
VIAL (ML) INTRAVENOUS
Status: DISCONTINUED | OUTPATIENT
Start: 2025-08-05 | End: 2025-08-05

## 2025-08-05 RX ORDER — PREGABALIN 150 MG/1
150 CAPSULE ORAL NIGHTLY
Status: DISCONTINUED | OUTPATIENT
Start: 2025-08-05 | End: 2025-08-10 | Stop reason: HOSPADM

## 2025-08-05 RX ORDER — METHOCARBAMOL 500 MG/1
500 TABLET, FILM COATED ORAL EVERY 8 HOURS
Status: DISCONTINUED | OUTPATIENT
Start: 2025-08-05 | End: 2025-08-06

## 2025-08-05 RX ORDER — KETOROLAC TROMETHAMINE 15 MG/ML
15 INJECTION, SOLUTION INTRAMUSCULAR; INTRAVENOUS
Status: DISCONTINUED | OUTPATIENT
Start: 2025-08-05 | End: 2025-08-05 | Stop reason: HOSPADM

## 2025-08-05 RX ORDER — FAMOTIDINE 10 MG/ML
20 INJECTION, SOLUTION INTRAVENOUS 2 TIMES DAILY
Status: DISCONTINUED | OUTPATIENT
Start: 2025-08-05 | End: 2025-08-06

## 2025-08-05 RX ORDER — ACETAMINOPHEN 10 MG/ML
INJECTION, SOLUTION INTRAVENOUS
Status: DISCONTINUED | OUTPATIENT
Start: 2025-08-05 | End: 2025-08-05

## 2025-08-05 RX ORDER — ALVIMOPAN 12 MG/1
12 CAPSULE ORAL
Status: COMPLETED | OUTPATIENT
Start: 2025-08-05 | End: 2025-08-05

## 2025-08-05 RX ORDER — CLONAZEPAM 0.5 MG/1
1.5 TABLET ORAL NIGHTLY
Status: DISCONTINUED | OUTPATIENT
Start: 2025-08-06 | End: 2025-08-06

## 2025-08-05 RX ORDER — DEXAMETHASONE SODIUM PHOSPHATE 4 MG/ML
INJECTION, SOLUTION INTRA-ARTICULAR; INTRALESIONAL; INTRAMUSCULAR; INTRAVENOUS; SOFT TISSUE
Status: DISCONTINUED | OUTPATIENT
Start: 2025-08-05 | End: 2025-08-05

## 2025-08-05 RX ORDER — KETAMINE HCL IN 0.9 % NACL 50 MG/5 ML
SYRINGE (ML) INTRAVENOUS
Status: DISCONTINUED | OUTPATIENT
Start: 2025-08-05 | End: 2025-08-05

## 2025-08-05 RX ORDER — MUPIROCIN 20 MG/G
OINTMENT TOPICAL 2 TIMES DAILY
Status: COMPLETED | OUTPATIENT
Start: 2025-08-05 | End: 2025-08-10

## 2025-08-05 RX ORDER — SODIUM CHLORIDE 9 MG/ML
INJECTION, SOLUTION INTRAVENOUS CONTINUOUS
Status: DISCONTINUED | OUTPATIENT
Start: 2025-08-05 | End: 2025-08-06

## 2025-08-05 RX ORDER — FENTANYL CITRATE 50 UG/ML
INJECTION, SOLUTION INTRAMUSCULAR; INTRAVENOUS
Status: DISCONTINUED | OUTPATIENT
Start: 2025-08-05 | End: 2025-08-05

## 2025-08-05 RX ORDER — PHENYLEPHRINE HYDROCHLORIDE 10 MG/ML
INJECTION INTRAVENOUS
Status: DISCONTINUED | OUTPATIENT
Start: 2025-08-05 | End: 2025-08-05

## 2025-08-05 RX ORDER — RASAGILINE 1 MG/1
1 TABLET ORAL DAILY
Status: DISCONTINUED | OUTPATIENT
Start: 2025-08-06 | End: 2025-08-10 | Stop reason: HOSPADM

## 2025-08-05 RX ORDER — KETOROLAC TROMETHAMINE 15 MG/ML
15 INJECTION, SOLUTION INTRAMUSCULAR; INTRAVENOUS EVERY 6 HOURS
Status: DISPENSED | OUTPATIENT
Start: 2025-08-06 | End: 2025-08-08

## 2025-08-05 RX ORDER — ACETAMINOPHEN 500 MG
1000 TABLET ORAL
Status: COMPLETED | OUTPATIENT
Start: 2025-08-05 | End: 2025-08-05

## 2025-08-05 RX ORDER — HYDROMORPHONE HYDROCHLORIDE 1 MG/ML
0.2 INJECTION, SOLUTION INTRAMUSCULAR; INTRAVENOUS; SUBCUTANEOUS EVERY 5 MIN PRN
Refills: 0 | Status: DISCONTINUED | OUTPATIENT
Start: 2025-08-05 | End: 2025-08-05 | Stop reason: HOSPADM

## 2025-08-05 RX ORDER — ONDANSETRON HYDROCHLORIDE 2 MG/ML
4 INJECTION, SOLUTION INTRAVENOUS EVERY 8 HOURS PRN
Status: DISCONTINUED | OUTPATIENT
Start: 2025-08-05 | End: 2025-08-07

## 2025-08-05 RX ORDER — ROCURONIUM BROMIDE 10 MG/ML
INJECTION, SOLUTION INTRAVENOUS
Status: DISCONTINUED | OUTPATIENT
Start: 2025-08-05 | End: 2025-08-05

## 2025-08-05 RX ORDER — CEFAZOLIN 2 G/1
2 INJECTION, POWDER, FOR SOLUTION INTRAMUSCULAR; INTRAVENOUS
Status: COMPLETED | OUTPATIENT
Start: 2025-08-05 | End: 2025-08-05

## 2025-08-05 RX ORDER — ONDANSETRON HYDROCHLORIDE 2 MG/ML
INJECTION, SOLUTION INTRAVENOUS
Status: DISCONTINUED | OUTPATIENT
Start: 2025-08-05 | End: 2025-08-05

## 2025-08-05 RX ORDER — METHOCARBAMOL 500 MG/1
1000 TABLET, FILM COATED ORAL EVERY 6 HOURS PRN
Status: DISCONTINUED | OUTPATIENT
Start: 2025-08-05 | End: 2025-08-06

## 2025-08-05 RX ORDER — MIDAZOLAM HYDROCHLORIDE 1 MG/ML
INJECTION INTRAMUSCULAR; INTRAVENOUS
Status: DISCONTINUED | OUTPATIENT
Start: 2025-08-05 | End: 2025-08-05

## 2025-08-05 RX ORDER — MIDAZOLAM HYDROCHLORIDE 1 MG/ML
.5-4 INJECTION, SOLUTION INTRAMUSCULAR; INTRAVENOUS
Status: DISCONTINUED | OUTPATIENT
Start: 2025-08-05 | End: 2025-08-05 | Stop reason: HOSPADM

## 2025-08-05 RX ORDER — DEXAMETHASONE SODIUM PHOSPHATE 10 MG/ML
INJECTION, SOLUTION INTRA-ARTICULAR; INTRALESIONAL; INTRAMUSCULAR; INTRAVENOUS; SOFT TISSUE
Status: COMPLETED | OUTPATIENT
Start: 2025-08-05 | End: 2025-08-05

## 2025-08-05 RX ORDER — HYDROMORPHONE HYDROCHLORIDE 1 MG/ML
INJECTION, SOLUTION INTRAMUSCULAR; INTRAVENOUS; SUBCUTANEOUS
Status: COMPLETED
Start: 2025-08-05 | End: 2025-08-05

## 2025-08-05 RX ORDER — OXYCODONE HYDROCHLORIDE 5 MG/1
5 TABLET ORAL EVERY 4 HOURS PRN
Status: DISCONTINUED | OUTPATIENT
Start: 2025-08-05 | End: 2025-08-06

## 2025-08-05 RX ORDER — ATORVASTATIN CALCIUM 20 MG/1
20 TABLET, FILM COATED ORAL NIGHTLY
Status: DISCONTINUED | OUTPATIENT
Start: 2025-08-05 | End: 2025-08-10 | Stop reason: HOSPADM

## 2025-08-05 RX ORDER — PREGABALIN 75 MG/1
150 CAPSULE ORAL
Status: COMPLETED | OUTPATIENT
Start: 2025-08-05 | End: 2025-08-05

## 2025-08-05 RX ORDER — FENTANYL CITRATE 50 UG/ML
25-200 INJECTION, SOLUTION INTRAMUSCULAR; INTRAVENOUS
Status: DISCONTINUED | OUTPATIENT
Start: 2025-08-05 | End: 2025-08-05 | Stop reason: HOSPADM

## 2025-08-05 RX ORDER — ROPIVACAINE HYDROCHLORIDE 2 MG/ML
INJECTION, SOLUTION EPIDURAL; INFILTRATION; PERINEURAL CONTINUOUS
Status: DISCONTINUED | OUTPATIENT
Start: 2025-08-05 | End: 2025-08-07

## 2025-08-05 RX ORDER — ACETAMINOPHEN 500 MG
1000 TABLET ORAL EVERY 6 HOURS
Status: DISCONTINUED | OUTPATIENT
Start: 2025-08-06 | End: 2025-08-10 | Stop reason: HOSPADM

## 2025-08-05 RX ORDER — GLUCAGON 1 MG
1 KIT INJECTION
Status: DISCONTINUED | OUTPATIENT
Start: 2025-08-05 | End: 2025-08-05 | Stop reason: HOSPADM

## 2025-08-05 RX ORDER — SODIUM CHLORIDE, SODIUM LACTATE, POTASSIUM CHLORIDE, CALCIUM CHLORIDE 600; 310; 30; 20 MG/100ML; MG/100ML; MG/100ML; MG/100ML
INJECTION, SOLUTION INTRAVENOUS CONTINUOUS
Status: DISCONTINUED | OUTPATIENT
Start: 2025-08-05 | End: 2025-08-06

## 2025-08-05 RX ORDER — HEPARIN SODIUM 5000 [USP'U]/ML
5000 INJECTION, SOLUTION INTRAVENOUS; SUBCUTANEOUS EVERY 8 HOURS
Status: DISCONTINUED | OUTPATIENT
Start: 2025-08-05 | End: 2025-08-07

## 2025-08-05 RX ORDER — CARBIDOPA AND LEVODOPA 25; 100 MG/1; MG/1
2 TABLET ORAL
Status: DISCONTINUED | OUTPATIENT
Start: 2025-08-05 | End: 2025-08-10 | Stop reason: HOSPADM

## 2025-08-05 RX ORDER — HYDROMORPHONE HYDROCHLORIDE 1 MG/ML
1 INJECTION, SOLUTION INTRAMUSCULAR; INTRAVENOUS; SUBCUTANEOUS
Status: DISCONTINUED | OUTPATIENT
Start: 2025-08-05 | End: 2025-08-06

## 2025-08-05 RX ORDER — ACETAMINOPHEN 500 MG
1000 TABLET ORAL EVERY 6 HOURS SCHEDULED
Status: DISCONTINUED | OUTPATIENT
Start: 2025-08-05 | End: 2025-08-05 | Stop reason: SDUPTHER

## 2025-08-05 RX ORDER — OXYCODONE HYDROCHLORIDE 5 MG/1
5 TABLET ORAL
Refills: 0 | Status: DISCONTINUED | OUTPATIENT
Start: 2025-08-05 | End: 2025-08-05 | Stop reason: HOSPADM

## 2025-08-05 RX ORDER — PROCHLORPERAZINE EDISYLATE 5 MG/ML
5 INJECTION INTRAMUSCULAR; INTRAVENOUS EVERY 6 HOURS PRN
Status: DISCONTINUED | OUTPATIENT
Start: 2025-08-05 | End: 2025-08-10 | Stop reason: HOSPADM

## 2025-08-05 RX ORDER — DULOXETIN HYDROCHLORIDE 30 MG/1
30 CAPSULE, DELAYED RELEASE ORAL DAILY
Status: DISCONTINUED | OUTPATIENT
Start: 2025-08-06 | End: 2025-08-07

## 2025-08-05 RX ORDER — HEPARIN SODIUM 5000 [USP'U]/ML
5000 INJECTION, SOLUTION INTRAVENOUS; SUBCUTANEOUS EVERY 8 HOURS
Status: DISCONTINUED | OUTPATIENT
Start: 2025-08-05 | End: 2025-08-05 | Stop reason: HOSPADM

## 2025-08-05 RX ORDER — ALVIMOPAN 12 MG/1
12 CAPSULE ORAL 2 TIMES DAILY
Status: DISCONTINUED | OUTPATIENT
Start: 2025-08-05 | End: 2025-08-07

## 2025-08-05 RX ORDER — LIDOCAINE HYDROCHLORIDE 20 MG/ML
INJECTION INTRAVENOUS
Status: DISCONTINUED | OUTPATIENT
Start: 2025-08-05 | End: 2025-08-05

## 2025-08-05 RX ORDER — CLONIDINE 100 UG/ML
INJECTION, SOLUTION EPIDURAL
Status: COMPLETED | OUTPATIENT
Start: 2025-08-05 | End: 2025-08-05

## 2025-08-05 RX ORDER — DIPHENHYDRAMINE HYDROCHLORIDE 50 MG/ML
12.5 INJECTION, SOLUTION INTRAMUSCULAR; INTRAVENOUS EVERY 6 HOURS PRN
Status: DISCONTINUED | OUTPATIENT
Start: 2025-08-05 | End: 2025-08-07

## 2025-08-05 RX ORDER — SULFAMETHOXAZOLE AND TRIMETHOPRIM 800; 160 MG/1; MG/1
1 TABLET ORAL DAILY
Status: DISCONTINUED | OUTPATIENT
Start: 2025-08-06 | End: 2025-08-10 | Stop reason: HOSPADM

## 2025-08-05 RX ORDER — SODIUM CHLORIDE 0.9 % (FLUSH) 0.9 %
10 SYRINGE (ML) INJECTION
Status: DISCONTINUED | OUTPATIENT
Start: 2025-08-05 | End: 2025-08-05 | Stop reason: HOSPADM

## 2025-08-05 RX ORDER — BUPIVACAINE HYDROCHLORIDE 7.5 MG/ML
INJECTION, SOLUTION EPIDURAL; RETROBULBAR
Status: COMPLETED | OUTPATIENT
Start: 2025-08-05 | End: 2025-08-05

## 2025-08-05 RX ORDER — DONEPEZIL HYDROCHLORIDE 5 MG/1
5 TABLET, FILM COATED ORAL NIGHTLY
Status: DISCONTINUED | OUTPATIENT
Start: 2025-08-05 | End: 2025-08-10 | Stop reason: HOSPADM

## 2025-08-05 RX ORDER — OXYCODONE HYDROCHLORIDE 10 MG/1
10 TABLET ORAL EVERY 4 HOURS PRN
Status: DISCONTINUED | OUTPATIENT
Start: 2025-08-05 | End: 2025-08-06

## 2025-08-05 RX ORDER — PREGABALIN 75 MG/1
75 CAPSULE ORAL NIGHTLY
Status: DISCONTINUED | OUTPATIENT
Start: 2025-08-05 | End: 2025-08-05 | Stop reason: SDUPTHER

## 2025-08-05 RX ORDER — ONDANSETRON HYDROCHLORIDE 2 MG/ML
4 INJECTION, SOLUTION INTRAVENOUS EVERY 12 HOURS PRN
Status: DISCONTINUED | OUTPATIENT
Start: 2025-08-05 | End: 2025-08-10 | Stop reason: HOSPADM

## 2025-08-05 RX ORDER — OXYCODONE HYDROCHLORIDE 5 MG/1
5 TABLET ORAL EVERY 4 HOURS PRN
Refills: 0 | Status: DISCONTINUED | OUTPATIENT
Start: 2025-08-05 | End: 2025-08-06

## 2025-08-05 RX ORDER — METRONIDAZOLE 500 MG/100ML
500 INJECTION, SOLUTION INTRAVENOUS
Status: DISCONTINUED | OUTPATIENT
Start: 2025-08-05 | End: 2025-08-05 | Stop reason: HOSPADM

## 2025-08-05 RX ADMIN — ONDANSETRON 4 MG: 2 INJECTION INTRAMUSCULAR; INTRAVENOUS at 09:08

## 2025-08-05 RX ADMIN — CEFAZOLIN 2 G: 2 INJECTION, POWDER, FOR SOLUTION INTRAMUSCULAR; INTRAVENOUS at 08:08

## 2025-08-05 RX ADMIN — Medication 10 MG: at 06:08

## 2025-08-05 RX ADMIN — SUGAMMADEX 200 MG: 100 INJECTION, SOLUTION INTRAVENOUS at 09:08

## 2025-08-05 RX ADMIN — Medication 25 MG: at 04:08

## 2025-08-05 RX ADMIN — PHENYLEPHRINE HYDROCHLORIDE 100 MCG: 10 INJECTION INTRAVENOUS at 05:08

## 2025-08-05 RX ADMIN — HEPARIN SODIUM 5000 UNITS: 5000 INJECTION INTRAVENOUS; SUBCUTANEOUS at 12:08

## 2025-08-05 RX ADMIN — FAMOTIDINE 20 MG: 10 INJECTION INTRAVENOUS at 10:08

## 2025-08-05 RX ADMIN — SODIUM CHLORIDE, SODIUM GLUCONATE, SODIUM ACETATE, POTASSIUM CHLORIDE, MAGNESIUM CHLORIDE, SODIUM PHOSPHATE, DIBASIC, AND POTASSIUM PHOSPHATE: .53; .5; .37; .037; .03; .012; .00082 INJECTION, SOLUTION INTRAVENOUS at 04:08

## 2025-08-05 RX ADMIN — ROCURONIUM BROMIDE 20 MG: 10 INJECTION, SOLUTION INTRAVENOUS at 04:08

## 2025-08-05 RX ADMIN — PHENYLEPHRINE HYDROCHLORIDE 0.2 MCG/KG/MIN: 10 INJECTION INTRAVENOUS at 05:08

## 2025-08-05 RX ADMIN — ACETAMINOPHEN 1000 MG: 10 INJECTION INTRAVENOUS at 09:08

## 2025-08-05 RX ADMIN — HEPARIN SODIUM 5000 UNITS: 5000 INJECTION INTRAVENOUS; SUBCUTANEOUS at 10:08

## 2025-08-05 RX ADMIN — MUPIROCIN: 20 OINTMENT TOPICAL at 10:08

## 2025-08-05 RX ADMIN — FENTANYL CITRATE 50 MCG: 50 INJECTION INTRAMUSCULAR; INTRAVENOUS at 01:08

## 2025-08-05 RX ADMIN — LOSARTAN POTASSIUM 12.5 MG: 25 TABLET, FILM COATED ORAL at 10:08

## 2025-08-05 RX ADMIN — ALVIMOPAN 12 MG: 12 CAPSULE ORAL at 12:08

## 2025-08-05 RX ADMIN — BUPIVACAINE HYDROCHLORIDE 30 ML: 7.5 INJECTION, SOLUTION EPIDURAL; RETROBULBAR at 01:08

## 2025-08-05 RX ADMIN — FENTANYL CITRATE 100 MCG: 50 INJECTION, SOLUTION INTRAMUSCULAR; INTRAVENOUS at 03:08

## 2025-08-05 RX ADMIN — DONEPEZIL HYDROCHLORIDE 5 MG: 5 TABLET ORAL at 10:08

## 2025-08-05 RX ADMIN — ATORVASTATIN CALCIUM 20 MG: 20 TABLET, FILM COATED ORAL at 10:08

## 2025-08-05 RX ADMIN — PREGABALIN 150 MG: 150 CAPSULE ORAL at 10:08

## 2025-08-05 RX ADMIN — HYDROMORPHONE HYDROCHLORIDE 0.2 MG: 1 INJECTION, SOLUTION INTRAMUSCULAR; INTRAVENOUS; SUBCUTANEOUS at 10:08

## 2025-08-05 RX ADMIN — PHENYLEPHRINE HYDROCHLORIDE 50 MCG: 10 INJECTION INTRAVENOUS at 09:08

## 2025-08-05 RX ADMIN — CARBIDOPA AND LEVODOPA 2 TABLET: 25; 100 TABLET ORAL at 10:08

## 2025-08-05 RX ADMIN — ACETAMINOPHEN 1000 MG: 500 TABLET ORAL at 12:08

## 2025-08-05 RX ADMIN — SODIUM CHLORIDE: 9 INJECTION, SOLUTION INTRAVENOUS at 12:08

## 2025-08-05 RX ADMIN — Medication: at 10:08

## 2025-08-05 RX ADMIN — SODIUM CHLORIDE: 0.9 INJECTION, SOLUTION INTRAVENOUS at 03:08

## 2025-08-05 RX ADMIN — ROCURONIUM BROMIDE 10 MG: 10 INJECTION, SOLUTION INTRAVENOUS at 08:08

## 2025-08-05 RX ADMIN — MIDAZOLAM HYDROCHLORIDE 2 MG: 2 INJECTION, SOLUTION INTRAMUSCULAR; INTRAVENOUS at 03:08

## 2025-08-05 RX ADMIN — MIDAZOLAM 1 MG: 1 INJECTION INTRAMUSCULAR; INTRAVENOUS at 01:08

## 2025-08-05 RX ADMIN — SODIUM CHLORIDE, POTASSIUM CHLORIDE, SODIUM LACTATE AND CALCIUM CHLORIDE: 600; 310; 30; 20 INJECTION, SOLUTION INTRAVENOUS at 10:08

## 2025-08-05 RX ADMIN — Medication 5 MG: at 07:08

## 2025-08-05 RX ADMIN — ROCURONIUM BROMIDE 50 MG: 10 INJECTION, SOLUTION INTRAVENOUS at 03:08

## 2025-08-05 RX ADMIN — ROCURONIUM BROMIDE 10 MG: 10 INJECTION, SOLUTION INTRAVENOUS at 07:08

## 2025-08-05 RX ADMIN — PROPOFOL 150 MG: 10 INJECTION, EMULSION INTRAVENOUS at 03:08

## 2025-08-05 RX ADMIN — ROCURONIUM BROMIDE 10 MG: 10 INJECTION, SOLUTION INTRAVENOUS at 06:08

## 2025-08-05 RX ADMIN — KETOROLAC TROMETHAMINE 15 MG: 15 INJECTION INTRAMUSCULAR at 12:08

## 2025-08-05 RX ADMIN — OXYCODONE HYDROCHLORIDE 10 MG: 10 TABLET ORAL at 10:08

## 2025-08-05 RX ADMIN — LIDOCAINE HYDROCHLORIDE 100 MG: 20 INJECTION INTRAVENOUS at 03:08

## 2025-08-05 RX ADMIN — Medication 10 MG: at 05:08

## 2025-08-05 RX ADMIN — CEFAZOLIN 2 G: 2 INJECTION, POWDER, FOR SOLUTION INTRAMUSCULAR; INTRAVENOUS at 04:08

## 2025-08-05 RX ADMIN — METRONIDAZOLE 500 MG: 500 INJECTION, SOLUTION INTRAVENOUS at 04:08

## 2025-08-05 RX ADMIN — DEXAMETHASONE SODIUM PHOSPHATE 4 MG: 4 INJECTION, SOLUTION INTRAMUSCULAR; INTRAVENOUS at 04:08

## 2025-08-05 RX ADMIN — ROCURONIUM BROMIDE 20 MG: 10 INJECTION, SOLUTION INTRAVENOUS at 05:08

## 2025-08-05 RX ADMIN — CLONIDINE HYDROCHLORIDE 100 MCG: 100 INJECTION, SOLUTION INTRAVENOUS at 01:08

## 2025-08-05 RX ADMIN — ALVIMOPAN 12 MG: 12 CAPSULE ORAL at 10:08

## 2025-08-05 RX ADMIN — DEXAMETHASONE SODIUM PHOSPHATE 2 MG: 10 INJECTION, SOLUTION INTRAMUSCULAR; INTRAVENOUS at 01:08

## 2025-08-05 RX ADMIN — PHENYLEPHRINE HYDROCHLORIDE 50 MCG: 10 INJECTION INTRAVENOUS at 08:08

## 2025-08-05 RX ADMIN — METHOCARBAMOL 500 MG: 500 TABLET ORAL at 10:08

## 2025-08-05 RX ADMIN — PREGABALIN 150 MG: 75 CAPSULE ORAL at 12:08

## 2025-08-05 NOTE — ANESTHESIA PROCEDURE NOTES
B/l ALEX catheter    Patient location during procedure: pre-op   Block not for primary anesthetic.  Reason for block: at surgeon's request and post-op pain management   Post-op Pain Location: b/l lower abdominal pain   Start time: 8/5/2025 1:41 PM  Timeout: 8/5/2025 1:40 PM   End time: 8/5/2025 2:00 PM    Staffing  Authorizing Provider: Mitchell Cho MD  Performing Provider: Yg Navarrete MD    Staffing  Performed by: Yg Navarrete MD  Authorized by: Mitchell Cho MD    Preanesthetic Checklist  Completed: patient identified, IV checked, site marked, risks and benefits discussed, surgical consent, monitors and equipment checked, pre-op evaluation and timeout performed  Peripheral Block  Patient position: sitting  Prep: ChloraPrep  Patient monitoring: heart rate, cardiac monitor, continuous pulse ox, continuous capnometry and frequent blood pressure checks  Block type: erector spinae plane  Laterality: bilateral  Injection technique: continuous  Interspace: T9-10    Needle  Needle type: Stimuplex   Needle gauge: 20 G  Needle length: 4 in  Needle localization: anatomical landmarks and ultrasound guidance   -ultrasound image captured on disc.  Assessment  Injection assessment: negative aspiration, negative parasthesia and local visualized surrounding nerve  Paresthesia pain: none  Heart rate change: no  Slow fractionated injection: yes  Pain Tolerance: comfortable throughout block and no complaints  Medications:    Medications: bupivacaine (pf) (MARCAINE) injection 0.75% - Perineural   30 mL - 8/5/2025 1:55:00 PM  cloNIDine injection 100 mcg/mL (epidural) - Perineural   100 mcg - 8/5/2025 1:55:00 PM  dexAMETHasone sodium phos (PF) injection 10 mg/mL - Other   2 mg - 8/5/2025 1:55:00 PM    Additional Notes  Patient tolerated procedure well. Given with 1:300,000 epinephrine. Needle insertion at 5 cm, catheter at 12 cm at the skin. Needle insertion at 4 cm on the right, catheter at 10 cm at skin.

## 2025-08-05 NOTE — ANESTHESIA PROCEDURE NOTES
Intubation    Date/Time: 8/5/2025 3:52 PM    Performed by: Bettina Chery CRNA  Authorized by: Matthew Starr MD    Intubation:     Induction:  Intravenous    Intubated:  Postinduction    Mask Ventilation:  Easy mask    Attempts:  1    Attempted By:  CRNA    Method of Intubation:  Video laryngoscopy    Blade:  Malin 3    Laryngeal View Grade: Grade I - full view of cords      Difficult Airway Encountered?: No      Complications:  None    Airway Device:  Oral endotracheal tube    Airway Device Size:  7.5    Style/Cuff Inflation:  Cuffed (inflated to minimal occlusive pressure)    Tube secured:  23    Secured at:  The teeth    Placement Verified By:  Capnometry    Findings Post-Intubation:  BS equal bilateral and atraumatic/condition of teeth unchanged

## 2025-08-06 LAB
ABSOLUTE EOSINOPHIL (OHS): 0.01 K/UL
ABSOLUTE EOSINOPHIL (OHS): 0.05 K/UL
ABSOLUTE MONOCYTE (OHS): 0.54 K/UL (ref 0.3–1)
ABSOLUTE MONOCYTE (OHS): 0.59 K/UL (ref 0.3–1)
ABSOLUTE MONOCYTE (OHS): 1.07 K/UL (ref 0.3–1)
ABSOLUTE MONOCYTE (OHS): 1.15 K/UL (ref 0.3–1)
ABSOLUTE NEUTROPHIL COUNT (OHS): 6.95 K/UL (ref 1.8–7.7)
ABSOLUTE NEUTROPHIL COUNT (OHS): 7.68 K/UL (ref 1.8–7.7)
ABSOLUTE NEUTROPHIL COUNT (OHS): 8.31 K/UL (ref 1.8–7.7)
ABSOLUTE NEUTROPHIL COUNT (OHS): 8.58 K/UL (ref 1.8–7.7)
ALBUMIN SERPL BCP-MCNC: 2.7 G/DL (ref 3.5–5.2)
ALBUMIN SERPL BCP-MCNC: 2.8 G/DL (ref 3.5–5.2)
ALP SERPL-CCNC: 69 UNIT/L (ref 40–150)
ALP SERPL-CCNC: 72 UNIT/L (ref 40–150)
ALT SERPL W/O P-5'-P-CCNC: <8 UNIT/L (ref 0–55)
ALT SERPL W/O P-5'-P-CCNC: <8 UNIT/L (ref 0–55)
ANION GAP (OHS): 10 MMOL/L (ref 8–16)
ANION GAP (OHS): 11 MMOL/L (ref 8–16)
ANION GAP (OHS): 12 MMOL/L (ref 8–16)
ANION GAP (OHS): 9 MMOL/L (ref 8–16)
AST SERPL-CCNC: 27 UNIT/L (ref 0–50)
AST SERPL-CCNC: 32 UNIT/L (ref 0–50)
BASOPHILS # BLD AUTO: 0.01 K/UL
BASOPHILS # BLD AUTO: 0.01 K/UL
BASOPHILS # BLD AUTO: 0.03 K/UL
BASOPHILS # BLD AUTO: 0.03 K/UL
BASOPHILS NFR BLD AUTO: 0.1 %
BASOPHILS NFR BLD AUTO: 0.1 %
BASOPHILS NFR BLD AUTO: 0.3 %
BASOPHILS NFR BLD AUTO: 0.4 %
BILIRUB SERPL-MCNC: 0.5 MG/DL (ref 0.1–1)
BILIRUB SERPL-MCNC: 1 MG/DL (ref 0.1–1)
BUN SERPL-MCNC: 21 MG/DL (ref 8–23)
BUN SERPL-MCNC: 22 MG/DL (ref 8–23)
BUN SERPL-MCNC: 26 MG/DL (ref 8–23)
BUN SERPL-MCNC: 28 MG/DL (ref 8–23)
BUN SERPL-MCNC: 29 MG/DL (ref 8–23)
BUN SERPL-MCNC: 29 MG/DL (ref 8–23)
CALCIUM SERPL-MCNC: 7.5 MG/DL (ref 8.7–10.5)
CALCIUM SERPL-MCNC: 7.6 MG/DL (ref 8.7–10.5)
CALCIUM SERPL-MCNC: 7.7 MG/DL (ref 8.7–10.5)
CALCIUM SERPL-MCNC: 7.8 MG/DL (ref 8.7–10.5)
CALCIUM SERPL-MCNC: 8 MG/DL (ref 8.7–10.5)
CALCIUM SERPL-MCNC: 8.1 MG/DL (ref 8.7–10.5)
CHLORIDE SERPL-SCNC: 105 MMOL/L (ref 95–110)
CHLORIDE SERPL-SCNC: 105 MMOL/L (ref 95–110)
CHLORIDE SERPL-SCNC: 106 MMOL/L (ref 95–110)
CHLORIDE SERPL-SCNC: 106 MMOL/L (ref 95–110)
CHLORIDE SERPL-SCNC: 107 MMOL/L (ref 95–110)
CHLORIDE SERPL-SCNC: 107 MMOL/L (ref 95–110)
CO2 SERPL-SCNC: 19 MMOL/L (ref 23–29)
CO2 SERPL-SCNC: 19 MMOL/L (ref 23–29)
CO2 SERPL-SCNC: 21 MMOL/L (ref 23–29)
CO2 SERPL-SCNC: 22 MMOL/L (ref 23–29)
CO2 SERPL-SCNC: 22 MMOL/L (ref 23–29)
CO2 SERPL-SCNC: 23 MMOL/L (ref 23–29)
CREAT SERPL-MCNC: 1.2 MG/DL (ref 0.5–1.4)
CREAT SERPL-MCNC: 1.2 MG/DL (ref 0.5–1.4)
CREAT SERPL-MCNC: 1.4 MG/DL (ref 0.5–1.4)
CREAT SERPL-MCNC: 1.4 MG/DL (ref 0.5–1.4)
CREAT SERPL-MCNC: 1.6 MG/DL (ref 0.5–1.4)
CREAT SERPL-MCNC: 1.6 MG/DL (ref 0.5–1.4)
ERYTHROCYTE [DISTWIDTH] IN BLOOD BY AUTOMATED COUNT: 14 % (ref 11.5–14.5)
ERYTHROCYTE [DISTWIDTH] IN BLOOD BY AUTOMATED COUNT: 14.2 % (ref 11.5–14.5)
GFR SERPLBLD CREATININE-BSD FMLA CKD-EPI: 46 ML/MIN/1.73/M2
GFR SERPLBLD CREATININE-BSD FMLA CKD-EPI: 46 ML/MIN/1.73/M2
GFR SERPLBLD CREATININE-BSD FMLA CKD-EPI: 54 ML/MIN/1.73/M2
GFR SERPLBLD CREATININE-BSD FMLA CKD-EPI: 54 ML/MIN/1.73/M2
GFR SERPLBLD CREATININE-BSD FMLA CKD-EPI: >60 ML/MIN/1.73/M2
GFR SERPLBLD CREATININE-BSD FMLA CKD-EPI: >60 ML/MIN/1.73/M2
GLUCOSE SERPL-MCNC: 191 MG/DL (ref 70–110)
GLUCOSE SERPL-MCNC: 194 MG/DL (ref 70–110)
GLUCOSE SERPL-MCNC: 194 MG/DL (ref 70–110)
GLUCOSE SERPL-MCNC: 241 MG/DL (ref 70–110)
GLUCOSE SERPL-MCNC: 250 MG/DL (ref 70–110)
GLUCOSE SERPL-MCNC: 256 MG/DL (ref 70–110)
HCT VFR BLD AUTO: 26.7 % (ref 40–54)
HCT VFR BLD AUTO: 28.9 % (ref 40–54)
HCT VFR BLD AUTO: 31.6 % (ref 40–54)
HCT VFR BLD AUTO: 32.5 % (ref 40–54)
HGB BLD-MCNC: 10.1 GM/DL (ref 14–18)
HGB BLD-MCNC: 10.2 GM/DL (ref 14–18)
HGB BLD-MCNC: 8.5 GM/DL (ref 14–18)
HGB BLD-MCNC: 9.3 GM/DL (ref 14–18)
HOLD SPECIMEN: NORMAL
IMM GRANULOCYTES # BLD AUTO: 0.02 K/UL (ref 0–0.04)
IMM GRANULOCYTES # BLD AUTO: 0.03 K/UL (ref 0–0.04)
IMM GRANULOCYTES # BLD AUTO: 0.04 K/UL (ref 0–0.04)
IMM GRANULOCYTES # BLD AUTO: 0.06 K/UL (ref 0–0.04)
IMM GRANULOCYTES NFR BLD AUTO: 0.2 % (ref 0–0.5)
IMM GRANULOCYTES NFR BLD AUTO: 0.4 % (ref 0–0.5)
IMM GRANULOCYTES NFR BLD AUTO: 0.5 % (ref 0–0.5)
IMM GRANULOCYTES NFR BLD AUTO: 0.6 % (ref 0–0.5)
LYMPHOCYTES # BLD AUTO: 0.55 K/UL (ref 1–4.8)
LYMPHOCYTES # BLD AUTO: 0.77 K/UL (ref 1–4.8)
LYMPHOCYTES # BLD AUTO: 0.83 K/UL (ref 1–4.8)
LYMPHOCYTES # BLD AUTO: 0.85 K/UL (ref 1–4.8)
MAGNESIUM SERPL-MCNC: 1.7 MG/DL (ref 1.6–2.6)
MAGNESIUM SERPL-MCNC: 1.7 MG/DL (ref 1.6–2.6)
MAGNESIUM SERPL-MCNC: 1.8 MG/DL (ref 1.6–2.6)
MAGNESIUM SERPL-MCNC: 1.8 MG/DL (ref 1.6–2.6)
MCH RBC QN AUTO: 29.7 PG (ref 27–31)
MCH RBC QN AUTO: 29.8 PG (ref 27–31)
MCH RBC QN AUTO: 30.1 PG (ref 27–31)
MCH RBC QN AUTO: 30.9 PG (ref 27–31)
MCHC RBC AUTO-ENTMCNC: 31.1 G/DL (ref 32–36)
MCHC RBC AUTO-ENTMCNC: 31.8 G/DL (ref 32–36)
MCHC RBC AUTO-ENTMCNC: 32.2 G/DL (ref 32–36)
MCHC RBC AUTO-ENTMCNC: 32.3 G/DL (ref 32–36)
MCV RBC AUTO: 93 FL (ref 82–98)
MCV RBC AUTO: 95 FL (ref 82–98)
MCV RBC AUTO: 96 FL (ref 82–98)
MCV RBC AUTO: 96 FL (ref 82–98)
NUCLEATED RBC (/100WBC) (OHS): 0 /100 WBC
PHOSPHATE SERPL-MCNC: 4 MG/DL (ref 2.7–4.5)
PHOSPHATE SERPL-MCNC: 4.9 MG/DL (ref 2.7–4.5)
PLATELET # BLD AUTO: 81 K/UL (ref 150–450)
PLATELET # BLD AUTO: 92 K/UL (ref 150–450)
PLATELET # BLD AUTO: 96 K/UL (ref 150–450)
PLATELET # BLD AUTO: 97 K/UL (ref 150–450)
PMV BLD AUTO: 9.2 FL (ref 9.2–12.9)
PMV BLD AUTO: 9.4 FL (ref 9.2–12.9)
PMV BLD AUTO: 9.5 FL (ref 9.2–12.9)
PMV BLD AUTO: 9.7 FL (ref 9.2–12.9)
POCT GLUCOSE: 159 MG/DL (ref 70–110)
POCT GLUCOSE: 198 MG/DL (ref 70–110)
POCT GLUCOSE: 235 MG/DL (ref 70–110)
POCT GLUCOSE: 243 MG/DL (ref 70–110)
POCT GLUCOSE: 252 MG/DL (ref 70–110)
POTASSIUM SERPL-SCNC: 4.1 MMOL/L (ref 3.5–5.1)
POTASSIUM SERPL-SCNC: 4.4 MMOL/L (ref 3.5–5.1)
POTASSIUM SERPL-SCNC: 4.6 MMOL/L (ref 3.5–5.1)
POTASSIUM SERPL-SCNC: 4.6 MMOL/L (ref 3.5–5.1)
POTASSIUM SERPL-SCNC: 5.2 MMOL/L (ref 3.5–5.1)
POTASSIUM SERPL-SCNC: 5.2 MMOL/L (ref 3.5–5.1)
PROT SERPL-MCNC: 4.7 GM/DL (ref 6–8.4)
PROT SERPL-MCNC: 4.7 GM/DL (ref 6–8.4)
RBC # BLD AUTO: 2.82 M/UL (ref 4.6–6.2)
RBC # BLD AUTO: 3.12 M/UL (ref 4.6–6.2)
RBC # BLD AUTO: 3.3 M/UL (ref 4.6–6.2)
RBC # BLD AUTO: 3.4 M/UL (ref 4.6–6.2)
RELATIVE EOSINOPHIL (OHS): 0.1 %
RELATIVE EOSINOPHIL (OHS): 0.6 %
RELATIVE LYMPHOCYTE (OHS): 6.2 % (ref 18–48)
RELATIVE LYMPHOCYTE (OHS): 7.4 % (ref 18–48)
RELATIVE LYMPHOCYTE (OHS): 8.2 % (ref 18–48)
RELATIVE LYMPHOCYTE (OHS): 9.8 % (ref 18–48)
RELATIVE MONOCYTE (OHS): 10.2 % (ref 4–15)
RELATIVE MONOCYTE (OHS): 11.1 % (ref 4–15)
RELATIVE MONOCYTE (OHS): 6.1 % (ref 4–15)
RELATIVE MONOCYTE (OHS): 7 % (ref 4–15)
RELATIVE NEUTROPHIL (OHS): 79.9 % (ref 38–73)
RELATIVE NEUTROPHIL (OHS): 81.8 % (ref 38–73)
RELATIVE NEUTROPHIL (OHS): 82 % (ref 38–73)
RELATIVE NEUTROPHIL (OHS): 86.8 % (ref 38–73)
SODIUM SERPL-SCNC: 135 MMOL/L (ref 136–145)
SODIUM SERPL-SCNC: 136 MMOL/L (ref 136–145)
SODIUM SERPL-SCNC: 137 MMOL/L (ref 136–145)
SODIUM SERPL-SCNC: 137 MMOL/L (ref 136–145)
SODIUM SERPL-SCNC: 140 MMOL/L (ref 136–145)
SODIUM SERPL-SCNC: 141 MMOL/L (ref 136–145)
WBC # BLD AUTO: 10.39 K/UL (ref 3.9–12.7)
WBC # BLD AUTO: 10.46 K/UL (ref 3.9–12.7)
WBC # BLD AUTO: 8.48 K/UL (ref 3.9–12.7)
WBC # BLD AUTO: 8.85 K/UL (ref 3.9–12.7)

## 2025-08-06 PROCEDURE — 83735 ASSAY OF MAGNESIUM: CPT | Performed by: STUDENT IN AN ORGANIZED HEALTH CARE EDUCATION/TRAINING PROGRAM

## 2025-08-06 PROCEDURE — 36430 TRANSFUSION BLD/BLD COMPNT: CPT | Performed by: ANESTHESIOLOGY

## 2025-08-06 PROCEDURE — 25000003 PHARM REV CODE 250

## 2025-08-06 PROCEDURE — 99900035 HC TECH TIME PER 15 MIN (STAT)

## 2025-08-06 PROCEDURE — P9016 RBC LEUKOCYTES REDUCED: HCPCS | Performed by: ANESTHESIOLOGY

## 2025-08-06 PROCEDURE — 36415 COLL VENOUS BLD VENIPUNCTURE: CPT

## 2025-08-06 PROCEDURE — 84100 ASSAY OF PHOSPHORUS: CPT

## 2025-08-06 PROCEDURE — 99231 SBSQ HOSP IP/OBS SF/LOW 25: CPT | Mod: ,,, | Performed by: ANESTHESIOLOGY

## 2025-08-06 PROCEDURE — 25000242 PHARM REV CODE 250 ALT 637 W/ HCPCS

## 2025-08-06 PROCEDURE — 63600175 PHARM REV CODE 636 W HCPCS: Mod: JZ,TB | Performed by: STUDENT IN AN ORGANIZED HEALTH CARE EDUCATION/TRAINING PROGRAM

## 2025-08-06 PROCEDURE — 82435 ASSAY OF BLOOD CHLORIDE: CPT

## 2025-08-06 PROCEDURE — 25000003 PHARM REV CODE 250: Performed by: STUDENT IN AN ORGANIZED HEALTH CARE EDUCATION/TRAINING PROGRAM

## 2025-08-06 PROCEDURE — 25000003 PHARM REV CODE 250: Performed by: UROLOGY

## 2025-08-06 PROCEDURE — 63600175 PHARM REV CODE 636 W HCPCS

## 2025-08-06 PROCEDURE — 83735 ASSAY OF MAGNESIUM: CPT

## 2025-08-06 PROCEDURE — 85025 COMPLETE CBC W/AUTO DIFF WBC: CPT | Performed by: UROLOGY

## 2025-08-06 PROCEDURE — 97161 PT EVAL LOW COMPLEX 20 MIN: CPT

## 2025-08-06 PROCEDURE — 27100171 HC OXYGEN HIGH FLOW UP TO 24 HOURS

## 2025-08-06 PROCEDURE — 97530 THERAPEUTIC ACTIVITIES: CPT

## 2025-08-06 PROCEDURE — 94660 CPAP INITIATION&MGMT: CPT

## 2025-08-06 PROCEDURE — 97112 NEUROMUSCULAR REEDUCATION: CPT

## 2025-08-06 PROCEDURE — 94761 N-INVAS EAR/PLS OXIMETRY MLT: CPT

## 2025-08-06 PROCEDURE — 80048 BASIC METABOLIC PNL TOTAL CA: CPT | Performed by: STUDENT IN AN ORGANIZED HEALTH CARE EDUCATION/TRAINING PROGRAM

## 2025-08-06 PROCEDURE — 85025 COMPLETE CBC W/AUTO DIFF WBC: CPT

## 2025-08-06 PROCEDURE — 36415 COLL VENOUS BLD VENIPUNCTURE: CPT | Performed by: STUDENT IN AN ORGANIZED HEALTH CARE EDUCATION/TRAINING PROGRAM

## 2025-08-06 PROCEDURE — 85025 COMPLETE CBC W/AUTO DIFF WBC: CPT | Performed by: STUDENT IN AN ORGANIZED HEALTH CARE EDUCATION/TRAINING PROGRAM

## 2025-08-06 PROCEDURE — 97166 OT EVAL MOD COMPLEX 45 MIN: CPT

## 2025-08-06 PROCEDURE — 84100 ASSAY OF PHOSPHORUS: CPT | Performed by: STUDENT IN AN ORGANIZED HEALTH CARE EDUCATION/TRAINING PROGRAM

## 2025-08-06 PROCEDURE — 94640 AIRWAY INHALATION TREATMENT: CPT

## 2025-08-06 PROCEDURE — 11000001 HC ACUTE MED/SURG PRIVATE ROOM

## 2025-08-06 PROCEDURE — 27000190 HC CPAP FULL FACE MASK W/VALVE

## 2025-08-06 RX ORDER — FAMOTIDINE 20 MG/1
20 TABLET, FILM COATED ORAL 2 TIMES DAILY
Status: DISCONTINUED | OUTPATIENT
Start: 2025-08-06 | End: 2025-08-07

## 2025-08-06 RX ORDER — NALOXONE HCL 0.4 MG/ML
0.4 VIAL (ML) INJECTION
Status: DISCONTINUED | OUTPATIENT
Start: 2025-08-06 | End: 2025-08-10 | Stop reason: HOSPADM

## 2025-08-06 RX ORDER — METHOCARBAMOL 500 MG/1
1000 TABLET, FILM COATED ORAL EVERY 8 HOURS
Status: COMPLETED | OUTPATIENT
Start: 2025-08-06 | End: 2025-08-06

## 2025-08-06 RX ORDER — IBUPROFEN 200 MG
24 TABLET ORAL
Status: DISCONTINUED | OUTPATIENT
Start: 2025-08-06 | End: 2025-08-10 | Stop reason: HOSPADM

## 2025-08-06 RX ORDER — OXYCODONE HYDROCHLORIDE 5 MG/1
5 TABLET ORAL EVERY 4 HOURS PRN
Refills: 0 | Status: DISCONTINUED | OUTPATIENT
Start: 2025-08-06 | End: 2025-08-08

## 2025-08-06 RX ORDER — INSULIN ASPART 100 [IU]/ML
0-10 INJECTION, SOLUTION INTRAVENOUS; SUBCUTANEOUS
Status: DISCONTINUED | OUTPATIENT
Start: 2025-08-06 | End: 2025-08-10 | Stop reason: HOSPADM

## 2025-08-06 RX ORDER — PRAZOSIN HYDROCHLORIDE 1 MG/1
2 CAPSULE ORAL NIGHTLY
Status: DISCONTINUED | OUTPATIENT
Start: 2025-08-06 | End: 2025-08-06

## 2025-08-06 RX ORDER — HYDROCODONE BITARTRATE AND ACETAMINOPHEN 500; 5 MG/1; MG/1
TABLET ORAL
Status: DISCONTINUED | OUTPATIENT
Start: 2025-08-06 | End: 2025-08-10 | Stop reason: HOSPADM

## 2025-08-06 RX ORDER — GLUCAGON 1 MG
1 KIT INJECTION
Status: DISCONTINUED | OUTPATIENT
Start: 2025-08-06 | End: 2025-08-10 | Stop reason: HOSPADM

## 2025-08-06 RX ORDER — ALBUTEROL SULFATE 2.5 MG/.5ML
10 SOLUTION RESPIRATORY (INHALATION) ONCE
Status: COMPLETED | OUTPATIENT
Start: 2025-08-06 | End: 2025-08-06

## 2025-08-06 RX ORDER — IBUPROFEN 200 MG
16 TABLET ORAL
Status: DISCONTINUED | OUTPATIENT
Start: 2025-08-06 | End: 2025-08-10 | Stop reason: HOSPADM

## 2025-08-06 RX ADMIN — HEPARIN SODIUM 5000 UNITS: 5000 INJECTION INTRAVENOUS; SUBCUTANEOUS at 12:08

## 2025-08-06 RX ADMIN — KETOROLAC TROMETHAMINE 15 MG: 15 INJECTION INTRAMUSCULAR at 12:08

## 2025-08-06 RX ADMIN — ALBUTEROL SULFATE 10 MG: 2.5 SOLUTION RESPIRATORY (INHALATION) at 10:08

## 2025-08-06 RX ADMIN — DULOXETINE 30 MG: 30 CAPSULE, DELAYED RELEASE ORAL at 09:08

## 2025-08-06 RX ADMIN — METHOCARBAMOL 500 MG: 500 TABLET ORAL at 05:08

## 2025-08-06 RX ADMIN — SODIUM CHLORIDE, POTASSIUM CHLORIDE, SODIUM LACTATE AND CALCIUM CHLORIDE 500 ML: 600; 310; 30; 20 INJECTION, SOLUTION INTRAVENOUS at 04:08

## 2025-08-06 RX ADMIN — ACETAMINOPHEN 1000 MG: 500 TABLET ORAL at 05:08

## 2025-08-06 RX ADMIN — PRAZOSIN HYDROCHLORIDE 2 MG: 1 CAPSULE ORAL at 12:08

## 2025-08-06 RX ADMIN — METHOCARBAMOL 1000 MG: 500 TABLET ORAL at 12:08

## 2025-08-06 RX ADMIN — FAMOTIDINE 20 MG: 20 TABLET, FILM COATED ORAL at 09:08

## 2025-08-06 RX ADMIN — CARBIDOPA AND LEVODOPA 2 TABLET: 25; 100 TABLET ORAL at 12:08

## 2025-08-06 RX ADMIN — SODIUM CHLORIDE, POTASSIUM CHLORIDE, SODIUM LACTATE AND CALCIUM CHLORIDE 500 ML: 600; 310; 30; 20 INJECTION, SOLUTION INTRAVENOUS at 06:08

## 2025-08-06 RX ADMIN — MUPIROCIN: 20 OINTMENT TOPICAL at 09:08

## 2025-08-06 RX ADMIN — SODIUM CHLORIDE 1000 ML: 0.9 INJECTION, SOLUTION INTRAVENOUS at 01:08

## 2025-08-06 RX ADMIN — SULFAMETHOXAZOLE AND TRIMETHOPRIM 1 TABLET: 800; 160 TABLET ORAL at 09:08

## 2025-08-06 RX ADMIN — INSULIN ASPART 6 UNITS: 100 INJECTION, SOLUTION INTRAVENOUS; SUBCUTANEOUS at 04:08

## 2025-08-06 RX ADMIN — HEPARIN SODIUM 5000 UNITS: 5000 INJECTION INTRAVENOUS; SUBCUTANEOUS at 05:08

## 2025-08-06 RX ADMIN — ATORVASTATIN CALCIUM 20 MG: 20 TABLET, FILM COATED ORAL at 09:08

## 2025-08-06 RX ADMIN — PREGABALIN 150 MG: 150 CAPSULE ORAL at 09:08

## 2025-08-06 RX ADMIN — KETOROLAC TROMETHAMINE 15 MG: 15 INJECTION INTRAMUSCULAR at 06:08

## 2025-08-06 RX ADMIN — ACETAMINOPHEN 1000 MG: 500 TABLET ORAL at 12:08

## 2025-08-06 RX ADMIN — ALVIMOPAN 12 MG: 12 CAPSULE ORAL at 09:08

## 2025-08-06 RX ADMIN — ACETAMINOPHEN 1000 MG: 500 TABLET ORAL at 11:08

## 2025-08-06 RX ADMIN — CARBIDOPA AND LEVODOPA 2 TABLET: 25; 100 TABLET ORAL at 05:08

## 2025-08-06 RX ADMIN — KETOROLAC TROMETHAMINE 15 MG: 15 INJECTION INTRAMUSCULAR at 05:08

## 2025-08-06 RX ADMIN — INSULIN ASPART 1 UNITS: 100 INJECTION, SOLUTION INTRAVENOUS; SUBCUTANEOUS at 09:08

## 2025-08-06 RX ADMIN — CARBIDOPA AND LEVODOPA 2 TABLET: 25; 100 TABLET ORAL at 06:08

## 2025-08-06 RX ADMIN — KETOROLAC TROMETHAMINE 15 MG: 15 INJECTION INTRAMUSCULAR at 11:08

## 2025-08-06 RX ADMIN — DONEPEZIL HYDROCHLORIDE 5 MG: 5 TABLET ORAL at 09:08

## 2025-08-06 RX ADMIN — HEPARIN SODIUM 5000 UNITS: 5000 INJECTION INTRAVENOUS; SUBCUTANEOUS at 09:08

## 2025-08-06 RX ADMIN — RASAGILINE 1 MG: 1 TABLET ORAL at 09:08

## 2025-08-06 RX ADMIN — ACETAMINOPHEN 1000 MG: 500 TABLET ORAL at 06:08

## 2025-08-06 RX ADMIN — SODIUM CHLORIDE, POTASSIUM CHLORIDE, SODIUM LACTATE AND CALCIUM CHLORIDE 500 ML: 600; 310; 30; 20 INJECTION, SOLUTION INTRAVENOUS at 09:08

## 2025-08-06 RX ADMIN — CARBIDOPA AND LEVODOPA 2 TABLET: 25; 100 TABLET ORAL at 09:08

## 2025-08-06 RX ADMIN — CARBIDOPA AND LEVODOPA 2 TABLET: 25; 100 TABLET ORAL at 10:08

## 2025-08-06 NOTE — TRANSFER OF CARE
"Anesthesia Transfer of Care Note    Patient: Glenn Medel    Procedure(s) Performed: Procedure(s) (LRB):  CYSTECTOMY, RADICAL (N/A)  CREATION, ILEAL CONDUIT (N/A)  LYMPHADENECTOMY, PELVIS (N/A)    Patient location: PACU    Anesthesia Type: general    Transport from OR: Transported from OR on 6-10 L/min O2 by face mask with adequate spontaneous ventilation    Post pain: adequate analgesia    Post assessment: no apparent anesthetic complications    Post vital signs: stable    Level of consciousness: awake    Nausea/Vomiting: no nausea/vomiting    Complications: none    Transfer of care protocol was followed      Last vitals: Visit Vitals  BP (!) 147/85 (BP Location: Right arm, Patient Position: Lying)   Pulse 82   Temp 36.4 °C (97.5 °F) (Temporal)   Resp 20   Ht 5' 11" (1.803 m)   Wt 79.8 kg (175 lb 14.8 oz)   SpO2 98%   BMI 24.54 kg/m²     "

## 2025-08-06 NOTE — ADDENDUM NOTE
Addendum  created 08/06/25 1303 by Vijay Jacobo MD    Charge Capture section accepted, Cosign clinical note with attestation

## 2025-08-06 NOTE — ANESTHESIA POST-OP PAIN MANAGEMENT
Acute Pain Service Progress Note    Glenn Medel is a 70 y.o., male, [id number].    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          Subjective:     General appearance of alert, oriented, no complaints,    Pain with rest: 5    Numbers   Pain with movement: 8    Numbers   Side Effects    1. Pruritis No    2. Nausea No    3. Motor Blockade No, 0=Ability to raise lower extremities off bed    4. Sedation No, 1=awake and alert  Sitting at edge of bed    Objective:     Catheter level T9-T10   Catheter site clean, dry, intact bilaterally   Catheter placement 12 cm @skin, and 10 cm at the skin      Vitals   Vitals:    08/06/25 0816   BP: (!) 87/52   Pulse: 89   Resp: 18   Temp: 36.2 °C (97.2 °F)        Labs    Admission on 08/05/2025   Component Date Value Ref Range Status    UNIT NUMBER 08/05/2025 [id number]   Preliminary    UNIT ABO/RH 08/05/2025 O NEG   Preliminary    DISPENSE STATUS 08/05/2025 Selected   Preliminary    Unit Expiration 08/05/2025 202508192359   Preliminary    Product Code 08/05/2025 H8685G66   Preliminary    Unit Blood Type Code 08/05/2025 9500   Preliminary    CROSSMATCH INTERPRETATION 08/05/2025 Compatible   Preliminary    UNIT NUMBER 08/05/2025 [id number]   Preliminary    UNIT ABO/RH 08/05/2025 O NEG   Preliminary    DISPENSE STATUS 08/05/2025 Selected   Preliminary    Unit Expiration 08/05/2025 202508202359   Preliminary    Product Code 08/05/2025 M3300G01   Preliminary    Unit Blood Type Code 08/05/2025 9500   Preliminary    CROSSMATCH INTERPRETATION 08/05/2025 Compatible   Preliminary    Specimen Outdate  08/05/2025 08/08/2025 23:59   Final    Group & Rh 08/05/2025 O NEG   Final    Indirect Aleja 08/05/2025 NEG   Final    POCT Glucose 08/05/2025 123 (H)  70 - 110 mg/dL Final    Sodium 08/05/2025 140  136 - 145 mmol/L Final    Potassium 08/05/2025 4.4  3.5 - 5.1 mmol/L Final    Chloride 08/05/2025 110  95 - 110 mmol/L Final    CO2 08/05/2025 22 (L)  23 - 29 mmol/L Final    Glucose 08/05/2025 149 (H)  70 - 110 mg/dL Final    BUN 08/05/2025 19  8 - 23 mg/dL Final    Creatinine 08/05/2025 0.9  0.5 - 1.4 mg/dL Final    Calcium 08/05/2025 7.4 (L)  8.7 - 10.5 mg/dL Final    Anion Gap 08/05/2025 8  8 - 16 mmol/L Final    eGFR 08/05/2025 >60  >60 mL/min/1.73/m2 Final    Magnesium  08/05/2025 1.8  1.6 - 2.6 mg/dL Final    Phosphorus Level 08/05/2025 3.7  2.7 - 4.5 mg/dL Final    WBC 08/05/2025 9.23  3.90 - 12.70 K/uL Final    RBC 08/05/2025 3.56 (L)  4.60 - 6.20 M/uL Final    HGB 08/05/2025 10.7 (L)  14.0 - 18.0 gm/dL Final    HCT 08/05/2025 32.2 (L)  40.0 - 54.0 % Final    MCV 08/05/2025 90  82 - 98 fL Final    MCH 08/05/2025 30.1  27.0 - 31.0 pg Final    MCHC 08/05/2025 33.2  32.0 - 36.0 g/dL Final    RDW 08/05/2025 13.8  11.5 - 14.5 % Final    Platelet Count 08/05/2025 103 (L)  150 - 450 K/uL Final    MPV 08/05/2025 8.7 (L)  9.2 - 12.9 fL Final    Nucleated RBC 08/05/2025 0  <=0 /100 WBC Final    Neut % 08/05/2025 84.7 (H)  38 - 73 % Final    Lymph % 08/05/2025 7.4 (L)  18 - 48 % Final    Mono % 08/05/2025 7.4  4 - 15 % Final    Eos % 08/05/2025 0.1  <=8 % Final    Basophil % 08/05/2025 0.2  <=1.9 % Final    Imm Grans % 08/05/2025 0.2  0.0 - 0.5 % Final    Neut # 08/05/2025 7.82 (H)  1.8 - 7.7 K/uL Final    Lymph # 08/05/2025 0.68 (L)  1 - 4.8 K/uL Final    Mono # 08/05/2025 0.68  0.3 - 1 K/uL Final    Eos # 08/05/2025 0.01  <=0.5 K/uL Final    Baso # 08/05/2025 0.02  <=0.2 K/uL Final    Imm Grans # 08/05/2025 0.02  0.00 - 0.04 K/uL Final    POCT Glucose 08/05/2025 147 (H)  70 - 110 mg/dL Final    POC PH 08/05/2025 7.407   7.35 - 7.45 Final    POC PCO2 08/05/2025 36.4  35 - 45 mmHg Final    POC PO2 08/05/2025 154 (H)  80 - 100 mmHg Final    POC HCO3 08/05/2025 22.9 (L)  24 - 28 mmol/L Final    POC BE 08/05/2025 -2  -2 to 2 mmol/L Final    POC SATURATED O2 08/05/2025 99  95 - 100 % Final    POC Glucose 08/05/2025 145 (H)  70 - 110 mg/dL Final    POC Sodium 08/05/2025 139  136 - 145 mmol/L Final    POC Potassium 08/05/2025 3.9  3.5 - 5.1 mmol/L Final    POC TCO2 08/05/2025 24  23 - 27 mmol/L Final    POC Ionized Calcium 08/05/2025 1.14  1.06 - 1.42 mmol/L Final    POC Hematocrit 08/05/2025 30 (L)  36 - 54 %PCV Final    Sample 08/05/2025 ARTERIAL   Final    POC PH 08/05/2025 7.340 (L)  7.35 - 7.45 Final    POC PCO2 08/05/2025 40.1  35 - 45 mmHg Final    POC PO2 08/05/2025 206 (H)  80 - 100 mmHg Final    POC HCO3 08/05/2025 21.6 (L)  24 - 28 mmol/L Final    POC BE 08/05/2025 -4 (L)  -2 to 2 mmol/L Final    POC SATURATED O2 08/05/2025 100  95 - 100 % Final    POC Glucose 08/05/2025 153 (H)  70 - 110 mg/dL Final    POC Sodium 08/05/2025 140  136 - 145 mmol/L Final    POC Potassium 08/05/2025 4.2  3.5 - 5.1 mmol/L Final    POC TCO2 08/05/2025 23  23 - 27 mmol/L Final    POC Ionized Calcium 08/05/2025 1.15  1.06 - 1.42 mmol/L Final    POC Hematocrit 08/05/2025 30 (L)  36 - 54 %PCV Final    Sample 08/05/2025 ARTERIAL   Final    Sodium 08/06/2025 141  136 - 145 mmol/L Final    Potassium 08/06/2025 5.2 (H)  3.5 - 5.1 mmol/L Final    Chloride 08/06/2025 107  95 - 110 mmol/L Final    CO2 08/06/2025 23  23 - 29 mmol/L Final    Glucose 08/06/2025 194 (H)  70 - 110 mg/dL Final    BUN 08/06/2025 22  8 - 23 mg/dL Final    Creatinine 08/06/2025 1.2  0.5 - 1.4 mg/dL Final    Calcium 08/06/2025 8.1 (L)  8.7 - 10.5 mg/dL Final    Anion Gap 08/06/2025 11  8 - 16 mmol/L Final    eGFR 08/06/2025 >60  >60 mL/min/1.73/m2 Final    Sodium 08/06/2025 140  136 - 145 mmol/L Final    Potassium 08/06/2025 5.2 (H)  3.5 - 5.1 mmol/L Final    Chloride 08/06/2025 107  95  - 110 mmol/L Final    CO2 08/06/2025 22 (L)  23 - 29 mmol/L Final    Glucose 08/06/2025 191 (H)  70 - 110 mg/dL Final    BUN 08/06/2025 21  8 - 23 mg/dL Final    Creatinine 08/06/2025 1.2  0.5 - 1.4 mg/dL Final    Calcium 08/06/2025 8.0 (L)  8.7 - 10.5 mg/dL Final    Anion Gap 08/06/2025 11  8 - 16 mmol/L Final    eGFR 08/06/2025 >60  >60 mL/min/1.73/m2 Final    Magnesium  08/06/2025 1.8  1.6 - 2.6 mg/dL Final    Magnesium  08/06/2025 1.8  1.6 - 2.6 mg/dL Final    Phosphorus Level 08/06/2025 4.9 (H)  2.7 - 4.5 mg/dL Final    Phosphorus Level 08/06/2025 4.9 (H)  2.7 - 4.5 mg/dL Final    WBC 08/06/2025 10.46  3.90 - 12.70 K/uL Final    RBC 08/06/2025 3.30 (L)  4.60 - 6.20 M/uL Final    HGB 08/06/2025 10.2 (L)  14.0 - 18.0 gm/dL Final    HCT 08/06/2025 31.6 (L)  40.0 - 54.0 % Final    MCV 08/06/2025 96  82 - 98 fL Final    MCH 08/06/2025 30.9  27.0 - 31.0 pg Final    MCHC 08/06/2025 32.3  32.0 - 36.0 g/dL Final    RDW 08/06/2025 14.2  11.5 - 14.5 % Final    Platelet Count 08/06/2025 97 (L)  150 - 450 K/uL Final    MPV 08/06/2025 9.5  9.2 - 12.9 fL Final    Nucleated RBC 08/06/2025 0  <=0 /100 WBC Final    Neut % 08/06/2025 82.0 (H)  38 - 73 % Final    Lymph % 08/06/2025 7.4 (L)  18 - 48 % Final    Mono % 08/06/2025 10.2  4 - 15 % Final    Eos % 08/06/2025 0.1  <=8 % Final    Basophil % 08/06/2025 0.1  <=1.9 % Final    Imm Grans % 08/06/2025 0.2  0.0 - 0.5 % Final    Neut # 08/06/2025 8.58 (H)  1.8 - 7.7 K/uL Final    Lymph # 08/06/2025 0.77 (L)  1 - 4.8 K/uL Final    Mono # 08/06/2025 1.07 (H)  0.3 - 1 K/uL Final    Eos # 08/06/2025 0.01  <=0.5 K/uL Final    Baso # 08/06/2025 0.01  <=0.2 K/uL Final    Imm Grans # 08/06/2025 0.02  0.00 - 0.04 K/uL Final    WBC 08/06/2025 10.39  3.90 - 12.70 K/uL Final    RBC 08/06/2025 3.40 (L)  4.60 - 6.20 M/uL Final    HGB 08/06/2025 10.1 (L)  14.0 - 18.0 gm/dL Final    HCT 08/06/2025 32.5 (L)  40.0 - 54.0 % Final    MCV 08/06/2025 96  82 - 98 fL Final    MCH 08/06/2025 29.7  27.0 -  31.0 pg Final    MCHC 08/06/2025 31.1 (L)  32.0 - 36.0 g/dL Final    RDW 08/06/2025 14.2  11.5 - 14.5 % Final    Platelet Count 08/06/2025 96 (L)  150 - 450 K/uL Final    MPV 08/06/2025 9.7  9.2 - 12.9 fL Final    Nucleated RBC 08/06/2025 0  <=0 /100 WBC Final    Neut % 08/06/2025 79.9 (H)  38 - 73 % Final    Lymph % 08/06/2025 8.2 (L)  18 - 48 % Final    Mono % 08/06/2025 11.1  4 - 15 % Final    Eos % 08/06/2025 0.1  <=8 % Final    Basophil % 08/06/2025 0.1  <=1.9 % Final    Imm Grans % 08/06/2025 0.6 (H)  0.0 - 0.5 % Final    Neut # 08/06/2025 8.31 (H)  1.8 - 7.7 K/uL Final    Lymph # 08/06/2025 0.85 (L)  1 - 4.8 K/uL Final    Mono # 08/06/2025 1.15 (H)  0.3 - 1 K/uL Final    Eos # 08/06/2025 0.01  <=0.5 K/uL Final    Baso # 08/06/2025 0.01  <=0.2 K/uL Final    Imm Grans # 08/06/2025 0.06 (H)  0.00 - 0.04 K/uL Final    POCT Glucose 08/06/2025 159 (H)  70 - 110 mg/dL Final        Meds Current Medications[1]     Anticoagulant dose heparin at 5000 U q 8.    Assessment:     Pain control adequate    Plan:     Patient doing well, continue present treatment.  - Will maintain bilateral catheters in place, sites were clean, dry and intact  - Continue scheduled medications: Tylenol 1g q6h, Toradol 15 mg IV q 6, pregabalin 150 mg qhs, Duloxetine 30 q D  - Increase to Robaxin 1000 mg q6h   - Discontinue oxycodone 10 mg prn, will maintain oxy 5 mg prn for now  - PT/OT ordered        Case discussed with staff, final recommendations per attestation above.     Thank you for the consult and allowing us to participate in the care of this patient. We will continue to follow along. Please call Acute Pain Service at [phone] or Anesthesia at [phone] if you have any further questions or concerns.           [1]   Current Facility-Administered Medications   Medication Dose Route Frequency Provider Last Rate Last Admin    0.9% NaCl infusion   Intravenous Continuous Shannan Ontiveros MD 10 mL/hr at 08/05/25 1223 New Bag at 08/05/25 1223     acetaminophen tablet 1,000 mg  1,000 mg Oral Q6H Eren Pichardo MD   1,000 mg at 08/06/25 0502    albuterol sulfate nebulizer solution 10 mg  10 mg Nebulization Once Guanaco Rogel MD        alvimopan capsule 12 mg  12 mg Oral BID Eren Pichardo MD   12 mg at 08/06/25 0928    atorvastatin tablet 20 mg  20 mg Oral QHS Eren Pichardo MD   20 mg at 08/05/25 2230    carbidopa-levodopa  mg per tablet 2 tablet  2 tablet Oral 5x Daily Eren Pichardo MD   2 tablet at 08/06/25 0930    clonazePAM tablet 1.5 mg  1.5 mg Oral QHS Eren Pichardo MD        diphenhydrAMINE injection 12.5 mg  12.5 mg Intravenous Q6H PRN Eren Pichardo MD        donepeziL tablet 5 mg  5 mg Oral QHS Eren Pichardo MD   5 mg at 08/05/25 2236    DULoxetine DR capsule 30 mg  30 mg Oral Daily Eren Pichardo MD   30 mg at 08/06/25 0930    famotidine tablet 20 mg  20 mg Oral BID Ko Corea MD   20 mg at 08/06/25 0930    heparin (porcine) injection 5,000 Units  5,000 Units Subcutaneous Q8H Eren Pichardo MD   5,000 Units at 08/06/25 0502    ketorolac injection 15 mg  15 mg Intravenous Q6H Eren Pichardo MD   15 mg at 08/06/25 0502    lactated ringers infusion   Intravenous Continuous Eren Pichardo  mL/hr at 08/05/25 2205 New Bag at 08/05/25 2205    methocarbamoL tablet 1,000 mg  1,000 mg Oral Q8H Fabiano Beltran DO        mupirocin 2 % ointment   Nasal BID Hebert Zapata MD   Given at 08/06/25 0930    ondansetron injection 4 mg  4 mg Intravenous Q12H PRN Fabiano Beltran DO        ondansetron injection 4 mg  4 mg Intravenous Q8H PRN Eren Pichardo MD        oxyCODONE immediate release tablet 5 mg  5 mg Oral Q4H PRN Devin, Fabiano, DO        pregabalin capsule 150 mg  150 mg Oral QHS Eren Pichardo MD   150 mg at 08/05/25 2231    prochlorperazine injection Soln 5 mg  5 mg Intravenous Q6H PRN Eren Pichardo MD        rasagiline tablet Tab 1 mg  1 mg Oral Daily Eren Pichardo MD   1 mg at 08/06/25 7587    ropivacaine 0.2% Perineural  Pump infusion 500 ML   Perineural Continuous Fabiano Beltran DO   New Bag at 08/05/25 2210    ropivacaine 0.2% Perineural Pump infusion 500 ML   Perineural Continuous Fabiano Beltran DO New Bag at 08/05/25 2210    sulfamethoxazole-trimethoprim 800-160mg per tablet 1 tablet  1 tablet Oral Daily Eren Pichardo MD   1 tablet at 08/06/25 0811

## 2025-08-07 PROBLEM — I95.81: Status: RESOLVED | Noted: 2025-08-07 | Resolved: 2025-08-07

## 2025-08-07 PROBLEM — I95.81: Status: ACTIVE | Noted: 2025-08-07

## 2025-08-07 LAB
ABSOLUTE EOSINOPHIL (OHS): 0.45 K/UL
ABSOLUTE MONOCYTE (OHS): 0.62 K/UL (ref 0.3–1)
ABSOLUTE NEUTROPHIL COUNT (OHS): 6.24 K/UL (ref 1.8–7.7)
ANION GAP (OHS): 8 MMOL/L (ref 8–16)
AORTIC SIZE INDEX (SOV): 1.8 CM/M2
AORTIC SIZE INDEX: 1.6 CM/M2
ASCENDING AORTA: 3.1 CM
AV AREA BY CONTINUOUS VTI: 3.4 CM2
AV INDEX (PROSTH): 0.88
AV LVOT MEAN GRADIENT: 2 MMHG
AV LVOT PEAK GRADIENT: 4 MMHG
AV MEAN GRADIENT: 4 MMHG
AV PEAK GRADIENT: 6 MMHG
AV VALVE AREA BY VELOCITY RATIO: 3.2 CM²
AV VALVE AREA: 3.3 CM2
AV VELOCITY RATIO: 0.83
BASOPHILS # BLD AUTO: 0.04 K/UL
BASOPHILS NFR BLD AUTO: 0.5 %
BSA FOR ECHO PROCEDURE: 1.98 M2
BUN SERPL-MCNC: 28 MG/DL (ref 8–23)
CALCIUM SERPL-MCNC: 7.9 MG/DL (ref 8.7–10.5)
CHLORIDE SERPL-SCNC: 106 MMOL/L (ref 95–110)
CO2 SERPL-SCNC: 22 MMOL/L (ref 23–29)
CREAT SERPL-MCNC: 1.3 MG/DL (ref 0.5–1.4)
CV ECHO LV RWT: 0.45 CM
DOP CALC AO PEAK VEL: 1.2 M/S
DOP CALC AO VTI: 24 CM
DOP CALC LVOT AREA: 3.8 CM2
DOP CALC LVOT DIAMETER: 2.2 CM
DOP CALC LVOT PEAK VEL: 1 M/S
DOP CALCLVOT PEAK VEL VTI: 21 CM
E WAVE DECELERATION TIME: 237 MS
E/A RATIO: 0.94
E/E' RATIO: 9 M/S
ECHO EF ESTIMATED: 62 %
ECHO LV POSTERIOR WALL: 1 CM (ref 0.6–1.1)
ERYTHROCYTE [DISTWIDTH] IN BLOOD BY AUTOMATED COUNT: 14.1 % (ref 11.5–14.5)
FRACTIONAL SHORTENING: 31.8 % (ref 28–44)
GFR SERPLBLD CREATININE-BSD FMLA CKD-EPI: 59 ML/MIN/1.73/M2
GLUCOSE SERPL-MCNC: 120 MG/DL (ref 70–110)
HCT VFR BLD AUTO: 28 % (ref 40–54)
HGB BLD-MCNC: 9.1 GM/DL (ref 14–18)
IMM GRANULOCYTES # BLD AUTO: 0.04 K/UL (ref 0–0.04)
IMM GRANULOCYTES NFR BLD AUTO: 0.5 % (ref 0–0.5)
INTERVENTRICULAR SEPTUM: 1 CM (ref 0.6–1.1)
LA MAJOR: 4.3 CM
LA MINOR: 4.2 CM
LA WIDTH: 4.5 CM
LEFT ATRIUM SIZE: 3 CM
LEFT ATRIUM VOLUME INDEX MOD: 26 ML/M2
LEFT ATRIUM VOLUME INDEX: 25 ML/M2
LEFT ATRIUM VOLUME MOD: 51 ML
LEFT ATRIUM VOLUME: 49 CM3
LEFT INTERNAL DIMENSION IN SYSTOLE: 3 CM (ref 2.1–4)
LEFT VENTRICLE DIASTOLIC VOLUME INDEX: 44.95 ML/M2
LEFT VENTRICLE DIASTOLIC VOLUME: 89 ML
LEFT VENTRICLE MASS INDEX: 74.7 G/M2
LEFT VENTRICLE SYSTOLIC VOLUME INDEX: 17.2 ML/M2
LEFT VENTRICLE SYSTOLIC VOLUME: 34 ML
LEFT VENTRICULAR INTERNAL DIMENSION IN DIASTOLE: 4.4 CM (ref 3.5–6)
LEFT VENTRICULAR MASS: 147.8 G
LV LATERAL E/E' RATIO: 8.7
LV SEPTAL E/E' RATIO: 8.7
LYMPHOCYTES # BLD AUTO: 1.32 K/UL (ref 1–4.8)
Lab: 1.7 CM/M
Lab: 2 CM/M
MAGNESIUM SERPL-MCNC: 1.7 MG/DL (ref 1.6–2.6)
MCH RBC QN AUTO: 30 PG (ref 27–31)
MCHC RBC AUTO-ENTMCNC: 32.5 G/DL (ref 32–36)
MCV RBC AUTO: 92 FL (ref 82–98)
MV PEAK A VEL: 0.93 M/S
MV PEAK E VEL: 0.87 M/S
NUCLEATED RBC (/100WBC) (OHS): 0 /100 WBC
OHS CV CPX PATIENT HEIGHT IN: 71
OHS CV RV/LV RATIO: 0.73 CM
PHOSPHATE SERPL-MCNC: 3.6 MG/DL (ref 2.7–4.5)
PLATELET # BLD AUTO: 88 K/UL (ref 150–450)
PMV BLD AUTO: 9.4 FL (ref 9.2–12.9)
POCT GLUCOSE: 106 MG/DL (ref 70–110)
POCT GLUCOSE: 142 MG/DL (ref 70–110)
POTASSIUM SERPL-SCNC: 3.9 MMOL/L (ref 3.5–5.1)
RA MAJOR: 4.78 CM
RA PRESSURE ESTIMATED: 3 MMHG
RA WIDTH: 2.88 CM
RBC # BLD AUTO: 3.03 M/UL (ref 4.6–6.2)
RELATIVE EOSINOPHIL (OHS): 5.2 %
RELATIVE LYMPHOCYTE (OHS): 15.2 % (ref 18–48)
RELATIVE MONOCYTE (OHS): 7.1 % (ref 4–15)
RELATIVE NEUTROPHIL (OHS): 71.5 % (ref 38–73)
RIGHT VENTRICLE DIASTOLIC BASEL DIMENSION: 3.2 CM
SINUS: 3.6 CM
SODIUM SERPL-SCNC: 136 MMOL/L (ref 136–145)
STJ: 3 CM
TDI LATERAL: 0.1 M/S
TDI SEPTAL: 0.1 M/S
TDI: 0.1 M/S
TRICUSPID ANNULAR PLANE SYSTOLIC EXCURSION: 1.7 CM
WBC # BLD AUTO: 8.71 K/UL (ref 3.9–12.7)
Z-SCORE OF LEFT VENTRICULAR DIMENSION IN END DIASTOLE: -2.62
Z-SCORE OF LEFT VENTRICULAR DIMENSION IN END SYSTOLE: -1.25

## 2025-08-07 PROCEDURE — 94660 CPAP INITIATION&MGMT: CPT

## 2025-08-07 PROCEDURE — 80048 BASIC METABOLIC PNL TOTAL CA: CPT | Performed by: STUDENT IN AN ORGANIZED HEALTH CARE EDUCATION/TRAINING PROGRAM

## 2025-08-07 PROCEDURE — 63600175 PHARM REV CODE 636 W HCPCS: Performed by: STUDENT IN AN ORGANIZED HEALTH CARE EDUCATION/TRAINING PROGRAM

## 2025-08-07 PROCEDURE — 25000003 PHARM REV CODE 250: Performed by: STUDENT IN AN ORGANIZED HEALTH CARE EDUCATION/TRAINING PROGRAM

## 2025-08-07 PROCEDURE — 97168 OT RE-EVAL EST PLAN CARE: CPT

## 2025-08-07 PROCEDURE — 25000003 PHARM REV CODE 250

## 2025-08-07 PROCEDURE — 97535 SELF CARE MNGMENT TRAINING: CPT

## 2025-08-07 PROCEDURE — 97116 GAIT TRAINING THERAPY: CPT

## 2025-08-07 PROCEDURE — 97161 PT EVAL LOW COMPLEX 20 MIN: CPT

## 2025-08-07 PROCEDURE — 85025 COMPLETE CBC W/AUTO DIFF WBC: CPT | Performed by: STUDENT IN AN ORGANIZED HEALTH CARE EDUCATION/TRAINING PROGRAM

## 2025-08-07 PROCEDURE — 20600001 HC STEP DOWN PRIVATE ROOM

## 2025-08-07 PROCEDURE — 63600175 PHARM REV CODE 636 W HCPCS: Mod: JZ,TB | Performed by: STUDENT IN AN ORGANIZED HEALTH CARE EDUCATION/TRAINING PROGRAM

## 2025-08-07 PROCEDURE — 99231 SBSQ HOSP IP/OBS SF/LOW 25: CPT | Mod: ,,, | Performed by: ANESTHESIOLOGY

## 2025-08-07 PROCEDURE — 94761 N-INVAS EAR/PLS OXIMETRY MLT: CPT

## 2025-08-07 PROCEDURE — 99900035 HC TECH TIME PER 15 MIN (STAT)

## 2025-08-07 PROCEDURE — 63600175 PHARM REV CODE 636 W HCPCS

## 2025-08-07 PROCEDURE — 84100 ASSAY OF PHOSPHORUS: CPT | Performed by: STUDENT IN AN ORGANIZED HEALTH CARE EDUCATION/TRAINING PROGRAM

## 2025-08-07 PROCEDURE — 83735 ASSAY OF MAGNESIUM: CPT | Performed by: STUDENT IN AN ORGANIZED HEALTH CARE EDUCATION/TRAINING PROGRAM

## 2025-08-07 RX ORDER — DORZOLAMIDE HCL 20 MG/ML
1 SOLUTION/ DROPS OPHTHALMIC 2 TIMES DAILY
Status: DISCONTINUED | OUTPATIENT
Start: 2025-08-07 | End: 2025-08-10 | Stop reason: HOSPADM

## 2025-08-07 RX ORDER — ENOXAPARIN SODIUM 100 MG/ML
40 INJECTION SUBCUTANEOUS EVERY 24 HOURS
Status: DISCONTINUED | OUTPATIENT
Start: 2025-08-07 | End: 2025-08-10 | Stop reason: HOSPADM

## 2025-08-07 RX ORDER — FAMOTIDINE 20 MG/1
20 TABLET, FILM COATED ORAL DAILY
Status: DISCONTINUED | OUTPATIENT
Start: 2025-08-08 | End: 2025-08-10 | Stop reason: HOSPADM

## 2025-08-07 RX ORDER — LANOLIN ALCOHOL/MO/W.PET/CERES
800 CREAM (GRAM) TOPICAL
Status: DISCONTINUED | OUTPATIENT
Start: 2025-08-07 | End: 2025-08-08

## 2025-08-07 RX ORDER — SODIUM,POTASSIUM PHOSPHATES 280-250MG
2 POWDER IN PACKET (EA) ORAL
Status: DISCONTINUED | OUTPATIENT
Start: 2025-08-07 | End: 2025-08-08

## 2025-08-07 RX ORDER — CLONAZEPAM 0.5 MG/1
1.5 TABLET ORAL NIGHTLY
Status: DISCONTINUED | OUTPATIENT
Start: 2025-08-08 | End: 2025-08-10 | Stop reason: HOSPADM

## 2025-08-07 RX ORDER — SODIUM CHLORIDE, SODIUM LACTATE, POTASSIUM CHLORIDE, CALCIUM CHLORIDE 600; 310; 30; 20 MG/100ML; MG/100ML; MG/100ML; MG/100ML
INJECTION, SOLUTION INTRAVENOUS CONTINUOUS
Status: DISCONTINUED | OUTPATIENT
Start: 2025-08-07 | End: 2025-08-10 | Stop reason: HOSPADM

## 2025-08-07 RX ORDER — BRIMONIDINE TARTRATE 2 MG/ML
1 SOLUTION/ DROPS OPHTHALMIC 2 TIMES DAILY
Status: DISCONTINUED | OUTPATIENT
Start: 2025-08-07 | End: 2025-08-10 | Stop reason: HOSPADM

## 2025-08-07 RX ORDER — DULOXETIN HYDROCHLORIDE 30 MG/1
30 CAPSULE, DELAYED RELEASE ORAL 2 TIMES DAILY
Status: DISCONTINUED | OUTPATIENT
Start: 2025-08-07 | End: 2025-08-08

## 2025-08-07 RX ORDER — RAMELTEON 8 MG/1
8 TABLET ORAL NIGHTLY
Status: DISCONTINUED | OUTPATIENT
Start: 2025-08-07 | End: 2025-08-10 | Stop reason: HOSPADM

## 2025-08-07 RX ADMIN — Medication 800 MG: at 06:08

## 2025-08-07 RX ADMIN — RAMELTEON 8 MG: 8 TABLET, FILM COATED ORAL at 09:08

## 2025-08-07 RX ADMIN — KETOROLAC TROMETHAMINE 15 MG: 15 INJECTION INTRAMUSCULAR at 11:08

## 2025-08-07 RX ADMIN — SODIUM CHLORIDE, POTASSIUM CHLORIDE, SODIUM LACTATE AND CALCIUM CHLORIDE: 600; 310; 30; 20 INJECTION, SOLUTION INTRAVENOUS at 04:08

## 2025-08-07 RX ADMIN — PREGABALIN 150 MG: 150 CAPSULE ORAL at 09:08

## 2025-08-07 RX ADMIN — CARBIDOPA AND LEVODOPA 2 TABLET: 25; 100 TABLET ORAL at 05:08

## 2025-08-07 RX ADMIN — KETOROLAC TROMETHAMINE 15 MG: 15 INJECTION INTRAMUSCULAR at 06:08

## 2025-08-07 RX ADMIN — FAMOTIDINE 20 MG: 20 TABLET, FILM COATED ORAL at 08:08

## 2025-08-07 RX ADMIN — MUPIROCIN: 20 OINTMENT TOPICAL at 08:08

## 2025-08-07 RX ADMIN — DORZOLAMIDE HCL 1 DROP: 20 SOLUTION/ DROPS OPHTHALMIC at 08:08

## 2025-08-07 RX ADMIN — HEPARIN SODIUM 5000 UNITS: 5000 INJECTION INTRAVENOUS; SUBCUTANEOUS at 06:08

## 2025-08-07 RX ADMIN — DONEPEZIL HYDROCHLORIDE 5 MG: 5 TABLET ORAL at 09:08

## 2025-08-07 RX ADMIN — CARBIDOPA AND LEVODOPA 2 TABLET: 25; 100 TABLET ORAL at 02:08

## 2025-08-07 RX ADMIN — BRIMONIDINE TARTRATE 1 DROP: 2 SOLUTION OPHTHALMIC at 09:08

## 2025-08-07 RX ADMIN — ENOXAPARIN SODIUM 40 MG: 40 INJECTION SUBCUTANEOUS at 05:08

## 2025-08-07 RX ADMIN — ALVIMOPAN 12 MG: 12 CAPSULE ORAL at 08:08

## 2025-08-07 RX ADMIN — MUPIROCIN: 20 OINTMENT TOPICAL at 09:08

## 2025-08-07 RX ADMIN — CARBIDOPA AND LEVODOPA 2 TABLET: 25; 100 TABLET ORAL at 06:08

## 2025-08-07 RX ADMIN — CARBIDOPA AND LEVODOPA 2 TABLET: 25; 100 TABLET ORAL at 11:08

## 2025-08-07 RX ADMIN — DULOXETINE 30 MG: 30 CAPSULE, DELAYED RELEASE ORAL at 08:08

## 2025-08-07 RX ADMIN — CARBIDOPA AND LEVODOPA 2 TABLET: 25; 100 TABLET ORAL at 09:08

## 2025-08-07 RX ADMIN — CLONAZEPAM 1.5 MG: 0.5 TABLET ORAL at 06:08

## 2025-08-07 RX ADMIN — ACETAMINOPHEN 1000 MG: 500 TABLET ORAL at 05:08

## 2025-08-07 RX ADMIN — DORZOLAMIDE HCL 1 DROP: 20 SOLUTION/ DROPS OPHTHALMIC at 09:08

## 2025-08-07 RX ADMIN — DULOXETINE 30 MG: 30 CAPSULE, DELAYED RELEASE ORAL at 09:08

## 2025-08-07 RX ADMIN — ACETAMINOPHEN 1000 MG: 500 TABLET ORAL at 06:08

## 2025-08-07 RX ADMIN — KETOROLAC TROMETHAMINE 15 MG: 15 INJECTION INTRAMUSCULAR at 05:08

## 2025-08-07 RX ADMIN — BRIMONIDINE TARTRATE 1 DROP: 2 SOLUTION OPHTHALMIC at 08:08

## 2025-08-07 RX ADMIN — ATORVASTATIN CALCIUM 20 MG: 20 TABLET, FILM COATED ORAL at 09:08

## 2025-08-07 RX ADMIN — RASAGILINE 1 MG: 1 TABLET ORAL at 08:08

## 2025-08-07 RX ADMIN — ACETAMINOPHEN 1000 MG: 500 TABLET ORAL at 11:08

## 2025-08-07 RX ADMIN — SULFAMETHOXAZOLE AND TRIMETHOPRIM 1 TABLET: 800; 160 TABLET ORAL at 08:08

## 2025-08-07 NOTE — ANESTHESIA POST-OP PAIN MANAGEMENT
Acute Pain Service Progress Note    Glenn Medel is a 70 y.o., male, [id number].    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 Pruritis No    2. Nausea No    3. Motor Blockade No, 0=Ability to raise lower extremities off bed    4. Sedation No, 1=awake and alert  Sitting at edge of bed    Objective:     Catheter level T9-T10   Catheter site clean, dry, intact bilaterally   Catheter placement 12 cm @skin, and 10 cm at the skin      Vitals   Vitals:    08/07/25 0800   BP: 112/63   Pulse: 91   Resp: (!) 21   Temp:         Labs    No results displayed because visit has over 200 results.           Meds Current Medications[1]     Anticoagulant dose heparin at 5000 U q 8.    Assessment:     Pt stepped up to SICU yesterday due to hypotension, and required blood products. PNC catheters paused at that time due to concern that catheter infusions could be contributing to hypotension. Pt pain has been well controlled since catheters were paused yesterday afternoon, and he is now HDS since receiving transfusion. Given adequate control of pain, plan to pull PNC catheters this afternoon and continue multimodal regimen.    Plan:     Patient doing well, continue present treatment.  - Continue scheduled medications: Tylenol 1g q6h, Toradol 15 mg IV q 6, pregabalin 150 mg qhs, Duloxetine 30 q D  -  Robaxin 1000 mg q6h   - Discontinue  oxycodone 10 mg prn, will maintain oxy 5 mg prn for now  - PT/OT ordered  -Catheters paused since yesterday and pain well controlled, will plan to pull catheters on afternoon rounds and continue MM regimen.       Case discussed with staff, final recommendations per attestation above.     Thank you for the consult and allowing us to participate in the care of this patient. We will continue to follow along. Please call Acute Pain Service at [phone] or Anesthesia at [phone] if you have any further questions or concerns.      Jules Lowe MD  CA-1         [1]   Current Facility-Administered Medications   Medication Dose Route Frequency Provider Last Rate Last Admin    0.9%  NaCl infusion (for blood administration)   Intravenous Q24H PRN Sujatha Matthew MD        acetaminophen tablet 1,000 mg  1,000 mg Oral Q6H Eren Pichardo MD   1,000 mg at 08/07/25 0615    alvimopan capsule 12 mg  12 mg Oral BID Eren Pichardo MD   12 mg at 08/07/25 0852    atorvastatin tablet 20 mg  20 mg Oral QHS Eren Pichardo MD   20 mg at 08/06/25 2105    brimonidine 0.2% ophthalmic solution 1 drop  1 drop Both Eyes BID Sujatha Matthew MD   1 drop at 08/07/25 0852    carbidopa-levodopa  mg per tablet 2 tablet  2 tablet Oral 5x Daily Eren Pichardo MD   2 tablet at 08/07/25 0617    [START ON 8/8/2025] clonazePAM tablet 1.5 mg  1.5 mg Oral QHS Augusto Wright MD        dextrose 50% injection 12.5 g  12.5 g Intravenous PRN Eren Pichardo MD        dextrose 50% injection 25 g  25 g Intravenous PRN Eren Pichardo MD        donepeziL tablet 5 mg  5 mg Oral QHS Eren Pichardo MD   5 mg at 08/06/25 2104    dorzolamide 2 % ophthalmic solution 1 drop  1 drop Both Eyes BID Sujatha Matthew MD   1 drop at 08/07/25 0852    DULoxetine DR capsule 30 mg  30 mg Oral Daily Eren Pichardo MD   30 mg at 08/07/25 0851    enoxaparin injection 40 mg  40 mg Subcutaneous Daily Augusto Wright MD        famotidine tablet 20 mg  20 mg Oral BID Ko Corea MD   20  mg at 08/07/25 0851    glucagon (human recombinant) injection 1 mg  1 mg Intramuscular PRN Eren Pichardo MD        glucose chewable tablet 16 g  16 g Oral PRN Eren Pichardo MD        glucose chewable tablet 24 g  24 g Oral PRN Eren Pichardo MD        insulin aspart U-100 pen 0-10 Units  0-10 Units Subcutaneous QID (AC + HS) PRN Eren Pichardo MD   1 Units at 08/06/25 2114    ketorolac injection 15 mg  15 mg Intravenous Q6H Eren Pichardo MD   15 mg at 08/07/25 0615    magnesium oxide tablet 800 mg  800 mg Oral PRN Lenny Sanchez MD   800 mg at 08/07/25 0616    magnesium oxide tablet 800 mg  800 mg Oral PRN Lenny Sanchez MD        mupirocin 2 % ointment   Nasal BID Hebert Zapata MD   Given at 08/07/25 0852    naloxone 0.4 mg/mL injection 0.4 mg  0.4 mg Intravenous PRN Eren Pichardo MD        ondansetron injection 4 mg  4 mg Intravenous Q12H PRN Fabiano Beltran DO        oxyCODONE immediate release tablet 5 mg  5 mg Oral Q4H PRN Fabiano Beltran DO        potassium bicarbonate disintegrating tablet 35 mEq  35 mEq Oral PRLenny Chaudhari MD        potassium bicarbonate disintegrating tablet 50 mEq  50 mEq Oral PRLenny Chaudhari MD        potassium bicarbonate disintegrating tablet 60 mEq  60 mEq Oral PRN Lenny Sanchez MD        potassium, sodium phosphates 280-160-250 mg packet 2 packet  2 packet Oral PRLenny Chaudhari MD        potassium, sodium phosphates 280-160-250 mg packet 2 packet  2 packet Oral PRN Lenny Sanchez MD        potassium, sodium phosphates 280-160-250 mg packet 2 packet  2 packet Oral PRLenny Chaudhari MD        pregabalin capsule 150 mg  150 mg Oral QHS Eren Pichardo MD   150 mg at 08/06/25 2105    prochlorperazine injection Soln 5 mg  5 mg Intravenous Q6H PRN Eren Pichardo MD        Ramelteon 8 mg tablet - PATIENT SUPPLIED  8 mg Oral QHS Sujatha Matthew MD        rasagiline tablet Tab 1 mg  1 mg Oral Daily Eren Pichardo MD   1 mg at 08/07/25 0814    ropivacaine 0.2%  Perineural Pump infusion 500 ML   Perineural Continuous Fabiano Beltran DO   New Bag at 08/05/25 2210    ropivacaine 0.2% Perineural Pump infusion 500 ML   Perineural Continuous Fabiano Beltran DO New Bag at 08/05/25 2219    sulfamethoxazole-trimethoprim 800-160mg per tablet 1 tablet  1 tablet Oral Daily Eren Pichardo MD   1 tablet at 08/07/25 0826

## 2025-08-07 NOTE — ADDENDUM NOTE
Addendum  created 08/07/25 1419 by Vijay Jacobo MD    Charge Capture section accepted, Cosign clinical note with attestation, Intraprocedure Event edited

## 2025-08-08 LAB
ABSOLUTE EOSINOPHIL (OHS): 0.5 K/UL
ABSOLUTE MONOCYTE (OHS): 0.38 K/UL (ref 0.3–1)
ABSOLUTE NEUTROPHIL COUNT (OHS): 4.76 K/UL (ref 1.8–7.7)
ANION GAP (OHS): 5 MMOL/L (ref 8–16)
BASOPHILS # BLD AUTO: 0.03 K/UL
BASOPHILS NFR BLD AUTO: 0.4 %
BUN SERPL-MCNC: 29 MG/DL (ref 8–23)
CALCIUM SERPL-MCNC: 7.9 MG/DL (ref 8.7–10.5)
CHLORIDE SERPL-SCNC: 108 MMOL/L (ref 95–110)
CO2 SERPL-SCNC: 26 MMOL/L (ref 23–29)
CREAT SERPL-MCNC: 1.2 MG/DL (ref 0.5–1.4)
ERYTHROCYTE [DISTWIDTH] IN BLOOD BY AUTOMATED COUNT: 14.2 % (ref 11.5–14.5)
GFR SERPLBLD CREATININE-BSD FMLA CKD-EPI: >60 ML/MIN/1.73/M2
GLUCOSE SERPL-MCNC: 111 MG/DL (ref 70–110)
HCT VFR BLD AUTO: 23 % (ref 40–54)
HGB BLD-MCNC: 7.5 GM/DL (ref 14–18)
IMM GRANULOCYTES # BLD AUTO: 0.03 K/UL (ref 0–0.04)
IMM GRANULOCYTES NFR BLD AUTO: 0.4 % (ref 0–0.5)
LYMPHOCYTES # BLD AUTO: 1.43 K/UL (ref 1–4.8)
MAGNESIUM SERPL-MCNC: 1.9 MG/DL (ref 1.6–2.6)
MCH RBC QN AUTO: 30.4 PG (ref 27–31)
MCHC RBC AUTO-ENTMCNC: 32.6 G/DL (ref 32–36)
MCV RBC AUTO: 93 FL (ref 82–98)
NUCLEATED RBC (/100WBC) (OHS): 0 /100 WBC
PHOSPHATE SERPL-MCNC: 2.7 MG/DL (ref 2.7–4.5)
PLATELET # BLD AUTO: 89 K/UL (ref 150–450)
PMV BLD AUTO: 9.3 FL (ref 9.2–12.9)
POCT GLUCOSE: 100 MG/DL (ref 70–110)
POCT GLUCOSE: 129 MG/DL (ref 70–110)
POCT GLUCOSE: 137 MG/DL (ref 70–110)
POCT GLUCOSE: 138 MG/DL (ref 70–110)
POTASSIUM SERPL-SCNC: 4.8 MMOL/L (ref 3.5–5.1)
RBC # BLD AUTO: 2.47 M/UL (ref 4.6–6.2)
RELATIVE EOSINOPHIL (OHS): 7 %
RELATIVE LYMPHOCYTE (OHS): 20.1 % (ref 18–48)
RELATIVE MONOCYTE (OHS): 5.3 % (ref 4–15)
RELATIVE NEUTROPHIL (OHS): 66.8 % (ref 38–73)
SODIUM SERPL-SCNC: 139 MMOL/L (ref 136–145)
WBC # BLD AUTO: 7.13 K/UL (ref 3.9–12.7)

## 2025-08-08 PROCEDURE — 84100 ASSAY OF PHOSPHORUS: CPT | Performed by: STUDENT IN AN ORGANIZED HEALTH CARE EDUCATION/TRAINING PROGRAM

## 2025-08-08 PROCEDURE — 25000003 PHARM REV CODE 250

## 2025-08-08 PROCEDURE — 94660 CPAP INITIATION&MGMT: CPT

## 2025-08-08 PROCEDURE — 25000003 PHARM REV CODE 250: Performed by: STUDENT IN AN ORGANIZED HEALTH CARE EDUCATION/TRAINING PROGRAM

## 2025-08-08 PROCEDURE — 36415 COLL VENOUS BLD VENIPUNCTURE: CPT | Performed by: STUDENT IN AN ORGANIZED HEALTH CARE EDUCATION/TRAINING PROGRAM

## 2025-08-08 PROCEDURE — 63600175 PHARM REV CODE 636 W HCPCS

## 2025-08-08 PROCEDURE — 63600175 PHARM REV CODE 636 W HCPCS: Performed by: STUDENT IN AN ORGANIZED HEALTH CARE EDUCATION/TRAINING PROGRAM

## 2025-08-08 PROCEDURE — 99900035 HC TECH TIME PER 15 MIN (STAT)

## 2025-08-08 PROCEDURE — 97535 SELF CARE MNGMENT TRAINING: CPT

## 2025-08-08 PROCEDURE — 94761 N-INVAS EAR/PLS OXIMETRY MLT: CPT

## 2025-08-08 PROCEDURE — 97530 THERAPEUTIC ACTIVITIES: CPT

## 2025-08-08 PROCEDURE — 20600001 HC STEP DOWN PRIVATE ROOM

## 2025-08-08 PROCEDURE — 80048 BASIC METABOLIC PNL TOTAL CA: CPT | Performed by: STUDENT IN AN ORGANIZED HEALTH CARE EDUCATION/TRAINING PROGRAM

## 2025-08-08 PROCEDURE — 63600175 PHARM REV CODE 636 W HCPCS: Mod: JZ,TB | Performed by: STUDENT IN AN ORGANIZED HEALTH CARE EDUCATION/TRAINING PROGRAM

## 2025-08-08 PROCEDURE — 25000003 PHARM REV CODE 250: Performed by: UROLOGY

## 2025-08-08 PROCEDURE — 85025 COMPLETE CBC W/AUTO DIFF WBC: CPT | Performed by: STUDENT IN AN ORGANIZED HEALTH CARE EDUCATION/TRAINING PROGRAM

## 2025-08-08 PROCEDURE — 83735 ASSAY OF MAGNESIUM: CPT | Performed by: STUDENT IN AN ORGANIZED HEALTH CARE EDUCATION/TRAINING PROGRAM

## 2025-08-08 RX ORDER — CLONAZEPAM 0.5 MG/1
1.5 TABLET ORAL ONCE
Status: DISCONTINUED | OUTPATIENT
Start: 2025-08-08 | End: 2025-08-10 | Stop reason: HOSPADM

## 2025-08-08 RX ORDER — LANOLIN ALCOHOL/MO/W.PET/CERES
400 CREAM (GRAM) TOPICAL ONCE
Status: DISCONTINUED | OUTPATIENT
Start: 2025-08-08 | End: 2025-08-10 | Stop reason: HOSPADM

## 2025-08-08 RX ORDER — DULOXETIN HYDROCHLORIDE 30 MG/1
30 CAPSULE, DELAYED RELEASE ORAL DAILY
Status: DISCONTINUED | OUTPATIENT
Start: 2025-08-08 | End: 2025-08-10 | Stop reason: HOSPADM

## 2025-08-08 RX ORDER — SODIUM,POTASSIUM PHOSPHATES 280-250MG
1 POWDER IN PACKET (EA) ORAL ONCE
Status: COMPLETED | OUTPATIENT
Start: 2025-08-08 | End: 2025-08-08

## 2025-08-08 RX ADMIN — PREGABALIN 150 MG: 150 CAPSULE ORAL at 09:08

## 2025-08-08 RX ADMIN — DORZOLAMIDE HCL 1 DROP: 20 SOLUTION/ DROPS OPHTHALMIC at 08:08

## 2025-08-08 RX ADMIN — DORZOLAMIDE HCL 1 DROP: 20 SOLUTION/ DROPS OPHTHALMIC at 09:08

## 2025-08-08 RX ADMIN — MUPIROCIN: 20 OINTMENT TOPICAL at 09:08

## 2025-08-08 RX ADMIN — RAMELTEON 8 MG: 8 TABLET, FILM COATED ORAL at 09:08

## 2025-08-08 RX ADMIN — DULOXETINE 30 MG: 30 CAPSULE, DELAYED RELEASE ORAL at 09:08

## 2025-08-08 RX ADMIN — DONEPEZIL HYDROCHLORIDE 5 MG: 5 TABLET ORAL at 09:08

## 2025-08-08 RX ADMIN — CARBIDOPA AND LEVODOPA 2 TABLET: 25; 100 TABLET ORAL at 05:08

## 2025-08-08 RX ADMIN — KETOROLAC TROMETHAMINE 15 MG: 15 INJECTION INTRAMUSCULAR at 12:08

## 2025-08-08 RX ADMIN — BRIMONIDINE TARTRATE 1 DROP: 2 SOLUTION OPHTHALMIC at 09:08

## 2025-08-08 RX ADMIN — ENOXAPARIN SODIUM 40 MG: 40 INJECTION SUBCUTANEOUS at 05:08

## 2025-08-08 RX ADMIN — FAMOTIDINE 20 MG: 20 TABLET, FILM COATED ORAL at 08:08

## 2025-08-08 RX ADMIN — POTASSIUM & SODIUM PHOSPHATES POWDER PACK 280-160-250 MG 1 PACKET: 280-160-250 PACK at 05:08

## 2025-08-08 RX ADMIN — ACETAMINOPHEN 1000 MG: 500 TABLET ORAL at 12:08

## 2025-08-08 RX ADMIN — Medication 800 MG: at 05:08

## 2025-08-08 RX ADMIN — SODIUM CHLORIDE, POTASSIUM CHLORIDE, SODIUM LACTATE AND CALCIUM CHLORIDE: 600; 310; 30; 20 INJECTION, SOLUTION INTRAVENOUS at 07:08

## 2025-08-08 RX ADMIN — CARBIDOPA AND LEVODOPA 2 TABLET: 25; 100 TABLET ORAL at 02:08

## 2025-08-08 RX ADMIN — SODIUM CHLORIDE, POTASSIUM CHLORIDE, SODIUM LACTATE AND CALCIUM CHLORIDE: 600; 310; 30; 20 INJECTION, SOLUTION INTRAVENOUS at 05:08

## 2025-08-08 RX ADMIN — SULFAMETHOXAZOLE AND TRIMETHOPRIM 1 TABLET: 800; 160 TABLET ORAL at 08:08

## 2025-08-08 RX ADMIN — CLONAZEPAM 1.5 MG: 0.5 TABLET ORAL at 09:08

## 2025-08-08 RX ADMIN — RASAGILINE 1 MG: 1 TABLET ORAL at 08:08

## 2025-08-08 RX ADMIN — KETOROLAC TROMETHAMINE 15 MG: 15 INJECTION INTRAMUSCULAR at 05:08

## 2025-08-08 RX ADMIN — MUPIROCIN: 20 OINTMENT TOPICAL at 08:08

## 2025-08-08 RX ADMIN — BRIMONIDINE TARTRATE 1 DROP: 2 SOLUTION OPHTHALMIC at 08:08

## 2025-08-08 RX ADMIN — CARBIDOPA AND LEVODOPA 2 TABLET: 25; 100 TABLET ORAL at 09:08

## 2025-08-08 RX ADMIN — ACETAMINOPHEN 500 MG: 500 TABLET ORAL at 11:08

## 2025-08-08 RX ADMIN — ATORVASTATIN CALCIUM 20 MG: 20 TABLET, FILM COATED ORAL at 09:08

## 2025-08-08 RX ADMIN — CARBIDOPA AND LEVODOPA 2 TABLET: 25; 100 TABLET ORAL at 11:08

## 2025-08-09 LAB
ABO + RH BLD: NORMAL
ABO + RH BLD: NORMAL
ABSOLUTE EOSINOPHIL (OHS): 0.59 K/UL
ABSOLUTE MONOCYTE (OHS): 0.49 K/UL (ref 0.3–1)
ABSOLUTE NEUTROPHIL COUNT (OHS): 3.99 K/UL (ref 1.8–7.7)
ANION GAP (OHS): 9 MMOL/L (ref 8–16)
BASOPHILS # BLD AUTO: 0.03 K/UL
BASOPHILS NFR BLD AUTO: 0.5 %
BLD PROD TYP BPU: NORMAL
BLD PROD TYP BPU: NORMAL
BLOOD UNIT EXPIRATION DATE: NORMAL
BLOOD UNIT EXPIRATION DATE: NORMAL
BLOOD UNIT TYPE CODE: 9500
BLOOD UNIT TYPE CODE: 9500
BUN SERPL-MCNC: 27 MG/DL (ref 8–23)
CALCIUM SERPL-MCNC: 8 MG/DL (ref 8.7–10.5)
CHLORIDE SERPL-SCNC: 106 MMOL/L (ref 95–110)
CO2 SERPL-SCNC: 22 MMOL/L (ref 23–29)
CREAT FLD-MCNC: 1.1 MG/DL
CREAT SERPL-MCNC: 1.1 MG/DL (ref 0.5–1.4)
CROSSMATCH INTERPRETATION: NORMAL
CROSSMATCH INTERPRETATION: NORMAL
DISPENSE STATUS: NORMAL
DISPENSE STATUS: NORMAL
ERYTHROCYTE [DISTWIDTH] IN BLOOD BY AUTOMATED COUNT: 13.9 % (ref 11.5–14.5)
GFR SERPLBLD CREATININE-BSD FMLA CKD-EPI: >60 ML/MIN/1.73/M2
GLUCOSE SERPL-MCNC: 114 MG/DL (ref 70–110)
HCT VFR BLD AUTO: 22.6 % (ref 40–54)
HGB BLD-MCNC: 7.2 GM/DL (ref 14–18)
IMM GRANULOCYTES # BLD AUTO: 0.02 K/UL (ref 0–0.04)
IMM GRANULOCYTES NFR BLD AUTO: 0.3 % (ref 0–0.5)
LYMPHOCYTES # BLD AUTO: 1.31 K/UL (ref 1–4.8)
MAGNESIUM SERPL-MCNC: 1.8 MG/DL (ref 1.6–2.6)
MCH RBC QN AUTO: 29.1 PG (ref 27–31)
MCHC RBC AUTO-ENTMCNC: 31.9 G/DL (ref 32–36)
MCV RBC AUTO: 92 FL (ref 82–98)
NUCLEATED RBC (/100WBC) (OHS): 0 /100 WBC
PHOSPHATE SERPL-MCNC: 3.3 MG/DL (ref 2.7–4.5)
PLATELET # BLD AUTO: 101 K/UL (ref 150–450)
PMV BLD AUTO: 9.3 FL (ref 9.2–12.9)
POCT GLUCOSE: 104 MG/DL (ref 70–110)
POCT GLUCOSE: 119 MG/DL (ref 70–110)
POCT GLUCOSE: 122 MG/DL (ref 70–110)
POTASSIUM SERPL-SCNC: 4 MMOL/L (ref 3.5–5.1)
RBC # BLD AUTO: 2.47 M/UL (ref 4.6–6.2)
RBCS: NORMAL
RBCS: NORMAL
RELATIVE EOSINOPHIL (OHS): 9.2 %
RELATIVE LYMPHOCYTE (OHS): 20.4 % (ref 18–48)
RELATIVE MONOCYTE (OHS): 7.6 % (ref 4–15)
RELATIVE NEUTROPHIL (OHS): 62 % (ref 38–73)
SODIUM SERPL-SCNC: 137 MMOL/L (ref 136–145)
UNIT NUMBER: NORMAL
UNIT NUMBER: NORMAL
WBC # BLD AUTO: 6.43 K/UL (ref 3.9–12.7)

## 2025-08-09 PROCEDURE — 25000003 PHARM REV CODE 250: Performed by: UROLOGY

## 2025-08-09 PROCEDURE — 82570 ASSAY OF URINE CREATININE: CPT | Performed by: STUDENT IN AN ORGANIZED HEALTH CARE EDUCATION/TRAINING PROGRAM

## 2025-08-09 PROCEDURE — 83735 ASSAY OF MAGNESIUM: CPT | Performed by: STUDENT IN AN ORGANIZED HEALTH CARE EDUCATION/TRAINING PROGRAM

## 2025-08-09 PROCEDURE — 25000003 PHARM REV CODE 250

## 2025-08-09 PROCEDURE — 94761 N-INVAS EAR/PLS OXIMETRY MLT: CPT

## 2025-08-09 PROCEDURE — 20600001 HC STEP DOWN PRIVATE ROOM

## 2025-08-09 PROCEDURE — 94660 CPAP INITIATION&MGMT: CPT

## 2025-08-09 PROCEDURE — 84100 ASSAY OF PHOSPHORUS: CPT | Performed by: STUDENT IN AN ORGANIZED HEALTH CARE EDUCATION/TRAINING PROGRAM

## 2025-08-09 PROCEDURE — 25000003 PHARM REV CODE 250: Performed by: STUDENT IN AN ORGANIZED HEALTH CARE EDUCATION/TRAINING PROGRAM

## 2025-08-09 PROCEDURE — 36415 COLL VENOUS BLD VENIPUNCTURE: CPT | Performed by: STUDENT IN AN ORGANIZED HEALTH CARE EDUCATION/TRAINING PROGRAM

## 2025-08-09 PROCEDURE — 63600175 PHARM REV CODE 636 W HCPCS: Performed by: STUDENT IN AN ORGANIZED HEALTH CARE EDUCATION/TRAINING PROGRAM

## 2025-08-09 PROCEDURE — 85025 COMPLETE CBC W/AUTO DIFF WBC: CPT | Performed by: STUDENT IN AN ORGANIZED HEALTH CARE EDUCATION/TRAINING PROGRAM

## 2025-08-09 PROCEDURE — 82310 ASSAY OF CALCIUM: CPT | Performed by: STUDENT IN AN ORGANIZED HEALTH CARE EDUCATION/TRAINING PROGRAM

## 2025-08-09 RX ADMIN — ENOXAPARIN SODIUM 40 MG: 40 INJECTION SUBCUTANEOUS at 05:08

## 2025-08-09 RX ADMIN — MUPIROCIN: 20 OINTMENT TOPICAL at 08:08

## 2025-08-09 RX ADMIN — CARBIDOPA AND LEVODOPA 2 TABLET: 25; 100 TABLET ORAL at 02:08

## 2025-08-09 RX ADMIN — ACETAMINOPHEN 1000 MG: 500 TABLET ORAL at 05:08

## 2025-08-09 RX ADMIN — CLONAZEPAM 1.5 MG: 0.5 TABLET ORAL at 08:08

## 2025-08-09 RX ADMIN — CARBIDOPA AND LEVODOPA 2 TABLET: 25; 100 TABLET ORAL at 05:08

## 2025-08-09 RX ADMIN — MUPIROCIN: 20 OINTMENT TOPICAL at 10:08

## 2025-08-09 RX ADMIN — CARBIDOPA AND LEVODOPA 2 TABLET: 25; 100 TABLET ORAL at 10:08

## 2025-08-09 RX ADMIN — FAMOTIDINE 20 MG: 20 TABLET, FILM COATED ORAL at 10:08

## 2025-08-09 RX ADMIN — RAMELTEON 8 MG: 8 TABLET, FILM COATED ORAL at 08:08

## 2025-08-09 RX ADMIN — ATORVASTATIN CALCIUM 20 MG: 20 TABLET, FILM COATED ORAL at 08:08

## 2025-08-09 RX ADMIN — DORZOLAMIDE HCL 1 DROP: 20 SOLUTION/ DROPS OPHTHALMIC at 10:08

## 2025-08-09 RX ADMIN — SULFAMETHOXAZOLE AND TRIMETHOPRIM 1 TABLET: 800; 160 TABLET ORAL at 10:08

## 2025-08-09 RX ADMIN — BRIMONIDINE TARTRATE 1 DROP: 2 SOLUTION OPHTHALMIC at 08:08

## 2025-08-09 RX ADMIN — PREGABALIN 150 MG: 150 CAPSULE ORAL at 08:08

## 2025-08-09 RX ADMIN — BRIMONIDINE TARTRATE 1 DROP: 2 SOLUTION OPHTHALMIC at 10:08

## 2025-08-09 RX ADMIN — DONEPEZIL HYDROCHLORIDE 5 MG: 5 TABLET ORAL at 08:08

## 2025-08-09 RX ADMIN — CARBIDOPA AND LEVODOPA 2 TABLET: 25; 100 TABLET ORAL at 09:08

## 2025-08-09 RX ADMIN — DORZOLAMIDE HCL 1 DROP: 20 SOLUTION/ DROPS OPHTHALMIC at 08:08

## 2025-08-10 VITALS
HEIGHT: 71 IN | SYSTOLIC BLOOD PRESSURE: 151 MMHG | TEMPERATURE: 99 F | WEIGHT: 171.94 LBS | OXYGEN SATURATION: 93 % | RESPIRATION RATE: 18 BRPM | BODY MASS INDEX: 24.07 KG/M2 | HEART RATE: 84 BPM | DIASTOLIC BLOOD PRESSURE: 67 MMHG

## 2025-08-10 DIAGNOSIS — C67.9 MALIGNANT NEOPLASM OF URINARY BLADDER, UNSPECIFIED SITE: ICD-10-CM

## 2025-08-10 DIAGNOSIS — C67.0 MALIGNANT NEOPLASM OF TRIGONE OF URINARY BLADDER: Primary | ICD-10-CM

## 2025-08-10 DIAGNOSIS — R31.0 GROSS HEMATURIA: ICD-10-CM

## 2025-08-10 DIAGNOSIS — C61 PROSTATE CANCER: ICD-10-CM

## 2025-08-10 LAB
ABO + RH BLD: NORMAL
ABSOLUTE EOSINOPHIL (OHS): 0.46 K/UL
ABSOLUTE MONOCYTE (OHS): 0.5 K/UL (ref 0.3–1)
ABSOLUTE NEUTROPHIL COUNT (OHS): 3.4 K/UL (ref 1.8–7.7)
ANION GAP (OHS): 6 MMOL/L (ref 8–16)
BASOPHILS # BLD AUTO: 0.02 K/UL
BASOPHILS NFR BLD AUTO: 0.3 %
BLD PROD TYP BPU: NORMAL
BLOOD UNIT EXPIRATION DATE: NORMAL
BLOOD UNIT TYPE CODE: 9500
BUN SERPL-MCNC: 24 MG/DL (ref 8–23)
CALCIUM SERPL-MCNC: 8.1 MG/DL (ref 8.7–10.5)
CHLORIDE SERPL-SCNC: 110 MMOL/L (ref 95–110)
CO2 SERPL-SCNC: 24 MMOL/L (ref 23–29)
CREAT SERPL-MCNC: 1.2 MG/DL (ref 0.5–1.4)
CROSSMATCH INTERPRETATION: NORMAL
DISPENSE STATUS: NORMAL
ERYTHROCYTE [DISTWIDTH] IN BLOOD BY AUTOMATED COUNT: 13.6 % (ref 11.5–14.5)
GFR SERPLBLD CREATININE-BSD FMLA CKD-EPI: >60 ML/MIN/1.73/M2
GLUCOSE SERPL-MCNC: 103 MG/DL (ref 70–110)
HCT VFR BLD AUTO: 22 % (ref 40–54)
HGB BLD-MCNC: 6.9 GM/DL (ref 14–18)
IMM GRANULOCYTES # BLD AUTO: 0.02 K/UL (ref 0–0.04)
IMM GRANULOCYTES NFR BLD AUTO: 0.3 % (ref 0–0.5)
INDIRECT COOMBS: NORMAL
LYMPHOCYTES # BLD AUTO: 1.38 K/UL (ref 1–4.8)
MAGNESIUM SERPL-MCNC: 1.9 MG/DL (ref 1.6–2.6)
MCH RBC QN AUTO: 29.1 PG (ref 27–31)
MCHC RBC AUTO-ENTMCNC: 31.4 G/DL (ref 32–36)
MCV RBC AUTO: 93 FL (ref 82–98)
NUCLEATED RBC (/100WBC) (OHS): 0 /100 WBC
PHOSPHATE SERPL-MCNC: 4.3 MG/DL (ref 2.7–4.5)
PLATELET # BLD AUTO: 118 K/UL (ref 150–450)
PMV BLD AUTO: 8.9 FL (ref 9.2–12.9)
POCT GLUCOSE: 138 MG/DL (ref 70–110)
POCT GLUCOSE: 162 MG/DL (ref 70–110)
POTASSIUM SERPL-SCNC: 4.3 MMOL/L (ref 3.5–5.1)
RBC # BLD AUTO: 2.37 M/UL (ref 4.6–6.2)
RELATIVE EOSINOPHIL (OHS): 8 %
RELATIVE LYMPHOCYTE (OHS): 23.9 % (ref 18–48)
RELATIVE MONOCYTE (OHS): 8.7 % (ref 4–15)
RELATIVE NEUTROPHIL (OHS): 58.8 % (ref 38–73)
RH BLD: NORMAL
SODIUM SERPL-SCNC: 140 MMOL/L (ref 136–145)
SPECIMEN OUTDATE: NORMAL
UNIT NUMBER: NORMAL
WBC # BLD AUTO: 5.78 K/UL (ref 3.9–12.7)

## 2025-08-10 PROCEDURE — 36430 TRANSFUSION BLD/BLD COMPNT: CPT | Performed by: STUDENT IN AN ORGANIZED HEALTH CARE EDUCATION/TRAINING PROGRAM

## 2025-08-10 PROCEDURE — 86901 BLOOD TYPING SEROLOGIC RH(D): CPT

## 2025-08-10 PROCEDURE — 97530 THERAPEUTIC ACTIVITIES: CPT | Mod: CQ

## 2025-08-10 PROCEDURE — 36415 COLL VENOUS BLD VENIPUNCTURE: CPT | Performed by: STUDENT IN AN ORGANIZED HEALTH CARE EDUCATION/TRAINING PROGRAM

## 2025-08-10 PROCEDURE — P9016 RBC LEUKOCYTES REDUCED: HCPCS | Performed by: STUDENT IN AN ORGANIZED HEALTH CARE EDUCATION/TRAINING PROGRAM

## 2025-08-10 PROCEDURE — 83735 ASSAY OF MAGNESIUM: CPT | Performed by: STUDENT IN AN ORGANIZED HEALTH CARE EDUCATION/TRAINING PROGRAM

## 2025-08-10 PROCEDURE — 25000003 PHARM REV CODE 250

## 2025-08-10 PROCEDURE — 80048 BASIC METABOLIC PNL TOTAL CA: CPT | Performed by: STUDENT IN AN ORGANIZED HEALTH CARE EDUCATION/TRAINING PROGRAM

## 2025-08-10 PROCEDURE — 63600175 PHARM REV CODE 636 W HCPCS: Performed by: STUDENT IN AN ORGANIZED HEALTH CARE EDUCATION/TRAINING PROGRAM

## 2025-08-10 PROCEDURE — 94761 N-INVAS EAR/PLS OXIMETRY MLT: CPT

## 2025-08-10 PROCEDURE — 86920 COMPATIBILITY TEST SPIN: CPT | Performed by: STUDENT IN AN ORGANIZED HEALTH CARE EDUCATION/TRAINING PROGRAM

## 2025-08-10 PROCEDURE — 25000003 PHARM REV CODE 250: Performed by: STUDENT IN AN ORGANIZED HEALTH CARE EDUCATION/TRAINING PROGRAM

## 2025-08-10 PROCEDURE — 36415 COLL VENOUS BLD VENIPUNCTURE: CPT

## 2025-08-10 PROCEDURE — 84100 ASSAY OF PHOSPHORUS: CPT | Performed by: STUDENT IN AN ORGANIZED HEALTH CARE EDUCATION/TRAINING PROGRAM

## 2025-08-10 PROCEDURE — 97110 THERAPEUTIC EXERCISES: CPT | Mod: CQ

## 2025-08-10 PROCEDURE — 25000003 PHARM REV CODE 250: Performed by: UROLOGY

## 2025-08-10 PROCEDURE — 99900035 HC TECH TIME PER 15 MIN (STAT)

## 2025-08-10 PROCEDURE — 85025 COMPLETE CBC W/AUTO DIFF WBC: CPT | Performed by: STUDENT IN AN ORGANIZED HEALTH CARE EDUCATION/TRAINING PROGRAM

## 2025-08-10 PROCEDURE — 97116 GAIT TRAINING THERAPY: CPT | Mod: CQ

## 2025-08-10 RX ORDER — ACETAMINOPHEN 500 MG
1000 TABLET ORAL EVERY 8 HOURS
Qty: 168 TABLET | Refills: 0 | Status: SHIPPED | OUTPATIENT
Start: 2025-08-10 | End: 2025-09-07

## 2025-08-10 RX ORDER — DOCUSATE SODIUM 100 MG/1
100 CAPSULE, LIQUID FILLED ORAL
Qty: 14 CAPSULE | Refills: 0 | Status: SHIPPED | OUTPATIENT
Start: 2025-08-10 | End: 2025-09-07

## 2025-08-10 RX ORDER — LANOLIN ALCOHOL/MO/W.PET/CERES
400 CREAM (GRAM) TOPICAL ONCE
Status: COMPLETED | OUTPATIENT
Start: 2025-08-10 | End: 2025-08-10

## 2025-08-10 RX ORDER — HYDROCODONE BITARTRATE AND ACETAMINOPHEN 500; 5 MG/1; MG/1
TABLET ORAL
Status: DISCONTINUED | OUTPATIENT
Start: 2025-08-10 | End: 2025-08-10 | Stop reason: HOSPADM

## 2025-08-10 RX ORDER — SULFAMETHOXAZOLE AND TRIMETHOPRIM 800; 160 MG/1; MG/1
1 TABLET ORAL ONCE
Qty: 1 TABLET | Refills: 0 | Status: SHIPPED | OUTPATIENT
Start: 2025-08-10 | End: 2025-08-11

## 2025-08-10 RX ORDER — POLYETHYLENE GLYCOL 3350 17 G/17G
17 POWDER, FOR SOLUTION ORAL DAILY
Qty: 510 G | Refills: 0 | Status: SHIPPED | OUTPATIENT
Start: 2025-08-10 | End: 2025-09-09

## 2025-08-10 RX ORDER — ONDANSETRON 4 MG/1
4 TABLET, FILM COATED ORAL EVERY 8 HOURS PRN
Qty: 10 TABLET | Refills: 0 | Status: SHIPPED | OUTPATIENT
Start: 2025-08-10

## 2025-08-10 RX ORDER — OXYCODONE HYDROCHLORIDE 5 MG/1
5 TABLET ORAL EVERY 4 HOURS PRN
Qty: 10 TABLET | Refills: 0 | Status: SHIPPED | OUTPATIENT
Start: 2025-08-10 | End: 2025-08-24

## 2025-08-10 RX ORDER — IBUPROFEN 400 MG/1
800 TABLET, FILM COATED ORAL EVERY 8 HOURS
Qty: 168 TABLET | Refills: 0 | Status: SHIPPED | OUTPATIENT
Start: 2025-08-10 | End: 2025-09-07

## 2025-08-10 RX ORDER — ENOXAPARIN SODIUM 100 MG/ML
40 INJECTION SUBCUTANEOUS DAILY
Qty: 9.2 ML | Refills: 0 | Status: SHIPPED | OUTPATIENT
Start: 2025-08-10 | End: 2025-09-02

## 2025-08-10 RX ADMIN — SULFAMETHOXAZOLE AND TRIMETHOPRIM 1 TABLET: 800; 160 TABLET ORAL at 09:08

## 2025-08-10 RX ADMIN — Medication 400 MG: at 09:08

## 2025-08-10 RX ADMIN — CARBIDOPA AND LEVODOPA 2 TABLET: 25; 100 TABLET ORAL at 09:08

## 2025-08-10 RX ADMIN — BRIMONIDINE TARTRATE 1 DROP: 2 SOLUTION OPHTHALMIC at 09:08

## 2025-08-10 RX ADMIN — RASAGILINE 1 MG: 1 TABLET ORAL at 09:08

## 2025-08-10 RX ADMIN — ENOXAPARIN SODIUM 40 MG: 40 INJECTION SUBCUTANEOUS at 05:08

## 2025-08-10 RX ADMIN — ACETAMINOPHEN 1000 MG: 500 TABLET ORAL at 12:08

## 2025-08-10 RX ADMIN — DORZOLAMIDE HCL 1 DROP: 20 SOLUTION/ DROPS OPHTHALMIC at 09:08

## 2025-08-10 RX ADMIN — CARBIDOPA AND LEVODOPA 2 TABLET: 25; 100 TABLET ORAL at 01:08

## 2025-08-10 RX ADMIN — DULOXETINE 30 MG: 30 CAPSULE, DELAYED RELEASE ORAL at 09:08

## 2025-08-10 RX ADMIN — CARBIDOPA AND LEVODOPA 2 TABLET: 25; 100 TABLET ORAL at 05:08

## 2025-08-10 RX ADMIN — MUPIROCIN: 20 OINTMENT TOPICAL at 09:08

## 2025-08-10 RX ADMIN — FAMOTIDINE 20 MG: 20 TABLET, FILM COATED ORAL at 09:08

## 2025-08-11 LAB
ESTROGEN SERPL-MCNC: NORMAL PG/ML
INSULIN SERPL-ACNC: NORMAL U[IU]/ML
LAB AP CLINICAL INFORMATION: NORMAL
LAB AP GROSS DESCRIPTION: NORMAL
LAB AP INTRA OP: NORMAL
LAB AP PERFORMING LOCATION(S): NORMAL
LAB AP REPORT FOOTNOTES: NORMAL
LAB AP SYNOPTIC CHECKLIST: NORMAL

## 2025-08-12 ENCOUNTER — PATIENT MESSAGE (OUTPATIENT)
Dept: HEMATOLOGY/ONCOLOGY | Facility: CLINIC | Age: 70
End: 2025-08-12
Payer: MEDICARE

## 2025-08-12 RX ORDER — SULFAMETHOXAZOLE AND TRIMETHOPRIM 400; 80 MG/1; MG/1
1 TABLET ORAL DAILY
Qty: 10 TABLET | Refills: 0 | Status: SHIPPED | OUTPATIENT
Start: 2025-08-12 | End: 2025-08-22

## 2025-08-13 ENCOUNTER — PATIENT MESSAGE (OUTPATIENT)
Dept: HEMATOLOGY/ONCOLOGY | Facility: CLINIC | Age: 70
End: 2025-08-13
Payer: MEDICARE

## 2025-08-13 ENCOUNTER — PATIENT OUTREACH (OUTPATIENT)
Dept: ADMINISTRATIVE | Facility: CLINIC | Age: 70
End: 2025-08-13
Payer: MEDICARE

## 2025-08-13 ENCOUNTER — LAB VISIT (OUTPATIENT)
Dept: LAB | Facility: HOSPITAL | Age: 70
End: 2025-08-13
Payer: MEDICARE

## 2025-08-13 DIAGNOSIS — C67.0 MALIGNANT NEOPLASM OF TRIGONE OF URINARY BLADDER: ICD-10-CM

## 2025-08-13 LAB
ALBUMIN SERPL BCP-MCNC: 2.6 G/DL (ref 3.5–5.2)
ALP SERPL-CCNC: 72 UNIT/L (ref 40–150)
ALT SERPL W/O P-5'-P-CCNC: <8 UNIT/L (ref 0–55)
ANION GAP (OHS): 6 MMOL/L (ref 8–16)
AST SERPL-CCNC: 37 UNIT/L (ref 0–50)
BILIRUB SERPL-MCNC: 0.4 MG/DL (ref 0.1–1)
BUN SERPL-MCNC: 19 MG/DL (ref 8–23)
CALCIUM SERPL-MCNC: 8.1 MG/DL (ref 8.7–10.5)
CHLORIDE SERPL-SCNC: 109 MMOL/L (ref 95–110)
CO2 SERPL-SCNC: 25 MMOL/L (ref 23–29)
CREAT SERPL-MCNC: 1 MG/DL (ref 0.5–1.4)
ERYTHROCYTE [DISTWIDTH] IN BLOOD BY AUTOMATED COUNT: 14 % (ref 11.5–14.5)
GFR SERPLBLD CREATININE-BSD FMLA CKD-EPI: >60 ML/MIN/1.73/M2
GLUCOSE SERPL-MCNC: 155 MG/DL (ref 70–110)
HCT VFR BLD AUTO: 24.6 % (ref 40–54)
HGB BLD-MCNC: 7.5 GM/DL (ref 14–18)
MCH RBC QN AUTO: 29.4 PG (ref 27–31)
MCHC RBC AUTO-ENTMCNC: 30.5 G/DL (ref 32–36)
MCV RBC AUTO: 97 FL (ref 82–98)
PLATELET # BLD AUTO: 197 K/UL (ref 150–450)
PLATELET BLD QL SMEAR: NORMAL
PMV BLD AUTO: 9.4 FL (ref 9.2–12.9)
POTASSIUM SERPL-SCNC: 4.3 MMOL/L (ref 3.5–5.1)
PROT SERPL-MCNC: 5.6 GM/DL (ref 6–8.4)
RBC # BLD AUTO: 2.55 M/UL (ref 4.6–6.2)
SODIUM SERPL-SCNC: 140 MMOL/L (ref 136–145)
WBC # BLD AUTO: 5.5 K/UL (ref 3.9–12.7)

## 2025-08-13 PROCEDURE — 36415 COLL VENOUS BLD VENIPUNCTURE: CPT | Mod: PO

## 2025-08-13 PROCEDURE — 85027 COMPLETE CBC AUTOMATED: CPT

## 2025-08-13 PROCEDURE — 82040 ASSAY OF SERUM ALBUMIN: CPT

## 2025-08-14 ENCOUNTER — PATIENT MESSAGE (OUTPATIENT)
Dept: HEMATOLOGY/ONCOLOGY | Facility: CLINIC | Age: 70
End: 2025-08-14
Payer: MEDICARE

## 2025-08-18 ENCOUNTER — OFFICE VISIT (OUTPATIENT)
Dept: UROLOGY | Facility: CLINIC | Age: 70
End: 2025-08-18
Payer: MEDICARE

## 2025-08-18 ENCOUNTER — OFFICE VISIT (OUTPATIENT)
Dept: SURGERY | Facility: CLINIC | Age: 70
End: 2025-08-18
Payer: MEDICARE

## 2025-08-18 ENCOUNTER — LAB VISIT (OUTPATIENT)
Dept: LAB | Facility: HOSPITAL | Age: 70
End: 2025-08-18
Attending: UROLOGY
Payer: MEDICARE

## 2025-08-18 VITALS
WEIGHT: 178.81 LBS | DIASTOLIC BLOOD PRESSURE: 63 MMHG | SYSTOLIC BLOOD PRESSURE: 111 MMHG | HEIGHT: 71 IN | BODY MASS INDEX: 25.03 KG/M2 | HEART RATE: 64 BPM

## 2025-08-18 VITALS
HEART RATE: 85 BPM | HEIGHT: 71 IN | WEIGHT: 178.81 LBS | SYSTOLIC BLOOD PRESSURE: 96 MMHG | BODY MASS INDEX: 25.03 KG/M2 | DIASTOLIC BLOOD PRESSURE: 59 MMHG

## 2025-08-18 DIAGNOSIS — C67.0 MALIGNANT NEOPLASM OF TRIGONE OF URINARY BLADDER: Primary | ICD-10-CM

## 2025-08-18 DIAGNOSIS — Z87.19 HX OF CONSTIPATION: ICD-10-CM

## 2025-08-18 DIAGNOSIS — C61 PROSTATE CANCER: ICD-10-CM

## 2025-08-18 DIAGNOSIS — K62.89 ANAL PAIN: Primary | ICD-10-CM

## 2025-08-18 DIAGNOSIS — C67.0 MALIGNANT NEOPLASM OF TRIGONE OF URINARY BLADDER: ICD-10-CM

## 2025-08-18 DIAGNOSIS — Z93.6 PRESENCE OF UROSTOMY: ICD-10-CM

## 2025-08-18 LAB
ANION GAP (OHS): 9 MMOL/L (ref 8–16)
BUN SERPL-MCNC: 21 MG/DL (ref 8–23)
CALCIUM SERPL-MCNC: 9.1 MG/DL (ref 8.7–10.5)
CHLORIDE SERPL-SCNC: 106 MMOL/L (ref 95–110)
CO2 SERPL-SCNC: 24 MMOL/L (ref 23–29)
CREAT SERPL-MCNC: 1.3 MG/DL (ref 0.5–1.4)
ERYTHROCYTE [DISTWIDTH] IN BLOOD BY AUTOMATED COUNT: 13.5 % (ref 11.5–14.5)
GFR SERPLBLD CREATININE-BSD FMLA CKD-EPI: 59 ML/MIN/1.73/M2
GLUCOSE SERPL-MCNC: 111 MG/DL (ref 70–110)
HCT VFR BLD AUTO: 27.2 % (ref 40–54)
HGB BLD-MCNC: 8.3 GM/DL (ref 14–18)
MCH RBC QN AUTO: 28.5 PG (ref 27–31)
MCHC RBC AUTO-ENTMCNC: 30.5 G/DL (ref 32–36)
MCV RBC AUTO: 94 FL (ref 82–98)
PLATELET # BLD AUTO: 304 K/UL (ref 150–450)
PLATELET BLD QL SMEAR: NORMAL
PMV BLD AUTO: 8.4 FL (ref 9.2–12.9)
POTASSIUM SERPL-SCNC: 4.8 MMOL/L (ref 3.5–5.1)
RBC # BLD AUTO: 2.91 M/UL (ref 4.6–6.2)
SODIUM SERPL-SCNC: 139 MMOL/L (ref 136–145)
WBC # BLD AUTO: 9.49 K/UL (ref 3.9–12.7)

## 2025-08-18 PROCEDURE — 36415 COLL VENOUS BLD VENIPUNCTURE: CPT

## 2025-08-18 PROCEDURE — 82565 ASSAY OF CREATININE: CPT

## 2025-08-18 PROCEDURE — 99999 PR PBB SHADOW E&M-EST. PATIENT-LVL III: CPT | Mod: PBBFAC,,, | Performed by: UROLOGY

## 2025-08-18 PROCEDURE — 85027 COMPLETE CBC AUTOMATED: CPT

## 2025-08-18 PROCEDURE — 99213 OFFICE O/P EST LOW 20 MIN: CPT | Mod: PBBFAC,27 | Performed by: UROLOGY

## 2025-08-18 PROCEDURE — 99215 OFFICE O/P EST HI 40 MIN: CPT | Mod: PBBFAC | Performed by: NURSE PRACTITIONER

## 2025-08-18 PROCEDURE — 99024 POSTOP FOLLOW-UP VISIT: CPT | Mod: POP,,, | Performed by: UROLOGY

## 2025-08-18 PROCEDURE — 99999 PR PBB SHADOW E&M-EST. PATIENT-LVL V: CPT | Mod: PBBFAC,,, | Performed by: NURSE PRACTITIONER

## 2025-08-18 RX ORDER — LIDOCAINE 50 MG/G
OINTMENT TOPICAL
Qty: 30 G | Refills: 2 | Status: SHIPPED | OUTPATIENT
Start: 2025-08-18

## 2025-08-18 RX ORDER — HYDROCORTISONE 25 MG/G
CREAM TOPICAL 2 TIMES DAILY
Qty: 28 G | Refills: 2 | Status: SHIPPED | OUTPATIENT
Start: 2025-08-18

## 2025-08-19 ENCOUNTER — TELEPHONE (OUTPATIENT)
Dept: HEMATOLOGY/ONCOLOGY | Facility: CLINIC | Age: 70
End: 2025-08-19
Payer: MEDICARE

## 2025-08-19 ENCOUNTER — TELEPHONE (OUTPATIENT)
Dept: SURGERY | Facility: CLINIC | Age: 70
End: 2025-08-19
Payer: MEDICARE

## 2025-08-19 ENCOUNTER — PATIENT MESSAGE (OUTPATIENT)
Dept: HEMATOLOGY/ONCOLOGY | Facility: CLINIC | Age: 70
End: 2025-08-19
Payer: MEDICARE

## 2025-08-20 ENCOUNTER — PATIENT MESSAGE (OUTPATIENT)
Dept: HEMATOLOGY/ONCOLOGY | Facility: CLINIC | Age: 70
End: 2025-08-20
Payer: MEDICARE

## 2025-08-21 ENCOUNTER — PATIENT MESSAGE (OUTPATIENT)
Dept: UROLOGY | Facility: CLINIC | Age: 70
End: 2025-08-21
Payer: MEDICARE

## 2025-08-21 ENCOUNTER — PATIENT MESSAGE (OUTPATIENT)
Dept: HEMATOLOGY/ONCOLOGY | Facility: CLINIC | Age: 70
End: 2025-08-21
Payer: MEDICARE

## 2025-08-22 ENCOUNTER — PATIENT MESSAGE (OUTPATIENT)
Dept: CARDIOLOGY | Facility: CLINIC | Age: 70
End: 2025-08-22
Payer: MEDICARE

## 2025-08-22 ENCOUNTER — INFUSION (OUTPATIENT)
Dept: INFUSION THERAPY | Facility: HOSPITAL | Age: 70
End: 2025-08-22
Attending: INTERNAL MEDICINE
Payer: MEDICARE

## 2025-08-22 ENCOUNTER — LAB VISIT (OUTPATIENT)
Dept: LAB | Facility: HOSPITAL | Age: 70
End: 2025-08-22
Attending: DIETITIAN, REGISTERED
Payer: MEDICARE

## 2025-08-22 ENCOUNTER — OFFICE VISIT (OUTPATIENT)
Dept: HEMATOLOGY/ONCOLOGY | Facility: CLINIC | Age: 70
End: 2025-08-22
Payer: MEDICARE

## 2025-08-22 ENCOUNTER — PATIENT MESSAGE (OUTPATIENT)
Dept: FAMILY MEDICINE | Facility: CLINIC | Age: 70
End: 2025-08-22
Payer: MEDICARE

## 2025-08-22 VITALS
WEIGHT: 175.5 LBS | BODY MASS INDEX: 24.57 KG/M2 | RESPIRATION RATE: 16 BRPM | HEART RATE: 77 BPM | SYSTOLIC BLOOD PRESSURE: 113 MMHG | OXYGEN SATURATION: 100 % | DIASTOLIC BLOOD PRESSURE: 74 MMHG | TEMPERATURE: 98 F | HEIGHT: 71 IN

## 2025-08-22 VITALS
HEART RATE: 77 BPM | BODY MASS INDEX: 24.57 KG/M2 | RESPIRATION RATE: 16 BRPM | TEMPERATURE: 98 F | WEIGHT: 175.5 LBS | HEIGHT: 71 IN | OXYGEN SATURATION: 100 % | DIASTOLIC BLOOD PRESSURE: 74 MMHG | SYSTOLIC BLOOD PRESSURE: 113 MMHG

## 2025-08-22 DIAGNOSIS — E11.21 TYPE 2 DIABETES MELLITUS WITH DIABETIC NEPHROPATHY, WITH LONG-TERM CURRENT USE OF INSULIN: ICD-10-CM

## 2025-08-22 DIAGNOSIS — G20.A2 PARKINSON'S DISEASE WITHOUT DYSKINESIA, WITH FLUCTUATING MANIFESTATIONS: ICD-10-CM

## 2025-08-22 DIAGNOSIS — D64.9 NORMOCYTIC ANEMIA: ICD-10-CM

## 2025-08-22 DIAGNOSIS — C67.9 SMALL CELL CARCINOMA OF BLADDER: Primary | ICD-10-CM

## 2025-08-22 DIAGNOSIS — C7A.1 MALIGNANT POORLY DIFFERENTIATED NEUROENDOCRINE TUMORS: ICD-10-CM

## 2025-08-22 DIAGNOSIS — C61 PROSTATE CANCER: ICD-10-CM

## 2025-08-22 DIAGNOSIS — Z79.4 TYPE 2 DIABETES MELLITUS WITH DIABETIC NEPHROPATHY, WITH LONG-TERM CURRENT USE OF INSULIN: ICD-10-CM

## 2025-08-22 LAB
ABSOLUTE EOSINOPHIL (OHS): 0.34 K/UL
ABSOLUTE MONOCYTE (OHS): 0.5 K/UL (ref 0.3–1)
ABSOLUTE NEUTROPHIL COUNT (OHS): 3.98 K/UL (ref 1.8–7.7)
ALBUMIN SERPL BCP-MCNC: 3.1 G/DL (ref 3.5–5.2)
ALP SERPL-CCNC: 95 UNIT/L (ref 40–150)
ALT SERPL W/O P-5'-P-CCNC: <5 UNIT/L (ref 10–44)
ANION GAP (OHS): 8 MMOL/L (ref 8–16)
AST SERPL-CCNC: 14 UNIT/L (ref 11–45)
BASOPHILS # BLD AUTO: 0.07 K/UL
BASOPHILS NFR BLD AUTO: 1.1 %
BILIRUB SERPL-MCNC: 0.4 MG/DL (ref 0.1–1)
BUN SERPL-MCNC: 24 MG/DL (ref 8–23)
CALCIUM SERPL-MCNC: 8.9 MG/DL (ref 8.7–10.5)
CHLORIDE SERPL-SCNC: 107 MMOL/L (ref 95–110)
CO2 SERPL-SCNC: 24 MMOL/L (ref 23–29)
CREAT SERPL-MCNC: 1.3 MG/DL (ref 0.5–1.4)
ERYTHROCYTE [DISTWIDTH] IN BLOOD BY AUTOMATED COUNT: 13.5 % (ref 11.5–14.5)
GFR SERPLBLD CREATININE-BSD FMLA CKD-EPI: 59 ML/MIN/1.73/M2
GLUCOSE SERPL-MCNC: 158 MG/DL (ref 70–110)
HCT VFR BLD AUTO: 27.8 % (ref 40–54)
HGB BLD-MCNC: 8.9 GM/DL (ref 14–18)
IMM GRANULOCYTES # BLD AUTO: 0.01 K/UL (ref 0–0.04)
IMM GRANULOCYTES NFR BLD AUTO: 0.2 % (ref 0–0.5)
LYMPHOCYTES # BLD AUTO: 1.73 K/UL (ref 1–4.8)
MCH RBC QN AUTO: 28.7 PG (ref 27–31)
MCHC RBC AUTO-ENTMCNC: 32 G/DL (ref 32–36)
MCV RBC AUTO: 90 FL (ref 82–98)
NUCLEATED RBC (/100WBC) (OHS): 0 /100 WBC
PLATELET # BLD AUTO: 268 K/UL (ref 150–450)
PMV BLD AUTO: 7.8 FL (ref 9.2–12.9)
POTASSIUM SERPL-SCNC: 4.6 MMOL/L (ref 3.5–5.1)
PROT SERPL-MCNC: 6.7 GM/DL (ref 6–8.4)
RBC # BLD AUTO: 3.1 M/UL (ref 4.6–6.2)
RELATIVE EOSINOPHIL (OHS): 5.1 %
RELATIVE LYMPHOCYTE (OHS): 26.1 % (ref 18–48)
RELATIVE MONOCYTE (OHS): 7.5 % (ref 4–15)
RELATIVE NEUTROPHIL (OHS): 60 % (ref 38–73)
SODIUM SERPL-SCNC: 139 MMOL/L (ref 136–145)
WBC # BLD AUTO: 6.63 K/UL (ref 3.9–12.7)

## 2025-08-22 PROCEDURE — 99215 OFFICE O/P EST HI 40 MIN: CPT | Mod: PBBFAC,25,PN | Performed by: INTERNAL MEDICINE

## 2025-08-22 PROCEDURE — A4216 STERILE WATER/SALINE, 10 ML: HCPCS | Mod: PN | Performed by: INTERNAL MEDICINE

## 2025-08-22 PROCEDURE — 36415 COLL VENOUS BLD VENIPUNCTURE: CPT | Mod: PN

## 2025-08-22 PROCEDURE — 99214 OFFICE O/P EST MOD 30 MIN: CPT | Mod: S$PBB,,, | Performed by: INTERNAL MEDICINE

## 2025-08-22 PROCEDURE — 63600175 PHARM REV CODE 636 W HCPCS: Mod: PN | Performed by: INTERNAL MEDICINE

## 2025-08-22 PROCEDURE — 85025 COMPLETE CBC W/AUTO DIFF WBC: CPT | Mod: PN

## 2025-08-22 PROCEDURE — 25000003 PHARM REV CODE 250: Mod: PN | Performed by: INTERNAL MEDICINE

## 2025-08-22 PROCEDURE — 99999 PR PBB SHADOW E&M-EST. PATIENT-LVL V: CPT | Mod: PBBFAC,,, | Performed by: INTERNAL MEDICINE

## 2025-08-22 PROCEDURE — 36591 DRAW BLOOD OFF VENOUS DEVICE: CPT | Mod: PN

## 2025-08-22 PROCEDURE — 84075 ASSAY ALKALINE PHOSPHATASE: CPT | Mod: PN

## 2025-08-22 RX ORDER — SODIUM CHLORIDE 0.9 % (FLUSH) 0.9 %
10 SYRINGE (ML) INJECTION
Status: DISCONTINUED | OUTPATIENT
Start: 2025-08-22 | End: 2025-08-22 | Stop reason: HOSPADM

## 2025-08-22 RX ORDER — SODIUM CHLORIDE 0.9 % (FLUSH) 0.9 %
10 SYRINGE (ML) INJECTION
OUTPATIENT
Start: 2025-08-22

## 2025-08-22 RX ORDER — HEPARIN 100 UNIT/ML
500 SYRINGE INTRAVENOUS
Status: CANCELLED | OUTPATIENT
Start: 2025-08-22

## 2025-08-22 RX ORDER — SODIUM CHLORIDE 0.9 % (FLUSH) 0.9 %
10 SYRINGE (ML) INJECTION
Status: CANCELLED | OUTPATIENT
Start: 2025-08-22

## 2025-08-22 RX ORDER — HEPARIN 100 UNIT/ML
500 SYRINGE INTRAVENOUS
Status: DISCONTINUED | OUTPATIENT
Start: 2025-08-22 | End: 2025-08-22 | Stop reason: HOSPADM

## 2025-08-22 RX ORDER — HEPARIN 100 UNIT/ML
500 SYRINGE INTRAVENOUS
OUTPATIENT
Start: 2025-08-22

## 2025-08-22 RX ADMIN — Medication 500 UNITS: at 02:08

## 2025-08-22 RX ADMIN — Medication 10 ML: at 02:08

## 2025-08-24 ENCOUNTER — PATIENT MESSAGE (OUTPATIENT)
Dept: HEMATOLOGY/ONCOLOGY | Facility: CLINIC | Age: 70
End: 2025-08-24
Payer: MEDICARE

## 2025-08-25 ENCOUNTER — TELEPHONE (OUTPATIENT)
Dept: HEMATOLOGY/ONCOLOGY | Facility: CLINIC | Age: 70
End: 2025-08-25
Payer: MEDICARE

## 2025-08-25 DIAGNOSIS — C61 PROSTATE CANCER: Primary | ICD-10-CM

## 2025-08-25 DIAGNOSIS — C67.9 SMALL CELL CARCINOMA OF BLADDER: ICD-10-CM

## 2025-08-26 ENCOUNTER — PATIENT MESSAGE (OUTPATIENT)
Dept: HEMATOLOGY/ONCOLOGY | Facility: CLINIC | Age: 70
End: 2025-08-26
Payer: MEDICARE

## 2025-08-26 DIAGNOSIS — E11.21 TYPE 2 DIABETES MELLITUS WITH DIABETIC NEPHROPATHY, WITH LONG-TERM CURRENT USE OF INSULIN: Primary | ICD-10-CM

## 2025-08-26 DIAGNOSIS — Z79.4 TYPE 2 DIABETES MELLITUS WITH DIABETIC NEPHROPATHY, WITH LONG-TERM CURRENT USE OF INSULIN: Primary | ICD-10-CM

## 2025-08-26 RX ORDER — BLOOD-GLUCOSE SENSOR
1 EACH MISCELLANEOUS
Qty: 5 EACH | Refills: 5 | Status: SHIPPED | OUTPATIENT
Start: 2025-08-26 | End: 2026-08-26

## 2025-08-27 ENCOUNTER — LAB VISIT (OUTPATIENT)
Dept: LAB | Facility: HOSPITAL | Age: 70
End: 2025-08-27
Attending: INTERNAL MEDICINE
Payer: MEDICARE

## 2025-08-27 ENCOUNTER — OFFICE VISIT (OUTPATIENT)
Dept: NEUROSURGERY | Facility: CLINIC | Age: 70
End: 2025-08-27
Payer: MEDICARE

## 2025-08-27 VITALS
SYSTOLIC BLOOD PRESSURE: 101 MMHG | BODY MASS INDEX: 24.57 KG/M2 | WEIGHT: 175.5 LBS | DIASTOLIC BLOOD PRESSURE: 64 MMHG | HEART RATE: 70 BPM | HEIGHT: 71 IN | RESPIRATION RATE: 18 BRPM

## 2025-08-27 DIAGNOSIS — N25.81 SECONDARY HYPERPARATHYROIDISM OF RENAL ORIGIN: ICD-10-CM

## 2025-08-27 DIAGNOSIS — N18.2 CKD (CHRONIC KIDNEY DISEASE) STAGE 2, GFR 60-89 ML/MIN: ICD-10-CM

## 2025-08-27 DIAGNOSIS — E23.7 PITUITARY ABNORMALITY: Primary | ICD-10-CM

## 2025-08-27 LAB
ALBUMIN SERPL BCP-MCNC: 3.6 G/DL (ref 3.5–5.2)
ANION GAP (OHS): 9 MMOL/L (ref 8–16)
BUN SERPL-MCNC: 28 MG/DL (ref 8–23)
CALCIUM SERPL-MCNC: 9.4 MG/DL (ref 8.7–10.5)
CHLORIDE SERPL-SCNC: 107 MMOL/L (ref 95–110)
CO2 SERPL-SCNC: 24 MMOL/L (ref 23–29)
CREAT SERPL-MCNC: 1.2 MG/DL (ref 0.5–1.4)
GFR SERPLBLD CREATININE-BSD FMLA CKD-EPI: >60 ML/MIN/1.73/M2
GLUCOSE SERPL-MCNC: 137 MG/DL (ref 70–110)
PHOSPHATE SERPL-MCNC: 3.9 MG/DL (ref 2.7–4.5)
POTASSIUM SERPL-SCNC: 4.5 MMOL/L (ref 3.5–5.1)
PTH-INTACT SERPL-MCNC: 59.8 PG/ML (ref 9–77)
SODIUM SERPL-SCNC: 140 MMOL/L (ref 136–145)

## 2025-08-27 PROCEDURE — 99214 OFFICE O/P EST MOD 30 MIN: CPT | Mod: S$PBB,,,

## 2025-08-27 PROCEDURE — 36415 COLL VENOUS BLD VENIPUNCTURE: CPT | Mod: PO

## 2025-08-27 PROCEDURE — 83970 ASSAY OF PARATHORMONE: CPT

## 2025-08-27 PROCEDURE — 80069 RENAL FUNCTION PANEL: CPT

## 2025-08-28 ENCOUNTER — PATIENT MESSAGE (OUTPATIENT)
Dept: HEMATOLOGY/ONCOLOGY | Facility: CLINIC | Age: 70
End: 2025-08-28
Payer: MEDICARE

## 2025-08-29 ENCOUNTER — PATIENT MESSAGE (OUTPATIENT)
Dept: HEMATOLOGY/ONCOLOGY | Facility: CLINIC | Age: 70
End: 2025-08-29
Payer: MEDICARE

## 2025-08-29 LAB
CYSTATIN C SERPL-MCNC: 1.23 MG/L (ref 0.67–1.21)
GFR/BSA.PRED SERPLBLD CYS-BASED-ARV: 57 ML/MIN/BSA

## 2025-09-02 ENCOUNTER — TELEPHONE (OUTPATIENT)
Dept: FAMILY MEDICINE | Facility: CLINIC | Age: 70
End: 2025-09-02
Payer: MEDICARE

## 2025-09-04 ENCOUNTER — TELEPHONE (OUTPATIENT)
Dept: FAMILY MEDICINE | Facility: CLINIC | Age: 70
End: 2025-09-04
Payer: MEDICARE

## (undated) DEVICE — DRAPE THREE-QTR REINF 53X77IN

## (undated) DEVICE — GOWN SURGICAL X-LARGE

## (undated) DEVICE — SEE MEDLINE ITEM 157117

## (undated) DEVICE — ELECTRODE BLD 1 INCH TEFLON

## (undated) DEVICE — BLADE SURG #15 CARBON STEEL

## (undated) DEVICE — SUT PDS PLUS VIOLET LOOP 0 CTX

## (undated) DEVICE — SYS EXOFIN SKIN CLOSURE 22CM

## (undated) DEVICE — SOL BALANCED SALT 500ML

## (undated) DEVICE — SUT PDS II O CTX MONO 60

## (undated) DEVICE — KNIFE OPHTH MICRO UNITOME 5MM

## (undated) DEVICE — DRAPE T TRNSVRS LAP 102X78X121

## (undated) DEVICE — DISSECTOR LIGASURE EXACT 21CM

## (undated) DEVICE — PANTIES FEMININE NAPKIN LG/XLG

## (undated) DEVICE — SUT CHROMIC 3-0 SH 27IN GUT

## (undated) DEVICE — NDL SAFETY 22G X 1.5 ECLIPSE

## (undated) DEVICE — DRAPE STERI INSTRUMENT 1018

## (undated) DEVICE — NDL FLTR 5MCRN BLNT TIP 18GX1

## (undated) DEVICE — CONTAINER SPECIMEN STRL 4OZ

## (undated) DEVICE — DRESSING EYE OVAL LF

## (undated) DEVICE — LIGATING HEMOCLIP MED

## (undated) DEVICE — HANDLE SURG LIGHT NONRIGID

## (undated) DEVICE — SUT VICRYL 3-0 27 SH

## (undated) DEVICE — SEE MEDLINE ITEM 157128

## (undated) DEVICE — SYR 30CC LUER LOCK

## (undated) DEVICE — FORCEP GRIESHABER MAXGRIP 25G

## (undated) DEVICE — SYR 10CC LUER LOCK

## (undated) DEVICE — NDL SPINAL 25GX3.5 SPINOCAN

## (undated) DEVICE — GLOVE SURG BIOGEL LATEX SZ 7.5

## (undated) DEVICE — PAD ELECTROSURGICAL PAT PLATE

## (undated) DEVICE — SEE MEDLINE ITEM 152622

## (undated) DEVICE — PACK TOTAL PLUS 25G VITRECTOMY

## (undated) DEVICE — Device

## (undated) DEVICE — GAUZE PETROLATUM VASLNE 3X36IN

## (undated) DEVICE — SOL BSS BALANCED SALT

## (undated) DEVICE — DRAPE LAP T SHT W/ INSTR PAD

## (undated) DEVICE — BACKFLUSH 25GA SOFT-TIP DISP

## (undated) DEVICE — SEE MEDLINE ITEM 152739

## (undated) DEVICE — CONTAINER SPECIMEN OR STER 4OZ

## (undated) DEVICE — DRAPE OPHTHALMIC 48X62 FEN

## (undated) DEVICE — KIT PERFLUOROCARBON LIQUID

## (undated) DEVICE — SUT SILK 0 STRANDS 30IN BLK

## (undated) DEVICE — RELOAD PROXIMATE CUT BLUE 75MM

## (undated) DEVICE — SUT PROLENE 2-0 30 SH

## (undated) DEVICE — NEEDLE HYPODERMIC HUB LUER LOC

## (undated) DEVICE — GOWN POLY REINF BRTH SLV XL

## (undated) DEVICE — APPLICATOR CHLORAPREP CLR 10.5

## (undated) DEVICE — COVER LIGHT HANDLE 80/CA

## (undated) DEVICE — BNDG COFLEX FOAM LF2 ST 4X5YD

## (undated) DEVICE — CLIP LIGATING MEDIUM

## (undated) DEVICE — SUT VICRYL+ 2-0 UR6 27 VIOL

## (undated) DEVICE — DRAPE C ARM 42 X 120 10/BX

## (undated) DEVICE — SOL NS 1000CC

## (undated) DEVICE — COVER CAMERA OPERATING ROOM

## (undated) DEVICE — HEMOSTAT SURGICEL 4X8IN

## (undated) DEVICE — SOL IRR STRL WATER 500ML

## (undated) DEVICE — LENS VITRCTMY OPHTH 30DEG 59DE

## (undated) DEVICE — SYR DISP LL 5CC

## (undated) DEVICE — KIT GREY EYE

## (undated) DEVICE — TRAY NERVE BLOCK

## (undated) DEVICE — GARTER EYE ADULT

## (undated) DEVICE — SUT VICRYL 0 SH

## (undated) DEVICE — PACK INSTRUMENT COVER DISPO

## (undated) DEVICE — FORCEP GRASPING 25GA SMOOTH

## (undated) DEVICE — DRAPE ABDOMINAL TIBURON 14X11

## (undated) DEVICE — PENCIL ROCKER SWITCH 10FT CORD

## (undated) DEVICE — SUT 0 VICRYL / CT-1

## (undated) DEVICE — INSERT CUSHIONPRONE VIEW LARGE

## (undated) DEVICE — PROBE ILLUM FLEX CURVE LASER

## (undated) DEVICE — SUT MCRYL PLUS 4-0 PS2 27IN

## (undated) DEVICE — TOWEL OR DISP STRL BLUE 4/PK

## (undated) DEVICE — SOL BETADINE 5%

## (undated) DEVICE — FORMALIN 60ML PREFILLED CONT

## (undated) DEVICE — REMOVER LOTION

## (undated) DEVICE — LIGATING CLIP LARGE

## (undated) DEVICE — GAUZE SPONGE 4'X4' 8PLY NS

## (undated) DEVICE — BANDAGE ADHESIVE PLAS STRL 1X3

## (undated) DEVICE — SOL WATER STRL IRR 1000ML

## (undated) DEVICE — PAD PREP CUFFED NS 24X48IN

## (undated) DEVICE — SOL GONAK

## (undated) DEVICE — GEL ULTRASOUND SQZ BTL 8.5OZ

## (undated) DEVICE — GLOVE 7.5 PROTEXIS PI MICRO

## (undated) DEVICE — SUT PDS II 5-0 RB-1RB-1 VI

## (undated) DEVICE — MANIFOLD 4 PORT

## (undated) DEVICE — LABEL FOR UTILITY MARKER

## (undated) DEVICE — SHIELD COLLAGEN 12HR CORNEAL

## (undated) DEVICE — PACK OPHTHALMIC

## (undated) DEVICE — ELECTRODE REM PLYHSV RETURN 9

## (undated) DEVICE — SUT VICRYL PLUS 0 CT1 36IN

## (undated) DEVICE — SEE MEDLINE ITEM 146372

## (undated) DEVICE — SUT SILK 3-0 SH 18IN BLACK

## (undated) DEVICE — SEE MEDLINE ITEM 157027

## (undated) DEVICE — NDL BLUNT W/O FILTER 18GX1IN

## (undated) DEVICE — SOL NACL IRR 1000ML BTL

## (undated) DEVICE — ADHESIVE DERMABOND ADVANCED

## (undated) DEVICE — SUT MONOCYRL 4-0 PS2 UND

## (undated) DEVICE — CLOSURE SKIN STERI STRIP 1/2X4

## (undated) DEVICE — PAD ABD 8X10 STERILE

## (undated) DEVICE — SUT ETHILON 2-0 PSLX 30IN

## (undated) DEVICE — SEE MEDLINE ITEM 157131

## (undated) DEVICE — EVACUATOR PENCIL SMOKE NEPTUNE

## (undated) DEVICE — CORD FOR BIPOLAR FORCEPS 12

## (undated) DEVICE — SUT 5/0 27IN PDS II VIO MO

## (undated) DEVICE — TRAY MUSCLE LID EYE

## (undated) DEVICE — APPLICATOR CHLORAPREP ORN 26ML

## (undated) DEVICE — SYR 1CC TB SG 27GX1/2

## (undated) DEVICE — SEE L#152161

## (undated) DEVICE — TRAY CATH 1-LYR URIMTR 16FR

## (undated) DEVICE — LOOP VESSEL BLUE MAXI

## (undated) DEVICE — NDL 22GA X1 1/2 REG BEVEL

## (undated) DEVICE — GOWN NONREINF SET-IN SLV 2XL

## (undated) DEVICE — DRESSING ADH ISLAND 3.6 X 14

## (undated) DEVICE — NDL TISSUE BIOPSY MAGNUM

## (undated) DEVICE — NDL HYPO STD REG BVL 22GX1.5IN

## (undated) DEVICE — ADHESIVE MASTISOL VIAL 48/BX

## (undated) DEVICE — CANNULA ANTERIOR CHAMBER 30G

## (undated) DEVICE — LOOP VESSEL YELLOW MAXI

## (undated) DEVICE — COVER OVERHEAD SURG LT BLUE

## (undated) DEVICE — DRAPE INCISE IOBAN 2 23X17IN

## (undated) DEVICE — COVER MAYO STAND REINFRCD 30

## (undated) DEVICE — DECANTER VIAL ASEPTIC TRANSFER

## (undated) DEVICE — GAUZE SPONGE 4X4 12PLY

## (undated) DEVICE — SUT SILK 0 SUTUPAK SA86H

## (undated) DEVICE — SUT VCRL + COAT UD 2-0 27IN

## (undated) DEVICE — CANNULA VENOM 20G 10X100MM

## (undated) DEVICE — SUT 7/0 18IN COATED VICRYL

## (undated) DEVICE — SUT PROLENE 2-0 SH 36IN BLU

## (undated) DEVICE — SEE MEDLINE ITEM 157181

## (undated) DEVICE — DURAPREP SURG SCRUB 26ML

## (undated) DEVICE — NDL SAFETY 25G X 1.5 ECLIPSE

## (undated) DEVICE — SUT CHR GUT 3-0 RB-1 27IN

## (undated) DEVICE — SYR IRRIGATION BULB STER 60ML

## (undated) DEVICE — NDL HYPO A BEVEL 22X1 1/2

## (undated) DEVICE — ELECTRODE MEGADYNE RETURN DUAL

## (undated) DEVICE — SHIELD FOX W/GARTER

## (undated) DEVICE — SUT VICRYL PLUS 3-0 PS2 18

## (undated) DEVICE — SEE MEDLINE ITEM 154981

## (undated) DEVICE — COVER TRANSDUCER LATEX N/STERI

## (undated) DEVICE — CUP MEDICINE GRAD STRL 2OZ

## (undated) DEVICE — BLADE SURG CARBON STEEL SZ11

## (undated) DEVICE — GLOVE PROTEXIS HYDROGEL SZ7.5

## (undated) DEVICE — GLOVE SENSICARE PI SLT 7.5

## (undated) DEVICE — GLOVE 7.5 PROTEXIS PI BLUE

## (undated) DEVICE — TAPE SILK 3IN

## (undated) DEVICE — SUT VICRYL PLUS 3-0 SH 18IN

## (undated) DEVICE — STAPLER INT PROX TX 60X3.5MM

## (undated) DEVICE — EVACUATOR WOUND BULB 100CC

## (undated) DEVICE — SYRINGE 30CC LL W/O NDL

## (undated) DEVICE — PACK DRAPE PERI/GYN TIBURON

## (undated) DEVICE — GAUZE SPONGE PEANUT STRL

## (undated) DEVICE — SEE L#120831

## (undated) DEVICE — COVER PROBE STERILE CIVFLEX

## (undated) DEVICE — DRAPE STERI INCISE MED 130X130

## (undated) DEVICE — GLOVE BIOGEL PI MICRO SZ 7.5

## (undated) DEVICE — CUTTER PROXIMATE BLUE 75MM

## (undated) DEVICE — COVER TABLE HVY DTY 60X90IN

## (undated) DEVICE — SUT CTD VICRYL 1 UND BR

## (undated) DEVICE — UNDERGLOVES BIOGEL PI SIZE 7.5

## (undated) DEVICE — PACK EYE CUSTOM COVINGTON.

## (undated) DEVICE — GLOVE BIOGEL ECLIPSE SZ 6.5

## (undated) DEVICE — DRAIN CHANNEL ROUND 15FR

## (undated) DEVICE — SUT VICRYL CTD 2-0 GI 27 SH

## (undated) DEVICE — SUT CTD VICRYL 2-0 UND BR

## (undated) DEVICE — SHIELD EYE METAL FOX 50/BX

## (undated) DEVICE — CLIPPER BLADE MOD 4406 (CAREF)

## (undated) DEVICE — SYR 50CC LL

## (undated) DEVICE — TRAY GENERAL SURGERY OMC

## (undated) DEVICE — MARKER SKIN RULER STERILE

## (undated) DEVICE — DRAPE STERI-DRAPE 1000 17X11IN

## (undated) DEVICE — COVER SURG LIGHT HANDLE

## (undated) DEVICE — STAPLER SKIN PROXIMATE WIDE

## (undated) DEVICE — BRIEF MESH LARGE

## (undated) DEVICE — TIP YANKAUERS BULB NO VENT

## (undated) DEVICE — SPONGE WEC CEL SPEARS

## (undated) DEVICE — SUT 2/0 30IN SILK BLK BRAI

## (undated) DEVICE — DECANTER FLUID TRNSF WHITE 9IN

## (undated) DEVICE — LUBRICANT SURGILUBE 2 OZ

## (undated) DEVICE — DRAPE LITHOTOMY SHEET

## (undated) DEVICE — KIT UROSTOMY

## (undated) DEVICE — NDL SPINAL 22GX7 SPINOCAN

## (undated) DEVICE — GLOVE SENSICARE PI MICRO 7.5